# Patient Record
Sex: FEMALE | Race: WHITE | NOT HISPANIC OR LATINO | Employment: OTHER | URBAN - METROPOLITAN AREA
[De-identification: names, ages, dates, MRNs, and addresses within clinical notes are randomized per-mention and may not be internally consistent; named-entity substitution may affect disease eponyms.]

---

## 2022-05-27 ENCOUNTER — CARE AT HOME (OUTPATIENT)
Dept: HOME HEALTH SERVICES | Facility: CLINIC | Age: 87
End: 2022-05-27
Payer: MEDICARE

## 2022-05-27 DIAGNOSIS — I10 HYPERTENSION, UNSPECIFIED TYPE: ICD-10-CM

## 2022-05-27 DIAGNOSIS — G47.00 INSOMNIA, UNSPECIFIED TYPE: ICD-10-CM

## 2022-05-27 PROCEDURE — 99497 PR ADVNCD CARE PLAN 30 MIN: ICD-10-PCS | Mod: S$GLB,,, | Performed by: NURSE PRACTITIONER

## 2022-05-27 PROCEDURE — 99350 PR HOME VISIT,ESTAB PATIENT,LEVEL IV: ICD-10-PCS | Mod: S$GLB,,, | Performed by: NURSE PRACTITIONER

## 2022-05-27 PROCEDURE — 99350 HOME/RES VST EST HIGH MDM 60: CPT | Mod: S$GLB,,, | Performed by: NURSE PRACTITIONER

## 2022-05-27 PROCEDURE — 99497 ADVNCD CARE PLAN 30 MIN: CPT | Mod: S$GLB,,, | Performed by: NURSE PRACTITIONER

## 2022-05-30 NOTE — PROGRESS NOTES
[facility]  Medical Home Visit    Visit Date: 2022  Encounter Provider: Rula Mays  PCP:  Rula Mays, NP    Subjective:      Patient ID: Radha Sandoval is a 87 y.o. female.    Consult Requested By:  No ref. provider found  Reason for Consult:  Establish care    Radha is being seen at home due to being seen at home due to physical debility that presents a taxing effort to leave the home, to mitigate high risk of hospital readmission and/or due to the limited availability of reliable or safe options for transportation to the point of access to the provider. Prior to treatment on this visit the chart was reviewed and patient verbal consent was obtained.    Chief Complaint: Establish Care HTN, Insomnia      HPI  Radha Sandoval  is a  y/o M/F with a past medical history of  Hypertension and Insomnia . Currently is ambulatory with a rollator and lives home alone in [place], she has a son who lives nearby and checks on her frequently. She is interested in moving to [facility], an assistant living facility as it is getting taxing to prepare meals etc.     With this visit today patient is found at home alone, ambulatory with rollator. Patient is AAOx3, able to verify her name and . Most of patients other history was received from her medical record. She does not have Home health and there is no skill for home health that meets medical necessity. She does not have a PCP, I will refer to [facility] clinic to establish PCP. I will continue seeing the patient in the home as it is difficult for him to physically make multiple visits. SHe endorses eating x 3 meals per day. SHe endorses regular BMs.       Fall/Safety precautions. Education completed ~ 15 minutes. Plan to follow up.     VSS. Denies fever, chest pain, shortness of breath, nausea, vomiting, diarrhea. Risks of environmental exposure to coronavirus discussed including: social distancing, hand hygiene, and limiting departures from the home for  necessities only.  Reports understanding and willingness to comply.  All hospital discharge orders reviewed and being followed, all medications reconciled and reviewed, patient and family verbalized understanding. No other needs identified at this time.     I initiated the process of advance care planning today and explained the importance of this process to the patient and family.  I introduced the concept of advance directives to the patient, as well. Then the patient received detailed information about the importance of designating a Health Care Power of  (HCPOA). She was also instructed to communicate with this person about his wishes for future healthcare, should he become sick and lose decision-making capacity. FULL CODE STATUS    I Introduced [place] form with patient/family, explaining this is the patient's wishes, and this form will be uploaded into the patient's [facility] and the [place] Registry.     We spoke about ACP for 20 minutes.    Attestation: Screening criteria to assess the level of the patient's risk for infection with COVID-19 as recommended by the CDC at the time of the above documented home visit concluded appropriateness to proceed. Universal precautions were maintained at all times, including provider use of 60% alcohol gel hand  immediately prior to entry and upon departing the patient's home.    Review of Systems   Constitutional: Negative for fatigue and unexpected weight change.   HENT: Positive for trouble swallowing. Negative for congestion, dental problem and drooling.    Eyes: Negative for redness and visual disturbance.   Respiratory: Negative for cough and shortness of breath.    Cardiovascular: Negative for chest pain and palpitations.   Gastrointestinal: Negative for abdominal distention, abdominal pain, nausea and vomiting.   Endocrine: Negative for polydipsia and polyuria.   Genitourinary: Negative for difficulty urinating and flank pain.    Musculoskeletal: Positive for gait problem. Negative for arthralgias.   Skin: Negative for color change and rash.   Allergic/Immunologic: Negative for food allergies.   Neurological: Negative for dizziness and weakness.   Psychiatric/Behavioral: Negative for agitation.       Assessments:  · Environmental: Lives in single story home with 4 steps to enter  · Functional Status: Independent with ADLs, rollator for mobility   · Safety: Fall precautions  · Nutritional: Heart healthy  · Home Health/DME/Supplies: rollator    Objective:   Physical Exam  HENT:      Head: Atraumatic.      Nose: Nose normal.      Mouth/Throat:      Mouth: Mucous membranes are moist.   Eyes:      Pupils: Pupils are equal, round, and reactive to light.   Cardiovascular:      Rate and Rhythm: Normal rate.      Pulses: Normal pulses.   Pulmonary:      Effort: Pulmonary effort is normal.   Abdominal:      General: Abdomen is flat. Bowel sounds are normal.      Palpations: Abdomen is soft.   Musculoskeletal:         General: Normal range of motion.      Cervical back: Normal range of motion.   Skin:     General: Skin is warm and dry.      Capillary Refill: Capillary refill takes less than 2 seconds.   Neurological:      Mental Status: She is alert and oriented to person, place, and time.         Vitals:    05/27/22 1015   BP: 128/73   Pulse: 72   Resp: 18   Temp: 97.6 °F (36.4 °C)   SpO2: 98%   PainSc: 0-No pain     There is no height or weight on file to calculate BMI.    Assessment:     1. Hypertension, unspecified type    2. Insomnia, unspecified type      Hypertension    Norvasc 10 mg po daily  Lisinopril 10 mg po daily  Low Na diet    Insomnia  Ramelteon 8 mg po Qhs     Constipation  Colace 100 mg po prn constipation    Plan:     Ethical / Legal: Advance Care Planning   · Surrogate decision maker:  Cali Sandoval, Relationship: son  · Code Status:  Full code  · [place]:  To discuss with family  · Other advance directive:  To discuss with family,  Capacity to make medical decisions:  yes, Conflict n/a       Problem List Items Addressed This Visit        Cardiac/Vascular    Hypertension       Other    Insomnia           Were controlled substances prescribed?  No    Follow Up Appointments:   No future appointments.    Signature: Rula Mays, NP

## 2022-06-10 VITALS
HEART RATE: 72 BPM | SYSTOLIC BLOOD PRESSURE: 128 MMHG | OXYGEN SATURATION: 98 % | TEMPERATURE: 98 F | DIASTOLIC BLOOD PRESSURE: 73 MMHG | RESPIRATION RATE: 18 BRPM

## 2022-06-10 PROBLEM — I10 HYPERTENSION: Status: ACTIVE | Noted: 2022-06-10

## 2022-06-10 PROBLEM — G47.00 INSOMNIA: Status: ACTIVE | Noted: 2022-06-10

## 2022-06-10 RX ORDER — LISINOPRIL 10 MG/1
10 TABLET ORAL DAILY
Status: ON HOLD | COMMUNITY
End: 2023-01-01 | Stop reason: HOSPADM

## 2022-06-10 RX ORDER — RAMELTEON 8 MG/1
8 TABLET ORAL NIGHTLY
Status: ON HOLD | COMMUNITY
End: 2023-01-01 | Stop reason: HOSPADM

## 2022-06-10 RX ORDER — AMLODIPINE BESYLATE 10 MG/1
10 TABLET ORAL DAILY
COMMUNITY
End: 2023-01-01

## 2022-06-10 NOTE — PATIENT INSTRUCTIONS
Instructions:  - [facility] Nurse Practitioner to schedule home follow-up visit with patient in 4-6 weeks or as needed.  - Continue all medications, treatments and therapies as ordered.   - Follow all instructions, recommendations as discussed.  - Maintain Safety Precautions at all times.  - Attend all medical appointments as scheduled.  - For worsening symptoms: call Primary Care Physician or Nurse Practitioner.  - For emergencies, call 911 or immediately report to the nearest emergency room.  - Limit Risks of environmental exposure to coronavirus/COVID-19 as discussed including: social distancing, hand hygiene, and limiting departures from the home for necessities only.

## 2022-07-08 ENCOUNTER — CARE AT HOME (OUTPATIENT)
Dept: HOME HEALTH SERVICES | Facility: CLINIC | Age: 87
End: 2022-07-08
Payer: MEDICARE

## 2022-07-08 DIAGNOSIS — G30.9 ALZHEIMER'S DEMENTIA WITHOUT BEHAVIORAL DISTURBANCE, UNSPECIFIED TIMING OF DEMENTIA ONSET: Primary | ICD-10-CM

## 2022-07-08 DIAGNOSIS — Z51.5 PALLIATIVE CARE ENCOUNTER: ICD-10-CM

## 2022-07-08 DIAGNOSIS — F02.80 ALZHEIMER'S DEMENTIA WITHOUT BEHAVIORAL DISTURBANCE, UNSPECIFIED TIMING OF DEMENTIA ONSET: Primary | ICD-10-CM

## 2022-07-08 PROCEDURE — 99349 HOME/RES VST EST MOD MDM 40: CPT | Mod: S$GLB,,, | Performed by: NURSE PRACTITIONER

## 2022-07-08 PROCEDURE — 99349 PR HOME VISIT,ESTAB PATIENT,LEVEL III: ICD-10-PCS | Mod: S$GLB,,, | Performed by: NURSE PRACTITIONER

## 2022-07-11 NOTE — PROGRESS NOTES
[facility]  Medical Home Visit    Visit Date: 2022  Encounter Provider: Rula Mays  PCP:  Rula Mays, NP    Subjective:      Patient ID: Radha Sandoval is a 87 y.o. female.    Consult Requested By:  No ref. provider found  Reason for Consult:  Establish care    Radha is being seen at home due to being seen at home due to physical debility that presents a taxing effort to leave the home, to mitigate high risk of hospital readmission and/or due to the limited availability of reliable or safe options for transportation to the point of access to the provider. Prior to treatment on this visit the chart was reviewed and patient verbal consent was obtained.    Chief Complaint: Establish Care HTN, Insomnia      HPI  Radha Sandoval  is a  y/o M/F with a past medical history of  Hypertension and Insomnia . Currently is ambulatory with a rollator and was living at home alone in [place], she has a son who lives nearby and checks on her frequently. She has recently moved to [facility], an assistant living facility. She enjoys playing the piano.      With this visit today patient is found in her room at the [facility], ambulatory with rollator. Patient is AAOx3, able to verify her name and . Most of patients other history was received from her medical record. She does not have Home health and there is no skill for home health that meets medical necessity. She does not have a PCP, I will refer to [facility] clinic to establish PCP. I will continue seeing the patient in the home as it is difficult for him to physically make multiple visits. SHe endorses eating x 3 meals per day. SHe endorses regular BMs.  She feels she has made a good transition to the [facility].     Fall/Safety precautions. Education completed ~ 15 minutes. Plan to follow up.     VSS. Denies fever, chest pain, shortness of breath, nausea, vomiting, diarrhea. Risks of environmental exposure to coronavirus discussed including: social distancing, hand  hygiene, and limiting departures from the home for necessities only.  Reports understanding and willingness to comply.  All hospital discharge orders reviewed and being followed, all medications reconciled and reviewed, patient and family verbalized understanding. No other needs identified at this time.     Attestation: Screening criteria to assess the level of the patient's risk for infection with COVID-19 as recommended by the CDC at the time of the above documented home visit concluded appropriateness to proceed. Universal precautions were maintained at all times, including provider use of 60% alcohol gel hand  immediately prior to entry and upon departing the patient's home.    Review of Systems   Constitutional: Negative for fatigue and unexpected weight change.   HENT: Positive for trouble swallowing. Negative for congestion, dental problem and drooling.    Eyes: Negative for redness and visual disturbance.   Respiratory: Negative for cough and shortness of breath.    Cardiovascular: Negative for chest pain and palpitations.   Gastrointestinal: Negative for abdominal distention, abdominal pain, nausea and vomiting.   Endocrine: Negative for polydipsia and polyuria.   Genitourinary: Negative for difficulty urinating and flank pain.   Musculoskeletal: Positive for gait problem. Negative for arthralgias.   Skin: Negative for color change and rash.   Allergic/Immunologic: Negative for food allergies.   Neurological: Negative for dizziness and weakness.   Psychiatric/Behavioral: Negative for agitation.       Assessments:  · Environmental: Lives in single story home with 4 steps to enter  · Functional Status: Independent with ADLs, rollator for mobility   · Safety: Fall precautions  · Nutritional: Heart healthy  · Home Health/DME/Supplies: rollator    Objective:   Physical Exam  HENT:      Head: Atraumatic.      Nose: Nose normal.      Mouth/Throat:      Mouth: Mucous membranes are moist.   Eyes:       Pupils: Pupils are equal, round, and reactive to light.   Cardiovascular:      Rate and Rhythm: Normal rate.      Pulses: Normal pulses.   Pulmonary:      Effort: Pulmonary effort is normal.   Abdominal:      General: Abdomen is flat. Bowel sounds are normal.      Palpations: Abdomen is soft.   Musculoskeletal:         General: Normal range of motion.      Cervical back: Normal range of motion.   Skin:     General: Skin is warm and dry.      Capillary Refill: Capillary refill takes less than 2 seconds.   Neurological:      Mental Status: She is alert and oriented to person, place, and time.         Vitals:    07/08/22 1100   BP: 122/67   Pulse: 67   Resp: 18   Temp: 97.8 °F (36.6 °C)   SpO2: 98%   PainSc: 0-No pain     There is no height or weight on file to calculate BMI.    Assessment:     1. Alzheimer's dementia without behavioral disturbance, unspecified timing of dementia onset    2. Palliative care encounter      Hypertension    Norvasc 10 mg po daily  Lisinopril 10 mg po daily  Low Na diet    Insomnia  Ramelteon 8 mg po Qhs     Constipation  Colace 100 mg po prn constipation    Plan:     Ethical / Legal: Advance Care Planning   · Surrogate decision maker:  Cali Sandoval, Relationship: son  · Code Status:  Full code  · [place]:  To discuss with family  · Other advance directive:  To discuss with family, Capacity to make medical decisions:  yes, Conflict n/a       Problem List Items Addressed This Visit    None     Visit Diagnoses     Alzheimer's dementia without behavioral disturbance, unspecified timing of dementia onset    -  Primary    Palliative care encounter   C discussion              Were controlled substances prescribed?  No    Follow Up Appointments:   Future Appointments   Date Time Provider Department Center   8/26/2022  8:00 AM Rula Mays NP [facility] [place]       Signature: Rula Mays NP

## 2022-07-13 VITALS
OXYGEN SATURATION: 98 % | RESPIRATION RATE: 18 BRPM | SYSTOLIC BLOOD PRESSURE: 122 MMHG | TEMPERATURE: 98 F | HEART RATE: 67 BPM | DIASTOLIC BLOOD PRESSURE: 67 MMHG

## 2022-07-13 NOTE — PATIENT INSTRUCTIONS
Instructions:  - [facility] Nurse Practitioner to schedule home follow-up visit with patient in 4-6 weeks or as needed.  - Continue all medications, treatments and therapies as ordered.   - Follow all instructions, recommendations as discussed.  - Maintain Safety Precautions at all times.  - Attend all medical appointments as scheduled.  - For worsening symptoms: call Primary Care Physician or Nurse Practitioner.  - For emergencies, call 911 or immediately report to the nearest emergency room.  - Limit Risks of environmental exposure to coronavirus/COVID-19 as discussed including: social distancing, hand hygiene, and limiting departures from the home for necessities only.

## 2022-08-26 ENCOUNTER — CARE AT HOME (OUTPATIENT)
Dept: HOME HEALTH SERVICES | Facility: CLINIC | Age: 87
End: 2022-08-26
Payer: MEDICARE

## 2022-08-26 DIAGNOSIS — I10 HYPERTENSION, UNSPECIFIED TYPE: Primary | ICD-10-CM

## 2022-08-26 PROCEDURE — 99349 HOME/RES VST EST MOD MDM 40: CPT | Mod: S$GLB,,, | Performed by: NURSE PRACTITIONER

## 2022-08-26 PROCEDURE — 99349 PR HOME VISIT,ESTAB PATIENT,LEVEL III: ICD-10-PCS | Mod: S$GLB,,, | Performed by: NURSE PRACTITIONER

## 2022-08-29 NOTE — PROGRESS NOTES
[facility]  Medical Home Visit    Visit Date: 2022  Encounter Provider: Rula Mays  PCP:  Rula Mays, NP    Subjective:      Patient ID: Radha Sandoval is a 87 y.o. female.    Consult Requested By:    Reason for Consult:  Establish care    Radha is being seen at home due to being seen at home due to physical debility that presents a taxing effort to leave the home, to mitigate high risk of hospital readmission and/or due to the limited availability of reliable or safe options for transportation to the point of access to the provider. Prior to treatment on this visit the chart was reviewed and patient verbal consent was obtained.    Chief Complaint: Establish Care HTN, Insomnia      HPI  Radha Sandoval  is a  y/o M/F with a past medical history of  Hypertension and Insomnia . Currently is ambulatory with a rollator and was living at home alone in [place], she has a son who lives nearby and checks on her frequently. She has recently moved to [facility], an assistant living facility. She enjoys playing the piano and has the opportunity to do so a community events at the [facility].     With this visit today patient is found in her room at the [facility], ambulatory with rollator. Patient is AAOx3, able to verify her name and , she does exhibit some confusion today but is easily reoriented. Most of patients other history was received from her medical record. She does not have Home health and there is no skill for home health that meets medical necessity. She does not have a PCP, I will refer to [facility] clinic to establish PCP. I will continue seeing the patient in the home as it is difficult for him to physically make multiple visits. SHe endorses eating x 3 meals per day. SHe endorses regular BMs.  She feels she has made a good transition to the [facility].     Fall/Safety precautions. Education completed ~ 15 minutes. Plan to follow up.     VSS. Denies fever, chest pain, shortness of breath, nausea, vomiting,  diarrhea. Risks of environmental exposure to coronavirus discussed including: social distancing, hand hygiene, and limiting departures from the home for necessities only.  Reports understanding and willingness to comply.  All hospital discharge orders reviewed and being followed, all medications reconciled and reviewed, patient and family verbalized understanding. No other needs identified at this time.     Attestation: Screening criteria to assess the level of the patient's risk for infection with COVID-19 as recommended by the CDC at the time of the above documented home visit concluded appropriateness to proceed. Universal precautions were maintained at all times, including provider use of 60% alcohol gel hand  immediately prior to entry and upon departing the patient's home.    Review of Systems   Constitutional: Negative for fatigue and unexpected weight change.   HENT: Negative for congestion, dental problem and drooling.    Eyes: Negative for redness and visual disturbance.   Respiratory: Negative for cough and shortness of breath.    Cardiovascular: Negative for chest pain and palpitations.   Gastrointestinal: Negative for abdominal distention, abdominal pain, nausea and vomiting.   Endocrine: Negative for polydipsia and polyuria.   Genitourinary: Negative for difficulty urinating and flank pain.   Musculoskeletal: Positive for gait problem. Negative for arthralgias.   Skin: Negative for color change and rash.   Allergic/Immunologic: Negative for food allergies.   Neurological: Negative for dizziness and weakness.   Psychiatric/Behavioral: Negative for agitation.       Assessments:  Environmental: Lives in single story home with 4 steps to enter  Functional Status: Independent with ADLs, rollator for mobility   Safety: Fall precautions  Nutritional: Heart healthy  Home Health/DME/Supplies: rollator    Objective:   Physical Exam  HENT:      Head: Atraumatic.      Nose: Nose normal.      Mouth/Throat:       Mouth: Mucous membranes are moist.   Eyes:      Pupils: Pupils are equal, round, and reactive to light.   Cardiovascular:      Rate and Rhythm: Normal rate.      Pulses: Normal pulses.   Pulmonary:      Effort: Pulmonary effort is normal.   Abdominal:      General: Abdomen is flat. Bowel sounds are normal.      Palpations: Abdomen is soft.   Musculoskeletal:         General: Normal range of motion.      Cervical back: Normal range of motion.   Skin:     General: Skin is warm and dry.      Capillary Refill: Capillary refill takes less than 2 seconds.   Neurological:      Mental Status: She is alert and oriented to person, place, and time.         Vitals:    08/26/22 1217   Resp: 18   Temp: 97.5 °F (36.4 °C)   SpO2: 98%   PainSc: 0-No pain     There is no height or weight on file to calculate BMI.    Assessment:     1. Hypertension, unspecified type      Hypertension    Norvasc 10 mg po daily  Lisinopril 10 mg po daily  Low Na diet    Insomnia  Ramelteon 8 mg po Qhs     Constipation  Colace 100 mg po prn constipation    Plan:     Ethical / Legal: Advance Care Planning   Surrogate decision maker:  Cali Sandoval, Relationship: son  Code Status:  Full code  [place]:  To discuss with family  Other advance directive:  To discuss with family, Capacity to make medical decisions:  yes, Conflict n/a       Problem List Items Addressed This Visit    None       Visit Diagnoses       Alzheimer's dementia without behavioral disturbance, unspecified timing of dementia onset    -  Primary    Palliative care encounter   GOC discussion                Were controlled substances prescribed?  No    Follow Up Appointments:   No future appointments.      Signature: Rula Mays NP

## 2022-09-05 VITALS — RESPIRATION RATE: 18 BRPM | OXYGEN SATURATION: 98 % | TEMPERATURE: 98 F

## 2022-09-05 NOTE — PATIENT INSTRUCTIONS
Instructions:  - [facility] Pract[facility] to schedule home follow-up visit with patient in 4-6 weeks or as needed.  - Continue all medications, treatments and therapies as ordered.   - Follow all instructions, recommendations as discussed.  - Maintain Safety Precautions at all times.  - Attend all medical appointments as scheduled.  - For worsening symptoms: call Primary Care Physician or Nurse Practitioner.  - For emergencies, call 911 or immediately report to the nearest emergency room.  - Limit Risks of environmental exposure to coronavirus/COVID-19 as discussed including: social distancing, hand hygiene, and limiting departures from the home for necessities only.

## 2022-10-07 ENCOUNTER — CARE AT HOME (OUTPATIENT)
Dept: HOME HEALTH SERVICES | Facility: CLINIC | Age: 87
End: 2022-10-07
Payer: MEDICARE

## 2022-10-07 DIAGNOSIS — G47.00 INSOMNIA, UNSPECIFIED TYPE: ICD-10-CM

## 2022-10-07 DIAGNOSIS — I10 HYPERTENSION, UNSPECIFIED TYPE: Primary | ICD-10-CM

## 2022-10-07 PROCEDURE — 99349 PR HOME VISIT,ESTAB PATIENT,LEVEL III: ICD-10-PCS | Mod: ,,, | Performed by: NURSE PRACTITIONER

## 2022-10-07 PROCEDURE — 99349 HOME/RES VST EST MOD MDM 40: CPT | Mod: ,,, | Performed by: NURSE PRACTITIONER

## 2022-10-10 NOTE — PROGRESS NOTES
[facility]  Medical Home Visit    Visit Date: 10/07/2022  Encounter Provider: Rula Mays  PCP:  Rula Mays, NP    Subjective:      Patient ID: Radha Sandoval is a 87 y.o. female.    Consult Requested By:    Reason for Consult:  Medical follow up    Radha is being seen at home due to being seen at home due to physical debility that presents a taxing effort to leave the home, to mitigate high risk of hospital readmission and/or due to the limited availability of reliable or safe options for transportation to the point of access to the provider. Prior to treatment on this visit the chart was reviewed and patient verbal consent was obtained.    Chief Complaint: HTN, Insomnia      HPI  Radha Sandoval  is an 86 y/o F with a past medical history of  Hypertension and Insomnia . Currently is ambulatory with a rollator and was living at home alone in [place], she has a son who lives nearby and checks on her frequently. She has recently moved to [facility], an assistant [facility] facility where she is being evaluated today. She enjoys playing the piano and has the opportunity to do so at community events at the [facility].     With this visit today patient is found in her room at the [facility], ambulatory with rollator. Patient is AAOx3, able to verify her name and , she does exhibit some confusion today but is easily reoriented. Most of patients other history was received from her medical record. She does not have Home health and there is no skill for home health that meets medical necessity. She does not have a PCP, I will refer to [facility] clinic to establish PCP. I will continue seeing the patient in the home as it is difficult for him to physically make multiple visits. SHe endorses eating x 3 meals per day. SHe endorses regular BMs.  She feels she has made a good transition to the [facility]. She has no acute concerns, I have refilled all prescriptions.     Fall/Safety precautions. Education completed ~ 15 minutes. Plan to  follow up.     VSS. Denies fever, chest pain, shortness of breath, nausea, vomiting, diarrhea. Risks of environmental exposure to coronavirus discussed including: social distancing, hand hygiene, and limiting departures from the home for necessities only.  Reports understanding and willingness to comply.  All hospital discharge orders reviewed and being followed, all medications reconciled and reviewed, patient and family verbalized understanding. No other needs identified at this time.     Attestation: Screening criteria to assess the level of the patient's risk for infection with COVID-19 as recommended by the CDC at the time of the above documented home visit concluded appropriateness to proceed. Universal precautions were maintained at all times, including provider use of 60% alcohol gel hand  immediately prior to entry and upon departing the patient's home.    Review of Systems   Constitutional: Negative for fatigue and unexpected weight change.   HENT: Negative for congestion, dental problem and drooling.    Eyes: Negative for redness and visual disturbance.   Respiratory: Negative for cough and shortness of breath.    Cardiovascular: Negative for chest pain and palpitations.   Gastrointestinal: Negative for abdominal distention, abdominal pain, nausea and vomiting.   Endocrine: Negative for polydipsia and polyuria.   Genitourinary: Negative for difficulty urinating and flank pain.   Musculoskeletal: Positive for gait problem. Negative for arthralgias.   Skin: Negative for color change and rash.   Allergic/Immunologic: Negative for food allergies.   Neurological: Negative for dizziness and weakness.   Psychiatric/Behavioral: Negative for agitation.       Assessments:  Environmental: Lives in single story home with 4 steps to enter  Functional Status: Independent with ADLs, rollator for mobility   Safety: Fall precautions  Nutritional: Heart healthy  Home Health/DME/Supplies: rollator    Objective:    Physical Exam  HENT:      Head: Atraumatic.      Nose: Nose normal.      Mouth/Throat:      Mouth: Mucous membranes are moist.   Eyes:      Pupils: Pupils are equal, round, and reactive to light.   Cardiovascular:      Rate and Rhythm: Normal rate.      Pulses: Normal pulses.   Pulmonary:      Effort: Pulmonary effort is normal.   Abdominal:      General: Abdomen is flat. Bowel sounds are normal.      Palpations: Abdomen is soft.   Musculoskeletal:         General: Normal range of motion.      Cervical back: Normal range of motion.   Skin:     General: Skin is warm and dry.      Capillary Refill: Capillary refill takes less than 2 seconds.   Neurological:      Mental Status: She is alert and oriented to person, place, and time.         Vitals:    10/07/22 1100   Pulse: 70   Resp: 18   Temp: 97.8 °F (36.6 °C)   SpO2: 98%   PainSc: 0-No pain     There is no height or weight on file to calculate BMI.    Assessment:     1. Hypertension, unspecified type    2. Insomnia, unspecified type      Hypertension    Norvasc 10 mg po daily  Lisinopril 10 mg po daily  Low Na diet    Insomnia  Ramelteon 8 mg po Qhs     Constipation  Colace 100 mg po prn constipation    Plan:     Ethical / Legal: Advance Care Planning   Surrogate decision maker:  Cali Sandoval, Relationship: son  Code Status:  Full code  LaPOST:  To discuss with family  Other advance directive:  To discuss with family, Capacity to make medical decisions:  yes, Conflict n/a       Problem List Items Addressed This Visit    None       Visit Diagnoses       Alzheimer's dementia without behavioral disturbance, unspecified timing of dementia onset    -  Primary    Palliative care encounter   GOC discussion                Were controlled substances prescribed?  No    Follow Up Appointments:   No future appointments.      Signature: Rula Mays, MARYANA

## 2022-10-12 VITALS — OXYGEN SATURATION: 98 % | RESPIRATION RATE: 18 BRPM | TEMPERATURE: 98 F | HEART RATE: 70 BPM

## 2022-10-12 NOTE — PATIENT INSTRUCTIONS
Instructions:  - [facility] Pract[facility] to schedule home follow-up visit with patient in 6-8 weeks or as needed.  - Continue all medications, treatments and therapies as ordered.   - Follow all instructions, recommendations as discussed.  - Maintain Safety Precautions at all times.  - Attend all medical appointments as scheduled.  - For worsening symptoms: call Primary Care Physician or Nurse Practitioner.  - For emergencies, call 911 or immediately report to the nearest emergency room.  - Limit Risks of environmental exposure to coronavirus/COVID-19 as discussed including: social distancing, hand hygiene, and limiting departures from the home for necessities only.

## 2022-12-09 ENCOUNTER — CARE AT HOME (OUTPATIENT)
Dept: HOME HEALTH SERVICES | Facility: CLINIC | Age: 87
End: 2022-12-09
Payer: MEDICARE

## 2022-12-09 DIAGNOSIS — I10 HYPERTENSION, UNSPECIFIED TYPE: Primary | ICD-10-CM

## 2022-12-09 PROCEDURE — 99349 PR HOME VISIT,ESTAB PATIENT,LEVEL III: ICD-10-PCS | Mod: S$GLB,,, | Performed by: NURSE PRACTITIONER

## 2022-12-09 PROCEDURE — 99349 HOME/RES VST EST MOD MDM 40: CPT | Mod: S$GLB,,, | Performed by: NURSE PRACTITIONER

## 2022-12-28 VITALS — TEMPERATURE: 98 F | OXYGEN SATURATION: 96 % | RESPIRATION RATE: 18 BRPM | HEART RATE: 76 BPM

## 2022-12-28 NOTE — PROGRESS NOTES
[facility]  Medical Home Visit    Visit Date: 2022  Encounter Provider: Rula Mays  PCP:  Rula Mays, NP    Subjective:      Patient ID: Radha Sandoval is a 87 y.o. female.    Consult Requested By:    Reason for Consult:  Medical follow up    Radha is being seen at home due to being seen at home due to physical debility that presents a taxing effort to leave the home, to mitigate high risk of hospital readmission and/or due to the limited availability of reliable or safe options for transportation to the point of access to the provider. Prior to treatment on this visit the chart was reviewed and patient verbal consent was obtained.    Chief Complaint: HTN, Insomnia      HPI  Radha Sandoval  is an 88 y/o F with a past medical history of  Hypertension and Insomnia . At our first visit she was ambulatory with a rollator and was living at home alone in [place], she has a son who lives nearby and checks on her frequently. She has recently moved to [facility], an assistant [facility] facility where she is being evaluated today. She enjoys playing the piano and has the opportunity to do so at community events at the [facility].     With this visit today patient is found in her room at the [facility], ambulatory with rollator. Patient is AAOx3, able to verify her name and , she does exhibit some confusion today but is easily reoriented. Most of patients other history was received from her medical record. She does not have Home health and there is no skill for home health that meets medical necessity. She does not have a PCP, I will refer to [facility] clinic to establish PCP. I will continue seeing the patient in the home as it is difficult for him to physically make multiple visits. SHe endorses eating x 3 meals per day. SHe endorses regular BMs.  She feels she has made a good transition to the [facility]. She has no acute concerns, I have refilled all prescriptions.     Fall/Safety precautions. Education completed ~ 15  minutes. Plan to follow up.     VSS. Denies fever, chest pain, shortness of breath, nausea, vomiting, diarrhea. Risks of environmental exposure to coronavirus discussed including: social distancing, hand hygiene, and limiting departures from the home for necessities only.  Reports understanding and willingness to comply.  All hospital discharge orders reviewed and being followed, all medications reconciled and reviewed, patient and family verbalized understanding. No other needs identified at this time.     Attestation: Screening criteria to assess the level of the patient's risk for infection with COVID-19 as recommended by the CDC at the time of the above documented home visit concluded appropriateness to proceed. Universal precautions were maintained at all times, including provider use of 60% alcohol gel hand  immediately prior to entry and upon departing the patient's home.    Review of Systems   Constitutional: Negative for fatigue and unexpected weight change.   HENT: Negative for congestion, dental problem and drooling.    Eyes: Negative for redness and visual disturbance.   Respiratory: Negative for cough and shortness of breath.    Cardiovascular: Negative for chest pain and palpitations.   Gastrointestinal: Negative for abdominal distention, abdominal pain, nausea and vomiting.   Endocrine: Negative for polydipsia and polyuria.   Genitourinary: Negative for difficulty urinating and flank pain.   Musculoskeletal: Positive for gait problem. Negative for arthralgias.   Skin: Negative for color change and rash.   Allergic/Immunologic: Negative for food allergies.   Neurological: Negative for dizziness and weakness.   Psychiatric/Behavioral: Negative for agitation.       Assessments:  Environmental: Lives in single story home with 4 steps to enter  Functional Status: Independent with ADLs, rollator for mobility   Safety: Fall precautions  Nutritional: Heart healthy  Home Health/DME/Supplies:  rollator    Objective:   Physical Exam  HENT:      Head: Atraumatic.      Nose: Nose normal.      Mouth/Throat:      Mouth: Mucous membranes are moist.   Eyes:      Pupils: Pupils are equal, round, and reactive to light.   Cardiovascular:      Rate and Rhythm: Normal rate.      Pulses: Normal pulses.   Pulmonary:      Effort: Pulmonary effort is normal.   Abdominal:      General: Abdomen is flat. Bowel sounds are normal.      Palpations: Abdomen is soft.   Musculoskeletal:         General: Normal range of motion.      Cervical back: Normal range of motion.   Skin:     General: Skin is warm and dry.      Capillary Refill: Capillary refill takes less than 2 seconds.   Neurological:      Mental Status: She is alert and oriented to person, place, and time.         Vitals:    12/09/22 0912   Pulse: 76   Resp: 18   Temp: 97.8 °F (36.6 °C)   SpO2: 96%     There is no height or weight on file to calculate BMI.    Assessment:     No diagnosis found.    Hypertension    Norvasc 10 mg po daily  Lisinopril 10 mg po daily  Low Na diet    Insomnia  Ramelteon 8 mg po Qhs     Constipation  Colace 100 mg po prn constipation    Plan:     Ethical / Legal: Advance Care Planning   Surrogate decision maker:  Cali Sandoval, Relationship: son  Code Status:  Full code  LaPOST:  To discuss with family  Other advance directive:  To discuss with family, Capacity to make medical decisions:  yes, Conflict n/a       Problem List Items Addressed This Visit    None       Visit Diagnoses       Alzheimer's dementia without behavioral disturbance, unspecified timing of dementia onset    -  Primary    Palliative care encounter   GOC discussion                Were controlled substances prescribed?  No    Follow Up Appointments:   No future appointments.      Signature: Rula Mays NP

## 2022-12-28 NOTE — PATIENT INSTRUCTIONS
Instructions:  - [facility] Nurse Practition[facility] to schedule home follow-up visit with patient in 6-8 weeks or as needed.  - Continue all medications, treatments and therapies as ordered.   - Follow all instructions, recommendations as discussed.  - Maintain Safety Precautions at all times.  - Attend all medical appointments as scheduled.  - For worsening symptoms: call Primary Care Physician or Nurse Practitioner.  - For emergencies, call 911 or immediately report to the nearest emergency room.  - Limit Risks of environmental exposure to coronavirus/COVID-19 as discussed including: social distancing, hand hygiene, and limiting departures from the home for necessities only.

## 2023-01-01 ENCOUNTER — TELEPHONE (OUTPATIENT)
Dept: NEUROLOGY | Facility: CLINIC | Age: 88
End: 2023-01-01
Payer: MEDICARE

## 2023-01-01 ENCOUNTER — HOSPITAL ENCOUNTER (INPATIENT)
Facility: HOSPITAL | Age: 88
LOS: 9 days | Discharge: NURSING, REHAB OR LONG-TERM CARE | DRG: 690 | End: 2023-04-28
Attending: STUDENT IN AN ORGANIZED HEALTH CARE EDUCATION/TRAINING PROGRAM | Admitting: STUDENT IN AN ORGANIZED HEALTH CARE EDUCATION/TRAINING PROGRAM
Payer: MEDICARE

## 2023-01-01 ENCOUNTER — PATIENT OUTREACH (OUTPATIENT)
Dept: ADMINISTRATIVE | Facility: CLINIC | Age: 88
End: 2023-01-01
Payer: MEDICARE

## 2023-01-01 ENCOUNTER — CARE AT HOME (OUTPATIENT)
Dept: HOME HEALTH SERVICES | Facility: CLINIC | Age: 88
End: 2023-01-01
Payer: MEDICARE

## 2023-01-01 ENCOUNTER — HOSPITAL ENCOUNTER (INPATIENT)
Facility: HOSPITAL | Age: 88
LOS: 7 days | Discharge: NURSING, REHAB OR LONG-TERM CARE | DRG: 175 | End: 2023-05-29
Attending: EMERGENCY MEDICINE | Admitting: STUDENT IN AN ORGANIZED HEALTH CARE EDUCATION/TRAINING PROGRAM
Payer: MEDICARE

## 2023-01-01 ENCOUNTER — TELEPHONE (OUTPATIENT)
Dept: ADMINISTRATIVE | Facility: HOSPITAL | Age: 88
End: 2023-01-01
Payer: MEDICARE

## 2023-01-01 ENCOUNTER — LAB VISIT (OUTPATIENT)
Dept: LAB | Facility: HOSPITAL | Age: 88
End: 2023-01-01
Payer: MEDICARE

## 2023-01-01 ENCOUNTER — TELEPHONE (OUTPATIENT)
Dept: NEUROSURGERY | Facility: CLINIC | Age: 88
End: 2023-01-01
Payer: MEDICARE

## 2023-01-01 ENCOUNTER — HOSPITAL ENCOUNTER (INPATIENT)
Facility: HOSPITAL | Age: 88
LOS: 4 days | Discharge: NURSING, REHAB OR LONG-TERM CARE | DRG: 092 | End: 2023-08-14
Attending: EMERGENCY MEDICINE | Admitting: HOSPITALIST
Payer: MEDICARE

## 2023-01-01 ENCOUNTER — HOSPITAL ENCOUNTER (INPATIENT)
Facility: HOSPITAL | Age: 88
LOS: 6 days | Discharge: HOME HEALTH | DRG: 291 | End: 2023-07-14
Attending: STUDENT IN AN ORGANIZED HEALTH CARE EDUCATION/TRAINING PROGRAM | Admitting: HOSPITALIST
Payer: MEDICARE

## 2023-01-01 ENCOUNTER — HOSPITAL ENCOUNTER (INPATIENT)
Facility: OTHER | Age: 88
LOS: 2 days | Discharge: NURSING, REHAB OR LONG-TERM CARE | DRG: 682 | End: 2023-09-21
Attending: EMERGENCY MEDICINE | Admitting: HOSPITALIST
Payer: MEDICARE

## 2023-01-01 ENCOUNTER — OFFICE VISIT (OUTPATIENT)
Dept: INTERNAL MEDICINE | Facility: CLINIC | Age: 88
DRG: 690 | End: 2023-01-01
Payer: MEDICARE

## 2023-01-01 ENCOUNTER — TELEPHONE (OUTPATIENT)
Dept: INTERNAL MEDICINE | Facility: CLINIC | Age: 88
End: 2023-01-01
Payer: MEDICARE

## 2023-01-01 ENCOUNTER — PATIENT MESSAGE (OUTPATIENT)
Dept: ORTHOPEDICS | Facility: CLINIC | Age: 88
End: 2023-01-01
Payer: MEDICARE

## 2023-01-01 ENCOUNTER — HOSPITAL ENCOUNTER (INPATIENT)
Facility: HOSPITAL | Age: 88
LOS: 3 days | Discharge: NURSING, REHAB OR LONG-TERM CARE | DRG: 689 | End: 2023-12-19
Attending: EMERGENCY MEDICINE | Admitting: EMERGENCY MEDICINE
Payer: MEDICARE

## 2023-01-01 ENCOUNTER — OFFICE VISIT (OUTPATIENT)
Dept: INTERNAL MEDICINE | Facility: CLINIC | Age: 88
DRG: 963 | End: 2023-01-01
Payer: MEDICARE

## 2023-01-01 ENCOUNTER — ANESTHESIA EVENT (OUTPATIENT)
Dept: SURGERY | Facility: HOSPITAL | Age: 88
DRG: 352 | End: 2023-01-01
Payer: MEDICARE

## 2023-01-01 ENCOUNTER — HOSPITAL ENCOUNTER (EMERGENCY)
Facility: OTHER | Age: 88
Discharge: HOME | End: 2023-11-06
Attending: EMERGENCY MEDICINE
Payer: MEDICARE

## 2023-01-01 ENCOUNTER — TELEPHONE (OUTPATIENT)
Dept: INTERNAL MEDICINE | Facility: CLINIC | Age: 88
End: 2023-01-01

## 2023-01-01 ENCOUNTER — ANESTHESIA (OUTPATIENT)
Dept: SURGERY | Facility: HOSPITAL | Age: 88
DRG: 352 | End: 2023-01-01
Payer: MEDICARE

## 2023-01-01 ENCOUNTER — OFFICE VISIT (OUTPATIENT)
Dept: ORTHOPEDICS | Facility: CLINIC | Age: 88
End: 2023-01-01
Payer: MEDICARE

## 2023-01-01 ENCOUNTER — EXTERNAL HOME HEALTH (OUTPATIENT)
Dept: HOME HEALTH SERVICES | Facility: HOSPITAL | Age: 88
End: 2023-01-01
Payer: MEDICARE

## 2023-01-01 ENCOUNTER — HOSPITAL ENCOUNTER (INPATIENT)
Facility: HOSPITAL | Age: 88
LOS: 15 days | Discharge: NURSING, REHAB OR LONG-TERM CARE | DRG: 963 | End: 2023-08-07
Attending: EMERGENCY MEDICINE | Admitting: INTERNAL MEDICINE
Payer: MEDICARE

## 2023-01-01 ENCOUNTER — HOSPITAL ENCOUNTER (OUTPATIENT)
Dept: RADIOLOGY | Facility: HOSPITAL | Age: 88
Discharge: HOME | End: 2023-11-15
Attending: PHYSICIAN ASSISTANT
Payer: MEDICARE

## 2023-01-01 ENCOUNTER — OFFICE VISIT (OUTPATIENT)
Dept: NEUROLOGY | Facility: CLINIC | Age: 88
End: 2023-01-01
Payer: MEDICARE

## 2023-01-01 ENCOUNTER — HOSPITAL ENCOUNTER (EMERGENCY)
Facility: HOSPITAL | Age: 88
Discharge: HOME | End: 2023-03-27
Attending: EMERGENCY MEDICINE
Payer: MEDICARE

## 2023-01-01 ENCOUNTER — TELEPHONE (OUTPATIENT)
Dept: ORTHOPEDICS | Facility: CLINIC | Age: 88
End: 2023-01-01
Payer: MEDICARE

## 2023-01-01 ENCOUNTER — HOSPITAL ENCOUNTER (INPATIENT)
Facility: HOSPITAL | Age: 88
LOS: 3 days | Discharge: HOME | DRG: 352 | End: 2023-05-09
Attending: EMERGENCY MEDICINE | Admitting: SURGERY
Payer: MEDICARE

## 2023-01-01 ENCOUNTER — PATIENT MESSAGE (OUTPATIENT)
Dept: NEUROLOGY | Facility: CLINIC | Age: 88
End: 2023-01-01
Payer: MEDICARE

## 2023-01-01 ENCOUNTER — HOSPITAL ENCOUNTER (OUTPATIENT)
Dept: RADIOLOGY | Facility: HOSPITAL | Age: 88
Discharge: HOME | End: 2023-08-30
Attending: PHYSICIAN ASSISTANT
Payer: MEDICARE

## 2023-01-01 ENCOUNTER — HOSPITAL ENCOUNTER (INPATIENT)
Facility: OTHER | Age: 88
LOS: 2 days | Discharge: HOME | DRG: 380 | End: 2024-01-04
Attending: EMERGENCY MEDICINE | Admitting: INTERNAL MEDICINE
Payer: MEDICARE

## 2023-01-01 VITALS
WEIGHT: 167.56 LBS | HEART RATE: 80 BPM | RESPIRATION RATE: 16 BRPM | TEMPERATURE: 98 F | BODY MASS INDEX: 29.69 KG/M2 | OXYGEN SATURATION: 95 % | DIASTOLIC BLOOD PRESSURE: 51 MMHG | HEIGHT: 63 IN | SYSTOLIC BLOOD PRESSURE: 109 MMHG

## 2023-01-01 VITALS
DIASTOLIC BLOOD PRESSURE: 72 MMHG | SYSTOLIC BLOOD PRESSURE: 179 MMHG | RESPIRATION RATE: 18 BRPM | TEMPERATURE: 98 F | WEIGHT: 133.63 LBS | HEART RATE: 80 BPM | OXYGEN SATURATION: 98 % | BODY MASS INDEX: 22.93 KG/M2

## 2023-01-01 VITALS
DIASTOLIC BLOOD PRESSURE: 79 MMHG | HEIGHT: 65 IN | WEIGHT: 200 LBS | RESPIRATION RATE: 18 BRPM | TEMPERATURE: 99 F | OXYGEN SATURATION: 95 % | HEART RATE: 91 BPM | BODY MASS INDEX: 33.32 KG/M2 | SYSTOLIC BLOOD PRESSURE: 194 MMHG

## 2023-01-01 VITALS
HEART RATE: 80 BPM | WEIGHT: 156.75 LBS | TEMPERATURE: 97 F | OXYGEN SATURATION: 98 % | SYSTOLIC BLOOD PRESSURE: 152 MMHG | BODY MASS INDEX: 26.12 KG/M2 | DIASTOLIC BLOOD PRESSURE: 60 MMHG | HEIGHT: 65 IN

## 2023-01-01 VITALS
OXYGEN SATURATION: 92 % | SYSTOLIC BLOOD PRESSURE: 126 MMHG | DIASTOLIC BLOOD PRESSURE: 56 MMHG | HEART RATE: 81 BPM | RESPIRATION RATE: 18 BRPM | TEMPERATURE: 98 F

## 2023-01-01 VITALS
WEIGHT: 156 LBS | HEIGHT: 65 IN | SYSTOLIC BLOOD PRESSURE: 132 MMHG | TEMPERATURE: 98 F | OXYGEN SATURATION: 93 % | BODY MASS INDEX: 25.99 KG/M2 | HEART RATE: 58 BPM | DIASTOLIC BLOOD PRESSURE: 63 MMHG | RESPIRATION RATE: 18 BRPM

## 2023-01-01 VITALS
WEIGHT: 167 LBS | SYSTOLIC BLOOD PRESSURE: 139 MMHG | DIASTOLIC BLOOD PRESSURE: 64 MMHG | HEART RATE: 88 BPM | RESPIRATION RATE: 20 BRPM | TEMPERATURE: 97 F | HEIGHT: 64 IN | OXYGEN SATURATION: 97 % | BODY MASS INDEX: 28.51 KG/M2

## 2023-01-01 VITALS
SYSTOLIC BLOOD PRESSURE: 133 MMHG | HEART RATE: 115 BPM | WEIGHT: 167 LBS | OXYGEN SATURATION: 97 % | TEMPERATURE: 98 F | RESPIRATION RATE: 19 BRPM | DIASTOLIC BLOOD PRESSURE: 60 MMHG | BODY MASS INDEX: 28.51 KG/M2 | HEIGHT: 64 IN

## 2023-01-01 VITALS
WEIGHT: 167 LBS | HEIGHT: 63 IN | SYSTOLIC BLOOD PRESSURE: 128 MMHG | BODY MASS INDEX: 29.59 KG/M2 | DIASTOLIC BLOOD PRESSURE: 70 MMHG

## 2023-01-01 VITALS
DIASTOLIC BLOOD PRESSURE: 65 MMHG | TEMPERATURE: 98 F | HEART RATE: 72 BPM | OXYGEN SATURATION: 97 % | RESPIRATION RATE: 18 BRPM | WEIGHT: 134 LBS | SYSTOLIC BLOOD PRESSURE: 154 MMHG | HEIGHT: 64 IN | BODY MASS INDEX: 22.88 KG/M2

## 2023-01-01 VITALS
DIASTOLIC BLOOD PRESSURE: 57 MMHG | HEIGHT: 65 IN | TEMPERATURE: 98 F | SYSTOLIC BLOOD PRESSURE: 127 MMHG | OXYGEN SATURATION: 93 % | WEIGHT: 159.63 LBS | RESPIRATION RATE: 18 BRPM | HEART RATE: 83 BPM | BODY MASS INDEX: 26.6 KG/M2

## 2023-01-01 VITALS
WEIGHT: 135 LBS | DIASTOLIC BLOOD PRESSURE: 63 MMHG | OXYGEN SATURATION: 100 % | TEMPERATURE: 96 F | RESPIRATION RATE: 20 BRPM | BODY MASS INDEX: 23.17 KG/M2 | SYSTOLIC BLOOD PRESSURE: 140 MMHG | HEART RATE: 92 BPM

## 2023-01-01 VITALS — BODY MASS INDEX: 23.04 KG/M2 | WEIGHT: 134.94 LBS | HEIGHT: 64 IN

## 2023-01-01 VITALS — HEIGHT: 65 IN | BODY MASS INDEX: 26.49 KG/M2 | WEIGHT: 159 LBS

## 2023-01-01 DIAGNOSIS — Z03.89 RULED OUT FOR MYOCARDIAL INFARCTION: ICD-10-CM

## 2023-01-01 DIAGNOSIS — N17.9 AKI (ACUTE KIDNEY INJURY): Primary | ICD-10-CM

## 2023-01-01 DIAGNOSIS — I10 HYPERTENSION, UNSPECIFIED TYPE: ICD-10-CM
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
## 2023-01-01 DIAGNOSIS — R41.82 AMS (ALTERED MENTAL STATUS): ICD-10-CM

## 2023-01-01 DIAGNOSIS — R29.6 FREQUENT FALLS: ICD-10-CM

## 2023-01-01 DIAGNOSIS — S32.82XD MULTIPLE CLOSED FRACTURES OF PELVIS WITHOUT DISRUPTION OF PELVIC RING WITH ROUTINE HEALING, SUBSEQUENT ENCOUNTER: Primary | ICD-10-CM

## 2023-01-01 DIAGNOSIS — R33.9 URINARY RETENTION: ICD-10-CM

## 2023-01-01 DIAGNOSIS — R07.9 CHEST PAIN: ICD-10-CM

## 2023-01-01 DIAGNOSIS — D64.9 CHRONIC ANEMIA: Primary | ICD-10-CM

## 2023-01-01 DIAGNOSIS — Z91.89 AT RISK FOR LONG QT SYNDROME: ICD-10-CM

## 2023-01-01 DIAGNOSIS — R41.82 ALTERED MENTAL STATUS, UNSPECIFIED ALTERED MENTAL STATUS TYPE: ICD-10-CM

## 2023-01-01 DIAGNOSIS — I82.411 ACUTE DEEP VEIN THROMBOSIS (DVT) OF FEMORAL VEIN OF RIGHT LOWER EXTREMITY: ICD-10-CM

## 2023-01-01 DIAGNOSIS — F02.C0 SEVERE LATE ONSET ALZHEIMER'S DEMENTIA WITHOUT BEHAVIORAL DISTURBANCE, PSYCHOTIC DISTURBANCE, MOOD DISTURBANCE, OR ANXIETY: Primary | Chronic | ICD-10-CM

## 2023-01-01 DIAGNOSIS — M79.604 RIGHT LEG PAIN: ICD-10-CM

## 2023-01-01 DIAGNOSIS — S32.82XA MULTIPLE CLOSED FRACTURES OF PELVIS WITHOUT DISRUPTION OF PELVIC RING, INITIAL ENCOUNTER: ICD-10-CM

## 2023-01-01 DIAGNOSIS — I62.00 NONTRAUMATIC SUBDURAL HEMORRHAGE, UNSPECIFIED: Primary | ICD-10-CM

## 2023-01-01 DIAGNOSIS — G96.08 SUBDURAL HYGROMA: ICD-10-CM

## 2023-01-01 DIAGNOSIS — R00.0 TACHYCARDIA: ICD-10-CM

## 2023-01-01 DIAGNOSIS — D62 ACUTE BLOOD LOSS ANEMIA: ICD-10-CM

## 2023-01-01 DIAGNOSIS — N32.3 BLADDER DIVERTICULUM: ICD-10-CM

## 2023-01-01 DIAGNOSIS — N30.00 ACUTE CYSTITIS WITHOUT HEMATURIA: ICD-10-CM

## 2023-01-01 DIAGNOSIS — G30.1 SEVERE LATE ONSET ALZHEIMER'S DEMENTIA WITHOUT BEHAVIORAL DISTURBANCE, PSYCHOTIC DISTURBANCE, MOOD DISTURBANCE, OR ANXIETY: Chronic | ICD-10-CM

## 2023-01-01 DIAGNOSIS — F03.B11 MODERATE DEMENTIA WITH AGITATION, UNSPECIFIED DEMENTIA TYPE: Primary | ICD-10-CM

## 2023-01-01 DIAGNOSIS — G30.9 MAJOR NEUROCOGNITIVE DISORDER DUE TO ALZHEIMER'S DISEASE, WITHOUT BEHAVIORAL DISTURBANCE: ICD-10-CM

## 2023-01-01 DIAGNOSIS — J96.01 ACUTE HYPOXEMIC RESPIRATORY FAILURE: ICD-10-CM

## 2023-01-01 DIAGNOSIS — S32.82XD MULTIPLE CLOSED FRACTURES OF PELVIS WITHOUT DISRUPTION OF PELVIC RING WITH ROUTINE HEALING, SUBSEQUENT ENCOUNTER: ICD-10-CM

## 2023-01-01 DIAGNOSIS — I10 PRIMARY HYPERTENSION: Chronic | ICD-10-CM

## 2023-01-01 DIAGNOSIS — I48.91 A-FIB: ICD-10-CM

## 2023-01-01 DIAGNOSIS — S32.9XXA CLOSED NONDISPLACED FRACTURE OF PELVIS, UNSPECIFIED PART OF PELVIS, INITIAL ENCOUNTER: ICD-10-CM

## 2023-01-01 DIAGNOSIS — I48.91 ATRIAL FIBRILLATION, UNSPECIFIED TYPE: Chronic | ICD-10-CM

## 2023-01-01 DIAGNOSIS — N17.9 AKI (ACUTE KIDNEY INJURY): ICD-10-CM

## 2023-01-01 DIAGNOSIS — K40.90 INGUINAL HERNIA: ICD-10-CM

## 2023-01-01 DIAGNOSIS — R79.89 TROPONIN LEVEL ELEVATED: ICD-10-CM

## 2023-01-01 DIAGNOSIS — Z76.89 ENCOUNTER FOR NAIL CARE: ICD-10-CM

## 2023-01-01 DIAGNOSIS — K92.0 HEMATEMESIS WITH NAUSEA: ICD-10-CM

## 2023-01-01 DIAGNOSIS — E87.6 HYPOKALEMIA: ICD-10-CM

## 2023-01-01 DIAGNOSIS — I50.9 CONGESTIVE HEART FAILURE, UNSPECIFIED HF CHRONICITY, UNSPECIFIED HEART FAILURE TYPE: ICD-10-CM

## 2023-01-01 DIAGNOSIS — E88.09 HYPOALBUMINEMIA: ICD-10-CM

## 2023-01-01 DIAGNOSIS — I26.99 PULMONARY EMBOLISM: ICD-10-CM

## 2023-01-01 DIAGNOSIS — I10 HYPERTENSION, UNSPECIFIED TYPE: Primary | ICD-10-CM

## 2023-01-01 DIAGNOSIS — Z09 HOSPITAL DISCHARGE FOLLOW-UP: Primary | ICD-10-CM

## 2023-01-01 DIAGNOSIS — J90 CHRONIC BILATERAL PLEURAL EFFUSIONS: ICD-10-CM

## 2023-01-01 DIAGNOSIS — R41.82 ALTERED MENTAL STATUS, UNSPECIFIED ALTERED MENTAL STATUS TYPE: Primary | ICD-10-CM

## 2023-01-01 DIAGNOSIS — R41.0 CONFUSION: ICD-10-CM

## 2023-01-01 DIAGNOSIS — M25.551 RIGHT HIP PAIN: ICD-10-CM

## 2023-01-01 DIAGNOSIS — G30.1 SEVERE LATE ONSET ALZHEIMER'S DEMENTIA WITHOUT BEHAVIORAL DISTURBANCE, PSYCHOTIC DISTURBANCE, MOOD DISTURBANCE, OR ANXIETY: Primary | Chronic | ICD-10-CM

## 2023-01-01 DIAGNOSIS — R11.2 NAUSEA & VOMITING: ICD-10-CM

## 2023-01-01 DIAGNOSIS — R53.1 WEAKNESS: ICD-10-CM

## 2023-01-01 DIAGNOSIS — G30.9 ALZHEIMER DISEASE: ICD-10-CM

## 2023-01-01 DIAGNOSIS — R09.02 HYPOXIA: ICD-10-CM

## 2023-01-01 DIAGNOSIS — R94.31 PROLONGED Q-T INTERVAL ON ECG: ICD-10-CM

## 2023-01-01 DIAGNOSIS — F02.80 MAJOR NEUROCOGNITIVE DISORDER DUE TO ALZHEIMER'S DISEASE, WITHOUT BEHAVIORAL DISTURBANCE: ICD-10-CM

## 2023-01-01 DIAGNOSIS — G93.40 ACUTE ENCEPHALOPATHY: ICD-10-CM

## 2023-01-01 DIAGNOSIS — N39.0 UTI (URINARY TRACT INFECTION): ICD-10-CM

## 2023-01-01 DIAGNOSIS — M79.10 MYALGIA: Primary | ICD-10-CM

## 2023-01-01 DIAGNOSIS — S32.810A CLOSED PELVIC RING FRACTURE, INITIAL ENCOUNTER: ICD-10-CM

## 2023-01-01 DIAGNOSIS — U07.1 COVID-19 VIRUS DETECTED: ICD-10-CM

## 2023-01-01 DIAGNOSIS — K40.30 NON-RECURRENT UNILATERAL INGUINAL HERNIA WITH OBSTRUCTION WITHOUT GANGRENE: Primary | ICD-10-CM

## 2023-01-01 DIAGNOSIS — N39.0 URINARY TRACT INFECTION WITHOUT HEMATURIA, SITE UNSPECIFIED: ICD-10-CM

## 2023-01-01 DIAGNOSIS — F03.90 DEMENTIA, UNSPECIFIED DEMENTIA SEVERITY, UNSPECIFIED DEMENTIA TYPE, UNSPECIFIED WHETHER BEHAVIORAL, PSYCHOTIC, OR MOOD DISTURBANCE OR ANXIETY: ICD-10-CM

## 2023-01-01 DIAGNOSIS — F02.C0 SEVERE LATE ONSET ALZHEIMER'S DEMENTIA WITHOUT BEHAVIORAL DISTURBANCE, PSYCHOTIC DISTURBANCE, MOOD DISTURBANCE, OR ANXIETY: Chronic | ICD-10-CM

## 2023-01-01 DIAGNOSIS — S32.9XXA PELVIC FRACTURE: ICD-10-CM

## 2023-01-01 DIAGNOSIS — K20.90 ESOPHAGITIS: ICD-10-CM

## 2023-01-01 DIAGNOSIS — R55 SYNCOPE: ICD-10-CM

## 2023-01-01 DIAGNOSIS — I26.99 ACUTE PULMONARY EMBOLISM WITHOUT ACUTE COR PULMONALE, UNSPECIFIED PULMONARY EMBOLISM TYPE: Primary | ICD-10-CM

## 2023-01-01 DIAGNOSIS — R94.31 QT PROLONGATION: ICD-10-CM

## 2023-01-01 DIAGNOSIS — Z71.89 ADVANCE CARE PLANNING: ICD-10-CM

## 2023-01-01 DIAGNOSIS — M79.10 MYALGIA: ICD-10-CM

## 2023-01-01 DIAGNOSIS — R29.6 FREQUENT FALLS: Primary | ICD-10-CM

## 2023-01-01 DIAGNOSIS — A41.9 SEPSIS: ICD-10-CM

## 2023-01-01 DIAGNOSIS — F03.B11 MODERATE DEMENTIA WITH AGITATION, UNSPECIFIED DEMENTIA TYPE: ICD-10-CM

## 2023-01-01 DIAGNOSIS — R29.6 MULTIPLE FALLS: ICD-10-CM

## 2023-01-01 DIAGNOSIS — R32 URINARY INCONTINENCE, UNSPECIFIED TYPE: ICD-10-CM

## 2023-01-01 DIAGNOSIS — I50.9 CHF (CONGESTIVE HEART FAILURE): ICD-10-CM

## 2023-01-01 DIAGNOSIS — Z79.01 ANTICOAGULATED: ICD-10-CM

## 2023-01-01 DIAGNOSIS — K92.2 GASTROINTESTINAL HEMORRHAGE, UNSPECIFIED GASTROINTESTINAL HEMORRHAGE TYPE: Primary | ICD-10-CM

## 2023-01-01 DIAGNOSIS — M47.16 LUMBAR SPONDYLOSIS WITH MYELOPATHY: Primary | ICD-10-CM

## 2023-01-01 DIAGNOSIS — J18.9 PNEUMONIA DUE TO INFECTIOUS ORGANISM, UNSPECIFIED LATERALITY, UNSPECIFIED PART OF LUNG: ICD-10-CM

## 2023-01-01 DIAGNOSIS — F02.80 ALZHEIMER DISEASE: ICD-10-CM

## 2023-01-01 DIAGNOSIS — K92.2 ACUTE UPPER GI BLEED: ICD-10-CM

## 2023-01-01 DIAGNOSIS — R41.3 OTHER AMNESIA: ICD-10-CM

## 2023-01-01 DIAGNOSIS — I50.31 ACUTE DIASTOLIC CONGESTIVE HEART FAILURE: ICD-10-CM

## 2023-01-01 DIAGNOSIS — R06.02 SHORTNESS OF BREATH: ICD-10-CM

## 2023-01-01 DIAGNOSIS — E43 SEVERE MALNUTRITION: Chronic | ICD-10-CM

## 2023-01-01 DIAGNOSIS — D64.9 ANEMIA, UNSPECIFIED TYPE: ICD-10-CM

## 2023-01-01 DIAGNOSIS — N17.9 ACUTE KIDNEY INJURY: ICD-10-CM

## 2023-01-01 LAB
25(OH)D3+25(OH)D2 SERPL-MCNC: 45 NG/ML (ref 30–96)
ABO + RH BLD: NORMAL
ALBUMIN SERPL BCP-MCNC: 2 G/DL (ref 3.5–5.2)
ALBUMIN SERPL BCP-MCNC: 2.2 G/DL (ref 3.5–5.2)
ALBUMIN SERPL BCP-MCNC: 2.3 G/DL (ref 3.5–5.2)
ALBUMIN SERPL BCP-MCNC: 2.4 G/DL (ref 3.5–5.2)
ALBUMIN SERPL BCP-MCNC: 2.5 G/DL (ref 3.5–5.2)
ALBUMIN SERPL BCP-MCNC: 2.6 G/DL (ref 3.5–5.2)
ALBUMIN SERPL BCP-MCNC: 2.6 G/DL (ref 3.5–5.2)
ALBUMIN SERPL BCP-MCNC: 2.7 G/DL (ref 3.5–5.2)
ALBUMIN SERPL BCP-MCNC: 2.8 G/DL (ref 3.5–5.2)
ALBUMIN SERPL BCP-MCNC: 2.8 G/DL (ref 3.5–5.2)
ALBUMIN SERPL BCP-MCNC: 3 G/DL (ref 3.5–5.2)
ALBUMIN SERPL BCP-MCNC: 3.6 G/DL (ref 3.5–5.2)
ALBUMIN SERPL BCP-MCNC: 3.7 G/DL (ref 3.5–5.2)
ALLENS TEST: ABNORMAL
ALLENS TEST: NORMAL
ALP SERPL-CCNC: 119 U/L (ref 55–135)
ALP SERPL-CCNC: 121 U/L (ref 55–135)
ALP SERPL-CCNC: 121 U/L (ref 55–135)
ALP SERPL-CCNC: 131 U/L (ref 55–135)
ALP SERPL-CCNC: 132 U/L (ref 55–135)
ALP SERPL-CCNC: 138 U/L (ref 55–135)
ALP SERPL-CCNC: 139 U/L (ref 55–135)
ALP SERPL-CCNC: 56 U/L (ref 55–135)
ALP SERPL-CCNC: 60 U/L (ref 55–135)
ALP SERPL-CCNC: 66 U/L (ref 55–135)
ALP SERPL-CCNC: 67 U/L (ref 55–135)
ALP SERPL-CCNC: 72 U/L (ref 55–135)
ALP SERPL-CCNC: 74 U/L (ref 55–135)
ALP SERPL-CCNC: 74 U/L (ref 55–135)
ALP SERPL-CCNC: 77 U/L (ref 55–135)
ALP SERPL-CCNC: 83 U/L (ref 55–135)
ALT SERPL W/O P-5'-P-CCNC: 10 U/L (ref 10–44)
ALT SERPL W/O P-5'-P-CCNC: 11 U/L (ref 10–44)
ALT SERPL W/O P-5'-P-CCNC: 12 U/L (ref 10–44)
ALT SERPL W/O P-5'-P-CCNC: 12 U/L (ref 10–44)
ALT SERPL W/O P-5'-P-CCNC: 14 U/L (ref 10–44)
ALT SERPL W/O P-5'-P-CCNC: 14 U/L (ref 10–44)
ALT SERPL W/O P-5'-P-CCNC: 15 U/L (ref 10–44)
ALT SERPL W/O P-5'-P-CCNC: 16 U/L (ref 10–44)
ALT SERPL W/O P-5'-P-CCNC: 19 U/L (ref 10–44)
ALT SERPL W/O P-5'-P-CCNC: 7 U/L (ref 10–44)
AMMONIA PLAS-SCNC: <10 UMOL/L (ref 10–50)
ANION GAP SERPL CALC-SCNC: 10 MMOL/L (ref 8–16)
ANION GAP SERPL CALC-SCNC: 11 MMOL/L (ref 8–16)
ANION GAP SERPL CALC-SCNC: 12 MMOL/L (ref 8–16)
ANION GAP SERPL CALC-SCNC: 13 MMOL/L (ref 8–16)
ANION GAP SERPL CALC-SCNC: 14 MMOL/L (ref 8–16)
ANION GAP SERPL CALC-SCNC: 15 MMOL/L (ref 8–16)
ANION GAP SERPL CALC-SCNC: 17 MMOL/L (ref 8–16)
ANION GAP SERPL CALC-SCNC: 17 MMOL/L (ref 8–16)
ANION GAP SERPL CALC-SCNC: 7 MMOL/L (ref 8–16)
ANION GAP SERPL CALC-SCNC: 8 MMOL/L (ref 8–16)
ANION GAP SERPL CALC-SCNC: 9 MMOL/L (ref 8–16)
ANISOCYTOSIS BLD QL SMEAR: SLIGHT
APTT PPP: 138.8 SEC (ref 21–32)
APTT PPP: 22.9 SEC (ref 21–32)
APTT PPP: 42.8 SEC (ref 21–32)
APTT PPP: 45.3 SEC (ref 21–32)
APTT PPP: 49.9 SEC (ref 21–32)
APTT PPP: 58.9 SEC (ref 21–32)
APTT PPP: 65.6 SEC (ref 21–32)
ASCENDING AORTA: 2.66 CM
ASCENDING AORTA: 2.93 CM
AST SERPL-CCNC: 18 U/L (ref 10–40)
AST SERPL-CCNC: 18 U/L (ref 10–40)
AST SERPL-CCNC: 19 U/L (ref 10–40)
AST SERPL-CCNC: 20 U/L (ref 10–40)
AST SERPL-CCNC: 20 U/L (ref 10–40)
AST SERPL-CCNC: 22 U/L (ref 10–40)
AST SERPL-CCNC: 23 U/L (ref 10–40)
AST SERPL-CCNC: 26 U/L (ref 10–40)
AST SERPL-CCNC: 27 U/L (ref 10–40)
AST SERPL-CCNC: 28 U/L (ref 10–40)
AST SERPL-CCNC: 30 U/L (ref 10–40)
AST SERPL-CCNC: 32 U/L (ref 10–40)
AST SERPL-CCNC: 33 U/L (ref 10–40)
AST SERPL-CCNC: 36 U/L (ref 10–40)
AV INDEX (PROSTH): 0.47
AV INDEX (PROSTH): 0.51
AV INDEX (PROSTH): 0.6
AV MEAN GRADIENT: 15 MMHG
AV MEAN GRADIENT: 15 MMHG
AV MEAN GRADIENT: 22 MMHG
AV PEAK GRADIENT: 27 MMHG
AV PEAK GRADIENT: 34 MMHG
AV PEAK GRADIENT: 37 MMHG
AV VALVE AREA: 1.42 CM2
AV VALVE AREA: 1.47 CM2
AV VALVE AREA: 1.88 CM2
AV VELOCITY RATIO: 0.5
AV VELOCITY RATIO: 0.52
AV VELOCITY RATIO: 0.52
BACTERIA #/AREA URNS AUTO: ABNORMAL /HPF
BACTERIA #/AREA URNS HPF: ABNORMAL /HPF
BACTERIA BLD CULT: NORMAL
BACTERIA UR CULT: ABNORMAL
BACTERIA UR CULT: NO GROWTH
BACTERIA UR CULT: NORMAL
BASO STIPL BLD QL SMEAR: ABNORMAL
BASOPHILS # BLD AUTO: 0.02 K/UL (ref 0–0.2)
BASOPHILS # BLD AUTO: 0.03 K/UL (ref 0–0.2)
BASOPHILS # BLD AUTO: 0.04 K/UL (ref 0–0.2)
BASOPHILS # BLD AUTO: 0.05 K/UL (ref 0–0.2)
BASOPHILS # BLD AUTO: 0.06 K/UL (ref 0–0.2)
BASOPHILS # BLD AUTO: 0.07 K/UL (ref 0–0.2)
BASOPHILS # BLD AUTO: 0.11 K/UL (ref 0–0.2)
BASOPHILS NFR BLD: 0.2 % (ref 0–1.9)
BASOPHILS NFR BLD: 0.3 % (ref 0–1.9)
BASOPHILS NFR BLD: 0.4 % (ref 0–1.9)
BASOPHILS NFR BLD: 0.5 % (ref 0–1.9)
BASOPHILS NFR BLD: 0.6 % (ref 0–1.9)
BASOPHILS NFR BLD: 0.7 % (ref 0–1.9)
BASOPHILS NFR BLD: 0.8 % (ref 0–1.9)
BASOPHILS NFR BLD: 0.9 % (ref 0–1.9)
BASOPHILS NFR BLD: 0.9 % (ref 0–1.9)
BASOPHILS NFR BLD: 1.3 % (ref 0–1.9)
BILIRUB SERPL-MCNC: 0.4 MG/DL (ref 0.1–1)
BILIRUB SERPL-MCNC: 0.5 MG/DL (ref 0.1–1)
BILIRUB SERPL-MCNC: 0.6 MG/DL (ref 0.1–1)
BILIRUB SERPL-MCNC: 0.7 MG/DL (ref 0.1–1)
BILIRUB SERPL-MCNC: 0.7 MG/DL (ref 0.1–1)
BILIRUB UR QL STRIP: NEGATIVE
BLD GP AB SCN CELLS X3 SERPL QL: NORMAL
BLD PROD TYP BPU: NORMAL
BLOOD UNIT EXPIRATION DATE: NORMAL
BLOOD UNIT TYPE CODE: 5100
BLOOD UNIT TYPE: NORMAL
BNP SERPL-MCNC: 1626 PG/ML (ref 0–99)
BNP SERPL-MCNC: 301 PG/ML (ref 0–99)
BNP SERPL-MCNC: 371 PG/ML (ref 0–99)
BNP SERPL-MCNC: 373 PG/ML (ref 0–99)
BNP SERPL-MCNC: 397 PG/ML (ref 0–99)
BNP SERPL-MCNC: 514 PG/ML (ref 0–99)
BNP SERPL-MCNC: 815 PG/ML (ref 0–99)
BNP SERPL-MCNC: 931 PG/ML (ref 0–99)
BSA FOR ECHO PROCEDURE: 1.8 M2
BSA FOR ECHO PROCEDURE: 1.8 M2
BSA FOR ECHO PROCEDURE: 1.85 M2
BUN SERPL-MCNC: 10 MG/DL (ref 8–23)
BUN SERPL-MCNC: 11 MG/DL (ref 8–23)
BUN SERPL-MCNC: 12 MG/DL (ref 8–23)
BUN SERPL-MCNC: 12 MG/DL (ref 8–23)
BUN SERPL-MCNC: 13 MG/DL (ref 8–23)
BUN SERPL-MCNC: 14 MG/DL (ref 8–23)
BUN SERPL-MCNC: 15 MG/DL (ref 8–23)
BUN SERPL-MCNC: 16 MG/DL (ref 8–23)
BUN SERPL-MCNC: 17 MG/DL (ref 8–23)
BUN SERPL-MCNC: 17 MG/DL (ref 8–23)
BUN SERPL-MCNC: 18 MG/DL (ref 8–23)
BUN SERPL-MCNC: 19 MG/DL (ref 8–23)
BUN SERPL-MCNC: 19 MG/DL (ref 8–23)
BUN SERPL-MCNC: 20 MG/DL (ref 8–23)
BUN SERPL-MCNC: 21 MG/DL (ref 8–23)
BUN SERPL-MCNC: 25 MG/DL (ref 8–23)
BUN SERPL-MCNC: 29 MG/DL (ref 8–23)
BUN SERPL-MCNC: 29 MG/DL (ref 8–23)
BUN SERPL-MCNC: 32 MG/DL (ref 8–23)
BUN SERPL-MCNC: 33 MG/DL (ref 8–23)
BUN SERPL-MCNC: 36 MG/DL (ref 8–23)
BUN SERPL-MCNC: 39 MG/DL (ref 8–23)
BUN SERPL-MCNC: 41 MG/DL (ref 8–23)
BUN SERPL-MCNC: 50 MG/DL (ref 8–23)
BUN SERPL-MCNC: 55 MG/DL (ref 8–23)
BUN SERPL-MCNC: 55 MG/DL (ref 8–23)
BUN SERPL-MCNC: 6 MG/DL (ref 8–23)
BUN SERPL-MCNC: 7 MG/DL (ref 8–23)
BUN SERPL-MCNC: 7 MG/DL (ref 8–23)
BUN SERPL-MCNC: 8 MG/DL (ref 8–23)
BUN SERPL-MCNC: 9 MG/DL (ref 8–23)
BUN SERPL-MCNC: 9 MG/DL (ref 8–23)
BURR CELLS BLD QL SMEAR: ABNORMAL
CALCIUM SERPL-MCNC: 7.6 MG/DL (ref 8.7–10.5)
CALCIUM SERPL-MCNC: 7.9 MG/DL (ref 8.7–10.5)
CALCIUM SERPL-MCNC: 8.1 MG/DL (ref 8.7–10.5)
CALCIUM SERPL-MCNC: 8.1 MG/DL (ref 8.7–10.5)
CALCIUM SERPL-MCNC: 8.2 MG/DL (ref 8.7–10.5)
CALCIUM SERPL-MCNC: 8.3 MG/DL (ref 8.7–10.5)
CALCIUM SERPL-MCNC: 8.4 MG/DL (ref 8.7–10.5)
CALCIUM SERPL-MCNC: 8.5 MG/DL (ref 8.7–10.5)
CALCIUM SERPL-MCNC: 8.6 MG/DL (ref 8.7–10.5)
CALCIUM SERPL-MCNC: 8.7 MG/DL (ref 8.7–10.5)
CALCIUM SERPL-MCNC: 8.8 MG/DL (ref 8.7–10.5)
CALCIUM SERPL-MCNC: 8.8 MG/DL (ref 8.7–10.5)
CALCIUM SERPL-MCNC: 8.9 MG/DL (ref 8.7–10.5)
CALCIUM SERPL-MCNC: 9 MG/DL (ref 8.7–10.5)
CALCIUM SERPL-MCNC: 9.1 MG/DL (ref 8.7–10.5)
CALCIUM SERPL-MCNC: 9.2 MG/DL (ref 8.7–10.5)
CALCIUM SERPL-MCNC: 9.2 MG/DL (ref 8.7–10.5)
CALCIUM SERPL-MCNC: 9.3 MG/DL (ref 8.7–10.5)
CALCIUM SERPL-MCNC: 9.3 MG/DL (ref 8.7–10.5)
CALCIUM SERPL-MCNC: 9.6 MG/DL (ref 8.7–10.5)
CALCIUM SERPL-MCNC: 9.9 MG/DL (ref 8.7–10.5)
CAOX CRY UR QL COMP ASSIST: ABNORMAL
CHLORIDE SERPL-SCNC: 101 MMOL/L (ref 95–110)
CHLORIDE SERPL-SCNC: 102 MMOL/L (ref 95–110)
CHLORIDE SERPL-SCNC: 103 MMOL/L (ref 95–110)
CHLORIDE SERPL-SCNC: 104 MMOL/L (ref 95–110)
CHLORIDE SERPL-SCNC: 104 MMOL/L (ref 95–110)
CHLORIDE SERPL-SCNC: 105 MMOL/L (ref 95–110)
CHLORIDE SERPL-SCNC: 106 MMOL/L (ref 95–110)
CHLORIDE SERPL-SCNC: 107 MMOL/L (ref 95–110)
CHLORIDE SERPL-SCNC: 108 MMOL/L (ref 95–110)
CHLORIDE SERPL-SCNC: 109 MMOL/L (ref 95–110)
CHLORIDE SERPL-SCNC: 110 MMOL/L (ref 95–110)
CHLORIDE SERPL-SCNC: 111 MMOL/L (ref 95–110)
CHLORIDE SERPL-SCNC: 111 MMOL/L (ref 95–110)
CHLORIDE SERPL-SCNC: 112 MMOL/L (ref 95–110)
CHLORIDE SERPL-SCNC: 113 MMOL/L (ref 95–110)
CHLORIDE SERPL-SCNC: 116 MMOL/L (ref 95–110)
CHLORIDE SERPL-SCNC: 117 MMOL/L (ref 95–110)
CHLORIDE SERPL-SCNC: 97 MMOL/L (ref 95–110)
CHLORIDE SERPL-SCNC: 99 MMOL/L (ref 95–110)
CK SERPL-CCNC: 201 U/L (ref 20–180)
CLARITY UR REFRACT.AUTO: ABNORMAL
CLARITY UR REFRACT.AUTO: CLEAR
CLARITY UR REFRACT.AUTO: CLEAR
CLARITY UR: CLEAR
CLARITY UR: CLEAR
CO2 SERPL-SCNC: 14 MMOL/L (ref 23–29)
CO2 SERPL-SCNC: 15 MMOL/L (ref 23–29)
CO2 SERPL-SCNC: 17 MMOL/L (ref 23–29)
CO2 SERPL-SCNC: 18 MMOL/L (ref 23–29)
CO2 SERPL-SCNC: 19 MMOL/L (ref 23–29)
CO2 SERPL-SCNC: 20 MMOL/L (ref 23–29)
CO2 SERPL-SCNC: 20 MMOL/L (ref 23–29)
CO2 SERPL-SCNC: 21 MMOL/L (ref 23–29)
CO2 SERPL-SCNC: 22 MMOL/L (ref 23–29)
CO2 SERPL-SCNC: 23 MMOL/L (ref 23–29)
CO2 SERPL-SCNC: 24 MMOL/L (ref 23–29)
CO2 SERPL-SCNC: 25 MMOL/L (ref 23–29)
CO2 SERPL-SCNC: 26 MMOL/L (ref 23–29)
CO2 SERPL-SCNC: 27 MMOL/L (ref 23–29)
CO2 SERPL-SCNC: 28 MMOL/L (ref 23–29)
CO2 SERPL-SCNC: 28 MMOL/L (ref 23–29)
CO2 SERPL-SCNC: 29 MMOL/L (ref 23–29)
CO2 SERPL-SCNC: 30 MMOL/L (ref 23–29)
CO2 SERPL-SCNC: 30 MMOL/L (ref 23–29)
CODING SYSTEM: NORMAL
COLOR UR AUTO: ABNORMAL
COLOR UR AUTO: YELLOW
COLOR UR: YELLOW
COLOR UR: YELLOW
CREAT SERPL-MCNC: 0.6 MG/DL (ref 0.5–1.4)
CREAT SERPL-MCNC: 0.7 MG/DL (ref 0.5–1.4)
CREAT SERPL-MCNC: 0.8 MG/DL (ref 0.5–1.4)
CREAT SERPL-MCNC: 0.9 MG/DL (ref 0.5–1.4)
CREAT SERPL-MCNC: 1 MG/DL (ref 0.5–1.4)
CREAT SERPL-MCNC: 1.2 MG/DL (ref 0.5–1.4)
CREAT SERPL-MCNC: 1.3 MG/DL (ref 0.5–1.4)
CREAT SERPL-MCNC: 1.4 MG/DL (ref 0.5–1.4)
CREAT SERPL-MCNC: 1.4 MG/DL (ref 0.5–1.4)
CREAT SERPL-MCNC: 1.6 MG/DL (ref 0.5–1.4)
CREAT SERPL-MCNC: 2.2 MG/DL (ref 0.5–1.4)
CREAT SERPL-MCNC: 2.2 MG/DL (ref 0.5–1.4)
CREAT UR-MCNC: 219.3 MG/DL (ref 15–325)
CREAT UR-MCNC: 219.3 MG/DL (ref 15–325)
CROSSMATCH INTERPRETATION: NORMAL
CTP QC/QA: YES
CV ECHO LV RWT: 0.42 CM
CV ECHO LV RWT: 0.44 CM
D DIMER PPP IA.FEU-MCNC: 0.52 MG/L FEU
DELSYS: ABNORMAL
DIFFERENTIAL METHOD BLD: ABNORMAL
DIFFERENTIAL METHOD: ABNORMAL
DISPENSE STATUS: NORMAL
DOP CALC AO PEAK VEL: 2.58 M/S
DOP CALC AO PEAK VEL: 2.9 M/S
DOP CALC AO PEAK VEL: 3.03 M/S
DOP CALC AO VTI: 60 CM
DOP CALC AO VTI: 65.36 CM
DOP CALC AO VTI: 66.32 CM
DOP CALC LVOT AREA: 2.8 CM2
DOP CALC LVOT AREA: 3.1 CM2
DOP CALC LVOT AREA: 3.1 CM2
DOP CALC LVOT DIAMETER: 1.88 CM
DOP CALC LVOT DIAMETER: 2 CM
DOP CALC LVOT DIAMETER: 2 CM
DOP CALC LVOT PEAK VEL: 1.33 M/S
DOP CALC LVOT PEAK VEL: 1.5 M/S
DOP CALC LVOT PEAK VEL: 1.51 M/S
DOP CALC LVOT STROKE VOLUME: 123.15 CM3
DOP CALC LVOT STROKE VOLUME: 87.92 CM3
DOP CALC LVOT STROKE VOLUME: 94.33 CM3
DOP CALC MV VTI: 46.22 CM
DOP CALC MV VTI: 53.64 CM
DOP CALCLVOT PEAK VEL VTI: 28 CM
DOP CALCLVOT PEAK VEL VTI: 34 CM
DOP CALCLVOT PEAK VEL VTI: 39.22 CM
E WAVE DECELERATION TIME: 245.49 MSEC
E WAVE DECELERATION TIME: 268.36 MSEC
E WAVE DECELERATION TIME: 435.65 MSEC
E/A RATIO: 0.58
E/A RATIO: 0.7
E/A RATIO: 0.76
E/E' RATIO: 17.86 M/S
E/E' RATIO: 20.17 M/S
E/E' RATIO: 26.25 M/S
ECHO LV POSTERIOR WALL: 0.82 CM (ref 0.6–1.1)
ECHO LV POSTERIOR WALL: 1 CM (ref 0.6–1.1)
EJECTION FRACTION: 65 %
EOSINOPHIL # BLD AUTO: 0 K/UL (ref 0–0.5)
EOSINOPHIL # BLD AUTO: 0.1 K/UL (ref 0–0.5)
EOSINOPHIL # BLD AUTO: 0.2 K/UL (ref 0–0.5)
EOSINOPHIL # BLD AUTO: 0.3 K/UL (ref 0–0.5)
EOSINOPHIL # BLD AUTO: 0.4 K/UL (ref 0–0.5)
EOSINOPHIL # BLD AUTO: 0.5 K/UL (ref 0–0.5)
EOSINOPHIL NFR BLD: 0 % (ref 0–8)
EOSINOPHIL NFR BLD: 0.1 % (ref 0–8)
EOSINOPHIL NFR BLD: 0.1 % (ref 0–8)
EOSINOPHIL NFR BLD: 0.2 % (ref 0–8)
EOSINOPHIL NFR BLD: 0.3 % (ref 0–8)
EOSINOPHIL NFR BLD: 0.4 % (ref 0–8)
EOSINOPHIL NFR BLD: 0.4 % (ref 0–8)
EOSINOPHIL NFR BLD: 0.5 % (ref 0–8)
EOSINOPHIL NFR BLD: 0.6 % (ref 0–8)
EOSINOPHIL NFR BLD: 0.7 % (ref 0–8)
EOSINOPHIL NFR BLD: 0.8 % (ref 0–8)
EOSINOPHIL NFR BLD: 0.8 % (ref 0–8)
EOSINOPHIL NFR BLD: 0.9 % (ref 0–8)
EOSINOPHIL NFR BLD: 0.9 % (ref 0–8)
EOSINOPHIL NFR BLD: 1 % (ref 0–8)
EOSINOPHIL NFR BLD: 1 % (ref 0–8)
EOSINOPHIL NFR BLD: 1.1 % (ref 0–8)
EOSINOPHIL NFR BLD: 1.1 % (ref 0–8)
EOSINOPHIL NFR BLD: 1.2 % (ref 0–8)
EOSINOPHIL NFR BLD: 1.3 % (ref 0–8)
EOSINOPHIL NFR BLD: 1.4 % (ref 0–8)
EOSINOPHIL NFR BLD: 1.6 % (ref 0–8)
EOSINOPHIL NFR BLD: 1.6 % (ref 0–8)
EOSINOPHIL NFR BLD: 1.7 % (ref 0–8)
EOSINOPHIL NFR BLD: 1.9 % (ref 0–8)
EOSINOPHIL NFR BLD: 2 % (ref 0–8)
EOSINOPHIL NFR BLD: 2.2 % (ref 0–8)
EOSINOPHIL NFR BLD: 2.2 % (ref 0–8)
EOSINOPHIL NFR BLD: 2.3 % (ref 0–8)
EOSINOPHIL NFR BLD: 2.3 % (ref 0–8)
EOSINOPHIL NFR BLD: 2.6 % (ref 0–8)
EOSINOPHIL NFR BLD: 2.7 % (ref 0–8)
EOSINOPHIL NFR BLD: 2.8 % (ref 0–8)
EOSINOPHIL NFR BLD: 3.1 % (ref 0–8)
EOSINOPHIL NFR BLD: 3.4 % (ref 0–8)
EOSINOPHIL NFR BLD: 4.2 % (ref 0–8)
EOSINOPHIL NFR BLD: 4.7 % (ref 0–8)
EOSINOPHIL NFR BLD: 5.1 % (ref 0–8)
EOSINOPHIL NFR BLD: 5.1 % (ref 0–8)
EOSINOPHIL NFR BLD: 5.3 % (ref 0–8)
EOSINOPHIL NFR BLD: 5.4 % (ref 0–8)
EOSINOPHIL NFR BLD: 5.6 % (ref 0–8)
EOSINOPHIL NFR BLD: 5.7 % (ref 0–8)
EOSINOPHIL NFR BLD: 6.7 % (ref 0–8)
EOSINOPHIL NFR BLD: 7.1 % (ref 0–8)
ERYTHROCYTE [DISTWIDTH] IN BLOOD BY AUTOMATED COUNT: 13.4 % (ref 11.5–14.5)
ERYTHROCYTE [DISTWIDTH] IN BLOOD BY AUTOMATED COUNT: 13.4 % (ref 11.5–14.5)
ERYTHROCYTE [DISTWIDTH] IN BLOOD BY AUTOMATED COUNT: 13.8 % (ref 11.5–14.5)
ERYTHROCYTE [DISTWIDTH] IN BLOOD BY AUTOMATED COUNT: 13.9 % (ref 11.5–14.5)
ERYTHROCYTE [DISTWIDTH] IN BLOOD BY AUTOMATED COUNT: 13.9 % (ref 11.5–14.5)
ERYTHROCYTE [DISTWIDTH] IN BLOOD BY AUTOMATED COUNT: 14 % (ref 11.5–14.5)
ERYTHROCYTE [DISTWIDTH] IN BLOOD BY AUTOMATED COUNT: 14 % (ref 11.5–14.5)
ERYTHROCYTE [DISTWIDTH] IN BLOOD BY AUTOMATED COUNT: 14.1 % (ref 11.5–14.5)
ERYTHROCYTE [DISTWIDTH] IN BLOOD BY AUTOMATED COUNT: 14.1 % (ref 11.5–14.5)
ERYTHROCYTE [DISTWIDTH] IN BLOOD BY AUTOMATED COUNT: 14.2 % (ref 11.5–14.5)
ERYTHROCYTE [DISTWIDTH] IN BLOOD BY AUTOMATED COUNT: 14.3 % (ref 11.5–14.5)
ERYTHROCYTE [DISTWIDTH] IN BLOOD BY AUTOMATED COUNT: 14.4 % (ref 11.5–14.5)
ERYTHROCYTE [DISTWIDTH] IN BLOOD BY AUTOMATED COUNT: 14.5 % (ref 11.5–14.5)
ERYTHROCYTE [DISTWIDTH] IN BLOOD BY AUTOMATED COUNT: 14.6 % (ref 11.5–14.5)
ERYTHROCYTE [DISTWIDTH] IN BLOOD BY AUTOMATED COUNT: 14.6 % (ref 11.5–14.5)
ERYTHROCYTE [DISTWIDTH] IN BLOOD BY AUTOMATED COUNT: 14.8 % (ref 11.5–14.5)
ERYTHROCYTE [DISTWIDTH] IN BLOOD BY AUTOMATED COUNT: 14.9 % (ref 11.5–14.5)
ERYTHROCYTE [DISTWIDTH] IN BLOOD BY AUTOMATED COUNT: 14.9 % (ref 11.5–14.5)
ERYTHROCYTE [DISTWIDTH] IN BLOOD BY AUTOMATED COUNT: 15 % (ref 11.5–14.5)
ERYTHROCYTE [DISTWIDTH] IN BLOOD BY AUTOMATED COUNT: 15.1 % (ref 11.5–14.5)
ERYTHROCYTE [DISTWIDTH] IN BLOOD BY AUTOMATED COUNT: 15.1 % (ref 11.5–14.5)
ERYTHROCYTE [DISTWIDTH] IN BLOOD BY AUTOMATED COUNT: 15.2 % (ref 11.5–14.5)
ERYTHROCYTE [DISTWIDTH] IN BLOOD BY AUTOMATED COUNT: 15.3 % (ref 11.5–14.5)
ERYTHROCYTE [DISTWIDTH] IN BLOOD BY AUTOMATED COUNT: 15.3 % (ref 11.5–14.5)
ERYTHROCYTE [DISTWIDTH] IN BLOOD BY AUTOMATED COUNT: 15.4 % (ref 11.5–14.5)
ERYTHROCYTE [DISTWIDTH] IN BLOOD BY AUTOMATED COUNT: 15.4 % (ref 11.5–14.5)
ERYTHROCYTE [DISTWIDTH] IN BLOOD BY AUTOMATED COUNT: 15.5 % (ref 11.5–14.5)
ERYTHROCYTE [DISTWIDTH] IN BLOOD BY AUTOMATED COUNT: 15.5 % (ref 11.5–14.5)
ERYTHROCYTE [DISTWIDTH] IN BLOOD BY AUTOMATED COUNT: 15.7 % (ref 11.5–14.5)
ERYTHROCYTE [DISTWIDTH] IN BLOOD BY AUTOMATED COUNT: 15.7 % (ref 11.5–14.5)
ERYTHROCYTE [DISTWIDTH] IN BLOOD BY AUTOMATED COUNT: 16.1 % (ref 11.5–14.5)
ERYTHROCYTE [DISTWIDTH] IN BLOOD BY AUTOMATED COUNT: 16.9 % (ref 11.5–14.5)
ERYTHROCYTE [DISTWIDTH] IN BLOOD BY AUTOMATED COUNT: 17.1 % (ref 11.5–14.5)
ERYTHROCYTE [DISTWIDTH] IN BLOOD BY AUTOMATED COUNT: 17.2 % (ref 11.5–14.5)
ERYTHROCYTE [DISTWIDTH] IN BLOOD BY AUTOMATED COUNT: 17.2 % (ref 11.5–14.5)
ERYTHROCYTE [DISTWIDTH] IN BLOOD BY AUTOMATED COUNT: 17.3 % (ref 11.5–14.5)
ERYTHROCYTE [DISTWIDTH] IN BLOOD BY AUTOMATED COUNT: 17.7 % (ref 11.5–14.5)
ERYTHROCYTE [DISTWIDTH] IN BLOOD BY AUTOMATED COUNT: 19.1 % (ref 11.5–14.5)
ERYTHROCYTE [SEDIMENTATION RATE] IN BLOOD BY WESTERGREN METHOD: 2 MM/H
EST. GFR  (NO RACE VARIABLE): 21 ML/MIN/1.73 M^2
EST. GFR  (NO RACE VARIABLE): 21 ML/MIN/1.73 M^2
EST. GFR  (NO RACE VARIABLE): 31 ML/MIN/1.73 M^2
EST. GFR  (NO RACE VARIABLE): 36 ML/MIN/1.73 M^2
EST. GFR  (NO RACE VARIABLE): 36.4 ML/MIN/1.73 M^2
EST. GFR  (NO RACE VARIABLE): 39.8 ML/MIN/1.73 M^2
EST. GFR  (NO RACE VARIABLE): 39.8 ML/MIN/1.73 M^2
EST. GFR  (NO RACE VARIABLE): 40 ML/MIN/1.73 M^2
EST. GFR  (NO RACE VARIABLE): 43.8 ML/MIN/1.73 M^2
EST. GFR  (NO RACE VARIABLE): 54.5 ML/MIN/1.73 M^2
EST. GFR  (NO RACE VARIABLE): 55 ML/MIN/1.73 M^2
EST. GFR  (NO RACE VARIABLE): >60 ML/MIN/1.73 M^2
ESTIMATED AVG GLUCOSE: 120 MG/DL (ref 68–131)
FERRITIN SERPL-MCNC: 16 NG/ML (ref 20–300)
FINAL PATHOLOGIC DIAGNOSIS: NORMAL
FIO2: 21
FIO2: 28
FLOW: 2
FOLATE SERPL-MCNC: 7.7 NG/ML (ref 4–24)
FRACTIONAL SHORTENING: 32 % (ref 28–44)
FRACTIONAL SHORTENING: 43 % (ref 28–44)
FRACTIONAL SHORTENING: 48 % (ref 28–44)
GLUCOSE SERPL-MCNC: 101 MG/DL (ref 70–110)
GLUCOSE SERPL-MCNC: 102 MG/DL (ref 70–110)
GLUCOSE SERPL-MCNC: 103 MG/DL (ref 70–110)
GLUCOSE SERPL-MCNC: 104 MG/DL (ref 70–110)
GLUCOSE SERPL-MCNC: 105 MG/DL (ref 70–110)
GLUCOSE SERPL-MCNC: 106 MG/DL (ref 70–110)
GLUCOSE SERPL-MCNC: 107 MG/DL (ref 70–110)
GLUCOSE SERPL-MCNC: 107 MG/DL (ref 70–110)
GLUCOSE SERPL-MCNC: 108 MG/DL (ref 70–110)
GLUCOSE SERPL-MCNC: 109 MG/DL (ref 70–110)
GLUCOSE SERPL-MCNC: 111 MG/DL (ref 70–110)
GLUCOSE SERPL-MCNC: 111 MG/DL (ref 70–110)
GLUCOSE SERPL-MCNC: 119 MG/DL (ref 70–110)
GLUCOSE SERPL-MCNC: 120 MG/DL (ref 70–110)
GLUCOSE SERPL-MCNC: 121 MG/DL (ref 70–110)
GLUCOSE SERPL-MCNC: 121 MG/DL (ref 70–110)
GLUCOSE SERPL-MCNC: 122 MG/DL (ref 70–110)
GLUCOSE SERPL-MCNC: 145 MG/DL (ref 70–110)
GLUCOSE SERPL-MCNC: 62 MG/DL (ref 70–110)
GLUCOSE SERPL-MCNC: 78 MG/DL (ref 70–110)
GLUCOSE SERPL-MCNC: 78 MG/DL (ref 70–110)
GLUCOSE SERPL-MCNC: 80 MG/DL (ref 70–110)
GLUCOSE SERPL-MCNC: 81 MG/DL (ref 70–110)
GLUCOSE SERPL-MCNC: 82 MG/DL (ref 70–110)
GLUCOSE SERPL-MCNC: 83 MG/DL (ref 70–110)
GLUCOSE SERPL-MCNC: 84 MG/DL (ref 70–110)
GLUCOSE SERPL-MCNC: 85 MG/DL (ref 70–110)
GLUCOSE SERPL-MCNC: 85 MG/DL (ref 70–110)
GLUCOSE SERPL-MCNC: 87 MG/DL (ref 70–110)
GLUCOSE SERPL-MCNC: 89 MG/DL (ref 70–110)
GLUCOSE SERPL-MCNC: 90 MG/DL (ref 70–110)
GLUCOSE SERPL-MCNC: 91 MG/DL (ref 70–110)
GLUCOSE SERPL-MCNC: 92 MG/DL (ref 70–110)
GLUCOSE SERPL-MCNC: 93 MG/DL (ref 70–110)
GLUCOSE SERPL-MCNC: 95 MG/DL (ref 70–110)
GLUCOSE SERPL-MCNC: 98 MG/DL (ref 70–110)
GLUCOSE SERPL-MCNC: 98 MG/DL (ref 70–110)
GLUCOSE SERPL-MCNC: 99 MG/DL (ref 70–110)
GLUCOSE UR QL STRIP: NEGATIVE
GROSS: NORMAL
HBA1C MFR BLD: 5.8 % (ref 4–5.6)
HCO3 UR-SCNC: 18.1 MMOL/L (ref 24–28)
HCO3 UR-SCNC: 20.7 MMOL/L (ref 24–28)
HCO3 UR-SCNC: 22.5 MMOL/L (ref 24–28)
HCO3 UR-SCNC: 26.9 MMOL/L (ref 24–28)
HCO3 UR-SCNC: 27.7 MMOL/L (ref 24–28)
HCO3 UR-SCNC: 27.9 MMOL/L (ref 24–28)
HCO3 UR-SCNC: 28.1 MMOL/L (ref 24–28)
HCO3 UR-SCNC: 28.5 MMOL/L (ref 24–28)
HCO3 UR-SCNC: 29.6 MMOL/L (ref 24–28)
HCO3 UR-SCNC: 29.9 MMOL/L (ref 24–28)
HCO3 UR-SCNC: 30.5 MMOL/L (ref 24–28)
HCO3 UR-SCNC: 31.2 MMOL/L (ref 24–28)
HCT VFR BLD AUTO: 21 % (ref 37–48.5)
HCT VFR BLD AUTO: 23.6 % (ref 37–48.5)
HCT VFR BLD AUTO: 23.8 % (ref 37–48.5)
HCT VFR BLD AUTO: 24.8 % (ref 37–48.5)
HCT VFR BLD AUTO: 24.9 % (ref 37–48.5)
HCT VFR BLD AUTO: 25.1 % (ref 37–48.5)
HCT VFR BLD AUTO: 25.5 % (ref 37–48.5)
HCT VFR BLD AUTO: 25.8 % (ref 37–48.5)
HCT VFR BLD AUTO: 25.8 % (ref 37–48.5)
HCT VFR BLD AUTO: 25.9 % (ref 37–48.5)
HCT VFR BLD AUTO: 26 % (ref 37–48.5)
HCT VFR BLD AUTO: 26 % (ref 37–48.5)
HCT VFR BLD AUTO: 26.2 % (ref 37–48.5)
HCT VFR BLD AUTO: 26.4 % (ref 37–48.5)
HCT VFR BLD AUTO: 26.8 % (ref 37–48.5)
HCT VFR BLD AUTO: 26.9 % (ref 37–48.5)
HCT VFR BLD AUTO: 26.9 % (ref 37–48.5)
HCT VFR BLD AUTO: 27.2 % (ref 37–48.5)
HCT VFR BLD AUTO: 27.6 % (ref 37–48.5)
HCT VFR BLD AUTO: 27.6 % (ref 37–48.5)
HCT VFR BLD AUTO: 27.8 % (ref 37–48.5)
HCT VFR BLD AUTO: 27.8 % (ref 37–48.5)
HCT VFR BLD AUTO: 27.9 % (ref 37–48.5)
HCT VFR BLD AUTO: 28 % (ref 37–48.5)
HCT VFR BLD AUTO: 28.3 % (ref 37–48.5)
HCT VFR BLD AUTO: 28.4 % (ref 37–48.5)
HCT VFR BLD AUTO: 28.5 % (ref 37–48.5)
HCT VFR BLD AUTO: 29.3 % (ref 37–48.5)
HCT VFR BLD AUTO: 29.5 % (ref 37–48.5)
HCT VFR BLD AUTO: 29.6 % (ref 37–48.5)
HCT VFR BLD AUTO: 29.6 % (ref 37–48.5)
HCT VFR BLD AUTO: 29.7 % (ref 37–48.5)
HCT VFR BLD AUTO: 29.8 % (ref 37–48.5)
HCT VFR BLD AUTO: 30.2 % (ref 37–48.5)
HCT VFR BLD AUTO: 30.3 % (ref 37–48.5)
HCT VFR BLD AUTO: 30.4 % (ref 37–48.5)
HCT VFR BLD AUTO: 30.8 % (ref 37–48.5)
HCT VFR BLD AUTO: 31.1 % (ref 37–48.5)
HCT VFR BLD AUTO: 31.4 % (ref 37–48.5)
HCT VFR BLD AUTO: 31.9 % (ref 37–48.5)
HCT VFR BLD AUTO: 32.6 % (ref 37–48.5)
HCT VFR BLD AUTO: 33 % (ref 37–48.5)
HCT VFR BLD AUTO: 33 % (ref 37–48.5)
HCT VFR BLD AUTO: 33.3 % (ref 37–48.5)
HCT VFR BLD AUTO: 33.5 % (ref 37–48.5)
HCT VFR BLD AUTO: 33.6 % (ref 37–48.5)
HCT VFR BLD AUTO: 34.6 % (ref 37–48.5)
HCT VFR BLD AUTO: 34.9 % (ref 37–48.5)
HCT VFR BLD AUTO: 35 % (ref 37–48.5)
HCT VFR BLD AUTO: 35.4 % (ref 37–48.5)
HCT VFR BLD AUTO: 35.9 % (ref 37–48.5)
HCT VFR BLD AUTO: 36 % (ref 37–48.5)
HCT VFR BLD AUTO: 36.4 % (ref 37–48.5)
HCT VFR BLD AUTO: 37.8 % (ref 37–48.5)
HCT VFR BLD AUTO: 42.7 % (ref 37–48.5)
HCT VFR BLD CALC: 24 %PCV (ref 36–54)
HCV AB SERPL QL IA: NORMAL
HGB BLD-MCNC: 10 G/DL (ref 12–16)
HGB BLD-MCNC: 10 G/DL (ref 12–16)
HGB BLD-MCNC: 10.3 G/DL (ref 12–16)
HGB BLD-MCNC: 10.4 G/DL (ref 12–16)
HGB BLD-MCNC: 10.5 G/DL (ref 12–16)
HGB BLD-MCNC: 10.7 G/DL (ref 12–16)
HGB BLD-MCNC: 11 G/DL (ref 12–16)
HGB BLD-MCNC: 11.1 G/DL (ref 12–16)
HGB BLD-MCNC: 11.1 G/DL (ref 12–16)
HGB BLD-MCNC: 11.5 G/DL (ref 12–16)
HGB BLD-MCNC: 11.7 G/DL (ref 12–16)
HGB BLD-MCNC: 11.7 G/DL (ref 12–16)
HGB BLD-MCNC: 11.9 G/DL (ref 12–16)
HGB BLD-MCNC: 13.1 G/DL (ref 12–16)
HGB BLD-MCNC: 13.9 G/DL (ref 12–16)
HGB BLD-MCNC: 6.7 G/DL (ref 12–16)
HGB BLD-MCNC: 7.4 G/DL (ref 12–16)
HGB BLD-MCNC: 7.5 G/DL (ref 12–16)
HGB BLD-MCNC: 7.7 G/DL (ref 12–16)
HGB BLD-MCNC: 7.8 G/DL (ref 12–16)
HGB BLD-MCNC: 7.9 G/DL (ref 12–16)
HGB BLD-MCNC: 8 G/DL (ref 12–16)
HGB BLD-MCNC: 8.1 G/DL (ref 12–16)
HGB BLD-MCNC: 8.1 G/DL (ref 12–16)
HGB BLD-MCNC: 8.2 G/DL (ref 12–16)
HGB BLD-MCNC: 8.2 G/DL (ref 12–16)
HGB BLD-MCNC: 8.3 G/DL (ref 12–16)
HGB BLD-MCNC: 8.4 G/DL (ref 12–16)
HGB BLD-MCNC: 8.4 G/DL (ref 12–16)
HGB BLD-MCNC: 8.5 G/DL (ref 12–16)
HGB BLD-MCNC: 8.6 G/DL (ref 12–16)
HGB BLD-MCNC: 8.7 G/DL (ref 12–16)
HGB BLD-MCNC: 8.7 G/DL (ref 12–16)
HGB BLD-MCNC: 8.8 G/DL (ref 12–16)
HGB BLD-MCNC: 8.9 G/DL (ref 12–16)
HGB BLD-MCNC: 8.9 G/DL (ref 12–16)
HGB BLD-MCNC: 9 G/DL (ref 12–16)
HGB BLD-MCNC: 9.1 G/DL (ref 12–16)
HGB BLD-MCNC: 9.2 G/DL (ref 12–16)
HGB BLD-MCNC: 9.2 G/DL (ref 12–16)
HGB BLD-MCNC: 9.4 G/DL (ref 12–16)
HGB BLD-MCNC: 9.5 G/DL (ref 12–16)
HGB BLD-MCNC: 9.6 G/DL (ref 12–16)
HGB BLD-MCNC: 9.6 G/DL (ref 12–16)
HGB BLD-MCNC: 9.8 G/DL (ref 12–16)
HGB BLD-MCNC: 9.9 G/DL (ref 12–16)
HGB UR QL STRIP: ABNORMAL
HGB UR QL STRIP: NEGATIVE
HIV 1+2 AB+HIV1 P24 AG SERPL QL IA: NORMAL
HR MV ECHO: 76 BPM
HYALINE CASTS #/AREA URNS LPF: 16 /LPF
HYALINE CASTS UR QL AUTO: 0 /LPF
HYALINE CASTS UR QL AUTO: 0 /LPF
HYALINE CASTS UR QL AUTO: 110 /LPF
HYALINE CASTS UR QL AUTO: 13 /LPF
HYALINE CASTS UR QL AUTO: 20 /LPF
HYALINE CASTS UR QL AUTO: 3 /LPF
HYALINE CASTS UR QL AUTO: 9 /LPF
HYPOCHROMIA BLD QL SMEAR: ABNORMAL
IMM GRANULOCYTES # BLD AUTO: 0.01 K/UL (ref 0–0.04)
IMM GRANULOCYTES # BLD AUTO: 0.02 K/UL (ref 0–0.04)
IMM GRANULOCYTES # BLD AUTO: 0.03 K/UL (ref 0–0.04)
IMM GRANULOCYTES # BLD AUTO: 0.04 K/UL (ref 0–0.04)
IMM GRANULOCYTES # BLD AUTO: 0.05 K/UL (ref 0–0.04)
IMM GRANULOCYTES # BLD AUTO: 0.06 K/UL (ref 0–0.04)
IMM GRANULOCYTES # BLD AUTO: 0.08 K/UL (ref 0–0.04)
IMM GRANULOCYTES # BLD AUTO: 0.09 K/UL (ref 0–0.04)
IMM GRANULOCYTES # BLD AUTO: 0.09 K/UL (ref 0–0.04)
IMM GRANULOCYTES # BLD AUTO: 0.1 K/UL (ref 0–0.04)
IMM GRANULOCYTES # BLD AUTO: 0.1 K/UL (ref 0–0.04)
IMM GRANULOCYTES # BLD AUTO: 0.11 K/UL (ref 0–0.04)
IMM GRANULOCYTES # BLD AUTO: 0.15 K/UL (ref 0–0.04)
IMM GRANULOCYTES # BLD AUTO: 0.2 K/UL (ref 0–0.04)
IMM GRANULOCYTES # BLD AUTO: 0.33 K/UL (ref 0–0.04)
IMM GRANULOCYTES NFR BLD AUTO: 0.1 % (ref 0–0.5)
IMM GRANULOCYTES NFR BLD AUTO: 0.2 % (ref 0–0.5)
IMM GRANULOCYTES NFR BLD AUTO: 0.3 % (ref 0–0.5)
IMM GRANULOCYTES NFR BLD AUTO: 0.4 % (ref 0–0.5)
IMM GRANULOCYTES NFR BLD AUTO: 0.5 % (ref 0–0.5)
IMM GRANULOCYTES NFR BLD AUTO: 0.6 % (ref 0–0.5)
IMM GRANULOCYTES NFR BLD AUTO: 0.6 % (ref 0–0.5)
IMM GRANULOCYTES NFR BLD AUTO: 0.7 % (ref 0–0.5)
IMM GRANULOCYTES NFR BLD AUTO: 0.7 % (ref 0–0.5)
IMM GRANULOCYTES NFR BLD AUTO: 0.8 % (ref 0–0.5)
IMM GRANULOCYTES NFR BLD AUTO: 1 % (ref 0–0.5)
IMM GRANULOCYTES NFR BLD AUTO: 1.3 % (ref 0–0.5)
IMM GRANULOCYTES NFR BLD AUTO: 1.4 % (ref 0–0.5)
IMM GRANULOCYTES NFR BLD AUTO: 1.4 % (ref 0–0.5)
IMM GRANULOCYTES NFR BLD AUTO: 1.5 % (ref 0–0.5)
IMM GRANULOCYTES NFR BLD AUTO: 1.7 % (ref 0–0.5)
IMM GRANULOCYTES NFR BLD AUTO: 2.4 % (ref 0–0.5)
INFLUENZA A, MOLECULAR: NOT DETECTED
INFLUENZA B, MOLECULAR: NOT DETECTED
INR PPP: 1 (ref 0.8–1.2)
INR PPP: 1.1 (ref 0.8–1.2)
INR PPP: 2.3 (ref 0.8–1.2)
INR PPP: 3.5 (ref 0.8–1.2)
INR PPP: 3.8 (ref 0.8–1.2)
INR PPP: 4 (ref 0.8–1.2)
INTERVENTRICULAR SEPTUM: 0.86 CM (ref 0.6–1.1)
INTERVENTRICULAR SEPTUM: 1.3 CM (ref 0.6–1.1)
IRON SERPL-MCNC: 18 UG/DL (ref 30–160)
IVRT: 79.92 MSEC
KETONES UR QL STRIP: ABNORMAL
KETONES UR QL STRIP: NEGATIVE
LA MAJOR: 5.38 CM
LA MAJOR: 6.59 CM
LA MAJOR: 6.65 CM
LA MINOR: 5.74 CM
LA MINOR: 6.37 CM
LA MINOR: 6.65 CM
LA WIDTH: 3.7 CM
LA WIDTH: 3.73 CM
LA WIDTH: 3.94 CM
LACTATE SERPL-SCNC: 0.8 MMOL/L (ref 0.5–2.2)
LACTATE SERPL-SCNC: 1.2 MMOL/L (ref 0.5–2.2)
LDH SERPL L TO P-CCNC: 0.79 MMOL/L (ref 0.5–2.2)
LEFT ATRIUM SIZE: 3.31 CM
LEFT ATRIUM SIZE: 3.62 CM
LEFT ATRIUM SIZE: 5.03 CM
LEFT ATRIUM VOLUME INDEX MOD: 33.4 ML/M2
LEFT ATRIUM VOLUME INDEX MOD: 38.7 ML/M2
LEFT ATRIUM VOLUME INDEX MOD: 54.5 ML/M2
LEFT ATRIUM VOLUME INDEX: 40.3 ML/M2
LEFT ATRIUM VOLUME INDEX: 42.9 ML/M2
LEFT ATRIUM VOLUME INDEX: 48.5 ML/M2
LEFT ATRIUM VOLUME MOD: 59.45 CM3
LEFT ATRIUM VOLUME MOD: 70 CM3
LEFT ATRIUM VOLUME MOD: 96.99 CM3
LEFT ATRIUM VOLUME: 71.81 CM3
LEFT ATRIUM VOLUME: 76.32 CM3
LEFT ATRIUM VOLUME: 87.86 CM3
LEFT INTERNAL DIMENSION IN SYSTOLE: 1.9 CM (ref 2.1–4)
LEFT INTERNAL DIMENSION IN SYSTOLE: 2.32 CM (ref 2.1–4)
LEFT INTERNAL DIMENSION IN SYSTOLE: 2.66 CM (ref 2.1–4)
LEFT VENTRICLE DIASTOLIC VOLUME INDEX: 25.38 ML/M2
LEFT VENTRICLE DIASTOLIC VOLUME INDEX: 35.87 ML/M2
LEFT VENTRICLE DIASTOLIC VOLUME INDEX: 37.07 ML/M2
LEFT VENTRICLE DIASTOLIC VOLUME: 45.18 ML
LEFT VENTRICLE DIASTOLIC VOLUME: 63.85 ML
LEFT VENTRICLE DIASTOLIC VOLUME: 67.1 ML
LEFT VENTRICLE MASS INDEX: 105 G/M2
LEFT VENTRICLE MASS INDEX: 54 G/M2
LEFT VENTRICLE SYSTOLIC VOLUME INDEX: 10.4 ML/M2
LEFT VENTRICLE SYSTOLIC VOLUME INDEX: 14.4 ML/M2
LEFT VENTRICLE SYSTOLIC VOLUME INDEX: 6.2 ML/M2
LEFT VENTRICLE SYSTOLIC VOLUME: 11.09 ML
LEFT VENTRICLE SYSTOLIC VOLUME: 18.44 ML
LEFT VENTRICLE SYSTOLIC VOLUME: 26.04 ML
LEFT VENTRICULAR INTERNAL DIMENSION IN DIASTOLE: 3.33 CM (ref 3.5–6)
LEFT VENTRICULAR INTERNAL DIMENSION IN DIASTOLE: 3.93 CM (ref 3.5–6)
LEFT VENTRICULAR INTERNAL DIMENSION IN DIASTOLE: 4.5 CM (ref 3.5–6)
LEFT VENTRICULAR MASS: 186.39 G
LEFT VENTRICULAR MASS: 97 G
LEUKOCYTE ESTERASE UR QL STRIP: ABNORMAL
LEUKOCYTE ESTERASE UR QL STRIP: NEGATIVE
LIPASE SERPL-CCNC: 19 U/L (ref 4–60)
LV LATERAL E/E' RATIO: 13.89 M/S
LV LATERAL E/E' RATIO: 15.13 M/S
LV LATERAL E/E' RATIO: 21 M/S
LV SEPTAL E/E' RATIO: 25 M/S
LV SEPTAL E/E' RATIO: 30.25 M/S
LV SEPTAL E/E' RATIO: 35 M/S
LYMPHOCYTES # BLD AUTO: 0.6 K/UL (ref 1–4.8)
LYMPHOCYTES # BLD AUTO: 0.7 K/UL (ref 1–4.8)
LYMPHOCYTES # BLD AUTO: 0.7 K/UL (ref 1–4.8)
LYMPHOCYTES # BLD AUTO: 0.8 K/UL (ref 1–4.8)
LYMPHOCYTES # BLD AUTO: 0.9 K/UL (ref 1–4.8)
LYMPHOCYTES # BLD AUTO: 0.9 K/UL (ref 1–4.8)
LYMPHOCYTES # BLD AUTO: 1 K/UL (ref 1–4.8)
LYMPHOCYTES # BLD AUTO: 1.1 K/UL (ref 1–4.8)
LYMPHOCYTES # BLD AUTO: 1.2 K/UL (ref 1–4.8)
LYMPHOCYTES # BLD AUTO: 1.3 K/UL (ref 1–4.8)
LYMPHOCYTES # BLD AUTO: 1.4 K/UL (ref 1–4.8)
LYMPHOCYTES # BLD AUTO: 1.5 K/UL (ref 1–4.8)
LYMPHOCYTES # BLD AUTO: 1.5 K/UL (ref 1–4.8)
LYMPHOCYTES # BLD AUTO: 1.6 K/UL (ref 1–4.8)
LYMPHOCYTES # BLD AUTO: 1.7 K/UL (ref 1–4.8)
LYMPHOCYTES # BLD AUTO: 1.8 K/UL (ref 1–4.8)
LYMPHOCYTES # BLD AUTO: 2.3 K/UL (ref 1–4.8)
LYMPHOCYTES # BLD AUTO: 2.3 K/UL (ref 1–4.8)
LYMPHOCYTES NFR BLD: 10.5 % (ref 18–48)
LYMPHOCYTES NFR BLD: 10.9 % (ref 18–48)
LYMPHOCYTES NFR BLD: 11.5 % (ref 18–48)
LYMPHOCYTES NFR BLD: 11.5 % (ref 18–48)
LYMPHOCYTES NFR BLD: 11.9 % (ref 18–48)
LYMPHOCYTES NFR BLD: 11.9 % (ref 18–48)
LYMPHOCYTES NFR BLD: 12.3 % (ref 18–48)
LYMPHOCYTES NFR BLD: 12.5 % (ref 18–48)
LYMPHOCYTES NFR BLD: 12.6 % (ref 18–48)
LYMPHOCYTES NFR BLD: 12.9 % (ref 18–48)
LYMPHOCYTES NFR BLD: 13 % (ref 18–48)
LYMPHOCYTES NFR BLD: 13.1 % (ref 18–48)
LYMPHOCYTES NFR BLD: 13.3 % (ref 18–48)
LYMPHOCYTES NFR BLD: 13.4 % (ref 18–48)
LYMPHOCYTES NFR BLD: 13.4 % (ref 18–48)
LYMPHOCYTES NFR BLD: 14 % (ref 18–48)
LYMPHOCYTES NFR BLD: 14 % (ref 18–48)
LYMPHOCYTES NFR BLD: 14.2 % (ref 18–48)
LYMPHOCYTES NFR BLD: 14.3 % (ref 18–48)
LYMPHOCYTES NFR BLD: 14.5 % (ref 18–48)
LYMPHOCYTES NFR BLD: 14.6 % (ref 18–48)
LYMPHOCYTES NFR BLD: 15.3 % (ref 18–48)
LYMPHOCYTES NFR BLD: 15.9 % (ref 18–48)
LYMPHOCYTES NFR BLD: 15.9 % (ref 18–48)
LYMPHOCYTES NFR BLD: 16.3 % (ref 18–48)
LYMPHOCYTES NFR BLD: 16.4 % (ref 18–48)
LYMPHOCYTES NFR BLD: 17.4 % (ref 18–48)
LYMPHOCYTES NFR BLD: 17.6 % (ref 18–48)
LYMPHOCYTES NFR BLD: 17.8 % (ref 18–48)
LYMPHOCYTES NFR BLD: 17.9 % (ref 18–48)
LYMPHOCYTES NFR BLD: 18.4 % (ref 18–48)
LYMPHOCYTES NFR BLD: 19.5 % (ref 18–48)
LYMPHOCYTES NFR BLD: 19.7 % (ref 18–48)
LYMPHOCYTES NFR BLD: 20.2 % (ref 18–48)
LYMPHOCYTES NFR BLD: 20.6 % (ref 18–48)
LYMPHOCYTES NFR BLD: 21.5 % (ref 18–48)
LYMPHOCYTES NFR BLD: 21.8 % (ref 18–48)
LYMPHOCYTES NFR BLD: 22.6 % (ref 18–48)
LYMPHOCYTES NFR BLD: 23.2 % (ref 18–48)
LYMPHOCYTES NFR BLD: 23.8 % (ref 18–48)
LYMPHOCYTES NFR BLD: 24.3 % (ref 18–48)
LYMPHOCYTES NFR BLD: 27.6 % (ref 18–48)
LYMPHOCYTES NFR BLD: 3.2 % (ref 18–48)
LYMPHOCYTES NFR BLD: 6.4 % (ref 18–48)
LYMPHOCYTES NFR BLD: 7.2 % (ref 18–48)
LYMPHOCYTES NFR BLD: 7.5 % (ref 18–48)
LYMPHOCYTES NFR BLD: 7.6 % (ref 18–48)
LYMPHOCYTES NFR BLD: 8 % (ref 18–48)
LYMPHOCYTES NFR BLD: 9 % (ref 18–48)
Lab: NORMAL
MAGNESIUM SERPL-MCNC: 1.4 MG/DL (ref 1.6–2.6)
MAGNESIUM SERPL-MCNC: 1.5 MG/DL (ref 1.6–2.6)
MAGNESIUM SERPL-MCNC: 1.6 MG/DL (ref 1.6–2.6)
MAGNESIUM SERPL-MCNC: 1.7 MG/DL (ref 1.6–2.6)
MAGNESIUM SERPL-MCNC: 1.8 MG/DL (ref 1.6–2.6)
MAGNESIUM SERPL-MCNC: 1.9 MG/DL (ref 1.6–2.6)
MAGNESIUM SERPL-MCNC: 2 MG/DL (ref 1.6–2.6)
MAGNESIUM SERPL-MCNC: 2 MG/DL (ref 1.6–2.6)
MAGNESIUM SERPL-MCNC: 2.1 MG/DL (ref 1.6–2.6)
MAGNESIUM SERPL-MCNC: 2.3 MG/DL (ref 1.6–2.6)
MAGNESIUM SERPL-MCNC: 2.4 MG/DL (ref 1.6–2.6)
MCH RBC QN AUTO: 23.8 PG (ref 27–31)
MCH RBC QN AUTO: 25.1 PG (ref 27–31)
MCH RBC QN AUTO: 25.1 PG (ref 27–31)
MCH RBC QN AUTO: 25.6 PG (ref 27–31)
MCH RBC QN AUTO: 25.8 PG (ref 27–31)
MCH RBC QN AUTO: 25.9 PG (ref 27–31)
MCH RBC QN AUTO: 26.3 PG (ref 27–31)
MCH RBC QN AUTO: 26.6 PG (ref 27–31)
MCH RBC QN AUTO: 26.6 PG (ref 27–31)
MCH RBC QN AUTO: 26.9 PG (ref 27–31)
MCH RBC QN AUTO: 26.9 PG (ref 27–31)
MCH RBC QN AUTO: 27 PG (ref 27–31)
MCH RBC QN AUTO: 27.2 PG (ref 27–31)
MCH RBC QN AUTO: 27.2 PG (ref 27–31)
MCH RBC QN AUTO: 27.4 PG (ref 27–31)
MCH RBC QN AUTO: 27.5 PG (ref 27–31)
MCH RBC QN AUTO: 27.6 PG (ref 27–31)
MCH RBC QN AUTO: 27.6 PG (ref 27–31)
MCH RBC QN AUTO: 27.7 PG (ref 27–31)
MCH RBC QN AUTO: 27.8 PG (ref 27–31)
MCH RBC QN AUTO: 27.9 PG (ref 27–31)
MCH RBC QN AUTO: 27.9 PG (ref 27–31)
MCH RBC QN AUTO: 28 PG (ref 27–31)
MCH RBC QN AUTO: 28.1 PG (ref 27–31)
MCH RBC QN AUTO: 28.2 PG (ref 27–31)
MCH RBC QN AUTO: 28.2 PG (ref 27–31)
MCH RBC QN AUTO: 28.3 PG (ref 27–31)
MCH RBC QN AUTO: 28.5 PG (ref 27–31)
MCH RBC QN AUTO: 28.5 PG (ref 27–31)
MCH RBC QN AUTO: 28.6 PG (ref 27–31)
MCH RBC QN AUTO: 28.7 PG (ref 27–31)
MCH RBC QN AUTO: 28.7 PG (ref 27–31)
MCH RBC QN AUTO: 28.9 PG (ref 27–31)
MCH RBC QN AUTO: 29 PG (ref 27–31)
MCH RBC QN AUTO: 29.1 PG (ref 27–31)
MCH RBC QN AUTO: 29.1 PG (ref 27–31)
MCH RBC QN AUTO: 29.3 PG (ref 27–31)
MCHC RBC AUTO-ENTMCNC: 28.8 G/DL (ref 32–36)
MCHC RBC AUTO-ENTMCNC: 29.8 G/DL (ref 32–36)
MCHC RBC AUTO-ENTMCNC: 30.1 G/DL (ref 32–36)
MCHC RBC AUTO-ENTMCNC: 30.2 G/DL (ref 32–36)
MCHC RBC AUTO-ENTMCNC: 30.3 G/DL (ref 32–36)
MCHC RBC AUTO-ENTMCNC: 30.5 G/DL (ref 32–36)
MCHC RBC AUTO-ENTMCNC: 30.5 G/DL (ref 32–36)
MCHC RBC AUTO-ENTMCNC: 30.7 G/DL (ref 32–36)
MCHC RBC AUTO-ENTMCNC: 30.9 G/DL (ref 32–36)
MCHC RBC AUTO-ENTMCNC: 31 G/DL (ref 32–36)
MCHC RBC AUTO-ENTMCNC: 31 G/DL (ref 32–36)
MCHC RBC AUTO-ENTMCNC: 31.1 G/DL (ref 32–36)
MCHC RBC AUTO-ENTMCNC: 31.1 G/DL (ref 32–36)
MCHC RBC AUTO-ENTMCNC: 31.2 G/DL (ref 32–36)
MCHC RBC AUTO-ENTMCNC: 31.2 G/DL (ref 32–36)
MCHC RBC AUTO-ENTMCNC: 31.3 G/DL (ref 32–36)
MCHC RBC AUTO-ENTMCNC: 31.3 G/DL (ref 32–36)
MCHC RBC AUTO-ENTMCNC: 31.4 G/DL (ref 32–36)
MCHC RBC AUTO-ENTMCNC: 31.5 G/DL (ref 32–36)
MCHC RBC AUTO-ENTMCNC: 31.5 G/DL (ref 32–36)
MCHC RBC AUTO-ENTMCNC: 31.6 G/DL (ref 32–36)
MCHC RBC AUTO-ENTMCNC: 31.7 G/DL (ref 32–36)
MCHC RBC AUTO-ENTMCNC: 31.7 G/DL (ref 32–36)
MCHC RBC AUTO-ENTMCNC: 31.8 G/DL (ref 32–36)
MCHC RBC AUTO-ENTMCNC: 31.9 G/DL (ref 32–36)
MCHC RBC AUTO-ENTMCNC: 32 G/DL (ref 32–36)
MCHC RBC AUTO-ENTMCNC: 32 G/DL (ref 32–36)
MCHC RBC AUTO-ENTMCNC: 32.1 G/DL (ref 32–36)
MCHC RBC AUTO-ENTMCNC: 32.2 G/DL (ref 32–36)
MCHC RBC AUTO-ENTMCNC: 32.3 G/DL (ref 32–36)
MCHC RBC AUTO-ENTMCNC: 32.3 G/DL (ref 32–36)
MCHC RBC AUTO-ENTMCNC: 32.5 G/DL (ref 32–36)
MCHC RBC AUTO-ENTMCNC: 32.5 G/DL (ref 32–36)
MCHC RBC AUTO-ENTMCNC: 32.6 G/DL (ref 32–36)
MCHC RBC AUTO-ENTMCNC: 32.8 G/DL (ref 32–36)
MCHC RBC AUTO-ENTMCNC: 32.9 G/DL (ref 32–36)
MCHC RBC AUTO-ENTMCNC: 32.9 G/DL (ref 32–36)
MCHC RBC AUTO-ENTMCNC: 33.1 G/DL (ref 32–36)
MCHC RBC AUTO-ENTMCNC: 33.3 G/DL (ref 32–36)
MCHC RBC AUTO-ENTMCNC: 34.7 G/DL (ref 32–36)
MCV RBC AUTO: 75 FL (ref 82–98)
MCV RBC AUTO: 77 FL (ref 82–98)
MCV RBC AUTO: 78 FL (ref 82–98)
MCV RBC AUTO: 80 FL (ref 82–98)
MCV RBC AUTO: 81 FL (ref 82–98)
MCV RBC AUTO: 81 FL (ref 82–98)
MCV RBC AUTO: 84 FL (ref 82–98)
MCV RBC AUTO: 85 FL (ref 82–98)
MCV RBC AUTO: 86 FL (ref 82–98)
MCV RBC AUTO: 87 FL (ref 82–98)
MCV RBC AUTO: 88 FL (ref 82–98)
MCV RBC AUTO: 89 FL (ref 82–98)
MCV RBC AUTO: 90 FL (ref 82–98)
MCV RBC AUTO: 91 FL (ref 82–98)
MCV RBC AUTO: 92 FL (ref 82–98)
MCV RBC AUTO: 93 FL (ref 82–98)
MCV RBC AUTO: 94 FL (ref 82–98)
MCV RBC AUTO: 95 FL (ref 82–98)
MICROSCOPIC COMMENT: ABNORMAL
MICROSCOPIC EXAM: NORMAL
MODE: ABNORMAL
MONOCYTES # BLD AUTO: 0.4 K/UL (ref 0.3–1)
MONOCYTES # BLD AUTO: 0.5 K/UL (ref 0.3–1)
MONOCYTES # BLD AUTO: 0.6 K/UL (ref 0.3–1)
MONOCYTES # BLD AUTO: 0.7 K/UL (ref 0.3–1)
MONOCYTES # BLD AUTO: 0.8 K/UL (ref 0.3–1)
MONOCYTES # BLD AUTO: 0.9 K/UL (ref 0.3–1)
MONOCYTES # BLD AUTO: 1.2 K/UL (ref 0.3–1)
MONOCYTES # BLD AUTO: 1.2 K/UL (ref 0.3–1)
MONOCYTES # BLD AUTO: 1.3 K/UL (ref 0.3–1)
MONOCYTES NFR BLD: 10 % (ref 4–15)
MONOCYTES NFR BLD: 10.3 % (ref 4–15)
MONOCYTES NFR BLD: 10.3 % (ref 4–15)
MONOCYTES NFR BLD: 10.4 % (ref 4–15)
MONOCYTES NFR BLD: 10.5 % (ref 4–15)
MONOCYTES NFR BLD: 11.2 % (ref 4–15)
MONOCYTES NFR BLD: 12 % (ref 4–15)
MONOCYTES NFR BLD: 12.3 % (ref 4–15)
MONOCYTES NFR BLD: 4.9 % (ref 4–15)
MONOCYTES NFR BLD: 5.3 % (ref 4–15)
MONOCYTES NFR BLD: 5.8 % (ref 4–15)
MONOCYTES NFR BLD: 6.4 % (ref 4–15)
MONOCYTES NFR BLD: 6.6 % (ref 4–15)
MONOCYTES NFR BLD: 6.7 % (ref 4–15)
MONOCYTES NFR BLD: 6.8 % (ref 4–15)
MONOCYTES NFR BLD: 7 % (ref 4–15)
MONOCYTES NFR BLD: 7.2 % (ref 4–15)
MONOCYTES NFR BLD: 7.3 % (ref 4–15)
MONOCYTES NFR BLD: 7.3 % (ref 4–15)
MONOCYTES NFR BLD: 7.4 % (ref 4–15)
MONOCYTES NFR BLD: 7.4 % (ref 4–15)
MONOCYTES NFR BLD: 7.6 % (ref 4–15)
MONOCYTES NFR BLD: 7.7 % (ref 4–15)
MONOCYTES NFR BLD: 8.1 % (ref 4–15)
MONOCYTES NFR BLD: 8.2 % (ref 4–15)
MONOCYTES NFR BLD: 8.3 % (ref 4–15)
MONOCYTES NFR BLD: 8.5 % (ref 4–15)
MONOCYTES NFR BLD: 8.6 % (ref 4–15)
MONOCYTES NFR BLD: 8.7 % (ref 4–15)
MONOCYTES NFR BLD: 8.8 % (ref 4–15)
MONOCYTES NFR BLD: 8.9 % (ref 4–15)
MONOCYTES NFR BLD: 9.1 % (ref 4–15)
MONOCYTES NFR BLD: 9.2 % (ref 4–15)
MONOCYTES NFR BLD: 9.2 % (ref 4–15)
MONOCYTES NFR BLD: 9.3 % (ref 4–15)
MONOCYTES NFR BLD: 9.4 % (ref 4–15)
MONOCYTES NFR BLD: 9.5 % (ref 4–15)
MONOCYTES NFR BLD: 9.7 % (ref 4–15)
MONOCYTES NFR BLD: 9.8 % (ref 4–15)
MV MEAN GRADIENT: 5 MMHG
MV MEAN GRADIENT: 7 MMHG
MV PEAK A VEL: 1.65 M/S
MV PEAK A VEL: 1.73 M/S
MV PEAK A VEL: 1.82 M/S
MV PEAK E VEL: 1.05 M/S
MV PEAK E VEL: 1.21 M/S
MV PEAK E VEL: 1.25 M/S
MV PEAK GRADIENT: 22 MMHG
MV STENOSIS PRESSURE HALF TIME: 126.34 MS
MV STENOSIS PRESSURE HALF TIME: 71.19 MS
MV STENOSIS PRESSURE HALF TIME: 77.83 MS
MV VALVE AREA BY CONTINUITY EQUATION: 1.76 CM2
MV VALVE AREA BY CONTINUITY EQUATION: 2.66 CM2
MV VALVE AREA P 1/2 METHOD: 1.74 CM2
MV VALVE AREA P 1/2 METHOD: 2.83 CM2
MV VALVE AREA P 1/2 METHOD: 3.09 CM2
NEUTROPHILS # BLD AUTO: 10 K/UL (ref 1.8–7.7)
NEUTROPHILS # BLD AUTO: 10.6 K/UL (ref 1.8–7.7)
NEUTROPHILS # BLD AUTO: 10.8 K/UL (ref 1.8–7.7)
NEUTROPHILS # BLD AUTO: 11 K/UL (ref 1.8–7.7)
NEUTROPHILS # BLD AUTO: 16 K/UL (ref 1.8–7.7)
NEUTROPHILS # BLD AUTO: 3.1 K/UL (ref 1.8–7.7)
NEUTROPHILS # BLD AUTO: 3.1 K/UL (ref 1.8–7.7)
NEUTROPHILS # BLD AUTO: 3.3 K/UL (ref 1.8–7.7)
NEUTROPHILS # BLD AUTO: 3.7 K/UL (ref 1.8–7.7)
NEUTROPHILS # BLD AUTO: 3.9 K/UL (ref 1.8–7.7)
NEUTROPHILS # BLD AUTO: 4 K/UL (ref 1.8–7.7)
NEUTROPHILS # BLD AUTO: 4 K/UL (ref 1.8–7.7)
NEUTROPHILS # BLD AUTO: 4.1 K/UL (ref 1.8–7.7)
NEUTROPHILS # BLD AUTO: 4.2 K/UL (ref 1.8–7.7)
NEUTROPHILS # BLD AUTO: 4.3 K/UL (ref 1.8–7.7)
NEUTROPHILS # BLD AUTO: 4.4 K/UL (ref 1.8–7.7)
NEUTROPHILS # BLD AUTO: 4.7 K/UL (ref 1.8–7.7)
NEUTROPHILS # BLD AUTO: 4.8 K/UL (ref 1.8–7.7)
NEUTROPHILS # BLD AUTO: 4.9 K/UL (ref 1.8–7.7)
NEUTROPHILS # BLD AUTO: 4.9 K/UL (ref 1.8–7.7)
NEUTROPHILS # BLD AUTO: 5 K/UL (ref 1.8–7.7)
NEUTROPHILS # BLD AUTO: 5.2 K/UL (ref 1.8–7.7)
NEUTROPHILS # BLD AUTO: 5.2 K/UL (ref 1.8–7.7)
NEUTROPHILS # BLD AUTO: 5.3 K/UL (ref 1.8–7.7)
NEUTROPHILS # BLD AUTO: 5.4 K/UL (ref 1.8–7.7)
NEUTROPHILS # BLD AUTO: 5.5 K/UL (ref 1.8–7.7)
NEUTROPHILS # BLD AUTO: 5.9 K/UL (ref 1.8–7.7)
NEUTROPHILS # BLD AUTO: 5.9 K/UL (ref 1.8–7.7)
NEUTROPHILS # BLD AUTO: 6 K/UL (ref 1.8–7.7)
NEUTROPHILS # BLD AUTO: 6.2 K/UL (ref 1.8–7.7)
NEUTROPHILS # BLD AUTO: 6.3 K/UL (ref 1.8–7.7)
NEUTROPHILS # BLD AUTO: 6.3 K/UL (ref 1.8–7.7)
NEUTROPHILS # BLD AUTO: 6.4 K/UL (ref 1.8–7.7)
NEUTROPHILS # BLD AUTO: 6.4 K/UL (ref 1.8–7.7)
NEUTROPHILS # BLD AUTO: 6.5 K/UL (ref 1.8–7.7)
NEUTROPHILS # BLD AUTO: 6.5 K/UL (ref 1.8–7.7)
NEUTROPHILS # BLD AUTO: 6.7 K/UL (ref 1.8–7.7)
NEUTROPHILS # BLD AUTO: 6.7 K/UL (ref 1.8–7.7)
NEUTROPHILS # BLD AUTO: 7.2 K/UL (ref 1.8–7.7)
NEUTROPHILS # BLD AUTO: 7.8 K/UL (ref 1.8–7.7)
NEUTROPHILS # BLD AUTO: 8 K/UL (ref 1.8–7.7)
NEUTROPHILS # BLD AUTO: 8.1 K/UL (ref 1.8–7.7)
NEUTROPHILS # BLD AUTO: 8.3 K/UL (ref 1.8–7.7)
NEUTROPHILS # BLD AUTO: 8.5 K/UL (ref 1.8–7.7)
NEUTROPHILS # BLD AUTO: 8.9 K/UL (ref 1.8–7.7)
NEUTROPHILS # BLD AUTO: 9.2 K/UL (ref 1.8–7.7)
NEUTROPHILS NFR BLD: 57.4 % (ref 38–73)
NEUTROPHILS NFR BLD: 59.6 % (ref 38–73)
NEUTROPHILS NFR BLD: 63 % (ref 38–73)
NEUTROPHILS NFR BLD: 63.1 % (ref 38–73)
NEUTROPHILS NFR BLD: 63.2 % (ref 38–73)
NEUTROPHILS NFR BLD: 64.2 % (ref 38–73)
NEUTROPHILS NFR BLD: 64.6 % (ref 38–73)
NEUTROPHILS NFR BLD: 65.1 % (ref 38–73)
NEUTROPHILS NFR BLD: 67.1 % (ref 38–73)
NEUTROPHILS NFR BLD: 67.1 % (ref 38–73)
NEUTROPHILS NFR BLD: 68.8 % (ref 38–73)
NEUTROPHILS NFR BLD: 69.4 % (ref 38–73)
NEUTROPHILS NFR BLD: 69.5 % (ref 38–73)
NEUTROPHILS NFR BLD: 70.2 % (ref 38–73)
NEUTROPHILS NFR BLD: 70.4 % (ref 38–73)
NEUTROPHILS NFR BLD: 71.1 % (ref 38–73)
NEUTROPHILS NFR BLD: 71.2 % (ref 38–73)
NEUTROPHILS NFR BLD: 71.6 % (ref 38–73)
NEUTROPHILS NFR BLD: 72.2 % (ref 38–73)
NEUTROPHILS NFR BLD: 72.4 % (ref 38–73)
NEUTROPHILS NFR BLD: 72.8 % (ref 38–73)
NEUTROPHILS NFR BLD: 73.2 % (ref 38–73)
NEUTROPHILS NFR BLD: 73.4 % (ref 38–73)
NEUTROPHILS NFR BLD: 73.8 % (ref 38–73)
NEUTROPHILS NFR BLD: 74 % (ref 38–73)
NEUTROPHILS NFR BLD: 74.2 % (ref 38–73)
NEUTROPHILS NFR BLD: 74.4 % (ref 38–73)
NEUTROPHILS NFR BLD: 74.5 % (ref 38–73)
NEUTROPHILS NFR BLD: 74.7 % (ref 38–73)
NEUTROPHILS NFR BLD: 74.9 % (ref 38–73)
NEUTROPHILS NFR BLD: 75.4 % (ref 38–73)
NEUTROPHILS NFR BLD: 75.4 % (ref 38–73)
NEUTROPHILS NFR BLD: 75.6 % (ref 38–73)
NEUTROPHILS NFR BLD: 75.9 % (ref 38–73)
NEUTROPHILS NFR BLD: 76 % (ref 38–73)
NEUTROPHILS NFR BLD: 76.2 % (ref 38–73)
NEUTROPHILS NFR BLD: 76.6 % (ref 38–73)
NEUTROPHILS NFR BLD: 76.8 % (ref 38–73)
NEUTROPHILS NFR BLD: 77.3 % (ref 38–73)
NEUTROPHILS NFR BLD: 77.6 % (ref 38–73)
NEUTROPHILS NFR BLD: 78 % (ref 38–73)
NEUTROPHILS NFR BLD: 78 % (ref 38–73)
NEUTROPHILS NFR BLD: 78.1 % (ref 38–73)
NEUTROPHILS NFR BLD: 78.9 % (ref 38–73)
NEUTROPHILS NFR BLD: 79.5 % (ref 38–73)
NEUTROPHILS NFR BLD: 79.8 % (ref 38–73)
NEUTROPHILS NFR BLD: 80.8 % (ref 38–73)
NEUTROPHILS NFR BLD: 82.4 % (ref 38–73)
NEUTROPHILS NFR BLD: 84.1 % (ref 38–73)
NEUTROPHILS NFR BLD: 86 % (ref 38–73)
NEUTROPHILS NFR BLD: 90.7 % (ref 38–73)
NITRITE UR QL STRIP: NEGATIVE
NON-SQ EPI CELLS #/AREA URNS AUTO: 0 /HPF
NRBC BLD-RTO: 0 /100 WBC
NRBC BLD-RTO: 1 /100 WBC
NUM UNITS TRANS PACKED RBC: NORMAL
OB PNL STL: POSITIVE
OSMOLALITY UR: 349 MOSM/KG (ref 50–1200)
OVALOCYTES BLD QL SMEAR: ABNORMAL
PCO2 BLDA: 28 MMHG (ref 35–45)
PCO2 BLDA: 28.8 MMHG (ref 35–45)
PCO2 BLDA: 31.8 MMHG (ref 35–45)
PCO2 BLDA: 31.9 MMHG (ref 35–45)
PCO2 BLDA: 34.2 MMHG (ref 35–45)
PCO2 BLDA: 36.6 MMHG (ref 35–45)
PCO2 BLDA: 37.1 MMHG (ref 35–45)
PCO2 BLDA: 37.5 MMHG (ref 35–45)
PCO2 BLDA: 39.3 MMHG (ref 35–45)
PCO2 BLDA: 41.6 MMHG (ref 35–45)
PCO2 BLDA: 43.3 MMHG (ref 35–45)
PCO2 BLDA: 52.3 MMHG (ref 35–45)
PH SMN: 7.34 [PH] (ref 7.35–7.45)
PH SMN: 7.36 [PH] (ref 7.35–7.45)
PH SMN: 7.46 [PH] (ref 7.35–7.45)
PH SMN: 7.47 [PH] (ref 7.35–7.45)
PH SMN: 7.48 [PH] (ref 7.35–7.45)
PH SMN: 7.5 [PH] (ref 7.35–7.45)
PH SMN: 7.5 [PH] (ref 7.35–7.45)
PH SMN: 7.51 [PH] (ref 7.35–7.45)
PH SMN: 7.52 [PH] (ref 7.35–7.45)
PH SMN: 7.55 [PH] (ref 7.35–7.45)
PH UR STRIP: 5 [PH] (ref 5–8)
PH UR STRIP: 5 [PH] (ref 5–8)
PH UR STRIP: 6 [PH] (ref 5–8)
PH UR STRIP: 7 [PH] (ref 5–8)
PH UR STRIP: 7 [PH] (ref 5–8)
PH UR STRIP: 8 [PH] (ref 5–8)
PHOSPHATE SERPL-MCNC: 2 MG/DL (ref 2.7–4.5)
PHOSPHATE SERPL-MCNC: 2.1 MG/DL (ref 2.7–4.5)
PHOSPHATE SERPL-MCNC: 2.5 MG/DL (ref 2.7–4.5)
PHOSPHATE SERPL-MCNC: 2.6 MG/DL (ref 2.7–4.5)
PHOSPHATE SERPL-MCNC: 2.8 MG/DL (ref 2.7–4.5)
PHOSPHATE SERPL-MCNC: 2.9 MG/DL (ref 2.7–4.5)
PHOSPHATE SERPL-MCNC: 3 MG/DL (ref 2.7–4.5)
PHOSPHATE SERPL-MCNC: 3.2 MG/DL (ref 2.7–4.5)
PHOSPHATE SERPL-MCNC: 3.3 MG/DL (ref 2.7–4.5)
PHOSPHATE SERPL-MCNC: 3.5 MG/DL (ref 2.7–4.5)
PHOSPHATE SERPL-MCNC: 3.6 MG/DL (ref 2.7–4.5)
PHOSPHATE SERPL-MCNC: 3.7 MG/DL (ref 2.7–4.5)
PHOSPHATE SERPL-MCNC: 3.9 MG/DL (ref 2.7–4.5)
PHOSPHATE SERPL-MCNC: 4 MG/DL (ref 2.7–4.5)
PHOSPHATE SERPL-MCNC: 4.6 MG/DL (ref 2.7–4.5)
PISA TR MAX VEL: 2.77 M/S
PISA TR MAX VEL: 3 M/S
PISA TR MAX VEL: 3.38 M/S
PLATELET # BLD AUTO: 187 K/UL (ref 150–450)
PLATELET # BLD AUTO: 196 K/UL (ref 150–450)
PLATELET # BLD AUTO: 202 K/UL (ref 150–450)
PLATELET # BLD AUTO: 203 K/UL (ref 150–450)
PLATELET # BLD AUTO: 205 K/UL (ref 150–450)
PLATELET # BLD AUTO: 211 K/UL (ref 150–450)
PLATELET # BLD AUTO: 217 K/UL (ref 150–450)
PLATELET # BLD AUTO: 217 K/UL (ref 150–450)
PLATELET # BLD AUTO: 219 K/UL (ref 150–450)
PLATELET # BLD AUTO: 221 K/UL (ref 150–450)
PLATELET # BLD AUTO: 222 K/UL (ref 150–450)
PLATELET # BLD AUTO: 224 K/UL (ref 150–450)
PLATELET # BLD AUTO: 228 K/UL (ref 150–450)
PLATELET # BLD AUTO: 228 K/UL (ref 150–450)
PLATELET # BLD AUTO: 230 K/UL (ref 150–450)
PLATELET # BLD AUTO: 241 K/UL (ref 150–450)
PLATELET # BLD AUTO: 247 K/UL (ref 150–450)
PLATELET # BLD AUTO: 249 K/UL (ref 150–450)
PLATELET # BLD AUTO: 253 K/UL (ref 150–450)
PLATELET # BLD AUTO: 256 K/UL (ref 150–450)
PLATELET # BLD AUTO: 268 K/UL (ref 150–450)
PLATELET # BLD AUTO: 270 K/UL (ref 150–450)
PLATELET # BLD AUTO: 273 K/UL (ref 150–450)
PLATELET # BLD AUTO: 273 K/UL (ref 150–450)
PLATELET # BLD AUTO: 274 K/UL (ref 150–450)
PLATELET # BLD AUTO: 275 K/UL (ref 150–450)
PLATELET # BLD AUTO: 278 K/UL (ref 150–450)
PLATELET # BLD AUTO: 278 K/UL (ref 150–450)
PLATELET # BLD AUTO: 279 K/UL (ref 150–450)
PLATELET # BLD AUTO: 282 K/UL (ref 150–450)
PLATELET # BLD AUTO: 283 K/UL (ref 150–450)
PLATELET # BLD AUTO: 284 K/UL (ref 150–450)
PLATELET # BLD AUTO: 286 K/UL (ref 150–450)
PLATELET # BLD AUTO: 291 K/UL (ref 150–450)
PLATELET # BLD AUTO: 291 K/UL (ref 150–450)
PLATELET # BLD AUTO: 292 K/UL (ref 150–450)
PLATELET # BLD AUTO: 294 K/UL (ref 150–450)
PLATELET # BLD AUTO: 295 K/UL (ref 150–450)
PLATELET # BLD AUTO: 299 K/UL (ref 150–450)
PLATELET # BLD AUTO: 300 K/UL (ref 150–450)
PLATELET # BLD AUTO: 301 K/UL (ref 150–450)
PLATELET # BLD AUTO: 303 K/UL (ref 150–450)
PLATELET # BLD AUTO: 307 K/UL (ref 150–450)
PLATELET # BLD AUTO: 307 K/UL (ref 150–450)
PLATELET # BLD AUTO: 311 K/UL (ref 150–450)
PLATELET # BLD AUTO: 312 K/UL (ref 150–450)
PLATELET # BLD AUTO: 313 K/UL (ref 150–450)
PLATELET # BLD AUTO: 314 K/UL (ref 150–450)
PLATELET # BLD AUTO: 315 K/UL (ref 150–450)
PLATELET # BLD AUTO: 315 K/UL (ref 150–450)
PLATELET # BLD AUTO: 319 K/UL (ref 150–450)
PLATELET # BLD AUTO: 321 K/UL (ref 150–450)
PLATELET # BLD AUTO: 330 K/UL (ref 150–450)
PLATELET # BLD AUTO: 341 K/UL (ref 150–450)
PLATELET # BLD AUTO: 356 K/UL (ref 150–450)
PLATELET BLD QL SMEAR: ABNORMAL
PMV BLD AUTO: 10.1 FL (ref 9.2–12.9)
PMV BLD AUTO: 10.3 FL (ref 9.2–12.9)
PMV BLD AUTO: 10.4 FL (ref 9.2–12.9)
PMV BLD AUTO: 10.5 FL (ref 9.2–12.9)
PMV BLD AUTO: 10.6 FL (ref 9.2–12.9)
PMV BLD AUTO: 10.7 FL (ref 9.2–12.9)
PMV BLD AUTO: 10.7 FL (ref 9.2–12.9)
PMV BLD AUTO: 10.8 FL (ref 9.2–12.9)
PMV BLD AUTO: 10.9 FL (ref 9.2–12.9)
PMV BLD AUTO: 11 FL (ref 9.2–12.9)
PMV BLD AUTO: 11.1 FL (ref 9.2–12.9)
PMV BLD AUTO: 11.2 FL (ref 9.2–12.9)
PMV BLD AUTO: 11.3 FL (ref 9.2–12.9)
PMV BLD AUTO: 11.4 FL (ref 9.2–12.9)
PMV BLD AUTO: 11.5 FL (ref 9.2–12.9)
PMV BLD AUTO: 11.6 FL (ref 9.2–12.9)
PMV BLD AUTO: 11.7 FL (ref 9.2–12.9)
PMV BLD AUTO: 11.8 FL (ref 9.2–12.9)
PMV BLD AUTO: 11.8 FL (ref 9.2–12.9)
PMV BLD AUTO: 11.9 FL (ref 9.2–12.9)
PMV BLD AUTO: 11.9 FL (ref 9.2–12.9)
PMV BLD AUTO: 12 FL (ref 9.2–12.9)
PMV BLD AUTO: 12 FL (ref 9.2–12.9)
PMV BLD AUTO: 12.1 FL (ref 9.2–12.9)
PMV BLD AUTO: 12.1 FL (ref 9.2–12.9)
PMV BLD AUTO: 12.8 FL (ref 9.2–12.9)
PO2 BLDA: 26 MMHG (ref 40–60)
PO2 BLDA: 33 MMHG (ref 40–60)
PO2 BLDA: 34 MMHG (ref 40–60)
PO2 BLDA: 50 MMHG (ref 40–60)
PO2 BLDA: 54 MMHG (ref 40–60)
PO2 BLDA: 64 MMHG (ref 80–100)
PO2 BLDA: 66 MMHG (ref 40–60)
PO2 BLDA: 70 MMHG (ref 80–100)
PO2 BLDA: 71 MMHG (ref 80–100)
PO2 BLDA: 76 MMHG (ref 80–100)
PO2 BLDA: 77 MMHG (ref 80–100)
PO2 BLDA: 81 MMHG (ref 80–100)
POC BE: -1 MMOL/L
POC BE: -3 MMOL/L
POC BE: -7 MMOL/L
POC BE: 2 MMOL/L
POC BE: 4 MMOL/L
POC BE: 4 MMOL/L
POC BE: 5 MMOL/L
POC BE: 5 MMOL/L
POC BE: 7 MMOL/L
POC BE: 8 MMOL/L
POC IONIZED CALCIUM: 1.15 MMOL/L (ref 1.06–1.42)
POC MOLECULAR INFLUENZA A AGN: NEGATIVE
POC MOLECULAR INFLUENZA B AGN: NEGATIVE
POC SATURATED O2: 44 % (ref 95–100)
POC SATURATED O2: 64 % (ref 95–100)
POC SATURATED O2: 67 % (ref 95–100)
POC SATURATED O2: 89 % (ref 95–100)
POC SATURATED O2: 90 % (ref 95–100)
POC SATURATED O2: 94 % (ref 95–100)
POC SATURATED O2: 95 % (ref 95–100)
POC SATURATED O2: 96 % (ref 95–100)
POC SATURATED O2: 97 % (ref 95–100)
POC SATURATED O2: 97 % (ref 95–100)
POC TCO2: 19 MMOL/L (ref 24–29)
POC TCO2: 22 MMOL/L (ref 23–27)
POC TCO2: 23 MMOL/L (ref 24–29)
POC TCO2: 28 MMOL/L (ref 24–29)
POC TCO2: 29 MMOL/L (ref 23–27)
POC TCO2: 29 MMOL/L (ref 24–29)
POC TCO2: 30 MMOL/L (ref 23–27)
POC TCO2: 30 MMOL/L (ref 24–29)
POC TCO2: 31 MMOL/L (ref 23–27)
POC TCO2: 31 MMOL/L (ref 23–27)
POC TCO2: 32 MMOL/L (ref 23–27)
POC TCO2: 32 MMOL/L (ref 24–29)
POCT GLUCOSE: 104 MG/DL (ref 70–110)
POCT GLUCOSE: 106 MG/DL (ref 70–110)
POCT GLUCOSE: 110 MG/DL (ref 70–110)
POCT GLUCOSE: 110 MG/DL (ref 70–110)
POCT GLUCOSE: 112 MG/DL (ref 70–110)
POCT GLUCOSE: 113 MG/DL (ref 70–110)
POCT GLUCOSE: 116 MG/DL (ref 70–110)
POCT GLUCOSE: 120 MG/DL (ref 70–110)
POCT GLUCOSE: 125 MG/DL (ref 70–110)
POCT GLUCOSE: 127 MG/DL (ref 70–110)
POCT GLUCOSE: 133 MG/DL (ref 70–110)
POCT GLUCOSE: 136 MG/DL (ref 70–110)
POCT GLUCOSE: 163 MG/DL (ref 70–110)
POCT GLUCOSE: 211 MG/DL (ref 70–110)
POCT GLUCOSE: 66 MG/DL (ref 70–110)
POCT GLUCOSE: 74 MG/DL (ref 70–110)
POCT GLUCOSE: 78 MG/DL (ref 70–110)
POCT GLUCOSE: 81 MG/DL (ref 70–110)
POCT GLUCOSE: 87 MG/DL (ref 70–110)
POCT GLUCOSE: 88 MG/DL (ref 70–110)
POCT GLUCOSE: 89 MG/DL (ref 70–110)
POCT GLUCOSE: 91 MG/DL (ref 70–110)
POCT GLUCOSE: 91 MG/DL (ref 70–110)
POCT GLUCOSE: 92 MG/DL (ref 70–110)
POCT GLUCOSE: 93 MG/DL (ref 70–110)
POCT GLUCOSE: 94 MG/DL (ref 70–110)
POCT GLUCOSE: 95 MG/DL (ref 70–110)
POCT GLUCOSE: 95 MG/DL (ref 70–110)
POCT GLUCOSE: 97 MG/DL (ref 70–110)
POCT GLUCOSE: 98 MG/DL (ref 70–110)
POIKILOCYTOSIS BLD QL SMEAR: SLIGHT
POLYCHROMASIA BLD QL SMEAR: ABNORMAL
POTASSIUM BLD-SCNC: 3.7 MMOL/L (ref 3.5–5.1)
POTASSIUM SERPL-SCNC: 2.6 MMOL/L (ref 3.5–5.1)
POTASSIUM SERPL-SCNC: 2.6 MMOL/L (ref 3.5–5.1)
POTASSIUM SERPL-SCNC: 2.9 MMOL/L (ref 3.5–5.1)
POTASSIUM SERPL-SCNC: 2.9 MMOL/L (ref 3.5–5.1)
POTASSIUM SERPL-SCNC: 3 MMOL/L (ref 3.5–5.1)
POTASSIUM SERPL-SCNC: 3.1 MMOL/L (ref 3.5–5.1)
POTASSIUM SERPL-SCNC: 3.2 MMOL/L (ref 3.5–5.1)
POTASSIUM SERPL-SCNC: 3.3 MMOL/L (ref 3.5–5.1)
POTASSIUM SERPL-SCNC: 3.4 MMOL/L (ref 3.5–5.1)
POTASSIUM SERPL-SCNC: 3.5 MMOL/L (ref 3.5–5.1)
POTASSIUM SERPL-SCNC: 3.6 MMOL/L (ref 3.5–5.1)
POTASSIUM SERPL-SCNC: 3.7 MMOL/L (ref 3.5–5.1)
POTASSIUM SERPL-SCNC: 3.8 MMOL/L (ref 3.5–5.1)
POTASSIUM SERPL-SCNC: 3.9 MMOL/L (ref 3.5–5.1)
POTASSIUM SERPL-SCNC: 3.9 MMOL/L (ref 3.5–5.1)
POTASSIUM SERPL-SCNC: 4 MMOL/L (ref 3.5–5.1)
POTASSIUM SERPL-SCNC: 4.1 MMOL/L (ref 3.5–5.1)
POTASSIUM SERPL-SCNC: 4.1 MMOL/L (ref 3.5–5.1)
POTASSIUM SERPL-SCNC: 4.2 MMOL/L (ref 3.5–5.1)
POTASSIUM SERPL-SCNC: 4.2 MMOL/L (ref 3.5–5.1)
POTASSIUM SERPL-SCNC: 4.3 MMOL/L (ref 3.5–5.1)
POTASSIUM SERPL-SCNC: 4.4 MMOL/L (ref 3.5–5.1)
POTASSIUM SERPL-SCNC: 4.5 MMOL/L (ref 3.5–5.1)
POTASSIUM SERPL-SCNC: 4.9 MMOL/L (ref 3.5–5.1)
PROT SERPL-MCNC: 5.4 G/DL (ref 6–8.4)
PROT SERPL-MCNC: 5.4 G/DL (ref 6–8.4)
PROT SERPL-MCNC: 5.5 G/DL (ref 6–8.4)
PROT SERPL-MCNC: 5.7 G/DL (ref 6–8.4)
PROT SERPL-MCNC: 5.7 G/DL (ref 6–8.4)
PROT SERPL-MCNC: 5.8 G/DL (ref 6–8.4)
PROT SERPL-MCNC: 6 G/DL (ref 6–8.4)
PROT SERPL-MCNC: 6.1 G/DL (ref 6–8.4)
PROT SERPL-MCNC: 6.2 G/DL (ref 6–8.4)
PROT SERPL-MCNC: 6.5 G/DL (ref 6–8.4)
PROT SERPL-MCNC: 7.2 G/DL (ref 6–8.4)
PROT SERPL-MCNC: 7.4 G/DL (ref 6–8.4)
PROT UR QL STRIP: ABNORMAL
PROT UR QL STRIP: NEGATIVE
PROT UR-MCNC: 20 MG/DL (ref 0–15)
PROT/CREAT UR: 0.09 MG/G{CREAT} (ref 0–0.2)
PROTHROMBIN TIME: 11.3 SEC (ref 9–12.5)
PROTHROMBIN TIME: 11.4 SEC (ref 9–12.5)
PROTHROMBIN TIME: 11.9 SEC (ref 9–12.5)
PROTHROMBIN TIME: 23 SEC (ref 9–12.5)
PROTHROMBIN TIME: 35 SEC (ref 9–12.5)
PROTHROMBIN TIME: 37.3 SEC (ref 9–12.5)
PROTHROMBIN TIME: 39.8 SEC (ref 9–12.5)
RA MAJOR: 4.71 CM
RA MAJOR: 4.71 CM
RA MAJOR: 4.77 CM
RA PRESSURE: 3 MMHG
RA PRESSURE: 3 MMHG
RA PRESSURE: 8 MMHG
RA WIDTH: 2.71 CM
RA WIDTH: 3.06 CM
RA WIDTH: 3.39 CM
RBC # BLD AUTO: 2.7 M/UL (ref 4–5.4)
RBC # BLD AUTO: 2.77 M/UL (ref 4–5.4)
RBC # BLD AUTO: 2.78 M/UL (ref 4–5.4)
RBC # BLD AUTO: 2.8 M/UL (ref 4–5.4)
RBC # BLD AUTO: 2.81 M/UL (ref 4–5.4)
RBC # BLD AUTO: 2.88 M/UL (ref 4–5.4)
RBC # BLD AUTO: 2.9 M/UL (ref 4–5.4)
RBC # BLD AUTO: 2.91 M/UL (ref 4–5.4)
RBC # BLD AUTO: 2.98 M/UL (ref 4–5.4)
RBC # BLD AUTO: 2.98 M/UL (ref 4–5.4)
RBC # BLD AUTO: 2.99 M/UL (ref 4–5.4)
RBC # BLD AUTO: 3.01 M/UL (ref 4–5.4)
RBC # BLD AUTO: 3.04 M/UL (ref 4–5.4)
RBC # BLD AUTO: 3.06 M/UL (ref 4–5.4)
RBC # BLD AUTO: 3.06 M/UL (ref 4–5.4)
RBC # BLD AUTO: 3.1 M/UL (ref 4–5.4)
RBC # BLD AUTO: 3.12 M/UL (ref 4–5.4)
RBC # BLD AUTO: 3.15 M/UL (ref 4–5.4)
RBC # BLD AUTO: 3.15 M/UL (ref 4–5.4)
RBC # BLD AUTO: 3.17 M/UL (ref 4–5.4)
RBC # BLD AUTO: 3.18 M/UL (ref 4–5.4)
RBC # BLD AUTO: 3.21 M/UL (ref 4–5.4)
RBC # BLD AUTO: 3.23 M/UL (ref 4–5.4)
RBC # BLD AUTO: 3.26 M/UL (ref 4–5.4)
RBC # BLD AUTO: 3.27 M/UL (ref 4–5.4)
RBC # BLD AUTO: 3.32 M/UL (ref 4–5.4)
RBC # BLD AUTO: 3.32 M/UL (ref 4–5.4)
RBC # BLD AUTO: 3.34 M/UL (ref 4–5.4)
RBC # BLD AUTO: 3.36 M/UL (ref 4–5.4)
RBC # BLD AUTO: 3.37 M/UL (ref 4–5.4)
RBC # BLD AUTO: 3.4 M/UL (ref 4–5.4)
RBC # BLD AUTO: 3.41 M/UL (ref 4–5.4)
RBC # BLD AUTO: 3.42 M/UL (ref 4–5.4)
RBC # BLD AUTO: 3.45 M/UL (ref 4–5.4)
RBC # BLD AUTO: 3.46 M/UL (ref 4–5.4)
RBC # BLD AUTO: 3.48 M/UL (ref 4–5.4)
RBC # BLD AUTO: 3.51 M/UL (ref 4–5.4)
RBC # BLD AUTO: 3.52 M/UL (ref 4–5.4)
RBC # BLD AUTO: 3.53 M/UL (ref 4–5.4)
RBC # BLD AUTO: 3.57 M/UL (ref 4–5.4)
RBC # BLD AUTO: 3.57 M/UL (ref 4–5.4)
RBC # BLD AUTO: 3.58 M/UL (ref 4–5.4)
RBC # BLD AUTO: 3.67 M/UL (ref 4–5.4)
RBC # BLD AUTO: 3.71 M/UL (ref 4–5.4)
RBC # BLD AUTO: 3.79 M/UL (ref 4–5.4)
RBC # BLD AUTO: 3.82 M/UL (ref 4–5.4)
RBC # BLD AUTO: 3.83 M/UL (ref 4–5.4)
RBC # BLD AUTO: 3.89 M/UL (ref 4–5.4)
RBC # BLD AUTO: 3.98 M/UL (ref 4–5.4)
RBC # BLD AUTO: 4.02 M/UL (ref 4–5.4)
RBC # BLD AUTO: 4.07 M/UL (ref 4–5.4)
RBC # BLD AUTO: 4.11 M/UL (ref 4–5.4)
RBC # BLD AUTO: 4.11 M/UL (ref 4–5.4)
RBC # BLD AUTO: 4.47 M/UL (ref 4–5.4)
RBC # BLD AUTO: 4.79 M/UL (ref 4–5.4)
RBC #/AREA URNS AUTO: 12 /HPF (ref 0–4)
RBC #/AREA URNS AUTO: 28 /HPF (ref 0–4)
RBC #/AREA URNS AUTO: 3 /HPF (ref 0–4)
RBC #/AREA URNS AUTO: 4 /HPF (ref 0–4)
RBC #/AREA URNS AUTO: 4 /HPF (ref 0–4)
RBC #/AREA URNS AUTO: 46 /HPF (ref 0–4)
RBC #/AREA URNS AUTO: 9 /HPF (ref 0–4)
RBC #/AREA URNS AUTO: >100 /HPF (ref 0–4)
RBC #/AREA URNS AUTO: >100 /HPF (ref 0–4)
RBC #/AREA URNS HPF: 0 /HPF (ref 0–4)
RIGHT VENTRICULAR END-DIASTOLIC DIMENSION: 2.89 CM
RIGHT VENTRICULAR END-DIASTOLIC DIMENSION: 2.99 CM
RIGHT VENTRICULAR END-DIASTOLIC DIMENSION: 3.2 CM
RSV AG BY MOLECULAR METHOD: NOT DETECTED
SAMPLE: ABNORMAL
SAMPLE: NORMAL
SARS-COV-2 RDRP RESP QL NAA+PROBE: NEGATIVE
SARS-COV-2 RDRP RESP QL NAA+PROBE: NEGATIVE
SARS-COV-2 RDRP RESP QL NAA+PROBE: POSITIVE
SARS-COV-2 RNA RESP QL NAA+PROBE: DETECTED
SARS-COV-2 RNA RESP QL NAA+PROBE: NOT DETECTED
SATURATED IRON: 4 % (ref 20–50)
SINUS: 2.45 CM
SINUS: 2.66 CM
SINUS: 2.74 CM
SITE: ABNORMAL
SITE: NORMAL
SODIUM BLD-SCNC: 145 MMOL/L (ref 136–145)
SODIUM SERPL-SCNC: 135 MMOL/L (ref 136–145)
SODIUM SERPL-SCNC: 137 MMOL/L (ref 136–145)
SODIUM SERPL-SCNC: 137 MMOL/L (ref 136–145)
SODIUM SERPL-SCNC: 138 MMOL/L (ref 136–145)
SODIUM SERPL-SCNC: 139 MMOL/L (ref 136–145)
SODIUM SERPL-SCNC: 140 MMOL/L (ref 136–145)
SODIUM SERPL-SCNC: 141 MMOL/L (ref 136–145)
SODIUM SERPL-SCNC: 142 MMOL/L (ref 136–145)
SODIUM SERPL-SCNC: 143 MMOL/L (ref 136–145)
SODIUM SERPL-SCNC: 144 MMOL/L (ref 136–145)
SODIUM SERPL-SCNC: 145 MMOL/L (ref 136–145)
SODIUM SERPL-SCNC: 145 MMOL/L (ref 136–145)
SODIUM SERPL-SCNC: 146 MMOL/L (ref 136–145)
SODIUM SERPL-SCNC: 146 MMOL/L (ref 136–145)
SODIUM SERPL-SCNC: 147 MMOL/L (ref 136–145)
SODIUM SERPL-SCNC: 148 MMOL/L (ref 136–145)
SODIUM SERPL-SCNC: 148 MMOL/L (ref 136–145)
SODIUM SERPL-SCNC: 149 MMOL/L (ref 136–145)
SODIUM SERPL-SCNC: 149 MMOL/L (ref 136–145)
SODIUM UR-SCNC: 20 MMOL/L (ref 20–250)
SP GR UR STRIP: 1.01 (ref 1–1.03)
SP GR UR STRIP: 1.02 (ref 1–1.03)
SP02: 91
SPECIMEN OUTDATE: NORMAL
SPHEROCYTES BLD QL SMEAR: ABNORMAL
SQUAMOUS #/AREA URNS AUTO: 0 /HPF
SQUAMOUS #/AREA URNS AUTO: 0 /HPF
SQUAMOUS #/AREA URNS AUTO: 1 /HPF
SQUAMOUS #/AREA URNS AUTO: 15 /HPF
SQUAMOUS #/AREA URNS AUTO: 17 /HPF
SQUAMOUS #/AREA URNS AUTO: 2 /HPF
SQUAMOUS #/AREA URNS AUTO: 4 /HPF
SQUAMOUS #/AREA URNS AUTO: 6 /HPF
SQUAMOUS #/AREA URNS AUTO: 7 /HPF
SQUAMOUS #/AREA URNS HPF: 1 /HPF
STJ: 2.17 CM
STJ: 2.43 CM
STJ: 2.66 CM
T4 FREE SERPL-MCNC: 1.32 NG/DL (ref 0.71–1.51)
TB INDURATION 48 - 72 HR READ: 0 MM
TDI LATERAL: 0.05 M/S
TDI LATERAL: 0.08 M/S
TDI LATERAL: 0.09 M/S
TDI SEPTAL: 0.03 M/S
TDI SEPTAL: 0.04 M/S
TDI SEPTAL: 0.05 M/S
TDI: 0.04 M/S
TDI: 0.06 M/S
TDI: 0.07 M/S
TOTAL IRON BINDING CAPACITY: 404 UG/DL (ref 250–450)
TR MAX PG: 31 MMHG
TR MAX PG: 36 MMHG
TR MAX PG: 46 MMHG
TRANSFERRIN SERPL-MCNC: 273 MG/DL (ref 200–375)
TRICUSPID ANNULAR PLANE SYSTOLIC EXCURSION: 1.68 CM
TRICUSPID ANNULAR PLANE SYSTOLIC EXCURSION: 1.8 CM
TRICUSPID ANNULAR PLANE SYSTOLIC EXCURSION: 2.26 CM
TROPONIN I SERPL DL<=0.01 NG/ML-MCNC: 0.01 NG/ML (ref 0–0.03)
TROPONIN I SERPL DL<=0.01 NG/ML-MCNC: 0.03 NG/ML (ref 0–0.03)
TROPONIN I SERPL DL<=0.01 NG/ML-MCNC: 0.04 NG/ML (ref 0–0.03)
TROPONIN I SERPL DL<=0.01 NG/ML-MCNC: 0.06 NG/ML (ref 0–0.03)
TROPONIN I SERPL DL<=0.01 NG/ML-MCNC: 0.07 NG/ML (ref 0–0.03)
TROPONIN I SERPL DL<=0.01 NG/ML-MCNC: 0.17 NG/ML (ref 0–0.03)
TROPONIN I SERPL DL<=0.01 NG/ML-MCNC: 0.18 NG/ML (ref 0–0.03)
TROPONIN I SERPL DL<=0.01 NG/ML-MCNC: 0.24 NG/ML (ref 0–0.03)
TROPONIN I SERPL DL<=0.01 NG/ML-MCNC: 0.24 NG/ML (ref 0–0.03)
TSH SERPL DL<=0.005 MIU/L-ACNC: 3.37 UIU/ML (ref 0.4–4)
TSH SERPL DL<=0.005 MIU/L-ACNC: 4 UIU/ML (ref 0.4–4)
TV REST PULMONARY ARTERY PRESSURE: 34 MMHG
TV REST PULMONARY ARTERY PRESSURE: 39 MMHG
TV REST PULMONARY ARTERY PRESSURE: 54 MMHG
URN SPEC COLLECT METH UR: ABNORMAL
UROBILINOGEN UR STRIP-ACNC: NEGATIVE EU/DL
UROBILINOGEN UR STRIP-ACNC: NEGATIVE EU/DL
VIT B12 SERPL-MCNC: 492 PG/ML (ref 210–950)
VIT B12 SERPL-MCNC: 971 PG/ML (ref 210–950)
WBC # BLD AUTO: 10.17 K/UL (ref 3.9–12.7)
WBC # BLD AUTO: 10.34 K/UL (ref 3.9–12.7)
WBC # BLD AUTO: 10.91 K/UL (ref 3.9–12.7)
WBC # BLD AUTO: 11.09 K/UL (ref 3.9–12.7)
WBC # BLD AUTO: 11.13 K/UL (ref 3.9–12.7)
WBC # BLD AUTO: 11.17 K/UL (ref 3.9–12.7)
WBC # BLD AUTO: 11.24 K/UL (ref 3.9–12.7)
WBC # BLD AUTO: 12.56 K/UL (ref 3.9–12.7)
WBC # BLD AUTO: 12.81 K/UL (ref 3.9–12.7)
WBC # BLD AUTO: 13.51 K/UL (ref 3.9–12.7)
WBC # BLD AUTO: 14.03 K/UL (ref 3.9–12.7)
WBC # BLD AUTO: 17.63 K/UL (ref 3.9–12.7)
WBC # BLD AUTO: 4.9 K/UL (ref 3.9–12.7)
WBC # BLD AUTO: 5.21 K/UL (ref 3.9–12.7)
WBC # BLD AUTO: 5.69 K/UL (ref 3.9–12.7)
WBC # BLD AUTO: 5.72 K/UL (ref 3.9–12.7)
WBC # BLD AUTO: 5.83 K/UL (ref 3.9–12.7)
WBC # BLD AUTO: 5.87 K/UL (ref 3.9–12.7)
WBC # BLD AUTO: 5.9 K/UL (ref 3.9–12.7)
WBC # BLD AUTO: 5.9 K/UL (ref 3.9–12.7)
WBC # BLD AUTO: 6.18 K/UL (ref 3.9–12.7)
WBC # BLD AUTO: 6.19 K/UL (ref 3.9–12.7)
WBC # BLD AUTO: 6.25 K/UL (ref 3.9–12.7)
WBC # BLD AUTO: 6.38 K/UL (ref 3.9–12.7)
WBC # BLD AUTO: 6.48 K/UL (ref 3.9–12.7)
WBC # BLD AUTO: 6.49 K/UL (ref 3.9–12.7)
WBC # BLD AUTO: 6.51 K/UL (ref 3.9–12.7)
WBC # BLD AUTO: 6.52 K/UL (ref 3.9–12.7)
WBC # BLD AUTO: 6.53 K/UL (ref 3.9–12.7)
WBC # BLD AUTO: 6.68 K/UL (ref 3.9–12.7)
WBC # BLD AUTO: 6.68 K/UL (ref 3.9–12.7)
WBC # BLD AUTO: 6.92 K/UL (ref 3.9–12.7)
WBC # BLD AUTO: 7.03 K/UL (ref 3.9–12.7)
WBC # BLD AUTO: 7.06 K/UL (ref 3.9–12.7)
WBC # BLD AUTO: 7.11 K/UL (ref 3.9–12.7)
WBC # BLD AUTO: 7.11 K/UL (ref 3.9–12.7)
WBC # BLD AUTO: 7.14 K/UL (ref 3.9–12.7)
WBC # BLD AUTO: 7.15 K/UL (ref 3.9–12.7)
WBC # BLD AUTO: 7.18 K/UL (ref 3.9–12.7)
WBC # BLD AUTO: 7.21 K/UL (ref 3.9–12.7)
WBC # BLD AUTO: 7.4 K/UL (ref 3.9–12.7)
WBC # BLD AUTO: 7.64 K/UL (ref 3.9–12.7)
WBC # BLD AUTO: 7.65 K/UL (ref 3.9–12.7)
WBC # BLD AUTO: 7.87 K/UL (ref 3.9–12.7)
WBC # BLD AUTO: 7.92 K/UL (ref 3.9–12.7)
WBC # BLD AUTO: 8.09 K/UL (ref 3.9–12.7)
WBC # BLD AUTO: 8.3 K/UL (ref 3.9–12.7)
WBC # BLD AUTO: 8.33 K/UL (ref 3.9–12.7)
WBC # BLD AUTO: 8.37 K/UL (ref 3.9–12.7)
WBC # BLD AUTO: 8.47 K/UL (ref 3.9–12.7)
WBC # BLD AUTO: 8.51 K/UL (ref 3.9–12.7)
WBC # BLD AUTO: 8.76 K/UL (ref 3.9–12.7)
WBC # BLD AUTO: 8.83 K/UL (ref 3.9–12.7)
WBC # BLD AUTO: 9.48 K/UL (ref 3.9–12.7)
WBC # BLD AUTO: 9.49 K/UL (ref 3.9–12.7)
WBC #/AREA URNS AUTO: 23 /HPF (ref 0–5)
WBC #/AREA URNS AUTO: 37 /HPF (ref 0–5)
WBC #/AREA URNS AUTO: 46 /HPF (ref 0–5)
WBC #/AREA URNS AUTO: 55 /HPF (ref 0–5)
WBC #/AREA URNS AUTO: 58 /HPF (ref 0–5)
WBC #/AREA URNS AUTO: >100 /HPF (ref 0–5)
WBC #/AREA URNS HPF: 11 /HPF (ref 0–5)
WBC CLUMPS UR QL AUTO: ABNORMAL
WBC CLUMPS UR QL AUTO: ABNORMAL
YEAST UR QL AUTO: ABNORMAL
YEAST UR QL AUTO: ABNORMAL

## 2023-01-01 PROCEDURE — 82803 BLOOD GASES ANY COMBINATION: CPT

## 2023-01-01 PROCEDURE — 36415 COLL VENOUS BLD VENIPUNCTURE: CPT | Performed by: FAMILY MEDICINE

## 2023-01-01 PROCEDURE — 99900035 HC TECH TIME PER 15 MIN (STAT)

## 2023-01-01 PROCEDURE — 25000003 PHARM REV CODE 250: Performed by: NURSE PRACTITIONER

## 2023-01-01 PROCEDURE — 25000003 PHARM REV CODE 250

## 2023-01-01 PROCEDURE — 94640 AIRWAY INHALATION TREATMENT: CPT

## 2023-01-01 PROCEDURE — 36415 COLL VENOUS BLD VENIPUNCTURE: CPT | Performed by: INTERNAL MEDICINE

## 2023-01-01 PROCEDURE — 85025 COMPLETE CBC W/AUTO DIFF WBC: CPT

## 2023-01-01 PROCEDURE — 21400001 HC TELEMETRY ROOM

## 2023-01-01 PROCEDURE — 83735 ASSAY OF MAGNESIUM: CPT

## 2023-01-01 PROCEDURE — 86920 COMPATIBILITY TEST SPIN: CPT

## 2023-01-01 PROCEDURE — 83735 ASSAY OF MAGNESIUM: CPT | Performed by: PHYSICIAN ASSISTANT

## 2023-01-01 PROCEDURE — 99233 PR SUBSEQUENT HOSPITAL CARE,LEVL III: ICD-10-PCS | Mod: ,,,

## 2023-01-01 PROCEDURE — 99233 PR SUBSEQUENT HOSPITAL CARE,LEVL III: ICD-10-PCS | Mod: ,,, | Performed by: INTERNAL MEDICINE

## 2023-01-01 PROCEDURE — 80048 BASIC METABOLIC PNL TOTAL CA: CPT | Performed by: FAMILY MEDICINE

## 2023-01-01 PROCEDURE — 93010 ELECTROCARDIOGRAM REPORT: CPT | Mod: ,,, | Performed by: INTERNAL MEDICINE

## 2023-01-01 PROCEDURE — 93010 EKG 12-LEAD: ICD-10-PCS | Mod: ,,, | Performed by: INTERNAL MEDICINE

## 2023-01-01 PROCEDURE — 87635 SARS-COV-2 COVID-19 AMP PRB: CPT | Performed by: INTERNAL MEDICINE

## 2023-01-01 PROCEDURE — 87635 SARS-COV-2 COVID-19 AMP PRB: CPT | Performed by: FAMILY MEDICINE

## 2023-01-01 PROCEDURE — 25000242 PHARM REV CODE 250 ALT 637 W/ HCPCS: Performed by: FAMILY MEDICINE

## 2023-01-01 PROCEDURE — 63600175 PHARM REV CODE 636 W HCPCS

## 2023-01-01 PROCEDURE — 80048 BASIC METABOLIC PNL TOTAL CA: CPT

## 2023-01-01 PROCEDURE — 99223 1ST HOSP IP/OBS HIGH 75: CPT | Mod: ,,, | Performed by: ORTHOPAEDIC SURGERY

## 2023-01-01 PROCEDURE — 11000001 HC ACUTE MED/SURG PRIVATE ROOM

## 2023-01-01 PROCEDURE — 97530 THERAPEUTIC ACTIVITIES: CPT

## 2023-01-01 PROCEDURE — 85025 COMPLETE CBC W/AUTO DIFF WBC: CPT | Performed by: HOSPITALIST

## 2023-01-01 PROCEDURE — 99232 SBSQ HOSP IP/OBS MODERATE 35: CPT | Mod: GC,,, | Performed by: PSYCHIATRY & NEUROLOGY

## 2023-01-01 PROCEDURE — 25000003 PHARM REV CODE 250: Performed by: HOSPITALIST

## 2023-01-01 PROCEDURE — 80048 BASIC METABOLIC PNL TOTAL CA: CPT | Performed by: PHYSICIAN ASSISTANT

## 2023-01-01 PROCEDURE — 25000003 PHARM REV CODE 250: Performed by: STUDENT IN AN ORGANIZED HEALTH CARE EDUCATION/TRAINING PROGRAM

## 2023-01-01 PROCEDURE — 63600175 PHARM REV CODE 636 W HCPCS: Performed by: INTERNAL MEDICINE

## 2023-01-01 PROCEDURE — 96365 THER/PROPH/DIAG IV INF INIT: CPT

## 2023-01-01 PROCEDURE — 99232 SBSQ HOSP IP/OBS MODERATE 35: CPT | Mod: ,,, | Performed by: NURSE PRACTITIONER

## 2023-01-01 PROCEDURE — 27000221 HC OXYGEN, UP TO 24 HOURS

## 2023-01-01 PROCEDURE — 93005 ELECTROCARDIOGRAM TRACING: CPT

## 2023-01-01 PROCEDURE — 82140 ASSAY OF AMMONIA: CPT | Performed by: FAMILY MEDICINE

## 2023-01-01 PROCEDURE — 87088 URINE BACTERIA CULTURE: CPT | Performed by: FAMILY MEDICINE

## 2023-01-01 PROCEDURE — 99239 PR HOSPITAL DISCHARGE DAY,>30 MIN: ICD-10-PCS | Mod: ,,,

## 2023-01-01 PROCEDURE — 25000003 PHARM REV CODE 250: Performed by: INTERNAL MEDICINE

## 2023-01-01 PROCEDURE — 99233 SBSQ HOSP IP/OBS HIGH 50: CPT | Mod: ,,, | Performed by: INTERNAL MEDICINE

## 2023-01-01 PROCEDURE — 84443 ASSAY THYROID STIM HORMONE: CPT | Performed by: EMERGENCY MEDICINE

## 2023-01-01 PROCEDURE — 63600175 PHARM REV CODE 636 W HCPCS: Performed by: HOSPITALIST

## 2023-01-01 PROCEDURE — 99223 1ST HOSP IP/OBS HIGH 75: CPT | Mod: ,,,

## 2023-01-01 PROCEDURE — 97530 THERAPEUTIC ACTIVITIES: CPT | Mod: CQ

## 2023-01-01 PROCEDURE — 81000 URINALYSIS NONAUTO W/SCOPE: CPT | Performed by: EMERGENCY MEDICINE

## 2023-01-01 PROCEDURE — 80048 BASIC METABOLIC PNL TOTAL CA: CPT | Mod: 91 | Performed by: NURSE PRACTITIONER

## 2023-01-01 PROCEDURE — 95816 EEG AWAKE AND DROWSY: CPT

## 2023-01-01 PROCEDURE — 84100 ASSAY OF PHOSPHORUS: CPT | Performed by: INTERNAL MEDICINE

## 2023-01-01 PROCEDURE — 99232 PR SUBSEQUENT HOSPITAL CARE,LEVL II: ICD-10-PCS | Mod: ,,, | Performed by: INTERNAL MEDICINE

## 2023-01-01 PROCEDURE — 63600175 PHARM REV CODE 636 W HCPCS: Performed by: NURSE PRACTITIONER

## 2023-01-01 PROCEDURE — 49507 PR REPAIR ING HERNIA,5+Y/O,STRANG: ICD-10-PCS | Mod: RT,GC,, | Performed by: SURGERY

## 2023-01-01 PROCEDURE — 81001 URINALYSIS AUTO W/SCOPE: CPT

## 2023-01-01 PROCEDURE — P9016 RBC LEUKOCYTES REDUCED: HCPCS

## 2023-01-01 PROCEDURE — 51701 INSERT BLADDER CATHETER: CPT

## 2023-01-01 PROCEDURE — 85730 THROMBOPLASTIN TIME PARTIAL: CPT | Performed by: STUDENT IN AN ORGANIZED HEALTH CARE EDUCATION/TRAINING PROGRAM

## 2023-01-01 PROCEDURE — 99497 PR ADVNCD CARE PLAN 30 MIN: ICD-10-PCS | Mod: ,,, | Performed by: HOSPITALIST

## 2023-01-01 PROCEDURE — 83735 ASSAY OF MAGNESIUM: CPT | Performed by: INTERNAL MEDICINE

## 2023-01-01 PROCEDURE — 25000242 PHARM REV CODE 250 ALT 637 W/ HCPCS

## 2023-01-01 PROCEDURE — 99999 PR PBB SHADOW E&M-EST. PATIENT-LVL V: CPT | Mod: PBBFAC,,,

## 2023-01-01 PROCEDURE — 72190 XR PELVIS AP INLET AND OUTLET: ICD-10-PCS | Mod: 26,,, | Performed by: RADIOLOGY

## 2023-01-01 PROCEDURE — 25500020 PHARM REV CODE 255: Performed by: EMERGENCY MEDICINE

## 2023-01-01 PROCEDURE — 97535 SELF CARE MNGMENT TRAINING: CPT

## 2023-01-01 PROCEDURE — 83735 ASSAY OF MAGNESIUM: CPT | Performed by: FAMILY MEDICINE

## 2023-01-01 PROCEDURE — 85610 PROTHROMBIN TIME: CPT | Performed by: STUDENT IN AN ORGANIZED HEALTH CARE EDUCATION/TRAINING PROGRAM

## 2023-01-01 PROCEDURE — 99223 PR INITIAL HOSPITAL CARE,LEVL III: ICD-10-PCS | Mod: AI,,, | Performed by: STUDENT IN AN ORGANIZED HEALTH CARE EDUCATION/TRAINING PROGRAM

## 2023-01-01 PROCEDURE — 99999 PR PBB SHADOW E&M-EST. PATIENT-LVL III: CPT | Mod: PBBFAC,,, | Performed by: INTERNAL MEDICINE

## 2023-01-01 PROCEDURE — 99233 SBSQ HOSP IP/OBS HIGH 50: CPT | Mod: ,,, | Performed by: STUDENT IN AN ORGANIZED HEALTH CARE EDUCATION/TRAINING PROGRAM

## 2023-01-01 PROCEDURE — 86803 HEPATITIS C AB TEST: CPT | Performed by: PHYSICIAN ASSISTANT

## 2023-01-01 PROCEDURE — 99233 SBSQ HOSP IP/OBS HIGH 50: CPT | Mod: ,,, | Performed by: HOSPITALIST

## 2023-01-01 PROCEDURE — 97162 PT EVAL MOD COMPLEX 30 MIN: CPT

## 2023-01-01 PROCEDURE — C9113 INJ PANTOPRAZOLE SODIUM, VIA: HCPCS | Performed by: EMERGENCY MEDICINE

## 2023-01-01 PROCEDURE — 96375 TX/PRO/DX INJ NEW DRUG ADDON: CPT

## 2023-01-01 PROCEDURE — 20000000 HC ICU ROOM

## 2023-01-01 PROCEDURE — 82550 ASSAY OF CK (CPK): CPT | Performed by: EMERGENCY MEDICINE

## 2023-01-01 PROCEDURE — 80053 COMPREHEN METABOLIC PANEL: CPT

## 2023-01-01 PROCEDURE — 97112 NEUROMUSCULAR REEDUCATION: CPT

## 2023-01-01 PROCEDURE — 99213 PR OFFICE/OUTPT VISIT, EST, LEVL III, 20-29 MIN: ICD-10-PCS | Mod: S$PBB,,, | Performed by: PHYSICIAN ASSISTANT

## 2023-01-01 PROCEDURE — 99223 PR INITIAL HOSPITAL CARE,LEVL III: ICD-10-PCS | Mod: ,,, | Performed by: INTERNAL MEDICINE

## 2023-01-01 PROCEDURE — 84132 ASSAY OF SERUM POTASSIUM: CPT | Performed by: STUDENT IN AN ORGANIZED HEALTH CARE EDUCATION/TRAINING PROGRAM

## 2023-01-01 PROCEDURE — 86900 BLOOD TYPING SEROLOGIC ABO: CPT | Performed by: EMERGENCY MEDICINE

## 2023-01-01 PROCEDURE — 25000003 PHARM REV CODE 250: Performed by: EMERGENCY MEDICINE

## 2023-01-01 PROCEDURE — 99239 HOSP IP/OBS DSCHRG MGMT >30: CPT | Mod: ,,, | Performed by: INTERNAL MEDICINE

## 2023-01-01 PROCEDURE — 87086 URINE CULTURE/COLONY COUNT: CPT | Performed by: STUDENT IN AN ORGANIZED HEALTH CARE EDUCATION/TRAINING PROGRAM

## 2023-01-01 PROCEDURE — 80069 RENAL FUNCTION PANEL: CPT | Performed by: INTERNAL MEDICINE

## 2023-01-01 PROCEDURE — C9113 INJ PANTOPRAZOLE SODIUM, VIA: HCPCS | Performed by: PHYSICIAN ASSISTANT

## 2023-01-01 PROCEDURE — 81003 URINALYSIS AUTO W/O SCOPE: CPT | Performed by: EMERGENCY MEDICINE

## 2023-01-01 PROCEDURE — 99223 1ST HOSP IP/OBS HIGH 75: CPT | Mod: ,,, | Performed by: FAMILY MEDICINE

## 2023-01-01 PROCEDURE — 84100 ASSAY OF PHOSPHORUS: CPT

## 2023-01-01 PROCEDURE — 83880 ASSAY OF NATRIURETIC PEPTIDE: CPT

## 2023-01-01 PROCEDURE — 99223 1ST HOSP IP/OBS HIGH 75: CPT | Mod: ,,, | Performed by: PHYSICIAN ASSISTANT

## 2023-01-01 PROCEDURE — 63600175 PHARM REV CODE 636 W HCPCS: Performed by: PHYSICIAN ASSISTANT

## 2023-01-01 PROCEDURE — 84100 ASSAY OF PHOSPHORUS: CPT | Performed by: FAMILY MEDICINE

## 2023-01-01 PROCEDURE — 88302 TISSUE EXAM BY PATHOLOGIST: CPT | Mod: 26,,, | Performed by: STUDENT IN AN ORGANIZED HEALTH CARE EDUCATION/TRAINING PROGRAM

## 2023-01-01 PROCEDURE — 84484 ASSAY OF TROPONIN QUANT: CPT | Performed by: PHYSICIAN ASSISTANT

## 2023-01-01 PROCEDURE — 94761 N-INVAS EAR/PLS OXIMETRY MLT: CPT

## 2023-01-01 PROCEDURE — 63600175 PHARM REV CODE 636 W HCPCS: Performed by: STUDENT IN AN ORGANIZED HEALTH CARE EDUCATION/TRAINING PROGRAM

## 2023-01-01 PROCEDURE — 99999 PR PBB SHADOW E&M-EST. PATIENT-LVL III: ICD-10-PCS | Mod: PBBFAC,,, | Performed by: PHYSICIAN ASSISTANT

## 2023-01-01 PROCEDURE — 99285 EMERGENCY DEPT VISIT HI MDM: CPT | Mod: 25,27

## 2023-01-01 PROCEDURE — 99233 SBSQ HOSP IP/OBS HIGH 50: CPT | Mod: ,,, | Performed by: FAMILY MEDICINE

## 2023-01-01 PROCEDURE — 85025 COMPLETE CBC W/AUTO DIFF WBC: CPT | Performed by: STUDENT IN AN ORGANIZED HEALTH CARE EDUCATION/TRAINING PROGRAM

## 2023-01-01 PROCEDURE — 63600175 PHARM REV CODE 636 W HCPCS: Performed by: EMERGENCY MEDICINE

## 2023-01-01 PROCEDURE — 85025 COMPLETE CBC W/AUTO DIFF WBC: CPT | Performed by: EMERGENCY MEDICINE

## 2023-01-01 PROCEDURE — 99233 SBSQ HOSP IP/OBS HIGH 50: CPT | Mod: ,,,

## 2023-01-01 PROCEDURE — 93010 ELECTROCARDIOGRAM REPORT: CPT | Mod: 76,,, | Performed by: INTERNAL MEDICINE

## 2023-01-01 PROCEDURE — 80048 BASIC METABOLIC PNL TOTAL CA: CPT | Performed by: STUDENT IN AN ORGANIZED HEALTH CARE EDUCATION/TRAINING PROGRAM

## 2023-01-01 PROCEDURE — 80048 BASIC METABOLIC PNL TOTAL CA: CPT | Mod: XB | Performed by: PHYSICIAN ASSISTANT

## 2023-01-01 PROCEDURE — 99223 PR INITIAL HOSPITAL CARE,LEVL III: ICD-10-PCS | Mod: 25,,, | Performed by: STUDENT IN AN ORGANIZED HEALTH CARE EDUCATION/TRAINING PROGRAM

## 2023-01-01 PROCEDURE — 99213 OFFICE O/P EST LOW 20 MIN: CPT | Mod: PBBFAC | Performed by: INTERNAL MEDICINE

## 2023-01-01 PROCEDURE — 84100 ASSAY OF PHOSPHORUS: CPT | Performed by: PHYSICIAN ASSISTANT

## 2023-01-01 PROCEDURE — 51798 US URINE CAPACITY MEASURE: CPT

## 2023-01-01 PROCEDURE — 99232 SBSQ HOSP IP/OBS MODERATE 35: CPT | Mod: ,,, | Performed by: HOSPITALIST

## 2023-01-01 PROCEDURE — 99214 OFFICE O/P EST MOD 30 MIN: CPT | Mod: S$PBB,,, | Performed by: PHYSICIAN ASSISTANT

## 2023-01-01 PROCEDURE — 80069 RENAL FUNCTION PANEL: CPT | Performed by: HOSPITALIST

## 2023-01-01 PROCEDURE — G0378 HOSPITAL OBSERVATION PER HR: HCPCS

## 2023-01-01 PROCEDURE — 25500020 PHARM REV CODE 255: Performed by: HOSPITALIST

## 2023-01-01 PROCEDURE — 97166 OT EVAL MOD COMPLEX 45 MIN: CPT

## 2023-01-01 PROCEDURE — 84484 ASSAY OF TROPONIN QUANT: CPT

## 2023-01-01 PROCEDURE — 84484 ASSAY OF TROPONIN QUANT: CPT | Performed by: EMERGENCY MEDICINE

## 2023-01-01 PROCEDURE — 83735 ASSAY OF MAGNESIUM: CPT | Performed by: NURSE PRACTITIONER

## 2023-01-01 PROCEDURE — 99497 PR ADVNCD CARE PLAN 30 MIN: ICD-10-PCS | Mod: 25,,, | Performed by: STUDENT IN AN ORGANIZED HEALTH CARE EDUCATION/TRAINING PROGRAM

## 2023-01-01 PROCEDURE — 96374 THER/PROPH/DIAG INJ IV PUSH: CPT

## 2023-01-01 PROCEDURE — S0166 INJ OLANZAPINE 2.5MG: HCPCS

## 2023-01-01 PROCEDURE — 36415 COLL VENOUS BLD VENIPUNCTURE: CPT | Performed by: PHYSICIAN ASSISTANT

## 2023-01-01 PROCEDURE — 87077 CULTURE AEROBIC IDENTIFY: CPT | Performed by: FAMILY MEDICINE

## 2023-01-01 PROCEDURE — 96372 THER/PROPH/DIAG INJ SC/IM: CPT | Performed by: HOSPITALIST

## 2023-01-01 PROCEDURE — 87086 URINE CULTURE/COLONY COUNT: CPT

## 2023-01-01 PROCEDURE — 99223 PR INITIAL HOSPITAL CARE,LEVL III: ICD-10-PCS | Mod: ,,, | Performed by: HOSPITALIST

## 2023-01-01 PROCEDURE — 20600001 HC STEP DOWN PRIVATE ROOM

## 2023-01-01 PROCEDURE — 97110 THERAPEUTIC EXERCISES: CPT

## 2023-01-01 PROCEDURE — 30200315 PPD INTRADERMAL TEST REV CODE 302: Performed by: HOSPITALIST

## 2023-01-01 PROCEDURE — 25000003 PHARM REV CODE 250: Performed by: FAMILY MEDICINE

## 2023-01-01 PROCEDURE — 99222 1ST HOSP IP/OBS MODERATE 55: CPT | Mod: ,,, | Performed by: FAMILY MEDICINE

## 2023-01-01 PROCEDURE — 51702 INSERT TEMP BLADDER CATH: CPT

## 2023-01-01 PROCEDURE — 99999 PR PBB SHADOW E&M-EST. PATIENT-LVL III: CPT | Mod: PBBFAC,,, | Performed by: PHYSICIAN ASSISTANT

## 2023-01-01 PROCEDURE — 84300 ASSAY OF URINE SODIUM: CPT | Performed by: PHYSICIAN ASSISTANT

## 2023-01-01 PROCEDURE — 36415 COLL VENOUS BLD VENIPUNCTURE: CPT | Performed by: STUDENT IN AN ORGANIZED HEALTH CARE EDUCATION/TRAINING PROGRAM

## 2023-01-01 PROCEDURE — 80053 COMPREHEN METABOLIC PANEL: CPT | Performed by: EMERGENCY MEDICINE

## 2023-01-01 PROCEDURE — 85025 COMPLETE CBC W/AUTO DIFF WBC: CPT | Performed by: INTERNAL MEDICINE

## 2023-01-01 PROCEDURE — 99233 PR SUBSEQUENT HOSPITAL CARE,LEVL III: ICD-10-PCS | Mod: ,,, | Performed by: PHYSICIAN ASSISTANT

## 2023-01-01 PROCEDURE — 80053 COMPREHEN METABOLIC PANEL: CPT | Performed by: STUDENT IN AN ORGANIZED HEALTH CARE EDUCATION/TRAINING PROGRAM

## 2023-01-01 PROCEDURE — 99284 EMERGENCY DEPT VISIT MOD MDM: CPT | Mod: 25

## 2023-01-01 PROCEDURE — 90792 PSYCH DIAG EVAL W/MED SRVCS: CPT | Mod: GC,,, | Performed by: PSYCHIATRY & NEUROLOGY

## 2023-01-01 PROCEDURE — 37000009 HC ANESTHESIA EA ADD 15 MINS: Performed by: SURGERY

## 2023-01-01 PROCEDURE — 99999 PR PBB SHADOW E&M-EST. PATIENT-LVL V: ICD-10-PCS | Mod: PBBFAC,,,

## 2023-01-01 PROCEDURE — 99499 NO LOS: ICD-10-PCS | Mod: ,,, | Performed by: ORTHOPAEDIC SURGERY

## 2023-01-01 PROCEDURE — 63600175 PHARM REV CODE 636 W HCPCS: Performed by: FAMILY MEDICINE

## 2023-01-01 PROCEDURE — 25000003 PHARM REV CODE 250: Performed by: PHYSICIAN ASSISTANT

## 2023-01-01 PROCEDURE — 99232 PR SUBSEQUENT HOSPITAL CARE,LEVL II: ICD-10-PCS | Mod: ,,, | Performed by: NURSE PRACTITIONER

## 2023-01-01 PROCEDURE — 99233 SBSQ HOSP IP/OBS HIGH 50: CPT | Mod: GC,,, | Performed by: PSYCHIATRY & NEUROLOGY

## 2023-01-01 PROCEDURE — 99213 OFFICE O/P EST LOW 20 MIN: CPT | Mod: PBBFAC | Performed by: PHYSICIAN ASSISTANT

## 2023-01-01 PROCEDURE — 87086 URINE CULTURE/COLONY COUNT: CPT | Performed by: EMERGENCY MEDICINE

## 2023-01-01 PROCEDURE — 99239 PR HOSPITAL DISCHARGE DAY,>30 MIN: ICD-10-PCS | Mod: ,,, | Performed by: INTERNAL MEDICINE

## 2023-01-01 PROCEDURE — 99223 PR INITIAL HOSPITAL CARE,LEVL III: ICD-10-PCS | Mod: ,,, | Performed by: ORTHOPAEDIC SURGERY

## 2023-01-01 PROCEDURE — 85025 COMPLETE CBC W/AUTO DIFF WBC: CPT | Performed by: FAMILY MEDICINE

## 2023-01-01 PROCEDURE — 97165 OT EVAL LOW COMPLEX 30 MIN: CPT

## 2023-01-01 PROCEDURE — 99291 PR CRITICAL CARE, E/M 30-74 MINUTES: ICD-10-PCS | Mod: GC,,, | Performed by: EMERGENCY MEDICINE

## 2023-01-01 PROCEDURE — 92610 EVALUATE SWALLOWING FUNCTION: CPT

## 2023-01-01 PROCEDURE — 81001 URINALYSIS AUTO W/SCOPE: CPT | Performed by: STUDENT IN AN ORGANIZED HEALTH CARE EDUCATION/TRAINING PROGRAM

## 2023-01-01 PROCEDURE — 99233 PR SUBSEQUENT HOSPITAL CARE,LEVL III: ICD-10-PCS | Mod: ,,, | Performed by: HOSPITALIST

## 2023-01-01 PROCEDURE — 36415 COLL VENOUS BLD VENIPUNCTURE: CPT

## 2023-01-01 PROCEDURE — 87088 URINE BACTERIA CULTURE: CPT | Performed by: EMERGENCY MEDICINE

## 2023-01-01 PROCEDURE — 99285 EMERGENCY DEPT VISIT HI MDM: CPT | Mod: 25

## 2023-01-01 PROCEDURE — 82330 ASSAY OF CALCIUM: CPT

## 2023-01-01 PROCEDURE — 83735 ASSAY OF MAGNESIUM: CPT | Performed by: EMERGENCY MEDICINE

## 2023-01-01 PROCEDURE — 96361 HYDRATE IV INFUSION ADD-ON: CPT

## 2023-01-01 PROCEDURE — 80048 BASIC METABOLIC PNL TOTAL CA: CPT | Performed by: HOSPITALIST

## 2023-01-01 PROCEDURE — 84100 ASSAY OF PHOSPHORUS: CPT | Performed by: HOSPITALIST

## 2023-01-01 PROCEDURE — 99999 PR PBB SHADOW E&M-EST. PATIENT-LVL II: CPT | Mod: PBBFAC,,, | Performed by: PHYSICIAN ASSISTANT

## 2023-01-01 PROCEDURE — 84484 ASSAY OF TROPONIN QUANT: CPT | Mod: 91 | Performed by: STUDENT IN AN ORGANIZED HEALTH CARE EDUCATION/TRAINING PROGRAM

## 2023-01-01 PROCEDURE — 99223 PR INITIAL HOSPITAL CARE,LEVL III: ICD-10-PCS | Mod: 25,,, | Performed by: INTERNAL MEDICINE

## 2023-01-01 PROCEDURE — 71000033 HC RECOVERY, INTIAL HOUR: Performed by: SURGERY

## 2023-01-01 PROCEDURE — 99231 SBSQ HOSP IP/OBS SF/LOW 25: CPT | Mod: ,,, | Performed by: INTERNAL MEDICINE

## 2023-01-01 PROCEDURE — 99233 PR SUBSEQUENT HOSPITAL CARE,LEVL III: ICD-10-PCS | Mod: ,,, | Performed by: FAMILY MEDICINE

## 2023-01-01 PROCEDURE — 83735 ASSAY OF MAGNESIUM: CPT | Performed by: HOSPITALIST

## 2023-01-01 PROCEDURE — 93010 EKG 12-LEAD: ICD-10-PCS | Mod: 76,,, | Performed by: INTERNAL MEDICINE

## 2023-01-01 PROCEDURE — 99497 ADVNCD CARE PLAN 30 MIN: CPT | Mod: 25,,, | Performed by: STUDENT IN AN ORGANIZED HEALTH CARE EDUCATION/TRAINING PROGRAM

## 2023-01-01 PROCEDURE — 99232 PR SUBSEQUENT HOSPITAL CARE,LEVL II: ICD-10-PCS | Mod: ,,, | Performed by: PSYCHIATRY & NEUROLOGY

## 2023-01-01 PROCEDURE — 87635 SARS-COV-2 COVID-19 AMP PRB: CPT | Performed by: EMERGENCY MEDICINE

## 2023-01-01 PROCEDURE — 99213 OFFICE O/P EST LOW 20 MIN: CPT | Mod: S$PBB,,, | Performed by: INTERNAL MEDICINE

## 2023-01-01 PROCEDURE — 99215 PR OFFICE/OUTPT VISIT, EST, LEVL V, 40-54 MIN: ICD-10-PCS | Mod: S$PBB,,,

## 2023-01-01 PROCEDURE — 81001 URINALYSIS AUTO W/SCOPE: CPT | Performed by: FAMILY MEDICINE

## 2023-01-01 PROCEDURE — 99232 SBSQ HOSP IP/OBS MODERATE 35: CPT | Mod: ,,, | Performed by: PSYCHIATRY & NEUROLOGY

## 2023-01-01 PROCEDURE — 82272 OCCULT BLD FECES 1-3 TESTS: CPT | Performed by: HOSPITALIST

## 2023-01-01 PROCEDURE — A4216 STERILE WATER/SALINE, 10 ML: HCPCS | Performed by: PHYSICIAN ASSISTANT

## 2023-01-01 PROCEDURE — 72190 X-RAY EXAM OF PELVIS: CPT | Mod: 26,,, | Performed by: RADIOLOGY

## 2023-01-01 PROCEDURE — 72190 X-RAY EXAM OF PELVIS: CPT | Mod: TC

## 2023-01-01 PROCEDURE — 99223 PR INITIAL HOSPITAL CARE,LEVL III: ICD-10-PCS | Mod: ,,, | Performed by: PHYSICIAN ASSISTANT

## 2023-01-01 PROCEDURE — 85025 COMPLETE CBC W/AUTO DIFF WBC: CPT | Performed by: PHYSICIAN ASSISTANT

## 2023-01-01 PROCEDURE — 88302 PR  SURG PATH,LEVEL II: ICD-10-PCS | Mod: 26,,, | Performed by: STUDENT IN AN ORGANIZED HEALTH CARE EDUCATION/TRAINING PROGRAM

## 2023-01-01 PROCEDURE — 85610 PROTHROMBIN TIME: CPT | Performed by: EMERGENCY MEDICINE

## 2023-01-01 PROCEDURE — 83880 ASSAY OF NATRIURETIC PEPTIDE: CPT | Performed by: EMERGENCY MEDICINE

## 2023-01-01 PROCEDURE — 99232 PR SUBSEQUENT HOSPITAL CARE,LEVL II: ICD-10-PCS | Mod: ,,, | Performed by: HOSPITALIST

## 2023-01-01 PROCEDURE — 36415 COLL VENOUS BLD VENIPUNCTURE: CPT | Performed by: HOSPITALIST

## 2023-01-01 PROCEDURE — 99999 PR PBB SHADOW E&M-EST. PATIENT-LVL III: ICD-10-PCS | Mod: PBBFAC,,, | Performed by: INTERNAL MEDICINE

## 2023-01-01 PROCEDURE — 99291 CRITICAL CARE FIRST HOUR: CPT | Mod: ,,, | Performed by: PHYSICIAN ASSISTANT

## 2023-01-01 PROCEDURE — 99223 PR INITIAL HOSPITAL CARE,LEVL III: ICD-10-PCS | Mod: ,,,

## 2023-01-01 PROCEDURE — 36000707: Performed by: SURGERY

## 2023-01-01 PROCEDURE — 99213 PR OFFICE/OUTPT VISIT, EST, LEVL III, 20-29 MIN: ICD-10-PCS | Mod: S$PBB,,, | Performed by: INTERNAL MEDICINE

## 2023-01-01 PROCEDURE — 95816 PR EEG,W/AWAKE & DROWSY RECORD: ICD-10-PCS | Mod: 26,,, | Performed by: PSYCHIATRY & NEUROLOGY

## 2023-01-01 PROCEDURE — 82746 ASSAY OF FOLIC ACID SERUM: CPT

## 2023-01-01 PROCEDURE — 99233 PR SUBSEQUENT HOSPITAL CARE,LEVL III: ICD-10-PCS | Mod: ,,, | Performed by: STUDENT IN AN ORGANIZED HEALTH CARE EDUCATION/TRAINING PROGRAM

## 2023-01-01 PROCEDURE — 80048 BASIC METABOLIC PNL TOTAL CA: CPT | Performed by: NURSE PRACTITIONER

## 2023-01-01 PROCEDURE — 37000008 HC ANESTHESIA 1ST 15 MINUTES: Performed by: SURGERY

## 2023-01-01 PROCEDURE — 99223 1ST HOSP IP/OBS HIGH 75: CPT | Mod: ,,, | Performed by: HOSPITALIST

## 2023-01-01 PROCEDURE — C1781 MESH (IMPLANTABLE): HCPCS | Performed by: SURGERY

## 2023-01-01 PROCEDURE — 87186 SC STD MICRODIL/AGAR DIL: CPT | Performed by: FAMILY MEDICINE

## 2023-01-01 PROCEDURE — 84484 ASSAY OF TROPONIN QUANT: CPT | Mod: 91 | Performed by: PHYSICIAN ASSISTANT

## 2023-01-01 PROCEDURE — 80053 COMPREHEN METABOLIC PANEL: CPT | Performed by: FAMILY MEDICINE

## 2023-01-01 PROCEDURE — D9220A PRA ANESTHESIA: ICD-10-PCS | Mod: ANES,,, | Performed by: ANESTHESIOLOGY

## 2023-01-01 PROCEDURE — 84439 ASSAY OF FREE THYROXINE: CPT | Performed by: EMERGENCY MEDICINE

## 2023-01-01 PROCEDURE — 99223 1ST HOSP IP/OBS HIGH 75: CPT | Mod: ,,, | Performed by: STUDENT IN AN ORGANIZED HEALTH CARE EDUCATION/TRAINING PROGRAM

## 2023-01-01 PROCEDURE — 86850 RBC ANTIBODY SCREEN: CPT

## 2023-01-01 PROCEDURE — 25000242 PHARM REV CODE 250 ALT 637 W/ HCPCS: Performed by: INTERNAL MEDICINE

## 2023-01-01 PROCEDURE — 87088 URINE BACTERIA CULTURE: CPT

## 2023-01-01 PROCEDURE — 36415 COLL VENOUS BLD VENIPUNCTURE: CPT | Performed by: NURSE PRACTITIONER

## 2023-01-01 PROCEDURE — 99223 PR INITIAL HOSPITAL CARE,LEVL III: ICD-10-PCS | Mod: ,,, | Performed by: STUDENT IN AN ORGANIZED HEALTH CARE EDUCATION/TRAINING PROGRAM

## 2023-01-01 PROCEDURE — 87186 SC STD MICRODIL/AGAR DIL: CPT | Performed by: EMERGENCY MEDICINE

## 2023-01-01 PROCEDURE — 49507 PRP I/HERN INIT BLOCK >5 YR: CPT | Mod: RT,GC,, | Performed by: SURGERY

## 2023-01-01 PROCEDURE — 87040 BLOOD CULTURE FOR BACTERIA: CPT

## 2023-01-01 PROCEDURE — 71000015 HC POSTOP RECOV 1ST HR: Performed by: SURGERY

## 2023-01-01 PROCEDURE — 87106 FUNGI IDENTIFICATION YEAST: CPT | Performed by: EMERGENCY MEDICINE

## 2023-01-01 PROCEDURE — 99497 PR ADVNCD CARE PLAN 30 MIN: ICD-10-PCS | Mod: ,,, | Performed by: INTERNAL MEDICINE

## 2023-01-01 PROCEDURE — 36600 WITHDRAWAL OF ARTERIAL BLOOD: CPT

## 2023-01-01 PROCEDURE — 99231 PR SUBSEQUENT HOSPITAL CARE,LEVL I: ICD-10-PCS | Mod: ,,, | Performed by: INTERNAL MEDICINE

## 2023-01-01 PROCEDURE — 99213 OFFICE O/P EST LOW 20 MIN: CPT | Mod: S$PBB,,, | Performed by: PHYSICIAN ASSISTANT

## 2023-01-01 PROCEDURE — 85610 PROTHROMBIN TIME: CPT

## 2023-01-01 PROCEDURE — 99233 SBSQ HOSP IP/OBS HIGH 50: CPT | Mod: ,,, | Performed by: PHYSICIAN ASSISTANT

## 2023-01-01 PROCEDURE — 86580 TB INTRADERMAL TEST: CPT | Performed by: HOSPITALIST

## 2023-01-01 PROCEDURE — 99443 PR PHYSICIAN TELEPHONE EVALUATION 21-30 MIN: CPT | Mod: 95,S$GLB,, | Performed by: NURSE PRACTITIONER

## 2023-01-01 PROCEDURE — 82607 VITAMIN B-12: CPT | Performed by: FAMILY MEDICINE

## 2023-01-01 PROCEDURE — 99239 HOSP IP/OBS DSCHRG MGMT >30: CPT | Mod: ,,,

## 2023-01-01 PROCEDURE — 25000003 PHARM REV CODE 250: Performed by: SURGERY

## 2023-01-01 PROCEDURE — 99443 PR PHYSICIAN TELEPHONE EVALUATION 21-30 MIN: ICD-10-PCS | Mod: 95,S$GLB,, | Performed by: NURSE PRACTITIONER

## 2023-01-01 PROCEDURE — 0241U SARS-COV2 (COVID) WITH FLU/RSV BY PCR: CPT | Performed by: NURSE PRACTITIONER

## 2023-01-01 PROCEDURE — 99291 PR CRITICAL CARE, E/M 30-74 MINUTES: ICD-10-PCS | Mod: ,,, | Performed by: PHYSICIAN ASSISTANT

## 2023-01-01 PROCEDURE — 99291 CRITICAL CARE FIRST HOUR: CPT | Mod: GC,,, | Performed by: EMERGENCY MEDICINE

## 2023-01-01 PROCEDURE — 99499 UNLISTED E&M SERVICE: CPT | Mod: ,,, | Performed by: ORTHOPAEDIC SURGERY

## 2023-01-01 PROCEDURE — 80053 COMPREHEN METABOLIC PANEL: CPT | Performed by: INTERNAL MEDICINE

## 2023-01-01 PROCEDURE — 87389 HIV-1 AG W/HIV-1&-2 AB AG IA: CPT | Performed by: PHYSICIAN ASSISTANT

## 2023-01-01 PROCEDURE — 87086 URINE CULTURE/COLONY COUNT: CPT | Performed by: FAMILY MEDICINE

## 2023-01-01 PROCEDURE — 85730 THROMBOPLASTIN TIME PARTIAL: CPT | Mod: 91 | Performed by: STUDENT IN AN ORGANIZED HEALTH CARE EDUCATION/TRAINING PROGRAM

## 2023-01-01 PROCEDURE — 96360 HYDRATION IV INFUSION INIT: CPT

## 2023-01-01 PROCEDURE — 99232 PR SUBSEQUENT HOSPITAL CARE,LEVL II: ICD-10-PCS | Mod: GC,,, | Performed by: PSYCHIATRY & NEUROLOGY

## 2023-01-01 PROCEDURE — 99497 ADVNCD CARE PLAN 30 MIN: CPT | Mod: ,,, | Performed by: INTERNAL MEDICINE

## 2023-01-01 PROCEDURE — 99232 SBSQ HOSP IP/OBS MODERATE 35: CPT | Mod: ,,, | Performed by: INTERNAL MEDICINE

## 2023-01-01 PROCEDURE — 87077 CULTURE AEROBIC IDENTIFY: CPT | Performed by: EMERGENCY MEDICINE

## 2023-01-01 PROCEDURE — 99223 PR INITIAL HOSPITAL CARE,LEVL III: ICD-10-PCS | Mod: ,,, | Performed by: FAMILY MEDICINE

## 2023-01-01 PROCEDURE — 36430 TRANSFUSION BLD/BLD COMPNT: CPT

## 2023-01-01 PROCEDURE — 99212 OFFICE O/P EST SF 10 MIN: CPT | Mod: PBBFAC | Performed by: PHYSICIAN ASSISTANT

## 2023-01-01 PROCEDURE — 95816 EEG AWAKE AND DROWSY: CPT | Mod: 26,,, | Performed by: PSYCHIATRY & NEUROLOGY

## 2023-01-01 PROCEDURE — 97164 PT RE-EVAL EST PLAN CARE: CPT

## 2023-01-01 PROCEDURE — 82962 GLUCOSE BLOOD TEST: CPT | Performed by: ORTHOPAEDIC SURGERY

## 2023-01-01 PROCEDURE — 99239 HOSP IP/OBS DSCHRG MGMT >30: CPT | Mod: ,,, | Performed by: HOSPITALIST

## 2023-01-01 PROCEDURE — 99215 OFFICE O/P EST HI 40 MIN: CPT | Mod: PBBFAC,PO

## 2023-01-01 PROCEDURE — 93005 ELECTROCARDIOGRAM TRACING: CPT | Performed by: INTERNAL MEDICINE

## 2023-01-01 PROCEDURE — 92526 ORAL FUNCTION THERAPY: CPT

## 2023-01-01 PROCEDURE — 84156 ASSAY OF PROTEIN URINE: CPT | Performed by: PHYSICIAN ASSISTANT

## 2023-01-01 PROCEDURE — 99223 1ST HOSP IP/OBS HIGH 75: CPT | Mod: 25,,, | Performed by: STUDENT IN AN ORGANIZED HEALTH CARE EDUCATION/TRAINING PROGRAM

## 2023-01-01 PROCEDURE — 99239 HOSP IP/OBS DSCHRG MGMT >30: CPT | Mod: ,,, | Performed by: NURSE PRACTITIONER

## 2023-01-01 PROCEDURE — 84295 ASSAY OF SERUM SODIUM: CPT

## 2023-01-01 PROCEDURE — 86580 TB INTRADERMAL TEST: CPT | Performed by: INTERNAL MEDICINE

## 2023-01-01 PROCEDURE — 85027 COMPLETE CBC AUTOMATED: CPT | Performed by: NURSE PRACTITIONER

## 2023-01-01 PROCEDURE — 99239 PR HOSPITAL DISCHARGE DAY,>30 MIN: ICD-10-PCS | Mod: ,,, | Performed by: NURSE PRACTITIONER

## 2023-01-01 PROCEDURE — 84466 ASSAY OF TRANSFERRIN: CPT | Performed by: HOSPITALIST

## 2023-01-01 PROCEDURE — 82728 ASSAY OF FERRITIN: CPT | Performed by: HOSPITALIST

## 2023-01-01 PROCEDURE — 88302 TISSUE EXAM BY PATHOLOGIST: CPT | Performed by: STUDENT IN AN ORGANIZED HEALTH CARE EDUCATION/TRAINING PROGRAM

## 2023-01-01 PROCEDURE — 83605 ASSAY OF LACTIC ACID: CPT

## 2023-01-01 PROCEDURE — 87040 BLOOD CULTURE FOR BACTERIA: CPT | Performed by: EMERGENCY MEDICINE

## 2023-01-01 PROCEDURE — U0005 INFEC AGEN DETEC AMPLI PROBE: HCPCS

## 2023-01-01 PROCEDURE — D9220A PRA ANESTHESIA: Mod: CRNA,,, | Performed by: STUDENT IN AN ORGANIZED HEALTH CARE EDUCATION/TRAINING PROGRAM

## 2023-01-01 PROCEDURE — 99239 PR HOSPITAL DISCHARGE DAY,>30 MIN: ICD-10-PCS | Mod: ,,, | Performed by: HOSPITALIST

## 2023-01-01 PROCEDURE — 97535 SELF CARE MNGMENT TRAINING: CPT | Mod: CO

## 2023-01-01 PROCEDURE — 99214 PR OFFICE/OUTPT VISIT, EST, LEVL IV, 30-39 MIN: ICD-10-PCS | Mod: S$PBB,,, | Performed by: PHYSICIAN ASSISTANT

## 2023-01-01 PROCEDURE — 83880 ASSAY OF NATRIURETIC PEPTIDE: CPT | Performed by: STUDENT IN AN ORGANIZED HEALTH CARE EDUCATION/TRAINING PROGRAM

## 2023-01-01 PROCEDURE — 84132 ASSAY OF SERUM POTASSIUM: CPT

## 2023-01-01 PROCEDURE — 83880 ASSAY OF NATRIURETIC PEPTIDE: CPT | Performed by: NURSE PRACTITIONER

## 2023-01-01 PROCEDURE — 87106 FUNGI IDENTIFICATION YEAST: CPT

## 2023-01-01 PROCEDURE — 99223 1ST HOSP IP/OBS HIGH 75: CPT | Mod: ,,, | Performed by: INTERNAL MEDICINE

## 2023-01-01 PROCEDURE — 85027 COMPLETE CBC AUTOMATED: CPT | Performed by: FAMILY MEDICINE

## 2023-01-01 PROCEDURE — 82800 BLOOD PH: CPT

## 2023-01-01 PROCEDURE — 99223 1ST HOSP IP/OBS HIGH 75: CPT | Mod: GC,,, | Performed by: ORTHOPAEDIC SURGERY

## 2023-01-01 PROCEDURE — 12000002 HC ACUTE/MED SURGE SEMI-PRIVATE ROOM

## 2023-01-01 PROCEDURE — 30200315 PPD INTRADERMAL TEST REV CODE 302: Performed by: INTERNAL MEDICINE

## 2023-01-01 PROCEDURE — 99223 PR INITIAL HOSPITAL CARE,LEVL III: ICD-10-PCS | Mod: GC,,, | Performed by: ORTHOPAEDIC SURGERY

## 2023-01-01 PROCEDURE — 85014 HEMATOCRIT: CPT

## 2023-01-01 PROCEDURE — 83935 ASSAY OF URINE OSMOLALITY: CPT | Performed by: PHYSICIAN ASSISTANT

## 2023-01-01 PROCEDURE — 83540 ASSAY OF IRON: CPT | Performed by: HOSPITALIST

## 2023-01-01 PROCEDURE — 83690 ASSAY OF LIPASE: CPT | Performed by: EMERGENCY MEDICINE

## 2023-01-01 PROCEDURE — 99291 PR CRITICAL CARE, E/M 30-74 MINUTES: ICD-10-PCS | Mod: ,,, | Performed by: EMERGENCY MEDICINE

## 2023-01-01 PROCEDURE — 85025 COMPLETE CBC W/AUTO DIFF WBC: CPT | Mod: 91 | Performed by: PHYSICIAN ASSISTANT

## 2023-01-01 PROCEDURE — 82306 VITAMIN D 25 HYDROXY: CPT

## 2023-01-01 PROCEDURE — D9220A PRA ANESTHESIA: Mod: ANES,,, | Performed by: ANESTHESIOLOGY

## 2023-01-01 PROCEDURE — 81001 URINALYSIS AUTO W/SCOPE: CPT | Performed by: EMERGENCY MEDICINE

## 2023-01-01 PROCEDURE — 97161 PT EVAL LOW COMPLEX 20 MIN: CPT

## 2023-01-01 PROCEDURE — U0005 INFEC AGEN DETEC AMPLI PROBE: HCPCS | Performed by: STUDENT IN AN ORGANIZED HEALTH CARE EDUCATION/TRAINING PROGRAM

## 2023-01-01 PROCEDURE — 97530 THERAPEUTIC ACTIVITIES: CPT | Mod: CO

## 2023-01-01 PROCEDURE — 30200315 PPD INTRADERMAL TEST REV CODE 302

## 2023-01-01 PROCEDURE — 85025 COMPLETE CBC W/AUTO DIFF WBC: CPT | Mod: 91 | Performed by: HOSPITALIST

## 2023-01-01 PROCEDURE — 99233 PR SUBSEQUENT HOSPITAL CARE,LEVL III: ICD-10-PCS | Mod: GC,,, | Performed by: INTERNAL MEDICINE

## 2023-01-01 PROCEDURE — 27000207 HC ISOLATION

## 2023-01-01 PROCEDURE — 25500020 PHARM REV CODE 255: Performed by: FAMILY MEDICINE

## 2023-01-01 PROCEDURE — 97116 GAIT TRAINING THERAPY: CPT | Mod: CQ

## 2023-01-01 PROCEDURE — 99284 EMERGENCY DEPT VISIT MOD MDM: CPT

## 2023-01-01 PROCEDURE — 87635 SARS-COV-2 COVID-19 AMP PRB: CPT | Performed by: HOSPITALIST

## 2023-01-01 PROCEDURE — 36000706: Performed by: SURGERY

## 2023-01-01 PROCEDURE — 82962 GLUCOSE BLOOD TEST: CPT

## 2023-01-01 PROCEDURE — 99285 PR EMERGENCY DEPT VISIT,LEVEL V: ICD-10-PCS | Mod: ,,, | Performed by: STUDENT IN AN ORGANIZED HEALTH CARE EDUCATION/TRAINING PROGRAM

## 2023-01-01 PROCEDURE — 99291 CRITICAL CARE FIRST HOUR: CPT | Mod: ,,, | Performed by: EMERGENCY MEDICINE

## 2023-01-01 PROCEDURE — 86580 TB INTRADERMAL TEST: CPT

## 2023-01-01 PROCEDURE — 83036 HEMOGLOBIN GLYCOSYLATED A1C: CPT | Performed by: PHYSICIAN ASSISTANT

## 2023-01-01 PROCEDURE — 84484 ASSAY OF TROPONIN QUANT: CPT | Performed by: NURSE PRACTITIONER

## 2023-01-01 PROCEDURE — 99233 PR SUBSEQUENT HOSPITAL CARE,LEVL III: ICD-10-PCS | Mod: GC,,, | Performed by: PSYCHIATRY & NEUROLOGY

## 2023-01-01 PROCEDURE — 99497 ADVNCD CARE PLAN 30 MIN: CPT | Mod: ,,, | Performed by: HOSPITALIST

## 2023-01-01 PROCEDURE — D9220A PRA ANESTHESIA: ICD-10-PCS | Mod: CRNA,,, | Performed by: STUDENT IN AN ORGANIZED HEALTH CARE EDUCATION/TRAINING PROGRAM

## 2023-01-01 PROCEDURE — 82607 VITAMIN B-12: CPT

## 2023-01-01 PROCEDURE — 99233 SBSQ HOSP IP/OBS HIGH 50: CPT | Mod: GC,,, | Performed by: INTERNAL MEDICINE

## 2023-01-01 PROCEDURE — 99999 PR PBB SHADOW E&M-EST. PATIENT-LVL II: ICD-10-PCS | Mod: PBBFAC,,, | Performed by: PHYSICIAN ASSISTANT

## 2023-01-01 PROCEDURE — 99285 EMERGENCY DEPT VISIT HI MDM: CPT | Mod: ,,, | Performed by: STUDENT IN AN ORGANIZED HEALTH CARE EDUCATION/TRAINING PROGRAM

## 2023-01-01 PROCEDURE — 25500020 PHARM REV CODE 255: Performed by: STUDENT IN AN ORGANIZED HEALTH CARE EDUCATION/TRAINING PROGRAM

## 2023-01-01 PROCEDURE — 99222 PR INITIAL HOSPITAL CARE,LEVL II: ICD-10-PCS | Mod: ,,, | Performed by: FAMILY MEDICINE

## 2023-01-01 PROCEDURE — 99215 OFFICE O/P EST HI 40 MIN: CPT | Mod: S$PBB,,,

## 2023-01-01 PROCEDURE — 90792 PR PSYCHIATRIC DIAGNOSTIC EVALUATION W/MEDICAL SERVICES: ICD-10-PCS | Mod: GC,,, | Performed by: PSYCHIATRY & NEUROLOGY

## 2023-01-01 PROCEDURE — 97116 GAIT TRAINING THERAPY: CPT

## 2023-01-01 PROCEDURE — 85379 FIBRIN DEGRADATION QUANT: CPT | Performed by: EMERGENCY MEDICINE

## 2023-01-01 PROCEDURE — 99900031 HC PATIENT EDUCATION (STAT)

## 2023-01-01 PROCEDURE — 84443 ASSAY THYROID STIM HORMONE: CPT

## 2023-01-01 PROCEDURE — 99223 1ST HOSP IP/OBS HIGH 75: CPT | Mod: 25,,, | Performed by: INTERNAL MEDICINE

## 2023-01-01 PROCEDURE — 84484 ASSAY OF TROPONIN QUANT: CPT | Performed by: STUDENT IN AN ORGANIZED HEALTH CARE EDUCATION/TRAINING PROGRAM

## 2023-01-01 PROCEDURE — 99223 1ST HOSP IP/OBS HIGH 75: CPT | Mod: AI,,, | Performed by: STUDENT IN AN ORGANIZED HEALTH CARE EDUCATION/TRAINING PROGRAM

## 2023-01-01 PROCEDURE — 87502 INFLUENZA DNA AMP PROBE: CPT

## 2023-01-01 DEVICE — MESH HERNIA GROIN KIT 2.4X5.4: Type: IMPLANTABLE DEVICE | Site: ABDOMEN | Status: FUNCTIONAL

## 2023-01-01 RX ORDER — LOPERAMIDE HYDROCHLORIDE 2 MG/1
2 CAPSULE ORAL 4 TIMES DAILY PRN
Qty: 60 CAPSULE | Refills: 0 | Status: SHIPPED | OUTPATIENT
Start: 2023-01-01 | End: 2023-01-01 | Stop reason: SDUPTHER

## 2023-01-01 RX ORDER — ACETAMINOPHEN 325 MG/1
650 TABLET ORAL EVERY 6 HOURS PRN
Qty: 60 TABLET | Refills: 1 | COMMUNITY
Start: 2023-01-01

## 2023-01-01 RX ORDER — GLUCAGON 1 MG
1 KIT INJECTION
Status: DISCONTINUED | OUTPATIENT
Start: 2023-01-01 | End: 2023-01-01

## 2023-01-01 RX ORDER — PANTOPRAZOLE SODIUM 40 MG/1
40 TABLET, DELAYED RELEASE ORAL DAILY
Status: DISCONTINUED | OUTPATIENT
Start: 2023-01-01 | End: 2023-01-01 | Stop reason: HOSPADM

## 2023-01-01 RX ORDER — HALOPERIDOL 2 MG/1
2 TABLET ORAL NIGHTLY
Qty: 30 TABLET | Refills: 11 | Status: ON HOLD
Start: 2023-01-01 | End: 2023-01-01 | Stop reason: HOSPADM

## 2023-01-01 RX ORDER — POTASSIUM CHLORIDE 7.45 MG/ML
10 INJECTION INTRAVENOUS ONCE
Status: DISCONTINUED | OUTPATIENT
Start: 2023-01-01 | End: 2023-01-01 | Stop reason: HOSPADM

## 2023-01-01 RX ORDER — POTASSIUM CHLORIDE 750 MG/1
30 CAPSULE, EXTENDED RELEASE ORAL
Status: DISCONTINUED | OUTPATIENT
Start: 2023-01-01 | End: 2023-01-01

## 2023-01-01 RX ORDER — MECLIZINE HCL 12.5 MG 12.5 MG/1
25 TABLET ORAL 2 TIMES DAILY
Status: DISCONTINUED | OUTPATIENT
Start: 2023-01-01 | End: 2023-01-01

## 2023-01-01 RX ORDER — PANTOPRAZOLE SODIUM 40 MG/1
40 TABLET, DELAYED RELEASE ORAL DAILY
Qty: 30 TABLET | Refills: 11
Start: 2023-01-01 | End: 2023-01-01

## 2023-01-01 RX ORDER — FUROSEMIDE 20 MG/1
20 TABLET ORAL DAILY
Status: DISCONTINUED | OUTPATIENT
Start: 2023-01-01 | End: 2023-01-01 | Stop reason: HOSPADM

## 2023-01-01 RX ORDER — ESCITALOPRAM OXALATE 10 MG/1
10 TABLET ORAL DAILY
Status: DISCONTINUED | OUTPATIENT
Start: 2023-01-01 | End: 2023-01-01 | Stop reason: HOSPADM

## 2023-01-01 RX ORDER — TALC
6 POWDER (GRAM) TOPICAL NIGHTLY PRN
Qty: 60 TABLET | Refills: 0 | Status: SHIPPED | OUTPATIENT
Start: 2023-01-01 | End: 2023-01-01 | Stop reason: SDUPTHER

## 2023-01-01 RX ORDER — METOPROLOL TARTRATE 25 MG/1
25 TABLET, FILM COATED ORAL 2 TIMES DAILY
Qty: 90 TABLET | Refills: 1 | Status: SHIPPED | OUTPATIENT
Start: 2023-01-01 | End: 2023-01-01 | Stop reason: SDUPTHER

## 2023-01-01 RX ORDER — ALBUTEROL SULFATE 90 UG/1
2 AEROSOL, METERED RESPIRATORY (INHALATION) 4 TIMES DAILY
Status: DISCONTINUED | OUTPATIENT
Start: 2023-01-01 | End: 2023-01-01 | Stop reason: HOSPADM

## 2023-01-01 RX ORDER — METOPROLOL TARTRATE 1 MG/ML
2.5 INJECTION, SOLUTION INTRAVENOUS
Status: DISCONTINUED | OUTPATIENT
Start: 2023-01-01 | End: 2023-01-01

## 2023-01-01 RX ORDER — TALC
6 POWDER (GRAM) TOPICAL NIGHTLY PRN
Status: DISCONTINUED | OUTPATIENT
Start: 2023-01-01 | End: 2023-01-01 | Stop reason: HOSPADM

## 2023-01-01 RX ORDER — OLANZAPINE 10 MG/2ML
2.5 INJECTION, POWDER, FOR SOLUTION INTRAMUSCULAR EVERY 8 HOURS PRN
Start: 2023-01-01 | End: 2023-01-01 | Stop reason: HOSPADM

## 2023-01-01 RX ORDER — PROPOFOL 10 MG/ML
VIAL (ML) INTRAVENOUS
Status: DISCONTINUED | OUTPATIENT
Start: 2023-01-01 | End: 2023-01-01

## 2023-01-01 RX ORDER — MEMANTINE HYDROCHLORIDE 5 MG/1
5 TABLET ORAL 2 TIMES DAILY
Status: DISCONTINUED | OUTPATIENT
Start: 2023-01-01 | End: 2023-01-01 | Stop reason: HOSPADM

## 2023-01-01 RX ORDER — TAMSULOSIN HYDROCHLORIDE 0.4 MG/1
0.4 CAPSULE ORAL NIGHTLY
Status: DISCONTINUED | OUTPATIENT
Start: 2023-01-01 | End: 2023-01-01 | Stop reason: HOSPADM

## 2023-01-01 RX ORDER — PROCHLORPERAZINE EDISYLATE 5 MG/ML
5 INJECTION INTRAMUSCULAR; INTRAVENOUS EVERY 6 HOURS PRN
Status: DISCONTINUED | OUTPATIENT
Start: 2023-01-01 | End: 2023-01-01 | Stop reason: HOSPADM

## 2023-01-01 RX ORDER — ROCURONIUM BROMIDE 10 MG/ML
INJECTION, SOLUTION INTRAVENOUS
Status: DISCONTINUED | OUTPATIENT
Start: 2023-01-01 | End: 2023-01-01

## 2023-01-01 RX ORDER — ZINC SULFATE 50(220)MG
220 CAPSULE ORAL EVERY OTHER DAY
Status: DISCONTINUED | OUTPATIENT
Start: 2023-01-01 | End: 2023-01-01 | Stop reason: HOSPADM

## 2023-01-01 RX ORDER — FAMOTIDINE 10 MG/ML
20 INJECTION INTRAVENOUS
Status: COMPLETED | OUTPATIENT
Start: 2023-01-01 | End: 2023-01-01

## 2023-01-01 RX ORDER — OXYCODONE HYDROCHLORIDE 5 MG/1
5 TABLET ORAL EVERY 4 HOURS PRN
Status: DISCONTINUED | OUTPATIENT
Start: 2023-01-01 | End: 2023-01-01 | Stop reason: HOSPADM

## 2023-01-01 RX ORDER — SODIUM CHLORIDE 0.9 % (FLUSH) 0.9 %
10 SYRINGE (ML) INJECTION EVERY 6 HOURS PRN
Status: DISCONTINUED | OUTPATIENT
Start: 2023-01-01 | End: 2023-01-01 | Stop reason: HOSPADM

## 2023-01-01 RX ORDER — LEVALBUTEROL 1.25 MG/.5ML
1.25 SOLUTION, CONCENTRATE RESPIRATORY (INHALATION) EVERY 4 HOURS PRN
Status: DISCONTINUED | OUTPATIENT
Start: 2023-01-01 | End: 2023-01-01 | Stop reason: HOSPADM

## 2023-01-01 RX ORDER — LORAZEPAM 0.5 MG/1
0.5 TABLET ORAL EVERY 6 HOURS PRN
Status: DISCONTINUED | OUTPATIENT
Start: 2023-01-01 | End: 2023-01-01

## 2023-01-01 RX ORDER — FENTANYL CITRATE 50 UG/ML
INJECTION, SOLUTION INTRAMUSCULAR; INTRAVENOUS
Status: DISCONTINUED | OUTPATIENT
Start: 2023-01-01 | End: 2023-01-01

## 2023-01-01 RX ORDER — DEXTROSE, SODIUM CHLORIDE, SODIUM LACTATE, POTASSIUM CHLORIDE, AND CALCIUM CHLORIDE 5; .6; .31; .03; .02 G/100ML; G/100ML; G/100ML; G/100ML; G/100ML
INJECTION, SOLUTION INTRAVENOUS CONTINUOUS
Status: DISCONTINUED | OUTPATIENT
Start: 2023-01-01 | End: 2023-01-01

## 2023-01-01 RX ORDER — SODIUM,POTASSIUM PHOSPHATES 280-250MG
2 POWDER IN PACKET (EA) ORAL DAILY
Refills: 0
Start: 2023-01-01 | End: 2023-01-01

## 2023-01-01 RX ORDER — FUROSEMIDE 10 MG/ML
40 INJECTION INTRAMUSCULAR; INTRAVENOUS
Status: COMPLETED | OUTPATIENT
Start: 2023-01-01 | End: 2023-01-01

## 2023-01-01 RX ORDER — SODIUM CHLORIDE 0.9 % (FLUSH) 0.9 %
10 SYRINGE (ML) INJECTION EVERY 8 HOURS
Status: DISCONTINUED | OUTPATIENT
Start: 2023-01-01 | End: 2023-01-01

## 2023-01-01 RX ORDER — WARFARIN SODIUM 5 MG/1
5 TABLET ORAL DAILY
Status: DISCONTINUED | OUTPATIENT
Start: 2023-01-01 | End: 2023-01-01

## 2023-01-01 RX ORDER — LANOLIN ALCOHOL/MO/W.PET/CERES
400 CREAM (GRAM) TOPICAL DAILY
Qty: 90 TABLET | Refills: 1 | Status: SHIPPED | OUTPATIENT
Start: 2023-01-01 | End: 2023-01-01 | Stop reason: HOSPADM

## 2023-01-01 RX ORDER — IBUPROFEN 200 MG
16 TABLET ORAL
Status: DISCONTINUED | OUTPATIENT
Start: 2023-01-01 | End: 2023-01-01 | Stop reason: HOSPADM

## 2023-01-01 RX ORDER — NIFEDIPINE 30 MG/1
30 TABLET, EXTENDED RELEASE ORAL DAILY
Status: DISCONTINUED | OUTPATIENT
Start: 2023-01-01 | End: 2023-01-01

## 2023-01-01 RX ORDER — POLYETHYLENE GLYCOL 3350 17 G/17G
17 POWDER, FOR SOLUTION ORAL 2 TIMES DAILY
Status: DISCONTINUED | OUTPATIENT
Start: 2023-01-01 | End: 2023-01-01 | Stop reason: HOSPADM

## 2023-01-01 RX ORDER — ASCORBIC ACID 250 MG
250 TABLET ORAL DAILY
Status: DISCONTINUED | OUTPATIENT
Start: 2023-01-01 | End: 2023-01-01 | Stop reason: HOSPADM

## 2023-01-01 RX ORDER — ZINC GLUCONATE 50 MG
50 TABLET ORAL DAILY
Status: ON HOLD | COMMUNITY
End: 2023-01-01 | Stop reason: HOSPADM

## 2023-01-01 RX ORDER — IPRATROPIUM BROMIDE AND ALBUTEROL SULFATE 2.5; .5 MG/3ML; MG/3ML
3 SOLUTION RESPIRATORY (INHALATION) EVERY 6 HOURS PRN
Status: DISCONTINUED | OUTPATIENT
Start: 2023-01-01 | End: 2023-01-01 | Stop reason: HOSPADM

## 2023-01-01 RX ORDER — FUROSEMIDE 10 MG/ML
40 INJECTION INTRAMUSCULAR; INTRAVENOUS DAILY
Status: DISCONTINUED | OUTPATIENT
Start: 2023-01-01 | End: 2023-01-01

## 2023-01-01 RX ORDER — ACETAMINOPHEN 325 MG/1
650 TABLET ORAL EVERY 4 HOURS PRN
Status: DISCONTINUED | OUTPATIENT
Start: 2023-01-01 | End: 2023-01-01 | Stop reason: HOSPADM

## 2023-01-01 RX ORDER — OLANZAPINE 10 MG/2ML
2.5 INJECTION, POWDER, FOR SOLUTION INTRAMUSCULAR EVERY 8 HOURS PRN
Status: DISCONTINUED | OUTPATIENT
Start: 2023-01-01 | End: 2023-01-01

## 2023-01-01 RX ORDER — LACTULOSE 10 G/15ML
20 SOLUTION ORAL EVERY 6 HOURS PRN
Status: DISCONTINUED | OUTPATIENT
Start: 2023-01-01 | End: 2023-01-01 | Stop reason: HOSPADM

## 2023-01-01 RX ORDER — METOPROLOL TARTRATE 1 MG/ML
5 INJECTION, SOLUTION INTRAVENOUS EVERY 6 HOURS
Status: CANCELLED | OUTPATIENT
Start: 2023-01-01

## 2023-01-01 RX ORDER — ENOXAPARIN SODIUM 100 MG/ML
40 INJECTION SUBCUTANEOUS EVERY 24 HOURS
Status: DISCONTINUED | OUTPATIENT
Start: 2023-01-01 | End: 2023-01-01 | Stop reason: HOSPADM

## 2023-01-01 RX ORDER — MEMANTINE HYDROCHLORIDE 5 MG/1
5 TABLET ORAL 2 TIMES DAILY
Status: ON HOLD | COMMUNITY
End: 2023-01-01 | Stop reason: SDUPTHER

## 2023-01-01 RX ORDER — ACETAMINOPHEN 325 MG/1
650 TABLET ORAL EVERY 6 HOURS PRN
Status: DISCONTINUED | OUTPATIENT
Start: 2023-01-01 | End: 2023-01-01 | Stop reason: HOSPADM

## 2023-01-01 RX ORDER — ACETAMINOPHEN 500 MG
1000 TABLET ORAL EVERY 8 HOURS PRN
Status: DISCONTINUED | OUTPATIENT
Start: 2023-01-01 | End: 2023-01-01 | Stop reason: HOSPADM

## 2023-01-01 RX ORDER — HYDROCODONE BITARTRATE AND ACETAMINOPHEN 500; 5 MG/1; MG/1
TABLET ORAL
Status: DISCONTINUED | OUTPATIENT
Start: 2023-01-01 | End: 2023-01-01 | Stop reason: HOSPADM

## 2023-01-01 RX ORDER — MAGNESIUM SULFATE HEPTAHYDRATE 40 MG/ML
2 INJECTION, SOLUTION INTRAVENOUS
Status: COMPLETED | OUTPATIENT
Start: 2023-01-01 | End: 2023-01-01

## 2023-01-01 RX ORDER — METOPROLOL SUCCINATE 25 MG/1
25 TABLET, EXTENDED RELEASE ORAL DAILY
Status: DISCONTINUED | OUTPATIENT
Start: 2023-01-01 | End: 2023-01-01 | Stop reason: HOSPADM

## 2023-01-01 RX ORDER — KETOROLAC TROMETHAMINE 30 MG/ML
15 INJECTION, SOLUTION INTRAMUSCULAR; INTRAVENOUS EVERY 6 HOURS PRN
Status: DISCONTINUED | OUTPATIENT
Start: 2023-01-01 | End: 2023-01-01

## 2023-01-01 RX ORDER — MAGNESIUM SULFATE HEPTAHYDRATE 40 MG/ML
2 INJECTION, SOLUTION INTRAVENOUS ONCE
Status: COMPLETED | OUTPATIENT
Start: 2023-01-01 | End: 2023-01-01

## 2023-01-01 RX ORDER — METOPROLOL TARTRATE 25 MG/1
12.5 TABLET ORAL 2 TIMES DAILY
Status: DISCONTINUED | OUTPATIENT
Start: 2023-01-01 | End: 2023-01-01 | Stop reason: HOSPADM

## 2023-01-01 RX ORDER — LANOLIN ALCOHOL/MO/W.PET/CERES
400 CREAM (GRAM) TOPICAL DAILY
Refills: 0
Start: 2023-01-01 | End: 2023-01-01 | Stop reason: SDUPTHER

## 2023-01-01 RX ORDER — MICONAZOLE NITRATE 2 %
POWDER (GRAM) TOPICAL 2 TIMES DAILY
Refills: 0
Start: 2023-01-01 | End: 2023-01-01 | Stop reason: SDUPTHER

## 2023-01-01 RX ORDER — MEMANTINE HYDROCHLORIDE 5 MG/1
5 TABLET ORAL 2 TIMES DAILY
Qty: 60 TABLET | Refills: 11 | Status: SHIPPED | OUTPATIENT
Start: 2023-01-01 | End: 2024-07-13

## 2023-01-01 RX ORDER — LISINOPRIL 10 MG/1
10 TABLET ORAL DAILY
Status: DISCONTINUED | OUTPATIENT
Start: 2023-01-01 | End: 2023-01-01

## 2023-01-01 RX ORDER — POTASSIUM CHLORIDE 750 MG/1
10 TABLET, EXTENDED RELEASE ORAL DAILY
Refills: 0
Start: 2023-01-01 | End: 2023-01-01 | Stop reason: SDUPTHER

## 2023-01-01 RX ORDER — ACETAMINOPHEN 650 MG/20.3ML
650 LIQUID ORAL
Status: COMPLETED | OUTPATIENT
Start: 2023-01-01 | End: 2023-01-01

## 2023-01-01 RX ORDER — LIDOCAINE 50 MG/G
1 PATCH TOPICAL
Status: COMPLETED | OUTPATIENT
Start: 2023-01-01 | End: 2023-01-01

## 2023-01-01 RX ORDER — SODIUM CHLORIDE, SODIUM LACTATE, POTASSIUM CHLORIDE, CALCIUM CHLORIDE 600; 310; 30; 20 MG/100ML; MG/100ML; MG/100ML; MG/100ML
INJECTION, SOLUTION INTRAVENOUS CONTINUOUS
Status: DISCONTINUED | OUTPATIENT
Start: 2023-01-01 | End: 2023-01-01

## 2023-01-01 RX ORDER — METHOCARBAMOL 500 MG/1
500 TABLET, FILM COATED ORAL 3 TIMES DAILY PRN
Start: 2023-01-01 | End: 2023-01-01 | Stop reason: SDUPTHER

## 2023-01-01 RX ORDER — ACETAMINOPHEN 650 MG/20.3ML
500 LIQUID ORAL ONCE
Status: DISCONTINUED | OUTPATIENT
Start: 2023-01-01 | End: 2023-01-01 | Stop reason: HOSPADM

## 2023-01-01 RX ORDER — ONDANSETRON 2 MG/ML
INJECTION INTRAMUSCULAR; INTRAVENOUS
Status: DISCONTINUED | OUTPATIENT
Start: 2023-01-01 | End: 2023-01-01

## 2023-01-01 RX ORDER — BUPIVACAINE HYDROCHLORIDE 2.5 MG/ML
INJECTION, SOLUTION EPIDURAL; INFILTRATION; INTRACAUDAL
Status: DISCONTINUED | OUTPATIENT
Start: 2023-01-01 | End: 2023-01-01 | Stop reason: HOSPADM

## 2023-01-01 RX ORDER — BISACODYL 10 MG
10 SUPPOSITORY, RECTAL RECTAL DAILY PRN
Status: DISCONTINUED | OUTPATIENT
Start: 2023-01-01 | End: 2023-01-01 | Stop reason: HOSPADM

## 2023-01-01 RX ORDER — TALC
6 POWDER (GRAM) TOPICAL NIGHTLY PRN
Refills: 0
Start: 2023-01-01 | End: 2023-01-01 | Stop reason: SDUPTHER

## 2023-01-01 RX ORDER — METOPROLOL TARTRATE 1 MG/ML
5 INJECTION, SOLUTION INTRAVENOUS EVERY 5 MIN PRN
Status: DISCONTINUED | OUTPATIENT
Start: 2023-01-01 | End: 2023-01-01 | Stop reason: HOSPADM

## 2023-01-01 RX ORDER — WARFARIN 2 MG/1
2 TABLET ORAL DAILY
Status: DISCONTINUED | OUTPATIENT
Start: 2023-01-01 | End: 2023-01-01

## 2023-01-01 RX ORDER — LISINOPRIL 20 MG/1
40 TABLET ORAL DAILY
Status: DISCONTINUED | OUTPATIENT
Start: 2023-01-01 | End: 2023-01-01 | Stop reason: HOSPADM

## 2023-01-01 RX ORDER — PROCHLORPERAZINE EDISYLATE 5 MG/ML
5 INJECTION INTRAMUSCULAR; INTRAVENOUS EVERY 6 HOURS PRN
Status: DISCONTINUED | OUTPATIENT
Start: 2023-01-01 | End: 2023-01-01

## 2023-01-01 RX ORDER — MUPIROCIN 20 MG/G
OINTMENT TOPICAL 2 TIMES DAILY
Status: DISCONTINUED | OUTPATIENT
Start: 2023-01-01 | End: 2023-01-01 | Stop reason: HOSPADM

## 2023-01-01 RX ORDER — LACTULOSE 10 G/15ML
30 SOLUTION ORAL DAILY PRN
Status: DISCONTINUED | OUTPATIENT
Start: 2023-01-01 | End: 2023-01-01 | Stop reason: HOSPADM

## 2023-01-01 RX ORDER — METOCLOPRAMIDE HYDROCHLORIDE 5 MG/ML
10 INJECTION INTRAMUSCULAR; INTRAVENOUS
Status: COMPLETED | OUTPATIENT
Start: 2023-01-01 | End: 2023-01-01

## 2023-01-01 RX ORDER — METHOCARBAMOL 500 MG/1
500 TABLET, FILM COATED ORAL 4 TIMES DAILY
Status: DISCONTINUED | OUTPATIENT
Start: 2023-01-01 | End: 2023-01-01

## 2023-01-01 RX ORDER — LEVALBUTEROL 1.25 MG/.5ML
1.25 SOLUTION, CONCENTRATE RESPIRATORY (INHALATION) EVERY 4 HOURS PRN
Status: DISCONTINUED | OUTPATIENT
Start: 2023-01-01 | End: 2023-01-01

## 2023-01-01 RX ORDER — HYDROXYZINE HYDROCHLORIDE 25 MG/1
25 TABLET, FILM COATED ORAL 3 TIMES DAILY PRN
Status: DISCONTINUED | OUTPATIENT
Start: 2023-01-01 | End: 2023-01-01 | Stop reason: HOSPADM

## 2023-01-01 RX ORDER — ESCITALOPRAM OXALATE 5 MG/1
5 TABLET ORAL DAILY
Status: ON HOLD | COMMUNITY
End: 2023-01-01 | Stop reason: SDUPTHER

## 2023-01-01 RX ORDER — METHOCARBAMOL 500 MG/1
500 TABLET, FILM COATED ORAL 4 TIMES DAILY PRN
Status: DISCONTINUED | OUTPATIENT
Start: 2023-01-01 | End: 2023-01-01

## 2023-01-01 RX ORDER — FLUCONAZOLE 150 MG/1
150 TABLET ORAL ONCE
Status: COMPLETED | OUTPATIENT
Start: 2023-01-01 | End: 2023-01-01

## 2023-01-01 RX ORDER — LANOLIN ALCOHOL/MO/W.PET/CERES
400 CREAM (GRAM) TOPICAL DAILY
Status: DISCONTINUED | OUTPATIENT
Start: 2023-01-01 | End: 2023-01-01 | Stop reason: HOSPADM

## 2023-01-01 RX ORDER — ESCITALOPRAM OXALATE 5 MG/1
5 TABLET ORAL DAILY
Qty: 90 TABLET | Refills: 1 | Status: SHIPPED | OUTPATIENT
Start: 2023-01-01 | End: 2023-01-01 | Stop reason: SDUPTHER

## 2023-01-01 RX ORDER — NALOXONE HCL 0.4 MG/ML
0.02 VIAL (ML) INJECTION
Status: DISCONTINUED | OUTPATIENT
Start: 2023-01-01 | End: 2023-01-01 | Stop reason: HOSPADM

## 2023-01-01 RX ORDER — MAG HYDROX/ALUMINUM HYD/SIMETH 200-200-20
30 SUSPENSION, ORAL (FINAL DOSE FORM) ORAL 4 TIMES DAILY PRN
Status: DISCONTINUED | OUTPATIENT
Start: 2023-01-01 | End: 2023-01-01 | Stop reason: HOSPADM

## 2023-01-01 RX ORDER — ONDANSETRON 2 MG/ML
4 INJECTION INTRAMUSCULAR; INTRAVENOUS EVERY 8 HOURS PRN
Status: DISCONTINUED | OUTPATIENT
Start: 2023-01-01 | End: 2023-01-01 | Stop reason: HOSPADM

## 2023-01-01 RX ORDER — DEXTROSE MONOHYDRATE 50 MG/ML
INJECTION, SOLUTION INTRAVENOUS CONTINUOUS
Status: ACTIVE | OUTPATIENT
Start: 2023-01-01 | End: 2023-01-01

## 2023-01-01 RX ORDER — AMOXICILLIN 250 MG
1 CAPSULE ORAL DAILY PRN
Status: DISCONTINUED | OUTPATIENT
Start: 2023-01-01 | End: 2023-01-01 | Stop reason: HOSPADM

## 2023-01-01 RX ORDER — SODIUM CHLORIDE 0.9 % (FLUSH) 0.9 %
10 SYRINGE (ML) INJECTION
Status: DISCONTINUED | OUTPATIENT
Start: 2023-01-01 | End: 2023-01-01 | Stop reason: HOSPADM

## 2023-01-01 RX ORDER — FUROSEMIDE 20 MG/1
20 TABLET ORAL 2 TIMES DAILY
Status: DISCONTINUED | OUTPATIENT
Start: 2023-01-01 | End: 2023-01-01

## 2023-01-01 RX ORDER — SODIUM CHLORIDE 9 MG/ML
500 INJECTION, SOLUTION INTRAVENOUS
Status: COMPLETED | OUTPATIENT
Start: 2023-01-01 | End: 2023-01-01

## 2023-01-01 RX ORDER — POTASSIUM CHLORIDE 7.45 MG/ML
10 INJECTION INTRAVENOUS
Status: COMPLETED | OUTPATIENT
Start: 2023-01-01 | End: 2023-01-01

## 2023-01-01 RX ORDER — METHOCARBAMOL 500 MG/1
500 TABLET, FILM COATED ORAL 3 TIMES DAILY PRN
Qty: 30 TABLET | Refills: 1 | Status: ON HOLD | OUTPATIENT
Start: 2023-01-01 | End: 2023-01-01 | Stop reason: HOSPADM

## 2023-01-01 RX ORDER — MIRTAZAPINE 15 MG/1
15 TABLET, ORALLY DISINTEGRATING ORAL NIGHTLY
Status: DISCONTINUED | OUTPATIENT
Start: 2023-01-01 | End: 2023-01-01

## 2023-01-01 RX ORDER — POLYETHYLENE GLYCOL 3350 17 G/17G
17 POWDER, FOR SOLUTION ORAL DAILY PRN
Status: DISCONTINUED | OUTPATIENT
Start: 2023-01-01 | End: 2023-01-01 | Stop reason: HOSPADM

## 2023-01-01 RX ORDER — LOPERAMIDE HYDROCHLORIDE 2 MG/1
2 CAPSULE ORAL 4 TIMES DAILY PRN
Refills: 0
Start: 2023-01-01 | End: 2023-01-01 | Stop reason: SDUPTHER

## 2023-01-01 RX ORDER — HALOPERIDOL 1 MG/1
2 TABLET ORAL NIGHTLY
Status: DISCONTINUED | OUTPATIENT
Start: 2023-01-01 | End: 2023-01-01 | Stop reason: HOSPADM

## 2023-01-01 RX ORDER — POTASSIUM CHLORIDE 7.45 MG/ML
10 INJECTION INTRAVENOUS
Status: DISCONTINUED | OUTPATIENT
Start: 2023-01-01 | End: 2023-01-01

## 2023-01-01 RX ORDER — ESCITALOPRAM OXALATE 5 MG/1
5 TABLET ORAL DAILY
Qty: 30 TABLET | Refills: 11 | Status: ON HOLD | OUTPATIENT
Start: 2023-01-01 | End: 2023-01-01 | Stop reason: SDUPTHER

## 2023-01-01 RX ORDER — ENOXAPARIN SODIUM 100 MG/ML
40 INJECTION SUBCUTANEOUS EVERY 24 HOURS
Status: DISCONTINUED | OUTPATIENT
Start: 2023-01-01 | End: 2023-01-01

## 2023-01-01 RX ORDER — AMOXICILLIN 250 MG
1 CAPSULE ORAL DAILY PRN
Qty: 65 TABLET | Refills: 1 | Status: SHIPPED | OUTPATIENT
Start: 2023-01-01 | End: 2023-01-01 | Stop reason: HOSPADM

## 2023-01-01 RX ORDER — HALOPERIDOL 0.5 MG/1
0.5 TABLET ORAL NIGHTLY
Qty: 30 TABLET | Refills: 11 | Status: ON HOLD | OUTPATIENT
Start: 2023-01-01 | End: 2023-01-01 | Stop reason: HOSPADM

## 2023-01-01 RX ORDER — METOPROLOL TARTRATE 25 MG/1
25 TABLET, FILM COATED ORAL 2 TIMES DAILY
Status: DISCONTINUED | OUTPATIENT
Start: 2023-01-01 | End: 2023-01-01 | Stop reason: HOSPADM

## 2023-01-01 RX ORDER — ALBUTEROL SULFATE 90 UG/1
2 AEROSOL, METERED RESPIRATORY (INHALATION) 4 TIMES DAILY
Qty: 18 G | Refills: 0 | Status: SHIPPED | OUTPATIENT
Start: 2023-01-01 | End: 2023-01-01

## 2023-01-01 RX ORDER — DEXAMETHASONE SODIUM PHOSPHATE 4 MG/ML
INJECTION, SOLUTION INTRA-ARTICULAR; INTRALESIONAL; INTRAMUSCULAR; INTRAVENOUS; SOFT TISSUE
Status: DISCONTINUED | OUTPATIENT
Start: 2023-01-01 | End: 2023-01-01

## 2023-01-01 RX ORDER — AMLODIPINE BESYLATE 10 MG/1
10 TABLET ORAL DAILY
Status: DISCONTINUED | OUTPATIENT
Start: 2023-01-01 | End: 2023-01-01 | Stop reason: HOSPADM

## 2023-01-01 RX ORDER — FUROSEMIDE 20 MG/1
20 TABLET ORAL 2 TIMES DAILY
Qty: 60 TABLET | Refills: 11 | Status: ON HOLD | OUTPATIENT
Start: 2023-01-01 | End: 2023-01-01 | Stop reason: SDUPTHER

## 2023-01-01 RX ORDER — ZINC SULFATE 50(220)MG
220 CAPSULE ORAL DAILY
Start: 2023-01-01 | End: 2023-01-01 | Stop reason: SDUPTHER

## 2023-01-01 RX ORDER — HYDROXYZINE HYDROCHLORIDE 25 MG/1
25 TABLET, FILM COATED ORAL 3 TIMES DAILY
Status: ON HOLD
Start: 2023-01-01 | End: 2023-01-01 | Stop reason: HOSPADM

## 2023-01-01 RX ORDER — POTASSIUM CHLORIDE 20 MEQ/1
20 TABLET, EXTENDED RELEASE ORAL 3 TIMES DAILY
Status: DISCONTINUED | OUTPATIENT
Start: 2023-01-01 | End: 2023-01-01 | Stop reason: HOSPADM

## 2023-01-01 RX ORDER — POTASSIUM CHLORIDE 7.45 MG/ML
10 INJECTION INTRAVENOUS
Status: DISPENSED | OUTPATIENT
Start: 2023-01-01 | End: 2023-01-01

## 2023-01-01 RX ORDER — LISINOPRIL 40 MG/1
40 TABLET ORAL DAILY
Qty: 90 TABLET | Refills: 3
Start: 2023-01-01 | End: 2023-01-01 | Stop reason: SDUPTHER

## 2023-01-01 RX ORDER — HYDROXYZINE HYDROCHLORIDE 25 MG/1
25 TABLET, FILM COATED ORAL 3 TIMES DAILY
Status: DISCONTINUED | OUTPATIENT
Start: 2023-01-01 | End: 2023-01-01 | Stop reason: HOSPADM

## 2023-01-01 RX ORDER — LISINOPRIL 20 MG/1
40 TABLET ORAL DAILY
Status: DISCONTINUED | OUTPATIENT
Start: 2023-01-01 | End: 2023-01-01

## 2023-01-01 RX ORDER — MICONAZOLE NITRATE 2 %
POWDER (GRAM) TOPICAL 2 TIMES DAILY
Status: DISCONTINUED | OUTPATIENT
Start: 2023-01-01 | End: 2023-01-01 | Stop reason: HOSPADM

## 2023-01-01 RX ORDER — SODIUM,POTASSIUM PHOSPHATES 280-250MG
2 POWDER IN PACKET (EA) ORAL EVERY 4 HOURS
Status: COMPLETED | OUTPATIENT
Start: 2023-01-01 | End: 2023-01-01

## 2023-01-01 RX ORDER — MECLIZINE HCL 12.5 MG 12.5 MG/1
12.5 TABLET ORAL DAILY
Refills: 0
Start: 2023-01-01 | End: 2023-01-01

## 2023-01-01 RX ORDER — ACETAMINOPHEN 325 MG/1
650 TABLET ORAL EVERY 4 HOURS PRN
Qty: 60 TABLET | Refills: 1 | Status: SHIPPED | OUTPATIENT
Start: 2023-01-01 | End: 2023-01-01 | Stop reason: SDUPTHER

## 2023-01-01 RX ORDER — METHOCARBAMOL 500 MG/1
500 TABLET, FILM COATED ORAL 3 TIMES DAILY PRN
Qty: 65 TABLET | Refills: 1 | Status: SHIPPED | OUTPATIENT
Start: 2023-01-01 | End: 2023-01-01 | Stop reason: SDUPTHER

## 2023-01-01 RX ORDER — MICONAZOLE NITRATE 2 %
POWDER (GRAM) TOPICAL
Refills: 0 | Status: ON HOLD
Start: 2023-01-01 | End: 2023-01-01 | Stop reason: HOSPADM

## 2023-01-01 RX ORDER — FLUMAZENIL 0.1 MG/ML
0.5 INJECTION INTRAVENOUS ONCE AS NEEDED
Status: DISCONTINUED | OUTPATIENT
Start: 2023-01-01 | End: 2023-01-01 | Stop reason: HOSPADM

## 2023-01-01 RX ORDER — AMPICILLIN 500 MG/1
500 CAPSULE ORAL 4 TIMES DAILY
Qty: 16 CAPSULE | Refills: 0 | Status: SHIPPED | OUTPATIENT
Start: 2023-01-01 | End: 2023-01-01

## 2023-01-01 RX ORDER — ASCORBIC ACID 250 MG
250 TABLET ORAL DAILY
Qty: 30 TABLET | Refills: 11 | Status: ON HOLD | OUTPATIENT
Start: 2023-01-01 | End: 2023-01-01 | Stop reason: HOSPADM

## 2023-01-01 RX ORDER — OXYCODONE HYDROCHLORIDE 5 MG/1
5 TABLET ORAL EVERY 4 HOURS PRN
Status: DISCONTINUED | OUTPATIENT
Start: 2023-01-01 | End: 2023-01-01

## 2023-01-01 RX ORDER — POLYETHYLENE GLYCOL 3350 17 G/17G
17 POWDER, FOR SOLUTION ORAL 2 TIMES DAILY PRN
Status: DISCONTINUED | OUTPATIENT
Start: 2023-01-01 | End: 2023-01-01

## 2023-01-01 RX ORDER — POTASSIUM CHLORIDE 750 MG/1
10 CAPSULE, EXTENDED RELEASE ORAL DAILY
Status: DISCONTINUED | OUTPATIENT
Start: 2023-01-01 | End: 2023-01-01

## 2023-01-01 RX ORDER — TAMSULOSIN HYDROCHLORIDE 0.4 MG/1
0.4 CAPSULE ORAL NIGHTLY
Qty: 30 CAPSULE | Refills: 11 | Status: ON HOLD | OUTPATIENT
Start: 2023-01-01 | End: 2023-01-01 | Stop reason: HOSPADM

## 2023-01-01 RX ORDER — HYDRALAZINE HYDROCHLORIDE 20 MG/ML
10 INJECTION INTRAMUSCULAR; INTRAVENOUS EVERY 6 HOURS PRN
Status: DISCONTINUED | OUTPATIENT
Start: 2023-01-01 | End: 2023-01-01

## 2023-01-01 RX ORDER — METOPROLOL SUCCINATE 25 MG/1
25 TABLET, EXTENDED RELEASE ORAL 2 TIMES DAILY
Status: DISCONTINUED | OUTPATIENT
Start: 2023-01-01 | End: 2023-01-01 | Stop reason: HOSPADM

## 2023-01-01 RX ORDER — DIVALPROEX SODIUM 250 MG/1
250 TABLET, DELAYED RELEASE ORAL NIGHTLY
Qty: 30 TABLET | Refills: 11 | Status: ON HOLD
Start: 2023-01-01 | End: 2023-01-01 | Stop reason: HOSPADM

## 2023-01-01 RX ORDER — LORAZEPAM 2 MG/ML
0.5 INJECTION INTRAMUSCULAR ONCE AS NEEDED
Status: COMPLETED | OUTPATIENT
Start: 2023-01-01 | End: 2023-01-01

## 2023-01-01 RX ORDER — ACETAMINOPHEN 500 MG
1000 TABLET ORAL EVERY 8 HOURS
Status: DISCONTINUED | OUTPATIENT
Start: 2023-01-01 | End: 2023-01-01

## 2023-01-01 RX ORDER — KETOROLAC TROMETHAMINE 30 MG/ML
10 INJECTION, SOLUTION INTRAMUSCULAR; INTRAVENOUS
Status: COMPLETED | OUTPATIENT
Start: 2023-01-01 | End: 2023-01-01

## 2023-01-01 RX ORDER — METOPROLOL TARTRATE 25 MG/1
12.5 TABLET, FILM COATED ORAL 2 TIMES DAILY
Qty: 30 TABLET | Refills: 11 | Status: ON HOLD
Start: 2023-01-01 | End: 2023-01-01 | Stop reason: HOSPADM

## 2023-01-01 RX ORDER — POTASSIUM CHLORIDE 750 MG/1
30 CAPSULE, EXTENDED RELEASE ORAL
Status: COMPLETED | OUTPATIENT
Start: 2023-01-01 | End: 2023-01-01

## 2023-01-01 RX ORDER — ASCORBIC ACID 250 MG
250 TABLET ORAL DAILY
Status: ON HOLD | COMMUNITY
End: 2023-01-01 | Stop reason: SDUPTHER

## 2023-01-01 RX ORDER — CEFAZOLIN SODIUM 1 G/3ML
INJECTION, POWDER, FOR SOLUTION INTRAMUSCULAR; INTRAVENOUS
Status: DISCONTINUED | OUTPATIENT
Start: 2023-01-01 | End: 2023-01-01

## 2023-01-01 RX ORDER — HYDRALAZINE HYDROCHLORIDE 20 MG/ML
10 INJECTION INTRAMUSCULAR; INTRAVENOUS EVERY 8 HOURS PRN
Status: DISCONTINUED | OUTPATIENT
Start: 2023-01-01 | End: 2023-01-01 | Stop reason: HOSPADM

## 2023-01-01 RX ORDER — POTASSIUM CHLORIDE 20 MEQ/1
40 TABLET, EXTENDED RELEASE ORAL ONCE
Status: COMPLETED | OUTPATIENT
Start: 2023-01-01 | End: 2023-01-01

## 2023-01-01 RX ORDER — HYDRALAZINE HYDROCHLORIDE 25 MG/1
25 TABLET, FILM COATED ORAL EVERY 8 HOURS PRN
Status: DISCONTINUED | OUTPATIENT
Start: 2023-01-01 | End: 2023-01-01

## 2023-01-01 RX ORDER — CLONIDINE HYDROCHLORIDE 0.1 MG/1
0.1 TABLET ORAL 3 TIMES DAILY PRN
Status: ON HOLD | COMMUNITY
Start: 2022-11-06 | End: 2023-01-01

## 2023-01-01 RX ORDER — MICONAZOLE NITRATE 2 %
POWDER (GRAM) TOPICAL 2 TIMES DAILY
Qty: 85 G | Refills: 3 | Status: SHIPPED | OUTPATIENT
Start: 2023-01-01

## 2023-01-01 RX ORDER — DEXMEDETOMIDINE HYDROCHLORIDE 100 UG/ML
INJECTION, SOLUTION INTRAVENOUS
Status: DISCONTINUED | OUTPATIENT
Start: 2023-01-01 | End: 2023-01-01

## 2023-01-01 RX ORDER — ENOXAPARIN SODIUM 100 MG/ML
40 INJECTION SUBCUTANEOUS EVERY 24 HOURS
Status: COMPLETED | OUTPATIENT
Start: 2023-01-01 | End: 2023-01-01

## 2023-01-01 RX ORDER — SODIUM CHLORIDE 9 MG/ML
INJECTION, SOLUTION INTRAVENOUS CONTINUOUS
Status: ACTIVE | OUTPATIENT
Start: 2023-01-01 | End: 2023-01-01

## 2023-01-01 RX ORDER — AMLODIPINE BESYLATE 10 MG/1
10 TABLET ORAL DAILY
Status: DISCONTINUED | OUTPATIENT
Start: 2023-01-01 | End: 2023-01-01

## 2023-01-01 RX ORDER — GLUCAGON 1 MG
1 KIT INJECTION
Status: DISCONTINUED | OUTPATIENT
Start: 2023-01-01 | End: 2023-01-01 | Stop reason: HOSPADM

## 2023-01-01 RX ORDER — LOPERAMIDE HYDROCHLORIDE 2 MG/1
2 CAPSULE ORAL ONCE
Status: COMPLETED | OUTPATIENT
Start: 2023-01-01 | End: 2023-01-01

## 2023-01-01 RX ORDER — PHENYLEPHRINE HYDROCHLORIDE 10 MG/ML
INJECTION INTRAVENOUS
Status: DISCONTINUED | OUTPATIENT
Start: 2023-01-01 | End: 2023-01-01

## 2023-01-01 RX ORDER — SUCCINYLCHOLINE CHLORIDE 20 MG/ML
INJECTION INTRAMUSCULAR; INTRAVENOUS
Status: DISCONTINUED | OUTPATIENT
Start: 2023-01-01 | End: 2023-01-01

## 2023-01-01 RX ORDER — LANOLIN ALCOHOL/MO/W.PET/CERES
400 CREAM (GRAM) TOPICAL ONCE
Status: COMPLETED | OUTPATIENT
Start: 2023-01-01 | End: 2023-01-01

## 2023-01-01 RX ORDER — WARFARIN 7.5 MG/1
7.5 TABLET ORAL DAILY
Status: DISCONTINUED | OUTPATIENT
Start: 2023-01-01 | End: 2023-01-01

## 2023-01-01 RX ORDER — TAMSULOSIN HYDROCHLORIDE 0.4 MG/1
0.4 CAPSULE ORAL NIGHTLY
Qty: 30 CAPSULE | Refills: 11
Start: 2023-01-01 | End: 2023-01-01 | Stop reason: SDUPTHER

## 2023-01-01 RX ORDER — NUT.TX.GLUCOSE INTOLERANCE,SOY
30 BAR ORAL 2 TIMES DAILY
Status: ON HOLD | COMMUNITY
Start: 2023-01-01 | End: 2023-01-01 | Stop reason: HOSPADM

## 2023-01-01 RX ORDER — LEVALBUTEROL 1.25 MG/.5ML
1.25 SOLUTION, CONCENTRATE RESPIRATORY (INHALATION) ONCE
Status: COMPLETED | OUTPATIENT
Start: 2023-01-01 | End: 2023-01-01

## 2023-01-01 RX ORDER — METOPROLOL SUCCINATE 25 MG/1
25 TABLET, EXTENDED RELEASE ORAL DAILY
Status: DISCONTINUED | OUTPATIENT
Start: 2023-01-01 | End: 2023-01-01

## 2023-01-01 RX ORDER — METOPROLOL TARTRATE 25 MG/1
25 TABLET, FILM COATED ORAL 2 TIMES DAILY
Qty: 60 TABLET | Refills: 11 | Status: SHIPPED | OUTPATIENT
Start: 2023-01-01 | End: 2024-07-13

## 2023-01-01 RX ORDER — MAGNESIUM SULFATE HEPTAHYDRATE 40 MG/ML
2 INJECTION, SOLUTION INTRAVENOUS
Status: DISCONTINUED | OUTPATIENT
Start: 2023-01-01 | End: 2023-01-01 | Stop reason: HOSPADM

## 2023-01-01 RX ORDER — ACETAMINOPHEN 325 MG/1
650 TABLET ORAL EVERY 4 HOURS PRN
Refills: 0
Start: 2023-01-01 | End: 2023-01-01 | Stop reason: SDUPTHER

## 2023-01-01 RX ORDER — ONDANSETRON 2 MG/ML
4 INJECTION INTRAMUSCULAR; INTRAVENOUS EVERY 8 HOURS PRN
Status: DISCONTINUED | OUTPATIENT
Start: 2023-01-01 | End: 2023-01-01

## 2023-01-01 RX ORDER — FUROSEMIDE 10 MG/ML
40 INJECTION INTRAMUSCULAR; INTRAVENOUS
Status: DISCONTINUED | OUTPATIENT
Start: 2023-01-01 | End: 2023-01-01

## 2023-01-01 RX ORDER — LOPERAMIDE HYDROCHLORIDE 2 MG/1
2 CAPSULE ORAL 4 TIMES DAILY PRN
Status: DISCONTINUED | OUTPATIENT
Start: 2023-01-01 | End: 2023-01-01 | Stop reason: HOSPADM

## 2023-01-01 RX ORDER — ASCORBIC ACID 250 MG
250 TABLET ORAL DAILY
Qty: 90 TABLET | Refills: 1 | Status: SHIPPED | OUTPATIENT
Start: 2023-01-01 | End: 2023-01-01 | Stop reason: SDUPTHER

## 2023-01-01 RX ORDER — HYDROXYZINE PAMOATE 25 MG/1
25 CAPSULE ORAL EVERY 8 HOURS PRN
Status: DISCONTINUED | OUTPATIENT
Start: 2023-01-01 | End: 2023-01-01

## 2023-01-01 RX ORDER — TAMSULOSIN HYDROCHLORIDE 0.4 MG/1
CAPSULE ORAL DAILY
Status: ON HOLD | COMMUNITY
End: 2023-01-01 | Stop reason: HOSPADM

## 2023-01-01 RX ORDER — NAPROXEN 500 MG/1
500 TABLET ORAL
Status: COMPLETED | OUTPATIENT
Start: 2023-01-01 | End: 2023-01-01

## 2023-01-01 RX ORDER — CHOLECALCIFEROL (VITAMIN D3) 25 MCG
1000 TABLET ORAL DAILY
Status: DISCONTINUED | OUTPATIENT
Start: 2023-01-01 | End: 2023-01-01 | Stop reason: HOSPADM

## 2023-01-01 RX ORDER — IPRATROPIUM BROMIDE 0.5 MG/2.5ML
0.5 SOLUTION RESPIRATORY (INHALATION) EVERY 4 HOURS PRN
Status: DISCONTINUED | OUTPATIENT
Start: 2023-01-01 | End: 2023-01-01

## 2023-01-01 RX ORDER — LISINOPRIL 40 MG/1
40 TABLET ORAL DAILY
Qty: 30 TABLET | Refills: 11 | Status: SHIPPED | OUTPATIENT
Start: 2023-01-01 | End: 2024-07-13

## 2023-01-01 RX ORDER — ONDANSETRON 8 MG/1
8 TABLET, ORALLY DISINTEGRATING ORAL EVERY 8 HOURS PRN
Status: DISCONTINUED | OUTPATIENT
Start: 2023-01-01 | End: 2023-01-01 | Stop reason: HOSPADM

## 2023-01-01 RX ORDER — POLYETHYLENE GLYCOL 3350 17 G/17G
17 POWDER, FOR SOLUTION ORAL 2 TIMES DAILY PRN
Refills: 0
Start: 2023-01-01

## 2023-01-01 RX ORDER — IBUPROFEN 200 MG
24 TABLET ORAL
Status: DISCONTINUED | OUTPATIENT
Start: 2023-01-01 | End: 2023-01-01 | Stop reason: HOSPADM

## 2023-01-01 RX ORDER — DIVALPROEX SODIUM 250 MG/1
250 TABLET, DELAYED RELEASE ORAL NIGHTLY
Status: DISCONTINUED | OUTPATIENT
Start: 2023-01-01 | End: 2023-01-01 | Stop reason: HOSPADM

## 2023-01-01 RX ORDER — FUROSEMIDE 20 MG/1
20 TABLET ORAL 2 TIMES DAILY
Status: DISCONTINUED | OUTPATIENT
Start: 2023-01-01 | End: 2023-01-01 | Stop reason: HOSPADM

## 2023-01-01 RX ORDER — HYDRALAZINE HYDROCHLORIDE 20 MG/ML
5 INJECTION INTRAMUSCULAR; INTRAVENOUS ONCE
Status: COMPLETED | OUTPATIENT
Start: 2023-01-01 | End: 2023-01-01

## 2023-01-01 RX ORDER — ACETAMINOPHEN 325 MG/1
650 TABLET ORAL EVERY 4 HOURS PRN
Status: DISCONTINUED | OUTPATIENT
Start: 2023-01-01 | End: 2023-01-01

## 2023-01-01 RX ORDER — AMOXICILLIN 250 MG
1 CAPSULE ORAL DAILY PRN
Start: 2023-01-01 | End: 2023-01-01 | Stop reason: SDUPTHER

## 2023-01-01 RX ORDER — HYDRALAZINE HYDROCHLORIDE 20 MG/ML
10 INJECTION INTRAMUSCULAR; INTRAVENOUS EVERY 6 HOURS PRN
Status: DISCONTINUED | OUTPATIENT
Start: 2023-01-01 | End: 2023-01-01 | Stop reason: HOSPADM

## 2023-01-01 RX ORDER — ONDANSETRON 2 MG/ML
4 INJECTION INTRAMUSCULAR; INTRAVENOUS EVERY 6 HOURS PRN
Status: DISCONTINUED | OUTPATIENT
Start: 2023-01-01 | End: 2023-01-01 | Stop reason: HOSPADM

## 2023-01-01 RX ORDER — HYDROMORPHONE HYDROCHLORIDE 1 MG/ML
0.5 INJECTION, SOLUTION INTRAMUSCULAR; INTRAVENOUS; SUBCUTANEOUS ONCE
Status: COMPLETED | OUTPATIENT
Start: 2023-01-01 | End: 2023-01-01

## 2023-01-01 RX ORDER — METOPROLOL TARTRATE 25 MG/1
25 TABLET, FILM COATED ORAL 2 TIMES DAILY
Qty: 60 TABLET | Refills: 11
Start: 2023-01-01 | End: 2023-01-01 | Stop reason: SDUPTHER

## 2023-01-01 RX ORDER — ZINC SULFATE 50(220)MG
220 CAPSULE ORAL DAILY
Qty: 30 CAPSULE | Refills: 11 | Status: ON HOLD | OUTPATIENT
Start: 2023-01-01 | End: 2023-01-01 | Stop reason: HOSPADM

## 2023-01-01 RX ORDER — MAGNESIUM SULFATE HEPTAHYDRATE 40 MG/ML
2 INJECTION, SOLUTION INTRAVENOUS ONCE
Status: DISCONTINUED | OUTPATIENT
Start: 2023-01-01 | End: 2023-01-01

## 2023-01-01 RX ORDER — CHOLECALCIFEROL (VITAMIN D3) 50 MCG
2000 TABLET ORAL DAILY
Start: 2023-01-01

## 2023-01-01 RX ORDER — IBUPROFEN 400 MG/1
400 TABLET ORAL EVERY 6 HOURS PRN
Qty: 30 TABLET | Refills: 0 | Status: ON HOLD | OUTPATIENT
Start: 2023-01-01 | End: 2023-01-01 | Stop reason: HOSPADM

## 2023-01-01 RX ORDER — MECLIZINE HCL 12.5 MG 12.5 MG/1
12.5 TABLET ORAL DAILY
Status: DISCONTINUED | OUTPATIENT
Start: 2023-01-01 | End: 2023-01-01 | Stop reason: HOSPADM

## 2023-01-01 RX ORDER — IPRATROPIUM BROMIDE 0.5 MG/2.5ML
0.5 SOLUTION RESPIRATORY (INHALATION) EVERY 4 HOURS PRN
Status: DISCONTINUED | OUTPATIENT
Start: 2023-01-01 | End: 2023-01-01 | Stop reason: HOSPADM

## 2023-01-01 RX ORDER — AMLODIPINE BESYLATE 5 MG/1
5 TABLET ORAL DAILY
Status: DISCONTINUED | OUTPATIENT
Start: 2023-01-01 | End: 2023-01-01

## 2023-01-01 RX ORDER — LISINOPRIL 40 MG/1
40 TABLET ORAL DAILY
Qty: 90 TABLET | Refills: 3 | Status: SHIPPED | OUTPATIENT
Start: 2023-01-01 | End: 2023-01-01 | Stop reason: SDUPTHER

## 2023-01-01 RX ORDER — ACETAMINOPHEN 325 MG/1
650 TABLET ORAL EVERY 6 HOURS
Status: DISCONTINUED | OUTPATIENT
Start: 2023-01-01 | End: 2023-01-01 | Stop reason: HOSPADM

## 2023-01-01 RX ORDER — ENOXAPARIN SODIUM 100 MG/ML
1 INJECTION SUBCUTANEOUS EVERY 12 HOURS
Status: DISCONTINUED | OUTPATIENT
Start: 2023-01-01 | End: 2023-01-01

## 2023-01-01 RX ORDER — AMOXICILLIN 250 MG
1 CAPSULE ORAL 2 TIMES DAILY PRN
Status: DISCONTINUED | OUTPATIENT
Start: 2023-01-01 | End: 2023-01-01 | Stop reason: HOSPADM

## 2023-01-01 RX ORDER — HALOPERIDOL 0.5 MG/1
0.5 TABLET ORAL NIGHTLY
Status: DISCONTINUED | OUTPATIENT
Start: 2023-01-01 | End: 2023-01-01

## 2023-01-01 RX ORDER — OLANZAPINE 10 MG/2ML
2.5 INJECTION, POWDER, FOR SOLUTION INTRAMUSCULAR NIGHTLY
Status: DISCONTINUED | OUTPATIENT
Start: 2023-01-01 | End: 2023-01-01

## 2023-01-01 RX ORDER — HYDROCODONE BITARTRATE AND ACETAMINOPHEN 5; 325 MG/1; MG/1
1 TABLET ORAL EVERY 6 HOURS PRN
Qty: 10 TABLET | Refills: 0 | Status: SHIPPED | OUTPATIENT
Start: 2023-01-01 | End: 2023-01-01 | Stop reason: SDUPTHER

## 2023-01-01 RX ORDER — CALCIUM CARBONATE 300MG(750)
1 TABLET,CHEWABLE ORAL 2 TIMES DAILY WITH MEALS
Start: 2023-01-01 | End: 2023-01-01

## 2023-01-01 RX ORDER — HYDROCODONE BITARTRATE AND ACETAMINOPHEN 5; 325 MG/1; MG/1
1 TABLET ORAL
Status: COMPLETED | OUTPATIENT
Start: 2023-01-01 | End: 2023-01-01

## 2023-01-01 RX ORDER — TAMSULOSIN HYDROCHLORIDE 0.4 MG/1
0.4 CAPSULE ORAL NIGHTLY
Qty: 90 CAPSULE | Refills: 1 | Status: SHIPPED | OUTPATIENT
Start: 2023-01-01 | End: 2023-01-01 | Stop reason: SDUPTHER

## 2023-01-01 RX ORDER — POTASSIUM CHLORIDE 750 MG/1
10 TABLET, EXTENDED RELEASE ORAL DAILY
Qty: 30 TABLET | Refills: 11 | Status: SHIPPED | OUTPATIENT
Start: 2023-01-01 | End: 2024-07-13

## 2023-01-01 RX ORDER — ESCITALOPRAM OXALATE 5 MG/1
5 TABLET ORAL DAILY
Status: DISCONTINUED | OUTPATIENT
Start: 2023-01-01 | End: 2023-01-01 | Stop reason: HOSPADM

## 2023-01-01 RX ORDER — SODIUM,POTASSIUM PHOSPHATES 280-250MG
2 POWDER IN PACKET (EA) ORAL ONCE
Status: COMPLETED | OUTPATIENT
Start: 2023-01-01 | End: 2023-01-01

## 2023-01-01 RX ORDER — TALC
6 POWDER (GRAM) TOPICAL NIGHTLY PRN
Qty: 30 TABLET | Refills: 3 | Status: SHIPPED | OUTPATIENT
Start: 2023-01-01

## 2023-01-01 RX ORDER — PANTOPRAZOLE SODIUM 40 MG/10ML
80 INJECTION, POWDER, LYOPHILIZED, FOR SOLUTION INTRAVENOUS ONCE
Status: COMPLETED | OUTPATIENT
Start: 2023-01-01 | End: 2023-01-01

## 2023-01-01 RX ORDER — HALOPERIDOL 0.5 MG/1
0.5 TABLET ORAL NIGHTLY
Qty: 30 TABLET | Refills: 11
Start: 2023-01-01 | End: 2023-01-01 | Stop reason: SDUPTHER

## 2023-01-01 RX ORDER — NOREPINEPHRINE BITARTRATE/D5W 4MG/250ML
0-.2 PLASTIC BAG, INJECTION (ML) INTRAVENOUS CONTINUOUS
Status: DISPENSED | OUTPATIENT
Start: 2023-01-01 | End: 2023-01-01

## 2023-01-01 RX ORDER — DEXTROSE 40 %
30 GEL (GRAM) ORAL
Status: DISCONTINUED | OUTPATIENT
Start: 2023-01-01 | End: 2023-01-01 | Stop reason: HOSPADM

## 2023-01-01 RX ORDER — SODIUM CHLORIDE, SODIUM LACTATE, POTASSIUM CHLORIDE, CALCIUM CHLORIDE 600; 310; 30; 20 MG/100ML; MG/100ML; MG/100ML; MG/100ML
INJECTION, SOLUTION INTRAVENOUS CONTINUOUS
Status: DISCONTINUED | OUTPATIENT
Start: 2023-01-01 | End: 2023-01-01 | Stop reason: HOSPADM

## 2023-01-01 RX ORDER — HALOPERIDOL 5 MG/ML
0.5 INJECTION INTRAMUSCULAR ONCE
Status: DISCONTINUED | OUTPATIENT
Start: 2023-01-01 | End: 2023-01-01

## 2023-01-01 RX ORDER — LISINOPRIL 20 MG/1
20 TABLET ORAL DAILY
Status: ON HOLD | COMMUNITY
End: 2023-01-01 | Stop reason: HOSPADM

## 2023-01-01 RX ORDER — ACETAMINOPHEN 10 MG/ML
INJECTION, SOLUTION INTRAVENOUS
Status: DISCONTINUED | OUTPATIENT
Start: 2023-01-01 | End: 2023-01-01

## 2023-01-01 RX ORDER — SIMETHICONE 80 MG
1 TABLET,CHEWABLE ORAL 4 TIMES DAILY PRN
Status: DISCONTINUED | OUTPATIENT
Start: 2023-01-01 | End: 2023-01-01 | Stop reason: HOSPADM

## 2023-01-01 RX ORDER — MUPIROCIN 20 MG/G
OINTMENT TOPICAL
Status: CANCELLED | OUTPATIENT
Start: 2023-01-01

## 2023-01-01 RX ORDER — ONDANSETRON 2 MG/ML
4 INJECTION INTRAMUSCULAR; INTRAVENOUS
Status: COMPLETED | OUTPATIENT
Start: 2023-01-01 | End: 2023-01-01

## 2023-01-01 RX ORDER — ONDANSETRON 8 MG/1
8 TABLET, ORALLY DISINTEGRATING ORAL EVERY 8 HOURS PRN
Status: DISCONTINUED | OUTPATIENT
Start: 2023-01-01 | End: 2023-01-01

## 2023-01-01 RX ORDER — MAGNESIUM SULFATE 1 G/100ML
1 INJECTION INTRAVENOUS ONCE
Status: COMPLETED | OUTPATIENT
Start: 2023-01-01 | End: 2023-01-01

## 2023-01-01 RX ORDER — ZINC SULFATE 50(220)MG
220 CAPSULE ORAL DAILY
Status: DISCONTINUED | OUTPATIENT
Start: 2023-01-01 | End: 2023-01-01 | Stop reason: HOSPADM

## 2023-01-01 RX ORDER — ALBUTEROL SULFATE 90 UG/1
2 AEROSOL, METERED RESPIRATORY (INHALATION) EVERY 4 HOURS PRN
Qty: 18 G | Refills: 1
Start: 2023-01-01

## 2023-01-01 RX ORDER — POLYETHYLENE GLYCOL 3350 17 G/17G
17 POWDER, FOR SOLUTION ORAL 2 TIMES DAILY PRN
Status: DISCONTINUED | OUTPATIENT
Start: 2023-01-01 | End: 2023-01-01 | Stop reason: HOSPADM

## 2023-01-01 RX ORDER — OLANZAPINE 10 MG/2ML
5 INJECTION, POWDER, FOR SOLUTION INTRAMUSCULAR NIGHTLY
Status: DISCONTINUED | OUTPATIENT
Start: 2023-01-01 | End: 2023-01-01

## 2023-01-01 RX ORDER — METHOCARBAMOL 500 MG/1
500 TABLET, FILM COATED ORAL 3 TIMES DAILY PRN
Status: DISCONTINUED | OUTPATIENT
Start: 2023-01-01 | End: 2023-01-01 | Stop reason: HOSPADM

## 2023-01-01 RX ORDER — NITROFURANTOIN 25; 75 MG/1; MG/1
100 CAPSULE ORAL 2 TIMES DAILY
Status: ON HOLD | COMMUNITY
Start: 2023-01-01 | End: 2023-01-01 | Stop reason: ALTCHOICE

## 2023-01-01 RX ORDER — MICONAZOLE NITRATE 2 %
POWDER (GRAM) TOPICAL 2 TIMES DAILY
Qty: 85 G | Refills: 3 | Status: SHIPPED | OUTPATIENT
Start: 2023-01-01 | End: 2023-01-01 | Stop reason: SDUPTHER

## 2023-01-01 RX ORDER — FUROSEMIDE 10 MG/ML
40 INJECTION INTRAMUSCULAR; INTRAVENOUS ONCE
Status: COMPLETED | OUTPATIENT
Start: 2023-01-01 | End: 2023-01-01

## 2023-01-01 RX ORDER — KETAMINE HCL IN 0.9 % NACL 50 MG/5 ML
SYRINGE (ML) INTRAVENOUS
Status: DISCONTINUED | OUTPATIENT
Start: 2023-01-01 | End: 2023-01-01

## 2023-01-01 RX ORDER — IPRATROPIUM BROMIDE AND ALBUTEROL SULFATE 2.5; .5 MG/3ML; MG/3ML
3 SOLUTION RESPIRATORY (INHALATION)
Status: DISCONTINUED | OUTPATIENT
Start: 2023-01-01 | End: 2023-01-01

## 2023-01-01 RX ORDER — HEPARIN SODIUM,PORCINE/D5W 25000/250
0-40 INTRAVENOUS SOLUTION INTRAVENOUS CONTINUOUS
Status: DISCONTINUED | OUTPATIENT
Start: 2023-01-01 | End: 2023-01-01

## 2023-01-01 RX ORDER — ACETAMINOPHEN 500 MG
1000 TABLET ORAL EVERY 8 HOURS
Status: DISCONTINUED | OUTPATIENT
Start: 2023-01-01 | End: 2023-01-01 | Stop reason: HOSPADM

## 2023-01-01 RX ORDER — POTASSIUM CHLORIDE 750 MG/1
10 TABLET, EXTENDED RELEASE ORAL DAILY
Status: ON HOLD | COMMUNITY
Start: 2023-01-01 | End: 2023-01-01

## 2023-01-01 RX ORDER — HYDROCODONE BITARTRATE AND ACETAMINOPHEN 5; 325 MG/1; MG/1
1 TABLET ORAL EVERY 4 HOURS PRN
Status: DISCONTINUED | OUTPATIENT
Start: 2023-01-01 | End: 2023-01-01

## 2023-01-01 RX ORDER — POTASSIUM CHLORIDE 750 MG/1
10 TABLET, EXTENDED RELEASE ORAL DAILY
Qty: 90 TABLET | Refills: 1 | Status: SHIPPED | OUTPATIENT
Start: 2023-01-01 | End: 2023-01-01 | Stop reason: SDUPTHER

## 2023-01-01 RX ORDER — HALOPERIDOL 0.5 MG/1
0.5 TABLET ORAL NIGHTLY
Status: DISCONTINUED | OUTPATIENT
Start: 2023-01-01 | End: 2023-01-01 | Stop reason: HOSPADM

## 2023-01-01 RX ORDER — FUROSEMIDE 20 MG/1
20 TABLET ORAL DAILY
Qty: 30 TABLET | Refills: 0
Start: 2023-01-01 | End: 2024-09-20

## 2023-01-01 RX ORDER — DEXTROSE 40 %
15 GEL (GRAM) ORAL
Status: DISCONTINUED | OUTPATIENT
Start: 2023-01-01 | End: 2023-01-01 | Stop reason: HOSPADM

## 2023-01-01 RX ORDER — IBUPROFEN 200 MG
16 TABLET ORAL
Status: DISCONTINUED | OUTPATIENT
Start: 2023-01-01 | End: 2023-01-01

## 2023-01-01 RX ORDER — SODIUM CHLORIDE 0.9 % (FLUSH) 0.9 %
10 SYRINGE (ML) INJECTION EVERY 8 HOURS PRN
Status: DISCONTINUED | OUTPATIENT
Start: 2023-01-01 | End: 2023-01-01

## 2023-01-01 RX ORDER — SODIUM CHLORIDE 450 MG/100ML
INJECTION, SOLUTION INTRAVENOUS CONTINUOUS
Status: ACTIVE | OUTPATIENT
Start: 2023-01-01 | End: 2023-01-01

## 2023-01-01 RX ORDER — HYDRALAZINE HYDROCHLORIDE 20 MG/ML
20 INJECTION INTRAMUSCULAR; INTRAVENOUS ONCE
Status: COMPLETED | OUTPATIENT
Start: 2023-01-01 | End: 2023-01-01

## 2023-01-01 RX ORDER — QUETIAPINE FUMARATE 25 MG/1
25 TABLET, FILM COATED ORAL NIGHTLY
Status: DISCONTINUED | OUTPATIENT
Start: 2023-01-01 | End: 2023-01-01

## 2023-01-01 RX ORDER — IPRATROPIUM BROMIDE AND ALBUTEROL SULFATE 2.5; .5 MG/3ML; MG/3ML
3 SOLUTION RESPIRATORY (INHALATION) EVERY 4 HOURS PRN
Status: DISCONTINUED | OUTPATIENT
Start: 2023-01-01 | End: 2023-01-01 | Stop reason: HOSPADM

## 2023-01-01 RX ORDER — IBUPROFEN 400 MG/1
400 TABLET ORAL EVERY 6 HOURS PRN
Status: DISCONTINUED | OUTPATIENT
Start: 2023-01-01 | End: 2023-01-01 | Stop reason: HOSPADM

## 2023-01-01 RX ORDER — SODIUM CHLORIDE 9 MG/ML
INJECTION, SOLUTION INTRAVENOUS CONTINUOUS
Status: DISCONTINUED | OUTPATIENT
Start: 2023-01-01 | End: 2023-01-01

## 2023-01-01 RX ORDER — MEMANTINE HYDROCHLORIDE 5 MG/1
5 TABLET ORAL 2 TIMES DAILY
Qty: 90 TABLET | Refills: 1 | Status: SHIPPED | OUTPATIENT
Start: 2023-01-01 | End: 2023-01-01 | Stop reason: SDUPTHER

## 2023-01-01 RX ORDER — FUROSEMIDE 10 MG/ML
20 INJECTION INTRAMUSCULAR; INTRAVENOUS ONCE
Status: COMPLETED | OUTPATIENT
Start: 2023-01-01 | End: 2023-01-01

## 2023-01-01 RX ORDER — METOPROLOL SUCCINATE 25 MG/1
25 TABLET, EXTENDED RELEASE ORAL 2 TIMES DAILY
Qty: 60 TABLET | Refills: 11
Start: 2023-01-01 | End: 2023-01-01

## 2023-01-01 RX ORDER — ZINC SULFATE 50(220)MG
220 CAPSULE ORAL DAILY
Qty: 90 CAPSULE | Refills: 1 | Status: SHIPPED | OUTPATIENT
Start: 2023-01-01 | End: 2023-01-01 | Stop reason: SDUPTHER

## 2023-01-01 RX ORDER — OXYCODONE HYDROCHLORIDE 10 MG/1
10 TABLET ORAL EVERY 4 HOURS PRN
Status: DISCONTINUED | OUTPATIENT
Start: 2023-01-01 | End: 2023-01-01 | Stop reason: HOSPADM

## 2023-01-01 RX ORDER — LISINOPRIL 10 MG/1
10 TABLET ORAL
Status: COMPLETED | OUTPATIENT
Start: 2023-01-01 | End: 2023-01-01

## 2023-01-01 RX ORDER — FUROSEMIDE 20 MG/1
20 TABLET ORAL DAILY
Status: ON HOLD | COMMUNITY
Start: 2023-01-01 | End: 2023-01-01 | Stop reason: HOSPADM

## 2023-01-01 RX ORDER — LOPERAMIDE HYDROCHLORIDE 2 MG/1
2 CAPSULE ORAL 4 TIMES DAILY PRN
Qty: 60 CAPSULE | Refills: 0 | COMMUNITY
Start: 2023-01-01

## 2023-01-01 RX ORDER — AMOXICILLIN 250 MG
1 CAPSULE ORAL 2 TIMES DAILY
Status: DISCONTINUED | OUTPATIENT
Start: 2023-01-01 | End: 2023-01-01 | Stop reason: HOSPADM

## 2023-01-01 RX ORDER — SODIUM CHLORIDE 0.9 % (FLUSH) 0.9 %
5 SYRINGE (ML) INJECTION
Status: DISCONTINUED | OUTPATIENT
Start: 2023-01-01 | End: 2023-01-01 | Stop reason: HOSPADM

## 2023-01-01 RX ORDER — LIDOCAINE HYDROCHLORIDE 20 MG/ML
INJECTION INTRAVENOUS
Status: DISCONTINUED | OUTPATIENT
Start: 2023-01-01 | End: 2023-01-01

## 2023-01-01 RX ORDER — ENALAPRILAT 1.25 MG/ML
1.25 INJECTION INTRAVENOUS
Status: DISCONTINUED | OUTPATIENT
Start: 2023-01-01 | End: 2023-01-01

## 2023-01-01 RX ORDER — SODIUM CHLORIDE 0.9 % (FLUSH) 0.9 %
10 SYRINGE (ML) INJECTION EVERY 12 HOURS PRN
Status: DISCONTINUED | OUTPATIENT
Start: 2023-01-01 | End: 2023-01-01 | Stop reason: HOSPADM

## 2023-01-01 RX ORDER — METOPROLOL TARTRATE 1 MG/ML
5 INJECTION, SOLUTION INTRAVENOUS ONCE
Status: COMPLETED | OUTPATIENT
Start: 2023-01-01 | End: 2023-01-01

## 2023-01-01 RX ORDER — ESCITALOPRAM OXALATE 5 MG/1
10 TABLET ORAL DAILY
Qty: 60 TABLET | Refills: 11
Start: 2023-01-01 | End: 2024-01-01

## 2023-01-01 RX ORDER — HALOPERIDOL 0.5 MG/1
0.5 TABLET ORAL NIGHTLY
Qty: 90 TABLET | Refills: 1 | Status: SHIPPED | OUTPATIENT
Start: 2023-01-01 | End: 2023-01-01 | Stop reason: SDUPTHER

## 2023-01-01 RX ORDER — PANTOPRAZOLE SODIUM 40 MG/10ML
40 INJECTION, POWDER, LYOPHILIZED, FOR SOLUTION INTRAVENOUS 2 TIMES DAILY
Status: DISCONTINUED | OUTPATIENT
Start: 2023-01-01 | End: 2023-01-01

## 2023-01-01 RX ORDER — HYDRALAZINE HYDROCHLORIDE 20 MG/ML
5 INJECTION INTRAMUSCULAR; INTRAVENOUS ONCE
Status: DISCONTINUED | OUTPATIENT
Start: 2023-01-01 | End: 2023-01-01

## 2023-01-01 RX ORDER — HYDROCODONE BITARTRATE AND ACETAMINOPHEN 5; 325 MG/1; MG/1
1 TABLET ORAL EVERY 6 HOURS PRN
Qty: 10 TABLET | Refills: 0 | Status: ON HOLD | OUTPATIENT
Start: 2023-01-01 | End: 2023-01-01 | Stop reason: HOSPADM

## 2023-01-01 RX ORDER — OLANZAPINE 10 MG/2ML
5 INJECTION, POWDER, FOR SOLUTION INTRAMUSCULAR NIGHTLY
Qty: 15 EACH | Refills: 11
Start: 2023-01-01 | End: 2023-01-01 | Stop reason: HOSPADM

## 2023-01-01 RX ORDER — LISINOPRIL 20 MG/1
20 TABLET ORAL DAILY
Status: DISCONTINUED | OUTPATIENT
Start: 2023-01-01 | End: 2023-01-01

## 2023-01-01 RX ORDER — FUROSEMIDE 10 MG/ML
20 INJECTION INTRAMUSCULAR; INTRAVENOUS ONCE
Status: DISCONTINUED | OUTPATIENT
Start: 2023-01-01 | End: 2023-01-01

## 2023-01-01 RX ORDER — LORAZEPAM 2 MG/ML
0.5 INJECTION INTRAMUSCULAR ONCE
Status: COMPLETED | OUTPATIENT
Start: 2023-01-01 | End: 2023-01-01

## 2023-01-01 RX ADMIN — ESCITALOPRAM OXALATE 5 MG: 5 TABLET, FILM COATED ORAL at 08:07

## 2023-01-01 RX ADMIN — MEMANTINE HYDROCHLORIDE 5 MG: 5 TABLET ORAL at 09:09

## 2023-01-01 RX ADMIN — METOPROLOL SUCCINATE 25 MG: 25 TABLET, EXTENDED RELEASE ORAL at 09:05

## 2023-01-01 RX ADMIN — SENNOSIDES AND DOCUSATE SODIUM 1 TABLET: 50; 8.6 TABLET ORAL at 10:08

## 2023-01-01 RX ADMIN — FUROSEMIDE 40 MG: 10 INJECTION, SOLUTION INTRAMUSCULAR; INTRAVENOUS at 09:07

## 2023-01-01 RX ADMIN — CHOLECALCIFEROL TAB 25 MCG (1000 UNIT) 1000 UNITS: 25 TAB at 08:08

## 2023-01-01 RX ADMIN — FUROSEMIDE 20 MG: 20 TABLET ORAL at 10:07

## 2023-01-01 RX ADMIN — MICONAZOLE NITRATE 2 % TOPICAL POWDER: at 08:07

## 2023-01-01 RX ADMIN — LACTULOSE 20 G: 20 SOLUTION ORAL at 10:04

## 2023-01-01 RX ADMIN — MAGNESIUM SULFATE HEPTAHYDRATE 2 G: 40 INJECTION, SOLUTION INTRAVENOUS at 04:08

## 2023-01-01 RX ADMIN — ALBUTEROL SULFATE 2 PUFF: 108 AEROSOL, METERED RESPIRATORY (INHALATION) at 11:08

## 2023-01-01 RX ADMIN — FENTANYL CITRATE 25 MCG: 50 INJECTION, SOLUTION INTRAMUSCULAR; INTRAVENOUS at 07:05

## 2023-01-01 RX ADMIN — SODIUM CHLORIDE, POTASSIUM CHLORIDE, SODIUM LACTATE AND CALCIUM CHLORIDE: 600; 310; 30; 20 INJECTION, SOLUTION INTRAVENOUS at 04:05

## 2023-01-01 RX ADMIN — LEVALBUTEROL 1.25 MG: 1.25 SOLUTION, CONCENTRATE RESPIRATORY (INHALATION) at 06:07

## 2023-01-01 RX ADMIN — METHOCARBAMOL 500 MG: 500 TABLET ORAL at 05:07

## 2023-01-01 RX ADMIN — ACETAMINOPHEN 1000 MG: 500 TABLET ORAL at 10:07

## 2023-01-01 RX ADMIN — METOPROLOL TARTRATE 25 MG: 25 TABLET, FILM COATED ORAL at 12:09

## 2023-01-01 RX ADMIN — MEMANTINE HYDROCHLORIDE 5 MG: 5 TABLET ORAL at 09:08

## 2023-01-01 RX ADMIN — IOHEXOL 75 ML: 350 INJECTION, SOLUTION INTRAVENOUS at 02:07

## 2023-01-01 RX ADMIN — Medication 6 MG: at 10:08

## 2023-01-01 RX ADMIN — HYDROXYZINE HYDROCHLORIDE 25 MG: 25 TABLET, FILM COATED ORAL at 04:04

## 2023-01-01 RX ADMIN — MUPIROCIN: 20 OINTMENT TOPICAL at 09:08

## 2023-01-01 RX ADMIN — ZINC SULFATE 220 MG (50 MG) CAPSULE 220 MG: CAPSULE at 10:07

## 2023-01-01 RX ADMIN — METHOCARBAMOL 500 MG: 500 TABLET ORAL at 01:07

## 2023-01-01 RX ADMIN — OXYCODONE HYDROCHLORIDE 5 MG: 5 TABLET ORAL at 09:05

## 2023-01-01 RX ADMIN — MICONAZOLE NITRATE 2 % TOPICAL POWDER: at 02:08

## 2023-01-01 RX ADMIN — POTASSIUM CHLORIDE 10 MEQ: 7.46 INJECTION, SOLUTION INTRAVENOUS at 11:07

## 2023-01-01 RX ADMIN — SODIUM CHLORIDE, POTASSIUM CHLORIDE, SODIUM LACTATE AND CALCIUM CHLORIDE 500 ML: 600; 310; 30; 20 INJECTION, SOLUTION INTRAVENOUS at 06:08

## 2023-01-01 RX ADMIN — APIXABAN 5 MG: 5 TABLET, FILM COATED ORAL at 08:08

## 2023-01-01 RX ADMIN — SODIUM CHLORIDE, POTASSIUM CHLORIDE, SODIUM LACTATE AND CALCIUM CHLORIDE: 600; 310; 30; 20 INJECTION, SOLUTION INTRAVENOUS at 02:04

## 2023-01-01 RX ADMIN — ENOXAPARIN SODIUM 80 MG: 80 INJECTION SUBCUTANEOUS at 01:08

## 2023-01-01 RX ADMIN — LISINOPRIL 40 MG: 20 TABLET ORAL at 09:08

## 2023-01-01 RX ADMIN — POTASSIUM CHLORIDE 10 MEQ: 7.46 INJECTION, SOLUTION INTRAVENOUS at 04:12

## 2023-01-01 RX ADMIN — MICONAZOLE NITRATE 2 % TOPICAL POWDER: at 10:08

## 2023-01-01 RX ADMIN — ENOXAPARIN SODIUM 40 MG: 40 INJECTION SUBCUTANEOUS at 05:07

## 2023-01-01 RX ADMIN — MEMANTINE HYDROCHLORIDE 5 MG: 5 TABLET ORAL at 10:07

## 2023-01-01 RX ADMIN — POLYETHYLENE GLYCOL 3350 17 G: 17 POWDER, FOR SOLUTION ORAL at 08:07

## 2023-01-01 RX ADMIN — SODIUM CHLORIDE, POTASSIUM CHLORIDE, SODIUM LACTATE AND CALCIUM CHLORIDE 500 ML: 600; 310; 30; 20 INJECTION, SOLUTION INTRAVENOUS at 11:05

## 2023-01-01 RX ADMIN — SODIUM CHLORIDE, POTASSIUM CHLORIDE, SODIUM LACTATE AND CALCIUM CHLORIDE 1000 ML: 600; 310; 30; 20 INJECTION, SOLUTION INTRAVENOUS at 09:04

## 2023-01-01 RX ADMIN — DIVALPROEX SODIUM 250 MG: 250 TABLET, DELAYED RELEASE ORAL at 09:08

## 2023-01-01 RX ADMIN — IPRATROPIUM BROMIDE AND ALBUTEROL SULFATE 3 ML: 2.5; .5 SOLUTION RESPIRATORY (INHALATION) at 08:07

## 2023-01-01 RX ADMIN — ACETAMINOPHEN 650 MG: 325 TABLET ORAL at 11:05

## 2023-01-01 RX ADMIN — LORAZEPAM 0.5 MG: 2 INJECTION INTRAMUSCULAR; INTRAVENOUS at 06:08

## 2023-01-01 RX ADMIN — VANCOMYCIN HYDROCHLORIDE 1500 MG: 1.5 INJECTION, POWDER, LYOPHILIZED, FOR SOLUTION INTRAVENOUS at 08:07

## 2023-01-01 RX ADMIN — METOPROLOL TARTRATE 25 MG: 25 TABLET, FILM COATED ORAL at 09:07

## 2023-01-01 RX ADMIN — METOPROLOL TARTRATE 25 MG: 25 TABLET, FILM COATED ORAL at 09:08

## 2023-01-01 RX ADMIN — MEMANTINE HYDROCHLORIDE 5 MG: 5 TABLET ORAL at 08:08

## 2023-01-01 RX ADMIN — MICONAZOLE NITRATE 2 % TOPICAL POWDER: at 08:08

## 2023-01-01 RX ADMIN — AMLODIPINE BESYLATE 10 MG: 10 TABLET ORAL at 09:04

## 2023-01-01 RX ADMIN — ACETAMINOPHEN 650 MG: 325 TABLET ORAL at 12:05

## 2023-01-01 RX ADMIN — APIXABAN 5 MG: 2.5 TABLET, FILM COATED ORAL at 09:09

## 2023-01-01 RX ADMIN — METOPROLOL TARTRATE 25 MG: 25 TABLET, FILM COATED ORAL at 09:09

## 2023-01-01 RX ADMIN — MEMANTINE HYDROCHLORIDE 5 MG: 5 TABLET ORAL at 08:07

## 2023-01-01 RX ADMIN — APIXABAN 5 MG: 5 TABLET, FILM COATED ORAL at 09:08

## 2023-01-01 RX ADMIN — ESCITALOPRAM OXALATE 5 MG: 5 TABLET, FILM COATED ORAL at 10:07

## 2023-01-01 RX ADMIN — METOPROLOL TARTRATE 25 MG: 25 TABLET, FILM COATED ORAL at 02:07

## 2023-01-01 RX ADMIN — FUROSEMIDE 40 MG: 10 INJECTION, SOLUTION INTRAMUSCULAR; INTRAVENOUS at 10:07

## 2023-01-01 RX ADMIN — POTASSIUM CHLORIDE 30 MEQ: 10 CAPSULE, COATED, EXTENDED RELEASE ORAL at 04:08

## 2023-01-01 RX ADMIN — ACETAMINOPHEN 650 MG: 325 TABLET, FILM COATED ORAL at 09:09

## 2023-01-01 RX ADMIN — CHOLECALCIFEROL TAB 25 MCG (1000 UNIT) 1000 UNITS: 25 TAB at 09:08

## 2023-01-01 RX ADMIN — LISINOPRIL 40 MG: 20 TABLET ORAL at 02:07

## 2023-01-01 RX ADMIN — SENNOSIDES AND DOCUSATE SODIUM 1 TABLET: 50; 8.6 TABLET ORAL at 09:08

## 2023-01-01 RX ADMIN — IOHEXOL 100 ML: 350 INJECTION, SOLUTION INTRAVENOUS at 10:07

## 2023-01-01 RX ADMIN — METOPROLOL TARTRATE 25 MG: 25 TABLET, FILM COATED ORAL at 08:07

## 2023-01-01 RX ADMIN — METOPROLOL TARTRATE 25 MG: 25 TABLET, FILM COATED ORAL at 10:07

## 2023-01-01 RX ADMIN — POTASSIUM CHLORIDE 10 MEQ: 7.46 INJECTION, SOLUTION INTRAVENOUS at 11:12

## 2023-01-01 RX ADMIN — PANTOPRAZOLE SODIUM 40 MG: 40 TABLET, DELAYED RELEASE ORAL at 09:05

## 2023-01-01 RX ADMIN — LISINOPRIL 40 MG: 20 TABLET ORAL at 08:12

## 2023-01-01 RX ADMIN — POTASSIUM CHLORIDE 10 MEQ: 10 INJECTION INTRAVENOUS at 07:07

## 2023-01-01 RX ADMIN — ESCITALOPRAM OXALATE 5 MG: 5 TABLET, FILM COATED ORAL at 09:08

## 2023-01-01 RX ADMIN — SODIUM CHLORIDE, POTASSIUM CHLORIDE, SODIUM LACTATE AND CALCIUM CHLORIDE: 600; 310; 30; 20 INJECTION, SOLUTION INTRAVENOUS at 05:05

## 2023-01-01 RX ADMIN — Medication 6 MG: at 08:07

## 2023-01-01 RX ADMIN — POTASSIUM CHLORIDE 30 MEQ: 10 CAPSULE, COATED, EXTENDED RELEASE ORAL at 10:08

## 2023-01-01 RX ADMIN — ESCITALOPRAM OXALATE 5 MG: 5 TABLET, FILM COATED ORAL at 09:07

## 2023-01-01 RX ADMIN — POTASSIUM CHLORIDE 10 MEQ: 10 INJECTION, SOLUTION INTRAVENOUS at 02:08

## 2023-01-01 RX ADMIN — Medication 10 ML: at 09:09

## 2023-01-01 RX ADMIN — METOPROLOL SUCCINATE 12.5 MG: 25 TABLET, EXTENDED RELEASE ORAL at 09:04

## 2023-01-01 RX ADMIN — SODIUM CHLORIDE, POTASSIUM CHLORIDE, SODIUM LACTATE AND CALCIUM CHLORIDE 1000 ML: 600; 310; 30; 20 INJECTION, SOLUTION INTRAVENOUS at 12:04

## 2023-01-01 RX ADMIN — POTASSIUM BICARBONATE 20 MEQ: 391 TABLET, EFFERVESCENT ORAL at 10:12

## 2023-01-01 RX ADMIN — Medication 6 MG: at 09:08

## 2023-01-01 RX ADMIN — ENOXAPARIN SODIUM 40 MG: 40 INJECTION SUBCUTANEOUS at 04:04

## 2023-01-01 RX ADMIN — ACETAMINOPHEN 1000 MG: 500 TABLET ORAL at 02:07

## 2023-01-01 RX ADMIN — MEMANTINE HYDROCHLORIDE 5 MG: 5 TABLET ORAL at 09:07

## 2023-01-01 RX ADMIN — MECLIZINE HYDROCHLORIDE 12.5 MG: 12.5 TABLET ORAL at 09:05

## 2023-01-01 RX ADMIN — Medication 10 MG: at 08:05

## 2023-01-01 RX ADMIN — OLANZAPINE 5 MG: 10 INJECTION, POWDER, FOR SOLUTION INTRAMUSCULAR at 09:07

## 2023-01-01 RX ADMIN — ENOXAPARIN SODIUM 40 MG: 40 INJECTION SUBCUTANEOUS at 05:08

## 2023-01-01 RX ADMIN — LISINOPRIL 40 MG: 20 TABLET ORAL at 08:08

## 2023-01-01 RX ADMIN — POTASSIUM CHLORIDE 10 MEQ: 7.46 INJECTION, SOLUTION INTRAVENOUS at 07:08

## 2023-01-01 RX ADMIN — MICONAZOLE NITRATE 2 % TOPICAL POWDER: at 09:07

## 2023-01-01 RX ADMIN — METHOCARBAMOL 500 MG: 500 TABLET ORAL at 07:07

## 2023-01-01 RX ADMIN — MICONAZOLE NITRATE 2 % TOPICAL POWDER: at 03:07

## 2023-01-01 RX ADMIN — HYDRALAZINE HYDROCHLORIDE 10 MG: 20 INJECTION, SOLUTION INTRAMUSCULAR; INTRAVENOUS at 07:12

## 2023-01-01 RX ADMIN — HYDRALAZINE HYDROCHLORIDE 5 MG: 20 INJECTION INTRAMUSCULAR; INTRAVENOUS at 05:08

## 2023-01-01 RX ADMIN — ACETAMINOPHEN 650 MG: 325 TABLET ORAL at 06:05

## 2023-01-01 RX ADMIN — Medication 250 MG: at 10:07

## 2023-01-01 RX ADMIN — MICONAZOLE NITRATE 2 % TOPICAL POWDER: at 09:04

## 2023-01-01 RX ADMIN — CHOLECALCIFEROL TAB 25 MCG (1000 UNIT) 1000 UNITS: 25 TAB at 08:07

## 2023-01-01 RX ADMIN — SODIUM CHLORIDE 1000 ML: 9 INJECTION, SOLUTION INTRAVENOUS at 08:08

## 2023-01-01 RX ADMIN — ACETAMINOPHEN 1000 MG: 500 TABLET ORAL at 03:07

## 2023-01-01 RX ADMIN — TAMSULOSIN HYDROCHLORIDE 0.4 MG: 0.4 CAPSULE ORAL at 10:07

## 2023-01-01 RX ADMIN — MAGNESIUM SULFATE HEPTAHYDRATE 2 G: 40 INJECTION, SOLUTION INTRAVENOUS at 01:08

## 2023-01-01 RX ADMIN — FUROSEMIDE 20 MG: 20 TABLET ORAL at 08:07

## 2023-01-01 RX ADMIN — METHOCARBAMOL 500 MG: 500 TABLET ORAL at 02:07

## 2023-01-01 RX ADMIN — APIXABAN 5 MG: 5 TABLET, FILM COATED ORAL at 10:07

## 2023-01-01 RX ADMIN — POTASSIUM BICARBONATE 50 MEQ: 978 TABLET, EFFERVESCENT ORAL at 03:05

## 2023-01-01 RX ADMIN — POTASSIUM CHLORIDE 30 MEQ: 10 CAPSULE, COATED, EXTENDED RELEASE ORAL at 11:05

## 2023-01-01 RX ADMIN — LACTULOSE 20 G: 20 SOLUTION ORAL at 01:04

## 2023-01-01 RX ADMIN — MICONAZOLE NITRATE 2 % TOPICAL POWDER: at 08:04

## 2023-01-01 RX ADMIN — POLYETHYLENE GLYCOL 3350 17 G: 17 POWDER, FOR SOLUTION ORAL at 09:07

## 2023-01-01 RX ADMIN — MUPIROCIN: 20 OINTMENT TOPICAL at 10:07

## 2023-01-01 RX ADMIN — AMPICILLIN 2 G: 2 INJECTION, POWDER, FOR SOLUTION INTRAMUSCULAR; INTRAVENOUS at 10:08

## 2023-01-01 RX ADMIN — OXYCODONE HYDROCHLORIDE 5 MG: 5 TABLET ORAL at 09:07

## 2023-01-01 RX ADMIN — Medication 250 MG: at 08:08

## 2023-01-01 RX ADMIN — PANTOPRAZOLE SODIUM 40 MG: 40 TABLET, DELAYED RELEASE ORAL at 09:09

## 2023-01-01 RX ADMIN — TAMSULOSIN HYDROCHLORIDE 0.4 MG: 0.4 CAPSULE ORAL at 08:07

## 2023-01-01 RX ADMIN — HALOPERIDOL 2 MG: 1 TABLET ORAL at 09:05

## 2023-01-01 RX ADMIN — ACETAMINOPHEN 650 MG: 325 TABLET ORAL at 11:08

## 2023-01-01 RX ADMIN — ACETAMINOPHEN 1000 MG: 500 TABLET ORAL at 01:07

## 2023-01-01 RX ADMIN — POTASSIUM BICARBONATE 20 MEQ: 391 TABLET, EFFERVESCENT ORAL at 07:07

## 2023-01-01 RX ADMIN — PIPERACILLIN SODIUM AND TAZOBACTAM SODIUM 4.5 G: 4; .5 INJECTION, POWDER, FOR SOLUTION INTRAVENOUS at 06:07

## 2023-01-01 RX ADMIN — HEPARIN SODIUM AND DEXTROSE 14 UNITS/KG/HR: 10000; 5 INJECTION INTRAVENOUS at 01:05

## 2023-01-01 RX ADMIN — APIXABAN 5 MG: 5 TABLET, FILM COATED ORAL at 10:08

## 2023-01-01 RX ADMIN — METOPROLOL SUCCINATE 25 MG: 25 TABLET, EXTENDED RELEASE ORAL at 08:04

## 2023-01-01 RX ADMIN — SENNOSIDES AND DOCUSATE SODIUM 1 TABLET: 50; 8.6 TABLET ORAL at 09:07

## 2023-01-01 RX ADMIN — MICONAZOLE NITRATE 2 % TOPICAL POWDER: at 02:07

## 2023-01-01 RX ADMIN — ACETAMINOPHEN 1000 MG: 500 TABLET ORAL at 09:07

## 2023-01-01 RX ADMIN — MUPIROCIN: 20 OINTMENT TOPICAL at 10:08

## 2023-01-01 RX ADMIN — MEMANTINE HYDROCHLORIDE 5 MG: 5 TABLET ORAL at 08:12

## 2023-01-01 RX ADMIN — Medication 250 MG: at 09:08

## 2023-01-01 RX ADMIN — METOPROLOL TARTRATE 25 MG: 25 TABLET, FILM COATED ORAL at 08:09

## 2023-01-01 RX ADMIN — SENNOSIDES AND DOCUSATE SODIUM 1 TABLET: 50; 8.6 TABLET ORAL at 08:07

## 2023-01-01 RX ADMIN — POTASSIUM BICARBONATE 40 MEQ: 391 TABLET, EFFERVESCENT ORAL at 09:07

## 2023-01-01 RX ADMIN — Medication 10 ML: at 06:09

## 2023-01-01 RX ADMIN — LISINOPRIL 40 MG: 20 TABLET ORAL at 09:07

## 2023-01-01 RX ADMIN — POTASSIUM CHLORIDE 10 MEQ: 7.46 INJECTION, SOLUTION INTRAVENOUS at 01:12

## 2023-01-01 RX ADMIN — ALBUTEROL SULFATE 2 PUFF: 108 AEROSOL, METERED RESPIRATORY (INHALATION) at 12:08

## 2023-01-01 RX ADMIN — ACETAMINOPHEN 650 MG: 325 TABLET ORAL at 05:05

## 2023-01-01 RX ADMIN — APIXABAN 5 MG: 5 TABLET, FILM COATED ORAL at 08:07

## 2023-01-01 RX ADMIN — METHOCARBAMOL 500 MG: 500 TABLET ORAL at 10:07

## 2023-01-01 RX ADMIN — MECLIZINE HYDROCHLORIDE 25 MG: 12.5 TABLET ORAL at 11:04

## 2023-01-01 RX ADMIN — MEMANTINE HYDROCHLORIDE 5 MG: 5 TABLET ORAL at 08:09

## 2023-01-01 RX ADMIN — POLYETHYLENE GLYCOL 3350 17 G: 17 POWDER, FOR SOLUTION ORAL at 10:04

## 2023-01-01 RX ADMIN — CEFTRIAXONE 2 G: 2 INJECTION, POWDER, FOR SOLUTION INTRAMUSCULAR; INTRAVENOUS at 03:07

## 2023-01-01 RX ADMIN — MICONAZOLE NITRATE 2 % TOPICAL POWDER: at 10:04

## 2023-01-01 RX ADMIN — HYDROXYZINE HYDROCHLORIDE 25 MG: 25 TABLET, FILM COATED ORAL at 05:05

## 2023-01-01 RX ADMIN — IPRATROPIUM BROMIDE AND ALBUTEROL SULFATE 3 ML: 2.5; .5 SOLUTION RESPIRATORY (INHALATION) at 02:07

## 2023-01-01 RX ADMIN — DIVALPROEX SODIUM 250 MG: 250 TABLET, DELAYED RELEASE ORAL at 08:08

## 2023-01-01 RX ADMIN — AMPICILLIN 2 G: 2 INJECTION, POWDER, FOR SOLUTION INTRAMUSCULAR; INTRAVENOUS at 04:08

## 2023-01-01 RX ADMIN — ZINC SULFATE 220 MG (50 MG) CAPSULE 220 MG: CAPSULE at 08:08

## 2023-01-01 RX ADMIN — AMPICILLIN 2 G: 2 INJECTION, POWDER, FOR SOLUTION INTRAMUSCULAR; INTRAVENOUS at 12:08

## 2023-01-01 RX ADMIN — MECLIZINE HYDROCHLORIDE 12.5 MG: 12.5 TABLET ORAL at 10:04

## 2023-01-01 RX ADMIN — MAGNESIUM SULFATE HEPTAHYDRATE 1 G: 500 INJECTION, SOLUTION INTRAMUSCULAR; INTRAVENOUS at 11:05

## 2023-01-01 RX ADMIN — CEFTRIAXONE 1 G: 1 INJECTION, POWDER, FOR SOLUTION INTRAMUSCULAR; INTRAVENOUS at 04:04

## 2023-01-01 RX ADMIN — HALOPERIDOL 0.5 MG: 0.5 TABLET ORAL at 08:07

## 2023-01-01 RX ADMIN — FUROSEMIDE 40 MG: 10 INJECTION, SOLUTION INTRAMUSCULAR; INTRAVENOUS at 03:07

## 2023-01-01 RX ADMIN — OLANZAPINE 2.5 MG: 10 INJECTION, POWDER, FOR SOLUTION INTRAMUSCULAR at 02:07

## 2023-01-01 RX ADMIN — HEPARIN SODIUM AND DEXTROSE 18 UNITS/KG/HR: 10000; 5 INJECTION INTRAVENOUS at 01:05

## 2023-01-01 RX ADMIN — SODIUM CHLORIDE: 9 INJECTION, SOLUTION INTRAVENOUS at 10:08

## 2023-01-01 RX ADMIN — MAGNESIUM SULFATE 2 G: 2 INJECTION INTRAVENOUS at 02:07

## 2023-01-01 RX ADMIN — POTASSIUM CHLORIDE 10 MEQ: 7.46 INJECTION, SOLUTION INTRAVENOUS at 06:08

## 2023-01-01 RX ADMIN — SODIUM CHLORIDE, POTASSIUM CHLORIDE, SODIUM LACTATE AND CALCIUM CHLORIDE: 600; 310; 30; 20 INJECTION, SOLUTION INTRAVENOUS at 03:04

## 2023-01-01 RX ADMIN — SODIUM CHLORIDE, POTASSIUM CHLORIDE, SODIUM LACTATE AND CALCIUM CHLORIDE: 600; 310; 30; 20 INJECTION, SOLUTION INTRAVENOUS at 12:04

## 2023-01-01 RX ADMIN — POTASSIUM BICARBONATE 25 MEQ: 978 TABLET, EFFERVESCENT ORAL at 10:07

## 2023-01-01 RX ADMIN — ZINC SULFATE 220 MG (50 MG) CAPSULE 220 MG: CAPSULE at 08:07

## 2023-01-01 RX ADMIN — CEFTRIAXONE 2 G: 2 INJECTION, POWDER, FOR SOLUTION INTRAMUSCULAR; INTRAVENOUS at 04:07

## 2023-01-01 RX ADMIN — CEFTRIAXONE 1 G: 1 INJECTION, POWDER, FOR SOLUTION INTRAMUSCULAR; INTRAVENOUS at 01:05

## 2023-01-01 RX ADMIN — MUPIROCIN: 20 OINTMENT TOPICAL at 08:07

## 2023-01-01 RX ADMIN — FUROSEMIDE 20 MG: 20 TABLET ORAL at 09:07

## 2023-01-01 RX ADMIN — AMLODIPINE BESYLATE 10 MG: 10 TABLET ORAL at 10:04

## 2023-01-01 RX ADMIN — SODIUM CHLORIDE, POTASSIUM CHLORIDE, SODIUM LACTATE AND CALCIUM CHLORIDE 500 ML: 600; 310; 30; 20 INJECTION, SOLUTION INTRAVENOUS at 02:04

## 2023-01-01 RX ADMIN — ACETAMINOPHEN 650 MG: 325 TABLET ORAL at 06:07

## 2023-01-01 RX ADMIN — CEFTRIAXONE 1 G: 1 INJECTION, POWDER, FOR SOLUTION INTRAMUSCULAR; INTRAVENOUS at 06:04

## 2023-01-01 RX ADMIN — Medication 250 MG: at 09:07

## 2023-01-01 RX ADMIN — LOPERAMIDE HYDROCHLORIDE 2 MG: 2 CAPSULE ORAL at 08:07

## 2023-01-01 RX ADMIN — APIXABAN 5 MG: 2.5 TABLET, FILM COATED ORAL at 12:09

## 2023-01-01 RX ADMIN — POTASSIUM & SODIUM PHOSPHATES POWDER PACK 280-160-250 MG 2 PACKET: 280-160-250 PACK at 08:04

## 2023-01-01 RX ADMIN — PANTOPRAZOLE SODIUM 40 MG: 40 INJECTION, POWDER, LYOPHILIZED, FOR SOLUTION INTRAVENOUS at 09:09

## 2023-01-01 RX ADMIN — DEXTROSE MONOHYDRATE 250 MG: 50 INJECTION, SOLUTION INTRAVENOUS at 11:08

## 2023-01-01 RX ADMIN — ACETAMINOPHEN 1000 MG: 500 TABLET ORAL at 12:07

## 2023-01-01 RX ADMIN — LACTULOSE 20 G: 20 SOLUTION ORAL at 09:04

## 2023-01-01 RX ADMIN — POTASSIUM CHLORIDE 10 MEQ: 7.46 INJECTION, SOLUTION INTRAVENOUS at 08:07

## 2023-01-01 RX ADMIN — AMLODIPINE BESYLATE 10 MG: 10 TABLET ORAL at 09:05

## 2023-01-01 RX ADMIN — AMLODIPINE BESYLATE 5 MG: 5 TABLET ORAL at 10:08

## 2023-01-01 RX ADMIN — POTASSIUM PHOSPHATE, MONOBASIC AND POTASSIUM PHOSPHATE, DIBASIC 30 MMOL: 224; 236 INJECTION, SOLUTION, CONCENTRATE INTRAVENOUS at 02:08

## 2023-01-01 RX ADMIN — FENTANYL CITRATE 25 MCG: 50 INJECTION, SOLUTION INTRAMUSCULAR; INTRAVENOUS at 08:05

## 2023-01-01 RX ADMIN — DEXTROSE MONOHYDRATE 250 MG: 50 INJECTION, SOLUTION INTRAVENOUS at 09:08

## 2023-01-01 RX ADMIN — ESCITALOPRAM OXALATE 5 MG: 5 TABLET, FILM COATED ORAL at 08:08

## 2023-01-01 RX ADMIN — IPRATROPIUM BROMIDE 2 PUFF: 17 AEROSOL, METERED RESPIRATORY (INHALATION) at 04:08

## 2023-01-01 RX ADMIN — QUETIAPINE FUMARATE 25 MG: 25 TABLET ORAL at 08:05

## 2023-01-01 RX ADMIN — QUETIAPINE FUMARATE 12.5 MG: 25 TABLET ORAL at 04:04

## 2023-01-01 RX ADMIN — Medication 6 MG: at 12:04

## 2023-01-01 RX ADMIN — Medication 30 MG: at 07:05

## 2023-01-01 RX ADMIN — MAGNESIUM SULFATE HEPTAHYDRATE 2 G: 40 INJECTION, SOLUTION INTRAVENOUS at 07:12

## 2023-01-01 RX ADMIN — MECLIZINE HYDROCHLORIDE 12.5 MG: 12.5 TABLET ORAL at 08:04

## 2023-01-01 RX ADMIN — MAGNESIUM SULFATE 2 G: 2 INJECTION INTRAVENOUS at 01:07

## 2023-01-01 RX ADMIN — SODIUM CHLORIDE: 9 INJECTION, SOLUTION INTRAVENOUS at 12:09

## 2023-01-01 RX ADMIN — SUCCINYLCHOLINE CHLORIDE 140 MG: 20 INJECTION, SOLUTION INTRAMUSCULAR; INTRAVENOUS at 07:05

## 2023-01-01 RX ADMIN — HYDRALAZINE HYDROCHLORIDE 10 MG: 20 INJECTION, SOLUTION INTRAMUSCULAR; INTRAVENOUS at 12:04

## 2023-01-01 RX ADMIN — MEMANTINE HYDROCHLORIDE 5 MG: 5 TABLET ORAL at 02:07

## 2023-01-01 RX ADMIN — ESCITALOPRAM OXALATE 10 MG: 10 TABLET ORAL at 08:09

## 2023-01-01 RX ADMIN — OLANZAPINE 2.5 MG: 10 INJECTION, POWDER, FOR SOLUTION INTRAMUSCULAR at 11:07

## 2023-01-01 RX ADMIN — ACETAMINOPHEN 1000 MG: 500 TABLET ORAL at 05:07

## 2023-01-01 RX ADMIN — METOPROLOL SUCCINATE 12.5 MG: 25 TABLET, EXTENDED RELEASE ORAL at 06:04

## 2023-01-01 RX ADMIN — QUETIAPINE FUMARATE 25 MG: 25 TABLET ORAL at 09:05

## 2023-01-01 RX ADMIN — MUPIROCIN: 20 OINTMENT TOPICAL at 08:08

## 2023-01-01 RX ADMIN — MICONAZOLE NITRATE 2 % TOPICAL POWDER: at 09:08

## 2023-01-01 RX ADMIN — LISINOPRIL 40 MG: 20 TABLET ORAL at 10:07

## 2023-01-01 RX ADMIN — POTASSIUM CHLORIDE 10 MEQ: 7.46 INJECTION, SOLUTION INTRAVENOUS at 10:12

## 2023-01-01 RX ADMIN — HEPARIN SODIUM AND DEXTROSE 14 UNITS/KG/HR: 10000; 5 INJECTION INTRAVENOUS at 05:05

## 2023-01-01 RX ADMIN — Medication 400 MG: at 10:12

## 2023-01-01 RX ADMIN — METOPROLOL TARTRATE 25 MG: 25 TABLET, FILM COATED ORAL at 08:08

## 2023-01-01 RX ADMIN — HYDROXYZINE HYDROCHLORIDE 25 MG: 25 TABLET, FILM COATED ORAL at 08:04

## 2023-01-01 RX ADMIN — FUROSEMIDE 20 MG: 20 TABLET ORAL at 05:07

## 2023-01-01 RX ADMIN — MICONAZOLE NITRATE 2 % TOPICAL POWDER: at 12:07

## 2023-01-01 RX ADMIN — ENOXAPARIN SODIUM 40 MG: 40 INJECTION SUBCUTANEOUS at 05:04

## 2023-01-01 RX ADMIN — METOPROLOL TARTRATE 25 MG: 25 TABLET, FILM COATED ORAL at 10:08

## 2023-01-01 RX ADMIN — ALBUTEROL SULFATE 2 PUFF: 108 AEROSOL, METERED RESPIRATORY (INHALATION) at 04:08

## 2023-01-01 RX ADMIN — OLANZAPINE 2.5 MG: 10 INJECTION, POWDER, FOR SOLUTION INTRAMUSCULAR at 12:07

## 2023-01-01 RX ADMIN — POTASSIUM CHLORIDE 30 MEQ: 10 CAPSULE, COATED, EXTENDED RELEASE ORAL at 02:05

## 2023-01-01 RX ADMIN — SODIUM CHLORIDE 500 ML: 9 INJECTION, SOLUTION INTRAVENOUS at 02:09

## 2023-01-01 RX ADMIN — IPRATROPIUM BROMIDE 2 PUFF: 17 AEROSOL, METERED RESPIRATORY (INHALATION) at 05:08

## 2023-01-01 RX ADMIN — ENOXAPARIN SODIUM 40 MG: 40 INJECTION SUBCUTANEOUS at 07:07

## 2023-01-01 RX ADMIN — METHOCARBAMOL 500 MG: 500 TABLET ORAL at 08:07

## 2023-01-01 RX ADMIN — CHOLECALCIFEROL TAB 25 MCG (1000 UNIT) 1000 UNITS: 25 TAB at 02:07

## 2023-01-01 RX ADMIN — MAGNESIUM SULFATE 2 G: 2 INJECTION INTRAVENOUS at 08:04

## 2023-01-01 RX ADMIN — MEMANTINE HYDROCHLORIDE 5 MG: 5 TABLET ORAL at 10:12

## 2023-01-01 RX ADMIN — IPRATROPIUM BROMIDE AND ALBUTEROL SULFATE 3 ML: 2.5; .5 SOLUTION RESPIRATORY (INHALATION) at 01:07

## 2023-01-01 RX ADMIN — POLYETHYLENE GLYCOL 3350 17 G: 17 POWDER, FOR SOLUTION ORAL at 09:08

## 2023-01-01 RX ADMIN — AMLODIPINE BESYLATE 10 MG: 10 TABLET ORAL at 12:04

## 2023-01-01 RX ADMIN — CHOLECALCIFEROL TAB 25 MCG (1000 UNIT) 1000 UNITS: 25 TAB at 09:07

## 2023-01-01 RX ADMIN — MEMANTINE HYDROCHLORIDE 5 MG: 5 TABLET ORAL at 12:07

## 2023-01-01 RX ADMIN — METHOCARBAMOL 500 MG: 500 TABLET ORAL at 04:07

## 2023-01-01 RX ADMIN — HYDROXYZINE HYDROCHLORIDE 25 MG: 25 TABLET, FILM COATED ORAL at 09:04

## 2023-01-01 RX ADMIN — AMLODIPINE BESYLATE 10 MG: 10 TABLET ORAL at 08:04

## 2023-01-01 RX ADMIN — LIDOCAINE HYDROCHLORIDE 80 MG: 20 INJECTION INTRAVENOUS at 07:05

## 2023-01-01 RX ADMIN — ENOXAPARIN SODIUM 40 MG: 40 INJECTION SUBCUTANEOUS at 05:05

## 2023-01-01 RX ADMIN — IOHEXOL 100 ML: 350 INJECTION, SOLUTION INTRAVENOUS at 11:05

## 2023-01-01 RX ADMIN — POTASSIUM CHLORIDE 40 MEQ: 1500 TABLET, EXTENDED RELEASE ORAL at 07:04

## 2023-01-01 RX ADMIN — TAMSULOSIN HYDROCHLORIDE 0.4 MG: 0.4 CAPSULE ORAL at 09:08

## 2023-01-01 RX ADMIN — TAMSULOSIN HYDROCHLORIDE 0.4 MG: 0.4 CAPSULE ORAL at 12:07

## 2023-01-01 RX ADMIN — GLYCOPYRROLATE 0.2 MG: 0.2 INJECTION, SOLUTION INTRAMUSCULAR; INTRAVENOUS at 07:05

## 2023-01-01 RX ADMIN — KETOROLAC TROMETHAMINE 10 MG: 30 INJECTION, SOLUTION INTRAMUSCULAR at 09:12

## 2023-01-01 RX ADMIN — SODIUM CHLORIDE, POTASSIUM CHLORIDE, SODIUM LACTATE AND CALCIUM CHLORIDE: 600; 310; 30; 20 INJECTION, SOLUTION INTRAVENOUS at 05:09

## 2023-01-01 RX ADMIN — HYDRALAZINE HYDROCHLORIDE 20 MG: 20 INJECTION INTRAMUSCULAR; INTRAVENOUS at 09:08

## 2023-01-01 RX ADMIN — METOPROLOL SUCCINATE 25 MG: 25 TABLET, EXTENDED RELEASE ORAL at 10:04

## 2023-01-01 RX ADMIN — Medication 400 MG: at 09:07

## 2023-01-01 RX ADMIN — ENOXAPARIN SODIUM 80 MG: 80 INJECTION SUBCUTANEOUS at 12:08

## 2023-01-01 RX ADMIN — POTASSIUM CHLORIDE 10 MEQ: 7.46 INJECTION, SOLUTION INTRAVENOUS at 10:07

## 2023-01-01 RX ADMIN — SODIUM CHLORIDE 1000 ML: 9 INJECTION, SOLUTION INTRAVENOUS at 04:05

## 2023-01-01 RX ADMIN — METOROPROLOL TARTRATE 5 MG: 5 INJECTION, SOLUTION INTRAVENOUS at 05:07

## 2023-01-01 RX ADMIN — METHOCARBAMOL 500 MG: 500 TABLET ORAL at 12:07

## 2023-01-01 RX ADMIN — HYDROXYZINE HYDROCHLORIDE 25 MG: 25 TABLET, FILM COATED ORAL at 03:04

## 2023-01-01 RX ADMIN — LOPERAMIDE HYDROCHLORIDE 2 MG: 2 CAPSULE ORAL at 10:07

## 2023-01-01 RX ADMIN — HYDROCODONE BITARTRATE AND ACETAMINOPHEN 1 TABLET: 5; 325 TABLET ORAL at 05:07

## 2023-01-01 RX ADMIN — TAMSULOSIN HYDROCHLORIDE 0.4 MG: 0.4 CAPSULE ORAL at 08:08

## 2023-01-01 RX ADMIN — HYDROXYZINE HYDROCHLORIDE 25 MG: 25 TABLET, FILM COATED ORAL at 08:05

## 2023-01-01 RX ADMIN — SODIUM CHLORIDE, POTASSIUM CHLORIDE, SODIUM LACTATE AND CALCIUM CHLORIDE 1000 ML: 600; 310; 30; 20 INJECTION, SOLUTION INTRAVENOUS at 10:12

## 2023-01-01 RX ADMIN — METOPROLOL SUCCINATE 25 MG: 25 TABLET, EXTENDED RELEASE ORAL at 09:04

## 2023-01-01 RX ADMIN — TUBERCULIN PURIFIED PROTEIN DERIVATIVE 5 UNITS: 5 INJECTION, SOLUTION INTRADERMAL at 12:04

## 2023-01-01 RX ADMIN — SODIUM CHLORIDE, POTASSIUM CHLORIDE, SODIUM LACTATE AND CALCIUM CHLORIDE 1000 ML: 600; 310; 30; 20 INJECTION, SOLUTION INTRAVENOUS at 06:09

## 2023-01-01 RX ADMIN — MEMANTINE HYDROCHLORIDE 5 MG: 5 TABLET ORAL at 10:08

## 2023-01-01 RX ADMIN — POTASSIUM CHLORIDE 10 MEQ: 7.46 INJECTION, SOLUTION INTRAVENOUS at 11:08

## 2023-01-01 RX ADMIN — Medication 250 MG: at 08:07

## 2023-01-01 RX ADMIN — CEFTRIAXONE 1 G: 1 INJECTION, POWDER, FOR SOLUTION INTRAMUSCULAR; INTRAVENOUS at 05:07

## 2023-01-01 RX ADMIN — ZINC SULFATE 220 MG (50 MG) CAPSULE 220 MG: CAPSULE at 09:08

## 2023-01-01 RX ADMIN — MUPIROCIN: 20 OINTMENT TOPICAL at 09:07

## 2023-01-01 RX ADMIN — CEFTRIAXONE 1 G: 1 INJECTION, POWDER, FOR SOLUTION INTRAMUSCULAR; INTRAVENOUS at 08:08

## 2023-01-01 RX ADMIN — MICONAZOLE NITRATE 2 % TOPICAL POWDER: at 09:05

## 2023-01-01 RX ADMIN — POTASSIUM BICARBONATE 50 MEQ: 978 TABLET, EFFERVESCENT ORAL at 11:05

## 2023-01-01 RX ADMIN — METOROPROLOL TARTRATE 5 MG: 5 INJECTION, SOLUTION INTRAVENOUS at 08:08

## 2023-01-01 RX ADMIN — MECLIZINE HYDROCHLORIDE 12.5 MG: 12.5 TABLET ORAL at 09:04

## 2023-01-01 RX ADMIN — LISINOPRIL 10 MG: 10 TABLET ORAL at 09:03

## 2023-01-01 RX ADMIN — POLYETHYLENE GLYCOL 3350 17 G: 17 POWDER, FOR SOLUTION ORAL at 10:08

## 2023-01-01 RX ADMIN — MICONAZOLE NITRATE 2 % TOPICAL POWDER: at 11:07

## 2023-01-01 RX ADMIN — ACETAMINOPHEN 650 MG: 650 SOLUTION ORAL at 05:07

## 2023-01-01 RX ADMIN — POTASSIUM CHLORIDE 10 MEQ: 10 CAPSULE, COATED, EXTENDED RELEASE ORAL at 10:07

## 2023-01-01 RX ADMIN — SODIUM CHLORIDE, POTASSIUM CHLORIDE, SODIUM LACTATE AND CALCIUM CHLORIDE: 600; 310; 30; 20 INJECTION, SOLUTION INTRAVENOUS at 11:04

## 2023-01-01 RX ADMIN — METOPROLOL SUCCINATE 25 MG: 25 TABLET, EXTENDED RELEASE ORAL at 08:05

## 2023-01-01 RX ADMIN — ONDANSETRON 4 MG: 2 INJECTION INTRAMUSCULAR; INTRAVENOUS at 08:05

## 2023-01-01 RX ADMIN — POLYETHYLENE GLYCOL 3350 17 G: 17 POWDER, FOR SOLUTION ORAL at 05:04

## 2023-01-01 RX ADMIN — POTASSIUM BICARBONATE 40 MEQ: 391 TABLET, EFFERVESCENT ORAL at 10:07

## 2023-01-01 RX ADMIN — SODIUM CHLORIDE 8 MG/HR: 900 INJECTION INTRAVENOUS at 03:09

## 2023-01-01 RX ADMIN — AMPICILLIN SODIUM 500 MG: 500 INJECTION, POWDER, FOR SOLUTION INTRAMUSCULAR; INTRAVENOUS at 10:12

## 2023-01-01 RX ADMIN — POTASSIUM CHLORIDE 10 MEQ: 7.46 INJECTION, SOLUTION INTRAVENOUS at 05:08

## 2023-01-01 RX ADMIN — ALBUTEROL SULFATE 2 PUFF: 108 AEROSOL, METERED RESPIRATORY (INHALATION) at 08:08

## 2023-01-01 RX ADMIN — POTASSIUM BICARBONATE 50 MEQ: 978 TABLET, EFFERVESCENT ORAL at 10:07

## 2023-01-01 RX ADMIN — FUROSEMIDE 20 MG: 20 TABLET ORAL at 12:07

## 2023-01-01 RX ADMIN — HYDROXYZINE HYDROCHLORIDE 25 MG: 25 TABLET, FILM COATED ORAL at 10:04

## 2023-01-01 RX ADMIN — LISINOPRIL 40 MG: 20 TABLET ORAL at 08:07

## 2023-01-01 RX ADMIN — APIXABAN 5 MG: 5 TABLET, FILM COATED ORAL at 10:12

## 2023-01-01 RX ADMIN — AMPICILLIN SODIUM 500 MG: 500 INJECTION, POWDER, FOR SOLUTION INTRAMUSCULAR; INTRAVENOUS at 09:12

## 2023-01-01 RX ADMIN — METOPROLOL TARTRATE 25 MG: 25 TABLET, FILM COATED ORAL at 12:07

## 2023-01-01 RX ADMIN — POTASSIUM CHLORIDE 10 MEQ: 7.46 INJECTION, SOLUTION INTRAVENOUS at 07:07

## 2023-01-01 RX ADMIN — NAPROXEN 500 MG: 500 TABLET ORAL at 06:12

## 2023-01-01 RX ADMIN — IOHEXOL 75 ML: 350 INJECTION, SOLUTION INTRAVENOUS at 03:07

## 2023-01-01 RX ADMIN — MAGNESIUM SULFATE HEPTAHYDRATE 1 G: 500 INJECTION, SOLUTION INTRAMUSCULAR; INTRAVENOUS at 10:07

## 2023-01-01 RX ADMIN — METOPROLOL TARTRATE 25 MG: 25 TABLET, FILM COATED ORAL at 10:12

## 2023-01-01 RX ADMIN — PHENYLEPHRINE HYDROCHLORIDE 100 MCG: 10 INJECTION INTRAVENOUS at 07:05

## 2023-01-01 RX ADMIN — ZINC SULFATE 220 MG (50 MG) CAPSULE 220 MG: CAPSULE at 09:07

## 2023-01-01 RX ADMIN — ACETAMINOPHEN 1000 MG: 500 TABLET ORAL at 06:07

## 2023-01-01 RX ADMIN — ZINC SULFATE 220 MG (50 MG) CAPSULE 220 MG: CAPSULE at 02:07

## 2023-01-01 RX ADMIN — POTASSIUM BICARBONATE 40 MEQ: 391 TABLET, EFFERVESCENT ORAL at 05:05

## 2023-01-01 RX ADMIN — OLANZAPINE 5 MG: 10 INJECTION, POWDER, FOR SOLUTION INTRAMUSCULAR at 08:07

## 2023-01-01 RX ADMIN — SODIUM CHLORIDE: 4.5 INJECTION, SOLUTION INTRAVENOUS at 10:07

## 2023-01-01 RX ADMIN — IPRATROPIUM BROMIDE 2 PUFF: 17 AEROSOL, METERED RESPIRATORY (INHALATION) at 12:08

## 2023-01-01 RX ADMIN — METHOCARBAMOL 500 MG: 500 TABLET ORAL at 09:07

## 2023-01-01 RX ADMIN — WARFARIN SODIUM 7.5 MG: 7.5 TABLET ORAL at 04:05

## 2023-01-01 RX ADMIN — DEXAMETHASONE SODIUM PHOSPHATE 4 MG: 4 INJECTION, SOLUTION INTRAMUSCULAR; INTRAVENOUS at 07:05

## 2023-01-01 RX ADMIN — POTASSIUM BICARBONATE 25 MEQ: 978 TABLET, EFFERVESCENT ORAL at 02:05

## 2023-01-01 RX ADMIN — IPRATROPIUM BROMIDE AND ALBUTEROL SULFATE 3 ML: .5; 3 SOLUTION RESPIRATORY (INHALATION) at 10:04

## 2023-01-01 RX ADMIN — IPRATROPIUM BROMIDE 2 PUFF: 17 AEROSOL, METERED RESPIRATORY (INHALATION) at 10:08

## 2023-01-01 RX ADMIN — SODIUM CHLORIDE, SODIUM LACTATE, POTASSIUM CHLORIDE, AND CALCIUM CHLORIDE 250 ML: .6; .31; .03; .02 INJECTION, SOLUTION INTRAVENOUS at 06:07

## 2023-01-01 RX ADMIN — POTASSIUM CHLORIDE 10 MEQ: 10 INJECTION INTRAVENOUS at 02:07

## 2023-01-01 RX ADMIN — MAGNESIUM SULFATE HEPTAHYDRATE 2 G: 40 INJECTION, SOLUTION INTRAVENOUS at 03:08

## 2023-01-01 RX ADMIN — AMPICILLIN SODIUM 500 MG: 500 INJECTION, POWDER, FOR SOLUTION INTRAMUSCULAR; INTRAVENOUS at 03:12

## 2023-01-01 RX ADMIN — MUPIROCIN: 20 OINTMENT TOPICAL at 08:09

## 2023-01-01 RX ADMIN — TAMSULOSIN HYDROCHLORIDE 0.4 MG: 0.4 CAPSULE ORAL at 09:07

## 2023-01-01 RX ADMIN — ALBUTEROL SULFATE 2 PUFF: 108 AEROSOL, METERED RESPIRATORY (INHALATION) at 05:08

## 2023-01-01 RX ADMIN — POTASSIUM & SODIUM PHOSPHATES POWDER PACK 280-160-250 MG 2 PACKET: 280-160-250 PACK at 09:04

## 2023-01-01 RX ADMIN — CEFTRIAXONE 1 G: 1 INJECTION, POWDER, FOR SOLUTION INTRAMUSCULAR; INTRAVENOUS at 07:04

## 2023-01-01 RX ADMIN — TAMSULOSIN HYDROCHLORIDE 0.4 MG: 0.4 CAPSULE ORAL at 10:08

## 2023-01-01 RX ADMIN — AMLODIPINE BESYLATE 10 MG: 10 TABLET ORAL at 06:04

## 2023-01-01 RX ADMIN — METOPROLOL TARTRATE 12.5 MG: 25 TABLET, FILM COATED ORAL at 11:05

## 2023-01-01 RX ADMIN — IPRATROPIUM BROMIDE AND ALBUTEROL SULFATE 3 ML: 2.5; .5 SOLUTION RESPIRATORY (INHALATION) at 10:08

## 2023-01-01 RX ADMIN — POTASSIUM BICARBONATE 25 MEQ: 978 TABLET, EFFERVESCENT ORAL at 10:05

## 2023-01-01 RX ADMIN — CEFTRIAXONE 1 G: 1 INJECTION, POWDER, FOR SOLUTION INTRAMUSCULAR; INTRAVENOUS at 09:04

## 2023-01-01 RX ADMIN — POTASSIUM CHLORIDE 30 MEQ: 10 CAPSULE, COATED, EXTENDED RELEASE ORAL at 12:08

## 2023-01-01 RX ADMIN — POTASSIUM CHLORIDE 10 MEQ: 7.46 INJECTION, SOLUTION INTRAVENOUS at 02:12

## 2023-01-01 RX ADMIN — ENOXAPARIN SODIUM 40 MG: 40 INJECTION SUBCUTANEOUS at 04:08

## 2023-01-01 RX ADMIN — ROCURONIUM BROMIDE 50 MG: 10 INJECTION, SOLUTION INTRAVENOUS at 07:05

## 2023-01-01 RX ADMIN — CEFAZOLIN 2 G: 330 INJECTION, POWDER, FOR SOLUTION INTRAMUSCULAR; INTRAVENOUS at 07:05

## 2023-01-01 RX ADMIN — POTASSIUM CHLORIDE 10 MEQ: 7.46 INJECTION, SOLUTION INTRAVENOUS at 09:04

## 2023-01-01 RX ADMIN — HALOPERIDOL 0.5 MG: 0.5 TABLET ORAL at 10:07

## 2023-01-01 RX ADMIN — METOPROLOL TARTRATE 25 MG: 25 TABLET, FILM COATED ORAL at 06:09

## 2023-01-01 RX ADMIN — IPRATROPIUM BROMIDE 2 PUFF: 17 AEROSOL, METERED RESPIRATORY (INHALATION) at 08:08

## 2023-01-01 RX ADMIN — POTASSIUM CHLORIDE 10 MEQ: 7.46 INJECTION, SOLUTION INTRAVENOUS at 01:07

## 2023-01-01 RX ADMIN — IPRATROPIUM BROMIDE AND ALBUTEROL SULFATE 3 ML: 2.5; .5 SOLUTION RESPIRATORY (INHALATION) at 07:07

## 2023-01-01 RX ADMIN — AMPICILLIN SODIUM 500 MG: 500 INJECTION, POWDER, FOR SOLUTION INTRAMUSCULAR; INTRAVENOUS at 04:12

## 2023-01-01 RX ADMIN — ACETAMINOPHEN 1000 MG: 10 INJECTION, SOLUTION INTRAVENOUS at 07:05

## 2023-01-01 RX ADMIN — DEXTROSE MONOHYDRATE 130 MG: 50 INJECTION, SOLUTION INTRAVENOUS at 12:08

## 2023-01-01 RX ADMIN — HYDRALAZINE HYDROCHLORIDE 10 MG: 20 INJECTION, SOLUTION INTRAMUSCULAR; INTRAVENOUS at 09:07

## 2023-01-01 RX ADMIN — POTASSIUM BICARBONATE 40 MEQ: 391 TABLET, EFFERVESCENT ORAL at 12:07

## 2023-01-01 RX ADMIN — CEFTRIAXONE 1 G: 1 INJECTION, POWDER, FOR SOLUTION INTRAMUSCULAR; INTRAVENOUS at 07:08

## 2023-01-01 RX ADMIN — HYDRALAZINE HYDROCHLORIDE 10 MG: 20 INJECTION, SOLUTION INTRAMUSCULAR; INTRAVENOUS at 08:07

## 2023-01-01 RX ADMIN — SODIUM CHLORIDE, POTASSIUM CHLORIDE, SODIUM LACTATE AND CALCIUM CHLORIDE: 600; 310; 30; 20 INJECTION, SOLUTION INTRAVENOUS at 07:09

## 2023-01-01 RX ADMIN — OLANZAPINE 2.5 MG: 10 INJECTION, POWDER, FOR SOLUTION INTRAMUSCULAR at 10:07

## 2023-01-01 RX ADMIN — QUETIAPINE FUMARATE 12.5 MG: 25 TABLET ORAL at 05:04

## 2023-01-01 RX ADMIN — APIXABAN 5 MG: 2.5 TABLET, FILM COATED ORAL at 08:09

## 2023-01-01 RX ADMIN — METOPROLOL SUCCINATE 12.5 MG: 25 TABLET, EXTENDED RELEASE ORAL at 01:04

## 2023-01-01 RX ADMIN — ENOXAPARIN SODIUM 40 MG: 40 INJECTION SUBCUTANEOUS at 06:08

## 2023-01-01 RX ADMIN — POTASSIUM CHLORIDE 10 MEQ: 10 INJECTION INTRAVENOUS at 12:07

## 2023-01-01 RX ADMIN — POTASSIUM CHLORIDE 10 MEQ: 7.46 INJECTION, SOLUTION INTRAVENOUS at 09:07

## 2023-01-01 RX ADMIN — DEXTROSE MONOHYDRATE: 5 INJECTION, SOLUTION INTRAVENOUS at 10:05

## 2023-01-01 RX ADMIN — POTASSIUM CHLORIDE 10 MEQ: 10 INJECTION, SOLUTION INTRAVENOUS at 01:08

## 2023-01-01 RX ADMIN — ENOXAPARIN SODIUM 70 MG: 80 INJECTION SUBCUTANEOUS at 01:07

## 2023-01-01 RX ADMIN — TUBERCULIN PURIFIED PROTEIN DERIVATIVE 5 UNITS: 5 INJECTION, SOLUTION INTRADERMAL at 06:07

## 2023-01-01 RX ADMIN — SODIUM CHLORIDE, POTASSIUM CHLORIDE, SODIUM LACTATE AND CALCIUM CHLORIDE: 600; 310; 30; 20 INJECTION, SOLUTION INTRAVENOUS at 09:05

## 2023-01-01 RX ADMIN — POTASSIUM & SODIUM PHOSPHATES POWDER PACK 280-160-250 MG 2 PACKET: 280-160-250 PACK at 10:04

## 2023-01-01 RX ADMIN — ACETAMINOPHEN 1000 MG: 500 TABLET ORAL at 12:04

## 2023-01-01 RX ADMIN — MICONAZOLE NITRATE 2 % TOPICAL POWDER: at 05:08

## 2023-01-01 RX ADMIN — Medication 6 MG: at 08:08

## 2023-01-01 RX ADMIN — POTASSIUM BICARBONATE 40 MEQ: 391 TABLET, EFFERVESCENT ORAL at 09:04

## 2023-01-01 RX ADMIN — OLANZAPINE 2.5 MG: 10 INJECTION, POWDER, FOR SOLUTION INTRAMUSCULAR at 05:07

## 2023-01-01 RX ADMIN — TRAZODONE HYDROCHLORIDE 25 MG: 50 TABLET ORAL at 10:07

## 2023-01-01 RX ADMIN — ENOXAPARIN SODIUM 40 MG: 40 INJECTION SUBCUTANEOUS at 06:07

## 2023-01-01 RX ADMIN — FUROSEMIDE 20 MG: 20 TABLET ORAL at 02:07

## 2023-01-01 RX ADMIN — METOPROLOL TARTRATE 25 MG: 25 TABLET, FILM COATED ORAL at 08:12

## 2023-01-01 RX ADMIN — METOPROLOL SUCCINATE 25 MG: 25 TABLET, EXTENDED RELEASE ORAL at 01:08

## 2023-01-01 RX ADMIN — MAGNESIUM SULFATE 2 G: 2 INJECTION INTRAVENOUS at 09:04

## 2023-01-01 RX ADMIN — POTASSIUM CHLORIDE 10 MEQ: 10 INJECTION, SOLUTION INTRAVENOUS at 11:08

## 2023-01-01 RX ADMIN — POTASSIUM CHLORIDE 20 MEQ: 1500 TABLET, EXTENDED RELEASE ORAL at 09:09

## 2023-01-01 RX ADMIN — LIDOCAINE 1 PATCH: 50 PATCH TOPICAL at 04:04

## 2023-01-01 RX ADMIN — MUPIROCIN: 20 OINTMENT TOPICAL at 09:09

## 2023-01-01 RX ADMIN — WARFARIN SODIUM 1 MG: 1 TABLET ORAL at 05:05

## 2023-01-01 RX ADMIN — DEXTROSE MONOHYDRATE 250 MG: 50 INJECTION, SOLUTION INTRAVENOUS at 05:07

## 2023-01-01 RX ADMIN — PANTOPRAZOLE SODIUM 80 MG: 40 INJECTION, POWDER, FOR SOLUTION INTRAVENOUS at 10:12

## 2023-01-01 RX ADMIN — LISINOPRIL 20 MG: 20 TABLET ORAL at 10:07

## 2023-01-01 RX ADMIN — ALBUTEROL SULFATE 2 PUFF: 108 AEROSOL, METERED RESPIRATORY (INHALATION) at 10:08

## 2023-01-01 RX ADMIN — SUGAMMADEX 200 MG: 100 INJECTION, SOLUTION INTRAVENOUS at 08:05

## 2023-01-01 RX ADMIN — SODIUM CHLORIDE, SODIUM GLUCONATE, SODIUM ACETATE, POTASSIUM CHLORIDE, MAGNESIUM CHLORIDE, SODIUM PHOSPHATE, DIBASIC, AND POTASSIUM PHOSPHATE: .53; .5; .37; .037; .03; .012; .00082 INJECTION, SOLUTION INTRAVENOUS at 07:05

## 2023-01-01 RX ADMIN — IPRATROPIUM BROMIDE 2 PUFF: 17 AEROSOL, METERED RESPIRATORY (INHALATION) at 11:08

## 2023-01-01 RX ADMIN — ENOXAPARIN SODIUM 40 MG: 40 INJECTION SUBCUTANEOUS at 04:12

## 2023-01-01 RX ADMIN — ESCITALOPRAM OXALATE 10 MG: 10 TABLET ORAL at 09:09

## 2023-01-01 RX ADMIN — METOCLOPRAMIDE 10 MG: 5 INJECTION, SOLUTION INTRAMUSCULAR; INTRAVENOUS at 09:12

## 2023-01-01 RX ADMIN — MICONAZOLE NITRATE 2 % TOPICAL POWDER: at 10:07

## 2023-01-01 RX ADMIN — IPRATROPIUM BROMIDE AND ALBUTEROL SULFATE 3 ML: 2.5; .5 SOLUTION RESPIRATORY (INHALATION) at 01:08

## 2023-01-01 RX ADMIN — AMPICILLIN 2 G: 2 INJECTION, POWDER, FOR SOLUTION INTRAMUSCULAR; INTRAVENOUS at 11:08

## 2023-01-01 RX ADMIN — LOPERAMIDE HYDROCHLORIDE 2 MG: 2 CAPSULE ORAL at 12:07

## 2023-01-01 RX ADMIN — Medication 6 MG: at 09:04

## 2023-01-01 RX ADMIN — PROPOFOL 130 MG: 10 INJECTION, EMULSION INTRAVENOUS at 07:05

## 2023-01-01 RX ADMIN — APIXABAN 5 MG: 5 TABLET, FILM COATED ORAL at 09:07

## 2023-01-01 RX ADMIN — MAGNESIUM SULFATE 2 G: 2 INJECTION INTRAVENOUS at 08:07

## 2023-01-01 RX ADMIN — ESCITALOPRAM OXALATE 10 MG: 10 TABLET ORAL at 08:12

## 2023-01-01 RX ADMIN — SODIUM CHLORIDE, SODIUM LACTATE, POTASSIUM CHLORIDE, CALCIUM CHLORIDE AND DEXTROSE MONOHYDRATE: 5; 600; 310; 30; 20 INJECTION, SOLUTION INTRAVENOUS at 06:12

## 2023-01-01 RX ADMIN — TUBERCULIN PURIFIED PROTEIN DERIVATIVE 5 UNITS: 5 INJECTION, SOLUTION INTRADERMAL at 01:08

## 2023-01-01 RX ADMIN — POTASSIUM CHLORIDE 10 MEQ: 10 INJECTION INTRAVENOUS at 04:07

## 2023-01-01 RX ADMIN — APIXABAN 5 MG: 5 TABLET, FILM COATED ORAL at 08:12

## 2023-01-01 RX ADMIN — AMLODIPINE BESYLATE 5 MG: 5 TABLET ORAL at 08:04

## 2023-01-01 RX ADMIN — SENNOSIDES AND DOCUSATE SODIUM 1 TABLET: 50; 8.6 TABLET ORAL at 10:07

## 2023-01-01 RX ADMIN — DEXMEDETOMIDINE HYDROCHLORIDE 8 MCG: 100 INJECTION, SOLUTION INTRAVENOUS at 08:05

## 2023-01-01 RX ADMIN — Medication 10 ML: at 05:09

## 2023-01-01 RX ADMIN — POTASSIUM & SODIUM PHOSPHATES POWDER PACK 280-160-250 MG 2 PACKET: 280-160-250 PACK at 02:04

## 2023-01-01 RX ADMIN — HYDROXYZINE HYDROCHLORIDE 25 MG: 25 TABLET, FILM COATED ORAL at 02:04

## 2023-01-01 RX ADMIN — LORAZEPAM 0.5 MG: 2 INJECTION INTRAMUSCULAR; INTRAVENOUS at 07:08

## 2023-01-01 RX ADMIN — ACETAMINOPHEN 650 MG: 325 TABLET ORAL at 10:07

## 2023-01-01 RX ADMIN — POTASSIUM CHLORIDE 10 MEQ: 7.46 INJECTION, SOLUTION INTRAVENOUS at 01:08

## 2023-01-01 RX ADMIN — IOHEXOL 100 ML: 350 INJECTION, SOLUTION INTRAVENOUS at 06:07

## 2023-01-01 RX ADMIN — POTASSIUM CHLORIDE 10 MEQ: 7.46 INJECTION, SOLUTION INTRAVENOUS at 03:08

## 2023-01-01 RX ADMIN — Medication 6 MG: at 09:09

## 2023-01-01 RX ADMIN — CEFTRIAXONE SODIUM 1 G: 1 INJECTION, POWDER, FOR SOLUTION INTRAMUSCULAR; INTRAVENOUS at 12:09

## 2023-01-01 RX ADMIN — POTASSIUM PHOSPHATE, MONOBASIC AND POTASSIUM PHOSPHATE, DIBASIC 30 MMOL: 224; 236 INJECTION, SOLUTION, CONCENTRATE INTRAVENOUS at 11:08

## 2023-01-01 RX ADMIN — ACETAMINOPHEN 1000 MG: 500 TABLET ORAL at 09:04

## 2023-01-01 RX ADMIN — SENNOSIDES AND DOCUSATE SODIUM 1 TABLET: 50; 8.6 TABLET ORAL at 08:08

## 2023-01-01 RX ADMIN — IPRATROPIUM BROMIDE AND ALBUTEROL SULFATE 3 ML: 2.5; .5 SOLUTION RESPIRATORY (INHALATION) at 03:08

## 2023-01-01 RX ADMIN — MECLIZINE HYDROCHLORIDE 25 MG: 12.5 TABLET ORAL at 10:04

## 2023-01-01 RX ADMIN — TUBERCULIN PURIFIED PROTEIN DERIVATIVE 5 UNITS: 5 INJECTION, SOLUTION INTRADERMAL at 05:05

## 2023-01-01 RX ADMIN — ONDANSETRON 4 MG: 2 INJECTION INTRAMUSCULAR; INTRAVENOUS at 02:09

## 2023-01-01 RX ADMIN — HYDRALAZINE HYDROCHLORIDE 10 MG: 20 INJECTION, SOLUTION INTRAMUSCULAR; INTRAVENOUS at 01:08

## 2023-01-01 RX ADMIN — HYDRALAZINE HYDROCHLORIDE 10 MG: 20 INJECTION, SOLUTION INTRAMUSCULAR; INTRAVENOUS at 12:12

## 2023-01-01 RX ADMIN — POTASSIUM CHLORIDE 10 MEQ: 7.46 INJECTION, SOLUTION INTRAVENOUS at 07:04

## 2023-01-01 RX ADMIN — POTASSIUM CHLORIDE 10 MEQ: 7.46 INJECTION, SOLUTION INTRAVENOUS at 07:12

## 2023-01-01 RX ADMIN — SODIUM CHLORIDE, SODIUM LACTATE, POTASSIUM CHLORIDE, CALCIUM CHLORIDE AND DEXTROSE MONOHYDRATE: 5; 600; 310; 30; 20 INJECTION, SOLUTION INTRAVENOUS at 01:12

## 2023-01-01 RX ADMIN — MAGNESIUM SULFATE HEPTAHYDRATE 2 G: 40 INJECTION, SOLUTION INTRAVENOUS at 05:08

## 2023-01-01 RX ADMIN — HALOPERIDOL 2 MG: 1 TABLET ORAL at 11:05

## 2023-01-01 RX ADMIN — SODIUM CHLORIDE, POTASSIUM CHLORIDE, SODIUM LACTATE AND CALCIUM CHLORIDE: 600; 310; 30; 20 INJECTION, SOLUTION INTRAVENOUS at 08:04

## 2023-01-01 RX ADMIN — LORAZEPAM 0.5 MG: 0.5 TABLET ORAL at 08:04

## 2023-01-01 RX ADMIN — DEXTROSE MONOHYDRATE 130 MG: 50 INJECTION, SOLUTION INTRAVENOUS at 05:08

## 2023-01-01 RX ADMIN — HYDRALAZINE HYDROCHLORIDE 10 MG: 20 INJECTION, SOLUTION INTRAMUSCULAR; INTRAVENOUS at 02:07

## 2023-01-01 RX ADMIN — MAGNESIUM SULFATE 2 G: 2 INJECTION INTRAVENOUS at 12:05

## 2023-01-01 RX ADMIN — ENALAPRILAT 1.25 MG: 2.5 INJECTION INTRAVENOUS at 10:07

## 2023-01-01 RX ADMIN — ACETAMINOPHEN 650 MG: 325 TABLET ORAL at 05:07

## 2023-01-01 RX ADMIN — FLUCONAZOLE 150 MG: 150 TABLET ORAL at 04:04

## 2023-01-01 RX ADMIN — POTASSIUM BICARBONATE 20 MEQ: 391 TABLET, EFFERVESCENT ORAL at 09:07

## 2023-01-01 RX ADMIN — POLYETHYLENE GLYCOL 3350 17 G: 17 POWDER, FOR SOLUTION ORAL at 08:08

## 2023-01-01 RX ADMIN — FUROSEMIDE 20 MG: 20 TABLET ORAL at 01:08

## 2023-01-01 RX ADMIN — METOPROLOL SUCCINATE 12.5 MG: 25 TABLET, EXTENDED RELEASE ORAL at 08:04

## 2023-01-01 RX ADMIN — POTASSIUM CHLORIDE 10 MEQ: 10 INJECTION, SOLUTION INTRAVENOUS at 12:08

## 2023-01-01 RX ADMIN — DIVALPROEX SODIUM 250 MG: 250 TABLET, DELAYED RELEASE ORAL at 10:08

## 2023-01-01 RX ADMIN — LEVALBUTEROL 1.25 MG: 1.25 SOLUTION, CONCENTRATE RESPIRATORY (INHALATION) at 02:08

## 2023-01-01 RX ADMIN — FAMOTIDINE 20 MG: 10 INJECTION, SOLUTION INTRAVENOUS at 02:09

## 2023-01-01 RX ADMIN — WARFARIN SODIUM 5 MG: 5 TABLET ORAL at 05:05

## 2023-01-01 RX ADMIN — FUROSEMIDE 20 MG: 10 INJECTION, SOLUTION INTRAMUSCULAR; INTRAVENOUS at 09:09

## 2023-01-01 RX ADMIN — KETOROLAC TROMETHAMINE 10 MG: 30 INJECTION, SOLUTION INTRAMUSCULAR; INTRAVENOUS at 09:03

## 2023-01-01 RX ADMIN — HYDROMORPHONE HYDROCHLORIDE 0.5 MG: 1 INJECTION, SOLUTION INTRAMUSCULAR; INTRAVENOUS; SUBCUTANEOUS at 09:07

## 2023-01-01 RX ADMIN — IOHEXOL 100 ML: 350 INJECTION, SOLUTION INTRAVENOUS at 05:05

## 2023-01-01 RX ADMIN — Medication 6 MG: at 10:12

## 2023-01-01 RX ADMIN — IPRATROPIUM BROMIDE 0.5 MG: 0.5 SOLUTION RESPIRATORY (INHALATION) at 02:08

## 2023-01-01 RX ADMIN — CEFTRIAXONE 1 G: 1 INJECTION, POWDER, FOR SOLUTION INTRAMUSCULAR; INTRAVENOUS at 12:05

## 2023-01-01 RX ADMIN — MAGNESIUM SULFATE 2 G: 2 INJECTION INTRAVENOUS at 12:04

## 2023-01-01 RX ADMIN — PANTOPRAZOLE SODIUM 40 MG: 40 INJECTION, POWDER, LYOPHILIZED, FOR SOLUTION INTRAVENOUS at 04:09

## 2023-01-01 RX ADMIN — FUROSEMIDE 20 MG: 20 TABLET ORAL at 09:09

## 2023-02-03 ENCOUNTER — CARE AT HOME (OUTPATIENT)
Dept: HOME HEALTH SERVICES | Facility: CLINIC | Age: 88
End: 2023-02-03
Payer: MEDICARE

## 2023-02-03 DIAGNOSIS — I10 HYPERTENSION, UNSPECIFIED TYPE: Primary | ICD-10-CM

## 2023-02-03 DIAGNOSIS — G47.00 INSOMNIA, UNSPECIFIED TYPE: ICD-10-CM

## 2023-02-03 PROCEDURE — 99349 HOME/RES VST EST MOD MDM 40: CPT | Mod: S$GLB,,, | Performed by: NURSE PRACTITIONER

## 2023-02-03 PROCEDURE — 99349 PR HOME VISIT,ESTAB PATIENT,LEVEL III: ICD-10-PCS | Mod: S$GLB,,, | Performed by: NURSE PRACTITIONER

## 2023-02-23 VITALS
HEART RATE: 70 BPM | DIASTOLIC BLOOD PRESSURE: 71 MMHG | OXYGEN SATURATION: 98 % | RESPIRATION RATE: 18 BRPM | SYSTOLIC BLOOD PRESSURE: 132 MMHG | TEMPERATURE: 98 F

## 2023-02-23 NOTE — PROGRESS NOTES
[facility]  Medical Home Visit    Visit Date: 2023  Encounter Provider: Rula Mays  PCP:  Rula Mays, NP    Subjective:      Patient ID: Radha Sandoval is a 87 y.o. female.    Consult Requested By:    Reason for Consult:  Medical follow up    Radha is being seen at home due to being seen at home due to physical debility that presents a taxing effort to leave the home, to mitigate high risk of hospital readmission and/or due to the limited availability of reliable or safe options for transportation to the point of access to the provider. Prior to treatment on this visit the chart was reviewed and patient verbal consent was obtained.    Chief Complaint: HTN, Insomnia      HPI  Radha Sandoval  is an 86 y/o F with a past medical history of  Hypertension and Insomnia . At our first visit she was ambulatory with a rollator and was living at home alone in [place], she has a son who lives nearby and checks on her frequently. She has recently moved to [facility], an assistant [facility] facility where she is being evaluated today. She enjoys playing the piano and has the opportunity to do so at community events at the [facility].     With this visit today patient is found in the day room at the [facility], ambulatory with rollator. Patient is AAOx3, able to verify her name and , she does exhibit some confusion at times but is easily reoriented. Most of patients other history was received from her medical record. She does not have Home health and there is no skill for home health that meets medical necessity. She does not have a PCP, I will refer to [facility] clinic to establish PCP. I will continue seeing the patient in the home as it is difficult for him to physically make multiple visits. SHe endorses eating x 3 meals per day. SHe endorses regular BMs.  She feels she has made a good transition to the [facility]. She has no acute concerns, I have refilled all prescriptions.Facility staff deneis acute concerns. Patient is  happy and plays the piano at community events.     Fall/Safety precautions. Education completed ~ 15 minutes. Plan to follow up.     VSS. Denies fever, chest pain, shortness of breath, nausea, vomiting, diarrhea. Risks of environmental exposure to coronavirus discussed including: social distancing, hand hygiene, and limiting departures from the home for necessities only.  Reports understanding and willingness to comply.  All hospital discharge orders reviewed and being followed, all medications reconciled and reviewed, patient and family verbalized understanding. No other needs identified at this time.     Attestation: Screening criteria to assess the level of the patient's risk for infection with COVID-19 as recommended by the CDC at the time of the above documented home visit concluded appropriateness to proceed. Universal precautions were maintained at all times, including provider use of 60% alcohol gel hand  immediately prior to entry and upon departing the patient's home.    Review of Systems   Constitutional: Negative for fatigue and unexpected weight change.   HENT: Negative for congestion, dental problem and drooling.    Eyes: Negative for redness and visual disturbance.   Respiratory: Negative for cough and shortness of breath.    Cardiovascular: Negative for chest pain and palpitations.   Gastrointestinal: Negative for abdominal distention, abdominal pain, nausea and vomiting.   Endocrine: Negative for polydipsia and polyuria.   Genitourinary: Negative for difficulty urinating and flank pain.   Musculoskeletal: Positive for gait problem. Negative for arthralgias.   Skin: Negative for color change and rash.   Allergic/Immunologic: Negative for food allergies.   Neurological: Negative for dizziness and weakness.   Psychiatric/Behavioral: Negative for agitation.       Assessments:  Environmental: Lives in single story home with 4 steps to enter  Functional Status: Independent with ADLs, rollator for  mobility   Safety: Fall precautions  Nutritional: Heart healthy  Home Health/DME/Supplies: rollator    Objective:   Physical Exam  HENT:      Head: Atraumatic.      Nose: Nose normal.      Mouth/Throat:      Mouth: Mucous membranes are moist.   Eyes:      Pupils: Pupils are equal, round, and reactive to light.   Cardiovascular:      Rate and Rhythm: Normal rate.      Pulses: Normal pulses.   Pulmonary:      Effort: Pulmonary effort is normal.   Abdominal:      General: Abdomen is flat. Bowel sounds are normal.      Palpations: Abdomen is soft.   Musculoskeletal:         General: Normal range of motion.      Cervical back: Normal range of motion.   Skin:     General: Skin is warm and dry.      Capillary Refill: Capillary refill takes less than 2 seconds.   Neurological:      Mental Status: She is alert and oriented to person, place, and time.         Vitals:    02/03/23 0935   BP: 132/71   Pulse: 70   Resp: 18   Temp: 97.8 °F (36.6 °C)   SpO2: 98%     There is no height or weight on file to calculate BMI.    Assessment:     1. Hypertension, unspecified type    2. Insomnia, unspecified type        Hypertension    Norvasc 10 mg po daily  Lisinopril 10 mg po daily  Low Na diet    Insomnia  Ramelteon 8 mg po Qhs     Constipation  Colace 100 mg po prn constipation    Plan:     Ethical / Legal: Advance Care Planning   Surrogate decision maker:  Name Lori, Relationship: son  Code Status:  Full code  LaPOST:  To discuss with family  Other advance directive:  To discuss with family, Capacity to make medical decisions:  yes, Conflict n/a       Problem List Items Addressed This Visit    None       Visit Diagnoses       Alzheimer's dementia without behavioral disturbance, unspecified timing of dementia onset    -  Primary    Palliative care encounter   GOC discussion                Were controlled substances prescribed?  No    Follow Up Appointments:   No future appointments.      Signature: Rula Mays NP

## 2023-02-23 NOTE — PATIENT INSTRUCTIONS
Instructions:  - [facility] Pract[facility] to schedule home follow-up visit with patient in 6-8 weeks or as needed.  - Continue all medications, treatments and therapies as ordered.   - Follow all instructions, recommendations as discussed.  - Maintain Safety Precautions at all times.  - Attend all medical appointments as scheduled.  - For worsening symptoms: call Primary Care Physician or Nurse Practitioner.  - For emergencies, call 911 or immediately report to the nearest emergency room.  - Limit Risks of environmental exposure to coronavirus/COVID-19 as discussed including: social distancing, hand hygiene, and limiting departures from the home for necessities only.

## 2023-03-27 NOTE — ED PROVIDER NOTES
Encounter Date: 3/27/2023       History     Chief Complaint   Patient presents with    Hypertension     Brought to ED form [facility]. 911 was alerted for htn.on ems arrival, pt c/o generalized body pain since Saturday.       Patient is an 88-year-old female with a history of hypertension brought in by EMS from her [facility] living center where she was found to have elevated blood pressure this morning.  Patient complains of generalized body aches.  No reported fever.  She denies nausea, vomiting or diarrhea.  No chest pain or shortness of breath.  Patient says she takes medicines for her blood pressure but believes she was not given this this morning.    Review of patient's allergies indicates:  No Known Allergies  No past medical history on file.  No past surgical history on file.  No family history on file.     Review of Systems   Constitutional:  Negative for fever.   Respiratory:  Negative for shortness of breath.    Cardiovascular:  Negative for chest pain.   Gastrointestinal:  Negative for abdominal pain, diarrhea, nausea and vomiting.   Musculoskeletal:  Positive for myalgias.     Physical Exam     Initial Vitals [03/27/23 0848]   BP Pulse Resp Temp SpO2   (!) 190/100 110 18 98.6 °F (37 °C) 95 %      MAP       --         Physical Exam    Nursing note and vitals reviewed.  Constitutional: No distress.   HENT:   Head: Atraumatic.   Eyes: EOM are normal.   Neck: Neck supple.   Cardiovascular:  Normal rate, regular rhythm and normal heart sounds.           Pulmonary/Chest: Breath sounds normal.   Abdominal: Abdomen is soft. Bowel sounds are normal.   Very mild diffuse tenderness without guarding or rebound.   Musculoskeletal:         General: No edema. Normal range of motion.      Cervical back: Neck supple.     Neurological: She is alert.   Psychiatric: Her behavior is normal. Thought content normal.       ED Course   Procedures  Labs Reviewed   URINALYSIS, REFLEX TO URINE CULTURE - Abnormal; Notable for  the following components:       Result Value    Occult Blood UA Trace (*)     All other components within normal limits    Narrative:     Specimen Source->Urine   CBC W/ AUTO DIFFERENTIAL - Abnormal; Notable for the following components:    Gran % 79.8 (*)     Lymph % 14.2 (*)     All other components within normal limits   COMPREHENSIVE METABOLIC PANEL - Abnormal; Notable for the following components:    CO2 18 (*)     All other components within normal limits   CK - Abnormal; Notable for the following components:     (*)     All other components within normal limits   B-TYPE NATRIURETIC PEPTIDE - Abnormal; Notable for the following components:     (*)     All other components within normal limits   TROPONIN I   MAGNESIUM   SARS-COV-2 RDRP GENE          Imaging Results              X-Ray Chest 1 View (Final result)  Result time 03/27/23 10:08:53      Final result by Ren Hickey MD (03/27/23 10:08:53)                   Impression:      No acute process.      Electronically signed by: Ren Hickey MD  Date:    03/27/2023  Time:    10:08               Narrative:    EXAMINATION:  XR CHEST 1 VIEW    CLINICAL HISTORY:  Hypertension;    TECHNIQUE:  Single frontal view of the chest was performed.    COMPARISON:  None    FINDINGS:  The trachea is unremarkable.  There are calcifications of the aortic knob.  The cardiomediastinal silhouette is within normal limits.  The hemidiaphragms are unremarkable.  There are no pleural effusions.  There is no evidence of a pneumothorax.  There is an azygous fissure.  There is no evidence of pneumomediastinum.  No airspace opacity is present.  The osseous structures demonstrate degenerative changes.                                       Medications   lisinopriL tablet 10 mg (10 mg Oral Given 3/27/23 0942)   ketorolac injection 9.999 mg (9.999 mg Intravenous Given 3/27/23 2936)     Medical Decision Making:   Initial Assessment:   88-year-old female from her assisted living  [place] where she was found to have elevated blood pressure.  Patient complains of body aches.  No headache, chest pain or shortness of breath.  Clinical Tests:   Lab Tests: Ordered and Reviewed  The following lab test(s) were unremarkable: CBC, CMP and Urinalysis  ED Management:  Patient was given Toradol here in the ED with resolution of her body aches.  Initial blood pressure was elevated as well as final one but patient becomes very anxious and tenses up when the blood pressure cuff inflates.  BNP is elevated at 815, however patient's lungs are clear to auscultation and her oxygen saturations are 95% on room air in the patient denies any breathing difficulties.  She reports feeling better with no complaints asking to be sent back to her assisted living center.  I feel she may be safely discharged with close follow-up.  She may return to the ED for any recurrence of symptoms or any other urgent concerns.                        Clinical Impression:   Final diagnoses:  [I10] Hypertension, unspecified type (Primary)  [M79.10] Myalgia        ED Disposition Condition    Discharge Stable          ED Prescriptions       Medication Sig Dispense Start Date End Date Auth. Provider    ibuprofen (ADVIL,MOTRIN) 400 MG tablet Take 1 tablet (400 mg total) by mouth every 6 (six) hours as needed (pain). 30 tablet 3/27/2023 -- Tyrese Segura MD          Follow-up Information       Follow up With Specialties Details Why Contact Info    Rula Mays, NP Hospitalist Schedule an appointment as soon as possible for a visit   [address] [facility] [address]  [phone]      [place] [facility] - Emergency Dept Emergency Medicine  If symptoms worsen [address] [facility] [id number] [phone]             Tyrese Segura MD  03/27/23 2334

## 2023-04-18 PROBLEM — N17.9 AKI (ACUTE KIDNEY INJURY): Status: ACTIVE | Noted: 2023-01-01

## 2023-04-18 PROBLEM — N39.0 UTI (URINARY TRACT INFECTION): Status: ACTIVE | Noted: 2023-01-01

## 2023-04-18 PROBLEM — R79.89 ELEVATED TROPONIN: Status: ACTIVE | Noted: 2023-01-01

## 2023-04-18 PROBLEM — N30.01 ACUTE CYSTITIS WITH HEMATURIA: Status: ACTIVE | Noted: 2023-01-01

## 2023-04-18 NOTE — ED PROVIDER NOTES
"Encounter Date: 4/18/2023       History     Chief Complaint   Patient presents with    Generalized Body Aches     Lives at [facility],      88-year-old female presents with caretaker for multiple complaints.  Per the patient, she says that she believes she has a urinary tract infection.  Caretaker says that the patient has multiple areas of pain from her "neck down to her ankle. "They have multiple concerns from chronic pain to difficulty with ambulation to her urinary incontinence.  Urinary incontinence is a chronic issue however may be worsening per the patient.  Patient said that she has been having the chronic pain in her back however it is not as worse as usual as she has been taking tramadol.  Reportedly she is on Macrobid as well as meclizine however intermittently takes both of these.  Both the caretaker and the patient are poor historians and if contradicting history is.  HPI limited secondary to poor historian on both the patient as well as the caretaker at bedside.  Caretaker becomes argumentative with patient when she does not have a corroborating history.  Caretaker says that the patient complains of significant pain with ambulation-when discussed with the patient directly, she denies this and says that her pain is improved and has been there for some time however is unable to quantify.    Review of patient's allergies indicates:  No Known Allergies  No past medical history on file.  No past surgical history on file.  No family history on file.     Review of Systems   Reason unable to perform ROS: Poor historian.     Physical Exam     Initial Vitals [04/18/23 1444]   BP Pulse Resp Temp SpO2   (!) 187/81 85 18 97.6 °F (36.4 °C) 100 %      MAP       --         Physical Exam    Nursing note and vitals reviewed.  Constitutional:   Older female, no acute distress   HENT:   Head: Normocephalic and atraumatic.   Mucous membranes are dry   Eyes: EOM are normal. Pupils are equal, round, and reactive to " light.   Neck: No JVD present.   No tenderness to the C-spine with palpation   Cardiovascular:  Normal rate and regular rhythm.           Pulmonary/Chest: Breath sounds normal. No stridor. No respiratory distress.   Abdominal: Abdomen is soft. There is no abdominal tenderness.   Musculoskeletal:         General: No edema. Normal range of motion.      Comments: No tenderness to the cervical, thoracic, lumbar midline with palpation.  No bony step-off or crepitus.     Neurological: She is alert and oriented to person, place, and time. GCS score is 15. GCS eye subscore is 4. GCS verbal subscore is 5. GCS motor subscore is 6.   Skin: Skin is warm and dry. Capillary refill takes less than 2 seconds.   Psychiatric: She has a normal mood and affect. Thought content normal.       ED Course   Procedures  Labs Reviewed   CBC W/ AUTO DIFFERENTIAL - Abnormal; Notable for the following components:       Result Value    Gran # (ANC) 7.8 (*)     All other components within normal limits   COMPREHENSIVE METABOLIC PANEL - Abnormal; Notable for the following components:    Chloride 112 (*)     CO2 15 (*)     BUN 50 (*)     eGFR 43.8 (*)     All other components within normal limits   URINALYSIS, REFLEX TO URINE CULTURE - Abnormal; Notable for the following components:    Appearance, UA Cloudy (*)     Protein, UA 1+ (*)     Occult Blood UA 3+ (*)     Leukocytes, UA 3+ (*)     All other components within normal limits    Narrative:     Specimen Source->Urine   TROPONIN I - Abnormal; Notable for the following components:    Troponin I 0.063 (*)     All other components within normal limits   URINALYSIS MICROSCOPIC - Abnormal; Notable for the following components:    RBC, UA >100 (*)     WBC, UA >100 (*)     Bacteria Many (*)     All other components within normal limits    Narrative:     Specimen Source->Urine   ISTAT PROCEDURE - Abnormal; Notable for the following components:    POC PCO2 31.9 (*)     POC PO2 34 (*)     POC HCO3 18.1 (*)      POC SATURATED O2 64 (*)     POC TCO2 19 (*)     All other components within normal limits   CULTURE, URINE   HIV 1 / 2 ANTIBODY    Narrative:     Release to patient->Immediate   HEPATITIS C ANTIBODY    Narrative:     Release to patient->Immediate          Imaging Results              CT Head Without Contrast (Final result)  Result time 04/18/23 20:29:51      Final result by Maurice Hernández MD (04/18/23 20:29:51)                   Impression:      No acute large vascular territory infarct or intracranial hemorrhage identified.  If persistent neurologic deficit, MRI brain can be obtained.    Sequela of senescent and chronic microvascular ischemic change.    Suspected multifocal age-indeterminate lacunar type infarcts, as above.  Clinical correlation advised.      Electronically signed by: Maurice Hernández MD  Date:    04/18/2023  Time:    20:29               Narrative:    EXAMINATION:  CT HEAD WITHOUT CONTRAST    CLINICAL HISTORY:  ams;    TECHNIQUE:  Low dose axial CT images obtained throughout the head without intravenous contrast. Sagittal and coronal reconstructions were performed.    COMPARISON:  None.    FINDINGS:  Beam hardening with streak artifact somewhat limits evaluation.    Intracranial compartment: Brain appears normally formed.    Age-related generalized cerebral volume loss.  Ventricles are midline without distortion by mass effect or acute hydrocephalus.  No extra-axial blood or fluid collections.    Patchy hypoattenuation of the supratentorial white matter consistent with chronic microvascular ischemic change.  More focal areas of hypoattenuation at the right corona radiata, bilateral basal ganglia, bilateral external capsule, left caudate and anterior upper left michelle amy in a region of streak artifact, likely age-indeterminate lacunar type infarcts, noting no priors for comparison.  No parenchymal mass, hemorrhage, edema or major vascular distribution infarct.  Skull base scattered advanced  atherosclerotic vascular calcifications noted.    Skull/extracranial contents (limited evaluation): No fracture. Mastoid air cells and paranasal sinuses are essentially clear.  Suspected remote operative change to the right ocular lens.                                       CT Entire Spine Without Contrast (Final result)  Result time 04/18/23 21:12:59      Final result by Maurice Hernández MD (04/18/23 21:12:59)                   Impression:      No acute osseous abnormality.    Lumbar spondylosis as described above, most prominent L4-L5 with moderate-severe spinal canal stenosis.    Additional findings as above.    Electronically signed by resident: Butch Fay  Date:    04/18/2023  Time:    20:18    Electronically signed by: Maurice Hernández MD  Date:    04/18/2023  Time:    21:12               Narrative:    EXAMINATION:  CT ENTIRE SPINE WITHOUT CONTRAST (XPD)    CLINICAL HISTORY:  pain all over;    TECHNIQUE:  Low dose axial images, sagittal and coronal reformations were performed though the cervical, thoracic, lumbar spine.  Contrast was not administered.    COMPARISON:  Same-date CT head.    FINDINGS:  Alignment: Minimal grade 1 anterolisthesis of L4 on L5.  Levocurvature of the cervical, thoracic and lumbar spine, likely positional.  Otherwise, within normal limits.    Vertebra: There is no evidence of acute displaced fracture.  Chronic appearing multilevel minimal to mild anterior wedge deformity of multiple thoracic and also L1 and L2 vertebral bodies.  No acute vertebral compression fracture.  Diffuse osteopenia.  Lumbar lordosis is maintained.  Five non-rib-bearing lumbar type vertebral bodies.    Discs: Multilevel degenerative disc disease with height loss, with multilevel vacuum disc phenomena.    Focal degenerative findings as below.    Multilevel facet arthropathy throughout the cervical spine.  Posterior disc osteophyte complex resulting in mild acquired canal stenosis with mild bilateral neural foraminal  narrowing at C5-6 level.  No high-grade spinal canal stenosis or severe neural foraminal narrowing at any cervical level.    No high-grade spinal canal stenosis or neural foraminal narrowing at any thoracic level.    Multilevel disc bulges, facet arthropathy, ligamentum flavum buckling.  Findings contribute to multilevel neural foraminal narrowing, most prominently with mild-moderate narrowing at L2-L3 bilaterally.  Multilevel spinal canal stenosis most prominently with moderate-severe stenosis at L4-L5.    Miscellaneous: The craniocervical junction and visualized intracranial contents are better evaluated on concomitant head CT.  The adjacent soft tissue structures demonstrate calcific atherosclerosis of the bilateral carotid bifurcations.  Thyroid appears unremarkable.  Incidental azygous pulmonary lobe.  Advanced aortoiliac calcific atherosclerosis including branch vessels.  Coronary artery calcific atherosclerosis and valvular calcifications.  Small hiatal hernia.  Partially imaged large left renal cyst.  Sclerotic focus of the left ilium, likely bone island.                                       X-Ray Chest AP Portable (Final result)  Result time 04/18/23 20:08:59      Final result by Maurice Hernández MD (04/18/23 20:08:59)                   Impression:      No radiographic acute intrathoracic process seen on this single view.      Electronically signed by: Maurice Hernández MD  Date:    04/18/2023  Time:    20:08               Narrative:    EXAMINATION:  XR CHEST AP PORTABLE    CLINICAL HISTORY:  Altered mental status, unspecified    TECHNIQUE:  Single frontal view of the chest was performed.    COMPARISON:  Chest radiograph 03/27/2023    FINDINGS:  Monitoring leads overlie the chest.  Large body habitus.  Patient is slightly rotated.    Cardiomediastinal silhouette is midline with similar calcification and tortuosity of the aorta.  Heart size is normal.  Pulmonary vasculature and hilar contours are within normal  limits.    Azygous lobe configuration on the right.  Nonspecific elevation of the right hemidiaphragm.  Bibasilar minimal platelike scarring versus atelectasis.  The lungs are otherwise well expanded without consolidation, pleural effusion or pneumothorax.  No acute osseous process definitively seen.  PA and lateral views can be obtained.                                       Medications   LIDOcaine 5 % patch 1 patch (1 patch Transdermal Patch Applied 4/18/23 1655)   cefTRIAXone (ROCEPHIN) 1 g in dextrose 5 % in water (D5W) 5 % 50 mL IVPB (MB+) (0 g Intravenous Stopped 4/18/23 1925)     Medical Decision Making:   Initial Assessment:   Hemodynamically stable. Afebrile. Phonating and protecting the airway spontaneously. No clinical evidence for cardiovascular instability or impending airway compromise. Examination as above.  Difficult historian from both the patient and the caretaker.  Palpation of the entire cervical, thoracic, lumbar spine is benign the patient has no pain with palpation throughout.  The caretaker says that any palpation of the patient results in her being in significant pain however I do not appreciate this on my examination.  They have contradicting history is and become frustrated with another mostly from the caretaker to the patient.  They saw an outpatient Internal Medicine PA who they say or very concern regarding this chronic pain as well as urinary tract infection concerns.  Will obtain basic set of labs as well as urine.  Due to the contradictory dynamic between the patient and caretaker, will obtain a CT scan and reassess.                 Labs reviewed.  The patient has evidence for urinary tract infection will receive ceftriaxone.  Troponin is elevated. The patient was reassessed frequently and managed aggressively while in the emergency department. Due to the clinical workup and presentation, the patients condition is concerning and will require additional evaluation. I discussed the  objective findings with her including laboratory studies, diagnostic imaging, and any consultant involvement. She was educated on their clinical presentation and all questions were answered. She acknowledged and verbally agreed to the treatment plan. The patient will be admitted to the hospital for further management.     DISCLAIMER: This note was prepared with [employer] voice recognition transcription software. Garbled syntax, mangled pronouns, and other bizarre constructions may be attributed to that software system.         Clinical Impression:   Final diagnoses:  [R41.82] AMS (altered mental status)  [N39.0] UTI (urinary tract infection)  [R77.8] Troponin level elevated (Primary)        ED Disposition Condition    Observation Stable                yEad Cruz MD  04/18/23 2953

## 2023-04-18 NOTE — Clinical Note
Diagnosis: UTI (urinary tract infection) [326915]   Future Attending Provider: YRN LE [[id number]]   Admitting Provider:: MARCELINO BARAKAT [[id number]]

## 2023-04-18 NOTE — TELEPHONE ENCOUNTER
----- Message from [facility] sent at 4/11/2023  3:48 PM CDT -----  .Type:  Same Day Appointment Request     Caller is requesting a same day appointment       Caller declined first available appointment 5/22       Best Call Back Number: [phone]    Additional Information: NONE

## 2023-04-18 NOTE — PROGRESS NOTES
Subjective:       Patient ID: Radha Sandoval is a 88 y.o. female.        Chief Complaint: Urinary Tract Infection    Radha Sandoval is an established patient of Rula Mays NP here today for urgent care visit.    Son Maykel is here with her today.  Also new caregiver Chel who has only known her for a few days.      Lives at [facility], assisted living.  Does NOT have 24 hour/day care.  Administers her own medications.  Chel shares that she found her pillbox with all meds in a drawer and none taken for at least a week.    Drinks diet coke, not much water.  Wonders if dehydrated.    Incontinent, wears depends, depends have been soaking wet.    She is breathing quickly, son attributes to anxiety, but unclear baseline.    He is giving her meclizine that he buys online and also glucose tablets for unclear reasons.    She complains of excruciating body pain.  Unable to walk due to pain.  Started 2 weeks ago.    She feels weak.     Mental status unclear, seems more confused to caregiver.         Review of Systems   Constitutional:  Negative for chills, diaphoresis, fatigue and fever.   HENT:  Negative for congestion and sore throat.    Eyes:  Negative for visual disturbance.   Respiratory:  Negative for cough, chest tightness and shortness of breath.    Cardiovascular:  Negative for chest pain, palpitations and leg swelling.   Gastrointestinal:  Negative for abdominal pain, blood in stool, constipation, diarrhea, nausea and vomiting.   Genitourinary:  Negative for dysuria, frequency, hematuria and urgency.   Musculoskeletal:  Positive for arthralgias, back pain, gait problem and myalgias.   Skin:  Negative for rash.   Neurological:  Negative for dizziness, syncope, weakness and headaches.   Psychiatric/Behavioral:  Negative for dysphoric mood and sleep disturbance. The patient is not nervous/anxious.      Objective:      Physical Exam  Vitals and nursing note reviewed.   Constitutional:       Appearance: Normal  appearance. She is well-developed.   HENT:      Head: Normocephalic.      Right Ear: External ear normal.      Left Ear: External ear normal.   Eyes:      Pupils: Pupils are equal, round, and reactive to light.   Cardiovascular:      Rate and Rhythm: Normal rate and regular rhythm.      Heart sounds: Normal heart sounds. No murmur heard.    No friction rub. No gallop.   Pulmonary:      Effort: Pulmonary effort is normal. Tachypnea present. No respiratory distress.      Breath sounds: Normal breath sounds.   Abdominal:      Palpations: Abdomen is soft.      Tenderness: There is no abdominal tenderness.   Musculoskeletal:      Comments: Sensitive to even light touch   Skin:     General: Skin is warm and dry.   Neurological:      Mental Status: She is alert.       Assessment:       1. Myalgia    2. Hypertension, unspecified type    3. Urinary incontinence, unspecified type    4. Weakness    5. Altered mental status, unspecified altered mental status type        Plan:       Radha was seen today for urinary tract infection.    Diagnoses and all orders for this visit:    Myalgia  -     Refer to Emergency Dept.    Hypertension, unspecified type  -     Refer to Emergency Dept.    Urinary incontinence, unspecified type  -     Refer to Emergency Dept.    Weakness  -     Refer to Emergency Dept.    Altered mental status, unspecified altered mental status type        -     Refer to Emergency Dept.    Importance of establishing with a PCP to coordinate care reviewed at length  Discussed not giving medications if not on medication list    Chel - new caregiver - [phone]  Maykel - son - [phone]    Pt has been given instructions populated from patient instructions database and has verbalized understanding of the after visit summary and information contained wherein.    Follow up with a primary care provider. May go to ER for acute shortness of breath, lightheadedness, fever, or any other emergent complaints or changes in  "condition.    "This note will be shared with the patient"    Future Appointments   Date Time Provider Department Center   5/22/2023  3:15 PM DO MACIE Graham                 "

## 2023-04-19 PROBLEM — R53.81 DEBILITY: Status: ACTIVE | Noted: 2023-01-01

## 2023-04-19 NOTE — ASSESSMENT & PLAN NOTE
Resolved  Patient with acute kidney injury likely due to IVVD/dehydration GINA is currently stable. Labs reviewed- Renal function/electrolytes with Estimated Creatinine Clearance: 48.9 mL/min (based on SCr of 0.8 mg/dL). according to latest data. Monitor urine output and serial BMP and adjust therapy as needed. Avoid nephrotoxins and renally dose meds for GFR listed above.   - Cr 1.2 on admit   - Cr 0.8 ~3 weeks ago  - 1L LR bolus given  - UTI noted and patient endorses poor oral hydration  - Monitor renal function on daily BMP

## 2023-04-19 NOTE — PLAN OF CARE
[facility]H  Initial Discharge Assessment       Primary Care Provider: Rula Mays NP    Admission Diagnosis: UTI (urinary tract infection) [N39.0]  Troponin level elevated [R77.8]  Chest pain [R07.9]  AMS (altered mental status) [R41.82]    Admission Date: 4/18/2023  Expected Discharge Date: 4/20/2023         Payor: MEDICARE / Plan: MEDICARE PART A & B / Product Type: Government /     Extended Emergency Contact Information  Primary Emergency Contact: tony griffith  Mobile Phone: [phone]  Relation: Son   needed? No  Secondary Emergency Contact: chel ramirez  Mobile Phone: [phone]  Relation: Other   needed? No    Discharge Plan A: (P) Assisted Living  Discharge Plan B: (P) Skilled Nursing Facility    No Pharmacies Listed    Initial Assessment (most recent)       Adult Discharge Assessment - 04/19/23 1254          Discharge Assessment    Assessment Type Discharge Planning Assessment     Confirmed/corrected address, phone number and insurance Yes     Confirmed Demographics Correct on Facesheet     Source of Information patient     People in Home facility resident     Facility Arrived From: [facility]     Do you expect to return to your current living situation? Yes     Prior to hospitilization cognitive status: Alert/Oriented     Current cognitive status: Alert/Oriented     Equipment Currently Used at Home walker, rolling     Readmission within 30 days? No     Patient currently being followed by outpatient case management? No     Do you take prescription medications? Yes     Do you have prescription coverage? Yes     Do you have any problems affording any of your prescribed medications? TBD     Are you on dialysis? No (P)      Do you take coumadin? No (P)      Discharge Plan A Assisted Living (P)      Discharge Plan B Skilled Nursing Facility (P)      Discharge Plan discussed with: Patient (P)    Chel - Hired                            SW completed  Discharge Planning Assessment with patient and Chel, hired  via bedside. Discharge planning booklet given to patient/family and whiteboard updated with TOSHA and phone #. All questions answered.    Patient may need assistance with transportation upon discharge.     Patient reported that she is a resident at [facility], and prior to hospitalization she needed assistance with her ADL's. Patient reported that she uses a walker. Patient is not on dialysis and does not go to a Coumadin clinic.     Chel reported that she has been hired by patient's son, Maykel to be a  for patient to assist her with running errands and making sure patient takes her medication. Chel reported that she does not think patient has been taking her medicine as she should and patient needs to see a primary care provider to make sure she is taking the right medication. Chel reported that she does not think patient is eating meals regularly and she does not go to the dining palma at the facility while they are serving food. Patient reported that she has not felt like going to the dining palma; however, she does it the food that they bring to her room, and some times friends will bring her something to eat. Chel reported that patient is unable to bathe herself and has been being incontinent. Chel reported that patient my benefit from having a caregiver or going to a skilled nursing facility for a short time in order to get back to her baseline.       Lyndsay West, BIPIN  [facility]  Ext. [phone]

## 2023-04-19 NOTE — H&P
"[facility] [address] [place] [address] Medicine  History & Physical    Patient Name: Radha Sandoval  MRN: [id number]  Patient Class: OP- Observation  Admission Date: 4/18/2023  Attending Physician: Ling Cabello MD   Primary Care Provider: Rula Mays NP      Patient information was obtained from patient, caregiver / friend, past medical records and ER records.     Subjective:     Principal Problem:Acute cystitis with hematuria    Chief Complaint:   Chief Complaint   Patient presents with    Generalized Body Aches     Lives at [facility],         HPI: Radha Sandoval is a 88 y.o. female with unclear PMHx, who presents for multiple complaints. Patient is accompanied by her new caretaker, who has only known the patient for less than a week. Patient currently resides at [facility]. She was sent from the [facility] to see a PCP for a possible urinary tract infection and was seen in primary care clinic this afternoon. At the appointment, patient was unable to ambulate, had excruciating body pain, tachypnea and altered mental status. Patient was sent to the ED for further evaluation. Of note, patient has not been established with a PCP for 3-4 years now.    Upon evaluation in the ED, patient noted to have an UTI and elevated troponin. Patient denies any urinary symptoms. However, the caretaker states the patient was told about her UTI already and was placed on macrobid last week, though there is no mention of this on chart review. Patient supposedly took only one dose of macrobid on Saturday. Caretaker states that her mental status has changed since she first met the patient last week. The caretaker also noted that patient administers her own medications and she found patient's pillbox in a drawer full, with no medications taken for at least a week. Lengthy discussion had at bedside regarding patient's chief complaint. Patient stated "she just wants someone to tell her what " "medications to take and when". Patient endorses worsening back pain, from cervical to lumbar spine. At baseline, patient uses a rolling walker to ambulate, but has had difficulty walking for the last two weeks. Patient supposedly takes meclizine, glucose tablets (unknown reason) and ibuprofen on a daily basis, which the son buys online. Overall history is limited and unclear due to patient being a poor historian. Currently endorses dyspnea and back pain. Denies chest pain, abdominal pain, headache, F/N/V/D.    ED: /81, afebrile, on RA. CBC wnl. CMP with Cr 1.2, BUN 50. Troponin 0.063. UA with 3+ leukocytes, >100 RBC, >100 WBC. Urine culture pending. CXR negative. CT Head negative. CT Entire Spine with No acute osseous abnormality. Received IV Ceftriaxone 1g and Lidocaine patch in ED.       No past medical history on file.    No past surgical history on file.    Review of patient's allergies indicates:  No Known Allergies    No current facility-administered medications on file prior to encounter.     Current Outpatient Medications on File Prior to Encounter   Medication Sig    amLODIPine (NORVASC) 10 MG tablet Take 10 mg by mouth once daily.    ibuprofen (ADVIL,MOTRIN) 400 MG tablet Take 1 tablet (400 mg total) by mouth every 6 (six) hours as needed (pain).    lisinopriL 10 MG tablet Take 10 mg by mouth once daily.    ramelteon (ROZEREM) 8 mg tablet Take 8 mg by mouth every evening.     Family History    None       Tobacco Use    Smoking status: Not on file    Smokeless tobacco: Not on file   Substance and Sexual Activity    Alcohol use: Not on file    Drug use: Not on file    Sexual activity: Not on file     Review of Systems   Constitutional:  Positive for activity change. Negative for chills and fever.   HENT:  Negative for trouble swallowing.    Eyes:  Negative for photophobia and visual disturbance.   Respiratory:  Positive for shortness of breath. Negative for cough and chest tightness.  "   Cardiovascular:  Negative for chest pain, palpitations and leg swelling.   Gastrointestinal:  Negative for abdominal pain, constipation, diarrhea, nausea and vomiting.   Genitourinary:  Negative for decreased urine volume, difficulty urinating, dysuria, frequency, hematuria and urgency.   Musculoskeletal:  Positive for back pain and gait problem (unable to use rolling walker). Negative for neck pain.   Skin:  Negative for rash and wound.   Neurological:  Negative for dizziness, syncope, speech difficulty and light-headedness.   Psychiatric/Behavioral:  Negative for agitation and confusion. The patient is not nervous/anxious.    Objective:     Vital Signs (Most Recent):  Temp: 97.6 °F (36.4 °C) (04/18/23 1444)  Pulse: 72 (04/18/23 2125)  Resp: 19 (04/18/23 2125)  BP: (!) 172/60 (04/18/23 2125)  SpO2: 99 % (04/18/23 2125)   Vital Signs (24h Range):  Temp:  [97.4 °F (36.3 °C)-97.6 °F (36.4 °C)] 97.6 °F (36.4 °C)  Pulse:  [72-85] 72  Resp:  [18-19] 19  SpO2:  [98 %-100 %] 99 %  BP: (152-187)/(60-81) 172/60     Weight: 70.8 kg (156 lb)  Body mass index is 25.96 kg/m².    Physical Exam  Vitals and nursing note reviewed.   Constitutional:       General: She is not in acute distress.     Appearance: She is well-developed. She is not ill-appearing.      Comments: Older, cooperative, easily redirected   HENT:      Head: Normocephalic and atraumatic.      Mouth/Throat:      Pharynx: No oropharyngeal exudate.   Eyes:      Conjunctiva/sclera: Conjunctivae normal.      Pupils: Pupils are equal, round, and reactive to light.   Cardiovascular:      Rate and Rhythm: Normal rate and regular rhythm.      Heart sounds: Normal heart sounds.   Pulmonary:      Effort: Pulmonary effort is normal. No respiratory distress.      Breath sounds: Normal breath sounds. No wheezing.   Abdominal:      General: Bowel sounds are normal. There is no distension.      Palpations: Abdomen is soft.      Tenderness: There is no abdominal tenderness.    Musculoskeletal:         General: No tenderness. Normal range of motion.      Cervical back: Normal range of motion and neck supple.      Comments: No TTP to spine   Lymphadenopathy:      Cervical: No cervical adenopathy.   Skin:     General: Skin is warm and dry.      Capillary Refill: Capillary refill takes less than 2 seconds.      Findings: No rash.   Neurological:      Mental Status: She is alert and oriented to person, place, and time.      Cranial Nerves: No cranial nerve deficit.      Sensory: No sensory deficit.      Coordination: Coordination normal.      Comments: Baseline unknown   Psychiatric:         Behavior: Behavior normal.         Thought Content: Thought content normal.         Judgment: Judgment normal.         CRANIAL NERVES     CN III, IV, VI   Pupils are equal, round, and reactive to light.     Significant Labs: All pertinent labs within the past 24 hours have been reviewed.  CBC:   Recent Labs   Lab 04/18/23  1633   WBC 11.13   HGB 13.1   HCT 37.8        CMP:   Recent Labs   Lab 04/18/23  1633      K 3.7   *   CO2 15*      BUN 50*   CREATININE 1.2   CALCIUM 9.9   PROT 7.4   ALBUMIN 3.6   BILITOT 0.6   ALKPHOS 66   AST 28   ALT 15   ANIONGAP 14     Troponin:   Recent Labs   Lab 04/18/23  1633   TROPONINI 0.063*     Urine Studies:   Recent Labs   Lab 04/18/23  1826   COLORU Mikayla   APPEARANCEUA Cloudy*   PHUR 5.0   SPECGRAV 1.025   PROTEINUA 1+*   GLUCUA Negative   KETONESU Negative   BILIRUBINUA Negative   OCCULTUA 3+*   NITRITE Negative   LEUKOCYTESUR 3+*   RBCUA >100*   WBCUA >100*   BACTERIA Many*   SQUAMEPITHEL 17   HYALINECASTS 0       Significant Imaging: I have reviewed all pertinent imaging results/findings within the past 24 hours.  CT Entire Spine Without Contrast  Narrative: EXAMINATION:  CT ENTIRE SPINE WITHOUT CONTRAST (XPD)    CLINICAL HISTORY:  pain all over;    TECHNIQUE:  Low dose axial images, sagittal and coronal reformations were performed though  the cervical, thoracic, lumbar spine.  Contrast was not administered.    COMPARISON:  Same-date CT head.    FINDINGS:  Alignment: Minimal grade 1 anterolisthesis of L4 on L5.  Levocurvature of the cervical, thoracic and lumbar spine, likely positional.  Otherwise, within normal limits.    Vertebra: There is no evidence of acute displaced fracture.  Chronic appearing multilevel minimal to mild anterior wedge deformity of multiple thoracic and also L1 and L2 vertebral bodies.  No acute vertebral compression fracture.  Diffuse osteopenia.  Lumbar lordosis is maintained.  Five non-rib-bearing lumbar type vertebral bodies.    Discs: Multilevel degenerative disc disease with height loss, with multilevel vacuum disc phenomena.    Focal degenerative findings as below.    Multilevel facet arthropathy throughout the cervical spine.  Posterior disc osteophyte complex resulting in mild acquired canal stenosis with mild bilateral neural foraminal narrowing at C5-6 level.  No high-grade spinal canal stenosis or severe neural foraminal narrowing at any cervical level.    No high-grade spinal canal stenosis or neural foraminal narrowing at any thoracic level.    Multilevel disc bulges, facet arthropathy, ligamentum flavum buckling.  Findings contribute to multilevel neural foraminal narrowing, most prominently with mild-moderate narrowing at L2-L3 bilaterally.  Multilevel spinal canal stenosis most prominently with moderate-severe stenosis at L4-L5.    Miscellaneous: The craniocervical junction and visualized intracranial contents are better evaluated on concomitant head CT.  The adjacent soft tissue structures demonstrate calcific atherosclerosis of the bilateral carotid bifurcations.  Thyroid appears unremarkable.  Incidental azygous pulmonary lobe.  Advanced aortoiliac calcific atherosclerosis including branch vessels.  Coronary artery calcific atherosclerosis and valvular calcifications.  Small hiatal hernia.  Partially imaged  large left renal cyst.  Sclerotic focus of the left ilium, likely bone island.  Impression: No acute osseous abnormality.    Lumbar spondylosis as described above, most prominent L4-L5 with moderate-severe spinal canal stenosis.    Additional findings as above.    Electronically signed by resident: Butch Fay  Date:    04/18/2023  Time:    20:18    Electronically signed by: Maurice Hernández MD  Date:    04/18/2023  Time:    21:12  CT Head Without Contrast  Narrative: EXAMINATION:  CT HEAD WITHOUT CONTRAST    CLINICAL HISTORY:  ams;    TECHNIQUE:  Low dose axial CT images obtained throughout the head without intravenous contrast. Sagittal and coronal reconstructions were performed.    COMPARISON:  None.    FINDINGS:  Beam hardening with streak artifact somewhat limits evaluation.    Intracranial compartment: Brain appears normally formed.    Age-related generalized cerebral volume loss.  Ventricles are midline without distortion by mass effect or acute hydrocephalus.  No extra-axial blood or fluid collections.    Patchy hypoattenuation of the supratentorial white matter consistent with chronic microvascular ischemic change.  More focal areas of hypoattenuation at the right corona radiata, bilateral basal ganglia, bilateral external capsule, left caudate and anterior upper left michelle amy in a region of streak artifact, likely age-indeterminate lacunar type infarcts, noting no priors for comparison.  No parenchymal mass, hemorrhage, edema or major vascular distribution infarct.  Skull base scattered advanced atherosclerotic vascular calcifications noted.    Skull/extracranial contents (limited evaluation): No fracture. Mastoid air cells and paranasal sinuses are essentially clear.  Suspected remote operative change to the right ocular lens.  Impression: No acute large vascular territory infarct or intracranial hemorrhage identified.  If persistent neurologic deficit, MRI brain can be obtained.    Sequela of senescent and  chronic microvascular ischemic change.    Suspected multifocal age-indeterminate lacunar type infarcts, as above.  Clinical correlation advised.    Electronically signed by: Maurice Hernández MD  Date:    04/18/2023  Time:    20:29  X-Ray Chest AP Portable  Narrative: EXAMINATION:  XR CHEST AP PORTABLE    CLINICAL HISTORY:  Altered mental status, unspecified    TECHNIQUE:  Single frontal view of the chest was performed.    COMPARISON:  Chest radiograph 03/27/2023    FINDINGS:  Monitoring leads overlie the chest.  Large body habitus.  Patient is slightly rotated.    Cardiomediastinal silhouette is midline with similar calcification and tortuosity of the aorta.  Heart size is normal.  Pulmonary vasculature and hilar contours are within normal limits.    Azygous lobe configuration on the right.  Nonspecific elevation of the right hemidiaphragm.  Bibasilar minimal platelike scarring versus atelectasis.  The lungs are otherwise well expanded without consolidation, pleural effusion or pneumothorax.  No acute osseous process definitively seen.  PA and lateral views can be obtained.  Impression: No radiographic acute intrathoracic process seen on this single view.    Electronically signed by: Maurice Hernández MD  Date:    04/18/2023  Time:    20:08      Assessment/Plan:     * Acute cystitis with hematuria  88 y.o. female admitted to  observation with acute cystitis with hematuria. Patient diagnosed with UTI last week, was prescribed macrobid, however only took one dose. Denies any urinary symptoms. However, was sent from [facility] to see PCP for concerns of worsening UTI and associated AMS. Patient sent to ED from primary care clinic for further evaluation.    - UA with 3+ occult blood, 3+ leukocytes, >100 RBC, >100 WBC  - Afebrile, no leukocytosis  - A&O x3, however caretaker states patient mental status is altered since last week  - Started IV Ceftriaxone 1g q24h  - Urine culture pending, follow up  - Monitor for  improvement in mentation and other symptoms  - Transition to oral antibiotics when clinically appropriate    Elevated troponin  - Troponin 0.063 - repeat pending  - EKG completed in ED, however results unavailable in EMR - will follow up   - No acute abnormalities noted by ED team  - Monitor telemetry  - TTE ordered, no baseline available in EMR    GINA (acute kidney injury)  Patient with acute kidney injury likely due to IVVD/dehydration GINA is currently stable. Labs reviewed- Renal function/electrolytes with Estimated Creatinine Clearance: 32.6 mL/min (based on SCr of 1.2 mg/dL). according to latest data. Monitor urine output and serial BMP and adjust therapy as needed. Avoid nephrotoxins and renally dose meds for GFR listed above.   - Cr 1.2 on admit   - Cr 0.8 ~3 weeks ago  - 1L LR bolus given  - UTI noted and patient endorses poor oral hydration  - Monitor renal function on daily BMP    Hypertension  - Patient non-compliant with anti-hypertensive medications  - History of amlodpine and lisinopril noted on chart review, however patient states she is not taking either and unable to say when she last took them  - Start amlodipine 10 mg daily  - Hold lisinopril 10 mg daily, in setting of GINA - resume when appropriate  - PRN hydralazine for SBP >180  - Monitor BP with q4h vitals   - Close PCP f/u on discharge      VTE Risk Mitigation (From admission, onward)         Ordered     enoxaparin injection 40 mg  Daily         04/18/23 2247     IP VTE HIGH RISK PATIENT  Once         04/18/23 2247                On 04/18/2023, patient should be placed in hospital observation services under my care in collaboration with Dr. Harvinder Huston.      Angelica Wadsworth PA-C  Department of Hospital Medicine  [facility] - Observation 11H

## 2023-04-19 NOTE — ASSESSMENT & PLAN NOTE
- Patient non-compliant with anti-hypertensive medications  - History of amlodpine and lisinopril noted on chart review, however patient states she is not taking either and unable to say when she last took them  - Start amlodipine 10 mg daily  - Hold lisinopril 10 mg daily, in setting of GINA - resume when appropriate  - PRN hydralazine for SBP >180  - Monitor BP with q4h vitals   - Close PCP f/u on discharge

## 2023-04-19 NOTE — ASSESSMENT & PLAN NOTE
- Patient's history provided by son and hemant, who is new to patient  - Sitter feels patient is a good candidate for SNF/rehab  - Patient's son, Maykel, states that patient is able to ambulate with walker at baseline, but reports that patient's had recent decline in functional status over the last week or two. Discussed that [facility] wants patient to be able to ambulate independently because they don't provide any assistance. Son states he'll consider SNF pending clinical improvement with UTI treatment  - Nursing to assist patient with ambulation  - Ordered walker to bedside  - Current plan to discharge back to [facility] with HH once medically ready  - HH orders placed  - Will monitor for improvement with treating underlying infection

## 2023-04-19 NOTE — PLAN OF CARE
Problem: Adult Inpatient Plan of Care  Goal: Plan of Care Review  4/19/2023 0931 by Edgardo Huston RN  Outcome: Ongoing, Progressing  4/19/2023 0931 by Edgardo Huston RN  Outcome: Ongoing, Progressing  Goal: Patient-Specific Goal (Individualized)  4/19/2023 0931 by Edgardo Huston RN  Outcome: Ongoing, Progressing  4/19/2023 0931 by Edgardo Huston RN  Outcome: Ongoing, Progressing  Goal: Absence of Hospital-Acquired Illness or Injury  4/19/2023 0931 by Edgardo Huston RN  Outcome: Ongoing, Progressing  4/19/2023 0931 by Edgardo Huston RN  Outcome: Ongoing, Progressing  Goal: Optimal Comfort and Wellbeing  4/19/2023 0931 by Edgardo Huston RN  Outcome: Ongoing, Progressing  4/19/2023 0931 by Edgardo Huston RN  Outcome: Ongoing, Progressing  Goal: Readiness for Transition of Care  4/19/2023 0931 by Edgardo Huston RN  Outcome: Ongoing, Progressing  4/19/2023 0931 by Edgardo Huston RN  Outcome: Ongoing, Progressing     Problem: Fluid and Electrolyte Imbalance (Acute Kidney Injury/Impairment)  Goal: Fluid and Electrolyte Balance  4/19/2023 0931 by Edgardo Huston RN  Outcome: Ongoing, Progressing  4/19/2023 0931 by Edgardo Huston RN  Outcome: Ongoing, Progressing     Problem: Oral Intake Inadequate (Acute Kidney Injury/Impairment)  Goal: Optimal Nutrition Intake  4/19/2023 0931 by Edgardo Huston RN  Outcome: Ongoing, Progressing  4/19/2023 0931 by Edgardo Huston RN  Outcome: Ongoing, Progressing     Problem: Renal Function Impairment (Acute Kidney Injury/Impairment)  Goal: Effective Renal Function  4/19/2023 0931 by Edgardo Huston RN  Outcome: Ongoing, Progressing  4/19/2023 0931 by Edgardo Huston RN  Outcome: Ongoing, Progressing     Problem: Infection  Goal: Absence of Infection Signs and Symptoms  4/19/2023 0931 by Edgardo Huston RN  Outcome: Ongoing, Progressing  4/19/2023 0931 by Edgardo Huston RN  Outcome: Ongoing, Progressing     Problem: Fall Injury Risk  Goal: Absence of Fall and Fall-Related Injury  4/19/2023  0931 by Edgardo Huston RN  Outcome: Ongoing, Progressing  4/19/2023 0931 by Edgardo Huston RN  Outcome: Ongoing, Progressing     Problem: Skin Injury Risk Increased  Goal: Skin Health and Integrity  4/19/2023 0931 by Edgardo Huston RN  Outcome: Ongoing, Progressing  4/19/2023 0931 by Edgardo Huston RN  Outcome: Ongoing, Progressing

## 2023-04-19 NOTE — SUBJECTIVE & OBJECTIVE
Interval History: HAMLET ROWE. K 2.6, replaced PO and IV. Evaluated patient at bedside. Patient oriented to self, place, year. States random thoughts, but is able to provide some history. She denies any pain or urinary symptoms at this time. Sitter at bedside who states patient unable to care for herself. Sitter found soiled diaper in drawer with medications at [facility]. Unsure if patient is taking medication as prescribed. Sitter feels patient is a good candidate for rehab facility. Will discuss with son.     Spoke with patient's son, Maykel. He states that patient is able to ambulate with walker at baseline, but reports that patient's had recent decline in functional status over the last week or two. Son not interested in SNF at this time. Nursing to assist patient with ambulation. Ordered walker to bedside. Discussed that [facility] wants patient to be able to ambulate independently because they don't provide any assistance. Son states he'll consider SNF pending clinical improvement with UTI treatment. Current plan to discharge back to [facility] with  once medically ready.    Review of Systems   Constitutional:  Positive for activity change. Negative for chills and fever.   HENT:  Negative for trouble swallowing.    Eyes:  Negative for photophobia and visual disturbance.   Respiratory:  Positive for shortness of breath. Negative for cough and chest tightness.    Cardiovascular:  Negative for chest pain, palpitations and leg swelling.   Gastrointestinal:  Negative for abdominal pain, constipation, diarrhea, nausea and vomiting.   Genitourinary:  Negative for decreased urine volume, difficulty urinating, dysuria, frequency, hematuria and urgency.   Musculoskeletal:  Positive for back pain and gait problem (unable to use rolling walker). Negative for neck pain.   Skin:  Negative for rash and wound.   Neurological:  Negative for dizziness, syncope, speech difficulty and light-headedness.   Psychiatric/Behavioral:  Negative for  agitation and confusion. The patient is not nervous/anxious.    Objective:     Vital Signs (Most Recent):  Temp: 98.3 °F (36.8 °C) (04/19/23 1719)  Pulse: (!) 125 (04/19/23 1719)  Resp: 18 (04/19/23 1719)  BP: 136/71 (04/19/23 1719)  SpO2: 99 % (04/19/23 1719)   Vital Signs (24h Range):  Temp:  [97.8 °F (36.6 °C)-98.4 °F (36.9 °C)] 98.3 °F (36.8 °C)  Pulse:  [] 125  Resp:  [16-20] 18  SpO2:  [98 %-99 %] 99 %  BP: (107-172)/(60-77) 136/71     Weight: 70.8 kg (156 lb)  Body mass index is 25.96 kg/m².    Intake/Output Summary (Last 24 hours) at 4/19/2023 1819  Last data filed at 4/19/2023 1230  Gross per 24 hour   Intake 530 ml   Output 350 ml   Net 180 ml      Physical Exam  Vitals and nursing note reviewed.   Constitutional:       General: She is not in acute distress.     Appearance: She is well-developed. She is not ill-appearing.      Comments: Older, cooperative, easily redirected   HENT:      Head: Normocephalic and atraumatic.      Mouth/Throat:      Pharynx: No oropharyngeal exudate.   Eyes:      Conjunctiva/sclera: Conjunctivae normal.      Pupils: Pupils are equal, round, and reactive to light.   Cardiovascular:      Rate and Rhythm: Normal rate and regular rhythm.      Heart sounds: Normal heart sounds.   Pulmonary:      Effort: Pulmonary effort is normal. No respiratory distress.      Breath sounds: Normal breath sounds. No wheezing.   Abdominal:      General: Bowel sounds are normal. There is no distension.      Palpations: Abdomen is soft.      Tenderness: There is no abdominal tenderness.   Musculoskeletal:         General: No tenderness. Normal range of motion.      Cervical back: Normal range of motion and neck supple.      Comments: No TTP to spine   Lymphadenopathy:      Cervical: No cervical adenopathy.   Skin:     General: Skin is warm and dry.      Capillary Refill: Capillary refill takes less than 2 seconds.      Findings: No rash.   Neurological:      Mental Status: She is alert and  oriented to person, place, and time.      Cranial Nerves: No cranial nerve deficit.      Sensory: No sensory deficit.      Coordination: Coordination normal.      Comments: Baseline unknown   Psychiatric:         Behavior: Behavior normal.         Thought Content: Thought content normal.         Judgment: Judgment normal.       Significant Labs: All pertinent labs within the past 24 hours have been reviewed.    Significant Imaging: I have reviewed all pertinent imaging results/findings within the past 24 hours.

## 2023-04-19 NOTE — PLAN OF CARE
04/19/23 1529   Post-Acute Status   Post-Acute Authorization Home Health   Home Health Status Set-up Complete/Auth obtained     SW received notification that pt has been accepted with Felisa West LMSW  [facility]  Ext. [phone]

## 2023-04-19 NOTE — PLAN OF CARE
04/19/23 1434   Post-Acute Status   Post-Acute Authorization Home Health   Home Health Status Referrals Sent     Pt is expected to discharge back to [facility] with home health once she is medically stable. SW sent referrals via [facility] and is waiting for an accepting agency.    CLAUDIA will continue to follow up.    Lyndsay West LMSW  [facility]  Ext. [phone]

## 2023-04-19 NOTE — ASSESSMENT & PLAN NOTE
88 y.o. female admitted to  observation with acute cystitis with hematuria. Patient diagnosed with UTI last week, was prescribed macrobid, however only took one dose. Denies any urinary symptoms. However, was sent from [facility] to see PCP for concerns of worsening UTI and associated AMS. Patient sent to ED from primary care clinic for further evaluation.    - UA with 3+ occult blood, 3+ leukocytes, >100 RBC, >100 WBC  - Afebrile, no leukocytosis  - A&O x3, however caretaker states patient mental status is altered since last week  - Started IV Ceftriaxone 1g q24h  - Urine culture pending, follow up  - Monitor for improvement in mentation and other symptoms  - Transition to oral antibiotics when clinically appropriate

## 2023-04-19 NOTE — HPI
"Radha Sandoval is a 88 y.o. female with unclear PMHx, who presents for multiple complaints. Patient is accompanied by her new caretaker, who has only known the patient for less than a week. Patient currently resides at [facility]. She was sent from the [facility] to see a PCP for a possible urinary tract infection and was seen in primary care clinic this afternoon. At the appointment, patient was unable to ambulate, had excruciating body pain, tachypnea and altered mental status. Patient was sent to the ED for further evaluation. Of note, patient has not been established with a PCP for 3-4 years now.    Upon evaluation in the ED, patient noted to have an UTI and elevated troponin. Patient denies any urinary symptoms. However, the caretaker states the patient was told about her UTI already and was placed on macrobid last week, though there is no mention of this on chart review. Patient supposedly took only one dose of macrobid on Saturday. Caretaker states that her mental status has changed since she first met the patient last week. The caretaker also noted that patient administers her own medications and she found patient's pillbox in a drawer full, with no medications taken for at least a week. Lengthy discussion had at bedside regarding patient's chief complaint. Patient stated "she just wants someone to tell her what medications to take and when". Patient endorses worsening back pain, from cervical to lumbar spine. At baseline, patient uses a rolling walker to ambulate, but has had difficulty walking for the last two weeks. Patient supposedly takes meclizine, glucose tablets (unknown reason) and ibuprofen on a daily basis, which the son buys online. Overall history is limited and unclear due to patient being a poor historian. Currently endorses dyspnea and back pain. Denies chest pain, abdominal pain, headache, F/N/V/D.    ED: /81, afebrile, on RA. CBC wnl. CMP with Cr " 1.2, BUN 50. Troponin 0.063. UA with 3+ leukocytes, >100 RBC, >100 WBC. Urine culture pending. CXR negative. CT Head negative. CT Entire Spine with No acute osseous abnormality. Received IV Ceftriaxone 1g and Lidocaine patch in ED.

## 2023-04-19 NOTE — ASSESSMENT & PLAN NOTE
88 y.o. female admitted to  observation with acute cystitis with hematuria. Patient diagnosed with UTI last week, was prescribed macrobid, however only took one dose. Denies any urinary symptoms. However, was sent from [facility] to see PCP for concerns of worsening UTI and associated AMS. Patient sent to ED from primary care clinic for further evaluation.    - UA with 3+ occult blood, 3+ leukocytes, >100 RBC, >100 WBC  - Afebrile, no leukocytosis  - A&O x3, however caretaker states patient mental status is altered since last week  - Started IV Ceftriaxone 1g q24h  - Urine culture pending, follow up  - Monitor for improvement in mentation and other symptoms  - Transition to oral antibiotics when clinically appropriate

## 2023-04-19 NOTE — ASSESSMENT & PLAN NOTE
- Troponin 0.063 - repeat pending  - EKG completed in ED, however results unavailable in EMR - will follow up   - No acute abnormalities noted by ED team  - Monitor telemetry  - TTE ordered, no baseline available in EMR

## 2023-04-19 NOTE — ASSESSMENT & PLAN NOTE
- Troponin 0.063, repeat flat  - EKG completed in ED, however results unavailable in EMR - will follow up   - No acute abnormalities noted by ED team  - Repeat EKG NSR  - Monitor telemetry  - TTE ordered, no baseline available in EMR  Results for orders placed during the hospital encounter of 04/18/23  Echo  Interpretation Summary  · The left ventricle is normal in size with normal systolic function. The estimated ejection fraction is 65%.  · Normal right ventricular size with normal right ventricular systolic function.  · Moderate left atrial enlargement.  · There is moderate mitral stenosis. The mean diastolic gradient across the mitral valve is 7 mmHg at a heart rate of bpm.  · There is mild-to-moderate aortic valve stenosis. Aortic valve area is 1.4 cm2; peak velocity is 3.0 m/s; mean gradient is 22 mmHg.  · The estimated PA systolic pressure is 34 mmHg.  · Normal central venous pressure (3 mmHg).

## 2023-04-19 NOTE — PLAN OF CARE
[facility]      HOME HEALTH ORDERS  FACE TO FACE ENCOUNTER    Patient Name: Radha Sandoval  YOB: 1935    PCP: Rula Mays NP   PCP Address: [address] / [place] [address]  PCP Phone Number: [phone]  PCP Fax: [phone]    Encounter Date: 4/18/23    Admit to Home Health    Diagnoses:  Active Hospital Problems    Diagnosis  POA    *Acute cystitis with hematuria [N30.01]  Yes    Elevated troponin [R77.8]  Yes    GINA (acute kidney injury) [N17.9]  Yes    Hypertension [I10]  Yes      Resolved Hospital Problems   No resolved problems to display.       Follow Up Appointments:  Future Appointments   Date Time Provider Department Center   5/22/2023  3:15 PM Mariposa Loya DO [facility]       Allergies:Review of patient's allergies indicates:  No Known Allergies    Medications: Review discharge medications with patient and family and provide education.    Current Facility-Administered Medications   Medication Dose Route Frequency Provider Last Rate Last Admin    acetaminophen tablet 1,000 mg  1,000 mg Oral Q8H PRN ADEEL Carrillo-C   1,000 mg at 04/19/23 0004    acetaminophen tablet 650 mg  650 mg Oral Q4H PRN Angelica Wadsworth PA-C        aluminum-magnesium hydroxide-simethicone 200-200-20 mg/5 mL suspension 30 mL  30 mL Oral QID PRN Angelica Wadsworth PA-C        amLODIPine tablet 10 mg  10 mg Oral Daily Angelica Wadsworth PA-C   10 mg at 04/19/23 0827    bisacodyL suppository 10 mg  10 mg Rectal Daily PRN Angelica Wadsworth PA-C        cefTRIAXone (ROCEPHIN) 1 g in dextrose 5 % in water (D5W) 5 % 50 mL IVPB (MB+)  1 g Intravenous Q24H Angelica Wadsworth PA-C        dextrose 10% bolus 125 mL 125 mL  12.5 g Intravenous PRN Angelica Wadsworth PA-C        dextrose 10% bolus 250 mL 250 mL  25 g Intravenous PRN Angelica Wadsworth PA-C        dextrose 40 % gel 15,000 mg  15 g Oral PRN Angelica Wadsworth PA-C        dextrose 40 % gel 30,000 mg  30 g Oral PRN Angelica DEIDRA  JANY Wadsworth        enoxaparin injection 40 mg  40 mg Subcutaneous Daily Angelica Wadsworth PA-C        glucagon (human recombinant) injection 1 mg  1 mg Intramuscular PRN Angelica Wadsworth PA-C        hydrALAZINE tablet 25 mg  25 mg Oral Q8H PRN Angelicacher Wadsworth PA-C        ibuprofen tablet 400 mg  400 mg Oral Q6H PRN Angelica Wadsworth PA-C        melatonin tablet 6 mg  6 mg Oral Nightly PRN AngelicaADEEL Juan-C   6 mg at 04/19/23 0004    naloxone 0.4 mg/mL injection 0.02 mg  0.02 mg Intravenous PRN Angelicacher Wadsworth PA-C        ondansetron disintegrating tablet 8 mg  8 mg Oral Q8H PRN Angelicacher Wadsworth PA-C        polyethylene glycol packet 17 g  17 g Oral Daily PRN Angelicacher Wadsworth PA-C        prochlorperazine injection Soln 5 mg  5 mg Intravenous Q6H PRN Angelica Wadsworth PA-C        simethicone chewable tablet 80 mg  1 tablet Oral QID PRN Angelicacher Wadsworth PA-C        sodium chloride 0.9% flush 5 mL  5 mL Intravenous PRN Angelicacher Wadsworth PA-C         Current Discharge Medication List        CONTINUE these medications which have NOT CHANGED    Details   amLODIPine (NORVASC) 10 MG tablet Take 10 mg by mouth once daily.      ibuprofen (ADVIL,MOTRIN) 400 MG tablet Take 1 tablet (400 mg total) by mouth every 6 (six) hours as needed (pain).  Qty: 30 tablet, Refills: 0      lisinopriL 10 MG tablet Take 10 mg by mouth once daily.      ramelteon (ROZEREM) 8 mg tablet Take 8 mg by mouth every evening.               I have seen and examined this patient within the last 30 days. My clinical findings that support the need for the home health skilled services and home bound status are the following:no   Weakness/numbness causing balance and gait disturbance due to Weakness/Debility making it taxing to leave home.     Diet:   cardiac diet    Referrals/ Consults  Physical Therapy to evaluate and treat. Evaluate for home safety and equipment needs; Establish/upgrade home exercise program. Perform / instruct on therapeutic  exercises, gait training, transfer training, and Range of Motion.  Occupational Therapy to evaluate and treat. Evaluate home environment for safety and equipment needs. Perform/Instruct on transfers, ADL training, ROM, and therapeutic exercises.  Aide to provide assistance with personal care, ADLs, and vital signs.    Activities:   activity as tolerated    Nursing:   Agency to admit patient within 24 hours of hospital discharge unless specified on physician order or at patient request    SN to complete comprehensive assessment including routine vital signs. Instruct on disease process and s/s of complications to report to MD. Review/verify medication list sent home with the patient at time of discharge  and instruct patient/caregiver as needed. Frequency may be adjusted depending on start of care date.     Skilled nurse to perform up to 3 visits PRN for symptoms related to diagnosis    Notify MD if SBP > 160 or < 90; DBP > 90 or < 50; HR > 120 or < 50; Temp > 101; O2 < 88%;    Ok to schedule additional visits based on staff availability and patient request on consecutive days within the home health episode.    When multiple disciplines ordered:    Start of Care occurs on Sunday - Wednesday schedule remaining discipline evaluations as ordered on separate consecutive days following the start of care.    Thursday SOC -schedule subsequent evaluations Friday and Monday the following week.     Friday - Saturday SOC - schedule subsequent discipline evaluations on consecutive days starting Monday of the following week.    For all post-discharge communication and subsequent orders please contact patient's primary care physician. If unable to reach primary care physician or do not receive response within 30 minutes, please contact [phone] for clinical staff order clarification    Home Health Aide:  Nursing Three times weekly, Physical Therapy Three times weekly, Occupational Therapy Three times weekly, and Home Health  Aide Three times weekly    Wound Care Orders  no    I certify that this patient is confined to her home and needs intermittent skilled nursing care, physical therapy, and occupational therapy.

## 2023-04-19 NOTE — SUBJECTIVE & OBJECTIVE
No past medical history on file.    No past surgical history on file.    Review of patient's allergies indicates:  No Known Allergies    No current facility-administered medications on file prior to encounter.     Current Outpatient Medications on File Prior to Encounter   Medication Sig    amLODIPine (NORVASC) 10 MG tablet Take 10 mg by mouth once daily.    ibuprofen (ADVIL,MOTRIN) 400 MG tablet Take 1 tablet (400 mg total) by mouth every 6 (six) hours as needed (pain).    lisinopriL 10 MG tablet Take 10 mg by mouth once daily.    ramelteon (ROZEREM) 8 mg tablet Take 8 mg by mouth every evening.     Family History    None       Tobacco Use    Smoking status: Not on file    Smokeless tobacco: Not on file   Substance and Sexual Activity    Alcohol use: Not on file    Drug use: Not on file    Sexual activity: Not on file     Review of Systems   Constitutional:  Positive for activity change. Negative for chills and fever.   HENT:  Negative for trouble swallowing.    Eyes:  Negative for photophobia and visual disturbance.   Respiratory:  Positive for shortness of breath. Negative for cough and chest tightness.    Cardiovascular:  Negative for chest pain, palpitations and leg swelling.   Gastrointestinal:  Negative for abdominal pain, constipation, diarrhea, nausea and vomiting.   Genitourinary:  Negative for decreased urine volume, difficulty urinating, dysuria, frequency, hematuria and urgency.   Musculoskeletal:  Positive for back pain and gait problem (unable to use rolling walker). Negative for neck pain.   Skin:  Negative for rash and wound.   Neurological:  Negative for dizziness, syncope, speech difficulty and light-headedness.   Psychiatric/Behavioral:  Negative for agitation and confusion. The patient is not nervous/anxious.    Objective:     Vital Signs (Most Recent):  Temp: 97.6 °F (36.4 °C) (04/18/23 1444)  Pulse: 72 (04/18/23 2125)  Resp: 19 (04/18/23 2125)  BP: (!) 172/60 (04/18/23 2125)  SpO2: 99 %  (04/18/23 2125)   Vital Signs (24h Range):  Temp:  [97.4 °F (36.3 °C)-97.6 °F (36.4 °C)] 97.6 °F (36.4 °C)  Pulse:  [72-85] 72  Resp:  [18-19] 19  SpO2:  [98 %-100 %] 99 %  BP: (152-187)/(60-81) 172/60     Weight: 70.8 kg (156 lb)  Body mass index is 25.96 kg/m².    Physical Exam  Vitals and nursing note reviewed.   Constitutional:       General: She is not in acute distress.     Appearance: She is well-developed. She is not ill-appearing.      Comments: Older, cooperative, easily redirected   HENT:      Head: Normocephalic and atraumatic.      Mouth/Throat:      Pharynx: No oropharyngeal exudate.   Eyes:      Conjunctiva/sclera: Conjunctivae normal.      Pupils: Pupils are equal, round, and reactive to light.   Cardiovascular:      Rate and Rhythm: Normal rate and regular rhythm.      Heart sounds: Normal heart sounds.   Pulmonary:      Effort: Pulmonary effort is normal. No respiratory distress.      Breath sounds: Normal breath sounds. No wheezing.   Abdominal:      General: Bowel sounds are normal. There is no distension.      Palpations: Abdomen is soft.      Tenderness: There is no abdominal tenderness.   Musculoskeletal:         General: No tenderness. Normal range of motion.      Cervical back: Normal range of motion and neck supple.      Comments: No TTP to spine   Lymphadenopathy:      Cervical: No cervical adenopathy.   Skin:     General: Skin is warm and dry.      Capillary Refill: Capillary refill takes less than 2 seconds.      Findings: No rash.   Neurological:      Mental Status: She is alert and oriented to person, place, and time.      Cranial Nerves: No cranial nerve deficit.      Sensory: No sensory deficit.      Coordination: Coordination normal.      Comments: Baseline unknown   Psychiatric:         Behavior: Behavior normal.         Thought Content: Thought content normal.         Judgment: Judgment normal.         CRANIAL NERVES     CN III, IV, VI   Pupils are equal, round, and reactive to  light.     Significant Labs: All pertinent labs within the past 24 hours have been reviewed.  CBC:   Recent Labs   Lab 04/18/23  1633   WBC 11.13   HGB 13.1   HCT 37.8        CMP:   Recent Labs   Lab 04/18/23  1633      K 3.7   *   CO2 15*      BUN 50*   CREATININE 1.2   CALCIUM 9.9   PROT 7.4   ALBUMIN 3.6   BILITOT 0.6   ALKPHOS 66   AST 28   ALT 15   ANIONGAP 14     Troponin:   Recent Labs   Lab 04/18/23  1633   TROPONINI 0.063*     Urine Studies:   Recent Labs   Lab 04/18/23  1826   COLORU Mikayla   APPEARANCEUA Cloudy*   PHUR 5.0   SPECGRAV 1.025   PROTEINUA 1+*   GLUCUA Negative   KETONESU Negative   BILIRUBINUA Negative   OCCULTUA 3+*   NITRITE Negative   LEUKOCYTESUR 3+*   RBCUA >100*   WBCUA >100*   BACTERIA Many*   SQUAMEPITHEL 17   HYALINECASTS 0       Significant Imaging: I have reviewed all pertinent imaging results/findings within the past 24 hours.  CT Entire Spine Without Contrast  Narrative: EXAMINATION:  CT ENTIRE SPINE WITHOUT CONTRAST (XPD)    CLINICAL HISTORY:  pain all over;    TECHNIQUE:  Low dose axial images, sagittal and coronal reformations were performed though the cervical, thoracic, lumbar spine.  Contrast was not administered.    COMPARISON:  Same-date CT head.    FINDINGS:  Alignment: Minimal grade 1 anterolisthesis of L4 on L5.  Levocurvature of the cervical, thoracic and lumbar spine, likely positional.  Otherwise, within normal limits.    Vertebra: There is no evidence of acute displaced fracture.  Chronic appearing multilevel minimal to mild anterior wedge deformity of multiple thoracic and also L1 and L2 vertebral bodies.  No acute vertebral compression fracture.  Diffuse osteopenia.  Lumbar lordosis is maintained.  Five non-rib-bearing lumbar type vertebral bodies.    Discs: Multilevel degenerative disc disease with height loss, with multilevel vacuum disc phenomena.    Focal degenerative findings as below.    Multilevel facet arthropathy throughout the  cervical spine.  Posterior disc osteophyte complex resulting in mild acquired canal stenosis with mild bilateral neural foraminal narrowing at C5-6 level.  No high-grade spinal canal stenosis or severe neural foraminal narrowing at any cervical level.    No high-grade spinal canal stenosis or neural foraminal narrowing at any thoracic level.    Multilevel disc bulges, facet arthropathy, ligamentum flavum buckling.  Findings contribute to multilevel neural foraminal narrowing, most prominently with mild-moderate narrowing at L2-L3 bilaterally.  Multilevel spinal canal stenosis most prominently with moderate-severe stenosis at L4-L5.    Miscellaneous: The craniocervical junction and visualized intracranial contents are better evaluated on concomitant head CT.  The adjacent soft tissue structures demonstrate calcific atherosclerosis of the bilateral carotid bifurcations.  Thyroid appears unremarkable.  Incidental azygous pulmonary lobe.  Advanced aortoiliac calcific atherosclerosis including branch vessels.  Coronary artery calcific atherosclerosis and valvular calcifications.  Small hiatal hernia.  Partially imaged large left renal cyst.  Sclerotic focus of the left ilium, likely bone island.  Impression: No acute osseous abnormality.    Lumbar spondylosis as described above, most prominent L4-L5 with moderate-severe spinal canal stenosis.    Additional findings as above.    Electronically signed by resident: Butch Fay  Date:    04/18/2023  Time:    20:18    Electronically signed by: Maurice Hernández MD  Date:    04/18/2023  Time:    21:12  CT Head Without Contrast  Narrative: EXAMINATION:  CT HEAD WITHOUT CONTRAST    CLINICAL HISTORY:  ams;    TECHNIQUE:  Low dose axial CT images obtained throughout the head without intravenous contrast. Sagittal and coronal reconstructions were performed.    COMPARISON:  None.    FINDINGS:  Beam hardening with streak artifact somewhat limits evaluation.    Intracranial compartment:  Brain appears normally formed.    Age-related generalized cerebral volume loss.  Ventricles are midline without distortion by mass effect or acute hydrocephalus.  No extra-axial blood or fluid collections.    Patchy hypoattenuation of the supratentorial white matter consistent with chronic microvascular ischemic change.  More focal areas of hypoattenuation at the right corona radiata, bilateral basal ganglia, bilateral external capsule, left caudate and anterior upper left michelle amy in a region of streak artifact, likely age-indeterminate lacunar type infarcts, noting no priors for comparison.  No parenchymal mass, hemorrhage, edema or major vascular distribution infarct.  Skull base scattered advanced atherosclerotic vascular calcifications noted.    Skull/extracranial contents (limited evaluation): No fracture. Mastoid air cells and paranasal sinuses are essentially clear.  Suspected remote operative change to the right ocular lens.  Impression: No acute large vascular territory infarct or intracranial hemorrhage identified.  If persistent neurologic deficit, MRI brain can be obtained.    Sequela of senescent and chronic microvascular ischemic change.    Suspected multifocal age-indeterminate lacunar type infarcts, as above.  Clinical correlation advised.    Electronically signed by: Maurice Hernández MD  Date:    04/18/2023  Time:    20:29  X-Ray Chest AP Portable  Narrative: EXAMINATION:  XR CHEST AP PORTABLE    CLINICAL HISTORY:  Altered mental status, unspecified    TECHNIQUE:  Single frontal view of the chest was performed.    COMPARISON:  Chest radiograph 03/27/2023    FINDINGS:  Monitoring leads overlie the chest.  Large body habitus.  Patient is slightly rotated.    Cardiomediastinal silhouette is midline with similar calcification and tortuosity of the aorta.  Heart size is normal.  Pulmonary vasculature and hilar contours are within normal limits.    Azygous lobe configuration on the right.  Nonspecific  elevation of the right hemidiaphragm.  Bibasilar minimal platelike scarring versus atelectasis.  The lungs are otherwise well expanded without consolidation, pleural effusion or pneumothorax.  No acute osseous process definitively seen.  PA and lateral views can be obtained.  Impression: No radiographic acute intrathoracic process seen on this single view.    Electronically signed by: Maurice Hernández MD  Date:    04/18/2023  Time:    20:08

## 2023-04-19 NOTE — PROGRESS NOTES
"[facility] [address] [place] [address] Medicine  Progress Note    Patient Name: Radha Sandoval  MRN: [id number]  Patient Class: IP- Inpatient   Admission Date: 4/18/2023  Length of Stay: 0 days  Attending Physician: Ling Cabello MD  Primary Care Provider: No primary care provider on file.        Subjective:     Principal Problem:Acute cystitis with hematuria        HPI:  Radha Sandoval is a 88 y.o. female with unclear PMHx, who presents for multiple complaints. Patient is accompanied by her new caretaker, who has only known the patient for less than a week. Patient currently resides at [facility]. She was sent from the [facility] to see a PCP for a possible urinary tract infection and was seen in primary care clinic this afternoon. At the appointment, patient was unable to ambulate, had excruciating body pain, tachypnea and altered mental status. Patient was sent to the ED for further evaluation. Of note, patient has not been established with a PCP for 3-4 years now.    Upon evaluation in the ED, patient noted to have an UTI and elevated troponin. Patient denies any urinary symptoms. However, the caretaker states the patient was told about her UTI already and was placed on macrobid last week, though there is no mention of this on chart review. Patient supposedly took only one dose of macrobid on Saturday. Caretaker states that her mental status has changed since she first met the patient last week. The caretaker also noted that patient administers her own medications and she found patient's pillbox in a drawer full, with no medications taken for at least a week. Lengthy discussion had at bedside regarding patient's chief complaint. Patient stated "she just wants someone to tell her what medications to take and when". Patient endorses worsening back pain, from cervical to lumbar spine. At baseline, patient uses a rolling walker to ambulate, but has had difficulty walking for the " last two weeks. Patient supposedly takes meclizine, glucose tablets (unknown reason) and ibuprofen on a daily basis, which the son buys online. Overall history is limited and unclear due to patient being a poor historian. Currently endorses dyspnea and back pain. Denies chest pain, abdominal pain, headache, F/N/V/D.    ED: /81, afebrile, on RA. CBC wnl. CMP with Cr 1.2, BUN 50. Troponin 0.063. UA with 3+ leukocytes, >100 RBC, >100 WBC. Urine culture pending. CXR negative. CT Head negative. CT Entire Spine with No acute osseous abnormality. Received IV Ceftriaxone 1g and Lidocaine patch in ED.       Overview/Hospital Course:  Radha Sandoval was admitted to Hospital Medicine for acute cystitis. Continuing Ceftriaxone at this time. Following urine cultures. Had in depth discussion with patient's son and sitter. Son not interested in SNF placement, current plan for HH at [facility]. Ordered walker to bedside for hospital use. Echo completed: normal LV systolic function, EF of 65%, normal RV systolic, moderate LA enlargement, moderate mitral stenosis, mild-to-moderate aortic valve stenosis, normal CVP. Patient needs to establish care with PCP. Son aware of plan of care.       Interval History: HAMLET ROWE. K 2.6, replaced PO and IV. Evaluated patient at bedside. Patient oriented to self, place, year. States random thoughts, but is able to provide some history. She denies any pain or urinary symptoms at this time. Sitter at bedside who states patient unable to care for herself. Sitter found soiled diaper in drawer with medications at [facility]. Unsure if patient is taking medication as prescribed. Sitter feels patient is a good candidate for rehab facility. Will discuss with son.     Spoke with patient's son, Maykel. He states that patient is able to ambulate with walker at baseline, but reports that patient's had recent decline in functional status over the last week or two. Son not interested in SNF at this time. Nursing to  assist patient with ambulation. Ordered walker to bedside. Discussed that [facility] wants patient to be able to ambulate independently because they don't provide any assistance. Son states he'll consider SNF pending clinical improvement with UTI treatment. Current plan to discharge back to [facility] with HH once medically ready.    Review of Systems   Constitutional:  Positive for activity change. Negative for chills and fever.   HENT:  Negative for trouble swallowing.    Eyes:  Negative for photophobia and visual disturbance.   Respiratory:  Positive for shortness of breath. Negative for cough and chest tightness.    Cardiovascular:  Negative for chest pain, palpitations and leg swelling.   Gastrointestinal:  Negative for abdominal pain, constipation, diarrhea, nausea and vomiting.   Genitourinary:  Negative for decreased urine volume, difficulty urinating, dysuria, frequency, hematuria and urgency.   Musculoskeletal:  Positive for back pain and gait problem (unable to use rolling walker). Negative for neck pain.   Skin:  Negative for rash and wound.   Neurological:  Negative for dizziness, syncope, speech difficulty and light-headedness.   Psychiatric/Behavioral:  Negative for agitation and confusion. The patient is not nervous/anxious.    Objective:     Vital Signs (Most Recent):  Temp: 98.3 °F (36.8 °C) (04/19/23 1719)  Pulse: (!) 125 (04/19/23 1719)  Resp: 18 (04/19/23 1719)  BP: 136/71 (04/19/23 1719)  SpO2: 99 % (04/19/23 1719)   Vital Signs (24h Range):  Temp:  [97.8 °F (36.6 °C)-98.4 °F (36.9 °C)] 98.3 °F (36.8 °C)  Pulse:  [] 125  Resp:  [16-20] 18  SpO2:  [98 %-99 %] 99 %  BP: (107-172)/(60-77) 136/71     Weight: 70.8 kg (156 lb)  Body mass index is 25.96 kg/m².    Intake/Output Summary (Last 24 hours) at 4/19/2023 1819  Last data filed at 4/19/2023 1230  Gross per 24 hour   Intake 530 ml   Output 350 ml   Net 180 ml      Physical Exam  Vitals and nursing note reviewed.   Constitutional:       General: She is  not in acute distress.     Appearance: She is well-developed. She is not ill-appearing.      Comments: Older, cooperative, easily redirected   HENT:      Head: Normocephalic and atraumatic.      Mouth/Throat:      Pharynx: No oropharyngeal exudate.   Eyes:      Conjunctiva/sclera: Conjunctivae normal.      Pupils: Pupils are equal, round, and reactive to light.   Cardiovascular:      Rate and Rhythm: Normal rate and regular rhythm.      Heart sounds: Normal heart sounds.   Pulmonary:      Effort: Pulmonary effort is normal. No respiratory distress.      Breath sounds: Normal breath sounds. No wheezing.   Abdominal:      General: Bowel sounds are normal. There is no distension.      Palpations: Abdomen is soft.      Tenderness: There is no abdominal tenderness.   Musculoskeletal:         General: No tenderness. Normal range of motion.      Cervical back: Normal range of motion and neck supple.      Comments: No TTP to spine   Lymphadenopathy:      Cervical: No cervical adenopathy.   Skin:     General: Skin is warm and dry.      Capillary Refill: Capillary refill takes less than 2 seconds.      Findings: No rash.   Neurological:      Mental Status: She is alert and oriented to person, place, and time.      Cranial Nerves: No cranial nerve deficit.      Sensory: No sensory deficit.      Coordination: Coordination normal.      Comments: Baseline unknown   Psychiatric:         Behavior: Behavior normal.         Thought Content: Thought content normal.         Judgment: Judgment normal.       Significant Labs: All pertinent labs within the past 24 hours have been reviewed.    Significant Imaging: I have reviewed all pertinent imaging results/findings within the past 24 hours.      Assessment/Plan:      * Acute cystitis with hematuria  88 y.o. female admitted to  observation with acute cystitis with hematuria. Patient diagnosed with UTI last week, was prescribed macrobid, however only took one dose. Denies any urinary  symptoms. However, was sent from assisted living center to see PCP for concerns of worsening UTI and associated AMS. Patient sent to ED from primary care clinic for further evaluation.    - UA with 3+ occult blood, 3+ leukocytes, >100 RBC, >100 WBC  - Afebrile, no leukocytosis  - A&O x3, however caretaker states patient mental status is altered since last week  - Started IV Ceftriaxone 1g q24h  - Urine culture pending, follow up  - Monitor for improvement in mentation and other symptoms  - Transition to oral antibiotics when clinically appropriate    Debility  - Patient's history provided by son and hemant, who is new to patient  - Sitter feels patient is a good candidate for SNF/rehab  - Patient's son, Maykel, states that patient is able to ambulate with walker at baseline, but reports that patient's had recent decline in functional status over the last week or two. Discussed that [facility] wants patient to be able to ambulate independently because they don't provide any assistance. Son states he'll consider SNF pending clinical improvement with UTI treatment  - Nursing to assist patient with ambulation  - Ordered walker to bedside  - Current plan to discharge back to [facility] with HH once medically ready  - HH orders placed  - Will monitor for improvement with treating underlying infection    GINA (acute kidney injury)  Resolved  Patient with acute kidney injury likely due to IVVD/dehydration GINA is currently stable. Labs reviewed- Renal function/electrolytes with Estimated Creatinine Clearance: 48.9 mL/min (based on SCr of 0.8 mg/dL). according to latest data. Monitor urine output and serial BMP and adjust therapy as needed. Avoid nephrotoxins and renally dose meds for GFR listed above.   - Cr 1.2 on admit   - Cr 0.8 ~3 weeks ago  - 1L LR bolus given  - UTI noted and patient endorses poor oral hydration  - Monitor renal function on daily BMP    Elevated troponin  - Troponin 0.063, repeat flat  - EKG completed in ED, however  results unavailable in EMR - will follow up   - No acute abnormalities noted by ED team  - Repeat EKG NSR  - Monitor telemetry  - TTE ordered, no baseline available in EMR  Results for orders placed during the hospital encounter of 04/18/23  Echo  Interpretation Summary  · The left ventricle is normal in size with normal systolic function. The estimated ejection fraction is 65%.  · Normal right ventricular size with normal right ventricular systolic function.  · Moderate left atrial enlargement.  · There is moderate mitral stenosis. The mean diastolic gradient across the mitral valve is 7 mmHg at a heart rate of bpm.  · There is mild-to-moderate aortic valve stenosis. Aortic valve area is 1.4 cm2; peak velocity is 3.0 m/s; mean gradient is 22 mmHg.  · The estimated PA systolic pressure is 34 mmHg.  · Normal central venous pressure (3 mmHg).    Hypertension  - Patient non-compliant with anti-hypertensive medications  - History of amlodpine and lisinopril noted on chart review, however patient states she is not taking either and unable to say when she last took them  - Start amlodipine 10 mg daily  - Hold lisinopril 10 mg daily, in setting of GINA - resume when appropriate  - PRN hydralazine for SBP >180  - Monitor BP with q4h vitals   - Close PCP f/u on discharge    VTE Risk Mitigation (From admission, onward)         Ordered     enoxaparin injection 40 mg  Daily         04/18/23 2247     IP VTE HIGH RISK PATIENT  Once         04/18/23 2247                Discharge Planning   TOSHA: 4/20/2023     Code Status: Full Code   Is the patient medically ready for discharge?: No    Reason for patient still in hospital (select all that apply): Patient trending condition, Treatment and Pending disposition  Discharge Plan A: Assisted Living                  Ricardo Liz PA-C  Department of Hospital Medicine   [facility] 11H

## 2023-04-19 NOTE — PLAN OF CARE
Problem: Adult Inpatient Plan of Care  Goal: Plan of Care Review  Outcome: Ongoing, Progressing  Goal: Optimal Comfort and Wellbeing  Outcome: Ongoing, Progressing     Problem: Fluid and Electrolyte Imbalance (Acute Kidney Injury/Impairment)  Goal: Fluid and Electrolyte Balance  Outcome: Ongoing, Progressing     Problem: Oral Intake Inadequate (Acute Kidney Injury/Impairment)  Goal: Optimal Nutrition Intake  Outcome: Ongoing, Progressing

## 2023-04-19 NOTE — HOSPITAL COURSE
Radha Sandoval was admitted to Hospital Medicine for acute cystitis for which she completed a course of IV rocephin. Urine culture no growth. Echo with normal LV systolic function, EF of 65%, normal RV systolic, moderate LA enlargement, moderate mitral stenosis, mild-to-moderate aortic valve stenosis, normal CVP. Patient is noncompliant with home meds and has not followed up with PCP in many years. Amlodipine restarted for HTN. Started on seroquel nightly for delirium. GINA on admit that resolved with IVF. Pt reporting persistent generalized weakness and back pain, consistent with her lumbar spondylosis. PT/OT recommending SNF. Patient and family agreeable to placement. Discharged to SNF with PCP and neuro f/u. Also placed gen surg referral for hernia.

## 2023-04-19 NOTE — ASSESSMENT & PLAN NOTE
Patient with acute kidney injury likely due to IVVD/dehydration GINA is currently stable. Labs reviewed- Renal function/electrolytes with Estimated Creatinine Clearance: 32.6 mL/min (based on SCr of 1.2 mg/dL). according to latest data. Monitor urine output and serial BMP and adjust therapy as needed. Avoid nephrotoxins and renally dose meds for GFR listed above.   - Cr 1.2 on admit   - Cr 0.8 ~3 weeks ago  - 1L LR bolus given  - UTI noted and patient endorses poor oral hydration  - Monitor renal function on daily BMP

## 2023-04-20 NOTE — PROGRESS NOTES
"[facility] - Observation [address] [place] [address] Medicine  Progress Note    Patient Name: Radha Sandoval  MRN: [id number]  Patient Class: IP- Inpatient   Admission Date: 4/18/2023  Length of Stay: 1 days  Attending Physician: Uvaldo Magaña MD  Primary Care Provider: Primary Doctor No        Subjective:     Principal Problem:Acute cystitis with hematuria        HPI:  Radha Sandoval is a 88 y.o. female with unclear PMHx, who presents for multiple complaints. Patient is accompanied by her new caretaker, who has only known the patient for less than a week. Patient currently resides at [facility]. She was sent from the [facility] to see a PCP for a possible urinary tract infection and was seen in primary care clinic this afternoon. At the appointment, patient was unable to ambulate, had excruciating body pain, tachypnea and altered mental status. Patient was sent to the ED for further evaluation. Of note, patient has not been established with a PCP for 3-4 years now.    Upon evaluation in the ED, patient noted to have an UTI and elevated troponin. Patient denies any urinary symptoms. However, the caretaker states the patient was told about her UTI already and was placed on macrobid last week, though there is no mention of this on chart review. Patient supposedly took only one dose of macrobid on Saturday. Caretaker states that her mental status has changed since she first met the patient last week. The caretaker also noted that patient administers her own medications and she found patient's pillbox in a drawer full, with no medications taken for at least a week. Lengthy discussion had at bedside regarding patient's chief complaint. Patient stated "she just wants someone to tell her what medications to take and when". Patient endorses worsening back pain, from cervical to lumbar spine. At baseline, patient uses a rolling walker to ambulate, but has had difficulty walking for the last two weeks. " Patient supposedly takes meclizine, glucose tablets (unknown reason) and ibuprofen on a daily basis, which the son buys online. Overall history is limited and unclear due to patient being a poor historian. Currently endorses dyspnea and back pain. Denies chest pain, abdominal pain, headache, F/N/V/D.    ED: /81, afebrile, on RA. CBC wnl. CMP with Cr 1.2, BUN 50. Troponin 0.063. UA with 3+ leukocytes, >100 RBC, >100 WBC. Urine culture pending. CXR negative. CT Head negative. CT Entire Spine with No acute osseous abnormality. Received IV Ceftriaxone 1g and Lidocaine patch in ED.       Overview/Hospital Course:  Radha Sandoval was admitted to Hospital Medicine for acute cystitis. Pt completed 3 days of IV rocephin. Urine culture no growth. Echo with normal LV systolic function, EF of 65%, normal RV systolic, moderate LA enlargement, moderate mitral stenosis, mild-to-moderate aortic valve stenosis, normal CVP. Pt reporting persistent generalized weakness. Son not initially interested in SNF placement and seeking HH at [facility]; however, given persistent weakness, he is open to possible SNF placement and eager for PT/OT evaluation. Plan to establish care with PCP at time of discharge.       Interval History: Pt seen and examined by me this morning. BISMARK ROWE. Patient oriented to self, place, and year. She reports some generalized weakness but it otherwise without physical complaints. Chel Merlos, at bedside who states that she still seems weak compared to her recent baseline.     Nursing attempted to walk the patient using her rollator from home. Per nursing, she only got a few steps in before needing to stop and rest in a chair. She was very slow and intermittently jerking and stopping.    Spoke with patient's son, Maykel, via phone. He states that patient is able to ambulate with walker at baseline, but reports that patient's had recent decline in functional status over the last week or two. Son not interested in  SNF initially but given this second failed attempt to walk, amenable to PT/OT consult and possible placement. Discussed that [facility] wants patient to be able to ambulate independently because they don't provide any assistance. Son states he'll consider SNF pending PT/OT recs.     Review of Systems   Constitutional:  Positive for activity change. Negative for chills and fever.   HENT:  Negative for trouble swallowing.    Eyes:  Negative for photophobia and visual disturbance.   Respiratory:  Negative for cough, chest tightness and shortness of breath.    Cardiovascular:  Negative for chest pain, palpitations and leg swelling.   Gastrointestinal:  Negative for abdominal pain, constipation, diarrhea, nausea and vomiting.   Genitourinary:  Negative for decreased urine volume, difficulty urinating, dysuria, frequency, hematuria and urgency.   Musculoskeletal:  Positive for back pain (chronic, well-controlled) and gait problem (unable to use rolling walker). Negative for neck pain.   Skin:  Negative for rash and wound.   Neurological:  Positive for weakness. Negative for dizziness, syncope, speech difficulty and light-headedness.   Psychiatric/Behavioral:  Negative for agitation and confusion. The patient is not nervous/anxious.    Objective:     Vital Signs (Most Recent):  Temp: 97.6 °F (36.4 °C) (04/20/23 1133)  Pulse: 75 (04/20/23 1133)  Resp: 12 (04/20/23 1133)  BP: 137/61 (04/20/23 1133)  SpO2: 98 % (04/20/23 1133)   Vital Signs (24h Range):  Temp:  [97.6 °F (36.4 °C)-98.5 °F (36.9 °C)] 97.6 °F (36.4 °C)  Pulse:  [] 75  Resp:  [12-18] 12  SpO2:  [96 %-99 %] 98 %  BP: (127-137)/(57-71) 137/61     Weight: 70.8 kg (156 lb)  Body mass index is 25.96 kg/m².    Intake/Output Summary (Last 24 hours) at 4/20/2023 1336  Last data filed at 4/20/2023 1104  Gross per 24 hour   Intake 1410 ml   Output 700 ml   Net 710 ml        Physical Exam  Vitals and nursing note reviewed.   Constitutional:       General: She is not in acute  distress.     Appearance: She is well-developed.      Comments: Pleasant elderly woman who is conversant with interview and cooperative with exam   HENT:      Head: Normocephalic and atraumatic.   Eyes:      Conjunctiva/sclera: Conjunctivae normal.      Pupils: Pupils are equal, round, and reactive to light.   Cardiovascular:      Rate and Rhythm: Normal rate and regular rhythm.      Heart sounds: Normal heart sounds.   Pulmonary:      Effort: Pulmonary effort is normal. No respiratory distress.      Breath sounds: Normal breath sounds. No wheezing.   Abdominal:      General: Bowel sounds are normal. There is no distension.      Palpations: Abdomen is soft.      Tenderness: There is no abdominal tenderness.   Musculoskeletal:         General: Normal range of motion.      Cervical back: Normal range of motion and neck supple.   Skin:     General: Skin is warm and dry.      Capillary Refill: Capillary refill takes less than 2 seconds.   Neurological:      Mental Status: She is alert and oriented to person, place, and time. Mental status is at baseline.   Psychiatric:         Behavior: Behavior normal.         Thought Content: Thought content normal.         Judgment: Judgment normal.       Significant Labs: All pertinent labs within the past 24 hours have been reviewed.    Significant Imaging: I have reviewed all pertinent imaging results/findings within the past 24 hours.      Assessment/Plan:      * Acute cystitis with hematuria  Resolved  88 y.o. female admitted to  observation with acute cystitis with hematuria. Patient diagnosed with UTI last week, was prescribed macrobid, however only took one dose. Denies any urinary symptoms. However, was sent from [facility] [place] to see PCP for concerns of worsening UTI and associated AMS. Patient sent to ED from primary care clinic for further evaluation.    - UA with 3+ occult blood, 3+ leukocytes, >100 RBC, >100 WBC  - Afebrile, no leukocytosis  - A&O x3   - s/p 3  days of IV Ceftriaxone 1g q24h  - Urine culture no growth    Debility  - Patient's history provided by son and sitter, who is new to patient  - [facility] feels patient is a good candidate for SNF/rehab  - Patient's son, Maykel, states that patient is able to ambulate with walker at baseline, but reports that patient's had recent decline in functional status over the last week or two. [facility]iscussed that [facility] to be able to ambulate independently because they don't provide any assistance. Son states he'll consider SNF pending PT/OT recs  - Nursing to assist patient with ambulation  - Ordered walker to bedside  -  orders placed  - Given continued difficulty ambulating with nursing, PT/OT consulted     GINA (acute kidney injury)  Resolved  Patient with acute kidney injury likely due to IVVD/dehydration GINA is currently stable. Labs reviewed- Renal function/electrolytes with Estimated Creatinine Clearance: 48.9 mL/min (based on SCr of 0.8 mg/dL). according to latest data. Monitor urine output and serial BMP and adjust therapy as needed. Avoid nephrotoxins and renally dose meds for GFR listed above.   - Cr 1.2 on admit   - Cr 0.8 ~3 weeks ago  - 1L LR bolus given  - UTI noted and patient endorses poor oral hydration  - Monitor renal function on daily BMP    Elevated troponin  - Troponin 0.063, repeat flat  - EKG completed in ED, however results unavailable in EMR - will follow up   - No acute abnormalities noted by ED team  - Repeat EKG NSR  - Monitor telemetry  - TTE ordered, no baseline available in EMR  Results for orders placed during the hospital encounter of 04/18/23  Echo  Interpretation Summary  · The left ventricle is normal in size with normal systolic function. The estimated ejection fraction is 65%.  · Normal right ventricular size with normal right ventricular systolic function.  · Moderate left atrial enlargement.  · There is moderate mitral stenosis. The mean diastolic gradient across the mitral valve  is 7 mmHg at a heart rate of bpm.  · There is mild-to-moderate aortic valve stenosis. Aortic valve area is 1.4 cm2; peak velocity is 3.0 m/s; mean gradient is 22 mmHg.  · The estimated PA systolic pressure is 34 mmHg.  · Normal central venous pressure (3 mmHg).    Hypertension  - Patient non-compliant with anti-hypertensive medications  - History of amlodpine and lisinopril noted on chart review, however patient states she is not taking either and unable to say when she last took them  - Start amlodipine 10 mg daily  - Hold lisinopril 10 mg daily, in setting of GINA - do not resume given normotensive with amlodipine only   - Monitor BP with q4h vitals   - Close PCP f/u on discharge      VTE Risk Mitigation (From admission, onward)         Ordered     enoxaparin injection 40 mg  Daily         04/18/23 2247     IP VTE HIGH RISK PATIENT  Once         04/18/23 2247                Discharge Planning   TOSHA: 4/20/2023     Code Status: Full Code   Is the patient medically ready for discharge?: Yes    Reason for patient still in hospital (select all that apply): Patient trending condition, PT / OT recommendations and Pending disposition  Discharge Plan A: Assisted Living                  Raine Huerta PA-C  Department of Hospital Medicine   [facility] - Observation 11H

## 2023-04-20 NOTE — SUBJECTIVE & OBJECTIVE
Interval History: Pt seen and examined by me this morning. BISMARK ROWE. Patient oriented to self, place, and year. She reports some generalized weakness but it otherwise without physical complaints. Chel Merlos, at bedside who states that she still seems weak compared to her recent baseline.     Nursing attempted to walk the patient using her rollator from home. Per nursing, she only got a few steps in before needing to stop and rest in a chair. She was very slow and intermittently jerking and stopping.    Spoke with patient's son, Maykel, via phone. He states that patient is able to ambulate with walker at baseline, but reports that patient's had recent decline in functional status over the last week or two. Son not interested in SNF initially but given this second failed attempt to walk, amenable to PT/OT consult and possible placement. Discussed that [facility] wants patient to be able to ambulate independently because they don't provide any assistance. Son states he'll consider SNF pending PT/OT recs.     Review of Systems   Constitutional:  Positive for activity change. Negative for chills and fever.   HENT:  Negative for trouble swallowing.    Eyes:  Negative for photophobia and visual disturbance.   Respiratory:  Negative for cough, chest tightness and shortness of breath.    Cardiovascular:  Negative for chest pain, palpitations and leg swelling.   Gastrointestinal:  Negative for abdominal pain, constipation, diarrhea, nausea and vomiting.   Genitourinary:  Negative for decreased urine volume, difficulty urinating, dysuria, frequency, hematuria and urgency.   Musculoskeletal:  Positive for back pain (chronic, well-controlled) and gait problem (unable to use rolling walker). Negative for neck pain.   Skin:  Negative for rash and wound.   Neurological:  Positive for weakness. Negative for dizziness, syncope, speech difficulty and light-headedness.   Psychiatric/Behavioral:  Negative for agitation and confusion.  The patient is not nervous/anxious.    Objective:     Vital Signs (Most Recent):  Temp: 97.6 °F (36.4 °C) (04/20/23 1133)  Pulse: 75 (04/20/23 1133)  Resp: 12 (04/20/23 1133)  BP: 137/61 (04/20/23 1133)  SpO2: 98 % (04/20/23 1133)   Vital Signs (24h Range):  Temp:  [97.6 °F (36.4 °C)-98.5 °F (36.9 °C)] 97.6 °F (36.4 °C)  Pulse:  [] 75  Resp:  [12-18] 12  SpO2:  [96 %-99 %] 98 %  BP: (127-137)/(57-71) 137/61     Weight: 70.8 kg (156 lb)  Body mass index is 25.96 kg/m².    Intake/Output Summary (Last 24 hours) at 4/20/2023 1336  Last data filed at 4/20/2023 1104  Gross per 24 hour   Intake 1410 ml   Output 700 ml   Net 710 ml        Physical Exam  Vitals and nursing note reviewed.   Constitutional:       General: She is not in acute distress.     Appearance: She is well-developed.      Comments: Pleasant elderly woman who is conversant with interview and cooperative with exam   HENT:      Head: Normocephalic and atraumatic.   Eyes:      Conjunctiva/sclera: Conjunctivae normal.      Pupils: Pupils are equal, round, and reactive to light.   Cardiovascular:      Rate and Rhythm: Normal rate and regular rhythm.      Heart sounds: Normal heart sounds.   Pulmonary:      Effort: Pulmonary effort is normal. No respiratory distress.      Breath sounds: Normal breath sounds. No wheezing.   Abdominal:      General: Bowel sounds are normal. There is no distension.      Palpations: Abdomen is soft.      Tenderness: There is no abdominal tenderness.   Musculoskeletal:         General: Normal range of motion.      Cervical back: Normal range of motion and neck supple.   Skin:     General: Skin is warm and dry.      Capillary Refill: Capillary refill takes less than 2 seconds.   Neurological:      Mental Status: She is alert and oriented to person, place, and time. Mental status is at baseline.   Psychiatric:         Behavior: Behavior normal.         Thought Content: Thought content normal.         Judgment: Judgment normal.        Significant Labs: All pertinent labs within the past 24 hours have been reviewed.    Significant Imaging: I have reviewed all pertinent imaging results/findings within the past 24 hours.

## 2023-04-20 NOTE — ASSESSMENT & PLAN NOTE
- Patient's history provided by son and hemant, who is new to patient  - [facility] feels patient is a good candidate for SNF/rehab  - Patient's son, Maykel, states that patient is able to ambulate with walker at baseline, but reports that patient's had recent decline in functional status over the last week or two. Discussed that [facility] wants patient to be able to ambulate independently because they don't provide any assistance. Son states he'll consider SNF pending PT/OT recs  - Nursing to assist patient with ambulation  - Ordered walker to bedside  -  orders placed  - Given continued difficulty ambulating with nursing, PT/OT consulted

## 2023-04-20 NOTE — ASSESSMENT & PLAN NOTE
- Patient non-compliant with anti-hypertensive medications  - History of amlodpine and lisinopril noted on chart review, however patient states she is not taking either and unable to say when she last took them  - Start amlodipine 10 mg daily  - Hold lisinopril 10 mg daily, in setting of GINA - do not resume given normotensive with amlodipine only   - Monitor BP with q4h vitals   - Close PCP f/u on discharge

## 2023-04-20 NOTE — PLAN OF CARE
Problem: Adult Inpatient Plan of Care  Goal: Plan of Care Review  Outcome: Ongoing, Progressing  Goal: Patient-Specific Goal (Individualized)  Outcome: Ongoing, Progressing  Goal: Absence of Hospital-Acquired Illness or Injury  Outcome: Ongoing, Progressing  Goal: Optimal Comfort and Wellbeing  Outcome: Ongoing, Progressing  Goal: Readiness for Transition of Care  Outcome: Ongoing, Progressing     Problem: Fluid and Electrolyte Imbalance (Acute Kidney Injury/Impairment)  Goal: Fluid and Electrolyte Balance  Outcome: Ongoing, Progressing     Problem: Oral Intake Inadequate (Acute Kidney Injury/Impairment)  Goal: Optimal Nutrition Intake  Outcome: Ongoing, Progressing     Problem: Renal Function Impairment (Acute Kidney Injury/Impairment)  Goal: Effective Renal Function  Outcome: Ongoing, Progressing     Problem: Infection  Goal: Absence of Infection Signs and Symptoms  Outcome: Ongoing, Progressing     Problem: Skin Injury Risk Increased  Goal: Skin Health and Integrity  Outcome: Ongoing, Progressing     Problem: Fall Injury Risk  Goal: Absence of Fall and Fall-Related Injury  Outcome: Ongoing, Progressing

## 2023-04-20 NOTE — ASSESSMENT & PLAN NOTE
Resolved  88 y.o. female admitted to  observation with acute cystitis with hematuria. Patient diagnosed with UTI last week, was prescribed macrobid, however only took one dose. Denies any urinary symptoms. However, was sent from [facility] to see PCP for concerns of worsening UTI and associated AMS. Patient sent to ED from primary care clinic for further evaluation.    - UA with 3+ occult blood, 3+ leukocytes, >100 RBC, >100 WBC  - Afebrile, no leukocytosis  - A&O x3   - s/p 3 days of IV Ceftriaxone 1g q24h  - Urine culture no growth

## 2023-04-20 NOTE — PLAN OF CARE
NURSING HOME ORDERS    04/20/2023  [facility]  [facility] - OBSERVATION 11H  [address]  [facility] [address]  Dept: [phone]  Loc: [phone]     Admit to Nursing Home:  Skilled Nursing Facility     Diagnoses:  Active Hospital Problems    Diagnosis  POA    *Acute cystitis with hematuria [N30.01]  Yes    Debility [R53.81]  Yes    Elevated troponin [R77.8]  Yes    GINA (acute kidney injury) [N17.9]  Yes    Hypertension [I10]  Yes      Resolved Hospital Problems   No resolved problems to display.       Patient is homebound due to:  Acute cystitis with hematuria    Allergies:Review of patient's allergies indicates:  No Known Allergies    Vitals:  Every shift    Diet: cardiac diet    Activities:   Up in a chair each morning as tolerated and Activity as tolerated    Goals of Care Treatment Preferences:  Code Status: Full Code      Labs: Per protocol    Nursing Precautions:  Fall and Pressure ulcer prevention    Consults:   PT to evaluate and treat- 5 times a week, OT to evaluate and treat- 5 times a week, and Nutrition to evaluate and recommend diet       Medications: Discontinue all previous medication orders, if any. See new list below.     Medication List        START taking these medications      pantoprazole 40 MG tablet  Commonly known as: PROTONIX  Take 1 tablet (40 mg total) by mouth once daily.            CONTINUE taking these medications      amLODIPine 10 MG tablet  Commonly known as: NORVASC  Take 10 mg by mouth once daily.     ibuprofen 400 MG tablet  Commonly known as: ADVIL,MOTRIN  Take 1 tablet (400 mg total) by mouth every 6 (six) hours as needed (pain).     ramelteon 8 mg tablet  Commonly known as: ROZEREM  Take 8 mg by mouth every evening.            STOP taking these medications      lisinopriL 10 MG tablet                Immunizations Administered as of 4/20/2023       Name Date Dose VIS Date Route Exp Date    COVID-19, MRNA, LN-S, PF (Pfizer) (Purple Cap) 3/10/2021  0.3 mL -- -- --    Lot: [id number]     External: Auto Reconciled From Outside Source     COVID-19, MRNA, LN-S, PF (Pfizer) (Purple Cap) 2/17/2021 0.3 mL -- -- --    Lot: [id number]     External: Auto Reconciled From Outside Source             This patient has had both covid vaccinations      Raine Huerta PA-C  04/20/2023\

## 2023-04-21 PROBLEM — R41.0 DELIRIUM: Status: ACTIVE | Noted: 2023-01-01

## 2023-04-21 NOTE — PLAN OF CARE
04/21/23 1604   Post-Acute Status   Post-Acute Authorization Placement   Post-Acute Placement Status Referrals Sent     CLAUDIA contacted pt's son, Maykel to review discharge recommendation of SNF and he is agreeable to plan    SW provided list of facilities in-network with patient's payor plan. CLAUDIA notified Maykel that referrals were sent to the below listed facilities from in-network list based on proximity to home/family support:   [facility]  [facility]    SW instructed Maykel to identify preference.    Preferred Facility: (if more than 1, listed in order of descending preference)  [facility]    If an additional preferred facility not listed above is identified, additional referral to be sent. If above facilities unable to accept, will send additional referrals to in-network providers.     Lyndsay West, BIPIN  [facility]  Ext. [phone]

## 2023-04-21 NOTE — SUBJECTIVE & OBJECTIVE
Interval History: Pt seen and examined by me this morning. BISMARK ROWE. Pt reports difficulty sleeping overnight and sitter at bedside this morning reports the patient is much more confused than previous evaluations and appears to be talking to someone who isn't in the room. Pt AAOx3 with some conversational confusion. PT/OT scheduled to evaluate the patient this morning. Will update son on POC via phone pending PT/OT recs.       Review of Systems   Constitutional:  Positive for activity change. Negative for chills and fever.   HENT:  Negative for trouble swallowing.    Eyes:  Negative for photophobia and visual disturbance.   Respiratory:  Negative for cough, chest tightness and shortness of breath.    Cardiovascular:  Negative for chest pain, palpitations and leg swelling.   Gastrointestinal:  Negative for abdominal pain, constipation, diarrhea, nausea and vomiting.   Genitourinary:  Negative for decreased urine volume, difficulty urinating, dysuria, frequency, hematuria and urgency.   Musculoskeletal:  Positive for back pain (chronic, well-controlled) and gait problem (unable to use rolling walker). Negative for neck pain.   Skin:  Negative for rash and wound.   Neurological:  Positive for weakness. Negative for dizziness, syncope, speech difficulty and light-headedness.   Psychiatric/Behavioral:  Positive for confusion. Negative for agitation. The patient is not nervous/anxious.    Objective:     Vital Signs (Most Recent):  Temp: 97.9 °F (36.6 °C) (04/21/23 0755)  Pulse: 97 (04/21/23 1129)  Resp: 18 (04/21/23 0755)  BP: 135/75 (04/21/23 0755)  SpO2: 95 % (04/21/23 0755) Vital Signs (24h Range):  Temp:  [97.6 °F (36.4 °C)-98.6 °F (37 °C)] 97.9 °F (36.6 °C)  Pulse:  [] 97  Resp:  [12-20] 18  SpO2:  [95 %-98 %] 95 %  BP: (119-137)/(54-75) 135/75     Weight: 70.8 kg (156 lb)  Body mass index is 25.96 kg/m².    Intake/Output Summary (Last 24 hours) at 4/21/2023 1130  Last data filed at 4/21/2023 0917  Gross  per 24 hour   Intake 345 ml   Output 200 ml   Net 145 ml      Physical Exam  Vitals and nursing note reviewed.   Constitutional:       General: She is not in acute distress.     Appearance: She is well-developed.      Comments: Pleasant elderly woman who is conversant with interview and cooperative with exam   HENT:      Head: Normocephalic and atraumatic.   Eyes:      Conjunctiva/sclera: Conjunctivae normal.      Pupils: Pupils are equal, round, and reactive to light.   Cardiovascular:      Rate and Rhythm: Normal rate and regular rhythm.      Heart sounds: Normal heart sounds.   Pulmonary:      Effort: Pulmonary effort is normal. No respiratory distress.      Breath sounds: Normal breath sounds. No wheezing.   Abdominal:      General: Bowel sounds are normal. There is no distension.      Palpations: Abdomen is soft.      Tenderness: There is no abdominal tenderness.   Musculoskeletal:         General: Normal range of motion.      Cervical back: Normal range of motion and neck supple.   Skin:     General: Skin is warm and dry.      Capillary Refill: Capillary refill takes less than 2 seconds.   Neurological:      Mental Status: She is alert and oriented to person, place, and time.      Comments: Mild confusion this morning. No objective signs of AVHs.    Psychiatric:         Behavior: Behavior normal.         Thought Content: Thought content normal.         Judgment: Judgment normal.       Significant Labs: All pertinent labs within the past 24 hours have been reviewed.    Significant Imaging: I have reviewed all pertinent imaging results/findings within the past 24 hours.

## 2023-04-21 NOTE — PT/OT/SLP EVAL
Occupational Therapy   Co-Evaluation and Co-Treatment  Co-treatment with PT for maximal pt participation, safety, and activity tolerance       Name: Radha Sandoval  MRN: [id number]  Admitting Diagnosis: Debility  Recent Surgery: * No surgery found *      Recommendations:     Discharge Recommendations: nursing facility, skilled, other (see comments) (with transition to snf)  Discharge Equipment Recommendations:  wheelchair  Barriers to discharge:       Assessment:     Radha Sandoval is a 88 y.o. female with a medical diagnosis of Debility.  She presents with impaired ADL and mobility performance deficits. Pt found upright in bed and agreeable for therapy. Pt with noted anxiety and fear of falling throughout all of session requiring max positive reinforcement and encouragement to pt's capacity.  Pt required max A for bed mobility and stood with max A against bed 2/2 resistive nature and noted fear of falling using rollator. PTA, pt was living at [facility] with limited A available for ADLs/mobility. Pt would benefit from continued OT skilled services 3x/wk to improve daily living skills to optimize QOL.  Pt is recommended to discharge to SNF at this time.  Performance deficits affecting function: weakness, impaired balance, impaired endurance, impaired self care skills, impaired functional mobility, gait instability, decreased lower extremity function, decreased upper extremity function, decreased ROM, pain, decreased safety awareness, impaired cognition.      Rehab Prognosis: Good; patient would benefit from acute skilled OT services to address these deficits and reach maximum level of function.       Plan:     Patient to be seen 3 x/week to address the above listed problems via self-care/home management, therapeutic activities, therapeutic exercises, cognitive retraining, neuromuscular re-education  Plan of Care Expires: 05/21/23  Plan of Care Reviewed with: patient, caregiver    Subjective     Chief  Complaint: fear of falling; pt feeling like she would slip   Patient/Family Comments/goals: go back to playing piano at new facility    Occupational Profile:  Living Environment: Pt lives at [facility] in 2nd floor apartment (pt reports apt at end of hallway requiring longer distance mobility).  Previous level of function: Pt has new   to care (present during session-Chel). Pt states she is able to complete ADLs however pt's  states this is difficult for pt. Pt has meals provided in dining room at [facility] however pt not attending during structured times. Pt uses a rollator for mobility and has A for bathing 2-3x/wk from staff.   Roles and Routines: Pt enjoys playing piano   Equipment Used at Home: rollator, grab bar, shower chair  Assistance upon Discharge: staff    Pain/Comfort:  Pain Rating 1: other (see comments) (pain in R flank near hernia)  Location - Side 1: Right  Location - Orientation 1: generalized  Location 1: flank  Pain Addressed 1: Reposition, Cessation of Activity, Distraction    Patients cultural, spiritual, Yazidism conflicts given the current situation: no    Objective:     Communicated with: RN prior to session.  Patient found HOB elevated with peripheral IV, PureWick, telemetry upon OT entry to room.    General Precautions: Standard, fall  Orthopedic Precautions: N/A  Braces: N/A  Respiratory Status: Room air    Occupational Performance:    Bed Mobility:    Patient completed Rolling/Turning to Right with maximal assistance  Patient completed Scooting/Bridging with maximal assistance  Patient completed Supine to Sit with maximal assistance and 2 persons  Patient completed Sit to Supine with maximal assistance and 2 persons    Functional Mobility/Transfers:  Patient completed Sit <> Stand Transfer with maximal assistance  with  rolling walker   Functional Mobility: Pt stood with max A with noted posterior lean (second A required 2/2 pt's fear of falling) using RW. Pt  tolerated ~20 seconds. Pt began having flailing episode (x2 times, once during initial sit, once during standing) requiring max therapeutic empathy and cues    Activities of Daily Living:  Lower Body Dressing: total assistance donning slippers and  socks over slippers ( to improve pt's fear of falling)  Toileting: total assistance replacement of purewick    Cognitive/Visual Perceptual:  Cognitive/Psychosocial Skills:     -       Oriented to: Person, Place, and Time   -       Follows Commands/attention:Inattentive, Easily distracted, and Follows two-step commands  -       Communication: clear/fluent  -       Memory: No Deficits noted  -       Safety awareness/insight to disability: impaired   -       Mood/Affect/Coping skills/emotional control: Labile, Tearful, and Anxious    Physical Exam:  Balance:    -       demo initially max A at EOB 2/2 fear however progressed to CGA. Pt tolerated EOB ~15 minutes. Poor standing balance 2/2 resistive posterior lean   Upper Extremity Range of Motion:     -       Right Upper Extremity: WFL  -       Left Upper Extremity: WFL  Upper Extremity Strength:    -       Right Upper Extremity: Deficits: 4/5  -       Left Upper Extremity: Deficits: 4/5   Strength:    -       Right Upper Extremity: WFL  -       Left Upper Extremity: WFL    AMPAC 6 Click ADL:  AMPAC Total Score: 16    Treatment & Education:  Pt educated on role of occupational therapy, POC, and safety during ADLs and functional mobility. Pt and OT discussed importance of safe, continued mobility to optimize daily living skills. Pt verbalized understanding.     White board updated during session. Pt given instruction to call for medical staff/nurse for assistance.       Patient left HOB elevated with all lines intact, call button in reach, bed alarm on, RN notified, and  present    GOALS:   Multidisciplinary Problems       Occupational Therapy Goals          Problem: Occupational Therapy    Goal Priority  Disciplines Outcome Interventions   Occupational Therapy Goal     OT, PT/OT Ongoing, Progressing    Description: Goals to be met by: 4/28/23 (1 week)     Patient will increase functional independence with ADLs by performing:    UE Dressing with Stand-by Assistance.  LE Dressing with Moderate Assistance.  Grooming while standing with Minimal Assistance.  Toileting from bedside commode with Moderate Assistance for hygiene and clothing management.   Rolling to Bilateral with Stand-by Assistance.   Supine to sit with Contact Guard Assistance.  Step transfer with Stand-by Assistance using LRAD  Toilet transfer to bedside commode with Stand-by Assistance using LRAD                          History:     No past medical history on file.    No past surgical history on file.    Time Tracking:     OT Date of Treatment: 04/21/23  OT Start Time: 1026  OT Stop Time: 1104  OT Total Time (min): 38 min    Billable Minutes:Evaluation 10 min  Self Care/Home Management 15 min  Therapeutic Activity 13 min    4/21/2023

## 2023-04-21 NOTE — NURSING
Patient sinus tach on telemetry 120s-130s... Rosemary Medley notified. 500ml bolus LR ordered. Will continue to monitor.

## 2023-04-21 NOTE — PHYSICIAN QUERY
PT Name: Radha Sandoval  MR #: [id number]    DOCUMENTATION CLARIFICATION     CDS/: Chiquita Aparicio  RN, BSN             Contact information: [email or web address]   This form is a permanent document in the medical record.     Query Date: April 21, 2023    By submitting this query, we are merely seeking further clarification of documentation. Please utilize your independent clinical judgment when addressing the question(s) below.    The Medical Record contains the following:   Indicators   Supporting Clinical Findings Location in Medical Record   x AMS, Confusion,  LOC, etc.  AMS    alert and oriented to person, place, and time    the patient are poor historians and if contradicting history    altered mental status.   A&O x3, however caretaker states patient mental status is altered since last week  Baseline unknown    oriented to self, place, year. States random thoughts, but is able to provide some history  cooperative, easily redirected   ED, 4/18            H&P, 4/18        HM, PN, 4/19       x Acute/Chronic Illness Acute cystitis with hematuria    GINA  likely due to IVVD/dehydration  endorses poor oral hydration    Elevated troponin    Hypertension  - Patient non-compliant with anti-hypertensive medications    Debility  - Patient's son, Maykel, states that patient is able to ambulate with walker at baseline, but reports that patient's had recent decline in functional status over the last week or two.    chronic pain in her back however it is not as worse as usual as she has been taking tramadol.  Reportedly she is on Macrobid as well as meclizine however intermittently takes both of these.     H&P, 4/18                      HM, PN, 4/19        ED, 4/18   x Radiology Findings Impression:   No acute large vascular territory infarct or intracranial hemorrhage identified.  If persistent neurologic deficit, MRI brain can be obtained.   Sequela of senescent and chronic microvascular ischemic change.   Suspected  multifocal age-indeterminate lacunar type infarcts, as above.  Clinical correlation advised.   CTH, 4/18   x Electrolyte Imbalance  04/18/23 16:33 04/19/23 05:10 04/19/23 15:42 04/20/23 04:31 04/21/23 05:09   Potassium 3.7 2.6 (LL) 3.5 3.4 (L) 3.7      04/18/23 16:33 04/19/23 05:10 04/20/23 04:31   Sodium 141 144 146 (H)      Latest Reference Range & Units 04/18/23 16:33 04/19/23 05:10 04/20/23 04:31 04/21/23 05:09   Chloride 95 - 110 mmol/L 112 (H) 116 (H) 117 (H) 113 (H)      Latest Reference Range & Units 04/19/23 05:10 04/20/23 04:31 04/21/23 05:09   Phosphorus 2.7 - 4.5 mg/dL 2.9 2.5 (L) 2.0 (L)      04/19/23 05:10 04/20/23 04:31 04/21/23 05:09   Magnesium 1.7 1.7 1.5 (L)      LAB   x Medication - Started IV Ceftriaxone 1g q24h     K 2.6, replaced PO and IV. H&P, 4/18    HM, PN, 4/19     x Treatment         - 1L LR bolus given  - UTI noted and patient endorses poor oral hydration  - Monitor renal function on daily BMP    - Monitor for improvement in mentation and other symptoms  - Transition to oral antibiotics when clinically appropriate   H&P, 4/18    Other       The noted clinical guidelines are only system guidelines and do not replace the providers clinical judgment.    The National [place] of Neurologic Disorders and Stroke (NINDS) of the NIH describes encephalopathy as any diffuse disease of the brain that alters brain function or structure.    Provider, please specify the diagnosis or diagnoses associated with above clinical findings.  [   ] Metabolic Encephalopathy - Due to electrolyte imbalance, metabolic derangements, or infectious processes, includes Septic Encephalopathy, Uremic Encephalopathy     [   ] Toxic Encephalopathy - Due to drugs, chemicals, or other toxic substances     [   ] Other Encephalopathy (please specify): ____________________     [   ] Other neurological condition- Includes Post-ictal altered mental status (please specify condition): __________     [ x  ]  Clinically  Undetermined         Please document in your progress notes daily for the duration of treatment until resolved, and include in your discharge summary.    References:  TEZ Butler RN, CCDS. (2018, June 9). Notes from the Instructor: Encephalopathy tips. Retrieved October 22, 2020, from [email or web address]-[email or web address]    ICD-9-CM Coding Clinic First Quarter 2013, Effective with discharges: October 21, 2013 [facility] § Seizure with encephalopathy due to postictal state (2013).    ICD-10-CM/PCS triptap Integrated Codebook (Version V.20.8.10.0) [Computer software]. (2020). Retrieved October 21, 2020.    National [place] of Neurological Disorders and Stroke. (2019, March 27). Retrieved October 22, 2020, from https://www.ninds.nih.gov/Disorders/All-Disorders/Qgczeylzydcijt-Zcuoqaqzjyb-Gvbf    Form No. 26934

## 2023-04-21 NOTE — CONSULTS
Thank you for your consult to [facility]. We have reviewed the patient chart. This patient does not meet criteria for [facility] service at this time due to [facility] service capacity limitations. Will hand back to In-house service.    Rachel Conway MD

## 2023-04-21 NOTE — ASSESSMENT & PLAN NOTE
- Pt with worsening confusion and signs of auditory & visual hallucinations per the sitter at bedside likely 2/2 hospital delirium though family reports she has had similar episodes in the past  - Start seroquel 12.5 mg with dinner  - Delirium precautions   - Consider neuropsych follow-up at discharge

## 2023-04-21 NOTE — PLAN OF CARE
Problem: Physical Therapy  Goal: Physical Therapy Goal  Description: Goals to be met by: 2023     Patient will increase functional independence with mobility by performin. Supine to sit with Stand-by Assistance  2. Sit to supine with Contact Guard Assistance  3. Rolling to Left and Right with Supervision.  4. Sit to stand transfer with Contact Guard Assistance and RW  5. Bed to chair transfer with Moderate Assistance using Rolling Walker  6. Gait  x 15 feet with Moderate Assistance using Rolling Walker.   7. Lower extremity exercise program x15 reps per handout, with supervision    Outcome: Ongoing, Progressing

## 2023-04-21 NOTE — ASSESSMENT & PLAN NOTE
Resolved  88 y.o. female admitted to  observation with acute cystitis with hematuria. Patient diagnosed with UTI last week, was prescribed macrobid, however only took one dose. Denies any urinary symptoms. However, was sent from [facility] to see PCP for concerns of worsening UTI and associated AMS. Patient sent to ED from primary care clinic for further evaluation.    - UA with 3+ occult blood, 3+ leukocytes, >100 RBC, >100 WBC  - Afebrile, no leukocytosis  - A&O x3   - s/p 4 days of IV Ceftriaxone 1g q24h  - Urine culture no growth

## 2023-04-21 NOTE — PLAN OF CARE
SW completed LOCET and faxed PASRR to state. Waiting on 142.    PASRR uploaded to [facility].    Lyndsay West LMSW  [facility]  Ext. [phone]

## 2023-04-21 NOTE — PT/OT/SLP EVAL
Physical Therapy Co-Evaluation  Co-treatment with PT for maximal pt participation, safety, and activity tolerance.     Patient Name:  Radha Sandoval   MRN:  [id number]    Recommendations:     Discharge Recommendations: nursing facility, skilled   Discharge Equipment Recommendations: to be determined by next level of care   Barriers to discharge: Inaccessible home and Decreased caregiver support    Assessment:     Radha Sandoval is a 88 y.o. female admitted with a medical diagnosis of Debility.  She presents with the following impairments/functional limitations: weakness, impaired endurance, impaired self care skills, impaired functional mobility, gait instability, impaired balance, decreased upper extremity function, decreased lower extremity function, decreased safety awareness, impaired cognition. Pt with significant fear of falling and begins shaking and crying with all mobility. Pt would benefit from a skilled nursing facility for: Dynamic/static standing/sitting balance through skilled balance training, strengthening with the use of skilled therapeutic exercises interventions, and mobility through adaptive equipment training. Pt continues to benefit from a collaborative PT/OT program to improve quality of life and focus on recovery of impairments.    Rehab Prognosis: Fair; patient would benefit from acute skilled PT services to address these deficits and reach maximum level of function.    Recent Surgery: * No surgery found *      Plan:     During this hospitalization, patient to be seen 3 x/week to address the identified rehab impairments via gait training, therapeutic activities, therapeutic exercises, neuromuscular re-education and progress toward the following goals:    Plan of Care Expires:  05/21/23    Subjective     Chief Complaint: fear of falling  Patient/Family Comments/goals: pt reports pain in R groin below hernia  Pain/Comfort:  Pain Rating 1: 0/10 (pain in R flank near hernia)  Location - Side 1:  Right  Location - Orientation 1: generalized  Location 1: lower quadrant  Pain Addressed 1: Reposition, Distraction, Cessation of Activity  Pain Rating Post-Intervention 1:  (not rated)    Patients cultural, spiritual, Latter-day conflicts given the current situation: no    Living Environment:  Living Environment: Pt lives at [facility] in 2nd floor apartment (pt reports apt at end of hallway requiring longer distance mobility).  Previous level of function: Pt has new   to care (present during session-Chel). Pt states she is able to complete ADLs however pt's  states this is difficult for pt. Pt has meals provided in dining room at [facility] however pt not attending during structured times. Pt uses a rollator for mobility and has A for bathing 2-3x/wk from staff.   Roles and Routines: Pt enjoys playing piano   Equipment Used at Home: rollator, grab bar, shower chair  Assistance upon Discharge: staff    Objective:     Communicated with RN prior to session.  Patient found HOB elevated with PureWick, peripheral IV, telemetry  upon PT entry to room.    General Precautions: Standard, fall  Orthopedic Precautions:N/A   Braces: N/A  Respiratory Status: Room air    Exams:  Cognitive Exam:  Patient is oriented to Person, Place, and Time  Attention: inattentive, easily distracted  Emotions: emotionally labile, normal mood and behavior but rapidly switches to tearful and anxious with movement or before movement 2/2 fear of falling  Gross Motor Coordination:  WFL  Postural Exam:  Patient presented with the following abnormalities:    -       Rounded shoulders  -       Forward head  RLE ROM: WFL  RLE Strength: Deficits: hip flexion 2/5, all others at least 3/5 observed; pt to anxious for formal assessment sitting EOB  LLE ROM: WFL  LLE Strength: Deficits: hip flexion 2/5, all others at least 3/5 observed; pt to anxious for formal assessment sitting EOB    Functional Mobility: *major fear of falling with  all mobility including rolling and scooting to HOB in supine with draw sheet  Bed Mobility:     Rolling Left:  maximal assistance  Rolling Right: maximal assistance  Scooting to EOB: maximal assistance  Scooting to HOB in supine via draw sheet: total assistance and of 2 persons  Supine to Sit: maximal assistance and of 2 persons, pt with flailing, shaking, and crying upon sitting EOB 2/2 fear of falling  Sit to Supine: maximal assistance and of 2 persons  Transfers:     Sit to Stand:  maximal assistance and of 2 persons with 4 wheeled walker  Gait: unable to complete, pt briefly calm but then began having shaking and flailing episode with fear of falling and sat back down  Balance:   Static Sitting: maxA progressed to CGA  Dynamic Sitting:   Static Standing: Yusef-maxA with resistive posterior lean and pt began flailing and shaking while crying 2/2 fear of falling and returned to sitting  Dynamic Standing: MICHAEL      AM-PAC 6 CLICK MOBILITY  Total Score:9       Treatment & Education:  Therapeutic listening and education provided.  Patient educated on role of therapy, goals of session, and benefits of mobilizing.   Discussed PT plan of care during hospitalization.   Patient educated on calling for assistance.   Patient educated on how their diagnosis impacts their mobility within PT scope of practice.   All questions answered within PT scope of practice.    Patient left HOB elevated with all lines intact, call button in reach, bed alarm on, RN notified, and pt's  present.    GOALS:   Multidisciplinary Problems       Physical Therapy Goals          Problem: Physical Therapy    Goal Priority Disciplines Outcome Goal Variances Interventions   Physical Therapy Goal     PT, PT/OT Ongoing, Progressing     Description: Goals to be met by: 2023     Patient will increase functional independence with mobility by performin. Supine to sit with Stand-by Assistance  2. Sit to supine with Contact Guard  Assistance  3. Rolling to Left and Right with Supervision.  4. Sit to stand transfer with Contact Guard Assistance and RW  5. Bed to chair transfer with Moderate Assistance using Rolling Walker  6. Gait  x 15 feet with Moderate Assistance using Rolling Walker.   7. Lower extremity exercise program x15 reps per handout, with supervision                         History:     No past medical history on file.    No past surgical history on file.    Time Tracking:     PT Received On: 04/21/23  PT Start Time: 1025     PT Stop Time: 1103  PT Total Time (min): 38 min     Billable Minutes: Evaluation 13, Therapeutic Activity 15, and Neuromuscular Re-education 10      04/21/2023

## 2023-04-21 NOTE — PLAN OF CARE
Problem: Occupational Therapy  Goal: Occupational Therapy Goal  Description: Goals to be met by: 4/28/23 (1 week)     Patient will increase functional independence with ADLs by performing:    UE Dressing with Stand-by Assistance.  LE Dressing with Moderate Assistance.  Grooming while standing with Minimal Assistance.  Toileting from bedside commode with Moderate Assistance for hygiene and clothing management.   Rolling to Bilateral with Stand-by Assistance.   Supine to sit with Contact Guard Assistance.  Step transfer with Stand-by Assistance using LRAD  Toilet transfer to bedside commode with Stand-by Assistance using LRAD     Evaluated pt and established OT POC. Continue OT as tolerated.  Nellie Patel OT  4/21/2023    Outcome: Ongoing, Progressing

## 2023-04-21 NOTE — PLAN OF CARE
Problem: Adult Inpatient Plan of Care  Goal: Plan of Care Review  4/21/2023 0627 by Leyda Lopez LPN  Outcome: Ongoing, Progressing  4/21/2023 0627 by Leyda Lopez LPN  Outcome: Ongoing, Progressing     Problem: Fluid and Electrolyte Imbalance (Acute Kidney Injury/Impairment)  Goal: Fluid and Electrolyte Balance  4/21/2023 0627 by Leyda Lopze LPN  Outcome: Ongoing, Progressing  4/21/2023 0627 by Leyda Lopez LPN  Outcome: Ongoing, Progressing     Problem: Oral Intake Inadequate (Acute Kidney Injury/Impairment)  Goal: Optimal Nutrition Intake  4/21/2023 0627 by Leyda Lopez LPN  Outcome: Ongoing, Progressing  4/21/2023 0627 by Leyda Lopez LPN  Outcome: Ongoing, Progressing     Problem: Renal Function Impairment (Acute Kidney Injury/Impairment)  Goal: Effective Renal Function  4/21/2023 0627 by Leyda Lopez LPN  Outcome: Ongoing, Progressing  4/21/2023 0627 by Leyda Lopez LPN  Outcome: Ongoing, Progressing     Problem: Infection  Goal: Absence of Infection Signs and Symptoms  4/21/2023 0627 by Leyda Lopez LPN  Outcome: Ongoing, Progressing  4/21/2023 0627 by Leyda Lopez LPN  Outcome: Ongoing, Progressing     Problem: Fall Injury Risk  Goal: Absence of Fall and Fall-Related Injury  4/21/2023 0627 by Leyda Lopez LPN  Outcome: Ongoing, Progressing  4/21/2023 0627 by Leyda Lopez LPN  Outcome: Ongoing, Progressing

## 2023-04-21 NOTE — PROGRESS NOTES
"[facility] [address] [place] [address] Medicine  Progress Note    Patient Name: Radha Sandoval  MRN: [id number]  Patient Class: IP- Inpatient   Admission Date: 4/18/2023  Length of Stay: 2 days  Attending Physician: Uvaldo Magaña MD  Primary Care Provider: Primary Doctor No        Subjective:     Principal Problem:Debility        HPI:  Radha Sandoval is a 88 y.o. female with unclear PMHx, who presents for multiple complaints. Patient is accompanied by her new caretaker, who has only known the patient for less than a week. Patient currently resides at [facility]. She was sent from the [facility] to see a PCP for a possible urinary tract infection and was seen in primary care clinic this afternoon. At the appointment, patient was unable to ambulate, had excruciating body pain, tachypnea and altered mental status. Patient was sent to the ED for further evaluation. Of note, patient has not been established with a PCP for 3-4 years now.    Upon evaluation in the ED, patient noted to have an UTI and elevated troponin. Patient denies any urinary symptoms. However, the caretaker states the patient was told about her UTI already and was placed on macrobid last week, though there is no mention of this on chart review. Patient supposedly took only one dose of macrobid on Saturday. Caretaker states that her mental status has changed since she first met the patient last week. The caretaker also noted that patient administers her own medications and she found patient's pillbox in a drawer full, with no medications taken for at least a week. Lengthy discussion had at bedside regarding patient's chief complaint. Patient stated "she just wants someone to tell her what medications to take and when". Patient endorses worsening back pain, from cervical to lumbar spine. At baseline, patient uses a rolling walker to ambulate, but has had difficulty walking for the last two weeks. Patient supposedly " takes meclizine, glucose tablets (unknown reason) and ibuprofen on a daily basis, which the son buys online. Overall history is limited and unclear due to patient being a poor historian. Currently endorses dyspnea and back pain. Denies chest pain, abdominal pain, headache, F/N/V/D.    ED: /81, afebrile, on RA. CBC wnl. CMP with Cr 1.2, BUN 50. Troponin 0.063. UA with 3+ leukocytes, >100 RBC, >100 WBC. Urine culture pending. CXR negative. CT Head negative. CT Entire Spine with No acute osseous abnormality. Received IV Ceftriaxone 1g and Lidocaine patch in ED.       Overview/Hospital Course:  Radha Sandoval was admitted to Hospital Medicine for acute cystitis. Pt completed 4 days of IV rocephin. Urine culture no growth. Echo with normal LV systolic function, EF of 65%, normal RV systolic, moderate LA enlargement, moderate mitral stenosis, mild-to-moderate aortic valve stenosis, normal CVP. Pt reporting persistent generalized weakness. Son not initially interested in SNF placement and seeking HH at [facility]; however, given persistent weakness, he is open to possible SNF placement and eager for PT/OT evaluation. Plan to establish care with PCP & neurology at time of discharge.       Interval History: Pt seen and examined by me this morning. BISMARK ROWE. Pt reports difficulty sleeping overnight and sitter at bedside this morning reports the patient is much more confused than previous evaluations and appears to be talking to someone who isn't in the room. Pt AAOx3 with some conversational confusion. PT/OT scheduled to evaluate the patient this morning. Will update son on POC via phone pending PT/OT recs.       Review of Systems   Constitutional:  Positive for activity change. Negative for chills and fever.   HENT:  Negative for trouble swallowing.    Eyes:  Negative for photophobia and visual disturbance.   Respiratory:  Negative for cough, chest tightness and shortness of breath.    Cardiovascular:  Negative for chest  pain, palpitations and leg swelling.   Gastrointestinal:  Negative for abdominal pain, constipation, diarrhea, nausea and vomiting.   Genitourinary:  Negative for decreased urine volume, difficulty urinating, dysuria, frequency, hematuria and urgency.   Musculoskeletal:  Positive for back pain (chronic, well-controlled) and gait problem (unable to use rolling walker). Negative for neck pain.   Skin:  Negative for rash and wound.   Neurological:  Positive for weakness. Negative for dizziness, syncope, speech difficulty and light-headedness.   Psychiatric/Behavioral:  Positive for confusion. Negative for agitation. The patient is not nervous/anxious.    Objective:     Vital Signs (Most Recent):  Temp: 97.9 °F (36.6 °C) (04/21/23 0755)  Pulse: 97 (04/21/23 1129)  Resp: 18 (04/21/23 0755)  BP: 135/75 (04/21/23 0755)  SpO2: 95 % (04/21/23 0755) Vital Signs (24h Range):  Temp:  [97.6 °F (36.4 °C)-98.6 °F (37 °C)] 97.9 °F (36.6 °C)  Pulse:  [] 97  Resp:  [12-20] 18  SpO2:  [95 %-98 %] 95 %  BP: (119-137)/(54-75) 135/75     Weight: 70.8 kg (156 lb)  Body mass index is 25.96 kg/m².    Intake/Output Summary (Last 24 hours) at 4/21/2023 1130  Last data filed at 4/21/2023 0956  Gross per 24 hour   Intake 345 ml   Output 200 ml   Net 145 ml      Physical Exam  Vitals and nursing note reviewed.   Constitutional:       General: She is not in acute distress.     Appearance: She is well-developed.      Comments: Pleasant elderly woman who is conversant with interview and cooperative with exam   HENT:      Head: Normocephalic and atraumatic.   Eyes:      Conjunctiva/sclera: Conjunctivae normal.      Pupils: Pupils are equal, round, and reactive to light.   Cardiovascular:      Rate and Rhythm: Normal rate and regular rhythm.      Heart sounds: Normal heart sounds.   Pulmonary:      Effort: Pulmonary effort is normal. No respiratory distress.      Breath sounds: Normal breath sounds. No wheezing.   Abdominal:      General: Bowel  sounds are normal. There is no distension.      Palpations: Abdomen is soft.      Tenderness: There is no abdominal tenderness.   Musculoskeletal:         General: Normal range of motion.      Cervical back: Normal range of motion and neck supple.   Skin:     General: Skin is warm and dry.      Capillary Refill: Capillary refill takes less than 2 seconds.   Neurological:      Mental Status: She is alert and oriented to person, place, and time.      Comments: Mild confusion this morning. No objective signs of AVHs.    Psychiatric:         Behavior: Behavior normal.         Thought Content: Thought content normal.         Judgment: Judgment normal.       Significant Labs: All pertinent labs within the past 24 hours have been reviewed.    Significant Imaging: I have reviewed all pertinent imaging results/findings within the past 24 hours.      Assessment/Plan:      * Debility  - Patient's history provided by son and hemant, who is new to patient  - Sitter feels patient is a good candidate for SNF/rehab  - Patient's son, Maykel, states that patient is able to ambulate with walker at baseline, but reports that patient's had recent decline in functional status over the last week or two. Discussed that [facility] wants patient to be able to ambulate independently because they don't provide any assistance. Son states he'll consider SNF pending PT/OT recs  - Nursing to assist patient with ambulation  - Ordered walker to bedside  -  orders placed  - Given continued difficulty ambulating with nursing, PT/OT consulted     Acute cystitis with hematuria  Resolved  88 y.o. female admitted to  observation with acute cystitis with hematuria. Patient diagnosed with UTI last week, was prescribed macrobid, however only took one dose. Denies any urinary symptoms. However, was sent from assisted living center to see PCP for concerns of worsening UTI and associated AMS. Patient sent to ED from primary care clinic for further evaluation.    -  UA with 3+ occult blood, 3+ leukocytes, >100 RBC, >100 WBC  - Afebrile, no leukocytosis  - A&O x3   - s/p 4 days of IV Ceftriaxone 1g q24h  - Urine culture no growth    GINA (acute kidney injury)  Resolved  Patient with acute kidney injury likely due to IVVD/dehydration GINA is currently stable. Labs reviewed- Renal function/electrolytes with Estimated Creatinine Clearance: 48.9 mL/min (based on SCr of 0.8 mg/dL). according to latest data. Monitor urine output and serial BMP and adjust therapy as needed. Avoid nephrotoxins and renally dose meds for GFR listed above.   - Cr 1.2 on admit   - Cr 0.8 ~3 weeks ago  - 1L LR bolus given  - UTI noted and patient endorses poor oral hydration  - Monitor renal function on daily BMP    Delirium  - Pt with worsening confusion and signs of auditory & visual hallucinations per the sitter at bedside likely 2/2 hospital delirium though family reports she has had similar episodes in the past  - Start seroquel 12.5 mg with dinner  - Delirium precautions   - Consider neuropsych follow-up at discharge     Elevated troponin  - Troponin 0.063, repeat flat  - EKG completed in ED, however results unavailable in EMR - will follow up   - No acute abnormalities noted by ED team  - Repeat EKG NSR  - Monitor telemetry  - TTE ordered, no baseline available in EMR  Results for orders placed during the hospital encounter of 04/18/23  Echo  Interpretation Summary  · The left ventricle is normal in size with normal systolic function. The estimated ejection fraction is 65%.  · Normal right ventricular size with normal right ventricular systolic function.  · Moderate left atrial enlargement.  · There is moderate mitral stenosis. The mean diastolic gradient across the mitral valve is 7 mmHg at a heart rate of bpm.  · There is mild-to-moderate aortic valve stenosis. Aortic valve area is 1.4 cm2; peak velocity is 3.0 m/s; mean gradient is 22 mmHg.  · The estimated PA systolic pressure is 34 mmHg.  · Normal  central venous pressure (3 mmHg).    Hypertension  - Patient non-compliant with anti-hypertensive medications  - History of amlodpine and lisinopril noted on chart review, however patient states she is not taking either and unable to say when she last took them  - Start amlodipine 10 mg daily  - Hold lisinopril 10 mg daily, in setting of GINA - do not resume given normotensive with amlodipine only   - Monitor BP with q4h vitals   - Close PCP f/u on discharge      VTE Risk Mitigation (From admission, onward)         Ordered     enoxaparin injection 40 mg  Daily         04/18/23 2247     IP VTE HIGH RISK PATIENT  Once         04/18/23 2247                Discharge Planning   TOSHA: 4/24/2023     Code Status: Full Code   Is the patient medically ready for discharge?: Yes    Reason for patient still in hospital (select all that apply): Patient trending condition, PT / OT recommendations and Pending disposition  Discharge Plan A: Assisted Living                  Raine Huerta PA-C  Department of Hospital Medicine   [facility]

## 2023-04-22 PROBLEM — R00.0 SINUS TACHYCARDIA: Status: ACTIVE | Noted: 2023-01-01

## 2023-04-22 NOTE — PROGRESS NOTES
"[facility] [address] [place] [address] Medicine  Progress Note    Patient Name: Radha Sandoval  MRN: [id number]  Patient Class: IP- Inpatient   Admission Date: 4/18/2023  Length of Stay: 3 days  Attending Physician: Uvaldo Magaña MD  Primary Care Provider: Primary Doctor No        Subjective:     Principal Problem:Debility        HPI:  Radha Sandoval is a 88 y.o. female with unclear PMHx, who presents for multiple complaints. Patient is accompanied by her new caretaker, who has only known the patient for less than a week. Patient currently resides at [facility]. She was sent from the [facility] to see a PCP for a possible urinary tract infection and was seen in primary care clinic this afternoon. At the appointment, patient was unable to ambulate, had excruciating body pain, tachypnea and altered mental status. Patient was sent to the ED for further evaluation. Of note, patient has not been established with a PCP for 3-4 years now.    Upon evaluation in the ED, patient noted to have an UTI and elevated troponin. Patient denies any urinary symptoms. However, the caretaker states the patient was told about her UTI already and was placed on macrobid last week, though there is no mention of this on chart review. Patient supposedly took only one dose of macrobid on Saturday. Caretaker states that her mental status has changed since she first met the patient last week. The caretaker also noted that patient administers her own medications and she found patient's pillbox in a drawer full, with no medications taken for at least a week. Lengthy discussion had at bedside regarding patient's chief complaint. Patient stated "she just wants someone to tell her what medications to take and when". Patient endorses worsening back pain, from cervical to lumbar spine. At baseline, patient uses a rolling walker to ambulate, but has had difficulty walking for the last two weeks. Patient supposedly " takes meclizine, glucose tablets (unknown reason) and ibuprofen on a daily basis, which the son buys online. Overall history is limited and unclear due to patient being a poor historian. Currently endorses dyspnea and back pain. Denies chest pain, abdominal pain, headache, F/N/V/D.    ED: /81, afebrile, on RA. CBC wnl. CMP with Cr 1.2, BUN 50. Troponin 0.063. UA with 3+ leukocytes, >100 RBC, >100 WBC. Urine culture pending. CXR negative. CT Head negative. CT Entire Spine with No acute osseous abnormality. Received IV Ceftriaxone 1g and Lidocaine patch in ED.       Overview/Hospital Course:  Radha Sandoval was admitted to Hospital Medicine for acute cystitis. Pt completed 4 days of IV rocephin. Urine culture no growth. Echo with normal LV systolic function, EF of 65%, normal RV systolic, moderate LA enlargement, moderate mitral stenosis, mild-to-moderate aortic valve stenosis, normal CVP. Pt reporting persistent generalized weakness. Son not initially interested in SNF placement and seeking HH at [facility]; however, given persistent weakness, he is open to possible SNF placement and eager for PT/OT evaluation. Plan to establish care with PCP & neurology at time of discharge.       Interval History: Pt seen and examined by me this morning. Per overnight team, patient was able to get some rest overnight & overall the patient appears less confused than prior evaluation. Pt with intermittent asymptomatic tachycardia to 120s-130s. EKG consistent with sinus tachycardia. Pt reports feeling down this morning and is eager to discharge home. She is otherwise without physical complaints and reports she is upset because she wants to see her son and her reynaldoterChel. Updated son on POC via phone who plans to visit the patient today. Awaiting SNF placement.    Review of Systems   Constitutional:  Positive for activity change. Negative for chills and fever.   HENT:  Negative for trouble swallowing.    Eyes:  Negative for  photophobia and visual disturbance.   Respiratory:  Negative for cough, chest tightness and shortness of breath.    Cardiovascular:  Negative for chest pain, palpitations and leg swelling.   Gastrointestinal:  Negative for abdominal pain, constipation, diarrhea, nausea and vomiting.   Genitourinary:  Negative for decreased urine volume, difficulty urinating, dysuria, frequency, hematuria and urgency.   Musculoskeletal:  Positive for back pain (chronic, well-controlled) and gait problem (unable to use rolling walker). Negative for neck pain.   Skin:  Negative for rash and wound.   Neurological:  Positive for weakness. Negative for dizziness, syncope, speech difficulty and light-headedness.   Psychiatric/Behavioral:  Positive for confusion (improving). Negative for agitation. The patient is not nervous/anxious.    Objective:     Vital Signs (Most Recent):  Temp: 97.9 °F (36.6 °C) (04/22/23 1152)  Pulse: (!) 127 (04/22/23 1152)  Resp: 18 (04/22/23 1152)  BP: (!) 145/81 (04/22/23 1152)  SpO2: (!) 93 % (04/22/23 1152)   Vital Signs (24h Range):  Temp:  [97.7 °F (36.5 °C)-98.7 °F (37.1 °C)] 97.9 °F (36.6 °C)  Pulse:  [] 127  Resp:  [17-18] 18  SpO2:  [93 %-95 %] 93 %  BP: (141-184)/(70-86) 145/81     Weight: 70.8 kg (156 lb)  Body mass index is 25.96 kg/m².    Intake/Output Summary (Last 24 hours) at 4/22/2023 1244  Last data filed at 4/22/2023 1055  Gross per 24 hour   Intake 1365.03 ml   Output 950 ml   Net 415.03 ml      Physical Exam  Vitals and nursing note reviewed.   Constitutional:       General: She is not in acute distress.     Appearance: She is well-developed.      Comments: Elderly woman who is conversant with interview and cooperative with exam, intermittently tearful   HENT:      Head: Normocephalic and atraumatic.   Eyes:      Conjunctiva/sclera: Conjunctivae normal.      Pupils: Pupils are equal, round, and reactive to light.   Cardiovascular:      Rate and Rhythm: Normal rate and regular rhythm.       Heart sounds: Normal heart sounds.   Pulmonary:      Effort: Pulmonary effort is normal. No respiratory distress.      Breath sounds: Normal breath sounds. No wheezing.   Abdominal:      General: Bowel sounds are normal. There is no distension.      Palpations: Abdomen is soft.      Tenderness: There is no abdominal tenderness.   Musculoskeletal:         General: Normal range of motion.      Cervical back: Normal range of motion and neck supple.   Skin:     General: Skin is warm and dry.      Capillary Refill: Capillary refill takes less than 2 seconds.   Neurological:      Mental Status: She is alert and oriented to person, place, and time.      Comments: Mild confusion, which seems improved from previous evaluation. No objective signs of AVHs.    Psychiatric:         Behavior: Behavior normal.         Thought Content: Thought content normal.         Judgment: Judgment normal.       Significant Labs: All pertinent labs within the past 24 hours have been reviewed.    Significant Imaging: I have reviewed all pertinent imaging results/findings within the past 24 hours.      Assessment/Plan:      * Debility  - Patient's history provided by son and hemant, who is new to patient  - Hemant feels patient is a good candidate for SNF/rehab  - Patient's son, Maykel, states that patient is able to ambulate with walker at baseline, but reports that patient's had recent decline in functional status over the last week or two. Discussed that [facility] wants patient to be able to ambulate independently because they don't provide any assistance. Son states he'll consider SNF pending PT/OT recs  - Nursing to assist patient with ambulation  - Ordered walker to bedside  - PT/OT and recommending SNF  - Awaiting placement    Sinus tachycardia  - Pt with intermittent bouts of asymptomatic sinus tachycardia, not responding to IVFs  - Afebrile with no leukocytosis  - Repeat CXR and UA pending  - Consider addition of metoprolol daily if  infectious workup is negative     Acute cystitis with hematuria  Resolved  88 y.o. female admitted to  observation with acute cystitis with hematuria. Patient diagnosed with UTI last week, was prescribed macrobid, however only took one dose. Denies any urinary symptoms. However, was sent from [facility] living center to see PCP for concerns of worsening UTI and associated AMS. Patient sent to ED from primary care clinic for further evaluation.    - UA with 3+ occult blood, 3+ leukocytes, >100 RBC, >100 WBC  - Afebrile, no leukocytosis  - A&O x3   - s/p 4 days of IV Ceftriaxone 1g q24h  - Urine culture no growth    GINA (acute kidney injury)  Resolved  Patient with acute kidney injury likely due to IVVD/dehydration GINA is currently stable. Labs reviewed- Renal function/electrolytes with Estimated Creatinine Clearance: 48.9 mL/min (based on SCr of 0.8 mg/dL). according to latest data. Monitor urine output and serial BMP and adjust therapy as needed. Avoid nephrotoxins and renally dose meds for GFR listed above.   - Cr 1.2 on admit   - Cr 0.8 ~3 weeks ago  - 1L LR bolus given  - UTI noted and patient endorses poor oral hydration  - Monitor renal function on daily BMP    Delirium  - Pt with worsening confusion and signs of auditory & visual hallucinations per the sitter at bedside likely 2/2 hospital delirium though family reports she has had similar episodes in the past  - Cotninune seroquel 12.5 mg nightly  - Delirium precautions   - Consider neuropsych follow-up at discharge     Elevated troponin  - Troponin 0.063, repeat flat  - EKG completed in ED, however results unavailable in EMR - will follow up   - No acute abnormalities noted by ED team  - Repeat EKG NSR  - Monitor telemetry  - TTE ordered, no baseline available in EMR  Results for orders placed during the hospital encounter of 04/18/23  Echo  Interpretation Summary  · The left ventricle is normal in size with normal systolic function. The estimated ejection  fraction is 65%.  · Normal right ventricular size with normal right ventricular systolic function.  · Moderate left atrial enlargement.  · There is moderate mitral stenosis. The mean diastolic gradient across the mitral valve is 7 mmHg at a heart rate of bpm.  · There is mild-to-moderate aortic valve stenosis. Aortic valve area is 1.4 cm2; peak velocity is 3.0 m/s; mean gradient is 22 mmHg.  · The estimated PA systolic pressure is 34 mmHg.  · Normal central venous pressure (3 mmHg).    Hypertension  - Patient non-compliant with anti-hypertensive medications  - History of amlodpine and lisinopril noted on chart review, however patient states she is not taking either and unable to say when she last took them  - Continue amlodipine 10 mg daily  - Hold lisinopril 10 mg daily in setting of GINA - do not resume given normotensive with amlodipine only   - Monitor BP with q4h vitals   - Close PCP f/u on discharge      VTE Risk Mitigation (From admission, onward)         Ordered     enoxaparin injection 40 mg  Daily         04/18/23 2247     IP VTE HIGH RISK PATIENT  Once         04/18/23 2247                Discharge Planning   TOSHA: 4/24/2023     Code Status: Full Code   Is the patient medically ready for discharge?: Yes    Reason for patient still in hospital (select all that apply): Patient trending condition and Pending disposition  Discharge Plan A: Assisted Living                  Raine Huerta PA-C  Department of Hospital Medicine   [facility] 11H

## 2023-04-22 NOTE — ASSESSMENT & PLAN NOTE
- Pt with intermittent bouts of asymptomatic sinus tachycardia, not responding to IVFs  - Afebrile with no leukocytosis  - Repeat CXR and UA pending  - Consider addition of metoprolol daily if infectious workup is negative

## 2023-04-22 NOTE — ASSESSMENT & PLAN NOTE
- Patient non-compliant with anti-hypertensive medications  - History of amlodpine and lisinopril noted on chart review, however patient states she is not taking either and unable to say when she last took them  - Continue amlodipine 10 mg daily  - Hold lisinopril 10 mg daily in setting of GINA - do not resume given normotensive with amlodipine only   - Monitor BP with q4h vitals   - Close PCP f/u on discharge

## 2023-04-22 NOTE — CONSULTS
Thank you for your consult to [facility]. We have reviewed the patient chart. This patient does not meet criteria for [facility] service at this time due to [facility] service capacity limitations. Will hand back to In-house service.    Rachel Conway MD

## 2023-04-22 NOTE — ASSESSMENT & PLAN NOTE
- Patient's history provided by son and hemant, who is new to patient  - Sitter feels patient is a good candidate for SNF/rehab  - Patient's son, Maykel, states that patient is able to ambulate with walker at baseline, but reports that patient's had recent decline in functional status over the last week or two. Discussed that [facility] wants patient to be able to ambulate independently because they don't provide any assistance. Son states he'll consider SNF pending PT/OT recs  - Nursing to assist patient with ambulation  - Ordered walker to bedside  - PT/OT and recommending SNF  - Awaiting placement

## 2023-04-22 NOTE — PLAN OF CARE
Patient confused and groggy at shift change. Patient resting quietly throughout shift. Patient awake with a period of confusion around midnight but easily redirectable. Patient appears to be sleeping rest of shift. VSS. Fall and safety precautions maintained. Bed in lowest locked position. Call light within reach. Bed alarm maintained. POC discussed with patient. Will continue to monitor.       Problem: Adult Inpatient Plan of Care  Goal: Plan of Care Review  4/22/2023 0625 by Leyda Lopez LPN  Outcome: Ongoing, Progressing  4/22/2023 0625 by Leyda Lopez LPN  Outcome: Ongoing, Progressing     Problem: Adult Inpatient Plan of Care  Goal: Optimal Comfort and Wellbeing  4/22/2023 0625 by Leyda Lopez LPN  Outcome: Ongoing, Progressing  4/22/2023 0625 by Leyda Lopez LPN  Outcome: Ongoing, Progressing     Problem: Adult Inpatient Plan of Care  Goal: Readiness for Transition of Care  4/22/2023 0625 by Leyda Lopez LPN  Outcome: Ongoing, Progressing  4/22/2023 0625 by Leyda Lopez LPN  Outcome: Ongoing, Progressing     Problem: Fluid and Electrolyte Imbalance (Acute Kidney Injury/Impairment)  Goal: Fluid and Electrolyte Balance  4/22/2023 0625 by Leyda Lopez LPN  Outcome: Ongoing, Progressing  4/22/2023 0625 by Leyda Lopez LPN  Outcome: Ongoing, Progressing     Problem: Oral Intake Inadequate (Acute Kidney Injury/Impairment)  Goal: Optimal Nutrition Intake  4/22/2023 0625 by Leyda Lopez LPN  Outcome: Ongoing, Progressing  4/22/2023 0625 by Leyda Lopez LPN  Outcome: Ongoing, Progressing     Problem: Renal Function Impairment (Acute Kidney Injury/Impairment)  Goal: Effective Renal Function  4/22/2023 0625 by Leyda oLpez LPN  Outcome: Ongoing, Progressing  4/22/2023 0625 by Leyda Lopez LPN  Outcome: Ongoing, Progressing     Problem: Infection  Goal: Absence of Infection Signs and Symptoms  4/22/2023 0625 by Leyda Lopez LPN  Outcome: Ongoing, Progressing  4/22/2023 0625 by Leyda Lopez  LPN  Outcome: Ongoing, Progressing     Problem: Skin Injury Risk Increased  Goal: Skin Health and Integrity  4/22/2023 0625 by Leyda Lopez LPN  Outcome: Ongoing, Progressing  4/22/2023 0625 by Leyda Lopez LPN  Outcome: Ongoing, Progressing     Problem: Fall Injury Risk  Goal: Absence of Fall and Fall-Related Injury  4/22/2023 0625 by Leyda Lopez LPN  Outcome: Ongoing, Progressing  4/22/2023 0625 by Leyda Lopez LPN  Outcome: Ongoing, Progressing

## 2023-04-22 NOTE — SUBJECTIVE & OBJECTIVE
Interval History: Pt seen and examined by me this morning. Per overnight team, patient was able to get some rest overnight & overall the patient appears less confused than prior evaluation. Pt with intermittent asymptomatic tachycardia to 120s-130s. EKG consistent with sinus tachycardia. Pt reports feeling down this morning and is eager to discharge home. She is otherwise without physical complaints and reports she is upset because she wants to see her son and her sitter, Chel. Updated son on POC via phone who plans to visit the patient today. Awaiting SNF placement.    Review of Systems   Constitutional:  Positive for activity change. Negative for chills and fever.   HENT:  Negative for trouble swallowing.    Eyes:  Negative for photophobia and visual disturbance.   Respiratory:  Negative for cough, chest tightness and shortness of breath.    Cardiovascular:  Negative for chest pain, palpitations and leg swelling.   Gastrointestinal:  Negative for abdominal pain, constipation, diarrhea, nausea and vomiting.   Genitourinary:  Negative for decreased urine volume, difficulty urinating, dysuria, frequency, hematuria and urgency.   Musculoskeletal:  Positive for back pain (chronic, well-controlled) and gait problem (unable to use rolling walker). Negative for neck pain.   Skin:  Negative for rash and wound.   Neurological:  Positive for weakness. Negative for dizziness, syncope, speech difficulty and light-headedness.   Psychiatric/Behavioral:  Positive for confusion (improving). Negative for agitation. The patient is not nervous/anxious.    Objective:     Vital Signs (Most Recent):  Temp: 97.9 °F (36.6 °C) (04/22/23 1152)  Pulse: (!) 127 (04/22/23 1152)  Resp: 18 (04/22/23 1152)  BP: (!) 145/81 (04/22/23 1152)  SpO2: (!) 93 % (04/22/23 1152)   Vital Signs (24h Range):  Temp:  [97.7 °F (36.5 °C)-98.7 °F (37.1 °C)] 97.9 °F (36.6 °C)  Pulse:  [] 127  Resp:  [17-18] 18  SpO2:  [93 %-95 %] 93 %  BP:  (141-184)/(70-86) 145/81     Weight: 70.8 kg (156 lb)  Body mass index is 25.96 kg/m².    Intake/Output Summary (Last 24 hours) at 4/22/2023 1244  Last data filed at 4/22/2023 1055  Gross per 24 hour   Intake 1365.03 ml   Output 950 ml   Net 415.03 ml      Physical Exam  Vitals and nursing note reviewed.   Constitutional:       General: She is not in acute distress.     Appearance: She is well-developed.      Comments: Elderly woman who is conversant with interview and cooperative with exam, intermittently tearful   HENT:      Head: Normocephalic and atraumatic.   Eyes:      Conjunctiva/sclera: Conjunctivae normal.      Pupils: Pupils are equal, round, and reactive to light.   Cardiovascular:      Rate and Rhythm: Normal rate and regular rhythm.      Heart sounds: Normal heart sounds.   Pulmonary:      Effort: Pulmonary effort is normal. No respiratory distress.      Breath sounds: Normal breath sounds. No wheezing.   Abdominal:      General: Bowel sounds are normal. There is no distension.      Palpations: Abdomen is soft.      Tenderness: There is no abdominal tenderness.   Musculoskeletal:         General: Normal range of motion.      Cervical back: Normal range of motion and neck supple.   Skin:     General: Skin is warm and dry.      Capillary Refill: Capillary refill takes less than 2 seconds.   Neurological:      Mental Status: She is alert and oriented to person, place, and time.      Comments: Mild confusion, which seems improved from previous evaluation. No objective signs of AVHs.    Psychiatric:         Behavior: Behavior normal.         Thought Content: Thought content normal.         Judgment: Judgment normal.       Significant Labs: All pertinent labs within the past 24 hours have been reviewed.    Significant Imaging: I have reviewed all pertinent imaging results/findings within the past 24 hours.

## 2023-04-22 NOTE — ASSESSMENT & PLAN NOTE
- Pt with worsening confusion and signs of auditory & visual hallucinations per the sitter at bedside likely 2/2 hospital delirium though family reports she has had similar episodes in the past  - Cotninune seroquel 12.5 mg nightly  - Delirium precautions   - Consider neuropsych follow-up at discharge

## 2023-04-22 NOTE — NURSING
"Son at bedside and patient stating she cannot find her upper dentures. Upon entering room this morning at 0805, patient did have upper dentures in her hand. I asked patient if she wanted to put them in and she said "I want to finish my cookie first". I looked under bed and couch, in the bed with patient, in trash room and housekeeping trash bags, and breakfast tray was checked. Dolores, nurse enamorado and I are unable to find the upper dentures at this time.Tri, charge nurse contacted security to come fill out a report with patient. Will continue to look for dentures.     1213- Dentures were found. Patient said they were in her purse  "

## 2023-04-23 NOTE — PROGRESS NOTES
"[facility] [address] [place] [address] Medicine  Progress Note    Patient Name: Radha Sandoval  MRN: [id number]  Patient Class: IP- Inpatient   Admission Date: 4/18/2023  Length of Stay: 4 days  Attending Physician: Uvaldo Magaña MD  Primary Care Provider: Primary Doctor No        Subjective:     Principal Problem:Debility        HPI:  Radha Sandoval is a 88 y.o. female with unclear PMHx, who presents for multiple complaints. Patient is accompanied by her new caretaker, who has only known the patient for less than a week. Patient currently resides at [facility]. She was sent from the [facility] to see a PCP for a possible urinary tract infection and was seen in primary care clinic this afternoon. At the appointment, patient was unable to ambulate, had excruciating body pain, tachypnea and altered mental status. Patient was sent to the ED for further evaluation. Of note, patient has not been established with a PCP for 3-4 years now.    Upon evaluation in the ED, patient noted to have an UTI and elevated troponin. Patient denies any urinary symptoms. However, the caretaker states the patient was told about her UTI already and was placed on macrobid last week, though there is no mention of this on chart review. Patient supposedly took only one dose of macrobid on Saturday. Caretaker states that her mental status has changed since she first met the patient last week. The caretaker also noted that patient administers her own medications and she found patient's pillbox in a drawer full, with no medications taken for at least a week. Lengthy discussion had at bedside regarding patient's chief complaint. Patient stated "she just wants someone to tell her what medications to take and when". Patient endorses worsening back pain, from cervical to lumbar spine. At baseline, patient uses a rolling walker to ambulate, but has had difficulty walking for the last two weeks. Patient supposedly " takes meclizine, glucose tablets (unknown reason) and ibuprofen on a daily basis, which the son buys online. Overall history is limited and unclear due to patient being a poor historian. Currently endorses dyspnea and back pain. Denies chest pain, abdominal pain, headache, F/N/V/D.    ED: /81, afebrile, on RA. CBC wnl. CMP with Cr 1.2, BUN 50. Troponin 0.063. UA with 3+ leukocytes, >100 RBC, >100 WBC. Urine culture pending. CXR negative. CT Head negative. CT Entire Spine with No acute osseous abnormality. Received IV Ceftriaxone 1g and Lidocaine patch in ED.       Overview/Hospital Course:  Radha Sandoval was admitted to Hospital Medicine for acute cystitis. Pt completed 4 days of IV rocephin. Urine culture no growth. Echo with normal LV systolic function, EF of 65%, normal RV systolic, moderate LA enlargement, moderate mitral stenosis, mild-to-moderate aortic valve stenosis, normal CVP. Pt reporting persistent generalized weakness. Son not initially interested in SNF placement and seeking HH at [facility]; however, given persistent weakness, he is open to possible SNF placement and eager for PT/OT evaluation. Plan to establish care with PCP & neurology at time of discharge.       Interval History: Pt seen and examined by me this morning with Chel marcos, at bedside. BISMARK ROWE. Pt reports sleeping well overnight. No physical complaints at this time.     Review of Systems   Constitutional:  Positive for activity change. Negative for chills and fever.   HENT:  Negative for trouble swallowing.    Eyes:  Negative for photophobia and visual disturbance.   Respiratory:  Negative for cough, chest tightness and shortness of breath.    Cardiovascular:  Negative for chest pain, palpitations and leg swelling.   Gastrointestinal:  Negative for abdominal pain, constipation, diarrhea, nausea and vomiting.   Genitourinary:  Negative for decreased urine volume, difficulty urinating, dysuria, frequency, hematuria and urgency.    Musculoskeletal:  Positive for back pain (chronic, well-controlled) and gait problem (unable to use rolling walker). Negative for neck pain.   Skin:  Negative for rash and wound.   Neurological:  Positive for weakness. Negative for dizziness, syncope, speech difficulty and light-headedness.   Psychiatric/Behavioral:  Positive for confusion (improving). Negative for agitation. The patient is not nervous/anxious.    Objective:     Vital Signs (Most Recent):  Temp: 97.9 °F (36.6 °C) (04/23/23 1045)  Pulse: 90 (04/23/23 1456)  Resp: 18 (04/23/23 1045)  BP: (!) 151/70 (04/23/23 1045)  SpO2: 95 % (04/23/23 1045)   Vital Signs (24h Range):  Temp:  [37.4 °F (3 °C)-98.7 °F (37.1 °C)] 97.9 °F (36.6 °C)  Pulse:  [] 90  Resp:  [17-20] 18  SpO2:  [93 %-95 %] 95 %  BP: (134-205)/() 151/70     Weight: 70.8 kg (156 lb)  Body mass index is 25.96 kg/m².    Intake/Output Summary (Last 24 hours) at 4/23/2023 1520  Last data filed at 4/22/2023 1908  Gross per 24 hour   Intake 862 ml   Output 150 ml   Net 712 ml      Physical Exam  Vitals and nursing note reviewed.   Constitutional:       General: She is not in acute distress.     Appearance: She is well-developed.      Comments: Pleasant elderly woman who is conversant with interview and cooperative with exam   HENT:      Head: Normocephalic and atraumatic.   Eyes:      Conjunctiva/sclera: Conjunctivae normal.      Pupils: Pupils are equal, round, and reactive to light.   Cardiovascular:      Rate and Rhythm: Normal rate and regular rhythm.      Heart sounds: Normal heart sounds.   Pulmonary:      Effort: Pulmonary effort is normal. No respiratory distress.      Breath sounds: Normal breath sounds. No wheezing.   Abdominal:      General: Bowel sounds are normal. There is no distension.      Palpations: Abdomen is soft.      Tenderness: There is no abdominal tenderness.   Musculoskeletal:         General: Normal range of motion.      Cervical back: Normal range of motion  and neck supple.   Skin:     General: Skin is warm and dry.      Capillary Refill: Capillary refill takes less than 2 seconds.   Neurological:      Mental Status: She is alert and oriented to person, place, and time.      Comments: Mild confusion, much improved from previous evaluation. No objective signs of AVHs.    Psychiatric:         Behavior: Behavior normal.         Thought Content: Thought content normal.         Judgment: Judgment normal.       Significant Labs: All pertinent labs within the past 24 hours have been reviewed.    Significant Imaging: I have reviewed all pertinent imaging results/findings within the past 24 hours.      Assessment/Plan:      * Debility  - Patient's history provided by son and hemant, who is new to patient  - Hemant feels patient is a good candidate for SNF/rehab  - Patient's son, Maykel, states that patient is able to ambulate with walker at baseline, but reports that patient's had recent decline in functional status over the last week or two. Discussed that [facility] wants patient to be able to ambulate independently because they don't provide any assistance. Son states he'll consider SNF pending PT/OT recs  - Nursing to assist patient with ambulation  - Ordered walker to bedside  - PT/OT and recommending SNF  - Awaiting placement    Sinus tachycardia  - Pt with intermittent bouts of asymptomatic sinus tachycardia, not responding to IVFs  - Afebrile with no leukocytosis  - Repeat CXR negative  - UA slightly positive - will extend rocephin course to total 7 days  - Start metoprolol 12.5 mg BID    Acute cystitis with hematuria  Resolved  88 y.o. female admitted to  observation with acute cystitis with hematuria. Patient diagnosed with UTI last week, was prescribed macrobid, however only took one dose. Denies any urinary symptoms. However, was sent from assisted living center to see PCP for concerns of worsening UTI and associated AMS. Patient sent to ED from primary care clinic for  further evaluation.    - UA with 3+ occult blood, 3+ leukocytes, >100 RBC, >100 WBC  - Afebrile, no leukocytosis  - A&O x3   - s/p 4 days of IV Ceftriaxone 1g q24h  - Urine culture no growth    GINA (acute kidney injury)  Resolved  Patient with acute kidney injury likely due to IVVD/dehydration GINA is currently stable. Labs reviewed- Renal function/electrolytes with Estimated Creatinine Clearance: 48.9 mL/min (based on SCr of 0.8 mg/dL). according to latest data. Monitor urine output and serial BMP and adjust therapy as needed. Avoid nephrotoxins and renally dose meds for GFR listed above.   - Cr 1.2 on admit   - Cr 0.8 ~3 weeks ago  - 1L LR bolus given  - UTI noted and patient endorses poor oral hydration  - Monitor renal function on daily BMP    Delirium  - Pt with worsening confusion and signs of auditory & visual hallucinations per the sitter at bedside likely 2/2 hospital delirium though family reports she has had similar episodes in the past  - Continue seroquel 12.5 mg nightly  - Delirium precautions   - Consider neuropsych follow-up at discharge     Elevated troponin  - Troponin 0.063, repeat flat  - EKG completed in ED, however results unavailable in EMR - will follow up   - No acute abnormalities noted by ED team  - Repeat EKG NSR  - Monitor telemetry  - TTE ordered, no baseline available in EMR  Results for orders placed during the hospital encounter of 04/18/23  Echo  Interpretation Summary  · The left ventricle is normal in size with normal systolic function. The estimated ejection fraction is 65%.  · Normal right ventricular size with normal right ventricular systolic function.  · Moderate left atrial enlargement.  · There is moderate mitral stenosis. The mean diastolic gradient across the mitral valve is 7 mmHg at a heart rate of bpm.  · There is mild-to-moderate aortic valve stenosis. Aortic valve area is 1.4 cm2; peak velocity is 3.0 m/s; mean gradient is 22 mmHg.  · The estimated PA systolic  pressure is 34 mmHg.  · Normal central venous pressure (3 mmHg).    Hypertension  - Patient non-compliant with anti-hypertensive medications  - History of amlodpine and lisinopril noted on chart review, however patient states she is not taking either and unable to say when she last took them  - Continue amlodipine 10 mg daily  - Hold lisinopril 10 mg daily in setting of GINA - do not resume given normotensive with amlodipine only   - Monitor BP with q4h vitals   - Close PCP f/u on discharge      VTE Risk Mitigation (From admission, onward)         Ordered     enoxaparin injection 40 mg  Daily         04/18/23 2247     IP VTE HIGH RISK PATIENT  Once         04/18/23 2247                Discharge Planning   TOSHA: 4/24/2023     Code Status: Full Code   Is the patient medically ready for discharge?: Yes    Reason for patient still in hospital (select all that apply): Patient trending condition and Pending disposition  Discharge Plan A: Assisted Living                  JON AngelC  Department of Hospital Medicine   [facility] 11H

## 2023-04-23 NOTE — ASSESSMENT & PLAN NOTE
- Pt with worsening confusion and signs of auditory & visual hallucinations per the sitter at bedside likely 2/2 hospital delirium though family reports she has had similar episodes in the past  - Continue seroquel 12.5 mg nightly  - Delirium precautions   - Consider neuropsych follow-up at discharge

## 2023-04-23 NOTE — SUBJECTIVE & OBJECTIVE
Interval History: Pt seen and examined by me this morning with Chel marcos, at bedside. JASONCATHIECliff ROWE. Pt reports sleeping well overnight. No physical complaints at this time.     Review of Systems   Constitutional:  Positive for activity change. Negative for chills and fever.   HENT:  Negative for trouble swallowing.    Eyes:  Negative for photophobia and visual disturbance.   Respiratory:  Negative for cough, chest tightness and shortness of breath.    Cardiovascular:  Negative for chest pain, palpitations and leg swelling.   Gastrointestinal:  Negative for abdominal pain, constipation, diarrhea, nausea and vomiting.   Genitourinary:  Negative for decreased urine volume, difficulty urinating, dysuria, frequency, hematuria and urgency.   Musculoskeletal:  Positive for back pain (chronic, well-controlled) and gait problem (unable to use rolling walker). Negative for neck pain.   Skin:  Negative for rash and wound.   Neurological:  Positive for weakness. Negative for dizziness, syncope, speech difficulty and light-headedness.   Psychiatric/Behavioral:  Positive for confusion (improving). Negative for agitation. The patient is not nervous/anxious.    Objective:     Vital Signs (Most Recent):  Temp: 97.9 °F (36.6 °C) (04/23/23 1045)  Pulse: 90 (04/23/23 1456)  Resp: 18 (04/23/23 1045)  BP: (!) 151/70 (04/23/23 1045)  SpO2: 95 % (04/23/23 1045)   Vital Signs (24h Range):  Temp:  [37.4 °F (3 °C)-98.7 °F (37.1 °C)] 97.9 °F (36.6 °C)  Pulse:  [] 90  Resp:  [17-20] 18  SpO2:  [93 %-95 %] 95 %  BP: (134-205)/() 151/70     Weight: 70.8 kg (156 lb)  Body mass index is 25.96 kg/m².    Intake/Output Summary (Last 24 hours) at 4/23/2023 1520  Last data filed at 4/22/2023 1908  Gross per 24 hour   Intake 862 ml   Output 150 ml   Net 712 ml      Physical Exam  Vitals and nursing note reviewed.   Constitutional:       General: She is not in acute distress.     Appearance: She is well-developed.      Comments: Pleasant  elderly woman who is conversant with interview and cooperative with exam   HENT:      Head: Normocephalic and atraumatic.   Eyes:      Conjunctiva/sclera: Conjunctivae normal.      Pupils: Pupils are equal, round, and reactive to light.   Cardiovascular:      Rate and Rhythm: Normal rate and regular rhythm.      Heart sounds: Normal heart sounds.   Pulmonary:      Effort: Pulmonary effort is normal. No respiratory distress.      Breath sounds: Normal breath sounds. No wheezing.   Abdominal:      General: Bowel sounds are normal. There is no distension.      Palpations: Abdomen is soft.      Tenderness: There is no abdominal tenderness.   Musculoskeletal:         General: Normal range of motion.      Cervical back: Normal range of motion and neck supple.   Skin:     General: Skin is warm and dry.      Capillary Refill: Capillary refill takes less than 2 seconds.   Neurological:      Mental Status: She is alert and oriented to person, place, and time.      Comments: Mild confusion, much improved from previous evaluation. No objective signs of AVHs.    Psychiatric:         Behavior: Behavior normal.         Thought Content: Thought content normal.         Judgment: Judgment normal.       Significant Labs: All pertinent labs within the past 24 hours have been reviewed.    Significant Imaging: I have reviewed all pertinent imaging results/findings within the past 24 hours.

## 2023-04-23 NOTE — ASSESSMENT & PLAN NOTE
- Pt with intermittent bouts of asymptomatic sinus tachycardia, not responding to IVFs  - Afebrile with no leukocytosis  - Repeat CXR negative  - UA slightly positive - will extend rocephin course to total 7 days  - Start metoprolol 12.5 mg BID

## 2023-04-23 NOTE — PLAN OF CARE
No acute events during shift. Patient able to answer all orientation questions correctly with the exception of situation. BP elevated 205/93 . ADEEL Tam notified . Nurse Instructed to stop fluids, give morning bp meds early and to recheck bp in an hour. Fall and safety precautions maintained. Bed in lowest locked position. Call light within reach. POC discussed with patient. Will continue to monitor.

## 2023-04-24 NOTE — PT/OT/SLP PROGRESS
"Physical Therapy Treatment    Patient Name:  Radha Sandoval   MRN:  [id number]    Recommendations:     Discharge Recommendations: nursing facility, skilled  Discharge Equipment Recommendations: to be determined by next level of care  Barriers to discharge: Inaccessible home and Decreased caregiver support    Assessment:     Radha Sandoval is a 88 y.o. female admitted with a medical diagnosis of Debility.  She presents with the following impairments/functional limitations: weakness, impaired endurance, impaired functional mobility, gait instability, impaired balance, pain, decreased safety awareness, decreased lower extremity function, decreased coordination, impaired cognition, abnormal tone, decreased ROM, impaired cardiopulmonary response to activity.  Pt was agreeable to therapy, but session limited d/t significant anxiousness and shakiness. Pt completed 2 trials of bed mobility and scooting to EOB. Once pt demonstrated increased shakiness, pt required increase time to calm down. Pt continues to benefit from a collaborative PT/OT program to improve quality of life and focus on recovery of impairments.     Rehab Prognosis: Fair; patient would benefit from acute skilled PT services to address these deficits and reach maximum level of function.    Recent Surgery: * No surgery found *      Plan:     During this hospitalization, patient to be seen 3 x/week to address the identified rehab impairments via gait training, therapeutic activities, therapeutic exercises, neuromuscular re-education and progress toward the following goals:    Plan of Care Expires:  05/21/23    Subjective     Chief Complaint: fear of falling   Patient/Family Comments/goals: "I don't want to fall" and "hold my hand"  Pain/Comfort:  Pain Rating 1:  (none verbalized)  Pain Rating Post-Intervention 1:  (none verbalized)      Objective:     Communicated with RN prior to session.  Patient found HOB elevated with Tamera, telemetry upon PT entry to room. "     General Precautions: Standard, fall  Orthopedic Precautions: N/A  Braces: N/A  Respiratory Status: Room air     Functional Mobility:  Additional staff present: Rehab Tech  Bed Mobility:   Scooting   to HOB: via drawsheet: total assistance and 2 persons  to EOB: maximal assistance, pt able to briefly complete with Yusef.   Supine to Sit: maximal assistance and 2 persons; HOB elevated  2 trials.   When pt laying in bed, pt required significant time to relax and decrease anxiousness about falling. Max cues to inform pt, she was laying in bed and not sitting or standing up. Per caregiver/sitter, pt possible experience some dizziness.  Sit to Supine: total assistance and 2 persons  2 trials.   Increase assistance d/t safety   Transfers:   Sit <> Stand Transfer: try to attempt x2, but unable to complete d/t anxiousness and increased shakiness  Balance:   Static/Dynamic sitting EOB balance: maxA for safety  2 trials. Pt tolerated ~1-2 minutes of sitting EOB before requesting to returned back to bed.  Increase shakiness and anxiousness with minimal relief of calming.      AM-PAC 6 CLICK MOBILITY  Turning over in bed (including adjusting bedclothes, sheets and blankets)?: 2  Sitting down on and standing up from a chair with arms (e.g., wheelchair, bedside commode, etc.): 2  Moving from lying on back to sitting on the side of the bed?: 2  Moving to and from a bed to a chair (including a wheelchair)?: 1  Need to walk in hospital room?: 1  Climbing 3-5 steps with a railing?: 1  Basic Mobility Total Score: 9     Treatment & Education:  Increase time required for controled breathing and decrease shakiness/tremors  Cool towel and music to assist with calming activities with short term relief.   Patient educated on role of therapy, goals of session, and benefits of out of bed mobility.   Instructed on use of call button and importance of calling nursing staff for assistance with mobility   Questions/concerns addressed within PTA  scope of practice  Unsure of pt education and understanding for above.    Patient left HOB elevated with all lines intact, call button in reach, nurse notified, and sitter/caregiver present.    GOALS:   Multidisciplinary Problems       Physical Therapy Goals          Problem: Physical Therapy    Goal Priority Disciplines Outcome Goal Variances Interventions   Physical Therapy Goal     PT, PT/OT Ongoing, Progressing     Description: Goals to be met by: 2023     Patient will increase functional independence with mobility by performin. Supine to sit with Stand-by Assistance  2. Sit to supine with Contact Guard Assistance  3. Rolling to Left and Right with Supervision.  4. Sit to stand transfer with Contact Guard Assistance and RW  5. Bed to chair transfer with Moderate Assistance using Rolling Walker  6. Gait  x 15 feet with Moderate Assistance using Rolling Walker.   7. Lower extremity exercise program x15 reps per handout, with supervision                         Time Tracking:     PT Received On: 23  PT Start Time: 1440     PT Stop Time: 1518  PT Total Time (min): 38 min     Billable Minutes: Therapeutic Activity 38    Treatment Type: Treatment  PT/PTA: PTA     Number of PTA visits since last PT visit: 2023

## 2023-04-24 NOTE — NURSING
0800 Rc'd pt awake and alert x3. Caregiver at bedside. No acute distress noted. Pt denies pain.Bed in low and lock position. Side rails up x2. Call light in reach. Will continue to monitor    1200 Pt is laying in bed with eyes open. No acute distress noted. Caregiver at bedside. Bed in low and lock position. Side rails up x2. Call light in reach. Will continue to monitor     1730 Pt is talking to caregiver. No acute distress noted. Will continue to monitor

## 2023-04-24 NOTE — PLAN OF CARE
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       Patient is homebound due to:  Lumbar spondylosis with myelopathy    Allergies:Review of patient's allergies indicates:  No Known Allergies    Vitals:  Every shift    Diet: No salt added     Activities:   Up in a chair each morning as tolerated and Activity as tolerated    Goals of Care Treatment Preferences:  Code Status: Full Code      Labs: Per facility protocol    Nursing Precautions:  Fall and Pressure ulcer prevention    Consults:   PT to evaluate and treat- 5 times a week, OT to evaluate and treat- 5 times a week, ST to evaluate and treat- 5 times a week, and Nutrition to evaluate and recommend diet     Miscellaneous Care: Routine Skin for Bedridden Patients:  Apply moisture barrier cream to all             Medications: Discontinue all previous medication orders, if any. See new list below.     Medication List        START taking these medications      hydrOXYzine HCL 25 MG tablet  Commonly known as: ATARAX  Take 1 tablet (25 mg total) by mouth 3 (three) times daily.     meclizine 12.5 mg tablet  Commonly known as: ANTIVERT  Take 1 tablet (12.5 mg total) by mouth once daily.     metoprolol succinate 25 MG 24 hr tablet  Commonly known as: TOPROL-XL  Take 1 tablet (25 mg total) by mouth 2 (two) times daily.     miconazole NITRATE 2 %  2 % top powder  Commonly known as: MICOTIN  Apply topically twice daily under bilateral breasts     pantoprazole 40 MG tablet  Commonly known as: PROTONIX  Take 1 tablet (40 mg total) by mouth once daily.            CONTINUE taking these medications      amLODIPine 10 MG tablet  Commonly known as: NORVASC  Take 10 mg by mouth once daily.     ibuprofen 400 MG tablet  Commonly known as: ADVIL,MOTRIN  Take 1 tablet (400 mg total) by mouth every 6 (six) hours as needed (pain).     ramelteon 8 mg tablet  Commonly known as: ROZEREM  Take 8 mg by mouth every evening.            STOP taking these medications      lisinopriL 10 MG tablet                Immunizations Administered as of 4/28/2023       Name Date Dose VIS Date Route Exp Date    COVID-19, MRNA, LN-S, PF (Pfizer) (Purple Cap) 3/10/2021 0.3 mL -- -- --    Lot: [id number]     External: Auto Reconciled From Outside Source     COVID-19, MRNA, LN-S, PF (Pfizer) (Purple Cap) 2/17/2021 0.3 mL -- -- --    Lot: [id number]     External: Auto Reconciled From Outside Source             This patient has had both covid vaccinations          Joslyn Fatima PA-C  04/28/2023

## 2023-04-24 NOTE — PLAN OF CARE
CHW met with patient/family at bedside. Patient experience rounding completed and reviewed the following.     Do you know your discharge plan? Yes         If yes, what is the plan? SNF    Have you discussed your needs and preferences with your SW/CM? Yes     Assigned SW/CM notified of any patient/family needs or concerns.

## 2023-04-24 NOTE — PLAN OF CARE
142 received and uploaded to [employer].    Lyndsay West LMSW  [facility]  Ext. [phone]

## 2023-04-24 NOTE — PLAN OF CARE
SANTOSW scheduled the Podiatry hospital follow up for May 17 @ 1:00pm    SANTOSW scheduled the Neurology hospital follow up for  June 5 @ 11:00am

## 2023-04-24 NOTE — PT/OT/SLP PROGRESS
Occupational Therapy      Patient Name:  Radha Sandoval   MRN:  [id number]    Patient not seen today secondary to Other (Comment) (Pt with discharge orders in place to leave facility today). Will follow-up for OT per POC.    4/24/2023

## 2023-04-24 NOTE — PLAN OF CARE
04/24/23 1049   Post-Acute Status   Post-Acute Authorization Placement   Post-Acute Placement Status Pending post-acute provider review/more information requested     SW received notification that [facility] is unable to accept pt. CLAUDIA sent additional documentation requested to [facility] and informed them that they can reach out to pt's son, Maykel to discuss paperwork.    CLAUDIA contacted Maykel to provide him with an update on the referrals that were sent. Maykel is agreeable with pt going to [facility]. CLAUDIA provided Maykel with [facility]'s contact information for him to reach out to them in case they have not called him regarding paperwork.    CLAUDIA will continue to follow up.    Lyndsay West LMSW  [facility]  Ext. [phone]

## 2023-04-24 NOTE — PT/OT/SLP PROGRESS
Occupational Therapy   Treatment    Name: Radha Sandoval  MRN: [id number]  Admitting Diagnosis:  Debility       Recommendations:     Discharge Recommendations: nursing facility, skilled  Discharge Equipment Recommendations:  to be determined by next level of care  Barriers to discharge:  Other (Comment)    Assessment:     Radha Sandoval is a 88 y.o. female with a medical diagnosis of Debility.  She presents with a fear of falling. When presented with the goal to stand she began to cry and shake significantly in UE and LE at EOB limited her performance today. Pt. Required redirecting to attend to task 2/2 increase anxiety with falling. Performance deficits affecting function are weakness, impaired endurance, impaired balance, impaired functional mobility, impaired self care skills, impaired cardiopulmonary response to activity, pain, decreased safety awareness, impaired cognition, decreased lower extremity function.     Rehab Prognosis:  Good; patient would benefit from acute skilled OT services to address these deficits and reach maximum level of function.       Plan:     Patient to be seen 3 x/week to address the above listed problems via self-care/home management, therapeutic activities, therapeutic exercises, cognitive retraining  Plan of Care Expires: 05/21/23  Plan of Care Reviewed with: patient, caregiver    Subjective     Chief Complaint: Oil is on the floor, it too slippery to stand  Patient/Family Comments/goals: Pt. Report she wants to stand to pull up undergarments  Pain/Comfort:  Pain Rating 1:  (Unrated)    Objective:     Communicated with: RN prior to session.  Patient found HOB elevated with PureWick upon OT entry to room.    General Precautions: Standard, fall    Orthopedic Precautions:N/A  Braces: N/A  Respiratory Status: Room air     Occupational Performance:   Additional staff present: Rehab Tech  Bed Mobility:    Patient completed Rolling/Turning to Left with  maximal assistance  Patient completed  Rolling/Turning to Right with maximal assistance  Patient completed Scooting/Bridging to plant feet on the floor with moderate assistance at   Patient completed Supine to Sit with moderate assistance  Patient completed Sit to Supine with total of assistance and 2 persons   Scooting to Patient  HOB: via drawsheet: total assistance and 2 persons    Functional Mobility/Transfers:  Functional Mobility: Pt. Sat EOB ~ 15mins prior to announce to the pt.the goal is stand at bed side table to complete oral care pt static sitting  balance at EOB was CGA.  Post the introducing the goal, pt static sitting balance at EOB was Max A x 2 with bilateral blocking of both Knees and a posterior lean (Pt. Arms would fail, and extend bilateral legs at EOB )    Activities of Daily Living:  Grooming: set up (a) to complete oral care at bed level (with caregiver)  Lower Body Dressing: maximal assistance at bed level to doff slippers and  socks (which  socks were over slippers)    Toileting: total assistance purewick in place  Grooming: moderate assistance to comb hair seated at EOB     [facility] 6 Click ADL: 16    Treatment & Education:  Pt.educated on breathing techniques to mange anxiety   Pt. Educated on mindfulness techniques to assist with anxiety relating to falling   Pt educated on role of occupational therapy, POC, and safety during ADLs and functional mobility. Pt and OT discussed importance of safe, continued mobility to optimize daily living skills. Pt verbalized understanding. Pt given instruction to call for medical staff/nurse for assistance.     Patient left HOB elevated with all lines intact, call button in reach, and caregiver and PCT present    GOALS:   Multidisciplinary Problems       Occupational Therapy Goals          Problem: Occupational Therapy    Goal Priority Disciplines Outcome Interventions   Occupational Therapy Goal     OT, PT/OT Ongoing, Progressing    Description: Goals to be met by: 4/28/23 (1 week)      Patient will increase functional independence with ADLs by performing:    UE Dressing with Stand-by Assistance.  LE Dressing with Moderate Assistance.  Grooming while standing with Minimal Assistance.  Toileting from bedside commode with Moderate Assistance for hygiene and clothing management.   Rolling to Bilateral with Stand-by Assistance.   Supine to sit with Contact Guard Assistance.  Step transfer with Stand-by Assistance using LRAD  Toilet transfer to bedside commode with Stand-by Assistance using LRAD                          Time Tracking:     OT Date of Treatment: 04/24/23  OT Start Time: 1602  OT Stop Time: 1633  OT Total Time (min): 31 min    Billable Minutes:Self Care/Home Management 15  Therapeutic Activity 16    OT/ALEXIS: OT          4/24/2023

## 2023-04-25 PROBLEM — M47.16 LUMBAR SPONDYLOSIS WITH MYELOPATHY: Status: ACTIVE | Noted: 2023-01-01

## 2023-04-25 PROBLEM — R53.81 DEBILITY: Status: ACTIVE | Noted: 2023-01-01

## 2023-04-25 NOTE — PLAN OF CARE
Problem: Adult Inpatient Plan of Care  Goal: Plan of Care Review  4/25/2023 0634 by Leyda Lopez LPN  Outcome: Ongoing, Progressing  4/25/2023 0633 by Leyda Lopez LPN  Outcome: Ongoing, Progressing     Problem: Adult Inpatient Plan of Care  Goal: Absence of Hospital-Acquired Illness or Injury  4/25/2023 0634 by Leyda Lopez LPN  Outcome: Ongoing, Progressing  4/25/2023 0633 by Leyda Lopez LPN  Outcome: Ongoing, Progressing     Problem: Adult Inpatient Plan of Care  Goal: Optimal Comfort and Wellbeing  4/25/2023 0634 by Leyda Lopez LPN  Outcome: Ongoing, Progressing  4/25/2023 0633 by Leyda Lopez LPN  Outcome: Ongoing, Progressing     Problem: Fluid and Electrolyte Imbalance (Acute Kidney Injury/Impairment)  Goal: Fluid and Electrolyte Balance  4/25/2023 0634 by Leyda Lopez LPN  Outcome: Ongoing, Progressing  4/25/2023 0633 by Leyda Lopez LPN  Outcome: Ongoing, Progressing     Problem: Oral Intake Inadequate (Acute Kidney Injury/Impairment)  Goal: Optimal Nutrition Intake  4/25/2023 0634 by Leyda Lopez LPN  Outcome: Ongoing, Progressing  4/25/2023 0633 by Leyda Lopez LPN  Outcome: Ongoing, Progressing     Problem: Renal Function Impairment (Acute Kidney Injury/Impairment)  Goal: Effective Renal Function  4/25/2023 0634 by Leyda Lopez LPN  Outcome: Ongoing, Progressing  4/25/2023 0633 by Leyda Lopez LPN  Outcome: Ongoing, Progressing     Problem: Infection  Goal: Absence of Infection Signs and Symptoms  4/25/2023 0634 by Leyda Lopez LPN  Outcome: Ongoing, Progressing  4/25/2023 0633 by Leyda Lopez LPN  Outcome: Ongoing, Progressing     Problem: Skin Injury Risk Increased  Goal: Skin Health and Integrity  4/25/2023 0634 by Leyda Lopez LPN  Outcome: Ongoing, Progressing  4/25/2023 0633 by Leyda Lopez LPN  Outcome: Ongoing, Progressing     Problem: Fall Injury Risk  Goal: Absence of Fall and Fall-Related Injury  4/25/2023 0634 by Leyda Lopez LPN  Outcome: Ongoing,  Progressing  4/25/2023 0633 by Leyda Lopez LPN  Outcome: Ongoing, Progressing

## 2023-04-25 NOTE — PROGRESS NOTES
"[facility] [address] [place] [address] Medicine  Progress Note    Patient Name: Radha Sandoval  MRN: [id number]  Patient Class: IP- Inpatient   Admission Date: 4/18/2023  Length of Stay: 5 days  Attending Physician: Uvaldo Magaña MD  Primary Care Provider: Primary Doctor No        Subjective:     Principal Problem:Debility        HPI:  Radha Sandoval is a 88 y.o. female with unclear PMHx, who presents for multiple complaints. Patient is accompanied by her new caretaker, who has only known the patient for less than a week. Patient currently resides at [facility]. She was sent from the [facility] to see a PCP for a possible urinary tract infection and was seen in primary care clinic this afternoon. At the appointment, patient was unable to ambulate, had excruciating body pain, tachypnea and altered mental status. Patient was sent to the ED for further evaluation. Of note, patient has not been established with a PCP for 3-4 years now.    Upon evaluation in the ED, patient noted to have an UTI and elevated troponin. Patient denies any urinary symptoms. However, the caretaker states the patient was told about her UTI already and was placed on macrobid last week, though there is no mention of this on chart review. Patient supposedly took only one dose of macrobid on Saturday. Caretaker states that her mental status has changed since she first met the patient last week. The caretaker also noted that patient administers her own medications and she found patient's pillbox in a drawer full, with no medications taken for at least a week. Lengthy discussion had at bedside regarding patient's chief complaint. Patient stated "she just wants someone to tell her what medications to take and when". Patient endorses worsening back pain, from cervical to lumbar spine. At baseline, patient uses a rolling walker to ambulate, but has had difficulty walking for the last two weeks. Patient supposedly " takes meclizine, glucose tablets (unknown reason) and ibuprofen on a daily basis, which the son buys online. Overall history is limited and unclear due to patient being a poor historian. Currently endorses dyspnea and back pain. Denies chest pain, abdominal pain, headache, F/N/V/D.    ED: /81, afebrile, on RA. CBC wnl. CMP with Cr 1.2, BUN 50. Troponin 0.063. UA with 3+ leukocytes, >100 RBC, >100 WBC. Urine culture pending. CXR negative. CT Head negative. CT Entire Spine with No acute osseous abnormality. Received IV Ceftriaxone 1g and Lidocaine patch in ED.       Overview/Hospital Course:  Radha Sandoval was admitted to Hospital Medicine for acute cystitis for which she completed a course of IV rocephin. Urine culture no growth. Echo with normal LV systolic function, EF of 65%, normal RV systolic, moderate LA enlargement, moderate mitral stenosis, mild-to-moderate aortic valve stenosis, normal CVP. Pt reporting persistent generalized weakness. Son not initially interested in SNF placement and seeking HH at [facility]; however, given persistent weakness, he is open to possible SNF placement and eager for PT/OT evaluation. PT/OT recommending SNF and the patient is awaiting placement. Plan to establish care with PCP & neurology at time of discharge.       Interval History: Pt seen and examined by me this morning with Chel marcos, at bedside. BISMARK ROWE. Pt reports continued weakness and difficulty with standing and walking. She is unable to ambulate safely without assistance. PT/OT re-evaluated and continue to recommend SNF placement.     Review of Systems   Constitutional:  Positive for activity change. Negative for chills and fever.   HENT:  Negative for trouble swallowing.    Eyes:  Negative for photophobia and visual disturbance.   Respiratory:  Negative for cough, chest tightness and shortness of breath.    Cardiovascular:  Negative for chest pain, palpitations and leg swelling.   Gastrointestinal:  Negative  for abdominal pain, constipation, diarrhea, nausea and vomiting.   Genitourinary:  Negative for decreased urine volume, difficulty urinating, dysuria, frequency, hematuria and urgency.   Musculoskeletal:  Positive for back pain (chronic, well-controlled) and gait problem (unable to use rolling walker). Negative for neck pain.   Skin:  Negative for rash and wound.   Neurological:  Positive for weakness. Negative for dizziness, syncope, speech difficulty and light-headedness.   Psychiatric/Behavioral:  Positive for confusion (improving). Negative for agitation. The patient is not nervous/anxious.    Objective:     Vital Signs (Most Recent):  Temp: 97.9 °F (36.6 °C) (04/24/23 1548)  Pulse: 68 (04/24/23 1548)  Resp: 20 (04/24/23 1548)  BP: 122/75 (04/24/23 1548)  SpO2: (!) 93 % (04/24/23 1548)   Vital Signs (24h Range):  Temp:  [97.6 °F (36.4 °C)-98.5 °F (36.9 °C)] 97.9 °F (36.6 °C)  Pulse:  [] 68  Resp:  [18-20] 20  SpO2:  [93 %-95 %] 93 %  BP: (122-181)/(48-84) 122/75     Weight: 70.8 kg (156 lb)  Body mass index is 25.96 kg/m².    Intake/Output Summary (Last 24 hours) at 4/24/2023 1915  Last data filed at 4/24/2023 0630  Gross per 24 hour   Intake --   Output 500 ml   Net -500 ml        Physical Exam  Vitals and nursing note reviewed.   Constitutional:       General: She is not in acute distress.     Appearance: She is well-developed.      Comments: Pleasant elderly woman who is conversant with interview and cooperative with exam   HENT:      Head: Normocephalic and atraumatic.   Eyes:      Conjunctiva/sclera: Conjunctivae normal.      Pupils: Pupils are equal, round, and reactive to light.   Cardiovascular:      Rate and Rhythm: Normal rate and regular rhythm.      Heart sounds: Normal heart sounds.   Pulmonary:      Effort: Pulmonary effort is normal. No respiratory distress.      Breath sounds: Normal breath sounds. No wheezing.   Abdominal:      General: Bowel sounds are normal. There is no distension.       Palpations: Abdomen is soft.      Tenderness: There is no abdominal tenderness.   Musculoskeletal:         General: Normal range of motion.      Cervical back: Normal range of motion and neck supple.   Skin:     General: Skin is warm and dry.      Capillary Refill: Capillary refill takes less than 2 seconds.   Neurological:      Mental Status: She is alert and oriented to person, place, and time.      Motor: Weakness present.      Gait: Gait abnormal.   Psychiatric:         Behavior: Behavior normal.         Thought Content: Thought content normal.         Judgment: Judgment normal.       Significant Labs: All pertinent labs within the past 24 hours have been reviewed.    Significant Imaging: I have reviewed all pertinent imaging results/findings within the past 24 hours.      Assessment/Plan:      * Debility  - Patient's history provided by son and hemant, who is new to patient  - Hemant feels patient is a good candidate for SNF/rehab  - Patient's son, Maykel, states that patient is able to ambulate with walker at baseline, but reports that patient's had recent decline in functional status over the last week or two. Discussed that [facility] wants patient to be able to ambulate independently because they don't provide any assistance. Son states he'll consider SNF pending PT/OT recs  - Nursing to assist patient with ambulation  - Ordered walker to bedside  - Pt persistently weak and requiring assistance with gait training  - PT/OT recommending SNF  - Awaiting placement    Sinus tachycardia  - Pt with intermittent bouts of asymptomatic sinus tachycardia, not responding to IVFs  - Afebrile with no leukocytosis  - Repeat CXR negative  - UA slightly positive - will extend rocephin course to total 7 days  - Continue metoprolol 12.5 mg BID    Acute cystitis with hematuria  Resolved  88 y.o. female admitted to  observation with acute cystitis with hematuria. Patient diagnosed with UTI last week, was prescribed macrobid, however  only took one dose. Denies any urinary symptoms. However, was sent from assisted living center to see PCP for concerns of worsening UTI and associated AMS. Patient sent to ED from primary care clinic for further evaluation.    - UA with 3+ occult blood, 3+ leukocytes, >100 RBC, >100 WBC  - Afebrile, no leukocytosis  - A&O x3   - s/p IV Ceftriaxone 1g q24h  - Urine culture no growth    GINA (acute kidney injury)  Resolved  Patient with acute kidney injury likely due to IVVD/dehydration GINA is currently stable. Labs reviewed- Renal function/electrolytes with Estimated Creatinine Clearance: 48.9 mL/min (based on SCr of 0.8 mg/dL). according to latest data. Monitor urine output and serial BMP and adjust therapy as needed. Avoid nephrotoxins and renally dose meds for GFR listed above.   - Cr 1.2 on admit   - Cr 0.8 ~3 weeks ago  - 1L LR bolus given  - UTI noted and patient endorses poor oral hydration  - Monitor renal function on daily BMP    Delirium  - Pt with worsening confusion and signs of auditory & visual hallucinations per the sitter at bedside likely 2/2 hospital delirium though family reports she has had similar episodes in the past  - Continue seroquel 12.5 mg nightly  - Delirium precautions   - Consider neuropsych follow-up at discharge     Elevated troponin  - Troponin 0.063, repeat flat  - EKG completed in ED, however results unavailable in EMR - will follow up   - No acute abnormalities noted by ED team  - Repeat EKG NSR  - Monitor telemetry  - TTE ordered, no baseline available in EMR  Results for orders placed during the hospital encounter of 04/18/23  Echo  Interpretation Summary  · The left ventricle is normal in size with normal systolic function. The estimated ejection fraction is 65%.  · Normal right ventricular size with normal right ventricular systolic function.  · Moderate left atrial enlargement.  · There is moderate mitral stenosis. The mean diastolic gradient across the mitral valve is 7 mmHg  at a heart rate of bpm.  · There is mild-to-moderate aortic valve stenosis. Aortic valve area is 1.4 cm2; peak velocity is 3.0 m/s; mean gradient is 22 mmHg.  · The estimated PA systolic pressure is 34 mmHg.  · Normal central venous pressure (3 mmHg).    Hypertension  - Patient non-compliant with anti-hypertensive medications  - History of amlodpine and lisinopril noted on chart review, however patient states she is not taking either and unable to say when she last took them  - Continue amlodipine 10 mg daily  - Hold lisinopril 10 mg daily in setting of GINA - do not resume given normotensive with amlodipine only   - Monitor BP with q4h vitals   - Close PCP f/u on discharge      VTE Risk Mitigation (From admission, onward)         Ordered     enoxaparin injection 40 mg  Daily         04/18/23 2247     IP VTE HIGH RISK PATIENT  Once         04/18/23 2247                Discharge Planning   TOSHA: 4/25/2023     Code Status: Full Code   Is the patient medically ready for discharge?: Yes    Reason for patient still in hospital (select all that apply): Pending disposition  Discharge Plan A: Assisted Living                  Raine Huerta PA-C  Department of Hospital Medicine   [facility] 11H

## 2023-04-25 NOTE — NURSING
Patient very drowsy and lethargic. Unable to answer questions. Night medications held due to lethargy. Per MAR, patient received Seroquel at 1753. VSS. Julianna Champagne notified. VBGs ordered. Charge nurse notified. Will continue to monitor.    2350: Patient back to baseline, able to answer all orientation questions but confused to situation. Will continue to monitor.

## 2023-04-25 NOTE — PLAN OF CARE
04/25/23 1205   Post-Acute Status   Post-Acute Authorization Placement   Post-Acute Placement Status Pending post-acute provider review/more information requested     CLAUDIA followed up with Carmen at [facility] regarding the updated documentation that was sent. Carmen reported that they are unable to accept pt due to her presenting issues being resolved. CLAUDIA informed Carmen that she sent updated progress notes showing that pt is still having issues with ambulation and is in need of SNF placement. Carmen reported that her staff has reviewed the documentation and they are not seeing a skillable need for SNF placement and they will not be able to accept pt.    CLAUDIA updated the medical team of the conversation with [facility] and will continue to follow up.    UPDATE    SW was informed that [facility] is maintaining their denial of acceptance. CLAUDIA followed up with the additional referrals that were previously sent and was informed that [facility] is able to accept pt, and will require financial documentation for pt. CLAUDIA provided admissions with pt's son, Maykel contact information for them to reach out to.    CLAUDIA met with pt, Chel (caregiver) at bedside, and Maykel via telephone to provide them with an update on pt's discharge plan. Maykel expressed frustration about the process and [facility] lack of communication, as well as, having a new facility that he will have to deal with. CLAUDIA explained the process and apologized about him not being able to get in touch with anyone at [facility].    CLAUDIA will continue to follow up.      Lyndsay West LMSW  [facility]  Ext. [phone]

## 2023-04-25 NOTE — ASSESSMENT & PLAN NOTE
Resolved  88 y.o. female admitted to  observation with acute cystitis with hematuria. Patient diagnosed with UTI last week, was prescribed macrobid, however only took one dose. Denies any urinary symptoms. However, was sent from [facility] to see PCP for concerns of worsening UTI and associated AMS. Patient sent to ED from primary care clinic for further evaluation.    - UA with 3+ occult blood, 3+ leukocytes, >100 RBC, >100 WBC  - Afebrile, no leukocytosis  - A&O x3   - s/p IV Ceftriaxone 1g q24h  - Urine culture no growth

## 2023-04-25 NOTE — ASSESSMENT & PLAN NOTE
- Patient's history provided by son and hemant, who is new to patient  - Sitter feels patient is a good candidate for SNF/rehab  - Patient's son, Maykel, states that patient is able to ambulate with walker at baseline, but reports that patient's had recent decline in functional status over the last week or two. Discussed that [facility] wants patient to be able to ambulate independently because they don't provide any assistance. Son states he'll consider SNF pending PT/OT recs  - Nursing to assist patient with ambulation  - Ordered walker to bedside  - Pt persistently weak and requiring assistance with gait training  - PT/OT recommending SNF  - Awaiting placement

## 2023-04-25 NOTE — PLAN OF CARE
Problem: Adult Inpatient Plan of Care  Goal: Plan of Care Review  4/25/2023 1749 by Eva Butler RN  Outcome: Ongoing, Progressing  4/25/2023 1710 by Eva Butler RN  Outcome: Ongoing, Progressing  Goal: Patient-Specific Goal (Individualized)  4/25/2023 1749 by Eva Butler RN  Outcome: Ongoing, Progressing  4/25/2023 1710 by Eva Butler RN  Outcome: Ongoing, Progressing  Goal: Absence of Hospital-Acquired Illness or Injury  4/25/2023 1749 by Eva Butler RN  Outcome: Ongoing, Progressing  4/25/2023 1710 by Eva Butler RN  Outcome: Ongoing, Progressing  Goal: Optimal Comfort and Wellbeing  4/25/2023 1749 by Eva Butler RN  Outcome: Ongoing, Progressing  4/25/2023 1710 by Eva Butler RN  Outcome: Ongoing, Progressing  Goal: Readiness for Transition of Care  4/25/2023 1749 by Eva Butler RN  Outcome: Ongoing, Progressing  4/25/2023 1710 by Eva Butler RN  Outcome: Ongoing, Progressing     Problem: Fluid and Electrolyte Imbalance (Acute Kidney Injury/Impairment)  Goal: Fluid and Electrolyte Balance  4/25/2023 1749 by Eva Butler RN  Outcome: Ongoing, Progressing  4/25/2023 1710 by Eva Butler RN  Outcome: Ongoing, Progressing     Problem: Oral Intake Inadequate (Acute Kidney Injury/Impairment)  Goal: Optimal Nutrition Intake  4/25/2023 1749 by Eva Butler RN  Outcome: Ongoing, Progressing  4/25/2023 1710 by Eva Butler RN  Outcome: Ongoing, Progressing     Problem: Renal Function Impairment (Acute Kidney Injury/Impairment)  Goal: Effective Renal Function  4/25/2023 1749 by Eva Butler RN  Outcome: Ongoing, Progressing  4/25/2023 1710 by Eva Butler RN  Outcome: Ongoing, Progressing     Problem: Infection  Goal: Absence of Infection Signs and Symptoms  4/25/2023 1749 by Eva Butler RN  Outcome: Ongoing, Progressing  4/25/2023 1710 by Eva Butler RN  Outcome: Ongoing, Progressing     Problem: Skin Injury Risk Increased  Goal: Skin Health and  Integrity  4/25/2023 1749 by Eva Butler RN  Outcome: Ongoing, Progressing  4/25/2023 1710 by Eva Butler RN  Outcome: Ongoing, Progressing     Problem: Fall Injury Risk  Goal: Absence of Fall and Fall-Related Injury  4/25/2023 1749 by Eva Butler RN  Outcome: Ongoing, Progressing  4/25/2023 1710 by Eva Butler RN  Outcome: Ongoing, Progressing

## 2023-04-25 NOTE — ASSESSMENT & PLAN NOTE
- Pt with intermittent bouts of asymptomatic sinus tachycardia, not responding to IVFs  - Afebrile with no leukocytosis  - Repeat CXR negative  - UA slightly positive - will extend rocephin course to total 7 days  - Continue metoprolol 12.5 mg BID

## 2023-04-25 NOTE — SUBJECTIVE & OBJECTIVE
Interval History: Pt seen and examined by me this morning with Chel marcos, at bedside. BISMARK ROWE. Pt reports continued weakness and difficulty with standing and walking. She is unable to ambulate safely without assistance. PT/OT re-evaluated and continue to recommend SNF placement.     Review of Systems   Constitutional:  Positive for activity change. Negative for chills and fever.   HENT:  Negative for trouble swallowing.    Eyes:  Negative for photophobia and visual disturbance.   Respiratory:  Negative for cough, chest tightness and shortness of breath.    Cardiovascular:  Negative for chest pain, palpitations and leg swelling.   Gastrointestinal:  Negative for abdominal pain, constipation, diarrhea, nausea and vomiting.   Genitourinary:  Negative for decreased urine volume, difficulty urinating, dysuria, frequency, hematuria and urgency.   Musculoskeletal:  Positive for back pain (chronic, well-controlled) and gait problem (unable to use rolling walker). Negative for neck pain.   Skin:  Negative for rash and wound.   Neurological:  Positive for weakness. Negative for dizziness, syncope, speech difficulty and light-headedness.   Psychiatric/Behavioral:  Positive for confusion (improving). Negative for agitation. The patient is not nervous/anxious.    Objective:     Vital Signs (Most Recent):  Temp: 97.9 °F (36.6 °C) (04/24/23 1548)  Pulse: 68 (04/24/23 1548)  Resp: 20 (04/24/23 1548)  BP: 122/75 (04/24/23 1548)  SpO2: (!) 93 % (04/24/23 1548)   Vital Signs (24h Range):  Temp:  [97.6 °F (36.4 °C)-98.5 °F (36.9 °C)] 97.9 °F (36.6 °C)  Pulse:  [] 68  Resp:  [18-20] 20  SpO2:  [93 %-95 %] 93 %  BP: (122-181)/(48-84) 122/75     Weight: 70.8 kg (156 lb)  Body mass index is 25.96 kg/m².    Intake/Output Summary (Last 24 hours) at 4/24/2023 191  Last data filed at 4/24/2023 0630  Gross per 24 hour   Intake --   Output 500 ml   Net -500 ml        Physical Exam  Vitals and nursing note reviewed.    Constitutional:       General: She is not in acute distress.     Appearance: She is well-developed.      Comments: Pleasant elderly woman who is conversant with interview and cooperative with exam   HENT:      Head: Normocephalic and atraumatic.   Eyes:      Conjunctiva/sclera: Conjunctivae normal.      Pupils: Pupils are equal, round, and reactive to light.   Cardiovascular:      Rate and Rhythm: Normal rate and regular rhythm.      Heart sounds: Normal heart sounds.   Pulmonary:      Effort: Pulmonary effort is normal. No respiratory distress.      Breath sounds: Normal breath sounds. No wheezing.   Abdominal:      General: Bowel sounds are normal. There is no distension.      Palpations: Abdomen is soft.      Tenderness: There is no abdominal tenderness.   Musculoskeletal:         General: Normal range of motion.      Cervical back: Normal range of motion and neck supple.   Skin:     General: Skin is warm and dry.      Capillary Refill: Capillary refill takes less than 2 seconds.   Neurological:      Mental Status: She is alert and oriented to person, place, and time.      Motor: Weakness present.      Gait: Gait abnormal.   Psychiatric:         Behavior: Behavior normal.         Thought Content: Thought content normal.         Judgment: Judgment normal.       Significant Labs: All pertinent labs within the past 24 hours have been reviewed.    Significant Imaging: I have reviewed all pertinent imaging results/findings within the past 24 hours.

## 2023-04-26 NOTE — CONSULTS
Thank you for your consult to [facility]. We have reviewed the patient chart. This patient does not meet criteria for [facility] service at this time due to [facility] service capacity limitations. Will hand back to In-house service.    Rachel Conway MD

## 2023-04-26 NOTE — PROGRESS NOTES
"[facility] [address] [place] [address] Medicine  Progress Note    Patient Name: Radha Sandoval  MRN: [id number]  Patient Class: IP- Inpatient   Admission Date: 4/18/2023  Length of Stay: 7 days  Attending Physician: Uvaldo Magaña MD  Primary Care Provider: Primary Doctor No        Subjective:     Principal Problem:Lumbar spondylosis with myelopathy        HPI:  Radha Sandoval is a 88 y.o. female with unclear PMHx, who presents for multiple complaints. Patient is accompanied by her new caretaker, who has only known the patient for less than a week. Patient currently resides at [facility]. She was sent from the [facility] to see a PCP for a possible urinary tract infection and was seen in primary care clinic this afternoon. At the appointment, patient was unable to ambulate, had excruciating body pain, tachypnea and altered mental status. Patient was sent to the ED for further evaluation. Of note, patient has not been established with a PCP for 3-4 years now.    Upon evaluation in the ED, patient noted to have an UTI and elevated troponin. Patient denies any urinary symptoms. However, the caretaker states the patient was told about her UTI already and was placed on macrobid last week, though there is no mention of this on chart review. Patient supposedly took only one dose of macrobid on Saturday. Caretaker states that her mental status has changed since she first met the patient last week. The caretaker also noted that patient administers her own medications and she found patient's pillbox in a drawer full, with no medications taken for at least a week. Lengthy discussion had at bedside regarding patient's chief complaint. Patient stated "she just wants someone to tell her what medications to take and when". Patient endorses worsening back pain, from cervical to lumbar spine. At baseline, patient uses a rolling walker to ambulate, but has had difficulty walking for the last two " weeks. Patient supposedly takes meclizine, glucose tablets (unknown reason) and ibuprofen on a daily basis, which the son buys online. Overall history is limited and unclear due to patient being a poor historian. Currently endorses dyspnea and back pain. Denies chest pain, abdominal pain, headache, F/N/V/D.    ED: /81, afebrile, on RA. CBC wnl. CMP with Cr 1.2, BUN 50. Troponin 0.063. UA with 3+ leukocytes, >100 RBC, >100 WBC. Urine culture pending. CXR negative. CT Head negative. CT Entire Spine with No acute osseous abnormality. Received IV Ceftriaxone 1g and Lidocaine patch in ED.       Overview/Hospital Course:  Radha Sandoval was admitted to Hospital Medicine for acute cystitis for which she completed a course of IV rocephin. Urine culture no growth. Echo with normal LV systolic function, EF of 65%, normal RV systolic, moderate LA enlargement, moderate mitral stenosis, mild-to-moderate aortic valve stenosis, normal CVP. Pt reporting persistent generalized weakness and back pain, consistent with her lumbar spondylosis. PT/OT recommending SNF and the patient is awaiting placement. Plan to establish care with PCP & neurology at time of discharge.       Interval History: Pt seen and examined by me this morning with Chel marcos, at bedside. Hypertensive prior to receiving morning medications otherwise VSSAF. NAEON. Pt reports continued weakness and difficulty with standing and walking. She also endorses slight worsening or chronic back pain associated with her lumbar spondylosis with myelopathy. She is unable to ambulate safely without assistance. PT/OT continue to recommend SNF placement.     Review of Systems   Constitutional:  Positive for activity change. Negative for chills and fever.   HENT:  Negative for trouble swallowing.    Eyes:  Negative for photophobia and visual disturbance.   Respiratory:  Negative for cough, chest tightness and shortness of breath.    Cardiovascular:  Negative for chest pain,  palpitations and leg swelling.   Gastrointestinal:  Negative for abdominal pain, constipation, diarrhea, nausea and vomiting.   Genitourinary:  Negative for decreased urine volume, difficulty urinating, dysuria, frequency, hematuria and urgency.   Musculoskeletal:  Positive for back pain (chronic, well-controlled) and gait problem (unable to use rolling walker). Negative for neck pain.   Skin:  Negative for rash and wound.   Neurological:  Positive for weakness. Negative for dizziness, syncope, speech difficulty and light-headedness.   Psychiatric/Behavioral:  Positive for confusion (improving). Negative for agitation. The patient is not nervous/anxious.    Objective:     Vital Signs (Most Recent):  Temp: 97.6 °F (36.4 °C) (04/26/23 0712)  Pulse: 107 (04/26/23 0712)  Resp: 18 (04/26/23 0712)  BP: (!) 170/74 (04/26/23 0712)  SpO2: 95 % (04/26/23 0712)   Vital Signs (24h Range):  Temp:  [97.6 °F (36.4 °C)-99.3 °F (37.4 °C)] 97.6 °F (36.4 °C)  Pulse:  [] 107  Resp:  [17-18] 18  SpO2:  [92 %-95 %] 95 %  BP: (124-174)/(57-74) 170/74     Weight: 70.8 kg (156 lb)  Body mass index is 25.96 kg/m².    Intake/Output Summary (Last 24 hours) at 4/26/2023 0911  Last data filed at 4/25/2023 1749  Gross per 24 hour   Intake --   Output 300 ml   Net -300 ml        Physical Exam  Vitals and nursing note reviewed.   Constitutional:       General: She is not in acute distress.     Appearance: She is well-developed.      Comments: Pleasant elderly woman who is conversant with interview and cooperative with exam   HENT:      Head: Normocephalic and atraumatic.   Eyes:      Conjunctiva/sclera: Conjunctivae normal.      Pupils: Pupils are equal, round, and reactive to light.   Cardiovascular:      Rate and Rhythm: Normal rate and regular rhythm.      Heart sounds: Normal heart sounds.   Pulmonary:      Effort: Pulmonary effort is normal. No respiratory distress.      Breath sounds: Normal breath sounds. No wheezing.   Abdominal:       General: Bowel sounds are normal. There is no distension.      Palpations: Abdomen is soft.      Tenderness: There is no abdominal tenderness.   Musculoskeletal:         General: Normal range of motion.      Cervical back: Normal range of motion and neck supple.   Skin:     General: Skin is warm and dry.      Capillary Refill: Capillary refill takes less than 2 seconds.   Neurological:      Mental Status: She is alert and oriented to person, place, and time.      Motor: Weakness present.      Gait: Gait abnormal.   Psychiatric:         Behavior: Behavior normal.         Thought Content: Thought content normal.         Judgment: Judgment normal.       Significant Labs: All pertinent labs within the past 24 hours have been reviewed.    Significant Imaging: I have reviewed all pertinent imaging results/findings within the past 24 hours.      Assessment/Plan:      * Lumbar spondylosis with myelopathy  Debility  - Patient with progressively worsening generalized weakness and gait instability, as well as back pain  - MRI with lumbar spondylosis most prominent L4-L5 with moderate-severe spinal canal stenosis  - Pt persistently weak and requiring active assistance with standing and gait training  - PT/OT recommending SNF  - Awaiting placement    Sinus tachycardia  - Pt with intermittent bouts of asymptomatic sinus tachycardia, not responding to IVFs  - Afebrile with no leukocytosis  - Repeat CXR negative  - UA slightly positive - will extend rocephin course to total 7 days  - Continue metoprolol 12.5 mg BID    Acute cystitis with hematuria  Resolved  88 y.o. female admitted to  observation with acute cystitis with hematuria. Patient diagnosed with UTI last week, was prescribed macrobid, however only took one dose. Denies any urinary symptoms. However, was sent from [facility] [place] to see PCP for concerns of worsening UTI and associated AMS. Patient sent to ED from primary care clinic for further evaluation.    - UA  with 3+ occult blood, 3+ leukocytes, >100 RBC, >100 WBC  - Afebrile, no leukocytosis  - A&O x3   - s/p IV Ceftriaxone 1g q24h  - Urine culture no growth    GINA (acute kidney injury)  Resolved  Patient with acute kidney injury likely due to IVVD/dehydration GINA is currently stable. Labs reviewed- Renal function/electrolytes with Estimated Creatinine Clearance: 48.9 mL/min (based on SCr of 0.8 mg/dL). according to latest data. Monitor urine output and serial BMP and adjust therapy as needed. Avoid nephrotoxins and renally dose meds for GFR listed above.   - Cr 1.2 on admit   - Cr 0.8 ~3 weeks ago  - 1L LR bolus given  - UTI noted and patient endorses poor oral hydration  - Monitor renal function on daily BMP    Delirium  Resolved  - Pt with worsening confusion and signs of auditory & visual hallucinations per the sitter at bedside likely 2/2 hospital delirium though family reports she has had similar episodes in the past  - Continue seroquel 12.5 mg nightly  - Delirium precautions   - Consider neuropsych follow-up at discharge     Elevated troponin  - Troponin 0.063, repeat flat  - EKG completed in ED, however results unavailable in EMR - will follow up   - No acute abnormalities noted by ED team  - Repeat EKG NSR  - Monitor telemetry  - TTE ordered, no baseline available in EMR  Results for orders placed during the hospital encounter of 04/18/23  Echo  Interpretation Summary  · The left ventricle is normal in size with normal systolic function. The estimated ejection fraction is 65%.  · Normal right ventricular size with normal right ventricular systolic function.  · Moderate left atrial enlargement.  · There is moderate mitral stenosis. The mean diastolic gradient across the mitral valve is 7 mmHg at a heart rate of bpm.  · There is mild-to-moderate aortic valve stenosis. Aortic valve area is 1.4 cm2; peak velocity is 3.0 m/s; mean gradient is 22 mmHg.  · The estimated PA systolic pressure is 34 mmHg.  · Normal  central venous pressure (3 mmHg).    Hypertension  - Patient non-compliant with anti-hypertensive medications  - History of amlodpine and lisinopril noted on chart review, however patient states she is not taking either and unable to say when she last took them  - Continue amlodipine 10 mg daily  - Hold lisinopril 10 mg daily in setting of GINA - do not resume given normotensive with amlodipine only   - Monitor BP with q4h vitals   - Close PCP f/u on discharge      VTE Risk Mitigation (From admission, onward)         Ordered     enoxaparin injection 40 mg  Daily         04/18/23 2247     IP VTE HIGH RISK PATIENT  Once         04/18/23 2247                Discharge Planning   TOSHA: 4/28/2023     Code Status: Full Code   Is the patient medically ready for discharge?: Yes    Reason for patient still in hospital (select all that apply): Pending disposition  Discharge Plan A: Assisted Living                  Ranie Huerta PA-C  Department of Hospital Medicine   [facility] 11H

## 2023-04-26 NOTE — NURSING
Care provided as ordered during shift. No falls or injuries noted during shift. Safety and fall precautions maintained. Plan of care discussed care continued

## 2023-04-26 NOTE — ASSESSMENT & PLAN NOTE
Resolved  - Pt with worsening confusion and signs of auditory & visual hallucinations per the sitter at bedside likely 2/2 hospital delirium though family reports she has had similar episodes in the past  - Continue seroquel 12.5 mg nightly  - Delirium precautions   - Consider neuropsych follow-up at discharge

## 2023-04-26 NOTE — PLAN OF CARE
04/26/23 1208   Post-Acute Status   Post-Acute Authorization Placement   Post-Acute Placement Status Pending post-acute provider review/more information requested     CLAUDIA followed up with [facility] regarding the referral that was sent. CLAUDIA was informed by Bel with admissions that pt's son, Maykel will be meeting her at the facility today between 1:00PM and 1:30PM to do a tour. Bel reported that she will follow up with CLAUDIA regarding if the paperwork needed is provided.    CLAUDIA will continue to follow up.    Lyndsay West LMSW  [facility]  Ext. [phone]

## 2023-04-26 NOTE — ASSESSMENT & PLAN NOTE
Debility  - Patient with progressively worsening generalized weakness and gait instability  - MRI with lumbar spondylosis most prominent L4-L5 with moderate-severe spinal canal stenosis.  - Pt persistently weak and requiring active assistance with gait training  - PT/OT recommending SNF  - Awaiting placement

## 2023-04-26 NOTE — PT/OT/SLP PROGRESS
Occupational Therapy   Co-Treatment  Co-treatment with PT for maximal pt participation, safety, and activity tolerance     Name: Radha Sandoval  MRN: [id number]  Admitting Diagnosis:  Lumbar spondylosis with myelopathy       Recommendations:     Discharge Recommendations: nursing facility, skilled  Discharge Equipment Recommendations:  to be determined by next level of care  Barriers to discharge:       Assessment:     Radha Sandoval is a 88 y.o. female with a medical diagnosis of Lumbar spondylosis with myelopathy.  She presents with impaired ADL and mobility performance deficits. Pt found upright in bed and agreeable for therapy. Pt continues to display significant anxiety related to fear of falling impeding on pt's fx'l mobility potential. Pt required  total A x2 for bed mobility and sat EOB with CGA with therapist and sitter (Chel) on side of pt for comfort.  Pt required min A x2 to stand with rollator however required max A x2 to maintain 2/2 pt flailing, tremulous shaking, and fearful posterior lean against bed despite max attempts at gentle calming. Pt would benefit from continued OT skilled services 3x/wk to improve daily living skills to optimize QOL. Pt is recommended to discharge to SNF at this time. Performance deficits affecting function are weakness, impaired functional mobility, impaired endurance, gait instability, impaired balance, impaired self care skills, decreased lower extremity function, decreased upper extremity function, decreased coordination, decreased safety awareness, impaired cognition, impaired coordination, pain.     Rehab Prognosis:  Good; patient would benefit from acute skilled OT services to address these deficits and reach maximum level of function.       Plan:     Patient to be seen 3 x/week to address the above listed problems via self-care/home management, therapeutic activities, therapeutic exercises, cognitive retraining  Plan of Care Expires: 05/21/23  Plan of Care Reviewed  with: patient, caregiver    Subjective     Chief Complaint: significant fear of falling   Patient/Family Comments/goals: to go back to bed   Pain/Comfort:  Pain Rating 1: 0/10  Pain Rating Post-Intervention 1: 0/10    Objective:     Communicated with: RN prior to session.  Patient found HOB elevated with PureWick upon OT entry to room.    General Precautions: Standard, fall    Orthopedic Precautions:N/A  Braces: N/A  Respiratory Status: Room air     Occupational Performance:     Bed Mobility:    Patient completed Rolling/Turning to Left with  total assistance and 2 persons  Patient completed Rolling/Turning to Right with total assistance and 2 persons  Patient completed Scooting/Bridging with total assistance and 2 persons  Patient completed Supine to Sit with total assistance and 2 persons  Patient completed Sit to Supine with total assistance and 2 persons     Functional Mobility/Transfers:  Patient completed Sit <> Stand Transfer with minimum assistance and of 2 persons  with  rollator    Functional Mobility: Pt sat EOB with CGA with 2 people next to pt 2/2 pt's fear of falling, tolerating EOB ~12 minutes .    Activities of Daily Living:  Lower Body Dressing: total assistance donning socks and slippers       [facility] 6 Click ADL: 16    Treatment & Education:  Pt educated on role of occupational therapy, POC, and safety during ADLs and functional mobility. Pt and OT discussed importance of safe, continued mobility to optimize daily living skills. Pt verbalized understanding.     White board updated during session. Pt given instruction to call for medical staff/nurse for assistance.       Patient left HOB elevated with all lines intact, call button in reach, RN notified, and sitter present    GOALS:   Multidisciplinary Problems       Occupational Therapy Goals          Problem: Occupational Therapy    Goal Priority Disciplines Outcome Interventions   Occupational Therapy Goal     OT, PT/OT Ongoing, Progressing     Description: Goals to be met by: 4/28/23 (1 week)     Patient will increase functional independence with ADLs by performing:    UE Dressing with Stand-by Assistance.  LE Dressing with Moderate Assistance.  Grooming while standing with Minimal Assistance.  Toileting from bedside commode with Moderate Assistance for hygiene and clothing management.   Rolling to Bilateral with Stand-by Assistance.   Supine to sit with Contact Guard Assistance.  Step transfer with Stand-by Assistance using LRAD  Toilet transfer to bedside commode with Stand-by Assistance using LRAD                          Time Tracking:     OT Date of Treatment: 04/26/23  OT Start Time: 1037  OT Stop Time: 1100  OT Total Time (min): 23 min    Billable Minutes:Self Care/Home Management 13 min  Therapeutic Activity 10 min    OT/ALEXIS: OT          4/26/2023

## 2023-04-26 NOTE — PLAN OF CARE
04/26/23 1212   Discharge Reassessment   Assessment Type Discharge Planning Reassessment   Did the patient's condition or plan change since previous assessment? Yes   Discharge Plan discussed with: Caregiver;Patient;Adult children   Discharge Plan A Skilled Nursing Facility   Discharge Plan B Assisted Living;Home Health   Post-Acute Status   Post-Acute Authorization Placement   Post-Acute Placement Status Pending post-acute provider review/more information requested     Interval History: Pt seen and examined by me this morning with Chel marcos, at bedside. Hypertensive prior to receiving morning medications otherwise VSSAF. NAEON. Pt reports continued weakness and difficulty with standing and walking. She also endorses slight worsening or chronic back pain associated with her lumbar spondylosis with myelopathy. She is unable to ambulate safely without assistance. PT/OT continue to recommend SNF placement.    [facility] has been accepted with [facility] and SW has been informed that pt's son, Maykel is going to the facility today to do a tour.    SW will continue to follow up for discharge planning needs.    Lyndsay West LMSW  [facility]  Ext. [phone]

## 2023-04-26 NOTE — PT/OT/SLP PROGRESS
Physical Therapy Co-Treatment    Patient Name:  Radha Sandoval   MRN:  [id number]  Admitting Diagnosis:  Lumbar spondylosis with myelopathy   Recent Surgery: * No surgery found *    Admit Date: 4/18/2023  Length of Stay: 7 days    Recommendations:     Discharge Recommendations:  nursing facility, skilled   Discharge Equipment Recommendations: to be determined by next level of care   Barriers to discharge:  current level of assist    Plan:     During this hospitalization, patient to be seen 3 x/week to address the identified rehab impairments via therapeutic activities, gait training, therapeutic exercises, neuromuscular re-education and progress towards the established goals.  Plan of Care Expires:  05/21/23  Plan of Care Reviewed with: patient, caregiver    Assessment:     Radha Sandoval is a 88 y.o. female admitted with a medical diagnosis of Lumbar spondylosis with myelopathy. Pt tolerated session fairly. Pt agreeable for PT session wit hcaregiver at bedside. When pt began initating supine > sit transfer she began to state she was too fearful to participate in PT. Caregiver and PT/OT encouraged pt to participate and was able to reach EOB with total A x 2 with pt flailing arms, grasping caregiver, and demonstrating full body tremors 2/2 fear of falling. Once EOB caregiver and PT sat on either side of pt and fear of falling improved. Pt agreeable to perform stand but again became fearful demonstrated increased posterior lean, full body tremors and refusing to stand with perseverative fear of falling. Eventually able to reach stand with min A x 2 with PT/OT and caregiver in front stabilizing rollator. Pt will continue to benefit from skilled PT services during this hospital admit to continue to improve transfer ability and efficiency as well as continue to progress pt's ambulation distance and cardiopulmonary endurance to maximize pt's functional independence and return to PLOF. Patient continues to demonstrate a need for  "therapy on a daily basis as patient continues to demonstrate decreased independence with bed mobility, transfers, and ambulation.     Problem List: weakness, impaired endurance, impaired self care skills, impaired functional mobility, gait instability, impaired balance, decreased upper extremity function, decreased lower extremity function, decreased safety awareness, pain, impaired cardiopulmonary response to activity, impaired fine motor, impaired coordination, impaired skin.  Rehab Prognosis: Good; patient would benefit from acute skilled PT services to address these deficits and reach maximum level of function.      Goals:   Multidisciplinary Problems       Physical Therapy Goals          Problem: Physical Therapy    Goal Priority Disciplines Outcome Goal Variances Interventions   Physical Therapy Goal     PT, PT/OT Ongoing, Progressing     Description: Goals to be met by: 2023     Patient will increase functional independence with mobility by performin. Supine to sit with Stand-by Assistance  2. Sit to supine with Contact Guard Assistance  3. Rolling to Left and Right with Supervision.  4. Sit to stand transfer with Contact Guard Assistance and RW  5. Bed to chair transfer with Moderate Assistance using Rolling Walker  6. Gait  x 15 feet with Moderate Assistance using Rolling Walker.   7. Lower extremity exercise program x15 reps per handout, with supervision                         Subjective     RN notified prior to session. Caregiver Chel present upon PT entrance into room. Patient agreeable to PT treatment session.    Chief Complaint: "I can't do it, no" pt stated about falling  Patient/Family Comments/goals: return to bed  Pain/Comfort:  Pain Rating 1: 0/10  Pain Rating Post-Intervention 1: 0/10      Objective:     Additional staff present: OT; Co-treatment performed due to pt's increased anxiety and fear of falling needing multiple skilled therapists with good therapeutic presence to promote " "mobility to highest potential.    Patient found HOB elevated with: PureWick   Cognition:   Alert and Tangential  AxOx3  Command following: Easily distracted and Follows multistep verbal commands  Fluency: clear/fluent  General Precautions: Standard, Cardiac fall   Orthopedic Precautions:N/A   Braces: N/A   Body mass index is 25.96 kg/m².  Oxygen Device: Room Air  Vitals: BP (!) 158/65 (BP Location: Right arm, Patient Position: Lying)   Pulse 68   Temp 98.1 °F (36.7 °C) (Oral)   Resp 18   Ht 5' 5" (1.651 m)   Wt 70.8 kg (156 lb)   SpO2 95%   BMI 25.96 kg/m²     Outcome Measures:  AM-PAC 6 CLICK MOBILITY  Turning over in bed (including adjusting bedclothes, sheets and blankets)?: 2  Sitting down on and standing up from a chair with arms (e.g., wheelchair, bedside commode, etc.): 2  Moving from lying on back to sitting on the side of the bed?: 2  Moving to and from a bed to a chair (including a wheelchair)?: 1  Need to walk in hospital room?: 1  Climbing 3-5 steps with a railing?: 1  Basic Mobility Total Score: 9     Functional Mobility:    Bed Mobility:   Rolling/Turning to Left: total assistance and 2 persons  Rolling/Turning to Right: total assistance and 2 persons  Scooting to HOB via supine bridge: total assistance and 2 persons  Supine to Sit: total assistance and 2 persons; from Lt side of bed  Scooting anteriorly to EOB to have both feet planted on floor: total assistance  Sit to Supine: total assistance and 2 persons; to Lt side of bed    Sitting Balance at Edge of Bed:  Static Sitting Balance: Fair : able to sit unsupported without balance loss and without UE support  Dynamic Sitting Balance: Fair : minimal weight shifting ipsilaterally or anteriorly. Difficulty crossing midline  Assistance Level Required: Contact Guard Assistance  Time: ~12 minutes  Postural deviations noted: slouched posture and rounded shoulders  Comments: Time EOB focused primarily on tolerance to upright positioning, " cardiopulmonary response and endurance for activities, and strength of postural musculature to perform dynamic sitting to prepare for tasks in the home. Pt required assist on either side of her for safety and fear of falling.     Transfers:   Sit <> Stand Transfer: minimum assistance and of 2 persons with  rollator    Stand <> Sit Transfer: minimum assistance and of 2 persons with  rollator    [id number] from EOB    Standing Balance:  Static Standing Balance: Poor+: requires moderate assistance and UE support to maintain standing without balance loss  Dynamic Standing Balance: n/a  Assistance Level Required: Minimal Assistancex2  Patient used:  rollator     Time: ~30 seconds      Gait: Deferred due to pt's performance with above listed functional mobility    Education:  Time provided for education, counseling and discussion of health disposition in regards to patient's current status  All questions answered within PT scope of practice and to patient's satisfaction  PT role in POC to address current functional deficits  Pt educated on proper body mechanics, safety techniques, and energy conservation with PT facilitation and cueing throughout session  Call nursing/pct to transfer to chair/use bathroom. Pt stated understanding.  Whiteboard updated with therapist name and pt's current mobility status documented above    Patient left HOB elevated with all lines intact, call button in reach, and caregiver present.    Time Tracking:     PT Received On: 04/26/23  PT Start Time: 1037     PT Stop Time: 1101  PT Total Time (min): 24 min     Billable Minutes:   Therapeutic Exercise 24 minutes    Treatment Type: Treatment  PT/PTA: PT       4/26/2023

## 2023-04-26 NOTE — SUBJECTIVE & OBJECTIVE
Interval History: Pt seen and examined by me this morning with Chel marcos, at bedside. BISMARK ROWE. Pt reports continued weakness and difficulty with standing and walking. She is unable to ambulate safely without assistance. PT/OT re-evaluated and continue to recommend SNF placement.     Review of Systems   Constitutional:  Positive for activity change. Negative for chills and fever.   HENT:  Negative for trouble swallowing.    Eyes:  Negative for photophobia and visual disturbance.   Respiratory:  Negative for cough, chest tightness and shortness of breath.    Cardiovascular:  Negative for chest pain, palpitations and leg swelling.   Gastrointestinal:  Negative for abdominal pain, constipation, diarrhea, nausea and vomiting.   Genitourinary:  Negative for decreased urine volume, difficulty urinating, dysuria, frequency, hematuria and urgency.   Musculoskeletal:  Positive for back pain (chronic, well-controlled) and gait problem (unable to use rolling walker). Negative for neck pain.   Skin:  Negative for rash and wound.   Neurological:  Positive for weakness. Negative for dizziness, syncope, speech difficulty and light-headedness.   Psychiatric/Behavioral:  Positive for confusion (improving). Negative for agitation. The patient is not nervous/anxious.    Objective:     Vital Signs (Most Recent):  Temp: 98.5 °F (36.9 °C) (04/25/23 1657)  Pulse: 77 (04/25/23 1657)  Resp: 17 (04/25/23 1657)  BP: (!) 124/59 (04/25/23 1657)  SpO2: (!) 93 % (04/25/23 1657)   Vital Signs (24h Range):  Temp:  [98.2 °F (36.8 °C)-98.8 °F (37.1 °C)] 98.5 °F (36.9 °C)  Pulse:  [70-80] 77  Resp:  [17-20] 17  SpO2:  [91 %-94 %] 93 %  BP: (124-190)/(58-75) 124/59     Weight: 70.8 kg (156 lb)  Body mass index is 25.96 kg/m².    Intake/Output Summary (Last 24 hours) at 4/25/2023 1922  Last data filed at 4/25/2023 1957  Gross per 24 hour   Intake --   Output 300 ml   Net -300 ml        Physical Exam  Vitals and nursing note reviewed.    Constitutional:       General: She is not in acute distress.     Appearance: She is well-developed.      Comments: Pleasant elderly woman who is conversant with interview and cooperative with exam   HENT:      Head: Normocephalic and atraumatic.   Eyes:      Conjunctiva/sclera: Conjunctivae normal.      Pupils: Pupils are equal, round, and reactive to light.   Cardiovascular:      Rate and Rhythm: Normal rate and regular rhythm.      Heart sounds: Normal heart sounds.   Pulmonary:      Effort: Pulmonary effort is normal. No respiratory distress.      Breath sounds: Normal breath sounds. No wheezing.   Abdominal:      General: Bowel sounds are normal. There is no distension.      Palpations: Abdomen is soft.      Tenderness: There is no abdominal tenderness.   Musculoskeletal:         General: Normal range of motion.      Cervical back: Normal range of motion and neck supple.   Skin:     General: Skin is warm and dry.      Capillary Refill: Capillary refill takes less than 2 seconds.   Neurological:      Mental Status: She is alert and oriented to person, place, and time.      Motor: Weakness present.      Gait: Gait abnormal.   Psychiatric:         Behavior: Behavior normal.         Thought Content: Thought content normal.         Judgment: Judgment normal.       Significant Labs: All pertinent labs within the past 24 hours have been reviewed.    Significant Imaging: I have reviewed all pertinent imaging results/findings within the past 24 hours.

## 2023-04-26 NOTE — PROGRESS NOTES
"[facility] [address] [place] [address] Medicine  Progress Note    Patient Name: Radha Sandoval  MRN: [id number]  Patient Class: IP- Inpatient   Admission Date: 4/18/2023  Length of Stay: 6 days  Attending Physician: Uvaldo Magaña MD  Primary Care Provider: Primary Doctor No        Subjective:     Principal Problem:Lumbar spondylosis with myelopathy        HPI:  Radha Sandoval is a 88 y.o. female with unclear PMHx, who presents for multiple complaints. Patient is accompanied by her new caretaker, who has only known the patient for less than a week. Patient currently resides at [facility]. She was sent from the [facility] to see a PCP for a possible urinary tract infection and was seen in primary care clinic this afternoon. At the appointment, patient was unable to ambulate, had excruciating body pain, tachypnea and altered mental status. Patient was sent to the ED for further evaluation. Of note, patient has not been established with a PCP for 3-4 years now.    Upon evaluation in the ED, patient noted to have an UTI and elevated troponin. Patient denies any urinary symptoms. However, the caretaker states the patient was told about her UTI already and was placed on macrobid last week, though there is no mention of this on chart review. Patient supposedly took only one dose of macrobid on Saturday. Caretaker states that her mental status has changed since she first met the patient last week. The caretaker also noted that patient administers her own medications and she found patient's pillbox in a drawer full, with no medications taken for at least a week. Lengthy discussion had at bedside regarding patient's chief complaint. Patient stated "she just wants someone to tell her what medications to take and when". Patient endorses worsening back pain, from cervical to lumbar spine. At baseline, patient uses a rolling walker to ambulate, but has had difficulty walking for the last two " weeks. Patient supposedly takes meclizine, glucose tablets (unknown reason) and ibuprofen on a daily basis, which the son buys online. Overall history is limited and unclear due to patient being a poor historian. Currently endorses dyspnea and back pain. Denies chest pain, abdominal pain, headache, F/N/V/D.    ED: /81, afebrile, on RA. CBC wnl. CMP with Cr 1.2, BUN 50. Troponin 0.063. UA with 3+ leukocytes, >100 RBC, >100 WBC. Urine culture pending. CXR negative. CT Head negative. CT Entire Spine with No acute osseous abnormality. Received IV Ceftriaxone 1g and Lidocaine patch in ED.       Overview/Hospital Course:  Radha Sandoval was admitted to Hospital Medicine for acute cystitis for which she completed a course of IV rocephin. Urine culture no growth. Echo with normal LV systolic function, EF of 65%, normal RV systolic, moderate LA enlargement, moderate mitral stenosis, mild-to-moderate aortic valve stenosis, normal CVP. Pt reporting persistent generalized weakness. Son not initially interested in SNF placement and seeking HH at [facility]; however, given persistent weakness, he is open to possible SNF placement and eager for PT/OT evaluation. PT/OT recommending SNF and the patient is awaiting placement. Plan to establish care with PCP & neurology at time of discharge.       Interval History: Pt seen and examined by me this morning with Chel marcos, at bedside. BISMARK ROWE. Pt reports continued weakness and difficulty with standing and walking. She is unable to ambulate safely without assistance. PT/OT re-evaluated and continue to recommend SNF placement.     Review of Systems   Constitutional:  Positive for activity change. Negative for chills and fever.   HENT:  Negative for trouble swallowing.    Eyes:  Negative for photophobia and visual disturbance.   Respiratory:  Negative for cough, chest tightness and shortness of breath.    Cardiovascular:  Negative for chest pain, palpitations and leg swelling.    Gastrointestinal:  Negative for abdominal pain, constipation, diarrhea, nausea and vomiting.   Genitourinary:  Negative for decreased urine volume, difficulty urinating, dysuria, frequency, hematuria and urgency.   Musculoskeletal:  Positive for back pain (chronic, well-controlled) and gait problem (unable to use rolling walker). Negative for neck pain.   Skin:  Negative for rash and wound.   Neurological:  Positive for weakness. Negative for dizziness, syncope, speech difficulty and light-headedness.   Psychiatric/Behavioral:  Positive for confusion (improving). Negative for agitation. The patient is not nervous/anxious.    Objective:     Vital Signs (Most Recent):  Temp: 98.5 °F (36.9 °C) (04/25/23 1657)  Pulse: 77 (04/25/23 1657)  Resp: 17 (04/25/23 1657)  BP: (!) 124/59 (04/25/23 1657)  SpO2: (!) 93 % (04/25/23 1657)   Vital Signs (24h Range):  Temp:  [98.2 °F (36.8 °C)-98.8 °F (37.1 °C)] 98.5 °F (36.9 °C)  Pulse:  [70-80] 77  Resp:  [17-20] 17  SpO2:  [91 %-94 %] 93 %  BP: (124-190)/(58-75) 124/59     Weight: 70.8 kg (156 lb)  Body mass index is 25.96 kg/m².    Intake/Output Summary (Last 24 hours) at 4/25/2023 1922  Last data filed at 4/25/2023 1749  Gross per 24 hour   Intake --   Output 300 ml   Net -300 ml        Physical Exam  Vitals and nursing note reviewed.   Constitutional:       General: She is not in acute distress.     Appearance: She is well-developed.      Comments: Pleasant elderly woman who is conversant with interview and cooperative with exam   HENT:      Head: Normocephalic and atraumatic.   Eyes:      Conjunctiva/sclera: Conjunctivae normal.      Pupils: Pupils are equal, round, and reactive to light.   Cardiovascular:      Rate and Rhythm: Normal rate and regular rhythm.      Heart sounds: Normal heart sounds.   Pulmonary:      Effort: Pulmonary effort is normal. No respiratory distress.      Breath sounds: Normal breath sounds. No wheezing.   Abdominal:      General: Bowel sounds are  normal. There is no distension.      Palpations: Abdomen is soft.      Tenderness: There is no abdominal tenderness.   Musculoskeletal:         General: Normal range of motion.      Cervical back: Normal range of motion and neck supple.   Skin:     General: Skin is warm and dry.      Capillary Refill: Capillary refill takes less than 2 seconds.   Neurological:      Mental Status: She is alert and oriented to person, place, and time.      Motor: Weakness present.      Gait: Gait abnormal.   Psychiatric:         Behavior: Behavior normal.         Thought Content: Thought content normal.         Judgment: Judgment normal.       Significant Labs: All pertinent labs within the past 24 hours have been reviewed.    Significant Imaging: I have reviewed all pertinent imaging results/findings within the past 24 hours.      Assessment/Plan:      * Lumbar spondylosis with myelopathy  Debility  - Patient with progressively worsening generalized weakness and gait instability  - MRI with lumbar spondylosis most prominent L4-L5 with moderate-severe spinal canal stenosis.  - Pt persistently weak and requiring active assistance with gait training  - PT/OT recommending SNF  - Awaiting placement    Sinus tachycardia  - Pt with intermittent bouts of asymptomatic sinus tachycardia, not responding to IVFs  - Afebrile with no leukocytosis  - Repeat CXR negative  - UA slightly positive - will extend rocephin course to total 7 days  - Continue metoprolol 12.5 mg BID    Acute cystitis with hematuria  Resolved  88 y.o. female admitted to  observation with acute cystitis with hematuria. Patient diagnosed with UTI last week, was prescribed macrobid, however only took one dose. Denies any urinary symptoms. However, was sent from [facility] living [place] to see PCP for concerns of worsening UTI and associated AMS. Patient sent to ED from primary care clinic for further evaluation.    - UA with 3+ occult blood, 3+ leukocytes, >100 RBC, >100 WBC  -  Afebrile, no leukocytosis  - A&O x3   - s/p IV Ceftriaxone 1g q24h  - Urine culture no growth    GINA (acute kidney injury)  Resolved  Patient with acute kidney injury likely due to IVVD/dehydration GINA is currently stable. Labs reviewed- Renal function/electrolytes with Estimated Creatinine Clearance: 48.9 mL/min (based on SCr of 0.8 mg/dL). according to latest data. Monitor urine output and serial BMP and adjust therapy as needed. Avoid nephrotoxins and renally dose meds for GFR listed above.   - Cr 1.2 on admit   - Cr 0.8 ~3 weeks ago  - 1L LR bolus given  - UTI noted and patient endorses poor oral hydration  - Monitor renal function on daily BMP    Delirium  - Pt with worsening confusion and signs of auditory & visual hallucinations per the sitter at bedside likely 2/2 hospital delirium though family reports she has had similar episodes in the past  - Continue seroquel 12.5 mg nightly  - Delirium precautions   - Consider neuropsych follow-up at discharge     Elevated troponin  - Troponin 0.063, repeat flat  - EKG completed in ED, however results unavailable in EMR - will follow up   - No acute abnormalities noted by ED team  - Repeat EKG NSR  - Monitor telemetry  - TTE ordered, no baseline available in EMR  Results for orders placed during the hospital encounter of 04/18/23  Echo  Interpretation Summary  · The left ventricle is normal in size with normal systolic function. The estimated ejection fraction is 65%.  · Normal right ventricular size with normal right ventricular systolic function.  · Moderate left atrial enlargement.  · There is moderate mitral stenosis. The mean diastolic gradient across the mitral valve is 7 mmHg at a heart rate of bpm.  · There is mild-to-moderate aortic valve stenosis. Aortic valve area is 1.4 cm2; peak velocity is 3.0 m/s; mean gradient is 22 mmHg.  · The estimated PA systolic pressure is 34 mmHg.  · Normal central venous pressure (3 mmHg).    Hypertension  - Patient  non-compliant with anti-hypertensive medications  - History of amlodpine and lisinopril noted on chart review, however patient states she is not taking either and unable to say when she last took them  - Continue amlodipine 10 mg daily  - Hold lisinopril 10 mg daily in setting of GINA - do not resume given normotensive with amlodipine only   - Monitor BP with q4h vitals   - Close PCP f/u on discharge      VTE Risk Mitigation (From admission, onward)         Ordered     enoxaparin injection 40 mg  Daily         04/18/23 2247     IP VTE HIGH RISK PATIENT  Once         04/18/23 2247                Discharge Planning   TOSHA: 4/28/2023     Code Status: Full Code   Is the patient medically ready for discharge?: Yes    Reason for patient still in hospital (select all that apply): Pending disposition  Discharge Plan A: Assisted Living                  Raine Huerta PA-C  Department of Hospital Medicine   [facility] 11H

## 2023-04-26 NOTE — SUBJECTIVE & OBJECTIVE
Interval History: Pt seen and examined by me this morning with Chel marcos, at bedside. Hypertensive prior to receiving morning medications otherwise VSSAF. NAEON. Pt reports continued weakness and difficulty with standing and walking. She also endorses slight worsening or chronic back pain associated with her lumbar spondylosis with myelopathy. She is unable to ambulate safely without assistance. PT/OT continue to recommend SNF placement.     Review of Systems   Constitutional:  Positive for activity change. Negative for chills and fever.   HENT:  Negative for trouble swallowing.    Eyes:  Negative for photophobia and visual disturbance.   Respiratory:  Negative for cough, chest tightness and shortness of breath.    Cardiovascular:  Negative for chest pain, palpitations and leg swelling.   Gastrointestinal:  Negative for abdominal pain, constipation, diarrhea, nausea and vomiting.   Genitourinary:  Negative for decreased urine volume, difficulty urinating, dysuria, frequency, hematuria and urgency.   Musculoskeletal:  Positive for back pain (chronic, well-controlled) and gait problem (unable to use rolling walker). Negative for neck pain.   Skin:  Negative for rash and wound.   Neurological:  Positive for weakness. Negative for dizziness, syncope, speech difficulty and light-headedness.   Psychiatric/Behavioral:  Positive for confusion (improving). Negative for agitation. The patient is not nervous/anxious.    Objective:     Vital Signs (Most Recent):  Temp: 97.6 °F (36.4 °C) (04/26/23 0712)  Pulse: 107 (04/26/23 0712)  Resp: 18 (04/26/23 0712)  BP: (!) 170/74 (04/26/23 0712)  SpO2: 95 % (04/26/23 0712)   Vital Signs (24h Range):  Temp:  [97.6 °F (36.4 °C)-99.3 °F (37.4 °C)] 97.6 °F (36.4 °C)  Pulse:  [] 107  Resp:  [17-18] 18  SpO2:  [92 %-95 %] 95 %  BP: (124-174)/(57-74) 170/74     Weight: 70.8 kg (156 lb)  Body mass index is 25.96 kg/m².    Intake/Output Summary (Last 24 hours) at 4/26/2023 0911  Last  data filed at 4/25/2023 3386  Gross per 24 hour   Intake --   Output 300 ml   Net -300 ml        Physical Exam  Vitals and nursing note reviewed.   Constitutional:       General: She is not in acute distress.     Appearance: She is well-developed.      Comments: Pleasant elderly woman who is conversant with interview and cooperative with exam   HENT:      Head: Normocephalic and atraumatic.   Eyes:      Conjunctiva/sclera: Conjunctivae normal.      Pupils: Pupils are equal, round, and reactive to light.   Cardiovascular:      Rate and Rhythm: Normal rate and regular rhythm.      Heart sounds: Normal heart sounds.   Pulmonary:      Effort: Pulmonary effort is normal. No respiratory distress.      Breath sounds: Normal breath sounds. No wheezing.   Abdominal:      General: Bowel sounds are normal. There is no distension.      Palpations: Abdomen is soft.      Tenderness: There is no abdominal tenderness.   Musculoskeletal:         General: Normal range of motion.      Cervical back: Normal range of motion and neck supple.   Skin:     General: Skin is warm and dry.      Capillary Refill: Capillary refill takes less than 2 seconds.   Neurological:      Mental Status: She is alert and oriented to person, place, and time.      Motor: Weakness present.      Gait: Gait abnormal.   Psychiatric:         Behavior: Behavior normal.         Thought Content: Thought content normal.         Judgment: Judgment normal.       Significant Labs: All pertinent labs within the past 24 hours have been reviewed.    Significant Imaging: I have reviewed all pertinent imaging results/findings within the past 24 hours.

## 2023-04-26 NOTE — ASSESSMENT & PLAN NOTE
Debility  - Patient with progressively worsening generalized weakness and gait instability, as well as back pain  - MRI with lumbar spondylosis most prominent L4-L5 with moderate-severe spinal canal stenosis  - Pt persistently weak and requiring active assistance with standing and gait training  - PT/OT recommending SNF  - Awaiting placement

## 2023-04-27 NOTE — PROGRESS NOTES
"[facility] - Observation [address] [place] [address] Medicine  Progress Note    Patient Name: Radha Sandoval  MRN: [id number]  Patient Class: IP- Inpatient   Admission Date: 4/18/2023  Length of Stay: 8 days  Attending Physician: Lucia Lancaster MD  Primary Care Provider: Primary Doctor No        Subjective:     Principal Problem:Lumbar spondylosis with myelopathy        HPI:  Radha Sandoval is a 88 y.o. female with unclear PMHx, who presents for multiple complaints. Patient is accompanied by her new caretaker, who has only known the patient for less than a week. Patient currently resides at [facility]. She was sent from the [facility] to see a PCP for a possible urinary tract infection and was seen in primary care clinic this afternoon. At the appointment, patient was unable to ambulate, had excruciating body pain, tachypnea and altered mental status. Patient was sent to the ED for further evaluation. Of note, patient has not been established with a PCP for 3-4 years now.    Upon evaluation in the ED, patient noted to have an UTI and elevated troponin. Patient denies any urinary symptoms. However, the caretaker states the patient was told about her UTI already and was placed on macrobid last week, though there is no mention of this on chart review. Patient supposedly took only one dose of macrobid on Saturday. Caretaker states that her mental status has changed since she first met the patient last week. The caretaker also noted that patient administers her own medications and she found patient's pillbox in a drawer full, with no medications taken for at least a week. Lengthy discussion had at bedside regarding patient's chief complaint. Patient stated "she just wants someone to tell her what medications to take and when". Patient endorses worsening back pain, from cervical to lumbar spine. At baseline, patient uses a rolling walker to ambulate, but has had difficulty walking for the last two " weeks. Patient supposedly takes meclizine, glucose tablets (unknown reason) and ibuprofen on a daily basis, which the son buys online. Overall history is limited and unclear due to patient being a poor historian. Currently endorses dyspnea and back pain. Denies chest pain, abdominal pain, headache, F/N/V/D.    ED: /81, afebrile, on RA. CBC wnl. CMP with Cr 1.2, BUN 50. Troponin 0.063. UA with 3+ leukocytes, >100 RBC, >100 WBC. Urine culture pending. CXR negative. CT Head negative. CT Entire Spine with No acute osseous abnormality. Received IV Ceftriaxone 1g and Lidocaine patch in ED.       Overview/Hospital Course:  Radha Sandoval was admitted to Hospital Medicine for acute cystitis for which she completed a course of IV rocephin. Urine culture no growth. Echo with normal LV systolic function, EF of 65%, normal RV systolic, moderate LA enlargement, moderate mitral stenosis, mild-to-moderate aortic valve stenosis, normal CVP. Pt reporting persistent generalized weakness and back pain, consistent with her lumbar spondylosis. PT/OT recommending SNF and the patient is awaiting placement. Plan to establish care with PCP & neurology at time of discharge.       Interval History: Patient seen at bedside with care taker Chel. Oxygen removed and patient 94% on RA. Patient alert and oriented X 3 on exam, no evidence of confusion noted. Chel reports intermittent confusion at times with hallucinations. Will continue to monitor while inpatient and refer to outpatient neurology/ psych. Medically stable for discharge.Awaiting SNF placement.     Review of Systems   Constitutional:  Positive for activity change. Negative for chills and fever.   HENT:  Negative for trouble swallowing.    Eyes:  Negative for photophobia and visual disturbance.   Respiratory:  Negative for cough, chest tightness and shortness of breath.    Cardiovascular:  Negative for chest pain, palpitations and leg swelling.   Gastrointestinal:  Negative for  abdominal pain, constipation, diarrhea, nausea and vomiting.   Genitourinary:  Negative for decreased urine volume, difficulty urinating, dysuria, frequency, hematuria and urgency.   Musculoskeletal:  Positive for back pain (chronic, well-controlled) and gait problem (unable to use rolling walker). Negative for neck pain.   Skin:  Negative for rash and wound.   Neurological:  Positive for weakness. Negative for dizziness, syncope, speech difficulty and light-headedness.   Psychiatric/Behavioral:  Positive for confusion (improving). Negative for agitation. The patient is not nervous/anxious.    Objective:     Vital Signs (Most Recent):  Temp: 97.5 °F (36.4 °C) (04/27/23 1205)  Pulse: 66 (04/27/23 1205)  Resp: 18 (04/27/23 1205)  BP: (!) 144/64 (04/27/23 1205)  SpO2: (!) 94 % (04/27/23 1205)   Vital Signs (24h Range):  Temp:  [97.5 °F (36.4 °C)-98.1 °F (36.7 °C)] 97.5 °F (36.4 °C)  Pulse:  [] 66  Resp:  [18-19] 18  SpO2:  [91 %-97 %] 94 %  BP: (144-171)/() 144/64     Weight: 70.8 kg (156 lb)  Body mass index is 25.96 kg/m².  No intake or output data in the 24 hours ending 04/27/23 1422   Physical Exam  Vitals and nursing note reviewed.   Constitutional:       General: She is not in acute distress.     Appearance: She is well-developed.      Comments: Pleasant elderly woman who is conversant with interview and cooperative with exam   HENT:      Head: Normocephalic and atraumatic.      Comments: Dentures at bed side  Eyes:      Conjunctiva/sclera: Conjunctivae normal.      Pupils: Pupils are equal, round, and reactive to light.   Cardiovascular:      Rate and Rhythm: Normal rate and regular rhythm.      Heart sounds: Normal heart sounds.   Pulmonary:      Effort: Pulmonary effort is normal. No respiratory distress.      Breath sounds: Normal breath sounds. No wheezing.   Abdominal:      General: Bowel sounds are normal. There is no distension.      Palpations: Abdomen is soft.      Tenderness: There is no  abdominal tenderness.   Musculoskeletal:         General: Normal range of motion.      Cervical back: Normal range of motion and neck supple.   Skin:     General: Skin is warm and dry.      Capillary Refill: Capillary refill takes less than 2 seconds.   Neurological:      Mental Status: She is alert and oriented to person, place, and time.      Motor: Weakness (generalized) present.      Gait: Gait abnormal.   Psychiatric:         Behavior: Behavior normal.         Thought Content: Thought content normal.         Judgment: Judgment normal.       Significant Labs: All pertinent labs within the past 24 hours have been reviewed.      Significant Imaging: I have reviewed all pertinent imaging results/findings within the past 24 hours.      Assessment/Plan:      * Lumbar spondylosis with myelopathy  Debility  - Patient with progressively worsening generalized weakness and gait instability, as well as back pain  - MRI with lumbar spondylosis most prominent L4-L5 with moderate-severe spinal canal stenosis  - Pt persistently weak and requiring active assistance with standing and gait training  - PT/OT recommending SNF  - Awaiting placement    Sinus tachycardia  - Pt with intermittent bouts of asymptomatic sinus tachycardia, not responding to IVFs  - Afebrile with no leukocytosis  - Repeat CXR negative  - UA slightly positive - will extend rocephin course to total 7 days  - Continue metoprolol 12.5 mg BID    Delirium  Resolved  - Pt with worsening confusion and signs of auditory & visual hallucinations per the sitter at bedside likely 2/2 hospital delirium though family reports she has had similar episodes in the past  - Continue seroquel 12.5 mg nightly  - Delirium precautions   - Refer to neuro, geripsych at discharge    GINA (acute kidney injury)  Resolved  Patient with acute kidney injury likely due to IVVD/dehydration GINA is currently stable. Labs reviewed- Renal function/electrolytes with Estimated Creatinine Clearance:  55.9 mL/min (based on SCr of 0.7 mg/dL). according to latest data. Monitor urine output and serial BMP and adjust therapy as needed. Avoid nephrotoxins and renally dose meds for GFR listed above.   - Cr 1.2 on admit   - Cr 0.8 ~3 weeks ago  - 1L LR bolus given  - UTI noted and patient endorses poor oral hydration  - Monitor renal function on daily BMP    Elevated troponin  - Troponin 0.063, repeat flat  - EKG completed in ED, however results unavailable in EMR - will follow up   - No acute abnormalities noted by ED team  - Repeat EKG NSR  - Monitor telemetry  - TTE ordered, no baseline available in EMR  Results for orders placed during the hospital encounter of 04/18/23  Echo  Interpretation Summary  · The left ventricle is normal in size with normal systolic function. The estimated ejection fraction is 65%.  · Normal right ventricular size with normal right ventricular systolic function.  · Moderate left atrial enlargement.  · There is moderate mitral stenosis. The mean diastolic gradient across the mitral valve is 7 mmHg at a heart rate of bpm.  · There is mild-to-moderate aortic valve stenosis. Aortic valve area is 1.4 cm2; peak velocity is 3.0 m/s; mean gradient is 22 mmHg.  · The estimated PA systolic pressure is 34 mmHg.  · Normal central venous pressure (3 mmHg).    Acute cystitis with hematuria  Resolved  88 y.o. female admitted to  observation with acute cystitis with hematuria. Patient diagnosed with UTI last week, was prescribed macrobid, however only took one dose. Denies any urinary symptoms. However, was sent from [facility] to see PCP for concerns of worsening UTI and associated AMS. Patient sent to ED from primary care clinic for further evaluation.    - UA with 3+ occult blood, 3+ leukocytes, >100 RBC, >100 WBC  - Afebrile, no leukocytosis  - A&O x3   - s/p IV Ceftriaxone 1g q24h  - Urine culture no growth    Hypertension  - Patient non-compliant with anti-hypertensive medications  -  History of amlodpine and lisinopril noted on chart review, however patient states she is not taking either and unable to say when she last took them  - Continue amlodipine 10 mg daily  - Hold lisinopril 10 mg daily in setting of GINA - do not resume given normotensive with amlodipine only   - Monitor BP with q4h vitals   - Close PCP f/u on discharge      VTE Risk Mitigation (From admission, onward)         Ordered     enoxaparin injection 40 mg  Daily         04/18/23 2247     IP VTE HIGH RISK PATIENT  Once         04/18/23 2247                Discharge Planning   TOSHA: 4/27/2023     Code Status: Full Code   Is the patient medically ready for discharge?: Yes    Reason for patient still in hospital (select all that apply): Pending disposition  Discharge Plan A: Skilled Nursing Facility                  Cristina Perez PA-C  Department of Hospital Medicine   [facility] - Observation 11H

## 2023-04-27 NOTE — ASSESSMENT & PLAN NOTE
Resolved  - Pt with worsening confusion and signs of auditory & visual hallucinations per the sitter at bedside likely 2/2 hospital delirium though family reports she has had similar episodes in the past  - Continue seroquel 12.5 mg nightly  - Delirium precautions   - Refer to neuro geripsych at discharge

## 2023-04-27 NOTE — PLAN OF CARE
04/27/23 1310   Post-Acute Status   Post-Acute Authorization Placement   Post-Acute Placement Status Pending post-acute provider review/more information requested     CLAUDIA attempted to follow up with [facility] regarding their reconsideration of accepting pt; however, CLAUDIA was informed that their admissions coordinator, Yoana is out for today. SW was forwarded to someone named Leland to speak with; however, there was no answer. CLAUDIA left a message for Leland to return the call. CLAUDIA attempted to call [facility] back at a later time to follow up with Leland; however, was informed that Leland was no in the office. SW was forwarded to someone in administration to follow up with; however, there was no answer. CLAUDIA left a message for someone to call back.    CLAUDIA contacted pt's son, Maykel and provided him with an update on the status of pt being accepted to [facility]. CLAUDIA also spoke with Maykel about providing [facility] with the financial documentation they need to complete the paperwork for pt to be accepted at their facility. Maykel reported that he is waiting to hear back from Leland at [facility], but he has submitted some of the documentation needed to [facility]. CLAUDIA explained to Maykel that Leland is not in the office and all of the paperwork will need to be submitted to so they can proceed with accepting pt. CLAUDIA also explained that the other option would be for pt to discharge back to [facility] with home health and 24 hour caregivers. Maykel reported that he does not think pt is at a point where she will be able to discharge back to [facility] with home health at this time. Maykel reported that he will attempt to reach out to [facility] again and follow up with CLAUDIA.    CLAUDIA contacted Bel at [facility] to see which documentation is still needed from Maykel. Bel reported Maykel has not submitted anything as of yet; however, she will reach out to him to see if he is going to provide the  documentation needed. CLAUDIA asked Bel if Maykel provides the documents today will pt be able to admit into their facility today. Bel reported that even if Maykel submitted the documentation needed today, pt will probably have to admit in tomorrow morning.    CLAUDIA spoke with pt's caregiver, Chel and provided her with an update on the status of pt's discharge and the next steps that need to happen. CLAUDIA informed Chel that Maykel will need to go provide the documentation needed to [facility] today in order for pt to go to their facility in the morning. Chel reported that she will contact Maykel and provide him with this information.    UPDATE    CLAUDIA was notified by Bel that Maykel has agreed for pt to go to [facility] and they have emailed him the paperwork for him to complete.    CLAUDIA updated the medical team of the discharge plan.    CLAUDIA will continue to follow up.      Lyndsay West LMSW  [facility]  Ext. [phone]

## 2023-04-27 NOTE — ASSESSMENT & PLAN NOTE
Resolved  Patient with acute kidney injury likely due to IVVD/dehydration GINA is currently stable. Labs reviewed- Renal function/electrolytes with Estimated Creatinine Clearance: 55.9 mL/min (based on SCr of 0.7 mg/dL). according to latest data. Monitor urine output and serial BMP and adjust therapy as needed. Avoid nephrotoxins and renally dose meds for GFR listed above.   - Cr 1.2 on admit   - Cr 0.8 ~3 weeks ago  - 1L LR bolus given  - UTI noted and patient endorses poor oral hydration  - Monitor renal function on daily BMP

## 2023-04-27 NOTE — SUBJECTIVE & OBJECTIVE
Interval History: Patient seen at bedside with care taker Chel. Oxygen removed and patient 94% on RA. Patient alert and oriented X 3 on exam, no evidence of confusion noted. Chel reports intermittent confusion at times with hallucinations. Will continue to monitor while inpatient and refer to outpatient neurology/ psych. Medically stable for discharge.Awaiting SNF placement.     Review of Systems   Constitutional:  Positive for activity change. Negative for chills and fever.   HENT:  Negative for trouble swallowing.    Eyes:  Negative for photophobia and visual disturbance.   Respiratory:  Negative for cough, chest tightness and shortness of breath.    Cardiovascular:  Negative for chest pain, palpitations and leg swelling.   Gastrointestinal:  Negative for abdominal pain, constipation, diarrhea, nausea and vomiting.   Genitourinary:  Negative for decreased urine volume, difficulty urinating, dysuria, frequency, hematuria and urgency.   Musculoskeletal:  Positive for back pain (chronic, well-controlled) and gait problem (unable to use rolling walker). Negative for neck pain.   Skin:  Negative for rash and wound.   Neurological:  Positive for weakness. Negative for dizziness, syncope, speech difficulty and light-headedness.   Psychiatric/Behavioral:  Positive for confusion (improving). Negative for agitation. The patient is not nervous/anxious.    Objective:     Vital Signs (Most Recent):  Temp: 97.5 °F (36.4 °C) (04/27/23 1205)  Pulse: 66 (04/27/23 1205)  Resp: 18 (04/27/23 1205)  BP: (!) 144/64 (04/27/23 1205)  SpO2: (!) 94 % (04/27/23 1205)   Vital Signs (24h Range):  Temp:  [97.5 °F (36.4 °C)-98.1 °F (36.7 °C)] 97.5 °F (36.4 °C)  Pulse:  [] 66  Resp:  [18-19] 18  SpO2:  [91 %-97 %] 94 %  BP: (144-171)/() 144/64     Weight: 70.8 kg (156 lb)  Body mass index is 25.96 kg/m².  No intake or output data in the 24 hours ending 04/27/23 1422   Physical Exam  Vitals and nursing note reviewed.    Constitutional:       General: She is not in acute distress.     Appearance: She is well-developed.      Comments: Pleasant elderly woman who is conversant with interview and cooperative with exam   HENT:      Head: Normocephalic and atraumatic.      Comments: Dentures at bed side  Eyes:      Conjunctiva/sclera: Conjunctivae normal.      Pupils: Pupils are equal, round, and reactive to light.   Cardiovascular:      Rate and Rhythm: Normal rate and regular rhythm.      Heart sounds: Normal heart sounds.   Pulmonary:      Effort: Pulmonary effort is normal. No respiratory distress.      Breath sounds: Normal breath sounds. No wheezing.   Abdominal:      General: Bowel sounds are normal. There is no distension.      Palpations: Abdomen is soft.      Tenderness: There is no abdominal tenderness.   Musculoskeletal:         General: Normal range of motion.      Cervical back: Normal range of motion and neck supple.   Skin:     General: Skin is warm and dry.      Capillary Refill: Capillary refill takes less than 2 seconds.   Neurological:      Mental Status: She is alert and oriented to person, place, and time.      Motor: Weakness (generalized) present.      Gait: Gait abnormal.   Psychiatric:         Behavior: Behavior normal.         Thought Content: Thought content normal.         Judgment: Judgment normal.       Significant Labs: All pertinent labs within the past 24 hours have been reviewed.      Significant Imaging: I have reviewed all pertinent imaging results/findings within the past 24 hours.

## 2023-04-28 NOTE — NURSING
1454: Report given to nurse Hess at [facility] via telephone. Questions answered/encouraged at this time.

## 2023-04-28 NOTE — PT/OT/SLP PROGRESS
Occupational Therapy   Co-Treatment  Co-treatment with PT for maximal pt participation, safety, and activity tolerance     Name: Radha Sandoval  MRN: [id number]  Admitting Diagnosis:  Lumbar spondylosis with myelopathy       Recommendations:     Discharge Recommendations: nursing facility, skilled  Discharge Equipment Recommendations:  to be determined by next level of care  Barriers to discharge:       Assessment:     Radha Sandoval is a 88 y.o. female with a medical diagnosis of Lumbar spondylosis with myelopathy.  He presents with impaired ADL and mobility performance deficits. Pt found upright in bed and agreeable for therapy. Session focused on bed mobility, gentle relaxation strategies at bedside, and standing trials using rollator. Pt required mod A x2 for bed mobility and stood from bed with min A x2 with rollator. Pt continues to display noted anxiety and voices fear of falling limiting further mobility.  Pt was able to take ~1 lateral step at bedside however quickly returned self to bedside.  Pt would benefit from continued OT skilled services 3x/wk to improve daily living skills to optimize QOL. Pt is recommended to discharge to SNF at this time.    Pt deficits: weakness, impaired functional mobility, impaired endurance, gait instability, impaired balance, impaired self care skills, decreased lower extremity function, decreased upper extremity function, impaired cardiopulmonary response to activity, pain, impaired cognition, decreased safety awareness.     Rehab Prognosis:  Good; patient would benefit from acute skilled OT services to address these deficits and reach maximum level of function.       Plan:     Patient to be seen 3 x/week to address the above listed problems via self-care/home management, therapeutic activities, therapeutic exercises, cognitive retraining  Plan of Care Expires: 05/21/23  Plan of Care Reviewed with: patient, caregiver    Subjective     Chief Complaint: fear of falling, pt stating  ""put me down" and "I'm not doing it, tell Maykel"  Patient/Family Comments/goals: return to bed   Pain/Comfort:  Pain Rating 1: 0/10  Pain Rating Post-Intervention 1: 0/10    Objective:     Communicated with: RN prior to session.  Patient found HOB elevated with PureWick upon OT entry to room.    General Precautions: Standard, fall    Orthopedic Precautions:N/A  Braces: N/A  Respiratory Status: Room air     Occupational Performance:     Bed Mobility:    Patient completed Rolling/Turning to Left with  moderate assistance and 2 persons  Patient completed Scooting/Bridging with moderate assistance and 2 persons  Patient completed Supine to Sit with moderate assistance and 2 persons     Functional Mobility/Transfers:  Patient completed Sit <> Stand Transfer with minimum assistance and of 2 persons  with  rollator    Functional Mobility: Pt stood from bed briefly x2 trials. On first trial pt stood ~30 seconds with rollator requiring min A x2. Pt completed second stand taking lateral step toward HOB however with voiced fear and pt purposely shuffling feet and slipping in place despite max cues for relaxation for safe participation.   Pt responded well to EOB today with 2 clinicians near pt, tolerating ~10 minutes while listening to therapeutic piano music.    Activities of Daily Living:  Lower Body Dressing: total assistance donning slippers and  socks in bed       [facility] 6 Click ADL: 16    Treatment & Education:  Pt educated on role of occupational therapy, POC, and safety during ADLs and functional mobility. Pt and OT discussed importance of safe, continued mobility to optimize daily living skills. Pt verbalized understanding.   White board updated during session. Pt given instruction to call for medical staff/nurse for assistance.       Patient left HOB elevated with all lines intact, call button in reach, RN notified, and pt's sitter present    GOALS:   Multidisciplinary Problems       Occupational Therapy Goals  "         Problem: Occupational Therapy    Goal Priority Disciplines Outcome Interventions   Occupational Therapy Goal     OT, PT/OT Ongoing, Progressing    Description: Goals to be met by: 4/28/23 (1 week)     Patient will increase functional independence with ADLs by performing:    UE Dressing with Stand-by Assistance.  LE Dressing with Moderate Assistance.  Grooming while standing with Minimal Assistance.  Toileting from bedside commode with Moderate Assistance for hygiene and clothing management.   Rolling to Bilateral with Stand-by Assistance.   Supine to sit with Contact Guard Assistance.  Step transfer with Stand-by Assistance using LRAD  Toilet transfer to bedside commode with Stand-by Assistance using LRAD                          Time Tracking:     OT Date of Treatment: 04/28/23  OT Start Time: 0846  OT Stop Time: 0909  OT Total Time (min): 23 min    Billable Minutes:Self Care/Home Management 13 min  Therapeutic Activity 10 min    OT/ALEXIS: OT          4/28/2023

## 2023-04-28 NOTE — PLAN OF CARE
CM was notified of placement. Patient was accepted to [facility] on 4/28/23 . Report can be called to [phone] ask for Jimena in 4 N. Pt going to Rm 420.  SW arranged  Ambulance transport via [facility]. Requested  time is 1300 .  Requested  time does not guarantee arrival time.  If transport does not arrive by  1400  please contact assigned SW or on-call for assistance.    Patient's bedside nurse and Team notified of the above.     04/28/23 1205   Post-Acute Status   Post-Acute Authorization Placement   Post-Acute Placement Status Set-up Complete/Auth obtained   Discharge Plan   Discharge Plan A Skilled Nursing Facility   Discharge Plan B Skilled Nursing Facility       Future Appointments   Date Time Provider Department Center   5/17/2023 10:00 AM Micha Jackson DPM [facility] POD [facility]   5/22/2023  3:15 PM Mariposa Loya DO [facility] IM [facility]   6/5/2023 11:00 AM Carolyne Hirsch PA-C [facility] NEUR[facility]     Luis Daniel Torrez, RN,BSN

## 2023-04-28 NOTE — NURSING
Patient discharged per MD order. Peripheral IVs and telemetry removed. Fitbit, visi, and ipad removed from patient. Patient states understanding of discharge instructions. Patient contacting family for transportation home. Will continue to monitor patient.   Yamileth attempts made to call report to nurse Jimena at [facility]. Placed on hold several times but no one came to the phone to receive report.

## 2023-04-28 NOTE — PLAN OF CARE
Problem: Adult Inpatient Plan of Care  Goal: Plan of Care Review  Outcome: Met  Goal: Patient-Specific Goal (Individualized)  Outcome: Met  Goal: Absence of Hospital-Acquired Illness or Injury  Outcome: Met  Goal: Optimal Comfort and Wellbeing  Outcome: Met  Goal: Readiness for Transition of Care  Outcome: Met     Problem: Fluid and Electrolyte Imbalance (Acute Kidney Injury/Impairment)  Goal: Fluid and Electrolyte Balance  Outcome: Met     Problem: Oral Intake Inadequate (Acute Kidney Injury/Impairment)  Goal: Optimal Nutrition Intake  Outcome: Met     Problem: Renal Function Impairment (Acute Kidney Injury/Impairment)  Goal: Effective Renal Function  Outcome: Met     Problem: Infection  Goal: Absence of Infection Signs and Symptoms  Outcome: Met     Problem: Skin Injury Risk Increased  Goal: Skin Health and Integrity  Outcome: Met     Problem: Fall Injury Risk  Goal: Absence of Fall and Fall-Related Injury  Outcome: Met

## 2023-04-28 NOTE — PT/OT/SLP PROGRESS
"Physical Therapy Co-Treatment    Patient Name:  Radha Sandoval   MRN:  [id number]    Recommendations:     Discharge Recommendations: nursing facility, skilled  Discharge Equipment Recommendations: to be determined by next level of care  Barriers to discharge: Pt currently requiring increased level of assistance with mobility    Assessment:     Radha Sandoval is a 88 y.o. female admitted with a medical diagnosis of Lumbar spondylosis with myelopathy.  She presents with the following impairments/functional limitations: weakness, impaired endurance, impaired functional mobility, gait instability, impaired balance, decreased coordination, decreased lower extremity function, decreased upper extremity function, edema, decreased safety awareness.    Pt appeared very anxious and fear of falling, however cooperated with max reinforcement. Pt initially required increased assistance with sitting balance, however with one person standing each side (therapist and caregiver), pt's sitting balance improved to fair static sitting balance with CGA. Pt tolerated 3 reps STS with rollator and 2 person assist and presented with fair static standing balance however most limited due to fear of falling. Pt continues to benefit from skilled PT services while in house in order to address the aforementioned deficits.     Rehab Prognosis: Fair; patient would benefit from acute skilled PT services to address these deficits and reach maximum level of function.    Recent Surgery: * No surgery found *      Plan:     During this hospitalization, patient to be seen 3 x/week to address the identified rehab impairments via gait training, therapeutic exercises, therapeutic activities, neuromuscular re-education, wheelchair management/training and progress toward the following goals:    Plan of Care Expires:  05/21/23    Subjective     "I learn how to play the piano by ear"    Pain/Comfort:  Pain Rating 1: 0/10  Pain Rating Post-Intervention 1: " 0/10      Objective:     Communicated with RN prior to session.  Patient found HOB elevated with PureWick upon PT entry to room.     General Precautions: Standard, fall  Orthopedic Precautions: N/A  Braces: N/A  Respiratory Status: Room air     Functional Mobility:  Bed Mobility:     Scooting: maximal assistance  Supine to Sit: moderate assistance and of 2 persons  Sit to Supine: moderate assistance and of 2 persons  Transfers:     Sit to Stand:  minimum assistance and of 2 persons with rollator  Gait: Pt amb 2 side steps to left rollator Yusef x2. Pt presented with fair weightshift for limb advancement, head down, excessive knee flexion  Balance:   Fair sitting balance   Fair static standing balance  Fair-poor dynamic standing balance      AM-PAC 6 CLICK MOBILITY  Turning over in bed (including adjusting bedclothes, sheets and blankets)?: 2  Sitting down on and standing up from a chair with arms (e.g., wheelchair, bedside commode, etc.): 2  Moving from lying on back to sitting on the side of the bed?: 2  Moving to and from a bed to a chair (including a wheelchair)?: 1  Need to walk in hospital room?: 1  Climbing 3-5 steps with a railing?: 1  Basic Mobility Total Score: 9       Treatment & Education:  Educated pt on PT role/POC  Educated pt on importance of OOB activity and daily ambulation   Pt educated on proper body mechanics, safety techniques, and energy conservation with PT facilitation and cueing throughout session   Pt verbalized understanding      Patient left HOB elevated with all lines intact, call button in reach, RN notified, and OT and caregiver Chel present..    GOALS:   Multidisciplinary Problems       Physical Therapy Goals          Problem: Physical Therapy    Goal Priority Disciplines Outcome Goal Variances Interventions   Physical Therapy Goal     PT, PT/OT Ongoing, Progressing     Description: Goals to be met by: 5/5/2023     Patient will increase functional independence with mobility by  performin. Supine to sit with Stand-by Assistance  2. Sit to supine with Contact Guard Assistance  3. Rolling to Left and Right with Supervision.  4. Sit to stand transfer with Contact Guard Assistance and RW  5. Bed to chair transfer with Moderate Assistance using Rolling Walker  6. Gait  x 15 feet with Moderate Assistance using Rolling Walker.   7. Lower extremity exercise program x15 reps per handout, with supervision                         Time Tracking:     PT Received On: 23  PT Start Time: 847     PT Stop Time: 910  PT Total Time (min): 23 min     Billable Minutes: Neuromuscular Re-education 23    Co-treatment performed due to patient's multiple deficits requiring two skilled therapists to appropriately and safely assess patient's strength and endurance while facilitating functional tasks in addition to accommodating for patient's activity tolerance.       Treatment Type: Treatment  PT/PTA: PT     Number of PTA visits since last PT visit: 0     2023

## 2023-04-30 NOTE — DISCHARGE SUMMARY
"[facility]ation [address] [place] [address] Medicine  Discharge Summary      Patient Name: Radha Sandoval  MRN: [id number]  CINDY: [id number]  Patient Class: IP- Inpatient  Admission Date: 4/18/2023  Hospital Length of Stay: 9 days  Discharge Date and Time: 4/28/2023  2:00 PM  Attending Physician: Bev att. providers found   Discharging Provider: Joslyn Fatima PA-C  Primary Care Provider: Primary Doctor Bev  [facility] Medicine Team: [facility] HOSP MED E Joslyn Fatima PA-C  Primary Care Team: [facility] HOSP MED E    HPI:   Radha Sandoval is a 88 y.o. female with unclear PMHx, who presents for multiple complaints. Patient is accompanied by her new caretaker, who has only known the patient for less than a week. Patient currently resides at [facility]. She was sent from the [facility] to see a PCP for a possible urinary tract infection and was seen in primary care clinic this afternoon. At the appointment, patient was unable to ambulate, had excruciating body pain, tachypnea and altered mental status. Patient was sent to the ED for further evaluation. Of note, patient has not been established with a PCP for 3-4 years now.    Upon evaluation in the ED, patient noted to have an UTI and elevated troponin. Patient denies any urinary symptoms. However, the caretaker states the patient was told about her UTI already and was placed on macrobid last week, though there is no mention of this on chart review. Patient supposedly took only one dose of macrobid on Saturday. Caretaker states that her mental status has changed since she first met the patient last week. The caretaker also noted that patient administers her own medications and she found patient's pillbox in a drawer full, with no medications taken for at least a week. Lengthy discussion had at bedside regarding patient's chief complaint. Patient stated "she just wants someone to tell her what medications to take and when". Patient endorses worsening back " pain, from cervical to lumbar spine. At baseline, patient uses a rolling walker to ambulate, but has had difficulty walking for the last two weeks. Patient supposedly takes meclizine, glucose tablets (unknown reason) and ibuprofen on a daily basis, which the son buys online. Overall history is limited and unclear due to patient being a poor historian. Currently endorses dyspnea and back pain. Denies chest pain, abdominal pain, headache, F/N/V/D.    ED: /81, afebrile, on RA. CBC wnl. CMP with Cr 1.2, BUN 50. Troponin 0.063. UA with 3+ leukocytes, >100 RBC, >100 WBC. Urine culture pending. CXR negative. CT Head negative. CT Entire Spine with No acute osseous abnormality. Received IV Ceftriaxone 1g and Lidocaine patch in ED.       * No surgery found *      Hospital Course:   Radha Sandoval was admitted to Hospital Medicine for acute cystitis for which she completed a course of IV rocephin. Urine culture no growth. Echo with normal LV systolic function, EF of 65%, normal RV systolic, moderate LA enlargement, moderate mitral stenosis, mild-to-moderate aortic valve stenosis, normal CVP. Patient is noncompliant with home meds and has not followed up with PCP in many years. Amlodipine restarted for HTN. Started on seroquel nightly for delirium. GINA on admit that resolved with IVF. Pt reporting persistent generalized weakness and back pain, consistent with her lumbar spondylosis. PT/OT recommending SNF. Patient and family agreeable to placement. Discharged to SNF with PCP and neuro f/u. Also placed gen surg referral for hernia.       Goals of Care Treatment Preferences:  Code Status: Full Code      Consults:   Consults (From admission, onward)        Status Ordering Provider     Inpatient virtual consult to Hospital Medicine  Once        Provider:  (Not yet assigned)    Completed RON GUAJARDO     Inpatient virtual consult to Hospital Medicine  Once        Provider:  (Not yet assigned)    Completed HARVEY REYNA      Inpatient virtual consult to Hospital Medicine  Once        Provider:  (Not yet assigned)    Completed RON GUAJARDO          No new Assessment & Plan notes have been filed under this hospital service since the last note was generated.  Service: Hospital Medicine    Final Active Diagnoses:    Diagnosis Date Noted POA    PRINCIPAL PROBLEM:  Lumbar spondylosis with myelopathy [M47.16] 04/25/2023 Yes    Debility [R53.81] 04/25/2023 Yes    Sinus tachycardia [R00.0] 04/22/2023 Yes    Delirium [R41.0] 04/21/2023 Yes    Acute cystitis with hematuria [N30.01] 04/18/2023 Yes    Elevated troponin [R77.8] 04/18/2023 Yes    GINA (acute kidney injury) [N17.9] 04/18/2023 Yes    Hypertension [I10] 06/10/2022 Yes      Problems Resolved During this Admission:       Discharged Condition: stable    Disposition: Skilled Nursing Facility    Follow Up:   Follow-up Information     Rula Mays NP .    Specialty: Hospitalist  Contact information:  [address] [place] [address]  [phone]             [facility] .    Contact information:  [address]  [facility] [address]  [phone]                     Patient Instructions:      Ambulatory referral/consult to Outpatient Case Management   Referral Priority: Routine Referral Type: Consultation   Referral Reason: Specialty Services Required   Number of Visits Requested: 1     Ambulatory referral/consult to [facility]   Standing Status: Future   Referral Priority: Routine Referral Type: Consultation   Referral Reason: Specialty Services Required   Number of Visits Requested: 1     Ambulatory referral/consult to Community Care   Standing Status: Future   Referral Priority: Routine Referral Type: Consultation   Referral Location: [facility]   Requested Specialty: Community Care   Number of Visits Requested: 1     Ambulatory referral/consult to Podiatry   Standing Status: Future    Referral Priority: Routine Referral Type: Consultation   Referral Reason: Specialty Services Required   Requested Specialty: Podiatry   Number of Visits Requested: 1     Ambulatory referral/consult to Neurology   Standing Status: Future   Referral Priority: Urgent Referral Type: Consultation   Referral Reason: Specialty Services Required   Requested Specialty: Neurology   Number of Visits Requested: 1     Ambulatory referral/consult to Internal Medicine   Standing Status: Future   Referral Priority: Urgent Referral Type: Consultation   Referral Reason: Specialty Services Required   Requested Specialty: Internal Medicine   Number of Visits Requested: 1     Ambulatory referral/consult to General Surgery   Standing Status: Future   Referral Priority: Routine Referral Type: Consultation   Referral Reason: Specialty Services Required   Requested Specialty: General Surgery   Number of Visits Requested: 1     Diet Cardiac     Notify your health care provider if you experience any of the following:  severe uncontrolled pain     Notify your health care provider if you experience any of the following:  persistent dizziness, light-headedness, or visual disturbances     Notify your health care provider if you experience any of the following:  increased confusion or weakness     Activity as tolerated     Activity as tolerated         Pending Diagnostic Studies:     Procedure Component Value Units Date/Time    EKG 12-lead [[id number]]     Order Status: Sent Lab Status: No result          Medications:  Reconciled Home Medications:      Medication List      START taking these medications    hydrOXYzine HCL 25 MG tablet  Commonly known as: ATARAX  Take 1 tablet (25 mg total) by mouth 3 (three) times daily.     meclizine 12.5 mg tablet  Commonly known as: ANTIVERT  Take 1 tablet (12.5 mg total) by mouth once daily.     metoprolol succinate 25 MG 24 hr tablet  Commonly known as: TOPROL-XL  Take 1 tablet (25 mg total) by mouth 2 (two)  times daily.     miconazole NITRATE 2 % 2 % top powder  Commonly known as: MICOTIN  Apply topically twice daily under bilateral breasts     pantoprazole 40 MG tablet  Commonly known as: PROTONIX  Take 1 tablet (40 mg total) by mouth once daily.        CONTINUE taking these medications    amLODIPine 10 MG tablet  Commonly known as: NORVASC  Take 10 mg by mouth once daily.     ibuprofen 400 MG tablet  Commonly known as: ADVIL,MOTRIN  Take 1 tablet (400 mg total) by mouth every 6 (six) hours as needed (pain).     ramelteon 8 mg tablet  Commonly known as: ROZEREM  Take 8 mg by mouth every evening.        STOP taking these medications    lisinopriL 10 MG tablet            Indwelling Lines/Drains at time of discharge:   Lines/Drains/Airways     None                 Time spent on the discharge of patient: 35 minutes         Joslyn Fatima PA-C  Department of Hospital Medicine  [facility] 11H

## 2023-05-06 PROBLEM — K40.30 INCARCERATED INGUINAL HERNIA: Status: ACTIVE | Noted: 2023-01-01

## 2023-05-06 NOTE — SUBJECTIVE & OBJECTIVE
No current facility-administered medications on file prior to encounter.     Current Outpatient Medications on File Prior to Encounter   Medication Sig    amLODIPine (NORVASC) 10 MG tablet Take 10 mg by mouth once daily.    hydrOXYzine HCL (ATARAX) 25 MG tablet Take 1 tablet (25 mg total) by mouth 3 (three) times daily.    ibuprofen (ADVIL,MOTRIN) 400 MG tablet Take 1 tablet (400 mg total) by mouth every 6 (six) hours as needed (pain).    meclizine (ANTIVERT) 12.5 mg tablet Take 1 tablet (12.5 mg total) by mouth once daily.    metoprolol succinate (TOPROL-XL) 25 MG 24 hr tablet Take 1 tablet (25 mg total) by mouth 2 (two) times daily.    miconazole NITRATE 2 % (MICOTIN) 2 % top powder Apply topically twice daily under bilateral breasts    pantoprazole (PROTONIX) 40 MG tablet Take 1 tablet (40 mg total) by mouth once daily.    ramelteon (ROZEREM) 8 mg tablet Take 8 mg by mouth every evening.       Review of patient's allergies indicates:  No Known Allergies    No past medical history on file.  No past surgical history on file.  Family History    None       Tobacco Use    Smoking status: Not on file    Smokeless tobacco: Not on file   Substance and Sexual Activity    Alcohol use: Not on file    Drug use: Not on file    Sexual activity: Not on file     Review of Systems   Constitutional:  Negative for chills and fever.   HENT:  Negative for sore throat.    Eyes:  Negative for redness.   Respiratory:  Negative for shortness of breath.    Cardiovascular:  Negative for chest pain.   Gastrointestinal:  Positive for abdominal pain, nausea and vomiting.   Endocrine: Negative for polyuria.   Genitourinary:  Negative for dysuria.   Musculoskeletal:  Negative for back pain.   Skin:  Negative for wound.   Neurological:  Negative for headaches.   Psychiatric/Behavioral:  Negative for agitation.    Objective:     Vital Signs (Most Recent):  Temp: 97.4 °F (36.3 °C) (05/06/23 1544)  Pulse: 66 (05/06/23 1544)  Resp: 20 (05/06/23  1544)  BP: (!) 132/46 (05/06/23 1544)  SpO2: 95 % (05/06/23 1544)   Vital Signs (24h Range):  Temp:  [97.4 °F (36.3 °C)] 97.4 °F (36.3 °C)  Pulse:  [66] 66  Resp:  [20] 20  SpO2:  [95 %] 95 %  BP: (132)/(46) 132/46        There is no height or weight on file to calculate BMI.     Physical Exam  Vitals and nursing note reviewed.   Constitutional:       General: She is not in acute distress.     Appearance: She is well-developed.   HENT:      Head: Normocephalic and atraumatic.      Right Ear: External ear normal.      Left Ear: External ear normal.      Mouth/Throat:      Mouth: Mucous membranes are moist.   Eyes:      Conjunctiva/sclera: Conjunctivae normal.   Cardiovascular:      Rate and Rhythm: Normal rate.   Pulmonary:      Effort: Pulmonary effort is normal.   Abdominal:      Palpations: Abdomen is soft.      Tenderness: There is abdominal tenderness.      Hernia: A hernia is present.      Comments: Incarcerated, possibly strangulated large right inguinal hernia with some erythema and warmth over the skin. Tender to touch.    Musculoskeletal:      Cervical back: Normal range of motion.      Right lower leg: No edema.      Left lower leg: No edema.   Skin:     General: Skin is warm and dry.   Neurological:      Mental Status: She is alert. Mental status is at baseline.   Psychiatric:         Behavior: Behavior normal.          I have reviewed all pertinent lab results within the past 24 hours.  CBC:   Recent Labs   Lab 05/06/23  1630   WBC 11.24   RBC 3.79*   HGB 11.0*   HCT 33.0*      MCV 87   MCH 29.0   MCHC 33.3     CMP:   Recent Labs   Lab 05/06/23  1630   *   CALCIUM 9.3   ALBUMIN 2.8*   PROT 6.0      K 3.6   CO2 29      BUN 36*   CREATININE 0.9   ALKPHOS 60   ALT 16   AST 18   BILITOT 0.5       Significant Diagnostics:  CT abdomen/pelvis shows very large right inguinal hernia with bowel and some fluid.

## 2023-05-06 NOTE — ANESTHESIA PREPROCEDURE EVALUATION
[facility]  Anesthesia Pre-Operative Evaluation         Patient Name: Radha Sandoval  YOB: 1935  MRN: [id number]    SUBJECTIVE:     Pre-operative evaluation for Procedure(s) (LRB):  REPAIR, HERNIA, INGUINAL, WITHOUT HISTORY OF PRIOR REPAIR, AGE 5 YEARS OR OLDER (Right)     05/06/2023    Radha Sandoval is a 88 y.o. female w/ a significant PMHx of HTN, GERD, and acutely incarcerated right inguinal hernia concerning for strangulation with some bowel edema and fluid on CT.    CLASS A TO OR. Patient is NPO. Last had sip of insure @ 1200, then vomited that back up. 20ga PIV for access. Upper dentures removed.    Patient now presents for the above procedure(s).    TTE (5/6/23):   The left ventricle is normal in size with normal systolic function. The estimated ejection fraction is 65%.   Normal right ventricular size with normal right ventricular systolic function.   Moderate left atrial enlargement.   There is moderate mitral stenosis. The mean diastolic gradient across the mitral valve is 7 mmHg at a heart rate of bpm.   There is mild-to-moderate aortic valve stenosis. Aortic valve area is 1.4 cm2; peak velocity is 3.0 m/s; mean gradient is 22 mmHg.   The estimated PA systolic pressure is 34 mmHg.   Normal central venous pressure (3 mmHg).      LDA:        Peripheral IV - Single Lumen 05/06/23 1629 20 G Anterior;Left Hand (Active)   Site Assessment Dry;Clean;Intact 05/06/23 1629   Line Status Saline locked;Blood return noted;Flushed 05/06/23 1629   Dressing Status Intact;Dry;Clean 05/06/23 1629   Number of days: 0       Prev airway: None documented.    Drips:    lactated ringers         Patient Active Problem List   Diagnosis    Hypertension    Insomnia    Acute cystitis with hematuria    Elevated troponin    GINA (acute kidney injury)    Delirium    Sinus tachycardia    Lumbar spondylosis with myelopathy    Debility    Incarcerated inguinal hernia       Review of patient's  allergies indicates:  No Known Allergies    Current Inpatient Medications:   [START ON 5/7/2023] amLODIPine  10 mg Oral Daily    enoxaparin  40 mg Subcutaneous Daily    [START ON 5/7/2023] meclizine  12.5 mg Oral Daily    metoprolol succinate  25 mg Oral BID       No current facility-administered medications on file prior to encounter.     Current Outpatient Medications on File Prior to Encounter   Medication Sig Dispense Refill    amLODIPine (NORVASC) 10 MG tablet Take 10 mg by mouth once daily.      hydrOXYzine HCL (ATARAX) 25 MG tablet Take 1 tablet (25 mg total) by mouth 3 (three) times daily.      ibuprofen (ADVIL,MOTRIN) 400 MG tablet Take 1 tablet (400 mg total) by mouth every 6 (six) hours as needed (pain). 30 tablet 0    meclizine (ANTIVERT) 12.5 mg tablet Take 1 tablet (12.5 mg total) by mouth once daily.  0    metoprolol succinate (TOPROL-XL) 25 MG 24 hr tablet Take 1 tablet (25 mg total) by mouth 2 (two) times daily. 60 tablet 11    miconazole NITRATE 2 % (MICOTIN) 2 % top powder Apply topically twice daily under bilateral breasts  0    pantoprazole (PROTONIX) 40 MG tablet Take 1 tablet (40 mg total) by mouth once daily. 30 tablet 11    ramelteon (ROZEREM) 8 mg tablet Take 8 mg by mouth every evening.         History reviewed. No pertinent surgical history.    Social History     Socioeconomic History    Marital status: Unknown       OBJECTIVE:     Vital Signs Range (Last 24H):  Temp:  [36.3 °C (97.4 °F)]   Pulse:  [66]   Resp:  [20]   BP: (132)/(46)   SpO2:  [95 %-96 %]       Significant Labs:  Lab Results   Component Value Date    WBC 11.24 05/06/2023    HGB 11.0 (L) 05/06/2023    HCT 33.0 (L) 05/06/2023     05/06/2023    ALT 16 05/06/2023    AST 18 05/06/2023     05/06/2023    K 3.6 05/06/2023     05/06/2023    CREATININE 0.9 05/06/2023    BUN 36 (H) 05/06/2023    CO2 29 05/06/2023       Diagnostic Studies: No relevant studies.    EKG:   Results for orders placed or  performed during the hospital encounter of 04/18/23   EKG 12-lead    Collection Time: 04/23/23  7:54 AM    Narrative    Test Reason : R00.0,    Vent. Rate : 097 BPM     Atrial Rate : 097 BPM     P-R Int : 140 ms          QRS Dur : 084 ms      QT Int : 370 ms       P-R-T Axes : 024 -08 034 degrees     QTc Int : 469 ms    Sinus rhythm with Premature atrial complexes  Otherwise normal ECG  When compared with ECG of 22-APR-2023 10:21,  Nonspecific T wave abnormality, improved in Inferior leads  Nonspecific T wave abnormality no longer evident in Lateral leads  Confirmed by Todd DERAS MD ([id number]) on 4/23/2023 10:06:12 AM    Referred By: [email or web address]   SELF           Confirmed By:Todd DERAS MD       2D ECHO:  TTE:  Results for orders placed or performed during the hospital encounter of 04/18/23   Echo   Result Value Ref Range    BSA 1.8 m2    TDI SEPTAL 0.03 m/s    LV LATERAL E/E' RATIO 21.00 m/s    LV SEPTAL E/E' RATIO 35.00 m/s    LA WIDTH 3.73 cm    TDI LATERAL 0.05 m/s    LVIDd 3.33 (A) 3.5 - 6.0 cm    LVIDs 1.90 (A) 2.1 - 4.0 cm    FS 43 28 - 44 %    LA volume 76.32 cm3    Sinus 2.74 cm    STJ 2.66 cm    LA size 3.62 cm    RVDD 3.20 cm    TAPSE 1.68 cm    AV mean gradient 22 mmHg    AV valve area 1.42 cm2    AV Velocity Ratio 0.50     AV index (prosthetic) 0.51     MV mean gradient 7 mmHg    MV valve area p 1/2 method 1.74 cm2    MV valve area by continuity eq 1.76 cm2    E/A ratio 0.58     Mean e' 0.04 m/s    E wave deceleration time 435.65 msec    LVOT diameter 1.88 cm    LVOT area 2.8 cm2    LVOT peak jose 1.51 m/s    LVOT peak VTI 34.00 cm    Ao peak jose 3.03 m/s    Ao VTI 66.32 cm    LVOT stroke volume 94.33 cm3    AV peak gradient 37 mmHg    MV peak gradient 22 mmHg    E/E' ratio 26.25 m/s    MV Peak E Jose 1.05 m/s    TR Max Jose 2.77 m/s    MV VTI 53.64 cm    MV stenosis pressure 1/2 time 126.34 ms    MV Peak A Jose 1.82 m/s    LV Systolic Volume 11.09 mL    LV Systolic Volume Index 6.2 mL/m2    LV Diastolic Volume  45.18 mL    LV Diastolic Volume Index 25.38 mL/m2    LA Volume Index 42.9 mL/m2    RA Major Axis 4.71 cm    Left Atrium Minor Axis 6.65 cm    Left Atrium Major Axis 6.65 cm    Triscuspid Valve Regurgitation Peak Gradient 31 mmHg    LA Volume Index (Mod) 33.4 mL/m2    LA volume (mod) 59.45 cm3    RA Width 3.06 cm    Right Atrial Pressure (from IVC) 3 mmHg    EF 65 %    TV rest pulmonary artery pressure 34 mmHg    Narrative    · The left ventricle is normal in size with normal systolic function. The   estimated ejection fraction is 65%.  · Normal right ventricular size with normal right ventricular systolic   function.  · Grade I left ventricular diastolic dysfunction.  · Moderate left atrial enlargement.  · There is moderate mitral stenosis. The mean diastolic gradient across   the mitral valve is 7 mmHg at a heart rate of bpm.  · There is mild-to-moderate aortic valve stenosis. Aortic valve area is   1.4 cm2; peak velocity is 3.0 m/s; mean gradient is 22 mmHg.  · The estimated PA systolic pressure is 34 mmHg.  · Normal central venous pressure (3 mmHg).          DERREK:  No results found for this or any previous visit.    ASSESSMENT/PLAN:       Pre-op Assessment    I have reviewed the Patient Summary Reports.     I have reviewed the Nursing Notes. I have reviewed the NPO Status.   I have reviewed the Medications.     Review of Systems  Anesthesia Hx:  No problems with previous Anesthesia  Denies Family Hx of Anesthesia complications.   Denies Personal Hx of Anesthesia complications.   Hematology/Oncology:  Hematology Normal   Oncology Normal     EENT/Dental:EENT/Dental Normal   Cardiovascular:   Hypertension    Pulmonary:  Pulmonary Normal    Renal/:   Chronic Renal Disease    Hepatic/GI:   GERD    Neurological:  Neurology Normal    Endocrine:  Endocrine Normal        Physical Exam  General: Well nourished    Airway:  Mallampati: III / II  Mouth Opening: Normal  TM Distance: Normal  Tongue: Normal  Neck ROM: Normal  ROM    Dental:  Dentures, Periodontal disease        Anesthesia Plan  Type of Anesthesia, risks & benefits discussed:    Anesthesia Type: Gen ETT  Intra-op Monitoring Plan: Standard ASA Monitors  Post Op Pain Control Plan: multimodal analgesia  Induction:  IV  Airway Plan: Video and Direct, Post-Induction  Informed Consent: Informed consent signed with the Patient and all parties understand the risks and agree with anesthesia plan.  All questions answered.   ASA Score: 3 Emergent  Day of Surgery Review of History & Physical: H&P Update referred to the surgeon/provider.    Ready For Surgery From Anesthesia Perspective.     .

## 2023-05-06 NOTE — ASSESSMENT & PLAN NOTE
88-year-old female with multiple medical co-morbidities presents with acutely incarcerated right inguinal hernia concerning for strangulation with some bowel edema and fluid on CT. Some skin changed on exam with tenderness over hernia. Lactate normal. Will proceed with emergent open right inguinal hernia repair, possible laparotomy, possible bowel resection, possible mesh versus tissue repair.     -OR for class A right inguinal hernia repair  -admit surgery

## 2023-05-06 NOTE — Clinical Note
Diagnosis: Inguinal hernia [080962]   Future Attending Provider: BRENNEN CASSIDY [[id number]]   Admitting Provider:: BRENNEN CASSIDY [[id number]]

## 2023-05-06 NOTE — CONSULTS
[facility] - Emergency Dept  General Surgery  Consult Note    Patient Name: Radha Sandoval  MRN: [id number]  Code Status: Full Code  Admission Date: 5/6/2023  Hospital Length of Stay: 0 days  Attending Physician: Maurice Piper MD  Primary Care Provider: Primary Doctor No    Patient information was obtained from patient, relative(s), past medical records and ER records.     Inpatient consult to General surgery  Consult performed by: Tres Hamm MD  Consult ordered by: Tres Hamm MD        Subjective:     Principal Problem: inguinal hernia    History of Present Illness: 88-year-old female with multiple medical co-morbidities including known right inguinal hernia who presents with right groin pain over for 6 days that has been waxing and waning in intensity associated with multiple episodes of nausea and foul-smelling emesis. Patient notes her right inguinal hernia bulge is much bigger than usual, firm, painful to touch, with some overlying erythema. Denies fevers, chills, or any other issues at this time.       No current facility-administered medications on file prior to encounter.     Current Outpatient Medications on File Prior to Encounter   Medication Sig    amLODIPine (NORVASC) 10 MG tablet Take 10 mg by mouth once daily.    hydrOXYzine HCL (ATARAX) 25 MG tablet Take 1 tablet (25 mg total) by mouth 3 (three) times daily.    ibuprofen (ADVIL,MOTRIN) 400 MG tablet Take 1 tablet (400 mg total) by mouth every 6 (six) hours as needed (pain).    meclizine (ANTIVERT) 12.5 mg tablet Take 1 tablet (12.5 mg total) by mouth once daily.    metoprolol succinate (TOPROL-XL) 25 MG 24 hr tablet Take 1 tablet (25 mg total) by mouth 2 (two) times daily.    miconazole NITRATE 2 % (MICOTIN) 2 % top powder Apply topically twice daily under bilateral breasts    pantoprazole (PROTONIX) 40 MG tablet Take 1 tablet (40 mg total) by mouth once daily.    ramelteon (ROZEREM) 8 mg tablet Take 8 mg by mouth every evening.       Review of  patient's allergies indicates:  No Known Allergies    No past medical history on file.  No past surgical history on file.  Family History    None       Tobacco Use    Smoking status: Not on file    Smokeless tobacco: Not on file   Substance and Sexual Activity    Alcohol use: Not on file    Drug use: Not on file    Sexual activity: Not on file     Review of Systems   Constitutional:  Negative for chills and fever.   HENT:  Negative for sore throat.    Eyes:  Negative for redness.   Respiratory:  Negative for shortness of breath.    Cardiovascular:  Negative for chest pain.   Gastrointestinal:  Positive for abdominal pain, nausea and vomiting.   Endocrine: Negative for polyuria.   Genitourinary:  Negative for dysuria.   Musculoskeletal:  Negative for back pain.   Skin:  Negative for wound.   Neurological:  Negative for headaches.   Psychiatric/Behavioral:  Negative for agitation.    Objective:     Vital Signs (Most Recent):  Temp: 97.4 °F (36.3 °C) (05/06/23 1544)  Pulse: 66 (05/06/23 1544)  Resp: 20 (05/06/23 1544)  BP: (!) 132/46 (05/06/23 1544)  SpO2: 95 % (05/06/23 1544)   Vital Signs (24h Range):  Temp:  [97.4 °F (36.3 °C)] 97.4 °F (36.3 °C)  Pulse:  [66] 66  Resp:  [20] 20  SpO2:  [95 %] 95 %  BP: (132)/(46) 132/46        There is no height or weight on file to calculate BMI.     Physical Exam  Vitals and nursing note reviewed.   Constitutional:       General: She is not in acute distress.     Appearance: She is well-developed.   HENT:      Head: Normocephalic and atraumatic.      Right Ear: External ear normal.      Left Ear: External ear normal.      Mouth/Throat:      Mouth: Mucous membranes are moist.   Eyes:      Conjunctiva/sclera: Conjunctivae normal.   Cardiovascular:      Rate and Rhythm: Normal rate.   Pulmonary:      Effort: Pulmonary effort is normal.   Abdominal:      Palpations: Abdomen is soft.      Tenderness: There is abdominal tenderness.      Hernia: A hernia is present.      Comments:  Incarcerated, possibly strangulated large right inguinal hernia with some erythema and warmth over the skin. Tender to touch.    Musculoskeletal:      Cervical back: Normal range of motion.      Right lower leg: No edema.      Left lower leg: No edema.   Skin:     General: Skin is warm and dry.   Neurological:      Mental Status: She is alert. Mental status is at baseline.   Psychiatric:         Behavior: Behavior normal.          I have reviewed all pertinent lab results within the past 24 hours.  CBC:   Recent Labs   Lab 05/06/23  1630   WBC 11.24   RBC 3.79*   HGB 11.0*   HCT 33.0*      MCV 87   MCH 29.0   MCHC 33.3     CMP:   Recent Labs   Lab 05/06/23  1630   *   CALCIUM 9.3   ALBUMIN 2.8*   PROT 6.0      K 3.6   CO2 29      BUN 36*   CREATININE 0.9   ALKPHOS 60   ALT 16   AST 18   BILITOT 0.5       Significant Diagnostics:  CT abdomen/pelvis shows very large right inguinal hernia with bowel and some fluid.       Assessment/Plan:     Incarcerated inguinal hernia  88-year-old female with multiple medical co-morbidities presents with acutely incarcerated right inguinal hernia concerning for strangulation with some bowel edema and fluid on CT. Some skin changed on exam with tenderness over hernia. Lactate normal. Will proceed with emergent open right inguinal hernia repair, possible laparotomy, possible bowel resection, possible mesh versus tissue repair.     -OR for class A right inguinal hernia repair  -admit surgery      VTE Risk Mitigation (From admission, onward)         Ordered     enoxaparin injection 40 mg  Daily         05/06/23 1830     IP VTE HIGH RISK PATIENT  Once         05/06/23 1830     Place sequential compression device  Until discontinued         05/06/23 1830                Thank you for your consult. I will follow-up with patient. Please contact us if you have any additional questions.    Tres Hamm MD  General Surgery  [facility] - Emergency Dept

## 2023-05-06 NOTE — HPI
88-year-old female with multiple medical co-morbidities including known right inguinal hernia who presents with right groin pain over for 6 days that has been waxing and waning in intensity associated with multiple episodes of nausea and foul-smelling emesis. Patient notes her right inguinal hernia bulge is much bigger than usual, firm, painful to touch, with some overlying erythema. Denies fevers, chills, or any other issues at this time.

## 2023-05-06 NOTE — ED PROVIDER NOTES
Encounter Date: 5/6/2023       History     Chief Complaint   Patient presents with    Emesis     Pt to ED via EMS for c/o emesis x 1 week. Large amt of brown colored emesis. Recent admit for UTI. AxOx4. Hx hernia, hernia appears ''much larger than normal'' per pt family on scene.     88-year-old female who resides in nursing home with past medical history of hypertension, abdominal wall hernia, and recent admission for UTI (DC 7 days ago) now presenting with chief complaint of 6-day history of vomiting.  Patient's caretaker is bedside and reports that patient has had dark brown foul-smelling vomitus for the last 6 days and she is concerned for fecal matter. Additionally patient's caregiver reports a notably enlarging hernia with overlying red skin changes.  Patient has had normal stooling habits and reports last bowel movement this morning.  Patient denies any abdominal pain, fevers, or dysuria.    Review of patient's allergies indicates:  No Known Allergies  History reviewed. No pertinent past medical history.  History reviewed. No pertinent surgical history.  History reviewed. No pertinent family history.     Review of Systems    Physical Exam     Initial Vitals [05/06/23 1544]   BP Pulse Resp Temp SpO2   (!) 132/46 66 20 97.4 °F (36.3 °C) 95 %      MAP       --         Physical Exam    Nursing note and vitals reviewed.    Gen: AxOx3, well nourished, appears stated age, no pallor, no jaundice, appears dehydrated with dry mucous membranes  Eye: EOMI, no scleral icterus, no periorbital edema or ecchymosis  Head: Normocephalic, atraumatic, no lesions, scalp appears normal  ENT:  Foul-smelling breath.  Neck supple, no stridor, no masses, no drooling or voice changes  CVS: All distal pulses intact with normal rate and rhythm, no JVD, normal S1/S2, no murmur.  Cap refill 3 seconds  Pulm: Normal breath sounds, no wheezes, rales or rhonchi, no increased work of breathing  Abd:  Large 20 cm mass of abdominal wall in right  lower quadrant that is not pulsatile, soft Nondistended, firm, with overlying erythematous skin changes, not reducible and nontender  -Tenderness to palpation over epigastrium  -no suprapubic/CVAT tenderness  Ext: No edema, no lesions, rashes, or deformity  Neuro: GCS15, moving all extremities, gait intact, face grossly symmetric  Psych: normal affect, cooperative, well groomed, makes good eye contact      ED Course   Procedures  Labs Reviewed   CBC W/ AUTO DIFFERENTIAL - Abnormal; Notable for the following components:       Result Value    RBC 3.79 (*)     Hemoglobin 11.0 (*)     Hematocrit 33.0 (*)     Immature Granulocytes 1.0 (*)     Gran # (ANC) 8.9 (*)     Immature Grans (Abs) 0.11 (*)     Mono # 1.2 (*)     Gran % 78.9 (*)     Lymph % 9.0 (*)     All other components within normal limits   COMPREHENSIVE METABOLIC PANEL - Abnormal; Notable for the following components:    Glucose 119 (*)     BUN 36 (*)     Albumin 2.8 (*)     All other components within normal limits   URINALYSIS, REFLEX TO URINE CULTURE - Abnormal; Notable for the following components:    Protein, UA Trace (*)     Occult Blood UA 1+ (*)     Leukocytes, UA 2+ (*)     All other components within normal limits    Narrative:     Specimen Source->Urine   URINALYSIS MICROSCOPIC - Abnormal; Notable for the following components:    RBC, UA 12 (*)     WBC, UA 37 (*)     All other components within normal limits    Narrative:     Specimen Source->Urine   CULTURE, URINE   LACTIC ACID, PLASMA          Imaging Results               CT Abdomen Pelvis With Contrast (Final result)  Result time 05/06/23 17:45:54      Final result by Matthew Chávez MD (05/06/23 17:45:54)                   Impression:      1. Large right inguinal hernia with protrusion of small bowel and mesentery with small bowel wall thickening, mild fluid and edema.  There are findings suggesting small bowel ischemic change.  There is an associated small bowel obstruction.  See above  comments also.  Recommend stat surgical consultation.  2. 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  See above comments.  Follow-up recommended.  3. See above comments also.  4.  This report was flagged in Epic as abnormal.  Dr. El and Dr. Magaña were notified by secure messaging at approximately 17:36      Electronically signed by: Mathtew Smith  Date:    05/06/2023  Time:    17:45               Narrative:    EXAMINATION:  CT ABDOMEN PELVIS WITH CONTRAST    CLINICAL HISTORY:  Bowel obstruction suspected;    TECHNIQUE:  Low dose axial images, sagittal and coronal reformations were obtained from the lung bases to the pubic symphysis following the IV administration of 100 mL of Omnipaque 350 .  Oral contrast was not administered.    COMPARISON:  None.    FINDINGS:  Abdomen:    - Lower thorax:Small hiatal hernia.    - Lung bases: No infiltrates and no nodules.    - Liver: Few scattered hepatic cysts.    - Gallbladder: No calcified gallstones.    - Bile Ducts: No evidence of intra or extra hepatic biliary ductal dilation.    - Spleen: Negative.    - Kidneys: Large simple left renal cyst at the upper pole.  No stone, soft tissue mass or hydronephrosis bilaterally.  Probable small vascular calcification near the right hilum.    - Adrenals: Unremarkable.    - Pancreas: No mass or peripancreatic fat stranding.    - Retroperitoneum:  No significant adenopathy.    - Vascular: Vascular atherosclerosis.  Probable high-grade narrowing at the origin of the celiac artery and slightly more distal in the celiac artery on axial 69.    Suspected high-grade narrowing at the origin of the superior mesenteric artery and proximal SMA on axial 84.    No evidence of aortic aneurysm.    - Abdominal wall:  There is a large right inguinal hernia with protrusion of small bowel.  There is small bowel wall thickening present.  There is edema and  slight hypoenhancement noted to a loop of the incarcerated small bowel.  Findings could represent ischemic change and a strangulation.  This is in the SMA distribution.  Recommend surgical consultation and follow-up.  The hernia is associated with more proximal dilation of small bowel with air-fluid levels consistent with small bowel obstruction.    The small bowel from the stomach to the hernia is in the right abdomen without crossing the midline to the left.  Acute angulation to the right is noted on axial 101..  This could represent the ligament of Treitz and deviation of the small bowel related to the large hernia versus congenital malrotation.    Pelvis:    Urinary bladder is distended.  Urinary bladder diverticulum posteriorly on the right.    Status post hysterectomy.    Bowel/Mesentery:    Small-bowel obstruction associated with large right inguinal hernia.  See above comments also.  Surgical consultation recommended.    Bones:  No acute osseous abnormality and no suspicious lytic or blastic lesion.                                       Medications   amLODIPine tablet 10 mg (has no administration in time range)   meclizine tablet 12.5 mg (has no administration in time range)   metoprolol succinate (TOPROL-XL) 24 hr tablet 25 mg (has no administration in time range)   sodium chloride 0.9% flush 10 mL (has no administration in time range)   lactated ringers infusion (has no administration in time range)   enoxaparin injection 40 mg (has no administration in time range)   oxyCODONE immediate release tablet 5 mg (has no administration in time range)   oxyCODONE immediate release tablet 10 mg (has no administration in time range)   ondansetron injection 4 mg (has no administration in time range)   sodium chloride 0.9% bolus 1,000 mL 1,000 mL (0 mLs Intravenous Stopped 5/6/23 1754)   iohexoL (OMNIPAQUE 350) injection 100 mL (100 mLs Intravenous Given 5/6/23 1713)     Medical Decision Making:   Initial Assessment:    88-year-old female presenting with vomiting and enlarging hernia. Patient is able to speak, breathing spontaneously, hemodynamically stable, oriented, moving all 4 limbs spontaneously.  Examination consistent with large red non reducible hernia  Differential Diagnosis:   Initial impression is concerning for  Small bowel obstruction secondary to strangulated hernia  Considered incarcerated hernia  Considered UTI    Clinical Tests:   Lab Tests: Ordered and Reviewed  Radiological Study: Ordered and Reviewed  ED Management:  Given high concern for strangulated bowel, CT abdomen was ordered showed large ventral hernia with concern for strangulation.  Spann was placed given large distended bladder on CT.  CBC and CMP consistent with GINA with BUN of 36.  Lactate was ordered which was 0.8 and reassuring for bowel ischemia.  Patient was given 1 L of normal saline given dehydration on examination.  General surgery was consulted and patient was admitted to general surgery.  Patient remained stable in the emergency department and did not require pain medication given only minimal pain.          Attending Attestation:   Physician Attestation Statement for Resident:  As the supervising MD   Physician Attestation Statement: I have personally seen and examined this patient.   I agree with the above history.  -:   As the supervising MD I agree with the above PE.     As the supervising MD I agree with the above treatment, course, plan, and disposition.                  ED Course as of 05/06/23 1914   Sat May 06, 2023   1725 Sodium: 145 [PM]   1725 Potassium: 3.6 [PM]   1725 BUN(!): 36  Concerning for GINA [PM]   1726 Lactate, Yakov: 0.8 [PM]   1726 WBC: 11.24 [PM]   1726 Hemoglobin(!): 11.0 [PM]   1726 Platelets: 286 [PM]   1807 CT Abdomen Pelvis With Contrast(!)  As per my independent interpretation  large right inguinal hernia with protrusion of small bowel and mesentery with small bowel wall thickening, mild fluid and edema.  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 [PM]      ED Course User Index  [PM] Kiera El MD          Critical Care   Date: 05/06/2023  Performed by: Maykel Magaña MD   Authorized by: Maykel Magaña MD    Total critical care time (exclusive of procedural time) : 45 minutes  Critical care was necessary to treat or prevent imminent or life-threatening deterioration of the following conditions:  bowel ischemia, SBO from strangulated hernia         Clinical Impression:   Final diagnoses:  [K40.90] Inguinal hernia        ED Disposition Condition    Admit                 Kiera El MD  Resident  05/06/23 1914       Maykel Magaña MD  05/06/23 [id number]

## 2023-05-07 NOTE — NURSING
"Patient verbalizes feeling frustrated with being in the hospital and her current state. She misses the way she used to do things. She became very agitated when asked to be repositioned in bed. She did agree to shoulder raises, foot pumps, arm rises and IS. She currently would like to be left alone. MD paged for concerns of agitation and anxiety, a podiatry consult, and feeling like she is "falling constantly" while remaining safely in bed. All four side rails raised per patient request, bed alarm on and patient's friend at bedside.   " Discharge teaching and instructions for condition explained to patient. AVS reviewed. Patient voiced understanding regarding follow up appointments and care of self at home. Pt discharged to home in stable condition per friend's care.      Herson Meyer RN  11/02/21 0051

## 2023-05-07 NOTE — ASSESSMENT & PLAN NOTE
88-year-old female with multiple medical co-morbidities presents with acutely incarcerated right inguinal hernia concerning for strangulation with some bowel edema and fluid on CT. Some skin changed on exam with tenderness over hernia. Now s/p right inguinal hernia repair 5/6.    - D/c NGT as it was clogged  - Okay for CLD, will back down if patient has any N/V  - Multimodal pain regimen  - Resume home meds as appropriate  - IS  - OOBTC, ambulate

## 2023-05-07 NOTE — NURSING TRANSFER
Nursing Transfer Note      5/6/2023     Reason patient is being transferred: post procedure    Transfer To: 1041    Transfer via bed    Transfer with 3L NC to O2    Transported by transport x 2    Telemetry: Box Number na    Medicines sent:  none    Any special needs or follow-up needed: routine    Chart send with patient: Yes    Notified: friend    Patient reassessed at: 7568 5/6/2023

## 2023-05-07 NOTE — OP NOTE
DATE OF PROCEDURE: 05/06/2023     PREOPERATIVE DIAGNOSIS: Incarcerated Right inguinal hernia.     POSTOPERATIVE DIAGNOSIS: Same    PROCEDURE PERFORMED: open R inguinal hernia repair with mesh.     ATTENDING SURGEON: Maurice Piper MD    HOUSESTAFF SURGEON: Tres Hamm M.D. (RES)     ANESTHESIA: GETA    ESTIMATED BLOOD LOSS: 12 mL.     FINDINGS: Large R inguinal hernia defect containing small bowel    SPECIMEN: Hernia Sac     DRAINS: None.     COMPLICATIONS: None.     INDICATIONS: Radha Sandoval is a 88 y.o.female who presented with abdominal pain and a R groin bulge that was not reducible.  The bulge had been present for days, patient began to have obstructive symptoms and was brought to the ED by her assisted living facility.  Decision made to take the patient for emergent surgery as the hernia could not be reduced.  Risks, benefits, alternatives to procedure discussed with the patient as well has her family over the phone.  Informed consent signed and witnessed.    DESCRIPTION OF OPERATIVE PROCEDURE: The patient was identified in preoperative holding and brought back to the Operating Room. The patient was placed supine on the operating table and padded appropriately. Monitors were applied and monitored anesthesia care was initiated. A timeout was   performed and all team members present agreed this was the correct procedure on   the correct side of the correct patient. We also confirmed administration of   appropriate preoperative antibiotics.     We marked out an appropriate R inguinal incision and administered a Marcaine. After assuring adequate local anesthesia, a 5 cm   oblique skin incision was made. Subcutaneous tissue was divided with Bovie   electrocautery. This dissection was carried down through Luc fascia down to   the aponeurosis of the external oblique. The aponeurosis of the external   oblique was incised in line with its fibers, first with a scalpel and then   Metzenbaum scissors, and this  incision was extended to the external spermatic   ring. The inguinal canal contents were bluntly encircled, first digitally and   then with a Penrose drain. Weitlaner retractors were placed to facilitate the   dissection. We then had appropriate borders of the inguinal canal identified and decided to repair the defect with a piece of polypropylene keyhole mesh. The mesh was cut to fit the defect appropriately and sewn in place with interrupted 2-0 PDS suture. Medially, the mesh was anchored to the fascia overlying the pubic tubercle. Inferiorly, the mesh was anchored to the shelving edge of the inguinal ligament. Superiorly, the mesh was anchored to the conjoined tendon.  The mesh was inspected and noted to lie in appropriate position without   any redundancy or tension. The aponeurosis of   the external oblique was closed to the conjoint tendon with a running 2-0 Vicryl suture. Luc   fascia was closed with several buried interrupted 3-0 Vicryl sutures and the   skin was closed with staples. A sterile dressing   was applied. The patient was then transported to the Recovery Room in stable   condition. All sponge, instrument and needle counts were correct at the end of   the case. I was present and scrubbed for the entire procedure.

## 2023-05-07 NOTE — ANESTHESIA POSTPROCEDURE EVALUATION
Anesthesia Post Evaluation    Patient: Radha Sandoval    Procedure(s) Performed: Procedure(s) (LRB):  REPAIR, HERNIA, INGUINAL, WITHOUT HISTORY OF PRIOR REPAIR, AGE 5 YEARS OR OLDER (Right)    Final Anesthesia Type: general      Patient location during evaluation: PACU  Patient participation: Yes- Able to Participate  Level of consciousness: awake and alert  Post-procedure vital signs: reviewed and stable  Pain management: adequate  Airway patency: patent    PONV status at discharge: No PONV  Anesthetic complications: no      Cardiovascular status: blood pressure returned to baseline  Respiratory status: unassisted  Hydration status: euvolemic  Follow-up not needed.          Vitals Value Taken Time   /71 05/07/23 0415   Temp 36 °C (96.8 °F) 05/07/23 0415   Pulse 81 05/07/23 0415   Resp 19 05/07/23 0415   SpO2 95 % 05/07/23 0415         Event Time   Out of Recovery 21:45:00         Pain/Juan Score: Juan Score: 9 (5/6/2023  9:45 PM)

## 2023-05-07 NOTE — PROGRESS NOTES
[facility] - [facility]  General Surgery  Progress Note    Subjective:     History of Present Illness:  88-year-old female with multiple medical co-morbidities including known right inguinal hernia who presents with right groin pain over for 6 days that has been waxing and waning in intensity associated with multiple episodes of nausea and foul-smelling emesis. Patient notes her right inguinal hernia bulge is much bigger than usual, firm, painful to touch, with some overlying erythema. Denies fevers, chills, or any other issues at this time.       Post-Op Info:  Procedure(s) (LRB):  REPAIR, HERNIA, INGUINAL, WITHOUT HISTORY OF PRIOR REPAIR, AGE 5 YEARS OR OLDER (Right)   1 Day Post-Op     Interval History: Underwent emergent repair of strangulated right inguinal hernia yesterday. NGT clogged overnight. Patient denies N/V this morning. Pain well controlled.    Medications:  Continuous Infusions:   lactated ringers 125 mL/hr at 05/07/23 0435     Scheduled Meds:   amLODIPine  10 mg Oral Daily    enoxaparin  40 mg Subcutaneous Daily    meclizine  12.5 mg Oral Daily    metoprolol succinate  25 mg Oral BID     PRN Meds:ondansetron, oxyCODONE, oxyCODONE, sodium chloride 0.9%     Review of patient's allergies indicates:  No Known Allergies  Objective:     Vital Signs (Most Recent):  Temp: 98.5 °F (36.9 °C) (05/07/23 0800)  Pulse: 66 (05/07/23 0800)  Resp: 18 (05/07/23 0800)  BP: (!) 157/65 (05/07/23 0800)  SpO2: 98 % (05/07/23 0800) Vital Signs (24h Range):  Temp:  [96.8 °F (36 °C)-98.5 °F (36.9 °C)] 98.5 °F (36.9 °C)  Pulse:  [60-81] 66  Resp:  [12-20] 18  SpO2:  [89 %-98 %] 98 %  BP: (121-162)/(46-79) 157/65        There is no height or weight on file to calculate BMI.    Intake/Output - Last 3 Shifts         05/05 0700  05/06 0659 05/06 0700  05/07 0659 05/07 0700  05/08 0659    I.V.  900     IV Piggyback  1000     Total Intake  1900     Urine  650     Emesis/NG output  400     Stool  0     Total Output  1050     Net  +850             Stool Occurrence  0 x              Physical Exam  Constitutional:       General: She is not in acute distress.     Appearance: Normal appearance.   HENT:      Head: Normocephalic and atraumatic.      Mouth/Throat:      Mouth: Mucous membranes are moist.   Eyes:      Extraocular Movements: Extraocular movements intact.      Conjunctiva/sclera: Conjunctivae normal.   Cardiovascular:      Rate and Rhythm: Normal rate and regular rhythm.   Pulmonary:      Effort: Pulmonary effort is normal. No respiratory distress.   Abdominal:      General: There is no distension.      Palpations: Abdomen is soft.      Comments: Right inguinal incision with island dressing in place with minimal strikethrough, appropriately TTP   Skin:     General: Skin is warm and dry.   Neurological:      General: No focal deficit present.      Mental Status: She is alert and oriented to person, place, and time. Mental status is at baseline.        Significant Labs:  I have reviewed all pertinent lab results within the past 24 hours.    Significant Diagnostics:  I have reviewed all pertinent imaging results/findings within the past 24 hours.    Assessment/Plan:     Incarcerated inguinal hernia  88-year-old female with multiple medical co-morbidities presents with acutely incarcerated right inguinal hernia concerning for strangulation with some bowel edema and fluid on CT. Some skin changed on exam with tenderness over hernia. Now s/p right inguinal hernia repair 5/6.    - D/c NGT as it was clogged  - Okay for CLD, will back down if patient has any N/V  - Multimodal pain regimen  - Resume home meds as appropriate  - IS  - OOBTC, ambulate        Bessie Aquino MD  General Surgery  [facility]

## 2023-05-07 NOTE — ANESTHESIA PROCEDURE NOTES
Intubation    Date/Time: 5/6/2023 7:30 PM  Performed by: Ana Triana CRNA  Authorized by: Janak Harman MD     Intubation:     Induction:  Rapid sequence induction    Intubated:  Postinduction    Mask Ventilation:  Not attempted    Attempts:  1    Attempted By:  CRNA    Method of Intubation:  Video laryngoscopy    Blade:  Thompson 3    Laryngeal View Grade: Grade I - full view of cords      Difficult Airway Encountered?: No      Complications:  None    Airway Device:  Oral endotracheal tube    Airway Device Size:  7.0    Style/Cuff Inflation:  Cuffed (inflated to minimal occlusive pressure)    Tube secured:  22    Secured at:  The lips    Complicating Factors:  None    Findings Post-Intubation:  BS equal bilateral and atraumatic/condition of teeth unchanged

## 2023-05-07 NOTE — PLAN OF CARE
Patient anxious with moving after surgery. Continuing to encourage ROM and mobility. Patient has best friend at bedside. Call light within reach, bed alarm on and pain adequately managed at this time.      Problem: Infection  Goal: Absence of Infection Signs and Symptoms  Outcome: Ongoing, Progressing     Problem: Adult Inpatient Plan of Care  Goal: Plan of Care Review  Outcome: Ongoing, Progressing  Goal: Patient-Specific Goal (Individualized)  Outcome: Ongoing, Progressing  Goal: Absence of Hospital-Acquired Illness or Injury  Outcome: Ongoing, Progressing  Goal: Optimal Comfort and Wellbeing  Outcome: Ongoing, Progressing  Goal: Readiness for Transition of Care  Outcome: Ongoing, Progressing     Problem: Fluid and Electrolyte Imbalance (Acute Kidney Injury/Impairment)  Goal: Fluid and Electrolyte Balance  Outcome: Ongoing, Progressing     Problem: Oral Intake Inadequate (Acute Kidney Injury/Impairment)  Goal: Optimal Nutrition Intake  Outcome: Ongoing, Progressing     Problem: Renal Function Impairment (Acute Kidney Injury/Impairment)  Goal: Effective Renal Function  Outcome: Ongoing, Progressing     Problem: Fall Injury Risk  Goal: Absence of Fall and Fall-Related Injury  Outcome: Ongoing, Progressing     Problem: Skin Injury Risk Increased  Goal: Skin Health and Integrity  Outcome: Ongoing, Progressing     Problem: Impaired Wound Healing  Goal: Optimal Wound Healing  Outcome: Ongoing, Progressing

## 2023-05-07 NOTE — TRANSFER OF CARE
Anesthesia Transfer of Care Note    Patient: Radha Sandoval    Procedure(s) Performed: Procedure(s) (LRB):  REPAIR, HERNIA, INGUINAL, WITHOUT HISTORY OF PRIOR REPAIR, AGE 5 YEARS OR OLDER (Right)    Patient location: PACU    Anesthesia Type: general    Transport from OR: Transported from OR on room air with adequate spontaneous ventilation and Transported from OR on 6-10 L/min O2 by NC with adequate spontaneous ventilation    Post pain: Adequate analgesia    Post assessment: no apparent anesthetic complications and tolerated procedure well    Last vitals:   Visit Vitals  BP (!) 132/46   Pulse 66   Temp 36.3 °C (97.4 °F) (Oral)   Resp 20   SpO2 96%       Post vital signs: stable    Level of consciousness: sedated    Nausea/Vomiting: no nausea/no vomiting    Complications: none

## 2023-05-07 NOTE — ANESTHESIA POSTPROCEDURE EVALUATION
Anesthesia Post Evaluation    Patient: aRdha Sandoval    Procedure(s) Performed: Procedure(s) (LRB):  REPAIR, HERNIA, INGUINAL, WITHOUT HISTORY OF PRIOR REPAIR, AGE 5 YEARS OR OLDER (Right)    [facility] Anesthesia Post Op Evaluation      Vitals Value Taken Time   /71 05/07/23 0415   Temp 36 °C (96.8 °F) 05/07/23 0415   Pulse 81 05/07/23 0415   Resp 19 05/07/23 0415   SpO2 95 % 05/07/23 0415         Event Time   Out of Recovery 21:45:00         Pain/Juan Score: Juan Score: 9 (5/6/2023  9:45 PM)

## 2023-05-07 NOTE — NURSING
Nurses Note -- 4 Eyes      5/7/2023   6:46 AM      Skin assessed during: Transfer      [] No Altered Skin Integrity Present    []Prevention Measures Documented      [x] Yes- Altered Skin Integrity Present or Discovered   [x] LDA Added if Not in Epic (Describe Wound)   [x] New Altered Skin Integrity was Present on Admit and Documented in LDA   [] Wound Image Taken    Wound Care Consulted? Yes    Attending Nurse:  Lee Ann Azevedo RN     Second RN/Staff Member:  ELEAZAR Bonilla

## 2023-05-07 NOTE — SUBJECTIVE & OBJECTIVE
Interval History: Underwent emergent repair of strangulated right inguinal hernia yesterday. NGT clogged overnight. Patient denies N/V this morning. Pain well controlled.    Medications:  Continuous Infusions:   lactated ringers 125 mL/hr at 05/07/23 0435     Scheduled Meds:   amLODIPine  10 mg Oral Daily    enoxaparin  40 mg Subcutaneous Daily    meclizine  12.5 mg Oral Daily    metoprolol succinate  25 mg Oral BID     PRN Meds:ondansetron, oxyCODONE, oxyCODONE, sodium chloride 0.9%     Review of patient's allergies indicates:  No Known Allergies  Objective:     Vital Signs (Most Recent):  Temp: 98.5 °F (36.9 °C) (05/07/23 0800)  Pulse: 66 (05/07/23 0800)  Resp: 18 (05/07/23 0800)  BP: (!) 157/65 (05/07/23 0800)  SpO2: 98 % (05/07/23 0800) Vital Signs (24h Range):  Temp:  [96.8 °F (36 °C)-98.5 °F (36.9 °C)] 98.5 °F (36.9 °C)  Pulse:  [60-81] 66  Resp:  [12-20] 18  SpO2:  [89 %-98 %] 98 %  BP: (121-162)/(46-79) 157/65        There is no height or weight on file to calculate BMI.    Intake/Output - Last 3 Shifts         05/05 0700  05/06 0659 05/06 0700  05/07 0659 05/07 0700  05/08 0659    I.V.  900     IV Piggyback  1000     Total Intake  1900     Urine  650     Emesis/NG output  400     Stool  0     Total Output  1050     Net  +850            Stool Occurrence  0 x              Physical Exam  Constitutional:       General: She is not in acute distress.     Appearance: Normal appearance.   HENT:      Head: Normocephalic and atraumatic.      Mouth/Throat:      Mouth: Mucous membranes are moist.   Eyes:      Extraocular Movements: Extraocular movements intact.      Conjunctiva/sclera: Conjunctivae normal.   Cardiovascular:      Rate and Rhythm: Normal rate and regular rhythm.   Pulmonary:      Effort: Pulmonary effort is normal. No respiratory distress.   Abdominal:      General: There is no distension.      Palpations: Abdomen is soft.      Comments: Right inguinal incision with island dressing in place with minimal  strikethrough, appropriately TTP   Skin:     General: Skin is warm and dry.   Neurological:      General: No focal deficit present.      Mental Status: She is alert and oriented to person, place, and time. Mental status is at baseline.        Significant Labs:  I have reviewed all pertinent lab results within the past 24 hours.    Significant Diagnostics:  I have reviewed all pertinent imaging results/findings within the past 24 hours.

## 2023-05-08 NOTE — CONSULTS
Wound care consult received for the buttocks.  The buttocks are intact, pink, moist and blanchable, likely due to moisture and incontinence. Triad applied. The posterior thighs have partial thickness skin losses with pink, dry and blanchable wound beds due to shearing. The breast folds are intact and moist with fungal appearance.     Recommendations:  - Buttocks and posterior thighs: nursing to cleanse with soap and water, pat dry and apply triad BID and Prn  - Breast folds: nursing to cleanse with soap and water, pat dry and apply miconazole powder BID  - Turning every 2 hours  - Heel protecting boots  - Waffle mattress overlay      05/08/23 1458        Altered Skin Integrity 05/06/23 2225 lower Breast Intertrigo   Date First Assessed/Time First Assessed: 05/06/23 2225   Altered Skin Integrity Present on Admission - Did Patient arrive to the hospital with altered skin?: yes  Orientation: lower  Location: Breast  Is this injury device related?: No  Primary Wound ...   Wound Image    Dressing Appearance Open to air   Drainage Amount None   Appearance Intact;Pink;Moist;Other (see comments)  (blanchable)   Periwound Area Intact;Dry        Altered Skin Integrity 05/06/23 2225 Buttocks Moisture associated dermatitis   Date First Assessed/Time First Assessed: 05/06/23 2225   Altered Skin Integrity Present on Admission - Did Patient arrive to the hospital with altered skin?: yes  Location: (c) Buttocks  Is this injury device related?: No  Primary Wound Type: Moisture...   Dressing Appearance Open to air   Drainage Amount None   Appearance Intact;Pink;Moist;Other (see comments)  (blanchable)   Periwound Area Intact;Dry   Care Cleansed with:;Soap and water   Dressing Applied;Other (comment)  (triad)        Altered Skin Integrity 05/06/23 2225 Left distal;posterior Thigh Skin Tear   Date First Assessed/Time First Assessed: 05/06/23 2225   Altered Skin Integrity Present on Admission - Did Patient arrive to the hospital with  altered skin?: yes  Side: Left  Orientation: distal;posterior  Location: Thigh  Is this injury device relate...   Dressing Appearance Open to air   Drainage Amount None   Appearance Intact;Pink;Dry        Altered Skin Integrity 05/06/23 2225 Right posterior Thigh Skin Tear   Date First Assessed/Time First Assessed: 05/06/23 2225   Altered Skin Integrity Present on Admission - Did Patient arrive to the hospital with altered skin?: yes  Side: Right  Orientation: posterior  Location: Thigh  Is this injury device related?: No...   Dressing Appearance Open to air   Drainage Amount None   Appearance Intact;Pink;Dry     Recommendations made to primary team for above plan via secure chat. Orders placed. Wound care to follow-up as needed.

## 2023-05-08 NOTE — PROGRESS NOTES
[facility] - [facility]  General Surgery  Progress Note    Subjective:     History of Present Illness:  88-year-old female with multiple medical co-morbidities including known right inguinal hernia who presents with right groin pain over for 6 days that has been waxing and waning in intensity associated with multiple episodes of nausea and foul-smelling emesis. Patient notes her right inguinal hernia bulge is much bigger than usual, firm, painful to touch, with some overlying erythema. Denies fevers, chills, or any other issues at this time.       Post-Op Info:  Procedure(s) (LRB):  REPAIR, HERNIA, INGUINAL, WITHOUT HISTORY OF PRIOR REPAIR, AGE 6 YEARS OR OLDER (Right)   2 Days Post-Op     Interval History: NAEO. Some pain. Wound looks good. Tolerating clears. AF. HDS.     Medications:  Continuous Infusions:   lactated ringers 50 mL/hr at 05/07/23 1724     Scheduled Meds:   acetaminophen  650 mg Oral Q6H    amLODIPine  10 mg Oral Daily    enoxaparin  40 mg Subcutaneous Daily    meclizine  12.5 mg Oral Daily    metoprolol succinate  25 mg Oral BID    pantoprazole  40 mg Oral Daily     PRN Meds:ondansetron, oxyCODONE, oxyCODONE, sodium chloride 0.9%     Review of patient's allergies indicates:  No Known Allergies  Objective:     Vital Signs (Most Recent):  Temp: 98.3 °F (36.8 °C) (05/07/23 2345)  Pulse: 72 (05/07/23 2345)  Resp: 18 (05/07/23 2345)  BP: (!) 145/64 (05/07/23 2345)  SpO2: (!) 90 % (05/07/23 2345) Vital Signs (24h Range):  Temp:  [98.2 °F (36.8 °C)-98.6 °F (37 °C)] 98.3 °F (36.8 °C)  Pulse:  [51-75] 72  Resp:  [16-22] 18  SpO2:  [90 %-98 %] 90 %  BP: (135-157)/(57-88) 145/64        There is no height or weight on file to calculate BMI.    Intake/Output - Last 3 Shifts         05/06 0700 05/07 0659 05/07 0700  05/08 0659 05/08 0700  05/09 0659    P.O.  240     I.V. 900 2400.4     IV Piggyback 1000      Total Intake 1900 2640.4     Urine 650 375     Emesis/NG output 400      Stool 0      Total Output 1050  375     Net +850 +2265.4            Stool Occurrence 0 x 1 x              Physical Exam  Constitutional:       General: She is not in acute distress.     Appearance: Normal appearance.   HENT:      Head: Normocephalic and atraumatic.      Mouth/Throat:      Mouth: Mucous membranes are moist.   Eyes:      Extraocular Movements: Extraocular movements intact.      Conjunctiva/sclera: Conjunctivae normal.   Cardiovascular:      Rate and Rhythm: Normal rate and regular rhythm.   Pulmonary:      Effort: Pulmonary effort is normal. No respiratory distress.   Abdominal:      General: There is no distension.      Palpations: Abdomen is soft.      Comments: Right inguinal incision with island dressing in place with minimal strikethrough, appropriately TTP   Skin:     General: Skin is warm and dry.   Neurological:      General: No focal deficit present.      Mental Status: She is alert and oriented to person, place, and time. Mental status is at baseline.        Significant Labs:  I have reviewed all pertinent lab results within the past 24 hours.    Significant Diagnostics:  I have reviewed all pertinent imaging results/findings within the past 24 hours.    Assessment/Plan:     Incarcerated inguinal hernia  88-year-old female with multiple medical co-morbidities presents with acutely incarcerated right inguinal hernia concerning for strangulation with some bowel edema and fluid on CT. Some skin changed on exam with tenderness over hernia. Now s/p right inguinal hernia repair 5/6.    - Okay for regular diet   - Multimodal pain regimen  - Home meds  - IS  - OOBTC, ambulate, PT/OT  -Dressing changes RLQ prn     Dispo: After tolerates diet and works with PT. PM vs tomorrow.         Moshe Coates MD  General Surgery  [facility]

## 2023-05-08 NOTE — PT/OT/SLP EVAL
Occupational Therapy   Evaluation    Name: Radha Sandoval  MRN: [id number]  Admitting Diagnosis: <principal problem not specified>  Recent Surgery: Procedure(s) (LRB):  REPAIR, HERNIA, INGUINAL, WITHOUT HISTORY OF PRIOR REPAIR, AGE 6 YEARS OR OLDER (Right) 2 Days Post-Op    Recommendations:     Discharge Recommendations: nursing facility, skilled  Discharge Equipment Recommendations:     Barriers to discharge:  None    Assessment:     Radha Sandoval is a 88 y.o. female with a medical diagnosis of <principal problem not specified>.  She presents s/p hernia repair on 5/06. Pt with increased anxiety when standing due to fear of falling. Performance deficits affecting function: weakness, impaired endurance, impaired balance, impaired self care skills, decreased coordination, impaired functional mobility, gait instability, decreased lower extremity function.      Rehab Prognosis: Good; patient would benefit from acute skilled OT services to address these deficits and reach maximum level of function.       Plan:     Patient to be seen 3 x/week to address the above listed problems via self-care/home management, therapeutic activities, therapeutic exercises  Plan of Care Expires: 06/07/23  Plan of Care Reviewed with: patient    Subjective     Occupational Profile:  Living Environment: Pt lives at [facility] in 2nd floor apartment (pt reports apt at end of hallway requiring longer distance mobility).  Previous level of function: Pt has new   to care. Pt states she is able to complete ADLs however pt's  helps as needed. Pt has meals provided in dining room at [facility] however pt not attending during structured times. Pt uses a rollator for mobility and has A for bathing 2-3x/wk from staff.   Roles and Routines: Pt enjoys playing piano   Equipment Used at Home: rollator, grab bar, shower chair  Assistance upon Discharge: staff    Pain/Comfort:  Pain Rating 1: 0/10    Patients cultural, spiritual, Buddhist  conflicts given the current situation:      Objective:     Communicated with: rn prior to session.  Patient found supine with peripheral IV upon OT entry to room.    General Precautions: Standard, fall  Orthopedic Precautions:    Braces:    Respiratory Status: Room air    Occupational Performance:    Bed Mobility:    Patient completed Scooting/Bridging with moderate assistance  Patient completed Supine to Sit with minimum assistance    Functional Mobility/Transfers:  Patient completed Sit <> Stand Transfer with minimum assistance  with  no assistive device   Patient completed Bed <> Chair Transfer using Stand Pivot technique with moderate assistance with no assistive device    Activities of Daily Living:  Grooming: supervision seated  Upper Body Dressing: minimum assistance   Lower Body Dressing: total assistance     Cognitive/Visual Perceptual:  Cognitive/Psychosocial Skills:     -       Oriented to: Person, Place, Time, and Situation   -       Safety awareness/insight to disability: impaired     Physical Exam:  Upper Extremity Range of Motion:     -       Right Upper Extremity: WNL  -       Left Upper Extremity: WNL  Upper Extremity Strength:    -       Right Upper Extremity: WFL  -       Left Upper Extremity: WFL    AMPAC 6 Click ADL:  AMPAC Total Score: 16    Treatment & Education:  UE ROM/MMT  Bed mobility training / assessment  Functional mobility assessment  Sit/standing balance assessment  Educated on importance of sitting OOB in bedside chair to promote increased strength, endurance & breathing.  Discussed OT POC / Post-acute plan    Patient left up in chair with all lines intact and call button in reach    GOALS:   Multidisciplinary Problems       Occupational Therapy Goals          Problem: Occupational Therapy    Goal Priority Disciplines Outcome Interventions   Occupational Therapy Goal     OT, PT/OT Ongoing, Progressing    Description: Goals to be met by: 5/15/23     Patient will increase functional  independence with ADLs by performing:    LE Dressing with Moderate Assistance.  Grooming while standing with Contact Guard Assistance.  Toileting from bedside commode with Moderate Assistance for hygiene and clothing management.   Supine to sit with Stand-by Assistance.  Step transfer with Contact Guard Assistance  Toilet transfer to bedside commode with Contact Guard Assistance.                         History:     History reviewed. No pertinent past medical history.      Past Surgical History:   Procedure Laterality Date    REPAIR, HERNIA, INGUINAL, WITHOUT HISTORY OF PRIOR REPAIR, AGE 6 YEARS OR OLDER Right 5/6/2023    Procedure: REPAIR, HERNIA, INGUINAL, WITHOUT HISTORY OF PRIOR REPAIR, AGE 6 YEARS OR OLDER;  Surgeon: Maurice Piper MD;  Location: [facility] OR [address] [place] [address];  Service: General;  Laterality: Right;  possible laparotomy, possible bowel resection       Time Tracking:     OT Date of Treatment: 05/08/23  OT Start Time: 1026  OT Stop Time: 1050  OT Total Time (min): 24 min    Billable Minutes:Evaluation 10  Therapeutic Activity 14    5/8/2023

## 2023-05-08 NOTE — PLAN OF CARE
Problem: Infection  Goal: Absence of Infection Signs and Symptoms  Outcome: Ongoing, Progressing     Problem: Adult Inpatient Plan of Care  Goal: Plan of Care Review  Outcome: Ongoing, Progressing  Goal: Patient-Specific Goal (Individualized)  Outcome: Ongoing, Progressing  Goal: Absence of Hospital-Acquired Illness or Injury  Outcome: Ongoing, Progressing  Goal: Optimal Comfort and Wellbeing  Outcome: Ongoing, Progressing  Goal: Readiness for Transition of Care  Outcome: Ongoing, Progressing     Problem: Fluid and Electrolyte Imbalance (Acute Kidney Injury/Impairment)  Goal: Fluid and Electrolyte Balance  Outcome: Ongoing, Progressing     Problem: Oral Intake Inadequate (Acute Kidney Injury/Impairment)  Goal: Optimal Nutrition Intake  Outcome: Ongoing, Progressing     Problem: Renal Function Impairment (Acute Kidney Injury/Impairment)  Goal: Effective Renal Function  Outcome: Ongoing, Progressing     Problem: Fall Injury Risk  Goal: Absence of Fall and Fall-Related Injury  Outcome: Ongoing, Progressing     Problem: Skin Injury Risk Increased  Goal: Skin Health and Integrity  Outcome: Ongoing, Progressing     Problem: Impaired Wound Healing  Goal: Optimal Wound Healing  Outcome: Ongoing, Progressing

## 2023-05-08 NOTE — ASSESSMENT & PLAN NOTE
88-year-old female with multiple medical co-morbidities presents with acutely incarcerated right inguinal hernia concerning for strangulation with some bowel edema and fluid on CT. Some skin changed on exam with tenderness over hernia. Now s/p right inguinal hernia repair 5/6.    - Okay for regular diet   - Multimodal pain regimen  - Home meds  - IS  - OOBTC, ambulate, PT/OT  -Dressing changes RLQ prn     Dispo: After tolerates diet and works with PT. PM vs tomorrow.

## 2023-05-08 NOTE — SUBJECTIVE & OBJECTIVE
Interval History: NAEO. Some pain. Wound looks good. Tolerating clears. AF. HDS.     Medications:  Continuous Infusions:   lactated ringers 50 mL/hr at 05/07/23 1724     Scheduled Meds:   acetaminophen  650 mg Oral Q6H    amLODIPine  10 mg Oral Daily    enoxaparin  40 mg Subcutaneous Daily    meclizine  12.5 mg Oral Daily    metoprolol succinate  25 mg Oral BID    pantoprazole  40 mg Oral Daily     PRN Meds:ondansetron, oxyCODONE, oxyCODONE, sodium chloride 0.9%     Review of patient's allergies indicates:  No Known Allergies  Objective:     Vital Signs (Most Recent):  Temp: 98.3 °F (36.8 °C) (05/07/23 2345)  Pulse: 72 (05/07/23 2345)  Resp: 18 (05/07/23 2345)  BP: (!) 145/64 (05/07/23 2345)  SpO2: (!) 90 % (05/07/23 2345) Vital Signs (24h Range):  Temp:  [98.2 °F (36.8 °C)-98.6 °F (37 °C)] 98.3 °F (36.8 °C)  Pulse:  [51-75] 72  Resp:  [16-22] 18  SpO2:  [90 %-98 %] 90 %  BP: (135-157)/(57-88) 145/64        There is no height or weight on file to calculate BMI.    Intake/Output - Last 3 Shifts         05/06 0700  05/07 0659 05/07 0700 05/08 0659 05/08 0700 05/09 0659    P.O.  240     I.V. 900 2400.4     IV Piggyback 1000      Total Intake 1900 2640.4     Urine 650 375     Emesis/NG output 400      Stool 0      Total Output 1050 375     Net +850 +2265.4            Stool Occurrence 0 x 1 x              Physical Exam  Constitutional:       General: She is not in acute distress.     Appearance: Normal appearance.   HENT:      Head: Normocephalic and atraumatic.      Mouth/Throat:      Mouth: Mucous membranes are moist.   Eyes:      Extraocular Movements: Extraocular movements intact.      Conjunctiva/sclera: Conjunctivae normal.   Cardiovascular:      Rate and Rhythm: Normal rate and regular rhythm.   Pulmonary:      Effort: Pulmonary effort is normal. No respiratory distress.   Abdominal:      General: There is no distension.      Palpations: Abdomen is soft.      Comments: Right inguinal incision with island  dressing in place with minimal strikethrough, appropriately TTP   Skin:     General: Skin is warm and dry.   Neurological:      General: No focal deficit present.      Mental Status: She is alert and oriented to person, place, and time. Mental status is at baseline.        Significant Labs:  I have reviewed all pertinent lab results within the past 24 hours.    Significant Diagnostics:  I have reviewed all pertinent imaging results/findings within the past 24 hours.

## 2023-05-08 NOTE — PLAN OF CARE
[facility]  Initial Discharge Assessment       Primary Care Provider: Primary Doctor No    Admission Diagnosis: Inguinal hernia [K40.90]  Non-recurrent unilateral inguinal hernia with obstruction without gangrene [K40.30]    Admission Date: 5/6/2023  Expected Discharge Date: 5/9/2023    Discharge Barriers Identified: None    Payor: MEDICARE / Plan: MEDICARE PART A & B / Product Type: Government /     Extended Emergency Contact Information  Primary Emergency Contact: tony griffith  Mobile Phone: [phone]  Relation: Son   needed? No  Secondary Emergency Contact: scott ramirez  Mobile Phone: [phone]  Relation: Other   needed? No    Discharge Plan A: Skilled Nursing Facility           Initial Assessment (most recent)       Adult Discharge Assessment - 05/08/23 1225          Discharge Assessment    Assessment Type Discharge Planning Assessment     Confirmed/corrected address, phone number and insurance Yes     Confirmed Demographics Correct on Facesheet     Source of Information patient;other (see comments)   caregiver scott    Communicated TOSHA with patient/caregiver Yes     People in Home alone     Do you expect to return to your current living situation? No     Do you have help at home or someone to help you manage your care at home? Yes     Who are your caregiver(s) and their phone number(s)? scott     Prior to hospitilization cognitive status: Unable to Assess     Current cognitive status: Unable to Assess     Walking or Climbing Stairs ambulation difficulty, dependent;transferring difficulty, assistance 1 person;stair climbing difficulty, dependent     Dressing/Bathing bathing difficulty, assistance 1 person     Equipment Currently Used at Home wheelchair     Readmission within 30 days? Yes     Patient currently being followed by outpatient case management? No     Do you currently have service(s) that help you manage your care at home? No     Do you take prescription medications?  Yes     Do you have prescription coverage? Yes     Do you have any problems affording any of your prescribed medications? No     Is the patient taking medications as prescribed? yes     Who is going to help you get home at discharge? will need transport to snf     How do you get to doctors appointments? family or friend will provide     Are you on dialysis? No     Do you take coumadin? No     Discharge Plan A Skilled Nursing Facility     DME Needed Upon Discharge  none     Discharge Plan discussed with: Caregiver;Patient     Discharge Barriers Identified None                   CLAUDIA met with pt and her caregiver Chel to complete dc assessment. Prior to April 2023, pt was living alone at [facility]. Her son hired a caregiver to assist pt (Chel). Pt was then hospitalized in April and dc to SNF at [facility]. Since this time pt has not been ambulatory and she requires assistance with ADLs.    Plan is for pt to return to [facility] upon d/c.     Ref sent through [facility] to [facility].    CLAUDIA requested covid test.    Mary Howard LCSW  PRN

## 2023-05-08 NOTE — NURSING
On call paged for concerns of patient's anxiety and inability to urinate after catheter removal and issues capturing bladder scan volume.

## 2023-05-08 NOTE — PLAN OF CARE
Spann removed this morning. Abdominal incision clean and intact with no drainage. Patient premedicated for PT, patient able transfer to chair with stand and pivot. Patient is happy with being into chair. Call light in reach and tele sitter at bedside.     Problem: Infection  Goal: Absence of Infection Signs and Symptoms  Outcome: Ongoing, Progressing     Problem: Adult Inpatient Plan of Care  Goal: Plan of Care Review  Outcome: Ongoing, Progressing  Goal: Patient-Specific Goal (Individualized)  Outcome: Ongoing, Progressing  Goal: Absence of Hospital-Acquired Illness or Injury  Outcome: Ongoing, Progressing  Goal: Optimal Comfort and Wellbeing  Outcome: Ongoing, Progressing  Goal: Readiness for Transition of Care  Outcome: Ongoing, Progressing     Problem: Fluid and Electrolyte Imbalance (Acute Kidney Injury/Impairment)  Goal: Fluid and Electrolyte Balance  Outcome: Ongoing, Progressing     Problem: Oral Intake Inadequate (Acute Kidney Injury/Impairment)  Goal: Optimal Nutrition Intake  Outcome: Ongoing, Progressing     Problem: Renal Function Impairment (Acute Kidney Injury/Impairment)  Goal: Effective Renal Function  Outcome: Ongoing, Progressing     Problem: Fall Injury Risk  Goal: Absence of Fall and Fall-Related Injury  Outcome: Ongoing, Progressing     Problem: Skin Injury Risk Increased  Goal: Skin Health and Integrity  Outcome: Ongoing, Progressing     Problem: Impaired Wound Healing  Goal: Optimal Wound Healing  Outcome: Ongoing, Progressing

## 2023-05-08 NOTE — PLAN OF CARE
Problem: Occupational Therapy  Goal: Occupational Therapy Goal  Description: Goals to be met by: 5/15/23     Patient will increase functional independence with ADLs by performing:    LE Dressing with Moderate Assistance.  Grooming while standing with Contact Guard Assistance.  Toileting from bedside commode with Moderate Assistance for hygiene and clothing management.   Supine to sit with Stand-by Assistance.  Step transfer with Contact Guard Assistance  Toilet transfer to bedside commode with Contact Guard Assistance.    Outcome: Ongoing, Progressing

## 2023-05-09 PROBLEM — K40.30 INCARCERATED INGUINAL HERNIA: Status: RESOLVED | Noted: 2023-01-01 | Resolved: 2023-01-01

## 2023-05-09 NOTE — PLAN OF CARE
05/09/23 0956   Post-Acute Status   Post-Acute Authorization Placement   Post-Acute Placement Status   (pending covid result, order review, room assignment and number for report)   Discharge Delays None known at this time   Discharge Plan   Discharge Plan A Skilled Nursing Facility     Pt will dc today to [facility]. Orders completed and sent to admissions. COVID swab completed per nurse and sent to lab. SW requested CM leadership escalate covid test result to lab to avoid a delay in dc.    Facility will need covid result prior to admit.    Mary Howard LCSW  PRN

## 2023-05-09 NOTE — PLAN OF CARE
NURSING HOME ORDERS    05/09/2023  [facility]  [address]  [address]  [place] [facility] [address]  Dept: [phone]  Loc: [phone]     Discharge to [facility]     Diagnoses:  Active Hospital Problems    Diagnosis  POA    Incarcerated inguinal hernia [K40.30]  Unknown      Resolved Hospital Problems   No resolved problems to display.       Patient is homebound due to: Weakness and faility     Allergies:Review of patient's allergies indicates:  No Known Allergies    Vitals:  Routine    Diet: regular diet    Activities:   Activity as tolerated    Goals of Care Treatment Preferences:  Code Status: Full Code       LaPOST: Yes           Labs:  Per facility    Nursing Precautions:  Fall    Consults:    PT/OT/ST eval and treat 5 times weekly  Podiatry consult for poor foot hygiene     Miscellaneous Care:   Bladder scans prn for urinary retention    Wound care: Keep right groin incision clean and dry   Gauze as needed  Leave staples in. Will remove in clinic in about 2 weeks                           Medications: Discontinue all previous medication orders, if any. See new list below.     Medication List        ASK your doctor about these medications      amLODIPine 10 MG tablet  Commonly known as: NORVASC  Take 10 mg by mouth once daily.     hydrOXYzine HCL 25 MG tablet  Commonly known as: ATARAX  Take 1 tablet (25 mg total) by mouth 3 (three) times daily.     ibuprofen 400 MG tablet  Commonly known as: ADVIL,MOTRIN  Take 1 tablet (400 mg total) by mouth every 6 (six) hours as needed (pain).     meclizine 12.5 mg tablet  Commonly known as: ANTIVERT  Take 1 tablet (12.5 mg total) by mouth once daily.     metoprolol succinate 25 MG 24 hr tablet  Commonly known as: TOPROL-XL  Take 1 tablet (25 mg total) by mouth 2 (two) times daily.     miconazole NITRATE 2 % 2 % top powder  Commonly known as: MICOTIN  Apply topically twice daily under bilateral breasts     pantoprazole 40 MG  tablet  Commonly known as: PROTONIX  Take 1 tablet (40 mg total) by mouth once daily.     ramelteon 8 mg tablet  Commonly known as: ROZEREM  Take 8 mg by mouth every evening.                Immunizations Administered as of 5/9/2023       Name Date Dose VIS Date Route Exp Date    COVID-19, MRNA, LN-S, PF (Pfizer) (Purple Cap) 3/10/2021 0.3 mL -- -- --    Lot: [id number]     External: Auto Reconciled From Outside Source     COVID-19, MRNA, LN-S, PF (Pfizer) (Purple Cap) 2/17/2021 0.3 mL -- -- --    Lot: [id number]     External: Auto Reconciled From Outside Source             As above , pending COVID test     Discharge to SNF    Some patients may experience side effects after vaccination.  These may include fever, headache, muscle or joint aches.  Most symptoms resolve with 24-48 hours and do not require urgent medical evaluation unless they persist for more than 72 hours or symptoms are concerning for an unrelated medical condition.          _________________________________  Moshe Coates MD  05/09/2023

## 2023-05-09 NOTE — NURSING
Report called to Candelaria at [facility] [phone], caregiver aware , will leave saline lck in until ambulance arrives
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
## 2023-05-09 NOTE — PLAN OF CARE
CHW met with patient/family at bedside. Patient experience rounding completed and reviewed the following.     Do you know your discharge plan? Yes or No,    If yes, what is the plan? (Home, Home Health, Rehab, SNF, LTAC, or Other)    SNF    If you are discharging home, do you have help at home? Yes or No     SNF    Do you think you will need help at home at discharge? Yes or No    SNF    Have you discussed your needs and preferences with your SW/CM? Yes or No    Yes    Assigned SW/CM notified of any patient/family needs or concerns.      Mary Trevizo CHW  Case Management  [phone]

## 2023-05-09 NOTE — SUBJECTIVE & OBJECTIVE
Interval History: No acute events overnight. Had straight cath x1 after duncan was removed. Small void after. Feels well. Eating and having bowel function. Minimal pain. Not getting out of bed.     Medications:  Continuous Infusions:   lactated ringers 40 mL/hr at 05/09/23 0245     Scheduled Meds:   acetaminophen  650 mg Oral Q6H    amLODIPine  10 mg Oral Daily    enoxaparin  40 mg Subcutaneous Daily    meclizine  12.5 mg Oral Daily    metoprolol succinate  25 mg Oral BID    miconazole NITRATE 2 %   Topical (Top) BID    pantoprazole  40 mg Oral Daily     PRN Meds:hydrOXYzine HCL, ondansetron, oxyCODONE, oxyCODONE, sodium chloride 0.9%     Review of patient's allergies indicates:  No Known Allergies  Objective:     Vital Signs (Most Recent):  Temp: 97.4 °F (36.3 °C) (05/09/23 0706)  Pulse: 78 (05/09/23 0706)  Resp: 18 (05/09/23 0706)  BP: (!) 163/72 (05/09/23 0706)  SpO2: (!) 93 % (05/09/23 0706) Vital Signs (24h Range):  Temp:  [97.1 °F (36.2 °C)-98.5 °F (36.9 °C)] 97.4 °F (36.3 °C)  Pulse:  [60-78] 78  Resp:  [16-20] 18  SpO2:  [93 %-94 %] 93 %  BP: (127-163)/(57-72) 163/72        There is no height or weight on file to calculate BMI.    Intake/Output - Last 3 Shifts         05/07 0700  05/08 0659 05/08 0700  05/09 0659 05/09 0700  05/10 0659    P.O. 240      I.V. 2400.4 599.6     IV Piggyback       Total Intake 2640.4 599.6     Urine 375 700     Emesis/NG output       Stool       Total Output 375 700     Net +2265.4 -100.4            Stool Occurrence 1 x               Physical Exam  Vitals and nursing note reviewed.   Cardiovascular:      Rate and Rhythm: Normal rate.   Pulmonary:      Effort: Pulmonary effort is normal.   Abdominal:      Tenderness: There is abdominal tenderness.      Comments: Abdomen is largely benign. Right inguinal incision is well approximated with staples. No evidence of infection. No recurrence. Minimally tender.         Significant Labs:  I have reviewed all pertinent lab results within  the past 24 hours.  CBC:   Recent Labs   Lab 05/09/23  0552   WBC 13.51*   RBC 3.57*   HGB 10.3*   HCT 31.4*      MCV 88   MCH 28.9   MCHC 32.8     BMP:   Recent Labs   Lab 05/09/23  0552   GLU 83      K 3.8      CO2 27   BUN 19   CREATININE 0.7   CALCIUM 8.3*       Significant Diagnostics:  none

## 2023-05-09 NOTE — NURSING
Pt [place]. Pt denies pain or discomfort. States she is ready to go home. Pt given med for anxiety at this time. Will follow up with bladder scan. Vitals obtained. Caregiver in room supplied by family. Call light in reach.

## 2023-05-09 NOTE — PLAN OF CARE
[facility] - [facility]  Discharge Final Note    Primary Care Provider: Primary Doctor No    Expected Discharge Date: 5/9/2023    Pt will dc to [facility] SNF. Nurse to call report to Jimena on [address], [phone], room 411.    Wheelchair van transport ordered for 3pm .     updated pts nurse and pts caregiver Chel.    Final Discharge Note (most recent)       Final Note - 05/09/23 1348          Final Note    Assessment Type Final Discharge Note     Anticipated Discharge Disposition Skilled Nursing Facility     Hospital Resources/Appts/Education Provided Appointments scheduled and added to AVS        Post-Acute Status    Post-Acute Authorization Placement     Post-Acute Placement Status Set-up Complete/Auth obtained     Discharge Delays None known at this time                     Important Message from Medicare  Important Message from Medicare regarding Discharge Appeal Rights: Given to patient/caregiver, Explained to patient/caregiver, Signed/date by patient/caregiver     Date IMM was signed: 05/09/23  Time IMM was signed: 0940    Contact Info       Chuy Caldera MD   Specialty: General Surgery, Surgery    [address]  [facility] [address]   Phone: [phone]       Next Steps: Follow up in 2 week(s)    Instructions: For wound check and post op follow up          Future Appointments   Date Time Provider Department Center   5/17/2023 10:00 AM Micha Jackson DPM [facility] POD [facility]   5/22/2023  3:15 PM Mariposa Loya DO [facility] IM [facility] PCW   5/25/2023  9:45 AM Lobito Barragan MD [facility] GENSUR [facility]   6/5/2023 11:00 AM Carolyne Hirsch PA-C [facility] NEURO [facility]     Mary Howard, ÁNGEL  PRN
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
## 2023-05-09 NOTE — AI DETERIORATION ALERT
Evaluation of Early Detection of Sepsis Risk     Patient Name: Radha Sandoval  MRN:  [id number]  Primary Care Team: Networked reference to record PCT   Date of Admission:  5/6/2023     Principal Problem:  Incarcerated inguinal hernia   Date of Review: 05/09/2023    Patient reviewed for elevated sepsis risk score. Sepsis Screen Negative  Will continue to monitor for signs and symptoms of new or worsening infection and screen as needed. Please notify Rapid Response Team for any hypotension or decompensation.    Sepsis Screen Results     IP Sepsis Screen (most recent)       Sepsis Screen (IP) - 05/09/23 1230       Is the patient's history or complaint suggestive of a possible infection? Yes  -SS    Are there at least two of the following signs and symptoms present? Yes  -SS    Sepsis signs/symptoms - Hyper or Hypothermia Hyperthermia >100.4 or Hypothermia < 96.8  -SS    Sepsis signs/symptoms - WBC WBC < 4,000 or WBC > 12,000  -SS    Are any of the following organ dysfunction criteria present and not considered to be due to a chronic condition? No  -SS    Initiate Sepsis Protocol No  -SS              User Key  (r) = Recorded By, (t) = Taken By, (c) = Cosigned By      Initials Name    SS Stephen Saenz, PA-C Stephen Saenz, PA-C [facility]

## 2023-05-10 NOTE — PT/OT/SLP EVAL
Physical Therapy Evaluation    Patient Name:  Radha Sandoval   MRN:  [id number]    Recommendations:     Discharge Recommendations: nursing facility, skilled   Discharge Equipment Recommendations: to be determined by next level of care   Barriers to discharge: Inaccessible home and Decreased caregiver support    Assessment:     Radha Sandoval is a 88 y.o. female admitted with a medical diagnosis of Incarcerated inguinal hernia.  She presents with the following impairments/functional limitations: weakness, impaired endurance, impaired self care skills, impaired functional mobility, gait instability, impaired balance, decreased coordination, decreased upper extremity function, decreased lower extremity function, decreased safety awareness, impaired skin, impaired cardiopulmonary response to activity. Pt with severe shaking and tremors with fear of falling on all attempts with mobility requiring significant amount of comforting. Pt would benefit from a skilled nursing facility for: Dynamic/static standing/sitting balance through skilled balance training, strengthening with the use of skilled therapeutic exercises interventions, and mobility through adaptive equipment training. Pt continues to benefit from a collaborative PT/OT program to improve quality of life and focus on recovery of impairments.      Rehab Prognosis: Good; patient would benefit from acute skilled PT services to address these deficits and reach maximum level of function.    Recent Surgery: Procedure(s) (LRB):  REPAIR, HERNIA, INGUINAL, WITHOUT HISTORY OF PRIOR REPAIR, AGE 6 YEARS OR OLDER (Right) 3 Days Post-Op    Plan:     During this hospitalization, patient to be seen   to address the identified rehab impairments via gait training, therapeutic activities, therapeutic exercises, neuromuscular re-education and progress toward the following goals:    Plan of Care Expires:  06/08/23    Subjective     Chief Complaint: fear of falling  Patient/Family  Comments/goals: return to PLOF  Pain/Comfort:  Pain Rating 1: 0/10  Pain Rating Post-Intervention 1: 0/10    Patients cultural, spiritual, Yazidism conflicts given the current situation: no    Living Environment:  Living Environment: Pt lives at [facility] in 2nd floor apartment (pt reports apt at end of hallway requiring longer distance mobility).  Previous level of function: Pt has new   to care. Pt states she is able to complete ADLs however pt's  helps as needed. Pt has meals provided in dining room at [facility] however pt not attending during structured times. Pt uses a rollator for mobility and has A for bathing 2-3x/wk from staff.   Roles and Routines: Pt enjoys playing piano   Equipment Used at Home: rollator, grab bar, shower chair  Assistance upon Discharge: staff    Objective:     Communicated with RN prior to session.  Patient found HOB elevated with peripheral IV  upon PT entry to room.    General Precautions: Standard, fall  Orthopedic Precautions:N/A   Braces: N/A  Respiratory Status: Room air    Exams:  Cognitive Exam:  awake and alert, no signs of disorientation   Gross Motor Coordination:  WFL  Postural Exam:  Patient presented with the following abnormalities:    -       Rounded shoulders  -       Forward head  -       Kyphosis  RLE ROM: WFL  RLE Strength: Deficits: at least 3+ to 4/5  LLE ROM: WFL  LLE Strength: Deficits: at least 3+ to 4/5    Functional Mobility: evere shaking and tremors with fear of falling on all attempts with mobility  Bed Mobility:     Scooting: contact guard assistance to moderate assistance  Supine to Sit: moderate assistance  Transfers:     Sit to Stand:  maximal assistance with no AD  Bed to Chair: maximal assistance with  no AD  using  Squat Pivot  Gait: MICHAEL  Balance:   Static Sitting: CGA-modA, multiple R posterolateral LOBs, particularly with anxiety and shaking   Dynamic Sitting: CGA-modA, multiple R posterolateral LOBs particularly with  anxiety and shaking  Static standing: maxA-totalA  Dynamic Standing: MICHAEL      AM-PAC 6 CLICK MOBILITY  Total Score:11       Treatment & Education:  Patient educated on role of therapy, goals of session, and benefits of mobilizing.   Discussed PT plan of care during hospitalization.   Patient educated on calling for assistance.   Patient educated on how their diagnosis impacts their mobility within PT scope of practice.   All questions answered within PT scope of practice.    Patient left  sitting up in wheelchair  with all lines intact, call button in reach, and caregiver and transporter present.    GOALS:   Multidisciplinary Problems       Physical Therapy Goals          Problem: Physical Therapy    Goal Priority Disciplines Outcome Goal Variances Interventions   Physical Therapy Goal     PT, PT/OT Ongoing, Progressing     Description: Goals to be met by: 2023     Patient will increase functional independence with mobility by performin. Supine to sit with Stand-by Assistance  2. Sit to supine with Stand-by Assistance  3. Sit to stand transfer with Stand-by Assistance  4. Bed to chair transfer with Minimal Assistance using Rolling Walker  5. Gait  x 15 feet with Minimal Assistance using Rolling Walker.   6. Lower extremity exercise program x15 reps, with independence                         History:     History reviewed. No pertinent past medical history.    Past Surgical History:   Procedure Laterality Date    REPAIR, HERNIA, INGUINAL, WITHOUT HISTORY OF PRIOR REPAIR, AGE 6 YEARS OR OLDER Right 2023    Procedure: REPAIR, HERNIA, INGUINAL, WITHOUT HISTORY OF PRIOR REPAIR, AGE 6 YEARS OR OLDER;  Surgeon: Maurice Piper MD;  Location: [facility] OR [address] [place] [address];  Service: General;  Laterality: Right;  possible laparotomy, possible bowel resection       Time Tracking:     PT Received On: 23  PT Start Time: 1537     PT Stop Time: 1556  PT Total Time (min): 19 min     Billable Minutes: Evaluation 10 and  Therapeutic Activity 9      05/09/2023

## 2023-05-10 NOTE — DISCHARGE SUMMARY
[facility]  General Surgery  Discharge Summary      Patient Name: Radha Sandoval  MRN: [id number]  Admission Date: 5/6/2023  Hospital Length of Stay: 3 days  Discharge Date and Time:  05/10/2023 11:53 AM  Attending Physician: Bev att. providers found   Discharging Provider: Devonte Felipe PA-C  Primary Care Provider: Primary Doctor Bev     HPI:   88-year-old female with multiple medical co-morbidities including known right inguinal hernia who presents with right groin pain over for 6 days that has been waxing and waning in intensity associated with multiple episodes of nausea and foul-smelling emesis. Patient notes her right inguinal hernia bulge is much bigger than usual, firm, painful to touch, with some overlying erythema. Denies fevers, chills, or any other issues at this time.    Procedure(s) (LRB):  REPAIR, HERNIA, INGUINAL, WITHOUT HISTORY OF PRIOR REPAIR, AGE 5 YEARS OR OLDER (Right)     Hospital Course:   Patient was admitted to the general surgery service. she was made NPO, given IVF, as well as pain and nausea control. She underwent open R inguinal hernia repair with mesh on 5/6/23. The patient tolerated the procedure well, was transferred to recovery post-op, and then transferred to the [facility] for continuation of medical care. The patient's clinical condition progressively improved. NGT removed. Had ROBF. Diet progressed. Evaluated by PT/OT and determined to require SNF at [place]. Patient was HDS throughout admission. By the time of discharge, she was meeting all post op milestones, tolerating a diet without nausea or vomiting, pain was well controlled with oral medications, and she was ambulating without difficulty. Voiding appropriately. On 5/9/23, the patient was discharged to [facility]. On discharge, the patient's incisions were c/d/i and the surgical site was soft and appropriately tender to palpation. The patient will follow up in Dr. Caldera's clinic in 2 weeks. Discussed POC and  ED precautions with patient. Patient verbalized understanding and is agreeable to plan. All questions answered.    Please see hospital and op notes for further detail regarding patient's admission.    Patient's discharge was discussed with Dr. Caldera.       Consults (From admission, onward)          Status Ordering Provider     Inpatient consult to General surgery  Once        Provider:  (Not yet assigned)    Completed BRIDGER HUNT              Indwelling Lines/Drains at time of discharge:   Lines/Drains/Airways       None                   Significant Diagnostic Studies: Labs: CMP   Recent Labs   Lab 05/09/23 0552      K 3.8      CO2 27   GLU 83   BUN 19   CREATININE 0.7   CALCIUM 8.3*   ANIONGAP 9   , CBC   Recent Labs   Lab 05/09/23 0552   WBC 13.51*   HGB 10.3*   HCT 31.4*      , and All labs within the past 24 hours have been reviewed  Radiology:   XR NG/OG tube placement check, non-radiologist performed [[id number]] Resulted: 05/06/23 2124   Order Status: Completed Updated: 05/06/23 2126   Narrative:     EXAMINATION:   XR NG/OG TUBE PLACEMENT CHECK, NON-RADIOLOGIST PERFORMED     CLINICAL HISTORY:   NG tube placement.;     TECHNIQUE:   Single-view chest and abdomen.     COMPARISON:   None     FINDINGS:   NG tube present with tip overlying the stomach.     No evidence of bowel obstruction.  Mild elevation the right hemidiaphragm.  No radiographic mass.    Impression:       Status post NG tube placement.       Electronically signed by: Matthew Smith   Date: 05/06/2023   Time: 21:24   CT Abdomen Pelvis With Contrast [[id number]] (Abnormal) Resulted: 05/06/23 1745   Order Status: Completed Updated: 05/06/23 1748   Narrative:     EXAMINATION:   CT ABDOMEN PELVIS WITH CONTRAST     CLINICAL HISTORY:   Bowel obstruction suspected;     TECHNIQUE:   Low dose axial images, sagittal and coronal reformations were obtained from the lung bases to the pubic symphysis following the IV administration of 100 mL  of Omnipaque 350 .  Oral contrast was not administered.     COMPARISON:   None.     FINDINGS:   Abdomen:     - Lower thorax:Small hiatal hernia.     - Lung bases: No infiltrates and no nodules.     - Liver: Few scattered hepatic cysts.     - Gallbladder: No calcified gallstones.     - Bile Ducts: No evidence of intra or extra hepatic biliary ductal dilation.     - Spleen: Negative.     - Kidneys: Large simple left renal cyst at the upper pole.  No stone, soft tissue mass or hydronephrosis bilaterally.  Probable small vascular calcification near the right hilum.     - Adrenals: Unremarkable.     - Pancreas: No mass or peripancreatic fat stranding.     - Retroperitoneum:  No significant adenopathy.     - Vascular: Vascular atherosclerosis.  Probable high-grade narrowing at the origin of the celiac artery and slightly more distal in the celiac artery on axial 69.     Suspected high-grade narrowing at the origin of the superior mesenteric artery and proximal SMA on axial 84.     No evidence of aortic aneurysm.     - Abdominal wall:  There is a large right inguinal hernia with protrusion of small bowel.  There is small bowel wall thickening present.  There is edema and slight hypoenhancement noted to a loop of the incarcerated small bowel.  Findings could represent ischemic change and a strangulation.  This is in the SMA distribution.  Recommend surgical consultation and follow-up.  The hernia is associated with more proximal dilation of small bowel with air-fluid levels consistent with small bowel obstruction.     The small bowel from the stomach to the hernia is in the right abdomen without crossing the midline to the left.  Acute angulation to the right is noted on axial 101..  This could represent the ligament of Treitz and deviation of the small bowel related to the large hernia versus congenital malrotation.     Pelvis:     Urinary bladder is distended.  Urinary bladder diverticulum posteriorly on the right.      Status post hysterectomy.     Bowel/Mesentery:     Small-bowel obstruction associated with large right inguinal hernia.  See above comments also.  Surgical consultation recommended.     Bones:  No acute osseous abnormality and no suspicious lytic or blastic lesion.    Impression:       1. Large right inguinal hernia with protrusion of small bowel and mesentery with small bowel wall thickening, mild fluid and edema.  There are findings suggesting small bowel ischemic change.  There is an associated small bowel obstruction.  See above comments also.  Recommend stat surgical consultation.   2. 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  See above comments.  Follow-up recommended.   3. See above comments also.   4.  This report was flagged in Epic as abnormal.   Dr. El and Dr. Magaña were notified by secure messaging at approximately 17:36        Pending Diagnostic Studies:       Procedure Component Value Units Date/Time    Specimen to Pathology, Surgery General Surgery [[id number]] Collected: 05/06/23 2105    Order Status: Sent Lab Status: In process Updated: 05/08/23 2257    Specimen: Tissue             Final Active Diagnoses:      Problems Resolved During this Admission:    Diagnosis Date Noted Date Resolved POA    PRINCIPAL PROBLEM:  Incarcerated inguinal hernia [K40.30] 05/06/2023 05/09/2023 Yes        Discharged Condition: good    Disposition: Skilled Nursing Facility    Follow Up:   Follow-up Information       Chuy Caldera MD Follow up in 2 week(s).    Specialties: General Surgery, Surgery  Why: For wound check and post op follow up  Contact information:  [address] [facility] [address]  [phone]                             Patient Instructions:      Diet Adult Regular     Lifting restrictions     No driving until:   Order Comments: Discharge Instructions     WOUND CARE   -- Ok to shower;  however, no baths or submerging in water (I.e. swimming, submerging in water) for at least two weeks.  -- Please keep the incision clean with soap and water, pat your incision dry, do not scrub hard over your incisions.     MEDICATIONS AND PAIN CONTROL  -- Please resume all home medications as instructed and take any newly prescribed medications.  -- Most people find that over-the-counter pain medications (Tylenol combined with Ibuprofen) will be sufficient for most pain control.  -- You have been prescribed a narcotic pain medication. Please wean narcotic over the next few daysIf you're taking prescription narcotics, do not drive or operate heavy machinery. Do not drive if your pain is not controlled enough for you to react quickly safely.  -- No straining, avoid constipation. May take OTC stool softeners as described and laxatives as needed.     OTHER INSTRUCTIONS  -- Monitor for temp > 101 F, bleeding, redness, purulent drainage, or any sudden, new extreme pain. If any occur, please call our clinic or go to the emergency department if after normal business hours.  -- You may resume your regular diet as tolerated.   -- No heavy lifting (anything >10 lbs or = to a gallon of milk) or strenuous exercise until cleared by a physician. No pushing or pulling activities such as vacuuming or raking. Otherwise, activity as tolerated.   -- Follow up with Dr. Caldera in 2 weeks in clinic for a post-op check. Message sent to clinic for scheduling. Clinic # is [phone]     Notify your health care provider if you experience any of the following:  increased confusion or weakness     Notify your health care provider if you experience any of the following:  worsening rash     Notify your health care provider if you experience any of the following:  severe persistent headache     Notify your health care provider if you experience any of the following:  difficulty breathing or increased cough     Notify your health care provider  if you experience any of the following:  redness, tenderness, or signs of infection (pain, swelling, redness, odor or green/yellow discharge around incision site)     Notify your health care provider if you experience any of the following:  severe uncontrolled pain     Notify your health care provider if you experience any of the following:  persistent nausea and vomiting or diarrhea     Notify your health care provider if you experience any of the following:  temperature >100.4     Notify your health care provider if you experience any of the following:  persistent dizziness, light-headedness, or visual disturbances     Change dressing (specify)   Order Comments: Dry gauze to R groin incision PRN. Change at least daily and PRN if soiled if drainage, okay to leave open to air if dry     Activity as tolerated       Medications:  Reconciled Home Medications:      Medication List        START taking these medications      HYDROcodone-acetaminophen 5-325 mg per tablet  Commonly known as: NORCO  Take 1 tablet by mouth every 6 (six) hours as needed for Pain.            CONTINUE taking these medications      amLODIPine 10 MG tablet  Commonly known as: NORVASC  Take 10 mg by mouth once daily.     hydrOXYzine HCL 25 MG tablet  Commonly known as: ATARAX  Take 1 tablet (25 mg total) by mouth 3 (three) times daily.     ibuprofen 400 MG tablet  Commonly known as: ADVIL,MOTRIN  Take 1 tablet (400 mg total) by mouth every 6 (six) hours as needed (pain).     meclizine 12.5 mg tablet  Commonly known as: ANTIVERT  Take 1 tablet (12.5 mg total) by mouth once daily.     metoprolol succinate 25 MG 24 hr tablet  Commonly known as: TOPROL-XL  Take 1 tablet (25 mg total) by mouth 2 (two) times daily.     miconazole NITRATE 2 % 2 % top powder  Commonly known as: MICOTIN  Apply topically twice daily under bilateral breasts     pantoprazole 40 MG tablet  Commonly known as: PROTONIX  Take 1 tablet (40 mg total) by mouth once daily.      ramelteon 8 mg tablet  Commonly known as: ROZEREM  Take 8 mg by mouth every evening.                Patient was seen and examined on the date of discharge and determined to be suitable for discharge.  Total time spent preparing discharge services: 20 minutes.  Time was spent speaking with consultants and case management, reviewing records, and/or discussing the plan of care with patient/family.        Devonte Felipe PA-C  General Surgery   [facility]

## 2023-05-10 NOTE — PLAN OF CARE
Problem: Physical Therapy  Goal: Physical Therapy Goal  Description: Goals to be met by: 2023     Patient will increase functional independence with mobility by performin. Supine to sit with Stand-by Assistance  2. Sit to supine with Stand-by Assistance  3. Sit to stand transfer with Stand-by Assistance  4. Bed to chair transfer with Minimal Assistance using Rolling Walker  5. Gait  x 15 feet with Minimal Assistance using Rolling Walker.   6. Lower extremity exercise program x15 reps, with independence    Outcome: Ongoing, Progressing

## 2023-05-19 NOTE — ADDENDUM NOTE
Addendum  created 05/19/23 1004 by Kar Robertson MD    Review and Sign - Ready for Procedure, Review and Sign - Undo

## 2023-05-22 PROBLEM — J96.01 ACUTE HYPOXEMIC RESPIRATORY FAILURE: Status: ACTIVE | Noted: 2023-01-01

## 2023-05-22 PROBLEM — I26.99 ACUTE PULMONARY EMBOLISM: Status: ACTIVE | Noted: 2023-01-01

## 2023-05-22 PROBLEM — J90 CHRONIC BILATERAL PLEURAL EFFUSIONS: Status: ACTIVE | Noted: 2023-01-01

## 2023-05-22 PROBLEM — F03.90 DEMENTIA: Status: ACTIVE | Noted: 2023-01-01

## 2023-05-22 PROBLEM — E88.09 HYPOALBUMINEMIA: Status: ACTIVE | Noted: 2023-01-01

## 2023-05-22 NOTE — ED NOTES
Telemetry Verification   Patient placed on Telemetry Box  Verified with War Room  Box # [id number]   Monitor Tech    Rate ns   Rhythm 66

## 2023-05-22 NOTE — ASSESSMENT & PLAN NOTE
Carries diagnosis of dementia  More recently reported some hallucinations  Possible acutely worsened in setting of UTI and hypoxia  Delirium precautions  Seroquel qhs

## 2023-05-22 NOTE — ASSESSMENT & PLAN NOTE
8 yr old with recent cystitis, inguinal hernia repair on right, no cardiac history as per caretaker at bedside, dementia came for SOB requiring 3 lt oxygen for last 3 days, weak, tired and some hallucinations.   She underwent open R inguinal hernia repair with mesh on 5/6/23. Discarhed to SNF. She had SOB with oxygen requirement of 3 litres for last 3 days. Today in Ed, CT mario alberto to r/o PE. Cardiology consulted for PE management.  Patient denied any SOB, leg swelling, chest pain , palpitations at bedside.   Caretaker/ Neighbour informed- she has been participating in rehab but last 3 days more hallucinations and upset.    HR 77-77, , Sat 98% on 3 lt NC.  Cr 1.3, hb 8.6 (was 10 2 weeks ago), Troponin 0.058, BNP pending.  EKG NSR 65, non specific ST/T wave changes.    Plan:  Given hemodynamically stable, age and AMS, baseline demientia recommend medical management with heparin gtt.  Bedside TTE with normal LVEF and RV with TAPSE 2, RV 3.5 cm at base, minimal TR for PASP.  Obtain official echo.  Assess for etiology of AMS including infection, medications etc  Admit to [facility] to cardiology 3rd floor.  Monitor closely for ay new HD instability, symptoms, or increased oxygen requirement/ hypotension/ tachycardia and call cardiology ASAP.  D/W IC staff

## 2023-05-22 NOTE — CONSULTS
[facility] - Emergency Dept  Cardiology  Consult Note    Patient Name: Radha Sandoval  MRN: [id number]  Admission Date: 5/22/2023  Hospital Length of Stay: 0 days  Code Status: Prior   Attending Provider: Bambi Zuñiga MD   Consulting Provider: Orquidea Bradley MD  Primary Care Physician: Primary Doctor No  Principal Problem:<principal problem not specified>    Patient information was obtained from patient and ER records.     Inpatient consult to Interventional Cardiology  Consult performed by: Orquidea Bradley MD  Consult ordered by: Nirali Gore MD        Subjective:     Chief Complaint:  PE     HPI:   88 yr old with recent cystitis, inguinal hernia repair on right, no cardiac history as per caretaker at bedside, dementia came for SOB requiring 3 lt oxygen for last 3 days, weak, tired and some hallucinations.   She underwent open R inguinal hernia repair with mesh on 5/6/23. Discarhed to SNF. She had SOB with oxygen requirement of 3 litres for last 3 days. Today in Ed, CT mario alberto to r/o PE. Cardiology consulted for PE management.  Patient denied any SOB, leg swelling, chest pain , palpitations at bedside.   Caretaker/ Neighbour informed- she has been participating in rehab but last 3 days more hallucinations and upset.    HR 77-77, , Sat 98% on 3 lt NC.  Cr 1.3, hb 8.6 (was 10 2 weeks ago), Troponin 0.058, BNP pending.  EKG NSR 65, non specific ST/T wave changes.    CTA:    1. Examination positive for extensive pulmonary emboli, asymmetric to the right.  Embolus noted within the lateral aspect of the right main pulmonary artery, encroaching within 4.7 cm of the main pulmonary artery bifurcation. Additional involvement of lobar, inter lobar, segmental and subsegmental pulmonary arteries.  2. Flattening of the interventricular septum concerning for right heart strain the given context.  3. Bilateral pleural effusions and basilar atelectasis. Infectious or noninfectious inflammatory airspace consolidation or  possibly small infarction difficult to exclude in the basilar left lower lobe.  4. Additional details, as provided in the body of the report.  This report was flagged in Epic as abnormal.       4/2023: TTE The left ventricle is normal in size with normal systolic function. The estimated ejection fraction is 65%.  Normal right ventricular size with normal right ventricular systolic function.  Moderate left atrial enlargement.  There is moderate mitral stenosis. The mean diastolic gradient across the mitral valve is 7 mmHg at a heart rate of bpm.  There is mild-to-moderate aortic valve stenosis. Aortic valve area is 1.4 cm2; peak velocity is 3.0 m/s; mean gradient is 22 mmHg.  The estimated PA systolic pressure is 34 mmHg.  Normal central venous pressure (3 mmHg).          No past medical history on file.    Past Surgical History:   Procedure Laterality Date    REPAIR, HERNIA, INGUINAL, WITHOUT HISTORY OF PRIOR REPAIR, AGE 5 YEARS OR OLDER Right 5/6/2023    Procedure: REPAIR, HERNIA, INGUINAL, WITHOUT HISTORY OF PRIOR REPAIR, AGE 6 YEARS OR OLDER;  Surgeon: Maurice Piper MD;  Location: [facility] OR [address] [place] [address];  Service: General;  Laterality: Right;  possible laparotomy, possible bowel resection       Review of patient's allergies indicates:  No Known Allergies    No current facility-administered medications on file prior to encounter.     Current Outpatient Medications on File Prior to Encounter   Medication Sig    haloperidol decanoate (HALDOL DECANOATE IM) Inject 2 mg into the muscle every 6 (six) hours as needed (Agitation for 3 days).    hydrOXYzine HCL (ATARAX) 25 MG tablet Take 1 tablet (25 mg total) by mouth 3 (three) times daily.    ibuprofen (ADVIL,MOTRIN) 400 MG tablet Take 1 tablet (400 mg total) by mouth every 6 (six) hours as needed (pain).    miconazole NITRATE 2 % (MICOTIN) 2 % top powder Apply topically twice daily under bilateral breasts    ramelteon (ROZEREM) 8 mg tablet Take 8 mg by mouth every evening.     HYDROcodone-acetaminophen (NORCO) 5-325 mg per tablet Take 1 tablet by mouth every 6 (six) hours as needed for Pain. (Patient not taking: Reported on 5/22/2023)    [DISCONTINUED] amLODIPine (NORVASC) 10 MG tablet Take 10 mg by mouth once daily.    [DISCONTINUED] meclizine (ANTIVERT) 12.5 mg tablet Take 1 tablet (12.5 mg total) by mouth once daily.    [DISCONTINUED] metoprolol succinate (TOPROL-XL) 25 MG 24 hr tablet Take 1 tablet (25 mg total) by mouth 2 (two) times daily.    [DISCONTINUED] pantoprazole (PROTONIX) 40 MG tablet Take 1 tablet (40 mg total) by mouth once daily.     Family History    None       Tobacco Use    Smoking status: Not on file    Smokeless tobacco: Not on file   Substance and Sexual Activity    Alcohol use: Not on file    Drug use: Not on file    Sexual activity: Not on file     Review of Systems   Reason unable to perform ROS: Limited history due to mentation.   Constitutional: Negative for decreased appetite, fever, malaise/fatigue and weight loss.   HENT:  Negative for congestion.    Eyes:  Negative for blurred vision.   Cardiovascular:  Negative for chest pain, dyspnea on exertion, orthopnea and palpitations.   Respiratory:  Negative for cough, shortness of breath and wheezing.    Endocrine: Negative for polyuria.   Gastrointestinal:  Negative for bloating, abdominal pain and constipation.   Genitourinary:  Negative for bladder incontinence.   Objective:     Vital Signs (Most Recent):  Temp: 97.3 °F (36.3 °C) (05/22/23 1106)  Pulse: 74 (05/22/23 1106)  Resp: (!) 22 (05/22/23 1106)  BP: 132/63 (05/22/23 1106)  SpO2: 96 % (05/22/23 1106) Vital Signs (24h Range):  Temp:  [97.3 °F (36.3 °C)-98 °F (36.7 °C)] 97.3 °F (36.3 °C)  Pulse:  [69-77] 74  Resp:  [20-22] 22  SpO2:  [96 %-97 %] 96 %  BP: ()/(56-63) 132/63     Weight: 70.3 kg (155 lb)  Body mass index is 25.79 kg/m².    SpO2: 96 %         Intake/Output Summary (Last 24 hours) at 5/22/2023 1301  Last data filed at 5/22/2023  1227  Gross per 24 hour   Intake 500 ml   Output --   Net 500 ml       Lines/Drains/Airways       Peripheral Intravenous Line  Duration                  Peripheral IV - Single Lumen 05/22/23 1030 20 G Anterior;Left Forearm <1 day                     Physical Exam  Vitals and nursing note reviewed.   Constitutional:       Appearance: She is well-developed.   HENT:      Head: Normocephalic and atraumatic.      Mouth/Throat:      Mouth: Mucous membranes are moist.   Eyes:      Pupils: Pupils are equal, round, and reactive to light.   Cardiovascular:      Rate and Rhythm: Normal rate and regular rhythm.      Pulses: Normal pulses and intact distal pulses.      Heart sounds: Murmur (ESM) heard.   Pulmonary:      Effort: Pulmonary effort is normal. No respiratory distress.      Breath sounds: Normal breath sounds. No rales.   Abdominal:      General: Abdomen is flat. Bowel sounds are normal. There is no distension.      Palpations: Abdomen is soft.      Tenderness: There is no abdominal tenderness. There is no guarding.   Musculoskeletal:      Cervical back: Normal range of motion.      Right lower leg: No edema.      Left lower leg: No edema.   Skin:     General: Skin is warm.   Neurological:      General: No focal deficit present.      Mental Status: She is alert and oriented to person, place, and time.   Psychiatric:         Mood and Affect: Mood normal.        Significant Labs: CMP   Recent Labs   Lab 05/22/23  0926   *   K 3.6   *   CO2 23      BUN 20   CREATININE 1.3   CALCIUM 8.1*   PROT 5.4*   ALBUMIN 2.5*   BILITOT 0.5   ALKPHOS 56   AST 33   ALT 19   ANIONGAP 13   , CBC   Recent Labs   Lab 05/22/23  0926   WBC 8.51   HGB 8.6*   HCT 27.6*      , and INR   Recent Labs   Lab 05/22/23  1228   INR 1.1       Significant Imaging: Echocardiogram: 2D echo with color flow doppler: No results found for this or any previous visit. and Transthoracic echo (TTE) complete (Cupid Only):   Results for  orders placed or performed during the hospital encounter of 04/18/23   Echo   Result Value Ref Range    BSA 1.8 m2    TDI SEPTAL 0.03 m/s    LV LATERAL E/E' RATIO 21.00 m/s    LV SEPTAL E/E' RATIO 35.00 m/s    LA WIDTH 3.73 cm    TDI LATERAL 0.05 m/s    LVIDd 3.33 (A) 3.5 - 6.0 cm    LVIDs 1.90 (A) 2.1 - 4.0 cm    FS 43 28 - 44 %    LA volume 76.32 cm3    Sinus 2.74 cm    STJ 2.66 cm    LA size 3.62 cm    RVDD 3.20 cm    TAPSE 1.68 cm    AV mean gradient 22 mmHg    AV valve area 1.42 cm2    AV Velocity Ratio 0.50     AV index (prosthetic) 0.51     MV mean gradient 7 mmHg    MV valve area p 1/2 method 1.74 cm2    MV valve area by continuity eq 1.76 cm2    E/A ratio 0.58     Mean e' 0.04 m/s    E wave deceleration time 435.65 msec    LVOT diameter 1.88 cm    LVOT area 2.8 cm2    LVOT peak jose 1.51 m/s    LVOT peak VTI 34.00 cm    Ao peak jose 3.03 m/s    Ao VTI 66.32 cm    LVOT stroke volume 94.33 cm3    AV peak gradient 37 mmHg    MV peak gradient 22 mmHg    E/E' ratio 26.25 m/s    MV Peak E Jose 1.05 m/s    TR Max Jose 2.77 m/s    MV VTI 53.64 cm    MV stenosis pressure 1/2 time 126.34 ms    MV Peak A Jose 1.82 m/s    LV Systolic Volume 11.09 mL    LV Systolic Volume Index 6.2 mL/m2    LV Diastolic Volume 45.18 mL    LV Diastolic Volume Index 25.38 mL/m2    LA Volume Index 42.9 mL/m2    RA Major Axis 4.71 cm    Left Atrium Minor Axis 6.65 cm    Left Atrium Major Axis 6.65 cm    Triscuspid Valve Regurgitation Peak Gradient 31 mmHg    LA Volume Index (Mod) 33.4 mL/m2    LA volume (mod) 59.45 cm3    RA Width 3.06 cm    Right Atrial Pressure (from IVC) 3 mmHg    EF 65 %    TV rest pulmonary artery pressure 34 mmHg    Narrative    · The left ventricle is normal in size with normal systolic function. The   estimated ejection fraction is 65%.  · Normal right ventricular size with normal right ventricular systolic   function.  · Grade I left ventricular diastolic dysfunction.  · Moderate left atrial enlargement.  · There is  moderate mitral stenosis. The mean diastolic gradient across   the mitral valve is 7 mmHg at a heart rate of bpm.  · There is mild-to-moderate aortic valve stenosis. Aortic valve area is   1.4 cm2; peak velocity is 3.0 m/s; mean gradient is 22 mmHg.  · The estimated PA systolic pressure is 34 mmHg.  · Normal central venous pressure (3 mmHg).        Assessment and Plan:     Acute pulmonary embolism  8 yr old with recent cystitis, inguinal hernia repair on right, no cardiac history as per caretaker at bedside, dementia came for SOB requiring 3 lt oxygen for last 3 days, weak, tired and some hallucinations.   She underwent open R inguinal hernia repair with mesh on 5/6/23. Discarhed to SNF. She had SOB with oxygen requirement of 3 litres for last 3 days. Today in Ed, CT mario alberto to r/o PE. Cardiology consulted for PE management.  Patient denied any SOB, leg swelling, chest pain , palpitations at bedside.   Caretaker/ Neighbour informed- she has been participating in rehab but last 3 days more hallucinations and upset.    HR 77-77, , Sat 98% on 3 lt NC.  Cr 1.3, hb 8.6 (was 10 2 weeks ago), Troponin 0.058,   EKG NSR 65, non specific ST/T wave changes.    Plan:  Given hemodynamically stable, age and AMS, baseline demientia recommend medical management with heparin gtt.  PESI 87 -147 as AMS seems more chronic as stated by caretaker/neighbor.   Bedside TTE with normal LVEF and RV with TAPSE 2, RV 3.5 cm at base, minimal TR for PASP.  Obtain official echo.  Assess for etiology of AMS including infection, medications etc  Admit to  to cardiology 3rd floor.  Monitor closely for ay new HD instability, symptoms, or increased oxygen requirement/ hypotension/ tachycardia and call cardiology ASAP.  D/W IC staff            VTE Risk Mitigation (From admission, onward)           Ordered     heparin 25,000 units in dextrose 5% (100 units/ml) IV bolus from bag - ADDITIONAL PRN BOLUS - 60 units/kg  As needed (PRN)         Question:  Heparin Infusion Adjustment (DO NOT MODIFY ANSWER)  Answer:  [email or web address]\epic\Images\Pharmacy\HeparinInfusions\heparin HIGH INTENSITY nomogram for [facility] [id number]    05/22/23 1127     heparin 25,000 units in dextrose 5% (100 units/ml) IV bolus from bag - ADDITIONAL PRN BOLUS - 30 units/kg  As needed (PRN)        Question:  Heparin Infusion Adjustment (DO NOT MODIFY ANSWER)  Answer:  [email or web address]\epic\Images\Pharmacy\HeparinInfusions\heparin HIGH INTENSITY nomogram for [facility] [id number]    05/22/23 1127     heparin 25,000 units in dextrose 5% 250 mL (100 units/mL) infusion HIGH INTENSITY nomogram - [facility]  Continuous        Question Answer Comment   Heparin Infusion Adjustment (DO NOT MODIFY ANSWER) [email or web address]\epic\Images\Pharmacy\HeparinInfusions\heparin HIGH INTENSITY nomogram for [facility] [id number]    Begin at (in units/kg/hr) 18        05/22/23 1127                    Thank you for your consult.    Orquidea Bradley MD  Cardiology   [facility] - Emergency Dept      Staff:  I have personally taken the history and examined this patient and agree with the fellow's note as stated above and amended it accordingly :-)  Based on the size and location of the PE's and the patient's age, comorbidities, and excellent clinical appearance, conservative management is optimal with IV then oral anticoagulation.

## 2023-05-22 NOTE — SUBJECTIVE & OBJECTIVE
No past medical history on file.    Past Surgical History:   Procedure Laterality Date    REPAIR, HERNIA, INGUINAL, WITHOUT HISTORY OF PRIOR REPAIR, AGE 6 YEARS OR OLDER Right 5/6/2023    Procedure: REPAIR, HERNIA, INGUINAL, WITHOUT HISTORY OF PRIOR REPAIR, AGE 6 YEARS OR OLDER;  Surgeon: Maurice Piper MD;  Location: [facility] OR [address] [place] [address];  Service: General;  Laterality: Right;  possible laparotomy, possible bowel resection       Review of patient's allergies indicates:  No Known Allergies    No current facility-administered medications on file prior to encounter.     Current Outpatient Medications on File Prior to Encounter   Medication Sig    haloperidol decanoate (HALDOL DECANOATE IM) Inject 2 mg into the muscle every 6 (six) hours as needed (Agitation for 3 days).    hydrOXYzine HCL (ATARAX) 25 MG tablet Take 1 tablet (25 mg total) by mouth 3 (three) times daily.    ibuprofen (ADVIL,MOTRIN) 400 MG tablet Take 1 tablet (400 mg total) by mouth every 6 (six) hours as needed (pain).    miconazole NITRATE 2 % (MICOTIN) 2 % top powder Apply topically twice daily under bilateral breasts    ramelteon (ROZEREM) 8 mg tablet Take 8 mg by mouth every evening.    HYDROcodone-acetaminophen (NORCO) 5-325 mg per tablet Take 1 tablet by mouth every 6 (six) hours as needed for Pain. (Patient not taking: Reported on 5/22/2023)    [DISCONTINUED] amLODIPine (NORVASC) 10 MG tablet Take 10 mg by mouth once daily.    [DISCONTINUED] meclizine (ANTIVERT) 12.5 mg tablet Take 1 tablet (12.5 mg total) by mouth once daily.    [DISCONTINUED] metoprolol succinate (TOPROL-XL) 25 MG 24 hr tablet Take 1 tablet (25 mg total) by mouth 2 (two) times daily.    [DISCONTINUED] pantoprazole (PROTONIX) 40 MG tablet Take 1 tablet (40 mg total) by mouth once daily.     Family History    None       Tobacco Use    Smoking status: Not on file    Smokeless tobacco: Not on file   Substance and Sexual Activity    Alcohol use: Not on file    Drug use: Not on file     Sexual activity: Not on file     Review of Systems   Reason unable to perform ROS: Limited history due to mentation.   Constitutional: Negative for decreased appetite, fever, malaise/fatigue and weight loss.   HENT:  Negative for congestion.    Eyes:  Negative for blurred vision.   Cardiovascular:  Negative for chest pain, dyspnea on exertion, orthopnea and palpitations.   Respiratory:  Negative for cough, shortness of breath and wheezing.    Endocrine: Negative for polyuria.   Gastrointestinal:  Negative for bloating, abdominal pain and constipation.   Genitourinary:  Negative for bladder incontinence.   Objective:     Vital Signs (Most Recent):  Temp: 97.3 °F (36.3 °C) (05/22/23 1106)  Pulse: 74 (05/22/23 1106)  Resp: (!) 22 (05/22/23 1106)  BP: 132/63 (05/22/23 1106)  SpO2: 96 % (05/22/23 1106) Vital Signs (24h Range):  Temp:  [97.3 °F (36.3 °C)-98 °F (36.7 °C)] 97.3 °F (36.3 °C)  Pulse:  [69-77] 74  Resp:  [20-22] 22  SpO2:  [96 %-97 %] 96 %  BP: ()/(56-63) 132/63     Weight: 70.3 kg (155 lb)  Body mass index is 25.79 kg/m².    SpO2: 96 %         Intake/Output Summary (Last 24 hours) at 5/22/2023 1301  Last data filed at 5/22/2023 1227  Gross per 24 hour   Intake 500 ml   Output --   Net 500 ml       Lines/Drains/Airways       Peripheral Intravenous Line  Duration                  Peripheral IV - Single Lumen 05/22/23 1030 20 G Anterior;Left Forearm <1 day                     Physical Exam  Vitals and nursing note reviewed.   Constitutional:       Appearance: She is well-developed.   HENT:      Head: Normocephalic and atraumatic.      Mouth/Throat:      Mouth: Mucous membranes are moist.   Eyes:      Pupils: Pupils are equal, round, and reactive to light.   Cardiovascular:      Rate and Rhythm: Normal rate and regular rhythm.      Pulses: Normal pulses and intact distal pulses.      Heart sounds: Murmur (ESM) heard.   Pulmonary:      Effort: Pulmonary effort is normal. No respiratory distress.       Breath sounds: Normal breath sounds. No rales.   Abdominal:      General: Abdomen is flat. Bowel sounds are normal. There is no distension.      Palpations: Abdomen is soft.      Tenderness: There is no abdominal tenderness. There is no guarding.   Musculoskeletal:      Cervical back: Normal range of motion.      Right lower leg: No edema.      Left lower leg: No edema.   Skin:     General: Skin is warm.   Neurological:      General: No focal deficit present.      Mental Status: She is alert and oriented to person, place, and time.   Psychiatric:         Mood and Affect: Mood normal.        Significant Labs: CMP   Recent Labs   Lab 05/22/23  0926   *   K 3.6   *   CO2 23      BUN 20   CREATININE 1.3   CALCIUM 8.1*   PROT 5.4*   ALBUMIN 2.5*   BILITOT 0.5   ALKPHOS 56   AST 33   ALT 19   ANIONGAP 13   , CBC   Recent Labs   Lab 05/22/23  0926   WBC 8.51   HGB 8.6*   HCT 27.6*      , and INR   Recent Labs   Lab 05/22/23  1228   INR 1.1       Significant Imaging: Echocardiogram: 2D echo with color flow doppler: No results found for this or any previous visit. and Transthoracic echo (TTE) complete (Cupid Only):   Results for orders placed or performed during the hospital encounter of 04/18/23   Echo   Result Value Ref Range    BSA 1.8 m2    TDI SEPTAL 0.03 m/s    LV LATERAL E/E' RATIO 21.00 m/s    LV SEPTAL E/E' RATIO 35.00 m/s    LA WIDTH 3.73 cm    TDI LATERAL 0.05 m/s    LVIDd 3.33 (A) 3.5 - 6.0 cm    LVIDs 1.90 (A) 2.1 - 4.0 cm    FS 43 28 - 44 %    LA volume 76.32 cm3    Sinus 2.74 cm    STJ 2.66 cm    LA size 3.62 cm    RVDD 3.20 cm    TAPSE 1.68 cm    AV mean gradient 22 mmHg    AV valve area 1.42 cm2    AV Velocity Ratio 0.50     AV index (prosthetic) 0.51     MV mean gradient 7 mmHg    MV valve area p 1/2 method 1.74 cm2    MV valve area by continuity eq 1.76 cm2    E/A ratio 0.58     Mean e' 0.04 m/s    E wave deceleration time 435.65 msec    LVOT diameter 1.88 cm    LVOT area 2.8 cm2     LVOT peak jose 1.51 m/s    LVOT peak VTI 34.00 cm    Ao peak jose 3.03 m/s    Ao VTI 66.32 cm    LVOT stroke volume 94.33 cm3    AV peak gradient 37 mmHg    MV peak gradient 22 mmHg    E/E' ratio 26.25 m/s    MV Peak E Jose 1.05 m/s    TR Max Jose 2.77 m/s    MV VTI 53.64 cm    MV stenosis pressure 1/2 time 126.34 ms    MV Peak A Jose 1.82 m/s    LV Systolic Volume 11.09 mL    LV Systolic Volume Index 6.2 mL/m2    LV Diastolic Volume 45.18 mL    LV Diastolic Volume Index 25.38 mL/m2    LA Volume Index 42.9 mL/m2    RA Major Axis 4.71 cm    Left Atrium Minor Axis 6.65 cm    Left Atrium Major Axis 6.65 cm    Triscuspid Valve Regurgitation Peak Gradient 31 mmHg    LA Volume Index (Mod) 33.4 mL/m2    LA volume (mod) 59.45 cm3    RA Width 3.06 cm    Right Atrial Pressure (from IVC) 3 mmHg    EF 65 %    TV rest pulmonary artery pressure 34 mmHg    Narrative    · The left ventricle is normal in size with normal systolic function. The   estimated ejection fraction is 65%.  · Normal right ventricular size with normal right ventricular systolic   function.  · Grade I left ventricular diastolic dysfunction.  · Moderate left atrial enlargement.  · There is moderate mitral stenosis. The mean diastolic gradient across   the mitral valve is 7 mmHg at a heart rate of bpm.  · There is mild-to-moderate aortic valve stenosis. Aortic valve area is   1.4 cm2; peak velocity is 3.0 m/s; mean gradient is 22 mmHg.  · The estimated PA systolic pressure is 34 mmHg.  · Normal central venous pressure (3 mmHg).

## 2023-05-22 NOTE — HPI
Radha Sandoval is a 88 y.o. female with past medical history of HTN, dementia, who presents from [facility] with shortness of breath. History obtained with assistance of patient close family friend who is at bedside. Patient was recently admitted in April for UTI, discharged to SNF, and then presented again and admitted 5/6 - 5/9 for inguinal hernia repair after presenting with groin pain. She was discharged to snf and now presents with shortness of breath for the last few days, along with confusion. She was started on oxygen at snf and has been requiring 3 L to maintain her saturation. She has had confusion along with potential hallucinations, reportedly thinking her son was at bedside; confusion has reportedly been apparent for at least last few weeks but potentially longer.

## 2023-05-22 NOTE — PHARMACY MED REC
"  Admission Medication History     The home medication history was taken by Angelica Thomas.    You may go to "Admission" then "Reconcile Home Medications" tabs to review and/or act upon these items.     The home medication list has been updated by the Pharmacy department.   Please read ALL comments highlighted in yellow.   Please address this information as you see fit.    Feel free to contact us if you have any questions or require assistance.      The medications listed below were removed from the home medication list. Please reorder if appropriate:  Patient reports no longer taking the following medication(s):  AMLODIPINE 10 MG  MECLIZINE 12.5 MG  PANTOPRAZOLE 40 MG    Medications listed below were obtained from: Nursing home  Current Outpatient Medications on File Prior to Encounter   Medication Sig    haloperidol decanoate (HALDOL DECANOATE IM) Inject 2 mg into the muscle every 6 (six) hours as needed (Agitation for 3 days).      hydrOXYzine HCL (ATARAX) 25 MG tablet   Take 1 tablet (25 mg total) by mouth 3 (three) times daily.    ibuprofen (ADVIL,MOTRIN) 400 MG tablet   Take 1 tablet (400 mg total) by mouth every 6 (six) hours as needed (pain).    miconazole NITRATE 2 % (MICOTIN) 2 % top powder   Apply topically twice daily under bilateral breasts    ramelteon (ROZEREM) 8 mg tablet   Take 8 mg by mouth every evening.     Potential issues to be addressed PRIOR TO DISCHARGE  Patient reported not taking the following medications: (NORCO). These medications remain on the home medication list. Please address accordingly.     The listed medications were obtained from another facility ([facility]). The patient may not have been able to fill these prescriptions prior to this admission and may require new scripts upon discharge.           Angelica Thomas  [id number]                .        "

## 2023-05-22 NOTE — HPI
88 yr old with recent cystitis, inguinal hernia repair on right, no cardiac history as per caretaker at bedside, dementia came for SOB requiring 3 lt oxygen for last 3 days, weak, tired and some hallucinations.   She underwent open R inguinal hernia repair with mesh on 5/6/23. Discarhed to SNF. She had SOB with oxygen requirement of 3 litres for last 3 days. Today in Ed, CT mario alberto to r/o PE. Cardiology consulted for PE management.  Patient denied any SOB, leg swelling, chest pain , palpitations at bedside.   Caretaker/ Neighbour informed- she has been participating in rehab but last 3 days more hallucinations and upset.    HR 77-77, , Sat 98% on 3 lt NC.  Cr 1.3, hb 8.6 (was 10 2 weeks ago), Troponin 0.058, BNP pending.  EKG NSR 65, non specific ST/T wave changes.    CTA:    1. Examination positive for extensive pulmonary emboli, asymmetric to the right.  Embolus noted within the lateral aspect of the right main pulmonary artery, encroaching within 4.7 cm of the main pulmonary artery bifurcation. Additional involvement of lobar, inter lobar, segmental and subsegmental pulmonary arteries.  2. Flattening of the interventricular septum concerning for right heart strain the given context.  3. Bilateral pleural effusions and basilar atelectasis. Infectious or noninfectious inflammatory airspace consolidation or possibly small infarction difficult to exclude in the basilar left lower lobe.  4. Additional details, as provided in the body of the report.  This report was flagged in Epic as abnormal.       4/2023: TTE The left ventricle is normal in size with normal systolic function. The estimated ejection fraction is 65%.  Normal right ventricular size with normal right ventricular systolic function.  Moderate left atrial enlargement.  There is moderate mitral stenosis. The mean diastolic gradient across the mitral valve is 7 mmHg at a heart rate of bpm.  There is mild-to-moderate aortic valve stenosis. Aortic valve  area is 1.4 cm2; peak velocity is 3.0 m/s; mean gradient is 22 mmHg.  The estimated PA systolic pressure is 34 mmHg.  Normal central venous pressure (3 mmHg).

## 2023-05-22 NOTE — CONSULTS
I have personally taken the history and examined the patient and agree with the resident's note ( Dr Orquidea Bradley) as stated above.  Also reviewed with Dr Dave. Problems are : 1) Large PE; 2) Anemia; 3) Hypoalbuminemia;4)Recent UTI; 5) Pleural effusions .   Heparin followed by probably Eliquis

## 2023-05-22 NOTE — ED PROVIDER NOTES
Encounter Date: 5/22/2023       History     Chief Complaint   Patient presents with    Shortness of Breath     EMS reports patient is at [facility]/ EMS state they received report that 02 sat has been declining over past 4 days, readings were @ 85-90% on room air, currently on 3L 02 per NC and 97%- EMS also suspect UTI     88-year-old female past medical history of HTN and dementia presenting to ED with complaint of hypoxia.  Patient recently underwent open repair right-sided inguinal hernia on 5/6/23 and was discharged to [facility].  EMS states [facility] staff reported approximately 3 days of hypoxia satting 85-90% on room air.  Patient was subsequently placed on 3 L of oxygen via nasal cannula with improvement in oxygen saturations to 93%.  There was also report of a suspected UTI.  Patient's only complaint is of dry mouth, reporting she has not ate or drank in 3-4 hours and is very thirsty.  She denies fevers, abdominal pain, chest pain or shortness of breath.    Review of patient's allergies indicates:  No Known Allergies  No past medical history on file.  Past Surgical History:   Procedure Laterality Date    REPAIR, HERNIA, INGUINAL, WITHOUT HISTORY OF PRIOR REPAIR, AGE 5 YEARS OR OLDER Right 5/6/2023    Procedure: REPAIR, HERNIA, INGUINAL, WITHOUT HISTORY OF PRIOR REPAIR, AGE 5 YEARS OR OLDER;  Surgeon: Maurice Piper MD;  Location: [facility] OR [address] [place] [address];  Service: General;  Laterality: Right;  possible laparotomy, possible bowel resection     History reviewed. No pertinent family history.     Review of Systems   Constitutional:  Negative for chills and fever.   HENT:  Negative for congestion and rhinorrhea.    Eyes:  Negative for visual disturbance.   Respiratory:  Negative for shortness of breath.    Cardiovascular:  Negative for chest pain.   Gastrointestinal:  Negative for abdominal pain and vomiting.   Genitourinary:  Negative for dysuria.   Musculoskeletal:  Negative for gait problem and  joint swelling.   Skin:  Positive for wound (s/p open repair of R inguinal hernia on 5/6). Negative for rash.   Neurological:  Negative for numbness and headaches.     Physical Exam     Initial Vitals [05/22/23 0849]   BP Pulse Resp Temp SpO2   (!) 95/56 77 20 98 °F (36.7 °C) 96 %      MAP       --         Physical Exam    Nursing note and vitals reviewed.  Constitutional: She is not diaphoretic. No distress.   HENT:   Head: Normocephalic and atraumatic.   Mouth/Throat: Oropharynx is clear and moist.   Eyes: Conjunctivae and EOM are normal.   Neck: Neck supple.   Normal range of motion.  Cardiovascular:  Normal rate, regular rhythm and normal heart sounds.           Pulmonary/Chest: She has no rhonchi. She has no rales.   Left basilar crackles   Abdominal: Abdomen is soft. She exhibits no distension. There is no abdominal tenderness.   Musculoskeletal:         General: No edema. Normal range of motion.      Cervical back: Normal range of motion and neck supple.     Neurological: She is alert and oriented to person, place, and time.   Skin: Skin is warm and dry. Capillary refill takes less than 2 seconds.   R inguinal incision site c/d/I with no surrounding erythema. Staples in place.        ED Course   Procedures  Labs Reviewed   CBC W/ AUTO DIFFERENTIAL - Abnormal; Notable for the following components:       Result Value    RBC 3.06 (*)     Hemoglobin 8.6 (*)     Hematocrit 27.6 (*)     MCHC 31.2 (*)     RDW 15.2 (*)     Immature Granulocytes 0.7 (*)     Immature Grans (Abs) 0.06 (*)     Gran % 75.4 (*)     Lymph % 12.3 (*)     All other components within normal limits   COMPREHENSIVE METABOLIC PANEL - Abnormal; Notable for the following components:    Sodium 149 (*)     Chloride 113 (*)     Calcium 8.1 (*)     Total Protein 5.4 (*)     Albumin 2.5 (*)     eGFR 39.8 (*)     All other components within normal limits    Narrative:     add on troponin per Bambi Zuñiga MD order# [id number] 05/22/2023   @ 10:13     URINALYSIS, REFLEX TO URINE CULTURE - Abnormal; Notable for the following components:    Appearance, UA Hazy (*)     Protein, UA Trace (*)     Occult Blood UA Trace (*)     Leukocytes, UA 3+ (*)     All other components within normal limits    Narrative:     Specimen Source->Urine   TROPONIN I - Abnormal; Notable for the following components:    Troponin I 0.058 (*)     All other components within normal limits    Narrative:     add on troponin per Bambi Zuñiga MD order# [id number] 05/22/2023   @ 10:13    TROPONIN I - Abnormal; Notable for the following components:    Troponin I 0.062 (*)     All other components within normal limits   URINALYSIS MICROSCOPIC - Abnormal; Notable for the following components:    WBC, UA 55 (*)     All other components within normal limits    Narrative:     Specimen Source->Urine   B-TYPE NATRIURETIC PEPTIDE - Abnormal; Notable for the following components:     (*)     All other components within normal limits    Narrative:     Add on per Bambi Zuñiga MD 05/22/2023  11:46   add on troponin per Bambi Zuñiga MD order# [id number] 05/22/2023   @ 10:13    ISTAT PROCEDURE - Abnormal; Notable for the following components:    POC PH 7.479 (*)     POC SATURATED O2 90 (*)     All other components within normal limits   CULTURE, URINE   TROPONIN I   B-TYPE NATRIURETIC PEPTIDE   APTT    Narrative:     Draw baseline aPTT prior to starting the heparin bolus or  infusion  (if patient is on warfarin prior to heparin therapy)   PROTIME-INR    Narrative:     Draw baseline aPTT prior to starting the heparin bolus or  infusion  (if patient is on warfarin prior to heparin therapy)   TYPE & SCREEN        ECG Results              EKG 12-lead (Final result)  Result time 05/22/23 11:02:27      Final result by Interface, Lab In [facility] (05/22/23 11:02:27)                   Narrative:    Test Reason : R09.02,    Vent. Rate : 065 BPM     Atrial Rate : 065 BPM     P-R Int : 146 ms           QRS Dur : 084 ms      QT Int : 460 ms       P-R-T Axes : 025 009 062 degrees     QTc Int : 478 ms    Normal sinus rhythm  Nonspecific T wave abnormality  Prolonged QT  Abnormal ECG  When compared with ECG of 23-APR-2023 07:54,  Premature atrial complexes are no longer Present  Vent. rate has decreased BY  32 BPM  Nonspecific T wave abnormality now evident in Anterior leads  Confirmed by Todd DERAS MD ([id number]) on 5/22/2023 11:02:20 AM    Referred By: [email or web address]   SELF           Confirmed By:Todd DERAS MD                                  Imaging Results               CTA Chest Non-Coronary (PE Studies) (Final result)  Result time 05/22/23 11:31:55      Final result by Edwin Parker MD (05/22/23 11:31:55)                   Impression:      1. Examination positive for extensive pulmonary emboli, asymmetric to the right.  Embolus noted within the lateral aspect of the right main pulmonary artery, encroaching within 4.7 cm of the main pulmonary artery bifurcation. Additional involvement of lobar, inter lobar, segmental and subsegmental pulmonary arteries.  2. Flattening of the interventricular septum concerning for right heart strain the given context.  3. Bilateral pleural effusions and basilar atelectasis. Infectious or noninfectious inflammatory airspace consolidation or possibly small infarction difficult to exclude in the basilar left lower lobe.  4. Additional details, as provided in the body of the report.  This report was flagged in Epic as abnormal.      Electronically signed by: Edwin Parker  Date:    05/22/2023  Time:    11:31               Narrative:    EXAMINATION:  CTA CHEST NON CORONARY (PE STUDIES)    CLINICAL HISTORY:  Pulmonary embolism (PE) suspected, unknown D-dimer;    TECHNIQUE:  Low dose axial images, sagittal and coronal reformations were obtained from the thoracic inlet to the lung bases following the IV administration of 100 mL of Omnipaque 350.  Contrast timing was optimized to evaluate the  pulmonary arteries.  MIP images were performed.    COMPARISON:  None    FINDINGS:  Motion compromised examination.    Exam quality: Diagnostic quality.    Pulmonary embolism: Examination positive for extensive pulmonary emboli, asymmetric to the right.  Embolus noted within the lateral aspect of the right main pulmonary artery, encroaching within 4.7 cm of the main pulmonary artery bifurcation. Additional involvement of lobar, inter lobar, segmental and subsegmental pulmonary arteries.    Central pulmonary arteries: Normal caliber.    Aorta: Nonaneurysmal.  Left-sided arch.  Moderate to heavy atherosclerotic calcifications.  Suspect conventional 3 vessel arch anatomy.    Heart: Flattening of the interventricular septum concerning for right heart strain the given context.    Coronary arteries: Heavy atherosclerotic calcifications notably involving the LAD.  Overall, suspect 3-vessel distribution atherosclerosis.    Pericardium: Normal. No effusion, thickening, or calcification.    Support tubes and lines: None.    Base of neck/thyroid: Normal.    Lymph nodes: No supraclavicular, axillary, internal mammary, mediastinal, or hilar adenopathy.    Esophagus: Patulous and fluid-filled, finding which may be seen in setting of dysmotility.    Pleura: Trace right and small to moderate left pleural effusions.  Fluid noted within the azygous fissure.    Upper abdomen: Simple appearing cysts in the liver are again appreciated.    Body wall: Within the left breast soft tissues, there is suggestion of 2 lobulated soft tissue lesions measuring approximately 20 mm in 27 mm, respectively (axial series 2, image 313).    Airways: Central airways grossly patent.    Lungs: Assessment lungs limited due to respiratory motion.  Dependent atelectasis.  Infectious or noninfectious inflammatory airspace consolidation or possibly small infarction difficult to exclude in the basilar left lower lobe.    Bones: Query osseous demineralization.   Mild irregularity of the superior endplates of the T3-T7 vertebra may relate to Schmorl's node versus remote fractures.  No definite retropulsion.  No discrete fracture lines identified.  Appearance similar to recent CT imaging of 04/18/2023.                                       X-Ray Chest AP Portable (Final result)  Result time 05/22/23 11:04:53      Final result by Edwin Parker MD (05/22/23 11:04:53)                   Impression:      Prominent interstitial opacities could relate to pulmonary edema versus infectious or noninfectious inflammatory etiology.    Left basilar opacity could relate to atelectasis, aspiration or pneumonia.    Question small pleural effusion.      Electronically signed by: Edwin Parker  Date:    05/22/2023  Time:    11:04               Narrative:    EXAMINATION:  XR CHEST AP PORTABLE    CLINICAL HISTORY:  Hypoxemia    TECHNIQUE:  Single frontal view of the chest was performed.    COMPARISON:  Chest radiograph performed 04/26/2023.    FINDINGS:  Monitoring leads overlie the chest.  Grossly unchanged cardiomediastinal contours.  Atherosclerosis of the aorta.  Prominence and indistinctness of central pulmonary vasculature prominent interstitial markings.    More ill-defined opacity at the left lung base.  Question small left pleural effusion.    No definite acute findings in the visualized abdomen.  Osseous and soft tissue structures appear without definite acute abnormality.                                       Medications   heparin 25,000 units in dextrose 5% 250 mL (100 units/mL) infusion HIGH INTENSITY nomogram - [facility] (18 Units/kg/hr × 62.3 kg (Adjusted) Intravenous New Bag 5/22/23 1300)   heparin 25,000 units in dextrose 5% (100 units/ml) IV bolus from bag - ADDITIONAL PRN BOLUS - 60 units/kg (has no administration in time range)   heparin 25,000 units in dextrose 5% (100 units/ml) IV bolus from bag - ADDITIONAL PRN BOLUS - 30 units/kg (has no administration in time range)    sodium chloride 0.9% flush 10 mL (has no administration in time range)   naloxone 0.4 mg/mL injection 0.02 mg (has no administration in time range)   glucose chewable tablet 16 g (has no administration in time range)   glucose chewable tablet 24 g (has no administration in time range)   glucagon (human recombinant) injection 1 mg (has no administration in time range)   cefTRIAXone (ROCEPHIN) 1 g in dextrose 5 % in water (D5W) 5 % 50 mL IVPB (MB+) (has no administration in time range)   QUEtiapine tablet 25 mg (has no administration in time range)   dextrose 10% bolus 125 mL 125 mL (has no administration in time range)   dextrose 10% bolus 250 mL 250 mL (has no administration in time range)   lactated ringers bolus 500 mL (0 mLs Intravenous Stopped 5/22/23 1227)   iohexoL (OMNIPAQUE 350) injection 100 mL (100 mLs Intravenous Given 5/22/23 1106)   heparin 25,000 units in dextrose 5% (100 units/ml) IV bolus from bag INITIAL BOLUS (4,984 Units Intravenous Bolus from Bag 5/22/23 1304)   cefTRIAXone (ROCEPHIN) 1 g in dextrose 5 % in water (D5W) 5 % 50 mL IVPB (MB+) (0 g Intravenous Stopped 5/22/23 1335)     Medical Decision Making:   History:   Old Medical Records: I decided to obtain old medical records.  Differential Diagnosis:   PE  ACS  PNA  CHF  COPD  Pericarditis  Independently Interpreted Test(s):   I have ordered and independently interpreted EKG Reading(s) - see summary below       <> Summary of EKG Reading(s): Normal sinus rhythm.  No STEMI.  Clinical Tests:   Lab Tests: Ordered and Reviewed  Radiological Study: Ordered and Reviewed  Medical Tests: Ordered and Reviewed  ED Management:  Patient is ill-appearing the hemodynamically stable.  Formal echo ordered.  Labs notable for Na 149, Cr 1.3 (0.7 two weeks ago), trop 0.058 (0.063 one month ago), Hgb 8.6 (10.3 two wks ago). FOBT positive.  Patient given 500 cc IV fluid bolus.  Chest x-ray demonstrates prominent interstitial opacities that could relate to  pulmonary edema versus infectious or noninfectious inflammatory etiology.  There is also a finding of left basilar opacity could relate to atelectasis, aspiration or pneumonia. CT study shows extensive right-sided pulmonary emboli with embolus noted within the lateral aspect of the right main pulmonary artery, encroaching within 4.7 cm of the main pulmonary artery bifurcation. Additional involvement of lobar, inter lobar, segmental and subsegmental pulmonary arteries appreciated along with flattening of the interventricular septum concerning for right heart strain the given context.  Heparin gtt started.  Cardiology consulted and performed bedside echo which did not demonstrate right heart strain.  Patient continues satting well on 3 L of oxygen via nasal cannula.  Per cardiology recommendations, patient admitted to step-down bed with Hospital Medicine with interventional cardiology following.           ED Course as of 05/22/23 1544   Mon May 22, 2023   0957 Hemoglobin(!): 8.6 [HS]   0957 Hematocrit(!): 27.6  Drop from baseline [HS]   1144 CTA results discussed with radiology, concerning for proximal PE with signs of heart strain. Cardiology consulted [HS]      ED Course User Index  [HS] Bambi Zuñiga MD                   Clinical Impression:   Final diagnoses:  [R09.02] Hypoxia  [I26.99] Pulmonary embolism        ED Disposition Condition    Admit Stable                Nirali Gore MD  Resident  05/22/23 2270

## 2023-05-22 NOTE — PROGRESS NOTES
05/22/23 1553   Vital Signs   Temp 97.6 °F (36.4 °C)   Temp Source Oral   Pulse 78   Heart Rate Source Monitor   Resp 18   SpO2 96 %   /80   MAP (mmHg) 103   BP Location Right arm   BP Method Automatic   Patient Position Lying        Cardiac/Telemetry Details / Alarms   Cardiac/Telemetry Monitor On Yes   Cardiac/Telemetry Start time of monitoring 1557   Cardiac/Telemetry Audible Yes   Cardiac/Telemetry Alarms Set Yes   Assessments (Pre/Post)   Level of Consciousness (AVPU) alert     Patient admitted to CSU. Patient arrived to floor from ED no evidence of distress; patient AAO x1 at this time. Patient placed on tele. Vital signs obtained. Patient voices no complaints at this time. Plan of care initiated with patient. Bed in lowest position, locked, SR up x2, call bell in reach. Will continue to monitor patient throughout my shift

## 2023-05-22 NOTE — H&P
Bijan Gusman - Emergency Dept  [facility] Medicine  History & Physical    Patient Name: Radha Sandoval  MRN: [id number]  Patient Class: IP- Inpatient  Admission Date: 5/22/2023  Attending Physician: Jose Mcgregor*   Primary Care Provider: Primary Doctor No         Patient information was obtained from patient, caregiver / friend, past medical records and ER records.     Subjective:     Principal Problem:Acute pulmonary embolism    Chief Complaint:   Chief Complaint   Patient presents with    Shortness of Breath     EMS reports patient is at [facility]/ EMS state they received report that 02 sat has been declining over past 4 days, readings were @ 85-90% on room air, currently on 3L 02 per NC and 97%- EMS also suspect UTI        HPI: Radha Sandoval is a 88 y.o. female with past medical history of HTN, dementia, who presents from [facility] with shortness of breath. History obtained with assistance of patient close family friend who is at bedside. Patient was recently admitted in April for UTI, discharged to SNF, and then presented again and admitted 5/6 - 5/9 for inguinal hernia repair after presenting with groin pain. She was discharged to snf and now presents with shortness of breath for the last few days, along with confusion. She was started on oxygen at snf and has been requiring 3 L to maintain her saturation. She has had confusion along with potential hallucinations, reportedly thinking her son was at bedside; confusion has reportedly been apparent for at least last few weeks but potentially longer. In the ED, HR 77-77, , Sat 98% on 3L NC. CTA showed Extensive pulmonary emboli, asymmetric to the right. Embolus noted within the lateral aspect of the right main pulmonary artery, encroaching within 4.7 cm of the main pulmonary artery bifurcation. Additional involvement of lobar, inter lobar, segmental and subsegmental pulmonary arteries. Patient denies chest pain, palpitations, lightheadedness, fever  / chills, abdominal pain, nausea / vomiting. Cardiology consulted and patient was started on heparin gtt. Bedside echo without evidence of right heart strain. She was admitted to hospital medicine.       No past medical history on file.    Past Surgical History:   Procedure Laterality Date    REPAIR, HERNIA, INGUINAL, WITHOUT HISTORY OF PRIOR REPAIR, AGE 5 YEARS OR OLDER Right 5/6/2023    Procedure: REPAIR, HERNIA, INGUINAL, WITHOUT HISTORY OF PRIOR REPAIR, AGE 5 YEARS OR OLDER;  Surgeon: Maurice Piper MD;  Location: [facility] OR [address] [place] [address];  Service: General;  Laterality: Right;  possible laparotomy, possible bowel resection       Review of patient's allergies indicates:  No Known Allergies    No current facility-administered medications on file prior to encounter.     Current Outpatient Medications on File Prior to Encounter   Medication Sig    haloperidol decanoate (HALDOL DECANOATE IM) Inject 2 mg into the muscle every 6 (six) hours as needed (Agitation for 3 days).    hydrOXYzine HCL (ATARAX) 25 MG tablet Take 1 tablet (25 mg total) by mouth 3 (three) times daily.    ibuprofen (ADVIL,MOTRIN) 400 MG tablet Take 1 tablet (400 mg total) by mouth every 6 (six) hours as needed (pain).    miconazole NITRATE 2 % (MICOTIN) 2 % top powder Apply topically twice daily under bilateral breasts    ramelteon (ROZEREM) 8 mg tablet Take 8 mg by mouth every evening.    HYDROcodone-acetaminophen (NORCO) 5-325 mg per tablet Take 1 tablet by mouth every 6 (six) hours as needed for Pain. (Patient not taking: Reported on 5/22/2023)    [DISCONTINUED] amLODIPine (NORVASC) 10 MG tablet Take 10 mg by mouth once daily.    [DISCONTINUED] meclizine (ANTIVERT) 12.5 mg tablet Take 1 tablet (12.5 mg total) by mouth once daily.    [DISCONTINUED] metoprolol succinate (TOPROL-XL) 25 MG 24 hr tablet Take 1 tablet (25 mg total) by mouth 2 (two) times daily.    [DISCONTINUED] pantoprazole (PROTONIX) 40 MG tablet Take 1 tablet (40 mg total) by  mouth once daily.     Family History    None       Tobacco Use    Smoking status: Not on file    Smokeless tobacco: Not on file   Substance and Sexual Activity    Alcohol use: Not on file    Drug use: Not on file    Sexual activity: Not on file     Review of Systems   Unable to perform ROS: Dementia   Objective:     Vital Signs (Most Recent):  Temp: 98.3 °F (36.8 °C) (05/22/23 1503)  Pulse: 76 (05/22/23 1503)  Resp: (!) 22 (05/22/23 1503)  BP: (!) 142/63 (05/22/23 1503)  SpO2: 98 % (05/22/23 1503) Vital Signs (24h Range):  Temp:  [97.3 °F (36.3 °C)-98.3 °F (36.8 °C)] 98.3 °F (36.8 °C)  Pulse:  [69-82] 76  Resp:  [20-26] 22  SpO2:  [96 %-100 %] 98 %  BP: ()/(56-63) 142/63     Weight: 70.3 kg (155 lb)  Body mass index is 25.79 kg/m².     Physical Exam  Constitutional:       General: She is not in acute distress.     Appearance: Normal appearance. She is not toxic-appearing or diaphoretic.   HENT:      Head: Normocephalic and atraumatic.   Cardiovascular:      Rate and Rhythm: Normal rate and regular rhythm.      Heart sounds: Murmur heard.     No friction rub. No gallop.   Pulmonary:      Effort: Pulmonary effort is normal. No respiratory distress.      Breath sounds: Normal breath sounds. No wheezing.   Abdominal:      General: Abdomen is flat. There is no distension.      Palpations: Abdomen is soft.      Tenderness: There is no abdominal tenderness. There is no guarding or rebound.      Comments: Inguinal incision  Staples  No signs infection   Musculoskeletal:      Right lower leg: No edema.      Left lower leg: No edema.   Skin:     General: Skin is warm and dry.      Findings: No erythema.   Neurological:      Mental Status: She is alert. She is disoriented.              Significant Labs: All pertinent labs within the past 24 hours have been reviewed.    Significant Imaging: I have reviewed all pertinent imaging results/findings within the past 24 hours.    Assessment/Plan:     * Acute pulmonary  embolism  Acute hypoxic respiratory failure  88 y.o. female with history of HTN, dementia, who presents from [facility] with shortness of breath.   - HR 77-77, , Sat 98% on 3L NC.   - CTA showed Extensive pulmonary emboli, asymmetric to the right. Embolus noted within the lateral aspect of the right main pulmonary artery, encroaching within 4.7 cm of the main pulmonary artery bifurcation. Additional involvement of lobar, inter lobar, segmental and subsegmental pulmonary arteries.   - Cardiology consulted appreciate assistance  - maintain heparin gtt, ultimate plan to transition to doac  - obtain formal echo  - telemetry    Delirium  Carries diagnosis of dementia  More recently reported some hallucinations  Possible acutely worsened in setting of UTI and hypoxia  Delirium precautions  Seroquel qhs      Acute cystitis with hematuria  Started on rocephin  F/u urine culture      Hypertension  No longer on medication        VTE Risk Mitigation (From admission, onward)         Ordered     IP VTE HIGH RISK PATIENT  Once         05/22/23 1454     Place sequential compression device  Until discontinued         05/22/23 1454     heparin 25,000 units in dextrose 5% (100 units/ml) IV bolus from bag - ADDITIONAL PRN BOLUS - 60 units/kg  As needed (PRN)        Question:  Heparin Infusion Adjustment (DO NOT MODIFY ANSWER)  Answer:  \\[email or web address]\[employer]\Images\Pharmacy\HeparinInfusions\heparin HIGH INTENSITY nomogram for [facility] [id number].pdf    05/22/23 1127     heparin 25,000 units in dextrose 5% (100 units/ml) IV bolus from bag - ADDITIONAL PRN BOLUS - 30 units/kg  As needed (PRN)        Question:  Heparin Infusion Adjustment (DO NOT MODIFY ANSWER)  Answer:  \[email or web address]\[email or web address]\Images\Pharmacy\HeparinInfusions\heparin HIGH INTENSITY nomogram for [facility] [id number].pdf    05/22/23 1127     heparin 25,000 units in dextrose 5% 250 mL (100 units/mL) infusion HIGH INTENSITY nomogram - [facility]  Continuous        Question Answer Comment   Heparin  Infusion Adjustment (DO NOT MODIFY ANSWER) \\[email or web address]\[email or web address]\Images\Pharmacy\HeparinInfusions\heparin HIGH INTENSITY nomogram for [facility] [id number]    Begin at (in units/kg/hr) 18 05/22/23 1127                           Jose Davis MD  Department of Hospital Medicine  [facility] - Emergency Dept

## 2023-05-22 NOTE — ASSESSMENT & PLAN NOTE
Acute hypoxic respiratory failure  88 y.o. female with history of HTN, dementia, who presents from [facility] with shortness of breath.   - HR 77-77, , Sat 98% on 3L NC.   - CTA showed Extensive pulmonary emboli, asymmetric to the right. Embolus noted within the lateral aspect of the right main pulmonary artery, encroaching within 4.7 cm of the main pulmonary artery bifurcation. Additional involvement of lobar, inter lobar, segmental and subsegmental pulmonary arteries.   - Cardiology consulted appreciate assistance  - maintain heparin gtt, ultimate plan to transition to doac  - obtain formal echo  - telemetry

## 2023-05-22 NOTE — PLAN OF CARE
Problem: Adult Inpatient Plan of Care  Goal: Patient-Specific Goal (Individualized)  Outcome: Ongoing, Progressing  Flowsheets (Taken 5/22/2023 1807)  Anxieties, Fears or Concerns: Falling  Individualized Care Needs: Maintain Delirium precautions. Perform Spann Cath care. Tele sitter @ bedside.

## 2023-05-23 NOTE — PLAN OF CARE
[facility]ijan Olivaresy [facility]  Initial Discharge Assessment       Primary Care Provider: Primary Doctor No    Admission Diagnosis: Inguinal hernia [K40.90]  Non-recurrent unilateral inguinal hernia with obstruction without gangrene [K40.30]    Admission Date: 5/6/2023  Expected Discharge Date: 5/9/2023    Transition of Care Barriers: None    Payor: MEDICARE / Plan: MEDICARE PART A & B / Product Type: Government /     Extended Emergency Contact Information  Primary Emergency Contact: tony griffith  Mobile Phone: [phone]  Relation: Son   needed? No  Secondary Emergency Contact: chel ramirez  Mobile Phone: [phone]  Relation: Other   needed? No    Discharge Plan A: Skilled Nursing Facility  Discharge Plan B: Independent living facility, Home Health    No Pharmacies Listed    Initial Assessment (most recent)       Adult Discharge Assessment - 05/23/23 1356          Discharge Assessment    Assessment Type Discharge Planning Assessment     Confirmed/corrected address, phone number and insurance Yes     Confirmed Demographics Correct on Facesheet     Source of Information patient;family   called and spoke with patient's son Tony Griffith ([phone])    If unable to respond/provide information was family/caregiver contacted? Yes     Contact Name/Number Tony Griffith (son) [phone]     Communicated TOSHA with patient/caregiver Date not available/Unable to determine     Reason For Admission confusion     People in Home alone     Facility Arrived From: [facility] [phone] [address] [place] [address]. [address]     Do you expect to return to your current living situation? Yes     Do you have help at home or someone to help you manage your care at home? Yes     Who are your caregiver(s) and their phone number(s)? Tony Griffith (son) [phone] ( son hired a care taker Chel to assist her at [facility]     Prior to hospitilization cognitive status: Unable to Assess     Current cognitive status:  Not Oriented to Place;Not Oriented to Time   patient confused time and place during conversation.    Walking or Climbing Stairs ambulation difficulty, requires equipment;transferring difficulty, assistance 1 person     Mobility Management wheelchair and walker with one person assist.     Dressing/Bathing bathing difficulty, requires equipment;bathing difficulty, assistance 1 person   shower chair    Equipment Currently Used at Home wheelchair     Readmission within 30 days? Yes     Patient currently being followed by outpatient case management? No     Do you currently have service(s) that help you manage your care at home? No     Do you take prescription medications? Yes     Do you have prescription coverage? Yes     Coverage Medicare part A  & B     Do you have any problems affording any of your prescribed medications? No     Is the patient taking medications as prescribed? yes     Who is going to help you get home at discharge? will need transportation to SNF ( her plan is to return to SNF and then after to go to her assisted Living at [facility].     How do you get to doctors appointments? family or friend will provide     Are you on dialysis? No     Do you take coumadin? No     Discharge Plan A Skilled Nursing Facility     Discharge Plan B Independent living facility;Home Health     DME Needed Upon Discharge  other (see comments)   TBD    Discharge Plan discussed with: Patient;Adult children     Name(s) and Number(s) Maykel Sandoval (son) [phone]     Transition of Care Barriers None        Financial Resource Strain    How hard is it for you to pay for the very basics like food, housing, medical care, and heating? Not hard at all        Housing Stability    In the last 12 months, was there a time when you were not able to pay the mortgage or rent on time? Yes     In the last 12 months, how many places have you lived? 1     In the last 12 months, was there a time when you did not have a steady place to  sleep or slept in a shelter (including now)? Yes        Transportation Needs    In the past 12 months, has lack of transportation kept you from medical appointments or from getting medications? No     In the past 12 months, has lack of transportation kept you from meetings, work, or from getting things needed for daily living? No        Food Insecurity    Within the past 12 months, you worried that your food would run out before you got the money to buy more. Never true     Within the past 12 months, the food you bought just didn't last and you didn't have money to get more. Never true        Stress    Do you feel stress - tense, restless, nervous, or anxious, or unable to sleep at night because your mind is troubled all the time - these days? Not at all        Social Connections    In a typical week, how many times do you talk on the phone with family, friends, or neighbors? More than three times a week     How often do you get together with friends or relatives? Patient refused     Are you , , , , never , or living with a partner?         Alcohol Use    Q1: How often do you have a drink containing alcohol? Never     Q2: How many drinks containing alcohol do you have on a typical day when you are drinking? Patient does not drink     Q3: How often do you have six or more drinks on one occasion? Never                      CM met with the patient at her bedside. Gave her the discharge booklet and explained to her the discharge process. She is pleasantly confused to place and time. Redirected easily and she was able to verify her name and  . Did call son Maykel Sandoval ([phone]) to verify information from prior assessment and current stay. Son stated the goal is for his mother to return to SNF  at [facility] for therapy and get her strong enough to return to [facility] assisted living facility where she can then get home health for her therapies after. He  "also stated he is interested in a pharmacy like [facility] whom delivers to get her medications delivered to her. Prior to April 2023 she lived alone at [facility] and son hired her a care taker "Chel" . Patient returned to hospital and was discharged to SNF and came back to hospital at this time. Will continue to monitor for discharge needs.     Elizabeth Russo RN CM   [phone]            "

## 2023-05-23 NOTE — PLAN OF CARE
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 No  Secondary Emergency Contact: scott ramirez  Mobile Phone: [phone]  Relation: Other   needed? No    Discharge Plan A: Skilled Nursing Facility  Discharge Plan B: Independent living facility    No Pharmacies Listed    Initial Assessment (most recent)       Adult Discharge Assessment - 05/23/23 1415          Discharge Assessment    Assessment Type Discharge Planning Assessment     Confirmed/corrected address, phone number and insurance Yes     Confirmed Demographics Correct on Facesheet     Source of Information patient;family     If unable to respond/provide information was family/caregiver contacted? Yes     Contact Name/Number son Tony Griffith [phone]     Communicated TOSHA with patient/caregiver Date not available/Unable to determine     Reason For Admission acute pulmonary embolism     People in Home alone     Facility Arrived From: [facility]     Do you expect to return to your current living situation? Yes     Do you have help at home or someone to help you manage your care at home? Yes     Who are your caregiver(s) and their phone  number(s)? facility staff     Prior to hospitilization cognitive status: Unable to Assess     Current cognitive status: Not Oriented to Time;Not Oriented to Place   pleasantly confused to place    Walking or Climbing Stairs ambulation difficulty, requires equipment     Mobility Management wheelchair and walker with one person assist     Dressing/Bathing bathing difficulty, assistance 1 person     Dressing/Bathing Management assist of one person     Equipment Currently Used at Home wheelchair     Readmission within 30 days? Yes     Patient currently being followed by outpatient case management? No     Do you currently have service(s) that help you manage your care at home? No     Do you take prescription medications? Yes     Do you have prescription coverage? Yes     Coverage Medicare A&B     Do you have any problems affording any of your prescribed medications? No     Is the patient taking medications as prescribed? yes     Who is going to help you get home at discharge? lang Sandoval [phone] ( if returning to SNF we will provide transportation)     How do you get to doctors appointments? family or friend will provide     Are you on dialysis? No     Do you take coumadin? No     Discharge Plan A Skilled Nursing Facility     Discharge Plan B Independent living facility     DME Needed Upon Discharge  wheelchair;walker, standard     Discharge Plan discussed with: Patient;Adult children     Transition of Care Barriers None                       CM met with the patient at her bedside. Gave her the discharge booklet and explained to her the discharge process. She is pleasantly confused to place and time. Redirected easily and she was able to verify her name and  . Did call lang Sandoval ([phone]) to verify information from prior assessment and current stay. Son stated the goal is for his mother to return to SNF  at [facility] for therapy and get her strong enough to return to [facility] her  "assisted living facility where she can then get home health for her therapies after. He also stated he is interested in a pharmacy like [facility] whom delivers to get her medications delivered to her. Prior to April 2023 she lived alone at [facility] and son hired her a care taker "Chel" . Patient returned to hospital and was discharged to SNF and came back to hospital at this time. Will continue to monitor for discharge needs.      Elizabeth Russo RN CM   [phone]    "

## 2023-05-23 NOTE — SUBJECTIVE & OBJECTIVE
Interval History: No acute events. Pt remains confused    Review of Systems  Objective:     Vital Signs (Most Recent):  Temp: 97.6 °F (36.4 °C) (05/23/23 1537)  Pulse: 82 (05/23/23 1537)  Resp: 18 (05/23/23 1537)  BP: (!) 153/59 (05/23/23 1537)  SpO2: 95 % (05/23/23 1537) Vital Signs (24h Range):  Temp:  [96.5 °F (35.8 °C)-98.5 °F (36.9 °C)] 97.6 °F (36.4 °C)  Pulse:  [] 82  Resp:  [18-20] 18  SpO2:  [95 %-97 %] 95 %  BP: (120-156)/(59-90) 153/59     Weight: 72.4 kg (159 lb 9.8 oz)  Body mass index is 26.56 kg/m².    Intake/Output Summary (Last 24 hours) at 5/23/2023 1720  Last data filed at 5/23/2023 1150  Gross per 24 hour   Intake --   Output 1350 ml   Net -1350 ml         Physical Exam  Constitutional:       General: She is not in acute distress.     Appearance: Normal appearance. She is not toxic-appearing or diaphoretic.   HENT:      Head: Normocephalic and atraumatic.   Cardiovascular:      Rate and Rhythm: Normal rate and regular rhythm.      Heart sounds: Murmur heard.     No friction rub. No gallop.   Pulmonary:      Effort: Pulmonary effort is normal. No respiratory distress.      Breath sounds: Normal breath sounds. No wheezing.   Abdominal:      General: Abdomen is flat. There is no distension.      Palpations: Abdomen is soft.      Tenderness: There is no abdominal tenderness. There is no guarding or rebound.      Comments: Inguinal incision  Staples  No signs infection   Musculoskeletal:      Right lower leg: No edema.      Left lower leg: No edema.   Skin:     General: Skin is warm and dry.      Findings: No erythema.   Neurological:      Mental Status: She is alert. She is disoriented.           Significant Labs: All pertinent labs within the past 24 hours have been reviewed.    Significant Imaging: I have reviewed all pertinent imaging results/findings within the past 24 hours.

## 2023-05-23 NOTE — SUBJECTIVE & OBJECTIVE
Interval History: No acute events overnight, afebrile, hemodynamically stable.       Review of Systems   Unable to perform ROS: Dementia   Objective:     Vital Signs (Most Recent):  Temp: 98.3 °F (36.8 °C) (05/23/23 0845)  Pulse: 93 (05/23/23 0845)  Resp: 18 (05/23/23 0845)  BP: 122/74 (05/23/23 0845)  SpO2: 95 % (05/23/23 0845) Vital Signs (24h Range):  Temp:  [96.5 °F (35.8 °C)-98.3 °F (36.8 °C)] 98.3 °F (36.8 °C)  Pulse:  [68-93] 93  Resp:  [18-26] 18  SpO2:  [95 %-100 %] 95 %  BP: (122-156)/(59-80) 122/74     Weight: 72.4 kg (159 lb 9.8 oz)  Body mass index is 26.56 kg/m².     SpO2: 95 %         Intake/Output Summary (Last 24 hours) at 5/23/2023 1108  Last data filed at 5/23/2023 0522  Gross per 24 hour   Intake 500.79 ml   Output 800 ml   Net -299.21 ml       Lines/Drains/Airways       Drain  Duration                  Urethral Catheter 05/22/23 1801 Silicone 16 Fr. <1 day              Peripheral Intravenous Line  Duration                  Peripheral IV - Single Lumen 05/22/23 1030 20 G Anterior;Left Forearm 1 day                       Physical Exam  Vitals and nursing note reviewed.   Constitutional:       General: She is not in acute distress.     Appearance: Normal appearance. She is not ill-appearing, toxic-appearing or diaphoretic.   HENT:      Head: Normocephalic and atraumatic.   Eyes:      General: No scleral icterus.        Right eye: No discharge.         Left eye: No discharge.   Cardiovascular:      Rate and Rhythm: Normal rate and regular rhythm.      Heart sounds: Murmur heard.   Pulmonary:      Effort: Pulmonary effort is normal. No respiratory distress.      Breath sounds: Normal breath sounds.   Abdominal:      General: Bowel sounds are normal.      Palpations: Abdomen is soft.      Tenderness: There is no abdominal tenderness.   Musculoskeletal:         General: No swelling or tenderness.      Right lower leg: Edema present.      Left lower leg: Edema present.   Feet:      Right foot:       Toenail Condition: Right toenails are abnormally thick and long.      Left foot:      Toenail Condition: Left toenails are abnormally thick and long.   Skin:     General: Skin is warm and dry.   Neurological:      Mental Status: She is alert.   Psychiatric:         Mood and Affect: Mood normal.         Behavior: Behavior normal.              LABS:  Recent Labs   Lab 05/22/23  0926 05/23/23  0640   * 149*   K 3.6 3.0*   * 113*   CO2 23 24   BUN 20 12   CREATININE 1.3 0.8    104   ANIONGAP 13 12     Recent Labs   Lab 05/22/23  0926   AST 33   ALT 19   ALKPHOS 56   BILITOT 0.5   ALBUMIN 2.5*      Recent Labs   Lab 05/22/23  0926 05/23/23  0640   WBC 8.51 7.15   HGB 8.6* 8.7*   HCT 27.6* 27.8*    303   GRAN 75.4*  6.4 70.4  5.0     Recent Labs   Lab 05/22/23  1228   INR 1.1     Recent Labs   Lab 05/22/23  0926 05/22/23  1125   TROPONINI 0.058* 0.062*     Lab Results   Component Value Date     (H) 05/22/2023     (H) 03/27/2023                IMAGING:  EKGs:  Results for orders placed or performed during the hospital encounter of 05/22/23   EKG 12-lead    Collection Time: 05/22/23  9:15 AM    Narrative    Test Reason : R09.02,    Vent. Rate : 065 BPM     Atrial Rate : 065 BPM     P-R Int : 146 ms          QRS Dur : 084 ms      QT Int : 460 ms       P-R-T Axes : 025 009 062 degrees     QTc Int : 478 ms    Normal sinus rhythm  Nonspecific T wave abnormality  Prolonged QT  Abnormal ECG  When compared with ECG of 23-APR-2023 07:54,  Premature atrial complexes are no longer Present  Vent. rate has decreased BY  32 BPM  Nonspecific T wave abnormality now evident in Anterior leads  Confirmed by Todd DERAS MD ([id number]) on 5/22/2023 11:02:20 AM    Referred By: [email or web address]   SELF           Confirmed By:Todd DERAS MD       TTE[05/22/23]:  Summary    The estimated ejection fraction is 65%.  The left ventricle is normal in size with concentric hypertrophy and normal systolic function.  Grade I left  ventricular diastolic dysfunction.  Normal right ventricular size with normal right ventricular systolic function.  Mild left atrial enlargement.  There is mild aortic valve stenosis.  Aortic valve area is 1.47 cm2; peak velocity is 2.9 m/s; mean gradient is 15 mmHg.  There is pulmonary hypertension.  The estimated PA systolic pressure is 54 mmHg.  Intermediate central venous pressure (8 mmHg).  EF   Date Value Ref Range Status   05/22/2023 65 % Final   04/19/2023 65 % Final       Significant Imaging: I have reviewed all pertinent imaging results/findings within the past 24 hours.      INPATIENT MEDICATIONS:  Continuous Infusions:   dextrose 5 % (D5W)      heparin (porcine) in D5W 14 Units/kg/hr (05/22/23 2340)     Scheduled Meds:   cefTRIAXone (ROCEPHIN) IVPB  1 g Intravenous Q24H    QUEtiapine  25 mg Oral QHS     PRN Meds:dextrose 10%, dextrose 10%, glucagon (human recombinant), glucose, glucose, heparin (PORCINE), heparin (PORCINE), naloxone, sodium chloride 0.9%

## 2023-05-23 NOTE — PLAN OF CARE
05/23/23 1430   Post-Acute Status   Post-Acute Authorization Placement   Post-Acute Placement Status Referrals Sent   Discharge Plan   Discharge Plan A Skilled Nursing Facility   Discharge Plan B Independent living facility;Home Health     Referral sent via care port to [facility] - per patient and family choice to return to facility she came from for SNF .     Elizabeth Russo RN    [phone]

## 2023-05-23 NOTE — HOSPITAL COURSE
Admitted with acute hypoxia and found to have acute PE and DVT's.    Acute pulmonary embolism  Acute hypoxic respiratory failure  Bilateral DVT  Supratherapeutic INR  - CTA showed Extensive pulmonary emboli, asymmetric to the right. Embolus noted within the lateral aspect of the right main pulmonary artery, encroaching within 4.7 cm of the main pulmonary artery bifurcation. Additional involvement of lobar, inter lobar, segmental and subsegmental pulmonary arteries.   - Also with extensive RLE DVTs as well as L femoral vein DVT.  - Cardiology consulted, recommended AC  - no R heart strain on TTE  - Started on heparin-coumadin bridge as pt unable to utilize DOAC 2/2 cost. Holding coumadin for INR 3.8. Resume at 2 mg daily once in range of 2-3.        Hypokalemia: repleted     Delirium  H/o dementia  More recently reported some hallucinations  Delirium precautions  Haldol qhs - per pt caretaker was being given haldol qhs while at facility, seroquel worsened agitation  Controlled  No longer requiring telesitter.        Abnormal urinalysis  Urinary retention  Started on rocephin, however UCx is negative. Stop CTX  Spann placed. Void trial in 2 weeks on 6/5        Hypertension  No longer on medication

## 2023-05-23 NOTE — ASSESSMENT & PLAN NOTE
"88 yr old with recent cystitis, inguinal hernia repair on right, no cardiac history as per caretaker at bedside, dementia came for SOB requiring 3 lt oxygen for last 3 days, weak, tired and some hallucinations.   She underwent open R inguinal hernia repair with mesh on 5/6/23. Discharged to SNF. She had SOB with oxygen requirement of 3 litres for last 3 days.  Cardiology consulted for PE management.  Upon initial evaluation, patient denied any SOB, leg swelling, chest pain , palpitations at bedside.     Caretaker/ Neighbour informed- she has been participating in rehab but last 3 days more hallucinations and upset.    Initial  Troponin 0.058 and remained flat, ,   EKG NSR 65, non specific ST/T wave changes.    LE U/S[05/23/23] positive for "extensive right lower extremity DVT including within the common femoral, femoral, deep femoral, and popliteal veins. DVT in the distal left femoral vein."    -TTE[05/22] with "normal right ventricular size with normal right ventricular systolic function."    -Interventional Cardiology was consulted and given hemodynamically stable, age and AMS, baseline dementia they recommended medical management     RECOMMENDATIONS  -Transition Heparin drip to Apixaban  -Anemia workup per primary team  -Monitor closely for any new HD instability, symptoms, or increased oxygen requirement/ hypotension/ tachycardia and call cardiology ASAP.  "

## 2023-05-23 NOTE — PROGRESS NOTES
[facility] - Cardiology [facility] Medicine  Progress Note    Patient Name: Radha Sandoval  MRN: [id number]  Patient Class: IP- Inpatient   Admission Date: 5/22/2023  Length of Stay: 1 days  Attending Physician: Jose Mcgregor*  Primary Care Provider: Primary Doctor No        Subjective:     Principal Problem:Acute pulmonary embolism        HPI:  Radha Sandoval is a 88 y.o. female with past medical history of HTN, dementia, who presents from [facility] with shortness of breath. History obtained with assistance of patient close family friend who is at bedside. Patient was recently admitted in April for UTI, discharged to SNF, and then presented again and admitted 5/6 - 5/9 for inguinal hernia repair after presenting with groin pain. She was discharged to snf and now presents with shortness of breath for the last few days, along with confusion. She was started on oxygen at snf and has been requiring 3 L to maintain her saturation. She has had confusion along with potential hallucinations, reportedly thinking her son was at bedside; confusion has reportedly been apparent for at least last few weeks but potentially longer.       Overview/Hospital Course:  In the ED, HR 77-77, , Sat 98% on 3L NC. CTA showed Extensive pulmonary emboli, asymmetric to the right. Embolus noted within the lateral aspect of the right main pulmonary artery, encroaching within 4.7 cm of the main pulmonary artery bifurcation. Additional involvement of lobar, inter lobar, segmental and subsegmental pulmonary arteries. Patient denies chest pain, palpitations, lightheadedness, fever / chills, abdominal pain, nausea / vomiting. Cardiology consulted and patient was started on heparin gtt. Bedside echo without evidence of right heart strain. She was admitted to hospital medicine.       Interval History: No acute events. Pt remains confused    Review of Systems  Objective:     Vital Signs (Most Recent):  Temp: 97.6 °F (36.4 °C)  (05/23/23 1537)  Pulse: 82 (05/23/23 1537)  Resp: 18 (05/23/23 1537)  BP: (!) 153/59 (05/23/23 1537)  SpO2: 95 % (05/23/23 1537) Vital Signs (24h Range):  Temp:  [96.5 °F (35.8 °C)-98.5 °F (36.9 °C)] 97.6 °F (36.4 °C)  Pulse:  [] 82  Resp:  [18-20] 18  SpO2:  [95 %-97 %] 95 %  BP: (120-156)/(59-90) 153/59     Weight: 72.4 kg (159 lb 9.8 oz)  Body mass index is 26.56 kg/m².    Intake/Output Summary (Last 24 hours) at 5/23/2023 1720  Last data filed at 5/23/2023 1150  Gross per 24 hour   Intake --   Output 1350 ml   Net -1350 ml         Physical Exam  Constitutional:       General: She is not in acute distress.     Appearance: Normal appearance. She is not toxic-appearing or diaphoretic.   HENT:      Head: Normocephalic and atraumatic.   Cardiovascular:      Rate and Rhythm: Normal rate and regular rhythm.      Heart sounds: Murmur heard.     No friction rub. No gallop.   Pulmonary:      Effort: Pulmonary effort is normal. No respiratory distress.      Breath sounds: Normal breath sounds. No wheezing.   Abdominal:      General: Abdomen is flat. There is no distension.      Palpations: Abdomen is soft.      Tenderness: There is no abdominal tenderness. There is no guarding or rebound.      Comments: Inguinal incision  Staples  No signs infection   Musculoskeletal:      Right lower leg: No edema.      Left lower leg: No edema.   Skin:     General: Skin is warm and dry.      Findings: No erythema.   Neurological:      Mental Status: She is alert. She is disoriented.           Significant Labs: All pertinent labs within the past 24 hours have been reviewed.    Significant Imaging: I have reviewed all pertinent imaging results/findings within the past 24 hours.      Assessment/Plan:      * Acute pulmonary embolism  Acute hypoxic respiratory failure  88 y.o. female with history of HTN, dementia, who presents from [facility] with shortness of breath.   - HR 77-77, , Sat 98% on 3L NC.   - CTA showed Extensive  pulmonary emboli, asymmetric to the right. Embolus noted within the lateral aspect of the right main pulmonary artery, encroaching within 4.7 cm of the main pulmonary artery bifurcation. Additional involvement of lobar, inter lobar, segmental and subsegmental pulmonary arteries.   - Cardiology consulted appreciate assistance  - maintain heparin gtt, ultimate plan to transition to doac  - obtain formal echo  - telemetry    Confusion  Carries diagnosis of dementia  More recently reported some hallucinations  Possible acutely worsened in setting of UTI and hypoxia  Delirium precautions  Seroquel qhs      Acute cystitis with hematuria  Started on rocephin  F/u urine culture      Hypertension  No longer on medication        VTE Risk Mitigation (From admission, onward)         Ordered     IP VTE HIGH RISK PATIENT  Once         05/22/23 1454     Place sequential compression device  Until discontinued         05/22/23 1454     heparin 25,000 units in dextrose 5% (100 units/ml) IV bolus from bag - ADDITIONAL PRN BOLUS - 60 units/kg  As needed (PRN)        Question:  Heparin Infusion Adjustment (DO NOT MODIFY ANSWER)  Answer:  \\[email or web address]\Images\Pharmacy\HeparinInfusions\heparin HIGH INTENSITY nomogram for [facility] [id number].pdf    05/22/23 1127     heparin 25,000 units in dextrose 5% (100 units/ml) IV bolus from bag - ADDITIONAL PRN BOLUS - 30 units/kg  As needed (PRN)        Question:  Heparin Infusion Adjustment (DO NOT MODIFY ANSWER)  Answer:  \[email or web address]\epic\Images\Pharmacy\HeparinInfusions\heparin HIGH INTENSITY nomogram for [facility] [id number].pdf    05/22/23 1127     heparin 25,000 units in dextrose 5% 250 mL (100 units/mL) infusion HIGH INTENSITY nomogram - [facility]  Continuous        Question Answer Comment   Heparin Infusion Adjustment (DO NOT MODIFY ANSWER) \\[email or web address]\epic\Images\Pharmacy\HeparinInfusions\heparin HIGH INTENSITY nomogram for [facility] [id number].pdf    Begin at (in units/kg/hr) 18        05/22/23 1127                 Discharge Planning   TOSHA: 5/25/2023     Code Status: Full Code   Is the patient medically ready for discharge?:     Reason for patient still in hospital (select all that apply): Patient trending condition and Treatment  Discharge Plan A: Skilled Nursing Facility          Jose Davis MD  Department of Hospital Medicine   [facility] - Cardiology Stepdown

## 2023-05-23 NOTE — PROGRESS NOTES
[facility] - Cardiology Stepdown  Cardiology  Progress Note    Patient Name: Radha Sandovla  MRN: [id number]  Admission Date: 5/22/2023  Hospital Length of Stay: 1 days  Code Status: Full Code   Attending Physician: Jose Mcgregor*   Primary Care Physician: Primary Doctor No  Expected Discharge Date:   Principal Problem:Acute pulmonary embolism    Subjective:       Interval History: No acute events overnight, afebrile, hemodynamically stable.       Review of Systems   Unable to perform ROS: Dementia   Objective:     Vital Signs (Most Recent):  Temp: 98.3 °F (36.8 °C) (05/23/23 0845)  Pulse: 93 (05/23/23 0845)  Resp: 18 (05/23/23 0845)  BP: 122/74 (05/23/23 0845)  SpO2: 95 % (05/23/23 0845) Vital Signs (24h Range):  Temp:  [96.5 °F (35.8 °C)-98.3 °F (36.8 °C)] 98.3 °F (36.8 °C)  Pulse:  [68-93] 93  Resp:  [18-26] 18  SpO2:  [95 %-100 %] 95 %  BP: (122-156)/(59-80) 122/74     Weight: 72.4 kg (159 lb 9.8 oz)  Body mass index is 26.56 kg/m².     SpO2: 95 %         Intake/Output Summary (Last 24 hours) at 5/23/2023 1108  Last data filed at 5/23/2023 0522  Gross per 24 hour   Intake 500.79 ml   Output 800 ml   Net -299.21 ml       Lines/Drains/Airways       Drain  Duration                  Urethral Catheter 05/22/23 1801 Silicone 16 Fr. <1 day              Peripheral Intravenous Line  Duration                  Peripheral IV - Single Lumen 05/22/23 1030 20 G Anterior;Left Forearm 1 day                       Physical Exam  Vitals and nursing note reviewed.   Constitutional:       General: She is not in acute distress.     Appearance: Normal appearance. She is not ill-appearing, toxic-appearing or diaphoretic.   HENT:      Head: Normocephalic and atraumatic.   Eyes:      General: No scleral icterus.        Right eye: No discharge.         Left eye: No discharge.   Cardiovascular:      Rate and Rhythm: Normal rate and regular rhythm.      Heart sounds: Murmur heard.   Pulmonary:      Effort: Pulmonary effort is normal. No  respiratory distress.      Breath sounds: Normal breath sounds.   Abdominal:      General: Bowel sounds are normal.      Palpations: Abdomen is soft.      Tenderness: There is no abdominal tenderness.   Musculoskeletal:         General: No swelling or tenderness.      Right lower leg: Edema present.      Left lower leg: Edema present.   Feet:      Right foot:      Toenail Condition: Right toenails are abnormally thick and long.      Left foot:      Toenail Condition: Left toenails are abnormally thick and long.   Skin:     General: Skin is warm and dry.   Neurological:      Mental Status: She is alert.   Psychiatric:         Mood and Affect: Mood normal.         Behavior: Behavior normal.              LABS:  Recent Labs   Lab 05/22/23  0926 05/23/23  0640   * 149*   K 3.6 3.0*   * 113*   CO2 23 24   BUN 20 12   CREATININE 1.3 0.8    104   ANIONGAP 13 12     Recent Labs   Lab 05/22/23  0926   AST 33   ALT 19   ALKPHOS 56   BILITOT 0.5   ALBUMIN 2.5*      Recent Labs   Lab 05/22/23  0926 05/23/23  0640   WBC 8.51 7.15   HGB 8.6* 8.7*   HCT 27.6* 27.8*    303   GRAN 75.4*  6.4 70.4  5.0     Recent Labs   Lab 05/22/23  1228   INR 1.1     Recent Labs   Lab 05/22/23  0926 05/22/23  1125   TROPONINI 0.058* 0.062*     Lab Results   Component Value Date     (H) 05/22/2023     (H) 03/27/2023                IMAGING:  EKGs:  Results for orders placed or performed during the hospital encounter of 05/22/23   EKG 12-lead    Collection Time: 05/22/23  9:15 AM    Narrative    Test Reason : R09.02,    Vent. Rate : 065 BPM     Atrial Rate : 065 BPM     P-R Int : 146 ms          QRS Dur : 084 ms      QT Int : 460 ms       P-R-T Axes : 025 009 062 degrees     QTc Int : 478 ms    Normal sinus rhythm  Nonspecific T wave abnormality  Prolonged QT  Abnormal ECG  When compared with ECG of 23-APR-2023 07:54,  Premature atrial complexes are no longer Present  Vent. rate has decreased BY  32  BPM  Nonspecific T wave abnormality now evident in Anterior leads  Confirmed by Todd DERAS MD ([id number]) on 5/22/2023 11:02:20 AM    Referred By: [email or web address]   SELF           Confirmed By:Todd DERAS MD       TTE[05/22/23]:  Summary    The estimated ejection fraction is 65%.  The left ventricle is normal in size with concentric hypertrophy and normal systolic function.  Grade I left ventricular diastolic dysfunction.  Normal right ventricular size with normal right ventricular systolic function.  Mild left atrial enlargement.  There is mild aortic valve stenosis.  Aortic valve area is 1.47 cm2; peak velocity is 2.9 m/s; mean gradient is 15 mmHg.  There is pulmonary hypertension.  The estimated PA systolic pressure is 54 mmHg.  Intermediate central venous pressure (8 mmHg).  EF   Date Value Ref Range Status   05/22/2023 65 % Final   04/19/2023 65 % Final       Significant Imaging: I have reviewed all pertinent imaging results/findings within the past 24 hours.      INPATIENT MEDICATIONS:  Continuous Infusions:   dextrose 5 % (D5W)      heparin (porcine) in D5W 14 Units/kg/hr (05/22/23 2340)     Scheduled Meds:   cefTRIAXone (ROCEPHIN) IVPB  1 g Intravenous Q24H    QUEtiapine  25 mg Oral QHS     PRN Meds:dextrose 10%, dextrose 10%, glucagon (human recombinant), glucose, glucose, heparin (PORCINE), heparin (PORCINE), naloxone, sodium chloride 0.9%        Assessment and Plan:       * Acute pulmonary embolism  88 yr old with recent cystitis, inguinal hernia repair on right, no cardiac history as per caretaker at bedside, dementia came for SOB requiring 3 lt oxygen for last 3 days, weak, tired and some hallucinations.   She underwent open R inguinal hernia repair with mesh on 5/6/23. Discharged to SNF. She had SOB with oxygen requirement of 3 litres for last 3 days.  Cardiology consulted for PE management.  Upon initial evaluation, patient denied any SOB, leg swelling, chest pain , palpitations at bedside.     Caretaker/  "Neighbour informed- she has been participating in rehab but last 3 days more hallucinations and upset.    Initial  Troponin 0.058 and remained flat, ,   EKG NSR 65, non specific ST/T wave changes.    LE U/S[05/23/23] positive for "extensive right lower extremity DVT including within the common femoral, femoral, deep femoral, and popliteal veins. DVT in the distal left femoral vein."    -TTE[05/22] with "normal right ventricular size with normal right ventricular systolic function."    -Interventional Cardiology was consulted and given hemodynamically stable, age and AMS, baseline dementia they recommended medical management     RECOMMENDATIONS  -Transition Heparin drip to Apixaban  -Anemia workup per primary team  -Monitor closely for any new HD instability, symptoms, or increased oxygen requirement/ hypotension/ tachycardia and call cardiology ASAP.      Discussed with Cardiology Fellow and attending. Please see staff attestation for final recommendations    Wil Elizabeth,   Cardiology  [facility] - Cardiology Stepdown  I have personally taken the history and examined the patient and agree with the resident's note as stated above.  Eliquis 10 mg BID for a week than 5 BID  "

## 2023-05-24 NOTE — SUBJECTIVE & OBJECTIVE
Interval History: Per patient's caretaker she has been agitated overnight while on seroquel. She was being given doses haldol at night to help her sleep while at facility with success.   DOAC prohibitively expensive and starting on warfarin. Heparin bridging.     Review of Systems  Objective:     Vital Signs (Most Recent):  Temp: 97.8 °F (36.6 °C) (05/24/23 0941)  Pulse: 87 (05/24/23 1215)  Resp: 20 (05/24/23 1215)  BP: (!) 158/68 (05/24/23 1215)  SpO2: 98 % (05/24/23 1215) Vital Signs (24h Range):  Temp:  [97.6 °F (36.4 °C)-98.6 °F (37 °C)] 97.8 °F (36.6 °C)  Pulse:  [72-87] 87  Resp:  [18-20] 20  SpO2:  [95 %-98 %] 98 %  BP: (138-158)/(59-68) 158/68     Weight: 72.4 kg (159 lb 9.8 oz)  Body mass index is 26.56 kg/m².    Intake/Output Summary (Last 24 hours) at 5/24/2023 1511  Last data filed at 5/24/2023 1109  Gross per 24 hour   Intake 342 ml   Output 650 ml   Net -308 ml         Physical Exam  Constitutional:       General: She is not in acute distress.     Appearance: Normal appearance. She is not toxic-appearing or diaphoretic.   HENT:      Head: Normocephalic and atraumatic.   Cardiovascular:      Rate and Rhythm: Normal rate and regular rhythm.      Heart sounds: Murmur heard.     No friction rub. No gallop.   Pulmonary:      Effort: Pulmonary effort is normal. No respiratory distress.      Breath sounds: Normal breath sounds. No wheezing.   Abdominal:      General: Abdomen is flat. There is no distension.      Palpations: Abdomen is soft.      Tenderness: There is no abdominal tenderness. There is no guarding or rebound.      Comments: Inguinal incision  Staples  No signs infection   Musculoskeletal:      Right lower leg: No edema.      Left lower leg: No edema.   Skin:     General: Skin is warm and dry.      Findings: No erythema.   Neurological:      Mental Status: She is alert. She is disoriented.           Significant Labs: All pertinent labs within the past 24 hours have been reviewed.    Significant  Imaging: I have reviewed all pertinent imaging results/findings within the past 24 hours.

## 2023-05-24 NOTE — ASSESSMENT & PLAN NOTE
H/o dementia  More recently reported some hallucinations  Possible acutely worsened in setting of UTI and hypoxia  Delirium precautions  Haldol qhs - per pt caretaker was being given haldol qhs while at facility, seroquel worsened agitation

## 2023-05-24 NOTE — PROGRESS NOTES
Bijan Gusman - Cardiology [facility] Medicine  Progress Note    Patient Name: Radha Sandoval  MRN: [id number]  Patient Class: IP- Inpatient   Admission Date: 5/22/2023  Length of Stay: 2 days  Attending Physician: Jose Mcgregor*  Primary Care Provider: Primary Doctor No        Subjective:     Principal Problem:Acute pulmonary embolism        HPI:  Radha Sandoval is a 88 y.o. female with past medical history of HTN, dementia, who presents from [facility] with shortness of breath. History obtained with assistance of patient close family friend who is at bedside. Patient was recently admitted in April for UTI, discharged to SNF, and then presented again and admitted 5/6 - 5/9 for inguinal hernia repair after presenting with groin pain. She was discharged to snf and now presents with shortness of breath for the last few days, along with confusion. She was started on oxygen at snf and has been requiring 3 L to maintain her saturation. She has had confusion along with potential hallucinations, reportedly thinking her son was at bedside; confusion has reportedly been apparent for at least last few weeks but potentially longer.       Overview/Hospital Course:  In the ED, HR 77-77, , Sat 98% on 3L NC. CTA showed Extensive pulmonary emboli, asymmetric to the right. Embolus noted within the lateral aspect of the right main pulmonary artery, encroaching within 4.7 cm of the main pulmonary artery bifurcation. Additional involvement of lobar, inter lobar, segmental and subsegmental pulmonary arteries. Patient denies chest pain, palpitations, lightheadedness, fever / chills, abdominal pain, nausea / vomiting. Cardiology consulted and patient was started on heparin gtt. Bedside echo without evidence of right heart strain. She was admitted to hospital medicine. UA concerning for UTI and started on rocephin.       Interval History: Per patient's caretaker she has been agitated overnight while on seroquel. She was  being given doses haldol at night to help her sleep while at facility with success.   DOAC prohibitively expensive and starting on warfarin. Heparin bridging.     Review of Systems  Objective:     Vital Signs (Most Recent):  Temp: 97.8 °F (36.6 °C) (05/24/23 0941)  Pulse: 87 (05/24/23 1215)  Resp: 20 (05/24/23 1215)  BP: (!) 158/68 (05/24/23 1215)  SpO2: 98 % (05/24/23 1215) Vital Signs (24h Range):  Temp:  [97.6 °F (36.4 °C)-98.6 °F (37 °C)] 97.8 °F (36.6 °C)  Pulse:  [72-87] 87  Resp:  [18-20] 20  SpO2:  [95 %-98 %] 98 %  BP: (138-158)/(59-68) 158/68     Weight: 72.4 kg (159 lb 9.8 oz)  Body mass index is 26.56 kg/m².    Intake/Output Summary (Last 24 hours) at 5/24/2023 1511  Last data filed at 5/24/2023 1109  Gross per 24 hour   Intake 342 ml   Output 650 ml   Net -308 ml         Physical Exam  Constitutional:       General: She is not in acute distress.     Appearance: Normal appearance. She is not toxic-appearing or diaphoretic.   HENT:      Head: Normocephalic and atraumatic.   Cardiovascular:      Rate and Rhythm: Normal rate and regular rhythm.      Heart sounds: Murmur heard.     No friction rub. No gallop.   Pulmonary:      Effort: Pulmonary effort is normal. No respiratory distress.      Breath sounds: Normal breath sounds. No wheezing.   Abdominal:      General: Abdomen is flat. There is no distension.      Palpations: Abdomen is soft.      Tenderness: There is no abdominal tenderness. There is no guarding or rebound.      Comments: Inguinal incision  Staples  No signs infection   Musculoskeletal:      Right lower leg: No edema.      Left lower leg: No edema.   Skin:     General: Skin is warm and dry.      Findings: No erythema.   Neurological:      Mental Status: She is alert. She is disoriented.           Significant Labs: All pertinent labs within the past 24 hours have been reviewed.    Significant Imaging: I have reviewed all pertinent imaging results/findings within the past 24  hours.      Assessment/Plan:      * Acute pulmonary embolism  Acute hypoxic respiratory failure  88 y.o. female with history of HTN, dementia, who presents from [facility] with shortness of breath.   - HR 77-77, , Sat 98% on 3L NC.   - CTA showed Extensive pulmonary emboli, asymmetric to the right. Embolus noted within the lateral aspect of the right main pulmonary artery, encroaching within 4.7 cm of the main pulmonary artery bifurcation. Additional involvement of lobar, inter lobar, segmental and subsegmental pulmonary arteries.   - Cardiology consulted appreciate assistance  - maintain heparin gtt, bridging to warfarin  - formal echo  - telemetry    Confusion  H/o dementia  More recently reported some hallucinations  Possible acutely worsened in setting of UTI and hypoxia  Delirium precautions  Haldol qhs - per pt caretaker was being given haldol qhs while at facility, seroquel worsened agitation      Acute cystitis with hematuria  Started on rocephin  F/u urine culture      Hypertension  No longer on medication      VTE Risk Mitigation (From admission, onward)         Ordered     warfarin (COUMADIN) tablet 5 mg  Daily         05/24/23 1255     IP VTE HIGH RISK PATIENT  Once         05/22/23 1454     Place sequential compression device  Until discontinued         05/22/23 1454     heparin 25,000 units in dextrose 5% (100 units/ml) IV bolus from bag - ADDITIONAL PRN BOLUS - 60 units/kg  As needed (PRN)        Question:  Heparin Infusion Adjustment (DO NOT MODIFY ANSWER)  Answer:  \\[email or web address]\Reaction\Images\Pharmacy\HeparinInfusions\heparin HIGH INTENSITY nomogram for [facility] [id number]    05/22/23 1127     heparin 25,000 units in dextrose 5% (100 units/ml) IV bolus from bag - ADDITIONAL PRN BOLUS - 30 units/kg  As needed (PRN)        Question:  Heparin Infusion Adjustment (DO NOT MODIFY ANSWER)  Answer:  \\[email or web address]\epic\Images\Pharmacy\HeparinInfusions\heparin HIGH INTENSITY nomogram for [facility] [id number].pdf     05/22/23 1127     heparin 25,000 units in dextrose 5% 250 mL (100 units/mL) infusion HIGH INTENSITY nomogram - [facility]  Continuous        Question Answer Comment   Heparin Infusion Adjustment (DO NOT MODIFY ANSWER) \\[email or web address]\epic\Images\Pharmacy\HeparinInfusions\heparin HIGH INTENSITY nomogram for [facility] [id number].pdf    Begin at (in units/kg/hr) 18        05/22/23 1127                Discharge Planning   TOSHA: 5/25/2023     Code Status: Full Code   Is the patient medically ready for discharge?:     Reason for patient still in hospital (select all that apply): Patient trending condition and Treatment  Discharge Plan A: Skilled Nursing Facility          Jose Davis MD  Department of Hospital Medicine   [facility] - Cardiology Stepdown

## 2023-05-24 NOTE — ASSESSMENT & PLAN NOTE
Acute hypoxic respiratory failure  88 y.o. female with history of HTN, dementia, who presents from [facility] with shortness of breath.   - HR 77-77, , Sat 98% on 3L NC.   - CTA showed Extensive pulmonary emboli, asymmetric to the right. Embolus noted within the lateral aspect of the right main pulmonary artery, encroaching within 4.7 cm of the main pulmonary artery bifurcation. Additional involvement of lobar, inter lobar, segmental and subsegmental pulmonary arteries.   - Cardiology consulted appreciate assistance  - maintain heparin gtt, bridging to warfarin  - formal echo  - telemetry

## 2023-05-24 NOTE — PLAN OF CARE
Problem: Adult Inpatient Plan of Care  Goal: Plan of Care Review  Outcome: Ongoing, Progressing  Goal: Patient-Specific Goal (Individualized)  Outcome: Ongoing, Progressing  Goal: Absence of Hospital-Acquired Illness or Injury  Outcome: Ongoing, Progressing  Goal: Optimal Comfort and Wellbeing  Outcome: Ongoing, Progressing  Goal: Readiness for Transition of Care  Outcome: Ongoing, Progressing     Problem: Fluid and Electrolyte Imbalance (Acute Kidney Injury/Impairment)  Goal: Fluid and Electrolyte Balance  Outcome: Ongoing, Progressing     Problem: Oral Intake Inadequate (Acute Kidney Injury/Impairment)  Goal: Optimal Nutrition Intake  Outcome: Ongoing, Progressing     Problem: Renal Function Impairment (Acute Kidney Injury/Impairment)  Goal: Effective Renal Function  Outcome: Ongoing, Progressing     Problem: Impaired Wound Healing  Goal: Optimal Wound Healing  Outcome: Ongoing, Progressing     Problem: Fall Injury Risk  Goal: Absence of Fall and Fall-Related Injury  Outcome: Ongoing, Progressing     Problem: Skin Injury Risk Increased  Goal: Skin Health and Integrity  Outcome: Ongoing, Progressing     Problem: Infection  Goal: Absence of Infection Signs and Symptoms  Outcome: Ongoing, Progressing

## 2023-05-24 NOTE — PHYSICIAN QUERY
PT Name: Radha Sandoval  MR #: [id number]     DOCUMENTATION CLARIFICATION     CDS: Sangita Ford RN, CCDS   Contact Information: [email or web address]    This form is a permanent document in the medical record.     Query Date: May 24, 2023    By submitting this query, we are merely seeking further clarification of documentation.  Please utilize your independent clinical judgment when addressing the question(s) below.  The Medical Record contains the following   Indicators   Supporting Clinical Findings Location in Medical Record    x Documentation of Respiratory Failure, ARDS  Acute hypoxic respiratory failure  5/22-5/23 [facility] Medicine Progress Notes     x Subjective Respiratory Signs/Symptoms:   SOB, SETHI, Cough, etc.  Negative for shortness of breath.    5/22 ED, Dr. Gore    x Objective Respiratory Signs/Symptoms: Respiratory distress, Accessory muscle use, tachypnea, wheezing, etc.  Pulmonary effort is normal. No respiratory distress.      Pulmonary effort is normal. No respiratory distress.     Pulmonary effort is normal. No respiratory distress.   5/22 H&P, Dr. Davis     5/23 Cardiology, Dr. Youssef     5/23 [facility] Medicine, Dr. Davis    x RR     ABGs     O2 sat  5/22 0849: R 20, SpO2 96% no oxygen documented   5/22 1106: R 22, SpO2 96% on 3L via Nasal Cannula   5/22 1503: R 22, SpO2 98% on 3L via Nasal Cannula   5/23 1130: R 20, SpO2 96% no oxygen documented  Flowsheets    x Hypoxia/Hypercapnia  Hypoxia  5/22 ED, Dr. Gore    BiPAP/Intubation/ Mechanical Ventilation      x Supplemental O2  Patient continues satting well on 3 L of oxygen via nasal cannula.  5/22 ED, Dr. Gore    Home O2, Oxygen Dependence      x Radiology findings  Examination positive for extensive pulmonary emboli, asymmetric to the right.  Embolus noted within the lateral aspect of the right main pulmonary artery, encroaching within 4.7 cm of the main pulmonary artery bifurcation. Additional involvement of lobar,  inter lobar, segmental and subsegmental pulmonary arteries.  5/22 CTA Chest Non-Coronary    x Acute/Chronic Illness  88-year-old female past medical history of HTN and dementia presenting to ED with complaint of hypoxia   Pulmonary embolism  5/22 ED, Dr. Sofía dalton Treatment  Continuous Oxygen   Continuous Pulse Oximetry  Orders    Other         The noted clinical guidelines following a diagnosis are only system guidelines and do not replace the providers clinical judgment.    Due to the conflicting clinical picture, please clinically validate the diagnosis of Acute Hypoxic Respiratory Failure.    If validated, please provide additional clinical support for the diagnosis.     [    ]  Acute hypoxic respiratory failure is not confirmed and/or it has been ruled out   [    ]  Acute hypoxic respiratory failure has been ruled out, other diagnosis ruled in:   [   ] Hypoxia only   [   ] Other (please specify):_______________   [  x  ]  Acute Respiratory Failure with Hypoxia (ABG pO2 < 60 mmHg or O2 sat of <91% on room air and respiratory symptoms documented) diagnosis is confirmed.       Additional clinical support/decision-making indicators for the diagnosis include (please specify):    ________________________________________________________   [    ]  Other clarification (please specify): ___________________     Please document in your progress notes daily for the duration of treatment until resolved and include in your discharge summary.     Reference:    AIDEE Kramer MD. (2020, March 13). Acute respiratory distress syndrome: Clinical features, diagnosis, and complications in adults ([id number] VARSHA Bliss MD & [id number] SLADE Westbrook MD, Eds.). Retrieved November 13, 2020, from [email or web address]-[email or web address]-distress-syndrome-[email or web address]-features-diagnosis-[email or web address]-[email or web address]-[email or web address]?search=ards&source=search_result&selectedTitle=1~150&usage_type=default&display_rank=1  Form No.  [id number]

## 2023-05-25 NOTE — PHYSICIAN QUERY
PT Name: Radha Sandoval  MR #: [id number]     DOCUMENTATION CLARIFICATION     CDS: Sangita Ford RN, CCDS   Contact Information: [email or web address]    This form is a permanent document in the medical record.     Query Date: May 25, 2023    By submitting this query, we are merely seeking further clarification of documentation.  Please utilize your independent clinical judgment when addressing the question(s) below.    The Medical Record contains the following             Clinical Findings   Location in Medical Record    Acute Respiratory Failure with Hypoxia  5/24 1606 Physician Query Response, Dr. Davis    Negative for shortness of breath.      Pulmonary effort is normal. No respiratory distress.       Pulmonary effort is normal. No respiratory distress.      Pulmonary effort is normal. No respiratory distress.   5/22 ED, Dr. Gore     5/22 H&P, Dr. Davis      5/23 Cardiology, Dr. Youssef      5/23 [facility] Medicine, Dr. Davis    5/22 0849: R 20, SpO2 96% no oxygen documented   5/22 1106: R 22, SpO2 96% on 3L via Nasal Cannula   5/22 1503: R 22, SpO2 98% on 3L via Nasal Cannula   5/23 1130: R 20, SpO2 96% no oxygen documented  Flowsheets    Continuous Oxygen   Continuous Pulse Oximetry  Orders      88-year-old female past medical history of HTN and dementia presenting to ED with complaint of hypoxia   Pulmonary embolism  5/22 ED, Dr. Gore     Acute Respiratory Failure with Hypoxia has been confirmed as a diagnosis via query signed 5/24/23 1606.    Based on your medical judgment and in order to clinically support the documented diagnosis, please document the clinical indicators (signs, symptoms, & treatment) that were utilized to support this diagnosis:    [ x  ]  Clinical Indicators: Signs, Symptoms, & Treatment: _hypoxic and in respiratory distress requiring o2_______________________________________   [   ]   Acute Respiratory Failure with Hypoxia is not confirmed and/or it has been ruled  out   [   ]   Acute Respiratory Failure with Hypoxia has been ruled out, other diagnosis ruled in:   [   ] Hypoxia only   [   ] Other Clarification (please specify):______________   [   ]  Other clarification (please specify): ___________________________________       Form No. [id number]

## 2023-05-25 NOTE — PROGRESS NOTES
[facility] Hospital Medicine  Progress Note    Primary Team: [facility]  Admit Date: 5/22/2023    Subjective:      HPI:  Radha Sandoval is a 88 y.o. female with past medical history of HTN, dementia, who presents from [facility] with shortness of breath. History obtained with assistance of patient close family friend who is at bedside. Patient was recently admitted in April for UTI, discharged to SNF, and then presented again and admitted 5/6 - 5/9 for inguinal hernia repair after presenting with groin pain. She was discharged to snf and now presents with shortness of breath for the last few days, along with confusion. She was started on oxygen at snf and has been requiring 3 L to maintain her saturation. She has had confusion along with potential hallucinations, reportedly thinking her son was at bedside; confusion has reportedly been apparent for at least last few weeks but potentially longer.         Overview/Hospital Course:  In the ED, HR 77-77, , Sat 98% on 3L NC. CTA showed Extensive pulmonary emboli, asymmetric to the right. Embolus noted within the lateral aspect of the right main pulmonary artery, encroaching within 4.7 cm of the main pulmonary artery bifurcation. Additional involvement of lobar, inter lobar, segmental and subsegmental pulmonary arteries. Patient denies chest pain, palpitations, lightheadedness, fever / chills, abdominal pain, nausea / vomiting. Cardiology consulted and patient was started on heparin gtt. Bedside echo without evidence of right heart strain. She was admitted to hospital medicine. UA concerning for UTI and started on rocephin.     Interval History:  No acute events overnight. RN reported agitation this AM. INR 1.1. Pharmd to increase coumadin. Urine cx negative, stop CTX.     ROS:  Unable to perform due to mental status    No past medical history on file.  Past Surgical History:   Procedure Laterality Date    REPAIR, HERNIA, INGUINAL, WITHOUT  HISTORY OF PRIOR REPAIR, AGE 5 YEARS OR OLDER Right 2023    Procedure: REPAIR, HERNIA, INGUINAL, WITHOUT HISTORY OF PRIOR REPAIR, AGE 5 YEARS OR OLDER;  Surgeon: Maurice Piper MD;  Location: [facility] OR [address] [place] [address];  Service: General;  Laterality: Right;  possible laparotomy, possible bowel resection         Objective:   Last 24 Hour Vital Signs:  BP  Min: 146/68  Max: 179/70  Temp  Av.1 °F (36.7 °C)  Min: 97.5 °F (36.4 °C)  Max: 98.9 °F (37.2 °C)  Pulse  Av.1  Min: 78  Max: 90  Resp  Av.3  Min: 17  Max: 20  SpO2  Av.1 %  Min: 93 %  Max: 98 %  I/O last 3 completed shifts:  In: 1120 [P.O.:1120]  Out: 1450 [Urine:1450]    Physical Examination:  GEN: AAOx1, NAD  HEENT: NCAT, MMM, PERRL, EOMI, oropharynx clear  CV: RRR, no m/r, no S3/S4  RESP: CTAB, no wheezes/crackles, no increased WOB  ABD: soft, NTND, normoactive BS, no organomegaly  EXTR: no c/c/e, intact distal pulses x 4  NEURO: PERRL, EOMI, moving all four extremities, intact sensation to light touch, no focal deficits  SKIN: no rashes, lesions, or color changes  PSYCH: confused    Laboratory:  I have reviewed all pertinent lab results/findings within the past 24 hours.    Radiology:  I have reviewed all pertinent imaging results/findings within the past 24 hours.    Current Medications:     Infusions:   heparin (porcine) in D5W 14 Units/kg/hr (23 4623)        Scheduled:   haloperidoL  2 mg Oral QHS    warfarin  7.5 mg Oral Daily        PRN:  dextrose 10%, dextrose 10%, glucagon (human recombinant), glucose, glucose, heparin (PORCINE), heparin (PORCINE), naloxone, sodium chloride 0.9%    Prior records reviewed.       Assessment/Plan:     Radha Sandoval is a 88 y.o.female with    Acute pulmonary embolism  Acute hypoxic respiratory failure  Bilateral DVT  - CTA showed Extensive pulmonary emboli, asymmetric to the right. Embolus noted within the lateral aspect of the right main pulmonary artery, encroaching within 4.7 cm of the main pulmonary  artery bifurcation. Additional involvement of lobar, inter lobar, segmental and subsegmental pulmonary arteries.   - Also with extensive RLE DVTs as well as L femoral vein DVT.  - Cardiology consulted  - maintain heparin gtt, bridging to warfarin. Unable to utilize DOAC 2/2 cost. Increase coumadin today as INR remains subtherapeutic.  - no R heart strain on TTE       Delirium  H/o dementia  More recently reported some hallucinations  Delirium precautions  Haldol qhs - per pt caretaker was being given haldol qhs while at facility, seroquel worsened agitation        Abnormal urinalysis  Urinary retention  Started on rocephin, however UCx is negative. Stop CTX  Spann placed. Void trial in 2 weeks        Hypertension  No longer on medication        Lucia Knapp MD  Hospital Medicine Staff  [facility]

## 2023-05-26 NOTE — PROGRESS NOTES
[facility] Hospital Medicine  Progress Note    Primary Team: [facility] HOSP MED C  Admit Date: 5/22/2023    Subjective:      HPI:  Radha Sandoval is a 88 y.o. female with past medical history of HTN, dementia, who presents from [facility] with shortness of breath. History obtained with assistance of patient close family friend who is at bedside. Patient was recently admitted in April for UTI, discharged to SNF, and then presented again and admitted 5/6 - 5/9 for inguinal hernia repair after presenting with groin pain. She was discharged to snf and now presents with shortness of breath for the last few days, along with confusion. She was started on oxygen at snf and has been requiring 3 L to maintain her saturation. She has had confusion along with potential hallucinations, reportedly thinking her son was at bedside; confusion has reportedly been apparent for at least last few weeks but potentially longer.         Overview/Hospital Course:  Admitted with acute PE. CTA showed Extensive pulmonary emboli, asymmetric to the right. Embolus noted within the lateral aspect of the right main pulmonary artery, encroaching within 4.7 cm of the main pulmonary artery bifurcation. Additional involvement of lobar, inter lobar, segmental and subsegmental pulmonary arteries. Cardiology consulted and patient was started on heparin gtt. Bedside echo without evidence of right heart strain. UA concerning for UTI and started on rocephin but this was stopped after urine cx returned with no growth. Patient has no medication coverage via insurance so unable to start DOAC. Heparin-coumadin bridge initiated. Heparin stoppped 5/26 as INR is > 2 now.    Interval History:  No acute events overnight.  INR increased 1.1 > 2.3. Pharmd to reduce coumadin dose. Caretaker at bedside. SNF reports needs to be off tele-sitter x 24 hours prior to transfer. Plan for Monday discharge; will need to stop telesitter Saturday evening.      ROS:  Unable to perform due to mental status    No past medical history on file.  Past Surgical History:   Procedure Laterality Date    REPAIR, HERNIA, INGUINAL, WITHOUT HISTORY OF PRIOR REPAIR, AGE 6 YEARS OR OLDER Right 2023    Procedure: REPAIR, HERNIA, INGUINAL, WITHOUT HISTORY OF PRIOR REPAIR, AGE 5 YEARS OR OLDER;  Surgeon: Maurice Piper MD;  Location: [facility] OR [address] [place] [address];  Service: General;  Laterality: Right;  possible laparotomy, possible bowel resection         Objective:   Last 24 Hour Vital Signs:  BP  Min: 137/68  Max: 193/74  Temp  Av °F (36.7 °C)  Min: 97.7 °F (36.5 °C)  Max: 98.1 °F (36.7 °C)  Pulse  Av.2  Min: 84  Max: 141  Resp  Av  Min: 18  Max: 18  SpO2  Av.5 %  Min: 93 %  Max: 98 %  I/O last 3 completed shifts:  In: 1240 [P.O.:1240]  Out: 2400 [Urine:2400]    Physical Examination:  GEN: AAOx1, NAD  HEENT: NCAT, MMM, PERRL, EOMI, oropharynx clear  CV: RRR, no m/r, no S3/S4  RESP: CTAB, no wheezes/crackles, no increased WOB  ABD: soft, NTND, normoactive BS, no organomegaly  EXTR: no c/c/e, intact distal pulses x 4  NEURO: PERRL, EOMI, moving all four extremities, intact sensation to light touch, no focal deficits  SKIN: no rashes, lesions, or color changes  PSYCH: confused but pleasant    Laboratory:  I have reviewed all pertinent lab results/findings within the past 24 hours.    Radiology:  I have reviewed all pertinent imaging results/findings within the past 24 hours.    Current Medications:     Infusions:         Scheduled:   haloperidoL  2 mg Oral QHS    tuberculin  5 Units Intradermal Once    warfarin  1 mg Oral Daily    [START ON 2023] warfarin  2 mg Oral Daily        PRN:  dextrose 10%, dextrose 10%, glucagon (human recombinant), glucose, glucose, naloxone, sodium chloride 0.9%    Prior records reviewed.       Assessment/Plan:     Radha Sandoval is a 88 y.o.female with    Acute pulmonary embolism  Acute hypoxic respiratory failure  Bilateral DVT  - CTA showed  Extensive pulmonary emboli, asymmetric to the right. Embolus noted within the lateral aspect of the right main pulmonary artery, encroaching within 4.7 cm of the main pulmonary artery bifurcation. Additional involvement of lobar, inter lobar, segmental and subsegmental pulmonary arteries.   - Also with extensive RLE DVTs as well as L femoral vein DVT.  - Cardiology consulted  - no R heart strain on TTE  - stop heparin gtt,  continue warfarin as INR is 2.3 now. Unable to utilize DOAC 2/2 cost. PharmD managing coumadin dosing; decreasing dose today      Delirium  H/o dementia  More recently reported some hallucinations  Delirium precautions  Haldol qhs - per pt caretaker was being given haldol qhs while at facility, seroquel worsened agitation  Controlled  Stop telesitter Saturday evening. Needs to be without telesitter 24 hours prior to SNF transfer on Monday.        Abnormal urinalysis  Urinary retention  Started on rocephin, however UCx is negative. Stop CTX  Spann placed. Void trial in 2 weeks        Hypertension  No longer on medication        Lucia Knapp MD  Hospital Medicine Staff  [facility]

## 2023-05-26 NOTE — PLAN OF CARE
Problem: Occupational Therapy  Goal: Occupational Therapy Goal  Description: Goals to be met by: 6/9/23     Patient will increase functional independence with ADLs by performing:    UE Dressing with Set-up Assistance.  Grooming while EOB with Minimal Assistance.  Sitting at edge of bed x10 minutes with Minimal Assistance.  Rolling to Bilateral with Minimal Assistance.   Supine to sit with Moderate Assistance.  Sit>stand transfers with Moderate Assistance.  Supervision assistance to complete BUE HEP to improve activity tolerance to complete ADLs and IADLs      Outcome: Ongoing, Progressing

## 2023-05-26 NOTE — PLAN OF CARE
Patient laying in bed at this time and appears in no acute distress. Patient is confused and is only oriented to person. Vital signs are stable. Patient is on room air at this time. Safety measures in place and call bell within reach. Patient remains with telesitter at this time.     Problem: Adult Inpatient Plan of Care  Goal: Plan of Care Review  Outcome: Ongoing, Progressing  Goal: Patient-Specific Goal (Individualized)  Outcome: Ongoing, Progressing  Goal: Absence of Hospital-Acquired Illness or Injury  Outcome: Ongoing, Progressing  Goal: Optimal Comfort and Wellbeing  Outcome: Ongoing, Progressing  Goal: Readiness for Transition of Care  Outcome: Ongoing, Progressing     Problem: Fluid and Electrolyte Imbalance (Acute Kidney Injury/Impairment)  Goal: Fluid and Electrolyte Balance  Outcome: Ongoing, Progressing     Problem: Oral Intake Inadequate (Acute Kidney Injury/Impairment)  Goal: Optimal Nutrition Intake  Outcome: Ongoing, Progressing     Problem: Impaired Wound Healing  Goal: Optimal Wound Healing  Outcome: Ongoing, Progressing     Problem: Fall Injury Risk  Goal: Absence of Fall and Fall-Related Injury  Outcome: Ongoing, Progressing     Problem: Skin Injury Risk Increased  Goal: Skin Health and Integrity  Outcome: Ongoing, Progressing     Problem: Infection  Goal: Absence of Infection Signs and Symptoms  Outcome: Ongoing, Progressing

## 2023-05-26 NOTE — PT/OT/SLP EVAL
"Physical Therapy Co-Evaluation with OT   And Treatment     Patient Name:  Radha Sandoval   MRN:  [id number]    Recommendations:     Discharge Recommendations: skilled nursing facility   Discharge Equipment Recommendations:  TBD at next level of care  Barriers to discharge:  fall risk and increased level of caregiver support required     Assessment:     Radha Sandoval is a 88 y.o. female admitted with a medical diagnosis of Acute pulmonary embolism. Pt transferred to [facility] from SNF. She presents with the following impairments/functional limitations: weakness, impaired endurance, impaired self care skills, impaired functional mobility, gait instability, impaired balance, impaired cognition, decreased lower extremity function, decreased safety awareness, impaired cardiopulmonary response to activity. Pt with fair tolerance for activity this visit, but displays increased fear of falling. Ms. Sandoval required max A of 2 persons to complete sup<>sit transitions and perform EOB activity this visit.     Rehab Prognosis: Fair; patient would benefit from acute skilled PT services to address these deficits and reach maximum level of function.      Recent Surgery: * No surgery found *      Plan:     During this hospitalization, patient to be seen 3 x/week to address the identified rehab impairments via gait training, therapeutic activities, therapeutic exercises, neuromuscular re-education and progress toward the following goals:    Plan of Care Expires:  06/25/23    Subjective     Chief Complaint: fear of falling, weakness   Patient/Family Comments/goals: pt's speech is difficult to understand, but pt reported not being happy at prior facility, and stating she thought about "killing herself." Pt easily distracted and tangential in speech, but RN notified of pt remarks.   Pain/Comfort:  Pain Rating 1: 0/10  Pain Rating Post-Intervention 1: 0/10    Patients cultural, spiritual, Episcopal conflicts given the current situation: " no    Living Environment/PLOF:   Pt is a poor historian, and unable to provide complete history/PLOF. Pt recently admitted and discharged to [facility]. Prior to last admission, pt living at [facility] in 2nd floor apartment (pt reports apt at end of hallway requiring longer distance mobility). Pt uses a rollator for mobility and has A for bathing 2-3x/wk from staff.   Equipment Used at Home: rollator, grab bar, shower chair  Assistance upon Discharge: staff    Objective:     Communicated with RN (Tyesha)  prior to session.  Patient found HOB elevated with oxygen, bed alarm, telemetry, peripheral IV  upon PT entry to room.    General Precautions: Standard, fall  Orthopedic Precautions:N/A   Braces: N/A  Respiratory Status: Nasal cannula     Exams:  Cognitive Exam:  Patient is oriented to Person  Postural Exam:  Patient presented with the following abnormalities:    -       Rounded shoulders  -       Forward head  -       Kyphosis  Sensation:    -       Intact  Skin Integrity/Edema:      -       Skin integrity: Visible skin intact  RLE ROM: WFL  RLE Strength: 3+/5  LLE ROM: WFL  LLE Strength: 3+/5    Functional Mobility:    Bed Mobility:   Rolling: to right and left with total assistance  Supine > Sit: maximal assistance of 2 persons   Sit > Supine: maximal assistance of 2 persons     Transfers:   Deferred; pt with poor sitting balance, excessive posterior lean, and fear of falling     Balance:   Sitting balance: Poor; pt requiring max to total assistance to maintain static and dynamic sitting balance while seated EOB x ~8 minute trial. Pt with significant posterior lean and fear of falling. Max verbal and tactile cueing provided to improve postural awareness and sitting balance. Pt with decreased trunk control and difficulty following cueing for balance due to cognitive deficits.               Gait:  Unable to perform      AM-PAC 6 CLICK MOBILITY  Total Score:8       Treatment & Education:  Pt  educated on:  - Role of PT and POC/goals for therapy   - Safety with mobility and fall risk   - Instructed to call nursing staff for assistance with mobility as needed     Pt verbalized understanding and expressed no further concerns/questions.     Patient left HOB elevated with all lines intact, call button in reach, bed alarm on, and RN notified.    GOALS:   Multidisciplinary Problems       Physical Therapy Goals          Problem: Physical Therapy    Goal Priority Disciplines Outcome Goal Variances Interventions   Physical Therapy Goal     PT, PT/OT Ongoing, Progressing     Description: Goals to be met by: 6/15/23     Patient will increase functional independence with mobility by performin. Supine to sit with Moderate Assistance  2. Sit to supine with Moderate Assistance  3. Sit to stand transfer with Moderate Assistance  4. Bed to chair transfer with Moderate Assistance using Rolling Walker  5. Sitting at edge of bed x15 minutes with Stand-by Assistance  6. Lower extremity exercise program x20 reps per handout, with assistance as needed                         History:     No past medical history on file.    Past Surgical History:   Procedure Laterality Date    REPAIR, HERNIA, INGUINAL, WITHOUT HISTORY OF PRIOR REPAIR, AGE 5 YEARS OR OLDER Right 2023    Procedure: REPAIR, HERNIA, INGUINAL, WITHOUT HISTORY OF PRIOR REPAIR, AGE 5 YEARS OR OLDER;  Surgeon: Maurice Piper MD;  Location: [facility] OR [address] [place] [address];  Service: General;  Laterality: Right;  possible laparotomy, possible bowel resection       Time Tracking:     PT Received On: 23  PT Start Time: 936     PT Stop Time: 959  PT Total Time (min): 23 min     Billable Minutes: Evaluation 14 min and Neuromuscular Re-education 9 min      2023

## 2023-05-26 NOTE — PLAN OF CARE
Plan of Care:  PT evaluation completed- see note for details. Goals and POC established.       2023    Physical Therapy Treatment Goals:  Multidisciplinary Problems       Physical Therapy Goals          Problem: Physical Therapy    Goal Priority Disciplines Outcome Goal Variances Interventions   Physical Therapy Goal     PT, PT/OT Ongoing, Progressing     Description: Goals to be met by: 6/15/23     Patient will increase functional independence with mobility by performin. Supine to sit with Moderate Assistance  2. Sit to supine with Moderate Assistance  3. Sit to stand transfer with Moderate Assistance  4. Bed to chair transfer with Moderate Assistance using Rolling Walker  5. Sitting at edge of bed x15 minutes with Stand-by Assistance  6. Lower extremity exercise program x20 reps per handout, with assistance as needed

## 2023-05-26 NOTE — PLAN OF CARE
Spoke with Erin at [facility] ( [phone] and aware patient remains on telesitter at this time. Patient bridging to coumadin as well . Not ready for discharge per MD . Will continue to monitor for discharge needs. Order to place PPD noted.     Elizabeth Russo RN    [phone]

## 2023-05-26 NOTE — PT/OT/SLP EVAL
"Occupational Therapy   Co-Evaluation & Co-Treatment     Name: Radha Sandoval  MRN: [id number]  Admitting Diagnosis: Acute pulmonary embolism  Recent Surgery: * No surgery found *      Recommendations:     Discharge Recommendations: nursing facility, skilled  Discharge Equipment Recommendations:  to be determined by next level of care  Barriers to discharge:  Other (Comment) (level of skilled assistanace required)    Assessment:     Radha Sandoval is a 88 y.o. female with a medical diagnosis of Acute pulmonary embolism.  Patient's speech is difficult to understand (clenched jaw) thus limited information obtained from patient about occupational profile. Patient with intense fear of falling. Performance deficits affecting function: weakness, impaired endurance, impaired self care skills, impaired functional mobility, impaired balance, decreased safety awareness, impaired cardiopulmonary response to activity, impaired cognition, impaired coordination.      Rehab Prognosis: Fair; patient would benefit from acute skilled OT services to address these deficits and reach maximum level of function.       Plan:     Patient to be seen   to address the above listed problems via self-care/home management, therapeutic activities, therapeutic exercises, neuromuscular re-education, wheelchair management/training  Plan of Care Expires: 06/09/23  Plan of Care Reviewed with: patient    Subjective     Chief Complaint: Difficulty to understand patient, but she mentioned thought of something "makes her think about killing herself". Patient tangential and unable to understand context of phrase. RN notified  Patient/Family Comments/goals: get better     Occupational Profile: patient presents as poor historian; all information obtained from chart review  Living Environment: Patient admitted from SNF at [facility]. Prior to this, patient resided at [facility].   Previous level of function: Patient used a rollator for mobility and " received assistance from staff to complete ADLs   Roles and Routines: will continue to assess   Equipment Used at Home: rollator, shower chair, grab bar (information from chart review; continue to assess)  Assistance upon Discharge: patient will require staff/skilled assistance after discharge    Pain/Comfort:  Pain Rating 1: 0/10    Patients cultural, spiritual, Yarsanism conflicts given the current situation: no    Objective:     Communicated with: nursing (Summer) prior to session.  Patient found HOB elevated with telemetry, peripheral IV upon OT entry to room.    General Precautions: Standard, fall  Orthopedic Precautions: N/A  Braces: N/A  Respiratory Status: Room air upon OT entrance to room; application of 3L nasal cannula during session    Occupational Performance:    Bed Mobility:    Patient completed Rolling/Turning to Left with  total assistance  Patient completed Rolling/Turning to Right with total assistance  Patient completed Supine to Sit with maximal assistance and 2 persons  Patient completed Sit to Supine with maximal assistance and 2 persons  EOB balance:  Time: ~8 minutes  Noted: excessive postior lean, rounded shoulders and forward head  Maximal cueing to improve static sitting posture. Patient with difficulty following cueing requiring physical assistance to facilitate movements.    Functional Mobility/Transfers:  Not completed on this date due to functional status and patient's fear of falling     Activities of Daily Living:  Upper Body Dressing: total assistance to don gown    Cognitive/Visual Perceptual:  Cognitive/Psychosocial Skills:     -       Oriented to: Person only   -       Follows Commands/attention:Inattentive and unable to follow 80% of cues  -       Communication: unable to articulate words; patient presenting with clenched and forward jaw   -       Memory: Impaired STM and Impaired LTM  -       Safety awareness/insight to disability: unable to assess   -        Mood/Affect/Coping skills/emotional control: Agitated    Physical Exam:  Postural examination/scapula alignment:    -       Rounded shoulders  -       Forward head  -       Kyphosis  Skin integrity: Visible skin intact  Unable to assess UE ROM and MMT on this date due to patient's inability to follow commands     [facility] 6 Click ADL:  AMPA Total Score: 13    Treatment & Education:    Patient educated on:   -purpose of OT and OT POC  -importance of early mobility and EOB activities with staff assist  -overall benefits of therapy       Patient left HOB elevated with all lines intact, call button in reach, and nursing notified    Co-treatment with PT performed due to need for education, patient safety, and facilitation of treatment from two skilled therapy disciplines.         GOALS:   Multidisciplinary Problems       Occupational Therapy Goals          Problem: Occupational Therapy    Goal Priority Disciplines Outcome Interventions   Occupational Therapy Goal     OT, PT/OT Ongoing, Progressing    Description: Goals to be met by: 6/9/23     Patient will increase functional independence with ADLs by performing:    UE Dressing with Set-up Assistance.  Grooming while EOB with Minimal Assistance.  Sitting at edge of bed x10 minutes with Minimal Assistance.  Rolling to Bilateral with Minimal Assistance.   Supine to sit with Moderate Assistance.  Sit>stand transfers with Moderate Assistance.  Supervision assistance to complete BUE HEP to improve activity tolerance to complete ADLs and IADLs                           History:     No past medical history on file.      Past Surgical History:   Procedure Laterality Date    REPAIR, HERNIA, INGUINAL, WITHOUT HISTORY OF PRIOR REPAIR, AGE 5 YEARS OR OLDER Right 5/6/2023    Procedure: REPAIR, HERNIA, INGUINAL, WITHOUT HISTORY OF PRIOR REPAIR, AGE 5 YEARS OR OLDER;  Surgeon: Maurice Piper MD;  Location: [facility] OR [address] [place] [address];  Service: General;  Laterality: Right;  possible laparotomy,  possible bowel resection       Time Tracking:     OT Date of Treatment: 05/26/23  OT Start Time: 0935  OT Stop Time: 1000  OT Total Time (min): 25 min    Billable Minutes:Evaluation 10  Therapeutic Activity 15    5/26/2023

## 2023-05-27 NOTE — PROGRESS NOTES
[facility] Hospital Medicine  Progress Note    Primary Team: [facility]  Admit Date: 5/22/2023    Subjective:      HPI:  Radha Sandoval is a 88 y.o. female with past medical history of HTN, dementia, who presents from [facility] with shortness of breath. History obtained with assistance of patient close family friend who is at bedside. Patient was recently admitted in April for UTI, discharged to SNF, and then presented again and admitted 5/6 - 5/9 for inguinal hernia repair after presenting with groin pain. She was discharged to snf and now presents with shortness of breath for the last few days, along with confusion. She was started on oxygen at snf and has been requiring 3 L to maintain her saturation. She has had confusion along with potential hallucinations, reportedly thinking her son was at bedside; confusion has reportedly been apparent for at least last few weeks but potentially longer.         Overview/Hospital Course:  Admitted with acute PE. CTA showed Extensive pulmonary emboli, asymmetric to the right. Embolus noted within the lateral aspect of the right main pulmonary artery, encroaching within 4.7 cm of the main pulmonary artery bifurcation. Additional involvement of lobar, inter lobar, segmental and subsegmental pulmonary arteries. Cardiology consulted and patient was started on heparin gtt. Bedside echo without evidence of right heart strain. UA concerning for UTI and started on rocephin but this was stopped after urine cx returned with no growth. Patient has no medication coverage via insurance so unable to start DOAC. Heparin-coumadin bridge initiated. Heparin stoppped 5/26 as INR is > 2 now.    Interval History:  No acute events overnight.  INR increased to 3.5, will hold dose today. Calm during my assessment. Discontinue telesitter tonight.     ROS:  Unable to perform due to mental status    No past medical history on file.  Past Surgical History:   Procedure  Laterality Date    REPAIR, HERNIA, INGUINAL, WITHOUT HISTORY OF PRIOR REPAIR, AGE 5 YEARS OR OLDER Right 2023    Procedure: REPAIR, HERNIA, INGUINAL, WITHOUT HISTORY OF PRIOR REPAIR, AGE 5 YEARS OR OLDER;  Surgeon: Maurice Piper MD;  Location: [facility] OR [address] [place] [address];  Service: General;  Laterality: Right;  possible laparotomy, possible bowel resection         Objective:   Last 24 Hour Vital Signs:  BP  Min: 147/67  Max: 180/72  Temp  Av.8 °F (36.6 °C)  Min: 97 °F (36.1 °C)  Max: 98.5 °F (36.9 °C)  Pulse  Av.8  Min: 84  Max: 111  Resp  Av.7  Min: 16  Max: 19  SpO2  Av %  Min: 95 %  Max: 97 %  I/O last 3 completed shifts:  In: 490 [P.O.:490]  Out: 3470 [Urine:3470]    Physical Examination:  GEN: AAOx1, NAD  HEENT: NCAT, MMM, PERRL, EOMI, oropharynx clear  CV: RRR, no m/r, no S3/S4  RESP: CTAB, no wheezes/crackles, no increased WOB  ABD: soft, NTND, normoactive BS, no organomegaly  EXTR: no c/c/e, intact distal pulses x 4  NEURO: PERRL, EOMI, moving all four extremities, intact sensation to light touch, no focal deficits  SKIN: no rashes, lesions, or color changes  PSYCH: confused but pleasant, calm    Laboratory:  I have reviewed all pertinent lab results/findings within the past 24 hours.    Radiology:  I have reviewed all pertinent imaging results/findings within the past 24 hours.    Current Medications:     Infusions:         Scheduled:   haloperidoL  2 mg Oral QHS    potassium bicarbonate  50 mEq Oral Q6H        PRN:  dextrose 10%, dextrose 10%, glucagon (human recombinant), glucose, glucose, naloxone, sodium chloride 0.9%    Prior records reviewed.       Assessment/Plan:     Radha Sandoval is a 88 y.o.female with    Acute pulmonary embolism  Acute hypoxic respiratory failure  Bilateral DVT  Supratherapeutic INR  - CTA showed Extensive pulmonary emboli, asymmetric to the right. Embolus noted within the lateral aspect of the right main pulmonary artery, encroaching within 4.7 cm of the main  pulmonary artery bifurcation. Additional involvement of lobar, inter lobar, segmental and subsegmental pulmonary arteries.   - Also with extensive RLE DVTs as well as L femoral vein DVT.  - Cardiology consulted  - no R heart strain on TTE  - stopped heparin gtt once INR became therapeutic. Holding coumadin tonight for INR 3.5. Unable to utilize DOAC 2/2 cost. PharmD managing coumadin dosing      Hypokalemia: K 2.9. Repleting.    Delirium  H/o dementia  More recently reported some hallucinations  Delirium precautions  Haldol qhs - per pt caretaker was being given haldol qhs while at facility, seroquel worsened agitation  Controlled  Stop telesitter Saturday evening. Needs to be without telesitter 24 hours prior to SNF transfer on Monday.        Abnormal urinalysis  Urinary retention  Started on rocephin, however UCx is negative. Stop CTX  Spann placed. Void trial in 2 weeks on 6/5        Hypertension  No longer on medication        Lucia Knapp MD  Hospital Medicine Staff  [facility]

## 2023-05-28 NOTE — PROGRESS NOTES
[facility] Hospital Medicine  Progress Note    Primary Team: [facility]  Admit Date: 5/22/2023    Subjective:      HPI:  Radha Sandoval is a 88 y.o. female with past medical history of HTN, dementia, who presents from [facility] with shortness of breath. History obtained with assistance of patient close family friend who is at bedside. Patient was recently admitted in April for UTI, discharged to SNF, and then presented again and admitted 5/6 - 5/9 for inguinal hernia repair after presenting with groin pain. She was discharged to snf and now presents with shortness of breath for the last few days, along with confusion. She was started on oxygen at snf and has been requiring 3 L to maintain her saturation. She has had confusion along with potential hallucinations, reportedly thinking her son was at bedside; confusion has reportedly been apparent for at least last few weeks but potentially longer.         Overview/Hospital Course:  Admitted with acute PE. CTA showed Extensive pulmonary emboli, asymmetric to the right. Embolus noted within the lateral aspect of the right main pulmonary artery, encroaching within 4.7 cm of the main pulmonary artery bifurcation. Additional involvement of lobar, inter lobar, segmental and subsegmental pulmonary arteries. Cardiology consulted and patient was started on heparin gtt. Bedside echo without evidence of right heart strain. UA concerning for UTI and started on rocephin but this was stopped after urine cx returned with no growth. Patient has no medication coverage via insurance so unable to start DOAC. Heparin-coumadin bridge initiated. Heparin stoppped 5/26 as INR is > 2 now.    Interval History:  No acute events overnight.  INR increased to 4, will continue to hold dose today. Calm during my assessment. Caretaker at bedside. Surgery to see patient for staples from recent hernia surgery.    ROS:  Unable to perform due to mental status    No past medical  history on file.  Past Surgical History:   Procedure Laterality Date    REPAIR, HERNIA, INGUINAL, WITHOUT HISTORY OF PRIOR REPAIR, AGE 5 YEARS OR OLDER Right 2023    Procedure: REPAIR, HERNIA, INGUINAL, WITHOUT HISTORY OF PRIOR REPAIR, AGE 5 YEARS OR OLDER;  Surgeon: Maurice Piper MD;  Location: [facility] OR [address] [place] [address];  Service: General;  Laterality: Right;  possible laparotomy, possible bowel resection         Objective:   Last 24 Hour Vital Signs:  BP  Min: 149/66  Max: 174/74  Temp  Av.1 °F (36.7 °C)  Min: 97.5 °F (36.4 °C)  Max: 98.5 °F (36.9 °C)  Pulse  Av.9  Min: 64  Max: 114  Resp  Av.3  Min: 16  Max: 20  SpO2  Av.8 %  Min: 94 %  Max: 98 %  I/O last 3 completed shifts:  In: 0   Out: 1270 [Urine:1270]    Physical Examination:  GEN: AAOx1, NAD  HEENT: NCAT, MMM, PERRL, EOMI, oropharynx clear  CV: RRR, no m/r, no S3/S4  RESP: CTAB, no wheezes/crackles, no increased WOB  ABD: soft, NTND, normoactive BS, no organomegaly  EXTR: no c/c/e, intact distal pulses x 4  NEURO: PERRL, EOMI, moving all four extremities, intact sensation to light touch, no focal deficits  SKIN: no rashes, lesions, or color changes  PSYCH: confused but pleasant, calm    Laboratory:  I have reviewed all pertinent lab results/findings within the past 24 hours.    Radiology:  I have reviewed all pertinent imaging results/findings within the past 24 hours.    Current Medications:     Infusions:         Scheduled:   haloperidoL  2 mg Oral QHS        PRN:  dextrose 10%, dextrose 10%, glucagon (human recombinant), glucose, glucose, naloxone, sodium chloride 0.9%    Prior records reviewed.       Assessment/Plan:     Radha Sandoval is a 88 y.o.female with    Acute pulmonary embolism  Acute hypoxic respiratory failure  Bilateral DVT  Supratherapeutic INR  - CTA showed Extensive pulmonary emboli, asymmetric to the right. Embolus noted within the lateral aspect of the right main pulmonary artery, encroaching within 4.7 cm of the main  pulmonary artery bifurcation. Additional involvement of lobar, inter lobar, segmental and subsegmental pulmonary arteries.   - Also with extensive RLE DVTs as well as L femoral vein DVT.  - Cardiology consulted  - no R heart strain on TTE  - stopped heparin gtt once INR became therapeutic. Holding coumadin for INR 4. Unable to utilize DOAC 2/2 cost. PharmD managing coumadin dosing      Hypokalemia: repleted    Delirium  H/o dementia  More recently reported some hallucinations  Delirium precautions  Haldol qhs - per pt caretaker was being given haldol qhs while at facility, seroquel worsened agitation  Controlled  No longer requiring telesitter. Needs to be without telesitter 24 hours prior to SNF transfer on Monday.        Abnormal urinalysis  Urinary retention  Started on rocephin, however UCx is negative. Stop CTX  Spann placed. Void trial in 2 weeks on 6/5        Hypertension  No longer on medication        Lucia Knapp MD  Hospital Medicine Staff  [facility]

## 2023-05-29 NOTE — PLAN OF CARE
GENERAL SURGERY    87 yo nursing home resident w/ dementia who underwent incarcerated inguinal hernia repair on 5/6.  Unfortunately is back in the hospital w/ a PE.  We were asked to see pt for staple removal.      Staples removed.  No evidence on exam of hernia recurrence and incision is well healed.      No further follow up with general surgery needed.  Incision can remain open to air.  Call w/ questions.    Vinicius Lopez MD  General Surgery, PGY-5  [phone]

## 2023-05-29 NOTE — PLAN OF CARE
NURSING HOME ORDERS    05[facility]29/2023  [facility]  [address]  [facility] [address]  Dept: [phone]  Loc: [phone]     Admit to Nursing Home:  Skilled Nursing Facility    Diagnoses:  Active Hospital Problems    Diagnosis  POA    *Acute pulmonary embolism [I26.99]  Unknown    Acute hypoxemic respiratory failure [J96.01]  Unknown    Dementia [F03.90]  Unknown    Hypoalbuminemia [E88.09]  Unknown    Chronic bilateral pleural effusions [J90]  Unknown    Confusion [R41.0]  Yes    Acute cystitis with hematuria [N30.01]  Yes    Hypertension [I10]  Yes      Resolved Hospital Problems   No resolved problems to display.       Patient is homebound due to:  Acute pulmonary embolism    Allergies:Review of patient's allergies indicates:  No Known Allergies    Vitals:  Every shift    Diet: regular diet (no added salt)    Activities:   Activity as tolerated    Goals of Care Treatment Preferences:  Code Status: DNR       LaPOST: Yes         Oxygen prn for goal sats > 92%    LABS: INR daily - start coumadin 2 mg daily once INR between 2-3    Nursing Precautions:  Aspiration , Fall, and Pressure ulcer prevention    Consults:   PT to evaluate and treat- 5 times a week, OT to evaluate and treat- 5 times a week, and ST to evaluate and treat- 5 times a week     Miscellaneous Care: Routine Skin for Bedridden Patients:  Apply moisture barrier cream to all             Wound  Abdominal hernia surgical incision: can remain open to air    Spann care. Voiding trial on 6/5/23    Medications: Discontinue all previous medication orders, if any. See new list below.     Medication List        START taking these medications      haloperidoL 2 MG tablet  Commonly known as: HALDOL  Take 1 tablet (2 mg total) by mouth every evening.     metoprolol tartrate 25 MG tablet  Commonly known as: LOPRESSOR  Take 0.5 tablets (12.5 mg total) by mouth 2 (two) times daily.          Start coumadin 2  mg daily once INR between 2-3, and titrate accordingly         STOP taking these medications      HALDOL DECANOATE IM     HYDROcodone-acetaminophen 5-325 mg per tablet  Commonly known as: NORCO     hydrOXYzine HCL 25 MG tablet  Commonly known as: ATARAX     ibuprofen 400 MG tablet  Commonly known as: ADVIL,MOTRIN     ramelteon 8 mg tablet  Commonly known as: ROZEREM                Immunizations Administered as of 5/29/2023       Name Date Dose VIS Date Route Exp Date    COVID-19, MRNA, LN-S, PF (Pfizer) (Purple Cap) 3/10/2021 0.3 mL -- -- --    Lot: [id number]     External: Auto Reconciled From Outside Source     COVID-19, MRNA, LN-S, PF (Pfizer) (Purple Cap) 2/17/2021 0.3 mL -- -- --    Lot: [id number]     External: Auto Reconciled From Outside Source             _________________________________  Lucia Knapp MD  05/29/2023

## 2023-05-29 NOTE — PLAN OF CARE
"Spoke with Leland at [facility] and sent Covid and TB results and chest Xray with D/C packet and 7 day past MAR along with orders for SNF all via care port.     12:42 Leland called and stated they do not have a cardiac diet but diet can read "No Added Na or No Concentrated Sweets or both . Chat to physician .    1:27 New diet orders sent and spoke with Leland and was told to have nurse call report to [phone] and ask for 4 north.     1:38 Attempted to reach son (Maykel ) to inform him of Mothers discharge today. No answer, attempted to leave message - Mail box full . Unable to leave message.     Elizabeth Russo RN    [phone]    "

## 2023-05-29 NOTE — DISCHARGE SUMMARY
[facility] - Cardiology [facility] Hospital Medicine  Discharge Summary      Patient Name: Radha Sandvoal  MRN: [id number]  ICNDY: [id number]  Patient Class: IP- Inpatient  Admission Date: 5/22/2023  Hospital Length of Stay: 7 days  Discharge Date and Time:  05/29/2023 2:42 PM  Attending Physician: Lucia Knapp MD   Discharging Provider: Lucia Knapp MD  Primary Care Provider: Primary Doctor [facility] Medicine Team: [facility] Lucia Knapp MD  Primary Care Team: [facility]    HPI:   Radha Sandoval is a 88 y.o. female with past medical history of HTN, dementia, who presents from [facility] with shortness of breath. History obtained with assistance of patient close family friend who is at bedside. Patient was recently admitted in April for UTI, discharged to SNF, and then presented again and admitted 5/6 - 5/9 for inguinal hernia repair after presenting with groin pain. She was discharged to snf and now presents with shortness of breath for the last few days, along with confusion. She was started on oxygen at snf and has been requiring 3 L to maintain her saturation. She has had confusion along with potential hallucinations, reportedly thinking her son was at bedside; confusion has reportedly been apparent for at least last few weeks but potentially longer.       * No surgery found *      Hospital Course:   Admitted with acute hypoxia and found to have acute PE and DVT's.    Acute pulmonary embolism  Acute hypoxic respiratory failure  Bilateral DVT  Supratherapeutic INR  - CTA showed Extensive pulmonary emboli, asymmetric to the right. Embolus noted within the lateral aspect of the right main pulmonary artery, encroaching within 4.7 cm of the main pulmonary artery bifurcation. Additional involvement of lobar, inter lobar, segmental and subsegmental pulmonary arteries.   - Also with extensive RLE DVTs as well as L femoral vein DVT.  - Cardiology consulted, recommended AC  - no R heart strain on TTE  - Started on  heparin-coumadin bridge as pt unable to utilize DOAC 2/2 cost. Holding coumadin for INR 3.8. Resume at 2 mg daily once in range of 2-3.        Hypokalemia: repleted     Delirium  H/o dementia  More recently reported some hallucinations  Delirium precautions  Haldol qhs - per pt caretaker was being given haldol qhs while at facility, seroquel worsened agitation  Controlled  No longer requiring telesitter.        Abnormal urinalysis  Urinary retention  Started on rocephin, however UCx is negative. Stop CTX  Spann placed. Void trial in 2 weeks on 6/5        Hypertension  No longer on medication       Goals of Care Treatment Preferences:  Code Status: DNR       LaPOST: Yes           Consults:   Consults (From admission, onward)        Status Ordering Provider     Inpatient consult to Interventional Cardiology  Once        Provider:  (Not yet assigned)    FOSTER Singh          No new Assessment & Plan notes have been filed under this hospital service since the last note was generated.  Service: Hospital Medicine    Final Active Diagnoses:    Diagnosis Date Noted POA    PRINCIPAL PROBLEM:  Acute pulmonary embolism [I26.99] 05/22/2023 Unknown    Acute hypoxemic respiratory failure [J96.01] 05/22/2023 Unknown    Dementia [F03.90] 05/22/2023 Unknown    Hypoalbuminemia [E88.09] 05/22/2023 Unknown    Chronic bilateral pleural effusions [J90] 05/22/2023 Unknown    Confusion [R41.0] 04/21/2023 Yes    Acute cystitis with hematuria [N30.01] 04/18/2023 Yes    Hypertension [I10] 06/10/2022 Yes      Problems Resolved During this Admission:       Discharged Condition: good    Disposition: Skilled Nursing Facility    Follow Up:    Patient Instructions:      Ambulatory referral/consult to Outpatient Case Management   Referral Priority: Routine Referral Type: Consultation   Referral Reason: Specialty Services Required   Number of Visits Requested: 1       Significant Diagnostic Studies: Labs:   CMP   Recent Labs   Lab  05/27/23  2246 05/28/23  0556 05/29/23  0456    142 141   K 4.2 3.8 3.5    104 105   CO2 26 23 25   * 103 120*   BUN 6* 6* 11   CREATININE 0.7 0.6 0.6   CALCIUM 8.8 8.5* 8.6*   ANIONGAP 14 15 11    and CBC   Recent Labs   Lab 05/28/23  0556 05/29/23  0456   WBC 8.83 8.37   HGB 9.8* 9.0*   HCT 30.3* 27.6*    275       Pending Diagnostic Studies:     None         Medications:  Reconciled Home Medications:      Medication List      START taking these medications    haloperidoL 2 MG tablet  Commonly known as: HALDOL  Take 1 tablet (2 mg total) by mouth every evening.     metoprolol tartrate 25 MG tablet  Commonly known as: LOPRESSOR  Take 0.5 tablets (12.5 mg total) by mouth 2 (two) times daily.        CONTINUE taking these medications    miconazole NITRATE 2 % 2 % top powder  Commonly known as: MICOTIN  Apply topically twice daily under bilateral breasts        STOP taking these medications    HALDOL DECANOATE IM     HYDROcodone-acetaminophen 5-325 mg per tablet  Commonly known as: NORCO     hydrOXYzine HCL 25 MG tablet  Commonly known as: ATARAX     ibuprofen 400 MG tablet  Commonly known as: ADVIL,MOTRIN     ramelteon 8 mg tablet  Commonly known as: ROZEREM            Indwelling Lines/Drains at time of discharge:   Lines/Drains/Airways     Drain  Duration                Urethral Catheter 05/22/23 1801 Silicone 16 Fr. 6 days                Time spent on the discharge of patient: 35 minutes         Lucia Knapp MD  Department of Hospital Medicine  [facility] - Cardiology Stepdown

## 2023-05-29 NOTE — NURSING
Patient is ready for discharge. Patient stable alert and oriented. IVs removed. Telemonitor removed. Spann CDI. No complaints of pain. Discussed discharge plan with Caregiver Chel. Printed AVS provided to Chel. Patient left the floor via stretcher with Sj.

## 2023-05-29 NOTE — PLAN OF CARE
CHW met with patient/family at bedside. Patient experience rounding completed and reviewed the following.     Do you know your discharge plan? Yes  If yes, what is the plan? ([facility])    If you are discharging home, do you have help at home? Yes caretaker  Do you think you will need help at home at discharge? No     Have you discussed your needs and preferences with your SW/CM? Yes     Assigned SW/CM notified of any patient/family needs or concerns. No needs/concerns

## 2023-05-29 NOTE — PLAN OF CARE
"   05/29/23 1342   Final Note   Assessment Type Final Discharge Note   Anticipated Discharge Disposition SNF   What phone number can be called within the next 1-3 days to see how you are doing after discharge? [phone]   Hospital Resources/Appts/Education Provided Provided patient/caregiver with written discharge plan information;Appointments scheduled and added to AVS;Appointments scheduled by Navigator/Coordinator   Post-Acute Status   Post-Acute Placement Status Set-up Complete/Auth obtained   Discharge Delays None known at this time     Patient returning to [facility] via ambulance . Nurse aware to call report to [phone] and ask for [address]. Attempted to call son and unsuccessful to reach him.     1:54 Did reach son (Maykel Sandoval) and informed him of mother returning back to [facility] and he requested that orders and all be sent to them. Reassured him all of that has been done.      2:58 Patient had left earlier and physician came and stated she had made change to orders for the coumadin and INR . Called and spoke with nurse Hess at [facility] facility at ([phone]) Sent copy in care port and copy faxed to [phone]. Nurse aware of orders. Your fax has been successfully sent to [phone] at [phone].  ------------------------------------------------------------  From: [id number]  ------------------------------------------------------------  5/29/2023 2:57:29 PM Transmission Record          Sent to [phone] with remote ID "[phone]"          Result: (0/339;0/0) Success          Page record: 1 - 5          Elapsed time: 02:34 on channel 42    Elizabeth Russo RN    [phone]    Future Appointments   Date Time Provider Department Center   6/5/2023 11:00 AM Carolyne Hirsch PA-C [facility] NEURO [facility]       "

## 2023-05-30 NOTE — PLAN OF CARE
Did call and speak with Erin at [facility] to let her know the new orders for the INR and the coumadin are in care port and that they were faxed to the nurse yesterday evening. She stated she will make sure it is there.     Elizabeth Russo RN CM   [phone]

## 2023-06-23 NOTE — PROGRESS NOTES
[facility] NEUROLOGY  [facility], [place]    Date: 6/23/23  Patient Name: Radha Sandoval   MRN: [id number]   PCP: Primary Doctor No  Referring Provider: Self, Aaareferral    Chief Complaint: Memory  Subjective:   Patient seen in consultation at the request of Self, Aaareferral for the evaluation of memory. A copy of this note will be sent to the referring physician.        HPI:   Ms. Radha Sandoval is a 88 y.o. female presenting for memory evaluation and is accompanied by Chel who has been assisting with care since April 2023. Chel was hired by patients son to bring to doctor's appointments. Chel notes that they went to establish care with a PCP for patient in April.     Patient had a UTI and was admitted for management followed by rehab 4/18-4/28. Following that patient had an emergency bowel obstruction requiring admission 5/6-5/9 followed by rehab. While in rehab, had difficulty breathing and then was admitted for PE and bladder infection (5/22-5/29). Since then confusion has been ongoing with continued changes over the last two months.    Patient tends to forget things very quickly. Sometimes thinks she ate but didn't. Endorse visual hallucinations and delusions. Does have vivid dreams and acts out dreams.     Chel additionally notes that when she first started with patient, she found a bunch of medications that had not been taken. Had found used diapers in the drawer. She states that current facility provides better care and supervision for patient given need for assistance with ADL's compared to prior facility which did not have 24 hour care.     Patient needs assistance with toileting, bathing, and dressing. Wears depends for incontinence     Currently on haldol 2 mg nightly. Had been given Seroquel in the hospital, which worsened agitation.    Sons manage finances. One is located here and one is in [place].     Patient no longer driving. Patient states it's because her   gave her car to his girlfriend (patient and  have been  for years).       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 5/22/2023    Lumbar spondylosis with myelopathy 4/25/2023    Multiple closed right sided fractures of pelvis without disruption of pelvic ring 7/9/2023    Primary hypertension 6/10/2022    Subdural hygroma 7/8/2023       PAST SURGICAL HISTORY:  Past Surgical History:   Procedure Laterality Date    REPAIR, HERNIA, INGUINAL, WITHOUT HISTORY OF PRIOR REPAIR, AGE 5 YEARS OR OLDER Right 5/6/2023    Procedure: REPAIR, HERNIA, INGUINAL, WITHOUT HISTORY OF PRIOR REPAIR, AGE 5 YEARS OR OLDER;  Surgeon: Maurice Piper MD;  Location: [facility] OR [address] [place] [address];  Service: General;  Laterality: Right;  possible laparotomy, possible bowel resection       CURRENT MEDS:  Current Outpatient Medications   Medication Sig Dispense Refill    acetaminophen (TYLENOL) 325 MG tablet Take 2 tablets (650 mg total) by mouth every 6 (six) hours as needed (Pain). 60 tablet 1    apixaban (ELIQUIS) 5 mg Tab Take 1 tablet (5 mg total) by mouth 2 (two) times daily. 60 tablet 11    ascorbic acid, vitamin C, (VITAMIN C) 250 MG tablet Take 1 tablet (250 mg total) by mouth once daily. 30 tablet 11    EScitalopram oxalate (LEXAPRO) 5 MG Tab Take 1 tablet (5 mg total) by mouth once daily. 30 tablet 11    furosemide (LASIX) 20 MG tablet Take 1 tablet (20 mg total) by mouth 2 (two) times daily. 60 tablet 11    haloperidoL (HALDOL) 0.5 MG tablet Take 1 tablet (0.5 mg total) by mouth every evening. 30 tablet 11    lisinopriL (PRINIVIL,ZESTRIL) 40 MG tablet Take 1 tablet (40 mg total) by  "mouth once daily. 30 tablet 11    loperamide (IMODIUM) 2 mg capsule Take 1 capsule (2 mg total) by mouth 4 (four) times daily as needed for Diarrhea. 60 capsule 0    melatonin (MELATIN) 3 mg tablet Take 2 tablets (6 mg total) by mouth nightly as needed for Insomnia. 30 tablet 3    memantine (NAMENDA) 5 MG Tab Take 1 tablet (5 mg total) by mouth 2 (two) times daily. 60 tablet 11    methocarbamoL (ROBAXIN) 500 MG Tab Take 1 tablet (500 mg total) by mouth 3 (three) times daily as needed (muscle spasms, pain scale 5-7). 30 tablet 1    metoprolol tartrate (LOPRESSOR) 25 MG tablet Take 1 tablet (25 mg total) by mouth 2 (two) times daily. 60 tablet 11    miconazole NITRATE 2 % (MICOTIN) 2 % top powder Apply topically 2 (two) times daily. Underside of bilateral breasts 85 g 3    potassium chloride (KLOR-CON) 10 MEQ TbSR Take 1 tablet (10 mEq total) by mouth once daily. 30 tablet 11    tamsulosin (FLOMAX) 0.4 mg Cap Take 1 capsule (0.4 mg total) by mouth nightly. 30 capsule 11    zinc sulfate (ZINCATE) 50 mg zinc (220 mg) capsule Take 1 capsule (220 mg total) by mouth once daily. 30 capsule 11     No current facility-administered medications for this visit.       ALLERGIES:  Review of patient's allergies indicates:  No Known Allergies    FAMILY HISTORY:  History reviewed. No pertinent family history.    SOCIAL HISTORY:       Review of Systems:  Gen: no fever, no chills, no generalized feeling of weakness   HEENT: no double vision, no blurred vision, no eye pain, no eye exudates. no nasal congestion,   no traumatic injury of head, no neck pain, no neck stiffness. no photophobia or phonophobia at this time. ?    Heart: no chest pain, no SOB    Lungs: no SOB, no cough    MSK: no weakness of legs, intact ROM    ABD: no abd pain, no N/V/D/C, no difficulty with defecation.    Extremities: No leg pain, no edema.       Objective:     Vitals:    06/23/23 0913   Weight: 72.1 kg (159 lb)   Height: 5' 5" (1.651 m)       General: Female " in NAD, alert and awake, well groomed. ?    ? ?    HEENT: Head is NC/AT EOMI, pupil size: 4 mm B/L, no nystagmus noted; hearing grossly intact b/l. Mucous membrane moist, uvula midline, no pharyngeal erythema, exudates or discharges.      Neck: Supple. no nuchal rigidity.      Cardiovascular: well perfused, no cyanosis        Respiratory: Symmetric chest rise noted       Musculoskeletal: Muscle tone noted to be adequate for patient age, muscle mass is WNL. No spontaneous movements or fasciculations noted during this examination.       Extremities: No pedal edema or calf tenderness. mild rigidity LUE     Neurological Examination.    Mental status: AA&O x3; Affect/mood is euthymic/congruent; no aphasia noted during examination. Patient answers simple questions appropriately & follows simple commands; no dysarthria or expressive aphasia; no michelle-neglect or extinction.       Cranial Nerves: II-XII grossly intact. visual fields intact to confrontation, EOMI, no nystagmus, PERRLA,?facial muscles symmetric and no facial droop noted, patient hearing is grossly intact b/l, ?facial sensation to sharp and light touch grossly intact B/L. Patient smiles, frowns, closes eyes ?forcefully uneventfully. Uvula midline and no difficulty with pronunciation. No SCM/trapezius/muscle of mastication weakness noted B/L. Patient has adequate control of tongue and may protrude it and move it adequately.       Muscle Function: Tone WNL and Muscle bulk WNL. Symmetric use of bilateral upper and lower extremities against gravity.     Sensory: ?light touch are grossly intact in bilateral upper and lower extremities, face, and trunk.       Coordination: no dysmetria      Gait: in wheelchair today    Slowed alternating movements on left    MOCA: 14/30  Visuospatial/Executive 1/5, Naming 0/3, Attention 5/6, Language 1/3, Abstraction 2/2, Delayed Recall 1/5, Orientation 4/6      Assessment:   Radha Sandoval is a 88 y.o. female presenting for memory  evaluation. MOCA and history align with dementia. Will initiate neuropsychology testing for further case definition. Screen for Lewy Body vs alzheimers with hallucinations. Memory labs ordered for evaluation. Endorse placement in facility that provides more supervision and care with daily activities. Will not initiate memory medications at this time due to side effect profile.    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 Use of such devices sometimes results in grammatical errors or replacement of words that sound similarly.    I spent a total of 60 minutes on the day of the visit. This includes face to face time and non-face to face time preparing to see the patient (eg, review of tests), obtaining and/or reviewing separately obtained history, documenting clinical information in the electronic or other health record, independently interpreting results and communicating results to the patient/family/caregiver, or care coordinator. Mathew Quigley DO was available  for consultation throughout this encounter.     Carolyne Hirsch PA-C

## 2023-07-08 PROBLEM — F02.83 LATE ONSET ALZHEIMER'S DEMENTIA WITH MOOD DISTURBANCE: Status: ACTIVE | Noted: 2023-01-01

## 2023-07-08 PROBLEM — I50.31 ACUTE DIASTOLIC CONGESTIVE HEART FAILURE: Status: ACTIVE | Noted: 2023-01-01

## 2023-07-08 PROBLEM — G30.1 LATE ONSET ALZHEIMER'S DEMENTIA WITH MOOD DISTURBANCE: Status: ACTIVE | Noted: 2023-01-01

## 2023-07-08 PROBLEM — S32.509A: Status: ACTIVE | Noted: 2023-01-01

## 2023-07-08 PROBLEM — I50.9 ACUTE CONGESTIVE HEART FAILURE: Status: ACTIVE | Noted: 2023-01-01

## 2023-07-08 PROBLEM — D18.1 HYGROMA: Status: ACTIVE | Noted: 2023-01-01

## 2023-07-08 PROBLEM — N17.9 AKI (ACUTE KIDNEY INJURY): Status: RESOLVED | Noted: 2023-01-01 | Resolved: 2023-01-01

## 2023-07-08 PROBLEM — J18.9 PNEUMONIA: Status: ACTIVE | Noted: 2023-01-01

## 2023-07-08 NOTE — ED PROVIDER NOTES
Encounter Date: 7/8/2023       History     Chief Complaint   Patient presents with    Fall     Pt arrived via EMS from NH w/ c/o a fall, pt landed on her right side, states she hit her head, pt is on eliquis. 5mg of morphine IM given by EMS     Radha Sandoval is a 88 y.o. female with past medical history of HTN, dementia, with for PE and DVT on Eliquis presenting to the emergency department after a fall.  Caregiver reports that patient has been working at [employer] with PT OT and attempted to get up on her own today and then landed on her right side.  She states that patient had hit her head but denies any loss of consciousness.  She states that patient began complaining of right arm and hip pain.  Caregiver also at bedside states that patient has had worsening swelling and audible wheezing and reporting shortness of breath however patient is a poor historian.  Patient unable to provide any history.  That patient has had decreased urination and is concerned about urinary retention.  She notes that patient has cognitively been declining and following up with neurology with a scheduled MRI that had to be canceled given current status.    The history is provided by a caregiver.   Review of patient's allergies indicates:  No Known Allergies  No past medical history on file.  Past Surgical History:   Procedure Laterality Date    REPAIR, HERNIA, INGUINAL, WITHOUT HISTORY OF PRIOR REPAIR, AGE 5 YEARS OR OLDER Right 5/6/2023    Procedure: REPAIR, HERNIA, INGUINAL, WITHOUT HISTORY OF PRIOR REPAIR, AGE 5 YEARS OR OLDER;  Surgeon: Maurice Piper MD;  Location: [facility] OR [address] [place] [address];  Service: General;  Laterality: Right;  possible laparotomy, possible bowel resection     No family history on file.      Physical Exam     Initial Vitals [07/08/23 1343]   BP Pulse Resp Temp SpO2   (!) 168/96 63 16 97.8 °F (36.6 °C) (!) 92 %      MAP       --         Physical Exam    Nursing note and vitals reviewed.  Constitutional: She appears  well-developed and well-nourished.   HENT:   Head: Normocephalic and atraumatic.   Eyes: EOM are normal. Pupils are equal, round, and reactive to light.   Neck: Neck supple. No tracheal deviation present. JVD present.   Normal range of motion.  Cardiovascular:  Normal rate and regular rhythm.           Pulmonary/Chest: She has wheezes. She has rales.   Abdominal: She exhibits no distension. There is no abdominal tenderness.   Musculoskeletal:         General: Tenderness and edema present.      Right shoulder: Bony tenderness present. Decreased range of motion.      Left shoulder: No tenderness or bony tenderness.      Cervical back: Normal range of motion and neck supple.      Left hip: Bony tenderness present. No deformity or lacerations. Decreased range of motion.      Comments: Bilateral pitting lower extremity edema         ED Course   Procedures  Labs Reviewed   CBC W/ AUTO DIFFERENTIAL   COMPREHENSIVE METABOLIC PANEL   TROPONIN I   B-TYPE NATRIURETIC PEPTIDE   MAGNESIUM   PROTIME-INR   URINALYSIS, REFLEX TO URINE CULTURE          Imaging Results    None          Medications   furosemide injection 40 mg (has no administration in time range)     Medical Decision Making:   Initial Assessment:   88-year-old female presenting to the emergency department for evaluation for fall with complaints of right shoulder and hip pain.  Caregiver also notes that patient has had worsening shortness of breath and wheezing.  On examination patient is hypoxic at 88-90% on room air improved on 2 L nasal cannula, tachypneic  Differential Diagnosis:   CHF  Pneumonia  Saddle pulmonary embolism  UTI  Intracranial hemorrhage  Proximal femur fracture  Pelvic fracture    Clinical Tests:   Lab Tests: Ordered and Reviewed  Radiological Study: Ordered and Reviewed  Medical Tests: Ordered and Reviewed  ED Management:  88-year-old female presenting to emergency department for evaluation for a fall.  Upon further evaluation by caregiver at  bedside patient has also been noted to be short of breath with audible inspiratory and expiratory wheezing with increased work of breathing.  Upon evaluation for trauma patient reported pain in the right shoulder, right hip.  She denies any neck pain however patient did hit her head and is on Eliquis with concern for possible distracting injury.  At this time x-ray of the right arm and right hip and pelvis obtained.  CT head and cervical spine obtained.  Cervical spine with no signs of acute fractures.  Right shoulder x-ray with no signs of humeral head fracture or dislocation.  Pelvic x-ray/right hip notable for a superior and inferior pubic rami fracture orthopedic surgery consulted and obtained a CT pelvic and recommended currently non operative but will continue to follow and evaluate for intervention.  CT head was notable for concern for possible chronic bilateral subdural hematomas.  Neurosurgery consulted and felt that this was chronic and unlikely to be acute but will continue to follow and monitor but did not recommend interventions at this time.    During initial assessment patient was also hypoxic with increased work of breathing with notable inspiratory and expiratory wheezing.  On exam patient had JVD with bilateral lower extremity edema and notable rales.  Concern for fluid overload/acute CHF exacerbation.  Chest x-ray obtained was notable for pleural effusions.  Given recent pulmonary embolism diagnosis with worsening cardiac function concern for worsening PE burden.  CT PE study obtained which does notable for bilateral pleural effusions, with concern for pneumonia.  with no signs of saddle PE or right heart strain given limited study.  Patient given IV 40 mg Lasix.  Patient has had worsening urinary retention.  Bladder scan performed and was notable for retention of 350 Spann was placed and patient given Lasix with urinary output.  Patient also had a leukocytosis of 12 with the findings of pneumonia  patient started on broad-spectrum antibiotics to cover Hcap given recent hospitalization and current care at SNF, which will also cover urinary tract infection suspected given leukocytosis, WBCs, bacteria in the urine.  This time discussed case with Hospital Medicine who will admit patient for continued care.  Plan discussed with caregiver at bedside and discussed with son over the phone who are agreeable with plan.  Patient admitted to Hospital Medicine                 ED Course as of 07/08/23 1659   Sat Jul 08, 2023   1631 BNP(!): 931 [AC]   1631 Troponin I(!): 0.031 [AC]   1631 WBC(!): 12.81 [AC]      ED Course User Index  [AC] Devonte Pennington DO                 Clinical Impression:   Final diagnoses:  [W19.XXXA] Fall  [R06.02] Shortness of breath  [R09.02] Hypoxia               Blake Chamberlain MD  Resident  07/08/23 2002

## 2023-07-09 PROBLEM — E87.6 HYPOKALEMIA: Status: ACTIVE | Noted: 2023-01-01

## 2023-07-09 PROBLEM — R13.10 DYSPHAGIA: Status: ACTIVE | Noted: 2023-01-01

## 2023-07-09 PROBLEM — G93.41 ACUTE METABOLIC ENCEPHALOPATHY: Status: ACTIVE | Noted: 2023-01-01

## 2023-07-09 PROBLEM — Z71.89 GOALS OF CARE, COUNSELING/DISCUSSION: Status: ACTIVE | Noted: 2023-01-01

## 2023-07-09 PROBLEM — R00.0 SINUS TACHYCARDIA: Status: RESOLVED | Noted: 2023-01-01 | Resolved: 2023-01-01

## 2023-07-09 PROBLEM — E83.42 HYPOMAGNESEMIA: Status: ACTIVE | Noted: 2023-01-01

## 2023-07-09 PROBLEM — I82.411 ACUTE DEEP VEIN THROMBOSIS (DVT) OF FEMORAL VEIN OF RIGHT LOWER EXTREMITY: Status: ACTIVE | Noted: 2023-01-01

## 2023-07-09 PROBLEM — S32.82XA MULTIPLE CLOSED FRACTURES OF PELVIS WITHOUT DISRUPTION OF PELVIC RING: Status: ACTIVE | Noted: 2023-01-01

## 2023-07-09 NOTE — ASSESSMENT & PLAN NOTE
With recent hospital stays - patient's caretaker notes worsening deblity and patient's cognitive deficits compound issue.  Patient's unwitnessed fall today occurred - patient reported to be trying to walk to the bathroom when this occurred  Son reports that prior to these recent multiple admits she was doing well but has had multiple serious illnesses including incarcerated hernia, PE, UTIS which have led to worsening baseline status in last few months

## 2023-07-09 NOTE — ASSESSMENT & PLAN NOTE
New acute respiratory failure with hypoxia, patient not previously on oxygen  -she has recent diagnosis of PE and noted to have bilateral L>R pleural effusions persistent today, persistent wheezing prior to admit per family in last days to weeks prior to admit sounds consistent with volume overload developing with LE edema worsening before admit as well  -continue lasix now, rocephin should cover any superimposed PNA but sounds more likely to be volume.   -CT chest today does not have significant parenchymal disease, patient is afebrile   -Etiology may be due to worsening of CHF symptoms coupled with presence of right pulmonary embolism, no new or worsening degree of PE seen on CTA today but motion artifact degrades quality of study.     -Continue lasix,  supplemental O2 to wean as tolerated.

## 2023-07-09 NOTE — ASSESSMENT & PLAN NOTE
-likely secondary to UTI as well as dilaudid given on admission as has had recent hallucinations prior to admit suspect was likely UTI then and then worsened mental status on 7/9 in AM lkely from combination of poor sleep on admit + pain meds worsening this, CT head showing possible change in hygroma and concern for this with mental status and monitoring closely with q 4 neuro checks but now improving mental status in PM so seems less likely from this and more likely from former  -close monitoring

## 2023-07-09 NOTE — NURSING
Pt very drowsy at this time. Pt is arousable and vitals are stable, pt very sleepy at this time and is unable to stay awake long enough to swallow medications. Pt last received medication of Dilaudid 0.5mg IV @ 2105 in the ED. Pt able to state her name but disoriented to place and time. No apnea at this time. Pupils 3 & PERRLA. 99% 2L NC. SCD's were applied to pt's legs for DVT prevention. HOB 30 degrees, call light within reach, bed in lowest position and bed alarm activated.Chel (caregiver) is at the bedside with the pt.

## 2023-07-09 NOTE — CARE UPDATE
Notified by nursing after arrival to the floor  Patient is somnolent - not safe to take oral pills    Instructed nursing to hold oral meds    Will switch eliquis to therapeutic lovenox for now to continue anticoagulation.

## 2023-07-09 NOTE — SUBJECTIVE & OBJECTIVE
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 5/22/2023    Lumbar spondylosis with myelopathy 4/25/2023    Primary hypertension 6/10/2022       Past Surgical History:   Procedure Laterality Date    REPAIR, HERNIA, INGUINAL, WITHOUT HISTORY OF PRIOR REPAIR, AGE 5 YEARS OR OLDER Right 5/6/2023    Procedure: REPAIR, HERNIA, INGUINAL, WITHOUT HISTORY OF PRIOR REPAIR, AGE 5 YEARS OR OLDER;  Surgeon: Maurice Piper MD;  Location: [facility] OR [address] [place] [address];  Service: General;  Laterality: Right;  possible laparotomy, possible bowel resection       Review of patient's allergies indicates:  No Known Allergies    Current Facility-Administered Medications   Medication    acetaminophen tablet 650 mg    ascorbic acid (vitamin C) tablet 250 mg    cefTRIAXone (ROCEPHIN) 2 g in dextrose 5 % in water (D5W) 5 % 100 mL IVPB (MB+)    EScitalopram oxalate tablet 5 mg    furosemide injection 40 mg    haloperidoL tablet 0.5 mg    lisinopriL tablet 20 mg    melatonin tablet 6 mg    memantine tablet 5 mg    metoprolol tartrate (LOPRESSOR) tablet 25 mg    miconazole NITRATE 2 % top powder    naloxone 0.4 mg/mL injection 0.02 mg    ondansetron injection 4 mg    polyethylene glycol packet 17 g    prochlorperazine injection Soln 5 mg    senna-docusate 8.6-50 mg per tablet 1 tablet    sodium chloride 0.9% flush 10 mL    tamsulosin 24 hr capsule 0.4 mg    zinc sulfate capsule 220 mg     Family History    None       Tobacco Use    Smoking status: Not on file    Smokeless tobacco: Not on file   Substance and Sexual Activity    Alcohol use: Not on file    Drug use: Not on file    Sexual activity: Not on file     ROS  Unable to obtain due to patient's neurocognitive status  Objective:     Vital  "Signs (Most Recent):  Temp: 98.6 °F (37 °C) (07/08/23 2306)  Pulse: 84 (07/09/23 0341)  Resp: 18 (07/08/23 2306)  BP: 131/63 (07/08/23 2306)  SpO2: 95 % (07/08/23 2306) Vital Signs (24h Range):  Temp:  [97.8 °F (36.6 °C)-98.6 °F (37 °C)] 98.6 °F (37 °C)  Pulse:  [63-85] 84  Resp:  [13-24] 18  SpO2:  [92 %-99 %] 95 %  BP: (110-168)/(53-96) 131/63     Weight: 72.1 kg (159 lb)  Height: 5' 3" (160 cm)  Body mass index is 28.17 kg/m².      Intake/Output Summary (Last 24 hours) at 7/9/2023 0427  Last data filed at 7/9/2023 0152  Gross per 24 hour   Intake --   Output 900 ml   Net -900 ml        Ortho/SPM Exam  Physical Exam:    Limited due to limited responsiveness from patient and inability to answer questioning    General: mild distress  CV:  Normal pulses, color, and cap refill  Lungs:  no increased WOB  Neuro/Psych: Drowsy on exam and difficult to rouse    MSK:    Patient demonstrates obvious pain with any movement of lower extremities.  Skin intact throughout with no open wounds.  Severe hallux valgus bilaterally.  Extremities are WWP.       Significant Labs: BMP:   Recent Labs   Lab 07/08/23  1549   *      K 4.5   *   CO2 23   BUN 17   CREATININE 0.8   CALCIUM 8.4*   MG 1.7     CBC:   Recent Labs   Lab 07/08/23  1549   WBC 12.81*   HGB 9.0*   HCT 29.5*          Lactic Acid:   Recent Labs   Lab 07/08/23  1821   LACTATE 1.2     Urine Studies:   Recent Labs   Lab 07/08/23  1844   COLORU Yellow   APPEARANCEUA Hazy*   PHUR 6.0   SPECGRAV 1.015   PROTEINUA Negative   GLUCUA Negative   KETONESU Negative   BILIRUBINUA Negative   OCCULTUA Negative   NITRITE Negative   LEUKOCYTESUR 3+*   RBCUA 4   WBCUA >100*   BACTERIA Many*   SQUAMEPITHEL 2   HYALINECASTS 13*     All pertinent labs within the past 24 hours have been reviewed.    Significant Imaging: I have reviewed and interpreted all pertinent imaging results/findings.  X-ray of right hip showing superior and inferior pubic rami fractures at the " level of the symphysis   CT of pelvis showing no acute sacral fractures

## 2023-07-09 NOTE — ASSESSMENT & PLAN NOTE
88F PMH HTN, dementia, PE/DVT on eliquis, presenting after fall w/o LOC and CTH demonstrating chronic subdural hygroma    Plan:  --admit HM, q4h neurochecks  --all labs and significant diagnostics reviewed   --CTH 7/8: New chronic subdural hygromas when compared to CT scan 4/23   --CTH 7/8 4hr repeat:   --CT C spine (fall, AMS) 7/8:  --Coags WNL  --will consider outpatient MMA embo for chronic subdural hygroma pending stable scan

## 2023-07-09 NOTE — ASSESSMENT & PLAN NOTE
[facility]   -Orthopedic surgery consulted and pending by ED team  -duncan catheter in place.   -given dilaudid 0.5mg IV once after evaluation - monitor resposne to pain medication.

## 2023-07-09 NOTE — ASSESSMENT & PLAN NOTE
Noted on CT head  -neurosurgery consults has evaluated, no acute hemorrhage concern, A 4 hour interval repeat CT head showing more dense area with possible more acuity than previous  Discussed with NS dr barrett on 7/9 AM who reports hold anticoag for now given this but odd presentation on scans as not wider or larger just more denser looking, CTA for tomorrow to reassess as plan still may be embolization OP and would need CTA either way prior to this  -q4 neuro checks, neuro exam improving this PM so reassuring from AM

## 2023-07-09 NOTE — HPI
Radha Sandoval is a 88 y.o. 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  Due to patient's declining neurocognitive status, history was collected from patient's daughter.  States patient has been experiencing multiple falls over the past few months, and she was found down on right side yesterday at her skilled nursing facility, [facility].  Daughter states patient was unable to move due to pain and has not tried to bear weight since the incident.  Prior to her most recent fall, she had been reportedly progressing well with physical therapy and was able to ambulate with walker assistance.  She is currently admitted for treatment of UTI and acute hypoxemic respiratory failure possibly secondary to pneumonia vs CHF exacerbation.

## 2023-07-09 NOTE — ASSESSMENT & PLAN NOTE
Noted to have inability to urinate in ED and urinary retention with elevated bladder scan  -duncan catheter placed  -urine gram stain sent, blood cultures sent   -continue empiric Ceftriaxone for now        Bilobed Transposition Flap Text: The defect edges were debeveled with a #15 scalpel blade.  Given the location of the defect and the proximity to free margins a bilobed transposition flap was deemed most appropriate.  Using a sterile surgical marker, an appropriate bilobe flap drawn around the defect.    The area thus outlined was incised deep to adipose tissue with a #15 scalpel blade.  The skin margins were undermined to an appropriate distance in all directions utilizing iris scissors.

## 2023-07-09 NOTE — ASSESSMENT & PLAN NOTE
Patient is identified as having Diastolic (HFpEF) heart failure that is Acute. CHF is currently uncontrolled due to Continued edema of extremities and JVD and Pulmonary edema/pleural effusion on CXR. Latest ECHO performed and demonstrates- Results for orders placed during the hospital encounter of 05/22/23    Echo    Interpretation Summary  · The estimated ejection fraction is 65%.  · The left ventricle is normal in size with concentric hypertrophy and normal systolic function.  · Grade I left ventricular diastolic dysfunction.  · Normal right ventricular size with normal right ventricular systolic function.  · Mild left atrial enlargement.  · There is mild aortic valve stenosis.  · Aortic valve area is 1.47 cm2; peak velocity is 2.9 m/s; mean gradient is 15 mmHg.  · There is pulmonary hypertension.  · The estimated PA systolic pressure is 54 mmHg.  · Intermediate central venous pressure (8 mmHg).  . Continue Beta Blocker, ACE/ARB and Furosemide and monitor clinical status closely. Monitor on telemetry. Patient is off CHF pathway.  Monitor strict Is&Os and daily weights.  Place on fluid restriction of 1.5 L. Cardiology has not been any consulted. Continue to stress to patient importance of self efficacy and  on diet for CHF. Last BNP reviewed- and noted below   Recent Labs   Lab 07/08/23  1549   *   .    Continue Metoprolol and LIsinopril - on higher dose metoprolol than from discharge at last hospital stay

## 2023-07-09 NOTE — ASSESSMENT & PLAN NOTE
Diagnosed with PE and DVT last hospital admission ((5/22& 5/23/23)- was on eliquis but transitioned to warfarin per DC summary.  Per nursing facility MAR she is back on eliquis. Son reports that at some point had it held he was told given easy bruising and was told clots may have gone away. Unclear, will f/u with sitter he reports knows more information as is with her every day. Holding now given CT head per NS recs.  Repeat US LE given extensive DVTS in may 2023 to assess clot burden.    Prior progress notes indicate she was switched to warfarin due to potential high cost of eliquis as an ouptatient.     -Continue eliquis for now - obtain PharmD consult to review pricing of anticoagulation options.

## 2023-07-09 NOTE — PLAN OF CARE
Seen this afternoon with caregiver at bedside. She was more awake as nurse jonna reported from earlier and able to answer more quetsions. Reports feeling better and pain improved from previous. Reports breathing is a little better. Dentures in but needs poligrip to fully keep in as sliding around a little bit and slurring from this and not from other causes. Updated caregiver on planning for US to assess clot burden and CTA head neck tomorrow per NS and holding anticoag right now. She is working on getting kia of [facility] to confirm that she was on eliquis prior to admit as son was not sure 100%  Had issues swallowing mashed potatoe so pureed and speech consulted for tomorrow, chocolate boost added in interim as she will drink this for nutrition.  Looks improving from this AM a good bit on repeat exam.

## 2023-07-09 NOTE — ASSESSMENT & PLAN NOTE
With recent hospital stays - patient's caretaker notes worsening deblity and patient's cognitive deficits compound issue.  Patient's unwitnessed fall today occurred - patient reported to be trying to walk to the bathroom when this occurred

## 2023-07-09 NOTE — ASSESSMENT & PLAN NOTE
Continue lisinopril and metoprolol that were being given @ SNF  -patient no longer on clonidine.

## 2023-07-09 NOTE — CONSULTS
[facility] - [facility]  Orthopedics  Consult Note    Patient Name: Radha Sandoval  MRN: [id number]  Admission Date: 7/8/2023  Hospital Length of Stay: 1 days  Attending Provider: Camryn Fink MD  Primary Care Provider: Primary Doctor No    Patient information was obtained from patient and ER records.     Inpatient consult to Orthopedic Surgery  Consult performed by: ANAY Vargas MD  Consult ordered by: Jay Gomez MD        Subjective:     Principal Problem:Acute hypoxemic respiratory failure    Chief Complaint:   Chief Complaint   Patient presents with    Fall     Pt arrived via EMS from NH w/ c/o a fall, pt landed on her right side, states she hit her head, pt is on eliquis. 5mg of morphine IM given by EMS        HPI: Radha Sandoval is a 88 y.o. 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  Due to patient's declining neurocognitive status, history was collected from patient's daughter.  States patient has been experiencing multiple falls over the past few months, and she was found down on right side yesterday at her skilled nursing facility, [facility].  Daughter states patient was unable to move due to pain and has not tried to bear weight since the incident.  Prior to her most recent fall, she had been reportedly progressing well with physical therapy and was able to ambulate with walker assistance.  She is currently admitted for treatment of UTI and acute hypoxemic respiratory failure possibly secondary to pneumonia vs CHF exacerbation.        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 5/22/2023    Lumbar spondylosis with myelopathy 4/25/2023    Primary hypertension 6/10/2022       Past Surgical History:   Procedure Laterality Date    REPAIR, HERNIA, INGUINAL, WITHOUT HISTORY OF PRIOR REPAIR, AGE 5 YEARS OR OLDER Right 5/6/2023    Procedure: REPAIR, HERNIA, INGUINAL, WITHOUT HISTORY OF PRIOR REPAIR, AGE 5 YEARS OR OLDER;  Surgeon: Maurice Piper MD;  Location: [facility] OR [address] [place] [address];  Service: General;  Laterality: Right;  possible laparotomy, possible bowel resection       Review of patient's allergies indicates:  No Known Allergies    Current Facility-Administered Medications   Medication    acetaminophen tablet 650 mg    ascorbic acid (vitamin C) tablet 250 mg    cefTRIAXone (ROCEPHIN) 2 g in dextrose 5 % in water (D5W) 5 % 100 mL IVPB (MB+)    EScitalopram oxalate tablet 5 mg    furosemide injection 40 mg    haloperidoL tablet 0.5 mg    lisinopriL tablet 20 mg    melatonin tablet 6 mg    memantine tablet 5 mg    metoprolol tartrate (LOPRESSOR) tablet 25 mg    miconazole NITRATE 2 % top powder    naloxone 0.4 mg/mL injection 0.02 mg    ondansetron injection 4 mg    polyethylene glycol packet 17 g    prochlorperazine injection Soln 5 mg    senna-docusate 8.6-50 mg per tablet 1 tablet    sodium chloride 0.9% flush 10 mL    tamsulosin 24 hr capsule 0.4 mg    zinc sulfate capsule 220 mg     Family History    None       Tobacco Use    Smoking status: Not on file    Smokeless tobacco: Not on file   Substance and Sexual Activity    Alcohol use: Not on file    Drug use: Not on file    Sexual activity: Not on file     ROS  Unable to obtain due to patient's neurocognitive status  Objective:     Vital Signs (Most Recent):  Temp: 98.6 °F (37 °C) (07/08/23 2306)  Pulse: 84 (07/09/23 0341)  Resp: 18 (07/08/23 2306)  BP: 131/63 (07/08/23 2306)  SpO2: 95 %  "(07/08/23 2306) Vital Signs (24h Range):  Temp:  [97.8 °F (36.6 °C)-98.6 °F (37 °C)] 98.6 °F (37 °C)  Pulse:  [63-85] 84  Resp:  [13-24] 18  SpO2:  [92 %-99 %] 95 %  BP: (110-168)/(53-96) 131/63     Weight: 72.1 kg (159 lb)  Height: 5' 3" (160 cm)  Body mass index is 28.17 kg/m².      Intake/Output Summary (Last 24 hours) at 7/9/2023 0427  Last data filed at 7/9/2023 0152  Gross per 24 hour   Intake --   Output 900 ml   Net -900 ml        Ortho/SPM Exam  Physical Exam:    Limited due to limited responsiveness from patient and inability to answer questioning    General: mild distress  CV:  Normal pulses, color, and cap refill  Lungs:  no increased WOB  Neuro/Psych: Drowsy on exam and difficult to rouse    MSK:    Patient demonstrates obvious pain with any movement of lower extremities.  Skin intact throughout with no open wounds.  Severe hallux valgus bilaterally.  Extremities are WWP.       Significant Labs: BMP:   Recent Labs   Lab 07/08/23  1549   *      K 4.5   *   CO2 23   BUN 17   CREATININE 0.8   CALCIUM 8.4*   MG 1.7     CBC:   Recent Labs   Lab 07/08/23  1549   WBC 12.81*   HGB 9.0*   HCT 29.5*          Lactic Acid:   Recent Labs   Lab 07/08/23  1821   LACTATE 1.2     Urine Studies:   Recent Labs   Lab 07/08/23  1844   COLORU Yellow   APPEARANCEUA Hazy*   PHUR 6.0   SPECGRAV 1.015   PROTEINUA Negative   GLUCUA Negative   KETONESU Negative   BILIRUBINUA Negative   OCCULTUA Negative   NITRITE Negative   LEUKOCYTESUR 3+*   RBCUA 4   WBCUA >100*   BACTERIA Many*   SQUAMEPITHEL 2   HYALINECASTS 13*     All pertinent labs within the past 24 hours have been reviewed.    Significant Imaging: I have reviewed and interpreted all pertinent imaging results/findings.  X-ray of right hip and CT pelvis showing:  - completely displaced inferior pubic ramus fracture, which appears to extend superiorly into the pubic body  - nondisplaced parasymphyseal superior ramus fracture   - possible cortical " irregularity pubic root/ileopubic eminence    Assessment/Plan:     Multiple closed fractures of pelvis without disruption of pelvic ring  Radha Sandoval is a 88 y.o. 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  No evidence of sacral fracture on CT.  She is currently admitted for treatment of UTI and possible pneumonia vs CHF exacerbation.  Will plan for PT to evaluate patient's ability to bear weight pending improvement in neurocognitive function.  Will discuss further treatment plan with staff, but plan to proceed with non-operative management at this time.       -Pain: multimodal regimen  -PT/OT: weightbearing as tolerated with walker   -DVT ppx: changed from eliquis to Lovenox due to patient being high risk for aspiration with oral meds    Dispo: f/u PT recs         ANAY Vargas MD  Orthopedics  [facility] - Surgery

## 2023-07-09 NOTE — PROGRESS NOTES
[facility] Medicine  Progress Note    Patient Name: Radha Sandoval  MRN: [id number]  Patient Class: IP- Inpatient   Admission Date: 7/8/2023  Length of Stay: 1 days  Attending Physician: Camryn Fink MD  Primary Care Provider: Primary Doctor No        Subjective:     Principal Problem:Acute hypoxemic respiratory failure        HPI:  87 y/o WF hx of PE/DVT, bilateral on anticoagulation, Dementia, Hypertension, Debility presents from skilled nursing facility with unwitnessed fall.     She had recent admission to [facility] [place] [facility] from 05/22-05/29, admitted for acute hypoxemic respiratory failure and was found with large Right sided PE, extensive RLE DVT and LLE DVT.  She had ECHO and cardiology evaluation without findings of right heart strain.  She was on heparin and then eliquis and discharge orders for coumadin.  Currently at SNF patient receiving Apixaban.   Noted to have Delirium and patient was treated with scheduled Haldol, report that seroquel worsened pts mental status and discontinued. She had urinary retention and duncan catheter placed during admission and patient discharged with indwelling catheter, with plans for voiding trial 6/5.     Today history per ED note and hired caretaker - Chel Bui who is with patient at bedside.  Chel states she presented to SNF to visit patient at approx 10am and found patient on the floor multiple feet from her bed and b/w bathroom.   Patient told her she was trying to ambulate. Caretaker noted patient has been wheezing this week    Since arrival to ED found with recurrent hypoxia and concern for pulmonary edema, persistent pleural effusions noted on CT, recurrent urinary retention s/p duncan catheter placement.  Plain film and CT imaging demonstrated Pubic fracture in 2 places Superior/inferior ramus. UA consistent with recurrent cystitis.     CT head with questionable new subdural fluid collections, acute bleeding not suspected - Neurosurgery consulted gely  pending.     She's received 40mg IV lasix, Vancomycin and Zosyn and referred for admission.     Chel is a hired caretaker, since patient is at SNF she visits few hours per day per patient preference.  She has noted in recent months/weeks patient with cognitive decline, frequent disorientation, hallucinations, repeats persecutory delusion about 3 men locking her in a cabin, pt repeats this toe me at bedside.   Patient oriented to self/birthdate, intermittently to hospital.  C/o of severe left foot pain when blankets removed from her, she is frustrated upset at being asked to repeat details of the fall.  Unclear if she understands that she suffered broken pelvis despite describing to her at bedside.     Extended Emergency Contact Information  Primary Emergency Contact: tony griffith  Mobile Phone: [phone]  Relation: Son   needed? No  Secondary Emergency Contact: chel ramirez  Mobile Phone: [phone]  Relation: Other   needed? No            Overview/Hospital Course:  No notes on file    Interval history: this morning on exam her sitter was bedside and able to update me on her history and was with her through the night, she reported that she was more alert when she came in than she was initially one xam this morning and received pain meds once late last night. On exam she was able to wake up with sternal rub and pushed my hand away and said that it hurt and opened her eyes to sitter and was able to answer basic quetsions and was able to follow commands- could squeeze my hands on both sides with good strength and push down with her feet on both sides. She would not smile or stick her tongue out for me but suspect is more from drowsiness than any physical barrier. Per nursing later in day she is awake and alert and swallowing fine and much improved from this morning. Her CT overnight was showing more dense area of subdural hygroma with concern for more acute findings and discussed with NS  resident dr keane on phone who rec CTA tomorrow to further assess and if any changes in mental status today then to repeat CT but mental status has been improving. She is wbat to bilateral LE and PT/OT was held due to mental status so will place order for these therapies to start tomorrow for her given improvements.  and CTA chest with exam now with wheezing heard most consistent with volume overload with recent admit for some volume/PE in may and started on lasix with 900 output so will adjust to BID for goal output of 2-3L daily. Was not sent home on lasix on last admit and likely will need to be on chronically as her son reports that she has had increased wheezing and dyspnea and LE edema for days-over a week now at [facility] and was not being addressed it sounds like prior to admit. Questionable pneumonia per CT read but symptoms sounds more like volume especialy slowly increasing onset per family with edema in legs and elevated BNP and on rocephin for UTI which should also cover upper resp infections as well. 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 Mag replaced as 1.5 this AM and K low at 3.0 and replacig as well, now that awake if needed can change from IV to oral and nursing to let me know if need IV access. Urine retentin on admit and her son reports that this was never an issue before was more incontinence so may be the UTI causing this now so would keep duncan in for at least 3-4 days before attempting removal and will need a uro f/u for possible UTI nidus.  He repots that she was possibly off anticoag at one point at the SNF as they said she was bruising easily and then a some point someone said the clots may have been gone so he is unsure what anticoagulation she was on and said her sitter would know better cause shes with her all the  "time and will discuss further with her as reports say eliquis and holding now given imaging per NS recs. Will recheck LE US now to assess burden given burden in may showed extenive clots and the CTA on admit was poor study but not showing extensive clots in lungs at this time. Nursing reports having some spasms in legs from pelvic fx so robaxin added to tylenol for pain control, would avoid stronger meds for now until ensure mental status staying cleared given sleeping ness this AM  I talked to he son on the phone and updated him. He reports that patient has been a full code which is what her documents from 4/2023 scanned in stated. He said that a meeting last week at [facility] she also indicated she was full code and wished to pursue all interventinos at that time. I am unsure why the night team had her placed as DNR and had amended this thi morning. He said sometimes she gest frustrated and may say "I dont want to be here" but that that is not her wishes and as of this morning was not in a state to be makign that decision but I dont have documentation by any physician from last night of any conversation regarding anything related to dnr so again not sure how that was conveyed or if but per son is full code.          Review of patient's allergies indicates:  No Known Allergies    No current facility-administered medications on file prior to encounter.     Current Outpatient Medications on File Prior to Encounter   Medication Sig    apixaban (ELIQUIS) 5 mg Tab Take 5 mg by mouth 2 (two) times daily.    ascorbic acid, vitamin C, (VITAMIN C) 250 MG tablet Take 250 mg by mouth once daily.    EScitalopram oxalate (LEXAPRO) 5 MG Tab Take 5 mg by mouth once daily.    furosemide (LASIX) 20 MG tablet Take 20 mg by mouth once daily.    haloperidoL (HALDOL) 2 MG tablet Take 1 tablet (2 mg total) by mouth every evening. (Patient taking differently: Take 0.5 mg by mouth every evening.)    lisinopriL (PRINIVIL,ZESTRIL) 20 MG " tablet Take 20 mg by mouth once daily.    memantine (NAMENDA) 5 MG Tab Take 5 mg by mouth 2 (two) times daily.    metoprolol tartrate (LOPRESSOR) 25 MG tablet Take 0.5 tablets (12.5 mg total) by mouth 2 (two) times daily. (Patient taking differently: Take 25 mg by mouth 2 (two) times daily. HOLD IF SBP <100 OR HR <50)    potassium chloride (KLOR-CON) 10 MEQ TbSR Take 10 mEq by mouth once daily.    protein supplement (PROMOD PROTEIN) Liqd Take 30 mLs by mouth 2 (two) times a day.    tamsulosin (FLOMAX) 0.4 mg Cap Take by mouth once daily.    zinc gluconate 50 mg tablet Take 50 mg by mouth once daily.    miconazole NITRATE 2 % (MICOTIN) 2 % top powder Apply topically twice daily under bilateral breasts (Patient not taking: Reported on 6/23/2023)     Family History    None       Tobacco Use    Smoking status: Not on file    Smokeless tobacco: Not on file   Substance and Sexual Activity    Alcohol use: Not on file    Drug use: Not on file    Sexual activity: Not on file     Review of Systems   Unable to perform ROS: Mental status change   Neurological:  Positive for weakness.   Psychiatric/Behavioral:  Positive for confusion and hallucinations.    Objective:     Vital Signs (Most Recent):  Temp: 97.8 °F (36.6 °C) (07/09/23 1152)  Pulse: 77 (07/09/23 1152)  Resp: 16 (07/09/23 1152)  BP: (!) 153/69 (07/09/23 1152)  SpO2: (!) 93 % (07/09/23 1152) Vital Signs (24h Range):  Temp:  [96.4 °F (35.8 °C)-100.7 °F (38.2 °C)] 97.8 °F (36.6 °C)  Pulse:  [] 77  Resp:  [13-24] 16  SpO2:  [92 %-99 %] 93 %  BP: (110-168)/(53-96) 153/69     Weight: 76 kg (167 lb 8.8 oz)  Body mass index is 29.68 kg/m².     Physical Exam  Constitutional:       General: She is in acute distress.      Appearance: She is obese. She is ill-appearing.      Comments: Lethargic but able to wake up with sternal rub and then with sitter assiting to answer basic questions.   HENT:      Head: Normocephalic and atraumatic.      Mouth/Throat:       Pharynx: Oropharynx is clear.   Eyes:      General: No scleral icterus.     Pupils: Pupils are equal, round, and reactive to light.   Cardiovascular:      Rate and Rhythm: Normal rate and regular rhythm.      Heart sounds: No murmur heard.  Pulmonary:      Effort: Pulmonary effort is normal. No respiratory distress.      Comments: Audible wheezing heard.  On 2L oxygen  Abdominal:      General: Bowel sounds are normal. There is no distension.      Palpations: Abdomen is soft.      Tenderness: There is no abdominal tenderness.   Musculoskeletal:      Right lower leg: Edema present.      Left lower leg: Edema present.   Skin:     General: Skin is warm and dry.      Coloration: Skin is pale.   Neurological:      Mental Status: She is disoriented and confused.      GCS: GCS eye subscore is 4. GCS verbal subscore is 4. GCS motor subscore is 5.      Motor: Weakness present.      Comments: Lethargic but wakes up to sternal rub and can open eyes and answer very basic yes no questions. Good strength in UE, LE, RLE slightly weaker. Will not stick out tongue or smile. Oriented to person/birthdate   Psychiatric:         Cognition and Memory: Cognition is impaired. Memory is impaired.            CRANIAL NERVES     CN III, IV, VI   Pupils are equal, round, and reactive to light.     Significant Labs: All pertinent labs within the past 24 hours have been reviewed.  ABGs:   Recent Labs   Lab 07/08/23  1555   PH 7.338*   PCO2 52.3*   HCO3 28.1*   POCSATURATED 44*   BE 2   PO2 26*       Blood Culture:   Recent Labs   Lab 07/08/23  1821   LABBLOO No Growth to date  No Growth to date     CBC:   Recent Labs   Lab 07/08/23  1549 07/09/23  0554   WBC 12.81* 11.09   HGB 9.0* 8.4*   HCT 29.5* 27.2*    291       CMP:   Recent Labs   Lab 07/08/23  1549 07/09/23  0554    146*   K 4.5 3.0*   * 110   CO2 23 26   * 106   BUN 17 17   CREATININE 0.8 0.8   CALCIUM 8.4* 8.3*   PROT 6.2  --    ALBUMIN 2.7* 2.5*   BILITOT 0.4   --    ALKPHOS 77  --    AST 28  --    ALT 10  --    ANIONGAP 10 10       Cardiac Markers:   Recent Labs   Lab 07/08/23  1549   *       Coagulation:   Recent Labs   Lab 07/08/23  1549   INR 1.1       Lactic Acid:   Recent Labs   Lab 07/08/23  1821   LACTATE 1.2       Magnesium:   Recent Labs   Lab 07/08/23  1549 07/09/23  0554   MG 1.7 1.5*       Troponin:   Recent Labs   Lab 07/08/23  1549   TROPONINI 0.031*       TSH: No results for input(s): TSH in the last 4320 hours.  Urine Culture: No results for input(s): LABURIN in the last 48 hours.  Urine Studies:   Recent Labs   Lab 07/08/23  1844   COLORU Yellow   APPEARANCEUA Hazy*   PHUR 6.0   SPECGRAV 1.015   PROTEINUA Negative   GLUCUA Negative   KETONESU Negative   BILIRUBINUA Negative   OCCULTUA Negative   NITRITE Negative   LEUKOCYTESUR 3+*   RBCUA 4   WBCUA >100*   BACTERIA Many*   SQUAMEPITHEL 2   HYALINECASTS 13*         Significant Imaging: I have reviewed all pertinent imaging results/findings within the past 24 hours.  EXAMINATION:  CT HEAD WITHOUT CONTRAST     CLINICAL HISTORY:  Head trauma, minor (Age >= 65y);     TECHNIQUE:  Low dose axial images were obtained through the head.  Coronal and sagittal reformations were also performed. Contrast was not administered.     COMPARISON:  CT 04/18/2023     FINDINGS:  There is motion artifact.     There is generalized cerebral volume loss.  There is hypoattenuation in a periventricular fashion, likely sequela of chronic microvascular ischemic change. There are a few punctate foci of low attenuation involving the left basal ganglia, suggesting sequela of remote infarct, stable. There is no evidence of acute major vascular territory infarct, hemorrhage, or mass.  There is no hydrocephalus.  There are prominent low attenuating collections overlying the convexities bilaterally, left greater than right, new since the previous exam.  The paranasal sinuses and mastoid air cells are clear, and there is no evidence of  calvarial fracture.  The visualized soft tissues are unremarkable.     Impression:     1. Allowing for extensive motion artifact, there are low attenuating subdural collections overlying the convexities bilaterally, left greater than right.  This is new since the previous exam.  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  No hydrocephalus.  2. Patchy low attenuation within the region of the left basal ganglia, suggesting sequela of remote infarct.        Electronically signed by: William Coelho MD  Date:                                            07/08/2023  Time:                                           18:34CTA CHEST NON CORONARY (PE STUDIES)     CLINICAL HISTORY:  Pulmonary embolism (PE) suspected, high prob;     TECHNIQUE:  Low dose axial images, sagittal and coronal reformations were obtained from the thoracic inlet to the lung bases following the IV administration of 100 mL of Omnipaque 350.  Contrast timing was optimized to evaluate the pulmonary arteries.  MIP images were performed.     COMPARISON:  CT 05/22/2023     FINDINGS:  There is extensive motion artifact.     Allowing for the above, the structures at the base of the neck are grossly unremarkable.  The heart is not enlarged, no significant pericardial effusion.  The thoracic aorta tapers normally noting atherosclerotic plaque and calcification along its course and in the distribution of the coronary arteries.  The visualized portions of the spleen and pancreas are grossly unremarkable.  The visualized portions of the adrenal glands are unremarkable.  The visualized right kidney and gallbladder are unremarkable.  Low attenuating lesions are noted within the hepatic parenchyma, several of which are too small for characterization, largest within the anterior aspect of the left hepatic lobe measures 2.5 cm with attenuation suggesting cyst.  There is a  partially visualized cyst within the left upper quadrant, better evaluated 05/06/2023, arising from the left kidney.  There is a hiatal hernia.     Allowing for extensive motion artifact, the central airways are patent, the distal airways are suboptimally evaluated.  Likewise, there is suboptimal evaluation of the pulmonary parenchyma.  There is suspicion for scattered interlobular septal thickening, may reflect edema.  There is fluid along the fissure on the right.  There is bilateral basilar dependent atelectasis.  There is a trace right pleural effusion with associated compressive atelectasis of the right lower lobe.  There is a mild left pleural effusion with associated compressive atelectasis of the left lower lobe.  There is a calcified granuloma within the right lower lobe.     Bolus timing is adequate for the evaluation of pulmonary thromboembolism however extensive motion artifact renders the majority of the exam nondiagnostic for evaluation of the same.  No large central pulmonary thromboembolism, distal embolism cannot be excluded on the basis of this exam.     There is osteopenia.  There are degenerative changes of the spine.  No significant axillary lymphadenopathy.  There is body wall anasarca.     Impression:     1. Extensive motion artifact significantly limits exam, no convincing large central pulmonary thromboembolism, distal embolism cannot be excluded on the basis of this exam.  Correlation is advised.  2. Bilateral pleural effusions, left greater than right with associated compressive atelectasis of the bilateral lower lobes.  There is suspected underlying interstitial edema.  Pulmonary nodule cannot be definitively excluded.  3. Please see above for several additional findings.        Electronically signed by: William Coelho MD  Date:                                            07/08/2023  Time:                                           18:25    CT PELVIS WITHOUT CONTRAST; CT 3D RECON WITH  INDEPENDENT WS     CLINICAL HISTORY:  Pelvic fracture;2mm cuts with 3d recons;; fall;     TECHNIQUE:  Axial images of the pelvis were obtained at 1.25 mm intervals without administration of IV contrast.  Coronal and sagittal reformatted images were reviewed.  3D reconstructed images were created on a separate workstation and reviewed.     COMPARISON:  CT 05/06/2023     FINDINGS:  There is motion artifact.     There is osteopenia.  There is subtle nondisplaced fracture of the superior pubic ramus on the right at the level of the symphysis.  There is displaced fracture involving the inferior pubic ramus on the right.  The bilateral femoroacetabular joints are intact.  There is a suspected bone island within the posterior aspect of the left iliac bone.  There are degenerative changes of the lumbar spine.  The sacrum is intact.  The sacroiliac joints are intact noting degenerative changes.  There is grade 1 anterolisthesis of L4 on L5.  The sacral segments appear aligned on sagittal reformatted images.     The visualized portions of the bowel are grossly unremarkable.  The appendix is unremarkable.  There is atherosclerotic calcification of the aorta and its branches.  The uterus and adnexa are unremarkable.  The urinary bladder is distended noting posteriorly projecting diverticulum versus urethral diverticulum.  Correlation with any history of urinary retention or outlet obstruction.  There is a partially visualized cyst arising from the left kidney.  There is body wall anasarca.  There is edema about the fracture sites.     Impression:     This report was flagged in Epic as abnormal.     1. Acute appearing fractures involving the right superior and inferior pubic rami as described.  2. Please see above for several additional findings.        Electronically signed by: William Coelho MD  Date:                                            07/08/2023  Time:                                            18:42        Assessment/Plan:      * Acute hypoxemic respiratory failure  New acute respiratory failure with hypoxia, patient not previously on oxygen  -she has recent diagnosis of PE and noted to have bilateral L>R pleural effusions persistent today, persistent wheezing prior to admit per family in last days to weeks prior to admit sounds consistent with volume overload developing with LE edema worsening before admit as well  -continue lasix now, rocephin should cover any superimposed PNA but sounds more likely to be volume.   -CT chest today does not have significant parenchymal disease, patient is afebrile   -Etiology may be due to worsening of CHF symptoms coupled with presence of right pulmonary embolism, no new or worsening degree of PE seen on CTA today but motion artifact degrades quality of study.     -Continue lasix,  supplemental O2 to wean as tolerated.     Goals of care, counseling/discussion  Advance Care Planning     Today a voluntary meeting took place: other (conference room) phone call with son    Patient Participation: Patient is unable to participate     Attendees (Name and  Relationship to patient): Legal surrogate decision-maker: son         ACP Conversation (General): Understanding of current condition good     Code Status: Full Code    ACP Documents: Confirmed existing forms and scanned into chart and Other Documents (specify): document in media section indication full code from 4/2023 and he reports had a meeting last week at [facility] and patient reported she was full code and wished to do eveerything if her condition changed    Goals of care: The family endorses that what is most important right now is to focus on continuing care per patient wishes from meeting a week ago on her goals.                   Acute metabolic encephalopathy  -likely secondary to UTI as well as dilaudid given on admission as has had recent hallucinations prior to admit suspect was likely UTI then and then worsened  mental status on 7/9 in AM lkely from combination of poor sleep on admit + pain meds worsening this, CT head showing possible change in hygroma and concern for this with mental status and monitoring closely with q 4 neuro checks but now improving mental status in PM so seems less likely from this and more likely from former  -close monitoring      Hypokalemia  Replace PRN      Hypomagnesemia  Replace PRN      Acute deep vein thrombosis (DVT) of femoral vein of right lower extremity    Seen on US may 2023, on anticoag prior to admit, held now for CT head trending  -repeat now to assess clot burden    Hygroma  Noted on CT head  -neurosurgery consults has evaluated, no acute hemorrhage concern, A 4 hour interval repeat CT head showing more dense area with possible more acuity than previous  Discussed with NS dr barrett on 7/9 AM who reports hold anticoag for now given this but odd presentation on scans as not wider or larger just more denser looking, CTA for tomorrow to reassess as plan still may be embolization OP and would need CTA either way prior to this  -q4 neuro checks, neuro exam improving this PM so reassuring from AM       Fracture of pubis  Secondary to fall today at Skilled nursing facility   -Orthopedic surgery consulted and non op fx with WBAT, PT/OT held on 7/9 due to mental status which has improved so will consult for 7/10  -duncan catheter in place.   -pain meds with tylenol and robaxin, avoid stronger meds for now as had worsening mental status after dilaudid x 1. If worsens can consdier possible tramadol    Acute diastolic congestive heart failure  Patient is identified as having Diastolic (HFpEF) heart failure that is Acute. CHF is currently uncontrolled due to Continued edema of extremities and JVD and Pulmonary edema/pleural effusion on CXR. Latest ECHO performed and demonstrates- Results for orders placed during the hospital encounter of 05/22/23    Echo    Interpretation Summary  · The  estimated ejection fraction is 65%.  · The left ventricle is normal in size with concentric hypertrophy and normal systolic function.  · Grade I left ventricular diastolic dysfunction.  · Normal right ventricular size with normal right ventricular systolic function.  · Mild left atrial enlargement.  · There is mild aortic valve stenosis.  · Aortic valve area is 1.47 cm2; peak velocity is 2.9 m/s; mean gradient is 15 mmHg.  · There is pulmonary hypertension.  · The estimated PA systolic pressure is 54 mmHg.  · Intermediate central venous pressure (8 mmHg).  . Continue Beta Blocker, ACE/ARB and Furosemide and monitor clinical status closely. Monitor on telemetry. Patient is off CHF pathway.  Monitor strict Is&Os and daily weights.  Place on fluid restriction of 1.5 L. Cardiology has not been any consulted. Continue to stress to patient importance of self efficacy and  on diet for CHF. Last BNP reviewed- and noted below   Recent Labs   Lab 07/08/23  1549   *   .    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  Per nursing facility MAR she is back on eliquis. Son reports that at some point had it held he was told given easy bruising and was told clots may have gone away. Unclear, will f/u with sitter he reports knows more information as is with her every day. Holding now given CT head  per NS recs.  Repeat US LE given extensive DVTS in may 2023 to assess clot burden.    Prior progress notes indicate she was switched to warfarin due to potential high cost of eliquis as an ouptatient.     -Continue eliquis for now - obtain PharmD consult to review pricing of anticoagulation options.       Debility  With recent hospital stays - patient's caretaker notes worsening deblity and patient's cognitive deficits compound issue.  Patient's unwitnessed fall today occurred - patient reported to be trying to walk to the bathroom when this occurred  Son reports that prior to these recent multiple admits she was doing well but has had multiple serious illnesses including incarcerated hernia, PE, UTIS which have led to worsening baseline status in last few months      Acute cystitis with hematuria  Noted to have inability to urinate in ED and urinary retention with elevated bladder scan. 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 Will refer to uro on dc for long term work up   -duncan catheter placed- would keep at least 3-4 days bfeore voiding trial.  -urine gram stain sent, blood cultures sent   Urine CX pending  -continue empiric Ceftriaxone for now         Primary hypertension  Continue lisinopril and metoprolol that were being given @ SNF  -patient no longer on clonidine.         VTE Risk Mitigation (From admission, onward)         Ordered     Reason for No Pharmacological VTE Prophylaxis  Once        Question:  Reasons:  Answer:  Already adequately anticoagulated on oral Anticoagulants    07/08/23 2326     IP VTE HIGH RISK PATIENT  Once         07/08/23 2326     Place sequential compression device  Until discontinued         07/08/23 2326                Discharge Planning   TOSHA:       Code Status: Full Code   Is the patient medically ready for discharge?: No    Reason for patient still in hospital (select all that apply): Patient trending condition                     Camryn Fink MD  Department of Hospital Medicine   [facility]

## 2023-07-09 NOTE — ASSESSMENT & PLAN NOTE
Seen on US may 2023, on anticoag prior to admit, held now for CT head trending  -repeat now to assess clot burden

## 2023-07-09 NOTE — H&P
[facility] Medicine  History & Physical    Patient Name: Radha Sandoval  MRN: [id number]  Patient Class: IP- Inpatient  Admission Date: 7/8/2023  Attending Physician: Camryn Fink MD [facility] HOSP MED K  Admitting Physician: Jay Gomez MD  Primary Care Provider: Primary Doctor No      Patient information was obtained from caregiver / friend, past medical records and ER records.     Subjective:     Principal Problem:Acute hypoxemic respiratory failure    Chief Complaint:   Chief Complaint   Patient presents with    Fall     Pt arrived via EMS from NH w/ c/o a fall, pt landed on her right side, states she hit her head, pt is on eliquis. 5mg of morphine IM given by EMS        HPI: 89 y/o WF hx of PE/DVT, bilateral on anticoagulation, Dementia, Hypertension, Debility presents from skilled nursing facility with unwitnessed fall.     She had recent admission to [facility] from 05/22-05/29, admitted for acute hypoxemic respiratory failure and was found with large Right sided PE, extensive RLE DVT and LLE DVT.  She had ECHO and cardiology evaluation without findings of right heart strain.  She was on heparin and then eliquis and discharge orders for coumadin.  Currently at SNF patient receiving Apixaban.   Noted to have Delirium and patient was treated with scheduled Haldol, report that seroquel worsened pts mental status and discontinued. She had urinary retention and duncan catheter placed during admission and patient discharged with indwelling catheter, with plans for voiding trial 6/5.     Today history per ED note and hired caretaker - Chel Bui who is with patient at bedside.  Chel states she presented to SNF to visit patient at approx 10am and found patient on the floor multiple feet from her bed and b/w bathroom.   Patient told her she was trying to ambulate. Caretaker noted patient has been wheezing this week    Since arrival to ED found with recurrent hypoxia and concern for pulmonary edema,  persistent pleural effusions noted on CT, recurrent urinary retention s/p duncan catheter placement.  Plain film and CT imaging demonstrated Pubic fracture in 2 places Superior/inferior ramus. UA consistent with recurrent cystitis.     CT head with questionable new subdural fluid collections, acute bleeding not suspected - Neurosurgery consulted eval pending.     She's received 40mg IV lasix, Vancomycin and Zosyn and referred for admission.     Chel is a hired caretaker, since patient is at SNF she visits few hours per day per patient preference.  She has noted in recent months/weeks patient with cognitive decline, frequent disorientation, hallucinations, repeats persecutory delusion about 3 men locking her in a cabin, pt repeats this toe me at bedside.   Patient oriented to self/birthdate, intermittently to hospital.  C/o of severe left foot pain when blankets removed from her, she is frustrated upset at being asked to repeat details of the fall.  Unclear if she understands that she suffered broken pelvis despite describing to her at bedside.     Extended Emergency Contact Information  Primary Emergency Contact: tony griffith  Mobile Phone: [phone]  Relation: Son   needed? No  Secondary Emergency Contact: chel ramirez  Mobile Phone: [phone]  Relation: Other   needed? 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 5/22/2023    Lumbar spondylosis with myelopathy 4/25/2023    Primary hypertension 6/10/2022       Past Surgical History:   Procedure Laterality Date    REPAIR, HERNIA, INGUINAL, WITHOUT HISTORY OF PRIOR REPAIR, AGE 5 YEARS OR OLDER Right 5/6/2023    Procedure: REPAIR, HERNIA, INGUINAL, WITHOUT HISTORY  OF PRIOR REPAIR, AGE 5 YEARS OR OLDER;  Surgeon: Maurice Piper MD;  Location: [facility] OR [address] [place] [address];  Service: General;  Laterality: Right;  possible laparotomy, possible bowel resection       Review of patient's allergies indicates:  No Known Allergies    No current facility-administered medications on file prior to encounter.     Current Outpatient Medications on File Prior to Encounter   Medication Sig    apixaban (ELIQUIS) 5 mg Tab Take 5 mg by mouth 2 (two) times daily.    ascorbic acid, vitamin C, (VITAMIN C) 250 MG tablet Take 250 mg by mouth once daily.    EScitalopram oxalate (LEXAPRO) 5 MG Tab Take 5 mg by mouth once daily.    furosemide (LASIX) 20 MG tablet Take 20 mg by mouth once daily.    haloperidoL (HALDOL) 2 MG tablet Take 1 tablet (2 mg total) by mouth every evening. (Patient taking differently: Take 0.5 mg by mouth every evening.)    lisinopriL (PRINIVIL,ZESTRIL) 20 MG tablet Take 20 mg by mouth once daily.    memantine (NAMENDA) 5 MG Tab Take 5 mg by mouth 2 (two) times daily.    metoprolol tartrate (LOPRESSOR) 25 MG tablet Take 0.5 tablets (12.5 mg total) by mouth 2 (two) times daily. (Patient taking differently: Take 25 mg by mouth 2 (two) times daily. HOLD IF SBP <100 OR HR <50)    potassium chloride (KLOR-CON) 10 MEQ TbSR Take 10 mEq by mouth once daily.    protein supplement (PROMOD PROTEIN) Liqd Take 30 mLs by mouth 2 (two) times a day.    tamsulosin (FLOMAX) 0.4 mg Cap Take by mouth once daily.    zinc gluconate 50 mg tablet Take 50 mg by mouth once daily.    miconazole NITRATE 2 % (MICOTIN) 2 % top powder Apply topically twice daily under bilateral breasts (Patient not taking: Reported on 6/23/2023)     Family History    None       Tobacco Use    Smoking status: Not on file    Smokeless tobacco: Not on file   Substance and Sexual Activity    Alcohol use: Not on file    Drug use: Not on file    Sexual activity: Not on file     Review of Systems   Unable to perform ROS: Mental status change    Neurological:  Positive for weakness.   Psychiatric/Behavioral:  Positive for confusion and hallucinations.    Objective:     Vital Signs (Most Recent):  Temp: 98.6 °F (37 °C) (07/08/23 2306)  Pulse: 77 (07/08/23 2306)  Resp: 18 (07/08/23 2306)  BP: 131/63 (07/08/23 2306)  SpO2: 95 % (07/08/23 2306) Vital Signs (24h Range):  Temp:  [97.8 °F (36.6 °C)-98.6 °F (37 °C)] 98.6 °F (37 °C)  Pulse:  [63-85] 77  Resp:  [13-24] 18  SpO2:  [92 %-99 %] 95 %  BP: (110-168)/(53-96) 131/63     Weight: 72.1 kg (159 lb)  Body mass index is 28.17 kg/m².     Physical Exam  Constitutional:       General: She is in acute distress.      Appearance: She is obese. She is ill-appearing.   HENT:      Head: Normocephalic and atraumatic.      Mouth/Throat:      Pharynx: Oropharynx is clear.   Eyes:      General: No scleral icterus.     Pupils: Pupils are equal, round, and reactive to light.   Cardiovascular:      Rate and Rhythm: Normal rate and regular rhythm.      Heart sounds: No murmur heard.  Pulmonary:      Effort: Pulmonary effort is normal. No respiratory distress.      Comments: Limited anterior exam - mobility limited due to severe pain complaints/disorientation  On 2L oxygen  Abdominal:      General: Bowel sounds are normal. There is no distension.      Palpations: Abdomen is soft.      Tenderness: There is no abdominal tenderness.   Musculoskeletal:      Right lower leg: Edema present.      Left lower leg: Edema present.   Skin:     General: Skin is warm and dry.      Coloration: Skin is pale.   Neurological:      Mental Status: She is disoriented and confused.      GCS: GCS eye subscore is 4. GCS verbal subscore is 4. GCS motor subscore is 5.      Motor: Weakness present.      Comments: Oriented to person/birthdate   Psychiatric:         Cognition and Memory: Cognition is impaired. Memory is impaired.      Comments: Inattentive  Reporting persecutory delusion - that she was held against her will by 3 men - taken to a cabin.   Caretaker notes patient repeats this delusion frequently            CRANIAL NERVES     CN III, IV, VI   Pupils are equal, round, and reactive to light.     Significant Labs: All pertinent labs within the past 24 hours have been reviewed.  ABGs:   Recent Labs   Lab 07/08/23  1555   PH 7.338*   PCO2 52.3*   HCO3 28.1*   POCSATURATED 44*   BE 2   PO2 26*     Blood Culture: No results for input(s): LABBLOO in the last 48 hours.  CBC:   Recent Labs   Lab 07/08/23  1549   WBC 12.81*   HGB 9.0*   HCT 29.5*        CMP:   Recent Labs   Lab 07/08/23  1549      K 4.5   *   CO2 23   *   BUN 17   CREATININE 0.8   CALCIUM 8.4*   PROT 6.2   ALBUMIN 2.7*   BILITOT 0.4   ALKPHOS 77   AST 28   ALT 10   ANIONGAP 10     Cardiac Markers:   Recent Labs   Lab 07/08/23  1549   *     Coagulation:   Recent Labs   Lab 07/08/23  1549   INR 1.1     Lactic Acid:   Recent Labs   Lab 07/08/23  1821   LACTATE 1.2     Magnesium:   Recent Labs   Lab 07/08/23  1549   MG 1.7     Troponin:   Recent Labs   Lab 07/08/23  1549   TROPONINI 0.031*     TSH: No results for input(s): TSH in the last 4320 hours.  Urine Culture: No results for input(s): LABURIN in the last 48 hours.  Urine Studies:   Recent Labs   Lab 07/08/23  1844   COLORU Yellow   APPEARANCEUA Hazy*   PHUR 6.0   SPECGRAV 1.015   PROTEINUA Negative   GLUCUA Negative   KETONESU Negative   BILIRUBINUA Negative   OCCULTUA Negative   NITRITE Negative   LEUKOCYTESUR 3+*   RBCUA 4   WBCUA >100*   BACTERIA Many*   SQUAMEPITHEL 2   HYALINECASTS 13*       Significant Imaging: I have reviewed all pertinent imaging results/findings within the past 24 hours.  EXAMINATION:  CT HEAD WITHOUT CONTRAST     CLINICAL HISTORY:  Head trauma, minor (Age >= 65y);     TECHNIQUE:  Low dose axial images were obtained through the head.  Coronal and sagittal reformations were also performed. Contrast was not administered.     COMPARISON:  CT 04/18/2023     FINDINGS:  There is motion  artifact.     There is generalized cerebral volume loss.  There is hypoattenuation in a periventricular fashion, likely sequela of chronic microvascular ischemic change. There are a few punctate foci of low attenuation involving the left basal ganglia, suggesting sequela of remote infarct, stable. There is no evidence of acute major vascular territory infarct, hemorrhage, or mass.  There is no hydrocephalus.  There are prominent low attenuating collections overlying the convexities bilaterally, left greater than right, new since the previous exam.  The paranasal sinuses and mastoid air cells are clear, and there is no evidence of calvarial fracture.  The visualized soft tissues are unremarkable.     Impression:     1. Allowing for extensive motion artifact, there are low attenuating subdural collections overlying the convexities bilaterally, left greater than right.  This is new since the previous exam.  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  No hydrocephalus.  2. Patchy low attenuation within the region of the left basal ganglia, suggesting sequela of remote infarct.        Electronically signed by: William Coelho MD  Date:                                            07/08/2023  Time:                                           18:34CTA CHEST NON CORONARY (PE STUDIES)     CLINICAL HISTORY:  Pulmonary embolism (PE) suspected, high prob;     TECHNIQUE:  Low dose axial images, sagittal and coronal reformations were obtained from the thoracic inlet to the lung bases following the IV administration of 100 mL of Omnipaque 350.  Contrast timing was optimized to evaluate the pulmonary arteries.  MIP images were performed.     COMPARISON:  CT 05/22/2023     FINDINGS:  There is extensive motion artifact.     Allowing for the above, the structures at the base of the neck are grossly unremarkable.  The heart is not enlarged,  no significant pericardial effusion.  The thoracic aorta tapers normally noting atherosclerotic plaque and calcification along its course and in the distribution of the coronary arteries.  The visualized portions of the spleen and pancreas are grossly unremarkable.  The visualized portions of the adrenal glands are unremarkable.  The visualized right kidney and gallbladder are unremarkable.  Low attenuating lesions are noted within the hepatic parenchyma, several of which are too small for characterization, largest within the anterior aspect of the left hepatic lobe measures 2.5 cm with attenuation suggesting cyst.  There is a partially visualized cyst within the left upper quadrant, better evaluated 05/06/2023, arising from the left kidney.  There is a hiatal hernia.     Allowing for extensive motion artifact, the central airways are patent, the distal airways are suboptimally evaluated.  Likewise, there is suboptimal evaluation of the pulmonary parenchyma.  There is suspicion for scattered interlobular septal thickening, may reflect edema.  There is fluid along the fissure on the right.  There is bilateral basilar dependent atelectasis.  There is a trace right pleural effusion with associated compressive atelectasis of the right lower lobe.  There is a mild left pleural effusion with associated compressive atelectasis of the left lower lobe.  There is a calcified granuloma within the right lower lobe.     Bolus timing is adequate for the evaluation of pulmonary thromboembolism however extensive motion artifact renders the majority of the exam nondiagnostic for evaluation of the same.  No large central pulmonary thromboembolism, distal embolism cannot be excluded on the basis of this exam.     There is osteopenia.  There are degenerative changes of the spine.  No significant axillary lymphadenopathy.  There is body wall anasarca.     Impression:     1. Extensive motion artifact significantly limits exam, no  convincing large central pulmonary thromboembolism, distal embolism cannot be excluded on the basis of this exam.  Correlation is advised.  2. Bilateral pleural effusions, left greater than right with associated compressive atelectasis of the bilateral lower lobes.  There is suspected underlying interstitial edema.  Pulmonary nodule cannot be definitively excluded.  3. Please see above for several additional findings.        Electronically signed by: William Coelho MD  Date:                                            07/08/2023  Time:                                           18:25    CT PELVIS WITHOUT CONTRAST; CT 3D RECON WITH INDEPENDENT WS     CLINICAL HISTORY:  Pelvic fracture;2mm cuts with 3d recons;; fall;     TECHNIQUE:  Axial images of the pelvis were obtained at 1.25 mm intervals without administration of IV contrast.  Coronal and sagittal reformatted images were reviewed.  3D reconstructed images were created on a separate workstation and reviewed.     COMPARISON:  CT 05/06/2023     FINDINGS:  There is motion artifact.     There is osteopenia.  There is subtle nondisplaced fracture of the superior pubic ramus on the right at the level of the symphysis.  There is displaced fracture involving the inferior pubic ramus on the right.  The bilateral femoroacetabular joints are intact.  There is a suspected bone island within the posterior aspect of the left iliac bone.  There are degenerative changes of the lumbar spine.  The sacrum is intact.  The sacroiliac joints are intact noting degenerative changes.  There is grade 1 anterolisthesis of L4 on L5.  The sacral segments appear aligned on sagittal reformatted images.     The visualized portions of the bowel are grossly unremarkable.  The appendix is unremarkable.  There is atherosclerotic calcification of the aorta and its branches.  The uterus and adnexa are unremarkable.  The urinary bladder is distended noting posteriorly projecting diverticulum versus  urethral diverticulum.  Correlation with any history of urinary retention or outlet obstruction.  There is a partially visualized cyst arising from the left kidney.  There is body wall anasarca.  There is edema about the fracture sites.     Impression:     This report was flagged in Epic as abnormal.     1. Acute appearing fractures involving the right superior and inferior pubic rami as described.  2. Please see above for several additional findings.        Electronically signed by: William Coelho MD  Date:                                            07/08/2023  Time:                                           18:42      Assessment/Plan:     * Acute hypoxemic respiratory failure  New acute respiratory failure with hypoxia, patient not previously on oxygen  -she has recent diagnosis of PE and noted to have bilateral L>R pleural effusions persistent today, was wheezing during ED evaluation, given 40mg IV lasix with >500cc UOP - duncan contents not yet measured since insertion.   -CT chest today does not have significant parenchymal disease, patient is afebrile   -Etiology may be due to worsening of CHF symptoms coupled with presence of right pulmonary embolism, no new or worsening degree of PE seen on CTA today but motion artifact degrades quality of study.     -Continue lasix 40mg daily at this time, supplemental O2 to wean as tolerated.     Fracture of pubis  Secondary to fall today at [facility] nursing facility   -Orthopedic surgery consulted and pending by ED team  -duncan catheter in place.   -given dilaudid 0.5mg IV once after evaluation - monitor resposne to pain medication.       Acute diastolic congestive heart failure  Patient is identified as having Diastolic (HFpEF) heart failure that is Acute. CHF is currently uncontrolled due to Continued edema of extremities and JVD and Pulmonary edema/pleural effusion on CXR. Latest ECHO performed and demonstrates- Results for orders placed during the hospital encounter of  05/22/23    Echo    Interpretation Summary  · The estimated ejection fraction is 65%.  · The left ventricle is normal in size with concentric hypertrophy and normal systolic function.  · Grade I left ventricular diastolic dysfunction.  · Normal right ventricular size with normal right ventricular systolic function.  · Mild left atrial enlargement.  · There is mild aortic valve stenosis.  · Aortic valve area is 1.47 cm2; peak velocity is 2.9 m/s; mean gradient is 15 mmHg.  · There is pulmonary hypertension.  · The estimated PA systolic pressure is 54 mmHg.  · Intermediate central venous pressure (8 mmHg).  . Continue Beta Blocker, ACE/ARB and Furosemide and monitor clinical status closely. Monitor on telemetry. Patient is off CHF pathway.  Monitor strict Is&Os and daily weights.  Place on fluid restriction of 1.5 L. Cardiology has not been any consulted. Continue to stress to patient importance of self efficacy and  on diet for CHF. Last BNP reviewed- and noted below   Recent Labs   Lab 07/08/23  1549   *   .    Continue Metoprolol and LIsinopril - on higher dose metoprolol than from discharge at last hospital stay     Acute pulmonary embolism  Diagnosed with PE and DVT last hospital admission ((5/22& 5/23/23)- was on eliquis but transitioned to warfarin per DC summary.  Per nursing facility MAR she is back on eliquis.     Prior progress notes indicate she was switched to warfarin due to potential high cost of eliquis as an ouptatient.     -Continue eliquis for now - obtain PharmD consult to review pricing of anticoagulation options.       Acute cystitis with hematuria  Noted to have inability to urinate in ED and urinary retention with elevated bladder scan  -duncan catheter placed  -urine gram stain sent, blood cultures sent   -continue empiric Ceftriaxone for now         Debility  With recent hospital stays - patient's caretaker notes worsening deblity and patient's cognitive deficits compound issue.   Patient's unwitnessed fall today occurred - patient reported to be trying to walk to the bathroom when this occurred      Primary hypertension  Continue lisinopril and metoprolol that were being given @ SNF  -patient no longer on clonidine.       Hygroma  Noted on CT head  -neurosurgery consults has evaluated, no acute hemorrhage concern, A 4 hour interval repeat CT head and CT cervical spine are ordered and pending.   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  Patient started on memantine  Last hospital stay was on haldol 2mg nightly - dosage has been reduced to 0.5mg nightly continue at this time  -Fall/Aspiration and Delirium precautions           VTE Risk Mitigation (From admission, onward)           Ordered     apixaban tablet 5 mg  2 times daily         07/08/23 2326     Reason for No Pharmacological VTE Prophylaxis  Once        Question:  Reasons:  Answer:  Already adequately anticoagulated on oral Anticoagulants    07/08/23 2326     IP VTE HIGH RISK PATIENT  Once         07/08/23 2326     Place sequential compression device  Until discontinued         07/08/23 2326                               Jay Gomez MD  Department of Hospital Medicine  [facility] - Surgery

## 2023-07-09 NOTE — HPI
89 y/o WF hx of PE/DVT, bilateral on anticoagulation, Dementia, Hypertension, Debility presents from skilled nursing facility with unwitnessed fall.     She had recent admission to [facility] from 05/22-05/29, admitted for acute hypoxemic respiratory failure and was found with large Right sided PE, extensive RLE DVT and LLE DVT.  She had ECHO and cardiology evaluation without findings of right heart strain.  She was on heparin and then eliquis and discharge orders for coumadin.  Currently at SNF patient receiving Apixaban.   Noted to have Delirium and patient was treated with scheduled Haldol, report that seroquel worsened pts mental status and discontinued. She had urinary retention and duncan catheter placed during admission and patient discharged with indwelling catheter, with plans for voiding trial 6/5.     Today history per ED note and hired caretaker - Chel Bui who is with patient at bedside.  Chel states she presented to SNF to visit patient at approx 10am and found patient on the floor multiple feet from her bed and b/w bathroom.   Patient told her she was trying to ambulate. Caretaker noted patient has been wheezing this week    Since arrival to ED found with recurrent hypoxia and concern for pulmonary edema, persistent pleural effusions noted on CT, recurrent urinary retention s/p duncan catheter placement.  Plain film and CT imaging demonstrated Pubic fracture in 2 places Superior/inferior ramus. UA consistent with recurrent cystitis.     CT head with questionable new subdural fluid collections, acute bleeding not suspected - Neurosurgery consulted eval pending.     She's received 40mg IV lasix, Vancomycin and Zosyn and referred for admission.     Chel is a hired caretaker, since patient is at SNF she visits few hours per day per patient preference.  She has noted in recent months/weeks patient with cognitive decline, frequent disorientation, hallucinations, repeats persecutory delusion about 3 men  locking her in a cabin, pt repeats this toe me at bedside.   Patient oriented to self/birthdate, intermittently to hospital.  C/o of severe left foot pain when blankets removed from her, she is frustrated upset at being asked to repeat details of the fall.  Unclear if she understands that she suffered broken pelvis despite describing to her at bedside.     Extended Emergency Contact Information  Primary Emergency Contact: tony griffith  Mobile Phone: [phone]  Relation: Son   needed? No  Secondary Emergency Contact: scott ramirez  Mobile Phone: [phone]  Relation: Other   needed? No

## 2023-07-09 NOTE — SUBJECTIVE & OBJECTIVE
Interval history: this morning on exam her sitter was bedside and able to update me on her history and was with her through the night, she reported that she was more alert when she came in than she was initially one xam this morning and received pain meds once late last night. On exam she was able to wake up with sternal rub and pushed my hand away and said that it hurt and opened her eyes to sitter and was able to answer basic quetsions and was able to follow commands- could squeeze my hands on both sides with good strength and push down with her feet on both sides. She would not smile or stick her tongue out for me but suspect is more from drowsiness than any physical barrier. Per nursing later in day she is awake and alert and swallowing fine and much improved from this morning. Her CT overnight was showing more dense area of subdural hygroma with concern for more acute findings and discussed with NS resident dr keane on phone who rec CTA tomorrow to further assess and if any changes in mental status today then to repeat CT but mental status has been improving. She is wbat to bilateral LE and PT/OT was held due to mental status so will place order for these therapies to start tomorrow for her given improvements.  and CTA chest with exam now with wheezing heard most consistent with volume overload with recent admit for some volume/PE in may and started on lasix with 900 output so will adjust to BID for goal output of 2-3L daily. Was not sent home on lasix on last admit and likely will need to be on chronically as her son reports that she has had increased wheezing and dyspnea and LE edema for days-over a week now at SN[facility] and was not being addressed it sounds like prior to admit. Questionable pneumonia per CT read but symptoms sounds more like volume especialy slowly increasing onset per family with edema in legs and elevated BNP and on rocephin for UTI which should also cover upper resp infections as  "well. 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 Mag replaced as 1.5 this AM and K low at 3.0 and replacig as well, now that awake if needed can change from IV to oral and nursing to let me know if need IV access. Urine retentin on admit and her son reports that this was never an issue before was more incontinence so may be the UTI causing this now so would keep duncan in for at least 3-4 days before attempting removal and will need a uro f/u for possible UTI nidus.  He repots that she was possibly off anticoag at one point at the [facility] as they said she was bruising easily and then a some point someone said the clots may have been gone so he is unsure what anticoagulation she was on and said her sitter would know better cause shes with her all the time and will discuss further with her as reports say eliquis and holding now given imaging per NS recs. Will recheck LE US now to assess burden given burden in may showed extenive clots and the CTA on admit was poor study but not showing extensive clots in lungs at this time. Nursing reports having some spasms in legs from pelvic fx so robaxin added to tylenol for pain control, would avoid stronger meds for now until ensure mental status staying cleared given sleeping ness this AM  I talked to he son on the phone and updated him. He reports that patient has been a full code which is what her documents from 4/2023 scanned in stated. He said that a meeting last week at [facility] she also indicated she was full code and wished to pursue all interventinos at that time. I am unsure why the night team had her placed as DNR and had amended this thi morning. He said sometimes she gest frustrated and may say "I dont want to be here" but that that is not her wishes and as of this morning was not in a state to be makign that " decision but I dont have documentation by any physician from last night of any conversation regarding anything related to dnr so again not sure how that was conveyed or if but per son is full code.          Review of patient's allergies indicates:  No Known Allergies    No current facility-administered medications on file prior to encounter.     Current Outpatient Medications on File Prior to Encounter   Medication Sig    apixaban (ELIQUIS) 5 mg Tab Take 5 mg by mouth 2 (two) times daily.    ascorbic acid, vitamin C, (VITAMIN C) 250 MG tablet Take 250 mg by mouth once daily.    EScitalopram oxalate (LEXAPRO) 5 MG Tab Take 5 mg by mouth once daily.    furosemide (LASIX) 20 MG tablet Take 20 mg by mouth once daily.    haloperidoL (HALDOL) 2 MG tablet Take 1 tablet (2 mg total) by mouth every evening. (Patient taking differently: Take 0.5 mg by mouth every evening.)    lisinopriL (PRINIVIL,ZESTRIL) 20 MG tablet Take 20 mg by mouth once daily.    memantine (NAMENDA) 5 MG Tab Take 5 mg by mouth 2 (two) times daily.    metoprolol tartrate (LOPRESSOR) 25 MG tablet Take 0.5 tablets (12.5 mg total) by mouth 2 (two) times daily. (Patient taking differently: Take 25 mg by mouth 2 (two) times daily. HOLD IF SBP <100 OR HR <50)    potassium chloride (KLOR-CON) 10 MEQ TbSR Take 10 mEq by mouth once daily.    protein supplement (PROMOD PROTEIN) Liqd Take 30 mLs by mouth 2 (two) times a day.    tamsulosin (FLOMAX) 0.4 mg Cap Take by mouth once daily.    zinc gluconate 50 mg tablet Take 50 mg by mouth once daily.    miconazole NITRATE 2 % (MICOTIN) 2 % top powder Apply topically twice daily under bilateral breasts (Patient not taking: Reported on 6/23/2023)     Family History    None       Tobacco Use    Smoking status: Not on file    Smokeless tobacco: Not on file   Substance and Sexual Activity    Alcohol use: Not on file    Drug use: Not on file    Sexual activity: Not on file     Review of Systems   Unable to perform ROS: Mental  status change   Neurological:  Positive for weakness.   Psychiatric/Behavioral:  Positive for confusion and hallucinations.    Objective:     Vital Signs (Most Recent):  Temp: 97.8 °F (36.6 °C) (07/09/23 1152)  Pulse: 77 (07/09/23 1152)  Resp: 16 (07/09/23 1152)  BP: (!) 153/69 (07/09/23 1152)  SpO2: (!) 93 % (07/09/23 1152) Vital Signs (24h Range):  Temp:  [96.4 °F (35.8 °C)-100.7 °F (38.2 °C)] 97.8 °F (36.6 °C)  Pulse:  [] 77  Resp:  [13-24] 16  SpO2:  [92 %-99 %] 93 %  BP: (110-168)/(53-96) 153/69     Weight: 76 kg (167 lb 8.8 oz)  Body mass index is 29.68 kg/m².     Physical Exam  Constitutional:       General: She is in acute distress.      Appearance: She is obese. She is ill-appearing.      Comments: Lethargic but able to wake up with sternal rub and then with sitter assiting to answer basic questions.   HENT:      Head: Normocephalic and atraumatic.      Mouth/Throat:      Pharynx: Oropharynx is clear.   Eyes:      General: No scleral icterus.     Pupils: Pupils are equal, round, and reactive to light.   Cardiovascular:      Rate and Rhythm: Normal rate and regular rhythm.      Heart sounds: No murmur heard.  Pulmonary:      Effort: Pulmonary effort is normal. No respiratory distress.      Comments: Audible wheezing heard.  On 2L oxygen  Abdominal:      General: Bowel sounds are normal. There is no distension.      Palpations: Abdomen is soft.      Tenderness: There is no abdominal tenderness.   Musculoskeletal:      Right lower leg: Edema present.      Left lower leg: Edema present.   Skin:     General: Skin is warm and dry.      Coloration: Skin is pale.   Neurological:      Mental Status: She is disoriented and confused.      GCS: GCS eye subscore is 4. GCS verbal subscore is 4. GCS motor subscore is 5.      Motor: Weakness present.      Comments: Lethargic but wakes up to sternal rub and can open eyes and answer very basic yes no questions. Good strength in UE, LE, RLE slightly weaker. Will not  stick out tongue or smile. Oriented to person/birthdate   Psychiatric:         Cognition and Memory: Cognition is impaired. Memory is impaired.            CRANIAL NERVES     CN III, IV, VI   Pupils are equal, round, and reactive to light.     Significant Labs: All pertinent labs within the past 24 hours have been reviewed.  ABGs:   Recent Labs   Lab 07/08/23  1555   PH 7.338*   PCO2 52.3*   HCO3 28.1*   POCSATURATED 44*   BE 2   PO2 26*       Blood Culture:   Recent Labs   Lab 07/08/23  1821   LABBLOO No Growth to date  No Growth to date     CBC:   Recent Labs   Lab 07/08/23  1549 07/09/23  0554   WBC 12.81* 11.09   HGB 9.0* 8.4*   HCT 29.5* 27.2*    291       CMP:   Recent Labs   Lab 07/08/23  1549 07/09/23  0554    146*   K 4.5 3.0*   * 110   CO2 23 26   * 106   BUN 17 17   CREATININE 0.8 0.8   CALCIUM 8.4* 8.3*   PROT 6.2  --    ALBUMIN 2.7* 2.5*   BILITOT 0.4  --    ALKPHOS 77  --    AST 28  --    ALT 10  --    ANIONGAP 10 10       Cardiac Markers:   Recent Labs   Lab 07/08/23  1549   *       Coagulation:   Recent Labs   Lab 07/08/23  1549   INR 1.1       Lactic Acid:   Recent Labs   Lab 07/08/23  1821   LACTATE 1.2       Magnesium:   Recent Labs   Lab 07/08/23  1549 07/09/23  0554   MG 1.7 1.5*       Troponin:   Recent Labs   Lab 07/08/23  1549   TROPONINI 0.031*       TSH: No results for input(s): TSH in the last 4320 hours.  Urine Culture: No results for input(s): LABURIN in the last 48 hours.  Urine Studies:   Recent Labs   Lab 07/08/23  1844   COLORU Yellow   APPEARANCEUA Hazy*   PHUR 6.0   SPECGRAV 1.015   PROTEINUA Negative   GLUCUA Negative   KETONESU Negative   BILIRUBINUA Negative   OCCULTUA Negative   NITRITE Negative   LEUKOCYTESUR 3+*   RBCUA 4   WBCUA >100*   BACTERIA Many*   SQUAMEPITHEL 2   HYALINECASTS 13*         Significant Imaging: I have reviewed all pertinent imaging results/findings within the past 24 hours.  EXAMINATION:  CT HEAD WITHOUT CONTRAST      CLINICAL HISTORY:  Head trauma, minor (Age >= 65y);     TECHNIQUE:  Low dose axial images were obtained through the head.  Coronal and sagittal reformations were also performed. Contrast was not administered.     COMPARISON:  CT 04/18/2023     FINDINGS:  There is motion artifact.     There is generalized cerebral volume loss.  There is hypoattenuation in a periventricular fashion, likely sequela of chronic microvascular ischemic change. There are a few punctate foci of low attenuation involving the left basal ganglia, suggesting sequela of remote infarct, stable. There is no evidence of acute major vascular territory infarct, hemorrhage, or mass.  There is no hydrocephalus.  There are prominent low attenuating collections overlying the convexities bilaterally, left greater than right, new since the previous exam.  The paranasal sinuses and mastoid air cells are clear, and there is no evidence of calvarial fracture.  The visualized soft tissues are unremarkable.     Impression:     1. Allowing for extensive motion artifact, there are low attenuating subdural collections overlying the convexities bilaterally, left greater than right.  This is new since the previous exam.  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  No hydrocephalus.  2. Patchy low attenuation within the region of the left basal ganglia, suggesting sequela of remote infarct.        Electronically signed by: William Coelho MD  Date:                                            07/08/2023  Time:                                           18:34CTA CHEST NON CORONARY (PE STUDIES)     CLINICAL HISTORY:  Pulmonary embolism (PE) suspected, high prob;     TECHNIQUE:  Low dose axial images, sagittal and coronal reformations were obtained from the thoracic inlet to the lung bases following the IV administration of 100 mL of Omnipaque 350.  Contrast timing was  optimized to evaluate the pulmonary arteries.  MIP images were performed.     COMPARISON:  CT 05/22/2023     FINDINGS:  There is extensive motion artifact.     Allowing for the above, the structures at the base of the neck are grossly unremarkable.  The heart is not enlarged, no significant pericardial effusion.  The thoracic aorta tapers normally noting atherosclerotic plaque and calcification along its course and in the distribution of the coronary arteries.  The visualized portions of the spleen and pancreas are grossly unremarkable.  The visualized portions of the adrenal glands are unremarkable.  The visualized right kidney and gallbladder are unremarkable.  Low attenuating lesions are noted within the hepatic parenchyma, several of which are too small for characterization, largest within the anterior aspect of the left hepatic lobe measures 2.5 cm with attenuation suggesting cyst.  There is a partially visualized cyst within the left upper quadrant, better evaluated 05/06/2023, arising from the left kidney.  There is a hiatal hernia.     Allowing for extensive motion artifact, the central airways are patent, the distal airways are suboptimally evaluated.  Likewise, there is suboptimal evaluation of the pulmonary parenchyma.  There is suspicion for scattered interlobular septal thickening, may reflect edema.  There is fluid along the fissure on the right.  There is bilateral basilar dependent atelectasis.  There is a trace right pleural effusion with associated compressive atelectasis of the right lower lobe.  There is a mild left pleural effusion with associated compressive atelectasis of the left lower lobe.  There is a calcified granuloma within the right lower lobe.     Bolus timing is adequate for the evaluation of pulmonary thromboembolism however extensive motion artifact renders the majority of the exam nondiagnostic for evaluation of the same.  No large central pulmonary thromboembolism, distal  embolism cannot be excluded on the basis of this exam.     There is osteopenia.  There are degenerative changes of the spine.  No significant axillary lymphadenopathy.  There is body wall anasarca.     Impression:     1. Extensive motion artifact significantly limits exam, no convincing large central pulmonary thromboembolism, distal embolism cannot be excluded on the basis of this exam.  Correlation is advised.  2. Bilateral pleural effusions, left greater than right with associated compressive atelectasis of the bilateral lower lobes.  There is suspected underlying interstitial edema.  Pulmonary nodule cannot be definitively excluded.  3. Please see above for several additional findings.        Electronically signed by: William Coelho MD  Date:                                            07/08/2023  Time:                                           18:25    CT PELVIS WITHOUT CONTRAST; CT 3D RECON WITH INDEPENDENT WS     CLINICAL HISTORY:  Pelvic fracture;2mm cuts with 3d recons;; fall;     TECHNIQUE:  Axial images of the pelvis were obtained at 1.25 mm intervals without administration of IV contrast.  Coronal and sagittal reformatted images were reviewed.  3D reconstructed images were created on a separate workstation and reviewed.     COMPARISON:  CT 05/06/2023     FINDINGS:  There is motion artifact.     There is osteopenia.  There is subtle nondisplaced fracture of the superior pubic ramus on the right at the level of the symphysis.  There is displaced fracture involving the inferior pubic ramus on the right.  The bilateral femoroacetabular joints are intact.  There is a suspected bone island within the posterior aspect of the left iliac bone.  There are degenerative changes of the lumbar spine.  The sacrum is intact.  The sacroiliac joints are intact noting degenerative changes.  There is grade 1 anterolisthesis of L4 on L5.  The sacral segments appear aligned on sagittal reformatted images.     The visualized  portions of the bowel are grossly unremarkable.  The appendix is unremarkable.  There is atherosclerotic calcification of the aorta and its branches.  The uterus and adnexa are unremarkable.  The urinary bladder is distended noting posteriorly projecting diverticulum versus urethral diverticulum.  Correlation with any history of urinary retention or outlet obstruction.  There is a partially visualized cyst arising from the left kidney.  There is body wall anasarca.  There is edema about the fracture sites.     Impression:     This report was flagged in Epic as abnormal.     1. Acute appearing fractures involving the right superior and inferior pubic rami as described.  2. Please see above for several additional findings.        Electronically signed by: William Coelho MD  Date:                                            07/08/2023  Time:                                           18:42

## 2023-07-09 NOTE — PLAN OF CARE
Patient was DNR in chart by night team but no documentation in night team notes regarding who this was discussed with or documents in paper chart supporting this now and not seen in past charting ACP docs.  In media section scanned on page 3 there is a form signed in April 2023 from [facility] signed by patient saying she is full code and wants everything done. She is unable to state her wishes now due to condition.   I called her first contact her son and went straight to voicemail and could not leave message as VM was full.  Given no documentation from overnight and only have documentation stating was full code from April 2023 will chage to full code at this time until can clarify further

## 2023-07-09 NOTE — ASSESSMENT & PLAN NOTE
Patient started on memantine  Last hospital stay was on haldol 2mg nightly - dosage has been reduced to 0.5mg nightly continue at this time  -Fall/Aspiration and Delirium precautions

## 2023-07-09 NOTE — ASSESSMENT & PLAN NOTE
New acute respiratory failure with hypoxia, patient not previously on oxygen  -she has recent diagnosis of PE and noted to have bilateral L>R pleural effusions persistent today, was wheezing during ED evaluation, given 40mg IV lasix with >500cc UOP - duncan contents not yet measured since insertion.   -CT chest today does not have significant parenchymal disease, patient is afebrile   -Etiology may be due to worsening of CHF symptoms coupled with presence of right pulmonary embolism, no new or worsening degree of PE seen on CTA today but motion artifact degrades quality of study.     -Continue lasix 40mg daily at this time, supplemental O2 to wean as tolerated.

## 2023-07-09 NOTE — ASSESSMENT & PLAN NOTE
Noted to have inability to urinate in ED and urinary retention with elevated bladder scan. 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 Will refer to uro on dc for long term work up   -duncan catheter placed- would keep at least 3-4 days bfeore voiding trial.  -urine gram stain sent, blood cultures sent   Urine CX pending  -continue empiric Ceftriaxone for now
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
## 2023-07-09 NOTE — ASSESSMENT & PLAN NOTE
Radha Sandoval is a 88 y.o. 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  No evidence of sacral fracture on CT.  She is currently admitted for treatment of UTI and possible pneumonia vs CHF exacerbation.  Will plan for PT to evaluate patient's ability to bear weight pending improvement in neurocognitive function.  Will discuss further treatment plan with staff, but plan to proceed with non-operative treatment at this time.       -Pain: multimodal regimen  -PT/OT: weightbearing as tolerated  -DVT ppx: Lovenox     Dispo: f/u PT recs

## 2023-07-09 NOTE — ASSESSMENT & PLAN NOTE
Diagnosed with PE and DVT last hospital admission ((5/22& 5/23/23)- was on eliquis but transitioned to warfarin per DC summary.  Per nursing facility MAR she is back on eliquis.     Prior progress notes indicate she was switched to warfarin due to potential high cost of eliquis as an ouptatient.     -Continue eliquis for now - obtain PharmD consult to review pricing of anticoagulation options.

## 2023-07-09 NOTE — NURSING
Nurse notified MARYANA Huerta and  of pt being unable to swallow medications (eliquis, flomax, and haldol) at this time. Pt is drowsy but arousable. See orders for Enoxaparin.

## 2023-07-09 NOTE — ASSESSMENT & PLAN NOTE
[facility]   -Orthopedic surgery consulted and non op fx with [facility], PT/OT held on 7/9 due to mental status which has improved so will consult for 7/10  -duncan catheter in place.   -pain meds with tylenol and robaxin, avoid stronger meds for now as had worsening mental status after dilaudid x 1. If worsens can consdier possible tramadol

## 2023-07-09 NOTE — SUBJECTIVE & OBJECTIVE
(Not in a hospital admission)      Review of patient's allergies indicates:  No Known Allergies    No past medical history on file.  Past Surgical History:   Procedure Laterality Date    REPAIR, HERNIA, INGUINAL, WITHOUT HISTORY OF PRIOR REPAIR, AGE 6 YEARS OR OLDER Right 5/6/2023    Procedure: REPAIR, HERNIA, INGUINAL, WITHOUT HISTORY OF PRIOR REPAIR, AGE 6 YEARS OR OLDER;  Surgeon: Maurice Piper MD;  Location: [facility] OR [address] [place] [address];  Service: General;  Laterality: Right;  possible laparotomy, possible bowel resection     Family History    None       Tobacco Use    Smoking status: Not on file    Smokeless tobacco: Not on file   Substance and Sexual Activity    Alcohol use: Not on file    Drug use: Not on file    Sexual activity: Not on file     Review of Systems   Reason unable to perform ROS: AMS.   Objective:     Weight: 72.1 kg (159 lb)  Body mass index is 28.17 kg/m².  Vital Signs (Most Recent):  Temp: 98.5 °F (36.9 °C) (07/08/23 2235)  Pulse: 73 (07/08/23 2235)  Resp: 19 (07/08/23 2105)  BP: (!) 133/56 (07/08/23 2235)  SpO2: 96 % (07/08/23 2235) Vital Signs (24h Range):  Temp:  [97.8 °F (36.6 °C)-98.5 °F (36.9 °C)] 98.5 °F (36.9 °C)  Pulse:  [63-85] 73  Resp:  [13-24] 19  SpO2:  [92 %-99 %] 96 %  BP: (110-168)/(53-96) 133/56                              Urethral Catheter 07/08/23 1911 Non-latex 16 Fr. (Active)          Physical Exam  Vitals and nursing note reviewed.   HENT:      Head: Normocephalic.   Eyes:      Extraocular Movements: Extraocular movements intact.      Pupils: Pupils are equal, round, and reactive to light.   Cardiovascular:      Rate and Rhythm: Normal rate.   Pulmonary:      Effort: Pulmonary effort is normal.   Abdominal:      Palpations: Abdomen is soft.   Neurological:      Mental Status: She is alert.          E4V4M6  Aox2 (person, year)  Cni, PERRL  dysarthric  Not fully cooperative due to AMS on motor/sensation exam but FC x4 AG  Unable to assess drift     Significant Labs:  Recent Labs    Lab 07/08/23  1549   *      K 4.5   *   CO2 23   BUN 17   CREATININE 0.8   CALCIUM 8.4*   MG 1.7     Recent Labs   Lab 07/08/23  1549   WBC 12.81*   HGB 9.0*   HCT 29.5*        Recent Labs   Lab 07/08/23  1549   INR 1.1     Microbiology Results (last 7 days)       Procedure Component Value Units Date/Time    Urine culture [[id number]] Collected: 07/08/23 1844    Order Status: No result Specimen: Urine Updated: 07/08/23 1915    Blood Culture #1 **CANNOT BE ORDERED STAT** [[id number]] Collected: 07/08/23 1821    Order Status: Sent Specimen: Blood from Peripheral, Antecubital, Right Updated: 07/08/23 1844    Blood Culture #2 **CANNOT BE ORDERED STAT** [[id number]] Collected: 07/08/23 1821    Order Status: Sent Specimen: Blood from Peripheral, Forearm, Right Updated: 07/08/23 1844          All pertinent labs from the last 24 hours have been reviewed.    Significant Diagnostics:  I have reviewed all pertinent imaging results/findings within the past 24 hours.  I have reviewed and interpreted all pertinent imaging results/findings within the past 24 hours.  X-Ray Hip 2 or 3 views Right (with Pelvis when performed)    Result Date: 7/8/2023  1. Fractures involving the superior and inferior pubic rami on the right as described. Electronically signed by: William Coelho MD Date:    07/08/2023 Time:    17:06    CT Head Without Contrast    Result Date: 7/8/2023  1. Allowing for extensive motion artifact, there are low attenuating subdural collections overlying the convexities bilaterally, left greater than right.  This is new since the previous exam.  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  No hydrocephalus. 2. Patchy low attenuation within the region of the left basal ganglia, suggesting sequela of remote infarct. Electronically signed by: William Coelho MD Date:    07/08/2023 Time:    18:34    CTA  Chest Non-Coronary (PE Studies)    Result Date: 7/8/2023  1. Extensive motion artifact significantly limits exam, no convincing large central pulmonary thromboembolism, distal embolism cannot be excluded on the basis of this exam.  Correlation is advised. 2. Bilateral pleural effusions, left greater than right with associated compressive atelectasis of the bilateral lower lobes.  There is suspected underlying interstitial edema.  Pulmonary nodule cannot be definitively excluded. 3. Please see above for several additional findings. Electronically signed by: William Coelho MD Date:    07/08/2023 Time:    18:25    CT Cervical Spine Without Contrast    Result Date: 7/8/2023  No acutely displaced fracture or acute traumatic malalignment. Mild apparent widening of the anterior C3-C4 intervertebral disc spaces appear similar to prior. Electronically signed by resident: Kilo Cruz Date:    07/08/2023 Time:    18:48 Electronically signed by: Ren Hickey MD Date:    07/08/2023 Time:    19:18    CT Pelvis Without Contrast    Result Date: 7/8/2023  This report was flagged in Epic as abnormal. 1. Acute appearing fractures involving the right superior and inferior pubic rami as described. 2. Please see above for several additional findings. Electronically signed by: William Coelho MD Date:    07/08/2023 Time:    18:42    X-Ray Chest AP Portable    Result Date: 7/8/2023  Increased opacification in the right paratracheal and right hilar regions with associated right lung volume loss.  Findings may reflect atelectasis.  Other possibilities include pneumonia, underlying mass, or adenopathy.  Follow-up, as clinically warranted. Electronically signed by resident: Kilo Cruz Date:    07/08/2023 Time:    17:03 Electronically signed by: Ren Hickey MD Date:    07/08/2023 Time:    17:34    CT 3D RECON WITH INDEPENDENT WS    Result Date: 7/8/2023  This report was flagged in Epic as abnormal. 1. Acute appearing fractures  involving the right superior and inferior pubic rami as described. 2. Please see above for several additional findings. Electronically signed by: William Coelho MD Date:    07/08/2023 Time:    18:42    X-Ray Shoulder Trauma Right    Result Date: 7/8/2023  1. Allowing for exam limitations described above, no convincing acute displaced fracture or dislocation of the right shoulder. Electronically signed by: William Coelho MD Date:    07/08/2023 Time:    17:04

## 2023-07-09 NOTE — HPI
88F PMH HTN, dementia, PE/DVT on eliquis, presenting after fall w/o LOC and CTH demonstrating chronic subdural hygroma. Patient with UTI, pneumonia, hip fracture, being admitted to . Per family member in room patient has been experiencing cognitive decline recently and is being followed by neurology, was scheduled to have MRI this week but deferred due to ongoing problems.

## 2023-07-09 NOTE — ASSESSMENT & PLAN NOTE
Noted on CT head  -neurosurgery consults has evaluated, no acute hemorrhage concern, A 4 hour interval repeat CT head and CT cervical spine are ordered and pending.   -Discussed with neurosurgery physician - patient ok to continue therapeutic anticoagulation

## 2023-07-09 NOTE — CONSULTS
[facility] - Emergency Dept  Neurosurgery  Consult Note    Inpatient consult to Neurosurgery  Consult performed by: Omer Stephens MD  Consult ordered by: Blake Chamberlain MD        Subjective:     Chief Complaint/Reason for Admission: fall w/o LOC    History of Present Illness: 88F PMH HTN, dementia, PE/DVT on eliquis, presenting after fall w/o LOC and CTH demonstrating chronic subdural hygroma. Patient with UTI, pneumonia, hip fracture, being admitted to . Per family member in room patient has been experiencing cognitive decline recently and is being followed by neurology, was scheduled to have MRI this week but deferred due to ongoing problems.       (Not in a hospital admission)      Review of patient's allergies indicates:  No Known Allergies    No past medical history on file.  Past Surgical History:   Procedure Laterality Date    REPAIR, HERNIA, INGUINAL, WITHOUT HISTORY OF PRIOR REPAIR, AGE 6 YEARS OR OLDER Right 5/6/2023    Procedure: REPAIR, HERNIA, INGUINAL, WITHOUT HISTORY OF PRIOR REPAIR, AGE 6 YEARS OR OLDER;  Surgeon: Maurice Piper MD;  Location: [facility] OR [address] [place] [address];  Service: General;  Laterality: Right;  possible laparotomy, possible bowel resection     Family History    None       Tobacco Use    Smoking status: Not on file    Smokeless tobacco: Not on file   Substance and Sexual Activity    Alcohol use: Not on file    Drug use: Not on file    Sexual activity: Not on file     Review of Systems   Reason unable to perform ROS: AMS.   Objective:     Weight: 72.1 kg (159 lb)  Body mass index is 28.17 kg/m².  Vital Signs (Most Recent):  Temp: 98.5 °F (36.9 °C) (07/08/23 2235)  Pulse: 73 (07/08/23 2235)  Resp: 19 (07/08/23 2105)  BP: (!) 133/56 (07/08/23 2235)  SpO2: 96 % (07/08/23 2235) Vital Signs (24h Range):  Temp:  [97.8 °F (36.6 °C)-98.5 °F (36.9 °C)] 98.5 °F (36.9 °C)  Pulse:  [63-85] 73  Resp:  [13-24] 19  SpO2:  [92 %-99 %] 96 %  BP: (110-168)/(53-96) 133/56                              Urethral  Catheter 07/08/23 1911 Non-latex 16 Fr. (Active)          Physical Exam  Vitals and nursing note reviewed.   HENT:      Head: Normocephalic.   Eyes:      Extraocular Movements: Extraocular movements intact.      Pupils: Pupils are equal, round, and reactive to light.   Cardiovascular:      Rate and Rhythm: Normal rate.   Pulmonary:      Effort: Pulmonary effort is normal.   Abdominal:      Palpations: Abdomen is soft.   Neurological:      Mental Status: She is alert.          E4V4M6  Aox2 (person, year)  Cni, PERRL  dysarthric  Not fully cooperative due to AMS on motor/sensation exam but FC x4 AG  Unable to assess drift     Significant Labs:  Recent Labs   Lab 07/08/23  1549   *      K 4.5   *   CO2 23   BUN 17   CREATININE 0.8   CALCIUM 8.4*   MG 1.7     Recent Labs   Lab 07/08/23  1549   WBC 12.81*   HGB 9.0*   HCT 29.5*        Recent Labs   Lab 07/08/23  1549   INR 1.1     Microbiology Results (last 7 days)       Procedure Component Value Units Date/Time    Urine culture [[id number]] Collected: 07/08/23 1844    Order Status: No result Specimen: Urine Updated: 07/08/23 1915    Blood Culture #1 **CANNOT BE ORDERED STAT** [[id number]] Collected: 07/08/23 1821    Order Status: Sent Specimen: Blood from Peripheral, Antecubital, Right Updated: 07/08/23 1844    Blood Culture #2 **CANNOT BE ORDERED STAT** [[id number]] Collected: 07/08/23 1821    Order Status: Sent Specimen: Blood from Peripheral, Forearm, Right Updated: 07/08/23 1844          All pertinent labs from the last 24 hours have been reviewed.    Significant Diagnostics:  I have reviewed all pertinent imaging results/findings within the past 24 hours.  I have reviewed and interpreted all pertinent imaging results/findings within the past 24 hours.  X-Ray Hip 2 or 3 views Right (with Pelvis when performed)    Result Date: 7/8/2023  1. Fractures involving the superior and inferior pubic rami on the right as described. Electronically signed  by: William Coelho MD Date:    07/08/2023 Time:    17:06    CT Head Without Contrast    Result Date: 7/8/2023  1. Allowing for extensive motion artifact, there are low attenuating subdural collections overlying the convexities bilaterally, left greater than right.  This is new since the previous exam.  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  No hydrocephalus. 2. Patchy low attenuation within the region of the left basal ganglia, suggesting sequela of remote infarct. Electronically signed by: William Coelho MD Date:    07/08/2023 Time:    18:34    CTA Chest Non-Coronary (PE Studies)    Result Date: 7/8/2023  1. Extensive motion artifact significantly limits exam, no convincing large central pulmonary thromboembolism, distal embolism cannot be excluded on the basis of this exam.  Correlation is advised. 2. Bilateral pleural effusions, left greater than right with associated compressive atelectasis of the bilateral lower lobes.  There is suspected underlying interstitial edema.  Pulmonary nodule cannot be definitively excluded. 3. Please see above for several additional findings. Electronically signed by: William Coelho MD Date:    07/08/2023 Time:    18:25    CT Cervical Spine Without Contrast    Result Date: 7/8/2023  No acutely displaced fracture or acute traumatic malalignment. Mild apparent widening of the anterior C3-C4 intervertebral disc spaces appear similar to prior. Electronically signed by resident: Kilo Cruz Date:    07/08/2023 Time:    18:48 Electronically signed by: Ren Hickey MD Date:    07/08/2023 Time:    19:18    CT Pelvis Without Contrast    Result Date: 7/8/2023  This report was flagged in Epic as abnormal. 1. Acute appearing fractures involving the right superior and inferior pubic rami as described. 2. Please see above for several additional findings. Electronically signed by: William  MD [place] Date:    07/08/2023 Time:    18:42    X-Ray Chest AP Portable    Result Date: 7/8/2023  Increased opacification in the right paratracheal and right hilar regions with associated right lung volume loss.  Findings may reflect atelectasis.  Other possibilities include pneumonia, underlying mass, or adenopathy.  Follow-up, as clinically warranted. Electronically signed by resident: Kilo Cruz Date:    07/08/2023 Time:    17:03 Electronically signed by: Ren Hickey MD Date:    07/08/2023 Time:    17:34    CT 3D RECON WITH INDEPENDENT WS    Result Date: 7/8/2023  This report was flagged in Epic as abnormal. 1. Acute appearing fractures involving the right superior and inferior pubic rami as described. 2. Please see above for several additional findings. Electronically signed by: William Coelho MD Date:    07/08/2023 Time:    18:42    X-Ray Shoulder Trauma Right    Result Date: 7/8/2023  1. Allowing for exam limitations described above, no convincing acute displaced fracture or dislocation of the right shoulder. Electronically signed by: William Coelho MD Date:    07/08/2023 Time:    17:04       Assessment/Plan:     Hygroma  88F PMH HTN, dementia, PE/DVT on eliquis, presenting after fall w/o LOC and CTH demonstrating chronic subdural hygroma    Plan:  --admit HM, q4h neurochecks  --all labs and significant diagnostics reviewed   --CTH 7/8: New chronic subdural hygromas when compared to CT scan 4/23   --CTH 7/8 4hr repeat:   --CT C spine (fall, AMS) 7/8:  --Coags WNL  --will consider outpatient [facility] embo for chronic subdural hygroma pending stable scan          Thank you for your consult. I will follow-up with patient. Please contact us if you have any additional questions.    Omer Stephens MD  Neurosurgery  [facility] - Emergency Dept

## 2023-07-09 NOTE — ASSESSMENT & PLAN NOTE
Advance Care Planning     Today a voluntary meeting took place: other (conference room) phone call with son    Patient Participation: Patient is unable to participate     Attendees (Name and  Relationship to patient): Legal surrogate decision-maker: son         ACP Conversation (General): Understanding of current condition good     Code Status: Full Code    ACP Documents: Confirmed existing forms and scanned into chart and Other Documents (specify): document in media section indication full code from 4/2023 and he reports had a meeting last week at [facility] and patient reported she was full code and wished to do eveerything if her condition changed    Goals of care: The family endorses that what is most important right now is to focus on continuing care per patient wishes from meeting a week ago on her goals.

## 2023-07-10 NOTE — PLAN OF CARE
Problem: Physical Therapy  Goal: Physical Therapy Goal  Description: Goals to be met by: 2023     Patient will increase functional independence with mobility by performin. Supine to sit with Stand-by Assistance  2. Sit to stand transfer with Stand-by Assistance  3. Bed to chair transfer with Stand-by Assistance using Rolling Walker  4. Gait  x 50 feet with Minimal Assistance using Rolling Walker.     Outcome: Ongoing, Progressing     Pt evaluated and appropriate goals established.

## 2023-07-10 NOTE — PLAN OF CARE
07/10/23 1206   Post-Acute Status   Post-Acute Authorization Placement   Post-Acute Placement Status Referrals Sent   Discharge Plan   Discharge Plan A Return to nursing home;Skilled Nursing Facility     SW faxed referral to [facility] via [facility] for review, expected to return with SNF. SW will follow up as needed.    Saida Pablo LMSW  Case Management   [facility]   Ext. [phone]

## 2023-07-10 NOTE — PT/OT/SLP EVAL
Physical Therapy Co-Evaluation and Treatment    Patient Name:  Radha Sandoval   MRN:  [id number]    *co-treatment with OT  2/2 pt with potential impaired ability to tolerate 2 evaluations 2/2 medical status   Recommendations:     Discharge Recommendations: nursing facility, skilled   Discharge Equipment Recommendations: none   Barriers to discharge: Decreased caregiver support at current level of function     Assessment:     Radha Sandoval is a 88 y.o. female admitted with a medical diagnosis of Multiple closed fractures of pelvis without disruption of pelvic ring.  She presents with the following impairments/functional limitations: weakness, impaired endurance, impaired balance, impaired self care skills, impaired functional mobility, gait instability, impaired cognition, decreased lower extremity function, pain. Pt tolerated activity with significant assistance required for mobility, primarily pain limiting. Pt has made great progress with mobility at SNF since previous admission, demo's good prognosis for continued improvement. Upon discharge, pt would benefit from continued skilled therapy intervention at [facility] nursing facility to progress toward more independent mobility. At this time, pt would continue to benefit from acute skilled therapy intervention to address deficits and progress toward prior level of function.       Rehab Prognosis: Good; patient would benefit from acute skilled PT services to address these deficits and reach maximum level of function.    Recent Surgery: * No surgery found *      Plan:     During this hospitalization, patient to be seen 4 x/week to address the identified rehab impairments via gait training, therapeutic activities, therapeutic exercises, neuromuscular re-education and progress toward the following goals:    Plan of Care Expires:  08/10/23    Subjective     Chief Complaint: c/o pain with movement   Patient/Family Comments/goals: to get better   Pain/Comfort:  Pain Rating 1:   (pt reported R hip pain with movement, did not quantify)  Location - Side 1: Right  Location - Orientation 1: generalized  Location 1: hip  Pain Addressed 1: Pre-medicate for activity, Reposition, Distraction, Cessation of Activity, Nurse notified  Pain Rating Post-Intervention 1:  (did not report, no c/o pain)    Patients cultural, spiritual, Sabianism conflicts given the current situation: no    Living Environment:  Pt lives at an assisted living facility on the second floor.   Prior to admission, patients level of function was independent with mobility and ADLs with use of rollator, however, has had multiple hospital admissions with subsequent SNF stays. Caregiver present, states she was ambulating 60-90 ft with RW with therapy prior to fall.  Equipment used at home: grab bar, rollator, shower chair.  DME owned (not currently used): none.  Upon discharge, patient will have assistance from caregiver.    Objective:     Communicated with RN prior to session.  Patient found HOB elevated with duncan catheter, peripheral IV, SCD  upon PT entry to room.    General Precautions: Standard, fall  Orthopedic Precautions:LLE weight bearing as tolerated, RLE weight bearing as tolerated   Braces: N/A  Respiratory Status: Room air    Exams:  Cognitive Exam:  Patient is oriented to Person, Place, and Situation, fluctuating presentation of confusion, intermittent impaired attention to task, moderate memory impairment   Gross Motor Coordination:  WFL  RLE ROM: WFL except limited hip exam 2/2 pain   RLE Strength: pain limiting   LLE ROM: WFL except limited hip exam 2/2 pain  LLE Strength: pain limiting     Functional Mobility:  Bed Mobility:     Rolling Left:  total assistance  Rolling Right: total assistance  Supine to Sit: total assistance  Transfers:     Sit to Stand:  moderate assistance with rolling walker  Bed to Chair: moderate assistance initially, regressed to maximum assistance with  rolling walker  using  Step  Transfer  Gait: Pt ambulated 5 ft from bed to chair with RW, initially performed with moderate assistance, regressed to maximum assistance. Pt demo'd small step size, decreased foot clearance, narrow ROLANDO, excessive sway. Facilitation provided for lateral weight shift to promote step initiation. Cuing provided for forward gaze and upright posture. Pt with uncontrolled descent to chair with maximum assistance for safety.       AM-PAC 6 CLICK MOBILITY  Total Score:11       Treatment & Education:  Pt performed rolling L and R while PT and OT assisted with self care/pericare   Pt educated on role of PT/POC. Pt verbalized understanding.   Pt encouraged to only perform OOB mobility with assistance from nursing/therapy. Pt agreeable.   Pt encouraged to ambulate daily with assistance/supervision from nursing/therapy. Pt agreeable.      Patient left up in chair with all lines intact, call button in reach, and RN notified.    GOALS:   Multidisciplinary Problems       Physical Therapy Goals          Problem: Physical Therapy    Goal Priority Disciplines Outcome Goal Variances Interventions   Physical Therapy Goal     PT, PT/OT Ongoing, Progressing     Description: Goals to be met by: 2023     Patient will increase functional independence with mobility by performin. Supine to sit with Stand-by Assistance  2. Sit to stand transfer with Stand-by Assistance  3. Bed to chair transfer with Stand-by Assistance using Rolling Walker  4. Gait  x 50 feet with Minimal Assistance using Rolling Walker.                          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 2023    Lumbar spondylosis with myelopathy 2023    Primary hypertension  6/10/2022       Past Surgical History:   Procedure Laterality Date    REPAIR, HERNIA, INGUINAL, WITHOUT HISTORY OF PRIOR REPAIR, AGE 6 YEARS OR OLDER Right 5/6/2023    Procedure: REPAIR, HERNIA, INGUINAL, WITHOUT HISTORY OF PRIOR REPAIR, AGE 6 YEARS OR OLDER;  Surgeon: Maurice Piper MD;  Location: [facility] OR [address] [place] [address];  Service: General;  Laterality: Right;  possible laparotomy, possible bowel resection       Time Tracking:     PT Received On: 07/10/23  PT Start Time: 0845     PT Stop Time: 0916  PT Total Time (min): 31 min     Billable Minutes: Evaluation 7 mins , Gait Training 8 mins , and Therapeutic Activity 16 mins      07/10/2023

## 2023-07-10 NOTE — ASSESSMENT & PLAN NOTE
Secondary to fall today at Skilled nursing facility   -Orthopedic surgery consulted and non op fx with [facility]AT, PT/OT held on 7/9 due to mental status which has improved so will consult for 7/10  -duncan catheter in place.   -pain meds with tylenol and robaxin, avoid stronger meds for now as had worsening mental status after dilaudid x 1. If worsens can consdier possible tramadol  Did well with PT/OT on 7/10 and pain doing well so far per therapy at bedside during exam

## 2023-07-10 NOTE — ASSESSMENT & PLAN NOTE
-likely secondary to UTI as well as dilaudid given on admission as has had recent hallucinations prior to admit suspect was likely UTI then and then worsened mental status on 7/9 in AM lkely from combination of poor sleep on admit + pain meds worsening this, CT head showing possible change in hygroma and concern for this with mental status and monitoring closely with q 4 neuro checks but now improving mental status in PM so seems less likely from this and more likely from former  -close monitoring  Much improved 7/10

## 2023-07-10 NOTE — ASSESSMENT & PLAN NOTE
With recent hospital stays - patient's caretaker notes worsening deblity and patient's cognitive deficits compound issue.  Patient's unwitnessed fall today occurred - patient reported to be trying to walk to the bathroom when this occurred  Son reports that prior to these recent multiple admits she was doing well but has had multiple serious illnesses including incarcerated hernia, PE, UTIS which have led to worsening baseline status in last few months  Did well with PT/OT on 7/10

## 2023-07-10 NOTE — NURSING
No significant events overnight. Pt became more alert throughout the night. Pt more verbal and able to express no pain at this time. Pt was given boost to drink, pt tolerated boost well. Chel (caregiver) at bedside with patient. No complaints or concerns from patient and/or caregiver at this time. Call light within reach and bed alarm activated. Pt was reoriented to place, time, situation and call light system. Pt expressed understanding but no evidence of learning. Chel expressed understanding to press call light when any assistance is needed.

## 2023-07-10 NOTE — NURSING
Nurse received critical result from Lab, potassium level of 2.6. Nurse notified ADEEL Ny via phone. See ORDERS

## 2023-07-10 NOTE — ASSESSMENT & PLAN NOTE
Continue lisinopril and metoprolol that were being given @ SNF  -patient no longer on clonidine.   Inc losartan 7/10 for elevations

## 2023-07-10 NOTE — ASSESSMENT & PLAN NOTE
Patient started on memantine  Last hospital stay was on haldol 2mg nightly - dosage has been reduced to 0.5mg nightly continue at this time  -Fall/Aspiration and Delirium precautions   Improved mental status 7/10 close to baseline

## 2023-07-10 NOTE — SUBJECTIVE & OBJECTIVE
Interval History: NAEON. Pt doing well today, mentation has significantly improved and seems close to her baseline now, calm and cooperative. Gaurang Milligan at bedside this afternoon and spoke with son Maykel via phone, discussed plan of care.     Medications:  Continuous Infusions:  Scheduled Meds:   apixaban  5 mg Oral BID    ascorbic acid (vitamin C)  250 mg Oral Daily    cefTRIAXone (ROCEPHIN) IVPB  2 g Intravenous Q24H    EScitalopram oxalate  5 mg Oral Daily    [START ON 7/11/2023] furosemide (LASIX) injection  40 mg Intravenous Daily    haloperidoL  0.5 mg Oral QHS    lisinopriL  40 mg Oral Daily    memantine  5 mg Oral BID    metoprolol tartrate  25 mg Oral BID    miconazole NITRATE 2 %   Topical (Top) BID    mupirocin   Nasal BID    tamsulosin  0.4 mg Oral Nightly    zinc sulfate  220 mg Oral Every other day     PRN Meds:acetaminophen, melatonin, methocarbamoL, naloxone, ondansetron, polyethylene glycol, prochlorperazine, senna-docusate 8.6-50 mg, sodium chloride 0.9%     Review of Systems   Constitutional:  Negative for chills and fever.   Eyes:  Negative for photophobia and visual disturbance.   Gastrointestinal:  Negative for nausea and vomiting.   Musculoskeletal:  Positive for gait problem. Negative for back pain and neck pain.   Skin:  Negative for rash and wound.   Neurological:  Negative for seizures, facial asymmetry, speech difficulty and headaches.   Psychiatric/Behavioral:  Positive for confusion. Negative for agitation.    Objective:     Weight: 76 kg (167 lb 8.8 oz)  Body mass index is 29.68 kg/m².  Vital Signs (Most Recent):  Temp: 97.8 °F (36.6 °C) (07/10/23 1529)  Pulse: 77 (07/10/23 1529)  Resp: 16 (07/10/23 1529)  BP: (!) 139/59 (07/10/23 1529)  SpO2: (!) 91 % (07/10/23 1529) Vital Signs (24h Range):  Temp:  [97.5 °F (36.4 °C)-99 °F (37.2 °C)] 97.8 °F (36.6 °C)  Pulse:  [] 77  Resp:  [16-20] 16  SpO2:  [91 %-96 %] 91 %  BP: (139-189)/(59-79) 139/59     Date 07/10/23 0700 - 07/11/23  "0659   Shift 0034-6405 0610-6027 8932-8960 24 Hour Total   INTAKE   P.O. 610   610   Shift Total(mL/kg) 610(8)   610(8)   OUTPUT   Urine(mL/kg/hr) 550(0.9)   550   Shift Total(mL/kg) 550(7.2)   550(7.2)   Weight (kg) 76 76 76 76                            Urethral Catheter 07/08/23 1911 Non-latex 16 Fr. (Active)   Site Assessment Clean;Intact 07/10/23 0800   Collection Container Standard drainage bag 07/10/23 0800   Securement Method secured to top of thigh w/ adhesive device 07/10/23 0800   Catheter Care Performed yes 07/10/23 0800   Reason for Continuing Urinary Catheterization Urinary retention 07/10/23 0800   CAUTI Prevention Bundle Securement Device in place with 1" slack;Intact seal between catheter & drainage tubing;Drainage bag/urimeter off the floor;Sheeting clip in use;No dependent loops or kinks;Drainage bag/urimeter not overfilled (<2/3 full);Drainage bag/urimeter below bladder 07/10/23 0800   Output (mL) 300 mL 07/10/23 1230          Physical Exam         Neurosurgery Physical Exam    Vitals and nursing note reviewed.   HENT:      Head: Normocephalic. Atraumatic.  Eyes:      Extraocular Movements: Extraocular movements intact.      Pupils: Pupils are equal, round, and reactive to light.   Cardiovascular:      Rate and Rhythm: Normal rate.   Pulmonary:      Effort: Pulmonary effort is normal.   Abdominal:      Palpations: Abdomen is soft and non-tender.   Neurological:      Mental Status: She is awake and alert.         E4V4M6  Aox2 (person, year; somewhat to place/situation with re-orienting)  Cni, PERRL  Dysarthric, no aphasia  Follows simple commands  Not fully cooperative due to AMS on motor/sensation exam but FC x4 AG  Unable to assess drift/dysmetria     Significant Labs:  Recent Labs   Lab 07/09/23  0554 07/10/23  0523    120*   * 142   K 3.0* 2.6*    107   CO2 26 27   BUN 17 15   CREATININE 0.8 0.7   CALCIUM 8.3* 8.3*   MG 1.5* 1.6     Recent Labs   Lab 07/09/23  0554 " 07/10/23  0523   WBC 11.09 8.09   HGB 8.4* 7.8*   HCT 27.2* 25.1*    253     No results for input(s): LABPT, INR, APTT in the last 48 hours.  Microbiology Results (last 7 days)       Procedure Component Value Units Date/Time    Urine culture [[id number]] Collected: 07/08/23 1844    Order Status: Completed Specimen: Urine Updated: 07/10/23 0347     Urine Culture, Routine Multiple organisms isolated. None in predominance.  Repeat if      clinically necessary.    Narrative:      Specimen Source->Urine    Blood Culture #1 **CANNOT BE ORDERED STAT** [[id number]] Collected: 07/08/23 1821    Order Status: Completed Specimen: Blood from Peripheral, Antecubital, Right Updated: 07/09/23 2012     Blood Culture, Routine No Growth to date      No Growth to date    Blood Culture #2 **CANNOT BE ORDERED STAT** [[id number]] Collected: 07/08/23 1821    Order Status: Completed Specimen: Blood from Peripheral, Forearm, Right Updated: 07/09/23 2012     Blood Culture, Routine No Growth to date      No Growth to date          All pertinent labs from the last 24 hours have been reviewed.    Significant Diagnostics:  I have reviewed and interpreted all pertinent imaging results/findings within the past 24 hours.

## 2023-07-10 NOTE — ASSESSMENT & PLAN NOTE
Advance Care Planning       Patient able to voice her wishes on 7/10 as back at her mental status baseline and her sitter and I wre able to ask her specifically what those were with her sitter asking her if she were to become incapacitated and if she were to be near death from not breathing or heart stopping and need cpr or berathing or feeding tube would she want this and she stated to both of us that she would want us to try to do what we could to save her and would want to be full code and will keep this in her chart.

## 2023-07-10 NOTE — ASSESSMENT & PLAN NOTE
New acute respiratory failure with hypoxia, patient not previously on oxygen  -she has recent diagnosis of PE and noted to have bilateral L>R pleural effusions persistent today, persistent wheezing prior to admit per family in last days to weeks prior to admit sounds consistent with volume overload developing with LE edema worsening before admit as well  -continue lasix now, rocephin should cover any superimposed PNA but sounds more likely to be volume.   -CT chest today does not have significant parenchymal disease, patient is afebrile   -Etiology may be due to worsening of CHF symptoms coupled with presence of right pulmonary embolism, no new or worsening degree of PE seen on CTA today but motion artifact degrades quality of study.     -Continue lasix,  supplemental O2 to wean as tolerated.   Improving 7/10 as off oxygen now, go to daily IV lasix

## 2023-07-10 NOTE — ASSESSMENT & PLAN NOTE
Seen on US may 2023, on anticoag prior to admit, held now for CT head trending  -repeat now to assess clot burden pending still, as has LE pain on R more than left and could be from clot burden still and ensure not becoing chronic

## 2023-07-10 NOTE — PLAN OF CARE
Problem: Occupational Therapy  Goal: Occupational Therapy Goal  Description: Goals to be met by: 7/10/23     Patient will increase functional independence with ADLs by performing:    UE Dressing with Minimal Assistance.  LE Dressing with Minimal Assistance.  Grooming while standing with Minimal Assistance.  Toileting from bedside commode with Stand-by Assistance for hygiene and clothing management.   Toilet transfer to bedside commode with Stand-by Assistance.    Outcome: Ongoing, Progressing   Pt. Evaluated and goals established

## 2023-07-10 NOTE — ASSESSMENT & PLAN NOTE
Patient is identified as having Diastolic (HFpEF) heart failure that is Acute. CHF is currently uncontrolled due to Continued edema of extremities and JVD and Pulmonary edema/pleural effusion on CXR. Latest ECHO performed and demonstrates- Results for orders placed during the hospital encounter of 05/22/23    Echo    Interpretation Summary  · The estimated ejection fraction is 65%.  · The left ventricle is normal in size with concentric hypertrophy and normal systolic function.  · Grade I left ventricular diastolic dysfunction.  · Normal right ventricular size with normal right ventricular systolic function.  · Mild left atrial enlargement.  · There is mild aortic valve stenosis.  · Aortic valve area is 1.47 cm2; peak velocity is 2.9 m/s; mean gradient is 15 mmHg.  · There is pulmonary hypertension.  · The estimated PA systolic pressure is 54 mmHg.  · Intermediate central venous pressure (8 mmHg).  . Continue Beta Blocker, ACE/ARB and Furosemide and monitor clinical status closely. Monitor on telemetry. Patient is off CHF pathway.  Monitor strict Is&Os and daily weights.  Place on fluid restriction of 1.5 L. Cardiology has not been any consulted. Continue to stress to patient importance of self efficacy and  on diet for CHF. Last BNP reviewed- and noted below   Recent Labs   Lab 07/08/23  1549   *   .    Continue Metoprolol and LIsinopril - on higher dose metoprolol than from discharge at last hospital stay   Will need to be on diuretics long term given had volume last stay and was not discharged on this and then had signs of volume return at SNF and now with acute overload  mproved 7/10 off oxygen and no wheezing heard with improving edema as well, go to lasix daily now IV and plan to go to orals once closer to euvolemic

## 2023-07-10 NOTE — PROGRESS NOTES
[facility] - [facility]  Neurosurgery  Progress Note    Subjective:     History of Present Illness: 88F PMH HTN, dementia, PE/DVT on eliquis, presenting after fall w/o LOC and CTH demonstrating chronic subdural hygroma. Patient with UTI, pneumonia, hip fracture, being admitted to . Per family member in room patient has been experiencing cognitive decline recently and is being followed by neurology, was scheduled to have MRI this week but deferred due to ongoing problems.       Post-Op Info:  * No surgery found *         Interval History: NAEON. Pt doing well today, mentation has significantly improved and seems close to her baseline now, calm and cooperative. Sitjulisa Milligan at bedside this afternoon and spoke with son Maykel via phone, discussed plan of care.     Medications:  Continuous Infusions:  Scheduled Meds:   apixaban  5 mg Oral BID    ascorbic acid (vitamin C)  250 mg Oral Daily    cefTRIAXone (ROCEPHIN) IVPB  2 g Intravenous Q24H    EScitalopram oxalate  5 mg Oral Daily    [START ON 7/11/2023] furosemide (LASIX) injection  40 mg Intravenous Daily    haloperidoL  0.5 mg Oral QHS    lisinopriL  40 mg Oral Daily    memantine  5 mg Oral BID    metoprolol tartrate  25 mg Oral BID    miconazole NITRATE 2 %   Topical (Top) BID    mupirocin   Nasal BID    tamsulosin  0.4 mg Oral Nightly    zinc sulfate  220 mg Oral Every other day     PRN Meds:acetaminophen, melatonin, methocarbamoL, naloxone, ondansetron, polyethylene glycol, prochlorperazine, senna-docusate 8.6-50 mg, sodium chloride 0.9%     Review of Systems   Constitutional:  Negative for chills and fever.   Eyes:  Negative for photophobia and visual disturbance.   Gastrointestinal:  Negative for nausea and vomiting.   Musculoskeletal:  Positive for gait problem. Negative for back pain and neck pain.   Skin:  Negative for rash and wound.   Neurological:  Negative for seizures, facial asymmetry, speech difficulty and headaches.  "  Psychiatric/Behavioral:  Positive for confusion. Negative for agitation.    Objective:     Weight: 76 kg (167 lb 8.8 oz)  Body mass index is 29.68 kg/m².  Vital Signs (Most Recent):  Temp: 97.8 °F (36.6 °C) (07/10/23 1529)  Pulse: 77 (07/10/23 1529)  Resp: 16 (07/10/23 1529)  BP: (!) 139/59 (07/10/23 1529)  SpO2: (!) 91 % (07/10/23 1529) Vital Signs (24h Range):  Temp:  [97.5 °F (36.4 °C)-99 °F (37.2 °C)] 97.8 °F (36.6 °C)  Pulse:  [] 77  Resp:  [16-20] 16  SpO2:  [91 %-96 %] 91 %  BP: (139-189)/(59-79) 139/59     Date 07/10/23 0700 - 07/11/23 0659   Shift 6140-3508 7387-4392 9874-8189 24 Hour Total   INTAKE   P.O. 610   610   Shift Total(mL/kg) 610(8)   610(8)   OUTPUT   Urine(mL/kg/hr) 550(0.9)   550   Shift Total(mL/kg) 550(7.2)   550(7.2)   Weight (kg) 76 76 76 76                            Urethral Catheter 07/08/23 1911 Non-latex 16 Fr. (Active)   Site Assessment Clean;Intact 07/10/23 0800   Collection Container Standard drainage bag 07/10/23 0800   Securement Method secured to top of thigh w/ adhesive device 07/10/23 0800   Catheter Care Performed yes 07/10/23 0800   Reason for Continuing Urinary Catheterization Urinary retention 07/10/23 0800   CAUTI Prevention Bundle Securement Device in place with 1" slack;Intact seal between catheter & drainage tubing;Drainage bag/urimeter off the floor;Sheeting clip in use;No dependent loops or kinks;Drainage bag/urimeter not overfilled (<2/3 full);Drainage bag/urimeter below bladder 07/10/23 0800   Output (mL) 300 mL 07/10/23 1230          Physical Exam         Neurosurgery Physical Exam    Vitals and nursing note reviewed.   HENT:      Head: Normocephalic. Atraumatic.  Eyes:      Extraocular Movements: Extraocular movements intact.      Pupils: Pupils are equal, round, and reactive to light.   Cardiovascular:      Rate and Rhythm: Normal rate.   Pulmonary:      Effort: Pulmonary effort is normal.   Abdominal:      Palpations: Abdomen is soft and non-tender. "   Neurological:      Mental Status: She is awake and alert.         E4V4M6  Aox2 (person, year; somewhat to place/situation with re-orienting)  Cni, PERRL  Dysarthric, no aphasia  Follows simple commands  Not fully cooperative due to AMS on motor/sensation exam but FC x4 AG  Unable to assess drift/dysmetria     Significant Labs:  Recent Labs   Lab 07/09/23  0554 07/10/23  0523    120*   * 142   K 3.0* 2.6*    107   CO2 26 27   BUN 17 15   CREATININE 0.8 0.7   CALCIUM 8.3* 8.3*   MG 1.5* 1.6     Recent Labs   Lab 07/09/23  0554 07/10/23  0523   WBC 11.09 8.09   HGB 8.4* 7.8*   HCT 27.2* 25.1*    253     No results for input(s): LABPT, INR, APTT in the last 48 hours.  Microbiology Results (last 7 days)       Procedure Component Value Units Date/Time    Urine culture [[id number]] Collected: 07/08/23 1844    Order Status: Completed Specimen: Urine Updated: 07/10/23 0347     Urine Culture, Routine Multiple organisms isolated. None in predominance.  Repeat if      clinically necessary.    Narrative:      Specimen Source->Urine    Blood Culture #1 **CANNOT BE ORDERED STAT** [[id number]] Collected: 07/08/23 1821    Order Status: Completed Specimen: Blood from Peripheral, Antecubital, Right Updated: 07/09/23 2012     Blood Culture, Routine No Growth to date      No Growth to date    Blood Culture #2 **CANNOT BE ORDERED STAT** [[id number]] Collected: 07/08/23 1821    Order Status: Completed Specimen: Blood from Peripheral, Forearm, Right Updated: 07/09/23 2012     Blood Culture, Routine No Growth to date      No Growth to date          All pertinent labs from the last 24 hours have been reviewed.    Significant Diagnostics:  I have reviewed and interpreted all pertinent imaging results/findings within the past 24 hours.    Assessment/Plan:     Hygroma  88F PMH HTN, dementia, PE/DVT on eliquis, presenting after fall w/o LOC and CTH demonstrating chronic subdural hygroma    Plan:  --admit , q4h  neurochecks  --all labs and significant diagnostics reviewed   --CTH 7/8: New chronic subdural hygromas when compared to CT scan 4/23   --CTH 7/9 4hr repeat: apparent overall increase in density of b/l subdural collections but stable in size/configuration   --CT C spine (fall, AMS) 7/9: negative for fractures    --CTA head/neck 7/10: stable bilateral subdural hygromas with decreased density compared to prior scan  --Lianna WNL  --Plan for MMA embolization outpatient for stable subdural hygromas, as well as f/u in clinic with CTH in 2 weeks, we will schedule. Discussed in detail with pt's son via phone and in agreement with plan.  --Okay to resume anti-coagulation for acute PE/DVTs given likely higher benefit rather than risk  --NSGY will sign off. Please contact us with any questions/concerns of acute decline in neurologic exam.    Discussed with attending staff Dr. Marcie Pimentel, PA-C  Neurosurgery  [facility] - Surgery

## 2023-07-10 NOTE — ASSESSMENT & PLAN NOTE
Noted on CT head  -neurosurgery consults has evaluated, no acute hemorrhage concern, A 4 hour interval repeat CT head showing more dense area with possible more acuity than previous  Discussed with NS dr barrett on 7/9 AM who reports hold anticoag for now given this but odd presentation on scans as not wider or larger just more denser looking, CTA obtained and awaiting NS recs further as they told sitter and patient on 7/10 they plan for embolization IP vs OP and deciding. Will need NS clearance to resume anticoag also. Mental status doing welll 7/10  -q4 neuro checks

## 2023-07-10 NOTE — PT/OT/SLP EVAL
"Speech Language Pathology Evaluation  Bedside Swallow    Patient Name:  Radha Sandoval   MRN:  [id number]  Admitting Diagnosis: Multiple closed fractures of pelvis without disruption of pelvic ring    Recommendations:                 General Recommendations:  Follow-up not indicated  Diet recommendations:  Minced & Moist Diet - IDDSI Level 5, Thin liquids - IDDSI Level 0 (upgrade to regular solids once caregiver acquires adhesive for denture placement)  Aspiration Precautions: 1 bite/sip at a time, Assistance with meals, Avoid talking while eating, Eliminate distractions, HOB to 90 degrees, Meds crushed in puree, Monitor for s/s of aspiration, Small bites/sips, and Strict aspiration precautions   General Precautions: Standard, fall  Communication strategies:  none    Assessment:     Radha Sandoval is a 88 y.o. female with an SLP diagnosis of oral Dysphagia.  She presents with likely baseline functional swallowing deficits, worsened by inability to place dentures 2/2 lack of denture adhesive present.     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 Extensive motion artifact significantly limits exam, no convincing large central " "pulmonary thromboembolism, distal embolism cannot be excluded on the basis of this exam.  Correlation is advised.  2. Bilateral pleural effusions, left greater than right with associated compressive atelectasis of the bilateral lower lobes.  There is suspected underlying interstitial edema.  Pulmonary nodule cannot be definitively excluded.  3. Please see above for several additional findings."    Prior diet: reg/thin per caregiver    Subjective     Pt awake/alert. Caregiver present. Pt agreeable to participate.    Pain/Comfort:  Pain Rating 1: 0/10    Respiratory Status: Room air    Objective:     Oral Musculature Evaluation  Oral Musculature: WFL  Dentition: upper dentures, uses dentures to eat  Secretion Management: adequate  Mucosal Quality: adequate  Oral Labial Strength and Mobility: WFL  Lingual Strength and Mobility: WFL  Volitional Cough: fair strength  Volitional Swallow: mildly delayed  Voice Prior to PO Intake: clear    Bedside Swallow Eval:   Consistencies Assessed:  Thin liquids - x2 cup sips diet coke, x5 straw sips diet coke  Puree - x1 tsp pudding  Soft solids - x3 bite portia cracker softened in puree       Oral Phase:   Decreased closure around utensil  Prolonged mastication    Pharyngeal Phase:   no overt clinical signs/symptoms of aspiration  no overt clinical signs/symptoms of pharyngeal dysphagia    Treatment: Pt utilizes upper dentures at baseline during mastication of regular solids, though upper dentures were not in place as the caregiver did not have adhesive. Dentures were placed without the use of polygrip, with the pt attempting to masticate softened solids and demonstrating poor oral coordination. SLP explained baseline swallow function and ability to upgrade to regular solids should denture adhesive be obtained and utilized for denture placement. Pt and caregiver expressed agreement.       Goals:   Multidisciplinary Problems       SLP Goals       Not on file                    Plan: "     Plan of Care reviewed with:  patient, caregiver   SLP Follow-Up:  No       Discharge recommendations: No further skilled SLP service warranted     Time Tracking:     SLP Treatment Date:   07/10/23  Speech Start Time:  0800  Speech Stop Time:  0821     Speech Total Time (min):  21 min    Billable Minutes: Eval Swallow and Oral Function 11 and Self Care/Home Management Training 10    07/10/2023       back, legs/yes

## 2023-07-10 NOTE — ASSESSMENT & PLAN NOTE
88F PMH HTN, dementia, PE/DVT on eliquis, presenting after fall w/o LOC and CTH demonstrating chronic subdural hygroma    Plan:  --admit HM, q4h neurochecks  --all labs and significant diagnostics reviewed   --CTH 7/8: New chronic subdural hygromas when compared to CT scan 4/23   --CTH 7/9 4hr repeat: apparent overall increase in density of b/l subdural collections but stable in size/configuration   --CT C spine (fall, AMS) 7/9: negative for fractures    --CTA head/neck 7/10: stable bilateral subdural hygromas with decreased density compared to prior scan  --Coags WNL  --Plan for MMA embolization outpatient for stable subdural hygromas, as well as f/u in clinic with CT in 2 weeks, we will schedule. Discussed in detail with pt's son via phone and in agreement with plan.  --Okay to resume anti-coagulation for acute PE/DVTs given likely higher benefit rather than risk  --NSGY will sign off. Please contact us with any questions/concerns of acute decline in neurologic exam.    Discussed with attending staff Dr. Jack

## 2023-07-10 NOTE — PLAN OF CARE
[facility] Gus[facility] - Surgery  Initial Discharge Assessment       Primary Care Provider: Primary Doctor No    Admission Diagnosis: Shortness of breath [R06.02]  Fall [W19.XXXA]  Hypoxia [R09.02]  Urinary tract infection without hematuria, site unspecified [N39.0]  Closed nondisplaced fracture of pelvis, unspecified part of pelvis, initial encounter [S32.9XXA]  Pneumonia due to infectious organism, unspecified laterality, unspecified part of lung [J18.9]  Congestive heart failure, unspecified HF chronicity, unspecified heart failure type [I50.9]    Admission Date: 7/8/2023  Expected Discharge Date: 7/12/2023    Transition of Care Barriers: None    Payor: MEDICARE / Plan: MEDICARE PART A & B / Product Type: Government /     Extended Emergency Contact Information  Primary Emergency Contact: tony griffith  Mobile Phone: [phone]  Relation: Son   needed? No  Secondary Emergency Contact: chel morrison  Mobile Phone: [phone]  Relation: Other   needed? No    Discharge Plan A: Skilled Nursing Facility  Discharge Plan B: Assisted Living    No Pharmacies Listed    Initial Assessment (most recent)       Adult Discharge Assessment - 07/10/23 1403          Discharge Assessment    Assessment Type Discharge Planning Assessment     Confirmed/corrected address, phone number and insurance Yes     Confirmed Demographics Correct on Facesheet     Source of Information other (see comments)   Caregiver- Chel Morrison    Communicated TOSHA with patient/caregiver Yes     Equipment Currently Used at Home rollator;wheelchair     Do you currently have service(s) that help you manage your care at home? No     Do you take prescription medications? Yes     Do you have prescription coverage? Yes     Do you have any problems affording any of your prescribed medications? No     Is the patient taking medications as prescribed? yes     How do you get to doctors appointments? family or friend will provide     Are you on dialysis? No      Do you take coumadin? No     Discharge Plan A Skilled Nursing Facility     Discharge Plan B Assisted Living     Discharge Plan discussed with: Caregiver     Transition of Care Barriers None                   Patient transferred from [facility] with plan to dc to [facility] per Caregiver- Chel Morrison

## 2023-07-10 NOTE — SUBJECTIVE & OBJECTIVE
"Interval history: she looks a lot better today in terms of her mental status, she is awake and oriented with sitter at bedside and able to say she is in the hospital and knows she fell and what she is here for. She reports that she doesn't remember yesterday too much and I updated her on yesterdays findings and that her sitter and her son helped me with her history upon admission since she was unable to fully participate given mental status on admission. K very low at 2.6 and replacing oral and IV today with repeat this afternoon to reassess. Replacing Mg again today also. She is a picky eater per sitter and so working on improving intake. Urine CX with multiple organisms but clinical UTI on admit UA so continue to traet for at least 3 days and then keep duncan in for at least 3-5 days for retention present on admission before voiding trial. Repeat US of LE pending to assess clot burden, holding anticoag until nS clears to resume, CTA done and f/u NS recs given this as plan for embolization at some point IP vs OP they said NS was decidding when they stopped by this morning to see her. Good urine output with lasix with improving edema in legs (has pain in LE andreina right on exam so may be from clots as edema improving so checking this burden), so will go to lasix daily as also woresning hypokalemia with being off oxygen now and wheezing improved with only slight expiratory crackle heard on exam now. Was on eliquis at [facility] and was on lasix recently as sitter was able to get [facility] of nursing who confirmed this for us.  She has brandi right shoulder pain and imaging showed OA on admit and she said "of course I have arthritis im 88 years old what do you expect" and was joking with the team about this. PT/OT worked with her and reported was hesitant at first to mobilize but did well considering all things.  Night MD dr flori velasquez had paperwork and discussed with her that suggested was a DNR. Myself and her sitter " asked her about this with her sitter asking her directly- if you were getting sick and couldn't breath or your heart stopped and you started to die would you want them to bring you back or put a feeding tube in or a breathing tube and she told us both yes I would want them to try and do what they could to save me. So she is riented and in her right state of mind today and per this, she would ilke to be a full code and will keep this in place now              Review of patient's allergies indicates:  No Known Allergies    No current facility-administered medications on file prior to encounter.     Current Outpatient Medications on File Prior to Encounter   Medication Sig    apixaban (ELIQUIS) 5 mg Tab Take 5 mg by mouth 2 (two) times daily.    ascorbic acid, vitamin C, (VITAMIN C) 250 MG tablet Take 250 mg by mouth once daily.    EScitalopram oxalate (LEXAPRO) 5 MG Tab Take 5 mg by mouth once daily.    furosemide (LASIX) 20 MG tablet Take 20 mg by mouth once daily.    haloperidoL (HALDOL) 2 MG tablet Take 1 tablet (2 mg total) by mouth every evening. (Patient taking differently: Take 0.5 mg by mouth every evening.)    lisinopriL (PRINIVIL,ZESTRIL) 20 MG tablet Take 20 mg by mouth once daily.    memantine (NAMENDA) 5 MG Tab Take 5 mg by mouth 2 (two) times daily.    metoprolol tartrate (LOPRESSOR) 25 MG tablet Take 0.5 tablets (12.5 mg total) by mouth 2 (two) times daily. (Patient taking differently: Take 25 mg by mouth 2 (two) times daily. HOLD IF SBP <100 OR HR <50)    potassium chloride (KLOR-CON) 10 MEQ TbSR Take 10 mEq by mouth once daily.    protein supplement (PROMOD PROTEIN) Liqd Take 30 mLs by mouth 2 (two) times a day.    tamsulosin (FLOMAX) 0.4 mg Cap Take by mouth once daily.    zinc gluconate 50 mg tablet Take 50 mg by mouth once daily.    miconazole NITRATE 2 % (MICOTIN) 2 % top powder Apply topically twice daily under bilateral breasts (Patient not taking: Reported on 6/23/2023)     Family History     None       Tobacco Use    Smoking status: Not on file    Smokeless tobacco: Not on file   Substance and Sexual Activity    Alcohol use: Not on file    Drug use: Not on file    Sexual activity: Not on file     Review of Systems   Unable to perform ROS: Mental status change   Neurological:  Positive for weakness.   Psychiatric/Behavioral:  Positive for confusion and hallucinations.    Objective:     Vital Signs (Most Recent):  Temp: 98.3 °F (36.8 °C) (07/10/23 1148)  Pulse: 69 (07/10/23 1148)  Resp: 16 (07/10/23 1148)  BP: (!) 142/76 (07/10/23 1148)  SpO2: (!) 92 % (07/10/23 1148) Vital Signs (24h Range):  Temp:  [97.5 °F (36.4 °C)-99.4 °F (37.4 °C)] 98.3 °F (36.8 °C)  Pulse:  [] 69  Resp:  [16-20] 16  SpO2:  [92 %-96 %] 92 %  BP: (142-189)/(65-79) 142/76     Weight: 76 kg (167 lb 8.8 oz)  Body mass index is 29.68 kg/m².     Physical Exam  Constitutional:       General: She is in acute distress.      Appearance: She is obese. She is ill-appearing.      Comments: In chair, much improved mental status. Sitter and nursing t bedside.   HENT:      Head: Normocephalic and atraumatic.      Mouth/Throat:      Pharynx: Oropharynx is clear.   Eyes:      General: No scleral icterus.     Pupils: Pupils are equal, round, and reactive to light.   Cardiovascular:      Rate and Rhythm: Normal rate and regular rhythm.      Heart sounds: No murmur heard.  Pulmonary:      Effort: Pulmonary effort is normal. No respiratory distress.      Comments: Wheezes resolved. Slight exp crackle. Off oxygen  Abdominal:      General: Bowel sounds are normal. There is no distension.      Palpations: Abdomen is soft.      Tenderness: There is no abdominal tenderness.   Musculoskeletal:      Right lower leg: Edema present.      Left lower leg: Edema present.      Comments: Pain when you touch her legs, R>L. Edema improving. Scds on   Skin:     General: Skin is warm and dry.      Coloration: Skin is pale.   Neurological:      GCS: GCS eye  subscore is 4. GCS verbal subscore is 4. GCS motor subscore is 5.      Motor: Weakness present.      Comments: Awake and oriented and  near baseline. Able to converse and answer questinos well. No neuro exam deficits seen, gait not assessed as sitting in chair.            CRANIAL NERVES     CN III, IV, VI   Pupils are equal, round, and reactive to light.     Significant Labs: All pertinent labs within the past 24 hours have been reviewed.  ABGs:   Recent Labs   Lab 07/08/23  1555   PH 7.338*   PCO2 52.3*   HCO3 28.1*   POCSATURATED 44*   BE 2   PO2 26*       Blood Culture:   Recent Labs   Lab 07/08/23  1821   LABBLOO No Growth to date  No Growth to date  No Growth to date  No Growth to date       CBC:   Recent Labs   Lab 07/08/23  1549 07/09/23  0554 07/10/23  0523   WBC 12.81* 11.09 8.09   HGB 9.0* 8.4* 7.8*   HCT 29.5* 27.2* 25.1*    291 253       CMP:   Recent Labs   Lab 07/08/23  1549 07/09/23  0554 07/10/23  0523    146* 142   K 4.5 3.0* 2.6*   * 110 107   CO2 23 26 27   * 106 120*   BUN 17 17 15   CREATININE 0.8 0.8 0.7   CALCIUM 8.4* 8.3* 8.3*   PROT 6.2  --   --    ALBUMIN 2.7* 2.5* 2.2*   BILITOT 0.4  --   --    ALKPHOS 77  --   --    AST 28  --   --    ALT 10  --   --    ANIONGAP 10 10 8       Cardiac Markers:   Recent Labs   Lab 07/08/23  1549   *       Coagulation:   Recent Labs   Lab 07/08/23  1549   INR 1.1       Lactic Acid:   Recent Labs   Lab 07/08/23  1821   LACTATE 1.2       Magnesium:   Recent Labs   Lab 07/08/23  1549 07/09/23  0554 07/10/23  0523   MG 1.7 1.5* 1.6       Troponin:   Recent Labs   Lab 07/08/23  1549   TROPONINI 0.031*       TSH: No results for input(s): TSH in the last 4320 hours.  Urine Culture:   Recent Labs   Lab 07/08/23  1844   LABURIN Multiple organisms isolated. None in predominance.  Repeat if  clinically necessary.     Urine Studies:   Recent Labs   Lab 07/08/23 1844   COLORU Yellow   APPEARANCEUA Hazy*   PHUR 6.0   SPECGRAV 1.015    PROTEINUA Negative   GLUCUA Negative   KETONESU Negative   BILIRUBINUA Negative   OCCULTUA Negative   NITRITE Negative   LEUKOCYTESUR 3+*   RBCUA 4   WBCUA >100*   BACTERIA Many*   SQUAMEPITHEL 2   HYALINECASTS 13*         Significant Imaging: I have reviewed all pertinent imaging results/findings within the past 24 hours.  EXAMINATION:  CT HEAD WITHOUT CONTRAST     CLINICAL HISTORY:  Head trauma, minor (Age >= 65y);     TECHNIQUE:  Low dose axial images were obtained through the head.  Coronal and sagittal reformations were also performed. Contrast was not administered.     COMPARISON:  CT 04/18/2023     FINDINGS:  There is motion artifact.     There is generalized cerebral volume loss.  There is hypoattenuation in a periventricular fashion, likely sequela of chronic microvascular ischemic change. There are a few punctate foci of low attenuation involving the left basal ganglia, suggesting sequela of remote infarct, stable. There is no evidence of acute major vascular territory infarct, hemorrhage, or mass.  There is no hydrocephalus.  There are prominent low attenuating collections overlying the convexities bilaterally, left greater than right, new since the previous exam.  The paranasal sinuses and mastoid air cells are clear, and there is no evidence of calvarial fracture.  The visualized soft tissues are unremarkable.     Impression:     1. Allowing for extensive motion artifact, there are low attenuating subdural collections overlying the convexities bilaterally, left greater than right.  This is new since the previous exam.  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  No hydrocephalus.  2. Patchy low attenuation within the region of the left basal ganglia, suggesting sequela of remote infarct.        Electronically signed by: William Coelho MD  Date:                                             07/08/2023  Time:                                           18:34CTA CHEST NON CORONARY (PE STUDIES)     CLINICAL HISTORY:  Pulmonary embolism (PE) suspected, high prob;     TECHNIQUE:  Low dose axial images, sagittal and coronal reformations were obtained from the thoracic inlet to the lung bases following the IV administration of 100 mL of Omnipaque 350.  Contrast timing was optimized to evaluate the pulmonary arteries.  MIP images were performed.     COMPARISON:  CT 05/22/2023     FINDINGS:  There is extensive motion artifact.     Allowing for the above, the structures at the base of the neck are grossly unremarkable.  The heart is not enlarged, no significant pericardial effusion.  The thoracic aorta tapers normally noting atherosclerotic plaque and calcification along its course and in the distribution of the coronary arteries.  The visualized portions of the spleen and pancreas are grossly unremarkable.  The visualized portions of the adrenal glands are unremarkable.  The visualized right kidney and gallbladder are unremarkable.  Low attenuating lesions are noted within the hepatic parenchyma, several of which are too small for characterization, largest within the anterior aspect of the left hepatic lobe measures 2.5 cm with attenuation suggesting cyst.  There is a partially visualized cyst within the left upper quadrant, better evaluated 05/06/2023, arising from the left kidney.  There is a hiatal hernia.     Allowing for extensive motion artifact, the central airways are patent, the distal airways are suboptimally evaluated.  Likewise, there is suboptimal evaluation of the pulmonary parenchyma.  There is suspicion for scattered interlobular septal thickening, may reflect edema.  There is fluid along the fissure on the right.  There is bilateral basilar dependent atelectasis.  There is a trace right pleural effusion with associated compressive atelectasis of the right lower lobe.  There is a mild left pleural  effusion with associated compressive atelectasis of the left lower lobe.  There is a calcified granuloma within the right lower lobe.     Bolus timing is adequate for the evaluation of pulmonary thromboembolism however extensive motion artifact renders the majority of the exam nondiagnostic for evaluation of the same.  No large central pulmonary thromboembolism, distal embolism cannot be excluded on the basis of this exam.     There is osteopenia.  There are degenerative changes of the spine.  No significant axillary lymphadenopathy.  There is body wall anasarca.     Impression:     1. Extensive motion artifact significantly limits exam, no convincing large central pulmonary thromboembolism, distal embolism cannot be excluded on the basis of this exam.  Correlation is advised.  2. Bilateral pleural effusions, left greater than right with associated compressive atelectasis of the bilateral lower lobes.  There is suspected underlying interstitial edema.  Pulmonary nodule cannot be definitively excluded.  3. Please see above for several additional findings.        Electronically signed by: William Coelho MD  Date:                                            07/08/2023  Time:                                           18:25    CT PELVIS WITHOUT CONTRAST; CT 3D RECON WITH INDEPENDENT WS     CLINICAL HISTORY:  Pelvic fracture;2mm cuts with 3d recons;; fall;     TECHNIQUE:  Axial images of the pelvis were obtained at 1.25 mm intervals without administration of IV contrast.  Coronal and sagittal reformatted images were reviewed.  3D reconstructed images were created on a separate workstation and reviewed.     COMPARISON:  CT 05/06/2023     FINDINGS:  There is motion artifact.     There is osteopenia.  There is subtle nondisplaced fracture of the superior pubic ramus on the right at the level of the symphysis.  There is displaced fracture involving the inferior pubic ramus on the right.  The bilateral femoroacetabular joints  are intact.  There is a suspected bone island within the posterior aspect of the left iliac bone.  There are degenerative changes of the lumbar spine.  The sacrum is intact.  The sacroiliac joints are intact noting degenerative changes.  There is grade 1 anterolisthesis of L4 on L5.  The sacral segments appear aligned on sagittal reformatted images.     The visualized portions of the bowel are grossly unremarkable.  The appendix is unremarkable.  There is atherosclerotic calcification of the aorta and its branches.  The uterus and adnexa are unremarkable.  The urinary bladder is distended noting posteriorly projecting diverticulum versus urethral diverticulum.  Correlation with any history of urinary retention or outlet obstruction.  There is a partially visualized cyst arising from the left kidney.  There is body wall anasarca.  There is edema about the fracture sites.     Impression:     This report was flagged in Epic as abnormal.     1. Acute appearing fractures involving the right superior and inferior pubic rami as described.  2. Please see above for several additional findings.        Electronically signed by: William Coelho MD  Date:                                            07/08/2023  Time:                                           18:42

## 2023-07-10 NOTE — ASSESSMENT & PLAN NOTE
Speech recs soft diet/minced diet and reports can updgrade once receive her denture bond per recs 7/10

## 2023-07-10 NOTE — PT/OT/SLP EVAL
Occupational Therapy   Co-Evaluation/Tx    Name: Radha Sandoval  MRN: [id number]  Admitting Diagnosis: Multiple closed fractures of pelvis without disruption of pelvic ring  Recent Surgery: * No surgery found *      Recommendations:     Discharge Recommendations: nursing facility, skilled  Discharge Equipment Recommendations:  to be determined by next level of care  Barriers to discharge:  None    Assessment:     Radha Sandoval is a 88 y.o. female with a medical diagnosis of Multiple closed fractures of pelvis without disruption of pelvic ring. Performance deficits affecting function: weakness, impaired endurance, impaired self care skills, impaired functional mobility, gait instability, pain, decreased safety awareness, decreased coordination, orthopedic precautions.    Since previous admission, Pt has made great progress with functional mobility at SNF. Recommending SNF once medically appropriate for discharge to increase maximal independence, reduce burden of care, and ensure safety.       Rehab Prognosis: Good; patient would benefit from acute skilled OT services to address these deficits and reach maximum level of function.       Plan:     Patient to be seen 3 x/week to address the above listed problems via self-care/home management, neuromuscular re-education, therapeutic activities  Plan of Care Expires: 08/09/23  Plan of Care Reviewed with: patient, caregiver    Subjective     Chief Complaint: pain with movement  Patient/Family Comments/goals: to get better    Occupational Profile:  Living Environment: Lives on a 2nd floor of an [facility]  Previous level of function: PTA at SNF pt. Was completing functional mobility of ~ 60-90ft with a RW and required assistance with ADLs  Equipment Used at Home: grab bar, rollator, shower chair  Assistance upon Discharge: Caregiver    Pain/Comfort:  Pain Rating 1:  (did not rate)  Location - Side 1: Right  Location - Orientation 1: generalized  Location 1: leg  Pain Addressed 1:  Reposition, Distraction  Pain Rating Post-Intervention 1:  (did not rate)    Patients cultural, spiritual, Taoist conflicts given the current situation: no    Objective:     Communicated with: RN prior to session.  Patient found HOB elevated with duncan catheter, peripheral IV, SCD upon OT entry to room.    General Precautions: Standard, fall  Orthopedic Precautions:  (BLE WBAT)LLE weight bearing as tolerated, RLE weight bearing as tolerated  Braces: N/A  Respiratory Status: Room air    Occupational Performance:    Bed Mobility:    Patient completed Rolling/Turning to Left with  total assistance  Patient completed Rolling/Turning to Right with total assistance  Patient completed Supine to Sit with total assistance    Functional Mobility/Transfers:  Patient completed Sit <> Stand Transfer with moderate assistance  with  rolling walker   Patient completed Bed <> Chair Transfer using Step Transfer technique with moderate assistance and maximal assistance with rolling walker  Functional Mobility: Pt demonstrated functional mobility training to simulate household ~5ft from bed to chair with RW with moderate assistance regressed to maximum assistance, with NBOS but no noted LOB     Activities of Daily Living:  Toileting: total assistance pericare at bed level    Cognitive/Visual Perceptual:  Cognitive/Psychosocial Skills:     -       Oriented to: Person, Place, Time, Situation, and noted confusion    -       Follows Commands/attention:Follows one-step commands  -       Communication: clear/fluent  -       Memory: Impaired STM  -       Safety awareness/insight to disability: impaired   -       Mood/Affect/Coping skills/emotional control: Appropriate to situation    Physical Exam:  Balance:    Static Sitting:  SBA  Dynamic Sitting: SBA  Static Standing: Mod A- Max A  Upper Extremity Range of Motion:     -       Right Upper Extremity: WFL  -       Left Upper Extremity: WFL  Upper Extremity Strength:    -       Right Upper  Extremity: WFL  -       Left Upper Extremity: WFL   Strength:    -       Right Upper Extremity: WFL  -       Left Upper Extremity: WFL    AMPAC 6 Click ADL:  AMPAC Total Score: 14    Treatment & Education:  BUE MMT/ROM  Pt educated on role of occupational therapy, POC, and safety during ADLs and functional mobility. Pt and OT discussed importance of safe, continued mobility to optimize daily living skills. Pt verbalized understanding. Pt given instruction to call for medical staff/nurse for assistance.   Co-treatment with PT for maximal pt participation, safety, and activity tolerance   PT present for coeval due to pt's multiple medical comorbidities and functional/cognition deficits requiring two skilled therapists to appropriately progress pt's musculoskeletal strength, neuromuscular control, and endurance while taking into consideration medical acuity and pt safety.         Patient left HOB elevated with all lines intact, call button in reach, RN notified, and Caregiver present    GOALS:   Multidisciplinary Problems       Occupational Therapy Goals          Problem: Occupational Therapy    Goal Priority Disciplines Outcome Interventions   Occupational Therapy Goal     OT, PT/OT Ongoing, Progressing    Description: Goals to be met by: 7/10/23     Patient will increase functional independence with ADLs by performing:    UE Dressing with Minimal Assistance.  LE Dressing with Minimal Assistance.  Grooming while standing with Minimal Assistance.  Toileting from bedside commode with Stand-by Assistance for hygiene and clothing management.   Toilet transfer to bedside commode with Stand-by Assistance.                         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 OR [address] [place] [address];  Service: General;  Laterality: Right;  possible laparotomy, possible bowel resection       Time Tracking:     OT Date of Treatment: 07/10/23  OT Start Time: 0846  OT Stop Time: 0916  OT Total Time (min): 30 min    Billable Minutes:Evaluation 10  Therapeutic Activity 20    7/10/2023

## 2023-07-10 NOTE — ASSESSMENT & PLAN NOTE
Noted to have inability to urinate in ED and urinary retention with elevated bladder scan. 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 Will refer to uro on dc for long term work up   -duncan catheter placed- would keep at least 3-4 days bfeore voiding trial.  -urine gram stain sent, blood cultures sent   Urine CX multiple organisms so will keep on rocephin for 3 days  -continue empiric Ceftriaxone for now

## 2023-07-10 NOTE — PROGRESS NOTES
[facility]  Progress Note    Patient Name: Radha Sandoval  MRN: [id number]  Patient Class: IP- Inpatient   Admission Date: 7/8/2023  Length of Stay: 2 days  Attending Physician: Camryn Fink MD  Primary Care Provider: Primary Doctor No        Subjective:     Principal Problem:Multiple closed fractures of pelvis without disruption of pelvic ring        HPI:  89 y/o WF hx of PE/DVT, bilateral on anticoagulation, Dementia, Hypertension, Debility presents from skilled nursing facility with unwitnessed fall.     She had recent admission to [facility] from 05/22-05/29, admitted for acute hypoxemic respiratory failure and was found with large Right sided PE, extensive RLE DVT and LLE DVT.  She had ECHO and cardiology evaluation without findings of right heart strain.  She was on heparin and then eliquis and discharge orders for coumadin.  Currently at SNF patient receiving Apixaban.   Noted to have Delirium and patient was treated with scheduled Haldol, report that seroquel worsened pts mental status and discontinued. She had urinary retention and duncan catheter placed during admission and patient discharged with indwelling catheter, with plans for voiding trial 6/5.     Today history per ED note and hired caretaker - Chel Bui who is with patient at bedside.  Chel states she presented to SNF to visit patient at approx 10am and found patient on the floor multiple feet from her bed and b/w bathroom.   Patient told her she was trying to ambulate. Caretaker noted patient has been wheezing this week    Since arrival to ED found with recurrent hypoxia and concern for pulmonary edema, persistent pleural effusions noted on CT, recurrent urinary retention s/p duncan catheter placement.  Plain film and CT imaging demonstrated Pubic fracture in 2 places Superior/inferior ramus. UA consistent with recurrent cystitis.     CT head with questionable new subdural fluid collections, acute bleeding not suspected  - Neurosurgery consulted eval pending.     She's received 40mg IV lasix, Vancomycin and Zosyn and referred for admission.     Chel is a hired caretaker, since patient is at SNF she visits few hours per day per patient preference.  She has noted in recent months/weeks patient with cognitive decline, frequent disorientation, hallucinations, repeats persecutory delusion about 3 men locking her in a cabin, pt repeats this toe me at bedside.   Patient oriented to self/birthdate, intermittently to hospital.  C/o of severe left foot pain when blankets removed from her, she is frustrated upset at being asked to repeat details of the fall.  Unclear if she understands that she suffered broken pelvis despite describing to her at bedside.     Extended Emergency Contact Information  Primary Emergency Contact: tony griffith  Mobile Phone: [phone]  Relation: Son   needed? No  Secondary Emergency Contact: chel ramirez  Mobile Phone: [phone]  Relation: Other   needed? No            Overview/Hospital Course:  No notes on file    Interval history: she looks a lot better today in terms of her mental status, she is awake and oriented with sitter at bedside and able to say she is in the hospital and knows she fell and what she is here for. She reports that she doesn't remember yesterday too much and I updated her on yesterdays findings and that her sitter and her son helped me with her history upon admission since she was unable to fully participate given mental status on admission. K very low at 2.6 and replacing oral and IV today with repeat this afternoon to reassess. Replacing Mg again today also. She is a picky eater per sitter and so working on improving intake. Urine CX with multiple organisms but clinical UTI on admit UA so continue to traet for at least 3 days and then keep duncan in for at least 3-5 days for retention present on admission before voiding trial. Repeat US of LE pending to assess  "clot burden, holding anticoag until nS clears to resume, CTA done and f/u NS recs given this as plan for embolization at some point IP vs OP they said NS was decidding when they stopped by this morning to see her. Good urine output with lasix with improving edema in legs (has pain in LE andreina right on exam so may be from clots as edema improving so checking this burden), so will go to lasix daily as also woresning hypokalemia with being off oxygen now and wheezing improved with only slight expiratory crackle heard on exam now. Was on eliquis at [facility] and was on lasix recently as sitter was able to get [facility] of nursing who confirmed this for us.  She has brandi right shoulder pain and imaging showed OA on admit and she said "of course I have arthritis im 88 years old what do you expect" and was joking with the team about this. PT/OT worked with her and reported was hesitant at first to mobilize but did well considering all things.  Night MD dr ruby reorted had paperwork and discussed with her that suggested was a DNR. Myself and her sitter asked her about this with her sitter asking her directly- if you were getting sick and couldn't breath or your heart stopped and you started to die would you want them to bring you back or put a feeding tube in or a breathing tube and she told us both yes I would want them to try and do what they could to save me. So she is riented and in her right state of mind today and per this, she would ilke to be a full code and will keep this in place now              Review of patient's allergies indicates:  No Known Allergies    No current facility-administered medications on file prior to encounter.     Current Outpatient Medications on File Prior to Encounter   Medication Sig    apixaban (ELIQUIS) 5 mg Tab Take 5 mg by mouth 2 (two) times daily.    ascorbic acid, vitamin C, (VITAMIN C) 250 MG tablet Take 250 mg by mouth once daily.    EScitalopram oxalate (LEXAPRO) 5 MG Tab " Take 5 mg by mouth once daily.    furosemide (LASIX) 20 MG tablet Take 20 mg by mouth once daily.    haloperidoL (HALDOL) 2 MG tablet Take 1 tablet (2 mg total) by mouth every evening. (Patient taking differently: Take 0.5 mg by mouth every evening.)    lisinopriL (PRINIVIL,ZESTRIL) 20 MG tablet Take 20 mg by mouth once daily.    memantine (NAMENDA) 5 MG Tab Take 5 mg by mouth 2 (two) times daily.    metoprolol tartrate (LOPRESSOR) 25 MG tablet Take 0.5 tablets (12.5 mg total) by mouth 2 (two) times daily. (Patient taking differently: Take 25 mg by mouth 2 (two) times daily. HOLD IF SBP <100 OR HR <50)    potassium chloride (KLOR-CON) 10 MEQ TbSR Take 10 mEq by mouth once daily.    protein supplement (PROMOD PROTEIN) Liqd Take 30 mLs by mouth 2 (two) times a day.    tamsulosin (FLOMAX) 0.4 mg Cap Take by mouth once daily.    zinc gluconate 50 mg tablet Take 50 mg by mouth once daily.    miconazole NITRATE 2 % (MICOTIN) 2 % top powder Apply topically twice daily under bilateral breasts (Patient not taking: Reported on 6/23/2023)     Family History    None       Tobacco Use    Smoking status: Not on file    Smokeless tobacco: Not on file   Substance and Sexual Activity    Alcohol use: Not on file    Drug use: Not on file    Sexual activity: Not on file     Review of Systems   Unable to perform ROS: Mental status change   Neurological:  Positive for weakness.   Psychiatric/Behavioral:  Positive for confusion and hallucinations.    Objective:     Vital Signs (Most Recent):  Temp: 98.3 °F (36.8 °C) (07/10/23 1148)  Pulse: 69 (07/10/23 1148)  Resp: 16 (07/10/23 1148)  BP: (!) 142/76 (07/10/23 1148)  SpO2: (!) 92 % (07/10/23 1148) Vital Signs (24h Range):  Temp:  [97.5 °F (36.4 °C)-99.4 °F (37.4 °C)] 98.3 °F (36.8 °C)  Pulse:  [] 69  Resp:  [16-20] 16  SpO2:  [92 %-96 %] 92 %  BP: (142-189)/(65-79) 142/76     Weight: 76 kg (167 lb 8.8 oz)  Body mass index is 29.68 kg/m².     Physical  Exam  Constitutional:       General: She is in acute distress.      Appearance: She is obese. She is ill-appearing.      Comments: In chair, much improved mental status. Sitter and nursing t bedside.   HENT:      Head: Normocephalic and atraumatic.      Mouth/Throat:      Pharynx: Oropharynx is clear.   Eyes:      General: No scleral icterus.     Pupils: Pupils are equal, round, and reactive to light.   Cardiovascular:      Rate and Rhythm: Normal rate and regular rhythm.      Heart sounds: No murmur heard.  Pulmonary:      Effort: Pulmonary effort is normal. No respiratory distress.      Comments: Wheezes resolved. Slight exp crackle. Off oxygen  Abdominal:      General: Bowel sounds are normal. There is no distension.      Palpations: Abdomen is soft.      Tenderness: There is no abdominal tenderness.   Musculoskeletal:      Right lower leg: Edema present.      Left lower leg: Edema present.      Comments: Pain when you touch her legs, R>L. Edema improving. Scds on   Skin:     General: Skin is warm and dry.      Coloration: Skin is pale.   Neurological:      GCS: GCS eye subscore is 4. GCS verbal subscore is 4. GCS motor subscore is 5.      Motor: Weakness present.      Comments: Awake and oriented and  near baseline. Able to converse and answer questinos well. No neuro exam deficits seen, gait not assessed as sitting in chair.            CRANIAL NERVES     CN III, IV, VI   Pupils are equal, round, and reactive to light.     Significant Labs: All pertinent labs within the past 24 hours have been reviewed.  ABGs:   Recent Labs   Lab 07/08/23  1555   PH 7.338*   PCO2 52.3*   HCO3 28.1*   POCSATURATED 44*   BE 2   PO2 26*       Blood Culture:   Recent Labs   Lab 07/08/23  1821   LABBLOO No Growth to date  No Growth to date  No Growth to date  No Growth to date       CBC:   Recent Labs   Lab 07/08/23  1549 07/09/23  0554 07/10/23  0523   WBC 12.81* 11.09 8.09   HGB 9.0* 8.4* 7.8*   HCT 29.5* 27.2* 25.1*     291 253       CMP:   Recent Labs   Lab 07/08/23  1549 07/09/23  0554 07/10/23  0523    146* 142   K 4.5 3.0* 2.6*   * 110 107   CO2 23 26 27   * 106 120*   BUN 17 17 15   CREATININE 0.8 0.8 0.7   CALCIUM 8.4* 8.3* 8.3*   PROT 6.2  --   --    ALBUMIN 2.7* 2.5* 2.2*   BILITOT 0.4  --   --    ALKPHOS 77  --   --    AST 28  --   --    ALT 10  --   --    ANIONGAP 10 10 8       Cardiac Markers:   Recent Labs   Lab 07/08/23  1549   *       Coagulation:   Recent Labs   Lab 07/08/23  1549   INR 1.1       Lactic Acid:   Recent Labs   Lab 07/08/23  1821   LACTATE 1.2       Magnesium:   Recent Labs   Lab 07/08/23  1549 07/09/23  0554 07/10/23  0523   MG 1.7 1.5* 1.6       Troponin:   Recent Labs   Lab 07/08/23  1549   TROPONINI 0.031*       TSH: No results for input(s): TSH in the last 4320 hours.  Urine Culture:   Recent Labs   Lab 07/08/23  1844   LABURIN Multiple organisms isolated. None in predominance.  Repeat if  clinically necessary.     Urine Studies:   Recent Labs   Lab 07/08/23  1844   COLORU Yellow   APPEARANCEUA Hazy*   PHUR 6.0   SPECGRAV 1.015   PROTEINUA Negative   GLUCUA Negative   KETONESU Negative   BILIRUBINUA Negative   OCCULTUA Negative   NITRITE Negative   LEUKOCYTESUR 3+*   RBCUA 4   WBCUA >100*   BACTERIA Many*   SQUAMEPITHEL 2   HYALINECASTS 13*         Significant Imaging: I have reviewed all pertinent imaging results/findings within the past 24 hours.  EXAMINATION:  CT HEAD WITHOUT CONTRAST     CLINICAL HISTORY:  Head trauma, minor (Age >= 65y);     TECHNIQUE:  Low dose axial images were obtained through the head.  Coronal and sagittal reformations were also performed. Contrast was not administered.     COMPARISON:  CT 04/18/2023     FINDINGS:  There is motion artifact.     There is generalized cerebral volume loss.  There is hypoattenuation in a periventricular fashion, likely sequela of chronic microvascular ischemic change. There are a few punctate foci of low attenuation  involving the left basal ganglia, suggesting sequela of remote infarct, stable. There is no evidence of acute major vascular territory infarct, hemorrhage, or mass.  There is no hydrocephalus.  There are prominent low attenuating collections overlying the convexities bilaterally, left greater than right, new since the previous exam.  The paranasal sinuses and mastoid air cells are clear, and there is no evidence of calvarial fracture.  The visualized soft tissues are unremarkable.     Impression:     1. Allowing for extensive motion artifact, there are low attenuating subdural collections overlying the convexities bilaterally, left greater than right.  This is new since the previous exam.  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  No hydrocephalus.  2. Patchy low attenuation within the region of the left basal ganglia, suggesting sequela of remote infarct.        Electronically signed by: William Coelho MD  Date:                                            07/08/2023  Time:                                           18:34CTA CHEST NON CORONARY (PE STUDIES)     CLINICAL HISTORY:  Pulmonary embolism (PE) suspected, high prob;     TECHNIQUE:  Low dose axial images, sagittal and coronal reformations were obtained from the thoracic inlet to the lung bases following the IV administration of 100 mL of Omnipaque 350.  Contrast timing was optimized to evaluate the pulmonary arteries.  MIP images were performed.     COMPARISON:  CT 05/22/2023     FINDINGS:  There is extensive motion artifact.     Allowing for the above, the structures at the base of the neck are grossly unremarkable.  The heart is not enlarged, no significant pericardial effusion.  The thoracic aorta tapers normally noting atherosclerotic plaque and calcification along its course and in the distribution of the coronary arteries.  The visualized portions of  the spleen and pancreas are grossly unremarkable.  The visualized portions of the adrenal glands are unremarkable.  The visualized right kidney and gallbladder are unremarkable.  Low attenuating lesions are noted within the hepatic parenchyma, several of which are too small for characterization, largest within the anterior aspect of the left hepatic lobe measures 2.5 cm with attenuation suggesting cyst.  There is a partially visualized cyst within the left upper quadrant, better evaluated 05/06/2023, arising from the left kidney.  There is a hiatal hernia.     Allowing for extensive motion artifact, the central airways are patent, the distal airways are suboptimally evaluated.  Likewise, there is suboptimal evaluation of the pulmonary parenchyma.  There is suspicion for scattered interlobular septal thickening, may reflect edema.  There is fluid along the fissure on the right.  There is bilateral basilar dependent atelectasis.  There is a trace right pleural effusion with associated compressive atelectasis of the right lower lobe.  There is a mild left pleural effusion with associated compressive atelectasis of the left lower lobe.  There is a calcified granuloma within the right lower lobe.     Bolus timing is adequate for the evaluation of pulmonary thromboembolism however extensive motion artifact renders the majority of the exam nondiagnostic for evaluation of the same.  No large central pulmonary thromboembolism, distal embolism cannot be excluded on the basis of this exam.     There is osteopenia.  There are degenerative changes of the spine.  No significant axillary lymphadenopathy.  There is body wall anasarca.     Impression:     1. Extensive motion artifact significantly limits exam, no convincing large central pulmonary thromboembolism, distal embolism cannot be excluded on the basis of this exam.  Correlation is advised.  2. Bilateral pleural effusions, left greater than right with associated compressive  atelectasis of the bilateral lower lobes.  There is suspected underlying interstitial edema.  Pulmonary nodule cannot be definitively excluded.  3. Please see above for several additional findings.        Electronically signed by: William Coelho MD  Date:                                            07/08/2023  Time:                                           18:25    CT PELVIS WITHOUT CONTRAST; CT 3D RECON WITH INDEPENDENT WS     CLINICAL HISTORY:  Pelvic fracture;2mm cuts with 3d recons;; fall;     TECHNIQUE:  Axial images of the pelvis were obtained at 1.25 mm intervals without administration of IV contrast.  Coronal and sagittal reformatted images were reviewed.  3D reconstructed images were created on a separate workstation and reviewed.     COMPARISON:  CT 05/06/2023     FINDINGS:  There is motion artifact.     There is osteopenia.  There is subtle nondisplaced fracture of the superior pubic ramus on the right at the level of the symphysis.  There is displaced fracture involving the inferior pubic ramus on the right.  The bilateral femoroacetabular joints are intact.  There is a suspected bone island within the posterior aspect of the left iliac bone.  There are degenerative changes of the lumbar spine.  The sacrum is intact.  The sacroiliac joints are intact noting degenerative changes.  There is grade 1 anterolisthesis of L4 on L5.  The sacral segments appear aligned on sagittal reformatted images.     The visualized portions of the bowel are grossly unremarkable.  The appendix is unremarkable.  There is atherosclerotic calcification of the aorta and its branches.  The uterus and adnexa are unremarkable.  The urinary bladder is distended noting posteriorly projecting diverticulum versus urethral diverticulum.  Correlation with any history of urinary retention or outlet obstruction.  There is a partially visualized cyst arising from the left kidney.  There is body wall anasarca.  There is edema about the  fracture sites.     Impression:     This report was flagged in Epic as abnormal.     1. Acute appearing fractures involving the right superior and inferior pubic rami as described.  2. Please see above for several additional findings.        Electronically signed by: William Coelho MD  Date:                                            07/08/2023  Time:                                           18:42        Assessment/Plan:      Dysphagia  Speech recs soft diet/minced diet and reports can updgrade once receive her denture bond per recs 7/10      Goals of care, counseling/discussion  Advance Care Planning       Patient able to voice her wishes on 7/10 as back at her mental status baseline and her sitter and I wre able to ask her specifically what those were with her sitter asking her if she were to become incapacitated and if she were to be near death from not breathing or heart stopping and need cpr or berathing or feeding tube would she want this and she stated to both of us that she would want us to try to do what we could to save her and would want to be full code and will keep this in her chart.    Acute metabolic encephalopathy  -likely secondary to UTI as well as dilaudid given on admission as has had recent hallucinations prior to admit suspect was likely UTI then and then worsened mental status on 7/9 in AM lkely from combination of poor sleep on admit + pain meds worsening this, CT head showing possible change in hygroma and concern for this with mental status and monitoring closely with q 4 neuro checks but now improving mental status in PM so seems less likely from this and more likely from former  -close monitoring  Much improved 7/10      Hypokalemia  Replace PRN  Very low again 7/10 and replacing iv and PO and recheck this pm      Hypomagnesemia  Replace PRN      Acute deep vein thrombosis (DVT) of femoral vein of right lower extremity    Seen on US may 2023, on anticoag prior to admit, held now for CT  head trending  -repeat now to assess clot burden pending still, as has LE pain on R more than left and could be from clot burden still and ensure not becoing chronic     Hygroma  Noted on CT head  -neurosurgery consults has evaluated, no acute hemorrhage concern, A 4 hour interval repeat CT head showing more dense area with possible more acuity than previous  Discussed with NS dr barrett on 7/9 AM who reports hold anticoag for now given this but odd presentation on scans as not wider or larger just more denser looking, CTA obtained and awaiting NS recs further as they told sitter and patient on 7/10 they plan for embolization IP vs OP and deciding. Will need NS clearance to resume anticoag also. Mental status doing welll 7/10  -q4 neuro checks      Fracture of pubis  Secondary to fall today at Skilled nursing facility   -Orthopedic surgery consulted and non op fx with WBAT, PT/OT held on 7/9 due to mental status which has improved so will consult for 7/10  -duncan catheter in place.   -pain meds with tylenol and robaxin, avoid stronger meds for now as had worsening mental status after dilaudid x 1. If worsens can consdier possible tramadol  Did well with PT/OT on 7/10 and pain doing well so far per therapy at bedside during exam    Acute diastolic congestive heart failure  Patient is identified as having Diastolic (HFpEF) heart failure that is Acute. CHF is currently uncontrolled due to Continued edema of extremities and JVD and Pulmonary edema/pleural effusion on CXR. Latest ECHO performed and demonstrates- Results for orders placed during the hospital encounter of 05/22/23    Echo    Interpretation Summary  · The estimated ejection fraction is 65%.  · The left ventricle is normal in size with concentric hypertrophy and normal systolic function.  · Grade I left ventricular diastolic dysfunction.  · Normal right ventricular size with normal right ventricular systolic function.  · Mild left atrial enlargement.  ·  There is mild aortic valve stenosis.  · Aortic valve area is 1.47 cm2; peak velocity is 2.9 m/s; mean gradient is 15 mmHg.  · There is pulmonary hypertension.  · The estimated PA systolic pressure is 54 mmHg.  · Intermediate central venous pressure (8 mmHg).  . Continue Beta Blocker, ACE/ARB and Furosemide and monitor clinical status closely. Monitor on telemetry. Patient is off CHF pathway.  Monitor strict Is&Os and daily weights.  Place on fluid restriction of 1.5 L. Cardiology has not been any consulted. Continue to stress to patient importance of self efficacy and  on diet for CHF. Last BNP reviewed- and noted below   Recent Labs   Lab 07/08/23  1549   *   .    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  Patient started on memantine  Last hospital stay was on haldol 2mg nightly - dosage has been reduced to 0.5mg nightly continue at this time  -Fall/Aspiration and Delirium precautions   Improved mental status 7/10 close to baseline        Acute hypoxemic respiratory failure  New acute respiratory failure with hypoxia, patient not previously on oxygen  -she has recent diagnosis of PE and noted to have bilateral L>R pleural effusions persistent today, persistent wheezing prior to admit per family in last days to weeks prior to admit sounds consistent with volume overload developing with LE edema worsening before admit as well  -continue lasix now, rocephin should cover any superimposed PNA but sounds more likely to be volume.   -CT chest today does not have significant  parenchymal disease, patient is afebrile   -Etiology may be due to worsening of CHF symptoms coupled with presence of right pulmonary embolism, no new or worsening degree of PE seen on CTA today but motion artifact degrades quality of study.     -Continue lasix,  supplemental O2 to wean as tolerated.   Improving 7/10 as off oxygen now, go to daily IV lasix     Acute pulmonary embolism  Diagnosed with PE and DVT last hospital admission ((5/22& 5/23/23)- was on eliquis but transitioned to warfarin per DC summary.  Per nursing facility MAR she is back on eliquis. Son reports that at some point had it held he was told given easy bruising and was told clots may have gone away. Unclear, will f/u with sitter he reports knows more information as is with her every day. Holding now given CT head per NS recs.  Repeat US LE given extensive DVTS in may 2023 to assess clot burden.    Prior progress notes indicate she was switched to warfarin due to potential high cost of eliquis as an ouptatient.     -Continue eliquis for now - obtain PharmD consult to review pricing of anticoagulation options.       Debility  With recent hospital stays - patient's caretaker notes worsening deblity and patient's cognitive deficits compound issue.  Patient's unwitnessed fall today occurred - patient reported to be trying to walk to the bathroom when this occurred  Son reports that prior to these recent multiple admits she was doing well but has had multiple serious illnesses including incarcerated hernia, PE, UTIS which have led to worsening baseline status in last few months  Did well with PT/OT on 7/10     Acute cystitis with hematuria  Noted to have inability to urinate in ED and urinary retention with elevated bladder scan. 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 Will refer to uro on dc for long term work up   -duncan catheter placed- would keep at least 3-4 days bfeore voiding trial.  -urine gram stain sent, blood cultures sent   Urine CX multiple organisms so will keep on rocephin for 3 days  -continue empiric Ceftriaxone for now         Primary hypertension  Continue lisinopril and metoprolol that were being given @ SNF  -patient no longer on clonidine.   Inc losartan 7/10 for elevations        VTE Risk Mitigation (From admission, onward)         Ordered     Reason for No Pharmacological VTE Prophylaxis  Once        Question:  Reasons:  Answer:  Already adequately anticoagulated on oral Anticoagulants    07/08/23 2326     IP VTE HIGH RISK PATIENT  Once         07/08/23 2326     Place sequential compression device  Until discontinued         07/08/23 2326                Discharge Planning   TOSHA: 7/12/2023     Code Status: Full Code   Is the patient medically ready for discharge?: No    Reason for patient still in hospital (select all that apply): Patient trending condition  Discharge Plan A: Return to nursing home, Skilled Nursing Facility                  Camryn Fink MD  Department of Hospital Medicine   [facility] - Surgery

## 2023-07-11 PROBLEM — R33.9 URINARY RETENTION: Status: ACTIVE | Noted: 2023-01-01

## 2023-07-11 NOTE — ASSESSMENT & PLAN NOTE
New acute respiratory failure with hypoxia, patient not previously on oxygen  -she has recent diagnosis of PE and noted to have bilateral L>R pleural effusions persistent today, persistent wheezing prior to admit per family in last days to weeks prior to admit sounds consistent with volume overload developing with LE edema worsening before admit as well  -continue lasix now, rocephin should cover any superimposed PNA but sounds more likely to be volume.   -CT chest today does not have significant parenchymal disease, patient is afebrile   -Etiology may be due to worsening of CHF symptoms coupled with presence of right pulmonary embolism, no new or worsening degree of PE seen on CTA today but motion artifact degrades quality of study.     -Continue lasix,  supplemental O2 to wean as tolerated.   Improving 7/10 as off oxygen now, go to daily IV lasix   Reports some dyspnea still on 7/11 so keep on lasix IV and plan for possible oral transition tomorrow

## 2023-07-11 NOTE — TELEPHONE ENCOUNTER
----- Message from Nancy Pimentel PA-C sent at 7/10/2023  5:46 PM CDT -----  Hey, please schedule f/u in about 2 weeks with [facility] (ordered) for [facility]. Can be with me or other PA clinic. Thanks.

## 2023-07-11 NOTE — PT/OT/SLP PROGRESS
Occupational Therapy   Co-Treatment  Co-treatment with PT for maximal pt participation, safety, and activity tolerance     Name: Radha Sandoval  MRN: [id number]  Admitting Diagnosis:  Multiple closed fractures of pelvis without disruption of pelvic ring       Recommendations:     Discharge Recommendations: nursing facility, skilled  Discharge Equipment Recommendations:  none  Barriers to discharge:  None    Assessment:     Radha Sandoval is a 88 y.o. female with a medical diagnosis of Multiple closed fractures of pelvis without disruption of pelvic ring.  She presents with diarrhea and was soiled upon arrival. Pt was also confused compared to yesterday session, per caregiver reports pt.did not sleep much last night. Performance deficits affecting function are weakness, impaired endurance, impaired balance, impaired self care skills, impaired cognition, decreased ROM, decreased coordination, decreased safety awareness, decreased lower extremity function, impaired functional mobility.     Rehab Prognosis:  Good; patient would benefit from acute skilled OT services to address these deficits and reach maximum level of function.       Plan:     Patient to be seen 3 x/week to address the above listed problems via therapeutic exercises, self-care/home management, therapeutic activities, neuromuscular re-education  Plan of Care Expires: 08/09/23  Plan of Care Reviewed with: patient, caregiver    Subjective     Chief Complaint: none verbalized   Patient/Family Comments/goals: C/o hip pain with rolling  Pain/Comfort:  Pain Rating 1:  (Pt did not rate)  Location 1: hip (hip)  Pain Addressed 1:  (Pt did not rate)    Objective:     Communicated with: RN prior to session.  Patient found HOB elevated with duncan catheter, peripheral IV, SCD, pressure relief boots upon OT entry to room.    General Precautions: Standard, fall    Orthopedic Precautions:: LLE weight bearing as tolerated, RLE weight bearing as tolerated  Braces:  N/A  Respiratory Status: Room air     Occupational Performance:     Bed Mobility:    Patient completed Rolling/Turning to Left with  total assistance and 2 persons  Patient completed Rolling/Turning to Right with total assistance and 2 persons     Functional Mobility/Transfers:  Deferred at this time until medically appropriate     Activities of Daily Living:  Toileting: total assistance and of 2 persons pericare at bed level    [facility] 6 Click ADL: 14    Treatment & Education:  Reorientation provided throughout session, little carryover noted.   Mindfulness techniques utilized throughout the session to decrease anxiety  Pt educated on role of occupational therapy, POC, and safety during ADLs and functional mobility. Pt and OT discussed importance of safe, continued mobility to optimize daily living skills. Pt verbalized understanding. Pt given instruction to call for medical staff/nurse for assistance.     Patient left HOB elevated with all lines intact, call button in reach, and PCT,caregiver Chel present    GOALS:   Multidisciplinary Problems       Occupational Therapy Goals          Problem: Occupational Therapy    Goal Priority Disciplines Outcome Interventions   Occupational Therapy Goal     OT, PT/OT Ongoing, Progressing    Description: Goals to be met by: 7/10/23     Patient will increase functional independence with ADLs by performing:    UE Dressing with Minimal Assistance.  LE Dressing with Minimal Assistance.  Grooming while standing with Minimal Assistance.  Toileting from bedside commode with Stand-by Assistance for hygiene and clothing management.   Toilet transfer to bedside commode with Stand-by Assistance.                         Time Tracking:     OT Date of Treatment: 07/11/23  OT Start Time: 1130  OT Stop Time: 1200  OT Total Time (min): 30 min    Billable Minutes:Self Care/Home Management 30    OT/ALEXIS: OT          7/11/2023

## 2023-07-11 NOTE — ASSESSMENT & PLAN NOTE
Noted on CT head  -neurosurgery consults has evaluated, no acute hemorrhage concern, A 4 hour interval repeat CT head showing more dense area with possible more acuity than previous  Discussed with NS dr barrett on 7/9 AM who reports hold anticoag for now given this but odd presentation on scans as not wider or larger just more denser looking, CTA obtained and awaiting NS recs further as they told sitter and patient on 7/10 they plan for embolization IP vs OP and deciding. Will need NS clearance to resume anticoag also. Mental status doing welll 7/10 and they recommend OP embolization, okay to resume eliquis 7/10 PM  -q4 neuro checks

## 2023-07-11 NOTE — ACP (ADVANCE CARE PLANNING)
Advance Care Planning     Date: 07/11/2023    [facility]  I engaged the patient in a voluntary conversation about advance care planning and we specifically addressed what the goals of care would be moving forward..  We did specifically address the patient's  course and if her course were to change and she were to become obtunded or worsen or to need intubation in the future or to have a cardiac arrest in the future or need a feeding tube or ICU care in the future. We explored the patient's values and preferences for future care.  The patient endorses that what is most important right now is to focus on extending life as long as possible, even it it means sacrificing quality and curative/life-prolongation (regardless of treatment burdens)  Patient caregiver scott at bedside to witness this conversation and patient was of sound mind to convey her wishes. Previous conversation with her son on phone reported similar wishes to him at a meeting at her Nursing home last week so these wishes she conveyed now are same as wishes previously known to him prior to admit.    Accordingly, we have decided that the best plan to meet the patient's goals includes continuing with treatment        I spent a total of 15 minutes engaging the patient in this advance care planning discussion.

## 2023-07-11 NOTE — SUBJECTIVE & OBJECTIVE
Interval history: she was in U/S this morning and discussed updates with her caretker Chel. Returned to room after she was back from US with caretaker and nursing at bedside. She repoted that she still has some heavy breathing and Chel reports that it was especialy at night and with more activity it seems like and so will keep on IV lasix today and if improving dyspnea will plan to go to orals tomorrow and watch on orals to see how dosing does to ensure output is at goal on an oral regimen as likely regimen was started too late at SNF before when was already in overload state. Not on oxygen, and exam in LE improving with less pain and less swelling. US showing improving clot burden from may with less clot in the right common femoral and femoral veins and resolution of clot in the right popliteal and left femoral veins that were there previously and CTA on admit was showing improving clot burden as was not seeing clots from previous as well too (bolus timing wasn't great so cannot truly assess) but is responding well to eliquis and NS yeterday reported was fine to resume this yesterday PM for her and plan to do embolization as OP for her hygromas. Pain controlled on eam this morning and potassium improving, likely will plan for long term low dose K and Mg daily replacemet as sitter chel reports that always seem to run low even when at SNF and will need close checks of lytes once out of hospital. If staying  on track will plan to transition to oral lasix tomorrow and do a voiding trial and then dc possibly Thursday if doing well.                 Review of patient's allergies indicates:  No Known Allergies    No current facility-administered medications on file prior to encounter.     Current Outpatient Medications on File Prior to Encounter   Medication Sig    apixaban (ELIQUIS) 5 mg Tab Take 5 mg by mouth 2 (two) times daily.    ascorbic acid, vitamin C, (VITAMIN C) 250 MG tablet Take 250 mg by mouth once  daily.    EScitalopram oxalate (LEXAPRO) 5 MG Tab Take 5 mg by mouth once daily.    [] furosemide (LASIX) 20 MG tablet Take 20 mg by mouth once daily.    haloperidoL (HALDOL) 2 MG tablet Take 1 tablet (2 mg total) by mouth every evening. (Patient taking differently: Take 0.5 mg by mouth every evening.)    lisinopriL (PRINIVIL,ZESTRIL) 20 MG tablet Take 20 mg by mouth once daily.    memantine (NAMENDA) 5 MG Tab Take 5 mg by mouth 2 (two) times daily.    metoprolol tartrate (LOPRESSOR) 25 MG tablet Take 0.5 tablets (12.5 mg total) by mouth 2 (two) times daily. (Patient taking differently: Take 25 mg by mouth 2 (two) times daily. HOLD IF SBP <100 OR HR <50)    [] potassium chloride (KLOR-CON) 10 MEQ TbSR Take 10 mEq by mouth once daily.    protein supplement (PROMOD PROTEIN) Liqd Take 30 mLs by mouth 2 (two) times a day.    tamsulosin (FLOMAX) 0.4 mg Cap Take by mouth once daily.    zinc gluconate 50 mg tablet Take 50 mg by mouth once daily.    miconazole NITRATE 2 % (MICOTIN) 2 % top powder Apply topically twice daily under bilateral breasts (Patient not taking: Reported on 2023)     Family History    None       Tobacco Use    Smoking status: Not on file    Smokeless tobacco: Not on file   Substance and Sexual Activity    Alcohol use: Not on file    Drug use: Not on file    Sexual activity: Not on file     Review of Systems   Unable to perform ROS: Mental status change   Neurological:  Positive for weakness.   Psychiatric/Behavioral:  Positive for confusion and hallucinations.    Objective:     Vital Signs (Most Recent):  Temp: 97.9 °F (36.6 °C) (23)  Pulse: 82 (23 1100)  Resp: 18 (23)  BP: (!) 151/99 (23)  SpO2: (!) 93 % (23) Vital Signs (24h Range):  Temp:  [96.7 °F (35.9 °C)-98.3 °F (36.8 °C)] 97.9 °F (36.6 °C)  Pulse:  [63-82] 82  Resp:  [14-18] 18  SpO2:  [91 %-95 %] 93 %  BP: (130-174)/(59-99) 151/99     Weight: 76 kg (167 lb 8.8 oz)  Body mass  index is 29.68 kg/m².     Physical Exam  Constitutional:       General: She is in acute distress.      Appearance: She is obese. She is ill-appearing.      Comments: Sitting in bed, much improved mental status. Sitter and nursing t bedside.   HENT:      Head: Normocephalic and atraumatic.      Mouth/Throat:      Pharynx: Oropharynx is clear.   Eyes:      General: No scleral icterus.     Pupils: Pupils are equal, round, and reactive to light.   Cardiovascular:      Rate and Rhythm: Normal rate and regular rhythm.      Heart sounds: No murmur heard.  Pulmonary:      Effort: Pulmonary effort is normal. No respiratory distress.      Comments: Wheezes resolved. Slight exp crackle. Off oxygen  Abdominal:      General: Bowel sounds are normal. There is no distension.      Palpations: Abdomen is soft.      Tenderness: There is no abdominal tenderness.   Musculoskeletal:      Right lower leg: Edema present.      Left lower leg: Edema present.      Comments: Pain when you touch her legs, R>L. Edema improving. Scds on   Skin:     General: Skin is warm and dry.      Coloration: Skin is pale.   Neurological:      GCS: GCS eye subscore is 4. GCS verbal subscore is 4. GCS motor subscore is 5.      Motor: Weakness present.      Comments: Awake and oriented and  near baseline. Able to converse and answer questinos well. No neuro exam deficits seen.            CRANIAL NERVES     CN III, IV, VI   Pupils are equal, round, and reactive to light.     Significant Labs: All pertinent labs within the past 24 hours have been reviewed.  ABGs:   No results for input(s): PH, PCO2, HCO3, POCSATURATED, BE, TOTALHB, COHB, METHB, O2HB, POCFIO2, PO2 in the last 48 hours.    Blood Culture: No results for input(s): LABBLOO in the last 48 hours.    CBC:   Recent Labs   Lab 07/10/23  0523 07/11/23  0508   WBC 8.09 7.92   HGB 7.8* 8.3*   HCT 25.1* 26.9*    273       CMP:   Recent Labs   Lab 07/10/23  0523 07/10/23  1715 07/11/23  0508    142  140   K 2.6* 3.6 3.6    102 103   CO2 27 29 29   * 109 122*   BUN 15 13 13   CREATININE 0.7 0.8 0.7   CALCIUM 8.3* 8.6* 8.2*   ALBUMIN 2.2*  --  2.3*   ANIONGAP 8 11 8       Cardiac Markers:   No results for input(s): CKMB, MYOGLOBIN, BNP, TROPISTAT in the last 48 hours.    Coagulation:   No results for input(s): PT, INR, APTT in the last 48 hours.    Lactic Acid:   No results for input(s): LACTATE in the last 48 hours.    Magnesium:   Recent Labs   Lab 07/10/23  0523 07/11/23  0508   MG 1.6 1.8       Troponin:   No results for input(s): TROPONINI, TROPONINIHS in the last 48 hours.    TSH: No results for input(s): TSH in the last 4320 hours.  Urine Culture: No results for input(s): LABURIN in the last 48 hours.    Urine Studies:   No results for input(s): COLORU, APPEARANCEUA, PHUR, SPECGRAV, PROTEINUA, GLUCUA, KETONESU, BILIRUBINUA, OCCULTUA, NITRITE, UROBILINOGEN, LEUKOCYTESUR, RBCUA, WBCUA, BACTERIA, SQUAMEPITHEL, HYALINECASTS in the last 48 hours.    Invalid input(s): WRIGHTSUR      Significant Imaging: I have reviewed all pertinent imaging results/findings within the past 24 hours.  EXAMINATION:  CT HEAD WITHOUT CONTRAST     CLINICAL HISTORY:  Head trauma, minor (Age >= 65y);     TECHNIQUE:  Low dose axial images were obtained through the head.  Coronal and sagittal reformations were also performed. Contrast was not administered.     COMPARISON:  CT 04/18/2023     FINDINGS:  There is motion artifact.     There is generalized cerebral volume loss.  There is hypoattenuation in a periventricular fashion, likely sequela of chronic microvascular ischemic change. There are a few punctate foci of low attenuation involving the left basal ganglia, suggesting sequela of remote infarct, stable. There is no evidence of acute major vascular territory infarct, hemorrhage, or mass.  There is no hydrocephalus.  There are prominent low attenuating collections overlying the convexities bilaterally, left greater than  right, new since the previous exam.  The paranasal sinuses and mastoid air cells are clear, and there is no evidence of calvarial fracture.  The visualized soft tissues are unremarkable.     Impression:     1. Allowing for extensive motion artifact, there are low attenuating subdural collections overlying the convexities bilaterally, left greater than right.  This is new since the previous exam.  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  No hydrocephalus.  2. Patchy low attenuation within the region of the left basal ganglia, suggesting sequela of remote infarct.        Electronically signed by: William Coelho MD  Date:                                            07/08/2023  Time:                                           18:34CTA CHEST NON CORONARY (PE STUDIES)     CLINICAL HISTORY:  Pulmonary embolism (PE) suspected, high prob;     TECHNIQUE:  Low dose axial images, sagittal and coronal reformations were obtained from the thoracic inlet to the lung bases following the IV administration of 100 mL of Omnipaque 350.  Contrast timing was optimized to evaluate the pulmonary arteries.  MIP images were performed.     COMPARISON:  CT 05/22/2023     FINDINGS:  There is extensive motion artifact.     Allowing for the above, the structures at the base of the neck are grossly unremarkable.  The heart is not enlarged, no significant pericardial effusion.  The thoracic aorta tapers normally noting atherosclerotic plaque and calcification along its course and in the distribution of the coronary arteries.  The visualized portions of the spleen and pancreas are grossly unremarkable.  The visualized portions of the adrenal glands are unremarkable.  The visualized right kidney and gallbladder are unremarkable.  Low attenuating lesions are noted within the hepatic parenchyma, several of which are too small for characterization,  largest within the anterior aspect of the left hepatic lobe measures 2.5 cm with attenuation suggesting cyst.  There is a partially visualized cyst within the left upper quadrant, better evaluated 05/06/2023, arising from the left kidney.  There is a hiatal hernia.     Allowing for extensive motion artifact, the central airways are patent, the distal airways are suboptimally evaluated.  Likewise, there is suboptimal evaluation of the pulmonary parenchyma.  There is suspicion for scattered interlobular septal thickening, may reflect edema.  There is fluid along the fissure on the right.  There is bilateral basilar dependent atelectasis.  There is a trace right pleural effusion with associated compressive atelectasis of the right lower lobe.  There is a mild left pleural effusion with associated compressive atelectasis of the left lower lobe.  There is a calcified granuloma within the right lower lobe.     Bolus timing is adequate for the evaluation of pulmonary thromboembolism however extensive motion artifact renders the majority of the exam nondiagnostic for evaluation of the same.  No large central pulmonary thromboembolism, distal embolism cannot be excluded on the basis of this exam.     There is osteopenia.  There are degenerative changes of the spine.  No significant axillary lymphadenopathy.  There is body wall anasarca.     Impression:     1. Extensive motion artifact significantly limits exam, no convincing large central pulmonary thromboembolism, distal embolism cannot be excluded on the basis of this exam.  Correlation is advised.  2. Bilateral pleural effusions, left greater than right with associated compressive atelectasis of the bilateral lower lobes.  There is suspected underlying interstitial edema.  Pulmonary nodule cannot be definitively excluded.  3. Please see above for several additional findings.        Electronically signed by: William Coelho MD  Date:                                             07/08/2023  Time:                                           18:25    CT PELVIS WITHOUT CONTRAST; CT 3D RECON WITH INDEPENDENT WS     CLINICAL HISTORY:  Pelvic fracture;2mm cuts with 3d recons;; fall;     TECHNIQUE:  Axial images of the pelvis were obtained at 1.25 mm intervals without administration of IV contrast.  Coronal and sagittal reformatted images were reviewed.  3D reconstructed images were created on a separate workstation and reviewed.     COMPARISON:  CT 05/06/2023     FINDINGS:  There is motion artifact.     There is osteopenia.  There is subtle nondisplaced fracture of the superior pubic ramus on the right at the level of the symphysis.  There is displaced fracture involving the inferior pubic ramus on the right.  The bilateral femoroacetabular joints are intact.  There is a suspected bone island within the posterior aspect of the left iliac bone.  There are degenerative changes of the lumbar spine.  The sacrum is intact.  The sacroiliac joints are intact noting degenerative changes.  There is grade 1 anterolisthesis of L4 on L5.  The sacral segments appear aligned on sagittal reformatted images.     The visualized portions of the bowel are grossly unremarkable.  The appendix is unremarkable.  There is atherosclerotic calcification of the aorta and its branches.  The uterus and adnexa are unremarkable.  The urinary bladder is distended noting posteriorly projecting diverticulum versus urethral diverticulum.  Correlation with any history of urinary retention or outlet obstruction.  There is a partially visualized cyst arising from the left kidney.  There is body wall anasarca.  There is edema about the fracture sites.     Impression:     This report was flagged in Epic as abnormal.     1. Acute appearing fractures involving the right superior and inferior pubic rami as described.  2. Please see above for several additional findings.        Electronically signed by: William Coelho MD  Date:                                             07/08/2023  Time:                                           18:42

## 2023-07-11 NOTE — PT/OT/SLP PROGRESS
Physical Therapy Treatment    Patient Name:  Radha Sandoval   MRN:  [id number]    Recommendations:     Discharge Recommendations: nursing facility, skilled  Discharge Equipment Recommendations: none  Barriers to discharge:  increased level of assistance required    Assessment:     Radha Sandoval is a 88 y.o. female admitted with a medical diagnosis of Multiple closed fractures of pelvis without disruption of pelvic ring.  She presents with the following impairments/functional limitations: weakness, impaired endurance, impaired balance, impaired self care skills, impaired functional mobility, gait instability, impaired cognition, decreased lower extremity function, pain.  Pt with some increased confusion per OT as compared to last session. Pt found soiled; OT and PTA performed bed mobility and hygiene with minimal effort from patient. Pt with increased anxiety with all mobility this session as well as continued confusion despite reorientation.     Rehab Prognosis: Fair; patient would benefit from acute skilled PT services to address these deficits and reach maximum level of function.    Recent Surgery: * No surgery found *      Plan:     During this hospitalization, patient to be seen 4 x/week to address the identified rehab impairments via gait training, therapeutic activities, therapeutic exercises, neuromuscular re-education and progress toward the following goals:    Plan of Care Expires:  08/10/23    Subjective     Chief Complaint: none verbalized   Patient/Family Comments/goals: none verbalized  Pain/Comfort:  Pain Rating 1:  (Pt did not rate)      Objective:     Communicated with RN prior to session.  Patient found HOB elevated with duncan catheter, peripheral IV, SCD, pressure relief boots upon PT entry to room.     General Precautions: Standard, fall  Orthopedic Precautions: LLE weight bearing as tolerated, RLE weight bearing as tolerated  Braces: N/A  Respiratory Status: Room air     Functional Mobility:  Bed  Mobility:     Rolling Left:  total assistance and of 2 persons  Rolling Right: total assistance and of 2 persons  Scooting: total assistance and of 2 persons      AM-PAC 6 CLICK MOBILITY  Turning over in bed (including adjusting bedclothes, sheets and blankets)?: 2  Sitting down on and standing up from a chair with arms (e.g., wheelchair, bedside commode, etc.): 2  Moving from lying on back to sitting on the side of the bed?: 2  Moving to and from a bed to a chair (including a wheelchair)?: 2  Need to walk in hospital room?: 2  Climbing 3-5 steps with a railing?: 1  Basic Mobility Total Score: 11       Treatment & Education:  Education and reorientation provided throughout session, however little carryover noted.     Bedside table in front of patient and area set up for function, convenience, and safety. RN aware of patient's mobility needs and status. Questions/concerns addressed within PTA scope of practice; patient  with no further questions. Time was provided for active listening, discussion of health disposition, and discussion of safe discharge.      Patient left HOB elevated with all lines intact, call button in reach, and caregiver present..    GOALS:   Multidisciplinary Problems       Physical Therapy Goals          Problem: Physical Therapy    Goal Priority Disciplines Outcome Goal Variances Interventions   Physical Therapy Goal     PT, PT/OT Ongoing, Progressing     Description: Goals to be met by: 2023     Patient will increase functional independence with mobility by performin. Supine to sit with Stand-by Assistance  2. Sit to stand transfer with Stand-by Assistance  3. Bed to chair transfer with Stand-by Assistance using Rolling Walker  4. Gait  x 50 feet with Minimal Assistance using Rolling Walker.                          Time Tracking:     PT Received On: 23  PT Start Time: 1130     PT Stop Time: 1200  PT Total Time (min): 30 min     Billable Minutes: Therapeutic Activity  30    Treatment Type: Treatment  PT/PTA: PTA     Number of PTA visits since last PT visit: 1 07/11/2023

## 2023-07-11 NOTE — ASSESSMENT & PLAN NOTE
Seen on US may 2023, on anticoag prior to admit, held now for CT head trending  -repeat now to assess clot burden pending still, as has LE pain on R more than left and could be from clot burden still and ensure not becoing chronic   7/11: US read back and showing improved clot burden from previous, and eliquis resumed yesterday PM

## 2023-07-11 NOTE — PROGRESS NOTES
[facility] Medicine  Progress Note    Patient Name: Radha Sandoval  MRN: [id number]  Patient Class: IP- Inpatient   Admission Date: 7/8/2023  Length of Stay: 3 days  Attending Physician: Camryn Fink MD  Primary Care Provider: Primary Doctor No        Subjective:     Principal Problem:Multiple closed fractures of pelvis without disruption of pelvic ring        HPI:  87 y/o WF hx of PE/DVT, bilateral on anticoagulation, Dementia, Hypertension, Debility presents from skilled nursing facility with unwitnessed fall.     She had recent admission to [facility] from 05/22-05/29, admitted for acute hypoxemic respiratory failure and was found with large Right sided PE, extensive RLE DVT and LLE DVT.  She had ECHO and cardiology evaluation without findings of right heart strain.  She was on heparin and then eliquis and discharge orders for coumadin.  Currently at SNF patient receiving Apixaban.   Noted to have Delirium and patient was treated with scheduled Haldol, report that seroquel worsened pts mental status and discontinued. She had urinary retention and duncan catheter placed during admission and patient discharged with indwelling catheter, with plans for voiding trial 6/5.     Today history per ED note and hired caretaker - Chel Bui who is with patient at bedside.  Chel states she presented to SNF to visit patient at approx 10am and found patient on the floor multiple feet from her bed and b/w bathroom.   Patient told her she was trying to ambulate. Caretaker noted patient has been wheezing this week    Since arrival to ED found with recurrent hypoxia and concern for pulmonary edema, persistent pleural effusions noted on CT, recurrent urinary retention s/p duncan catheter placement.  Plain film and CT imaging demonstrated Pubic fracture in 2 places Superior/inferior ramus. UA consistent with recurrent cystitis.     CT head with questionable new subdural fluid collections, acute bleeding not suspected  - Neurosurgery consulted eval pending.     She's received 40mg IV lasix, Vancomycin and Zosyn and referred for admission.     Chel is a hired caretaker, since patient is at SNF she visits few hours per day per patient preference.  She has noted in recent months/weeks patient with cognitive decline, frequent disorientation, hallucinations, repeats persecutory delusion about 3 men locking her in a cabin, pt repeats this toe me at bedside.   Patient oriented to self/birthdate, intermittently to hospital.  C/o of severe left foot pain when blankets removed from her, she is frustrated upset at being asked to repeat details of the fall.  Unclear if she understands that she suffered broken pelvis despite describing to her at bedside.     Extended Emergency Contact Information  Primary Emergency Contact: tony griffith  Mobile Phone: [phone]  Relation: Son   needed? No  Secondary Emergency Contact: chel ramirez  Mobile Phone: [phone]  Relation: Other   needed? No            Overview/Hospital Course:  No notes on file    Interval history: she was in U/S this morning and discussed updates with her caretker Chel. Returned to room after she was back from US with caretaker and nursing at bedside. She repoted that she still has some heavy breathing and Chel reports that it was especialy at night and with more activity it seems like and so will keep on IV lasix today and if improving dyspnea will plan to go to orals tomorrow and watch on orals to see how dosing does to ensure output is at goal on an oral regimen as likely regimen was started too late at SNF before when was already in overload state. Not on oxygen, and exam in LE improving with less pain and less swelling. US showing improving clot burden from may with less clot in the right common femoral and femoral veins and resolution of clot in the right popliteal and left femoral veins that were there previously and CTA on admit was showing  improving clot burden as was not seeing clots from previous as well too (bolus timing wasn't great so cannot truly assess) but is responding well to eliquis and NS yeterday reported was fine to resume this yesterday PM for her and plan to do embolization as OP for her hygromas. Pain controlled on eam this morning and potassium improving, likely will plan for long term low dose K and Mg daily replacemet as hemant chavez reports that always seem to run low even when at SNF and will need close checks of lytes once out of hospital. If staying  on track will plan to transition to oral lasix tomorrow and do a voiding trial and then dc possibly Thursday if doing well.                 Review of patient's allergies indicates:  No Known Allergies    No current facility-administered medications on file prior to encounter.     Current Outpatient Medications on File Prior to Encounter   Medication Sig    apixaban (ELIQUIS) 5 mg Tab Take 5 mg by mouth 2 (two) times daily.    ascorbic acid, vitamin C, (VITAMIN C) 250 MG tablet Take 250 mg by mouth once daily.    EScitalopram oxalate (LEXAPRO) 5 MG Tab Take 5 mg by mouth once daily.    [] furosemide (LASIX) 20 MG tablet Take 20 mg by mouth once daily.    haloperidoL (HALDOL) 2 MG tablet Take 1 tablet (2 mg total) by mouth every evening. (Patient taking differently: Take 0.5 mg by mouth every evening.)    lisinopriL (PRINIVIL,ZESTRIL) 20 MG tablet Take 20 mg by mouth once daily.    memantine (NAMENDA) 5 MG Tab Take 5 mg by mouth 2 (two) times daily.    metoprolol tartrate (LOPRESSOR) 25 MG tablet Take 0.5 tablets (12.5 mg total) by mouth 2 (two) times daily. (Patient taking differently: Take 25 mg by mouth 2 (two) times daily. HOLD IF SBP <100 OR HR <50)    [] potassium chloride (KLOR-CON) 10 MEQ TbSR Take 10 mEq by mouth once daily.    protein supplement (PROMOD PROTEIN) Liqd Take 30 mLs by mouth 2 (two) times a day.    tamsulosin (FLOMAX) 0.4 mg Cap Take  by mouth once daily.    zinc gluconate 50 mg tablet Take 50 mg by mouth once daily.    miconazole NITRATE 2 % (MICOTIN) 2 % top powder Apply topically twice daily under bilateral breasts (Patient not taking: Reported on 6/23/2023)     Family History    None       Tobacco Use    Smoking status: Not on file    Smokeless tobacco: Not on file   Substance and Sexual Activity    Alcohol use: Not on file    Drug use: Not on file    Sexual activity: Not on file     Review of Systems   Unable to perform ROS: Mental status change   Neurological:  Positive for weakness.   Psychiatric/Behavioral:  Positive for confusion and hallucinations.    Objective:     Vital Signs (Most Recent):  Temp: 97.9 °F (36.6 °C) (07/11/23 0721)  Pulse: 82 (07/11/23 1100)  Resp: 18 (07/11/23 0721)  BP: (!) 151/99 (07/11/23 0721)  SpO2: (!) 93 % (07/11/23 0721) Vital Signs (24h Range):  Temp:  [96.7 °F (35.9 °C)-98.3 °F (36.8 °C)] 97.9 °F (36.6 °C)  Pulse:  [63-82] 82  Resp:  [14-18] 18  SpO2:  [91 %-95 %] 93 %  BP: (130-174)/(59-99) 151/99     Weight: 76 kg (167 lb 8.8 oz)  Body mass index is 29.68 kg/m².     Physical Exam  Constitutional:       General: She is in acute distress.      Appearance: She is obese. She is ill-appearing.      Comments: Sitting in bed, much improved mental status. Sitter and nursing t bedside.   HENT:      Head: Normocephalic and atraumatic.      Mouth/Throat:      Pharynx: Oropharynx is clear.   Eyes:      General: No scleral icterus.     Pupils: Pupils are equal, round, and reactive to light.   Cardiovascular:      Rate and Rhythm: Normal rate and regular rhythm.      Heart sounds: No murmur heard.  Pulmonary:      Effort: Pulmonary effort is normal. No respiratory distress.      Comments: Wheezes resolved. Slight exp crackle. Off oxygen  Abdominal:      General: Bowel sounds are normal. There is no distension.      Palpations: Abdomen is soft.      Tenderness: There is no abdominal tenderness.   Musculoskeletal:       Right lower leg: Edema present.      Left lower leg: Edema present.      Comments: Pain when you touch her legs, R>L. Edema improving. Scds on   Skin:     General: Skin is warm and dry.      Coloration: Skin is pale.   Neurological:      GCS: GCS eye subscore is 4. GCS verbal subscore is 4. GCS motor subscore is 5.      Motor: Weakness present.      Comments: Awake and oriented and  near baseline. Able to converse and answer questinos well. No neuro exam deficits seen.            CRANIAL NERVES     CN III, IV, VI   Pupils are equal, round, and reactive to light.     Significant Labs: All pertinent labs within the past 24 hours have been reviewed.  ABGs:   No results for input(s): PH, PCO2, HCO3, POCSATURATED, BE, TOTALHB, COHB, METHB, O2HB, POCFIO2, PO2 in the last 48 hours.    Blood Culture: No results for input(s): LABBLOO in the last 48 hours.    CBC:   Recent Labs   Lab 07/10/23  0523 07/11/23  0508   WBC 8.09 7.92   HGB 7.8* 8.3*   HCT 25.1* 26.9*    273       CMP:   Recent Labs   Lab 07/10/23  0523 07/10/23  1715 07/11/23  0508    142 140   K 2.6* 3.6 3.6    102 103   CO2 27 29 29   * 109 122*   BUN 15 13 13   CREATININE 0.7 0.8 0.7   CALCIUM 8.3* 8.6* 8.2*   ALBUMIN 2.2*  --  2.3*   ANIONGAP 8 11 8       Cardiac Markers:   No results for input(s): CKMB, MYOGLOBIN, BNP, TROPISTAT in the last 48 hours.    Coagulation:   No results for input(s): PT, INR, APTT in the last 48 hours.    Lactic Acid:   No results for input(s): LACTATE in the last 48 hours.    Magnesium:   Recent Labs   Lab 07/10/23  0523 07/11/23  0508   MG 1.6 1.8       Troponin:   No results for input(s): TROPONINI, TROPONINIHS in the last 48 hours.    TSH: No results for input(s): TSH in the last 4320 hours.  Urine Culture: No results for input(s): LABURIN in the last 48 hours.    Urine Studies:   No results for input(s): COLORU, APPEARANCEUA, PHUR, SPECGRAV, PROTEINUA, GLUCUA, KETONESU, BILIRUBINUA, OCCULTUA,  NITRITE, UROBILINOGEN, LEUKOCYTESUR, RBCUA, WBCUA, BACTERIA, SQUAMEPITHEL, HYALINECASTS in the last 48 hours.    Invalid input(s): SREEKANTH      Significant Imaging: I have reviewed all pertinent imaging results/findings within the past 24 hours.  EXAMINATION:  CT HEAD WITHOUT CONTRAST     CLINICAL HISTORY:  Head trauma, minor (Age >= 65y);     TECHNIQUE:  Low dose axial images were obtained through the head.  Coronal and sagittal reformations were also performed. Contrast was not administered.     COMPARISON:  CT 04/18/2023     FINDINGS:  There is motion artifact.     There is generalized cerebral volume loss.  There is hypoattenuation in a periventricular fashion, likely sequela of chronic microvascular ischemic change. There are a few punctate foci of low attenuation involving the left basal ganglia, suggesting sequela of remote infarct, stable. There is no evidence of acute major vascular territory infarct, hemorrhage, or mass.  There is no hydrocephalus.  There are prominent low attenuating collections overlying the convexities bilaterally, left greater than right, new since the previous exam.  The paranasal sinuses and mastoid air cells are clear, and there is no evidence of calvarial fracture.  The visualized soft tissues are unremarkable.     Impression:     1. Allowing for extensive motion artifact, there are low attenuating subdural collections overlying the convexities bilaterally, left greater than right.  This is new since the previous exam.  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  No hydrocephalus.  2. Patchy low attenuation within the region of the left basal ganglia, suggesting sequela of remote infarct.        Electronically signed by: William Coelho MD  Date:                                            07/08/2023  Time:                                           18:34CTA CHEST NON CORONARY (PE  STUDIES)     CLINICAL HISTORY:  Pulmonary embolism (PE) suspected, high prob;     TECHNIQUE:  Low dose axial images, sagittal and coronal reformations were obtained from the thoracic inlet to the lung bases following the IV administration of 100 mL of Omnipaque 350.  Contrast timing was optimized to evaluate the pulmonary arteries.  MIP images were performed.     COMPARISON:  CT 05/22/2023     FINDINGS:  There is extensive motion artifact.     Allowing for the above, the structures at the base of the neck are grossly unremarkable.  The heart is not enlarged, no significant pericardial effusion.  The thoracic aorta tapers normally noting atherosclerotic plaque and calcification along its course and in the distribution of the coronary arteries.  The visualized portions of the spleen and pancreas are grossly unremarkable.  The visualized portions of the adrenal glands are unremarkable.  The visualized right kidney and gallbladder are unremarkable.  Low attenuating lesions are noted within the hepatic parenchyma, several of which are too small for characterization, largest within the anterior aspect of the left hepatic lobe measures 2.5 cm with attenuation suggesting cyst.  There is a partially visualized cyst within the left upper quadrant, better evaluated 05/06/2023, arising from the left kidney.  There is a hiatal hernia.     Allowing for extensive motion artifact, the central airways are patent, the distal airways are suboptimally evaluated.  Likewise, there is suboptimal evaluation of the pulmonary parenchyma.  There is suspicion for scattered interlobular septal thickening, may reflect edema.  There is fluid along the fissure on the right.  There is bilateral basilar dependent atelectasis.  There is a trace right pleural effusion with associated compressive atelectasis of the right lower lobe.  There is a mild left pleural effusion with associated compressive atelectasis of the left lower lobe.  There is a  calcified granuloma within the right lower lobe.     Bolus timing is adequate for the evaluation of pulmonary thromboembolism however extensive motion artifact renders the majority of the exam nondiagnostic for evaluation of the same.  No large central pulmonary thromboembolism, distal embolism cannot be excluded on the basis of this exam.     There is osteopenia.  There are degenerative changes of the spine.  No significant axillary lymphadenopathy.  There is body wall anasarca.     Impression:     1. Extensive motion artifact significantly limits exam, no convincing large central pulmonary thromboembolism, distal embolism cannot be excluded on the basis of this exam.  Correlation is advised.  2. Bilateral pleural effusions, left greater than right with associated compressive atelectasis of the bilateral lower lobes.  There is suspected underlying interstitial edema.  Pulmonary nodule cannot be definitively excluded.  3. Please see above for several additional findings.        Electronically signed by: William Coelho MD  Date:                                            07/08/2023  Time:                                           18:25    CT PELVIS WITHOUT CONTRAST; CT 3D RECON WITH INDEPENDENT WS     CLINICAL HISTORY:  Pelvic fracture;2mm cuts with 3d recons;; fall;     TECHNIQUE:  Axial images of the pelvis were obtained at 1.25 mm intervals without administration of IV contrast.  Coronal and sagittal reformatted images were reviewed.  3D reconstructed images were created on a separate workstation and reviewed.     COMPARISON:  CT 05/06/2023     FINDINGS:  There is motion artifact.     There is osteopenia.  There is subtle nondisplaced fracture of the superior pubic ramus on the right at the level of the symphysis.  There is displaced fracture involving the inferior pubic ramus on the right.  The bilateral femoroacetabular joints are intact.  There is a suspected bone island within the posterior aspect of the left  iliac bone.  There are degenerative changes of the lumbar spine.  The sacrum is intact.  The sacroiliac joints are intact noting degenerative changes.  There is grade 1 anterolisthesis of L4 on L5.  The sacral segments appear aligned on sagittal reformatted images.     The visualized portions of the bowel are grossly unremarkable.  The appendix is unremarkable.  There is atherosclerotic calcification of the aorta and its branches.  The uterus and adnexa are unremarkable.  The urinary bladder is distended noting posteriorly projecting diverticulum versus urethral diverticulum.  Correlation with any history of urinary retention or outlet obstruction.  There is a partially visualized cyst arising from the left kidney.  There is body wall anasarca.  There is edema about the fracture sites.     Impression:     This report was flagged in Epic as abnormal.     1. Acute appearing fractures involving the right superior and inferior pubic rami as described.  2. Please see above for several additional findings.        Electronically signed by: William Coelho MD  Date:                                            07/08/2023  Time:                                           18:42        Assessment/Plan:      Dysphagia  Speech recs soft diet/minced diet and reports can updgrade once receive her denture bond per recs 7/10      Goals of care, counseling/discussion  Advance Care Planning       Patient able to voice her wishes on 7/10 as back at her mental status baseline and her sitter and I wre able to ask her specifically what those were with her sitter asking her if she were to become incapacitated and if she were to be near death from not breathing or heart stopping and need cpr or berathing or feeding tube would she want this and she stated to both of us that she would want us to try to do what we could to save her and would want to be full code and will keep this in her chart.    Acute metabolic encephalopathy  -likely secondary  to UTI as well as dilaudid given on admission as has had recent hallucinations prior to admit suspect was likely UTI then and then worsened mental status on 7/9 in AM lkely from combination of poor sleep on admit + pain meds worsening this, CT head showing possible change in hygroma and concern for this with mental status and monitoring closely with q 4 neuro checks but now improving mental status in PM so seems less likely from this and more likely from former  -close monitoring  Much improved 7/10      Hypokalemia  Replace PRN  Very low again 7/10 and replacing iv and PO and recheck this pm  Improved to 3.6 and replace for goal of 4, will ikely keep on low dose like 10 meQ or so daily  On dc as sitter reports always runs low when checked at baseline and likely on lasix will continue to need long term    Hypomagnesemia  Replace PRN  Will plan to likely keep on daily Mag on dc      Acute deep vein thrombosis (DVT) of femoral vein of right lower extremity    Seen on US may 2023, on anticoag prior to admit, held now for CT head trending  -repeat now to assess clot burden pending still, as has LE pain on R more than left and could be from clot burden still and ensure not becoing chronic   7/11: US read back and showing improved clot burden from previous, and eliquis resumed yesterday PM    Hygroma  Noted on CT head  -neurosurgery consults has evaluated, no acute hemorrhage concern, A 4 hour interval repeat CT head showing more dense area with possible more acuity than previous  Discussed with NS dr barrett on 7/9 AM who reports hold anticoag for now given this but odd presentation on scans as not wider or larger just more denser looking, CTA obtained and awaiting NS recs further as they told sitter and patient on 7/10 they plan for embolization IP vs OP and deciding. Will need NS clearance to resume anticoag also. Mental status doing welll 7/10 and they recommend OP embolization, okay to resume eliquis 7/10 PM  -q4  neuro checks      Fracture of pubis  Secondary to fall today at [facility]   -Orthopedic surgery consulted and non op fx with WBAT, PT/OT held on 7/9 due to mental status which has improved so will consult for 7/10  -duncan catheter in place.   -pain meds with tylenol and robaxin, avoid stronger meds for now as had worsening mental status after dilaudid x 1. If worsens can consdier possible tramadol  Did well with PT/OT on 7/10 and pain doing well so far per therapy at bedside during exam    Acute diastolic congestive heart failure  Patient is identified as having Diastolic (HFpEF) heart failure that is Acute. CHF is currently uncontrolled due to Continued edema of extremities and JVD and Pulmonary edema/pleural effusion on CXR. Latest ECHO performed and demonstrates- Results for orders placed during the hospital encounter of 05/22/23    Echo    Interpretation Summary  · The estimated ejection fraction is 65%.  · The left ventricle is normal in size with concentric hypertrophy and normal systolic function.  · Grade I left ventricular diastolic dysfunction.  · Normal right ventricular size with normal right ventricular systolic function.  · Mild left atrial enlargement.  · There is mild aortic valve stenosis.  · Aortic valve area is 1.47 cm2; peak velocity is 2.9 m/s; mean gradient is 15 mmHg.  · There is pulmonary hypertension.  · The estimated PA systolic pressure is 54 mmHg.  · Intermediate central venous pressure (8 mmHg).  . Continue Beta Blocker, ACE/ARB and Furosemide and monitor clinical status closely. Monitor on telemetry. Patient is off CHF pathway.  Monitor strict Is&Os and daily weights.  Place on fluid restriction of 1.5 L. Cardiology has not been any consulted. Continue to stress to patient importance of self efficacy and  on diet for CHF. Last BNP reviewed- and noted below   Recent Labs   Lab 07/08/23  1549   *   .    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  Patient started on memantine  Last hospital stay was on haldol 2mg nightly - dosage has been reduced to 0.5mg nightly continue at this time  -Fall/Aspiration and Delirium precautions   Improved mental status 7/10 close to baseline        Acute hypoxemic respiratory failure  New acute respiratory failure with hypoxia, patient not previously on oxygen  -she has recent diagnosis of PE and noted to have bilateral L>R pleural effusions persistent today, persistent wheezing prior to admit per family in last days to weeks prior to admit sounds consistent with volume overload developing with LE edema worsening before admit as well  -continue lasix now, rocephin should cover any superimposed PNA but sounds more likely to be volume.   -CT chest today does not have significant parenchymal disease, patient is afebrile   -Etiology may be due to worsening of CHF symptoms coupled with presence of right pulmonary embolism, no new or worsening degree of PE seen on CTA today but motion artifact degrades quality of study.     -Continue lasix,  supplemental O2 to wean as tolerated.   Improving 7/10 as off oxygen now, go to daily IV lasix   Reports some dyspnea still on 7/11 so keep on lasix IV and plan for possible oral transition tomorrow    Acute pulmonary embolism  Diagnosed with PE and DVT last hospital admission ((5/22& 5/23/23)- was on eliquis but transitioned to warfarin per DC summary.  Per nursing facility MAR she is back  on eliquis. Son reports that at some point had it held he was told given easy bruising and was told clots may have gone away. Unclear, will f/u with sitter he reports knows more information as is with her every day. Holding now given CT head per NS recs.  Repeat US LE given extensive DVTS in may 2023 to assess clot burden.    Prior progress notes indicate she was switched to warfarin due to potential high cost of eliquis as an ouptatient.     -Continue eliquis for now - obtain PharmD consult to review pricing of anticoagulation options.       Debility  With recent hospital stays - patient's caretaker notes worsening deblity and patient's cognitive deficits compound issue.  Patient's unwitnessed fall today occurred - patient reported to be trying to walk to the bathroom when this occurred  Son reports that prior to these recent multiple admits she was doing well but has had multiple serious illnesses including incarcerated hernia, PE, UTIS which have led to worsening baseline status in last few months  Did well with PT/OT on 7/10     Acute cystitis with hematuria  Noted to have inability to urinate in ED and urinary retention with elevated bladder scan. 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 Will refer to uro on dc for long term work up   -duncan catheter placed- would keep at least 3-4 days bfeore voiding trial.  -urine gram stain sent, blood cultures sent   Urine CX multiple organisms so will keep on rocephin for 3 days  -continue empiric Ceftriaxone for now         Primary hypertension  Continue lisinopril and metoprolol that were being given @ SNF  -patient no longer on clonidine.   Inc losartan 7/10 for elevations        VTE Risk Mitigation (From admission, onward)         Ordered      apixaban tablet 5 mg  2 times daily         07/10/23 1511     Reason for No Pharmacological VTE Prophylaxis  Once        Question:  Reasons:  Answer:  Already adequately anticoagulated on oral Anticoagulants    07/08/23 2326     IP VTE HIGH RISK PATIENT  Once         07/08/23 2326     Place sequential compression device  Until discontinued         07/08/23 2326                Discharge Planning   TOSHA: 7/12/2023     Code Status: Full Code   Is the patient medically ready for discharge?: No    Reason for patient still in hospital (select all that apply): Patient trending condition  Discharge Plan A: Skilled Nursing Facility                  Camryn Fink MD  Department of Hospital Medicine   [facility] - [facility]

## 2023-07-11 NOTE — ASSESSMENT & PLAN NOTE
Patient is identified as having Diastolic (HFpEF) heart failure that is Acute. CHF is currently uncontrolled due to Continued edema of extremities and JVD and Pulmonary edema/pleural effusion on CXR. Latest ECHO performed and demonstrates- Results for orders placed during the hospital encounter of 05/22/23    Echo    Interpretation Summary  · The estimated ejection fraction is 65%.  · The left ventricle is normal in size with concentric hypertrophy and normal systolic function.  · Grade I left ventricular diastolic dysfunction.  · Normal right ventricular size with normal right ventricular systolic function.  · Mild left atrial enlargement.  · There is mild aortic valve stenosis.  · Aortic valve area is 1.47 cm2; peak velocity is 2.9 m/s; mean gradient is 15 mmHg.  · There is pulmonary hypertension.  · The estimated PA systolic pressure is 54 mmHg.  · Intermediate central venous pressure (8 mmHg).  . Continue Beta Blocker, ACE/ARB and Furosemide and monitor clinical status closely. Monitor on telemetry. Patient is off CHF pathway.  Monitor strict Is&Os and daily weights.  Place on fluid restriction of 1.5 L. Cardiology has not been any consulted. Continue to stress to patient importance of self efficacy and  on diet for CHF. Last BNP reviewed- and noted below   Recent Labs   Lab 07/08/23  1549   *   .    Continue Metoprolol and LIsinopril - on higher dose metoprolol than from discharge at last hospital stay   Will need to be on diuretics long term given had volume last stay and was not discharged on this and then had signs of volume return at SNF and now with acute overload  mproved 7/10 off oxygen and no wheezing heard with improving edema as well, go to lasix daily now IV and plan to go to orals once closer to euvolemic  7/11 improving, would keep on IV lasix now, and possible change to orals tomorrow

## 2023-07-11 NOTE — PLAN OF CARE
Pt resting in bed comfortably. Oriented to self only, calm and cooperative with nursing care. Caregiver at bedside. PIV line intact and free of infection and irritation. Fall precautions maintained, no falls noted. Call light within reach, bed locked and in lowest position. Patient instructed to call for assistance. Weight shift assistance and incontinence care provided. No complaints at this time. Will continue to monitor and follow plan of care.

## 2023-07-11 NOTE — ASSESSMENT & PLAN NOTE
Replace PRN  Very low again 7/10 and replacing iv and PO and recheck this pm  Improved to 3.6 and replace for goal of 4, will ikely keep on low dose like 10 meQ or so daily  On dc as sitter reports always runs low when checked at baseline and likely on lasix will continue to need long term

## 2023-07-12 PROBLEM — S32.509A: Status: RESOLVED | Noted: 2023-01-01 | Resolved: 2023-01-01

## 2023-07-12 NOTE — PLAN OF CARE
Nursing reports no urine output since duncan removed this AM and 400 cc lashonda on bladder scan. Will straight cath now, is already on flomax on admit. Will give more time overnight to see if has spontaneous voiding and has straight cath prn orders now. If overnight requires further straight caths and no spontaneous urinations by the morning then will need to have duncan placed again. Have already placed a uro follow up either way to be seen in their clinic

## 2023-07-12 NOTE — ASSESSMENT & PLAN NOTE
New acute respiratory failure with hypoxia, patient not previously on oxygen  -she has recent diagnosis of PE and noted to have bilateral L>R pleural effusions persistent today, persistent wheezing prior to admit per family in last days to weeks prior to admit sounds consistent with volume overload developing with LE edema worsening before admit as well  -continue lasix now, rocephin should cover any superimposed PNA but sounds more likely to be volume on admit and suspect was due to volume as had no sputum during admit and completed antibiotics.  -CT chest  does not have significant parenchymal disease, patient is afebrile   -Etiology due to worsening of CHF symptoms coupled with presence of right pulmonary embolism, no new or worsening degree of PE seen on CTA  but motion artifact degrades quality of study.     -Continue lasix,  supplemental O2 to wean as tolerated, was off oxygen but placed back on overnight 7/12 due to sats 88% and now stable on 2L and would wean back down as doing well I prev days with much improved exam.   Improving 7/10 as off oxygen now, go to daily IV lasix - given back on oxygen wolfreeman keep on IV and if back off oxygen would go to orals tomorrow to monitor on orals to enusre good output and determine dosage for discharge  -not fluid restricted now in hospital as very picky eater as would likely restrict her getting her boost but when she is discharged will need to have it placed on her home orders to be 2L and 2 grams restricted to ensure the food that is cooked for her is compliant long term

## 2023-07-12 NOTE — PLAN OF CARE
CLAUDIA called Chel garcia/Norah #[phone] to follow up on pt's discharge needs prior to d/c, received no answer left voice message.    Saida Pablo LMSW  Case Management   [facility]   Ext. [phone]

## 2023-07-12 NOTE — PLAN OF CARE
This is not the official med rec as does not have lasix and used for unofficial purposes only     Medication List        START taking these medications      acetaminophen 325 MG tablet  Commonly known as: TYLENOL  Take 2 tablets (650 mg total) by mouth every 4 (four) hours as needed (pain scale 1-4).     loperamide 2 mg capsule  Commonly known as: IMODIUM  Take 1 capsule (2 mg total) by mouth 4 (four) times daily as needed for Diarrhea.     magnesium oxide 400 mg (241.3 mg magnesium) tablet  Commonly known as: MAG-OX  Take 1 tablet (400 mg total) by mouth once daily.     melatonin 3 mg tablet  Commonly known as: MELATIN  Take 2 tablets (6 mg total) by mouth nightly as needed for Insomnia.     methocarbamoL 500 MG Tab  Commonly known as: ROBAXIN  Take 1 tablet (500 mg total) by mouth 3 (three) times daily as needed (muscle spasms, pain scale 5-7).     senna-docusate 8.6-50 mg 8.6-50 mg per tablet  Commonly known as: PERICOLACE  Take 1 tablet by mouth daily as needed for Constipation.     zinc sulfate 50 mg zinc (220 mg) capsule  Commonly known as: ZINCATE  Take 1 capsule (220 mg total) by mouth once daily.  Replaces: zinc gluconate 50 mg tablet            CHANGE how you take these medications      haloperidoL 0.5 MG tablet  Commonly known as: HALDOL  Take 1 tablet (0.5 mg total) by mouth every evening.  What changed:   medication strength  how much to take     lisinopriL 40 MG tablet  Commonly known as: PRINIVIL,ZESTRIL  Take 1 tablet (40 mg total) by mouth once daily.  What changed:   medication strength  how much to take     metoprolol tartrate 25 MG tablet  Commonly known as: LOPRESSOR  Take 1 tablet (25 mg total) by mouth 2 (two) times daily.  What changed: how much to take     miconazole NITRATE 2 % 2 % top powder  Commonly known as: MICOTIN  Apply topically 2 (two) times daily. Underside of bilateral breasts  What changed:   how to take this  when to take this  additional instructions     tamsulosin 0.4 mg  Cap  Commonly known as: FLOMAX  Take 1 capsule (0.4 mg total) by mouth nightly.  What changed:   how much to take  when to take this            CONTINUE taking these medications      apixaban 5 mg Tab  Commonly known as: ELIQUIS  Take 1 tablet (5 mg total) by mouth 2 (two) times daily.     ascorbic acid (vitamin C) 250 MG tablet  Commonly known as: VITAMIN C  Take 1 tablet (250 mg total) by mouth once daily.     EScitalopram oxalate 5 MG Tab  Commonly known as: LEXAPRO  Take 1 tablet (5 mg total) by mouth once daily.     memantine 5 MG Tab  Commonly known as: NAMENDA  Take 1 tablet (5 mg total) by mouth 2 (two) times daily.     potassium chloride 10 MEQ Tbsr  Commonly known as: KLOR-CON  Take 1 tablet (10 mEq total) by mouth once daily.            STOP taking these medications      PROMOD PROTEIN Liqd  Generic drug: protein supplement     zinc gluconate 50 mg tablet  Replaced by: zinc sulfate 50 mg zinc (220 mg) capsule            ASK your doctor about these medications      furosemide 20 MG tablet  Commonly known as: LASIX  Ask about: Should I take this medication?               Where to Get Your Medications        These medications were sent to [facility]  [address] [place] [address]      Phone: [phone]   acetaminophen 325 MG tablet  apixaban 5 mg Tab  ascorbic acid (vitamin C) 250 MG tablet  EScitalopram oxalate 5 MG Tab  haloperidoL 0.5 MG tablet  lisinopriL 40 MG tablet  loperamide 2 mg capsule  magnesium oxide 400 mg (241.3 mg magnesium) tablet  melatonin 3 mg tablet  memantine 5 MG Tab  methocarbamoL 500 MG Tab  metoprolol tartrate 25 MG tablet  miconazole NITRATE 2 % 2 % top powder  potassium chloride 10 MEQ Tbsr  senna-docusate 8.6-50 mg 8.6-50 mg per tablet  tamsulosin 0.4 mg Cap  zinc sulfate 50 mg zinc (220 mg) capsule

## 2023-07-12 NOTE — ASSESSMENT & PLAN NOTE
Noted to have inability to urinate in ED and urinary retention with elevated bladder scan. 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 Will refer to uro on dc for long term work up   -duncan catheter placed- will attempt voiding trial 7/12.   -urine gram stain sent, blood cultures NG  Urine CX multiple organisms so will keep on rocephin for 3 days  -continue empiric Ceftriaxone since admit and completed treatment and dc on 7/12

## 2023-07-12 NOTE — PT/OT/SLP PROGRESS
Occupational Therapy   Co-Treatment    Name: Radha Sandoval  MRN: [id number]  Admitting Diagnosis:  Multiple closed fractures of pelvis without disruption of pelvic ring       Recommendations:     Discharge Recommendations: nursing facility, skilled  Discharge Equipment Recommendations:  none  Barriers to discharge:  None    Assessment:     Radha Sandoval is a 88 y.o. female with a medical diagnosis of Multiple closed fractures of pelvis without disruption of pelvic ring.  She presents with a negative attitude today toward functional mobility. Pt. Completed 3 sit to stand at EOB however actively resistive after max encouragement to take any steps. Performance deficits affecting function are weakness, impaired balance, impaired endurance, impaired self care skills, impaired cognition, decreased coordination, impaired functional mobility, decreased lower extremity function.     Rehab Prognosis:  Good; patient would benefit from acute skilled OT services to address these deficits and reach maximum level of function.       Plan:     Patient to be seen 4 x/week to address the above listed problems via self-care/home management, neuromuscular re-education, therapeutic activities, therapeutic exercises  Plan of Care Expires: 08/09/23  Plan of Care Reviewed with: patient, caregiver    Subjective     Chief Complaint: Multiple closed fractures of pelvis without disruption of pelvic ring    Patient/Family Comments/goals: Pt reports she is not suppose to walk   Pain/Comfort:  Pain Rating 1:  (did not rate)  Location - Side 1: Right  Location - Orientation 1: generalized  Location 1: hip    Objective:     Communicated with: RN prior to session.  Patient found HOB elevated with SCD upon OT entry to room.    General Precautions: Standard, fall    Orthopedic Precautions:LLE weight bearing as tolerated, RLE weight bearing as tolerated  Braces: N/A  Respiratory Status: Room air     Occupational Performance:     Bed Mobility:    Patient  completed Scooting/Bridging with moderate assistance  Patient completed Supine to Sit with total assistance  Patient completed Sit to Supine with total assistance     Functional Mobility/Transfers:  Patient completed Sit <> Stand Transfer with minimum assistance  with  rolling walker 3 trials from EOB  Functional Mobility: deferred due to safety, patient actively resisting with posterior lean     Activities of Daily Living:  Grooming: set up (A) for facial hygiene seated at EOB  Grooming: total assistance for combing hair seated at EOB  Decline all further ADLs    [facility] 6 Click ADL: 14    Treatment & Education:  3 sit stands from EOB in prep for ADLs, build endurance and strength   Pt. Educated on importance of OOB mobility, grooming and  hygiene   Pt educated on role of occupational therapy, POC, and safety during ADLs and functional mobility. Pt and OT discussed importance of safe, continued mobility to optimize daily living skills. Pt verbalized understanding. Pt given instruction to call for medical staff/nurse for assistance.     Co-treatment with PT for maximal pt participation, safety, and activity tolerance       Patient left HOB elevated with all lines intact, call button in reach, bed alarm on, RN  notified, and Caregiver Chel present    GOALS:   Multidisciplinary Problems       Occupational Therapy Goals          Problem: Occupational Therapy    Goal Priority Disciplines Outcome Interventions   Occupational Therapy Goal     OT, PT/OT Ongoing, Progressing    Description: Goals to be met by: 7/10/23     Patient will increase functional independence with ADLs by performing:    UE Dressing with Minimal Assistance.  LE Dressing with Minimal Assistance.  Grooming while standing with Minimal Assistance.  Toileting from bedside commode with Stand-by Assistance for hygiene and clothing management.   Toilet transfer to bedside commode with Stand-by Assistance.                         Time Tracking:     OT Date of  Treatment: 07/12/23  OT Start Time: 1111  OT Stop Time: 1130  OT Total Time (min): 19 min    Billable Minutes:Self Care/Home Management 19    OT/ALEXIS: OT          7/12/2023

## 2023-07-12 NOTE — SUBJECTIVE & OBJECTIVE
Interval history: she was sleeping this morning with caretaker scott at bedside and she reports she was having vivid dreams last night and that she was finally getting good sleep now as she had not slept well in the prevoius nights since admission and that she has vivid dreams and some confusion at night even priro to this admission but the melatonin she got last night really seemed to help her get some good rest. Still awaiting her AM labs as not back yet to check her electrrolytes as had been improving since previous days when K/Mg were very low and lpan to keep on low dose K/Mag long term. She reports her sats got to 88% before she fell asleep when she was getting antsy and moving around in bed a good bit and a little riled up, nursing documented 92% in chart and so put on 2L, so will see how she does today once calm and awake as diuresing well and exam had improved in previous days and would titrate off if sats stay above 92% again, given was put back on oxygen would keep on IV Lasix today and then if tirate back off then would plan to go back to plan to take her to [facility] to watch on tht a day before discharge, so if on track then plan would be more likely discharge Friday and plan to discharge to [facility] facility per caretaker. She has been taking to them on the phone ad what they need and tdap ordered yesterday per their request. They have aked for orders as well and they need a hard copy for her to take to them and she let them know CM will fax them a copy also, and there are no unusual meds expeccted on those orders but do not have them ready yet as unsure what dose of lasix we are planning for on discharge yet and we are unsure if she will need a folley yet or not as [facility] order placed today and plan to see if she can urinate on her own today given retention on admit and UTi treated now since admission. Urology referral for OP placed given intermittent retention and frequent UTIS with bladder  divertiulum on US to see if may be a nidus for this.  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     Current Outpatient Medications on File Prior to Encounter   Medication Sig    apixaban (ELIQUIS) 5 mg Tab Take 5 mg by mouth 2 (two) times daily.    ascorbic acid, vitamin C, (VITAMIN C) 250 MG tablet Take 250 mg by mouth once daily.    EScitalopram oxalate (LEXAPRO) 5 MG Tab Take 5 mg by mouth once daily.    memantine (NAMENDA) 5 MG Tab Take 5 mg by mouth 2 (two) times daily.     Family History    None       Tobacco Use    Smoking status: Not on file    Smokeless tobacco: Not on file   Substance and Sexual Activity    Alcohol use: Not on file    Drug use: Not on file    Sexual activity: Not on file     Review of Systems   Unable to perform ROS: Mental status change   Neurological:  Positive for weakness.   Psychiatric/Behavioral:  Positive for confusion and hallucinations.    Objective:     Vital Signs (Most Recent):  Temp: 97.1 °F (36.2 °C) (07/12/23 0800)  Pulse: 75 (07/12/23 0800)  Resp: 18 (07/12/23 0442)  BP: (!) 150/64 (07/12/23 0800)  SpO2: 97 % (07/12/23 0800) Vital Signs (24h Range):  Temp:  [97.1 °F (36.2 °C)-99.1 °F (37.3 °C)] 97.1 °F (36.2 °C)  Pulse:  [] 75  Resp:  [16-20] 18  SpO2:  [91 %-98 %] 97 %  BP: (139-156)/(60-85) 150/64     Weight: 76 kg (167 lb 8.8 oz)  Body mass index is 29.68 kg/m².     Physical Exam  Constitutional:       Appearance: She is obese. She is  ill-appearing.      Comments: Sleeeping comfortably with caretaker scott at bedside. On 2L   HENT:      Head: Normocephalic and atraumatic.      Mouth/Throat:      Pharynx: Oropharynx is clear.   Eyes:      General: No scleral icterus.     Pupils: Pupils are equal, round, and reactive to light.   Cardiovascular:      Rate and Rhythm: Normal rate and regular rhythm.      Heart sounds: No murmur heard.  Pulmonary:      Effort: Pulmonary effort is normal. No respiratory distress.      Comments: Wheezes resolved. Crackes resolved and deep breath sounds heard. On 2L  Abdominal:      General: Bowel sounds are normal. There is no distension.      Palpations: Abdomen is soft.      Tenderness: There is no abdominal tenderness.   Musculoskeletal:      Right lower leg: Edema present.      Left lower leg: Edema present.      Comments: Pain mild and improved to LE R>L. Edema improving. Scds on   Skin:     General: Skin is warm and dry.      Coloration: Skin is pale.   Neurological:      GCS: GCS eye subscore is 4. GCS verbal subscore is 4. GCS motor subscore is 5.      Motor: Weakness present.      Comments: Sleeping this morning, but on prev days was now able to converse and answer questinos well. No neuro exam deficits seen.            CRANIAL NERVES     CN III, IV, VI   Pupils are equal, round, and reactive to light.     Significant Labs: All pertinent labs within the past 24 hours have been reviewed.  ABGs:   No results for input(s): PH, PCO2, HCO3, POCSATURATED, BE, TOTALHB, COHB, METHB, O2HB, POCFIO2, PO2 in the last 48 hours.    Blood Culture: No results for input(s): LABBLOO in the last 48 hours.    CBC:   Recent Labs   Lab 07/11/23  0508   WBC 7.92   HGB 8.3*   HCT 26.9*          CMP:   Recent Labs   Lab 07/10/23  1715 07/11/23  0508    140   K 3.6 3.6    103   CO2 29 29    122*   BUN 13 13   CREATININE 0.8 0.7   CALCIUM 8.6* 8.2*   ALBUMIN  --  2.3*   ANIONGAP 11 8       Cardiac Markers:   No  results for input(s): CKMB, MYOGLOBIN, BNP, TROPISTAT in the last 48 hours.    Coagulation:   No results for input(s): PT, INR, APTT in the last 48 hours.    Lactic Acid:   No results for input(s): LACTATE in the last 48 hours.    Magnesium:   Recent Labs   Lab 07/11/23  0508   MG 1.8       Troponin:   No results for input(s): TROPONINI, TROPONINIHS in the last 48 hours.    TSH: No results for input(s): TSH in the last 4320 hours.  Urine Culture: No results for input(s): LABURIN in the last 48 hours.    Urine Studies:   No results for input(s): COLORU, APPEARANCEUA, PHUR, SPECGRAV, PROTEINUA, GLUCUA, KETONESU, BILIRUBINUA, OCCULTUA, NITRITE, UROBILINOGEN, LEUKOCYTESUR, RBCUA, WBCUA, BACTERIA, SQUAMEPITHEL, HYALINECASTS in the last 48 hours.    Invalid input(s): WRIGHTSUR      Significant Imaging: I have reviewed all pertinent imaging results/findings within the past 24 hours.  EXAMINATION:  CT HEAD WITHOUT CONTRAST     CLINICAL HISTORY:  Head trauma, minor (Age >= 65y);     TECHNIQUE:  Low dose axial images were obtained through the head.  Coronal and sagittal reformations were also performed. Contrast was not administered.     COMPARISON:  CT 04/18/2023     FINDINGS:  There is motion artifact.     There is generalized cerebral volume loss.  There is hypoattenuation in a periventricular fashion, likely sequela of chronic microvascular ischemic change. There are a few punctate foci of low attenuation involving the left basal ganglia, suggesting sequela of remote infarct, stable. There is no evidence of acute major vascular territory infarct, hemorrhage, or mass.  There is no hydrocephalus.  There are prominent low attenuating collections overlying the convexities bilaterally, left greater than right, new since the previous exam.  The paranasal sinuses and mastoid air cells are clear, and there is no evidence of calvarial fracture.  The visualized soft tissues are unremarkable.     Impression:     1. Allowing for  extensive motion artifact, there are low attenuating subdural collections overlying the convexities bilaterally, left greater than right.  This is new since the previous exam.  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  No hydrocephalus.  2. Patchy low attenuation within the region of the left basal ganglia, suggesting sequela of remote infarct.        Electronically signed by: Willaim Coelho MD  Date:                                            07/08/2023  Time:                                           18:34CTA CHEST NON CORONARY (PE STUDIES)     CLINICAL HISTORY:  Pulmonary embolism (PE) suspected, high prob;     TECHNIQUE:  Low dose axial images, sagittal and coronal reformations were obtained from the thoracic inlet to the lung bases following the IV administration of 100 mL of Omnipaque 350.  Contrast timing was optimized to evaluate the pulmonary arteries.  MIP images were performed.     COMPARISON:  CT 05/22/2023     FINDINGS:  There is extensive motion artifact.     Allowing for the above, the structures at the base of the neck are grossly unremarkable.  The heart is not enlarged, no significant pericardial effusion.  The thoracic aorta tapers normally noting atherosclerotic plaque and calcification along its course and in the distribution of the coronary arteries.  The visualized portions of the spleen and pancreas are grossly unremarkable.  The visualized portions of the adrenal glands are unremarkable.  The visualized right kidney and gallbladder are unremarkable.  Low attenuating lesions are noted within the hepatic parenchyma, several of which are too small for characterization, largest within the anterior aspect of the left hepatic lobe measures 2.5 cm with attenuation suggesting cyst.  There is a partially visualized cyst within the left upper quadrant, better evaluated 05/06/2023, arising from the  left kidney.  There is a hiatal hernia.     Allowing for extensive motion artifact, the central airways are patent, the distal airways are suboptimally evaluated.  Likewise, there is suboptimal evaluation of the pulmonary parenchyma.  There is suspicion for scattered interlobular septal thickening, may reflect edema.  There is fluid along the fissure on the right.  There is bilateral basilar dependent atelectasis.  There is a trace right pleural effusion with associated compressive atelectasis of the right lower lobe.  There is a mild left pleural effusion with associated compressive atelectasis of the left lower lobe.  There is a calcified granuloma within the right lower lobe.     Bolus timing is adequate for the evaluation of pulmonary thromboembolism however extensive motion artifact renders the majority of the exam nondiagnostic for evaluation of the same.  No large central pulmonary thromboembolism, distal embolism cannot be excluded on the basis of this exam.     There is osteopenia.  There are degenerative changes of the spine.  No significant axillary lymphadenopathy.  There is body wall anasarca.     Impression:     1. Extensive motion artifact significantly limits exam, no convincing large central pulmonary thromboembolism, distal embolism cannot be excluded on the basis of this exam.  Correlation is advised.  2. Bilateral pleural effusions, left greater than right with associated compressive atelectasis of the bilateral lower lobes.  There is suspected underlying interstitial edema.  Pulmonary nodule cannot be definitively excluded.  3. Please see above for several additional findings.        Electronically signed by: William Coelho MD  Date:                                            07/08/2023  Time:                                           18:25    CT PELVIS WITHOUT CONTRAST; CT 3D RECON WITH INDEPENDENT WS     CLINICAL HISTORY:  Pelvic fracture;2mm cuts with 3d recons;; fall;     TECHNIQUE:  Axial  images of the pelvis were obtained at 1.25 mm intervals without administration of IV contrast.  Coronal and sagittal reformatted images were reviewed.  3D reconstructed images were created on a separate workstation and reviewed.     COMPARISON:  CT 05/06/2023     FINDINGS:  There is motion artifact.     There is osteopenia.  There is subtle nondisplaced fracture of the superior pubic ramus on the right at the level of the symphysis.  There is displaced fracture involving the inferior pubic ramus on the right.  The bilateral femoroacetabular joints are intact.  There is a suspected bone island within the posterior aspect of the left iliac bone.  There are degenerative changes of the lumbar spine.  The sacrum is intact.  The sacroiliac joints are intact noting degenerative changes.  There is grade 1 anterolisthesis of L4 on L5.  The sacral segments appear aligned on sagittal reformatted images.     The visualized portions of the bowel are grossly unremarkable.  The appendix is unremarkable.  There is atherosclerotic calcification of the aorta and its branches.  The uterus and adnexa are unremarkable.  The urinary bladder is distended noting posteriorly projecting diverticulum versus urethral diverticulum.  Correlation with any history of urinary retention or outlet obstruction.  There is a partially visualized cyst arising from the left kidney.  There is body wall anasarca.  There is edema about the fracture sites.     Impression:     This report was flagged in Epic as abnormal.     1. Acute appearing fractures involving the right superior and inferior pubic rami as described.  2. Please see above for several additional findings.        Electronically signed by: William Coelho MD  Date:                                            07/08/2023  Time:                                           18:42

## 2023-07-12 NOTE — ASSESSMENT & PLAN NOTE
Patient is identified as having Diastolic (HFpEF) heart failure that is Acute. CHF is currently uncontrolled due to Continued edema of extremities and JVD and Pulmonary edema/pleural effusion on CXR. Latest ECHO performed and demonstrates- Results for orders placed during the hospital encounter of 05/22/23    Echo    Interpretation Summary  · The estimated ejection fraction is 65%.  · The left ventricle is normal in size with concentric hypertrophy and normal systolic function.  · Grade I left ventricular diastolic dysfunction.  · Normal right ventricular size with normal right ventricular systolic function.  · Mild left atrial enlargement.  · There is mild aortic valve stenosis.  · Aortic valve area is 1.47 cm2; peak velocity is 2.9 m/s; mean gradient is 15 mmHg.  · There is pulmonary hypertension.  · The estimated PA systolic pressure is 54 mmHg.  · Intermediate central venous pressure (8 mmHg).  . Continue Beta Blocker, ACE/ARB and Furosemide and monitor clinical status closely. Monitor on telemetry. Patient is off CHF pathway.  Monitor strict Is&Os and daily weights.  Place on fluid restriction of 1.5 L. Cardiology has not been any consulted. Continue to stress to patient importance of self efficacy and  on diet for CHF. Last BNP reviewed- and noted below   Recent Labs   Lab 07/08/23  1549   *   .    Continue Metoprolol and LIsinopril - on higher dose metoprolol than from discharge at last hospital stay   Will need to be on diuretics long term given had volume last stay and was not discharged on this and then had signs of volume return at SNF and now with acute overload  mproved 7/10 off oxygen and no wheezing heard with improving edema as well, go to lasix daily now IV and plan to go to [facility] once closer to euvolemic  7/11 improving, would keep on IV lasix now, and possible change to [facility] tomorrow  7/12: keep on IV now as back on 2L with sats 88 reportedly with agitation, in 90s now and exam  improved and so would likely go to [facility] tomorrow if sats stay good and back off oxygen.

## 2023-07-12 NOTE — PT/OT/SLP PROGRESS
"Physical Therapy Treatment    Patient Name:  Radha Sandoval   MRN:  [id number]    Recommendations:     Discharge Recommendations: nursing facility, skilled  Discharge Equipment Recommendations: none  Barriers to discharge:  increased level of assistance required    Assessment:     Radha Sandoval is a 88 y.o. female admitted with a medical diagnosis of Multiple closed fractures of pelvis without disruption of pelvic ring.  She presents with the following impairments/functional limitations: weakness, impaired endurance, impaired self care skills, impaired functional mobility, gait instability, impaired balance, decreased safety awareness, pain, decreased ROM. Throughout session, pt was physically resistant against mobility as well as verbally refusing to attempt further mobility.     Rehab Prognosis: Good; patient would benefit from acute skilled PT services to address these deficits and reach maximum level of function.    Recent Surgery: * No surgery found *      Plan:     During this hospitalization, patient to be seen 4 x/week to address the identified rehab impairments via gait training, therapeutic activities, therapeutic exercises, neuromuscular re-education and progress toward the following goals:    Plan of Care Expires:  08/10/23    Subjective     Chief Complaint: "I am not supposed to walk, my legs are broken."  Patient/Family Comments/goals: return to PLOF  Pain/Comfort:  Pain Rating 1:  (unrated)  Location 1: hip  Pain Addressed 1: Distraction, Cessation of Activity, Pre-medicate for activity, Reposition      Objective:     Communicated with RN prior to session.  Patient found HOB elevated with duncan catheter, peripheral IV, SCD, pressure relief boots upon PT entry to room.     General Precautions: Standard, fall  Orthopedic Precautions: LLE weight bearing as tolerated, RLE weight bearing as tolerated  Braces: N/A  Respiratory Status: Room air     Functional Mobility:  Bed Mobility:     Scooting: moderate " assistance  Supine to Sit: total assistance and resistive  Sit to Supine: total assistance  Transfers:     Sit to Stand:  minimum assistance and 3 trials with rolling walker  Gait: attempted (took 1/2 a step), pt resistive and with posterior lean.      AM-PAC 6 CLICK MOBILITY  Turning over in bed (including adjusting bedclothes, sheets and blankets)?: 2  Sitting down on and standing up from a chair with arms (e.g., wheelchair, bedside commode, etc.): 3  Moving from lying on back to sitting on the side of the bed?: 2  Moving to and from a bed to a chair (including a wheelchair)?: 2  Need to walk in hospital room?: 2  Climbing 3-5 steps with a railing?: 1  Basic Mobility Total Score: 12       Treatment & Education:  Education provided on PT role, weight bearing status, mobility expectations.   Bedside table in front of patient and area set up for function, convenience, and safety. RN aware of patient's mobility needs and status. Questions/concerns addressed within PTA scope of practice; patient  with no further questions. Time was provided for active listening, discussion of health disposition, and discussion of safe discharge.      Patient left HOB elevated with all lines intact, call button in reach, and caregiver present..    GOALS:   Multidisciplinary Problems       Physical Therapy Goals          Problem: Physical Therapy    Goal Priority Disciplines Outcome Goal Variances Interventions   Physical Therapy Goal     PT, PT/OT Ongoing, Progressing     Description: Goals to be met by: 2023     Patient will increase functional independence with mobility by performin. Supine to sit with Stand-by Assistance  2. Sit to stand transfer with Stand-by Assistance  3. Bed to chair transfer with Stand-by Assistance using Rolling Walker  4. Gait  x 50 feet with Minimal Assistance using Rolling Walker.                          Time Tracking:     PT Received On: 23  PT Start Time: 1111     PT Stop Time: 1131  PT  Total Time (min): 20 min     Billable Minutes: Gait Training 20    Treatment Type: Treatment  PT/PTA: PTA     Number of PTA visits since last PT visit: 2     07/12/2023

## 2023-07-12 NOTE — PLAN OF CARE
Problem: Infection  Goal: Absence of Infection Signs and Symptoms  Outcome: Ongoing, Progressing     Problem: Adult Inpatient Plan of Care  Goal: Plan of Care Review  Outcome: Ongoing, Progressing  Goal: Optimal Comfort and Wellbeing  Outcome: Ongoing, Progressing     Problem: Impaired Wound Healing  Goal: Optimal Wound Healing  Outcome: Ongoing, Progressing     Problem: Fall Injury Risk  Goal: Absence of Fall and Fall-Related Injury  Outcome: Ongoing, Progressing

## 2023-07-12 NOTE — PROGRESS NOTES
[facility]  Progress Note    Patient Name: Radha Sandoval  MRN: [id number]  Patient Class: IP- Inpatient   Admission Date: 7/8/2023  Length of Stay: 4 days  Attending Physician: Camryn Fink MD  Primary Care Provider: Primary Doctor No        Subjective:     Principal Problem:Multiple closed fractures of pelvis without disruption of pelvic ring        HPI:  87 y/o WF hx of PE/DVT, bilateral on anticoagulation, Dementia, Hypertension, Debility presents from skilled nursing facility with unwitnessed fall.     She had recent admission to [facility] from 05/22-05/29, admitted for acute hypoxemic respiratory failure and was found with large Right sided PE, extensive RLE DVT and LLE DVT.  She had ECHO and cardiology evaluation without findings of right heart strain.  She was on heparin and then eliquis and discharge orders for coumadin.  Currently at SNF patient receiving Apixaban.   Noted to have Delirium and patient was treated with scheduled Haldol, report that seroquel worsened pts mental status and discontinued. She had urinary retention and duncan catheter placed during admission and patient discharged with indwelling catheter, with plans for voiding trial 6/5.     Today history per ED note and hired caretaker - Chel Bui who is with patient at bedside.  Chel states she presented to SNF to visit patient at approx 10am and found patient on the floor multiple feet from her bed and b/w bathroom.   Patient told her she was trying to ambulate. Caretaker noted patient has been wheezing this week    Since arrival to ED found with recurrent hypoxia and concern for pulmonary edema, persistent pleural effusions noted on CT, recurrent urinary retention s/p duncan catheter placement.  Plain film and CT imaging demonstrated Pubic fracture in 2 places Superior/inferior ramus. UA consistent with recurrent cystitis.     CT head with questionable new subdural fluid collections, acute bleeding not suspected  - Neurosurgery consulted eval pending.     She's received 40mg IV lasix, Vancomycin and Zosyn and referred for admission.     Chel is a hired caretaker, since patient is at SNF she visits few hours per day per patient preference.  She has noted in recent months/weeks patient with cognitive decline, frequent disorientation, hallucinations, repeats persecutory delusion about 3 men locking her in a cabin, pt repeats this toe me at bedside.   Patient oriented to self/birthdate, intermittently to hospital.  C/o of severe left foot pain when blankets removed from her, she is frustrated upset at being asked to repeat details of the fall.  Unclear if she understands that she suffered broken pelvis despite describing to her at bedside.     Extended Emergency Contact Information  Primary Emergency Contact: tony griffith  Mobile Phone: [phone]  Relation: Son   needed? No  Secondary Emergency Contact: melissachel montes  Mobile Phone: [phone]  Relation: Other   needed? No            Overview/Hospital Course:  No notes on file    Interval history: she was sleeping this morning with caretaker chel at bedside and she reports she was having vivid dreams last night and that she was finally getting good sleep now as she had not slept well in the prevoius nights since admission and that she has vivid dreams and some confusion at night even priro to this admission but the melatonin she got last night really seemed to help her get some good rest. Still awaiting her AM labs as not back yet to check her electrrolytes as had been improving since previous days when K/Mg were very low and lpan to keep on low dose K/Mag long term. She reports her sats got to 88% before she fell asleep when she was getting antsy and moving around in bed a good bit and a little riled up, nursing documented 92% in chart and so put on 2L, so will see how she does today once calm and awake as diuresing well and exam had improved in  previous days and would titrate off if sats stay above 92% again, given was put back on oxygen would keep on IV Lasix today and then if tirate back off then would plan to go back to plan to take her to orals to watch on tht a day before discharge, so if on track then plan would be more likely discharge Friday and plan to discharge to [facility] facility per caretaker. She has been taking to them on the phone ad what they need and tdap ordered yesterday per their request. They have aked for orders as well and they need a hard copy for her to take to them and she let them know CM will fax them a copy also, and there are no unusual meds expeccted on those orders but do not have them ready yet as unsure what dose of lasix we are planning for on discharge yet and we are unsure if she will need a folley yet or not as duncan dc order placed today and plan to see if she can urinate on her own today given retention on admit and UTi treated now since admission. Urology referral for OP placed given intermittent retention and frequent UTIS with bladder divertiulum on US to see if may be a nidus for this.  Updated that US of LE are showing improvemet of previous extensive DVTS from may and still some clot on the right side but left resolved and much less clot in RLE than before so continuing on eliquis she was on now as last admit was on coumadin but ultimately eliquis was affordable when at SNF in may and placed on this and scott confirmed she was on this with [facility] before she came in this weekend back to hospital                  Review of patient's allergies indicates:  No Known Allergies    No current facility-administered medications on file prior to encounter.     Current Outpatient Medications on File Prior to Encounter   Medication Sig    apixaban (ELIQUIS) 5 mg Tab Take 5 mg by mouth 2 (two) times daily.    ascorbic acid, vitamin C, (VITAMIN C) 250 MG tablet Take 250 mg by mouth once daily.    EScitalopram  oxalate (LEXAPRO) 5 MG Tab Take 5 mg by mouth once daily.    memantine (NAMENDA) 5 MG Tab Take 5 mg by mouth 2 (two) times daily.     Family History    None       Tobacco Use    Smoking status: Not on file    Smokeless tobacco: Not on file   Substance and Sexual Activity    Alcohol use: Not on file    Drug use: Not on file    Sexual activity: Not on file     Review of Systems   Unable to perform ROS: Mental status change   Neurological:  Positive for weakness.   Psychiatric/Behavioral:  Positive for confusion and hallucinations.    Objective:     Vital Signs (Most Recent):  Temp: 97.1 °F (36.2 °C) (07/12/23 0800)  Pulse: 75 (07/12/23 0800)  Resp: 18 (07/12/23 0442)  BP: (!) 150/64 (07/12/23 0800)  SpO2: 97 % (07/12/23 0800) Vital Signs (24h Range):  Temp:  [97.1 °F (36.2 °C)-99.1 °F (37.3 °C)] 97.1 °F (36.2 °C)  Pulse:  [] 75  Resp:  [16-20] 18  SpO2:  [91 %-98 %] 97 %  BP: (139-156)/(60-85) 150/64     Weight: 76 kg (167 lb 8.8 oz)  Body mass index is 29.68 kg/m².     Physical Exam  Constitutional:       Appearance: She is obese. She is ill-appearing.      Comments: Sleeeping comfortably with caretaker scott at bedside. On 2L   HENT:      Head: Normocephalic and atraumatic.      Mouth/Throat:      Pharynx: Oropharynx is clear.   Eyes:      General: No scleral icterus.     Pupils: Pupils are equal, round, and reactive to light.   Cardiovascular:      Rate and Rhythm: Normal rate and regular rhythm.      Heart sounds: No murmur heard.  Pulmonary:      Effort: Pulmonary effort is normal. No respiratory distress.      Comments: Wheezes resolved. Crackes resolved and deep breath sounds heard. On 2L  Abdominal:      General: Bowel sounds are normal. There is no distension.      Palpations: Abdomen is soft.      Tenderness: There is no abdominal tenderness.   Musculoskeletal:      Right lower leg: Edema present.      Left lower leg: Edema present.      Comments: Pain mild and improved to LE R>L. Edema  improving. Scds on   Skin:     General: Skin is warm and dry.      Coloration: Skin is pale.   Neurological:      GCS: GCS eye subscore is 4. GCS verbal subscore is 4. GCS motor subscore is 5.      Motor: Weakness present.      Comments: Sleeping this morning, but on prev days was now able to converse and answer questinos well. No neuro exam deficits seen.            CRANIAL NERVES     CN III, IV, VI   Pupils are equal, round, and reactive to light.     Significant Labs: All pertinent labs within the past 24 hours have been reviewed.  ABGs:   No results for input(s): PH, PCO2, HCO3, POCSATURATED, BE, TOTALHB, COHB, METHB, O2HB, POCFIO2, PO2 in the last 48 hours.    Blood Culture: No results for input(s): LABBLOO in the last 48 hours.    CBC:   Recent Labs   Lab 07/11/23  0508   WBC 7.92   HGB 8.3*   HCT 26.9*          CMP:   Recent Labs   Lab 07/10/23  1715 07/11/23  0508    140   K 3.6 3.6    103   CO2 29 29    122*   BUN 13 13   CREATININE 0.8 0.7   CALCIUM 8.6* 8.2*   ALBUMIN  --  2.3*   ANIONGAP 11 8       Cardiac Markers:   No results for input(s): CKMB, MYOGLOBIN, BNP, TROPISTAT in the last 48 hours.    Coagulation:   No results for input(s): PT, INR, APTT in the last 48 hours.    Lactic Acid:   No results for input(s): LACTATE in the last 48 hours.    Magnesium:   Recent Labs   Lab 07/11/23  0508   MG 1.8       Troponin:   No results for input(s): TROPONINI, TROPONINIHS in the last 48 hours.    TSH: No results for input(s): TSH in the last 4320 hours.  Urine Culture: No results for input(s): LABURIN in the last 48 hours.    Urine Studies:   No results for input(s): COLORU, APPEARANCEUA, PHUR, SPECGRAV, PROTEINUA, GLUCUA, KETONESU, BILIRUBINUA, OCCULTUA, NITRITE, UROBILINOGEN, LEUKOCYTESUR, RBCUA, WBCUA, BACTERIA, SQUAMEPITHEL, HYALINECASTS in the last 48 hours.    Invalid input(s): WRIGHTSUR      Significant Imaging: I have reviewed all pertinent imaging results/findings within the  past 24 hours.  EXAMINATION:  CT HEAD WITHOUT CONTRAST     CLINICAL HISTORY:  Head trauma, minor (Age >= 65y);     TECHNIQUE:  Low dose axial images were obtained through the head.  Coronal and sagittal reformations were also performed. Contrast was not administered.     COMPARISON:  CT 04/18/2023     FINDINGS:  There is motion artifact.     There is generalized cerebral volume loss.  There is hypoattenuation in a periventricular fashion, likely sequela of chronic microvascular ischemic change. There are a few punctate foci of low attenuation involving the left basal ganglia, suggesting sequela of remote infarct, stable. There is no evidence of acute major vascular territory infarct, hemorrhage, or mass.  There is no hydrocephalus.  There are prominent low attenuating collections overlying the convexities bilaterally, left greater than right, new since the previous exam.  The paranasal sinuses and mastoid air cells are clear, and there is no evidence of calvarial fracture.  The visualized soft tissues are unremarkable.     Impression:     1. Allowing for extensive motion artifact, there are low attenuating subdural collections overlying the convexities bilaterally, left greater than right.  This is new since the previous exam.  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  No hydrocephalus.  2. Patchy low attenuation within the region of the left basal ganglia, suggesting sequela of remote infarct.        Electronically signed by: William Coelho MD  Date:                                            07/08/2023  Time:                                           18:34CTA CHEST NON CORONARY (PE STUDIES)     CLINICAL HISTORY:  Pulmonary embolism (PE) suspected, high prob;     TECHNIQUE:  Low dose axial images, sagittal and coronal reformations were obtained from the thoracic inlet to the lung bases following the IV  administration of 100 mL of Omnipaque 350.  Contrast timing was optimized to evaluate the pulmonary arteries.  MIP images were performed.     COMPARISON:  CT 05/22/2023     FINDINGS:  There is extensive motion artifact.     Allowing for the above, the structures at the base of the neck are grossly unremarkable.  The heart is not enlarged, no significant pericardial effusion.  The thoracic aorta tapers normally noting atherosclerotic plaque and calcification along its course and in the distribution of the coronary arteries.  The visualized portions of the spleen and pancreas are grossly unremarkable.  The visualized portions of the adrenal glands are unremarkable.  The visualized right kidney and gallbladder are unremarkable.  Low attenuating lesions are noted within the hepatic parenchyma, several of which are too small for characterization, largest within the anterior aspect of the left hepatic lobe measures 2.5 cm with attenuation suggesting cyst.  There is a partially visualized cyst within the left upper quadrant, better evaluated 05/06/2023, arising from the left kidney.  There is a hiatal hernia.     Allowing for extensive motion artifact, the central airways are patent, the distal airways are suboptimally evaluated.  Likewise, there is suboptimal evaluation of the pulmonary parenchyma.  There is suspicion for scattered interlobular septal thickening, may reflect edema.  There is fluid along the fissure on the right.  There is bilateral basilar dependent atelectasis.  There is a trace right pleural effusion with associated compressive atelectasis of the right lower lobe.  There is a mild left pleural effusion with associated compressive atelectasis of the left lower lobe.  There is a calcified granuloma within the right lower lobe.     Bolus timing is adequate for the evaluation of pulmonary thromboembolism however extensive motion artifact renders the majority of the exam nondiagnostic for evaluation of the  same.  No large central pulmonary thromboembolism, distal embolism cannot be excluded on the basis of this exam.     There is osteopenia.  There are degenerative changes of the spine.  No significant axillary lymphadenopathy.  There is body wall anasarca.     Impression:     1. Extensive motion artifact significantly limits exam, no convincing large central pulmonary thromboembolism, distal embolism cannot be excluded on the basis of this exam.  Correlation is advised.  2. Bilateral pleural effusions, left greater than right with associated compressive atelectasis of the bilateral lower lobes.  There is suspected underlying interstitial edema.  Pulmonary nodule cannot be definitively excluded.  3. Please see above for several additional findings.        Electronically signed by: William Coelho MD  Date:                                            07/08/2023  Time:                                           18:25    CT PELVIS WITHOUT CONTRAST; CT 3D RECON WITH INDEPENDENT WS     CLINICAL HISTORY:  Pelvic fracture;2mm cuts with 3d recons;; fall;     TECHNIQUE:  Axial images of the pelvis were obtained at 1.25 mm intervals without administration of IV contrast.  Coronal and sagittal reformatted images were reviewed.  3D reconstructed images were created on a separate workstation and reviewed.     COMPARISON:  CT 05/06/2023     FINDINGS:  There is motion artifact.     There is osteopenia.  There is subtle nondisplaced fracture of the superior pubic ramus on the right at the level of the symphysis.  There is displaced fracture involving the inferior pubic ramus on the right.  The bilateral femoroacetabular joints are intact.  There is a suspected bone island within the posterior aspect of the left iliac bone.  There are degenerative changes of the lumbar spine.  The sacrum is intact.  The sacroiliac joints are intact noting degenerative changes.  There is grade 1 anterolisthesis of L4 on L5.  The sacral segments appear  aligned on sagittal reformatted images.     The visualized portions of the bowel are grossly unremarkable.  The appendix is unremarkable.  There is atherosclerotic calcification of the aorta and its branches.  The uterus and adnexa are unremarkable.  The urinary bladder is distended noting posteriorly projecting diverticulum versus urethral diverticulum.  Correlation with any history of urinary retention or outlet obstruction.  There is a partially visualized cyst arising from the left kidney.  There is body wall anasarca.  There is edema about the fracture sites.     Impression:     This report was flagged in Epic as abnormal.     1. Acute appearing fractures involving the right superior and inferior pubic rami as described.  2. Please see above for several additional findings.        Electronically signed by: William Coelho MD  Date:                                            07/08/2023  Time:                                           18:42        Assessment/Plan:      Urinary retention  Will attempt duncan removal on 7/12, OP uro consult placed for chronic on/off issue      Dysphagia  Speech recs soft diet/minced diet and reports can updgrade once receive her denture bond per recs 7/10      Goals of care, counseling/discussion  Advance Care Planning       Patient able to voice her wishes on 7/10 as back at her mental status baseline and her sitter and I wre able to ask her specifically what those were with her sitter asking her if she were to become incapacitated and if she were to be near death from not breathing or heart stopping and need cpr or berathing or feeding tube would she want this and she stated to both of us that she would want us to try to do what we could to save her and would want to be full code and will keep this in her chart.    Acute metabolic encephalopathy  -likely secondary to UTI as well as dilaudid given on admission as has had recent hallucinations prior to admit suspect was likely UTI  then and then worsened mental status on 7/9 in AM lkely from combination of poor sleep on admit + pain meds worsening this, CT head showing possible change in hygroma and concern for this with mental status and monitoring closely with q 4 neuro checks but now improving mental status in PM so seems less likely from this and more likely from former  -close monitoring  Much improved 7/10      Hypokalemia  Replace PRN  Very low again 7/10 and replacing iv and PO and recheck this pm  Improved to 3.6 and replace for goal of 4, will ikely keep on low dose like 10 meQ or so daily  On dc as sitter reports always runs low when checked at baseline and likely on lasix will continue to need long term    Hypomagnesemia  Replace PRN  Will plan to likely keep on daily Mag on dc      Acute deep vein thrombosis (DVT) of femoral vein of right lower extremity    Seen on US may 2023, on anticoag prior to admit, held now for CT head trending  -repeat now to assess clot burden pending still, as has LE pain on R more than left and could be from clot burden still and ensure not becoing chronic   7/11: US read back and showing improved clot burden from previous, and eliquis resumed yesterday PM    Hygroma  Noted on CT head  -neurosurgery consults has evaluated, no acute hemorrhage concern, A 4 hour interval repeat CT head showing more dense area with possible more acuity than previous  Discussed with NS dr barrett on 7/9 AM who reports hold anticoag for now given this but odd presentation on scans as not wider or larger just more denser looking, CTA obtained and awaiting NS recs further as they told sitter and patient on 7/10 they plan for embolization IP vs OP and deciding. Will need NS clearance to resume anticoag also. Mental status doing welll 7/10 and they recommend OP embolization, okay to resume eliquis 7/10 PM  -q4 neuro checks      Fracture of pubis  Secondary to fall today at Skilled nursing facility   -Orthopedic surgery  consulted and non op fx with WBAT, PT/OT held on 7/9 due to mental status which has improved so will consult for 7/10  -duncan catheter in place.   -pain meds with tylenol and robaxin, avoid stronger meds for now as had worsening mental status after dilaudid x 1. If worsens can consdier possible tramadol  Did well with PT/OT on 7/10 and pain doing well so far per therapy at bedside during exam    Acute diastolic congestive heart failure  Patient is identified as having Diastolic (HFpEF) heart failure that is Acute. CHF is currently uncontrolled due to Continued edema of extremities and JVD and Pulmonary edema/pleural effusion on CXR. Latest ECHO performed and demonstrates- Results for orders placed during the hospital encounter of 05/22/23    Echo    Interpretation Summary  · The estimated ejection fraction is 65%.  · The left ventricle is normal in size with concentric hypertrophy and normal systolic function.  · Grade I left ventricular diastolic dysfunction.  · Normal right ventricular size with normal right ventricular systolic function.  · Mild left atrial enlargement.  · There is mild aortic valve stenosis.  · Aortic valve area is 1.47 cm2; peak velocity is 2.9 m/s; mean gradient is 15 mmHg.  · There is pulmonary hypertension.  · The estimated PA systolic pressure is 54 mmHg.  · Intermediate central venous pressure (8 mmHg).  . Continue Beta Blocker, ACE/ARB and Furosemide and monitor clinical status closely. Monitor on telemetry. Patient is off CHF pathway.  Monitor strict Is&Os and daily weights.  Place on fluid restriction of 1.5 L. Cardiology has not been any consulted. Continue to stress to patient importance of self efficacy and  on diet for CHF. Last BNP reviewed- and noted below   Recent Labs   Lab 07/08/23  1549   *   .    Continue Metoprolol and LIsinopril - on higher dose metoprolol than from discharge at last hospital stay   Will need to be on diuretics long term given had volume last  stay and was not discharged on this and then had signs of volume return at SNF and now with acute overload  mproved 7/10 off oxygen and no wheezing heard with improving edema as well, go to lasix daily now IV and plan to go to orals once closer to euvolemic  7/11 improving, would keep on IV lasix now, and possible change to orals tomorrow  7/12: keep on IV now as back on 2L with sats 88 reportedly with agitation, in 90s now and exam improved and so would likely go to orals tomorrow if sats stay good and back off oxygen.    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  Patient started on memantine  Last hospital stay was on haldol 2mg nightly - dosage has been reduced to 0.5mg nightly continue at this time  -Fall/Aspiration and Delirium precautions   Improved mental status 7/10 close to baseline        Acute hypoxemic respiratory failure  New acute respiratory failure with hypoxia, patient not previously on oxygen  -she has recent diagnosis of PE and noted to have bilateral L>R pleural effusions persistent today, persistent wheezing prior to admit per family in last days to weeks prior to admit sounds consistent with volume overload developing with LE edema worsening before admit as well  -continue lasix now, rocephin should cover any superimposed PNA but sounds more likely to be volume on admit and suspect was due to volume as had no sputum during admit and completed antibiotics.  -CT chest  does not have significant parenchymal disease, patient is afebrile   -Etiology due to worsening of CHF symptoms coupled with presence of right pulmonary embolism, no new or worsening degree of PE seen on CTA  but motion artifact degrades quality of study.     -Continue lasix,  supplemental O2 to wean as tolerated, was off oxygen but placed back on overnight 7/12 due to sats 88% and now stable on 2L and would wean back down as doing well I prev days with much improved exam.   Improving 7/10 as off oxygen now, go to daily  IV lasix - given back on oxygen jocelyn keep on IV and if back off oxygen would go to orals tomorrow to monitor on orals to enusre good output and determine dosage for discharge  -not fluid restricted now in hospital as very picky eater as would likely restrict her getting her boost but when she is discharged will need to have it placed on her home orders to be 2L and 2 grams restricted to ensure the food that is cooked for her is compliant long term    Acute pulmonary embolism  Diagnosed with PE and DVT last hospital admission ((5/22& 5/23/23)- was on eliquis but transitioned to warfarin per DC summary.  Per nursing facility MAR she is back on eliquis. Son reports that at some point had it held he was told given easy bruising and was told clots may have gone away. Unclear, will f/u with sitter he reports knows more information as is with her every day. Holding now given CT head per NS recs.  Repeat US LE given extensive DVTS in may 2023 to assess clot burden.    Prior progress notes indicate she was switched to warfarin due to potential high cost of eliquis as an ouptatient.     -Continue eliquis for now - obtain PharmD consult to review pricing of anticoagulation options.       Debility  With recent hospital stays - patient's caretaker notes worsening deblity and patient's cognitive deficits compound issue.  Patient's unwitnessed fall today occurred - patient reported to be trying to walk to the bathroom when this occurred  Son reports that prior to these recent multiple admits she was doing well but has had multiple serious illnesses including incarcerated hernia, PE, UTIS which have led to worsening baseline status in last few months  Did well with PT/OT on 7/10     Acute cystitis with hematuria  Noted to have inability to urinate in ED and urinary retention with elevated bladder scan. 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 Will refer to uro on dc for long term work up   -duncan catheter placed- will attempt voiding trial 7/12.   -urine gram stain sent, blood cultures NG  Urine CX multiple organisms so will keep on rocephin for 3 days  -continue empiric Ceftriaxone since admit and completed treatment and dc on 7/12        Primary hypertension  Continue lisinopril and metoprolol that were being given @ SNF  -patient no longer on clonidine.   Inc losartan 7/10 for elevations        VTE Risk Mitigation (From admission, onward)         Ordered     apixaban tablet 5 mg  2 times daily         07/10/23 1511     Reason for No Pharmacological VTE Prophylaxis  Once        Question:  Reasons:  Answer:  Already adequately anticoagulated on oral Anticoagulants    07/08/23 2326     IP VTE HIGH RISK PATIENT  Once         07/08/23 2326     Place sequential compression device  Until discontinued         07/08/23 2326                Discharge Planning   TOSHA: 7/13/2023     Code Status: Full Code   Is the patient medically ready for discharge?: No    Reason for patient still in hospital (select all that apply): Patient trending condition  Discharge Plan A: Skilled Nursing Facility                  Camryn Fink MD  Department of Hospital Medicine   [facility] - Surgery

## 2023-07-12 NOTE — PLAN OF CARE
POC Frequency Adjusted   Problem: Occupational Therapy  Goal: Occupational Therapy Goal  Description: Goals to be met by: 7/10/23     Patient will increase functional independence with ADLs by performing:    UE Dressing with Minimal Assistance.  LE Dressing with Minimal Assistance.  Grooming while standing with Minimal Assistance.  Toileting from bedside commode with Stand-by Assistance for hygiene and clothing management.   Toilet transfer to bedside commode with Stand-by Assistance.    Outcome: Ongoing, Progressing

## 2023-07-13 PROBLEM — G93.41 ACUTE METABOLIC ENCEPHALOPATHY: Status: RESOLVED | Noted: 2023-01-01 | Resolved: 2023-01-01

## 2023-07-13 PROBLEM — G96.08 SUBDURAL HYGROMA: Status: ACTIVE | Noted: 2023-01-01

## 2023-07-13 PROBLEM — R41.0 CONFUSION: Status: RESOLVED | Noted: 2023-01-01 | Resolved: 2023-01-01

## 2023-07-13 PROBLEM — R79.89 ELEVATED TROPONIN: Status: RESOLVED | Noted: 2023-01-01 | Resolved: 2023-01-01

## 2023-07-13 PROBLEM — R13.12 OROPHARYNGEAL DYSPHAGIA: Status: ACTIVE | Noted: 2023-01-01

## 2023-07-13 NOTE — ASSESSMENT & PLAN NOTE
- Much improved on 7/13. Plan to switch from IV to oral Lasix on 7/13 and monitor for 1 day to make sure still diuresising on oral lasix. Patient switched to Lasix 20 mg po BID on 7/13. Patient weaned off oxygen and not hypoxia on room air. Patient denies any SOB.   - Patient is identified as having Diastolic (HFpEF) heart failure that is Acute. Latest ECHO performed and demonstrates- Results for orders placed during the hospital encounter of 05/22/23    Echo    Interpretation Summary  · The estimated ejection fraction is 65%.  · The left ventricle is normal in size with concentric hypertrophy and normal systolic function.  · Grade I left ventricular diastolic dysfunction.  · Normal right ventricular size with normal right ventricular systolic function.  · Mild left atrial enlargement.  · There is mild aortic valve stenosis.  · Aortic valve area is 1.47 cm2; peak velocity is 2.9 m/s; mean gradient is 15 mmHg.  · There is pulmonary hypertension.  · The estimated PA systolic pressure is 54 mmHg.  · Intermediate central venous pressure (8 mmHg).    - Continue Metoprolol, Lisinopril and Lasix to treat heart failure.  - Patient started on IV Lasix 40 mg BID on admit to treat acute heart failure and diuresised very well with IV Lasix.   - Monitor clinical status closely.   - Monitor on telemetry.   - Patient is off CHF pathway.    - Monitor strict Is&Os and daily weights.    - Fluid restriction of 1.5 L. Cardiology has not been any consulted.   - Continue to stress to patient importance of self efficacy and  on diet for CHF. Last BNP reviewed- and noted below   Recent Labs   Lab 07/08/23  1549   *

## 2023-07-13 NOTE — HOSPITAL COURSE
Patient admitted with closed right sided pubic rami fractures of right superior and inferior pubic rami seen on X-rays and CT of pelvis. Orthopedic surgery consulted and stated non-operative management needed for fractures as stable fractures and weight bear as tolerated to right lower extremity. PT/OT consulted. Patient placed on multimodal pain medications for pain management. 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 Neurosurgery consulted and evaluated. No acute surgical intervention needed. Repeat CTA of head and neck done on 7/10 and showed stable hygromas. Neurosurgery discussed with family her son Maykel and plan for outpatient follow-up with repeat CT scan of head in 2 weeks and plan outpatient middle meningeal artery embolization to treat hygromas and Neurosurgery to arrange their own follow-up and signed off case on 7/10. Neurosurgery okay for continued anticoagulation with Apixiban to treat recent PEs and bilateral DVTs found on recent admit in 5/2023. Patient resumed on Apixiban 5 mg po BID to treat after cleared by Neurosurgery to resume on 7/10. Patient noted to be hypoxic on admit and CTA of chest showed bilateral pleural effusions and recent echo with elevated CVP and BNP elevated at 931 on admit consistent with acute on chronic diastolic heart failure. Patient placed on IV Lasix 40 mg BID to treat and diuresised well with improvement in hypoxia and able to be weaned off oxygen on 7/13. Lasix switched from IV to oral Lasix 20 mg po BID on 7/13 and patient discharged on this dose. Patient continued on home Metoprolol and Lisinopril to treat her chronic heart failure in hospital. Patient also noted on admit to have UTI and placed on IV Ceftriaxone to treat. Patient with frequent UTIs in past 1 year. Urine culture grew multiple organisms but none in predominance. Patient treated with IV Ceftriaxone for 3 days to treat UTI and no further antibiotics needed on discharge. Patient also  with noted urinary retention in ED on admit and Spann placed. Patient has had recent issues with urinary retention causing need for Spann placement. Spann removed on 7/12 and patient able to void on her own. Ambulatory referral to Urology as outpatient to be placed on discharge due to recurrent issues with retention and UTIs. PT/OT recommended SNF on discharge but son declined and wanted patient placed in assisted living instead so family made arrangements on discharge for patient to discharge to [facility] and to be discharged with  PT/OT and DME and patient discharged to [facility] on 7/14/2023 and SW arranged HH PT/OT on discharge. Patient's medications sent to [facility] on discharge to get filled as that is pharmacy that [facility] uses for their patients. Patient to follow-up with Orthopedics on discharge to monitor fracture healing. Neurosurgery arranging their own follow-up of patient for her subdural hygromas on discharge. No home oxygen required on discharge.

## 2023-07-13 NOTE — ASSESSMENT & PLAN NOTE
Advance Care Planning   Patient able to voice her wishes on 7/10 as back at her mental status baseline and her sitter and I were able to ask her specifically what those were with her sitter asking her if she were to become incapacitated and if she were to be near death from not breathing or heart stopping and need cpr or berathing or feeding tube would she want this and she stated to both of us that she would want us to try to do what we could to save her and would want to be full code and will keep this in her chart.

## 2023-07-13 NOTE — ASSESSMENT & PLAN NOTE
- Noted on CT head of head on admit to have new and chronic bilateral hygromas left > right.   - Neurosurgery consulted and evaluated and no acute hemorrhage concern.  - CTA of head and neck done as per Neurosurgery recs and showed stable bilateral hygromas.  - Per Neurosurgery no acute intervention needed and Neurosurgery to plan for middle meningeal artery embolization outpatient for treatment of stable subdural hygromas as well as follow-up in Neurosurgery clinic with CT head in 2 weeks that Neurosurgery will schedule.   - Per Neurosurgery, okay to resume oral anti-coagulation for acute PE/DVTs given likely higher benefit rather than risk.

## 2023-07-13 NOTE — ASSESSMENT & PLAN NOTE
· Patient with CT scan of pelvis on admit that showed closed fractures of right inferior and superior pubic rami but stable fractures.  · Orthopedic surgery consulted and recommended non-operative management of right sided pelvic fractures and weight bear as tolerated to right lower extremity.   · PT/OT consulted and recommended SNF on discharge but family not interested and wants patient placed at [facility] facility with home health PT/OT on discharge and arrangements being made with family with CM/SW and plan likely discharge on 7/14.   · Patient on long term anticoagulation for recent PEs and bilateral proximal DVTs with Apixiban 5 mg po BID to treat and continued in hospital so no other chemical DVT prophylaxis needed.   · Pain management with multimodal pain medications and pain controlled. Continue scheduled Tylenol 1000 mg po every 8 hours and Robaxin prn for breakthrough pain.

## 2023-07-13 NOTE — ASSESSMENT & PLAN NOTE
Speech therapy consulted and recommended soft diet/minced diet and reports can updgrade once she receives her denture bond per recs on 7/10.

## 2023-07-13 NOTE — PT/OT/SLP PROGRESS
"Physical Therapy Treatment    Patient Name:  Radha Sandoval   MRN:  [id number]    Recommendations:     Discharge Recommendations: nursing facility, skilled  Discharge Equipment Recommendations: hospital bed; wheelchair  Barriers to discharge:  increased level of assistance required    Assessment:     Radha Sandoval is a 88 y.o. female admitted with a medical diagnosis of Multiple closed fractures of pelvis without disruption of pelvic ring.  She presents with the following impairments/functional limitations: weakness, impaired endurance, impaired self care skills, impaired functional mobility, gait instability, impaired balance, impaired cognition, decreased safety awareness, pain, decreased ROM, impaired cardiopulmonary response to activity. Pt continues to be limited by pain, confusion. Increased assistance required during standing tolerance due to pt posterior lean and active resistance to therapist.     Rehab Prognosis: Good; patient would benefit from acute skilled PT services to address these deficits and reach maximum level of function.    Recent Surgery: * No surgery found *      Plan:     During this hospitalization, patient to be seen 4 x/week to address the identified rehab impairments via gait training, therapeutic activities, therapeutic exercises, neuromuscular re-education and progress toward the following goals:    Plan of Care Expires:  08/10/23    Subjective     Chief Complaint: "I'm not walking today."  Patient/Family Comments/goals: none verablized  Pain/Comfort:         Objective:     Communicated with RN prior to session.  Patient found HOB elevated with peripheral IV, telemetry upon PT entry to room.     General Precautions: Standard, fall  Orthopedic Precautions: LLE weight bearing as tolerated, RLE weight bearing as tolerated  Braces: N/A  Respiratory Status: Room air     Functional Mobility:  Bed Mobility:     Supine to Sit: maximal assistance and of 2 persons  Sit to Supine: total " assistance  Transfers:     Sit to Stand:  minimum assistance, of 2 persons, and x2 trials with rolling walker  Gait: x4 steps to HOB max of 2 RW, decreased step length, increased weight bearing through UE, guarding      AM-PAC 6 CLICK MOBILITY  Turning over in bed (including adjusting bedclothes, sheets and blankets)?: 2  Sitting down on and standing up from a chair with arms (e.g., wheelchair, bedside commode, etc.): 3  Moving from lying on back to sitting on the side of the bed?: 2  Moving to and from a bed to a chair (including a wheelchair)?: 2  Need to walk in hospital room?: 2  Climbing 3-5 steps with a railing?: 1  Basic Mobility Total Score: 12       Treatment & Education:  Max education provided on role of PT and importance of mobility. Pt reoriented to weight bearing status and level of recent mobility.   Bedside table in front of patient and area set up for function, convenience, and safety. RN aware of patient's mobility needs and status. Questions/concerns addressed within PTA scope of practice; patient  with no further questions. Time was provided for active listening, discussion of health disposition, and discussion of safe discharge.      Patient left HOB elevated with all lines intact, call button in reach, and caregiver present..    GOALS:   Multidisciplinary Problems       Physical Therapy Goals          Problem: Physical Therapy    Goal Priority Disciplines Outcome Goal Variances Interventions   Physical Therapy Goal     PT, PT/OT Ongoing, Progressing     Description: Goals to be met by: 2023     Patient will increase functional independence with mobility by performin. Supine to sit with Stand-by Assistance  2. Sit to stand transfer with Stand-by Assistance  3. Bed to chair transfer with Stand-by Assistance using Rolling Walker  4. Gait  x 50 feet with Minimal Assistance using Rolling Walker.                          Time Tracking:     PT Received On: 23  PT Start Time: 4384      PT Stop Time: 1115  PT Total Time (min): 21 min     Billable Minutes: Gait Training 21    Treatment Type: Treatment  PT/PTA: PTA     Number of PTA visits since last PT visit: 3     07/13/2023

## 2023-07-13 NOTE — ASSESSMENT & PLAN NOTE
· Improved. Noted on previous lower extremity ultrasound in May 2023.    · Ultrasound of lower extremities on this admit showed improved clot burden with non occlusive thrombus noted in right common femoral veins with resolution of thrombus in right popliteal and and left femoral veins.   · Continue Apixiban 5 mg po BID to treat.

## 2023-07-13 NOTE — ASSESSMENT & PLAN NOTE
· Resolved on 7/13. No Spann catheter will be needed on hospital discharge. Continue to monitor with bladder scans every shift in hospital to monitor bladder residuals as patient has known urinary incontinence making accurate I+O's impossible to measure.   · Patient with urinary retention noted in ED on admit and Spann catheter placed. Voiding trial done on 7/12 with removal of Spann. This has been an on and off issue with patient in past several months.  · Outpatient referral to Urology clinic on discharge.

## 2023-07-13 NOTE — ASSESSMENT & PLAN NOTE
· With recent hospital stays - patient's caretaker notes worsening debility and patient's cognitive deficits compound issue. Patient's unwitnessed fall occurred on this admit as patient reported to be trying to walk to the bathroom when this occurred on her own.   · Son reports that prior to these recent multiple admits she was doing well but has had multiple serious illnesses including incarcerated hernia, PE, UTIs which have led to worsening baseline status in last few months.  · Continue PT/OT in hospital and on discharge to help with debility.

## 2023-07-13 NOTE — PLAN OF CARE
Problem: Infection  Goal: Absence of Infection Signs and Symptoms  Outcome: Ongoing, Progressing     Problem: Adult Inpatient Plan of Care  Goal: Optimal Comfort and Wellbeing  Outcome: Ongoing, Progressing     Problem: Impaired Wound Healing  Goal: Optimal Wound Healing  Outcome: Ongoing, Progressing     Problem: Fall Injury Risk  Goal: Absence of Fall and Fall-Related Injury  Outcome: Ongoing, Progressing

## 2023-07-13 NOTE — ASSESSMENT & PLAN NOTE
· Diagnosed with recent PEs and bilateral DVTs last hospital admission (5/22-5/23/23). Patient was on Apixiban but transitioned to Warfarin per discharge summary. Prior progress notes indicate she was switched to Warfarin due to potential high cost of Apixiban as outpatient. Per nursing facility MAR she is back on Apixiban. Son reports that at some point had it held he was told given easy bruising and was told clots may have gone away.   · Patient placed on Apixiban 5 mg po BID on this admit to treat PE and DVTs.   · Repeat CTA of chest on this admit was sub optimal but no large PE noted.

## 2023-07-13 NOTE — PLAN OF CARE
CLAUDIA returned call received voice message from Oliva garcia/ [facility] #[phone],      3:19 PM  SW called Oliva additional time, informed she will not be in tomorrow. [facility] #[phone] will be following.     Saida Pablo, BIPIN  Case Management   [facility]   Ext. [phone]

## 2023-07-13 NOTE — ASSESSMENT & PLAN NOTE
- Resolved.   - Noted to have inability to urinate in ED and urinary retention. Son reports baseline incontinence prior to this so posisble UTI caused retention prior to admit.  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 Will refer to Urology as outpatient on discharge.   - Urine culture sent and returned with multiple organisms but none in predominance and treated with IV Ceftriaxone x 3 days and now has completed treatment for UTI.

## 2023-07-13 NOTE — PROGRESS NOTES
[facility] Medicine  Progress Note    Patient Name: Radha Sandoval  MRN: [id number]  Patient Class: IP- Inpatient   Admission Date: 7/8/2023  Length of Stay: 5 days  Attending Physician: Yelena Aparicio MD  Primary Care Provider: Primary Doctor No        Subjective:     Principal Problem:Multiple closed fractures of pelvis without disruption of pelvic ring        HPI:  87 y/o WF hx of PE/DVT, bilateral on anticoagulation, Dementia, Hypertension, Debility presents from skilled nursing facility with unwitnessed fall.     She had recent admission to [facility] from 05/22-05/29, admitted for acute hypoxemic respiratory failure and was found with large Right sided PE, extensive RLE DVT and LLE DVT.  She had ECHO and cardiology evaluation without findings of right heart strain.  She was on heparin and then eliquis and discharge orders for coumadin.  Currently at SNF patient receiving Apixaban.   Noted to have Delirium and patient was treated with scheduled Haldol, report that seroquel worsened pts mental status and discontinued. She had urinary retention and duncan catheter placed during admission and patient discharged with indwelling catheter, with plans for voiding trial 6/5.     Today history per ED note and hired caretaker - Chel Bui who is with patient at bedside.  Chel states she presented to SNF to visit patient at approx 10am and found patient on the floor multiple feet from her bed and b/w bathroom.   Patient told her she was trying to ambulate. Caretaker noted patient has been wheezing this week    Since arrival to ED found with recurrent hypoxia and concern for pulmonary edema, persistent pleural effusions noted on CT, recurrent urinary retention s/p duncan catheter placement.  Plain film and CT imaging demonstrated Pubic fracture in 2 places Superior/inferior ramus. UA consistent with recurrent cystitis.     CT head with questionable new subdural fluid collections, acute bleeding not suspected  - Neurosurgery consulted eval pending.     She's received 40mg IV lasix, Vancomycin and Zosyn and referred for admission.     Chel is a hired caretaker, since patient is at SNF she visits few hours per day per patient preference.  She has noted in recent months/weeks patient with cognitive decline, frequent disorientation, hallucinations, repeats persecutory delusion about 3 men locking her in a cabin, pt repeats this toe me at bedside.   Patient oriented to self/birthdate, intermittently to hospital.  C/o of severe left foot pain when blankets removed from her, she is frustrated upset at being asked to repeat details of the fall.  Unclear if she understands that she suffered broken pelvis despite describing to her at bedside.     Extended Emergency Contact Information  Primary Emergency Contact: maykel griffith  Mobile Phone: [phone]  Relation: Son   needed? No  Secondary Emergency Contact: chel ramirez  Mobile Phone: [phone]  Relation: Other   needed? No            Overview/Hospital Course:  Patient admitted with closed right sided pubic rami fractures of right superior and inferior pubic rami seen on X-rays and CT of pelvis. Orthopedic surgery consulted and stated non-operative management needed for fractures as stable fractures and weight bear as tolerated to right lower extremity. PT/OT consulted. Patient placed on multimodal pain medications for pain management. 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 Neurosurgery consulted and evaluated. No acute surgical intervention needed. Repeat CTA of head and neck done on 7/10 and showed stable hygromas. Neurosurgery discussed with family her son Maykel and plan for outpatient follow-up with repeat CT scan of head in 2 weeks and plan outpatient middle meningeal artery embolization to treat hygromas and Neurosurgery to arrange their own follow-up and signed off case on 7/10. Neurosurgery okay for continued anticoagulation  with Apixiban to treat recent PEs and bilateral DVTs found on recent admit in 5/2023. Patient resumed on Apixiban 5 mg po BID to treat after cleared by Neurosurgery to resume on 7/10. Patient noted to be hypoxic on admit and CTA of chest showed bilateral pleural effusions and recent echo with elevated CVP and BNP elevated at 931 on admit consistent with acute on chronic diastolic heart failure. Patient placed on IV Lasix 40 mg BID to treat and diuresised well with improvement in hypoxia and able to be weaned off oxygen on 7/13. Lasix switched from IV to oral Lasix 20 mg po BID on 7/13. Patient continued on home Metoprolol and Lisinopril to treat her chronic heart failure in hospital. Patient also noted on admit to have UTI and placed on IV Ceftriaxone to treat. Patient with frequent UTIs in past 1 year. Urine culture grew multiple organisms but none in predominance. Patient treated with IV Ceftriaxone for 3 days to treat UTI. Patient also with noted urinary retention in ED on admit and Spann placed. Patient has had recent issues with urinary retention causing need for Spann placement. Spann removed on 7/12 and patient able to void on her own. Ambulatory referral to Urology as outpatient to be placed on discharge. PT/OT recommended SNF on discharge but son declined and wanted patient placed n assisted living so family made arrangements on discharge for patient to discharge to [facility] and be discharged on  PT/OT and DME. Plan likely discharge for 7/14.       Interval History: Patient this am on exam was awake and alert. Patient's friend at bedside as normal caretaker Chel had a clinic appointment today. Patient reports 3/10 pain to right hip area. Patient confused and thought she was still at [facility] where she came from today. Patient on room air this am with no hypoxia. Spann catheter removed yesterday and nursing noted patient's diaper this am was soaked with urine and unable to  quantitate how much urine she voided but bladder scans done today and showed low residuals in bladder so no evidence to suggest urinary retention. Patient to be switched from IV to oral Lasix today. Plan discharge to [facility] for tomorrow with HH PT/OT and DME. Patient back to her cognitive baseline.     Review of Systems   Constitutional:  Negative for fever.   Respiratory:  Negative for shortness of breath.    Cardiovascular:  Negative for chest pain.   Gastrointestinal:  Negative for abdominal pain, constipation and nausea.   Musculoskeletal:  Positive for arthralgias (Right hip).   Neurological:  Positive for weakness (Generalized weakness).   Psychiatric/Behavioral:  Positive for confusion. Negative for agitation and hallucinations.    Objective:     Vital Signs (Most Recent):  Temp: 96.5 °F (35.8 °C) (07/13/23 1530)  Pulse: 78 (07/13/23 1530)  Resp: 16 (07/13/23 1530)  BP: 127/53 (07/13/23 1530)  SpO2: 92 % (07/13/23 1530) on room air Vital Signs (24h Range):  Temp:  [96.5 °F (35.8 °C)-98.2 °F (36.8 °C)] 96.5 °F (35.8 °C)  Pulse:  [61-78] 78  Resp:  [16-18] 16  SpO2:  [92 %-96 %] 92 %  BP: (127-163)/(53-70) 127/53     Weight: 76 kg (167 lb 8.8 oz)  Body mass index is 29.68 kg/m².    Intake/Output Summary (Last 24 hours) at 7/13/2023 1649  Last data filed at 7/13/2023 0915  Gross per 24 hour   Intake 480 ml   Output 0 ml   Net 480 ml         Physical Exam  Vitals and nursing note reviewed.   Constitutional:       General: She is awake. She is not in acute distress.     Appearance: Normal appearance. She is normal weight. She is not ill-appearing.      Comments: Patient awake and alert and responsive on exam. Patient in no distress. Confused. Patient not on oxygen.    Neck:      Vascular: No JVD.   Cardiovascular:      Rate and Rhythm: Normal rate and regular rhythm.      Heart sounds: Normal heart sounds. No murmur heard.    No friction rub. No gallop.   Pulmonary:      Effort: Pulmonary effort  is normal. No respiratory distress.      Breath sounds: Normal breath sounds. No wheezing.   Abdominal:      General: Abdomen is flat. Bowel sounds are normal. There is no distension.      Palpations: Abdomen is soft.      Tenderness: There is no abdominal tenderness. There is no guarding.   Musculoskeletal:      Right lower leg: No edema.      Left lower leg: No edema.   Skin:     General: Skin is warm.      Findings: No erythema.   Neurological:      Mental Status: She is alert. She is disoriented.      Comments: Oriented to self not place or time.    Psychiatric:         Mood and Affect: Mood normal.         Behavior: Behavior normal. Behavior is cooperative.           Significant Labs: CBC:   Recent Labs   Lab 07/13/23  0525   WBC 6.68   HGB 7.7*   HCT 25.5*        CMP:   Recent Labs   Lab 07/13/23  0525      K 3.3*   CL 99   CO2 30*      BUN 15   CREATININE 0.7   CALCIUM 8.4*   ALBUMIN 2.0*   ANIONGAP 9     Urine Culture, Routine   Date Value Ref Range Status   07/08/2023   Final    Multiple organisms isolated. None in predominance.  Repeat if   07/08/2023 clinically necessary.  Final       Significant Imaging: I have reviewed all pertinent imaging results/findings within the past 24 hours.      Assessment/Plan:      * Multiple closed right sided fractures of pelvis without disruption of pelvic ring  · Patient with CT scan of pelvis on admit that showed closed fractures of right inferior and superior pubic rami but stable fractures.  · Orthopedic surgery consulted and recommended non-operative management of right sided pelvic fractures and weight bear as tolerated to right lower extremity.   · PT/OT consulted and recommended SNF on discharge but family not interested and wants patient placed at [facility] facility with home health PT/OT on discharge and arrangements being made with family with DILIA/CLAUDIA and plan likely discharge on 7/14.   · Patient on long term anticoagulation for  recent PEs and bilateral proximal DVTs with Apixiban 5 mg po BID to treat and continued in hospital so no other chemical DVT prophylaxis needed.   · Pain management with multimodal pain medications and pain controlled. Continue scheduled Tylenol 1000 mg po every 8 hours and Robaxin prn for breakthrough pain.       Acute cystitis with hematuria  - Resolved.   - Noted to have inability to urinate in ED and urinary retention. Son reports baseline incontinence prior to this so posisble UTI caused retention prior to admit.  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 Will refer to Urology as outpatient on discharge.   - Urine culture sent and returned with multiple organisms but none in predominance and treated with IV Ceftriaxone x 3 days and now has completed treatment for UTI.       Acute deep vein thrombosis (DVT) of femoral vein of right lower extremity  · Improved. Noted on previous lower extremity ultrasound in May 2023.    · Ultrasound of lower extremities on this admit showed improved clot burden with non occlusive thrombus noted in right common femoral veins with resolution of thrombus in right popliteal and and left femoral veins.   · Continue Apixiban 5 mg po BID to treat.     Acute pulmonary embolism without acute cor pulmonale  · Diagnosed with recent PEs and bilateral DVTs last hospital admission (5/22-5/23/23). Patient was on Apixiban but transitioned to Warfarin per discharge summary. Prior progress notes indicate she was switched to Warfarin due to potential high cost of Apixiban as outpatient. Per nursing facility MAR she is back on Apixiban. Son reports that at some point had it held he was told given easy bruising and was told clots may have gone away.   · Patient placed on Apixiban 5 mg po BID on this admit to treat PE and DVTs.   · Repeat CTA of  chest on this admit was sub optimal but no large PE noted.       Urinary retention  · Resolved on 7/13. No Spann catheter will be needed on hospital discharge. Continue to monitor with bladder scans every shift in hospital to monitor bladder residuals as patient has known urinary incontinence making accurate I+O's impossible to measure.   · Patient with urinary retention noted in ED on admit and Spann catheter placed. Voiding trial done on 7/12 with removal of Spann. This has been an on and off issue with patient in past several months.  · Outpatient referral to Urology clinic on discharge.     Acute hypoxemic respiratory failure  - Resolved on 7/13. Patient back to room air with oxygen sats > 90% on room air.   - Patient with new acute respiratory failure with hypoxia on this admit.   - Patient with recent diagnosis of PE and noted to have bilateral L>R pleural effusions persistent on this admit and persistent wheezing prior to admit per family in last days to weeks prior to admit sounds consistent with volume overload developing with LE edema worsening before admit as well. Patient started on IV Lasix to treat acute diastolic heart failure and hypoxia improved with IV Lasix and treatment of acute heart failure.   - CTA of chest on this admit showed bilateral pleural effusions but no significant parenchymal disease. Sub optimal study but no obvious large PEs noted on CTA.   - Titrate oxygen and wean as tolerated to keep oxygen sats > 90%.     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 Patient at cognitive baseline.  · Continue delirium and fall precautions in hospital.  · Continue Haldol 0.5 mg po nightly to help with sundowning. Continue Namenda 5 mg po BID to help with dementia.   · Continue Lexapro 5 mg po daily to help with mood disturbance related to dementia.       Acute diastolic congestive heart failure  - Much improved on 7/13. Plan to switch from IV to oral Lasix on 7/13  and monitor for 1 day to make sure still diuresising on oral lasix. Patient switched to Lasix 20 mg po BID on 7/13. Patient weaned off oxygen and not hypoxia on room air. Patient denies any SOB.   - Patient is identified as having Diastolic (HFpEF) heart failure that is Acute. Latest ECHO performed and demonstrates- Results for orders placed during the hospital encounter of 05/22/23    Echo    Interpretation Summary  · The estimated ejection fraction is 65%.  · The left ventricle is normal in size with concentric hypertrophy and normal systolic function.  · Grade I left ventricular diastolic dysfunction.  · Normal right ventricular size with normal right ventricular systolic function.  · Mild left atrial enlargement.  · There is mild aortic valve stenosis.  · Aortic valve area is 1.47 cm2; peak velocity is 2.9 m/s; mean gradient is 15 mmHg.  · There is pulmonary hypertension.  · The estimated PA systolic pressure is 54 mmHg.  · Intermediate central venous pressure (8 mmHg).    - Continue Metoprolol, Lisinopril and Lasix to treat heart failure.  - Patient started on IV Lasix 40 mg BID on admit to treat acute heart failure and diuresised very well with IV Lasix.   - Monitor clinical status closely.   - Monitor on telemetry.   - Patient is off CHF pathway.    - Monitor strict Is&Os and daily weights.    - Fluid restriction of 1.5 L. Cardiology has not been any consulted.   - Continue to stress to patient importance of self efficacy and  on diet for CHF. Last BNP reviewed- and noted below   Recent Labs   Lab 07/08/23  1549   *       Subdural hygroma  - Noted on CT head of head on admit to have new and chronic bilateral hygromas left > right.   - Neurosurgery consulted and evaluated and no acute hemorrhage concern.  - CTA of head and neck done as per Neurosurgery recs and showed stable bilateral hygromas.  - Per Neurosurgery no acute intervention needed and Neurosurgery to plan for middle meningeal artery  embolization outpatient for treatment of stable subdural hygromas as well as follow-up in Neurosurgery clinic with CT head in 2 weeks that Neurosurgery will schedule.   - Per Neurosurgery, okay to resume oral anti-coagulation for acute PE/DVTs given likely higher benefit rather than risk.    Hypomagnesemia  Related to renal losses from Lasix. Replace as needed with oral Magnesium supplements. Monitor Magnesium level daily.       Hypokalemia  · Related to renal losses from Lasix. Continue to replace potassium as needed to try and keep potassium > 4 with oral potassium supplements.  · Monitor daily BMP to watch potassium level.     Debility  · With recent hospital stays - patient's caretaker notes worsening debility and patient's cognitive deficits compound issue. Patient's unwitnessed fall occurred on this admit as patient reported to be trying to walk to the bathroom when this occurred on her own.   · Son reports that prior to these recent multiple admits she was doing well but has had multiple serious illnesses including incarcerated hernia, PE, UTIs which have led to worsening baseline status in last few months.  · Continue PT/OT in hospital and on discharge to help with debility.     Primary hypertension  Chronic and controlled. Continue Lisinopril and Metoprolol to treat. Monitor vital signs every 4 hours. Target BP < 150/90.       Oropharyngeal dysphagia  Speech therapy consulted and recommended soft diet/minced diet and reports can updgrade once she receives her denture bond per recs on 7/10.      Goals of care, counseling/discussion  Advance Care Planning   Patient able to voice her wishes on 7/10 as back at her mental status baseline and her sitter and I were able to ask her specifically what those were with her sitter asking her if she were to become incapacitated and if she were to be near death from not breathing or heart stopping and need cpr or berathing or feeding tube would she want this and she  stated to both of us that she would want us to try to do what we could to save her and would want to be full code and will keep this in her chart.      VTE Risk Mitigation (From admission, onward)         Ordered     apixaban tablet 5 mg  2 times daily         07/10/23 1511     Reason for No Pharmacological VTE Prophylaxis  Once        Question:  Reasons:  Answer:  Already adequately anticoagulated on oral Anticoagulants    07/08/23 2326     IP VTE HIGH RISK PATIENT  Once         07/08/23 2326     Place sequential compression device  Until discontinued         07/08/23 2326                Discharge Planning   TOSHA: 7/14/2023     Code Status: Full Code   Is the patient medically ready for discharge?: No    Reason for patient still in hospital (select all that apply): Patient trending condition  Discharge Plan A: [facility] with  PT/OT and DME on discharge and plan discharge for 7/14.          Yelena Aparicio MD  Department of Hospital Medicine   [facility] - Surgery

## 2023-07-13 NOTE — SUBJECTIVE & OBJECTIVE
Interval History: Patient this am on exam was awake and alert. Patient's friend at bedside as normal caretaker Chel had a clinic appointment today. Patient reports 3/10 pain to right hip area. Patient confused and thought she was still at [facility] where she came from today. Patient on room air this am with no hypoxia. Spann catheter removed yesterday and nursing noted patient's diaper this am was soaked with urine and unable to quantitate how much urine she voided but bladder scans done today and showed low residuals in bladder so no evidence to suggest urinary retention. Patient to be switched from IV to oral Lasix today. Plan discharge to [facility] for tomorrow with  PT/OT and DME. Patient back to her cognitive baseline.     Review of Systems   Constitutional:  Negative for fever.   Respiratory:  Negative for shortness of breath.    Cardiovascular:  Negative for chest pain.   Gastrointestinal:  Negative for abdominal pain, constipation and nausea.   Musculoskeletal:  Positive for arthralgias (Right hip).   Neurological:  Positive for weakness (Generalized weakness).   Psychiatric/Behavioral:  Positive for confusion. Negative for agitation and hallucinations.    Objective:     Vital Signs (Most Recent):  Temp: 96.5 °F (35.8 °C) (07/13/23 1530)  Pulse: 78 (07/13/23 1530)  Resp: 16 (07/13/23 1530)  BP: 127/53 (07/13/23 1530)  SpO2: 92 % (07/13/23 1530) on room air Vital Signs (24h Range):  Temp:  [96.5 °F (35.8 °C)-98.2 °F (36.8 °C)] 96.5 °F (35.8 °C)  Pulse:  [61-78] 78  Resp:  [16-18] 16  SpO2:  [92 %-96 %] 92 %  BP: (127-163)/(53-70) 127/53     Weight: 76 kg (167 lb 8.8 oz)  Body mass index is 29.68 kg/m².    Intake/Output Summary (Last 24 hours) at 7/13/2023 1649  Last data filed at 7/13/2023 0915  Gross per 24 hour   Intake 480 ml   Output 0 ml   Net 480 ml         Physical Exam  Vitals and nursing note reviewed.   Constitutional:       General: She is awake. She is not in acute  distress.     Appearance: Normal appearance. She is normal weight. She is not ill-appearing.      Comments: Patient awake and alert and responsive on exam. Patient in no distress. Confused. Patient not on oxygen.    Neck:      Vascular: No JVD.   Cardiovascular:      Rate and Rhythm: Normal rate and regular rhythm.      Heart sounds: Normal heart sounds. No murmur heard.    No friction rub. No gallop.   Pulmonary:      Effort: Pulmonary effort is normal. No respiratory distress.      Breath sounds: Normal breath sounds. No wheezing.   Abdominal:      General: Abdomen is flat. Bowel sounds are normal. There is no distension.      Palpations: Abdomen is soft.      Tenderness: There is no abdominal tenderness. There is no guarding.   Musculoskeletal:      Right lower leg: No edema.      Left lower leg: No edema.   Skin:     General: Skin is warm.      Findings: No erythema.   Neurological:      Mental Status: She is alert. She is disoriented.      Comments: Oriented to self not place or time.    Psychiatric:         Mood and Affect: Mood normal.         Behavior: Behavior normal. Behavior is cooperative.           Significant Labs: CBC:   Recent Labs   Lab 07/13/23  0525   WBC 6.68   HGB 7.7*   HCT 25.5*        CMP:   Recent Labs   Lab 07/13/23  0525      K 3.3*   CL 99   CO2 30*      BUN 15   CREATININE 0.7   CALCIUM 8.4*   ALBUMIN 2.0*   ANIONGAP 9     Urine Culture, Routine   Date Value Ref Range Status   07/08/2023   Final    Multiple organisms isolated. None in predominance.  Repeat if   07/08/2023 clinically necessary.  Final       Significant Imaging: I have reviewed all pertinent imaging results/findings within the past 24 hours.

## 2023-07-13 NOTE — ASSESSMENT & PLAN NOTE
Chronic and controlled. Continue Lisinopril and Metoprolol to treat. Monitor vital signs every 4 hours. Target BP < 150/90.

## 2023-07-13 NOTE — ASSESSMENT & PLAN NOTE
- Resolved on 7/13. Patient back to room air with oxygen sats > 90% on room air.   - Patient with new acute respiratory failure with hypoxia on this admit.   - Patient with recent diagnosis of PE and noted to have bilateral L>R pleural effusions persistent on this admit and persistent wheezing prior to admit per family in last days to weeks prior to admit sounds consistent with volume overload developing with LE edema worsening before admit as well. Patient started on IV Lasix to treat acute diastolic heart failure and hypoxia improved with IV Lasix and treatment of acute heart failure.   - CTA of chest on this admit showed bilateral pleural effusions but no significant parenchymal disease. Sub optimal study but no obvious large PEs noted on CTA.   - Titrate oxygen and wean as tolerated to keep oxygen sats > 90%.

## 2023-07-13 NOTE — ASSESSMENT & PLAN NOTE
· Related to renal losses from Lasix. Continue to replace potassium as needed to try and keep potassium > 4 with oral potassium supplements.  · Monitor daily BMP to watch potassium level.

## 2023-07-13 NOTE — ASSESSMENT & PLAN NOTE
Related to renal losses from Lasix. Replace as needed with oral Magnesium supplements. Monitor Magnesium level daily.

## 2023-07-13 NOTE — PT/OT/SLP PROGRESS
"Occupational Therapy   Co-Treatment with Physical Therapy    Name: Radha Sandoval  MRN: [id number]  Admitting Diagnosis:  Multiple closed fractures of pelvis without disruption of pelvic ring     Pre-op Diagnosis: * No surgery found *  Recommendations:     Discharge Recommendations: nursing facility, skilled  Discharge Equipment Recommendations:  wheelchair, hospital bed  Barriers to discharge:  None    Assessment:     Radha Sandoval is a 88 y.o. female with a medical diagnosis of Multiple closed fractures of pelvis without disruption of pelvic ring.  She presents with the following performance deficits affecting function are weakness, impaired endurance, impaired self care skills, impaired functional mobility, gait instability, impaired balance, pain, decreased safety awareness, decreased lower extremity function, decreased ROM, orthopedic precautions, decreased coordination, impaired coordination, impaired cardiopulmonary response to activity. Patient with poor tolerance 2/2 c/o pain. Patient goals remain appropriate. Patient would benefit from continued OT services to address deficits and progress towards goals. Continue OT POC.    Rehab Prognosis:  Fair; patient would benefit from acute skilled OT services to address these deficits and reach maximum level of function.       Plan:     Patient to be seen 4 x/week to address the above listed problems via self-care/home management, neuromuscular re-education, therapeutic activities, therapeutic exercises  Plan of Care Expires: 08/09/23  Plan of Care Reviewed with: patient, caregiver    Subjective     Chief Complaint: "Come back tomorrow!"  Patient/Family Comments/goals: rest  Pain/Comfort:  Pain Rating 1:  (unrated)  Location - Side 1: Right  Location - Orientation 1: generalized  Location 1: leg  Pain Addressed 1: Reposition, Distraction, Cessation of Activity, Pre-medicate for activity  Pain Rating Post-Intervention 1: 0/10 (rest)    Objective:     Communicated with: " nurse morrison and OTR prior to session.  Patient found HOB elevated with telemetry upon OT entry to room.  A client care conference was completed by the OTR and the STAFFORD prior to treatment by the STAFFORD to discuss the patient's POC and current status.    General Precautions: Standard, fall    Orthopedic Precautions:LLE weight bearing as tolerated, RLE weight bearing as tolerated  Braces: N/A  Respiratory Status: Room air     Occupational Performance:     Bed Mobility:    Patient completed Scooting/Bridging with maximal assistance  Patient completed Supine to Sit with maximal assistance and 2 persons  Patient completed Sit to Supine with total assistance and 2 persons     Functional Mobility/Transfers:  Patient completed Sit <> Stand Transfer with minimum assistance and of 2 persons  with  rolling walker   Functional Mobility: ~4 small lateral steps to L along EOB using RW with Max(A)x2 ; significant R posterolateral lean     Activities of Daily Living:  Grooming: stand by assistance HOB elevated  Hair combing seated EOB with Max(A); decreased endurance  Lower Body Dressing: total assistance adjust B socks in long sitting      AMPAC 6 Click ADL: 14    Treatment & Education:  Patient educated on OT POC, goals, and current progress. Patient requires max encouragement for all mobility and is resistive during functional t/f's as indicated by severe posterior lean. Patient educated on the importance of OOB activity to maximize recovery and increase occupational performance. Addressed all patient questions/concerns within STAFFORD scope of practice. Co-treatment performed with PTA due to patient's complexity and benefit of 2 skilled therapists to facilitate functional and safe occupational performance, accommodate patient's activity tolerance, and maximize patient's participation in therapy.       Patient left HOB elevated with all lines intact, call button in reach, and caregiver present    GOALS:   Multidisciplinary Problems        Occupational Therapy Goals          Problem: Occupational Therapy    Goal Priority Disciplines Outcome Interventions   Occupational Therapy Goal     OT, PT/OT Ongoing, Progressing    Description: Goals to be met by: 7/10/23     Patient will increase functional independence with ADLs by performing:    UE Dressing with Minimal Assistance.  LE Dressing with Minimal Assistance.  Grooming while standing with Minimal Assistance.  Toileting from bedside commode with Stand-by Assistance for hygiene and clothing management.   Toilet transfer to bedside commode with Stand-by Assistance.                         Time Tracking:     OT Date of Treatment: 07/13/23  OT Start Time: 1054  OT Stop Time: 1115  OT Total Time (min): 21 min    Billable Minutes:Therapeutic Activity 21    OT/ALEXIS: ALEXIS     Number of ALEXIS visits since last OT visit: 1 7/13/2023

## 2023-07-14 NOTE — PLAN OF CARE
Problem: Infection  Goal: Absence of Infection Signs and Symptoms  Outcome: Ongoing, Progressing     Problem: Adult Inpatient Plan of Care  Goal: Plan of Care Review  Outcome: Ongoing, Progressing  Goal: Absence of Hospital-Acquired Illness or Injury  Outcome: Ongoing, Progressing  Goal: Optimal Comfort and Wellbeing  Outcome: Ongoing, Progressing  Goal: Readiness for Transition of Care  Outcome: Ongoing, Progressing     Problem: Impaired Wound Healing  Goal: Optimal Wound Healing  Outcome: Ongoing, Progressing     Problem: Skin Injury Risk Increased  Goal: Skin Health and Integrity  Outcome: Ongoing, Progressing     Problem: Fall Injury Risk  Goal: Absence of Fall and Fall-Related Injury  Outcome: Ongoing, Progressing

## 2023-07-14 NOTE — PLAN OF CARE
07/14/23 1051   Post-Acute Status   Post-Acute Authorization Home Health;Placement   Post-Acute Placement Status   (Pending transport time)   Home Health Status Referrals Sent   Discharge Plan   Discharge Plan A Assisted Living;Home Health   Discharge Plan B Return to Nursing Home     CLAUDIA called Sang[facility] garcia/ [facility] , to confirm admit for today. CLAUDIA hard faxed admit clinicals to #[phone]. [facility]'s DME delivered to [facility] facility. CLAUDIA met with the pt's caregiver at bedside to provide updates.     11:34 AM  CLAUDIA called [facility] to confirm paperwork received. Staff asked for questions. CLAUDIA sent corrections.    11:44 AM  [facility]- Accepted.  order in.     Saida Pablo LMSW  Case Management   [facility]   Ext. [phone]

## 2023-07-14 NOTE — NURSING
Patient transported via stretcher off unit. Belongings packed up and transported per caregiver at bedside. PIV removed.

## 2023-07-14 NOTE — PLAN OF CARE
07/14/23 1208   Post-Acute Status   Post-Acute Authorization Placement   Post-Acute Placement Status Set-up Complete/Auth obtained     Patient's set-up has been completed.CLAUDIA scheduled d/c transportation to [facility]. Patient is scheduled to be picked up at 1:00 pm. CLAUDIA provided patient's nurse with report number #[phone]; ask for the nurse for the floor. Requested  time does not guarantee arrival time.      CLAUDIA informed the pt and pt's caregiver at bedside, agreeable.      Saida Pablo LMSW  Case Management   [facility]   Ext. [phone]

## 2023-07-14 NOTE — PLAN OF CARE
[facility]    HOME HEALTH ORDERS  FACE TO FACE ENCOUNTER    Patient Name: Radha Sandoval   YOB: 1935     PCP: Primary Doctor No   PCP Address: None   PCP Phone Number: None   PCP Fax: None     Encounter Date: 07/14/2023     Discharging Team: [facility]    Admit to Home Health    Diagnoses:  Active Hospital Problems    Diagnosis  POA    *Multiple closed right sided fractures of pelvis without disruption of pelvic ring [S32.82XA]  Yes     Priority: 1 - High    Acute cystitis with hematuria [N30.01]  Yes     Priority: 2     Acute deep vein thrombosis (DVT) of femoral vein of right lower extremity [I82.411]  Yes     Priority: 3     Acute pulmonary embolism without acute cor pulmonale [I26.99]  Yes     Priority: 3     Urinary retention [R33.9]  Yes     Priority: 4     Acute hypoxemic respiratory failure [J96.01]  Yes     Priority: 5     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, 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]  Yes     Priority: 6     Acute diastolic congestive heart failure [I50.31]  Yes     Priority: 7     Subdural hygroma [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.08]  Yes     Priority: 8     Hypomagnesemia [E83.42]  Yes     Priority: 9     Hypokalemia [E87.6]  Yes     Priority: 11     Debility [R53.81]  Yes     Priority: 13     Primary hypertension [I10]  Yes     Priority: 14     Oropharyngeal dysphagia [R13.12]  Yes     Priority: 15     Goals of care, counseling/discussion [Z71.89]  Not Applicable     Priority: 16       Resolved Hospital Problems    Diagnosis Date Resolved POA    Acute metabolic encephalopathy [G93.41] 07/13/2023 Yes     Priority: 12     Fracture of pubis [S32.509A] 07/12/2023 Yes        Future Appointments   Date Time Provider Department Center   7/19/2023  8:30 AM [facility]   7/24/2023  1:15 PM [facility] OI-MRI3 [facility]   7/25/2023 11:00 AM [facility]-CT1 500 LB LIMIT [facility] CTSC [facility]   8/1/2023 10:30 AM Saida Maldonado PA-C [facility] NEUROS8 [facility]   9/7/2023   9:30 AM ASSESSMENT CLINIC [facility] NERPSY8 [facility] Gusman        My clinical findings that support the need for the home health skilled services and home bound status are the following:  Weakness/numbness causing balance and gait disturbance due to Fracture making it taxing to leave home.  Requiring assistive device to leave home due to unsteady gait caused by  Fracture.    Allergies: Review of patient's allergies indicates:  No Known Allergies     Diet: regular diet    Activities: Activity as tolerated; Weight bear as tolerated to right lower extremity    Nursing:   SN to complete comprehensive assessment including routine vital signs. Instruct on disease process and s/s of complications to report to MD. Review/verify medication list sent home with the patient at time of discharge  and instruct patient/caregiver as needed. Frequency may be adjusted depending on start of care date.    Notify MD if SBP > 160 or < 90; DBP > 90 or < 50; HR > 120 or < 50; Temp > 101    Ok to schedule additional visits based on staff availability and patient request on consecutive days within the home health episode.       Okay to crush medications to give to patient including pain medication. Okay to open capsule and give medication that way also.       When multiple disciplines ordered:     Start of Care occurs on Sunday - Wednesday schedule remaining discipline evaluations as ordered on separate consecutive days following the start of care.     Thursday SOC -schedule subsequent evaluations Friday and Monday the following week.      Friday - Saturday SOC - schedule subsequent discipline evaluations on consecutive days starting Monday of the following week.     For all post-discharge communication and subsequent orders please contact patient's primary care physician. If unable to reach primary care physician or do not receive response within 30 minutes, please contact [facility] for clinical staff order clarification.      CONSULTS:     Physical Therapy to evaluate and treat. Evaluate for home safety and equipment needs; Establish/upgrade home exercise program. Perform / instruct on therapeutic exercises, gait training, transfer training, and Range of Motion.  Occupational Therapy to evaluate and treat. Evaluate home environment for safety and equipment needs. Perform/Instruct on transfers, ADL training, ROM, and therapeutic exercises.  Aide to provide assistance with personal care, ADLs, and vital signs.      MISCELLANEOUS CARE:  Routine Skin for Bedridden Patients: Instruct patient/caregiver to apply moisture barrier cream to all skin folds and wet areas in perineal area daily and after baths and all bowel movements.      Medications: Review discharge medications with patient and family and provide education.         Medication List        START taking these medications      acetaminophen 325 MG tablet  Commonly known as: TYLENOL  Take 2 tablets (650 mg total) by mouth every 6 (six) hours as needed (Pain).     loperamide 2 mg capsule  Commonly known as: IMODIUM  Take 1 capsule (2 mg total) by mouth 4 (four) times daily as needed for Diarrhea.     melatonin 3 mg tablet  Commonly known as: MELATIN  Take 2 tablets (6 mg total) by mouth nightly as needed for Insomnia.     methocarbamoL 500 MG Tab  Commonly known as: ROBAXIN  Take 1 tablet (500 mg total) by mouth 3 (three) times daily as needed (muscle spasms, pain scale 5-7).     zinc sulfate 50 mg zinc (220 mg) capsule  Commonly known as: ZINCATE  Take 1 capsule (220 mg total) by mouth once daily.              CHANGE how you take these medications      furosemide 20 MG tablet  Commonly known as: LASIX  Take 1 tablet (20 mg total) by mouth 2 (two) times daily.  What changed: when to take this     haloperidoL 0.5 MG tablet  Commonly known as: HALDOL  Take 1 tablet (0.5 mg total) by mouth every evening.       lisinopriL 40 MG tablet  Commonly known as: PRINIVIL,ZESTRIL  Take 1 tablet (40 mg total) by  mouth once daily.       metoprolol tartrate 25 MG tablet  Commonly known as: LOPRESSOR  Take 1 tablet (25 mg total) by mouth 2 (two) times daily.       miconazole NITRATE 2 % 2 % top powder  Commonly known as: MICOTIN  Apply topically 2 (two) times daily. Underside of bilateral breasts       tamsulosin 0.4 mg Cap  Commonly known as: FLOMAX  Take 1 capsule (0.4 mg total) by mouth nightly.              CONTINUE taking these medications      apixaban 5 mg Tab  Commonly known as: ELIQUIS  Take 1 tablet (5 mg total) by mouth 2 (two) times daily.     ascorbic acid (vitamin C) 250 MG tablet  Commonly known as: VITAMIN C  Take 1 tablet (250 mg total) by mouth once daily.     EScitalopram oxalate 5 MG Tab  Commonly known as: LEXAPRO  Take 1 tablet (5 mg total) by mouth once daily.     memantine 5 MG Tab  Commonly known as: NAMENDA  Take 1 tablet (5 mg total) by mouth 2 (two) times daily.     potassium chloride 10 MEQ Tbsr  Commonly known as: KLOR-CON  Take 1 tablet (10 mEq total) by mouth once daily.            STOP taking these medications      PROMOD PROTEIN Liqd  Generic drug: protein supplement     zinc gluconate 50 mg tablet  Replaced by: zinc sulfate 50 mg zinc (220 mg) capsule                I certify that this patient is confined to her home and needs intermittent skilled nursing care, physical therapy, and occupational therapy.    _________________________________  Yelena Aparicio MD  07/14/2023

## 2023-07-15 PROBLEM — I50.31 ACUTE DIASTOLIC CONGESTIVE HEART FAILURE: Status: RESOLVED | Noted: 2023-01-01 | Resolved: 2023-01-01

## 2023-07-15 PROBLEM — Z71.89 GOALS OF CARE, COUNSELING/DISCUSSION: Status: RESOLVED | Noted: 2023-01-01 | Resolved: 2023-01-01

## 2023-07-15 PROBLEM — J96.01 ACUTE HYPOXEMIC RESPIRATORY FAILURE: Status: RESOLVED | Noted: 2023-01-01 | Resolved: 2023-01-01

## 2023-07-15 PROBLEM — R13.12 OROPHARYNGEAL DYSPHAGIA: Status: RESOLVED | Noted: 2023-01-01 | Resolved: 2023-01-01

## 2023-07-15 PROBLEM — R33.9 URINARY RETENTION: Status: RESOLVED | Noted: 2023-01-01 | Resolved: 2023-01-01

## 2023-07-15 PROBLEM — E83.42 HYPOMAGNESEMIA: Status: RESOLVED | Noted: 2023-01-01 | Resolved: 2023-01-01

## 2023-07-15 PROBLEM — N30.01 ACUTE CYSTITIS WITH HEMATURIA: Status: RESOLVED | Noted: 2023-01-01 | Resolved: 2023-01-01

## 2023-07-15 PROBLEM — E87.6 HYPOKALEMIA: Status: RESOLVED | Noted: 2023-01-01 | Resolved: 2023-01-01

## 2023-07-15 NOTE — ASSESSMENT & PLAN NOTE
- Acute heart failure resolved on discharge. Patient discharged on Lasix 20 mg po BID + Lisinopril and Metoprolol to treat.   - Much improved on 7/13. Plan to switch from IV to oral Lasix on 7/13 and monitor for 1 day to make sure still diuresising on oral lasix. Patient switched to Lasix 20 mg po BID on 7/13. Patient weaned off oxygen and not hypoxia on room air. Patient denies any SOB.   - Patient is identified as having Diastolic (HFpEF) heart failure that is Acute. Latest ECHO performed and demonstrates- Results for orders placed during the hospital encounter of 05/22/23    Echo    Interpretation Summary  · The estimated ejection fraction is 65%.  · The left ventricle is normal in size with concentric hypertrophy and normal systolic function.  · Grade I left ventricular diastolic dysfunction.  · Normal right ventricular size with normal right ventricular systolic function.  · Mild left atrial enlargement.  · There is mild aortic valve stenosis.  · Aortic valve area is 1.47 cm2; peak velocity is 2.9 m/s; mean gradient is 15 mmHg.  · There is pulmonary hypertension.  · The estimated PA systolic pressure is 54 mmHg.  · Intermediate central venous pressure (8 mmHg).    - Continue Metoprolol, Lisinopril and Lasix to treat heart failure.  - Patient started on IV Lasix 40 mg BID on admit to treat acute heart failure and diuresised very well with IV Lasix.   - Monitor clinical status closely.   - Monitor on telemetry.   - Patient is off CHF pathway.    - Monitor strict Is&Os and daily weights.    - Fluid restriction of 1.5 L. Cardiology has not been any consulted.   - Continue to stress to patient importance of self efficacy and  on diet for CHF. Last BNP reviewed- and noted below   Recent Labs   Lab 07/08/23  1549   *

## 2023-07-15 NOTE — ASSESSMENT & PLAN NOTE
Speech therapy consulted and recommended soft diet/minced diet and reports can updgrade once she receives her denture bond per recs on 7/10. Patient discharged on regular diet.

## 2023-07-15 NOTE — ASSESSMENT & PLAN NOTE
· Improved. Noted on previous lower extremity ultrasound in May 2023.    · Ultrasound of lower extremities on this admit showed improved clot burden with non occlusive thrombus noted in right common femoral veins with resolution of thrombus in right popliteal and and left femoral veins.   · Continue Apixiban 5 mg po BID to treat on discharge.

## 2023-07-15 NOTE — DISCHARGE SUMMARY
[facility] Medicine  Discharge Summary      Patient Name: Radha Sandoval  MRN: [id number]  CINDY: [id number]  Patient Class: IP- Inpatient  Admission Date: 7/8/2023  Hospital Length of Stay: 6 days  Discharge Date and Time: 7/14/2023  1:45 PM  Attending Physician: Yelena Aparicio MD   Discharging Provider: Yelena Aparicio MD  Primary Care Provider: Primary Doctor [facility] Medicine Team: [facility]  Yelena Aparicio MD  Primary Care Team: [facility]    HPI:   87 y/o WF hx of PE/DVT, bilateral on anticoagulation, Dementia, Hypertension, Debility presents from skilled nursing facility with unwitnessed fall.     She had recent admission to [facility] from [facility]-05/[facility], admitted for acute hypoxemic respiratory failure and was found with large Right sided PE, extensive RLE DVT and LLE DVT.  She had ECHO and cardiology evaluation without findings of right heart strain.  She was on heparin and then eliquis and discharge orders for coumadin.  Currently at SNF patient receiving Apixaban.   Noted to have Delirium and patient was treated with scheduled Haldol, report that seroquel worsened pts mental status and discontinued. She had urinary retention and duncan catheter placed during admission and patient discharged with indwelling catheter, with plans for voiding trial 6/5.     Today history per ED note and hired caretaker - Chle Bui who is with patient at bedside.  Chel states she presented to SNF to visit patient at approx 10am and found patient on the floor multiple feet from her bed and b/w bathroom.   Patient told her she was trying to ambulate. Caretaker noted patient has been wheezing this week    Since arrival to ED found with recurrent hypoxia and concern for pulmonary edema, persistent pleural effusions noted on CT, recurrent urinary retention s/p duncan catheter placement.  Plain film and CT imaging demonstrated Pubic fracture in 2 places Superior/inferior ramus. UA consistent with  recurrent cystitis.     CT head with questionable new subdural fluid collections, acute bleeding not suspected - Neurosurgery consulted eval pending.     She's received 40mg IV lasix, Vancomycin and Zosyn and referred for admission.     Chel is a hired caretaker, since patient is at SNF she visits few hours per day per patient preference.  She has noted in recent months/weeks patient with cognitive decline, frequent disorientation, hallucinations, repeats persecutory delusion about 3 men locking her in a cabin, pt repeats this toe me at bedside.   Patient oriented to self/birthdate, intermittently to hospital.  C/o of severe left foot pain when blankets removed from her, she is frustrated upset at being asked to repeat details of the fall.  Unclear if she understands that she suffered broken pelvis despite describing to her at bedside.     Extended Emergency Contact Information  Primary Emergency Contact: maykel griffith  Mobile Phone: [phone]  Relation: Son   needed? No  Secondary Emergency Contact: chel ramirez  Mobile Phone: [phone]  Relation: Other   needed? No              Hospital Course:   Patient admitted with closed right sided pubic rami fractures of right superior and inferior pubic rami seen on X-rays and CT of pelvis. Orthopedic surgery consulted and stated non-operative management needed for fractures as stable fractures and weight bear as tolerated to right lower extremity. PT/OT consulted. Patient placed on multimodal pain medications for pain management. 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 Neurosurgery consulted and evaluated. No acute surgical intervention needed. Repeat CTA of head and neck done on 7/10 and showed stable hygromas. Neurosurgery discussed with family her son Maykel and plan for outpatient follow-up with repeat CT scan of head in 2 weeks and plan outpatient middle meningeal artery embolization to treat hygromas and Neurosurgery to  arrange their own follow-up and signed off case on 7/10. Neurosurgery okay for continued anticoagulation with Apixiban to treat recent PEs and bilateral DVTs found on recent admit in 5/2023. Patient resumed on Apixiban 5 mg po BID to treat after cleared by Neurosurgery to resume on 7/10. Patient noted to be hypoxic on admit and CTA of chest showed bilateral pleural effusions and recent echo with elevated CVP and BNP elevated at 931 on admit consistent with acute on chronic diastolic heart failure. Patient placed on IV Lasix 40 mg BID to treat and diuresised well with improvement in hypoxia and able to be weaned off oxygen on 7/13. Lasix switched from IV to oral Lasix 20 mg po BID on 7/13 and patient discharged on this dose. Patient continued on home Metoprolol and Lisinopril to treat her chronic heart failure in hospital. Patient also noted on admit to have UTI and placed on IV Ceftriaxone to treat. Patient with frequent UTIs in past 1 year. Urine culture grew multiple organisms but none in predominance. Patient treated with IV Ceftriaxone for 3 days to treat UTI and no further antibiotics needed on discharge. Patient also with noted urinary retention in ED on admit and Spann placed. Patient has had recent issues with urinary retention causing need for Spann placement. Spann removed on 7/12 and patient able to void on her own. Ambulatory referral to Urology as outpatient to be placed on discharge due to recurrent issues with retention and UTIs. PT/OT recommended SNF on discharge but son declined and wanted patient placed in assisted living instead so family made arrangements on discharge for patient to discharge to [facility] and to be discharged with  PT/OT and ARIN and patient discharged to [facility] on 7/14/2023 and SW arranged  PT/OT on discharge. Patient's medications sent to [facility] on discharge to get filled as that is pharmacy that [facility] uses for their patients. Patient to follow-up  with Orthopedics on discharge to monitor fracture healing. Neurosurgery arranging their own follow-up of patient for her subdural hygromas on discharge. No home oxygen required on discharge.        Goals of Care Treatment Preferences:  Code Status: Full Code       LaPOST: Yes  What is most important right now is to focus on extending life as long as possible, even it it means sacrificing quality, curative/life-prolongation (regardless of treatment burdens).  Accordingly, we have decided that the best plan to meet the patient's goals includes continuing with treatment.      Consults:   Consults (From admission, onward)        Status Ordering Provider     Inpatient consult to Neurosurgery  Once        Provider:  (Not yet assigned)    Completed DEB ROTHMAN     Inpatient consult to Orthopedic Surgery  Once        Provider:  (Not yet assigned)    Completed FRANKLIN MCPHERSON          Neuro  Subdural hygroma  - Noted on CT head of head on admit to have new and chronic bilateral hygromas left > right.   - Neurosurgery consulted and evaluated and no acute hemorrhage concern.  - CTA of head and neck done as per Neurosurgery recs and showed stable bilateral hygromas.  - Per Neurosurgery no acute intervention needed and Neurosurgery to plan for middle meningeal artery embolization outpatient for treatment of stable subdural hygromas as well as follow-up in Neurosurgery clinic with CT head in 2 weeks that Neurosurgery will schedule.   - Per Neurosurgery, okay to resume oral anti-coagulation for acute PE/DVTs given likely higher benefit rather than risk.    Pulmonary  Acute hypoxemic respiratory failure-resolved as of 7/15/2023  - Resolved on 7/13. Patient back to room air with oxygen sats > 90% on room air. No oxygen needed on discharge.  - Patient with new acute respiratory failure with hypoxia on this admit.   - Patient with recent diagnosis of PE and noted to have bilateral L>R pleural effusions persistent on this admit and  persistent wheezing prior to admit per family in last days to weeks prior to admit sounds consistent with volume overload developing with LE edema worsening before admit as well. Patient started on IV Lasix to treat acute diastolic heart failure and hypoxia improved with IV Lasix and treatment of acute heart failure.   - CTA of chest on this admit showed bilateral pleural effusions but no significant parenchymal disease. Sub optimal study but no obvious large PEs noted on CTA.   - Titrate oxygen and wean as tolerated to keep oxygen sats > 90%.     Cardiac/Vascular  Primary hypertension  Chronic and controlled. Continue Lisinopril and Metoprolol to treat on discharge.      Acute diastolic congestive heart failure  - Acute heart failure resolved on discharge. Patient discharged on Lasix 20 mg po BID + Lisinopril and Metoprolol to treat.   - Much improved on 7/13. Plan to switch from IV to oral Lasix on 7/13 and monitor for 1 day to make sure still diuresising on oral lasix. Patient switched to Lasix 20 mg po BID on 7/13. Patient weaned off oxygen and not hypoxia on room air. Patient denies any SOB.   - Patient is identified as having Diastolic (HFpEF) heart failure that is Acute. Latest ECHO performed and demonstrates- Results for orders placed during the hospital encounter of 05/22/23    Echo    Interpretation Summary  · The estimated ejection fraction is 65%.  · The left ventricle is normal in size with concentric hypertrophy and normal systolic function.  · Grade I left ventricular diastolic dysfunction.  · Normal right ventricular size with normal right ventricular systolic function.  · Mild left atrial enlargement.  · There is mild aortic valve stenosis.  · Aortic valve area is 1.47 cm2; peak velocity is 2.9 m/s; mean gradient is 15 mmHg.  · There is pulmonary hypertension.  · The estimated PA systolic pressure is 54 mmHg.  · Intermediate central venous pressure (8 mmHg).    - Continue Metoprolol, Lisinopril and  Lasix to treat heart failure.  - Patient started on IV Lasix 40 mg BID on admit to treat acute heart failure and diuresised very well with IV Lasix.   - Monitor clinical status closely.   - Monitor on telemetry.   - Patient is off CHF pathway.    - Monitor strict Is&Os and daily weights.    - Fluid restriction of 1.5 L. Cardiology has not been any consulted.   - Continue to stress to patient importance of self efficacy and  on diet for CHF. Last BNP reviewed- and noted below   Recent Labs   Lab 07/08/23  1549   *       Renal/  Hypokalemia-resolved as of 7/15/2023  · Resolved on discharge. Patient discharged on Potassium chloride 10 mEq po daily.  · Related to renal losses from Lasix. Continue to replace potassium as needed to try and keep potassium > 4 with oral potassium supplements.      Hypomagnesemia-resolved as of 7/15/2023  Resolved on discharge. Related to renal losses from Lasix. Replace as needed with oral Magnesium supplements.       Urinary retention-resolved as of 7/15/2023  · Resolved on 7/13. No Spann catheter will be needed on hospital discharge. Continue to monitor with bladder scans every shift in hospital to monitor bladder residuals as patient has known urinary incontinence making accurate I+O's impossible to measure.   · Patient with urinary retention noted in ED on admit and Spann catheter placed. Voiding trial done on 7/12 with removal of Spann. This has been an on and off issue with patient in past several months.  · Outpatient referral to Urology clinic on discharge for recurrent urinary retention and frequent UTIs.     Acute cystitis with hematuria-resolved as of 7/15/2023  - Resolved.   - Noted to have inability to urinate in ED and urinary retention. Son reports baseline incontinence prior to this so posisble UTI caused retention prior to admit.  - Patient with frequent UTIs in past per son and no culture data recently and has potential of resistance causing reinfection  also but also have diverticulum seen of urethra which may be a nidus. Will refer to Urology as outpatient on discharge.   - Urine culture sent and returned with multiple organisms but none in predominance and treated with IV Ceftriaxone x 3 days and now has completed treatment for UTI.       Hematology  Acute deep vein thrombosis (DVT) of femoral vein of right lower extremity  · Improved. Noted on previous lower extremity ultrasound in May 2023.    · Ultrasound of lower extremities on this admit showed improved clot burden with non occlusive thrombus noted in right common femoral veins with resolution of thrombus in right popliteal and and left femoral veins.   · Continue Apixiban 5 mg po BID to treat on discharge.     Acute pulmonary embolism without acute cor pulmonale  · Diagnosed with recent PEs and bilateral DVTs last hospital admission (5/22-5/23/23). Patient was on Apixiban but transitioned to Warfarin per discharge summary. Prior progress notes indicate she was switched to Warfarin due to potential high cost of Apixiban as outpatient. Per nursing facility MAR she is back on Apixiban. Son reports that at some point had it held he was told given easy bruising and was told clots may have gone away.   · Patient placed on Apixiban 5 mg po BID on this admit to treat PE and DVTs and to continue on discharge.   · Repeat CTA of chest on this admit was sub optimal but no large PE noted.       GI  Oropharyngeal dysphagia-resolved as of 7/15/2023  Speech therapy consulted and recommended soft diet/minced diet and reports can updgrade once she receives her denture bond per recs on 7/10. Patient discharged on regular diet.       Orthopedic  * Multiple closed right sided fractures of pelvis without disruption of pelvic ring  · Patient with CT scan of pelvis on admit that showed closed fractures of right inferior and superior pubic rami but stable fractures.  · Orthopedic surgery consulted and recommended non-operative  management of right sided pelvic fractures and weight bear as tolerated to right lower extremity and to continue on discharge.   · PT/OT consulted and recommended SNF on discharge but family not interested and wants patient placed at [facility] facility with home health PT/OT on discharge and arrangements made with family with CM/CLAUDIA and patient discharged to [facility] with HH PT/OT on 7/14.   · Patient on long term anticoagulation for recent PEs and bilateral proximal DVTs with Apixiban 5 mg po BID to treat and continued in hospital so no other chemical DVT prophylaxis needed on discharge except Apixiban.    · Pain management with multimodal pain medications and pain controlled. Continue scheduled Tylenol 1000 mg po every 8 hours and Robaxin prn for breakthrough pain on discharge.       Palliative Care  Goals of care, counseling/discussion  Advance Care Planning   Patient able to voice her wishes on 7/10 as back at her mental status baseline and her sitter and I were able to ask her specifically what those were with her sitter asking her if she were to become incapacitated and if she were to be near death from not breathing or heart stopping and need cpr or berathing or feeding tube would she want this and she stated to both of us that she would want us to try to do what we could to save her and would want to be full code and will keep this in her chart.    Other  Debility  · With recent hospital stays - patient's caretaker notes worsening debility and patient's cognitive deficits compound issue. Patient's unwitnessed fall occurred on this admit as patient reported to be trying to walk to the bathroom when this occurred on her own.   · Son reports that prior to these recent multiple admits she was doing well but has had multiple serious illnesses including incarcerated hernia, PE, UTIs which have led to worsening baseline status in last few months.  · Continue PT/OT on discharge with  PT/OT and DME to  help with debility.     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 Patient at cognitive baseline.  · Continue delirium and fall precautions on discharge to assisted living.   · Continue Haldol 0.5 mg po nightly to help with sundowning on discharge. Continue Namenda 5 mg po BID to help with dementia on discharge.   · Continue Lexapro 5 mg po daily to help with mood disturbance related to dementia on discharge.       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, F02.83] 05/22/2023 Yes    Acute diastolic congestive heart failure [I50.31] 07/08/2023 Yes    Subdural hygroma [G96.08] 07/08/2023 Yes    Debility [R53.81] 04/25/2023 Yes    Primary hypertension [I10] 06/10/2022 Yes    Oropharyngeal dysphagia [R13.12] 07/09/2023 Yes    Goals of care, counseling/discussion [Z71.89] 07/09/2023 Not Applicable      Problems Resolved During this Admission:    Diagnosis Date Noted Date Resolved POA    Acute cystitis with hematuria [N30.01] 04/18/2023 07/15/2023 Yes    Hypomagnesemia [E83.42] 07/09/2023 07/15/2023 Yes    Hypokalemia [E87.6] 07/09/2023 07/15/2023 Yes    Acute metabolic encephalopathy [G93.41] 07/09/2023 07/13/2023 Yes    Fracture of pubis [S32.509A] 07/08/2023 07/12/2023 Yes       Discharged Condition: good    Disposition: Home-Health Care Svc; Patient discharged to [facility]    Follow Up:  Future Appointments   Date Time Provider Department Center   7/19/2023  8:30  "AM KAROL Greene II, MD [facility] IM [facility] PC[facility]   7/24/2023  1:15 PM [facility] OI-MRI3 [facility]   7/25/2023 11:00 AM [facility]-CT1 500 LB LIMIT [facility] CTSC [facility]   7/26/2023  2:00 PM Cayden Marroquin NP [facility] ORTHO [facility]   8/1/2023 10:30 AM Saida Maldonado PA-C [facility] NEUROS8 [facility]   9/7/2023  9:30 AM ASSESSMENT CLINIC [facility] NERPSY8 [facility]        Follow-up Information     [facility] - Orthopedics 5th Fl Follow up.    Specialty: Orthopedics  Why: hospital follow up for pelvic fx in 3-4 weeks  Contact information:  Yeyo Gusman, 5th Floor  [facility] [address] [phone]  Additional information:  [facility] - Main Building, 5th Floor   Please park in [address] and take Atrium elevator           [facility] - Urology At[facility] 4th Fl Follow up.    Specialty: Urology  Why: referral placed to establish care for frequent UTIS  Contact information:  Yyeo Gusman  [facility] [address] [phone]  Additional information:  Main Building, 4th Floor   Please park in [address] and take Atrium elevator           Primary Doctor No Follow up.           [facility] [place] [facility].    Specialty: Home Health Services  Contact information:  [address]  [address]  [place] [address]  [phone]                       Patient Instructions:      HOSPITAL BED FOR HOME USE   Order Comments: Requesting No Side rail's - pt admitting to assisted living facility     Order Specific Question Answer Comments   Type: Semi-electric    Length of need (1-99 months): 99    Does patient have medical equipment at home? rollator    Does patient have medical equipment at home? shower chair    Does patient have medical equipment at home? grab bar    Height: 5' 3" (1.6 m)    Weight: 76 kg (167 lb 8.8 oz)    Please check all that apply: Patient requires frequent changes in body position and/or has an immediate need for a change in body position.  " "  Please check all that apply: Patient requires, for the alleviation of pain, positioning of the body in ways not feasible in an ordinary bed.    Please check all that apply: Patient requires positioning of the body in ways not feasible in an ordinary bed due to a medical condition which is expected to last at least one month.      COMMODE FOR HOME USE     Order Specific Question Answer Comments   Type: Standard    Height: 5' 3" (1.6 m)    Weight: 76 kg (167 lb 8.8 oz)    Does patient have medical equipment at home? rollator    Does patient have medical equipment at home? shower chair    Does patient have medical equipment at home? grab bar    Length of need (1-99 months): 99      WHEELCHAIR FOR HOME USE     Order Specific Question Answer Comments   Hours in W/C per day: 8    Type of Wheelchair: Standard    Size(Width): 18"(STD adult)    Leg Support: STD footrests    Lap Belt: Velcro    Accessories: Anti-tippers    Cushion: Basic    Reclining Back No    Height: 5' 3" (1.6 m)    Weight: 76 kg (167 lb 8.8 oz)    Does patient have medical equipment at home? rollator    Does patient have medical equipment at home? shower chair    Does patient have medical equipment at home? grab bar    Length of need (1-99 months): 99    Please check all that apply: The patient requires the use of a w/c for activities of daily living within the Home.    Please check all that apply: Patient mobility limitations cannot be sufficiently resolved by the use of other ambulatory therapies.    Please check all that apply: Patient's upper body strength is sufficient for propulsion.      WALKER FOR HOME USE     Order Specific Question Answer Comments   Type of Walker: Adult (5'4"-6'6")    With wheels? Yes    Height: 5' 3" (1.6 m)    Weight: 76 kg (167 lb 8.8 oz)    Length of need (1-99 months): 99    Does patient have medical equipment at home? rollator    Does patient have medical equipment at home? shower chair    Does patient have medical " equipment at home? grab bar    Please check all that apply: Patient's condition impairs ambulation.    Please check all that apply: Walker will be used for gait training.      Ambulatory referral/consult to Urology   Standing Status: Future   Referral Priority: Routine Referral Type: Consultation   Referral Reason: Specialty Services Required   Requested Specialty: Urology   Number of Visits Requested: 1     Diet Adult Regular     Notify your health care provider if you experience any of the following:  temperature >100.4     Notify your health care provider if you experience any of the following:  persistent nausea and vomiting or diarrhea     Notify your health care provider if you experience any of the following:  severe uncontrolled pain     Notify your health care provider if you experience any of the following:  redness, tenderness, or signs of infection (pain, swelling, redness, odor or green/yellow discharge around incision site)     Notify your health care provider if you experience any of the following:  difficulty breathing or increased cough     Notify your health care provider if you experience any of the following:  severe persistent headache     Notify your health care provider if you experience any of the following:  worsening rash     Notify your health care provider if you experience any of the following:  persistent dizziness, light-headedness, or visual disturbances     Notify your health care provider if you experience any of the following:  increased confusion or weakness     Activity as tolerated     Weight bearing restrictions (specify):   Order Comments: Weight bear as tolerated to right lower extremity       Significant Diagnostic Studies:   Hemoglobin   Date Value Ref Range Status   07/14/2023 8.2 (L) 12.0 - 16.0 g/dL Final   07/13/2023 7.7 (L) 12.0 - 16.0 g/dL Final   07/11/2023 8.3 (L) 12.0 - 16.0 g/dL Final   07/10/2023 7.8 (L) 12.0 - 16.0 g/dL Final   07/09/2023 8.4 (L) 12.0 - 16.0  g/dL Final     Hematocrit   Date Value Ref Range Status   07/14/2023 25.9 (L) 37.0 - 48.5 % Final   07/13/2023 25.5 (L) 37.0 - 48.5 % Final   07/11/2023 26.9 (L) 37.0 - 48.5 % Final   07/10/2023 25.1 (L) 37.0 - 48.5 % Final   07/09/2023 27.2 (L) 37.0 - 48.5 % Final     BUN   Date Value Ref Range Status   07/14/2023 13 8 - 23 mg/dL Final   07/13/2023 15 8 - 23 mg/dL Final   07/11/2023 13 8 - 23 mg/dL Final   07/10/2023 13 8 - 23 mg/dL Final   07/10/2023 15 8 - 23 mg/dL Final     Creatinine   Date Value Ref Range Status   07/14/2023 0.8 0.5 - 1.4 mg/dL Final   07/13/2023 0.7 0.5 - 1.4 mg/dL Final   07/11/2023 0.7 0.5 - 1.4 mg/dL Final   07/10/2023 0.8 0.5 - 1.4 mg/dL Final   07/10/2023 0.7 0.5 - 1.4 mg/dL Final        Medications:  Reconciled Home Medications:      Medication List      START taking these medications    acetaminophen 325 MG tablet  Commonly known as: TYLENOL  Take 2 tablets (650 mg total) by mouth every 6 (six) hours as needed (Pain).     loperamide 2 mg capsule  Commonly known as: IMODIUM  Take 1 capsule (2 mg total) by mouth 4 (four) times daily as needed for Diarrhea.     melatonin 3 mg tablet  Commonly known as: MELATIN  Take 2 tablets (6 mg total) by mouth nightly as needed for Insomnia.     methocarbamoL 500 MG Tab  Commonly known as: ROBAXIN  Take 1 tablet (500 mg total) by mouth 3 (three) times daily as needed (muscle spasms, pain scale 5-7).     zinc sulfate 50 mg zinc (220 mg) capsule  Commonly known as: ZINCATE  Take 1 capsule (220 mg total) by mouth once daily.  Replaces: zinc gluconate 50 mg tablet        CHANGE how you take these medications    furosemide 20 MG tablet  Commonly known as: LASIX  Take 1 tablet (20 mg total) by mouth 2 (two) times daily.  What changed: when to take this     haloperidoL 0.5 MG tablet  Commonly known as: HALDOL  Take 1 tablet (0.5 mg total) by mouth every evening.  What changed:   · medication strength  · how much to take     lisinopriL 40 MG tablet  Commonly  known as: PRINIVIL,ZESTRIL  Take 1 tablet (40 mg total) by mouth once daily.  What changed:   · medication strength  · how much to take     metoprolol tartrate 25 MG tablet  Commonly known as: LOPRESSOR  Take 1 tablet (25 mg total) by mouth 2 (two) times daily.  What changed: how much to take     miconazole NITRATE 2 % 2 % top powder  Commonly known as: MICOTIN  Apply topically 2 (two) times daily. Underside of bilateral breasts  What changed:   · how to take this  · when to take this  · additional instructions     tamsulosin 0.4 mg Cap  Commonly known as: FLOMAX  Take 1 capsule (0.4 mg total) by mouth nightly.  What changed:   · how much to take  · when to take this        CONTINUE taking these medications    apixaban 5 mg Tab  Commonly known as: ELIQUIS  Take 1 tablet (5 mg total) by mouth 2 (two) times daily.     ascorbic acid (vitamin C) 250 MG tablet  Commonly known as: VITAMIN C  Take 1 tablet (250 mg total) by mouth once daily.     EScitalopram oxalate 5 MG Tab  Commonly known as: LEXAPRO  Take 1 tablet (5 mg total) by mouth once daily.     memantine 5 MG Tab  Commonly known as: NAMENDA  Take 1 tablet (5 mg total) by mouth 2 (two) times daily.     potassium chloride 10 MEQ Tbsr  Commonly known as: KLOR-CON  Take 1 tablet (10 mEq total) by mouth once daily.        STOP taking these medications    PROMOD PROTEIN Liqd  Generic drug: protein supplement     zinc gluconate 50 mg tablet  Replaced by: zinc sulfate 50 mg zinc (220 mg) capsule            Indwelling Lines/Drains at time of discharge:   Lines/Drains/Airways     None                 Time spent on the discharge of patient: 32 minutes         Yelena Aparicio MD  Department of Hospital Medicine  [facility] - Surgery

## 2023-07-15 NOTE — ASSESSMENT & PLAN NOTE
· Resolved on 7/13. No Spann catheter will be needed on hospital discharge. Continue to monitor with bladder scans every shift in hospital to monitor bladder residuals as patient has known urinary incontinence making accurate I+O's impossible to measure.   · Patient with urinary retention noted in ED on admit and Spann catheter placed. Voiding trial done on 7/12 with removal of Spann. This has been an on and off issue with patient in past several months.  · Outpatient referral to Urology clinic on discharge for recurrent urinary retention and frequent UTIs.

## 2023-07-15 NOTE — ASSESSMENT & PLAN NOTE
· Diagnosed with recent PEs and bilateral DVTs last hospital admission (5/22-5/23/23). Patient was on Apixiban but transitioned to Warfarin per discharge summary. Prior progress notes indicate she was switched to Warfarin due to potential high cost of Apixiban as outpatient. Per nursing facility MAR she is back on Apixiban. Son reports that at some point had it held he was told given easy bruising and was told clots may have gone away.   · Patient placed on Apixiban 5 mg po BID on this admit to treat PE and DVTs and to continue on discharge.   · Repeat CTA of chest on this admit was sub optimal but no large PE noted.

## 2023-07-15 NOTE — ASSESSMENT & PLAN NOTE
- Resolved on 7/13. Patient back to room air with oxygen sats > 90% on room air. No oxygen needed on discharge.  - Patient with new acute respiratory failure with hypoxia on this admit.   - Patient with recent diagnosis of PE and noted to have bilateral L>R pleural effusions persistent on this admit and persistent wheezing prior to admit per family in last days to weeks prior to admit sounds consistent with volume overload developing with LE edema worsening before admit as well. Patient started on IV Lasix to treat acute diastolic heart failure and hypoxia improved with IV Lasix and treatment of acute heart failure.   - CTA of chest on this admit showed bilateral pleural effusions but no significant parenchymal disease. Sub optimal study but no obvious large PEs noted on CTA.   - Titrate oxygen and wean as tolerated to keep oxygen sats > 90%.

## 2023-07-15 NOTE — ASSESSMENT & PLAN NOTE
Resolved on discharge. Related to renal losses from Lasix. Replace as needed with oral Magnesium supplements.

## 2023-07-15 NOTE — ASSESSMENT & PLAN NOTE
· Resolved on discharge. Patient discharged on Potassium chloride 10 mEq po daily.  · Related to renal losses from Lasix. Continue to replace potassium as needed to try and keep potassium > 4 with oral potassium supplements.

## 2023-07-15 NOTE — ASSESSMENT & PLAN NOTE
· Patient with CT scan of pelvis on admit that showed closed fractures of right inferior and superior pubic rami but stable fractures.  · Orthopedic surgery consulted and recommended non-operative management of right sided pelvic fractures and weight bear as tolerated to right lower extremity and to continue on discharge.   · PT/OT consulted and recommended SNF on discharge but family not interested and wants patient placed at [facility] facility with home health PT/OT on discharge and arrangements made with family with CM/CLAUDIA and patient discharged to [facility] with  PT/OT on 7/14.   · Patient on long term anticoagulation for recent PEs and bilateral proximal DVTs with Apixiban 5 mg po BID to treat and continued in hospital so no other chemical DVT prophylaxis needed on discharge except Apixiban.    · Pain management with multimodal pain medications and pain controlled. Continue scheduled Tylenol 1000 mg po every 8 hours and Robaxin prn for breakthrough pain on discharge.

## 2023-07-15 NOTE — ASSESSMENT & PLAN NOTE
· Chronic condition. Patient at cognitive baseline.  · Continue delirium and fall precautions on discharge to assisted living.   · Continue Haldol 0.5 mg po nightly to help with sundowning on discharge. Continue Namenda 5 mg po BID to help with dementia on discharge.   · Continue Lexapro 5 mg po daily to help with mood disturbance related to dementia on discharge.

## 2023-07-15 NOTE — ASSESSMENT & PLAN NOTE
· With recent hospital stays - patient's caretaker notes worsening debility and patient's cognitive deficits compound issue. Patient's unwitnessed fall occurred on this admit as patient reported to be trying to walk to the bathroom when this occurred on her own.   · Son reports that prior to these recent multiple admits she was doing well but has had multiple serious illnesses including incarcerated hernia, PE, UTIs which have led to worsening baseline status in last few months.  · Continue PT/OT on discharge with  PT/OT and DME to help with debility.

## 2023-07-17 NOTE — PLAN OF CARE
[facility]  Discharge Final Note    Primary Care Provider: Primary Doctor No    Expected Discharge Date: 7/14/2023    Final Discharge Note (most recent)       Final Note - 07/14/23 1345          Final Note    Assessment Type Final Discharge Note     Anticipated Discharge Disposition Home-Health Care [facility] Resources/Appts/Education Provided Provided patient/caregiver with written discharge plan information;Appointments scheduled by Navigator/Coordinator                   Future Appointments   Date Time Provider Department Center   7/19/2023  8:30 AM KAROL Greene II, MD [facility] IM [facility]   7/24/2023  1:15 PM [facility]-MRI3 [facility]   7/25/2023 11:00 AM [facility]-CT1 500 LB LIMIT [facility]   7/26/2023  2:00 PM Cayden Marroquin NP [facility] ORTHO [facility]   8/1/2023 10:30 AM Saida Maldonado PA-C [facility] NEUROS8 [facility]   9/7/2023  9:30 AM ASSESSMENT CLINIC [facility] NERPSY8 [facility]     Important Message from Medicare  Important Message from Medicare regarding Discharge Appeal Rights: Given to patient/caregiver, Explained to patient/caregiver, Signed/date by patient/caregiver     Date IMM was signed: 07/14/23  Time IMM was signed: 1128    Contact Info       [facility] [address]   Specialty: Orthopedics    [address]  [facility] [address]   Phone: [phone]       Next Steps: Follow up    Instructions: hospital follow up for pelvic fx in 3-4 weeks    [facility] [address]   Specialty: Urology    [address]  [facility] [address]   Phone: [phone]       Next Steps: Follow up    Instructions: referral placed to establish care for frequent UTIS    No, Primary Doctor   Relationship: PCP - General        Next Steps: Follow up    [facility]   Specialty: Home Health Services    [address]  [address]  [facility] [address]   Phone: [phone]       Next Steps: Follow up

## 2023-07-20 NOTE — PROGRESS NOTES
HOSPITAL FOLLOWUP NOTE    Discharge note information (in italics) was reviewed in detail and discussed with the patient:   Hospital Course:   Patient admitted with closed right sided pubic rami fractures of right superior and inferior pubic rami seen on X-rays and CT of pelvis. Orthopedic surgery consulted and stated non-operative management needed for fractures as stable fractures and weight bear as tolerated to right lower extremity. PT/OT consulted. Patient placed on multimodal pain medications for pain management. 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 Neurosurgery consulted and evaluated. No acute surgical intervention needed. Repeat CTA of head and neck done on 7/10 and showed stable hygromas. Neurosurgery discussed with family her son Maykel and plan for outpatient follow-up with repeat CT scan of head in 2 weeks and plan outpatient middle meningeal artery embolization to treat hygromas and Neurosurgery to arrange their own follow-up and signed off case on 7/10. Neurosurgery okay for continued anticoagulation with Apixiban to treat recent PEs and bilateral DVTs found on recent admit in 5/2023. Patient resumed on Apixiban 5 mg po BID to treat after cleared by Neurosurgery to resume on 7/10. Patient noted to be hypoxic on admit and CTA of chest showed bilateral pleural effusions and recent echo with elevated CVP and BNP elevated at 931 on admit consistent with acute on chronic diastolic heart failure. Patient placed on IV Lasix 40 mg BID to treat and diuresised well with improvement in hypoxia and able to be weaned off oxygen on 7/13. Lasix switched from IV to oral Lasix 20 mg po BID on 7/13 and patient discharged on this dose. Patient continued on home Metoprolol and Lisinopril to treat her chronic heart failure in hospital. Patient also noted on admit to have UTI and placed on IV Ceftriaxone to treat. Patient with frequent UTIs in past 1 year. Urine culture grew multiple organisms but none  in predominance. Patient treated with IV Ceftriaxone for 3 days to treat UTI and no further antibiotics needed on discharge. Patient also with noted urinary retention in ED on admit and Spann placed. Patient has had recent issues with urinary retention causing need for Spann placement. Spann removed on 7/12 and patient able to void on her own. Ambulatory referral to Urology as outpatient to be placed on discharge due to recurrent issues with retention and UTIs. PT/OT recommended SNF on discharge but son declined and wanted patient placed in assisted living instead so family made arrangements on discharge for patient to discharge to [facility] and to be discharged with  PT/OT and DME and patient discharged to [facility] on 7/14/2023 and SW arranged HH PT/OT on discharge. Patient's medications sent to [email or web address] on discharge to get filled as that is pharmacy that [facility] uses for their patients. Patient to follow-up with Orthopedics on discharge to monitor fracture healing. Neurosurgery arranging their own follow-up of patient for her subdural hygromas on discharge. No home oxygen required on discharge.        PMFSH: all information reviewed and updated as necessary during this encounter.  Medication list reviewed and reconciled.    Pt still in pain but unable to communicate sufficiently to gather new information. This appointment was made in error for this patient who wanted to establish care with a new PCP and I am retiring in a few weeks.  Her hired sitter is with her today and she is her primary care giver. The patient's 2 sons were not present and according to the sitter have unrealistic exsections for the patient's recovery. She is now in an assisted living facility but according to her care giver with her, this is insufficient to meet the patient's needs going forward. She requires 24/7 supervision and is 100% unable to perform any ADLs alone.      Review of Systems   Unable to perform ROS:  Dementia   Musculoskeletal:  Positive for falls and joint pain.   Physical Exam  Vitals reviewed.   Constitutional:       General: She is not in acute distress.     Appearance: She is not ill-appearing, toxic-appearing or diaphoretic.   Cardiovascular:      Rate and Rhythm: Normal rate.   Pulmonary:      Effort: Pulmonary effort is normal.   Neurological:      Mental Status: She is alert.      Comments: Non communicative during the appointment today.       Assessment/plan:  1. Hospital discharge follow-up  I agree that she needs 24 hour care and recommend nursing home placement. She will est care with a PCP soon and follow up on the care situation with her new PCP.     2. Multiple closed fractures of pelvis without disruption of pelvic ring with routine healing, subsequent encounter  Comments:  Keep follow up in orthopedics and continue current medications.    3. Subdural hygroma  Comments:  Keep follow up with neurosurgery as scheduled.     4. Dementia, unspecified dementia severity, unspecified dementia type, unspecified whether behavioral, psychotic, or mood disturbance or anxiety  Comments:  Keep follow up in neurology and continue current medications       Medication List with Changes/Refills   Current Medications    ACETAMINOPHEN (TYLENOL) 325 MG TABLET    Take 2 tablets (650 mg total) by mouth every 6 (six) hours as needed (Pain).    APIXABAN (ELIQUIS) 5 MG TAB    Take 1 tablet (5 mg total) by mouth 2 (two) times daily.    ASCORBIC ACID, VITAMIN C, (VITAMIN C) 250 MG TABLET    Take 1 tablet (250 mg total) by mouth once daily.    ESCITALOPRAM OXALATE (LEXAPRO) 5 MG TAB    Take 1 tablet (5 mg total) by mouth once daily.    FUROSEMIDE (LASIX) 20 MG TABLET    Take 1 tablet (20 mg total) by mouth 2 (two) times daily.    HALOPERIDOL (HALDOL) 0.5 MG TABLET    Take 1 tablet (0.5 mg total) by mouth every evening.    LISINOPRIL (PRINIVIL,ZESTRIL) 40 MG TABLET    Take 1 tablet (40 mg total) by mouth once daily.     LOPERAMIDE (IMODIUM) 2 MG CAPSULE    Take 1 capsule (2 mg total) by mouth 4 (four) times daily as needed for Diarrhea.    MELATONIN (MELATIN) 3 MG TABLET    Take 2 tablets (6 mg total) by mouth nightly as needed for Insomnia.    MEMANTINE (NAMENDA) 5 MG TAB    Take 1 tablet (5 mg total) by mouth 2 (two) times daily.    METHOCARBAMOL (ROBAXIN) 500 MG TAB    Take 1 tablet (500 mg total) by mouth 3 (three) times daily as needed (muscle spasms, pain scale 5-7).    METOPROLOL TARTRATE (LOPRESSOR) 25 MG TABLET    Take 1 tablet (25 mg total) by mouth 2 (two) times daily.    MICONAZOLE NITRATE 2 % (MICOTIN) 2 % TOP POWDER    Apply topically 2 (two) times daily. Underside of bilateral breasts    POTASSIUM CHLORIDE (KLOR-CON) 10 MEQ TBSR    Take 1 tablet (10 mEq total) by mouth once daily.    TAMSULOSIN (FLOMAX) 0.4 MG CAP    Take 1 capsule (0.4 mg total) by mouth nightly.    ZINC SULFATE (ZINCATE) 50 MG ZINC (220 MG) CAPSULE    Take 1 capsule (220 mg total) by mouth once daily.        30 minutes total time spent on this encounter today. This includes face to face time and non-face to face time preparing to see the patient (eg, review of tests), obtaining and/or reviewing separately obtained history, documenting clinical information in the electronic or other health record, independently interpreting results and communicating results to the patient/family/caregiver, or care coordinator.

## 2023-07-22 NOTE — Clinical Note
Diagnosis: Frequent falls [514157]   Future Attending Provider: TAMI UGARTE [[id number]]   Admitting Provider:: TAMI UGARTE [[id number]]

## 2023-07-23 PROBLEM — N30.00 ACUTE CYSTITIS WITHOUT HEMATURIA: Status: ACTIVE | Noted: 2023-01-01

## 2023-07-23 PROBLEM — R94.31 PROLONGED Q-T INTERVAL ON ECG: Status: ACTIVE | Noted: 2023-01-01

## 2023-07-23 PROBLEM — R29.6 FREQUENT FALLS: Status: ACTIVE | Noted: 2023-01-01

## 2023-07-23 PROBLEM — I62.03 CHRONIC SUBDURAL HEMATOMA: Status: ACTIVE | Noted: 2023-01-01

## 2023-07-23 NOTE — CONSULTS
[facility] - Emergency Dept  Orthopedics  Consult Note    Patient Name: Radha Sandoval  MRN: [id number]  Admission Date: 7/22/2023  Hospital Length of Stay: 0 days  Attending Provider: Maykel Khan MD  Primary Care Provider: Primary Doctor No        Inpatient consult to Orthopedic Surgery  Consult performed by: Jose Quinones MD  Consult ordered by: Ashanti Scott MD        Subjective:     Principal Problem:Multiple closed fractures of pelvis without disruption of pelvic ring    Chief Complaint:   Chief Complaint   Patient presents with    Shortness of Breath     Pt coming from [facility] for SOB starting about an hour ago and alerted NH staff. Pt has no other complaints at this time and is alert and oriented        HPI: Radha Sandoval is a 88 y.o. female with PMH significant for chronic BL pleural effusions, recent DVT diagnosed in March '23 (on eliquis), dementia, HTN, recent hospitalization here at [facility] for pelvic fracture treated non-operatively and discharged on 7/14 presenting with right pelvic pain after a fall yesterday when she was transferred from bed to wheelchair. Patient is AO x1 at baseline and history was collected from her caretaker Sonia. She reports patient has had multiple falls since discharge, she is staying at a skilled nursing facility. Due to her fluctuating mentation she has been agitated with nursing staff and tries to leave her bed and resists being transferred from bed to wheelchair, which has resulted in multiple falls. She was hospitalized at [facility] last week for another fall. Prior to her pelvic fracture on 7/8, she was ambulating without assistance at baseline, however is now non-weight bearing. Currently on Eliquis. Never IV drug user, non-smoker, non-EtOH consumer. Never had chemoradiation. No h/o autoimmune disease. She is currently admitted [facility] for management of a UTI and further evaluation of her pelvic fracture.      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 5/22/2023    Lumbar spondylosis with myelopathy 4/25/2023    Multiple closed right sided fractures of pelvis without disruption of pelvic ring 7/9/2023    Primary hypertension 6/10/2022    Subdural hygroma 7/8/2023       Past Surgical History:   Procedure Laterality Date    REPAIR, HERNIA, INGUINAL, WITHOUT HISTORY OF PRIOR REPAIR, AGE 6 YEARS OR OLDER Right 5/6/2023    Procedure: REPAIR, HERNIA, INGUINAL, WITHOUT HISTORY OF PRIOR REPAIR, AGE 5 YEARS OR OLDER;  Surgeon: Maurice Piper MD;  Location: [facility] OR [address] [place] [address];  Service: General;  Laterality: Right;  possible laparotomy, possible bowel resection       Review of patient's allergies indicates:  No Known Allergies    Current Facility-Administered Medications   Medication    HYDROcodone-acetaminophen 5-325 mg per tablet 1 tablet     Current Outpatient Medications   Medication Sig    acetaminophen (TYLENOL) 325 MG tablet Take 2 tablets (650 mg total) by mouth every 6 (six) hours as needed (Pain).    apixaban (ELIQUIS) 5 mg Tab Take 1 tablet (5 mg total) by mouth 2 (two) times daily.    ascorbic acid, vitamin C, (VITAMIN C) 250 MG tablet Take 1 tablet (250 mg total) by mouth once daily.    EScitalopram oxalate (LEXAPRO) 5 MG Tab Take 1 tablet (5 mg total) by mouth once daily.    furosemide (LASIX) 20 MG tablet Take 1 tablet (20 mg total) by mouth 2 (two) times daily.    haloperidoL (HALDOL) 0.5 MG tablet Take 1 tablet (0.5 mg total) by mouth every evening.    lisinopriL (PRINIVIL,ZESTRIL) 40 MG tablet Take 1 tablet (40 mg total) by mouth once daily.    loperamide (IMODIUM) 2 mg capsule Take 1 capsule (2 mg total) by mouth 4  (four) times daily as needed for Diarrhea.    melatonin (MELATIN) 3 mg tablet Take 2 tablets (6 mg total) by mouth nightly as needed for Insomnia.    memantine (NAMENDA) 5 MG Tab Take 1 tablet (5 mg total) by mouth 2 (two) times daily.    methocarbamoL (ROBAXIN) 500 MG Tab Take 1 tablet (500 mg total) by mouth 3 (three) times daily as needed (muscle spasms, pain scale 5-7).    metoprolol tartrate (LOPRESSOR) 25 MG tablet Take 1 tablet (25 mg total) by mouth 2 (two) times daily.    miconazole NITRATE 2 % (MICOTIN) 2 % top powder Apply topically 2 (two) times daily. Underside of bilateral breasts    potassium chloride (KLOR-CON) 10 MEQ TbSR Take 1 tablet (10 mEq total) by mouth once daily.    tamsulosin (FLOMAX) 0.4 mg Cap Take 1 capsule (0.4 mg total) by mouth nightly.    zinc sulfate (ZINCATE) 50 mg zinc (220 mg) capsule Take 1 capsule (220 mg total) by mouth once daily.     Family History    None       Tobacco Use    Smoking status: Never    Smokeless tobacco: Never   Substance and Sexual Activity    Alcohol use: Not on file    Drug use: Never    Sexual activity: Not Currently     ROS    Constitutional: negative for fevers  Eyes: negative visual changes  ENT: negative for hearing loss  Respiratory: negative for dyspnea  Cardiovascular: negative for chest pain  Gastrointestinal: negative for abdominal pain  Genitourinary: negative for dysuria  Neurological: negative for headaches  Behavioral/Psych: negative for hallucinations  Endocrine: negative for temperature intolerance      Objective:     Vital Signs (Most Recent):  Temp: 98.1 °F (36.7 °C) (07/23/23 0442)  Pulse: 79 (07/23/23 0442)  Resp: 17 (07/23/23 0534)  BP: (!) 146/60 (07/23/23 0442)  SpO2: 95 % (07/23/23 0442) Vital Signs (24h Range):  Temp:  [97.6 °F (36.4 °C)-98.8 °F (37.1 °C)] 98.1 °F (36.7 °C)  Pulse:  [67-79] 79  Resp:  [17-24] 17  SpO2:  [94 %-96 %] 95 %  BP: (123-176)/(46-79) 146/60           There is no height or weight on file to calculate  "BMI.      Intake/Output Summary (Last 24 hours) at 7/23/2023 0730  Last data filed at 7/23/2023 0618  Gross per 24 hour   Intake 100 ml   Output --   Net 100 ml        Ortho/SPM Exam     Gen:  Mild distress, well-developed, well nourished.  CV:  Peripherally well-perfused. 2+ radial pulses, symmetric.  Respiratory:  Normal respiratory effort. No accessory muscle use.   Head/Neck:  Normocephalic.  Atraumatic. Sclera anicteric. TM. Neck supple.  Neuro: No FND. Awake. Alert. Oriented to person only, not time, place, or situation.  Abdomen: Soft, NTND.      MSK:  Left Upper Extremity  Inspection  - Skin intact throughout, no open wounds  - No swelling  - No ecchymosis, erythema, or signs of cellulitis  Palpation  - NonTTP throughout, no palpable abnormality  Range of motion  - AROM and PROM of the shoulder, elbow, wrist, and hand intact  Stability  - No evidence of joint dislocation or abnormal laxity   Neurovascular  - AIN/PIN/Radial/Median/Ulnar Nerves assessed in isolation without deficit  - Able to give thumbs up, make "OK" sign, cross IF/LF, abduct/adduct fingers, make fist  - SILT throughout  - Compartments soft  - Radial artery palpated  - Capillary Refill <3s  - Muscle tone normal    Right Upper Extremity  Inspection  - Skin intact throughout, no open wounds  - No swelling  - No ecchymosis, erythema, or signs of cellulitis  Palpation  - NonTTP throughout, no palpable abnormality   Range of motion  - AROM and PROM of the shoulder, elbow, wrist, and hand intact  Stability  - No evidence of joint dislocation or abnormal laxity   Neurovascular  - AIN/PIN/Radial/Median/Ulnar Nerves assessed in isolation without deficit  - Able to give thumbs up, make "OK" sign, cross IF/LF, abduct/adduct fingers, make fist  - SILT throughout  - Compartments soft  - Radial artery palpated  - Capillary Refill <3s  - Muscle tone normal    Left Lower Extremity  Inspection  - Skin intact throughout, no open wounds  - No swelling  - No " ecchymosis, erythema, or signs of cellulitis  Palpation  - NonTTP throughout, no palpable abnormality  Range of motion  - AROM and PROM of the hip, knee, ankle, and foot intact  Stability  - No evidence of joint dislocation or abnormal laxity  Neurovascular  - TA/EHL/Gastroc/FHL assessed in isolation without deficit  - SILT throughout  - Compartments soft  - DP palpated   - Capillary Refill <3s  - Negative Log roll  - Negative Stinchfield  - Muscle tone normal    Right Lower Extremity  Inspection  - Skin intact throughout, no open wounds  - No swelling  - No ecchymosis, erythema, or signs of cellulitis  Palpation  - TTP over the right hip, nontender to knee, ankle, and foot  Range of motion  - AROM and PROM of the ankle and foot intact  - AROM and PROM of the hip and knee limited due to pain  Stability  - No evidence of joint dislocation or abnormal laxity  Neurovascular  - TA/EHL/Gastroc/FHL assessed in isolation without deficit  - SILT throughout  - Compartments soft  - DP palpated   - Capillary Refill <3s  - Negative Log roll  - Negative Stinchfield  - Muscle tone normal    Spine/pelvis/axial body:  No tenderness to palpation of cervical, thoracic, or lumbar spine  No pain with compression of pelvis  No chest wall or abdominal tenderness  No decubitus ulcers  Muscle tone normal      Significant Labs: CBC:   Recent Labs   Lab 07/23/23  0032   WBC 10.34   HGB 9.8*   HCT 30.2*        CMP:   Recent Labs   Lab 07/23/23  0032      K 3.3*      CO2 27      BUN 21   CREATININE 1.0   CALCIUM 8.7   PROT 6.1   ALBUMIN 2.8*   BILITOT 0.6   ALKPHOS 139*   AST 22   ALT 12   ANIONGAP 13     All pertinent labs within the past 24 hours have been reviewed.    Significant Imaging: CT: I have reviewed all pertinent results/findings and my personal findings are:  CT findings consistent with superior and inferior right sided pubic rami fractures nondisplaced from prior imaging.   X-Ray: I have reviewed all  pertinent results/findings and my personal findings are:  XR AP pelvis shows known superior and inferior pelvic rami fractures that appear unchanged from prior XR. Full length femur films do not show any acute fractures or dislocations at this time.     Assessment/Plan:     * Multiple closed right sided fractures of pelvis without disruption of pelvic ring  Radha Sandoval is a 88 y.o. female with PMH significant for chronic BL pleural effusions, recent DVT diagnosed in March '23 (on eliquis), dementia, HTN, recent hospitalization here at [facility] for pelvic fracture treated non-operatively and discharged on 7/14 presenting with right pelvic pain after a fall yesterday when she was transferred from bed to wheelchair.    - evaluated at bedside, WBAT  - trial PT/OT  - Should patient be unable to ambulate once pain controlled, will re-consider surgical options  - MMP  - recommend following up on CT head read as possible SDH, radiology recommends repeat head in 6 hours, hold chemical DVT prophylaxis until better characterized  - SCDs at all times when not ambulating    Will continue to follow        KASANDRA GAY MD  Orthopedics  [facility] - Emergency Dept       Yes

## 2023-07-23 NOTE — ASSESSMENT & PLAN NOTE
- baseline per caretaker; however, concerned that patient does not sleep at night, worsening her mentation.   - continue memantine  - continue haloperidol 0.5 PO qhs  - will consider starting additional nightly med for agitation

## 2023-07-23 NOTE — PLAN OF CARE
SW emailed [facility] for medicaid eligibility for NH admission, on behalf of pt and family      Ana Jolly CD, MSW, LMSW, RSW   Case Management  [facility]  Email: [email or web address]

## 2023-07-23 NOTE — ASSESSMENT & PLAN NOTE
Radha Sandoval is a 88 y.o. female with PMH significant for chronic BL pleural effusions, recent DVT diagnosed in March '23 (on eliquis), dementia, HTN, recent hospitalization here at [facility] for pelvic fracture treated non-operatively and discharged on 7/14 presenting with right pelvic pain after a fall yesterday when she was transferred from bed to wheelchair.    - evaluated at bedside, WBAT  - trial PT/OT  - Should patient be unable to ambulate once pain controlled, will re-consider surgical options  - MMP  - recommend following up on CT head read as possible SDH, hold chemical DVT prophylaxis until better characterized  - SCDs at all times when not ambulating    Will continue to follow

## 2023-07-23 NOTE — SUBJECTIVE & OBJECTIVE
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 5/22/2023    Lumbar spondylosis with myelopathy 4/25/2023    Multiple closed right sided fractures of pelvis without disruption of pelvic ring 7/9/2023    Primary hypertension 6/10/2022    Subdural hygroma 7/8/2023       Past Surgical History:   Procedure Laterality Date    REPAIR, HERNIA, INGUINAL, WITHOUT HISTORY OF PRIOR REPAIR, AGE 6 YEARS OR OLDER Right 5/6/2023    Procedure: REPAIR, HERNIA, INGUINAL, WITHOUT HISTORY OF PRIOR REPAIR, AGE 6 YEARS OR OLDER;  Surgeon: Maurice Piper MD;  Location: [facility] OR [address] [place] [address];  Service: General;  Laterality: Right;  possible laparotomy, possible bowel resection       Review of patient's allergies indicates:  No Known Allergies    No current facility-administered medications on file prior to encounter.     Current Outpatient Medications on File Prior to Encounter   Medication Sig    acetaminophen (TYLENOL) 325 MG tablet Take 2 tablets (650 mg total) by mouth every 6 (six) hours as needed (Pain).    apixaban (ELIQUIS) 5 mg Tab Take 1 tablet (5 mg total) by mouth 2 (two) times daily.    ascorbic acid, vitamin C, (VITAMIN C) 250 MG tablet Take 1 tablet (250 mg total) by mouth once daily.    EScitalopram oxalate (LEXAPRO) 5 MG Tab Take 1 tablet (5 mg total) by mouth once daily.    furosemide (LASIX) 20 MG tablet Take 1 tablet (20 mg total) by mouth 2 (two) times daily.    haloperidoL (HALDOL) 0.5 MG tablet Take 1 tablet (0.5 mg total) by mouth every evening.    lisinopriL (PRINIVIL,ZESTRIL) 40 MG tablet Take 1 tablet (40 mg total) by mouth once daily.    loperamide (IMODIUM) 2 mg capsule Take 1 capsule (2 mg  total) by mouth 4 (four) times daily as needed for Diarrhea.    melatonin (MELATIN) 3 mg tablet Take 2 tablets (6 mg total) by mouth nightly as needed for Insomnia.    memantine (NAMENDA) 5 MG Tab Take 1 tablet (5 mg total) by mouth 2 (two) times daily.    methocarbamoL (ROBAXIN) 500 MG Tab Take 1 tablet (500 mg total) by mouth 3 (three) times daily as needed (muscle spasms, pain scale 5-7).    metoprolol tartrate (LOPRESSOR) 25 MG tablet Take 1 tablet (25 mg total) by mouth 2 (two) times daily.    miconazole NITRATE 2 % (MICOTIN) 2 % top powder Apply topically 2 (two) times daily. Underside of bilateral breasts    potassium chloride (KLOR-CON) 10 MEQ TbSR Take 1 tablet (10 mEq total) by mouth once daily.    tamsulosin (FLOMAX) 0.4 mg Cap Take 1 capsule (0.4 mg total) by mouth nightly.    zinc sulfate (ZINCATE) 50 mg zinc (220 mg) capsule Take 1 capsule (220 mg total) by mouth once daily.     Family History    None       Tobacco Use    Smoking status: Never    Smokeless tobacco: Never   Substance and Sexual Activity    Alcohol use: Not on file    Drug use: Never    Sexual activity: Not Currently     Review of Systems   Unable to perform ROS: Dementia   Objective:     Vital Signs (Most Recent):  Temp: 98.1 °F (36.7 °C) (07/23/23 0442)  Pulse: 79 (07/23/23 0442)  Resp: 17 (07/23/23 0534)  BP: 135/72 (07/23/23 0942)  SpO2: 95 % (07/23/23 0442) Vital Signs (24h Range):  Temp:  [97.6 °F (36.4 °C)-98.8 °F (37.1 °C)] 98.1 °F (36.7 °C)  Pulse:  [67-79] 79  Resp:  [17-24] 17  SpO2:  [94 %-96 %] 95 %  BP: (123-176)/(46-79) 135/72        There is no height or weight on file to calculate BMI.     Physical Exam  Vitals and nursing note reviewed.   Constitutional:       General: She is not in acute distress.     Appearance: She is well-developed. She is ill-appearing (chronically ill-appearing).   HENT:      Head: Normocephalic and atraumatic.      Mouth/Throat:      Pharynx: No oropharyngeal exudate.   Eyes:       Conjunctiva/sclera: Conjunctivae normal.      Pupils: Pupils are equal, round, and reactive to light.   Cardiovascular:      Rate and Rhythm: Normal rate and regular rhythm.      Heart sounds: Normal heart sounds.   Pulmonary:      Effort: Pulmonary effort is normal. No respiratory distress.      Breath sounds: Normal breath sounds. No wheezing.   Abdominal:      General: Bowel sounds are normal. There is no distension.      Palpations: Abdomen is soft.      Tenderness: There is no abdominal tenderness.   Musculoskeletal:         General: No tenderness. Normal range of motion.      Cervical back: Normal range of motion and neck supple.   Lymphadenopathy:      Cervical: No cervical adenopathy.   Skin:     General: Skin is warm and dry.      Capillary Refill: Capillary refill takes less than 2 seconds.      Findings: No rash.   Neurological:      Mental Status: She is alert. Mental status is at baseline.      Cranial Nerves: No cranial nerve deficit.      Sensory: No sensory deficit.      Coordination: Coordination normal.   Psychiatric:      Comments: Expressing delirious behavior, possibly hallucinating. Rambling, non-coherent. When re-directed, patient answers questions and is oriented to person and place.             CRANIAL NERVES     CN III, IV, VI   Pupils are equal, round, and reactive to light.     Significant Labs: All pertinent labs within the past 24 hours have been reviewed.  CBC:   Recent Labs   Lab 07/23/23 0032   WBC 10.34   HGB 9.8*   HCT 30.2*        CMP:   Recent Labs   Lab 07/23/23 0032      K 3.3*      CO2 27      BUN 21   CREATININE 1.0   CALCIUM 8.7   PROT 6.1   ALBUMIN 2.8*   BILITOT 0.6   ALKPHOS 139*   AST 22   ALT 12   ANIONGAP 13     Cardiac Markers:   Recent Labs   Lab 07/23/23 0032   *     Troponin:   Recent Labs   Lab 07/23/23  0032   TROPONINI 0.035*     Urine Studies:   Recent Labs   Lab 07/23/23  0306   COLORU Yellow   APPEARANCEUA Hazy*   PHUR 6.0    SPECGRAV 1.015   PROTEINUA Negative   GLUCUA Negative   KETONESU Negative   BILIRUBINUA Negative   OCCULTUA Negative   NITRITE Negative   LEUKOCYTESUR 3+*   RBCUA 28*   WBCUA >100*   BACTERIA Occasional   SQUAMEPITHEL 4   HYALINECASTS 20*       Significant Imaging: I have reviewed all pertinent imaging results/findings within the past 24 hours.    Imaging Results              CT Head Without Contrast (Final result)  Result time 07/23/23 08:24:54      Final result by Bill Tarango MD (07/23/23 08:24:54)                   Impression:        Similar volume of subdural hematomas.  Changes of density may be in part technical/artifactual in nature and also due to some leakage of recent intravenous contrast from recent CT of the abdomen pelvis with no focal hyperdense acute hemorrhage identified.  Follow-up as clinically warranted.      Electronically signed by: Bill Tarango  Date:    07/23/2023  Time:    08:24               Narrative:    EXAMINATION:  CT HEAD WITHOUT CONTRAST    CLINICAL HISTORY:  Head trauma, minor (Age >= 65y);    TECHNIQUE:  Low dose axial images were obtained through the head.  Coronal and sagittal reformations were also performed. Contrast was not administered.    COMPARISON:  07/23/2023, 07/09/2023    FINDINGS:  Bilateral subacute subdural hematomas are noted bilaterally again identified measuring up to 14 mm on  the left, similar in volume.  No new focal hyperdense hemorrhage is identified.  Overall mild increased density of the hematoma may in part be technical/artifactual in nature as well as due to leakage of recent intravenous contrast as similar appearances were noted previously (on 07/09/2023 and 07/10/2023).    3 mm midline shift to the right is similar in appearance.  The basal cisterns remain patent.  No acute intraparenchymal process.    Prominent atherosclerotic vascular calcifications are noted at the skull base.    The visualized sinuses and mastoid air cells are essentially  clear.                                       CT Chest Abdomen Pelvis With Contrast (xpd) (Final result)  Result time 07/23/23 02:32:32   Procedure changed from CT Abdomen Pelvis With Contrast     Final result by Chuy Mckeon MD (07/23/23 02:32:32)                   Impression:      Similar appearance of recent fractures involving the right inferior and superior pubic rami.  Increased conspicuity of essentially nondisplaced recent fractures of the right michelle sacrum and anterior aspect of the right acetabulum.    Motion and artifact limited study with suboptimal bolus timing.    Moderate-sized hiatal hernia with moderate lower esophageal wall thickening.  Suggest correlation for esophagitis.    Peribronchial thickening and questionable minimal patchy ground-glass opacities in the lung bases and bibasilar subsegmental atelectasis.    Nodular soft tissue opacities in the left breast.  Neoplasm not excluded.  Suggest correlation with physical exam and mammographic follow-up when clinically warranted.    Anasarca.    Mild nonspecific wall thickening of the inferior aspect of the urinary bladder.  Suggest correlation with urinalysis.    Multiple additional findings discussed in the body of the report.      Electronically signed by: Chuy Mckeon MD  Date:    07/23/2023  Time:    02:32               Narrative:    EXAMINATION:  CT CHEST ABDOMEN PELVIS WITH CONTRAST (XPD)    CLINICAL HISTORY:  Abdominal trauma, blunt;    TECHNIQUE:  Low dose axial images, sagittal and coronal reformations were obtained from the thoracic inlet to the pubic symphysis following the IV administration of 75 mL of Omnipaque 350 .  Oral contrast was not given.    COMPARISON:  CTA chest, 07/08/2023.  CT pelvis, 07/08/2023.  CT abdomen pelvis, 05/06/2023.    FINDINGS:  Chest:    Exam quality is limited by suboptimal bolus timing and motion.  Evaluation is limited by extensive streak artifact due to the patient's arms overlying the field of  view.    Heart is borderline enlarged and similar to the prior study.  Coronary artery and mitral valve calcifications.  Thoracic aorta is stable in caliber with moderate to advanced calcific atherosclerosis.  No central pulmonary embolus.  No bulky mediastinal lymphadenopathy.    Detailed evaluation of the pulmonary parenchyma limited by motion.  There is peribronchial thickening and questionable minimal patchy ground-glass opacities in the lung bases.  Bibasilar subsegmental atelectasis.  No consolidation or pleural effusion.    Moderate-sized hiatal hernia with moderate lower esophageal wall thickening.    Abdomen:    Exam quality is limited by suboptimal bolus timing, motion, and extensive artifact related to the patient's arms overlying the field of view.    Liver is similar in size and contour when compared with the prior study.  Multiple hepatic hypodensities most suggestive of cysts.  Gallbladder is unremarkable.  No intrahepatic biliary ductal dilatation.    Spleen, adrenals, and pancreas are stable and negative for acute finding allowing for significant motion.    Kidneys are stable.  There is a large left renal cyst.  Small stone or vascular calcification in the right renal hilum.  No hydronephrosis.    Evaluation for bowel inflammation is limited by motion.  No small bowel obstruction.  Mild stool burden in the colon.    No pneumoperitoneum or organized fluid collection.    No bulky retroperitoneal lymphadenopathy.    Abdominal aorta is similar in caliber with moderate to advanced calcific atherosclerosis involving the aorta and major branch vessels.    Portal vein is grossly patent.    Pelvis:    Urinary bladder is mildly distended and there is mild wall thickening along the inferior aspect of the urinary bladder similar to the prior CT abdomen.  Rectum is unremarkable.  There is minimal presacral fat stranding.  No significant pelvic free fluid.    Bones and soft tissues:    Recent fractures involving  the right superior and inferior pubic rami similar to prior CT pelvis.  Suspect nondisplaced fracture of the right michelle sacrum, more conspicuous when compared with prior CT pelvis.  Nondisplaced fracture of the anterior aspect of the right acetabulum (coronal series 606, image 129) is also more conspicuous when compared with the prior study.  No additional acute fracture.  Degenerative changes in the spine and pubic symphysis.  Mild anterolisthesis of L4 with respect to L5.  Mild left convex scoliotic curvature of the spine.  Old right lower rib fractures.  Operative changes of presumed right lower abdominal wall hernia repair.  Mild diffuse body wall edema.  Somewhat nodular soft tissue densities in the left breast soft tissues.  Suggest follow-up mammogram.                                       CT Head Without Contrast (Final result)  Result time 07/23/23 02:16:47      Final result by Maykel Castro MD (07/23/23 02:16:47)                   Impression:      Subtle increase in density of the subdural hygromas suggesting acute on chronic subdural fluid collection.    Consider follow-up CT scan per protocol in patient with small acute subdural hematoma on chronic subdural hygromas.      Electronically signed by: Maykel Castro  Date:    07/23/2023  Time:    02:16               Narrative:    EXAMINATION:  CT HEAD WITHOUT CONTRAST    CLINICAL HISTORY:  Head trauma, minor (Age >= 65y);    TECHNIQUE:  Low dose axial CT images obtained throughout the head without intravenous contrast. Sagittal and coronal reconstructions were performed.    COMPARISON:  None.    FINDINGS:  Intracranial compartment:    Ventricles and sulci are stable in size for age without evidence of hydrocephalus. Subdural hygromas appear increased in density slightly suggesting acute on chronic subdural hematoma.    The brain parenchyma appears stable with involutional and chronic small vessel type changes again noted..  No parenchymal mass,  hemorrhage, edema or major vascular distribution infarct.    Skull/extracranial contents (limited evaluation): No fracture. Mastoid air cells and paranasal sinuses are essentially clear.                                       CT Cervical Spine Without Contrast (Final result)  Result time 07/23/23 03:01:55      Final result by Chuy Mckeon MD (07/23/23 03:01:55)                   Impression:      No evidence of acute fracture or acute osseous abnormality.    Osteopenia and stable degenerative changes.    Additional findings discussed in the body of the report.    Electronically signed by resident: Anahi Gutierrez  Date:    07/23/2023  Time:    02:02    Electronically signed by: Chuy Mckeon MD  Date:    07/23/2023  Time:    03:01               Narrative:    EXAMINATION:  CT CERVICAL SPINE WITHOUT CONTRAST    CLINICAL HISTORY:  Neck trauma (Age >= 65y);    TECHNIQUE:  Low dose axial images, sagittal and coronal reformations were performed though the cervical spine.  Contrast was not administered.    COMPARISON:  CT 04/18/2023 and 07/09/2023.    FINDINGS:  Alignment: Normal.    Vertebrae: Osteopenia.  No fracture.  Lucent area involving the left C4 facet without associated cortical disruption, unchanged dating back to 04/18/2023.  Stable mild height loss of the superior endplate of T4 vertebral body.    Discs: Multilevel disc height loss, most pronounced at C5-C6.    C1-2: Dens is intact.  Pre-dens space is maintained.    Skull base and craniocervical junction: Normal.    Degenerative findings:    Stable appearance of mild multilevel degenerative changes through the cervical spine.  There are several levels demonstrating mild to moderate facet arthropathy and mild uncovertebral spurring resulting in areas of mild neural foraminal narrowing.  No areas of spinal canal stenosis.    Paraspinal muscles & soft tissues: Evaluation of the lung apices is severely limited by respiratory motion artifact.  Dense calcific  atherosclerosis of the aortic arch.  Soft tissues of the thoracic inlet are somewhat distorted secondary to motion artifact.                                       X-Ray Pelvis Routine AP (Final result)  Result time 07/23/23 01:54:57   Procedure changed from X-Ray Hip 2 or 3 views Right (with Pelvis when performed)     Final result by Chuy Mckeon MD (07/23/23 01:54:57)                   Impression:      Similar alignment of recent fractures involving the right superior and inferior pubic rami.  No new acute displaced fracture.      Electronically signed by: Chuy Mckeon MD  Date:    07/23/2023  Time:    01:54               Narrative:    EXAMINATION:  XR PELVIS ROUTINE AP; XR FEMUR 2 VIEW RIGHT    CLINICAL HISTORY:  HIP PAIN;  Pain in right hip; Pain in right leg    TECHNIQUE:  Three frontal views of the pelvis performed.  Two views of the right femur also obtained.    COMPARISON:  CT pelvis, 07/08/2023.    FINDINGS:  Pelvis: Bones are mildly demineralized.  Similar appearance of mildly displaced recent fracture of the right inferior pubic ramus and nondisplaced fracture of the right superior pubic ramus.  Similar fracture fragment alignment allowing for differences in positioning.  No new acute fracture.  No dislocation.  Mild degenerative changes in both hips.    Right femur: No additional acute fracture or dislocation other than described above.  Degenerative changes in the right hip and right knee.  Atherosclerotic vascular calcifications.  Soft tissue edema overlying the right hip.  No unexpected radiopaque foreign body.                                       X-Ray Femur 2 AP/LAT Right (Final result)  Result time 07/23/23 01:54:57      Final result by Chuy Mckeon MD (07/23/23 01:54:57)                   Impression:      Similar alignment of recent fractures involving the right superior and inferior pubic rami.  No new acute displaced fracture.      Electronically signed by: Chuy Mckeon  "MD  Date:    07/23/2023  Time:    01:54               Narrative:    EXAMINATION:  XR PELVIS ROUTINE AP; XR FEMUR 2 VIEW RIGHT    CLINICAL HISTORY:  HIP PAIN;  Pain in right hip; Pain in right leg    TECHNIQUE:  Three frontal views of the pelvis performed.  Two views of the right femur also obtained.    COMPARISON:  CT pelvis, 07/08/2023.    FINDINGS:  Pelvis: Bones are mildly demineralized.  Similar appearance of mildly displaced recent fracture of the right inferior pubic ramus and nondisplaced fracture of the right superior pubic ramus.  Similar fracture fragment alignment allowing for differences in positioning.  No new acute fracture.  No dislocation.  Mild degenerative changes in both hips.    Right femur: No additional acute fracture or dislocation other than described above.  Degenerative changes in the right hip and right knee.  Atherosclerotic vascular calcifications.  Soft tissue edema overlying the right hip.  No unexpected radiopaque foreign body.                                       X-Ray Chest AP Portable (Final result)  Result time 07/23/23 01:47:54      Final result by Chuy Mckeon MD (07/23/23 01:47:54)                   Impression:      No detrimental change when compared with 07/08/2023.      Electronically signed by: Chuy Mckeon MD  Date:    07/23/2023  Time:    01:47               Narrative:    EXAMINATION:  XR CHEST AP PORTABLE    CLINICAL HISTORY:  Provided history is "Sepsis;  ".    TECHNIQUE:  One view of the chest.    COMPARISON:  07/08/2023.    FINDINGS:  Cardiac wires overlie the chest.  Patient is slightly rotated.  Cardiomediastinal silhouette is stable and may be at the upper limits of normal in size.  Atherosclerotic calcifications overlie the aortic arch.  Right hemidiaphragm is slightly elevated as seen previously.  Azygous lobe configuration is incidentally noted in the right lung apex.  No confluent area of consolidation.  No sizable pleural effusion.  No pneumothorax.  " Hiatal hernia again noted.

## 2023-07-23 NOTE — HPI
Radha Sandoval is a 88 y.o. female with PMH significant for chronic BL pleural effusions, recent DVT diagnosed in March '23 (on eliquis), dementia, HTN, with recent hospitalization here at [facility] [place], [place] for pelvic fracture treated non-operatively, who presents after a fall at her long-term while being transferred from wheelchair to bed. Hx taken per Caretaker Sonia and Son (JULIO) Maykel. Caretaker at bedside unaware of head trauma or other injury. Of note, patient sustained her pelvic fractures at [facility], but after hospitalization at [facility] was placed in [facility] on 7/14. Caretaker reports patient also had another hospitalization during this time at . Patient generally disoriented and delirious, but she will have periods or even days of clarity. At baseline, she is oriented x2. Due to mentation she has difficulty working with staff which results in falls. Prior to her pelvic fracture on 7/8, pt was able to perform independent transfers from wheelchair and could walk short distances with her walker, but she is now max assist. Currently on Eliquis. Patient without complaint on my exam, denies pain.     In the ED: AFVSS. CBC with baseline anemia. CMP reveals slight elevation in Cr 1.0 (baseline 0.8) and K 3.3. BNP and troponin elevated but at baseline. UA grossly infectious. Spann placed for urinary retention.  All imaging reviewed. 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 Repeat CTH after 6 hours was stable. XR pelvis reveals stable recent R superior and inferior pubic rami fractures, non-displaced. EKG in NSR with prolonged Qtc 510 ms. Given tylenol, norco, and 1g rocephin.

## 2023-07-23 NOTE — H&P
Bijan Gusman - Emergency Dept  [facility] Medicine  History & Physical    Patient Name: Radha Sandoval  MRN: [id number]  Patient Class: OP- Observation  Admission Date: 7/22/2023  Attending Physician: Maykel Khan MD   Primary Care Provider: Primary Doctor No         Patient information was obtained from relative(s), caregiver / friend and ER records.     Subjective:     Principal Problem:Multiple closed fractures of pelvis without disruption of pelvic ring    Chief Complaint:   Chief Complaint   Patient presents with    Shortness of Breath     Pt coming from [facility] for SOB starting about an hour ago and alerted NH staff. Pt has no other complaints at this time and is alert and oriented    Nausea        HPI: Radha Sandoval is a 88 y.o. female with PMH significant for chronic BL pleural effusions, recent DVT diagnosed in March '23 (on eliquis), dementia, HTN, with recent hospitalization here at [facility] for pelvic fracture treated non-operatively, who presents after a fall at her [facility] while being transferred from wheelchair to bed. Hx taken per Caretaker Sonia and Son (POA) Maykel. Caretaker at bedside unaware of head trauma or other injury. Of note, patient sustained her pelvic fractures at [facility], but after hospitalization at [facility] was placed in [facility] on 7/14. Caretaker reports patient also had another hospitalization during this time at . Patient generally disoriented and delirious, but she will have periods or even days of clarity. At baseline, she is oriented x2. Due to mentation she has difficulty working with staff which results in falls. Prior to her pelvic fracture on 7/8, pt was able to perform independent transfers from wheelchair and could walk short distances with her walker, but she is now max assist. Currently on Eliquis. Patient without complaint on my exam, denies pain.     In the ED: AFVSS. CBC with baseline anemia. CMP reveals slight elevation in Cr 1.0 (baseline 0.8) and K 3.3. BNP  and troponin elevated but at baseline. UA grossly infectious. Spann placed for urinary retention.  All imaging reviewed. 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 Repeat CTH after 6 hours was stable. XR pelvis reveals stable recent R superior and inferior pubic rami fractures, non-displaced. EKG in NSR with prolonged Qtc 510 ms. Given tylenol, norco, and 1g rocephin.       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 5/22/2023    Lumbar spondylosis with myelopathy 4/25/2023    Multiple closed right sided fractures of pelvis without disruption of pelvic ring 7/9/2023    Primary hypertension 6/10/2022    Subdural hygroma 7/8/2023       Past Surgical History:   Procedure Laterality Date    REPAIR, HERNIA, INGUINAL, WITHOUT HISTORY OF PRIOR REPAIR, AGE 6 YEARS OR OLDER Right 5/6/2023    Procedure: REPAIR, HERNIA, INGUINAL, WITHOUT HISTORY OF PRIOR REPAIR, AGE 5 YEARS OR OLDER;  Surgeon: Maurice Piper MD;  Location: [facility] OR [address] [place] [address];  Service: General;  Laterality: Right;  possible laparotomy, possible bowel resection       Review of patient's allergies indicates:  No Known Allergies    No current facility-administered medications on file prior to encounter.     Current Outpatient Medications on File Prior to Encounter   Medication Sig    acetaminophen (TYLENOL) 325 MG tablet Take 2 tablets (650 mg total) by mouth every 6 (six) hours as needed (Pain).    apixaban (ELIQUIS) 5 mg Tab Take 1 tablet (5 mg total) by mouth 2 (two) times daily.    ascorbic acid, vitamin C, (VITAMIN C) 250 MG tablet Take 1 tablet (250 mg total) by mouth once daily.    EScitalopram  oxalate (LEXAPRO) 5 MG Tab Take 1 tablet (5 mg total) by mouth once daily.    furosemide (LASIX) 20 MG tablet Take 1 tablet (20 mg total) by mouth 2 (two) times daily.    haloperidoL (HALDOL) 0.5 MG tablet Take 1 tablet (0.5 mg total) by mouth every evening.    lisinopriL (PRINIVIL,ZESTRIL) 40 MG tablet Take 1 tablet (40 mg total) by mouth once daily.    loperamide (IMODIUM) 2 mg capsule Take 1 capsule (2 mg total) by mouth 4 (four) times daily as needed for Diarrhea.    melatonin (MELATIN) 3 mg tablet Take 2 tablets (6 mg total) by mouth nightly as needed for Insomnia.    memantine (NAMENDA) 5 MG Tab Take 1 tablet (5 mg total) by mouth 2 (two) times daily.    methocarbamoL (ROBAXIN) 500 MG Tab Take 1 tablet (500 mg total) by mouth 3 (three) times daily as needed (muscle spasms, pain scale 5-7).    metoprolol tartrate (LOPRESSOR) 25 MG tablet Take 1 tablet (25 mg total) by mouth 2 (two) times daily.    miconazole NITRATE 2 % (MICOTIN) 2 % top powder Apply topically 2 (two) times daily. Underside of bilateral breasts    potassium chloride (KLOR-CON) 10 MEQ TbSR Take 1 tablet (10 mEq total) by mouth once daily.    tamsulosin (FLOMAX) 0.4 mg Cap Take 1 capsule (0.4 mg total) by mouth nightly.    zinc sulfate (ZINCATE) 50 mg zinc (220 mg) capsule Take 1 capsule (220 mg total) by mouth once daily.     Family History    None       Tobacco Use    Smoking status: Never    Smokeless tobacco: Never   Substance and Sexual Activity    Alcohol use: Not on file    Drug use: Never    Sexual activity: Not Currently     Review of Systems   Unable to perform ROS: Dementia   Objective:     Vital Signs (Most Recent):  Temp: 98.1 °F (36.7 °C) (07/23/23 0442)  Pulse: 79 (07/23/23 0442)  Resp: 17 (07/23/23 0534)  BP: 135/72 (07/23/23 0942)  SpO2: 95 % (07/23/23 0442) Vital Signs (24h Range):  Temp:  [97.6 °F (36.4 °C)-98.8 °F (37.1 °C)] 98.1 °F (36.7 °C)  Pulse:  [67-79] 79  Resp:  [17-24] 17  SpO2:  [94 %-96 %] 95 %  BP:  (123-176)/(46-79) 135/72        There is no height or weight on file to calculate BMI.     Physical Exam  Vitals and nursing note reviewed.   Constitutional:       General: She is not in acute distress.     Appearance: She is well-developed. She is ill-appearing (chronically ill-appearing).   HENT:      Head: Normocephalic and atraumatic.      Mouth/Throat:      Pharynx: No oropharyngeal exudate.   Eyes:      Conjunctiva/sclera: Conjunctivae normal.      Pupils: Pupils are equal, round, and reactive to light.   Cardiovascular:      Rate and Rhythm: Normal rate and regular rhythm.      Heart sounds: Normal heart sounds.   Pulmonary:      Effort: Pulmonary effort is normal. No respiratory distress.      Breath sounds: Normal breath sounds. No wheezing.   Abdominal:      General: Bowel sounds are normal. There is no distension.      Palpations: Abdomen is soft.      Tenderness: There is no abdominal tenderness.   Musculoskeletal:         General: No tenderness. Normal range of motion.      Cervical back: Normal range of motion and neck supple.   Lymphadenopathy:      Cervical: No cervical adenopathy.   Skin:     General: Skin is warm and dry.      Capillary Refill: Capillary refill takes less than 2 seconds.      Findings: No rash.   Neurological:      Mental Status: She is alert. Mental status is at baseline.      Cranial Nerves: No cranial nerve deficit.      Sensory: No sensory deficit.      Coordination: Coordination normal.   Psychiatric:      Comments: Expressing delirious behavior, possibly hallucinating. Rambling, non-coherent. When re-directed, patient answers questions and is oriented to person and place.             CRANIAL NERVES     CN III, IV, VI   Pupils are equal, round, and reactive to light.     Significant Labs: All pertinent labs within the past 24 hours have been reviewed.  CBC:   Recent Labs   Lab 07/23/23  0032   WBC 10.34   HGB 9.8*   HCT 30.2*        CMP:   Recent Labs   Lab  07/23/23  0032      K 3.3*      CO2 27      BUN 21   CREATININE 1.0   CALCIUM 8.7   PROT 6.1   ALBUMIN 2.8*   BILITOT 0.6   ALKPHOS 139*   AST 22   ALT 12   ANIONGAP 13     Cardiac Markers:   Recent Labs   Lab 07/23/23  0032   *     Troponin:   Recent Labs   Lab 07/23/23  0032   TROPONINI 0.035*     Urine Studies:   Recent Labs   Lab 07/23/23  0306   COLORU Yellow   APPEARANCEUA Hazy*   PHUR 6.0   SPECGRAV 1.015   PROTEINUA Negative   GLUCUA Negative   KETONESU Negative   BILIRUBINUA Negative   OCCULTUA Negative   NITRITE Negative   LEUKOCYTESUR 3+*   RBCUA 28*   WBCUA >100*   BACTERIA Occasional   SQUAMEPITHEL 4   HYALINECASTS 20*       Significant Imaging: I have reviewed all pertinent imaging results/findings within the past 24 hours.    Imaging Results              CT Head Without Contrast (Final result)  Result time 07/23/23 08:24:54      Final result by Bill Tarango MD (07/23/23 08:24:54)                   Impression:        Similar volume of subdural hematomas.  Changes of density may be in part technical/artifactual in nature and also due to some leakage of recent intravenous contrast from recent CT of the abdomen pelvis with no focal hyperdense acute hemorrhage identified.  Follow-up as clinically warranted.      Electronically signed by: Bill Tarango  Date:    07/23/2023  Time:    08:24               Narrative:    EXAMINATION:  CT HEAD WITHOUT CONTRAST    CLINICAL HISTORY:  Head trauma, minor (Age >= 65y);    TECHNIQUE:  Low dose axial images were obtained through the head.  Coronal and sagittal reformations were also performed. Contrast was not administered.    COMPARISON:  07/23/2023, 07/09/2023    FINDINGS:  Bilateral subacute subdural hematomas are noted bilaterally again identified measuring up to 14 mm on  the left, similar in volume.  No new focal hyperdense hemorrhage is identified.  Overall mild increased density of the hematoma may in part be technical/artifactual  in nature as well as due to leakage of recent intravenous contrast as similar appearances were noted previously (on 07/09/2023 and 07/10/2023).    3 mm midline shift to the right is similar in appearance.  The basal cisterns remain patent.  No acute intraparenchymal process.    Prominent atherosclerotic vascular calcifications are noted at the skull base.    The visualized sinuses and mastoid air cells are essentially clear.                                       CT Chest Abdomen Pelvis With Contrast (xpd) (Final result)  Result time 07/23/23 02:32:32   Procedure changed from CT Abdomen Pelvis With Contrast     Final result by Chuy Mckeon MD (07/23/23 02:32:32)                   Impression:      Similar appearance of recent fractures involving the right inferior and superior pubic rami.  Increased conspicuity of essentially nondisplaced recent fractures of the right michelle sacrum and anterior aspect of the right acetabulum.    Motion and artifact limited study with suboptimal bolus timing.    Moderate-sized hiatal hernia with moderate lower esophageal wall thickening.  Suggest correlation for esophagitis.    Peribronchial thickening and questionable minimal patchy ground-glass opacities in the lung bases and bibasilar subsegmental atelectasis.    Nodular soft tissue opacities in the left breast.  Neoplasm not excluded.  Suggest correlation with physical exam and mammographic follow-up when clinically warranted.    Anasarca.    Mild nonspecific wall thickening of the inferior aspect of the urinary bladder.  Suggest correlation with urinalysis.    Multiple additional findings discussed in the body of the report.      Electronically signed by: Chuy Mckeon MD  Date:    07/23/2023  Time:    02:32               Narrative:    EXAMINATION:  CT CHEST ABDOMEN PELVIS WITH CONTRAST (XPD)    CLINICAL HISTORY:  Abdominal trauma, blunt;    TECHNIQUE:  Low dose axial images, sagittal and coronal reformations were obtained  from the thoracic inlet to the pubic symphysis following the IV administration of 75 mL of Omnipaque 350 .  Oral contrast was not given.    COMPARISON:  CTA chest, 07/08/2023.  CT pelvis, 07/08/2023.  CT abdomen pelvis, 05/06/2023.    FINDINGS:  Chest:    Exam quality is limited by suboptimal bolus timing and motion.  Evaluation is limited by extensive streak artifact due to the patient's arms overlying the field of view.    Heart is borderline enlarged and similar to the prior study.  Coronary artery and mitral valve calcifications.  Thoracic aorta is stable in caliber with moderate to advanced calcific atherosclerosis.  No central pulmonary embolus.  No bulky mediastinal lymphadenopathy.    Detailed evaluation of the pulmonary parenchyma limited by motion.  There is peribronchial thickening and questionable minimal patchy ground-glass opacities in the lung bases.  Bibasilar subsegmental atelectasis.  No consolidation or pleural effusion.    Moderate-sized hiatal hernia with moderate lower esophageal wall thickening.    Abdomen:    Exam quality is limited by suboptimal bolus timing, motion, and extensive artifact related to the patient's arms overlying the field of view.    Liver is similar in size and contour when compared with the prior study.  Multiple hepatic hypodensities most suggestive of cysts.  Gallbladder is unremarkable.  No intrahepatic biliary ductal dilatation.    Spleen, adrenals, and pancreas are stable and negative for acute finding allowing for significant motion.    Kidneys are stable.  There is a large left renal cyst.  Small stone or vascular calcification in the right renal hilum.  No hydronephrosis.    Evaluation for bowel inflammation is limited by motion.  No small bowel obstruction.  Mild stool burden in the colon.    No pneumoperitoneum or organized fluid collection.    No bulky retroperitoneal lymphadenopathy.    Abdominal aorta is similar in caliber with moderate to advanced calcific  atherosclerosis involving the aorta and major branch vessels.    Portal vein is grossly patent.    Pelvis:    Urinary bladder is mildly distended and there is mild wall thickening along the inferior aspect of the urinary bladder similar to the prior CT abdomen.  Rectum is unremarkable.  There is minimal presacral fat stranding.  No significant pelvic free fluid.    Bones and soft tissues:    Recent fractures involving the right superior and inferior pubic rami similar to prior CT pelvis.  Suspect nondisplaced fracture of the right michelle sacrum, more conspicuous when compared with prior CT pelvis.  Nondisplaced fracture of the anterior aspect of the right acetabulum (coronal series 606, image 129) is also more conspicuous when compared with the prior study.  No additional acute fracture.  Degenerative changes in the spine and pubic symphysis.  Mild anterolisthesis of L4 with respect to L5.  Mild left convex scoliotic curvature of the spine.  Old right lower rib fractures.  Operative changes of presumed right lower abdominal wall hernia repair.  Mild diffuse body wall edema.  Somewhat nodular soft tissue densities in the left breast soft tissues.  Suggest follow-up mammogram.                                       CT Head Without Contrast (Final result)  Result time 07/23/23 02:16:47      Final result by Maykel Castro MD (07/23/23 02:16:47)                   Impression:      Subtle increase in density of the subdural hygromas suggesting acute on chronic subdural fluid collection.    Consider follow-up CT scan per protocol in patient with small acute subdural hematoma on chronic subdural hygromas.      Electronically signed by: Maykel Castro  Date:    07/23/2023  Time:    02:16               Narrative:    EXAMINATION:  CT HEAD WITHOUT CONTRAST    CLINICAL HISTORY:  Head trauma, minor (Age >= 65y);    TECHNIQUE:  Low dose axial CT images obtained throughout the head without intravenous contrast. Sagittal and  coronal reconstructions were performed.    COMPARISON:  None.    FINDINGS:  Intracranial compartment:    Ventricles and sulci are stable in size for age without evidence of hydrocephalus. Subdural hygromas appear increased in density slightly suggesting acute on chronic subdural hematoma.    The brain parenchyma appears stable with involutional and chronic small vessel type changes again noted..  No parenchymal mass, hemorrhage, edema or major vascular distribution infarct.    Skull/extracranial contents (limited evaluation): No fracture. Mastoid air cells and paranasal sinuses are essentially clear.                                       CT Cervical Spine Without Contrast (Final result)  Result time 07/23/23 03:01:55      Final result by Chuy Mckeon MD (07/23/23 03:01:55)                   Impression:      No evidence of acute fracture or acute osseous abnormality.    Osteopenia and stable degenerative changes.    Additional findings discussed in the body of the report.    Electronically signed by resident: Anahi Gutierrez  Date:    07/23/2023  Time:    02:02    Electronically signed by: Chuy Mckeon MD  Date:    07/23/2023  Time:    03:01               Narrative:    EXAMINATION:  CT CERVICAL SPINE WITHOUT CONTRAST    CLINICAL HISTORY:  Neck trauma (Age >= 65y);    TECHNIQUE:  Low dose axial images, sagittal and coronal reformations were performed though the cervical spine.  Contrast was not administered.    COMPARISON:  CT 04/18/2023 and 07/09/2023.    FINDINGS:  Alignment: Normal.    Vertebrae: Osteopenia.  No fracture.  Lucent area involving the left C4 facet without associated cortical disruption, unchanged dating back to 04/18/2023.  Stable mild height loss of the superior endplate of T4 vertebral body.    Discs: Multilevel disc height loss, most pronounced at C5-C6.    C1-2: Dens is intact.  Pre-dens space is maintained.    Skull base and craniocervical junction: Normal.    Degenerative  findings:    Stable appearance of mild multilevel degenerative changes through the cervical spine.  There are several levels demonstrating mild to moderate facet arthropathy and mild uncovertebral spurring resulting in areas of mild neural foraminal narrowing.  No areas of spinal canal stenosis.    Paraspinal muscles & soft tissues: Evaluation of the lung apices is severely limited by respiratory motion artifact.  Dense calcific atherosclerosis of the aortic arch.  Soft tissues of the thoracic inlet are somewhat distorted secondary to motion artifact.                                       X-Ray Pelvis Routine AP (Final result)  Result time 07/23/23 01:54:57   Procedure changed from X-Ray Hip 2 or 3 views Right (with Pelvis when performed)     Final result by Chuy Mckeon MD (07/23/23 01:54:57)                   Impression:      Similar alignment of recent fractures involving the right superior and inferior pubic rami.  No new acute displaced fracture.      Electronically signed by: Chuy Mckeon MD  Date:    07/23/2023  Time:    01:54               Narrative:    EXAMINATION:  XR PELVIS ROUTINE AP; XR FEMUR 2 VIEW RIGHT    CLINICAL HISTORY:  HIP PAIN;  Pain in right hip; Pain in right leg    TECHNIQUE:  Three frontal views of the pelvis performed.  Two views of the right femur also obtained.    COMPARISON:  CT pelvis, 07/08/2023.    FINDINGS:  Pelvis: Bones are mildly demineralized.  Similar appearance of mildly displaced recent fracture of the right inferior pubic ramus and nondisplaced fracture of the right superior pubic ramus.  Similar fracture fragment alignment allowing for differences in positioning.  No new acute fracture.  No dislocation.  Mild degenerative changes in both hips.    Right femur: No additional acute fracture or dislocation other than described above.  Degenerative changes in the right hip and right knee.  Atherosclerotic vascular calcifications.  Soft tissue edema overlying the right  "hip.  No unexpected radiopaque foreign body.                                       X-Ray Femur 2 AP/LAT Right (Final result)  Result time 07/23/23 01:54:57      Final result by Chuy Mckeon MD (07/23/23 01:54:57)                   Impression:      Similar alignment of recent fractures involving the right superior and inferior pubic rami.  No new acute displaced fracture.      Electronically signed by: Chuy Mckeon MD  Date:    07/23/2023  Time:    01:54               Narrative:    EXAMINATION:  XR PELVIS ROUTINE AP; XR FEMUR 2 VIEW RIGHT    CLINICAL HISTORY:  HIP PAIN;  Pain in right hip; Pain in right leg    TECHNIQUE:  Three frontal views of the pelvis performed.  Two views of the right femur also obtained.    COMPARISON:  CT pelvis, 07/08/2023.    FINDINGS:  Pelvis: Bones are mildly demineralized.  Similar appearance of mildly displaced recent fracture of the right inferior pubic ramus and nondisplaced fracture of the right superior pubic ramus.  Similar fracture fragment alignment allowing for differences in positioning.  No new acute fracture.  No dislocation.  Mild degenerative changes in both hips.    Right femur: No additional acute fracture or dislocation other than described above.  Degenerative changes in the right hip and right knee.  Atherosclerotic vascular calcifications.  Soft tissue edema overlying the right hip.  No unexpected radiopaque foreign body.                                       X-Ray Chest AP Portable (Final result)  Result time 07/23/23 01:47:54      Final result by Chuy Mckeon MD (07/23/23 01:47:54)                   Impression:      No detrimental change when compared with 07/08/2023.      Electronically signed by: Chuy Mckeon MD  Date:    07/23/2023  Time:    01:47               Narrative:    EXAMINATION:  XR CHEST AP PORTABLE    CLINICAL HISTORY:  Provided history is "Sepsis;  ".    TECHNIQUE:  One view of the " chest.    COMPARISON:  07/08/2023.    FINDINGS:  Cardiac wires overlie the chest.  Patient is slightly rotated.  Cardiomediastinal silhouette is stable and may be at the upper limits of normal in size.  Atherosclerotic calcifications overlie the aortic arch.  Right hemidiaphragm is slightly elevated as seen previously.  Azygous lobe configuration is incidentally noted in the right lung apex.  No confluent area of consolidation.  No sizable pleural effusion.  No pneumothorax.  Hiatal hernia again noted.                                        Assessment/Plan:     * Multiple closed right sided fractures of pelvis without disruption of pelvic ring  Frequent falls     - diagnosed at [place] [facility] [place] about 2 weeks ago.   - repeat XRs show stable frcatures, non-displaced   - weight bearing as tolerated   - pain control with tylenol and robaxin for now. Prn PO oxy 5 for severe pain  - PT/OT consulted   - patient will likely need long term placement given max assist requirements and progressive debility, in setting of dementia and behavioral disturbances       Prolonged Q-T interval on ECG  - QTc 510  - avoid QT prolonging meds   - monitor tele       Frequent falls        Urinary retention  Recurrent issue   - mild elevation in Cr likely d/t retention   - duncan placed, treating UTI   - follow urine cultures   - voiding trial pending clinical improvement       Goals of care, counseling/discussion  - Caretaker Sonia expresses several concerns regarding patients continual health and function decline  - Recent stay at SNF with progress noted and pt was placed in [facility] at [facility]. However, detention is not the appropriate level of care for patient given dementia and frequent falls   - [facility] will now require 24 hour sitters for patient to return   - DILIA TAYLOR consulted for dc planning   - will need to have an in-depth palliative discussion with son to determine best care plan for patient - consult placed   - will call son to discuss      Hypokalemia  Patient has hypokalemia which is currently uncontrolled. Last electrolytes reviewed- Recent Labs   Lab 07/23/23  0032   K 3.3*   . Will replace potassium and monitor electrolytes closely. Continuous telemetry.  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   - continue memantine  - continue haloperidol 0.5 PO qhs  - will consider starting additional nightly med for agitation        Lumbar spondylosis with myelopathy  - tylenol for pain control, robaxin      Acute cystitis with hematuria  Recurrent issue  - UA reviewed - grossly infectious  - f/u urine and blood cultures   - duncan placed for U-retention (infection suspected cause)   - started on rocephin in ED, will continue   - AFVSS, no leukocytosis.       Primary hypertension  - BP controlled, continue lisinopril, lopressor, lasix        VTE Risk Mitigation (From admission, onward)         Ordered     Reason for No Pharmacological VTE Prophylaxis  Once        Question:  Reasons:  Answer:  Risk of Bleeding  Comment:  SDH    07/23/23 0831     IP VTE HIGH RISK PATIENT  Once         07/23/23 0831     Place sequential compression device  Until discontinued         07/23/23 0831                     On 07/23/2023, patient should be placed in hospital observation services under my care in collaboration with Dr. Maykel Khan.      JON PattonC  Department of Hospital Medicine  [facility] - Emergency Dept

## 2023-07-23 NOTE — ASSESSMENT & PLAN NOTE
- Acute on chronic s/p fall at [facility]; no acute deficits  - repeat CTH stable   - holding eliquis for now  - INR wnl

## 2023-07-23 NOTE — ASSESSMENT & PLAN NOTE
Patient has hypokalemia which is currently uncontrolled. Last electrolytes reviewed- Recent Labs   Lab 07/23/23  0032   K 3.3*   . Will replace potassium and monitor electrolytes closely. Continuous telemetry.  - PO K replacement

## 2023-07-23 NOTE — HPI
Radha Sandoval is a 88 y.o. female with PMH significant for chronic BL pleural effusions, recent DVT diagnosed in March '23 (on eliquis), dementia, HTN, [place] [facility] [place] and discharged on 7/14 presenting with right pelvic pain after a fall yesterday when she was transferred from bed to wheelchair. Patient is AO x1 at baseline and history was collected from her caretaker Sonia. She reports patient has had multiple falls since discharge, she is staying at a skilled nursing facility. Due to her fluctuating mentation she has been agitated with nursing staff and tries to leave her bed and resists being transferred from bed to wheelchair, which has resulted in multiple falls. She was hospitalized at [facility] last week for another fall. Prior to her pelvic fracture on 7/8, she was ambulating without assistance at baseline, however is now non-weight bearing. Currently on Eliquis. Never IV drug user, non-smoker, non-EtOH consumer. Never had chemoradiation. No h/o autoimmune disease. She is currently admitted [facility] for management of a UTI and further evaluation of her pelvic fracture.

## 2023-07-23 NOTE — ASSESSMENT & PLAN NOTE
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    Patient is at neuro-baseline per daughter, accompanying pt at bedside.    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 6hr repeat stable. No change from prior imaging reviewed at clinic appointment with Dr. Jack on 7/10/23.    No neurosurgical intervention recommended at this time.   - follow up with Dr. Jack in outpatient clinic to discuss MMA embolization

## 2023-07-23 NOTE — PLAN OF CARE
[facility] - Emergency Dept  Initial Discharge Assessment       Primary Care Provider: Primary Doctor No    Admission Diagnosis: Frequent falls [R29.6]    Admission Date: 7/22/2023  Expected Discharge Date: 7/26/2023    SW went to assess pt and caregiver Chel was in room.  Pt was altered and not oriented.      As per Chel pt was living at an assisted living facility [facility].  As per caregiver pt has not been sleeping and has had several falls.  As per caregiver Chel pt is declining.  As per Chel pt requires 24hr care.      CLUADIA contacted son Tony [phone] and spoke to him regarding pt d/c.  Son stated pt was at [facility] for SNF and was d/c after 45 days.  After d/c from SNF pt went to live at [facility].  Son was having difficulty understanding that it was his responsibility to provide appropriate care for pt if the assisted living facility does not take pt back due to her level of needs.  Son did not understand that pt may not qualify for further SNF services, and he will need to have a plan as pt requires 24/hr care; which assisted living facilities do not provide.      CLAUDIA contacted [facility] [phone] and spoke with Maura, Wellness Nurse.  As per Maura they cannot re-admit pt at this time due to her level of care.  As per Maura pt will require another head-to-toe assessment before determining if they can re-admit her to their facility.  Maura stated that [facility] Resident Coordinator Director and Director of Nursing [phone] will need to be contacted on pt d/c.  As per Maura, pt is a new resident at the facility and they are concerned about the level of care pt will need.      Transition of Care Barriers: (P) None    Payor: MEDICARE / Plan: MEDICARE PART A & B / Product Type: Government /     Extended Emergency Contact Information  Primary Emergency Contact: tony griffith  Mobile Phone: [phone]  Relation: Son    needed? No  Secondary Emergency Contact: chel morrison  Mobile Phone: [phone]  Relation: Other   needed? No    Discharge Plan A: (P) Assisted Living  Discharge Plan B: (P) Assisted Living      [facility] [place] - [address]  [address]  [place] [address] [address]  Phone: [phone] Fax: [phone]      Initial Assessment (most recent)       Adult Discharge Assessment - 07/23/23 1100          Discharge Assessment    Assessment Type Discharge Planning Assessment     Confirmed/corrected address, phone number and insurance Yes     Confirmed Demographics Correct on Facesheet     Source of Information family;health care advocate;other (see comments) (P)      Reason For Admission Multiple closed right sided fractures of pelvis without disruption of pelvic ring     People in Home facility resident;alone     Facility Arrived From: [facility]     Do you expect to return to your current living situation? Yes (P)      Do you have help at home or someone to help you manage your care at home? Yes (P)      Who are your caregiver(s) and their phone number(s)? [facility] sitter agency; Chel Morrison caregiver [phone] (P)      Prior to hospitilization cognitive status: Unable to Assess (P)      Current cognitive status: Unable to Assess (P)      Walking or Climbing Stairs ambulation difficulty, requires equipment (P)      Mobility Management wheelchair, walker (P)      Dressing/Bathing bathing difficulty, assistance 1 person;dressing difficulty, dependent (P)      Dressing/Bathing Management personal sitter (P)      Home Accessibility wheelchair accessible (P)      Home Layout Able to live on 1st floor (P)      Equipment Currently Used at Home walker, standard;wheelchair (P)      Patient currently being followed by outpatient case management? No (P)      Do you currently have service(s) that help you manage your care at home? Yes (P)      Name  and Contact number of agency [facility] service - 24hr care (P)      Is the pt/caregiver preference to resume services with current agency Yes (P)      Do you have any problems affording any of your prescribed medications? No (P)      Is the patient taking medications as prescribed? yes (P)      Who is going to help you get home at discharge? Chel chaidez caregiver (P)      How do you get to doctors appointments? family or friend will provide (P)      Are you on dialysis? No (P)      Do you take coumadin? No (P)      Discharge Plan A Assisted Living (P)      Discharge Plan B Assisted Living (P)      DME Needed Upon Discharge  none (P)      Discharge Plan discussed with: Adult children;Caregiver (P)      Name(s) and Number(s) Caregiver Chel [phone]; son Maykel [phone] (P)      Transition of Care Barriers None (P)                    Ana Jolly CD, MSW, LMSW, RSW   Case Management  [facility]  Email: [email or web address]

## 2023-07-23 NOTE — ASSESSMENT & PLAN NOTE
Recurrent issue   - mild elevation in Cr likely d/t retention   - duncan placed, treating UTI   - follow urine cultures   - voiding trial pending clinical improvement

## 2023-07-23 NOTE — MEDICAL/APP STUDENT
Radha Sandoval is a 88 y.o. female with PMH significant for chronic BL pleural effusions, recent DVT diagnosed in March '23 (on eliquis), dementia, HTN, recent hospitalization here at [facility] for pelvic fracture treated non-operatively and discharged on 7/14 presenting with right pelvic pain after a fall yesterday when she was transferred from bed to wheelchair. Patient is AO x1 at baseline and history was collected from her caretaker Sonia. She reports patient has had multiple falls since discharge, she is staying at a skilled nursing facility. Due to her fluctuating mentation she has been agitated with nursing staff and tries to leave her bed and resists being transferred from bed to wheelchair, which has resulted in multiple falls. She was hospitalized at [facility] last week for another fall. Prior to her pelvic fracture on 7/8, she was ambulating without assistance at baseline, however is now non-weight bearing. She is currently on Eliquis. Never IV drug user, non-smoker, non-EtOH consumer. Never had chemoradiation. No h/o autoimmune disease. She is currently admitted for management of a UTI and further evaluation of her pelvic fracture.      Blas Vivas, MS4

## 2023-07-23 NOTE — ASSESSMENT & PLAN NOTE
- Caretaker Sonia expresses several concerns regarding patients continual health and function decline  - Recent stay at SNF with progress noted and pt was placed in halfway at [facility]. However, [facility]VIN is not the appropriate level of care for patient given dementia and frequent falls   - [facility] will now require 24 hour sitters for patient to return   - DILIA TAYLOR consulted for dc planning   - will need to have an in-depth palliative discussion with son to determine best care plan for patient - consult placed   - will call son to discuss

## 2023-07-23 NOTE — PHARMACY MED REC
"Admission Medication History     The home medication history was taken by Chepe Huerta.    You may go to "Admission" then "Reconcile Home Medications" tabs to review and/or act upon these items.     The home medication list has been updated by the Pharmacy department.   Please read ALL comments highlighted in yellow.   Please address this information as you see fit.    Feel free to contact us if you have any questions or require assistance.          Current Outpatient Medications on File Prior to Encounter   Medication Sig    acetaminophen (TYLENOL) 325 MG tablet   Take 2 tablets (650 mg total) by mouth every 6 (six) hours as needed (Pain).    apixaban (ELIQUIS) 5 mg Tab     Take 1 tablet (5 mg total) by mouth 2 (two) times daily.    ascorbic acid, vitamin C, (VITAMIN C) 250 MG tablet   Take 1 tablet (250 mg total) by mouth once daily.    EScitalopram oxalate (LEXAPRO) 5 MG Tab   Take 1 tablet (5 mg total) by mouth once daily. (TAKES 10 MG PER NH MAR)    furosemide (LASIX) 20 MG tablet   Take 1 tablet (20 mg total) by mouth 2 (two) times daily.    haloperidoL (HALDOL) 0.5 MG tablet   Take 0.5 mg by mouth every 8 (eight) hours as needed (SLEEPLESSNESS). (ALSO TAKE 1 MG AT BEDTIME PER NH MAR)      lisinopriL (PRINIVIL,ZESTRIL) 40 MG tablet   Take 1 tablet (40 mg total) by mouth once daily.    loperamide (IMODIUM) 2 mg capsule   Take 1 capsule (2 mg total) by mouth 4 (four) times daily as needed for Diarrhea.    melatonin (MELATIN) 3 mg tablet   Take 2 tablets (6 mg total) by mouth nightly as needed for Insomnia.    memantine (NAMENDA) 5 MG Tab   Take 1 tablet (5 mg total) by mouth 2 (two) times daily.    methocarbamoL (ROBAXIN) 500 MG Tab     Take 1 tablet (500 mg total) by mouth 3 (three) times daily as needed (muscle spasms, pain scale 5-7).    metoprolol tartrate (LOPRESSOR) 25 MG tablet   Take 1 tablet (25 mg total) by mouth 2 (two) times daily.    miconazole NITRATE 2 % (MICOTIN) 2 % top powder   Apply " topically 2 (two) times daily. Underside of bilateral breasts    potassium chloride (KLOR-CON) 10 MEQ TbSR   Take 1 tablet (10 mEq total) by mouth once daily.    tamsulosin (FLOMAX) 0.4 mg Cap   Take 1 capsule (0.4 mg total) by mouth nightly.    zinc sulfate (ZINCATE) 50 mg zinc (220 mg) capsule   Take 1 capsule (220 mg total) by mouth once daily.       Chepe Huerta  EXT [id number]                  .

## 2023-07-23 NOTE — CONSULTS
Brief consult acknowledgement note    Palliative medicine consult acknowledged and will be seen on Monday morning unless needed sooner.     Reason for consult:  Symptom management support and AC/GOC in the setting of progressive neuro cognitive decline, falls, and pelvic fractures    Please call palliative MD on call for discussion or immediate assistance.     Thank you for the opportunity to care for this patient and family.     Please call with questions.     Allan Vaughan MD  Palliative Medicine   [facility]  [phone] (cell)

## 2023-07-23 NOTE — ASSESSMENT & PLAN NOTE
Frequent falls     - diagnosed at last [facility] hospitalization about 2 weeks ago.   - repeat XRs show stable frcatures, non-displaced   - weight bearing as tolerated   - pain control with tylenol and robaxin for now. Prn PO oxy 5 for severe pain  - PT/OT consulted   - patient will likely need long term placement given max assist requirements and progressive debility, in setting of dementia and behavioral disturbances

## 2023-07-23 NOTE — CONSULTS
[facility] - Emergency Dept  Neurosurgery  Consult Note    Inpatient consult to Neurosurgery  Consult performed by: Varun Tapia MD  Consult ordered by: Shanel Welch PA-C      Subjective:     Chief Complaint/Reason for Admission: B/l chronic subdural hematoma vs hygroma s/p fall    History of Present Illness: 88F w/bilateral chronic subdural hematomas    Fall transferring from wheelchair-> bed. Baseline neuro exam at ED per caretaker and family. CTH showed b/l subdural hygromas, 6hr repeat stable.   UA infectious, duncan for retention.     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 5/22/2023    Lumbar spondylosis with myelopathy 4/25/2023    Multiple closed right sided fractures of pelvis without disruption of pelvic ring 7/9/2023    Primary hypertension 6/10/2022    Subdural hygroma 7/8/2023     Past Surgical History:   Procedure Laterality Date    REPAIR, HERNIA, INGUINAL, WITHOUT HISTORY OF PRIOR REPAIR, AGE 5 YEARS OR OLDER Right 5/6/2023    Procedure: REPAIR, HERNIA, INGUINAL, WITHOUT HISTORY OF PRIOR REPAIR, AGE 5 YEARS OR OLDER;  Surgeon: Maurice Piper MD;  Location: [facility] OR [address] [place] [address];  Service: General;  Laterality: Right;  possible laparotomy, possible bowel resection     Family History    None       Tobacco Use    Smoking status: Never    Smokeless tobacco: Never   Substance and Sexual Activity    Alcohol use: Not on file    Drug use: Never    Sexual activity: Not Currently     Review of  Systems  Objective:        There is no height or weight on file to calculate BMI.  Vital Signs (Most Recent):  Temp: 98.1 °F (36.7 °C) (07/23/23 0442)  Pulse: 79 (07/23/23 1343)  Resp: 20 (07/23/23 1343)  BP: (!) 133/58 (07/23/23 1343)  SpO2: 95 % (07/23/23 1343) Vital Signs (24h Range):  Temp:  [97.6 °F (36.4 °C)-98.8 °F (37.1 °C)] 98.1 °F (36.7 °C)  Pulse:  [67-79] 79  Resp:  [17-24] 20  SpO2:  [94 %-96 %] 95 %  BP: (123-176)/(46-87) 133/58     Date 07/23/23 0700 - 07/24/23 0659   Shift 5885-6446 4160-9545 3660-1019 24 Hour Total   INTAKE   Shift Total       OUTPUT   Urine 300   300   Shift Total 300   300   Weight (kg)                                Urethral Catheter 07/23/23 0306 Straight-tip 16 Fr. (Active)          Physical Exam     Aox1. GCS 15  PERRL. EOMI  Follows commands in all extremities  LE range of motion limited by pain (pelvic fracture)    Neurosurgery Physical Exam    Significant Labs:  Recent Labs   Lab 07/23/23 0032         K 3.3*      CO2 27   BUN 21   CREATININE 1.0   CALCIUM 8.7     Recent Labs   Lab 07/23/23 0032   WBC 10.34   HGB 9.8*   HCT 30.2*        No results for input(s): LABPT, INR, APTT in the last 48 hours.  Microbiology Results (last 7 days)       Procedure Component Value Units Date/Time    Blood culture x two cultures. Draw prior to antibiotics. [[id number]] Collected: 07/23/23 0033    Order Status: Completed Specimen: Blood from Peripheral, Antecubital, Right Updated: 07/23/23 0745     Blood Culture, Routine No Growth to date    Narrative:      Aerobic and anaerobic    Blood culture x two cultures. Draw prior to antibiotics. [[id number]] Collected: 07/23/23 0033    Order Status: Completed Specimen: Blood from Peripheral, Antecubital, Right Updated: 07/23/23 0745     Blood Culture, Routine No Growth to date    Narrative:      Aerobic and anaerobic    Urine culture [[id number]] Collected: 07/23/23 0306    Order Status: No result Specimen: Urine Updated:  07/23/23 0422          Recent Lab Results  (Last 5 results in the past 24 hours)        07/23/23  0306   07/23/23  0043   07/23/23  0042   07/23/23  0033   07/23/23  0032        Albumin         2.8       Alkaline Phosphatase         139       Allens Test   N/A   N/A           ALT         12       Anion Gap         13       Appearance, UA Hazy               AST         22       Bacteria, UA Occasional               Baso #         0.03       Basophil %         0.3       Bilirubin (UA) Negative               BILIRUBIN TOTAL         0.6  Comment: For infants and newborns, interpretation of results should be based  on gestational age, weight and in agreement with clinical  observations.    Premature Infant recommended reference ranges:  Up to 24 hours.............<8.0 mg/dL  Up to 48 hours............<12.0 mg/dL  3-5 days..................<15.0 mg/dL  6-29 days.................<15.0 mg/dL         Blood Culture, Routine       No Growth to date  [P]                No Growth to date  [P]         BNP         397  Comment: Values of less than 100 pg/ml are consistent with non-CHF populations.       Site   Other   Other           BUN         21       Calcium         8.7       Chloride         101       CO2         27       Color, UA Yellow               Creatinine         1.0       Differential Method         Automated       eGFR         54.5       Eos #         0.0       Eosinophil %         0.4       Glucose         106       Glucose, UA Negative               Gran # (ANC)         8.0       Gran %         77.6       Hematocrit         30.2       Hemoglobin         9.8       Hyaline Casts, UA 20               Immature Grans (Abs)         0.02  Comment: Mild elevation in immature granulocytes is non specific and   can be seen in a variety of conditions including stress response,   acute inflammation, trauma and pregnancy. Correlation with other   laboratory and clinical findings is essential.         Immature Granulocytes          0.2       Ketones, UA Negative               Leukocytes, UA 3+               Lymph #         1.3       Lymph %         12.6       MCH         28.3       MCHC         32.5       MCV         87       Microscopic Comment SEE COMMENT  Comment: Other formed elements not mentioned in the report are not   present in the microscopic examination.                  Mono #         0.9       Mono %         8.9       MPV         10.8       NITRITE UA Negative               Non-Squam Epith 0               nRBC         0       Occult Blood UA Negative               pH, UA 6.0               Platelets         319       POC BE     7           POC HCO3     30.5           POC Lactate   0.79             POC PCO2     43.3           POC PH     7.456           POC PO2     33           POC SATURATED O2     67           POC TCO2     32           Potassium         3.3       PROTEIN TOTAL         6.1       Protein, UA Negative  Comment: Recommend a 24 hour urine protein or a urine   protein/creatinine ratio if globulin induced proteinuria is  clinically suspected.                 RBC         3.46       RBC, UA 28               RDW         14.5       Sample   VENOUS   VENOUS           Sodium         141       Specific [place], UA 1.015               Specimen UA Urine, Catheterized               Squam Epithel, UA 4               Troponin I         0.035  Comment: The reference interval for Troponin I represents the 99th percentile   cutoff   for our facility and is consistent with 3rd generation assay   performance.         WBC Clumps, UA Few               WBC, UA >100               WBC         10.34       Yeast, UA Many                                       [P] - Preliminary Result               Significant Diagnostics:  CT: CT Head Without Contrast    Result Date: 7/23/2023  Similar volume of subdural hematomas.  Changes of density may be in part technical/artifactual in nature and also due to some leakage of recent intravenous contrast from  recent CT of the abdomen pelvis with no focal hyperdense acute hemorrhage identified.  Follow-up as clinically warranted. Electronically signed by: Bill Tarango Date:    07/23/2023 Time:    08:24    CT Head Without Contrast    Result Date: 7/23/2023  Subtle increase in density of the subdural hygromas suggesting acute on chronic subdural fluid collection. Consider follow-up CT scan per protocol in patient with small acute subdural hematoma on chronic subdural hygromas. Electronically signed by: Maykel Castro Date:    07/23/2023 Time:    02:16   MRI: No results found in the last 24 hours.    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    Patient is at neuro-baseline per daughter, accompanying pt at bedside.    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 6hr repeat stable. No change from prior imaging reviewed at clinic appointment with Dr. Jack on 7/10/23.    No neurosurgical intervention recommended at this time.   - follow up with Dr. Jack in outpatient clinic to discuss MMA embolization        Thank you for your consult. I will sign off. Please contact us if you have any additional questions.    Varun Tapia MD  Neurosurgery  [facility] - Emergency Dept

## 2023-07-23 NOTE — SUBJECTIVE & OBJECTIVE
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 5/22/2023    Lumbar spondylosis with myelopathy 4/25/2023    Multiple closed right sided fractures of pelvis without disruption of pelvic ring 7/9/2023    Primary hypertension 6/10/2022    Subdural hygroma 7/8/2023       Past Surgical History:   Procedure Laterality Date    REPAIR, HERNIA, INGUINAL, WITHOUT HISTORY OF PRIOR REPAIR, AGE 6 YEARS OR OLDER Right 5/6/2023    Procedure: REPAIR, HERNIA, INGUINAL, WITHOUT HISTORY OF PRIOR REPAIR, AGE 6 YEARS OR OLDER;  Surgeon: Maurice Piper MD;  Location: [facility] OR [address] [place] [address];  Service: General;  Laterality: Right;  possible laparotomy, possible bowel resection       Review of patient's allergies indicates:  No Known Allergies    Current Facility-Administered Medications   Medication    HYDROcodone-acetaminophen 5-325 mg per tablet 1 tablet     Current Outpatient Medications   Medication Sig    acetaminophen (TYLENOL) 325 MG tablet Take 2 tablets (650 mg total) by mouth every 6 (six) hours as needed (Pain).    apixaban (ELIQUIS) 5 mg Tab Take 1 tablet (5 mg total) by mouth 2 (two) times daily.    ascorbic acid, vitamin C, (VITAMIN C) 250 MG tablet Take 1 tablet (250 mg total) by mouth once daily.    EScitalopram oxalate (LEXAPRO) 5 MG Tab Take 1 tablet (5 mg total) by mouth once daily.    furosemide (LASIX) 20 MG tablet Take 1 tablet (20 mg total) by mouth 2 (two) times daily.    haloperidoL (HALDOL) 0.5 MG tablet Take 1 tablet (0.5 mg total) by mouth every evening.    lisinopriL (PRINIVIL,ZESTRIL) 40 MG tablet Take 1 tablet (40 mg total) by mouth once daily.    loperamide (IMODIUM) 2 mg capsule Take 1  capsule (2 mg total) by mouth 4 (four) times daily as needed for Diarrhea.    melatonin (MELATIN) 3 mg tablet Take 2 tablets (6 mg total) by mouth nightly as needed for Insomnia.    memantine (NAMENDA) 5 MG Tab Take 1 tablet (5 mg total) by mouth 2 (two) times daily.    methocarbamoL (ROBAXIN) 500 MG Tab Take 1 tablet (500 mg total) by mouth 3 (three) times daily as needed (muscle spasms, pain scale 5-7).    metoprolol tartrate (LOPRESSOR) 25 MG tablet Take 1 tablet (25 mg total) by mouth 2 (two) times daily.    miconazole NITRATE 2 % (MICOTIN) 2 % top powder Apply topically 2 (two) times daily. Underside of bilateral breasts    potassium chloride (KLOR-CON) 10 MEQ TbSR Take 1 tablet (10 mEq total) by mouth once daily.    tamsulosin (FLOMAX) 0.4 mg Cap Take 1 capsule (0.4 mg total) by mouth nightly.    zinc sulfate (ZINCATE) 50 mg zinc (220 mg) capsule Take 1 capsule (220 mg total) by mouth once daily.     Family History    None       Tobacco Use    Smoking status: Never    Smokeless tobacco: Never   Substance and Sexual Activity    Alcohol use: Not on file    Drug use: Never    Sexual activity: Not Currently     ROS    Constitutional: negative for fevers  Eyes: negative visual changes  ENT: negative for hearing loss  Respiratory: negative for dyspnea  Cardiovascular: negative for chest pain  Gastrointestinal: negative for abdominal pain  Genitourinary: negative for dysuria  Neurological: negative for headaches  Behavioral/Psych: negative for hallucinations  Endocrine: negative for temperature intolerance      Objective:     Vital Signs (Most Recent):  Temp: 98.1 °F (36.7 °C) (07/23/23 0442)  Pulse: 79 (07/23/23 0442)  Resp: 17 (07/23/23 0534)  BP: (!) 146/60 (07/23/23 0442)  SpO2: 95 % (07/23/23 0442) Vital Signs (24h Range):  Temp:  [97.6 °F (36.4 °C)-98.8 °F (37.1 °C)] 98.1 °F (36.7 °C)  Pulse:  [67-79] 79  Resp:  [17-24] 17  SpO2:  [94 %-96 %] 95 %  BP: (123-176)/(46-79) 146/60           There is no height or  "weight on file to calculate BMI.      Intake/Output Summary (Last 24 hours) at 7/23/2023 0730  Last data filed at 7/23/2023 0618  Gross per 24 hour   Intake 100 ml   Output --   Net 100 ml        Ortho/SPM Exam     Gen:  Mild distress, well-developed, well nourished.  CV:  Peripherally well-perfused. 2+ radial pulses, symmetric.  Respiratory:  Normal respiratory effort. No accessory muscle use.   Head/Neck:  Normocephalic.  Atraumatic. Sclera anicteric. TM. Neck supple.  Neuro: No FND. Awake. Alert. Oriented to person only, not time, place, or situation.  Abdomen: Soft, NTND.      MSK:  Left Upper Extremity  Inspection  - Skin intact throughout, no open wounds  - No swelling  - No ecchymosis, erythema, or signs of cellulitis  Palpation  - NonTTP throughout, no palpable abnormality  Range of motion  - AROM and PROM of the shoulder, elbow, wrist, and hand intact  Stability  - No evidence of joint dislocation or abnormal laxity   Neurovascular  - AIN/PIN/Radial/Median/Ulnar Nerves assessed in isolation without deficit  - Able to give thumbs up, make "OK" sign, cross IF/LF, abduct/adduct fingers, make fist  - SILT throughout  - Compartments soft  - Radial artery palpated  - Capillary Refill <3s  - Muscle tone normal    Right Upper Extremity  Inspection  - Skin intact throughout, no open wounds  - No swelling  - No ecchymosis, erythema, or signs of cellulitis  Palpation  - NonTTP throughout, no palpable abnormality   Range of motion  - AROM and PROM of the shoulder, elbow, wrist, and hand intact  Stability  - No evidence of joint dislocation or abnormal laxity   Neurovascular  - AIN/PIN/Radial/Median/Ulnar Nerves assessed in isolation without deficit  - Able to give thumbs up, make "OK" sign, cross IF/LF, abduct/adduct fingers, make fist  - SILT throughout  - Compartments soft  - Radial artery palpated  - Capillary Refill <3s  - Muscle tone normal    Left Lower Extremity  Inspection  - Skin intact throughout, no open " wounds  - No swelling  - No ecchymosis, erythema, or signs of cellulitis  Palpation  - NonTTP throughout, no palpable abnormality  Range of motion  - AROM and PROM of the hip, knee, ankle, and foot intact  Stability  - No evidence of joint dislocation or abnormal laxity  Neurovascular  - TA/EHL/Gastroc/FHL assessed in isolation without deficit  - SILT throughout  - Compartments soft  - DP palpated   - Capillary Refill <3s  - Negative Log roll  - Negative Stinchfield  - Muscle tone normal    Right Lower Extremity  Inspection  - Skin intact throughout, no open wounds  - No swelling  - No ecchymosis, erythema, or signs of cellulitis  Palpation  - TTP over the right hip, nontender to knee, ankle, and foot  Range of motion  - AROM and PROM of the ankle and foot intact  - AROM and PROM of the hip and knee limited due to pain  Stability  - No evidence of joint dislocation or abnormal laxity  Neurovascular  - TA/EHL/Gastroc/FHL assessed in isolation without deficit  - SILT throughout  - Compartments soft  - DP palpated   - Capillary Refill <3s  - Negative Log roll  - Negative Stinchfield  - Muscle tone normal    Spine/pelvis/axial body:  No tenderness to palpation of cervical, thoracic, or lumbar spine  No pain with compression of pelvis  No chest wall or abdominal tenderness  No decubitus ulcers  Muscle tone normal      Significant Labs: CBC:   Recent Labs   Lab 07/23/23  0032   WBC 10.34   HGB 9.8*   HCT 30.2*        CMP:   Recent Labs   Lab 07/23/23  0032      K 3.3*      CO2 27      BUN 21   CREATININE 1.0   CALCIUM 8.7   PROT 6.1   ALBUMIN 2.8*   BILITOT 0.6   ALKPHOS 139*   AST 22   ALT 12   ANIONGAP 13     All pertinent labs within the past 24 hours have been reviewed.    Significant Imaging: CT: I have reviewed all pertinent results/findings and my personal findings are:  CT findings consistent with superior and inferior right sided pubic rami fractures nondisplaced from prior imaging.    X-Ray: I have reviewed all pertinent results/findings and my personal findings are:  XR AP pelvis shows known superior and inferior pelvic rami fractures that appear unchanged from prior XR. Full length femur films do not show any acute fractures or dislocations at this time.

## 2023-07-23 NOTE — SUBJECTIVE & OBJECTIVE
(Not in a hospital admission)      Review of patient's allergies indicates:  No Known Allergies    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 5/22/2023    Lumbar spondylosis with myelopathy 4/25/2023    Multiple closed right sided fractures of pelvis without disruption of pelvic ring 7/9/2023    Primary hypertension 6/10/2022    Subdural hygroma 7/8/2023     Past Surgical History:   Procedure Laterality Date    REPAIR, HERNIA, INGUINAL, WITHOUT HISTORY OF PRIOR REPAIR, AGE 6 YEARS OR OLDER Right 5/6/2023    Procedure: REPAIR, HERNIA, INGUINAL, WITHOUT HISTORY OF PRIOR REPAIR, AGE 6 YEARS OR OLDER;  Surgeon: Maurice Piper MD;  Location: [facility] OR [address] [place] [address];  Service: General;  Laterality: Right;  possible laparotomy, possible bowel resection     Family History    None       Tobacco Use    Smoking status: Never    Smokeless tobacco: Never   Substance and Sexual Activity    Alcohol use: Not on file    Drug use: Never    Sexual activity: Not Currently     Review of Systems  Objective:        There is no height or weight on file to calculate BMI.  Vital Signs (Most Recent):  Temp: 98.1 °F (36.7 °C) (07/23/23 0442)  Pulse: 79 (07/23/23 1343)  Resp: 20 (07/23/23 1343)  BP: (!) 133/58 (07/23/23 1343)  SpO2: 95 % (07/23/23 1343) Vital Signs (24h Range):  Temp:  [97.6 °F (36.4 °C)-98.8 °F (37.1 °C)] 98.1 °F (36.7 °C)  Pulse:  [67-79] 79  Resp:  [17-24] 20  SpO2:  [94 %-96 %] 95 %  BP: (123-176)/(46-87) 133/58     Date 07/23/23 0700 - 07/24/23 0659   Shift 5019-2546 3162-9712 8847-7185 24 Hour Total   INTAKE   Shift Total       OUTPUT   Urine 300   300   Shift Total 300   300   Weight (kg)                                 Urethral Catheter 07/23/23 0306 Straight-tip 16 Fr. (Active)          Physical Exam     Aox1. GCS 15  PERRL. EOMI  Follows commands in all extremities  LE range of motion limited by pain (pelvic fracture)    Neurosurgery Physical Exam    Significant Labs:  Recent Labs   Lab 07/23/23 0032         K 3.3*      CO2 27   BUN 21   CREATININE 1.0   CALCIUM 8.7     Recent Labs   Lab 07/23/23 0032   WBC 10.34   HGB 9.8*   HCT 30.2*        No results for input(s): LABPT, INR, APTT in the last 48 hours.  Microbiology Results (last 7 days)       Procedure Component Value Units Date/Time    Blood culture x two cultures. Draw prior to antibiotics. [[id number]] Collected: 07/23/23 0033    Order Status: Completed Specimen: Blood from Peripheral, Antecubital, Right Updated: 07/23/23 0745     Blood Culture, Routine No Growth to date    Narrative:      Aerobic and anaerobic    Blood culture x two cultures. Draw prior to antibiotics. [[id number]] Collected: 07/23/23 0033    Order Status: Completed Specimen: Blood from Peripheral, Antecubital, Right Updated: 07/23/23 0745     Blood Culture, Routine No Growth to date    Narrative:      Aerobic and anaerobic    Urine culture [[id number]] Collected: 07/23/23 0306    Order Status: No result Specimen: Urine Updated: 07/23/23 0422          Recent Lab Results  (Last 5 results in the past 24 hours)        07/23/23  0306   07/23/23  0043   07/23/23  0042   07/23/23  0033   07/23/23  0032        Albumin         2.8       Alkaline Phosphatase         139       Allens Test   N/A   N/A           ALT         12       Anion Gap         13       Appearance, UA Hazy               AST         22       Bacteria, UA Occasional               Baso #         0.03       Basophil %         0.3       Bilirubin (UA) Negative               BILIRUBIN TOTAL         0.6  Comment: For infants and newborns, interpretation of results should be based  on gestational  age, weight and in agreement with clinical  observations.    Premature Infant recommended reference ranges:  Up to 24 hours.............<8.0 mg/dL  Up to 48 hours............<12.0 mg/dL  3-5 days..................<15.0 mg/dL  6-29 days.................<15.0 mg/dL         Blood Culture, Routine       No Growth to date  [P]                No Growth to date  [P]         BNP         397  Comment: Values of less than 100 pg/ml are consistent with non-CHF populations.       Site   Other   Other           BUN         21       Calcium         8.7       Chloride         101       CO2         27       Color, UA Yellow               Creatinine         1.0       Differential Method         Automated       eGFR         54.5       Eos #         0.0       Eosinophil %         0.4       Glucose         106       Glucose, UA Negative               Gran # (ANC)         8.0       Gran %         77.6       Hematocrit         30.2       Hemoglobin         9.8       Hyaline Casts, UA 20               Immature Grans (Abs)         0.02  Comment: Mild elevation in immature granulocytes is non specific and   can be seen in a variety of conditions including stress response,   acute inflammation, trauma and pregnancy. Correlation with other   laboratory and clinical findings is essential.         Immature Granulocytes         0.2       Ketones, UA Negative               Leukocytes, UA 3+               Lymph #         1.3       Lymph %         12.6       MCH         28.3       MCHC         32.5       MCV         87       Microscopic Comment SEE COMMENT  Comment: Other formed elements not mentioned in the report are not   present in the microscopic examination.                  Mono #         0.9       Mono %         8.9       MPV         10.8       NITRITE UA Negative               Non-Squam Epith 0               nRBC         0       Occult Blood UA Negative               pH, UA 6.0               Platelets         319       POC BE     7            POC HCO3     30.5           POC Lactate   0.79             POC PCO2     43.3           POC PH     7.456           POC PO2     33           POC SATURATED O2     67           POC TCO2     32           Potassium         3.3       PROTEIN TOTAL         6.1       Protein, UA Negative  Comment: Recommend a 24 hour urine protein or a urine   protein/creatinine ratio if globulin induced proteinuria is  clinically suspected.                 RBC         3.46       RBC, UA 28               RDW         14.5       Sample   VENOUS   VENOUS           Sodium         141       Specific Johnson, UA 1.015               Specimen UA Urine, Catheterized               Squam Epithel, UA 4               Troponin I         0.035  Comment: The reference interval for Troponin I represents the 99th percentile   cutoff   for our facility and is consistent with 3rd generation assay   performance.         WBC Clumps, UA Few               WBC, UA >100               WBC         10.34       Yeast, UA Many                                       [P] - Preliminary Result               Significant Diagnostics:  CT: CT Head Without Contrast    Result Date: 7/23/2023  Similar volume of subdural hematomas.  Changes of density may be in part technical/artifactual in nature and also due to some leakage of recent intravenous contrast from recent CT of the abdomen pelvis with no focal hyperdense acute hemorrhage identified.  Follow-up as clinically warranted. Electronically signed by: Bill Tarango Date:    07/23/2023 Time:    08:24    CT Head Without Contrast    Result Date: 7/23/2023  Subtle increase in density of the subdural hygromas suggesting acute on chronic subdural fluid collection. Consider follow-up CT scan per protocol in patient with small acute subdural hematoma on chronic subdural hygromas. Electronically signed by: Maykel Castro Date:    07/23/2023 Time:    02:16   MRI: No results found in the last 24 hours.

## 2023-07-23 NOTE — ASSESSMENT & PLAN NOTE
Recurrent issue  - UA reviewed - grossly infectious  - f/u urine and blood cultures   - duncan placed for U-retention (infection suspected cause)   - started on rocephin in ED, will continue   - AFVSS, no leukocytosis.

## 2023-07-23 NOTE — ED PROVIDER NOTES
"Encounter Date: 7/22/2023       History     Chief Complaint   Patient presents with    Shortness of Breath     Pt coming from [facility] for SOB starting about an hour ago and alerted NH staff. Pt has no other complaints at this time and is alert and oriented    Nausea     Pt is an 87 yo F with PMH of DVT/PE, HTN, Alzheimers dementia who presents from [facility] after a fall being transitioned from bed to wheelchair and then a second fall which were both reportedly onto her buttock. Pt reportedly became short of breath and hypoxic. She was also reportedly febrile with fever up to 102 F per caretaker at bedside.  Caretaker at bedside reports she requires 24/7 supervision and is 100% unable to perform any ADLs alone. They have been working on upgrading her care and her family has hired additional caretakers.    Hospitalized 7/8-7/14/23 after a fall.  "found with recurrent hypoxia and concern for pulmonary edema, persistent pleural effusions noted on CT, recurrent urinary retention s/p duncan catheter placement.  Plain film and CT imaging demonstrated Pubic fracture in 2 places Superior/inferior ramus. UA consistent with recurrent cystitis"    The history is provided by the patient, a caregiver and medical records. The history is limited by the condition of the patient.   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 5/22/2023    Lumbar spondylosis with myelopathy 4/25/2023    Multiple closed right sided fractures of pelvis without disruption of pelvic ring 7/9/2023    Primary hypertension 6/10/2022    Subdural " hygroma 7/8/2023     Past Surgical History:   Procedure Laterality Date    REPAIR, HERNIA, INGUINAL, WITHOUT HISTORY OF PRIOR REPAIR, AGE 5 YEARS OR OLDER Right 5/6/2023    Procedure: REPAIR, HERNIA, INGUINAL, WITHOUT HISTORY OF PRIOR REPAIR, AGE 5 YEARS OR OLDER;  Surgeon: Maurice Piper MD;  Location: [facility] OR [address] [place] [address];  Service: General;  Laterality: Right;  possible laparotomy, possible bowel resection     History reviewed. No pertinent family history.  Social History     Tobacco Use    Smoking status: Never    Smokeless tobacco: Never   Substance Use Topics    Drug use: Never         Physical Exam     Initial Vitals [07/22/23 1909]   BP Pulse Resp Temp SpO2   (!) 123/46 70 (!) 24 97.6 °F (36.4 °C) 95 %      MAP       --         Physical Exam    Nursing note and vitals reviewed.  Constitutional: She appears well-developed and well-nourished. She is not diaphoretic. No distress.   HENT:   Head: Normocephalic and atraumatic.   Eyes: Conjunctivae and EOM are normal.   Neck: Neck supple.   Normal range of motion.  Cardiovascular:  Normal rate and regular rhythm.           Pulmonary/Chest: No respiratory distress. She exhibits no tenderness.   Abdominal: She exhibits no distension. There is no abdominal tenderness.   Musculoskeletal:         General: Tenderness (R hip) present.      Cervical back: Normal range of motion and neck supple.     Neurological: She is alert and oriented to person, place, and time. GCS score is 15. GCS eye subscore is 4. GCS verbal subscore is 5. GCS motor subscore is 6.   Skin: Skin is warm and dry.   Psychiatric:   Limited due to clinical condtion       ED Course   Procedures  Labs Reviewed   CBC W/ AUTO DIFFERENTIAL - Abnormal; Notable for the following components:       Result Value    RBC 3.46 (*)     Hemoglobin 9.8 (*)     Hematocrit 30.2 (*)     Gran # (ANC) 8.0 (*)     Gran % 77.6 (*)     Lymph % 12.6 (*)     All other components within normal limits   COMPREHENSIVE METABOLIC PANEL  - Abnormal; Notable for the following components:    Potassium 3.3 (*)     Albumin 2.8 (*)     Alkaline Phosphatase 139 (*)     eGFR 54.5 (*)     All other components within normal limits   URINALYSIS, REFLEX TO URINE CULTURE - Abnormal; Notable for the following components:    Appearance, UA Hazy (*)     Leukocytes, UA 3+ (*)     All other components within normal limits    Narrative:     Specimen Source->Urine   TROPONIN I - Abnormal; Notable for the following components:    Troponin I 0.035 (*)     All other components within normal limits   B-TYPE NATRIURETIC PEPTIDE - Abnormal; Notable for the following components:     (*)     All other components within normal limits   URINALYSIS MICROSCOPIC - Abnormal; Notable for the following components:    RBC, UA 28 (*)     WBC, UA >100 (*)     WBC Clumps, UA Few (*)     Yeast, UA Many (*)     Hyaline Casts, UA 20 (*)     All other components within normal limits    Narrative:     Specimen Source->Urine   BASIC METABOLIC PANEL - Abnormal; Notable for the following components:    Potassium 3.0 (*)     Calcium 8.3 (*)     All other components within normal limits   CBC W/ AUTO DIFFERENTIAL - Abnormal; Notable for the following components:    RBC 3.26 (*)     Hemoglobin 9.0 (*)     Hematocrit 29.3 (*)     MCHC 30.7 (*)     Lymph # 0.7 (*)     Gran % 74.5 (*)     Lymph % 10.5 (*)     All other components within normal limits   ISTAT PROCEDURE - Abnormal; Notable for the following components:    POC PH 7.456 (*)     POC PO2 33 (*)     POC HCO3 30.5 (*)     POC SATURATED O2 67 (*)     POC TCO2 32 (*)     All other components within normal limits   MAGNESIUM   PROTIME-INR   LACTIC ACID, PLASMA   ISTAT LACTATE     EKG Readings: (Independently Interpreted)   Initial Reading: No STEMI. Previous EKG: Compared with most recent EKG   EKG at 12:39 a.m. normal sinus rhythm rate of 68 prolonged QT with a QTC of 510 compared with EKG from 07/08/2023 PACs are no longer present   ECG  Results              EKG 12-lead (Final result)  Result time 07/24/23 09:28:15      Final result by Interface, Lab In [facility] (07/24/23 09:28:15)                   Narrative:    Test Reason : Z91.89,    Vent. Rate : 061 BPM     Atrial Rate : 061 BPM     P-R Int : 176 ms          QRS Dur : 090 ms      QT Int : 472 ms       P-R-T Axes : 017 -03 026 degrees     QTc Int : 475 ms    Normal sinus rhythm  Normal ECG  When compared with ECG of 23-JUL-2023 00:39,  No significant change was found  Confirmed by Todd DERAS MD ([id number]) on 7/24/2023 9:28:07 AM    Referred By: [email or web address]   SELF           Confirmed By:Todd DERAS MD                                     EKG 12-lead (Final result)  Result time 07/23/23 10:07:26      Final result by Interface, Lab In [facility] (07/23/23 10:07:26)                   Narrative:    Test Reason : A41.9,    Vent. Rate : 068 BPM     Atrial Rate : 068 BPM     P-R Int : 162 ms          QRS Dur : 082 ms      QT Int : 480 ms       P-R-T Axes : 027 002 016 degrees     QTc Int : 510 ms    Normal sinus rhythm  Prolonged QT  Abnormal ECG  When compared with ECG of 08-JUL-2023 15:25,  Premature atrial complexes are no longer Present  Confirmed by Howard Bundy MD ([id number]) on 7/23/2023 10:07:17 AM    Referred By: [email or web address]   SELF           Confirmed By:Howard Bundy MD                                  Imaging Results              CT Head Without Contrast (Final result)  Result time 07/23/23 08:24:54      Final result by Bill Tarango MD (07/23/23 08:24:54)                   Impression:        Similar volume of subdural hematomas.  Changes of density may be in part technical/artifactual in nature and also due to some leakage of recent intravenous contrast from recent CT of the abdomen pelvis with no focal hyperdense acute hemorrhage identified.  Follow-up as clinically warranted.      Electronically signed by: Bill Tarango  Date:    07/23/2023  Time:    08:24               Narrative:    EXAMINATION:  CT  HEAD WITHOUT CONTRAST    CLINICAL HISTORY:  Head trauma, minor (Age >= 65y);    TECHNIQUE:  Low dose axial images were obtained through the head.  Coronal and sagittal reformations were also performed. Contrast was not administered.    COMPARISON:  07/23/2023, 07/09/2023    FINDINGS:  Bilateral subacute subdural hematomas are noted bilaterally again identified measuring up to 14 mm on  the left, similar in volume.  No new focal hyperdense hemorrhage is identified.  Overall mild increased density of the hematoma may in part be technical/artifactual in nature as well as due to leakage of recent intravenous contrast as similar appearances were noted previously (on 07/09/2023 and 07/10/2023).    3 mm midline shift to the right is similar in appearance.  The basal cisterns remain patent.  No acute intraparenchymal process.    Prominent atherosclerotic vascular calcifications are noted at the skull base.    The visualized sinuses and mastoid air cells are essentially clear.                                       CT Chest Abdomen Pelvis With Contrast (xpd) (Final result)  Result time 07/23/23 02:32:32   Procedure changed from CT Abdomen Pelvis With Contrast     Final result by Chuy Mckeon MD (07/23/23 02:32:32)                   Impression:      Similar appearance of recent fractures involving the right inferior and superior pubic rami.  Increased conspicuity of essentially nondisplaced recent fractures of the right michelle sacrum and anterior aspect of the right acetabulum.    Motion and artifact limited study with suboptimal bolus timing.    Moderate-sized hiatal hernia with moderate lower esophageal wall thickening.  Suggest correlation for esophagitis.    Peribronchial thickening and questionable minimal patchy ground-glass opacities in the lung bases and bibasilar subsegmental atelectasis.    Nodular soft tissue opacities in the left breast.  Neoplasm not excluded.  Suggest correlation with physical exam and  mammographic follow-up when clinically warranted.    Anasarca.    Mild nonspecific wall thickening of the inferior aspect of the urinary bladder.  Suggest correlation with urinalysis.    Multiple additional findings discussed in the body of the report.      Electronically signed by: Chuy Mckeon MD  Date:    07/23/2023  Time:    02:32               Narrative:    EXAMINATION:  CT CHEST ABDOMEN PELVIS WITH CONTRAST (XPD)    CLINICAL HISTORY:  Abdominal trauma, blunt;    TECHNIQUE:  Low dose axial images, sagittal and coronal reformations were obtained from the thoracic inlet to the pubic symphysis following the IV administration of 75 mL of Omnipaque 350 .  Oral contrast was not given.    COMPARISON:  CTA chest, 07/08/2023.  CT pelvis, 07/08/2023.  CT abdomen pelvis, 05/06/2023.    FINDINGS:  Chest:    Exam quality is limited by suboptimal bolus timing and motion.  Evaluation is limited by extensive streak artifact due to the patient's arms overlying the field of view.    Heart is borderline enlarged and similar to the prior study.  Coronary artery and mitral valve calcifications.  Thoracic aorta is stable in caliber with moderate to advanced calcific atherosclerosis.  No central pulmonary embolus.  No bulky mediastinal lymphadenopathy.    Detailed evaluation of the pulmonary parenchyma limited by motion.  There is peribronchial thickening and questionable minimal patchy ground-glass opacities in the lung bases.  Bibasilar subsegmental atelectasis.  No consolidation or pleural effusion.    Moderate-sized hiatal hernia with moderate lower esophageal wall thickening.    Abdomen:    Exam quality is limited by suboptimal bolus timing, motion, and extensive artifact related to the patient's arms overlying the field of view.    Liver is similar in size and contour when compared with the prior study.  Multiple hepatic hypodensities most suggestive of cysts.  Gallbladder is unremarkable.  No intrahepatic biliary ductal  dilatation.    Spleen, adrenals, and pancreas are stable and negative for acute finding allowing for significant motion.    Kidneys are stable.  There is a large left renal cyst.  Small stone or vascular calcification in the right renal hilum.  No hydronephrosis.    Evaluation for bowel inflammation is limited by motion.  No small bowel obstruction.  Mild stool burden in the colon.    No pneumoperitoneum or organized fluid collection.    No bulky retroperitoneal lymphadenopathy.    Abdominal aorta is similar in caliber with moderate to advanced calcific atherosclerosis involving the aorta and major branch vessels.    Portal vein is grossly patent.    Pelvis:    Urinary bladder is mildly distended and there is mild wall thickening along the inferior aspect of the urinary bladder similar to the prior CT abdomen.  Rectum is unremarkable.  There is minimal presacral fat stranding.  No significant pelvic free fluid.    Bones and soft tissues:    Recent fractures involving the right superior and inferior pubic rami similar to prior CT pelvis.  Suspect nondisplaced fracture of the right michelle sacrum, more conspicuous when compared with prior CT pelvis.  Nondisplaced fracture of the anterior aspect of the right acetabulum (coronal series 606, image 129) is also more conspicuous when compared with the prior study.  No additional acute fracture.  Degenerative changes in the spine and pubic symphysis.  Mild anterolisthesis of L4 with respect to L5.  Mild left convex scoliotic curvature of the spine.  Old right lower rib fractures.  Operative changes of presumed right lower abdominal wall hernia repair.  Mild diffuse body wall edema.  Somewhat nodular soft tissue densities in the left breast soft tissues.  Suggest follow-up mammogram.                                       CT Head Without Contrast (Final result)  Result time 07/23/23 02:16:47      Final result by Maykel Castro MD (07/23/23 02:16:47)                    Impression:      Subtle increase in density of the subdural hygromas suggesting acute on chronic subdural fluid collection.    Consider follow-up CT scan per protocol in patient with small acute subdural hematoma on chronic subdural hygromas.      Electronically signed by: Maykel Castro  Date:    07/23/2023  Time:    02:16               Narrative:    EXAMINATION:  CT HEAD WITHOUT CONTRAST    CLINICAL HISTORY:  Head trauma, minor (Age >= 65y);    TECHNIQUE:  Low dose axial CT images obtained throughout the head without intravenous contrast. Sagittal and coronal reconstructions were performed.    COMPARISON:  None.    FINDINGS:  Intracranial compartment:    Ventricles and sulci are stable in size for age without evidence of hydrocephalus. Subdural hygromas appear increased in density slightly suggesting acute on chronic subdural hematoma.    The brain parenchyma appears stable with involutional and chronic small vessel type changes again noted..  No parenchymal mass, hemorrhage, edema or major vascular distribution infarct.    Skull/extracranial contents (limited evaluation): No fracture. Mastoid air cells and paranasal sinuses are essentially clear.                                       CT Cervical Spine Without Contrast (Final result)  Result time 07/23/23 03:01:55      Final result by Chuy Mckeon MD (07/23/23 03:01:55)                   Impression:      No evidence of acute fracture or acute osseous abnormality.    Osteopenia and stable degenerative changes.    Additional findings discussed in the body of the report.    Electronically signed by resident: Anahi Gutierrez  Date:    07/23/2023  Time:    02:02    Electronically signed by: Chuy Mckeon MD  Date:    07/23/2023  Time:    03:01               Narrative:    EXAMINATION:  CT CERVICAL SPINE WITHOUT CONTRAST    CLINICAL HISTORY:  Neck trauma (Age >= 65y);    TECHNIQUE:  Low dose axial images, sagittal and coronal reformations were performed though the  cervical spine.  Contrast was not administered.    COMPARISON:  CT 04/18/2023 and 07/09/2023.    FINDINGS:  Alignment: Normal.    Vertebrae: Osteopenia.  No fracture.  Lucent area involving the left C4 facet without associated cortical disruption, unchanged dating back to 04/18/2023.  Stable mild height loss of the superior endplate of T4 vertebral body.    Discs: Multilevel disc height loss, most pronounced at C5-C6.    C1-2: Dens is intact.  Pre-dens space is maintained.    Skull base and craniocervical junction: Normal.    Degenerative findings:    Stable appearance of mild multilevel degenerative changes through the cervical spine.  There are several levels demonstrating mild to moderate facet arthropathy and mild uncovertebral spurring resulting in areas of mild neural foraminal narrowing.  No areas of spinal canal stenosis.    Paraspinal muscles & soft tissues: Evaluation of the lung apices is severely limited by respiratory motion artifact.  Dense calcific atherosclerosis of the aortic arch.  Soft tissues of the thoracic inlet are somewhat distorted secondary to motion artifact.                                       X-Ray Pelvis Routine AP (Final result)  Result time 07/23/23 01:54:57   Procedure changed from X-Ray Hip 2 or 3 views Right (with Pelvis when performed)     Final result by Chuy Mckeon MD (07/23/23 01:54:57)                   Impression:      Similar alignment of recent fractures involving the right superior and inferior pubic rami.  No new acute displaced fracture.      Electronically signed by: Chuy Mckeon MD  Date:    07/23/2023  Time:    01:54               Narrative:    EXAMINATION:  XR PELVIS ROUTINE AP; XR FEMUR 2 VIEW RIGHT    CLINICAL HISTORY:  HIP PAIN;  Pain in right hip; Pain in right leg    TECHNIQUE:  Three frontal views of the pelvis performed.  Two views of the right femur also obtained.    COMPARISON:  CT pelvis, 07/08/2023.    FINDINGS:  Pelvis: Bones are mildly  demineralized.  Similar appearance of mildly displaced recent fracture of the right inferior pubic ramus and nondisplaced fracture of the right superior pubic ramus.  Similar fracture fragment alignment allowing for differences in positioning.  No new acute fracture.  No dislocation.  Mild degenerative changes in both hips.    Right femur: No additional acute fracture or dislocation other than described above.  Degenerative changes in the right hip and right knee.  Atherosclerotic vascular calcifications.  Soft tissue edema overlying the right hip.  No unexpected radiopaque foreign body.                                       X-Ray Femur 2 AP/LAT Right (Final result)  Result time 07/23/23 01:54:57      Final result by Chuy Mckeon MD (07/23/23 01:54:57)                   Impression:      Similar alignment of recent fractures involving the right superior and inferior pubic rami.  No new acute displaced fracture.      Electronically signed by: Chuy Mckeon MD  Date:    07/23/2023  Time:    01:54               Narrative:    EXAMINATION:  XR PELVIS ROUTINE AP; XR FEMUR 2 VIEW RIGHT    CLINICAL HISTORY:  HIP PAIN;  Pain in right hip; Pain in right leg    TECHNIQUE:  Three frontal views of the pelvis performed.  Two views of the right femur also obtained.    COMPARISON:  CT pelvis, 07/08/2023.    FINDINGS:  Pelvis: Bones are mildly demineralized.  Similar appearance of mildly displaced recent fracture of the right inferior pubic ramus and nondisplaced fracture of the right superior pubic ramus.  Similar fracture fragment alignment allowing for differences in positioning.  No new acute fracture.  No dislocation.  Mild degenerative changes in both hips.    Right femur: No additional acute fracture or dislocation other than described above.  Degenerative changes in the right hip and right knee.  Atherosclerotic vascular calcifications.  Soft tissue edema overlying the right hip.  No unexpected radiopaque foreign  "body.                                       X-Ray Chest AP Portable (Final result)  Result time 07/23/23 01:47:54      Final result by Chuy Mckeon MD (07/23/23 01:47:54)                   Impression:      No detrimental change when compared with 07/08/2023.      Electronically signed by: Chuy Mckeon MD  Date:    07/23/2023  Time:    01:47               Narrative:    EXAMINATION:  XR CHEST AP PORTABLE    CLINICAL HISTORY:  Provided history is "Sepsis;  ".    TECHNIQUE:  One view of the chest.    COMPARISON:  07/08/2023.    FINDINGS:  Cardiac wires overlie the chest.  Patient is slightly rotated.  Cardiomediastinal silhouette is stable and may be at the upper limits of normal in size.  Atherosclerotic calcifications overlie the aortic arch.  Right hemidiaphragm is slightly elevated as seen previously.  Azygous lobe configuration is incidentally noted in the right lung apex.  No confluent area of consolidation.  No sizable pleural effusion.  No pneumothorax.  Hiatal hernia again noted.                                       Medications   ascorbic acid (vitamin C) tablet 250 mg (250 mg Oral Given 7/27/23 0825)   EScitalopram oxalate tablet 5 mg (5 mg Oral Given 7/27/23 0825)   furosemide tablet 20 mg (20 mg Oral Given 7/27/23 2039)   lisinopriL tablet 40 mg (40 mg Oral Given 7/27/23 0825)   loperamide capsule 2 mg (has no administration in time range)   memantine tablet 5 mg (5 mg Oral Given 7/27/23 2039)   methocarbamoL tablet 500 mg (500 mg Oral Not Given 7/27/23 2105)   metoprolol tartrate (LOPRESSOR) tablet 25 mg (25 mg Oral Given 7/27/23 2039)   miconazole NITRATE 2 % top powder ( Topical (Top) Given 7/27/23 2113)   tamsulosin 24 hr capsule 0.4 mg (0.4 mg Oral Given 7/27/23 2039)   zinc sulfate capsule 220 mg (220 mg Oral Given 7/27/23 0825)   sodium chloride 0.9% flush 10 mL (has no administration in time range)   melatonin tablet 6 mg (has no administration in time range)   prochlorperazine injection " Soln 5 mg (has no administration in time range)   aluminum-magnesium hydroxide-simethicone 200-200-20 mg/5 mL suspension 30 mL (has no administration in time range)   naloxone 0.4 mg/mL injection 0.02 mg (has no administration in time range)   glucose chewable tablet 16 g (has no administration in time range)   glucose chewable tablet 24 g (has no administration in time range)   glucagon (human recombinant) injection 1 mg (has no administration in time range)   acetaminophen tablet 1,000 mg (1,000 mg Oral Given 7/27/23 2113)   OLANZapine injection 2.5 mg (2.5 mg Intramuscular Given 7/26/23 1045)   hydrALAZINE injection 5 mg (0 mg Intravenous Hold 7/24/23 0308)   sodium chloride 0.9% flush 10 mL (has no administration in time range)   hydrALAZINE injection 10 mg (10 mg Intravenous Not Given 7/26/23 0940)   vitamin D 1000 units tablet 1,000 Units (1,000 Units Oral Given 7/27/23 0825)   potassium bicarbonate disintegrating tablet 25 mEq (25 mEq Oral Not Given 7/24/23 1800)   enoxaparin injection 40 mg (40 mg Subcutaneous Given 7/27/23 1900)   OLANZapine injection 5 mg (5 mg Intramuscular Given 7/27/23 2106)   albuterol-ipratropium 2.5 mg-0.5 mg/3 mL nebulizer solution 3 mL (3 mLs Nebulization Given 7/27/23 2036)   polyethylene glycol packet 17 g (17 g Oral Given 7/27/23 2039)   senna-docusate 8.6-50 mg per tablet 1 tablet (1 tablet Oral Given 7/27/23 2039)   lactulose 20 gram/30 mL solution Soln 30 g (has no administration in time range)   iohexoL (OMNIPAQUE 350) injection 75 mL (75 mLs Intravenous Given 7/23/23 0207)   acetaminophen oral solution 650 mg (650 mg Oral Given 7/23/23 0534)   HYDROcodone-acetaminophen 5-325 mg per tablet 1 tablet (1 tablet Oral Given 7/23/23 0534)   cefTRIAXone (ROCEPHIN) 1 g in dextrose 5 % in water (D5W) 5 % 100 mL IVPB (MB+) (0 g Intravenous Stopped 7/23/23 0618)   potassium bicarbonate disintegrating tablet 25 mEq (25 mEq Oral Given 7/26/23 1035)   magnesium sulfate 2g in water 50mL  IVPB (premix) (0 g Intravenous Stopped 7/27/23 1536)   potassium bicarbonate disintegrating tablet 40 mEq (40 mEq Oral Given 7/27/23 1221)   loperamide capsule 2 mg (2 mg Oral Given 7/27/23 1221)     Medical Decision Making:   History:   Old Medical Records: I decided to obtain old medical records.  Old Records Summarized: records from clinic visits and records from previous admission(s).       <> Summary of Records: See hpi  Differential Diagnosis:   Pelvic fracture, sacral fracture, UTI, ICH, electrolyte abnormality  Independently Interpreted Test(s):   I have ordered and independently interpreted EKG Reading(s) - see prior notes  Clinical Tests:   Lab Tests: Ordered and Reviewed  Radiological Study: Ordered and Reviewed  Medical Tests: Reviewed and Ordered  ED Management:  Pt with a UTI  Appears to have stable SDH- rpt CT done 6 hours from first  She has pelvic fractures that are more conspicuous on CT today. She is having a lot of pain  I consulted ortho for possible acute fractures since she fell again  Patient was admitted to  for further evaluation of her UTI, PT/OT           ED Course as of 07/27/23 2310   Sun Jul 23, 2023   0316 BNP(!): 397  Improved compared to 2 weeks ago [GK]   0316 Troponin I(!): 0.035  Chronic elevation at baseline [GK]      ED Course User Index  [GK] Ashanti Scott MD                 Clinical Impression:   Final diagnoses:  [A41.9] Sepsis  [M25.551] Right hip pain  [M79.604] Right leg pain  [R29.6] Frequent falls (Primary)  [N30.00] Acute cystitis without hematuria  [S32.9XXA] Pelvic fracture        ED Disposition Condition    Admit                 Ashanti Scott MD  07/27/23 2310

## 2023-07-23 NOTE — CONSULTS
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 01:47:54)                   Impression:      No detrimental change when compared with 07/08/2023.      Electronically signed by: Chuy Mckeon MD  Date:    07/23/2023  Time:    01:47               Narrative:    EXAMINATION:  XR CHEST AP PORTABLE    CLINICAL HISTORY:  Provided history is "Sepsis;  ".    TECHNIQUE:  One view of the chest.    COMPARISON:  07/08/2023.    FINDINGS:  Cardiac wires overlie the chest.  Patient is slightly rotated.  Cardiomediastinal silhouette is stable and may be at the upper limits of normal in size.  Atherosclerotic calcifications overlie the aortic arch.  Right hemidiaphragm is slightly elevated as seen previously.  Azygous lobe configuration is incidentally noted in the right lung apex.  No confluent area of consolidation.  No sizable pleural effusion.  No pneumothorax.  Hiatal hernia again noted.                                      "

## 2023-07-23 NOTE — ED TRIAGE NOTES
Radha Sandoval, a 88 y.o. female presents to the ED w/ complaint of SOB and fall. -head injury. Pt is altered mental status, not making any sense.    Triage note:  Chief Complaint   Patient presents with    Shortness of Breath     Pt coming from [facility] for SOB starting about an hour ago and alerted NH staff. 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    LOC: The patient is awake, alert and aware of environment with an appropriate affect, the patient is oriented x 4 and speaking appropriately.    APPEARANCE: Patient resting comfortably and in no acute distress, patient is clean and well groomed, patient's clothing is properly fastened.    SKIN: The skin is warm and dry, color consistent with ethnicity, patient has normal skin turgor and moist mucus membranes, skin intact, no breakdown or bruising noted.    MUSCULOSKELETAL: Patient moving all extremities well, no obvious swelling or deformities noted.    RESPIRATORY: Airway is open and patent, respirations are spontaneous and even, patient has a normal effort and rate. Pt having SOB.    CARDIAC: Patient has a normal rate and rhythm, no periphreal edema noted, capillary refill < 3 seconds.    ABDOMEN: Soft and non tender to  palpation, no distention noted. Patient denies any nausea, vomiting, diarrhea, or constipation.     NEUROLOGIC: Eyes open spontaneously, PERRL, behavior appropriate to situation, follows commands, facial expression symmetrical, bilateral hand grasp equal and even, purposeful motor response noted, normal sensation in all extremities. Pt is altered mental status, not making any sense.    HEENT: No abnormalities noted. White sclera and pupils equal round and reactive to light. Denies headache, dizziness.     : Pt voids independently, denies dysuria, hematuria, frequency.

## 2023-07-23 NOTE — HPI
88F w/bilateral chronic subdural hematomas    Fall transferring from wheelchair-> bed. Baseline neuro exam at ED per caretaker and family. CTH showed b/l subdural hygromas, 6hr repeat stable.   UA infectious, duncan for retention.     DVT (3/2023, +eliquis), dementia, HTN, pelvic fx (non-operative)

## 2023-07-24 PROBLEM — Z51.5 ENCOUNTER FOR PALLIATIVE CARE: Status: ACTIVE | Noted: 2023-01-01

## 2023-07-24 PROBLEM — S06.5XAA ACUTE SUBDURAL HEMATOMA: Status: ACTIVE | Noted: 2023-01-01

## 2023-07-24 NOTE — ASSESSMENT & PLAN NOTE
Frequent falls     - diagnosed at last [facility] hospitalization about 2 weeks ago.   - repeat XRs show stable frcatures, non-displaced   - Ortho consulted in ED; originally recommended non-op management, but after further discussion recommended ORIF pelvis 7/24. Family declining surgery at this time. Further discussions pending progress with PT/OT.   - weight bearing as tolerated   - pain control with tylenol and robaxin for now. Prn PO oxy 5 for severe pain  - PT/OT consult pending  - patient will likely need SNF v long term placement given max assist requirements and progressive debility, in setting of dementia and behavioral disturbances

## 2023-07-24 NOTE — MEDICAL/APP STUDENT
"CONSULTATION LIAISON PSYCHIATRY PROGRESS NOTE    Patient Name: Radha Sandoval  MRN: [id number]  Patient Class: IP- Inpatient  Admission Date: 7/22/2023  Attending Physician: Maykel Khan MD      SUBJECTIVE:   Radha Sandoval is a 88 y.o. female with past pertinent psychiatric history of Late Onset Alzheimers with mood disturbance and Delirium and past medical history of Hypertension, Acute DVT and PE 5/23, frequent falls with recent Pelvic fracture s/p orthopedic interventionpresents to the ED/admitted to the hospital for Multiple closed fractures of pelvis without disruption of pelvic ring    Psychiatry consulted for persistent insomnia contributing to delirium in patient with dementia, uncontrolled on current regimen    Today, the patient was seen and evaluated with caregiver, Mrs. Chel Morrison, full time caretaker, at bedside - [phone]. Her attention waxes and wanes where she would answer questions appropriately but she would have some psychomotor agitation where she would question why she is there. She denies any SI, HI, or AVH.     The caregiver states that she was able to sleep last night without any medications but was still hallucinating at night. The patient would threaten to hit her caregivers and says that she has 2 boyfriends and is focused on talking about her time as a  since she was 18 years of age.The caregiver is requesting for a urology consult for her incontinence, since she has not urinated since Friday but does have a Spann catheter in place right now as she prepares for surgery for her hip fracture.    Overnight, the nurse reports that the patient was making statements to kill herself and "if it is her time to die, then so be it." She would be delirious at times and needs to be redirected.        OBJECTIVE:    Mental Status Exam:  Appearance: unremarkable, age appropriate, lying in bed  Behavior/Cooperation: normal, psychomotor agitation, redirectable, eye contact normal  Speech: normal " tone, normal rate, normal pitch, normal volume  Mood: fine  Affect: normal and irritable  Thought Process: normal and logical  Thought Content: normal, no suicidality, no homicidality, delusions, or paranoia   Orientation: grossly intact  Memory: impaired  Attention Span/Concentration: Easily distracted  Insight: poor  Judgment: poor    CAM ICU positive? no    Please contact ON CALL psychiatry service (24/7) for any acute issues that may arise.    Maurice HERNANDES Psychiatry  [facility]  7/24/2023 11:21 AM        --------------------------------------------------------------------------------------------------------------------------------------------------------------------------------------------------------------------------------------    CONTINUED OBJECTIVE clinical data & findings reviewed and noted for above decision making    Current Medications:   Scheduled Meds:    acetaminophen  1,000 mg Oral Q8H    ascorbic acid (vitamin C)  250 mg Oral Daily    EScitalopram oxalate  5 mg Oral Daily    furosemide  20 mg Oral BID    hydrALAZINE  5 mg Intravenous Once    lisinopriL  40 mg Oral Daily    memantine  5 mg Oral BID    methocarbamoL  500 mg Oral QID    metoprolol tartrate  25 mg Oral BID    miconazole NITRATE 2 %   Topical (Top) BID    OLANZapine  2.5 mg Intramuscular QHS    potassium chloride  10 mEq Oral Daily    tamsulosin  0.4 mg Oral Nightly    zinc sulfate  220 mg Oral Daily     PRN Meds: aluminum-magnesium hydroxide-simethicone, glucagon (human recombinant), glucose, glucose, hydrALAZINE, loperamide, melatonin, naloxone, OLANZapine, oxyCODONE, polyethylene glycol, prochlorperazine, sodium chloride 0.9%, sodium chloride 0.9%    Allergies:   Review of patient's allergies indicates:  No Known Allergies    Vitals  Vitals:    07/24/23 1013   BP: (!) 166/105   Pulse:    Resp:    Temp:        Labs/Imaging/Studies:  Recent Results (from the past 24 hour(s))   Basic Metabolic Panel (BMP)     Collection Time: 07/24/23  4:13 AM   Result Value Ref Range    Sodium 141 136 - 145 mmol/L    Potassium 3.0 (L) 3.5 - 5.1 mmol/L    Chloride 102 95 - 110 mmol/L    CO2 27 23 - 29 mmol/L    Glucose 85 70 - 110 mg/dL    BUN 16 8 - 23 mg/dL    Creatinine 0.7 0.5 - 1.4 mg/dL    Calcium 8.3 (L) 8.7 - 10.5 mg/dL    Anion Gap 12 8 - 16 mmol/L    eGFR >60.0 >60 mL/min/1.73 m^2   Magnesium    Collection Time: 07/24/23  4:13 AM   Result Value Ref Range    Magnesium 1.6 1.6 - 2.6 mg/dL   CBC with Automated Differential    Collection Time: 07/24/23  4:13 AM   Result Value Ref Range    WBC 7.03 3.90 - 12.70 K/uL    RBC 3.26 (L) 4.00 - 5.40 M/uL    Hemoglobin 9.0 (L) 12.0 - 16.0 g/dL    Hematocrit 29.3 (L) 37.0 - 48.5 %    MCV 90 82 - 98 fL    MCH 27.6 27.0 - 31.0 pg    MCHC 30.7 (L) 32.0 - 36.0 g/dL    RDW 14.2 11.5 - 14.5 %    Platelets 282 150 - 450 K/uL    MPV 10.6 9.2 - 12.9 fL    Immature Granulocytes 0.3 0.0 - 0.5 %    Gran # (ANC) 5.2 1.8 - 7.7 K/uL    Immature Grans (Abs) 0.02 0.00 - 0.04 K/uL    Lymph # 0.7 (L) 1.0 - 4.8 K/uL    Mono # 0.5 0.3 - 1.0 K/uL    Eos # 0.5 0.0 - 0.5 K/uL    Baso # 0.05 0.00 - 0.20 K/uL    nRBC 0 0 /100 WBC    Gran % 74.5 (H) 38.0 - 73.0 %    Lymph % 10.5 (L) 18.0 - 48.0 %    Mono % 7.3 4.0 - 15.0 %    Eosinophil % 6.7 0.0 - 8.0 %    Basophil % 0.7 0.0 - 1.9 %    Differential Method Automated    PT/INR    Collection Time: 07/24/23  4:13 AM   Result Value Ref Range    Prothrombin Time 11.3 9.0 - 12.5 sec    INR 1.1 0.8 - 1.2   POCT glucose    Collection Time: 07/24/23 10:17 AM   Result Value Ref Range    POCT Glucose 94 70 - 110 mg/dL     Imaging Results              CT Head Without Contrast (Final result)  Result time 07/23/23 08:24:54      Final result by Bill Tarango MD (07/23/23 08:24:54)                   Impression:        Similar volume of subdural hematomas.  Changes of density may be in part technical/artifactual in nature and also due to some leakage of recent intravenous  contrast from recent CT of the abdomen pelvis with no focal hyperdense acute hemorrhage identified.  Follow-up as clinically warranted.      Electronically signed by: Bill Tarango  Date:    07/23/2023  Time:    08:24               Narrative:    EXAMINATION:  CT HEAD WITHOUT CONTRAST    CLINICAL HISTORY:  Head trauma, minor (Age >= 65y);    TECHNIQUE:  Low dose axial images were obtained through the head.  Coronal and sagittal reformations were also performed. Contrast was not administered.    COMPARISON:  07/23/2023, 07/09/2023    FINDINGS:  Bilateral subacute subdural hematomas are noted bilaterally again identified measuring up to 14 mm on  the left, similar in volume.  No new focal hyperdense hemorrhage is identified.  Overall mild increased density of the hematoma may in part be technical/artifactual in nature as well as due to leakage of recent intravenous contrast as similar appearances were noted previously (on 07/09/2023 and 07/10/2023).    3 mm midline shift to the right is similar in appearance.  The basal cisterns remain patent.  No acute intraparenchymal process.    Prominent atherosclerotic vascular calcifications are noted at the skull base.    The visualized sinuses and mastoid air cells are essentially clear.                                       CT Chest Abdomen Pelvis With Contrast (xpd) (Final result)  Result time 07/23/23 02:32:32   Procedure changed from CT Abdomen Pelvis With Contrast     Final result by Chuy Mckeon MD (07/23/23 02:32:32)                   Impression:      Similar appearance of recent fractures involving the right inferior and superior pubic rami.  Increased conspicuity of essentially nondisplaced recent fractures of the right michelle sacrum and anterior aspect of the right acetabulum.    Motion and artifact limited study with suboptimal bolus timing.    Moderate-sized hiatal hernia with moderate lower esophageal wall thickening.  Suggest correlation for  esophagitis.    Peribronchial thickening and questionable minimal patchy ground-glass opacities in the lung bases and bibasilar subsegmental atelectasis.    Nodular soft tissue opacities in the left breast.  Neoplasm not excluded.  Suggest correlation with physical exam and mammographic follow-up when clinically warranted.    Anasarca.    Mild nonspecific wall thickening of the inferior aspect of the urinary bladder.  Suggest correlation with urinalysis.    Multiple additional findings discussed in the body of the report.      Electronically signed by: Chuy Mckeon MD  Date:    07/23/2023  Time:    02:32               Narrative:    EXAMINATION:  CT CHEST ABDOMEN PELVIS WITH CONTRAST (XPD)    CLINICAL HISTORY:  Abdominal trauma, blunt;    TECHNIQUE:  Low dose axial images, sagittal and coronal reformations were obtained from the thoracic inlet to the pubic symphysis following the IV administration of 75 mL of Omnipaque 350 .  Oral contrast was not given.    COMPARISON:  CTA chest, 07/08/2023.  CT pelvis, 07/08/2023.  CT abdomen pelvis, 05/06/2023.    FINDINGS:  Chest:    Exam quality is limited by suboptimal bolus timing and motion.  Evaluation is limited by extensive streak artifact due to the patient's arms overlying the field of view.    Heart is borderline enlarged and similar to the prior study.  Coronary artery and mitral valve calcifications.  Thoracic aorta is stable in caliber with moderate to advanced calcific atherosclerosis.  No central pulmonary embolus.  No bulky mediastinal lymphadenopathy.    Detailed evaluation of the pulmonary parenchyma limited by motion.  There is peribronchial thickening and questionable minimal patchy ground-glass opacities in the lung bases.  Bibasilar subsegmental atelectasis.  No consolidation or pleural effusion.    Moderate-sized hiatal hernia with moderate lower esophageal wall thickening.    Abdomen:    Exam quality is limited by suboptimal bolus timing, motion, and  extensive artifact related to the patient's arms overlying the field of view.    Liver is similar in size and contour when compared with the prior study.  Multiple hepatic hypodensities most suggestive of cysts.  Gallbladder is unremarkable.  No intrahepatic biliary ductal dilatation.    Spleen, adrenals, and pancreas are stable and negative for acute finding allowing for significant motion.    Kidneys are stable.  There is a large left renal cyst.  Small stone or vascular calcification in the right renal hilum.  No hydronephrosis.    Evaluation for bowel inflammation is limited by motion.  No small bowel obstruction.  Mild stool burden in the colon.    No pneumoperitoneum or organized fluid collection.    No bulky retroperitoneal lymphadenopathy.    Abdominal aorta is similar in caliber with moderate to advanced calcific atherosclerosis involving the aorta and major branch vessels.    Portal vein is grossly patent.    Pelvis:    Urinary bladder is mildly distended and there is mild wall thickening along the inferior aspect of the urinary bladder similar to the prior CT abdomen.  Rectum is unremarkable.  There is minimal presacral fat stranding.  No significant pelvic free fluid.    Bones and soft tissues:    Recent fractures involving the right superior and inferior pubic rami similar to prior CT pelvis.  Suspect nondisplaced fracture of the right michelle sacrum, more conspicuous when compared with prior CT pelvis.  Nondisplaced fracture of the anterior aspect of the right acetabulum (coronal series 606, image 129) is also more conspicuous when compared with the prior study.  No additional acute fracture.  Degenerative changes in the spine and pubic symphysis.  Mild anterolisthesis of L4 with respect to L5.  Mild left convex scoliotic curvature of the spine.  Old right lower rib fractures.  Operative changes of presumed right lower abdominal wall hernia repair.  Mild diffuse body wall edema.  Somewhat nodular soft  tissue densities in the left breast soft tissues.  Suggest follow-up mammogram.                                       CT Head Without Contrast (Final result)  Result time 07/23/23 02:16:47      Final result by Maykel Castro MD (07/23/23 02:16:47)                   Impression:      Subtle increase in density of the subdural hygromas suggesting acute on chronic subdural fluid collection.    Consider follow-up CT scan per protocol in patient with small acute subdural hematoma on chronic subdural hygromas.      Electronically signed by: Maykel Castro  Date:    07/23/2023  Time:    02:16               Narrative:    EXAMINATION:  CT HEAD WITHOUT CONTRAST    CLINICAL HISTORY:  Head trauma, minor (Age >= 65y);    TECHNIQUE:  Low dose axial CT images obtained throughout the head without intravenous contrast. Sagittal and coronal reconstructions were performed.    COMPARISON:  None.    FINDINGS:  Intracranial compartment:    Ventricles and sulci are stable in size for age without evidence of hydrocephalus. Subdural hygromas appear increased in density slightly suggesting acute on chronic subdural hematoma.    The brain parenchyma appears stable with involutional and chronic small vessel type changes again noted..  No parenchymal mass, hemorrhage, edema or major vascular distribution infarct.    Skull/extracranial contents (limited evaluation): No fracture. Mastoid air cells and paranasal sinuses are essentially clear.                                       CT Cervical Spine Without Contrast (Final result)  Result time 07/23/23 03:01:55      Final result by Chuy Mckeon MD (07/23/23 03:01:55)                   Impression:      No evidence of acute fracture or acute osseous abnormality.    Osteopenia and stable degenerative changes.    Additional findings discussed in the body of the report.    Electronically signed by resident: Anahi Gutierrez  Date:    07/23/2023  Time:    02:02    Electronically signed by: Chuy  MD Linda  Date:    07/23/2023  Time:    03:01               Narrative:    EXAMINATION:  CT CERVICAL SPINE WITHOUT CONTRAST    CLINICAL HISTORY:  Neck trauma (Age >= 65y);    TECHNIQUE:  Low dose axial images, sagittal and coronal reformations were performed though the cervical spine.  Contrast was not administered.    COMPARISON:  CT 04/18/2023 and 07/09/2023.    FINDINGS:  Alignment: Normal.    Vertebrae: Osteopenia.  No fracture.  Lucent area involving the left C4 facet without associated cortical disruption, unchanged dating back to 04/18/2023.  Stable mild height loss of the superior endplate of T4 vertebral body.    Discs: Multilevel disc height loss, most pronounced at C5-C6.    C1-2: Dens is intact.  Pre-dens space is maintained.    Skull base and craniocervical junction: Normal.    Degenerative findings:    Stable appearance of mild multilevel degenerative changes through the cervical spine.  There are several levels demonstrating mild to moderate facet arthropathy and mild uncovertebral spurring resulting in areas of mild neural foraminal narrowing.  No areas of spinal canal stenosis.    Paraspinal muscles & soft tissues: Evaluation of the lung apices is severely limited by respiratory motion artifact.  Dense calcific atherosclerosis of the aortic arch.  Soft tissues of the thoracic inlet are somewhat distorted secondary to motion artifact.                                       X-Ray Pelvis Routine AP (Final result)  Result time 07/23/23 01:54:57   Procedure changed from X-Ray Hip 2 or 3 views Right (with Pelvis when performed)     Final result by Chuy Mckeon MD (07/23/23 01:54:57)                   Impression:      Similar alignment of recent fractures involving the right superior and inferior pubic rami.  No new acute displaced fracture.      Electronically signed by: Chuy Mckeon MD  Date:    07/23/2023  Time:    01:54               Narrative:    EXAMINATION:  XR PELVIS ROUTINE AP; XR FEMUR  2 VIEW RIGHT    CLINICAL HISTORY:  HIP PAIN;  Pain in right hip; Pain in right leg    TECHNIQUE:  Three frontal views of the pelvis performed.  Two views of the right femur also obtained.    COMPARISON:  CT pelvis, 07/08/2023.    FINDINGS:  Pelvis: Bones are mildly demineralized.  Similar appearance of mildly displaced recent fracture of the right inferior pubic ramus and nondisplaced fracture of the right superior pubic ramus.  Similar fracture fragment alignment allowing for differences in positioning.  No new acute fracture.  No dislocation.  Mild degenerative changes in both hips.    Right femur: No additional acute fracture or dislocation other than described above.  Degenerative changes in the right hip and right knee.  Atherosclerotic vascular calcifications.  Soft tissue edema overlying the right hip.  No unexpected radiopaque foreign body.                                       X-Ray Femur 2 AP/LAT Right (Final result)  Result time 07/23/23 01:54:57      Final result by Chuy Mckeon MD (07/23/23 01:54:57)                   Impression:      Similar alignment of recent fractures involving the right superior and inferior pubic rami.  No new acute displaced fracture.      Electronically signed by: Chuy Mckeon MD  Date:    07/23/2023  Time:    01:54               Narrative:    EXAMINATION:  XR PELVIS ROUTINE AP; XR FEMUR 2 VIEW RIGHT    CLINICAL HISTORY:  HIP PAIN;  Pain in right hip; Pain in right leg    TECHNIQUE:  Three frontal views of the pelvis performed.  Two views of the right femur also obtained.    COMPARISON:  CT pelvis, 07/08/2023.    FINDINGS:  Pelvis: Bones are mildly demineralized.  Similar appearance of mildly displaced recent fracture of the right inferior pubic ramus and nondisplaced fracture of the right superior pubic ramus.  Similar fracture fragment alignment allowing for differences in positioning.  No new acute fracture.  No dislocation.  Mild degenerative changes in both  "hips.    Right femur: No additional acute fracture or dislocation other than described above.  Degenerative changes in the right hip and right knee.  Atherosclerotic vascular calcifications.  Soft tissue edema overlying the right hip.  No unexpected radiopaque foreign body.                                       X-Ray Chest AP Portable (Final result)  Result time 07/23/23 01:47:54      Final result by Chuy Mckeon MD (07/23/23 01:47:54)                   Impression:      No detrimental change when compared with 07/08/2023.      Electronically signed by: Chuy Mckeon MD  Date:    07/23/2023  Time:    01:47               Narrative:    EXAMINATION:  XR CHEST AP PORTABLE    CLINICAL HISTORY:  Provided history is "Sepsis;  ".    TECHNIQUE:  One view of the chest.    COMPARISON:  07/08/2023.    FINDINGS:  Cardiac wires overlie the chest.  Patient is slightly rotated.  Cardiomediastinal silhouette is stable and may be at the upper limits of normal in size.  Atherosclerotic calcifications overlie the aortic arch.  Right hemidiaphragm is slightly elevated as seen previously.  Azygous lobe configuration is incidentally noted in the right lung apex.  No confluent area of consolidation.  No sizable pleural effusion.  No pneumothorax.  Hiatal hernia again noted.                                     "

## 2023-07-24 NOTE — CONSULTS
[facility] - Intensive [facility] ([place] [address])  Palliative Medicine  Consult Note    Patient Name: Radha Sandoval  MRN: [id number]  Admission Date: 7/22/2023  Hospital Length of Stay: 1 days  Code Status: Full Code   Attending Provider: Maykel Khan MD  Consulting Provider: Carrol Thomas MD  Primary Care Physician: Primary Doctor No  Principal Problem:Multiple closed fractures of pelvis without disruption of pelvic ring    Patient information was obtained from relative(s) and primary team.      Consults  Assessment/Plan:     Palliative Care  Encounter for palliative care  Radha Sandoval is an 88-year-old woman with a history of dementia (FAST score 6D), fall resulting in pelvic fracture in early July 2023 that was treated non-operatively, chronic bilateral pleural effusions, DVT (3/2023) on apixaban, who was admitted for acute respiratory decompensation in the setting of recurrent fall at [facility] ([facility] living [facility]). On admission, was found to have sustained subdural hematomas that thankfully have been stable and require no intervention per Neurosurgery. Palliative and Supportive Care was consulted to explore goals of care.    Advance Care Planning   Goals of Care:  - Code status: Full code  - Next of kin: 2 adult sons   - Son Maykel is reportedly MPOA. Requested copy of HCPOA form  - Patient does not have decision making capacity  - Prognosis: guarded  - Goals: improvement in condition, quality of life  - Plans: continue current level of care. Will meet Maykel tomorrow when he is able to visit. Initiated discussion related to ACP and code status. Maykel has requested to speak with someone to clarify his questions related to SNF and SNF days; reached out to primary team to request for case management to help address Maykel's questions related to this subject    Goals of Care Conversation:  - 7/24/23: I called Ms. Sandoval's son Maykel on the phone, who shared that he was very surprised to receive a call that his  mother was going to the operating room for a surgery that he had no idea was even supposed to happen nor was he consented for this surgery. He was explained that his mother failed therapy but she only had one 15-minute session with PT and one 15-minute session with OT during her stay at [facility], and not the actual SNF-level of rehab that she received at [facility]. He worries about his mother's ability to tolerate the stresses of the surgery and recovery as she is older and weaker than before. He confirms that he wishes for his mother to have meaningful quality of life, giving me an example that she was instructed to maintain a low sodium diet. But now in her later age, if she requests for a cheeseburger, he desires to provide this for her because it brings her romi. While he hopes that his mother will have years longer to live, he acknowledges that she won't have as much time as we all hope and wish. He is hopeful that longevity is on their side, but does note that longevity is within his father's side of his family. He shares his frustrations, mentioning that four months ago, his mother was on zero medications and walking independently. Now she is on many medications and requires guidance/assistance to walk with a walker. He hired a caretaker to watch over her per the instruction of her primary care doctor, but pays about $26/hr for the caretaker, who is hired 24/7. This in addition to the cost of residential living in [facility] amounts to $27,000, which is a severe financial burden. He is frustrated that despite the immense costs he is spending for his mother's care, she is not receiving adequate care and definitely not enough therapy services through home health.  - Inquired about his mother's ACP documents. He shared that her MPOA is him and will send me a copy to my email. I asked if they had discussions about life sustaining treatment and they had not. He  imagines that no one would want to live indefinitely on machines and artificial life support if they weren't going to get better on those treatments. I recommended formally that at the end of her life when her heart stops and she stops breathing, that we protect her from interventions like CPR/intubation which would inflict more suffering/harm than benefit. He would like some more time to think about this and talk about it with his brother. 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 He does acknowledge that his functional status is different from his mother's but agrees to discuss this more. Agreed with Mr. Sandoval that the time to think about this is before it happens, and not while it is happening or when it is imminent.            Thank you for your consult. I will follow-up with patient. Please contact us if you have any additional questions.    Subjective:     HPI:   Radha Sandoval is an 88-year-old woman with a history of dementia (FAST score 6D?), fall resulting in pelvic fracture in early July 2023 that was treated non-operatively, chronic bilateral pleural effusions, DVT (3/2023) on apixaban, who was admitted for acute respiratory decompensation in the setting of recurrent fall at [facility] (assisted living community). On admission, was found to have sustained subdural hematomas that thankfully have been stable and require no intervention per Neurosurgery. Palliative and Supportive Care was consulted to explore goals of care.      Hospital Course:  No notes on file    Interval History: Taken to OR this morning, procedure cancelled due to power of 's request to exhaust therapy services first.    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 5/22/2023    Lumbar spondylosis with myelopathy 4/25/2023    Multiple closed right sided fractures of pelvis without disruption of pelvic ring 7/9/2023    Primary hypertension 6/10/2022    Subdural hygroma 7/8/2023       Past Surgical History:   Procedure Laterality Date    REPAIR, HERNIA, INGUINAL, WITHOUT HISTORY OF PRIOR REPAIR, AGE 5 YEARS OR OLDER Right 5/6/2023    Procedure: REPAIR, HERNIA, INGUINAL, WITHOUT HISTORY OF PRIOR REPAIR, AGE 5 YEARS OR OLDER;  Surgeon: Maurice Piper MD;  Location: [facility] OR [address] [place] [address];  Service: General;  Laterality: Right;  possible laparotomy, possible bowel resection       Review of patient's allergies indicates:  No Known Allergies    Medications:  Continuous Infusions:  Scheduled Meds:   acetaminophen  1,000 mg Oral Q8H    ascorbic acid (vitamin C)  250 mg Oral Daily    enoxparin  40 mg Subcutaneous Q24H (prophylaxis, 1700)    EScitalopram oxalate  5 mg Oral Daily    furosemide  20 mg Oral BID    hydrALAZINE  5 mg Intravenous Once    lisinopriL  40 mg Oral Daily    memantine  5 mg Oral BID    methocarbamoL  500 mg Oral QID    metoprolol tartrate  25 mg Oral BID    miconazole NITRATE 2 %   Topical (Top) BID    OLANZapine  2.5 mg Intramuscular QHS    potassium bicarbonate  25 mEq Oral Q4H    tamsulosin  0.4 mg Oral Nightly    vitamin D  1,000 Units Oral Daily    zinc sulfate  220 mg Oral Daily     PRN Meds:aluminum-magnesium hydroxide-simethicone, glucagon (human recombinant), glucose, glucose, hydrALAZINE, loperamide, melatonin, naloxone, OLANZapine, oxyCODONE, polyethylene glycol, prochlorperazine, sodium chloride 0.9%, sodium chloride 0.9%    Family History    None       Tobacco Use    Smoking status: Never    Smokeless tobacco: Never   Substance  and Sexual Activity    Alcohol use: Not on file    Drug use: Never    Sexual activity: Not Currently       Review of Systems   Unable to perform ROS: Dementia   Objective:     Vital Signs (Most Recent):  Temp: 98.6 °F (37 °C) (07/24/23 1345)  Pulse: 79 (07/24/23 1500)  Resp: (!) 22 (07/24/23 1500)  BP: (!) 119/58 (07/24/23 1500)  SpO2: 95 % (07/24/23 1345) Vital Signs (24h Range):  Temp:  [97.5 °F (36.4 °C)-98.6 °F (37 °C)] 98.6 °F (37 °C)  Pulse:  [52-79] 79  Resp:  [16-22] 22  SpO2:  [94 %-100 %] 95 %  BP: (119-226)/() 119/58     Weight: 75.8 kg (167 lb 1.7 oz)  Body mass index is 29.6 kg/m².       Physical Exam  Constitutional:       General: She is not in acute distress.  HENT:      Head: Normocephalic.      Right Ear: External ear normal.      Left Ear: External ear normal.      Nose: Nose normal.      Mouth/Throat:      Mouth: Mucous membranes are dry.   Eyes:      Extraocular Movements: Extraocular movements intact.      Conjunctiva/sclera: Conjunctivae normal.   Cardiovascular:      Rate and Rhythm: Normal rate.   Pulmonary:      Effort: Pulmonary effort is normal.      Comments: Decreased breath sounds  Abdominal:      General: Bowel sounds are normal.      Palpations: Abdomen is soft.   Lymphadenopathy:      Cervical: No cervical adenopathy.   Skin:     General: Skin is dry.   Neurological:      Mental Status: She is disoriented.          Review of Symptoms      Symptom Assessment (ESAS 0-10 Scale)  Unable to complete assessment due to Dementia         Pain Assessment in Advanced Demential Scale (PAINAD)   Breathing - Independent of vocalization:  0  Negative vocalization:  0  Facial expression:  0  Body language:  0  Consolability:  0  Total:  0    Living Arrangements:  Lives in assisted living    Psychosocial/Cultural:   See Palliative Psychosocial Note: No  Has two sons, Maykel and Sb. Has hired caretakers  **Primary  to Follow**  Palliative Care  Consult:  No      Advance Care Planning   Advance Directives:   Living Will: No    LaPOST: No    Do Not Resuscitate Status: No    Medical Power of : Yes    Agent's Name:  Maykel Sandoval   Agent's Contact Number:  [phone]  Goals of Care: The family endorses that what is most important right now is to focus on quality of life, even if it means sacrificing a little time and improvement in condition but with limits to invasive therapies    Accordingly, we have decided that the best plan to meet the patient's goals includes continuing with treatment         Significant Labs: CBC:   Recent Labs   Lab 07/23/23 0032 07/24/23 0413   WBC 10.34 7.03   HGB 9.8* 9.0*   HCT 30.2* 29.3*    282     CMP:   Recent Labs   Lab 07/23/23 0032 07/24/23 0413    141   K 3.3* 3.0*    102   CO2 27 27    85   BUN 21 16   CREATININE 1.0 0.7   CALCIUM 8.7 8.3*   PROT 6.1  --    ALBUMIN 2.8*  --    BILITOT 0.6  --    ALKPHOS 139*  --    AST 22  --    ALT 12  --    ANIONGAP 13 12     CBC:   Recent Labs   Lab 07/24/23 0413   WBC 7.03   HGB 9.0*   HCT 29.3*   MCV 90        BMP:  Recent Labs   Lab 07/24/23 0413   GLU 85      K 3.0*      CO2 27   BUN 16   CREATININE 0.7   CALCIUM 8.3*   MG 1.6     LFT:  Lab Results   Component Value Date    AST 22 07/23/2023    ALKPHOS 139 (H) 07/23/2023    BILITOT 0.6 07/23/2023     Albumin:   Albumin   Date Value Ref Range Status   07/23/2023 2.8 (L) 3.5 - 5.2 g/dL Final     Protein:   Total Protein   Date Value Ref Range Status   07/23/2023 6.1 6.0 - 8.4 g/dL Final     Lactic acid:   Lab Results   Component Value Date    LACTATE 1.2 07/08/2023    LACTATE 0.8 05/06/2023       Significant Imaging: I have reviewed all pertinent imaging results/findings within the past 24 hours.      I spent a total of 75 minutes on the day of the visit. This includes face to face time in discussion of goals of care, symptom assessment, coordination of care and emotional support. This  also includes non-face to face time preparing to see the patient (eg, review of tests/imaging), obtaining and/or reviewing separately obtained history, documenting clinical information in the electronic or other health record, independently interpreting results and communicating results to the patient/family/caregiver, or care coordinator. A total of 16 minutes was spent on advance care planning, goals of care discussion, emotional support, formulating and communicating prognosis and goals of care, exploring burden/benefit of various approaches of treatment. This discussion occurred on a fully voluntary basis with the verbal consent of the patient and/or family.        Carrol Thomas MD  Palliative Medicine  [facility] - Intensive Care ([facility]

## 2023-07-24 NOTE — SUBJECTIVE & OBJECTIVE
Principal Problem:Multiple closed fractures of pelvis without disruption of pelvic ring    Principal Orthopedic Problem: same as above    Interval History: NAEO. VSS. Plan was for OR this morning, but sons would like to pursue trial of PT/OT. Will plan to see how she does with PT and rediscuss surgery if unable to move with PT.    Review of patient's allergies indicates:  No Known Allergies    Current Facility-Administered Medications   Medication    acetaminophen tablet 1,000 mg    aluminum-magnesium hydroxide-simethicone 200-200-20 mg/5 mL suspension 30 mL    ascorbic acid (vitamin C) tablet 250 mg    EScitalopram oxalate tablet 5 mg    furosemide tablet 20 mg    glucagon (human recombinant) injection 1 mg    glucose chewable tablet 16 g    glucose chewable tablet 24 g    hydrALAZINE injection 10 mg    hydrALAZINE injection 5 mg    lisinopriL tablet 40 mg    loperamide capsule 2 mg    melatonin tablet 6 mg    memantine tablet 5 mg    methocarbamoL tablet 500 mg    metoprolol tartrate (LOPRESSOR) tablet 25 mg    miconazole NITRATE 2 % top powder    naloxone 0.4 mg/mL injection 0.02 mg    OLANZapine injection 2.5 mg    OLANZapine injection 2.5 mg    oxyCODONE immediate release tablet 5 mg    polyethylene glycol packet 17 g    potassium chloride CR capsule 10 mEq    prochlorperazine injection Soln 5 mg    sodium chloride 0.9% flush 10 mL    sodium chloride 0.9% flush 10 mL    tamsulosin 24 hr capsule 0.4 mg    zinc sulfate capsule 220 mg     Objective:     Vital Signs (Most Recent):  Temp: 98 °F (36.7 °C) (07/24/23 0756)  Pulse: 73 (07/24/23 0953)  Resp: 18 (07/24/23 0953)  BP: (!) 194/93 (07/24/23 1004)  SpO2: 100 % (07/24/23 0953) Vital Signs (24h Range):  Temp:  [97.5 °F (36.4 °C)-98.5 °F (36.9 °C)] 98 °F (36.7 °C)  Pulse:  [52-79] 73  Resp:  [16-20] 18  SpO2:  [94 %-100 %] 100 %  BP: (133-226)/() 194/93     Weight: 75.8 kg (167 lb 1.7 oz)     Body mass index is 29.6 kg/m².      Intake/Output Summary (Last  24 hours) at 7/24/2023 1102  Last data filed at 7/24/2023 0545  Gross per 24 hour   Intake --   Output 1400 ml   Net -1400 ml        Ortho/SPM Exam  TTP of the right hip   Pain with compression of pelvis  Cap refill <2s  Negative log roll       Significant Labs: All pertinent labs within the past 24 hours have been reviewed.    Significant Imaging: I have reviewed and interpreted all pertinent imaging results/findings.

## 2023-07-24 NOTE — PROGRESS NOTES
Bijan Gusman - Surgery ([facility])  [facility] Medicine  Progress Note    Patient Name: Radha Sandoval  MRN: [id number]  Patient Class: IP- Inpatient   Admission Date: 7/22/2023  Length of Stay: 1 days  Attending Physician: Maykel Khan MD  Primary Care Provider: Primary Doctor No        Subjective:     Principal Problem:Multiple closed fractures of pelvis without disruption of pelvic ring        HPI:  Radha Sandoval is a 88 y.o. female with PMH significant for chronic BL pleural effusions, recent DVT diagnosed in March '23 (on eliquis), dementia, HTN, with recent hospitalization here at [facility] for pelvic fracture treated non-operatively, who presents after a fall at her [facility] while being transferred from wheelchair to bed. Hx taken per Caretaker Sonia and Son (POA) Maykel. Caretaker at bedside unaware of head trauma or other injury. Of note, patient sustained her pelvic fractures at [facility], but after hospitalization at [facility] was placed in [facility] on 7/14. Caretaker reports patient also had another hospitalization during this time at . Patient generally disoriented and delirious, but she will have periods or even days of clarity. At baseline, she is oriented x2. Due to mentation she has difficulty working with staff which results in falls. Prior to her pelvic fracture on 7/8, pt was able to perform independent transfers from wheelchair and could walk short distances with her walker, but she is now max assist. Currently on Eliquis. Patient without complaint on my exam, denies pain.     In the ED: AFVSS. CBC with baseline anemia. CMP reveals slight elevation in Cr 1.0 (baseline 0.8) and K 3.3. BNP and troponin elevated but at baseline. UA grossly infectious. Spann placed for urinary retention.  All imaging reviewed. 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 Repeat CTH after 6 hours was stable. XR pelvis reveals stable recent R superior and inferior pubic rami fractures, non-displaced. EKG in NSR with  prolonged Qtc 510 ms. Given tylenol, norco, and 1g rocephin.       Overview/Hospital Course:  Radha Sandoval was placed in  observation after a fall. NSGY consulted for evaluation of SDH and determined no need for intervention or continued monitoring of the bleed. Orthopedics discussed surgical options with family and recommended ORIF pelvis, but family is declining at this time and would prefer to continue with plan for PT/OT. Therapy assessment to follow. WBAT. Holding pharmacologic Dvt ppx at this time - continue SCDs. Psychiatry provided recs for medication adjustment for insomnia and delirium with prn dosing for agitation, will monitor response. Monitor EKG given prolonged Qtc. Palliative care consulted for assistance with GOC planning with son and care team, appreciate recs. Pain controlled and mentation at baseline.       Interval History: NAEON. Hypertensive this morning after refusing/ spitting out BP meds. IV hydralazine prn. Ortho recommended ORIF pelvis this morning but family prefers non-op management with PT/OT for now. Med adjustments per psych for agitation and delirium. No PEC for now Palliative consult to follow.     Review of Systems   Unable to perform ROS: Dementia   Objective:     Vital Signs (Most Recent):  Temp: 98.6 °F (37 °C) (07/24/23 0953)  Pulse: 73 (07/24/23 0953)  Resp: 18 (07/24/23 0953)  BP: (!) 166/105 (07/24/23 1013)  SpO2: 100 % (07/24/23 0953) Vital Signs (24h Range):  Temp:  [97.5 °F (36.4 °C)-98.6 °F (37 °C)] 98.6 °F (37 °C)  Pulse:  [52-79] 73  Resp:  [16-20] 18  SpO2:  [94 %-100 %] 100 %  BP: (133-226)/() 166/105     Weight: 75.8 kg (167 lb 1.7 oz)  Body mass index is 29.6 kg/m².    Intake/Output Summary (Last 24 hours) at 7/24/2023 0160  Last data filed at 7/24/2023 0545  Gross per 24 hour   Intake --   Output 1400 ml   Net -1400 ml         Physical Exam  Vitals and nursing note reviewed.   Constitutional:       General: She is not in acute distress.     Appearance:  She is well-developed. She is ill-appearing (chronically ill-appearing).   HENT:      Head: Normocephalic and atraumatic.      Mouth/Throat:      Pharynx: No oropharyngeal exudate.   Eyes:      Conjunctiva/sclera: Conjunctivae normal.      Pupils: Pupils are equal, round, and reactive to light.   Cardiovascular:      Rate and Rhythm: Normal rate and regular rhythm.      Heart sounds: Normal heart sounds.   Pulmonary:      Effort: Pulmonary effort is normal. No respiratory distress.      Breath sounds: Normal breath sounds. No wheezing.   Abdominal:      General: Bowel sounds are normal. There is no distension.      Palpations: Abdomen is soft.      Tenderness: There is no abdominal tenderness.   Musculoskeletal:         General: No tenderness. Normal range of motion.      Cervical back: Normal range of motion and neck supple.   Lymphadenopathy:      Cervical: No cervical adenopathy.   Skin:     General: Skin is warm and dry.      Capillary Refill: Capillary refill takes less than 2 seconds.      Findings: No rash.   Neurological:      Mental Status: She is alert. Mental status is at baseline.      Cranial Nerves: No cranial nerve deficit.      Sensory: No sensory deficit.      Coordination: Coordination normal.   Psychiatric:      Comments: Expressing delirious behavior, possibly hallucinating. Rambling, non-coherent. When re-directed, patient answers questions and is oriented to person and place.            Significant Labs: All pertinent labs within the past 24 hours have been reviewed.  BMP:   Recent Labs   Lab 07/24/23  0413   GLU 85      K 3.0*      CO2 27   BUN 16   CREATININE 0.7   CALCIUM 8.3*   MG 1.6     CBC:   Recent Labs   Lab 07/23/23  0032 07/24/23  0413   WBC 10.34 7.03   HGB 9.8* 9.0*   HCT 30.2* 29.3*    282       Significant Imaging: I have reviewed all pertinent imaging results/findings within the past 24 hours.      Assessment/Plan:      * Multiple closed right sided fractures  of pelvis without disruption of pelvic ring  Frequent falls     - [facility] about 2 weeks ago.   - repeat XRs show stable frcatures, non-displaced   - Ortho consulted in ED; originally recommended non-op management, but after further discussion recommended ORIF pelvis 7/24. Family declining surgery at this time. Further discussions pending progress with PT/OT.   - weight bearing as tolerated   - pain control with tylenol and robaxin for now. Prn PO oxy 5 for severe pain  - PT/OT consult pending  - patient will likely need SNF v long term placement given max assist requirements and progressive debility, in setting of dementia and behavioral disturbances       Prolonged Q-T interval on ECG  - QTc 510 --> improved on repeat 475  - avoid QT prolonging meds   - monitor tele       Frequent falls        Urinary retention  Recurrent issue   - mild elevation in Cr likely d/t retention   - duncan placed, treating UTI   - follow urine cultures   - voiding trial pending clinical improvement       Goals of care, counseling/discussion  - Caretaker Sonia expresses several concerns regarding patients continual health and function decline  - Recent stay at SNF with progress noted and pt was placed in [facility] at [place]. However, custodial is not the appropriate level of care for patient given dementia and frequent falls   - [place] will now require 24 hour sitters for patient to return   - CM SW consulted for dc planning   - will need to have an in-depth palliative discussion with son to determine best care plan for patient - consult placed   - son agreeable to discussion, will follow    Hypokalemia  Patient has hypokalemia which is currently uncontrolled. Last electrolytes reviewed-   Recent Labs   Lab 07/23/23  0032 07/24/23  0413   K 3.3* 3.0*   . Will replace potassium and monitor electrolytes closely. Continuous telemetry.  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   - continue memantine  - on home haloperidol 0.5 PO qhs  - will consider starting additional nightly med for agitation; psychiatry consulted for assistance   --> psych recs DC nightly haldol. Start 2.5 mg zyprexa QHS and q8h prn for agitation.   - monitor for improvement   - EKG monitoring         Lumbar spondylosis with myelopathy  - tylenol for pain control, robaxin      Acute cystitis with hematuria  Recurrent issue  - UA reviewed - grossly infectious  - f/u urine and blood cultures   - duncan placed for U-retention (infection suspected cause)   - started on rocephin in ED, will continue   - AFVSS, no leukocytosis.       Primary hypertension  - BP uncontrolled this morning, hypertensive to 226/90 -- suspect bc patient was agitated and spitting out meds  - continue lisinopril, lopressor, lasix  - PRN IV hydralazine for SBP >180 or if unable to tolerate oral meds.         VTE Risk Mitigation (From admission, onward)         Ordered     Reason for No Pharmacological VTE Prophylaxis  Once        Question:  Reasons:  Answer:  Risk of Bleeding  Comment:  SDH    07/23/23 0831     IP VTE HIGH RISK PATIENT  Once         07/23/23 0831     Place sequential compression device  Until discontinued         07/23/23 0831                Discharge Planning   TOSHA: 7/26/2023     Code Status: Full Code   Is the patient medically ready for discharge?: No    Reason for patient still in hospital (select all that apply): Patient trending condition, Laboratory test, Treatment, Consult recommendations and PT / OT  recommendations  Discharge Plan A: Assisted Living                  Shanel Welch PA-C  Department of Hospital Medicine   [facility] - Surgery (2nd Fl)

## 2023-07-24 NOTE — HPI
Radha Sandoval is an 88-year-old woman with a history of dementia (FAST score 6D?), fall resulting in pelvic fracture in early July 2023 that was treated non-operatively, chronic bilateral pleural effusions, DVT (3/2023) on apixaban, who was admitted for acute respiratory decompensation in the setting of recurrent fall at [facility] On admission, was found to have sustained subdural hematomas that thankfully have been stable and require no intervention per Neurosurgery. Palliative and Supportive Care was consulted to explore goals of care.

## 2023-07-24 NOTE — NURSING
REPORT REC., CARE ASSUMED, VSS, NAD, DENIES PAIN EXCEPT WITH MOVEMENT OF RLE, RLE SITE UNREMARKABLE, + NEUROVASC CHECK, FULL ASSESSMENT COMPLETED, SAFETY MEASURES INTACT, FAMILY AT BS, WILL CONTINUE TO MONITOR.

## 2023-07-24 NOTE — PLAN OF CARE
CONSULTATION LIAISON PSYCHIATRY PLAN OF CARE    Patient Name: Radha Sandoval  MRN: [id number]  Patient Class: IP- Inpatient  Admission Date: 7/22/2023  Attending Physician: Maykel Khan MD        Radha Sandoval is a 88 y.o. female with past medical history of Hypertension, Acute DVT and PE 5/23, frequent falls with recent Pelvic fracture s/p orthopedic intervention & past pertinent psychiatric history of Late Onset Alzheimers with mood disturbance and Delirium presents to the ED/admitted to the hospital for a fall, shortness of breath, and hypoxia    Psychiatry consulted for persistent insomnia contributing to delirium in patient with dementia, uncontrolled on current regimen    RISK ASSESSMENT  NO NEED FOR PEC at This Time        Adalberto Sarmiento MD  [facility]  7/24/2023 9:32 AM

## 2023-07-24 NOTE — ASSESSMENT & PLAN NOTE
- Caretaker Sonia expresses several concerns regarding patients continual health and function decline  - Recent stay at SNF with progress noted and pt was placed in shelter at [place]. However, [facility] is not the appropriate level of care for patient given dementia and frequent falls   - [place] will now require 24 hour sitters for patient to return   - DILIA TAYLOR consulted for dc planning   - will need to have an in-depth palliative discussion with son to determine best care plan for patient - consult placed   - son agreeable to discussion, will follow

## 2023-07-24 NOTE — ED NOTES
Assumed care for pt after recieving report from nightshift RN. Pt. resting in bed in NAD. RR e/u. VS being monitored per frequency of MD orders. Vital signs stable at this time of assessment. Pt. offered bathroom assistance and denies need at this time. Explanation of care/wait provided. Bed in low, locked position with rails up and call bell in reach. Will continue to monitor.     Pt's visitor at bedside updated on plan of care, white board updated with today's care team and plan.     Patient identifiers for Radha Sandoval 88 y.o. female checked and correct.  Chief Complaint   Patient presents with    Shortness of Breath     Pt coming from [facility] for SOB starting about an hour ago and alerted NH staff. 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 Pt sleeping, sitter at bedside.  APPEARANCE: Patient resting comfortably and in no acute distress. Patient is clean and well groomed, patient's clothing is properly fastened.  HEENT: WDL  SKIN: The skin is warm and dry. Patient has normal skin turgor and moist mucus membranes.   MUSKULOSKELETAL: Patient is moving all extremities well, no obvious deformities noted. Pulses intact.   RESPIRATORY: Airway is open and patent.  Respirations are spontaneous and non-labored with normal effort and rate.  CARDIAC: Patient has a normal rate and rhythm. 60 on cardiac monitor. No peripheral edema noted.   ABDOMEN: No distention noted. Soft and non-tender upon palpation.  NEUROLOGICAL: pupils 3mm, PERRL. Facial expression is symmetrical. Hand grasps are equal bilaterally. Normal sensation in all extremities when touched with finger.

## 2023-07-24 NOTE — SUBJECTIVE & OBJECTIVE
Interval History: Taken to OR this morning, procedure cancelled due to power of 's request to exhaust therapy services first.    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 5/22/2023    Lumbar spondylosis with myelopathy 4/25/2023    Multiple closed right sided fractures of pelvis without disruption of pelvic ring 7/9/2023    Primary hypertension 6/10/2022    Subdural hygroma 7/8/2023       Past Surgical History:   Procedure Laterality Date    REPAIR, HERNIA, INGUINAL, WITHOUT HISTORY OF PRIOR REPAIR, AGE 5 YEARS OR OLDER Right 5/6/2023    Procedure: REPAIR, HERNIA, INGUINAL, WITHOUT HISTORY OF PRIOR REPAIR, AGE 5 YEARS OR OLDER;  Surgeon: Maurice Piper MD;  Location: [facility] OR [address] [place] [address];  Service: General;  Laterality: Right;  possible laparotomy, possible bowel resection       Review of patient's allergies indicates:  No Known Allergies    Medications:  Continuous Infusions:  Scheduled Meds:   acetaminophen  1,000 mg Oral Q8H    ascorbic acid (vitamin C)  250 mg Oral Daily    enoxparin  40 mg Subcutaneous Q24H (prophylaxis, 1700)    EScitalopram oxalate  5 mg Oral Daily    furosemide  20 mg Oral BID    hydrALAZINE  5 mg Intravenous Once    lisinopriL  40 mg Oral Daily    memantine  5 mg Oral BID    methocarbamoL  500 mg Oral QID    metoprolol tartrate  25 mg Oral BID    miconazole NITRATE 2 %   Topical (Top) BID    OLANZapine  2.5 mg Intramuscular QHS    potassium bicarbonate  25 mEq Oral Q4H    tamsulosin  0.4 mg Oral Nightly    vitamin D  1,000 Units Oral Daily    zinc sulfate  220 mg Oral Daily     PRN Meds:aluminum-magnesium hydroxide-simethicone, glucagon (human  recombinant), glucose, glucose, hydrALAZINE, loperamide, melatonin, naloxone, OLANZapine, oxyCODONE, polyethylene glycol, prochlorperazine, sodium chloride 0.9%, sodium chloride 0.9%    Family History    None       Tobacco Use    Smoking status: Never    Smokeless tobacco: Never   Substance and Sexual Activity    Alcohol use: Not on file    Drug use: Never    Sexual activity: Not Currently       Review of Systems   Unable to perform ROS: Dementia   Objective:     Vital Signs (Most Recent):  Temp: 98.6 °F (37 °C) (07/24/23 1345)  Pulse: 79 (07/24/23 1500)  Resp: (!) 22 (07/24/23 1500)  BP: (!) 119/58 (07/24/23 1500)  SpO2: 95 % (07/24/23 1345) Vital Signs (24h Range):  Temp:  [97.5 °F (36.4 °C)-98.6 °F (37 °C)] 98.6 °F (37 °C)  Pulse:  [52-79] 79  Resp:  [16-22] 22  SpO2:  [94 %-100 %] 95 %  BP: (119-226)/() 119/58     Weight: 75.8 kg (167 lb 1.7 oz)  Body mass index is 29.6 kg/m².       Physical Exam  Constitutional:       General: She is not in acute distress.  HENT:      Head: Normocephalic.      Right Ear: External ear normal.      Left Ear: External ear normal.      Nose: Nose normal.      Mouth/Throat:      Mouth: Mucous membranes are dry.   Eyes:      Extraocular Movements: Extraocular movements intact.      Conjunctiva/sclera: Conjunctivae normal.   Cardiovascular:      Rate and Rhythm: Normal rate.   Pulmonary:      Effort: Pulmonary effort is normal.      Comments: Decreased breath sounds  Abdominal:      General: Bowel sounds are normal.      Palpations: Abdomen is soft.   Lymphadenopathy:      Cervical: No cervical adenopathy.   Skin:     General: Skin is dry.   Neurological:      Mental Status: She is disoriented.          Review of Symptoms      Symptom Assessment (ESAS 0-10 Scale)  Unable to complete assessment due to Dementia         Pain Assessment in Advanced Demential Scale (PAINAD)   Breathing - Independent of vocalization:  0  Negative vocalization:  0  Facial expression:  0  Body language:   0  Consolability:  0  Total:  0    Living Arrangements:  Lives in assisted living    Psychosocial/Cultural:   See Palliative Psychosocial Note: No  Has two sons, Maykel and Sb. Has hired caretakers  **Primary  to Follow**  Palliative Care  Consult: No      Advance Care Planning   Advance Directives:   Living Will: No    LaPOST: No    Do Not Resuscitate Status: No    Medical Power of : Yes    Agent's Name:  Maykel Sandoval   Agent's Contact Number:  [phone]  Goals of Care: The family endorses that what is most important right now is to focus on quality of life, even if it means sacrificing a little time and improvement in condition but with limits to invasive therapies    Accordingly, we have decided that the best plan to meet the patient's goals includes continuing with treatment         Significant Labs: CBC:   Recent Labs   Lab 07/23/23 0032 07/24/23 0413   WBC 10.34 7.03   HGB 9.8* 9.0*   HCT 30.2* 29.3*    282     CMP:   Recent Labs   Lab 07/23/23 0032 07/24/23 0413    141   K 3.3* 3.0*    102   CO2 27 27    85   BUN 21 16   CREATININE 1.0 0.7   CALCIUM 8.7 8.3*   PROT 6.1  --    ALBUMIN 2.8*  --    BILITOT 0.6  --    ALKPHOS 139*  --    AST 22  --    ALT 12  --    ANIONGAP 13 12     CBC:   Recent Labs   Lab 07/24/23 0413   WBC 7.03   HGB 9.0*   HCT 29.3*   MCV 90        BMP:  Recent Labs   Lab 07/24/23 0413   GLU 85      K 3.0*      CO2 27   BUN 16   CREATININE 0.7   CALCIUM 8.3*   MG 1.6     LFT:  Lab Results   Component Value Date    AST 22 07/23/2023    ALKPHOS 139 (H) 07/23/2023    BILITOT 0.6 07/23/2023     Albumin:   Albumin   Date Value Ref Range Status   07/23/2023 2.8 (L) 3.5 - 5.2 g/dL Final     Protein:   Total Protein   Date Value Ref Range Status   07/23/2023 6.1 6.0 - 8.4 g/dL Final     Lactic acid:   Lab Results   Component Value Date    LACTATE 1.2 07/08/2023    LACTATE 0.8 05/06/2023       Significant  Imaging: I have reviewed all pertinent imaging results/findings within the past 24 hours.

## 2023-07-24 NOTE — PROGRESS NOTES
Bijan Gusman - Surgery ([facility])  Orthopedics  Progress Note    Patient Name: Radha Sandoval  MRN: [id number]  Admission Date: 7/22/2023  Hospital Length of Stay: 1 days  Attending Provider: Maykel Khan MD  Primary Care Provider: Primary Doctor No  Follow-up For: Procedure(s) (LRB):  ORIF,PELVIS, rui, bone foam (N/A)    Post-Operative Day: Day of Surgery  Subjective:     Principal Problem:Multiple closed fractures of pelvis without disruption of pelvic ring    Principal Orthopedic Problem: same as above    Interval History: NAEO. VSS. Plan was for OR this morning, but sons would like to pursue trial of PT/OT. Will plan to see how she does with PT and rediscuss surgery if unable to move with PT.    Review of patient's allergies indicates:  No Known Allergies    Current Facility-Administered Medications   Medication    acetaminophen tablet 1,000 mg    aluminum-magnesium hydroxide-simethicone 200-200-20 mg/5 mL suspension 30 mL    ascorbic acid (vitamin C) tablet 250 mg    EScitalopram oxalate tablet 5 mg    furosemide tablet 20 mg    glucagon (human recombinant) injection 1 mg    glucose chewable tablet 16 g    glucose chewable tablet 24 g    hydrALAZINE injection 10 mg    hydrALAZINE injection 5 mg    lisinopriL tablet 40 mg    loperamide capsule 2 mg    melatonin tablet 6 mg    memantine tablet 5 mg    methocarbamoL tablet 500 mg    metoprolol tartrate (LOPRESSOR) tablet 25 mg    miconazole NITRATE 2 % top powder    naloxone 0.4 mg/mL injection 0.02 mg    OLANZapine injection 2.5 mg    OLANZapine injection 2.5 mg    oxyCODONE immediate release tablet 5 mg    polyethylene glycol packet 17 g    potassium chloride CR capsule 10 mEq    prochlorperazine injection Soln 5 mg    sodium chloride 0.9% flush 10 mL    sodium chloride 0.9% flush 10 mL    tamsulosin 24 hr capsule 0.4 mg    zinc sulfate capsule 220 mg     Objective:     Vital Signs (Most Recent):  Temp: 98 °F (36.7 °C) (07/24/23 0756)  Pulse:  73 (07/24/23 0953)  Resp: 18 (07/24/23 0953)  BP: (!) 194/93 (07/24/23 1004)  SpO2: 100 % (07/24/23 0953) Vital Signs (24h Range):  Temp:  [97.5 °F (36.4 °C)-98.5 °F (36.9 °C)] 98 °F (36.7 °C)  Pulse:  [52-79] 73  Resp:  [16-20] 18  SpO2:  [94 %-100 %] 100 %  BP: (133-226)/() 194/93     Weight: 75.8 kg (167 lb 1.7 oz)     Body mass index is 29.6 kg/m².      Intake/Output Summary (Last 24 hours) at 7/24/2023 1102  Last data filed at 7/24/2023 0545  Gross per 24 hour   Intake --   Output 1400 ml   Net -1400 ml        Ortho/SPM Exam  TTP of the right hip   Pain with compression of pelvis  Cap refill <2s  Negative log roll       Significant Labs: All pertinent labs within the past 24 hours have been reviewed.    Significant Imaging: I have reviewed and interpreted all pertinent imaging results/findings.    Assessment/Plan:     * Multiple closed right sided fractures of pelvis without disruption of pelvic ring  Radha Sandoval is a 88 y.o. female with PMH significant for chronic BL pleural effusions, recent DVT diagnosed in March '23 (on eliquis), dementia, HTN, recent hospitalization here at [facility] for pelvic fracture treated non-operatively and discharged on 7/14 presenting with right pelvic pain after a fall yesterday when she was transferred from bed to wheelchair. Plan was for OR today for ORIF pelvis, but her sons wish to continue PT at this time.     - PT/OT, WBAT  - Will have further discussions with sons regarding management if PT unsuccesful  - SCDs at all times when not ambulating  - Multimodal pain control          Samy Stinson MD  Orthopedics  [facility] - Surgery ([facility])

## 2023-07-24 NOTE — ASSESSMENT & PLAN NOTE
- baseline per caretaker; however, concerned that patient does not sleep at night, worsening her mentation, agitation, and delirium.   - continue memantine  - on home haloperidol 0.5 PO qhs  - will consider starting additional nightly med for agitation; psychiatry consulted for assistance   --> psych recs DC nightly haldol. Start 2.5 mg zyprexa QHS and q8h prn for agitation.   - monitor for improvement   - EKG monitoring

## 2023-07-24 NOTE — PT/OT/SLP EVAL
Occupational Therapy   Co-Evaluation and Tx    Name: Radha Sandoval  MRN: [id number]  Admitting Diagnosis: Multiple closed fractures of pelvis without disruption of pelvic ring  Recent Surgery: Procedure(s) (LRB):  ORIF,PELVIS, rui, bone foam (N/A) Day of Surgery    Recommendations:     Discharge Recommendations: nursing facility, skilled   Discharge Equipment Recommendations:  to be determined by next level of care   Barriers to discharge:  Other (Comment) (increased (A) required)    Assessment:     Radha Sandoval is a 88 y.o. female with a medical diagnosis of Multiple closed fractures of pelvis without disruption of pelvic ring.  She presents with the following performance deficits affecting function: weakness, impaired endurance, impaired self care skills, impaired functional mobility, gait instability, impaired balance, decreased safety awareness, decreased lower extremity function, impaired cognition, impaired cardiopulmonary response to activity, orthopedic precautions.  Pt presents confused with caregiver at bedside throughout. She demo severe fear of falling and gravitational insecurity with all mobility, requiring increased skilled (A). PTA, was transferring to w/c with stand pivot from assisted living facility staff. She required consistent verbal reassurance for safety 2/2 severe posterior lean with sitting EOB. She was able to tolerate static stand and performed bed mobility to allow for toileting to be performed. Pt would benefit from continued skilled acute OT services in order to maximize (I) and with ADLs and functional mobility to ensure safe return to PLOF in the least restrictive environment. OT recommending SNF once pt is medically appropriate for d/c.       Rehab Prognosis: Good; patient would benefit from acute skilled OT services to address these deficits and reach maximum level of function.       Plan:     Patient to be seen 4 x/week to address the above listed problems via self-care/home  "management, therapeutic activities, therapeutic exercises, neuromuscular re-education  Plan of Care Expires: 08/23/23  Plan of Care Reviewed with: patient, caregiver   Subjective     Chief Complaint: "If I move I'll have a bowel movement"  Patient/Family Comments/goals: Return to PLOF/get better     Occupational Profile:  Living Environment: Pt was admitted from assisted living facility.    Previous level of function: T/f from bed to w/c and BSC with staff.   Roles and Routines: MICHAEL  Equipment Used at Home: walker, rolling, wheelchair   Assistance upon Discharge: Assisted living staff and caregiver     Pain/Comfort:  Pain Rating 1: 0/10   Pain Rating 2: 0/10    Patients cultural, spiritual, Methodist conflicts given the current situation: no     Objective:     Communicated with: RN prior to session.  Patient found HOB elevated with telemetry, pulse ox (continuous), duncan catheter upon OT entry to room.    General Precautions: Standard, fall   Orthopedic Precautions: LLE weight bearing as tolerated, RLE weight bearing as tolerated   Braces: N/A   Respiratory Status: Room air    Occupational Performance:    Bed Mobility:    Patient completed Rolling/Turning to Left with  maximal assistance  Patient completed Rolling/Turning to Right with maximal assistance  Patient completed Supine to Sit with total assistance  Patient completed Sit to Supine with total assistance    Functional Mobility/Transfers:  Patient completed Sit <> Stand Transfer with maximal assistance  with  hand-held assist   Functional Mobility: Deferred 2/2 increased level of assistance, safety concerns, and decreased command following/agitation.     Activities of Daily Living:  Toileting: total assistance and of 2 persons for perineal hygiene and clothing management 2/2 BM     Cognitive/Visual Perceptual:  Cognitive/Psychosocial Skills:     -       Oriented to: Person   -       Follows Commands/attention:Inattentive  -       Communication: " clear/fluent  -       Memory: impaired  -       Safety awareness/insight to disability: impaired   Visual/Perceptual:      -MICHAEL      Physical Exam:  Balance:    -           Static sitting balance: Max A  - Static standing balance: Max A   - Dynamic standing balance:  MICHAEL  Postural examination/scapula alignment:    -       Rounded shoulders  Skin integrity: Visible skin intact  Edema:  None noted  Sensation:  MICHAEL  Upper Extremity Range of Motion:     -       Right Upper Extremity: WFL grossly with bed mobility  -       Left Upper Extremity: WFL grossly with bed mobility   Upper Extremity Strength:    -       Right Upper Extremity: WFL grossly with bed mobility  -       Left Upper Extremity: WFL grossly with bed mobility    Strength:    -       Right Upper Extremity: WFL  -       Left Upper Extremity: WFL    AMPAC 6 Click ADL:  AMPAC Total Score: 12    Treatment & Education:  Pt sat EOB ~8 min with Max A 2/2 severe posterior lean and fear of falling.  She required consistent verbal cues for reassurance and for safety.   Pt confused throughout and was provided with reassurance from caregiver and therapist throughout.   Pt and caregiver educated on:   Role of OT, POC, and d/c planning.   Safe transfer techniques and proper body mechanics for fall prevention and improved independence with functional transfers   Importance of OOB activities to increase endurance and tolerance for increased participation in daily ADLs.   Utilizing the call bell to request for assistance with all functional mobility to ensure safety during hospital stay.      Pt and caregiver verbalized understanding and all questions were addressed within the scope of OT.       Patient left HOB elevated with all lines intact, call button in reach, bed alarm on, and RN and caregiver present    GOALS:   Multidisciplinary Problems       Occupational Therapy Goals          Problem: Occupational Therapy    Goal Priority Disciplines Outcome Interventions    Occupational Therapy Goal     OT, PT/OT Ongoing, Progressing    Description: Goals to be met by: 8/7/2023     Patient will increase functional independence with ADLs by performing:    UE Dressing with Minimal Assistance.  LE Dressing with Moderate Assistance.  Grooming while EOB with Moderate Assistance.  Supine to sit with Minimal Assistance.  Stand pivot transfers with Minimal Assistance.                         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 5/22/2023    Lumbar spondylosis with myelopathy 4/25/2023    Multiple closed right sided fractures of pelvis without disruption of pelvic ring 7/9/2023    Primary hypertension 6/10/2022    Subdural hygroma 7/8/2023         Past Surgical History:   Procedure Laterality Date    REPAIR, HERNIA, INGUINAL, WITHOUT HISTORY OF PRIOR REPAIR, AGE 5 YEARS OR OLDER Right 5/6/2023    Procedure: REPAIR, HERNIA, INGUINAL, WITHOUT HISTORY OF PRIOR REPAIR, AGE 5 YEARS OR OLDER;  Surgeon: Maurice Piper MD;  Location: [facility] OR [address] [place] [address];  Service: General;  Laterality: Right;  possible laparotomy, possible bowel resection       Time Tracking:     OT Date of Treatment: 07/24/23  OT Start Time: 1435  OT Stop Time: 1453  OT Total Time (min): 18 min    Billable Minutes:Evaluation 8  Self Care/Home Management 10    7/24/2023  Co-evaluation/treatment performed due to patient's multiple deficits requiring two skilled therapists to appropriately and safely assess patient's strength, endurance, functional mobility, and ADL performance while facilitating functional tasks in addition to accommodating for patient's activity tolerance and medical  acuity 2/2 pt seen in [facility] for multiple closed R sided fractures of pelvis without disruption of pelvic ring.

## 2023-07-24 NOTE — ASSESSMENT & PLAN NOTE
- BP uncontrolled this morning, hypertensive to 226/90 -- suspect bc patient was agitated and spitting out meds  - continue lisinopril, lopressor, lasix  - PRN IV hydralazine for SBP >180 or if unable to tolerate oral meds.

## 2023-07-24 NOTE — PT/OT/SLP EVAL
Physical Therapy Co-Evaluation and Treatment    Patient Name:  Radha Sandoval   MRN:  [id number]    *co-treatment with OT  2/2 pt with potential impaired ability to tolerate 2 evaluations 2/2 medical status   Recommendations:     Discharge Recommendations: nursing facility, skilled   Discharge Equipment Recommendations: hospital bed   Barriers to discharge: Decreased caregiver support at current functional level       Assessment:     Radha Sandoval is a 88 y.o. female admitted with a medical diagnosis of Multiple closed fractures of pelvis without disruption of pelvic ring.  She presents with the following impairments/functional limitations: weakness, impaired endurance, impaired self care skills, impaired functional mobility, gait instability, impaired balance, pain, impaired cognition, decreased safety awareness, decreased lower extremity function, decreased upper extremity function. Pt with multiple re-admissions, known to this therapist. Pt consistently demo's poor participation when admitted to hospital, cognitively fluctuates, demo's significant fear of falling. She appeared to make progress in SNF after a prior admission, was transitioned to [facility] after last admission and was pivoting to a w/c with staff assistance. She continues to experience falls. Given that pt has demonstrated progress with skilled therapy intervention in the past, she has potential to improve progress with mobility in a skilled nursing facility pending her participation. Given her persistent fluctuating cognitive status, she will likely require 24/7 care for safety. Pt would continue to benefit from acute skilled therapy intervention to address deficits and progress toward prior level of function.       Rehab Prognosis: Good; patient would benefit from acute skilled PT services to address these deficits and reach maximum level of function.    Recent Surgery: Procedure(s) (LRB):  ORIF,PELVIS, rui, bone foam (N/A) Day of Surgery    Plan:      During this hospitalization, patient to be seen 4 x/week to address the identified rehab impairments via gait training, therapeutic activities, therapeutic exercises, neuromuscular re-education and progress toward the following goals:    Plan of Care Expires:  08/24/23    Subjective     Chief Complaint: reluctant to participate, requests to keep calling her son, reports fear of falling  Patient/Family Comments/goals: to get better   Pain/Comfort:  Pain Rating 1:  (pt reported pain on tops of feet, did not quantify)  Pain Addressed 1: Reposition, Cessation of Activity, Distraction  Pain Rating Post-Intervention 1: 0/10    Patients cultural, spiritual, Tenriism conflicts given the current situation: no    Living Environment:  Pt was admitted to assisted living facility following prior admission. Caregiver reports she was transferring from bed to w/c and commode with [facility] staff, unknown what level of assistance. Equipment used at home: wheelchair, walker, rolling.  DME owned (not currently used): none.  Upon discharge, patient will have assistance from [facility] staff and caregiver.    Objective:     Communicated with RN prior to session.  Patient found supine with pulse ox (continuous), telemetry, duncan catheter  upon PT entry to room.    General Precautions: Standard, fall  Orthopedic Precautions:RLE weight bearing as tolerated, LLE weight bearing as tolerated   Braces: N/A  Respiratory Status: Room air    Exams:  Cognitive Exam:  Patient is oriented to self only, demo's memory impairment, easily agitated, difficulty attending to task or following commands   Gross Motor Coordination:  impaired  RLE ROM: WFL  RLE Strength: did not actively move but resisted 5/5   LLE ROM: WFL  LLE Strength: did not actively move but resisted 5/5     Functional Mobility:  Bed Mobility:     Rolling Left:  maximal assistance  Rolling Right: maximal assistance  Supine to Sit: total assistance  Sit to Supine: total assistance  Transfers:      Sit to Stand:  maximal assistance with hand-held assist, blocking at R knee, facilitation provided to prevent significant posterior lean.       AM-PAC 6 CLICK MOBILITY  Total Score:9       Treatment & Education:  Pt sat EOB for ~8 mins with maximum assistance for static sitting balance. Pt with frequent retropulsion, occasionally would lift her legs up, demo' tremor like movement and report fear of falling off EOB. She would calm with re-orientation from caregiver.   Rolling performed to perform linen change and pericare after a bowel movement.   Pt educated on role of PT/POC. Pt verbalized understanding.   Pt encouraged to only perform OOB mobility with assistance from nursing/therapy.        Patient left HOB elevated with all lines intact, call button in reach, bed alarm on, and RN present.    GOALS:   Multidisciplinary Problems       Physical Therapy Goals          Problem: Physical Therapy    Goal Priority Disciplines Outcome Goal Variances Interventions   Physical Therapy Goal     PT, PT/OT           Goals to be met by: 2023      Patient will increase functional independence with mobility by performin. Supine to sit with MInimal Assistance  2. Sit to supine with MInimal Assistance  3. Rolling to Left and Right with Minimal Assistance.  4. Sit to stand transfer with Minimal Assistance  5. Gait  x 10 feet with Moderate Assistance using Rolling Walker.                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 2023    Lumbar spondylosis with myelopathy 2023    Multiple closed right sided fractures of pelvis without disruption of pelvic ring 2023     Primary hypertension 6/10/2022    Subdural hygroma 7/8/2023       Past Surgical History:   Procedure Laterality Date    REPAIR, HERNIA, INGUINAL, WITHOUT HISTORY OF PRIOR REPAIR, AGE 5 YEARS OR OLDER Right 5/6/2023    Procedure: REPAIR, HERNIA, INGUINAL, WITHOUT HISTORY OF PRIOR REPAIR, AGE 5 YEARS OR OLDER;  Surgeon: Maurice Piper MD;  Location: [facility] OR [address] [place] [address];  Service: General;  Laterality: Right;  possible laparotomy, possible bowel resection       Time Tracking:     PT Received On: 07/24/23  PT Start Time: 1435     PT Stop Time: 1452  PT Total Time (min): 17 min     Billable Minutes: Evaluation 7 mins and Therapeutic Activity 10 mins       07/24/2023

## 2023-07-24 NOTE — ASSESSMENT & PLAN NOTE
Acute subdural hematoma     - Acute SDH on chronic SD hygroma s/p fall at [facility]; no acute deficits  - repeat CTH stable   - holding eliquis for now - may continue in 2 weeks (per NSGY)  - INR wnl  - NSGY clears for

## 2023-07-24 NOTE — ED NOTES
Patient resting in bed, with daughter bedside, no complaints.  Bed locked, side rails are up, call light is in reach.  Family voices no needs at the time and updated on patient POC.

## 2023-07-24 NOTE — ASSESSMENT & PLAN NOTE
Radha Sandoval is an 88-year-old woman with a history of dementia (FAST score 6D), fall resulting in pelvic fracture in early July 2023 that was treated non-operatively, chronic bilateral pleural effusions, DVT (3/2023) on apixaban, who was admitted for acute respiratory decompensation in the setting of recurrent fall at [facility] ([facility] living Counts include 234 beds at the [facility]). On admission, was found to have sustained subdural hematomas that thankfully have been stable and require no intervention per Neurosurgery. Palliative and Supportive Care was consulted to explore goals of care.    Advance Care Planning   Goals of Care:  - Code status: Full code  - Next of kin: 2 adult sons   - Son Maykel is reportedly MPOA. Requested copy of HCPOA form  - Patient does not have decision making capacity  - Prognosis: guarded  - Goals: improvement in condition, quality of life  - Plans: continue current level of care. Will meet Maykel tomorrow when he is able to visit. Initiated discussion related to ACP and code status. Maykel has requested to speak with someone to clarify his questions related to SNF and SNF days; reached out to primary team to request for case management to help address Maykel's questions related to this subject    Goals of Care Conversation:  - 7/24/23: I called Ms. Sandoval's son Maykel on the phone, who shared that he was very surprised to receive a call that his mother was going to the operating room for a surgery that he had no idea was even supposed to happen nor was he consented for this surgery. He was explained that his mother failed therapy but she only had one 15-minute session with PT and one 15-minute session with OT during her stay at [facility], and not the actual SNF-level of rehab that she received at [facility] previously. He worries about his mother's ability to tolerate the stresses of the surgery and recovery as she is older and weaker than before. He confirms that he wishes for  his mother to have meaningful quality of life, giving me an example that she was instructed to maintain a low sodium diet. But now in her later age, if she requests for a cheeseburger, he desires to provide this for her because it brings her romi. While he hopes that his mother will have years longer to live, he acknowledges that she won't have as much time as we all hope and wish. He is hopeful that longevity is on their side, but does note that longevity is within his father's side of his family. He shares his frustrations, mentioning that four months ago, his mother was on zero medications and walking independently. Now she is on many medications and requires guidance/assistance to walk with a walker. He hired a caretaker to watch over her per the instruction of her primary care doctor, but pays about $26/hr for the caretaker, who is hired 24/7. This in addition to the cost of residential living in [facility] amounts to $27,000, which is a severe financial burden. He is frustrated that despite the immense costs he is spending for his mother's care, she is not receiving adequate care and definitely not enough therapy services through home health.  - Inquired about his mother's ACP documents. He shared that her MPOA is him and will send me a copy to my email. I asked if they had discussions about life sustaining treatment and they had not. He imagines that no one would want to live indefinitely on machines and artificial life support if they weren't going to get better on those treatments. I recommended formally that at the end of her life when her heart stops and she stops breathing, that we protect her from interventions like CPR/intubation which would inflict more suffering/harm than benefit. He would like some more time to think about this and talk about it with his brother. 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 He does acknowledge that his functional status is different from his mother's but agrees to discuss this more. Agreed with Mr. Jorgensenlady that the time to think about this is before it happens, and not while it is happening or when it is imminent.

## 2023-07-24 NOTE — ED NOTES
Received report from ELEAZAR Wright. Pt resting in bed with daughter at bedside. Chest rise and fall noted. NAD. Bed locked in lowest position. SR up x 2. Call light within reach.

## 2023-07-24 NOTE — HOSPITAL COURSE
Radha Sandoval was placed in  observation after a fall. NSGY consulted for evaluation of SDH and determined no need for intervention , will follow up in clinic in 2 weeks. HOLD eliquis until follow up. Orthopedics discussed surgical options with family and recommended ORIF pelvis, but family is declined at this time and would prefer to continue with plan for PT/OT. Therapy assessment; recs for SNF. WBAT. Dvt ppx with 40 lovenox SQ daily (ok per NSGY). Psychiatry provided recs for medication adjustment for insomnia and delirium with prn dosing for agitation.  Agitation persists and patient started expressing suicidal ideation when asked to work with therapy. Psychiatry aware of SI, and made med adjustments as indicated while monitoring QT. Caretaker has been staying at bedside on multiple occasions and expressed the patient's significant cognitive decline over the last several weeks to months.  Palliative Medicine consulted with son and patient full code.    Planned to discharge patient on 07/27 to skilled nursing facility, however, patient tachypneic with stridor and increased work of breathing.  Mentation worsened with progressive respiratory alkalosis.  No improvement with breathing treatment or trial of Lasix.  Concern for bronchomalacia with CT images positive for bilateral mainstem bronchi narrowing.  Sats remained stable on 2 L O2.  Discussed with rapid response team and medical ICU.  Patient was moved to ICU step-down unit and monitored closely.  On 07/29 increased work of breathing and stridor spontaneously resolved.  Mentation briefly improved but multiple episodes of confusion.  QT even longer and Zyprexa changed to Depakote.  Repeat CT images of the brain revealed stable subdural hematomas.  On further discussion with patient's son, he opted to complete neurologic workup with MRI of the brain and EEG.  Son gave consent for MRI with Ativan, no acute infarct, stable SDH and 3 mm midline shift. Abnormal EEG  due to moderate generalized non-specific cerebral dysfunction with no electrographic seizures or indications of seizure tendency.  B12 and ammonia normal.  Folate and RPR checked on admission 2 weeks prior at outside facility and unremarkable.  Later in the afternoon, nursing staff called due to tachycardia and hypotension.  EKG revealed AFib with RVR with blood pressure 70s over 50s.  Minimal response to IV fluids.  To my knowledge, no history of AFib.  Patient transferred to ICU.    Of note recurrent episode of urinary retention on 07/31 and a UA was collected growing Enterococcus species but only max of 49,999 CFU.  Typically would not treat unless 100,000 CFU, will defer to ICU as the patient has been transferred

## 2023-07-24 NOTE — PROGRESS NOTES
CONSULTATION LIAISON PSYCHIATRY PROGRESS NOTE    Patient Name: Radha Sandoval  MRN: [id number]  Patient Class: IP- Inpatient  Admission Date: 7/22/2023  Attending Physician: Maykel Khan MD      SUBJECTIVE:   Radha Sandoval is a 88 y.o. female with past medical history of bilateral chronic subdural hematomas, Hypertension, Acute DVT and PE 5/23, frequent falls with recent Pelvic fracture s/p orthopedic intervention & past pertinent psychiatric history of Late Onset Alzheimers with mood disturbance and Delirium presents to the ED/admitted to the hospital for a fall, shortness of breath, and hypoxia. Patient presented with right pelvic pain after a fall on 7/22 when she was transferred from bed to wheelchair. Due to her fluctuating mentation she has been agitated with nursing staff and tries to leave her bed and resists being transferred from bed to wheelchair, which has resulted in multiple falls.      Psychiatry consulted for persistent insomnia contributing to delirium in patient with dementia, uncontrolled on current regimen    Medical Student Maurice Banegas Evaluation:  Today, the patient was seen and evaluated with caregiver, Mrs. Chel Morrison, full time caretaker, at bedside - [phone]. Her attention waxes and wanes where she would answer questions appropriately but she would have some psychomotor agitation where she would question why she is there. She denies any SI, HI, or AVH.      The caregiver states that she was able to sleep last night without any medications. On assessment, the patient would threaten to hit her caregiver and says that she has 2 boyfriends and is focused on talking about her time as a  since she was 18 years of age.     Resident Evaluation:  Patient did not require any PRN medications overnight for agitation. Today, patient seated with caregiver, Chel at the bedside. Patient was scheduled for surgery today but per [facility] family prefers non-op management with PT/OT. Patient  perseverated on not wanting to have surgery. Per caregiver, patient slept well for the first time last night and did not seem to be hallucinating and mumbling to herself. Patient was oriented to self and year. Pleasant, cooperative, and engaged on exam. She denied SI, HI, AVH on assessment.    Chart Review:  Pt currently living at [facility] in [place]. Caregiver reports that patient has not been sleeping as has had several falls.     Of note, patient sustained her pelvic fractures at [facility], but after hospitalization at [facility] was placed in [facility] on 7/14. Caretaker reports patient also had another hospitalization during this time at . Patient generally disoriented and delirious, but she will have periods or even days of clarity.         OBJECTIVE:    Mental Status Exam:  General Appearance: unremarkable, neatly groomed, dressed in hospital garb  Behavior: cooperative, friendly, pleasant, polite, appropriate eye-contact, under good behavioral control  Involuntary Movements and Motor Activity: no abnormal involuntary movements noted  Gait and Station: unable to assess - patient lying down or seated  Speech and Language: normal rate, normal rhythm, normal volume, normal tone, conversational, spontaneous  Mood: perseverative on not wanting surgery  Affect: appropriate to situation and context  Thought Process and Associations: disorganized, tangential  Thought Content and Perceptions:: no suicidal ideation, no homicidal ideation, no hallucinations  Sensorium and Orientation: waxing and waning  Recent and Remote Memory: significant impairments noted  Attention and Concentration: attentive to conversation, easily distractible  Fund of Knowledge: impaired  Insight: poor  Judgment: poor    CAM ICU positive? no      ASSESSMENT & RECOMMENDATIONS   Late Onset Alzheimer's with mood disturbance  Delirium    DELIRIUM  PSYCH MEDICATIONS  Contiune Zyprexa 2.5 mg po nightly IM or IV  Continue  Lexapro 5 mg po QD  Continue Memantine 5 mg po BID  PRN - Zyprexa 2.5 mg po, IM, or IV Q8 prn for non redirectable agitation or aggression   Continue to monitor EKGs, Qtc with antipsychotics     DELIRIUM BEHAVIOR MANAGEMENT  PLEASE utilize CHEMICAL restraints with PRN meds first for agitation. Minimize use of PHYSICAL restraints OR have periods of being out of physical restraints if possible.  Keep window shades open and room lit during day and room dim at night in order to promote normal sleep-wake cycles  Encourage family at bedside. Sentinel patient often to situation, location, date.  Continue to Limit or Discontinue use of Narcotics, Benzos and Anti-cholinergic medications as they may worsen delirium.  Continue medical workup for causative etiology of Delirium.     RISK ASSESSMENT  NO NEED FOR PEC       FOLLOW UP  Will follow up while in house    DISPOSITION - once medically cleared:  Defer to medical team    Please contact ON CALL psychiatry service (24/7) for any acute issues that may arise.    I attest to having seen and evaluated the above patient with the student, Maurice Banegas. I have personally reviewed the note, assessment, and plan, and have made necessary adjustments.      Dr. Adalberto Sarmiento   Psychiatry  [facility]  7/24/2023 10:00 AM      --------------------------------------------------------------------------------------------------------------------------------------------------------------------------------------------------------------------------------------    CONTINUED OBJECTIVE clinical data & findings reviewed and noted for above decision making    Current Medications:   Scheduled Meds:    acetaminophen  1,000 mg Oral Q8H    ascorbic acid (vitamin C)  250 mg Oral Daily    EScitalopram oxalate  5 mg Oral Daily    furosemide  20 mg Oral BID    hydrALAZINE  5 mg Intravenous Once    lisinopriL  40 mg Oral Daily    memantine  5 mg Oral BID    methocarbamoL  500 mg Oral QID     metoprolol tartrate  25 mg Oral BID    miconazole NITRATE 2 %   Topical (Top) BID    OLANZapine  2.5 mg Intramuscular QHS    potassium chloride  10 mEq Oral Daily    tamsulosin  0.4 mg Oral Nightly    zinc sulfate  220 mg Oral Daily     PRN Meds: aluminum-magnesium hydroxide-simethicone, glucagon (human recombinant), glucose, glucose, hydrALAZINE, loperamide, melatonin, naloxone, OLANZapine, oxyCODONE, polyethylene glycol, prochlorperazine, sodium chloride 0.9%, sodium chloride 0.9%    Allergies:   Review of patient's allergies indicates:  No Known Allergies    Vitals  Vitals:    07/24/23 0953   BP: (!) 201/82   Pulse: 73   Resp: 18   Temp:        Labs/Imaging/Studies:  Recent Results (from the past 24 hour(s))   Basic Metabolic Panel (BMP)    Collection Time: 07/24/23  4:13 AM   Result Value Ref Range    Sodium 141 136 - 145 mmol/L    Potassium 3.0 (L) 3.5 - 5.1 mmol/L    Chloride 102 95 - 110 mmol/L    CO2 27 23 - 29 mmol/L    Glucose 85 70 - 110 mg/dL    BUN 16 8 - 23 mg/dL    Creatinine 0.7 0.5 - 1.4 mg/dL    Calcium 8.3 (L) 8.7 - 10.5 mg/dL    Anion Gap 12 8 - 16 mmol/L    eGFR >60.0 >60 mL/min/1.73 m^2   Magnesium    Collection Time: 07/24/23  4:13 AM   Result Value Ref Range    Magnesium 1.6 1.6 - 2.6 mg/dL   CBC with Automated Differential    Collection Time: 07/24/23  4:13 AM   Result Value Ref Range    WBC 7.03 3.90 - 12.70 K/uL    RBC 3.26 (L) 4.00 - 5.40 M/uL    Hemoglobin 9.0 (L) 12.0 - 16.0 g/dL    Hematocrit 29.3 (L) 37.0 - 48.5 %    MCV 90 82 - 98 fL    MCH 27.6 27.0 - 31.0 pg    MCHC 30.7 (L) 32.0 - 36.0 g/dL    RDW 14.2 11.5 - 14.5 %    Platelets 282 150 - 450 K/uL    MPV 10.6 9.2 - 12.9 fL    Immature Granulocytes 0.3 0.0 - 0.5 %    Gran # (ANC) 5.2 1.8 - 7.7 K/uL    Immature Grans (Abs) 0.02 0.00 - 0.04 K/uL    Lymph # 0.7 (L) 1.0 - 4.8 K/uL    Mono # 0.5 0.3 - 1.0 K/uL    Eos # 0.5 0.0 - 0.5 K/uL    Baso # 0.05 0.00 - 0.20 K/uL    nRBC 0 0 /100 WBC    Gran % 74.5 (H) 38.0 - 73.0 %    Lymph %  10.5 (L) 18.0 - 48.0 %    Mono % 7.3 4.0 - 15.0 %    Eosinophil % 6.7 0.0 - 8.0 %    Basophil % 0.7 0.0 - 1.9 %    Differential Method Automated    PT/INR    Collection Time: 07/24/23  4:13 AM   Result Value Ref Range    Prothrombin Time 11.3 9.0 - 12.5 sec    INR 1.1 0.8 - 1.2     Imaging Results              CT Head Without Contrast (Final result)  Result time 07/23/23 08:24:54      Final result by Bill Tarango MD (07/23/23 08:24:54)                   Impression:        Similar volume of subdural hematomas.  Changes of density may be in part technical/artifactual in nature and also due to some leakage of recent intravenous contrast from recent CT of the abdomen pelvis with no focal hyperdense acute hemorrhage identified.  Follow-up as clinically warranted.      Electronically signed by: Bill Tarango  Date:    07/23/2023  Time:    08:24               Narrative:    EXAMINATION:  CT HEAD WITHOUT CONTRAST    CLINICAL HISTORY:  Head trauma, minor (Age >= 65y);    TECHNIQUE:  Low dose axial images were obtained through the head.  Coronal and sagittal reformations were also performed. Contrast was not administered.    COMPARISON:  07/23/2023, 07/09/2023    FINDINGS:  Bilateral subacute subdural hematomas are noted bilaterally again identified measuring up to 14 mm on  the left, similar in volume.  No new focal hyperdense hemorrhage is identified.  Overall mild increased density of the hematoma may in part be technical/artifactual in nature as well as due to leakage of recent intravenous contrast as similar appearances were noted previously (on 07/09/2023 and 07/10/2023).    3 mm midline shift to the right is similar in appearance.  The basal cisterns remain patent.  No acute intraparenchymal process.    Prominent atherosclerotic vascular calcifications are noted at the skull base.    The visualized sinuses and mastoid air cells are essentially clear.                                       CT Chest Abdomen Pelvis  With Contrast (xpd) (Final result)  Result time 07/23/23 02:32:32   Procedure changed from CT Abdomen Pelvis With Contrast     Final result by Chuy Mckeon MD (07/23/23 02:32:32)                   Impression:      Similar appearance of recent fractures involving the right inferior and superior pubic rami.  Increased conspicuity of essentially nondisplaced recent fractures of the right michelle sacrum and anterior aspect of the right acetabulum.    Motion and artifact limited study with suboptimal bolus timing.    Moderate-sized hiatal hernia with moderate lower esophageal wall thickening.  Suggest correlation for esophagitis.    Peribronchial thickening and questionable minimal patchy ground-glass opacities in the lung bases and bibasilar subsegmental atelectasis.    Nodular soft tissue opacities in the left breast.  Neoplasm not excluded.  Suggest correlation with physical exam and mammographic follow-up when clinically warranted.    Anasarca.    Mild nonspecific wall thickening of the inferior aspect of the urinary bladder.  Suggest correlation with urinalysis.    Multiple additional findings discussed in the body of the report.      Electronically signed by: Chuy Mckeon MD  Date:    07/23/2023  Time:    02:32               Narrative:    EXAMINATION:  CT CHEST ABDOMEN PELVIS WITH CONTRAST (XPD)    CLINICAL HISTORY:  Abdominal trauma, blunt;    TECHNIQUE:  Low dose axial images, sagittal and coronal reformations were obtained from the thoracic inlet to the pubic symphysis following the IV administration of 75 mL of Omnipaque 350 .  Oral contrast was not given.    COMPARISON:  CTA chest, 07/08/2023.  CT pelvis, 07/08/2023.  CT abdomen pelvis, 05/06/2023.    FINDINGS:  Chest:    Exam quality is limited by suboptimal bolus timing and motion.  Evaluation is limited by extensive streak artifact due to the patient's arms overlying the field of view.    Heart is borderline enlarged and similar to the prior study.   Coronary artery and mitral valve calcifications.  Thoracic aorta is stable in caliber with moderate to advanced calcific atherosclerosis.  No central pulmonary embolus.  No bulky mediastinal lymphadenopathy.    Detailed evaluation of the pulmonary parenchyma limited by motion.  There is peribronchial thickening and questionable minimal patchy ground-glass opacities in the lung bases.  Bibasilar subsegmental atelectasis.  No consolidation or pleural effusion.    Moderate-sized hiatal hernia with moderate lower esophageal wall thickening.    Abdomen:    Exam quality is limited by suboptimal bolus timing, motion, and extensive artifact related to the patient's arms overlying the field of view.    Liver is similar in size and contour when compared with the prior study.  Multiple hepatic hypodensities most suggestive of cysts.  Gallbladder is unremarkable.  No intrahepatic biliary ductal dilatation.    Spleen, adrenals, and pancreas are stable and negative for acute finding allowing for significant motion.    Kidneys are stable.  There is a large left renal cyst.  Small stone or vascular calcification in the right renal hilum.  No hydronephrosis.    Evaluation for bowel inflammation is limited by motion.  No small bowel obstruction.  Mild stool burden in the colon.    No pneumoperitoneum or organized fluid collection.    No bulky retroperitoneal lymphadenopathy.    Abdominal aorta is similar in caliber with moderate to advanced calcific atherosclerosis involving the aorta and major branch vessels.    Portal vein is grossly patent.    Pelvis:    Urinary bladder is mildly distended and there is mild wall thickening along the inferior aspect of the urinary bladder similar to the prior CT abdomen.  Rectum is unremarkable.  There is minimal presacral fat stranding.  No significant pelvic free fluid.    Bones and soft tissues:    Recent fractures involving the right superior and inferior pubic rami similar to prior CT pelvis.   Suspect nondisplaced fracture of the right michelle sacrum, more conspicuous when compared with prior CT pelvis.  Nondisplaced fracture of the anterior aspect of the right acetabulum (coronal series 606, image 129) is also more conspicuous when compared with the prior study.  No additional acute fracture.  Degenerative changes in the spine and pubic symphysis.  Mild anterolisthesis of L4 with respect to L5.  Mild left convex scoliotic curvature of the spine.  Old right lower rib fractures.  Operative changes of presumed right lower abdominal wall hernia repair.  Mild diffuse body wall edema.  Somewhat nodular soft tissue densities in the left breast soft tissues.  Suggest follow-up mammogram.                                       CT Head Without Contrast (Final result)  Result time 07/23/23 02:16:47      Final result by Maykel Castro MD (07/23/23 02:16:47)                   Impression:      Subtle increase in density of the subdural hygromas suggesting acute on chronic subdural fluid collection.    Consider follow-up CT scan per protocol in patient with small acute subdural hematoma on chronic subdural hygromas.      Electronically signed by: Maykel Castro  Date:    07/23/2023  Time:    02:16               Narrative:    EXAMINATION:  CT HEAD WITHOUT CONTRAST    CLINICAL HISTORY:  Head trauma, minor (Age >= 65y);    TECHNIQUE:  Low dose axial CT images obtained throughout the head without intravenous contrast. Sagittal and coronal reconstructions were performed.    COMPARISON:  None.    FINDINGS:  Intracranial compartment:    Ventricles and sulci are stable in size for age without evidence of hydrocephalus. Subdural hygromas appear increased in density slightly suggesting acute on chronic subdural hematoma.    The brain parenchyma appears stable with involutional and chronic small vessel type changes again noted..  No parenchymal mass, hemorrhage, edema or major vascular distribution  infarct.    Skull/extracranial contents (limited evaluation): No fracture. Mastoid air cells and paranasal sinuses are essentially clear.                                       CT Cervical Spine Without Contrast (Final result)  Result time 07/23/23 03:01:55      Final result by Chuy Mckeon MD (07/23/23 03:01:55)                   Impression:      No evidence of acute fracture or acute osseous abnormality.    Osteopenia and stable degenerative changes.    Additional findings discussed in the body of the report.    Electronically signed by resident: Anahi Gutierrez  Date:    07/23/2023  Time:    02:02    Electronically signed by: Chuy Mckeon MD  Date:    07/23/2023  Time:    03:01               Narrative:    EXAMINATION:  CT CERVICAL SPINE WITHOUT CONTRAST    CLINICAL HISTORY:  Neck trauma (Age >= 65y);    TECHNIQUE:  Low dose axial images, sagittal and coronal reformations were performed though the cervical spine.  Contrast was not administered.    COMPARISON:  CT 04/18/2023 and 07/09/2023.    FINDINGS:  Alignment: Normal.    Vertebrae: Osteopenia.  No fracture.  Lucent area involving the left C4 facet without associated cortical disruption, unchanged dating back to 04/18/2023.  Stable mild height loss of the superior endplate of T4 vertebral body.    Discs: Multilevel disc height loss, most pronounced at C5-C6.    C1-2: Dens is intact.  Pre-dens space is maintained.    Skull base and craniocervical junction: Normal.    Degenerative findings:    Stable appearance of mild multilevel degenerative changes through the cervical spine.  There are several levels demonstrating mild to moderate facet arthropathy and mild uncovertebral spurring resulting in areas of mild neural foraminal narrowing.  No areas of spinal canal stenosis.    Paraspinal muscles & soft tissues: Evaluation of the lung apices is severely limited by respiratory motion artifact.  Dense calcific atherosclerosis of the aortic arch.  Soft tissues of  the thoracic inlet are somewhat distorted secondary to motion artifact.                                       X-Ray Pelvis Routine AP (Final result)  Result time 07/23/23 01:54:57   Procedure changed from X-Ray Hip 2 or 3 views Right (with Pelvis when performed)     Final result by Chuy Mckeon MD (07/23/23 01:54:57)                   Impression:      Similar alignment of recent fractures involving the right superior and inferior pubic rami.  No new acute displaced fracture.      Electronically signed by: Chuy Mckeon MD  Date:    07/23/2023  Time:    01:54               Narrative:    EXAMINATION:  XR PELVIS ROUTINE AP; XR FEMUR 2 VIEW RIGHT    CLINICAL HISTORY:  HIP PAIN;  Pain in right hip; Pain in right leg    TECHNIQUE:  Three frontal views of the pelvis performed.  Two views of the right femur also obtained.    COMPARISON:  CT pelvis, 07/08/2023.    FINDINGS:  Pelvis: Bones are mildly demineralized.  Similar appearance of mildly displaced recent fracture of the right inferior pubic ramus and nondisplaced fracture of the right superior pubic ramus.  Similar fracture fragment alignment allowing for differences in positioning.  No new acute fracture.  No dislocation.  Mild degenerative changes in both hips.    Right femur: No additional acute fracture or dislocation other than described above.  Degenerative changes in the right hip and right knee.  Atherosclerotic vascular calcifications.  Soft tissue edema overlying the right hip.  No unexpected radiopaque foreign body.                                       X-Ray Femur 2 AP/LAT Right (Final result)  Result time 07/23/23 01:54:57      Final result by Chuy Mckeon MD (07/23/23 01:54:57)                   Impression:      Similar alignment of recent fractures involving the right superior and inferior pubic rami.  No new acute displaced fracture.      Electronically signed by: Chuy Mckeon MD  Date:    07/23/2023  Time:    01:54                "Narrative:    EXAMINATION:  XR PELVIS ROUTINE AP; XR FEMUR 2 VIEW RIGHT    CLINICAL HISTORY:  HIP PAIN;  Pain in right hip; Pain in right leg    TECHNIQUE:  Three frontal views of the pelvis performed.  Two views of the right femur also obtained.    COMPARISON:  CT pelvis, 07/08/2023.    FINDINGS:  Pelvis: Bones are mildly demineralized.  Similar appearance of mildly displaced recent fracture of the right inferior pubic ramus and nondisplaced fracture of the right superior pubic ramus.  Similar fracture fragment alignment allowing for differences in positioning.  No new acute fracture.  No dislocation.  Mild degenerative changes in both hips.    Right femur: No additional acute fracture or dislocation other than described above.  Degenerative changes in the right hip and right knee.  Atherosclerotic vascular calcifications.  Soft tissue edema overlying the right hip.  No unexpected radiopaque foreign body.                                       X-Ray Chest AP Portable (Final result)  Result time 07/23/23 01:47:54      Final result by Chuy Mckeon MD (07/23/23 01:47:54)                   Impression:      No detrimental change when compared with 07/08/2023.      Electronically signed by: Chuy Mckeon MD  Date:    07/23/2023  Time:    01:47               Narrative:    EXAMINATION:  XR CHEST AP PORTABLE    CLINICAL HISTORY:  Provided history is "Sepsis;  ".    TECHNIQUE:  One view of the chest.    COMPARISON:  07/08/2023.    FINDINGS:  Cardiac wires overlie the chest.  Patient is slightly rotated.  Cardiomediastinal silhouette is stable and may be at the upper limits of normal in size.  Atherosclerotic calcifications overlie the aortic arch.  Right hemidiaphragm is slightly elevated as seen previously.  Azygous lobe configuration is incidentally noted in the right lung apex.  No confluent area of consolidation.  No sizable pleural effusion.  No pneumothorax.  Hiatal hernia again noted.                           "

## 2023-07-24 NOTE — ASSESSMENT & PLAN NOTE
Patient has hypokalemia which is currently uncontrolled. Last electrolytes reviewed-   Recent Labs   Lab 07/23/23  0032 07/24/23  0413   K 3.3* 3.0*   . Will replace potassium and monitor electrolytes closely. Continuous telemetry.  - continue daily 10 mEq KCl  - Add PO K replacement with K bicarb

## 2023-07-24 NOTE — SUBJECTIVE & OBJECTIVE
Interval History: NAEON. Hypertensive this morning after refusing/ spitting out BP meds. IV hydralazine prn. Ortho recommended ORIF pelvis this morning but family prefers non-op management with PT/OT for now. Med adjustments per psych for agitation and delirium. No PEC for now Palliative consult to follow.     Review of Systems   Unable to perform ROS: Dementia   Objective:     Vital Signs (Most Recent):  Temp: 98.6 °F (37 °C) (07/24/23 0953)  Pulse: 73 (07/24/23 0953)  Resp: 18 (07/24/23 0953)  BP: (!) 166/105 (07/24/23 1013)  SpO2: 100 % (07/24/23 0953) Vital Signs (24h Range):  Temp:  [97.5 °F (36.4 °C)-98.6 °F (37 °C)] 98.6 °F (37 °C)  Pulse:  [52-79] 73  Resp:  [16-20] 18  SpO2:  [94 %-100 %] 100 %  BP: (133-226)/() 166/105     Weight: 75.8 kg (167 lb 1.7 oz)  Body mass index is 29.6 kg/m².    Intake/Output Summary (Last 24 hours) at 7/24/2023 1259  Last data filed at 7/24/2023 0545  Gross per 24 hour   Intake --   Output 1400 ml   Net -1400 ml         Physical Exam  Vitals and nursing note reviewed.   Constitutional:       General: She is not in acute distress.     Appearance: She is well-developed. She is ill-appearing (chronically ill-appearing).   HENT:      Head: Normocephalic and atraumatic.      Mouth/Throat:      Pharynx: No oropharyngeal exudate.   Eyes:      Conjunctiva/sclera: Conjunctivae normal.      Pupils: Pupils are equal, round, and reactive to light.   Cardiovascular:      Rate and Rhythm: Normal rate and regular rhythm.      Heart sounds: Normal heart sounds.   Pulmonary:      Effort: Pulmonary effort is normal. No respiratory distress.      Breath sounds: Normal breath sounds. No wheezing.   Abdominal:      General: Bowel sounds are normal. There is no distension.      Palpations: Abdomen is soft.      Tenderness: There is no abdominal tenderness.   Musculoskeletal:         General: No tenderness. Normal range of motion.      Cervical back: Normal range of motion and neck supple.    Lymphadenopathy:      Cervical: No cervical adenopathy.   Skin:     General: Skin is warm and dry.      Capillary Refill: Capillary refill takes less than 2 seconds.      Findings: No rash.   Neurological:      Mental Status: She is alert. Mental status is at baseline.      Cranial Nerves: No cranial nerve deficit.      Sensory: No sensory deficit.      Coordination: Coordination normal.   Psychiatric:      Comments: Expressing delirious behavior, possibly hallucinating. Rambling, non-coherent. When re-directed, patient answers questions and is oriented to person and place.            Significant Labs: All pertinent labs within the past 24 hours have been reviewed.  BMP:   Recent Labs   Lab 07/24/23 0413   GLU 85      K 3.0*      CO2 27   BUN 16   CREATININE 0.7   CALCIUM 8.3*   MG 1.6     CBC:   Recent Labs   Lab 07/23/23  0032 07/24/23 0413   WBC 10.34 7.03   HGB 9.8* 9.0*   HCT 30.2* 29.3*    282       Significant Imaging: I have reviewed all pertinent imaging results/findings within the past 24 hours.

## 2023-07-24 NOTE — ASSESSMENT & PLAN NOTE
Radha Sandoval is a 88 y.o. female with PMH significant for chronic BL pleural effusions, recent DVT diagnosed in March '23 (on eliquis), dementia, HTN, [place] [facility] [place] and discharged on 7/14 presenting with right pelvic pain after a fall yesterday when she was transferred from bed to wheelchair. Plan was for OR today for ORIF pelvis, but her sons wish to continue PT at this time.     - PT/OT, WBAT  - Will have further discussions with sons regarding management if PT unsuccesful  - SCDs at all times when not ambulating  - Multimodal pain control

## 2023-07-24 NOTE — PLAN OF CARE
After speaking with patient's two sons at length, today's surgery has been cancelled per Ortho Resident, EDGAR Stinson MD. Pt's son, Maykel who has POA, not wanting to consent for surgery at this time, states that he would like for his mother to try and work with Physical Therapy.

## 2023-07-24 NOTE — PROGRESS NOTES
Multiple conversations with Loreta, pharmacist trying to get meds up for pt not available in [facility].  Pt was to be going to floor room earlier.  Repeat vitals done, pt hypertensive.  Hydralazine given per Irene Ha RN.

## 2023-07-25 PROBLEM — R45.851 SUICIDAL IDEATION: Status: ACTIVE | Noted: 2023-01-01

## 2023-07-25 PROBLEM — S32.810A CLOSED PELVIC RING FRACTURE: Status: ACTIVE | Noted: 2023-01-01

## 2023-07-25 NOTE — PT/OT/SLP PROGRESS
Physical Therapy Co-Treatment    Patient Name:  Radha Sandoval   MRN:  [id number]    Recommendations:     Discharge Recommendations: nursing facility, skilled  Discharge Equipment Recommendations: to be determined by next level of care  Barriers to discharge:  increased level of assistance needed    Assessment:     Radha Sandoval is a 88 y.o. female admitted with a medical diagnosis of Multiple closed fractures of pelvis without disruption of pelvic ring.  She presents with the following impairments/functional limitations: weakness, gait instability, impaired endurance, impaired balance, decreased lower extremity function, decreased safety awareness, impaired self care skills, impaired cognition, pain, impaired functional mobility. Pt needed max verbal encouragement from son via phone call and caregiver Chel in room to participate due to mild agitation secondary to cognitive impairments. Pt presented with decreased command following and increased fear of falling. Pt completed supine to sit and scooting with maxA and sat EOB x 10 min with SBA-modA. Pt began shaking and posteriorly leaning with sitting EOB due to fear of falling. Pt needed max verbal encouragement to lean forward and place feet on the ground. Pt completed sit to stand x 2 trials with maxAx 2 with RW and bilateral blocking of both knees. Pt was anxious with standing and needed max verbal cuing for upright posture.  Pt would benefit from a skilled nursing facility for: Dynamic/static standing/sitting balance through skilled balance training, strengthening with the use of skilled therapeutic exercises interventions, and mobility through adaptive equipment training. Pt continues to benefit from a collaborative multidisciplinary program to improve quality of life and focus on recovery of impairments.     Rehab Prognosis: Fair; patient would benefit from acute skilled PT services to address these deficits and reach maximum level of function.    Recent Surgery:  Procedure(s) (LRB):  ORIF,PELVIS, rui, bone foam (N/A) 1 Day Post-Op    Plan:     During this hospitalization, patient to be seen 4 x/week to address the identified rehab impairments via gait training, therapeutic activities, therapeutic exercises, neuromuscular re-education and progress toward the following goals:    Plan of Care Expires:  08/24/23    Subjective     Chief Complaint: Pt's caregiver reported pt had been asking to walk to the bathroom.  Patient/Family Comments/goals: Pt stated she wanted to kill herself multiple times throughout session and that she had a plan. RN and medical team notified.  Pain/Comfort:  Pain Rating 1:  (pt did not rate)  Location - Side 1: Right  Location 1: hip  Pain Addressed 1: Reposition, Distraction  Pain Rating Post-Intervention 1:  (pt did not rate)      Objective:     Communicated with RN prior to session.  Patient found supine with HOB elevated with telemetry, peripheral IV, duncan catheter upon PT entry to room.     General Precautions: Standard, fall  Orthopedic Precautions: LLE weight bearing as tolerated, RLE weight bearing as tolerated  Braces: N/A  Respiratory Status: Room air     Functional Mobility:  Bed Mobility:     Scooting: maximal assistance  Supine to Sit: maximal assistance  Sit to Supine: total assistance  Transfers:     Sit to Stand:  maximal assistancex2 with rolling walker, blocking at bilateral knees  Gait: not assessed this date due to decreased command following/agitation  Balance:   Static sitting: SBA-modA; pt needs increased verbal cuing to lean forward and has a fear of falling  Dynamic sitting: not assessed this date due to safety/decreased command following  Static standing: not assessed this date due to safety/decreased command following  Dynamic standing: not assessed this date due to safety/decreased command following      AM-PAC 6 CLICK MOBILITY  Turning over in bed (including adjusting bedclothes, sheets and blankets)?: 2  Sitting down  on and standing up from a chair with arms (e.g., wheelchair, bedside commode, etc.): 2  Moving from lying on back to sitting on the side of the bed?: 2  Moving to and from a bed to a chair (including a wheelchair)?: 1  Need to walk in hospital room?: 1  Climbing 3-5 steps with a railing?: 1  Basic Mobility Total Score: 9       Treatment & Education:  Pt educated on role of therapy, goals of session, and benefits of mobilizing. Pt educated on calling for assistance. All questions answered within PT scope of practice. Co-treatment performed to safely and appropriately accommodate for pt's strength and endurance while facilitating functional tasks and maintaining pt and staff safety.    Patient left supine with HOB elevated with all lines intact, call button in reach, caregiver Chel present and RN notified.    GOALS:   Multidisciplinary Problems       Physical Therapy Goals          Problem: Physical Therapy    Goal Priority Disciplines Outcome Goal Variances Interventions   Physical Therapy Goal     PT, PT/OT Ongoing, Progressing     Description: Goals to be met by: 2023     Patient will increase functional independence with mobility by performin. Supine to sit with MInimal Assistance  2. Sit to supine with MInimal Assistance  3. Rolling to Left and Right with Minimal Assistance.  4. Sit to stand transfer with Minimal Assistance  5. Gait  x 10 feet with Moderate Assistance using Rolling Walker.                          Time Tracking:     PT Received On: 23  PT Start Time: 1053     PT Stop Time: 1136  PT Total Time (min): 43 min     Billable Minutes: Therapeutic Activity 15 min and Neuromuscular Re-education 28 min    Treatment Type: Treatment  PT/PTA: PT     Number of PTA visits since last PT visit: 0     2023

## 2023-07-25 NOTE — PLAN OF CARE
Problem: Infection  Goal: Absence of Infection Signs and Symptoms  Outcome: Ongoing, Progressing     Problem: Adult Inpatient Plan of Care  Goal: Plan of Care Review  Outcome: Ongoing, Progressing  Goal: Patient-Specific Goal (Individualized)  Outcome: Ongoing, Progressing  Goal: Absence of Hospital-Acquired Illness or Injury  Outcome: Ongoing, Progressing  Goal: Optimal Comfort and Wellbeing  Outcome: Ongoing, Progressing  Goal: Readiness for Transition of Care  Outcome: Ongoing, Progressing     Problem: Coping Ineffective  Goal: Effective Coping  Outcome: Ongoing, Progressing     Problem: Impaired Wound Healing  Goal: Optimal Wound Healing  Outcome: Ongoing, Progressing     Problem: Fall Injury Risk  Goal: Absence of Fall and Fall-Related Injury  Outcome: Ongoing, Progressing     Problem: Skin Injury Risk Increased  Goal: Skin Health and Integrity  Outcome: Ongoing, Progressing

## 2023-07-25 NOTE — ASSESSMENT & PLAN NOTE
- BP uncontrolled this morning, hypertensive to 226/90 -- suspect bc patient was agitated and spitting out meds  - continue lisinopril, lopressor, lasix  - PRN IV hydralazine for SBP >180 or if unable to tolerate oral meds.   - 7/25 BP controlled

## 2023-07-25 NOTE — PROGRESS NOTES
"CONSULTATION LIAISON PSYCHIATRY PROGRESS NOTE    Patient Name: Radha Sandoval  MRN: [id number]  Patient Class: IP- Inpatient  Admission Date: 7/22/2023  Attending Physician: Maykel Khan MD      SUBJECTIVE:   Radha Sandoval is a 88 y.o. female with past medical history of bilateral chronic subdural hematomas, Hypertension, Acute DVT and PE 5/23, frequent falls with recent Pelvic fracture s/p orthopedic intervention & past pertinent psychiatric history of Late Onset Alzheimers with mood disturbance and Delirium presents to the ED/admitted to the hospital for a fall, shortness of breath, and hypoxia.    Patient presented with right pelvic pain after a fall on 7/22 when she was transferred from bed to wheelchair. Due to her fluctuating mentation she has been agitated with nursing staff and tries to leave her bed and resists being transferred from bed to wheelchair, which has resulted in multiple falls.    Psychiatry consulted for persistent insomnia contributing to delirium in patient with dementia, uncontrolled on current regimen    Today, Patient did not require any PRN medications overnight, received zyprexa 2.5 mg scheduled. Per caregiver, Chel, patient with no objective hallucinations overnight but still with poor sleep. On assessment, patient endorsed SI intermittently throughout the day. On assessment, patient was oriented to self only. Difficult to engage on assessment today due to patient's mentation today.      OBJECTIVE:    Mental Status Exam:  General Appearance: unremarkable, dressed in hospital garb, lying in bed  Behavior: friendly, polite, under good behavioral control  Involuntary Movements and Motor Activity: no abnormal involuntary movements noted  Gait and Station: unable to assess - patient lying down or seated  Speech and Language: spontaneous, talkative  Mood: "ok"  Affect: labile  Thought Process and Associations: disorganized, tangential  Thought Content and Perceptions:: + suicidal " ideation  Sensorium and Orientation: oriented to person only  Recent and Remote Memory: significant impairments noted  Attention and Concentration: easily distractible  Fund of Knowledge: impaired  Insight: limited, poor  Judgment: impaired    CAM ICU positive? Unable to assess      ASSESSMENT & RECOMMENDATIONS   Late Onset Alzheimer's with mood disturbance  Delirium     DELIRIUM  PSYCH MEDICATIONS  Recommend to Increase Zyprexa 2.5 mg to 5 mg po nightly IM or IV if QTC <480  Continue Lexapro 5 mg po QD  Continue Memantine 5 mg po BID  PRN - Zyprexa 2.5 mg po, IM, or IV Q8 prn for non redirectable agitation or aggression if QTC <480  Continue to monitor EKGs, Qtc with antipsychotics    DELIRIUM BEHAVIOR MANAGEMENT  PLEASE utilize CHEMICAL restraints with PRN meds first for agitation. Minimize use of PHYSICAL restraints OR have periods of being out of physical restraints if possible.  Keep window shades open and room lit during day and room dim at night in order to promote normal sleep-wake cycles  Encourage family at bedside. Glen patient often to situation, location, date.  Continue to Limit or Discontinue use of Narcotics, Benzos and Anti-cholinergic medications as they may worsen delirium.  Continue medical workup for causative etiology of Delirium.      RISK ASSESSMENT  NO NEED FOR PEC at this time. Will continue to reassess.        FOLLOW UP  Will follow up while in house     DISPOSITION - once medically cleared:  Defer to medical team    Please contact ON CALL psychiatry service (24/7) for any acute issues that may arise.    Dr. Adalberto HERNANDES Psychiatry  [facility]  7/25/2023 9:57 AM        --------------------------------------------------------------------------------------------------------------------------------------------------------------------------------------------------------------------------------------    CONTINUED OBJECTIVE clinical data & findings reviewed and noted for  above decision making    Current Medications:   Scheduled Meds:    acetaminophen  1,000 mg Oral Q8H    ascorbic acid (vitamin C)  250 mg Oral Daily    enoxparin  40 mg Subcutaneous Q24H (prophylaxis, 1700)    EScitalopram oxalate  5 mg Oral Daily    furosemide  20 mg Oral BID    hydrALAZINE  5 mg Intravenous Once    lisinopriL  40 mg Oral Daily    memantine  5 mg Oral BID    methocarbamoL  500 mg Oral QID    metoprolol tartrate  25 mg Oral BID    miconazole NITRATE 2 %   Topical (Top) BID    OLANZapine  2.5 mg Intramuscular QHS    tamsulosin  0.4 mg Oral Nightly    vitamin D  1,000 Units Oral Daily    zinc sulfate  220 mg Oral Daily     PRN Meds: aluminum-magnesium hydroxide-simethicone, glucagon (human recombinant), glucose, glucose, hydrALAZINE, loperamide, melatonin, naloxone, OLANZapine, oxyCODONE, polyethylene glycol, prochlorperazine, sodium chloride 0.9%, sodium chloride 0.9%    Allergies:   Review of patient's allergies indicates:  No Known Allergies    Vitals  Vitals:    07/25/23 0800   BP: (!) 198/81   Pulse:    Resp:    Temp:        Labs/Imaging/Studies:  Recent Results (from the past 24 hour(s))   POCT glucose    Collection Time: 07/24/23 10:17 AM   Result Value Ref Range    POCT Glucose 94 70 - 110 mg/dL   POCT glucose    Collection Time: 07/24/23  8:15 PM   Result Value Ref Range    POCT Glucose 110 70 - 110 mg/dL   Basic Metabolic Panel (BMP)    Collection Time: 07/25/23  5:40 AM   Result Value Ref Range    Sodium 140 136 - 145 mmol/L    Potassium 3.5 3.5 - 5.1 mmol/L    Chloride 101 95 - 110 mmol/L    CO2 28 23 - 29 mmol/L    Glucose 82 70 - 110 mg/dL    BUN 11 8 - 23 mg/dL    Creatinine 0.8 0.5 - 1.4 mg/dL    Calcium 8.4 (L) 8.7 - 10.5 mg/dL    Anion Gap 11 8 - 16 mmol/L    eGFR >60.0 >60 mL/min/1.73 m^2   CBC with Automated Differential    Collection Time: 07/25/23  5:40 AM   Result Value Ref Range    WBC 5.90 3.90 - 12.70 K/uL    RBC 3.67 (L) 4.00 - 5.40 M/uL    Hemoglobin 10.0 (L) 12.0 - 16.0  g/dL    Hematocrit 33.0 (L) 37.0 - 48.5 %    MCV 90 82 - 98 fL    MCH 27.2 27.0 - 31.0 pg    MCHC 30.3 (L) 32.0 - 36.0 g/dL    RDW 14.4 11.5 - 14.5 %    Platelets 299 150 - 450 K/uL    MPV 10.9 9.2 - 12.9 fL    Immature Granulocytes 0.2 0.0 - 0.5 %    Gran # (ANC) 4.0 1.8 - 7.7 K/uL    Immature Grans (Abs) 0.01 0.00 - 0.04 K/uL    Lymph # 1.0 1.0 - 4.8 K/uL    Mono # 0.5 0.3 - 1.0 K/uL    Eos # 0.4 0.0 - 0.5 K/uL    Baso # 0.04 0.00 - 0.20 K/uL    nRBC 0 0 /100 WBC    Gran % 67.1 38.0 - 73.0 %    Lymph % 16.3 (L) 18.0 - 48.0 %    Mono % 8.6 4.0 - 15.0 %    Eosinophil % 7.1 0.0 - 8.0 %    Basophil % 0.7 0.0 - 1.9 %    Differential Method Automated    POCT glucose    Collection Time: 07/25/23  7:45 AM   Result Value Ref Range    POCT Glucose 93 70 - 110 mg/dL     Imaging Results              CT Head Without Contrast (Final result)  Result time 07/23/23 08:24:54      Final result by Bill Tarango MD (07/23/23 08:24:54)                   Impression:        Similar volume of subdural hematomas.  Changes of density may be in part technical/artifactual in nature and also due to some leakage of recent intravenous contrast from recent CT of the abdomen pelvis with no focal hyperdense acute hemorrhage identified.  Follow-up as clinically warranted.      Electronically signed by: Bill Tarango  Date:    07/23/2023  Time:    08:24               Narrative:    EXAMINATION:  CT HEAD WITHOUT CONTRAST    CLINICAL HISTORY:  Head trauma, minor (Age >= 65y);    TECHNIQUE:  Low dose axial images were obtained through the head.  Coronal and sagittal reformations were also performed. Contrast was not administered.    COMPARISON:  07/23/2023, 07/09/2023    FINDINGS:  Bilateral subacute subdural hematomas are noted bilaterally again identified measuring up to 14 mm on  the left, similar in volume.  No new focal hyperdense hemorrhage is identified.  Overall mild increased density of the hematoma may in part be technical/artifactual in  nature as well as due to leakage of recent intravenous contrast as similar appearances were noted previously (on 07/09/2023 and 07/10/2023).    3 mm midline shift to the right is similar in appearance.  The basal cisterns remain patent.  No acute intraparenchymal process.    Prominent atherosclerotic vascular calcifications are noted at the skull base.    The visualized sinuses and mastoid air cells are essentially clear.                                       CT Chest Abdomen Pelvis With Contrast (xpd) (Final result)  Result time 07/23/23 02:32:32   Procedure changed from CT Abdomen Pelvis With Contrast     Final result by Chuy Mckeon MD (07/23/23 02:32:32)                   Impression:      Similar appearance of recent fractures involving the right inferior and superior pubic rami.  Increased conspicuity of essentially nondisplaced recent fractures of the right michelle sacrum and anterior aspect of the right acetabulum.    Motion and artifact limited study with suboptimal bolus timing.    Moderate-sized hiatal hernia with moderate lower esophageal wall thickening.  Suggest correlation for esophagitis.    Peribronchial thickening and questionable minimal patchy ground-glass opacities in the lung bases and bibasilar subsegmental atelectasis.    Nodular soft tissue opacities in the left breast.  Neoplasm not excluded.  Suggest correlation with physical exam and mammographic follow-up when clinically warranted.    Anasarca.    Mild nonspecific wall thickening of the inferior aspect of the urinary bladder.  Suggest correlation with urinalysis.    Multiple additional findings discussed in the body of the report.      Electronically signed by: Chuy Mckeon MD  Date:    07/23/2023  Time:    02:32               Narrative:    EXAMINATION:  CT CHEST ABDOMEN PELVIS WITH CONTRAST (XPD)    CLINICAL HISTORY:  Abdominal trauma, blunt;    TECHNIQUE:  Low dose axial images, sagittal and coronal reformations were obtained from  the thoracic inlet to the pubic symphysis following the IV administration of 75 mL of Omnipaque 350 .  Oral contrast was not given.    COMPARISON:  CTA chest, 07/08/2023.  CT pelvis, 07/08/2023.  CT abdomen pelvis, 05/06/2023.    FINDINGS:  Chest:    Exam quality is limited by suboptimal bolus timing and motion.  Evaluation is limited by extensive streak artifact due to the patient's arms overlying the field of view.    Heart is borderline enlarged and similar to the prior study.  Coronary artery and mitral valve calcifications.  Thoracic aorta is stable in caliber with moderate to advanced calcific atherosclerosis.  No central pulmonary embolus.  No bulky mediastinal lymphadenopathy.    Detailed evaluation of the pulmonary parenchyma limited by motion.  There is peribronchial thickening and questionable minimal patchy ground-glass opacities in the lung bases.  Bibasilar subsegmental atelectasis.  No consolidation or pleural effusion.    Moderate-sized hiatal hernia with moderate lower esophageal wall thickening.    Abdomen:    Exam quality is limited by suboptimal bolus timing, motion, and extensive artifact related to the patient's arms overlying the field of view.    Liver is similar in size and contour when compared with the prior study.  Multiple hepatic hypodensities most suggestive of cysts.  Gallbladder is unremarkable.  No intrahepatic biliary ductal dilatation.    Spleen, adrenals, and pancreas are stable and negative for acute finding allowing for significant motion.    Kidneys are stable.  There is a large left renal cyst.  Small stone or vascular calcification in the right renal hilum.  No hydronephrosis.    Evaluation for bowel inflammation is limited by motion.  No small bowel obstruction.  Mild stool burden in the colon.    No pneumoperitoneum or organized fluid collection.    No bulky retroperitoneal lymphadenopathy.    Abdominal aorta is similar in caliber with moderate to advanced calcific  atherosclerosis involving the aorta and major branch vessels.    Portal vein is grossly patent.    Pelvis:    Urinary bladder is mildly distended and there is mild wall thickening along the inferior aspect of the urinary bladder similar to the prior CT abdomen.  Rectum is unremarkable.  There is minimal presacral fat stranding.  No significant pelvic free fluid.    Bones and soft tissues:    Recent fractures involving the right superior and inferior pubic rami similar to prior CT pelvis.  Suspect nondisplaced fracture of the right mihcelle sacrum, more conspicuous when compared with prior CT pelvis.  Nondisplaced fracture of the anterior aspect of the right acetabulum (coronal series 606, image 129) is also more conspicuous when compared with the prior study.  No additional acute fracture.  Degenerative changes in the spine and pubic symphysis.  Mild anterolisthesis of L4 with respect to L5.  Mild left convex scoliotic curvature of the spine.  Old right lower rib fractures.  Operative changes of presumed right lower abdominal wall hernia repair.  Mild diffuse body wall edema.  Somewhat nodular soft tissue densities in the left breast soft tissues.  Suggest follow-up mammogram.                                       CT Head Without Contrast (Final result)  Result time 07/23/23 02:16:47      Final result by Maykel Castro MD (07/23/23 02:16:47)                   Impression:      Subtle increase in density of the subdural hygromas suggesting acute on chronic subdural fluid collection.    Consider follow-up CT scan per protocol in patient with small acute subdural hematoma on chronic subdural hygromas.      Electronically signed by: Maykel Castro  Date:    07/23/2023  Time:    02:16               Narrative:    EXAMINATION:  CT HEAD WITHOUT CONTRAST    CLINICAL HISTORY:  Head trauma, minor (Age >= 65y);    TECHNIQUE:  Low dose axial CT images obtained throughout the head without intravenous contrast. Sagittal and  coronal reconstructions were performed.    COMPARISON:  None.    FINDINGS:  Intracranial compartment:    Ventricles and sulci are stable in size for age without evidence of hydrocephalus. Subdural hygromas appear increased in density slightly suggesting acute on chronic subdural hematoma.    The brain parenchyma appears stable with involutional and chronic small vessel type changes again noted..  No parenchymal mass, hemorrhage, edema or major vascular distribution infarct.    Skull/extracranial contents (limited evaluation): No fracture. Mastoid air cells and paranasal sinuses are essentially clear.                                       CT Cervical Spine Without Contrast (Final result)  Result time 07/23/23 03:01:55      Final result by Chuy Mckeon MD (07/23/23 03:01:55)                   Impression:      No evidence of acute fracture or acute osseous abnormality.    Osteopenia and stable degenerative changes.    Additional findings discussed in the body of the report.    Electronically signed by resident: Anahi Gutierrez  Date:    07/23/2023  Time:    02:02    Electronically signed by: Chuy Mckeon MD  Date:    07/23/2023  Time:    03:01               Narrative:    EXAMINATION:  CT CERVICAL SPINE WITHOUT CONTRAST    CLINICAL HISTORY:  Neck trauma (Age >= 65y);    TECHNIQUE:  Low dose axial images, sagittal and coronal reformations were performed though the cervical spine.  Contrast was not administered.    COMPARISON:  CT 04/18/2023 and 07/09/2023.    FINDINGS:  Alignment: Normal.    Vertebrae: Osteopenia.  No fracture.  Lucent area involving the left C4 facet without associated cortical disruption, unchanged dating back to 04/18/2023.  Stable mild height loss of the superior endplate of T4 vertebral body.    Discs: Multilevel disc height loss, most pronounced at C5-C6.    C1-2: Dens is intact.  Pre-dens space is maintained.    Skull base and craniocervical junction: Normal.    Degenerative  findings:    Stable appearance of mild multilevel degenerative changes through the cervical spine.  There are several levels demonstrating mild to moderate facet arthropathy and mild uncovertebral spurring resulting in areas of mild neural foraminal narrowing.  No areas of spinal canal stenosis.    Paraspinal muscles & soft tissues: Evaluation of the lung apices is severely limited by respiratory motion artifact.  Dense calcific atherosclerosis of the aortic arch.  Soft tissues of the thoracic inlet are somewhat distorted secondary to motion artifact.                                       X-Ray Pelvis Routine AP (Final result)  Result time 07/23/23 01:54:57   Procedure changed from X-Ray Hip 2 or 3 views Right (with Pelvis when performed)     Final result by Chuy Mckeon MD (07/23/23 01:54:57)                   Impression:      Similar alignment of recent fractures involving the right superior and inferior pubic rami.  No new acute displaced fracture.      Electronically signed by: Chuy Mckeon MD  Date:    07/23/2023  Time:    01:54               Narrative:    EXAMINATION:  XR PELVIS ROUTINE AP; XR FEMUR 2 VIEW RIGHT    CLINICAL HISTORY:  HIP PAIN;  Pain in right hip; Pain in right leg    TECHNIQUE:  Three frontal views of the pelvis performed.  Two views of the right femur also obtained.    COMPARISON:  CT pelvis, 07/08/2023.    FINDINGS:  Pelvis: Bones are mildly demineralized.  Similar appearance of mildly displaced recent fracture of the right inferior pubic ramus and nondisplaced fracture of the right superior pubic ramus.  Similar fracture fragment alignment allowing for differences in positioning.  No new acute fracture.  No dislocation.  Mild degenerative changes in both hips.    Right femur: No additional acute fracture or dislocation other than described above.  Degenerative changes in the right hip and right knee.  Atherosclerotic vascular calcifications.  Soft tissue edema overlying the right  "hip.  No unexpected radiopaque foreign body.                                       X-Ray Femur 2 AP/LAT Right (Final result)  Result time 07/23/23 01:54:57      Final result by Chuy Mckeon MD (07/23/23 01:54:57)                   Impression:      Similar alignment of recent fractures involving the right superior and inferior pubic rami.  No new acute displaced fracture.      Electronically signed by: Chuy Mckeon MD  Date:    07/23/2023  Time:    01:54               Narrative:    EXAMINATION:  XR PELVIS ROUTINE AP; XR FEMUR 2 VIEW RIGHT    CLINICAL HISTORY:  HIP PAIN;  Pain in right hip; Pain in right leg    TECHNIQUE:  Three frontal views of the pelvis performed.  Two views of the right femur also obtained.    COMPARISON:  CT pelvis, 07/08/2023.    FINDINGS:  Pelvis: Bones are mildly demineralized.  Similar appearance of mildly displaced recent fracture of the right inferior pubic ramus and nondisplaced fracture of the right superior pubic ramus.  Similar fracture fragment alignment allowing for differences in positioning.  No new acute fracture.  No dislocation.  Mild degenerative changes in both hips.    Right femur: No additional acute fracture or dislocation other than described above.  Degenerative changes in the right hip and right knee.  Atherosclerotic vascular calcifications.  Soft tissue edema overlying the right hip.  No unexpected radiopaque foreign body.                                       X-Ray Chest AP Portable (Final result)  Result time 07/23/23 01:47:54      Final result by Chuy Mckeon MD (07/23/23 01:47:54)                   Impression:      No detrimental change when compared with 07/08/2023.      Electronically signed by: Chuy Mckeon MD  Date:    07/23/2023  Time:    01:47               Narrative:    EXAMINATION:  XR CHEST AP PORTABLE    CLINICAL HISTORY:  Provided history is "Sepsis;  ".    TECHNIQUE:  One view of the " chest.    COMPARISON:  07/08/2023.    FINDINGS:  Cardiac wires overlie the chest.  Patient is slightly rotated.  Cardiomediastinal silhouette is stable and may be at the upper limits of normal in size.  Atherosclerotic calcifications overlie the aortic arch.  Right hemidiaphragm is slightly elevated as seen previously.  Azygous lobe configuration is incidentally noted in the right lung apex.  No confluent area of consolidation.  No sizable pleural effusion.  No pneumothorax.  Hiatal hernia again noted.

## 2023-07-25 NOTE — ASSESSMENT & PLAN NOTE
Suicidal ideation   Insomnia     - baseline per caretaker; however, concerned that patient does not sleep at night, worsening her mentation, agitation, and delirium.   - continue memantine  - on home haloperidol 0.5 PO qhs  - will consider starting additional nightly med for agitation; psychiatry consulted for assistance   --> psych recs DC nightly haldol. Start 2.5 mg zyprexa QHS and q8h prn for agitation.    --> 7/25 increase nightly zyprexa to 5 mg   - monitor for improvement   - EKG monitoring   - 7/24-25 Pt reporting SI from patient during session. Caretaker says this is not new, patient expresses this intermittently. Psych aware. Med adjustments made.

## 2023-07-25 NOTE — SUBJECTIVE & OBJECTIVE
Interval History: Agitation somewhat improved after 2.5 mg zyprexa last night, per caretaker. Hallucinations resolved but patient still had trouble sleeping and today expressing SI. Increase nightly zyprexa, continue prn. Patient without complaint on my exa,. Adamantly refusing ortho surgery. I encouraged cooperation with therapy and patient agreed. Palliative convo with son today.    Review of Systems   Unable to perform ROS: Dementia   Objective:     Vital Signs (Most Recent):  Temp: 97.4 °F (36.3 °C) (07/25/23 1556)  Pulse: 77 (07/25/23 1556)  Resp: 16 (07/25/23 1556)  BP: (!) 146/98 (07/25/23 1556)  SpO2: 97 % (07/25/23 1556) Vital Signs (24h Range):  Temp:  [97.4 °F (36.3 °C)-98.1 °F (36.7 °C)] 97.4 °F (36.3 °C)  Pulse:  [63-77] 77  Resp:  [16-23] 16  SpO2:  [92 %-99 %] 97 %  BP: ()/(51-98) 146/98     Weight: 75.8 kg (167 lb 1.7 oz)  Body mass index is 29.6 kg/m².    Intake/Output Summary (Last 24 hours) at 7/25/2023 1721  Last data filed at 7/25/2023 0530  Gross per 24 hour   Intake 240 ml   Output 950 ml   Net -710 ml         Physical Exam  Vitals and nursing note reviewed.   Constitutional:       General: She is not in acute distress.     Appearance: She is well-developed. She is ill-appearing (chronically ill-appearing).   HENT:      Head: Normocephalic and atraumatic.      Mouth/Throat:      Pharynx: No oropharyngeal exudate.   Eyes:      Conjunctiva/sclera: Conjunctivae normal.      Pupils: Pupils are equal, round, and reactive to light.   Cardiovascular:      Rate and Rhythm: Normal rate and regular rhythm.      Heart sounds: Normal heart sounds.   Pulmonary:      Effort: Pulmonary effort is normal. No respiratory distress.      Breath sounds: Normal breath sounds. No wheezing.   Abdominal:      General: Bowel sounds are normal. There is no distension.      Palpations: Abdomen is soft.      Tenderness: There is no abdominal tenderness.   Musculoskeletal:         General: No tenderness. Normal range  of motion.      Cervical back: Normal range of motion and neck supple.   Lymphadenopathy:      Cervical: No cervical adenopathy.   Skin:     General: Skin is warm and dry.      Capillary Refill: Capillary refill takes less than 2 seconds.      Findings: No rash.   Neurological:      Mental Status: She is alert. Mental status is at baseline.      Cranial Nerves: No cranial nerve deficit.      Sensory: No sensory deficit.      Coordination: Coordination normal.   Psychiatric:         Mood and Affect: Mood normal.         Behavior: Behavior normal.      Comments: Cooperative and conversant this morning. Answering most questions appropriately.            Significant Labs: All pertinent labs within the past 24 hours have been reviewed.    Significant Imaging: I have reviewed all pertinent imaging results/findings within the past 24 hours.

## 2023-07-25 NOTE — PT/OT/SLP PROGRESS
Occupational Therapy   Treatment    Name: Radha Sandoval  MRN: [id number]  Admitting Diagnosis:  Multiple closed fractures of pelvis without disruption of pelvic ring  1 Day Post-Op    Recommendations:     Discharge Recommendations: nursing facility, skilled  Discharge Equipment Recommendations:  other (see comments) (TBD)  Barriers to discharge:  Other (Comment) (Increased level of skilled A needed at this time)    Assessment:     Radha Sandoval is a 88 y.o. female with a medical diagnosis of Multiple closed fractures of pelvis without disruption of pelvic ring.  Performance deficits affecting function are weakness, impaired endurance, impaired self care skills, impaired functional mobility, gait instability, impaired balance, impaired cognition, decreased coordination, decreased upper extremity function, decreased lower extremity function, decreased safety awareness, impaired cardiopulmonary response to activity, orthopedic precautions. Patient engaged fairly in today's session to sit EOB. Prior to activity and supine in bed, pt's blood pressure taken and read 121/58. Pt required significant encouragement to participate as pt was confused and agitated throughout session. Pt demonstrated significant fear of falling and gravitational insecurity with movement and needed cues to take deep breaths to remain calm. Throughout session, pt verbally expressed suicidal ideations, nursing notified and aware. Caregiver present on this date to reassure pt and encourage pt to engage in EOB activity. On this date, pt limited by general weakness, fear of falling, and unsteadiness. Patient would benefit from continued skilled acute OT 4 x/wk to improve functional mobility, increase independence with ADLs, and address established goals. Recommending SNF once medically appropriate for discharge to increase maximal independence, reduce burden of care, and ensure safety.     Rehab Prognosis:  Good; patient would benefit from acute skilled OT  "services to address these deficits and reach maximum level of function.       Plan:     Patient to be seen 4 x/week to address the above listed problems via self-care/home management, therapeutic activities, neuromuscular re-education, therapeutic exercises  Plan of Care Expires: 08/23/23  Plan of Care Reviewed with: patient, caregiver    Subjective     Chief Complaint: Fear of falling  Patient/Family Comments/goals: Pt agitated and confused throughout session  Pain/Comfort:  Pain Rating 1:  (Pt didn't rate)  Location - Side 1: Right  Location 1: hip ("occasionally when people touch it")  Pain Addressed 1: Pre-medicate for activity, Distraction, Reposition  Pain Rating Post-Intervention 1:  (Pt didn't rate)    Objective:     Communicated with: NSG prior to session.  Patient found supine with telemetry, duncan catheter, peripheral IV upon OT entry to room.    General Precautions: Standard, fall    Orthopedic Precautions:LLE weight bearing as tolerated, RLE weight bearing as tolerated  Braces: N/A  Respiratory Status: Room air     Occupational Performance:     Bed Mobility:    Patient completed Scooting/Bridging with maximal assistance  Patient completed Supine to Sit with maximal assistance. While seated pt demonstrated strong posterior lean.  Patient completed Sit to Supine with total assistance x2    Functional Mobility/Transfers:  Patient completed Sit <> Stand Transfer with maximal assistance and of 2 persons  with  rolling walker from EOB in two trials. Pt demonstrated severe fear of falling and had tendency to pick feet up off of floor when attempting to stand.   Activities of Daily Living:  Grooming: contact guard assistance to don back gown seated EOB  Lower Body Dressing: total assistance to don socks supine in bed      [facility] 6 Click ADL: 12    Treatment & Education:  Role of OT and POC  ADL retraining  Functional mobility training  Safety  Discharge planning  Importance EOB/OOB activity    Pt sat EOB to " engage in session with CGA <> mod A with cues to learn forward due to present posterior lean. Pt required significant encouragement and reassurance while seated EOB    Co-treatment performed due to patient's multiple deficits requiring two skilled therapists to appropriately and safely assess patient's strength and endurance while facilitating functional tasks in addition to accommodating for patient's activity tolerance.     Patient left supine with all lines intact, call button in reach, caregiver and nurse present, and all needs met.    GOALS:   Multidisciplinary Problems       Occupational Therapy Goals          Problem: Occupational Therapy    Goal Priority Disciplines Outcome Interventions   Occupational Therapy Goal     OT, PT/OT Ongoing, Progressing    Description: Goals to be met by: 8/7/2023     Patient will increase functional independence with ADLs by performing:    UE Dressing with Minimal Assistance.  LE Dressing with Moderate Assistance.  Grooming while EOB with Moderate Assistance.  Supine to sit with Minimal Assistance.  Stand pivot transfers with Minimal Assistance.                         Time Tracking:     OT Date of Treatment: 07/25/23  OT Start Time: 1052  OT Stop Time: 1136  OT Total Time (min): 44 min    Billable Minutes:Self Care/Home Management 9  Therapeutic Activity 35               7/25/2023

## 2023-07-25 NOTE — PROGRESS NOTES
[facility] - Int[facility] ([place] [address])  Palliative Medicine  Progress Note    Patient Name: Radha Sandoval  MRN: [id number]  Admission Date: 7/22/2023  Hospital Length of Stay: 2 days  Code Status: Full Code   Attending Provider: Maykel Khan MD  Consulting Provider: Carrol Thomas MD  Primary Care Physician: Primary Doctor No  Principal Problem:Multiple closed fractures of pelvis without disruption of pelvic ring    Patient information was obtained from patient, relative(s) and primary team.      Assessment/Plan:     Palliative Care  Encounter for palliative care  Radha Sandoval is an 88-year-old woman with a history of dementia (FAST score 6D), fall resulting in pelvic fracture in early July 2023 that was treated non-operatively, chronic bilateral pleural effusions, DVT (3/2023) on apixaban, who was admitted for acute respiratory decompensation in the setting of recurrent fall at [facility] ([facility] living [facility]). On admission, was found to have sustained subdural hematomas that thankfully have been stable and require no intervention per Neurosurgery. Palliative and Supportive Care was consulted to explore goals of care.    Advance Care Planning   Goals of Care:  - Code status: Full code  - Next of kin: 2 adult sons   - Son Maykel is reportedly MPOA. Requested copy of HCPOA form  - Patient does not have decision making capacity  - Prognosis: guarded  - Goals: improvement in condition, quality of life  - Plans: continue current level of care. Mayekl has requested to speak with someone to clarify his questions related to SNF and SNF days; reached out to primary team to request for case management to help address Maykel's questions related to this subject    Goals of Care Conversation:  - 7/25/23: Met Ms. Sandoval and her caretaker Chel at bedside, who clarified for me that Ms. Sandoval has been declining, evidenced by recurrent falls and deconditioning to the point where she is requiring 24/7 care so that she  "doesn't get up by herself and fall again. She has had at least 4 falls in the past 10 days. Met with Maykel who reiterates his hope to get his mother SNF care so that she can restore her strength as much as possible, then return home with a 24/7 caregiver. He is frustrated and disappointed by the care [employer] provided to his mother as it was mediocre and misleading. I asked if it would be helpful to discuss the natural progression of her dementia as I worry that she is advanced her in dementia stages. He admits that he's not ready to discuss this and "not ready to give up" on her. He hopes that he can find her a PCP who has a holistic approach to her care and can help deprescribe medications that she does not benefit from. Agreed to refer her to a geriatrician (and possibly dementia clinic) moving forward. He told me he will send me a copy of the HCPOA. He has not spoken with anyone from the primary team and would like to receive updates if possible.  - 7/24/23: I called Ms. Sandoval's son Maykel on the phone, who shared that he was very surprised to receive a call that his mother was going to the operating room for a surgery that he had no idea was even supposed to happen nor was he consented for this surgery. He was explained that his mother failed therapy but she only had one 15-minute session with PT and one 15-minute session with OT during her stay at [facility], and not the actual SNF-level of rehab that she received at [facility] previously. He worries about his mother's ability to tolerate the stresses of the surgery and recovery as she is older and weaker than before. He confirms that he wishes for his mother to have meaningful quality of life, giving me an example that she was instructed to maintain a low sodium diet. But now in her later age, if she requests for a cheeseburger, he desires to provide this for her because it brings her romi. While he hopes that his mother will have years " longer to live, he acknowledges that she won't have as much time as we all hope and wish. He is hopeful that longevity is on their side, but does note that longevity is within his father's side of his family. He shares his frustrations, mentioning that four months ago, his mother was on zero medications and walking independently. Now she is on many medications and requires guidance/assistance to walk with a walker. He hired a caretaker to watch over her per the instruction of her primary care doctor, but pays about $26/hr for the caretaker, who is hired 24/7. This in addition to the cost of residential living in [facility] amounts to $27,000, which is a severe financial burden. He is frustrated that despite the immense costs he is spending for his mother's care, she is not receiving adequate care and definitely not enough therapy services through home health.  - Inquired about his mother's ACP documents. He shared that her MPOA is him and will send me a copy to my email. I asked if they had discussions about life sustaining treatment and they had not. He imagines that no one would want to live indefinitely on machines and artificial life support if they weren't going to get better on those treatments. I recommended formally that at the end of her life when her heart stops and she stops breathing, that we protect her from interventions like CPR/intubation which would inflict more suffering/harm than benefit. He would like some more time to think about this and talk about it with his brother. 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 He does acknowledge that his functional status is different from his mother's but agrees to discuss this more. Agreed with Mr. Sandoval that the time to think about this is before it happens, and not while it is happening or when it is imminent.            I  will sign off. Please contact us if you have any additional questions.    Subjective:     Chief Complaint:   Chief Complaint   Patient presents with    Shortness of Breath     Pt coming from [facility] for SOB starting about an hour ago and alerted NH staff. Pt has no other complaints at this time and is alert and oriented    Nausea       HPI:   Radha Sandoval is an 88-year-old woman with a history of dementia (FAST score 6D?), fall resulting in pelvic fracture in early July 2023 that was treated non-operatively, chronic bilateral pleural effusions, DVT (3/2023) on apixaban, who was admitted for acute respiratory decompensation in the setting of recurrent fall at [facility] (assisted living [facility]). On admission, was found to have sustained subdural hematomas that thankfully have been stable and require no intervention per Neurosurgery. Palliative and Supportive Care was consulted to explore goals of care.      Hospital Course:  No notes on file    Interval History: Met Ms. Sandoval, her caregiver Chel and her son Maykel at bedside (along with grandson Pardeep). Supportive conversation at bedside.    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 5/22/2023    Lumbar spondylosis with myelopathy 4/25/2023    Multiple closed right sided fractures of pelvis without disruption of pelvic ring 7/9/2023    Primary hypertension 6/10/2022    Subdural hygroma 7/8/2023       Past Surgical History:   Procedure Laterality Date    REPAIR, HERNIA, INGUINAL, WITHOUT HISTORY OF PRIOR REPAIR, AGE 5 YEARS OR OLDER Right 5/6/2023    Procedure: REPAIR, HERNIA, INGUINAL, WITHOUT  HISTORY OF PRIOR REPAIR, AGE 5 YEARS OR OLDER;  Surgeon: Maurice Piper MD;  Location: [facility] OR [address] [place] [address];  Service: General;  Laterality: Right;  possible laparotomy, possible bowel resection       Review of patient's allergies indicates:  No Known Allergies    Medications:  Continuous Infusions:  Scheduled Meds:   acetaminophen  1,000 mg Oral Q8H    ascorbic acid (vitamin C)  250 mg Oral Daily    enoxparin  40 mg Subcutaneous Q24H (prophylaxis, 1700)    EScitalopram oxalate  5 mg Oral Daily    furosemide  20 mg Oral BID    hydrALAZINE  5 mg Intravenous Once    lisinopriL  40 mg Oral Daily    memantine  5 mg Oral BID    methocarbamoL  500 mg Oral QID    metoprolol tartrate  25 mg Oral BID    miconazole NITRATE 2 %   Topical (Top) BID    OLANZapine  5 mg Intramuscular QHS    tamsulosin  0.4 mg Oral Nightly    vitamin D  1,000 Units Oral Daily    zinc sulfate  220 mg Oral Daily     PRN Meds:aluminum-magnesium hydroxide-simethicone, glucagon (human recombinant), glucose, glucose, hydrALAZINE, loperamide, melatonin, naloxone, OLANZapine, oxyCODONE, polyethylene glycol, prochlorperazine, sodium chloride 0.9%, sodium chloride 0.9%    Family History    None       Tobacco Use    Smoking status: Never    Smokeless tobacco: Never   Substance and Sexual Activity    Alcohol use: Not on file    Drug use: Never    Sexual activity: Not Currently       Review of Systems   Unable to perform ROS: Dementia   Objective:     Vital Signs (Most Recent):  Temp: 97.4 °F (36.3 °C) (07/25/23 1556)  Pulse: 77 (07/25/23 1556)  Resp: 16 (07/25/23 1556)  BP: (!) 146/98 (07/25/23 1556)  SpO2: 97 % (07/25/23 1556) Vital Signs (24h Range):  Temp:  [97.4 °F (36.3 °C)-98.1 °F (36.7 °C)] 97.4 °F (36.3 °C)  Pulse:  [63-77] 77  Resp:  [16-23] 16  SpO2:  [92 %-99 %] 97 %  BP: ()/(51-98) 146/98     Weight: 75.8 kg (167 lb 1.7 oz)  Body mass index is 29.6 kg/m².       Physical Exam  Constitutional:       General: She is not in  acute distress.  HENT:      Head: Normocephalic.      Right Ear: External ear normal.      Left Ear: External ear normal.      Nose: Nose normal.      Mouth/Throat:      Mouth: Mucous membranes are dry.   Eyes:      Extraocular Movements: Extraocular movements intact.      Conjunctiva/sclera: Conjunctivae normal.   Cardiovascular:      Rate and Rhythm: Normal rate.   Pulmonary:      Effort: Pulmonary effort is normal.      Comments: Decreased breath sounds  Abdominal:      General: Bowel sounds are normal.      Palpations: Abdomen is soft.   Lymphadenopathy:      Cervical: No cervical adenopathy.   Skin:     General: Skin is dry.   Neurological:      Mental Status: She is disoriented.          Review of Symptoms      Symptom Assessment (ESAS 0-10 Scale)  Pain:  0  Dyspnea:  0  Anxiety:  0  Nausea:  0  Depression:  0  Anorexia:  0  Fatigue:  0  Insomnia:  0  Restlessness:  0  Agitation:  0 due to Dementia         Pain Assessment in Advanced Demential Scale (PAINAD)   Breathing - Independent of vocalization:  0  Negative vocalization:  0  Facial expression:  0  Body language:  0  Consolability:  0  Total:  0    Living Arrangements:  Lives in assisted living    Psychosocial/Cultural:   See Palliative Psychosocial Note: No  Has two sons, Maykel and Sb. Has hired caretakers  **Primary  to Follow**  Palliative Care  Consult: No      Advance Care Planning   Advance Directives:   Living Will: No    LaPOST: Yes    Do Not Resuscitate Status: No    Medical Power of : Yes    Agent's Name:  Maykel Sandoval   Agent's Contact Number:  [phone]  Goals of Care: What is most important right now is to focus on quality of life, even if it means sacrificing a little time, improvement in condition but with limits to invasive therapies. Accordingly, we have decided that the best plan to meet the patient's goals includes continuing with treatment.       Significant Labs: CBC:   Recent Labs   Lab  07/24/23  0413 07/25/23  0540   WBC 7.03 5.90   HGB 9.0* 10.0*   HCT 29.3* 33.0*    299       CMP:   Recent Labs   Lab 07/24/23  0413 07/25/23  0540    140   K 3.0* 3.5    101   CO2 27 28   GLU 85 82   BUN 16 11   CREATININE 0.7 0.8   CALCIUM 8.3* 8.4*   ANIONGAP 12 11       CBC:   Recent Labs   Lab 07/25/23  0540   WBC 5.90   HGB 10.0*   HCT 33.0*   MCV 90          BMP:  Recent Labs   Lab 07/25/23  0540   GLU 82      K 3.5      CO2 28   BUN 11   CREATININE 0.8   CALCIUM 8.4*       LFT:  Lab Results   Component Value Date    AST 22 07/23/2023    ALKPHOS 139 (H) 07/23/2023    BILITOT 0.6 07/23/2023     Albumin:   Albumin   Date Value Ref Range Status   07/23/2023 2.8 (L) 3.5 - 5.2 g/dL Final     Protein:   Total Protein   Date Value Ref Range Status   07/23/2023 6.1 6.0 - 8.4 g/dL Final     Lactic acid:   Lab Results   Component Value Date    LACTATE 1.2 07/08/2023    LACTATE 0.8 05/06/2023       Significant Imaging: I have reviewed all pertinent imaging results/findings within the past 24 hours.    I spent a total of 65 minutes on the day of the visit. This includes face to face time in discussion of goals of care, symptom assessment, coordination of care and emotional support. This also includes non-face to face time preparing to see the patient (eg, review of tests/imaging), obtaining and/or reviewing separately obtained history, documenting clinical information in the electronic or other health record, independently interpreting results and communicating results to the patient/family/caregiver, or care coordinator.         Carrol Thomas MD  Palliative Medicine  [facility] - Intensive Care ([address]

## 2023-07-25 NOTE — TELEPHONE ENCOUNTER
----- Message from Varun Tapia MD sent at 7/23/2023  4:59 PM CDT -----  Regarding: Follow-up  Hi,    Ms. Sandoval is an 88F presenting after a fall. She has bilateral subdural hematomas vs hygromas. She was seen by Dr. Jack on 7/10/23, and he recommended MMA embolization. Imaging is largely unchanged since the visit on 7/10.    It seems like she was lost to follow-up. We are recommending she visit Dr. Jack in outpatient setting for MMA embolization    Can you please help arrange a follow-up appointment with Dr. Jack?    Thank you!  Varun

## 2023-07-25 NOTE — ASSESSMENT & PLAN NOTE
- Caretaker Sonia expresses several concerns regarding patients continual health and function decline  - Recent stay at SNF with progress noted and pt was placed in custodial at [facility]. However, [facility] is not the appropriate level of care for patient given dementia and frequent falls   - [facility] will now require 24 hour sitters for patient to return   - DILIA TAYLOR consulted for dc planning   - will need to have an in-depth palliative discussion with son to determine best care plan for patient - consult placed   - son agreeable to discussion 7/25 - will follow up

## 2023-07-25 NOTE — SUBJECTIVE & OBJECTIVE
Interval History: Met Ms. Sandoval, her caregiver Chel and her son Maykel at bedside (along with grandlang Roberto). Supportive conversation at bedside.    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 5/22/2023    Lumbar spondylosis with myelopathy 4/25/2023    Multiple closed right sided fractures of pelvis without disruption of pelvic ring 7/9/2023    Primary hypertension 6/10/2022    Subdural hygroma 7/8/2023       Past Surgical History:   Procedure Laterality Date    REPAIR, HERNIA, INGUINAL, WITHOUT HISTORY OF PRIOR REPAIR, AGE 6 YEARS OR OLDER Right 5/6/2023    Procedure: REPAIR, HERNIA, INGUINAL, WITHOUT HISTORY OF PRIOR REPAIR, AGE 6 YEARS OR OLDER;  Surgeon: Maurice Piper MD;  Location: [facility] OR [address] [place] [address];  Service: General;  Laterality: Right;  possible laparotomy, possible bowel resection       Review of patient's allergies indicates:  No Known Allergies    Medications:  Continuous Infusions:  Scheduled Meds:   acetaminophen  1,000 mg Oral Q8H    ascorbic acid (vitamin C)  250 mg Oral Daily    enoxparin  40 mg Subcutaneous Q24H (prophylaxis, 1700)    EScitalopram oxalate  5 mg Oral Daily    furosemide  20 mg Oral BID    hydrALAZINE  5 mg Intravenous Once    lisinopriL  40 mg Oral Daily    memantine  5 mg Oral BID    methocarbamoL  500 mg Oral QID    metoprolol tartrate  25 mg Oral BID    miconazole NITRATE 2 %   Topical (Top) BID    OLANZapine  5 mg Intramuscular QHS    tamsulosin  0.4 mg Oral Nightly    vitamin D  1,000 Units Oral Daily    zinc sulfate  220 mg Oral Daily     PRN Meds:aluminum-magnesium hydroxide-simethicone, glucagon (human recombinant), glucose, glucose,  hydrALAZINE, loperamide, melatonin, naloxone, OLANZapine, oxyCODONE, polyethylene glycol, prochlorperazine, sodium chloride 0.9%, sodium chloride 0.9%    Family History    None       Tobacco Use    Smoking status: Never    Smokeless tobacco: Never   Substance and Sexual Activity    Alcohol use: Not on file    Drug use: Never    Sexual activity: Not Currently       Review of Systems   Unable to perform ROS: Dementia   Objective:     Vital Signs (Most Recent):  Temp: 97.4 °F (36.3 °C) (07/25/23 1556)  Pulse: 77 (07/25/23 1556)  Resp: 16 (07/25/23 1556)  BP: (!) 146/98 (07/25/23 1556)  SpO2: 97 % (07/25/23 1556) Vital Signs (24h Range):  Temp:  [97.4 °F (36.3 °C)-98.1 °F (36.7 °C)] 97.4 °F (36.3 °C)  Pulse:  [63-77] 77  Resp:  [16-23] 16  SpO2:  [92 %-99 %] 97 %  BP: ()/(51-98) 146/98     Weight: 75.8 kg (167 lb 1.7 oz)  Body mass index is 29.6 kg/m².       Physical Exam  Constitutional:       General: She is not in acute distress.  HENT:      Head: Normocephalic.      Right Ear: External ear normal.      Left Ear: External ear normal.      Nose: Nose normal.      Mouth/Throat:      Mouth: Mucous membranes are dry.   Eyes:      Extraocular Movements: Extraocular movements intact.      Conjunctiva/sclera: Conjunctivae normal.   Cardiovascular:      Rate and Rhythm: Normal rate.   Pulmonary:      Effort: Pulmonary effort is normal.      Comments: Decreased breath sounds  Abdominal:      General: Bowel sounds are normal.      Palpations: Abdomen is soft.   Lymphadenopathy:      Cervical: No cervical adenopathy.   Skin:     General: Skin is dry.   Neurological:      Mental Status: She is disoriented.          Review of Symptoms      Symptom Assessment (ESAS 0-10 Scale)  Pain:  0  Dyspnea:  0  Anxiety:  0  Nausea:  0  Depression:  0  Anorexia:  0  Fatigue:  0  Insomnia:  0  Restlessness:  0  Agitation:  0 due to Dementia         Pain Assessment in Advanced Demential Scale (PAINAD)   Breathing - Independent of  vocalization:  0  Negative vocalization:  0  Facial expression:  0  Body language:  0  Consolability:  0  Total:  0    Living Arrangements:  Lives in assisted living    Psychosocial/Cultural:   See Palliative Psychosocial Note: No  Has two sons, Maykel and Sb. Has hired caretakers  **Primary  to Follow**  Palliative Care  Consult: No      Advance Care Planning   Advance Directives:   Living Will: No    LaPOST: Yes    Do Not Resuscitate Status: No    Medical Power of : Yes    Agent's Name:  Maykel Sandoval   Agent's Contact Number:  [phone]  Goals of Care: What is most important right now is to focus on quality of life, even if it means sacrificing a little time, improvement in condition but with limits to invasive therapies. Accordingly, we have decided that the best plan to meet the patient's goals includes continuing with treatment.       Significant Labs: CBC:   Recent Labs   Lab 07/24/23  0413 07/25/23  0540   WBC 7.03 5.90   HGB 9.0* 10.0*   HCT 29.3* 33.0*    299       CMP:   Recent Labs   Lab 07/24/23  0413 07/25/23  0540    140   K 3.0* 3.5    101   CO2 27 28   GLU 85 82   BUN 16 11   CREATININE 0.7 0.8   CALCIUM 8.3* 8.4*   ANIONGAP 12 11       CBC:   Recent Labs   Lab 07/25/23  0540   WBC 5.90   HGB 10.0*   HCT 33.0*   MCV 90          BMP:  Recent Labs   Lab 07/25/23  0540   GLU 82      K 3.5      CO2 28   BUN 11   CREATININE 0.8   CALCIUM 8.4*       LFT:  Lab Results   Component Value Date    AST 22 07/23/2023    ALKPHOS 139 (H) 07/23/2023    BILITOT 0.6 07/23/2023     Albumin:   Albumin   Date Value Ref Range Status   07/23/2023 2.8 (L) 3.5 - 5.2 g/dL Final     Protein:   Total Protein   Date Value Ref Range Status   07/23/2023 6.1 6.0 - 8.4 g/dL Final     Lactic acid:   Lab Results   Component Value Date    LACTATE 1.2 07/08/2023    LACTATE 0.8 05/06/2023       Significant Imaging: I have reviewed all pertinent imaging  results/findings within the past 24 hours.

## 2023-07-25 NOTE — PROGRESS NOTES
[facility] - Intensive Care ([address] Medicine  Progress Note    Patient Name: Radha Sandoval  MRN: [id number]  Patient Class: IP- Inpatient   Admission Date: 7/22/2023  Length of Stay: 2 days  Attending Physician: Maykel Khan MD  Primary Care Provider: Primary Doctor No        Subjective:     Principal Problem:Multiple closed fractures of pelvis without disruption of pelvic ring        HPI:  Radha Sandoval is a 88 y.o. female with PMH significant for chronic BL pleural effusions, recent DVT diagnosed in March '23 (on eliquis), dementia, HTN, with recent hospitalization here at [facility] for pelvic fracture treated non-operatively, who presents after a fall at her [facility] while being transferred from wheelchair to bed. Hx taken per Caretaker Sonia and Son (JULIO) Maykel. Caretaker at bedside unaware of head trauma or other injury. Of note, patient sustained her pelvic fractures at [facility], but after hospitalization at [facility] was placed in [facility] on 7/14. Caretaker reports patient also had another hospitalization during this time at . Patient generally disoriented and delirious, but she will have periods or even days of clarity. At baseline, she is oriented x2. Due to mentation she has difficulty working with staff which results in falls. Prior to her pelvic fracture on 7/8, pt was able to perform independent transfers from wheelchair and could walk short distances with her walker, but she is now max assist. Currently on Eliquis. Patient without complaint on my exam, denies pain.     In the ED: AFVSS. CBC with baseline anemia. CMP reveals slight elevation in Cr 1.0 (baseline 0.8) and K 3.3. BNP and troponin elevated but at baseline. UA grossly infectious. Spann placed for urinary retention.  All imaging reviewed. 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 Repeat CTH after 6 hours was stable. XR pelvis reveals stable recent R superior and inferior pubic rami fractures, non-displaced. EKG in NSR  with prolonged Qtc 510 ms. Given tylenol, norco, and 1g rocephin.       Overview/Hospital Course:  Radha Sandoval was placed in  observation after a fall. NSGY consulted for evaluation of SDH and determined no need for intervention , will follow up in clinic in 2 weeks. HOLD eliquis until follow up. Orthopedics discussed surgical options with family and recommended ORIF pelvis, but family is declining at this time and would prefer to continue with plan for PT/OT. Therapy assessment; recs for SNF. WBAT. Dvt ppx with 40 lovenox SQ daily (ok per NSGY). Psychiatry provided recs for medication adjustment for insomnia and delirium with prn dosing for agitation, will monitor response. Agitation persists and patient now expressing suicidal ideation when asked to work with therapy. Zyprexa 5 mg nightly and 2.5 mg prn for agitation and prior to therapy sessions. Psychiatry aware of SI. Caretaker has been staying at bedside. Monitor EKG daily given prolonged Qtc. Palliative care consulted for assistance with GOC planning with son and care team, appreciate recs. Pain controlled and mentation at baseline.       Interval History: Agitation somewhat improved after 2.5 mg zyprexa last night, per caretaker. Hallucinations resolved but patient still had trouble sleeping and today expressing SI. Increase nightly zyprexa, continue prn. Patient without complaint on my exa,. Adamantly refusing ortho surgery. I encouraged cooperation with therapy and patient agreed. Palliative convo with son today.    Review of Systems   Unable to perform ROS: Dementia   Objective:     Vital Signs (Most Recent):  Temp: 97.4 °F (36.3 °C) (07/25/23 1556)  Pulse: 77 (07/25/23 1556)  Resp: 16 (07/25/23 1556)  BP: (!) 146/98 (07/25/23 1556)  SpO2: 97 % (07/25/23 1556) Vital Signs (24h Range):  Temp:  [97.4 °F (36.3 °C)-98.1 °F (36.7 °C)] 97.4 °F (36.3 °C)  Pulse:  [63-77] 77  Resp:  [16-23] 16  SpO2:  [92 %-99 %] 97 %  BP: ()/(51-98) 146/98     Weight:  75.8 kg (167 lb 1.7 oz)  Body mass index is 29.6 kg/m².    Intake/Output Summary (Last 24 hours) at 7/25/2023 1721  Last data filed at 7/25/2023 0530  Gross per 24 hour   Intake 240 ml   Output 950 ml   Net -710 ml         Physical Exam  Vitals and nursing note reviewed.   Constitutional:       General: She is not in acute distress.     Appearance: She is well-developed. She is ill-appearing (chronically ill-appearing).   HENT:      Head: Normocephalic and atraumatic.      Mouth/Throat:      Pharynx: No oropharyngeal exudate.   Eyes:      Conjunctiva/sclera: Conjunctivae normal.      Pupils: Pupils are equal, round, and reactive to light.   Cardiovascular:      Rate and Rhythm: Normal rate and regular rhythm.      Heart sounds: Normal heart sounds.   Pulmonary:      Effort: Pulmonary effort is normal. No respiratory distress.      Breath sounds: Normal breath sounds. No wheezing.   Abdominal:      General: Bowel sounds are normal. There is no distension.      Palpations: Abdomen is soft.      Tenderness: There is no abdominal tenderness.   Musculoskeletal:         General: No tenderness. Normal range of motion.      Cervical back: Normal range of motion and neck supple.   Lymphadenopathy:      Cervical: No cervical adenopathy.   Skin:     General: Skin is warm and dry.      Capillary Refill: Capillary refill takes less than 2 seconds.      Findings: No rash.   Neurological:      Mental Status: She is alert. Mental status is at baseline.      Cranial Nerves: No cranial nerve deficit.      Sensory: No sensory deficit.      Coordination: Coordination normal.   Psychiatric:         Mood and Affect: Mood normal.         Behavior: Behavior normal.      Comments: Cooperative and conversant this morning. Answering most questions appropriately.            Significant Labs: All pertinent labs within the past 24 hours have been reviewed.    Significant Imaging: I have reviewed all pertinent imaging results/findings within the  past 24 hours.      Assessment/Plan:      * Multiple closed right sided fractures of pelvis without disruption of pelvic ring  Frequent falls     - diagnosed at last [facility] hospitalization about 2 weeks ago.   - repeat XRs show stable frcatures, non-displaced   - Ortho consulted in ED; originally recommended non-op management, but after further discussion recommended ORIF pelvis 7/24. Family declining surgery at this time. Further discussions pending progress with PT/OT.   - weight bearing as tolerated   - pain control with tylenol and robaxin for now. Prn PO oxy 5 for severe pain  - PT/OT consult pending  - patient will likely need SNF v long term placement given max assist requirements and progressive debility, in setting of dementia and behavioral disturbances       Prolonged Q-T interval on ECG  - QTc 510 --> improved on repeat 475  - avoid QT prolonging meds   - monitor tele       Frequent falls        Urinary retention  Recurrent issue   - mild elevation in Cr likely d/t retention   - dunacn placed, treating UTI   - follow urine cultures   - voiding trial pending clinical improvement       Goals of care, counseling/discussion  - Caretaker Sonia expresses several concerns regarding patients continual health and function decline  - Recent stay at SNF with progress noted and pt was placed in [facility] at [facility]. However, jail is not the appropriate level of care for patient given dementia and frequent falls   - [facility] will now require 24 hour sitters for patient to return   - DILIA TAYLOR consulted for dc planning   - will need to have an in-depth palliative discussion with son to determine best care plan for patient - consult placed   - son agreeable to discussion 7/25 - will follow up    Hypokalemia  Patient has hypokalemia which is currently uncontrolled. Last electrolytes reviewed-   Recent Labs   Lab 07/24/23  0413 07/25/23  0540   K 3.0* 3.5   . Will replace potassium and monitor electrolytes closely. Continuous  telemetry.  - continue daily 10 mEq KCl  - Add PO K replacement with K bicarb         Subdural hygroma  Acute subdural hematoma     - Acute SDH on chronic SD hygroma s/p fall at [facility]; no acute deficits  - repeat CTH stable   - holding eliquis for now - may continue in 2 weeks (per NSGY)   - > NSGY ok's starting [facility] for dvt ppx   - INR wnl  - NSGY cleared. No need for continued monitoring aside from 2 week clinic follow up.         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   - continue memantine  - on home haloperidol 0.5 PO qhs  - will consider starting additional nightly med for agitation; psychiatry consulted for assistance   --> psych recs DC nightly haldol. Start 2.5 mg zyprexa QHS and q8h prn for agitation.    --> 7/25 increase nightly zyprexa to 5 mg   - monitor for improvement   - EKG monitoring   - 7/24-25 Pt reporting SI from patient during session. Caretaker says this is not new, patient expresses this intermittently. Psych aware. Med adjustments made.         Lumbar spondylosis with myelopathy  - tylenol for pain control, robaxin      Acute cystitis with hematuria  Recurrent issue  - UA reviewed - grossly infectious  - f/u urine and blood cultures   - duncan placed for U-retention (infection suspected cause)   - started on rocephin in ED, will continue   - AFVSS, no leukocytosis.   --> urine culture growing candida. No evidence for treating asymptomatic pateint's with candida.   --> duncan trial         Primary hypertension  - BP uncontrolled this morning, hypertensive to 226/90 -- suspect bc patient was agitated and spitting out meds  - continue lisinopril, lopressor, lasix  - PRN IV hydralazine for SBP >180 or if unable to tolerate oral meds.   - 7/25 BP controlled      VTE Risk Mitigation (From admission, onward)         Ordered      enoxaparin injection 40 mg  Every 24 hours         07/24/23 1337     Reason for No Pharmacological VTE Prophylaxis  Once        Question:  Reasons:  Answer:  Risk of Bleeding  Comment:  SDH    07/23/23 0831     IP VTE HIGH RISK PATIENT  Once         07/23/23 0831     Place sequential compression device  Until discontinued         07/23/23 0831                Discharge Planning   TOSHA: 7/28/2023     Code Status: Full Code   Is the patient medically ready for discharge?: No    Reason for patient still in hospital (select all that apply): Patient new problem, Patient trending condition, Laboratory test, Treatment, Consult recommendations and PT / OT recommendations  Discharge Plan A: Assisted Living                  Shanel Welch PA-C  Department of Hospital Medicine   [facility] - Intensive Care ([address]

## 2023-07-25 NOTE — PLAN OF CARE
Problem: Infection  Goal: Absence of Infection Signs and Symptoms  Outcome: Ongoing, Progressing     Problem: Adult Inpatient Plan of Care  Goal: Plan of Care Review  Outcome: Ongoing, Progressing  Goal: Patient-Specific Goal (Individualized)  Outcome: Ongoing, Progressing  Goal: Absence of Hospital-Acquired Illness or Injury  Outcome: Ongoing, Progressing  Goal: Optimal Comfort and Wellbeing  Outcome: Ongoing, Progressing  Goal: Readiness for Transition of Care  Outcome: Ongoing, Progressing     Problem: Coping Ineffective  Goal: Effective Coping  Outcome: Ongoing, Progressing     Problem: Impaired Wound Healing  Goal: Optimal Wound Healing  Outcome: Ongoing, Progressing     Problem: Fall Injury Risk  Goal: Absence of Fall and Fall-Related Injury  Outcome: Ongoing, Progressing     Problem: Skin Injury Risk Increased  Goal: Skin Health and Integrity  Outcome: Ongoing, Progressing       Pt AAO x 2. Pt not c/o of any pain. Takes meds crushed in applesauce. Spann intact. Medications given as ordered. Caregiver at bedside. Bed locked and low, call light within reach.

## 2023-07-25 NOTE — ASSESSMENT & PLAN NOTE
Recurrent issue  - UA reviewed - grossly infectious  - f/u urine and blood cultures   - duncan placed for U-retention (infection suspected cause)   - started on rocephin in ED, will continue   - AFVSS, no leukocytosis.   --> urine culture growing candida. No evidence for treating asymptomatic pateint's with candida.   --> duncan trial

## 2023-07-25 NOTE — ASSESSMENT & PLAN NOTE
"Radha Sandoval is an 88-year-old woman with a history of dementia (FAST score 6D), fall resulting in pelvic fracture in early July 2023 that was treated non-operatively, chronic bilateral pleural effusions, DVT (3/2023) on apixaban, who was admitted for acute respiratory decompensation in the setting of recurrent fall at [facility] (assisted living [facility]). On admission, was found to have sustained subdural hematomas that thankfully have been stable and require no intervention per Neurosurgery. Palliative and Supportive Care was consulted to explore goals of care.    Advance Care Planning   Goals of Care:  - Code status: Full code  - Next of kin: 2 adult sons   - Son Maykel is reportedly MPOA. Requested copy of HCPOA form  - Patient does not have decision making capacity  - Prognosis: guarded  - Goals: improvement in condition, quality of life  - Plans: continue current level of care. Maykel has requested to speak with someone to clarify his questions related to SNF and SNF days; reached out to primary team to request for case management to help address Maykel's questions related to this subject    Goals of Care Conversation:  - 7/25/23: Met Ms. Sandoval and her caretaker Chel at bedside, who clarified for me that Ms. Sandoval has been declining, evidenced by recurrent falls and deconditioning to the point where she is requiring 24/7 care so that she doesn't get up by herself and fall again. She has had at least 4 falls in the past 10 days. Met with Maykel who reiterates his hope to get his mother SNF care so that she can restore her strength as much as possible, then return home with a 24/7 caregiver. He is frustrated and disappointed by the care [facility] provided to his mother as it was mediocre and misleading. I asked if it would be helpful to discuss the natural progression of her dementia as I worry that she is advanced her in dementia stages. He admits that he's not ready to discuss this and "not ready to " "give up" on her. He hopes that he can find her a PCP who has a holistic approach to her care and can help deprescribe medications that she does not benefit from. Agreed to refer her to a geriatrician (and possibly dementia clinic) moving forward. He told me he will send me a copy of the HCPOA. He has not spoken with anyone from the primary team and would like to receive updates if possible.  - 7/24/23: I called Ms. Sandoval's son Maykel on the phone, who shared that he was very surprised to receive a call that his mother was going to the operating room for a surgery that he had no idea was even supposed to happen nor was he consented for this surgery. He was explained that his mother failed therapy but she only had one 15-minute session with PT and one 15-minute session with OT during her stay at [facility], and not the actual SNF-level of rehab that she received at [facility] previously. He worries about his mother's ability to tolerate the stresses of the surgery and recovery as she is older and weaker than before. He confirms that he wishes for his mother to have meaningful quality of life, giving me an example that she was instructed to maintain a low sodium diet. But now in her later age, if she requests for a cheeseburger, he desires to provide this for her because it brings her romi. While he hopes that his mother will have years longer to live, he acknowledges that she won't have as much time as we all hope and wish. He is hopeful that longevity is on their side, but does note that longevity is within his father's side of his family. He shares his frustrations, mentioning that four months ago, his mother was on zero medications and walking independently. Now she is on many medications and requires guidance/assistance to walk with a walker. He hired a caretaker to watch over her per the instruction of her primary care doctor, but pays about $26/hr for the caretaker, who is hired 24/7. " This in addition to the cost of residential living in [facility] amounts to $27,000, which is a severe financial burden. He is frustrated that despite the immense costs he is spending for his mother's care, she is not receiving adequate care and definitely not enough therapy services through home health.  - Inquired about his mother's ACP documents. He shared that her MPOA is him and will send me a copy to my email. I asked if they had discussions about life sustaining treatment and they had not. He imagines that no one would want to live indefinitely on machines and artificial life support if they weren't going to get better on those treatments. I recommended formally that at the end of her life when her heart stops and she stops breathing, that we protect her from interventions like CPR/intubation which would inflict more suffering/harm than benefit. He would like some more time to think about this and talk about it with his brother. 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 He does acknowledge that his functional status is different from his mother's but agrees to discuss this more. Agreed with Mr. Sandoval that the time to think about this is before it happens, and not while it is happening or when it is imminent.

## 2023-07-25 NOTE — ASSESSMENT & PLAN NOTE
Acute subdural hematoma     - Acute SDH on chronic SD hygroma s/p fall at [facility]; no acute deficits  - repeat CTH stable   - holding eliquis for now - may continue in 2 weeks (per NSGY)   - > NSGY ok's starting [facility] for dvt ppx   - INR wnl  - NSGY cleared. No need for continued monitoring aside from 2 week clinic follow up.

## 2023-07-25 NOTE — ASSESSMENT & PLAN NOTE
Patient has hypokalemia which is currently uncontrolled. Last electrolytes reviewed-   Recent Labs   Lab 07/24/23  0413 07/25/23  0540   K 3.0* 3.5   . Will replace potassium and monitor electrolytes closely. Continuous telemetry.  - continue daily 10 mEq KCl  - Add PO K replacement with K bicarb

## 2023-07-26 NOTE — PLAN OF CARE
07/26/23 1635   Post-Acute Status   Post-Acute Authorization Placement   Post-Acute Placement Status Referrals Sent   Discharge Plan   Discharge Plan A Skilled Nursing Facility   Discharge Plan B Home Health         SW phoned patient's Son tony to discuss patient's D/c plan. Patient's Son confirmed that patient [facility]. Patient's Son states that he does not believe that his Mom can return to living alone at her previous assisted living facility. Patient's Son states that he would like for patient to re-admit to SNF @ [facility] or another SNF facility. Patient's Son states that patient's assisted living housing expenses have been paid up through to the end of August. Son states that he will consider patient D/c home w/ home health if unable to secure SNF. Patient's Son not agreeable with residential placement at this time. Son states that he is willing to pay out-of-pocket for patient to receive SNF services, in the event that insurance is unable to cover SNF services due to patient recently receiving Skilled services under insurance. Patient's son requested that SNF option be pursued.      SW provided list of facilities in-network with patient's payor plan. Notified that referral sent to below listed facilities from in-network list based on proximity to home/family support:     [facility]  [facility]  [facility]  [facility]    Patient's Son instructed to identify preference.    Preferred Facility: (if more than 1, listed in order of descending preference)  [facility]    If an additional preferred facility not listed above is identified, additional referral to be sent. If above facilities unable to accept, will send additional referrals to in-network providers.      SW will continue to follow.              Elliott Tijerina, RYLEE, LMSW  [facility]  Case Management  Ext.  [phone]

## 2023-07-26 NOTE — PROGRESS NOTES
[facility] - Intensive Care ([address] Medicine  Progress Note    Patient Name: Radha Sandoval  MRN: [id number]  Patient Class: IP- Inpatient   Admission Date: 7/22/2023  Length of Stay: 3 days  Attending Physician: Maykel Khan MD  Primary Care Provider: Primary Doctor No        Subjective:     Principal Problem:Multiple closed fractures of pelvis without disruption of pelvic ring        HPI:  Radha Sandoval is a 88 y.o. female with PMH significant for chronic BL pleural effusions, recent DVT diagnosed in March '23 (on eliquis), dementia, HTN, with recent [facility] for pelvic fracture treated non-operatively, who presents after a fall at her [facility] while being transferred from wheelchair to bed. Hx taken per Caretaker Sonia and Son (JULIO) Maykel. Caretaker at bedside unaware of head trauma or other injury. Of note, patient sustained her pelvic fractures at [facility], but after hospitalization at [facility] was placed in [facility] on 7/14. Caretaker reports patient also had another hospitalization during this time at . Patient generally disoriented and delirious, but she will have periods or even days of clarity. At baseline, she is oriented x2. Due to mentation she has difficulty working with staff which results in falls. Prior to her pelvic fracture on 7/8, pt was able to perform independent transfers from wheelchair and could walk short distances with her walker, but she is now max assist. Currently on Eliquis. Patient without complaint on my exam, denies pain.     In the ED: AFVSS. CBC with baseline anemia. CMP reveals slight elevation in Cr 1.0 (baseline 0.8) and K 3.3. BNP and troponin elevated but at baseline. UA grossly infectious. Spann placed for urinary retention.  All imaging reviewed. 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 Repeat CTH after 6 hours was stable. XR pelvis reveals stable recent R superior and inferior pubic rami fractures, non-displaced. EKG in NSR  with prolonged Qtc 510 ms. Given tylenol, norco, and 1g rocephin.       Overview/Hospital Course:  Radha Sandoval was placed in [facility] observation after a fall. NSGY consulted for evaluation of SDH and determined no need for intervention , will follow up in clinic in 2 weeks. HOLD eliquis until follow up. Orthopedics discussed surgical options with family and recommended ORIF pelvis, but family is declining at this time and would prefer to continue with plan for PT/OT. Therapy assessment; recs for SNF. WBAT. Dvt ppx with 40 lovenox SQ daily (ok per NSGY). Psychiatry provided recs for medication adjustment for insomnia and delirium with prn dosing for agitation, will monitor response. Agitation persists and patient now expressing suicidal ideation when asked to work with therapy. Psychiatry aware of SI, making med adjustments as indicated. Caretaker has been staying at bedside. Monitor EKG daily given prolonged Qtc. Palliative care consulted for assistance with GOC planning with son and care team, appreciate recs. Pain controlled and mentation at baseline.       Interval History: Caretaker reporting agitation and hallucinations despite 5mg zyprexa last night. Psych adjustments as recommended. Continue PT/OT. Discussed plan with son today. Questions answered.     Review of Systems   Unable to perform ROS: Dementia   Objective:     Vital Signs (Most Recent):  Temp: 98 °F (36.7 °C) (07/26/23 1552)  Pulse: 72 (07/26/23 1552)  Resp: 18 (07/26/23 1552)  BP: 126/79 (07/26/23 1552)  SpO2: 96 % (07/26/23 1552) Vital Signs (24h Range):  Temp:  [97.5 °F (36.4 °C)-98.3 °F (36.8 °C)] 98 °F (36.7 °C)  Pulse:  [64-86] 72  Resp:  [16-23] 18  SpO2:  [92 %-98 %] 96 %  BP: ()/() 126/79     Weight: 75.8 kg (167 lb 1.7 oz)  Body mass index is 29.6 kg/m².    Intake/Output Summary (Last 24 hours) at 7/26/2023 2365  Last data filed at 7/26/2023 0549  Gross per 24 hour   Intake --   Output 1000 ml   Net -1000 ml           Physical  Exam  Vitals and nursing note reviewed.   Constitutional:       General: She is not in acute distress.     Appearance: She is well-developed. She is ill-appearing (chronically ill-appearing).   HENT:      Head: Normocephalic and atraumatic.      Mouth/Throat:      Pharynx: No oropharyngeal exudate.   Eyes:      Conjunctiva/sclera: Conjunctivae normal.      Pupils: Pupils are equal, round, and reactive to light.   Cardiovascular:      Rate and Rhythm: Normal rate and regular rhythm.      Heart sounds: Normal heart sounds.   Pulmonary:      Effort: Pulmonary effort is normal. No respiratory distress.      Breath sounds: Normal breath sounds. No wheezing.   Abdominal:      General: Bowel sounds are normal. There is no distension.      Palpations: Abdomen is soft.      Tenderness: There is no abdominal tenderness.   Musculoskeletal:         General: No tenderness. Normal range of motion.      Cervical back: Normal range of motion and neck supple.   Lymphadenopathy:      Cervical: No cervical adenopathy.   Skin:     General: Skin is warm and dry.      Capillary Refill: Capillary refill takes less than 2 seconds.      Findings: No rash.   Neurological:      Mental Status: She is alert. Mental status is at baseline.      Cranial Nerves: No cranial nerve deficit.      Sensory: No sensory deficit.      Coordination: Coordination normal.   Psychiatric:         Mood and Affect: Mood normal.         Behavior: Behavior normal.      Comments: Cooperative and conversant this morning. Answering most questions appropriately.            Significant Labs: All pertinent labs within the past 24 hours have been reviewed.    Significant Imaging: I have reviewed all pertinent imaging results/findings within the past 24 hours.      Assessment/Plan:      * Multiple closed right sided fractures of pelvis without disruption of pelvic ring  Frequent falls     - diagnosed at last [facility] about 2 weeks ago.   - repeat XRs show stable  frcatures, non-displaced   - Ortho consulted in ED; originally recommended non-op management, but after further discussion recommended ORIF pelvis 7/24. Family declining surgery at this time. Further discussions pending progress with PT/OT.   - weight bearing as tolerated   - pain control with tylenol and robaxin for now. Prn PO oxy 5 for severe pain  - PT/OT consult pending  - patient will likely need SNF v long term placement given max assist requirements and progressive debility, in setting of dementia and behavioral disturbances       Prolonged Q-T interval on ECG  - QTc 510 --> improved on repeat 475  - avoid QT prolonging meds   - monitor tele       Frequent falls        Urinary retention  Recurrent issue   - mild elevation in Cr likely d/t retention   - duncan placed, treating UTI   - follow urine cultures   - voiding trial today, will follow response       Goals of care, counseling/discussion  - Caretaker Sonia expresses several concerns regarding patients continual health and function decline  - Recent stay at SNF with progress noted and pt was placed in longterm at [facility]. However, longterm is not the appropriate level of care for patient given dementia and frequent falls   - [facility] will now require 24 hour sitters for patient to return   -  CLAUDIA consulted for dc planning   - will need to have an in-depth palliative discussion with son to determine best care plan for patient - consult placed   - son agreeable to discussion 7/25, palliative following    Hypokalemia  Patient has hypokalemia which is currently uncontrolled. Last electrolytes reviewed-   Recent Labs   Lab 07/24/23  0413 07/25/23  0540   K 3.0* 3.5   . Will replace potassium and monitor electrolytes closely. Continuous telemetry.  - continue daily 10 mEq KCl  - Add PO K replacement with K bicarb         Subdural hygroma  Acute subdural hematoma     - Acute SDH on chronic SD hygroma s/p fall at longterm; no acute deficits  - repeat CTH stable   - holding  eliquis for now - may continue in 2 weeks (per NSGY)   - > NSGY ok's starting [facility] for dvt ppx   - INR wnl  - NSGY cleared. No need for continued monitoring aside from 2 week clinic follow up.         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   - continue memantine  - on home haloperidol 0.5 PO qhs  - will consider starting additional nightly med for agitation; psychiatry consulted for assistance   --> psych recs DC nightly haldol. Start 2.5 mg zyprexa QHS and q8h prn for agitation.    --> 7/25 increase nightly zyprexa to 5 mg   - monitor for improvement   - EKG monitoring   - 7/24-25 Pt reporting SI from patient during session. Caretaker says this is not new, patient expresses this intermittently. Psych aware. Med adjustments made.         Lumbar spondylosis with myelopathy  - tylenol for pain control, robaxin      Acute cystitis with hematuria  Recurrent issue  - UA reviewed - grossly infectious  - f/u urine and blood cultures   - duncan placed for U-retention (infection suspected cause)   - started on rocephin in ED, will continue   - AFVSS, no leukocytosis.   --> urine culture growing candida. No evidence for treating asymptomatic pateint's with candida.   --> duncan trial         Primary hypertension  - BP uncontrolled this morning, hypertensive to 226/90 -- suspect bc patient was agitated and spitting out meds  - continue lisinopril, lopressor, lasix  - PRN IV hydralazine for SBP >180 or if unable to tolerate oral meds.   - 7/25 BP controlled        VTE Risk Mitigation (From admission, onward)         Ordered     enoxaparin injection 40 mg  Every 24 hours         07/24/23 1337     Reason for No Pharmacological VTE Prophylaxis  Once        Question:  Reasons:  Answer:  Risk of Bleeding  Comment:  [facility]    07/23/23 0849     IP VTE HIGH RISK  PATIENT  Once         07/23/23 0831     Place sequential compression device  Until discontinued         07/23/23 0831                Discharge Planning   TOSHA: 7/28/2023     Code Status: Full Code   Is the patient medically ready for discharge?: No    Reason for patient still in hospital (select all that apply): Patient trending condition, Treatment, Consult recommendations and PT / OT recommendations  Discharge Plan A: Assisted Living                  Shanel Welch PA-C  [facility]   [facility] - Intensive Care ([address]

## 2023-07-26 NOTE — SUBJECTIVE & OBJECTIVE
Principal Problem:Multiple closed fractures of pelvis without disruption of pelvic ring    Principal Orthopedic Problem: same as above    Interval History: NAEO. VSS. Will discuss with sons regarding treatment plan. Patient unable to sit upright with PT. Patient unable to rest comfortably. No complaints of hip pain today.    Review of patient's allergies indicates:  No Known Allergies    Current Facility-Administered Medications   Medication    acetaminophen tablet 1,000 mg    albuterol-ipratropium 2.5 mg-0.5 mg/3 mL nebulizer solution 3 mL    aluminum-magnesium hydroxide-simethicone 200-200-20 mg/5 mL suspension 30 mL    ascorbic acid (vitamin C) tablet 250 mg    enoxaparin injection 40 mg    EScitalopram oxalate tablet 5 mg    furosemide tablet 20 mg    glucagon (human recombinant) injection 1 mg    glucose chewable tablet 16 g    glucose chewable tablet 24 g    hydrALAZINE injection 10 mg    hydrALAZINE injection 5 mg    lisinopriL tablet 40 mg    loperamide capsule 2 mg    melatonin tablet 6 mg    memantine tablet 5 mg    methocarbamoL tablet 500 mg    metoprolol tartrate (LOPRESSOR) tablet 25 mg    miconazole NITRATE 2 % top powder    naloxone 0.4 mg/mL injection 0.02 mg    OLANZapine injection 2.5 mg    OLANZapine injection 5 mg    polyethylene glycol packet 17 g    prochlorperazine injection Soln 5 mg    sodium chloride 0.9% flush 10 mL    sodium chloride 0.9% flush 10 mL    tamsulosin 24 hr capsule 0.4 mg    vitamin D 1000 units tablet 1,000 Units    zinc sulfate capsule 220 mg     Objective:     Vital Signs (Most Recent):  Temp: 98 °F (36.7 °C) (07/26/23 1552)  Pulse: 72 (07/26/23 1552)  Resp: 18 (07/26/23 1552)  BP: 126/79 (07/26/23 1552)  SpO2: 96 % (07/26/23 1700) Vital Signs (24h Range):  Temp:  [97.5 °F (36.4 °C)-98.3 °F (36.8 °C)] 98 °F (36.7 °C)  Pulse:  [64-86] 72  Resp:  [16-23] 18  SpO2:  [92 %-98 %] 96 %  BP: ()/() 126/79     Weight: 75.8 kg (167 lb 1.7 oz)     Body mass index is 29.6  kg/m².      Intake/Output Summary (Last 24 hours) at 7/26/2023 1757  Last data filed at 7/26/2023 1630  Gross per 24 hour   Intake 990 ml   Output 1200 ml   Net -210 ml          Ortho/SPM Exam  TTP of the right hip   Pain with compression of pelvis  Cap refill <2s  Negative log roll       Significant Labs: All pertinent labs within the past 24 hours have been reviewed.    Significant Imaging: I have reviewed and interpreted all pertinent imaging results/findings.

## 2023-07-26 NOTE — PT/OT/SLP PROGRESS
Physical Therapy Treatment    Patient Name:  Radha Sandoval   MRN:  [id number]    Recommendations:     Discharge Recommendations: nursing facility, skilled  Discharge Equipment Recommendations: to be determined by next level of care  Barriers to discharge:  increased level of assistance needed    Assessment:     Radha Sandoval is a 88 y.o. female admitted with a medical diagnosis of Multiple closed fractures of pelvis without disruption of pelvic ring.  She presents with the following impairments/functional limitations: weakness, gait instability, decreased upper extremity function, impaired endurance, impaired balance, impaired cardiopulmonary response to activity, decreased lower extremity function, decreased safety awareness, impaired self care skills, impaired cognition, pain, impaired functional mobility, orthopedic precautions. Pt completed supine to sit with maxAx2. Pt continued to demonstrate strong fear of falling and had a strong posterior lean requiring max verbal cuing and maxA to encourage leaning forward to remain upright while sitting EOB. Pt was shaking with fear of falling. Pt was able to self-correct slightly but returned almost immediately to strong posterior trunk lean. Pt EOB approximately 5 minutes. Pt was returned to supine with totalAx2.   Pt would benefit from a skilled nursing facility for: Dynamic/static standing/sitting balance through skilled balance training, strengthening with the use of skilled therapeutic exercises interventions, and mobility through adaptive equipment training. Pt continues to benefit from a collaborative multidisciplinary program to improve quality of life and focus on recovery of impairments.     Rehab Prognosis: Good; patient would benefit from acute skilled PT services to address these deficits and reach maximum level of function.    Recent Surgery: Procedure(s) (LRB):  ORIF,PELVIS, rui, bone foam (N/A) 2 Days Post-Op    Plan:     During this hospitalization,  patient to be seen 4 x/week to address the identified rehab impairments via gait training, therapeutic activities, therapeutic exercises, neuromuscular re-education and progress toward the following goals:    Plan of Care Expires:  08/24/23    Subjective     Chief Complaint: none reported  Patient/Family Comments/goals: Pt's caregiver reports pt did not sleep last night.  Pain/Comfort:  Pain Rating 1:  (pt did not report)  Pain Rating Post-Intervention 1:  (pt did not report)      Objective:     Communicated with RN prior to session.  Patient found supine with HOB elevated with telemetry, peripheral IV upon PT entry to room.     General Precautions: Standard, fall  Orthopedic Precautions: LLE weight bearing as tolerated, RLE weight bearing as tolerated  Braces: N/A  Respiratory Status: Room air     Functional Mobility:  Bed Mobility:     Supine to Sit: maximal assistancex2  Sit to Supine: total assistancex2  Transfers:   not assessed this date due to decreased command following/poor static balance  Balance:   Static sitting: CGA-maxA x 5 minutes, Pt continued to demonstrate strong fear of falling and had a strong posterior lean requiring max verbal cuing and maxA to encourage leaning forward to remain upright while sitting EOB. Pt was shaking with fear of falling. Pt was able to self-correct slightly but returned almost immediately to strong posterior trunk lean.  Dynamic sitting: not assessed this date due to safety/decreased command following  Static standing: not assessed this date due to safety/decreased command following  Dynamic standing:not assessed this date due to safety/decreased command following      AM-PAC 6 CLICK MOBILITY  Turning over in bed (including adjusting bedclothes, sheets and blankets)?: 2  Sitting down on and standing up from a chair with arms (e.g., wheelchair, bedside commode, etc.): 2  Moving from lying on back to sitting on the side of the bed?: 2  Moving to and from a bed to a chair  (including a wheelchair)?: 1  Need to walk in hospital room?: 1  Climbing 3-5 steps with a railing?: 1  Basic Mobility Total Score: 9       Treatment & Education:  Pt educated on role of therapy, goals of session, and benefits of mobilizing. Pt educated on calling for assistance. All questions answered within PT scope of practice.    Patient left supine with HOB elevated with all lines intact, call button in reach, and RN notified..    GOALS:   Multidisciplinary Problems       Physical Therapy Goals          Problem: Physical Therapy    Goal Priority Disciplines Outcome Goal Variances Interventions   Physical Therapy Goal     PT, PT/OT Ongoing, Progressing     Description: Goals to be met by: 2023     Patient will increase functional independence with mobility by performin. Supine to sit with MInimal Assistance  2. Sit to supine with MInimal Assistance  3. Rolling to Left and Right with Minimal Assistance.  4. Sit to stand transfer with Minimal Assistance  5. Gait  x 10 feet with Moderate Assistance using Rolling Walker.                          Time Tracking:     PT Received On: 23  PT Start Time: 1048     PT Stop Time: 1101  PT Total Time (min): 13 min     Billable Minutes: Therapeutic Activity 13 min    Treatment Type: Treatment  PT/PTA: PT     Number of PTA visits since last PT visit: 0     2023

## 2023-07-26 NOTE — ASSESSMENT & PLAN NOTE
Recurrent issue   - mild elevation in Cr likely d/t retention   - duncan placed, treating UTI   - follow urine cultures   - voiding trial today, will follow response

## 2023-07-26 NOTE — SUBJECTIVE & OBJECTIVE
Interval History: Caretaker reporting agitation and hallucinations despite 5mg zyprexa last night. Psych adjustments as recommended. Continue PT/OT. Discussed plan with son today. Questions answered.     Review of Systems   Unable to perform ROS: Dementia   Objective:     Vital Signs (Most Recent):  Temp: 98 °F (36.7 °C) (07/26/23 1552)  Pulse: 72 (07/26/23 1552)  Resp: 18 (07/26/23 1552)  BP: 126/79 (07/26/23 1552)  SpO2: 96 % (07/26/23 1552) Vital Signs (24h Range):  Temp:  [97.5 °F (36.4 °C)-98.3 °F (36.8 °C)] 98 °F (36.7 °C)  Pulse:  [64-86] 72  Resp:  [16-23] 18  SpO2:  [92 %-98 %] 96 %  BP: ()/() 126/79     Weight: 75.8 kg (167 lb 1.7 oz)  Body mass index is 29.6 kg/m².    Intake/Output Summary (Last 24 hours) at 7/26/2023 1559  Last data filed at 7/26/2023 0549  Gross per 24 hour   Intake --   Output 1000 ml   Net -1000 ml           Physical Exam  Vitals and nursing note reviewed.   Constitutional:       General: She is not in acute distress.     Appearance: She is well-developed. She is ill-appearing (chronically ill-appearing).   HENT:      Head: Normocephalic and atraumatic.      Mouth/Throat:      Pharynx: No oropharyngeal exudate.   Eyes:      Conjunctiva/sclera: Conjunctivae normal.      Pupils: Pupils are equal, round, and reactive to light.   Cardiovascular:      Rate and Rhythm: Normal rate and regular rhythm.      Heart sounds: Normal heart sounds.   Pulmonary:      Effort: Pulmonary effort is normal. No respiratory distress.      Breath sounds: Normal breath sounds. No wheezing.   Abdominal:      General: Bowel sounds are normal. There is no distension.      Palpations: Abdomen is soft.      Tenderness: There is no abdominal tenderness.   Musculoskeletal:         General: No tenderness. Normal range of motion.      Cervical back: Normal range of motion and neck supple.   Lymphadenopathy:      Cervical: No cervical adenopathy.   Skin:     General: Skin is warm and dry.      Capillary  Refill: Capillary refill takes less than 2 seconds.      Findings: No rash.   Neurological:      Mental Status: She is alert. Mental status is at baseline.      Cranial Nerves: No cranial nerve deficit.      Sensory: No sensory deficit.      Coordination: Coordination normal.   Psychiatric:         Mood and Affect: Mood normal.         Behavior: Behavior normal.      Comments: Cooperative and conversant this morning. Answering most questions appropriately.            Significant Labs: All pertinent labs within the past 24 hours have been reviewed.    Significant Imaging: I have reviewed all pertinent imaging results/findings within the past 24 hours.

## 2023-07-26 NOTE — ASSESSMENT & PLAN NOTE
- Caretaker Sonia expresses several concerns regarding patients continual health and function decline  - Recent stay at SNF with progress noted and pt was placed in skilled nursing at [facility]. However, [facility] is not the appropriate level of care for patient given dementia and frequent falls   - [facility] will now require 24 hour sitters for patient to return   - DILIA TAYLOR consulted for dc planning   - will need to have an in-depth palliative discussion with son to determine best care plan for patient - consult placed   - son agreeable to discussion 7/25, palliative following

## 2023-07-26 NOTE — PROGRESS NOTES
CONSULTATION LIAISON PSYCHIATRY PROGRESS NOTE    Patient Name: Radha Sandoval  MRN: [id number]  Patient Class: IP- Inpatient  Admission Date: 7/22/2023  Attending Physician: Maykel Khan MD      SUBJECTIVE:   Radha Sandoval is a 88 y.o. female with past medical history of bilateral chronic subdural hematomas, Hypertension, Acute DVT and PE 5/23, frequent falls with recent Pelvic fracture s/p orthopedic intervention & past pertinent psychiatric history of Late Onset Alzheimers with mood disturbance and Delirium presents to the ED/admitted to the hospital for a fall, shortness of breath, and hypoxia.    Patient presented with right pelvic pain after a fall on 7/22 when she was transferred from bed to wheelchair. Due to her fluctuating mentation she has been agitated with nursing staff and tries to leave her bed and resists being transferred from bed to wheelchair, which has resulted in multiple falls.    Psychiatry consulted for persistent insomnia contributing to delirium in patient with dementia, uncontrolled on current regimen    Today, patient with diaphoretic, slurred, mumbled, verbose, incoherent speech. She was oriented to self. Sitter at bedside provided the history. She stated that the patient did not sleep at all last night and has been up all day as well. Overnight, patient was reportedly agitated and hallucinating. Pt worked with PT today.    Interval Events:  7/26: Patient received zyprexa 2.5 mg PRN at 1045 prior to physical therapy. Also received scheduled zyprexa 5 mg last night.     OBJECTIVE:    Mental Status Exam:  General Appearance: lying in bed, disheveled, diaphoretic  Behavior: under good behavioral control, restless and fidgety, poor eye contact  Involuntary Movements and Motor Activity: +psychomotor agitation  Gait and Station: unable to assess - patient lying down or seated  Speech and Language: spontaneous, monotone, verbose, incoherent, mumbling  Mood: Unable to assess  Affect: irritable  Thought  Process and Associations: Unable to Assess  Thought Content and Perceptions:: Unable to Assess  Sensorium and Orientation: oriented to person only  Recent and Remote Memory: significant impairments noted  Attention and Concentration: inattentive to conversation  Fund of Knowledge: Unable to Formally Assess  Insight: impaired  Judgment: impaired    CAM ICU positive? Unable to assess      ASSESSMENT & RECOMMENDATIONS   Late Onset Alzheimer's with mood disturbance  Delirium    DELIRIUM  PSYCH MEDICATIONS  Continue zyprexa 5 mg po nightly IM or IV if QTC <480  Recommend continuous EKG monitoring  Continue Lexapro 5 mg po QD  Continue Memantine 5 mg po BID  PRN - Zyprexa 2.5 mg po, IM, or IV Q8 prn for non redirectable agitation or aggression if QTC <480  Continue to monitor EKGs, Qtc with antipsychotics    DELIRIUM BEHAVIOR MANAGEMENT  PLEASE utilize CHEMICAL restraints with PRN meds first for agitation. Minimize use of PHYSICAL restraints OR have periods of being out of physical restraints if possible.  Keep window shades open and room lit during day and room dim at night in order to promote normal sleep-wake cycles  Encourage family at bedside. [place] patient often to situation, location, date.  Continue to Limit or Discontinue use of Narcotics, Benzos and Anti-cholinergic medications as they may worsen delirium.  Continue medical workup for causative etiology of Delirium.     RISK ASSESSMENT  NO NEED FOR PEC at this time. Will continue to reassess.      FOLLOW UP  Will follow up while in house     DISPOSITION - once medically cleared:  Defer to medical team    Please contact ON CALL psychiatry service (24/7) for any acute issues that may arise.    Dr. Adalberto HERNANDES Psychiatry  [facility]  7/26/2023 11:44  AM        --------------------------------------------------------------------------------------------------------------------------------------------------------------------------------------------------------------------------------------    CONTINUED OBJECTIVE clinical data & findings reviewed and noted for above decision making    Current Medications:   Scheduled Meds:    acetaminophen  1,000 mg Oral Q8H    albuterol-ipratropium  3 mL Nebulization Q6H WAKE    ascorbic acid (vitamin C)  250 mg Oral Daily    enoxparin  40 mg Subcutaneous Q24H (prophylaxis, 1700)    EScitalopram oxalate  5 mg Oral Daily    furosemide  20 mg Oral BID    hydrALAZINE  5 mg Intravenous Once    lisinopriL  40 mg Oral Daily    memantine  5 mg Oral BID    methocarbamoL  500 mg Oral QID    metoprolol tartrate  25 mg Oral BID    miconazole NITRATE 2 %   Topical (Top) BID    OLANZapine  5 mg Intramuscular QHS    tamsulosin  0.4 mg Oral Nightly    vitamin D  1,000 Units Oral Daily    zinc sulfate  220 mg Oral Daily     PRN Meds: aluminum-magnesium hydroxide-simethicone, glucagon (human recombinant), glucose, glucose, hydrALAZINE, loperamide, melatonin, naloxone, OLANZapine, polyethylene glycol, prochlorperazine, sodium chloride 0.9%, sodium chloride 0.9%    Allergies:   Review of patient's allergies indicates:  No Known Allergies    Vitals  Vitals:    07/26/23 1114   BP: (!) 182/88   Pulse: 85   Resp: 16   Temp: 98 °F (36.7 °C)       Labs/Imaging/Studies:  Recent Results (from the past 24 hour(s))   POCT glucose    Collection Time: 07/25/23 11:45 AM   Result Value Ref Range    POCT Glucose 125 (H) 70 - 110 mg/dL   POCT glucose    Collection Time: 07/25/23  4:12 PM   Result Value Ref Range    POCT Glucose 110 70 - 110 mg/dL   POCT glucose    Collection Time: 07/25/23  7:27 PM   Result Value Ref Range    POCT Glucose 133 (H) 70 - 110 mg/dL   Basic Metabolic Panel (BMP)    Collection Time: 07/26/23  5:09 AM   Result Value Ref Range    Sodium 137  136 - 145 mmol/L    Potassium 3.3 (L) 3.5 - 5.1 mmol/L    Chloride 101 95 - 110 mmol/L    CO2 27 23 - 29 mmol/L    Glucose 89 70 - 110 mg/dL    BUN 15 8 - 23 mg/dL    Creatinine 0.8 0.5 - 1.4 mg/dL    Calcium 8.4 (L) 8.7 - 10.5 mg/dL    Anion Gap 9 8 - 16 mmol/L    eGFR >60.0 >60 mL/min/1.73 m^2   CBC with Automated Differential    Collection Time: 07/26/23  5:09 AM   Result Value Ref Range    WBC 7.11 3.90 - 12.70 K/uL    RBC 3.27 (L) 4.00 - 5.40 M/uL    Hemoglobin 9.1 (L) 12.0 - 16.0 g/dL    Hematocrit 28.3 (L) 37.0 - 48.5 %    MCV 87 82 - 98 fL    MCH 27.8 27.0 - 31.0 pg    MCHC 32.2 32.0 - 36.0 g/dL    RDW 14.6 (H) 11.5 - 14.5 %    Platelets 314 150 - 450 K/uL    MPV 11.3 9.2 - 12.9 fL    Immature Granulocytes 0.4 0.0 - 0.5 %    Gran # (ANC) 5.3 1.8 - 7.7 K/uL    Immature Grans (Abs) 0.03 0.00 - 0.04 K/uL    Lymph # 1.0 1.0 - 4.8 K/uL    Mono # 0.6 0.3 - 1.0 K/uL    Eos # 0.2 0.0 - 0.5 K/uL    Baso # 0.02 0.00 - 0.20 K/uL    nRBC 0 0 /100 WBC    Gran % 74.9 (H) 38.0 - 73.0 %    Lymph % 13.4 (L) 18.0 - 48.0 %    Mono % 8.7 4.0 - 15.0 %    Eosinophil % 2.3 0.0 - 8.0 %    Basophil % 0.3 0.0 - 1.9 %    Differential Method Automated    POCT glucose    Collection Time: 07/26/23  7:48 AM   Result Value Ref Range    POCT Glucose 98 70 - 110 mg/dL   POCT glucose    Collection Time: 07/26/23 11:14 AM   Result Value Ref Range    POCT Glucose 104 70 - 110 mg/dL     Imaging Results              CT Head Without Contrast (Final result)  Result time 07/23/23 08:24:54      Final result by Bill Tarango MD (07/23/23 08:24:54)                   Impression:        Similar volume of subdural hematomas.  Changes of density may be in part technical/artifactual in nature and also due to some leakage of recent intravenous contrast from recent CT of the abdomen pelvis with no focal hyperdense acute hemorrhage identified.  Follow-up as clinically warranted.      Electronically signed by: Bill  Tarango  Date:    07/23/2023  Time:    08:24               Narrative:    EXAMINATION:  CT HEAD WITHOUT CONTRAST    CLINICAL HISTORY:  Head trauma, minor (Age >= 65y);    TECHNIQUE:  Low dose axial images were obtained through the head.  Coronal and sagittal reformations were also performed. Contrast was not administered.    COMPARISON:  07/23/2023, 07/09/2023    FINDINGS:  Bilateral subacute subdural hematomas are noted bilaterally again identified measuring up to 14 mm on  the left, similar in volume.  No new focal hyperdense hemorrhage is identified.  Overall mild increased density of the hematoma may in part be technical/artifactual in nature as well as due to leakage of recent intravenous contrast as similar appearances were noted previously (on 07/09/2023 and 07/10/2023).    3 mm midline shift to the right is similar in appearance.  The basal cisterns remain patent.  No acute intraparenchymal process.    Prominent atherosclerotic vascular calcifications are noted at the skull base.    The visualized sinuses and mastoid air cells are essentially clear.                                       CT Chest Abdomen Pelvis With Contrast (xpd) (Final result)  Result time 07/23/23 02:32:32   Procedure changed from CT Abdomen Pelvis With Contrast     Final result by Chuy Mckeon MD (07/23/23 02:32:32)                   Impression:      Similar appearance of recent fractures involving the right inferior and superior pubic rami.  Increased conspicuity of essentially nondisplaced recent fractures of the right michelle sacrum and anterior aspect of the right acetabulum.    Motion and artifact limited study with suboptimal bolus timing.    Moderate-sized hiatal hernia with moderate lower esophageal wall thickening.  Suggest correlation for esophagitis.    Peribronchial thickening and questionable minimal patchy ground-glass opacities in the lung bases and bibasilar subsegmental atelectasis.    Nodular soft tissue opacities in  the left breast.  Neoplasm not excluded.  Suggest correlation with physical exam and mammographic follow-up when clinically warranted.    Anasarca.    Mild nonspecific wall thickening of the inferior aspect of the urinary bladder.  Suggest correlation with urinalysis.    Multiple additional findings discussed in the body of the report.      Electronically signed by: Chuy Mckeon MD  Date:    07/23/2023  Time:    02:32               Narrative:    EXAMINATION:  CT CHEST ABDOMEN PELVIS WITH CONTRAST (XPD)    CLINICAL HISTORY:  Abdominal trauma, blunt;    TECHNIQUE:  Low dose axial images, sagittal and coronal reformations were obtained from the thoracic inlet to the pubic symphysis following the IV administration of 75 mL of Omnipaque 350 .  Oral contrast was not given.    COMPARISON:  CTA chest, 07/08/2023.  CT pelvis, 07/08/2023.  CT abdomen pelvis, 05/06/2023.    FINDINGS:  Chest:    Exam quality is limited by suboptimal bolus timing and motion.  Evaluation is limited by extensive streak artifact due to the patient's arms overlying the field of view.    Heart is borderline enlarged and similar to the prior study.  Coronary artery and mitral valve calcifications.  Thoracic aorta is stable in caliber with moderate to advanced calcific atherosclerosis.  No central pulmonary embolus.  No bulky mediastinal lymphadenopathy.    Detailed evaluation of the pulmonary parenchyma limited by motion.  There is peribronchial thickening and questionable minimal patchy ground-glass opacities in the lung bases.  Bibasilar subsegmental atelectasis.  No consolidation or pleural effusion.    Moderate-sized hiatal hernia with moderate lower esophageal wall thickening.    Abdomen:    Exam quality is limited by suboptimal bolus timing, motion, and extensive artifact related to the patient's arms overlying the field of view.    Liver is similar in size and contour when compared with the prior study.  Multiple hepatic hypodensities most  suggestive of cysts.  Gallbladder is unremarkable.  No intrahepatic biliary ductal dilatation.    Spleen, adrenals, and pancreas are stable and negative for acute finding allowing for significant motion.    Kidneys are stable.  There is a large left renal cyst.  Small stone or vascular calcification in the right renal hilum.  No hydronephrosis.    Evaluation for bowel inflammation is limited by motion.  No small bowel obstruction.  Mild stool burden in the colon.    No pneumoperitoneum or organized fluid collection.    No bulky retroperitoneal lymphadenopathy.    Abdominal aorta is similar in caliber with moderate to advanced calcific atherosclerosis involving the aorta and major branch vessels.    Portal vein is grossly patent.    Pelvis:    Urinary bladder is mildly distended and there is mild wall thickening along the inferior aspect of the urinary bladder similar to the prior CT abdomen.  Rectum is unremarkable.  There is minimal presacral fat stranding.  No significant pelvic free fluid.    Bones and soft tissues:    Recent fractures involving the right superior and inferior pubic rami similar to prior CT pelvis.  Suspect nondisplaced fracture of the right michelle sacrum, more conspicuous when compared with prior CT pelvis.  Nondisplaced fracture of the anterior aspect of the right acetabulum (coronal series 606, image 129) is also more conspicuous when compared with the prior study.  No additional acute fracture.  Degenerative changes in the spine and pubic symphysis.  Mild anterolisthesis of L4 with respect to L5.  Mild left convex scoliotic curvature of the spine.  Old right lower rib fractures.  Operative changes of presumed right lower abdominal wall hernia repair.  Mild diffuse body wall edema.  Somewhat nodular soft tissue densities in the left breast soft tissues.  Suggest follow-up mammogram.                                       CT Head Without Contrast (Final result)  Result time 07/23/23 02:16:47       Final result by Maykel Castro MD (07/23/23 02:16:47)                   Impression:      Subtle increase in density of the subdural hygromas suggesting acute on chronic subdural fluid collection.    Consider follow-up CT scan per protocol in patient with small acute subdural hematoma on chronic subdural hygromas.      Electronically signed by: Maykel Castro  Date:    07/23/2023  Time:    02:16               Narrative:    EXAMINATION:  CT HEAD WITHOUT CONTRAST    CLINICAL HISTORY:  Head trauma, minor (Age >= 65y);    TECHNIQUE:  Low dose axial CT images obtained throughout the head without intravenous contrast. Sagittal and coronal reconstructions were performed.    COMPARISON:  None.    FINDINGS:  Intracranial compartment:    Ventricles and sulci are stable in size for age without evidence of hydrocephalus. Subdural hygromas appear increased in density slightly suggesting acute on chronic subdural hematoma.    The brain parenchyma appears stable with involutional and chronic small vessel type changes again noted..  No parenchymal mass, hemorrhage, edema or major vascular distribution infarct.    Skull/extracranial contents (limited evaluation): No fracture. Mastoid air cells and paranasal sinuses are essentially clear.                                       CT Cervical Spine Without Contrast (Final result)  Result time 07/23/23 03:01:55      Final result by Chuy Mckeon MD (07/23/23 03:01:55)                   Impression:      No evidence of acute fracture or acute osseous abnormality.    Osteopenia and stable degenerative changes.    Additional findings discussed in the body of the report.    Electronically signed by resident: Anahi Gutierrez  Date:    07/23/2023  Time:    02:02    Electronically signed by: Chuy Mckeon MD  Date:    07/23/2023  Time:    03:01               Narrative:    EXAMINATION:  CT CERVICAL SPINE WITHOUT CONTRAST    CLINICAL HISTORY:  Neck trauma (Age >= 65y);    TECHNIQUE:  Low  dose axial images, sagittal and coronal reformations were performed though the cervical spine.  Contrast was not administered.    COMPARISON:  CT 04/18/2023 and 07/09/2023.    FINDINGS:  Alignment: Normal.    Vertebrae: Osteopenia.  No fracture.  Lucent area involving the left C4 facet without associated cortical disruption, unchanged dating back to 04/18/2023.  Stable mild height loss of the superior endplate of T4 vertebral body.    Discs: Multilevel disc height loss, most pronounced at C5-C6.    C1-2: Dens is intact.  Pre-dens space is maintained.    Skull base and craniocervical junction: Normal.    Degenerative findings:    Stable appearance of mild multilevel degenerative changes through the cervical spine.  There are several levels demonstrating mild to moderate facet arthropathy and mild uncovertebral spurring resulting in areas of mild neural foraminal narrowing.  No areas of spinal canal stenosis.    Paraspinal muscles & soft tissues: Evaluation of the lung apices is severely limited by respiratory motion artifact.  Dense calcific atherosclerosis of the aortic arch.  Soft tissues of the thoracic inlet are somewhat distorted secondary to motion artifact.                                       X-Ray Pelvis Routine AP (Final result)  Result time 07/23/23 01:54:57   Procedure changed from X-Ray Hip 2 or 3 views Right (with Pelvis when performed)     Final result by Chuy Mckeon MD (07/23/23 01:54:57)                   Impression:      Similar alignment of recent fractures involving the right superior and inferior pubic rami.  No new acute displaced fracture.      Electronically signed by: Chuy Mckeon MD  Date:    07/23/2023  Time:    01:54               Narrative:    EXAMINATION:  XR PELVIS ROUTINE AP; XR FEMUR 2 VIEW RIGHT    CLINICAL HISTORY:  HIP PAIN;  Pain in right hip; Pain in right leg    TECHNIQUE:  Three frontal views of the pelvis performed.  Two views of the right femur also  obtained.    COMPARISON:  CT pelvis, 07/08/2023.    FINDINGS:  Pelvis: Bones are mildly demineralized.  Similar appearance of mildly displaced recent fracture of the right inferior pubic ramus and nondisplaced fracture of the right superior pubic ramus.  Similar fracture fragment alignment allowing for differences in positioning.  No new acute fracture.  No dislocation.  Mild degenerative changes in both hips.    Right femur: No additional acute fracture or dislocation other than described above.  Degenerative changes in the right hip and right knee.  Atherosclerotic vascular calcifications.  Soft tissue edema overlying the right hip.  No unexpected radiopaque foreign body.                                       X-Ray Femur 2 AP/LAT Right (Final result)  Result time 07/23/23 01:54:57      Final result by Chuy Mckeon MD (07/23/23 01:54:57)                   Impression:      Similar alignment of recent fractures involving the right superior and inferior pubic rami.  No new acute displaced fracture.      Electronically signed by: Chuy Mckeon MD  Date:    07/23/2023  Time:    01:54               Narrative:    EXAMINATION:  XR PELVIS ROUTINE AP; XR FEMUR 2 VIEW RIGHT    CLINICAL HISTORY:  HIP PAIN;  Pain in right hip; Pain in right leg    TECHNIQUE:  Three frontal views of the pelvis performed.  Two views of the right femur also obtained.    COMPARISON:  CT pelvis, 07/08/2023.    FINDINGS:  Pelvis: Bones are mildly demineralized.  Similar appearance of mildly displaced recent fracture of the right inferior pubic ramus and nondisplaced fracture of the right superior pubic ramus.  Similar fracture fragment alignment allowing for differences in positioning.  No new acute fracture.  No dislocation.  Mild degenerative changes in both hips.    Right femur: No additional acute fracture or dislocation other than described above.  Degenerative changes in the right hip and right knee.  Atherosclerotic vascular  "calcifications.  Soft tissue edema overlying the right hip.  No unexpected radiopaque foreign body.                                       X-Ray Chest AP Portable (Final result)  Result time 07/23/23 01:47:54      Final result by Chuy Mckeon MD (07/23/23 01:47:54)                   Impression:      No detrimental change when compared with 07/08/2023.      Electronically signed by: Chuy Mckeon MD  Date:    07/23/2023  Time:    01:47               Narrative:    EXAMINATION:  XR CHEST AP PORTABLE    CLINICAL HISTORY:  Provided history is "Sepsis;  ".    TECHNIQUE:  One view of the chest.    COMPARISON:  07/08/2023.    FINDINGS:  Cardiac wires overlie the chest.  Patient is slightly rotated.  Cardiomediastinal silhouette is stable and may be at the upper limits of normal in size.  Atherosclerotic calcifications overlie the aortic arch.  Right hemidiaphragm is slightly elevated as seen previously.  Azygous lobe configuration is incidentally noted in the right lung apex.  No confluent area of consolidation.  No sizable pleural effusion.  No pneumothorax.  Hiatal hernia again noted.                                     "

## 2023-07-27 NOTE — PT/OT/SLP PROGRESS
Occupational Therapy   Treatment    Name: Radha Sandoval  MRN: [id number]  Admitting Diagnosis:  Multiple closed fractures of pelvis without disruption of pelvic ring  3 Days Post-Op    Recommendations:     Discharge Recommendations: nursing facility, skilled  Discharge Equipment Recommendations:  to be determined by next level of care  Barriers to discharge:  Other (Comment) (Increased level of skilled A needed at this time)    Assessment:     Radha Sandoval is a 88 y.o. female with a medical diagnosis of Multiple closed fractures of pelvis without disruption of pelvic ring. Performance deficits affecting function are gait instability, impaired endurance, weakness, impaired balance, impaired cognition, impaired self care skills, impaired functional mobility, decreased coordination, decreased upper extremity function, decreased lower extremity function, decreased safety awareness, impaired cardiopulmonary response to activity, orthopedic precautions. Pt engaged fairly in today's session but required encouragement from caregiver and therapist for continued participation. Pt was able to sit EOB on this date with SBA <> mod A with occasional strong posterior lean to engage in seated ADLs. Sit <> stand performed with max x2 and pt able to clear bottom from bed, but pt demonstrated strong feelings of anxiousness and SOB. After completing sit <> stand pt required consistent cues to take deep breaths to calm down. Patient would benefit from continued skilled acute OT 4 x/wk to improve functional mobility, increase independence with ADLs, and address established goals. Recommending SNF once medically appropriate for discharge to increase maximal independence, reduce burden of care, and ensure safety.    Rehab Prognosis:  Good; patient would benefit from acute skilled OT services to address these deficits and reach maximum level of function.       Plan:     Patient to be seen 4 x/week to address the above listed problems via  "self-care/home management, therapeutic activities, therapeutic exercises, neuromuscular re-education  Plan of Care Expires: 08/23/23  Plan of Care Reviewed with: patient, caregiver    Subjective     Chief Complaint: No complaints  Patient/Family Comments/goals: Pt agreed to therapy  Pain/Comfort:  Pain Rating 1:  (Pt didn't rate)  Location 1:  ("My whole body from my ankles to my neck" while seated EOB)  Pain Addressed 1: Pre-medicate for activity, Reposition, Distraction  Pain Rating Post-Intervention 1:  (Pt didn't rate)    Objective:     Communicated with: NSG prior to session.  Patient found supine with telemetry, peripheral IV upon OT entry to room.    General Precautions: Standard, fall    Orthopedic Precautions:LLE weight bearing as tolerated, RLE weight bearing as tolerated  Braces: N/A  Respiratory Status: Room air     Occupational Performance:     Bed Mobility:    Patient completed Scooting with minimum assistance and increased time to place feet flat on floor  Patient completed Supine to Sit with maximal assistance x2  Patient completed Sit to Supine with maximal assistance  x2    Functional Mobility/Transfers:  Patient completed Sit <> Stand Transfer with maximal assistance x2 assist from EOB. After coming to sit pt immediately demonstrating strong posterior lean in attempt to lay down.    Activities of Daily Living:  Grooming: stand by assistance seated EOB to wash face with wash cloth. Total assist to comb through hair seated EOB  Upper Body Dressing: minimum assistance to don back gown while seated EOB, max A to doff back gown supine in bed    [facility] 6 Click ADL: 12    Treatment & Education:  Role of OT and POC  ADL retraining  Functional mobility training  Safety  Discharge planning  Importance EOB/OOB activity    Pt engaged in dynamic sit balance exercises by performing two trials of reaching on L and R side. Pt able to main sit balance with SBA during trials.    Co-treatment performed due to " patient's multiple deficits requiring two skilled therapists to appropriately and safely assess patient's strength and endurance while facilitating functional tasks in addition to accommodating for patient's activity tolerance.     Patient left supine with all lines intact, call button in reach, bed alarm on, caregiver present, and all needs met.    GOALS:   Multidisciplinary Problems       Occupational Therapy Goals          Problem: Occupational Therapy    Goal Priority Disciplines Outcome Interventions   Occupational Therapy Goal     OT, PT/OT Ongoing, Progressing    Description: Goals to be met by: 8/7/2023     Patient will increase functional independence with ADLs by performing:    UE Dressing with Minimal Assistance.  LE Dressing with Moderate Assistance.  Grooming while EOB with Moderate Assistance.  Supine to sit with Minimal Assistance.  Stand pivot transfers with Minimal Assistance.                         Time Tracking:     OT Date of Treatment: 07/27/23  OT Start Time: 1522  OT Stop Time: 1552  OT Total Time (min): 30 min    Billable Minutes:Self Care/Home Management 30               7/27/2023

## 2023-07-27 NOTE — PT/OT/SLP PROGRESS
Physical Therapy Co-Treatment    Patient Name:  Radha Sandoval   MRN:  [id number]    Recommendations:     Discharge Recommendations: nursing facility, skilled  Discharge Equipment Recommendations: to be determined by next level of care  Barriers to discharge:  increased level of assistance needed    Assessment:     Radha Sandoval is a 88 y.o. female admitted with a medical diagnosis of Multiple closed fractures of pelvis without disruption of pelvic ring.  She presents with the following impairments/functional limitations: weakness, gait instability, decreased upper extremity function, impaired cardiopulmonary response to activity, impaired endurance, decreased lower extremity function, impaired balance, decreased safety awareness, impaired self care skills, impaired cognition, pain, impaired functional mobility, orthopedic precautions. Pt completed bed mobility with maxAx2. Pt has a strong fear of falling and began shaking with anticipation of standing up. Pt encouraged to take deep breaths to decrease fear of movement. Pt sat EOB x 10 minutes with SBA-modA with increased verbal and tactile cuing to lean forward and take deep breaths to calm anxiety/fear of falling. Pt completed STS with maxAx2 with no AD. Pt was then returned to supine with maxAx2.  Pt would benefit from a skilled nursing facility for: Dynamic/static standing/sitting balance through skilled balance training, strengthening with the use of skilled therapeutic exercises interventions, and mobility through adaptive equipment training. Pt continues to benefit from a collaborative multidisciplinary program to improve quality of life and focus on recovery of impairments.     Rehab Prognosis: Good; patient would benefit from acute skilled PT services to address these deficits and reach maximum level of function.    Recent Surgery: Procedure(s) (LRB):  ORIF,PELVIS, rui, bone foam (N/A) 3 Days Post-Op    Plan:     During this hospitalization, patient to be  "seen 4 x/week to address the identified rehab impairments via gait training, therapeutic activities, therapeutic exercises, neuromuscular re-education and progress toward the following goals:    Plan of Care Expires:  08/24/23    Subjective     Chief Complaint: pain "my whole body from my neck to my ankles"  Patient/Family Comments/goals: to improve mobility  Pain/Comfort:  Pain Rating 1:  (pt did not rate)  Location 1:  ("my whole body from my neck to my ankles")  Pain Addressed 1: Pre-medicate for activity, Reposition, Distraction  Pain Rating Post-Intervention 1:  (pt did not rate)      Objective:     Communicated with RN prior to session.  Patient found supine with telemetry, peripheral IV, PureWick upon PT entry to room.     General Precautions: Standard, fall  Orthopedic Precautions: LLE weight bearing as tolerated, RLE weight bearing as tolerated  Braces: N/A  Respiratory Status: Room air     Functional Mobility:  Bed Mobility:     Supine to Sit: maximal assistancex2  Sit to Supine: maximal assistancex2  Scooting to EOB: Yusef  Transfers:     Sit to Stand:  maximal assistancex2 with no AD  Gait: deferred this date due to decreased command following  Balance:   Static sitting: SBA-modA x 10 minutes with increased verbal and tactile cuing to lean forward and take deep breaths to calm anxiety/fear of falling  Dynamic sitting: SBA-modA  Static standing: maxAx2 with increased verbal cuing for upright posture and feet placement  Dynamic standing: not assessed this date due to safety/decreased command following      AM-PAC 6 CLICK MOBILITY  Turning over in bed (including adjusting bedclothes, sheets and blankets)?: 2  Sitting down on and standing up from a chair with arms (e.g., wheelchair, bedside commode, etc.): 2  Moving from lying on back to sitting on the side of the bed?: 2  Moving to and from a bed to a chair (including a wheelchair)?: 1  Need to walk in hospital room?: 1  Climbing 3-5 steps with a railing?: " 1  Basic Mobility Total Score: 9       Treatment & Education:  Pt and caregiver educated on role of therapy, goals of session, and benefits of mobilizing. Pt educated on calling for assistance. All questions answered within PT scope of practice. Co-treatment performed to appropriately and safely accommodate for pt's strength and endurance while facilitating functional tasks while maintaining pt and staff safety.    Patient left supine with HOB elevated with all lines intact, call button in reach, and caregiver present..    GOALS:   Multidisciplinary Problems       Physical Therapy Goals          Problem: Physical Therapy    Goal Priority Disciplines Outcome Goal Variances Interventions   Physical Therapy Goal     PT, PT/OT Ongoing, Progressing     Description: Goals to be met by: 2023     Patient will increase functional independence with mobility by performin. Supine to sit with MInimal Assistance  2. Sit to supine with MInimal Assistance  3. Rolling to Left and Right with Minimal Assistance.  4. Sit to stand transfer with Minimal Assistance  5. Gait  x 10 feet with Moderate Assistance using Rolling Walker.                          Time Tracking:     PT Received On: 23  PT Start Time: 1522     PT Stop Time: 1552  PT Total Time (min): 30 min     Billable Minutes: Therapeutic Activity 15 min and Neuromuscular Re-education 15 min    Treatment Type: Treatment  PT/PTA: PT     Number of PTA visits since last PT visit: 0     2023

## 2023-07-27 NOTE — PLAN OF CARE
Problem: Coping Ineffective  Goal: Effective Coping  Outcome: Ongoing, Progressing     Problem: Coping Ineffective  Goal: Effective Coping  Outcome: Ongoing, Progressing     Problem: Impaired Wound Healing  Goal: Optimal Wound Healing  Outcome: Ongoing, Progressing     Problem: Impaired Wound Healing  Goal: Optimal Wound Healing  Outcome: Ongoing, Progressing     Problem: Fall Injury Risk  Goal: Absence of Fall and Fall-Related Injury  Outcome: Ongoing, Progressing     Problem: Fall Injury Risk  Goal: Absence of Fall and Fall-Related Injury  Outcome: Ongoing, Progressing     Problem: Skin Injury Risk Increased  Goal: Skin Health and Integrity  Outcome: Ongoing, Progressing     Problem: Skin Injury Risk Increased  Goal: Skin Health and Integrity  Outcome: Ongoing, Progressing   Pt unable to express needs.    Incontinent.  Cleaned,  applied barrier cream, and new diaper.  [place] run of PT/OT before resorting to surgery(per Ortho).   Personal sitter at bedside.  Meds given as ordered.  Slept well during the night.  [facility]ident.  Safety maintained.  Bed in low position,  call  light in reach.

## 2023-07-28 NOTE — CARE UPDATE
RAPID RESPONSE NURSE PROACTIVE ROUNDING NOTE       Time of Visit: 1730    Admit Date: 2023  LOS: 5  Code Status: Full Code   Date of Visit: 2023  : 1935  Age: 88 y.o.  Sex: female  Race: White  Bed: 18392/87650 A:   MRN: [id number]  Was the patient discharged from an ICU this admission? No   Was the patient discharged from a PACU within last 24 hours? No   Did the patient receive conscious sedation/general anesthesia in last 24 hours? No  Was the patient in the ED within the past 24 hours? No  Was the patient on NIPPV within the past 24 hours? No   Attending Physician: Pablo Haddad III,*  Primary Service: [facility] MED A   Time spent at the bedside: 45 - 60 min    SITUATION    Notified by telemetry via phone call.  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    Last Vitals:  Temp: 98.8 °F (37.1 °C) ( 1724)  Pulse: 98 ( 1745)  Resp: 18 ( 1724)  BP: 147/99 ( 1724)  SpO2: 94 % ( 172)    24 Hours Vitals Range:  Temp:  [97.5 °F (36.4 °C)-98.8 °F (37.1 °C)]   Pulse:  []   Resp:  [18-22]   BP: (114-150)/(57-99)   SpO2:  [90 %-96 %]     Labs:  Recent Labs     23  0509 23  0718   WBC 7.11 5.90   HGB 9.1* 9.2*   HCT 28.3* 29.8*    256       Recent Labs     23  0509 23  0933 23  0717    142 141   K 3.3* 3.4* 3.8     103 104   CO2 27 29 28   CREATININE 0.8 0.7 0.7   GLU 89 108 98   MG  --  1.5* 1.9        Recent Labs     07/27/23  1410 07/28/23  1409   PH 7.484* 7.510*   PCO2 41.6 37.1   PO2 76* 64*   HCO3 31.2* 29.6*   POCSATURATED 96 94*   BE 8 7        ASSESSMENT    Physical Exam  Vitals reviewed.   Constitutional:       General: She is in acute distress.      Appearance: She is ill-appearing.   Cardiovascular:      Rate and Rhythm: Regular rhythm. Tachycardia present.      Pulses: Normal pulses.   Pulmonary:      Effort: Tachypnea and accessory muscle usage present.      Breath sounds: Transmitted upper airway sounds present.   Abdominal:      Palpations: Abdomen is soft.   Skin:     General: Skin is warm and dry.      Coloration: Skin is pale.   Neurological:      Mental Status: She is alert. Mental status is at baseline. She is disoriented.      GCS: GCS eye subscore is 4. GCS verbal subscore is 3. GCS motor subscore is 5.      Motor: Tremor present.   Psychiatric:         Mood and Affect: Mood is anxious.         Behavior: Behavior is agitated.       INTERVENTIONS    The patient was seen for Cardiac problem. Staff concerns included tachycardia. The following interventions were performed: continuous cardiac monitoring and continuous pulse ox monitoring .    - PIV insertion  - ABG  - IV lasix  -Breathing treatment    RECOMMENDATIONS    - Continuous cardiac and pulse ox monitoring  - Maintain IV access  - Titrate oxygen to maintain SPO2 > 92%  -Strict NPO  -Maintain HOB elevation  -SLP consult  Transfer to Step down unit      PROVIDER ESCALATION    Yes/No  Yes    Orders received and case discussed with Dr. Haddad  & ADEEL Cho.    Disposition: Remain in room 78590.    FOLLOW-UP    Bedside RNJiaro  updated on plan of care. Instructed to call the Rapid Response Nurse, Irene Panchal RN at [phone] for additional questions or concerns.

## 2023-07-28 NOTE — ASSESSMENT & PLAN NOTE
Recurrent issue  - initial UA reviewed - grossly infectious  - urine culture with Candida glabrata 10 K to 49 K units not consistent with convincing UTI.  - duncan placed for U-retention (infection suspected cause)   - completed Rocephin  - AFVSS, no leukocytosis.

## 2023-07-28 NOTE — PLAN OF CARE
Problem: Adult Inpatient Plan of Care  Goal: Plan of Care Review  Outcome: Ongoing, Progressing  Goal: Patient-Specific Goal (Individualized)  Outcome: Ongoing, Progressing     Problem: Adult Inpatient Plan of Care  Goal: Plan of Care Review  Outcome: Ongoing, Progressing     Problem: Adult Inpatient Plan of Care  Goal: Patient-Specific Goal (Individualized)  Outcome: Ongoing, Progressing     Problem: Fall Injury Risk  Goal: Absence of Fall and Fall-Related Injury  Outcome: Ongoing, Progressing   Pt unable to express needs.  Extremely confused. Still hallucinating/seeing things not present.   History of multiple falls with multiple fractures.  Trail with PT to hold off surgery for now.  Zyprexa given during the night as scheduled.  Sleeping well. Pure wick in place.  Linen/gown change this morning.   Personal sitter at the bedside.  Meds given as ordered for pain control.   Safety maintained.  Bed in low position,  call  light in reach.

## 2023-07-28 NOTE — SUBJECTIVE & OBJECTIVE
Interval History:  Caretakers says patient is more alert and talkative today.  Dentures are out so she is difficult to understand patient seems to have a fairly coherent conversation with me regarding hammertoes and foot pain.  Patient and caretakers report new cough that sounds productive and intermittent shortness of breath.  Patient does have some wheezing that is audible.    Caretaker says that she is noticed significant decline in patient's cognition over the last several weeks to months.  Caretaker says that the patient had outpatient workup pending for possible Lewy body dementia.    Labs reviewed:  Potassium 3.4, creatinine 0.7, magnesium 1.5    Discussed with psychiatry:  Continue Lexapro 5 daily, memantine 5 b.i.d., Zyprexa p.r.n.    Review of Systems   Unable to perform ROS: Dementia   Objective:     Vital Signs (Most Recent):  Temp: 98 °F (36.7 °C) (07/27/23 2352)  Pulse: 63 (07/28/23 0019)  Resp: 18 (07/27/23 2352)  BP: (!) 150/89 (07/27/23 2352)  SpO2: 95 % (07/27/23 2352) Vital Signs (24h Range):  Temp:  [97.5 °F (36.4 °C)-98 °F (36.7 °C)] 98 °F (36.7 °C)  Pulse:  [] 63  Resp:  [18] 18  SpO2:  [91 %-96 %] 95 %  BP: (134-175)/(60-89) 150/89     Weight: 75.8 kg (167 lb 1.7 oz)  Body mass index is 29.6 kg/m².  No intake or output data in the 24 hours ending 07/28/23 0023        Physical Exam  Vitals and nursing note reviewed.   Constitutional:       General: She is not in acute distress.     Appearance: She is well-developed. She is ill-appearing (chronically ill-appearing).   HENT:      Mouth/Throat:      Mouth: Mucous membranes are moist.      Pharynx: Oropharynx is clear. No oropharyngeal exudate or posterior oropharyngeal erythema.   Cardiovascular:      Rate and Rhythm: Normal rate and regular rhythm.      Heart sounds: Normal heart sounds.   Pulmonary:      Effort: Pulmonary effort is normal. No respiratory distress.      Breath sounds: Normal breath sounds. No wheezing.   Abdominal:       General: Bowel sounds are normal. There is no distension.      Palpations: Abdomen is soft.      Tenderness: There is no abdominal tenderness.   Musculoskeletal:      Cervical back: Normal range of motion and neck supple.   Skin:     General: Skin is warm and dry.   Neurological:      Mental Status: She is alert. Mental status is at baseline.      Sensory: No sensory deficit.      Coordination: Coordination normal.   Psychiatric:         Behavior: Behavior normal.      Comments: Cooperative and conversant this morning. Answering most questions appropriately.

## 2023-07-28 NOTE — PLAN OF CARE
07/28/23 1542   Post-Acute Status   Post-Acute Authorization Placement   Post-Acute Placement Status Pending post-acute provider review/more information requested   Discharge Plan   Discharge Plan A Skilled Nursing Facility   Discharge Plan B Assisted Living         CLAUDIA phoned patient's Son to notify that patient has accepting SNF facilities. SW notified patient's Son that below facilities are willing to accept patient.    [facility]- accepts  [facility]- accepts  [facility]- accepts  [facility]- accepts          Preferred facilities   [facility] - No response  [facility] - accepts  [facility] - denies    SW informed patient's Son and caregiver that [facility] has denied patient. . [facility] has not responded to CLAUDIA's calls to follow up as of today. Patient's Son states that he is unsure if patient has a copay. Patient's son states that patient was [facility] apprx 45days but son unsure if he paid a copay during that time. Patient's son and caregiver are in agreement with moving forward with SNF placement with [facility], pending final co-pay amounts and review of additional information.      If patient unable to D/c to SNF, patient's Son agreeable with patient's return to [facility] facility. Per Oliva with [facility], patient will need full eval done before D/c to facility to determine if patient's current needs can be met at facility. Per [facility], if patient's needs exceed what facility can provide, patient will not be permitted to return to [facility].     CLAUDIA will continue to follow.              RYLEE Giron, LMSW  [facility]  Case Management  Ext. [phone]

## 2023-07-28 NOTE — ASSESSMENT & PLAN NOTE
· Likely progression of dementia.  · Due to cough, workup for possible respiratory infection with CT scan and COVID swab, pending.  · ABG ordered, very mild alkalosis, not contributing.    · Continue breathing treatments.

## 2023-07-28 NOTE — PROGRESS NOTES
CONSULTATION LIAISON PSYCHIATRY PROGRESS NOTE    Patient Name: Radha Sandoval  MRN: [id number]  Patient Class: IP- Inpatient  Admission Date: 7/22/2023  Attending Physician: Pablo Haddad III,*      SUBJECTIVE:   Radha Sandoval is a 88 y.o. female with past medical history of bilateral chronic subdural hematomas, Hypertension, Acute DVT and PE 5/23, frequent falls with recent Pelvic fracture s/p orthopedic intervention & past pertinent psychiatric history of Late Onset Alzheimers with mood disturbance and Delirium presents to the ED/admitted to the hospital for a fall, shortness of breath, and hypoxia.     Patient presented with right pelvic pain after a fall on 7/22 when she was transferred from bed to wheelchair. Due to her fluctuating mentation she has been agitated with nursing staff and tries to leave her bed and resists being transferred from bed to wheelchair, which has resulted in multiple falls.    Psychiatry consulted for persistent insomnia contributing to delirium in patient with dementia, uncontrolled on current regimen       Today, patient was sleeping but easily awakened. Pleasant and conversational.  Personal sitter, Chel was at the bedside. She reported that patient slept well overnight. Patient still with hallucinations throughout the day, which is her baseline. No objective hallucinations observed. She was oriented to person, place, and year. Patient with slurred, incomprehensible speech at times. Per Chel, at baseline, patient changes her speech pattern depending on her mood. Chel was able to interpret patient's speech for interviewer. Patient able to follow 2 step commands on assessment.     Interval Events: Patient did not require PRN medications overnight for agitation. Patient increased from 2.5 mg zyprexa to zyprexa 5 mg (7/25). Remains on nightly zyprexa 5 mg. Per sitter, patient slept well last night.      OBJECTIVE:    Mental Status Exam:  General Appearance: unremarkable, dressed  "in hospital garb, in no acute distress  Behavior: friendly, pleasant, polite  Involuntary Movements and Motor Activity: +akathisia  Gait and Station: unable to assess - patient lying down or seated  Speech and Language: spontaneous, talkative, incoherent, mumbling  Mood: "good"  Affect: appropriate to situation and context  Thought Process and Associations: Unable to Assess  Thought Content and Perceptions:: no suicidal ideation, no homicidal ideation, no hallucinations  Sensorium and Orientation: oriented to person and place, oriented partially to time  Recent and Remote Memory: impaired  Attention and Concentration: easily distractible  Fund of Knowledge: impaired  Insight: poor awareness of illness  Judgment: impaired, behavior is inappropriate to the circumstances, noncompliant with health provider's recommendations and instructions          ASSESSMENT & RECOMMENDATIONS   Late Onset Alzheimer's with mood disturbance  Delirium     DELIRIUM  PSYCH MEDICATIONS  Continue zyprexa 5 mg po nightly IM or IV if QTC <480  Recommend continuous EKG monitoring  Continue Lexapro 5 mg po QD  Continue Memantine 5 mg po BID  PRN - Zyprexa 2.5 mg po, IM, or IV Q8 prn for non redirectable agitation or aggression if QTC <480  Continue to monitor EKGs, Qtc with antipsychotics     DELIRIUM BEHAVIOR MANAGEMENT  PLEASE utilize CHEMICAL restraints with PRN meds first for agitation. Minimize use of PHYSICAL restraints OR have periods of being out of physical restraints if possible.  Keep window shades open and room lit during day and room dim at night in order to promote normal sleep-wake cycles  Encourage family at bedside. Carroll patient often to situation, location, date.  Continue to Limit or Discontinue use of Narcotics, Benzos and Anti-cholinergic medications as they may worsen delirium.  Continue medical workup for causative etiology of Delirium.      RISK ASSESSMENT  NO NEED FOR PEC at this time. Will continue to reassess.    "   FOLLOW UP  Will follow up while in house     DISPOSITION - once medically cleared:  Defer to medical team         Please contact ON CALL psychiatry service (24/7) for any acute issues that may arise.    Dr. Adalberto Sarmiento   Psychiatry  [facility]  7/27/2023 9:59 PM   I , Steve Oakley MD, have reviewed the attached history and exam . Pt examined personally by me on 7-27-23  . The case discussed with the examining psychiatry resident physician . I agree the assessment and recommended treatment plan .       --------------------------------------------------------------------------------------------------------------------------------------------------------------------------------------------------------------------------------------    CONTINUED OBJECTIVE clinical data & findings reviewed and noted for above decision making    Current Medications:   Scheduled Meds:    acetaminophen  1,000 mg Oral Q8H    albuterol-ipratropium  3 mL Nebulization Q6H WAKE    ascorbic acid (vitamin C)  250 mg Oral Daily    enoxparin  40 mg Subcutaneous Q24H (prophylaxis, 1700)    EScitalopram oxalate  5 mg Oral Daily    furosemide  20 mg Oral BID    hydrALAZINE  5 mg Intravenous Once    lisinopriL  40 mg Oral Daily    memantine  5 mg Oral BID    methocarbamoL  500 mg Oral QID    metoprolol tartrate  25 mg Oral BID    miconazole NITRATE 2 %   Topical (Top) BID    OLANZapine  5 mg Intramuscular QHS    polyethylene glycol  17 g Oral BID    senna-docusate 8.6-50 mg  1 tablet Oral BID    tamsulosin  0.4 mg Oral Nightly    vitamin D  1,000 Units Oral Daily    zinc sulfate  220 mg Oral Daily     PRN Meds: aluminum-magnesium hydroxide-simethicone, glucagon (human recombinant), glucose, glucose, hydrALAZINE, lactulose, loperamide, melatonin, naloxone, OLANZapine, prochlorperazine, sodium chloride 0.9%, sodium chloride 0.9%    Allergies:   Review of patient's allergies indicates:  No Known Allergies    Vitals  Vitals:     07/27/23 2036   BP:    Pulse: 77   Resp: 18   Temp:        Labs/Imaging/Studies:  Recent Results (from the past 24 hour(s))   BNP    Collection Time: 07/27/23  5:09 AM   Result Value Ref Range     (H) 0 - 99 pg/mL   POCT glucose    Collection Time: 07/27/23  7:51 AM   Result Value Ref Range    POCT Glucose 97 70 - 110 mg/dL   Basic metabolic panel    Collection Time: 07/27/23  9:33 AM   Result Value Ref Range    Sodium 142 136 - 145 mmol/L    Potassium 3.4 (L) 3.5 - 5.1 mmol/L    Chloride 103 95 - 110 mmol/L    CO2 29 23 - 29 mmol/L    Glucose 108 70 - 110 mg/dL    BUN 13 8 - 23 mg/dL    Creatinine 0.7 0.5 - 1.4 mg/dL    Calcium 8.3 (L) 8.7 - 10.5 mg/dL    Anion Gap 10 8 - 16 mmol/L    eGFR >60.0 >60 mL/min/1.73 m^2   Magnesium    Collection Time: 07/27/23  9:33 AM   Result Value Ref Range    Magnesium 1.5 (L) 1.6 - 2.6 mg/dL   POCT glucose    Collection Time: 07/27/23 11:36 AM   Result Value Ref Range    POCT Glucose 112 (H) 70 - 110 mg/dL   ISTAT PROCEDURE    Collection Time: 07/27/23  2:10 PM   Result Value Ref Range    POC PH 7.484 (H) 7.35 - 7.45    POC PCO2 41.6 35 - 45 mmHg    POC PO2 76 (L) 80 - 100 mmHg    POC HCO3 31.2 (H) 24 - 28 mmol/L    POC BE 8 -2 to 2 mmol/L    POC SATURATED O2 96 95 - 100 %    POC TCO2 32 (H) 23 - 27 mmol/L    Sample ARTERIAL     Site RR     Allens Test Pass    POCT glucose    Collection Time: 07/27/23  4:15 PM   Result Value Ref Range    POCT Glucose 127 (H) 70 - 110 mg/dL   COVID-19 Routine Screening    Collection Time: 07/27/23  4:25 PM   Result Value Ref Range    SARS-CoV2 (COVID-19) Qualitative PCR Not Detected Not Detected     Imaging Results              CT Head Without Contrast (Final result)  Result time 07/23/23 08:24:54      Final result by Bill Tarango MD (07/23/23 08:24:54)                   Impression:        Similar volume of subdural hematomas.  Changes of density may be in part technical/artifactual in nature and also due to some leakage of recent  intravenous contrast from recent CT of the abdomen pelvis with no focal hyperdense acute hemorrhage identified.  Follow-up as clinically warranted.      Electronically signed by: Bill Tarango  Date:    07/23/2023  Time:    08:24               Narrative:    EXAMINATION:  CT HEAD WITHOUT CONTRAST    CLINICAL HISTORY:  Head trauma, minor (Age >= 65y);    TECHNIQUE:  Low dose axial images were obtained through the head.  Coronal and sagittal reformations were also performed. Contrast was not administered.    COMPARISON:  07/23/2023, 07/09/2023    FINDINGS:  Bilateral subacute subdural hematomas are noted bilaterally again identified measuring up to 14 mm on  the left, similar in volume.  No new focal hyperdense hemorrhage is identified.  Overall mild increased density of the hematoma may in part be technical/artifactual in nature as well as due to leakage of recent intravenous contrast as similar appearances were noted previously (on 07/09/2023 and 07/10/2023).    3 mm midline shift to the right is similar in appearance.  The basal cisterns remain patent.  No acute intraparenchymal process.    Prominent atherosclerotic vascular calcifications are noted at the skull base.    The visualized sinuses and mastoid air cells are essentially clear.                                       CT Chest Abdomen Pelvis With Contrast (xpd) (Final result)  Result time 07/23/23 02:32:32   Procedure changed from CT Abdomen Pelvis With Contrast     Final result by Chuy Mckeon MD (07/23/23 02:32:32)                   Impression:      Similar appearance of recent fractures involving the right inferior and superior pubic rami.  Increased conspicuity of essentially nondisplaced recent fractures of the right michelle sacrum and anterior aspect of the right acetabulum.    Motion and artifact limited study with suboptimal bolus timing.    Moderate-sized hiatal hernia with moderate lower esophageal wall thickening.  Suggest correlation for  esophagitis.    Peribronchial thickening and questionable minimal patchy ground-glass opacities in the lung bases and bibasilar subsegmental atelectasis.    Nodular soft tissue opacities in the left breast.  Neoplasm not excluded.  Suggest correlation with physical exam and mammographic follow-up when clinically warranted.    Anasarca.    Mild nonspecific wall thickening of the inferior aspect of the urinary bladder.  Suggest correlation with urinalysis.    Multiple additional findings discussed in the body of the report.      Electronically signed by: Chuy Mckeon MD  Date:    07/23/2023  Time:    02:32               Narrative:    EXAMINATION:  CT CHEST ABDOMEN PELVIS WITH CONTRAST (XPD)    CLINICAL HISTORY:  Abdominal trauma, blunt;    TECHNIQUE:  Low dose axial images, sagittal and coronal reformations were obtained from the thoracic inlet to the pubic symphysis following the IV administration of 75 mL of Omnipaque 350 .  Oral contrast was not given.    COMPARISON:  CTA chest, 07/08/2023.  CT pelvis, 07/08/2023.  CT abdomen pelvis, 05/06/2023.    FINDINGS:  Chest:    Exam quality is limited by suboptimal bolus timing and motion.  Evaluation is limited by extensive streak artifact due to the patient's arms overlying the field of view.    Heart is borderline enlarged and similar to the prior study.  Coronary artery and mitral valve calcifications.  Thoracic aorta is stable in caliber with moderate to advanced calcific atherosclerosis.  No central pulmonary embolus.  No bulky mediastinal lymphadenopathy.    Detailed evaluation of the pulmonary parenchyma limited by motion.  There is peribronchial thickening and questionable minimal patchy ground-glass opacities in the lung bases.  Bibasilar subsegmental atelectasis.  No consolidation or pleural effusion.    Moderate-sized hiatal hernia with moderate lower esophageal wall thickening.    Abdomen:    Exam quality is limited by suboptimal bolus timing, motion, and  extensive artifact related to the patient's arms overlying the field of view.    Liver is similar in size and contour when compared with the prior study.  Multiple hepatic hypodensities most suggestive of cysts.  Gallbladder is unremarkable.  No intrahepatic biliary ductal dilatation.    Spleen, adrenals, and pancreas are stable and negative for acute finding allowing for significant motion.    Kidneys are stable.  There is a large left renal cyst.  Small stone or vascular calcification in the right renal hilum.  No hydronephrosis.    Evaluation for bowel inflammation is limited by motion.  No small bowel obstruction.  Mild stool burden in the colon.    No pneumoperitoneum or organized fluid collection.    No bulky retroperitoneal lymphadenopathy.    Abdominal aorta is similar in caliber with moderate to advanced calcific atherosclerosis involving the aorta and major branch vessels.    Portal vein is grossly patent.    Pelvis:    Urinary bladder is mildly distended and there is mild wall thickening along the inferior aspect of the urinary bladder similar to the prior CT abdomen.  Rectum is unremarkable.  There is minimal presacral fat stranding.  No significant pelvic free fluid.    Bones and soft tissues:    Recent fractures involving the right superior and inferior pubic rami similar to prior CT pelvis.  Suspect nondisplaced fracture of the right michelle sacrum, more conspicuous when compared with prior CT pelvis.  Nondisplaced fracture of the anterior aspect of the right acetabulum (coronal series 606, image 129) is also more conspicuous when compared with the prior study.  No additional acute fracture.  Degenerative changes in the spine and pubic symphysis.  Mild anterolisthesis of L4 with respect to L5.  Mild left convex scoliotic curvature of the spine.  Old right lower rib fractures.  Operative changes of presumed right lower abdominal wall hernia repair.  Mild diffuse body wall edema.  Somewhat nodular soft  tissue densities in the left breast soft tissues.  Suggest follow-up mammogram.                                       CT Head Without Contrast (Final result)  Result time 07/23/23 02:16:47      Final result by Maykel Castro MD (07/23/23 02:16:47)                   Impression:      Subtle increase in density of the subdural hygromas suggesting acute on chronic subdural fluid collection.    Consider follow-up CT scan per protocol in patient with small acute subdural hematoma on chronic subdural hygromas.      Electronically signed by: Maykel Castro  Date:    07/23/2023  Time:    02:16               Narrative:    EXAMINATION:  CT HEAD WITHOUT CONTRAST    CLINICAL HISTORY:  Head trauma, minor (Age >= 65y);    TECHNIQUE:  Low dose axial CT images obtained throughout the head without intravenous contrast. Sagittal and coronal reconstructions were performed.    COMPARISON:  None.    FINDINGS:  Intracranial compartment:    Ventricles and sulci are stable in size for age without evidence of hydrocephalus. Subdural hygromas appear increased in density slightly suggesting acute on chronic subdural hematoma.    The brain parenchyma appears stable with involutional and chronic small vessel type changes again noted..  No parenchymal mass, hemorrhage, edema or major vascular distribution infarct.    Skull/extracranial contents (limited evaluation): No fracture. Mastoid air cells and paranasal sinuses are essentially clear.                                       CT Cervical Spine Without Contrast (Final result)  Result time 07/23/23 03:01:55      Final result by Chuy Mckeon MD (07/23/23 03:01:55)                   Impression:      No evidence of acute fracture or acute osseous abnormality.    Osteopenia and stable degenerative changes.    Additional findings discussed in the body of the report.    Electronically signed by resident: Anahi Gutierrez  Date:    07/23/2023  Time:    02:02    Electronically signed by: Chuy  MD Linda  Date:    07/23/2023  Time:    03:01               Narrative:    EXAMINATION:  CT CERVICAL SPINE WITHOUT CONTRAST    CLINICAL HISTORY:  Neck trauma (Age >= 65y);    TECHNIQUE:  Low dose axial images, sagittal and coronal reformations were performed though the cervical spine.  Contrast was not administered.    COMPARISON:  CT 04/18/2023 and 07/09/2023.    FINDINGS:  Alignment: Normal.    Vertebrae: Osteopenia.  No fracture.  Lucent area involving the left C4 facet without associated cortical disruption, unchanged dating back to 04/18/2023.  Stable mild height loss of the superior endplate of T4 vertebral body.    Discs: Multilevel disc height loss, most pronounced at C5-C6.    C1-2: Dens is intact.  Pre-dens space is maintained.    Skull base and craniocervical junction: Normal.    Degenerative findings:    Stable appearance of mild multilevel degenerative changes through the cervical spine.  There are several levels demonstrating mild to moderate facet arthropathy and mild uncovertebral spurring resulting in areas of mild neural foraminal narrowing.  No areas of spinal canal stenosis.    Paraspinal muscles & soft tissues: Evaluation of the lung apices is severely limited by respiratory motion artifact.  Dense calcific atherosclerosis of the aortic arch.  Soft tissues of the thoracic inlet are somewhat distorted secondary to motion artifact.                                       X-Ray Pelvis Routine AP (Final result)  Result time 07/23/23 01:54:57   Procedure changed from X-Ray Hip 2 or 3 views Right (with Pelvis when performed)     Final result by Chuy Mckeon MD (07/23/23 01:54:57)                   Impression:      Similar alignment of recent fractures involving the right superior and inferior pubic rami.  No new acute displaced fracture.      Electronically signed by: Chuy Mckeon MD  Date:    07/23/2023  Time:    01:54               Narrative:    EXAMINATION:  XR PELVIS ROUTINE AP; XR FEMUR  2 VIEW RIGHT    CLINICAL HISTORY:  HIP PAIN;  Pain in right hip; Pain in right leg    TECHNIQUE:  Three frontal views of the pelvis performed.  Two views of the right femur also obtained.    COMPARISON:  CT pelvis, 07/08/2023.    FINDINGS:  Pelvis: Bones are mildly demineralized.  Similar appearance of mildly displaced recent fracture of the right inferior pubic ramus and nondisplaced fracture of the right superior pubic ramus.  Similar fracture fragment alignment allowing for differences in positioning.  No new acute fracture.  No dislocation.  Mild degenerative changes in both hips.    Right femur: No additional acute fracture or dislocation other than described above.  Degenerative changes in the right hip and right knee.  Atherosclerotic vascular calcifications.  Soft tissue edema overlying the right hip.  No unexpected radiopaque foreign body.                                       X-Ray Femur 2 AP/LAT Right (Final result)  Result time 07/23/23 01:54:57      Final result by Chuy Mckeon MD (07/23/23 01:54:57)                   Impression:      Similar alignment of recent fractures involving the right superior and inferior pubic rami.  No new acute displaced fracture.      Electronically signed by: Chuy Mckeon MD  Date:    07/23/2023  Time:    01:54               Narrative:    EXAMINATION:  XR PELVIS ROUTINE AP; XR FEMUR 2 VIEW RIGHT    CLINICAL HISTORY:  HIP PAIN;  Pain in right hip; Pain in right leg    TECHNIQUE:  Three frontal views of the pelvis performed.  Two views of the right femur also obtained.    COMPARISON:  CT pelvis, 07/08/2023.    FINDINGS:  Pelvis: Bones are mildly demineralized.  Similar appearance of mildly displaced recent fracture of the right inferior pubic ramus and nondisplaced fracture of the right superior pubic ramus.  Similar fracture fragment alignment allowing for differences in positioning.  No new acute fracture.  No dislocation.  Mild degenerative changes in both  "hips.    Right femur: No additional acute fracture or dislocation other than described above.  Degenerative changes in the right hip and right knee.  Atherosclerotic vascular calcifications.  Soft tissue edema overlying the right hip.  No unexpected radiopaque foreign body.                                       X-Ray Chest AP Portable (Final result)  Result time 07/23/23 01:47:54      Final result by Chuy Mckeon MD (07/23/23 01:47:54)                   Impression:      No detrimental change when compared with 07/08/2023.      Electronically signed by: Chuy Mckeon MD  Date:    07/23/2023  Time:    01:47               Narrative:    EXAMINATION:  XR CHEST AP PORTABLE    CLINICAL HISTORY:  Provided history is "Sepsis;  ".    TECHNIQUE:  One view of the chest.    COMPARISON:  07/08/2023.    FINDINGS:  Cardiac wires overlie the chest.  Patient is slightly rotated.  Cardiomediastinal silhouette is stable and may be at the upper limits of normal in size.  Atherosclerotic calcifications overlie the aortic arch.  Right hemidiaphragm is slightly elevated as seen previously.  Azygous lobe configuration is incidentally noted in the right lung apex.  No confluent area of consolidation.  No sizable pleural effusion.  No pneumothorax.  Hiatal hernia again noted.                                     "

## 2023-07-28 NOTE — PLAN OF CARE
NURSING HOME ORDERS    07/28/2023  [facility]  [facility] - INTENSIVE CARE ([facility])  [address]  [facility] [address]  Dept: [phone]  Loc: [phone]     Admit to Nursing Home:  SNF    Diagnoses:  Active Hospital Problems    Diagnosis  POA    *Multiple closed right sided fractures of pelvis without disruption of pelvic ring [S32.82XA]  Yes     Priority: 1 - High    Closed pelvic ring fracture [S32.810A]  Yes    Suicidal ideation [R45.851]  Not Applicable    Encounter for palliative care [Z51.5]  Not Applicable    Frequent falls [R29.6]  Not Applicable    Prolonged Q-T interval on ECG [R94.31]  Yes    Acute subdural hematoma [S06.5XAA]  Yes    Urinary retention [R33.9]  Yes    Hypokalemia [E87.6]  Yes    Goals of care, counseling/discussion [Z71.89]  Not Applicable    Acute metabolic encephalopathy [G93.41]  Yes    Subdural hygroma [G96.08]  Yes    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       Patient is homebound due to:  Multiple closed fractures of pelvis without disruption of pelvic ring    Allergies:Review of patient's allergies indicates:  No Known Allergies    Vitals:  Routine    Diet: soft diet    Activities:   Activity as tolerated    Goals of Care Treatment Preferences:  Code Status: Full Code    Health care agent: Maykel Sandoval  [facility] agent number: [phone]       LaPOST: Yes  What is most important right now is to focus on quality of life, even if it means sacrificing a little time, improvement in condition but with limits to invasive therapies.  Accordingly, we have decided that the best plan to meet the patient's goals includes continuing with treatment.      Labs:  Every Monday CBC and BMP with [facility]    Nursing  Precautions:  Aspiration , Fall, and Pressure ulcer prevention    Consults:   PT to evaluate and treat- 5 times a week, OT to evaluate and treat- 5 times a week, and ST to evaluate and treat- 5 times a week     WEIGHT BEARING:  WBAT bilateral lower extremities with walker and assistance as needed    Miscellaneous Care: Routine Skin for Bedridden Patients:  Apply moisture barrier cream to all                 Medications: Discontinue all previous medication orders, if any. See new list below.     Medication List        START taking these medications      * OLANZapine injection  Commonly known as: ZyPREXA  Inject 5 mg into the muscle every evening.     * OLANZapine injection  Commonly known as: ZyPREXA  Inject 2.5 mg into the muscle every 8 (eight) hours as needed for Agitation.     polyethylene glycol 17 gram Pwpk  Commonly known as: GLYCOLAX  Take 17 g by mouth 2 (two) times daily as needed.           * This list has 2 medication(s) that are the same as other medications prescribed for you. Read the directions carefully, and ask your doctor or other care provider to review them with you.                CHANGE how you take these medications      apixaban 5 mg Tab  Commonly known as: ELIQUIS  Take 1 tablet (5 mg total) by mouth 2 (two) times daily. Starting 8/5  Start taking on: August 5, 2023  What changed:   additional instructions  These instructions start on August 5, 2023. If you are unsure what to do until then, ask your doctor or other care provider.     EScitalopram oxalate 5 MG Tab  Commonly known as: LEXAPRO  Take 2 tablets (10 mg total) by mouth once daily.  What changed: how much to take     haloperidoL 0.5 MG tablet  Commonly known as: HALDOL  Take 1 tablet (0.5 mg total) by mouth every evening.  What changed:   when to take this  reasons to take this            CONTINUE taking these medications      acetaminophen 325 MG tablet  Commonly known as: TYLENOL  Take 2 tablets (650 mg total) by mouth every 6  (six) hours as needed (Pain).     ascorbic acid (vitamin C) 250 MG tablet  Commonly known as: VITAMIN C  Take 1 tablet (250 mg total) by mouth once daily.     furosemide 20 MG tablet  Commonly known as: LASIX  Take 1 tablet (20 mg total) by mouth 2 (two) times daily.     lisinopriL 40 MG tablet  Commonly known as: PRINIVIL,ZESTRIL  Take 1 tablet (40 mg total) by mouth once daily.     loperamide 2 mg capsule  Commonly known as: IMODIUM  Take 1 capsule (2 mg total) by mouth 4 (four) times daily as needed for Diarrhea.     melatonin 3 mg tablet  Commonly known as: MELATIN  Take 2 tablets (6 mg total) by mouth nightly as needed for Insomnia.     memantine 5 MG Tab  Commonly known as: NAMENDA  Take 1 tablet (5 mg total) by mouth 2 (two) times daily.     methocarbamoL 500 MG Tab  Commonly known as: ROBAXIN  Take 1 tablet (500 mg total) by mouth 3 (three) times daily as needed (muscle spasms, pain scale 5-7).     metoprolol tartrate 25 MG tablet  Commonly known as: LOPRESSOR  Take 1 tablet (25 mg total) by mouth 2 (two) times daily.     miconazole NITRATE 2 % 2 % top powder  Commonly known as: MICOTIN  Apply topically 2 (two) times daily. Underside of bilateral breasts     potassium chloride 10 MEQ Tbsr  Commonly known as: KLOR-CON  Take 1 tablet (10 mEq total) by mouth once daily.     tamsulosin 0.4 mg Cap  Commonly known as: FLOMAX  Take 1 capsule (0.4 mg total) by mouth nightly.     zinc sulfate 50 mg zinc (220 mg) capsule  Commonly known as: ZINCATE  Take 1 capsule (220 mg total) by mouth once daily.                Immunizations Administered as of 7/28/2023       Name Date Dose VIS Date Route Exp Date    COVID-19, MRNA, LN-S, PF (Pfizer) (Purple Cap) 3/10/2021 0.3 mL -- -- --    Lot: [id number]     External: Auto Reconciled From Outside Source     COVID-19, MRNA, LN-S, PF (Pfizer) (Purple Cap) 2/17/2021 0.3 mL -- -- --    Lot: [id number]     External: Auto Reconciled From Outside Source             This patient has had both  covid vaccinations    Some patients may experience side effects after vaccination.  These may include fever, headache, muscle or joint aches.  Most symptoms resolve with 24-48 hours and do not require urgent medical evaluation unless they persist for more than 72 hours or symptoms are concerning for an unrelated medical condition.          _________________________________  Pablo Haddad III, MD  07/28/2023

## 2023-07-28 NOTE — PLAN OF CARE
[facility] - Intensive Care ([facility]  Discharge Reassessment    Primary Care Provider: Primary Doctor No    Expected Discharge Date: 7/28/2023    Reassessment (most recent)       Discharge Reassessment - 07/28/23 1540          Discharge Reassessment    Assessment Type Discharge Planning Reassessment (P)      Did the patient's condition or plan change since previous assessment? Yes (P)      Discharge Plan discussed with: Caregiver;Adult children (P)      Name(s) and Number(s) tony griffith (Son)   [phone]. Cehl (caregiver) [phone] (P)      Communicated TOSHA with patient/caregiver Yes (P)      Discharge Plan A Skilled Nursing Facility (P)      Discharge Plan B Assisted Living (P)      DME Needed Upon Discharge  none (P)      Transition of Care Barriers None (P)      Why the patient remains in the hospital Requires continued medical care (P)         Post-Acute Status    Post-Acute Authorization Placement (P)      Post-Acute Placement Status Pending post-acute provider review/more information requested (P)                      SW will continue to follow.              Elliott Tijerina, RYLEE, LMSW  [facility]  Case Management  Ext. [phone]

## 2023-07-28 NOTE — PROGRESS NOTES
[facility] - Intensive Care ([address] Medicine  Progress Note    Patient Name: Radha Sandoval  MRN: [id number]  Patient Class: IP- Inpatient   Admission Date: 7/22/2023  Length of Stay: 5 days  Attending Physician: Pablo Haddad III,*  Primary Care Provider: Primary Doctor No        Subjective:     Principal Problem:Multiple closed fractures of pelvis without disruption of pelvic ring        HPI:  Radha Sandoval is a 88 y.o. female with PMH significant for chronic BL pleural effusions, recent DVT diagnosed in March '23 (on eliquis), dementia, HTN, with recent hospitalization here at [facility] for pelvic fracture treated non-operatively, who presents after a fall at her nursing home while being transferred from wheelchair to bed. Hx taken per Caretaker Sonia and Son (POA) Maykel. Caretaker at bedside unaware of head trauma or other injury. Of note, patient sustained her pelvic fractures at [facility], but after hospitalization at [facility] was placed in [facility] on 7/14. Caretaker reports patient also had another hospitalization during this time at . Patient generally disoriented and delirious, but she will have periods or even days of clarity. At baseline, she is oriented x2. Due to mentation she has difficulty working with staff which results in falls. Prior to her pelvic fracture on 7/8, pt was able to perform independent transfers from wheelchair and could walk short distances with her walker, but she is now max assist. Currently on Eliquis. Patient without complaint on my exam, denies pain.     In the ED: AFVSS. CBC with baseline anemia. CMP reveals slight elevation in Cr 1.0 (baseline 0.8) and K 3.3. BNP and troponin elevated but at baseline. UA grossly infectious. Spann placed for urinary retention.  All imaging reviewed. 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 Repeat CTH after 6 hours was stable. XR pelvis reveals stable recent R superior and inferior pubic rami fractures,  non-displaced. EKG in NSR with prolonged Qtc 510 ms. Given tylenol, norco, and 1g rocephin.       Overview/Hospital Course:  Radha Sandoval was placed in [facility] observation after a fall. NSGY consulted for evaluation of SDH and determined no need for intervention , will follow up in clinic in 2 weeks. HOLD eliquis until follow up. Orthopedics discussed surgical options with family and recommended ORIF pelvis, but family is declining at this time and would prefer to continue with plan for PT/OT. Therapy assessment; recs for SNF. WBAT. Dvt ppx with 40 lovenox SQ daily (ok per NSGY). Psychiatry provided recs for medication adjustment for insomnia and delirium with prn dosing for agitation, will monitor response. Agitation persists and patient now expressing suicidal ideation when asked to work with therapy. Psychiatry aware of SI, making med adjustments as indicated. Caretaker has been staying at bedside. Monitor EKG daily given prolonged Qtc. Palliative care consulted for assistance with GOC planning with son and care team, appreciate recs. Pain controlled and mentation at baseline.       Interval History:  Caretakers says patient is more alert and talkative today.  Dentures are out so she is difficult to understand patient seems to have a fairly coherent conversation with me regarding hammertoes and foot pain.  Patient and caretakers report new cough that sounds productive and intermittent shortness of breath.  Patient does have some wheezing that is audible.    Caretaker says that she is noticed significant decline in patient's cognition over the last several weeks to months.  Caretaker says that the patient had outpatient workup pending for possible Lewy body dementia.    Labs reviewed:  Potassium 3.4, creatinine 0.7, magnesium 1.5    Discussed with psychiatry:  Continue Lexapro 5 daily, memantine 5 b.i.d., Zyprexa p.r.n.    Review of Systems   Unable to perform ROS: Dementia   Objective:     Vital Signs (Most  Recent):  Temp: 98 °F (36.7 °C) (07/27/23 2352)  Pulse: 63 (07/28/23 0019)  Resp: 18 (07/27/23 2352)  BP: (!) 150/89 (07/27/23 2352)  SpO2: 95 % (07/27/23 2352) Vital Signs (24h Range):  Temp:  [97.5 °F (36.4 °C)-98 °F (36.7 °C)] 98 °F (36.7 °C)  Pulse:  [] 63  Resp:  [18] 18  SpO2:  [91 %-96 %] 95 %  BP: (134-175)/(60-89) 150/89     Weight: 75.8 kg (167 lb 1.7 oz)  Body mass index is 29.6 kg/m².  No intake or output data in the 24 hours ending 07/28/23 0023        Physical Exam  Vitals and nursing note reviewed.   Constitutional:       General: She is not in acute distress.     Appearance: She is well-developed. She is ill-appearing (chronically ill-appearing).   HENT:      Mouth/Throat:      Mouth: Mucous membranes are moist.      Pharynx: Oropharynx is clear. No oropharyngeal exudate or posterior oropharyngeal erythema.   Cardiovascular:      Rate and Rhythm: Normal rate and regular rhythm.      Heart sounds: Normal heart sounds.   Pulmonary:      Effort: Pulmonary effort is normal. No respiratory distress.      Breath sounds: Normal breath sounds. No wheezing.   Abdominal:      General: Bowel sounds are normal. There is no distension.      Palpations: Abdomen is soft.      Tenderness: There is no abdominal tenderness.   Musculoskeletal:      Cervical back: Normal range of motion and neck supple.   Skin:     General: Skin is warm and dry.   Neurological:      Mental Status: She is alert. Mental status is at baseline.      Sensory: No sensory deficit.      Coordination: Coordination normal.   Psychiatric:         Behavior: Behavior normal.      Comments: Cooperative and conversant this morning. Answering most questions appropriately.              Assessment/Plan:      * Multiple closed right sided fractures of pelvis without disruption of pelvic ring  Frequent falls     - diagnosed at last [facility] [place] about 2 weeks ago.   - repeat XRs show stable frcatures, non-displaced   - Ortho consulted in ED;  originally recommended non-op management, but after further discussion recommended ORIF pelvis 7/24. Family declining surgery at this time. Further discussions pending progress with PT/OT.   - weight bearing as tolerated   - pain control with tylenol and robaxin for now. Prn PO oxy 5 for severe pain  - PT/OT consult pending  - patient will likely need SNF v long term placement given max assist requirements and progressive debility, in setting of dementia and behavioral disturbances       Prolonged Q-T interval on ECG  - continue to monitor, relatively stable  - avoid QT prolonging meds   - monitor tele       Frequent falls        Urinary retention  Recurrent issue   - mild elevation in Cr likely d/t retention   - duncan placed, treating UTI   - follow urine cultures   - voiding trial today, will follow response       Goals of care, counseling/discussion  - Caretaker Sonia expresses several concerns regarding patients continual health and function decline  - Recent stay at SNF with progress noted and pt was placed in ELVIN at [facility]. However, snf is not the appropriate level of care for patient given dementia and frequent falls   - [facility] will now require 24 hour sitters for patient to return   -  CLAUDIA consulted for dc planning   - will need to have an in-depth palliative discussion with son to determine best care plan for patient - consult placed   - son agreeable to discussion 7/25, palliative following    Acute metabolic encephalopathy  · Likely progression of dementia.  · Due to cough, workup for possible respiratory infection with CT scan and COVID swab, pending.  · ABG ordered, very mild alkalosis, not contributing.    · Continue breathing treatments.    Hypokalemia  Patient has hypokalemia which is currently uncontrolled. Last electrolytes reviewed-   Recent Labs   Lab 07/24/23  0413 07/25/23  0540   K 3.0* 3.5   . Will replace potassium and monitor electrolytes closely. Continuous telemetry.  - continue daily 10  mEq KCl  - Add PO K replacement with K bicarb         Subdural hygroma  Acute subdural hematoma     - Acute SDH on chronic SD hygroma s/p fall at [facility]; no acute deficits  - repeat CTH stable   - holding eliquis for now - may continue in 2 weeks (per NSGY)   - > NSGY ok's starting LWMH for dvt ppx   - INR wnl  - NSGY cleared. No need for continued monitoring aside from 2 week clinic follow up.         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   - continue memantine  - on home haloperidol 0.5 PO qhs  - will consider starting additional nightly med for agitation; psychiatry consulted for assistance   --> psych recs DC nightly haldol. Start 2.5 mg zyprexa QHS and q8h prn for agitation.    --> 7/25 increase nightly zyprexa to 5 mg   - monitor for improvement   - EKG monitoring   - 7/24-25 Pt reporting SI from patient during session. Caretaker says this is not new, patient expresses this intermittently. Psych aware. Med adjustments made.         Lumbar spondylosis with myelopathy  - tylenol for pain control, robaxin      Acute cystitis with hematuria  Recurrent issue  - initial UA reviewed - grossly infectious  - urine culture with Candida glabrata 10 K to 49 K units not consistent with convincing UTI.  - duncan placed for U-retention (infection suspected cause)   - completed Rocephin  - AFVSS, no leukocytosis.         Primary hypertension  - BP uncontrolled intermittently, as high as 226/90 -- suspect bc patient was agitated and spitting out meds  - continue lisinopril, lopressor, lasix  - PRN IV hydralazine for SBP >180 or if unable to tolerate oral meds.         VTE Risk Mitigation (From admission, onward)         Ordered     enoxaparin injection 40 mg  Every 24 hours         07/24/23 1337     Reason for No Pharmacological VTE Prophylaxis  Once         Question:  Reasons:  Answer:  Risk of Bleeding  Comment:  SDH    07/23/23 0831     IP VTE HIGH RISK PATIENT  Once         07/23/23 0831     Place sequential compression device  Until discontinued         07/23/23 0831                Discharge Planning   TOSHA: 7/28/2023     Code Status: Full Code   Is the patient medically ready for discharge?: No    Reason for patient still in hospital (select all that apply): Patient trending condition, Laboratory test and Pending disposition  Discharge Plan A: Skilled Nursing Facility                  Pablo Haddad III, MD  Department of Hospital Medicine   [facility] - Intensive Care ([place]-16)

## 2023-07-28 NOTE — PROGRESS NOTES
"CONSULTATION LIAISON PSYCHIATRY PROGRESS NOTE    Patient Name: Radha Sandoval  MRN: [id number]  Patient Class: IP- Inpatient  Admission Date: 7/22/2023  Attending Physician: Pablo Haddad III,*      SUBJECTIVE:   Radha Sandoval is a 88 y.o. female with past medical history of bilateral chronic subdural hematomas, Hypertension, Acute DVT and PE 5/23, frequent falls with recent Pelvic fracture s/p orthopedic intervention & past pertinent psychiatric history of Late Onset Alzheimers with mood disturbance and Delirium presents to the ED/admitted to the hospital for a fall, shortness of breath, and hypoxia.     Patient presented with right pelvic pain after a fall on 7/22 when she was transferred from bed to wheelchair. Due to her fluctuating mentation she has been agitated with nursing staff and tries to leave her bed and resists being transferred from bed to wheelchair, which has resulted in multiple falls.     Psychiatry consulted for persistent insomnia contributing to delirium in patient with dementia, uncontrolled on current regimen    Today, patient was talkative/conversational, baseline speech pattern. Sitter at bedside stated that the patient was restless last night and had been hallucinating throughout the day. No hallucinations observed on assessment.     Interval Events: Patient continuing to work with PT/OT. Per nursing, this morning, patient is confused and hallucinating.     OBJECTIVE:    Mental Status Exam:  General Appearance: unremarkable, dressed in hospital garb, in no acute distress  Behavior: friendly, pleasant, polite  Involuntary Movements and Motor Activity: +akathisia  Gait and Station: unable to assess - patient lying down or seated  Speech and Language: spontaneous, talkative, incoherent at times, mumbling  Mood: "good"  Affect: appropriate to situation and context  Thought Process and Associations: Unable to Assess  Thought Content and Perceptions:: no hallucinations observed on " assessment  Sensorium and Orientation: oriented to person, disoriented to place  Recent and Remote Memory: impaired  Attention and Concentration: easily distractible  Fund of Knowledge: impaired  Insight: impaired, limited  Judgment: impaired, limited        ASSESSMENT & RECOMMENDATIONS   Late Onset Alzheimer's with mood disturbance    DELIRIUM  PSYCH MEDICATIONS  Continue zyprexa 5 mg po nightly IM or IV   Continue Lexapro 5 mg po QD  Continue Memantine 5 mg po BID  PRN - Zyprexa 2.5 mg po, IM, or IV Q8 prn for non redirectable agitation or aggression if QTC <480  Continue to monitor EKGs, Qtc with antipsychotics     DELIRIUM BEHAVIOR MANAGEMENT  PLEASE utilize CHEMICAL restraints with PRN meds first for agitation. Minimize use of PHYSICAL restraints OR have periods of being out of physical restraints if possible.  Keep window shades open and room lit during day and room dim at night in order to promote normal sleep-wake cycles  Encourage family at bedside. [place] patient often to situation, location, date.  Continue to Limit or Discontinue use of Narcotics, Benzos and Anti-cholinergic medications as they may worsen delirium.  Continue medical workup for causative etiology of Delirium.      RISK ASSESSMENT  NO NEED FOR PEC at this time. Will continue to reassess.      FOLLOW UP  Will follow up while in house     DISPOSITION - once medically cleared:  Defer to medical team  Please contact ON CALL psychiatry service (24/7) for any acute issues that may arise.    Dr. Adalberto HERNANDES Psychiatry  [facility]  7/28/2023 11:02 AM        --------------------------------------------------------------------------------------------------------------------------------------------------------------------------------------------------------------------------------------    CONTINUED OBJECTIVE clinical data & findings reviewed and noted for above decision making    Current Medications:   Scheduled Meds:     acetaminophen  1,000 mg Oral Q8H    albuterol-ipratropium  3 mL Nebulization Q6H WAKE    ascorbic acid (vitamin C)  250 mg Oral Daily    enoxparin  40 mg Subcutaneous Q24H (prophylaxis, 1700)    EScitalopram oxalate  5 mg Oral Daily    furosemide  20 mg Oral BID    hydrALAZINE  5 mg Intravenous Once    lisinopriL  40 mg Oral Daily    memantine  5 mg Oral BID    methocarbamoL  500 mg Oral QID    metoprolol tartrate  25 mg Oral BID    miconazole NITRATE 2 %   Topical (Top) BID    OLANZapine  5 mg Intramuscular QHS    polyethylene glycol  17 g Oral BID    senna-docusate 8.6-50 mg  1 tablet Oral BID    tamsulosin  0.4 mg Oral Nightly    vitamin D  1,000 Units Oral Daily    zinc sulfate  220 mg Oral Daily     PRN Meds: aluminum-magnesium hydroxide-simethicone, glucagon (human recombinant), glucose, glucose, hydrALAZINE, lactulose, loperamide, melatonin, naloxone, OLANZapine, prochlorperazine, sodium chloride 0.9%, sodium chloride 0.9%    Allergies:   Review of patient's allergies indicates:  No Known Allergies    Vitals  Vitals:    07/28/23 0951   BP:    Pulse:    Resp:    Temp: 97.9 °F (36.6 °C)       Labs/Imaging/Studies:  Recent Results (from the past 24 hour(s))   POCT glucose    Collection Time: 07/27/23 11:36 AM   Result Value Ref Range    POCT Glucose 112 (H) 70 - 110 mg/dL   ISTAT PROCEDURE    Collection Time: 07/27/23  2:10 PM   Result Value Ref Range    POC PH 7.484 (H) 7.35 - 7.45    POC PCO2 41.6 35 - 45 mmHg    POC PO2 76 (L) 80 - 100 mmHg    POC HCO3 31.2 (H) 24 - 28 mmol/L    POC BE 8 -2 to 2 mmol/L    POC SATURATED O2 96 95 - 100 %    POC TCO2 32 (H) 23 - 27 mmol/L    Sample ARTERIAL     Site RR     Allens Test Pass    POCT glucose    Collection Time: 07/27/23  4:15 PM   Result Value Ref Range    POCT Glucose 127 (H) 70 - 110 mg/dL   COVID-19 Routine Screening    Collection Time: 07/27/23  4:25 PM   Result Value Ref Range    SARS-CoV2 (COVID-19) Qualitative PCR Not Detected Not Detected   Comprehensive  Metabolic Panel    Collection Time: 07/28/23  7:17 AM   Result Value Ref Range    Sodium 141 136 - 145 mmol/L    Potassium 3.8 3.5 - 5.1 mmol/L    Chloride 104 95 - 110 mmol/L    CO2 28 23 - 29 mmol/L    Glucose 98 70 - 110 mg/dL    BUN 16 8 - 23 mg/dL    Creatinine 0.7 0.5 - 1.4 mg/dL    Calcium 8.4 (L) 8.7 - 10.5 mg/dL    Total Protein 5.4 (L) 6.0 - 8.4 g/dL    Albumin 2.4 (L) 3.5 - 5.2 g/dL    Total Bilirubin 0.4 0.1 - 1.0 mg/dL    Alkaline Phosphatase 138 (H) 55 - 135 U/L    AST 32 10 - 40 U/L    ALT 15 10 - 44 U/L    eGFR >60.0 >60 mL/min/1.73 m^2    Anion Gap 9 8 - 16 mmol/L   Magnesium    Collection Time: 07/28/23  7:17 AM   Result Value Ref Range    Magnesium 1.9 1.6 - 2.6 mg/dL   CBC Auto Differential    Collection Time: 07/28/23  7:18 AM   Result Value Ref Range    WBC 5.90 3.90 - 12.70 K/uL    RBC 3.36 (L) 4.00 - 5.40 M/uL    Hemoglobin 9.2 (L) 12.0 - 16.0 g/dL    Hematocrit 29.8 (L) 37.0 - 48.5 %    MCV 89 82 - 98 fL    MCH 27.4 27.0 - 31.0 pg    MCHC 30.9 (L) 32.0 - 36.0 g/dL    RDW 14.4 11.5 - 14.5 %    Platelets 256 150 - 450 K/uL    MPV 10.9 9.2 - 12.9 fL    Immature Granulocytes 0.3 0.0 - 0.5 %    Gran # (ANC) 4.3 1.8 - 7.7 K/uL    Immature Grans (Abs) 0.02 0.00 - 0.04 K/uL    Lymph # 0.8 (L) 1.0 - 4.8 K/uL    Mono # 0.5 0.3 - 1.0 K/uL    Eos # 0.3 0.0 - 0.5 K/uL    Baso # 0.03 0.00 - 0.20 K/uL    nRBC 0 0 /100 WBC    Gran % 73.4 (H) 38.0 - 73.0 %    Lymph % 12.9 (L) 18.0 - 48.0 %    Mono % 7.6 4.0 - 15.0 %    Eosinophil % 5.3 0.0 - 8.0 %    Basophil % 0.5 0.0 - 1.9 %    Differential Method Automated    Echo    Collection Time: 07/28/23  8:35 AM   Result Value Ref Range    BSA 1.85 m2    TDI SEPTAL 0.04 m/s    LV LATERAL E/E' RATIO 15.13 m/s    LV SEPTAL E/E' RATIO 30.25 m/s    LA WIDTH 3.70 cm    TDI LATERAL 0.08 m/s    LVIDd 3.93 3.5 - 6.0 cm    IVS 0.86 0.6 - 1.1 cm    Posterior Wall 0.82 0.6 - 1.1 cm    LVIDs 2.66 2.1 - 4.0 cm    FS 32 28 - 44 %    LA volume 87.86 cm3    Sinus 2.66 cm    STJ  2.43 cm    Ascending aorta 2.66 cm    LV mass 97.00 g    LA size 5.03 cm    RVDD 2.89 cm    TAPSE 1.80 cm    Left Ventricle Relative Wall Thickness 0.42 cm    AV mean gradient 15 mmHg    AV valve area 1.88 cm2    AV Velocity Ratio 0.52     AV index (prosthetic) 0.60     MV mean gradient 5 mmHg    MV valve area p 1/2 method 3.09 cm2    MV valve area by continuity eq 2.66 cm2    E/A ratio 0.70     Mean e' 0.06 m/s    E wave deceleration time 245.49 msec    LVOT diameter 2.00 cm    LVOT area 3.1 cm2    LVOT peak jose 1.33 m/s    LVOT peak VTI 39.22 cm    Ao peak jose 2.58 m/s    Ao VTI 65.36 cm    LVOT stroke volume 123.15 cm3    AV peak gradient 27 mmHg    E/E' ratio 20.17 m/s    MV Peak E Jose 1.21 m/s    TR Max Jose 3.00 m/s    MV VTI 46.22 cm    MV stenosis pressure 1/2 time 71.19 ms    MV Peak A Jose 1.73 m/s    LV Systolic Volume 26.04 mL    LV Systolic Volume Index 14.4 mL/m2    LV Diastolic Volume 67.10 mL    LV Diastolic Volume Index 37.07 mL/m2    LA Volume Index 48.5 mL/m2    LV Mass Index 54 g/m2    RA Major Axis 4.71 cm    Left Atrium Minor Axis 5.74 cm    Left Atrium Major Axis 5.38 cm    Triscuspid Valve Regurgitation Peak Gradient 36 mmHg    LA Volume Index (Mod) 38.7 mL/m2    LA volume (mod) 70.00 cm3    RA Width 2.71 cm    Right Atrial Pressure (from IVC) 3 mmHg    EF 65 %    TV rest pulmonary artery pressure 39 mmHg    Mitral Valve Heart Rate 76 bpm     Imaging Results              CT Head Without Contrast (Final result)  Result time 07/23/23 08:24:54      Final result by Bill Tarango MD (07/23/23 08:24:54)                   Impression:        Similar volume of subdural hematomas.  Changes of density may be in part technical/artifactual in nature and also due to some leakage of recent intravenous contrast from recent CT of the abdomen pelvis with no focal hyperdense acute hemorrhage identified.  Follow-up as clinically warranted.      Electronically signed by: Bill  Tarango  Date:    07/23/2023  Time:    08:24               Narrative:    EXAMINATION:  CT HEAD WITHOUT CONTRAST    CLINICAL HISTORY:  Head trauma, minor (Age >= 65y);    TECHNIQUE:  Low dose axial images were obtained through the head.  Coronal and sagittal reformations were also performed. Contrast was not administered.    COMPARISON:  07/23/2023, 07/09/2023    FINDINGS:  Bilateral subacute subdural hematomas are noted bilaterally again identified measuring up to 14 mm on  the left, similar in volume.  No new focal hyperdense hemorrhage is identified.  Overall mild increased density of the hematoma may in part be technical/artifactual in nature as well as due to leakage of recent intravenous contrast as similar appearances were noted previously (on 07/09/2023 and 07/10/2023).    3 mm midline shift to the right is similar in appearance.  The basal cisterns remain patent.  No acute intraparenchymal process.    Prominent atherosclerotic vascular calcifications are noted at the skull base.    The visualized sinuses and mastoid air cells are essentially clear.                                       CT Chest Abdomen Pelvis With Contrast (xpd) (Final result)  Result time 07/23/23 02:32:32   Procedure changed from CT Abdomen Pelvis With Contrast     Final result by Chuy Mckeon MD (07/23/23 02:32:32)                   Impression:      Similar appearance of recent fractures involving the right inferior and superior pubic rami.  Increased conspicuity of essentially nondisplaced recent fractures of the right michelle sacrum and anterior aspect of the right acetabulum.    Motion and artifact limited study with suboptimal bolus timing.    Moderate-sized hiatal hernia with moderate lower esophageal wall thickening.  Suggest correlation for esophagitis.    Peribronchial thickening and questionable minimal patchy ground-glass opacities in the lung bases and bibasilar subsegmental atelectasis.    Nodular soft tissue opacities in  the left breast.  Neoplasm not excluded.  Suggest correlation with physical exam and mammographic follow-up when clinically warranted.    Anasarca.    Mild nonspecific wall thickening of the inferior aspect of the urinary bladder.  Suggest correlation with urinalysis.    Multiple additional findings discussed in the body of the report.      Electronically signed by: Chuy Mckeon MD  Date:    07/23/2023  Time:    02:32               Narrative:    EXAMINATION:  CT CHEST ABDOMEN PELVIS WITH CONTRAST (XPD)    CLINICAL HISTORY:  Abdominal trauma, blunt;    TECHNIQUE:  Low dose axial images, sagittal and coronal reformations were obtained from the thoracic inlet to the pubic symphysis following the IV administration of 75 mL of Omnipaque 350 .  Oral contrast was not given.    COMPARISON:  CTA chest, 07/08/2023.  CT pelvis, 07/08/2023.  CT abdomen pelvis, 05/06/2023.    FINDINGS:  Chest:    Exam quality is limited by suboptimal bolus timing and motion.  Evaluation is limited by extensive streak artifact due to the patient's arms overlying the field of view.    Heart is borderline enlarged and similar to the prior study.  Coronary artery and mitral valve calcifications.  Thoracic aorta is stable in caliber with moderate to advanced calcific atherosclerosis.  No central pulmonary embolus.  No bulky mediastinal lymphadenopathy.    Detailed evaluation of the pulmonary parenchyma limited by motion.  There is peribronchial thickening and questionable minimal patchy ground-glass opacities in the lung bases.  Bibasilar subsegmental atelectasis.  No consolidation or pleural effusion.    Moderate-sized hiatal hernia with moderate lower esophageal wall thickening.    Abdomen:    Exam quality is limited by suboptimal bolus timing, motion, and extensive artifact related to the patient's arms overlying the field of view.    Liver is similar in size and contour when compared with the prior study.  Multiple hepatic hypodensities most  suggestive of cysts.  Gallbladder is unremarkable.  No intrahepatic biliary ductal dilatation.    Spleen, adrenals, and pancreas are stable and negative for acute finding allowing for significant motion.    Kidneys are stable.  There is a large left renal cyst.  Small stone or vascular calcification in the right renal hilum.  No hydronephrosis.    Evaluation for bowel inflammation is limited by motion.  No small bowel obstruction.  Mild stool burden in the colon.    No pneumoperitoneum or organized fluid collection.    No bulky retroperitoneal lymphadenopathy.    Abdominal aorta is similar in caliber with moderate to advanced calcific atherosclerosis involving the aorta and major branch vessels.    Portal vein is grossly patent.    Pelvis:    Urinary bladder is mildly distended and there is mild wall thickening along the inferior aspect of the urinary bladder similar to the prior CT abdomen.  Rectum is unremarkable.  There is minimal presacral fat stranding.  No significant pelvic free fluid.    Bones and soft tissues:    Recent fractures involving the right superior and inferior pubic rami similar to prior CT pelvis.  Suspect nondisplaced fracture of the right michelle sacrum, more conspicuous when compared with prior CT pelvis.  Nondisplaced fracture of the anterior aspect of the right acetabulum (coronal series 606, image 129) is also more conspicuous when compared with the prior study.  No additional acute fracture.  Degenerative changes in the spine and pubic symphysis.  Mild anterolisthesis of L4 with respect to L5.  Mild left convex scoliotic curvature of the spine.  Old right lower rib fractures.  Operative changes of presumed right lower abdominal wall hernia repair.  Mild diffuse body wall edema.  Somewhat nodular soft tissue densities in the left breast soft tissues.  Suggest follow-up mammogram.                                       CT Head Without Contrast (Final result)  Result time 07/23/23 02:16:47       Final result by Maykel Castro MD (07/23/23 02:16:47)                   Impression:      Subtle increase in density of the subdural hygromas suggesting acute on chronic subdural fluid collection.    Consider follow-up CT scan per protocol in patient with small acute subdural hematoma on chronic subdural hygromas.      Electronically signed by: Maykel Castro  Date:    07/23/2023  Time:    02:16               Narrative:    EXAMINATION:  CT HEAD WITHOUT CONTRAST    CLINICAL HISTORY:  Head trauma, minor (Age >= 65y);    TECHNIQUE:  Low dose axial CT images obtained throughout the head without intravenous contrast. Sagittal and coronal reconstructions were performed.    COMPARISON:  None.    FINDINGS:  Intracranial compartment:    Ventricles and sulci are stable in size for age without evidence of hydrocephalus. Subdural hygromas appear increased in density slightly suggesting acute on chronic subdural hematoma.    The brain parenchyma appears stable with involutional and chronic small vessel type changes again noted..  No parenchymal mass, hemorrhage, edema or major vascular distribution infarct.    Skull/extracranial contents (limited evaluation): No fracture. Mastoid air cells and paranasal sinuses are essentially clear.                                       CT Cervical Spine Without Contrast (Final result)  Result time 07/23/23 03:01:55      Final result by Chuy Mckeon MD (07/23/23 03:01:55)                   Impression:      No evidence of acute fracture or acute osseous abnormality.    Osteopenia and stable degenerative changes.    Additional findings discussed in the body of the report.    Electronically signed by resident: Anahi Gutierrez  Date:    07/23/2023  Time:    02:02    Electronically signed by: Chuy Mckeon MD  Date:    07/23/2023  Time:    03:01               Narrative:    EXAMINATION:  CT CERVICAL SPINE WITHOUT CONTRAST    CLINICAL HISTORY:  Neck trauma (Age >= 65y);    TECHNIQUE:  Low  dose axial images, sagittal and coronal reformations were performed though the cervical spine.  Contrast was not administered.    COMPARISON:  CT 04/18/2023 and 07/09/2023.    FINDINGS:  Alignment: Normal.    Vertebrae: Osteopenia.  No fracture.  Lucent area involving the left C4 facet without associated cortical disruption, unchanged dating back to 04/18/2023.  Stable mild height loss of the superior endplate of T4 vertebral body.    Discs: Multilevel disc height loss, most pronounced at C5-C6.    C1-2: Dens is intact.  Pre-dens space is maintained.    Skull base and craniocervical junction: Normal.    Degenerative findings:    Stable appearance of mild multilevel degenerative changes through the cervical spine.  There are several levels demonstrating mild to moderate facet arthropathy and mild uncovertebral spurring resulting in areas of mild neural foraminal narrowing.  No areas of spinal canal stenosis.    Paraspinal muscles & soft tissues: Evaluation of the lung apices is severely limited by respiratory motion artifact.  Dense calcific atherosclerosis of the aortic arch.  Soft tissues of the thoracic inlet are somewhat distorted secondary to motion artifact.                                       X-Ray Pelvis Routine AP (Final result)  Result time 07/23/23 01:54:57   Procedure changed from X-Ray Hip 2 or 3 views Right (with Pelvis when performed)     Final result by Chuy Mckeon MD (07/23/23 01:54:57)                   Impression:      Similar alignment of recent fractures involving the right superior and inferior pubic rami.  No new acute displaced fracture.      Electronically signed by: Chuy Mckeon MD  Date:    07/23/2023  Time:    01:54               Narrative:    EXAMINATION:  XR PELVIS ROUTINE AP; XR FEMUR 2 VIEW RIGHT    CLINICAL HISTORY:  HIP PAIN;  Pain in right hip; Pain in right leg    TECHNIQUE:  Three frontal views of the pelvis performed.  Two views of the right femur also  obtained.    COMPARISON:  CT pelvis, 07/08/2023.    FINDINGS:  Pelvis: Bones are mildly demineralized.  Similar appearance of mildly displaced recent fracture of the right inferior pubic ramus and nondisplaced fracture of the right superior pubic ramus.  Similar fracture fragment alignment allowing for differences in positioning.  No new acute fracture.  No dislocation.  Mild degenerative changes in both hips.    Right femur: No additional acute fracture or dislocation other than described above.  Degenerative changes in the right hip and right knee.  Atherosclerotic vascular calcifications.  Soft tissue edema overlying the right hip.  No unexpected radiopaque foreign body.                                       X-Ray Femur 2 AP/LAT Right (Final result)  Result time 07/23/23 01:54:57      Final result by Chuy Mckeon MD (07/23/23 01:54:57)                   Impression:      Similar alignment of recent fractures involving the right superior and inferior pubic rami.  No new acute displaced fracture.      Electronically signed by: Chuy Mckeon MD  Date:    07/23/2023  Time:    01:54               Narrative:    EXAMINATION:  XR PELVIS ROUTINE AP; XR FEMUR 2 VIEW RIGHT    CLINICAL HISTORY:  HIP PAIN;  Pain in right hip; Pain in right leg    TECHNIQUE:  Three frontal views of the pelvis performed.  Two views of the right femur also obtained.    COMPARISON:  CT pelvis, 07/08/2023.    FINDINGS:  Pelvis: Bones are mildly demineralized.  Similar appearance of mildly displaced recent fracture of the right inferior pubic ramus and nondisplaced fracture of the right superior pubic ramus.  Similar fracture fragment alignment allowing for differences in positioning.  No new acute fracture.  No dislocation.  Mild degenerative changes in both hips.    Right femur: No additional acute fracture or dislocation other than described above.  Degenerative changes in the right hip and right knee.  Atherosclerotic vascular  "calcifications.  Soft tissue edema overlying the right hip.  No unexpected radiopaque foreign body.                                       X-Ray Chest AP Portable (Final result)  Result time 07/23/23 01:47:54      Final result by Chuy Mckeon MD (07/23/23 01:47:54)                   Impression:      No detrimental change when compared with 07/08/2023.      Electronically signed by: Chuy Mckeon MD  Date:    07/23/2023  Time:    01:47               Narrative:    EXAMINATION:  XR CHEST AP PORTABLE    CLINICAL HISTORY:  Provided history is "Sepsis;  ".    TECHNIQUE:  One view of the chest.    COMPARISON:  07/08/2023.    FINDINGS:  Cardiac wires overlie the chest.  Patient is slightly rotated.  Cardiomediastinal silhouette is stable and may be at the upper limits of normal in size.  Atherosclerotic calcifications overlie the aortic arch.  Right hemidiaphragm is slightly elevated as seen previously.  Azygous lobe configuration is incidentally noted in the right lung apex.  No confluent area of consolidation.  No sizable pleural effusion.  No pneumothorax.  Hiatal hernia again noted.                                     "

## 2023-07-28 NOTE — ASSESSMENT & PLAN NOTE
- BP uncontrolled intermittently, as high as 226/90 -- suspect bc patient was agitated and spitting out meds  - continue lisinopril, lopressor, lasix  - PRN IV hydralazine for SBP >180 or if unable to tolerate oral meds.

## 2023-07-28 NOTE — MEDICAL/APP STUDENT
"CONSULTATION LIAISON PSYCHIATRY PROGRESS NOTE    Patient Name: Radha Sandoval  MRN: [id number]  Patient Class: IP- Inpatient  Admission Date: 7/22/2023  Attending Physician: Pablo Haddad III,*      SUBJECTIVE:   Radha Sandoval is a 88 y.o. female with past medical history of bilateral chronic subdural hematomas, Hypertension, Acute DVT and PE 5/23, frequent falls with recent Pelvic fracture s/p orthopedic intervention & past pertinent psychiatric history of Late Onset Alzheimers with mood disturbance and Delirium presents to the ED/admitted to the hospital for a fall, shortness of breath, and hypoxia.     Patient presented with right pelvic pain after a fall on 7/22 when she was transferred from bed to wheelchair. Due to her fluctuating mentation she has been agitated with nursing staff and tries to leave her bed and resists being transferred from bed to wheelchair, which has resulted in multiple falls.    Psychiatry consulted for persistent insomnia contributing to delirium in patient with dementia, uncontrolled on current regimen    Today, the patient was awake and sitting up in bed on arrival. She was "pissed off" because the nurse was trying to make her eat some food before she got her medicine. Patient states that she is "old enough to be able to decide when she wants to eat." Patient settled down after a couple minutes of conversation. Her personal sitterChel was not at the bedside but according to the nurse Chel states that patient did not sleep well last night, although the patient claims the opposite. Patient states she slept well last night and didn't wake up throughout the night. She reports no SI/HI/SIB or any auditory or visual hallucinations. No objective hallucinations were observed during interview. She was oriented to person, place, and year. Her speech was spontaneous and coherent with occasional slurred speech. Patient did not require PRN medications over night.      OBJECTIVE:    Mental " "Status Exam:  General Appearance:  unremarkable, dressed in hospital garb, in no acute distress  Behavior: cooperative, friendly, agitated   Involuntary Movements and Motor Activity: +akathisia  Gait and Station: unable to assess - patient lying down or seated  Speech and Language: spontaneous, talkative, slurred  Mood: "pissed off"- patient was angry that nurse was making her eat when she wasn't hungry.   Affect: angry, agitated  Thought Process and Associations: disorganized, circumstantial  Thought Content and Perceptions:: no suicidal or homicidal ideation, no auditory or visual hallucinations, no paranoid ideation, no ideas of reference, no evidence of delusions or psychosis  Sensorium and Orientation: oriented to person and place, oriented partially to time. Oriented to year not month.   Recent and Remote Memory: impaired  Attention and Concentration: easily distractible  Fund of Knowledge: impaired  Insight: impaired, limited  Judgment: impaired, limited    CAM ICU positive? no      ASSESSMENT & RECOMMENDATIONS   Late Onset Alzheimer's with mood disturbance  Delirium     DELIRIUM  PSYCH MEDICATIONS  Continue zyprexa 5 mg po nightly IM or IV if QTC <480  Recommend continuous EKG monitoring  Continue Lexapro 5 mg po QD  Continue Memantine 5 mg po BID  PRN - Zyprexa 2.5 mg po, IM, or IV Q8 prn for non redirectable agitation or aggression if QTC <480  Continue to monitor EKGs, Qtc with antipsychotics     DELIRIUM BEHAVIOR MANAGEMENT  PLEASE utilize CHEMICAL restraints with PRN meds first for agitation. Minimize use of PHYSICAL restraints OR have periods of being out of physical restraints if possible.  Keep window shades open and room lit during day and room dim at night in order to promote normal sleep-wake cycles  Encourage family at bedside. [place] patient often to situation, location, date.  Continue to Limit or Discontinue use of Narcotics, Benzos and Anti-cholinergic medications as they may worsen " delirium.  Continue medical workup for causative etiology of Delirium.      RISK ASSESSMENT  NO NEED FOR PEC at this time. Will continue to reassess.      FOLLOW UP  Will follow up while in house      Please contact ON CALL psychiatry service (24/7) for any acute issues that may arise.    Iván HERNANDES Psychiatry  [facility]  7/28/2023 10:33 AM        --------------------------------------------------------------------------------------------------------------------------------------------------------------------------------------------------------------------------------------    CONTINUED OBJECTIVE clinical data & findings reviewed and noted for above decision making    Current Medications:   Scheduled Meds:    acetaminophen  1,000 mg Oral Q8H    albuterol-ipratropium  3 mL Nebulization Q6H WAKE    ascorbic acid (vitamin C)  250 mg Oral Daily    enoxparin  40 mg Subcutaneous Q24H (prophylaxis, 1700)    EScitalopram oxalate  5 mg Oral Daily    furosemide  20 mg Oral BID    hydrALAZINE  5 mg Intravenous Once    lisinopriL  40 mg Oral Daily    memantine  5 mg Oral BID    methocarbamoL  500 mg Oral QID    metoprolol tartrate  25 mg Oral BID    miconazole NITRATE 2 %   Topical (Top) BID    OLANZapine  5 mg Intramuscular QHS    polyethylene glycol  17 g Oral BID    senna-docusate 8.6-50 mg  1 tablet Oral BID    tamsulosin  0.4 mg Oral Nightly    vitamin D  1,000 Units Oral Daily    zinc sulfate  220 mg Oral Daily     PRN Meds: aluminum-magnesium hydroxide-simethicone, glucagon (human recombinant), glucose, glucose, hydrALAZINE, lactulose, loperamide, melatonin, naloxone, OLANZapine, prochlorperazine, sodium chloride 0.9%, sodium chloride 0.9%    Allergies:   Review of patient's allergies indicates:  No Known Allergies    Vitals  Vitals:    07/28/23 0951   BP:    Pulse:    Resp:    Temp: 97.9 °F (36.6 °C)       Labs/Imaging/Studies:  Recent Results (from the past 24 hour(s))   POCT glucose     Collection Time: 07/27/23 11:36 AM   Result Value Ref Range    POCT Glucose 112 (H) 70 - 110 mg/dL   ISTAT PROCEDURE    Collection Time: 07/27/23  2:10 PM   Result Value Ref Range    POC PH 7.484 (H) 7.35 - 7.45    POC PCO2 41.6 35 - 45 mmHg    POC PO2 76 (L) 80 - 100 mmHg    POC HCO3 31.2 (H) 24 - 28 mmol/L    POC BE 8 -2 to 2 mmol/L    POC SATURATED O2 96 95 - 100 %    POC TCO2 32 (H) 23 - 27 mmol/L    Sample ARTERIAL     Site RR     Allens Test Pass    POCT glucose    Collection Time: 07/27/23  4:15 PM   Result Value Ref Range    POCT Glucose 127 (H) 70 - 110 mg/dL   COVID-19 Routine Screening    Collection Time: 07/27/23  4:25 PM   Result Value Ref Range    SARS-CoV2 (COVID-19) Qualitative PCR Not Detected Not Detected   Comprehensive Metabolic Panel    Collection Time: 07/28/23  7:17 AM   Result Value Ref Range    Sodium 141 136 - 145 mmol/L    Potassium 3.8 3.5 - 5.1 mmol/L    Chloride 104 95 - 110 mmol/L    CO2 28 23 - 29 mmol/L    Glucose 98 70 - 110 mg/dL    BUN 16 8 - 23 mg/dL    Creatinine 0.7 0.5 - 1.4 mg/dL    Calcium 8.4 (L) 8.7 - 10.5 mg/dL    Total Protein 5.4 (L) 6.0 - 8.4 g/dL    Albumin 2.4 (L) 3.5 - 5.2 g/dL    Total Bilirubin 0.4 0.1 - 1.0 mg/dL    Alkaline Phosphatase 138 (H) 55 - 135 U/L    AST 32 10 - 40 U/L    ALT 15 10 - 44 U/L    eGFR >60.0 >60 mL/min/1.73 m^2    Anion Gap 9 8 - 16 mmol/L   Magnesium    Collection Time: 07/28/23  7:17 AM   Result Value Ref Range    Magnesium 1.9 1.6 - 2.6 mg/dL   CBC Auto Differential    Collection Time: 07/28/23  7:18 AM   Result Value Ref Range    WBC 5.90 3.90 - 12.70 K/uL    RBC 3.36 (L) 4.00 - 5.40 M/uL    Hemoglobin 9.2 (L) 12.0 - 16.0 g/dL    Hematocrit 29.8 (L) 37.0 - 48.5 %    MCV 89 82 - 98 fL    MCH 27.4 27.0 - 31.0 pg    MCHC 30.9 (L) 32.0 - 36.0 g/dL    RDW 14.4 11.5 - 14.5 %    Platelets 256 150 - 450 K/uL    MPV 10.9 9.2 - 12.9 fL    Immature Granulocytes 0.3 0.0 - 0.5 %    Gran # (ANC) 4.3 1.8 - 7.7 K/uL    Immature Grans (Abs) 0.02 0.00 -  0.04 K/uL    Lymph # 0.8 (L) 1.0 - 4.8 K/uL    Mono # 0.5 0.3 - 1.0 K/uL    Eos # 0.3 0.0 - 0.5 K/uL    Baso # 0.03 0.00 - 0.20 K/uL    nRBC 0 0 /100 WBC    Gran % 73.4 (H) 38.0 - 73.0 %    Lymph % 12.9 (L) 18.0 - 48.0 %    Mono % 7.6 4.0 - 15.0 %    Eosinophil % 5.3 0.0 - 8.0 %    Basophil % 0.5 0.0 - 1.9 %    Differential Method Automated    Echo    Collection Time: 07/28/23  8:35 AM   Result Value Ref Range    BSA 1.85 m2    TDI SEPTAL 0.04 m/s    LV LATERAL E/E' RATIO 15.13 m/s    LV SEPTAL E/E' RATIO 30.25 m/s    LA WIDTH 3.70 cm    TDI LATERAL 0.08 m/s    LVIDd 3.93 3.5 - 6.0 cm    IVS 0.86 0.6 - 1.1 cm    Posterior Wall 0.82 0.6 - 1.1 cm    LVIDs 2.66 2.1 - 4.0 cm    FS 32 28 - 44 %    LA volume 87.86 cm3    Sinus 2.66 cm    STJ 2.43 cm    Ascending aorta 2.66 cm    LV mass 97.00 g    LA size 5.03 cm    RVDD 2.89 cm    TAPSE 1.80 cm    Left Ventricle Relative Wall Thickness 0.42 cm    AV mean gradient 15 mmHg    AV valve area 1.88 cm2    AV Velocity Ratio 0.52     AV index (prosthetic) 0.60     MV mean gradient 5 mmHg    MV valve area p 1/2 method 3.09 cm2    MV valve area by continuity eq 2.66 cm2    E/A ratio 0.70     Mean e' 0.06 m/s    E wave deceleration time 245.49 msec    LVOT diameter 2.00 cm    LVOT area 3.1 cm2    LVOT peak jose 1.33 m/s    LVOT peak VTI 39.22 cm    Ao peak jose 2.58 m/s    Ao VTI 65.36 cm    LVOT stroke volume 123.15 cm3    AV peak gradient 27 mmHg    E/E' ratio 20.17 m/s    MV Peak E Jose 1.21 m/s    TR Max Jose 3.00 m/s    MV VTI 46.22 cm    MV stenosis pressure 1/2 time 71.19 ms    MV Peak A Jose 1.73 m/s    LV Systolic Volume 26.04 mL    LV Systolic Volume Index 14.4 mL/m2    LV Diastolic Volume 67.10 mL    LV Diastolic Volume Index 37.07 mL/m2    LA Volume Index 48.5 mL/m2    LV Mass Index 54 g/m2    RA Major Axis 4.71 cm    Left Atrium Minor Axis 5.74 cm    Left Atrium Major Axis 5.38 cm    Triscuspid Valve Regurgitation Peak Gradient 36 mmHg    LA Volume Index (Mod) 38.7 mL/m2     LA volume (mod) 70.00 cm3    RA Width 2.71 cm    Right Atrial Pressure (from IVC) 3 mmHg    EF 65 %    TV rest pulmonary artery pressure 39 mmHg    Mitral Valve Heart Rate 76 bpm     Imaging Results              CT Head Without Contrast (Final result)  Result time 07/23/23 08:24:54      Final result by Bill Tarango MD (07/23/23 08:24:54)                   Impression:        Similar volume of subdural hematomas.  Changes of density may be in part technical/artifactual in nature and also due to some leakage of recent intravenous contrast from recent CT of the abdomen pelvis with no focal hyperdense acute hemorrhage identified.  Follow-up as clinically warranted.      Electronically signed by: Bill Tarango  Date:    07/23/2023  Time:    08:24               Narrative:    EXAMINATION:  CT HEAD WITHOUT CONTRAST    CLINICAL HISTORY:  Head trauma, minor (Age >= 65y);    TECHNIQUE:  Low dose axial images were obtained through the head.  Coronal and sagittal reformations were also performed. Contrast was not administered.    COMPARISON:  07/23/2023, 07/09/2023    FINDINGS:  Bilateral subacute subdural hematomas are noted bilaterally again identified measuring up to 14 mm on  the left, similar in volume.  No new focal hyperdense hemorrhage is identified.  Overall mild increased density of the hematoma may in part be technical/artifactual in nature as well as due to leakage of recent intravenous contrast as similar appearances were noted previously (on 07/09/2023 and 07/10/2023).    3 mm midline shift to the right is similar in appearance.  The basal cisterns remain patent.  No acute intraparenchymal process.    Prominent atherosclerotic vascular calcifications are noted at the skull base.    The visualized sinuses and mastoid air cells are essentially clear.                                       CT Chest Abdomen Pelvis With Contrast (xpd) (Final result)  Result time 07/23/23 02:32:32   Procedure changed from CT Abdomen  Pelvis With Contrast     Final result by Chuy Mckeon MD (07/23/23 02:32:32)                   Impression:      Similar appearance of recent fractures involving the right inferior and superior pubic rami.  Increased conspicuity of essentially nondisplaced recent fractures of the right michelle sacrum and anterior aspect of the right acetabulum.    Motion and artifact limited study with suboptimal bolus timing.    Moderate-sized hiatal hernia with moderate lower esophageal wall thickening.  Suggest correlation for esophagitis.    Peribronchial thickening and questionable minimal patchy ground-glass opacities in the lung bases and bibasilar subsegmental atelectasis.    Nodular soft tissue opacities in the left breast.  Neoplasm not excluded.  Suggest correlation with physical exam and mammographic follow-up when clinically warranted.    Anasarca.    Mild nonspecific wall thickening of the inferior aspect of the urinary bladder.  Suggest correlation with urinalysis.    Multiple additional findings discussed in the body of the report.      Electronically signed by: Chuy Mckeon MD  Date:    07/23/2023  Time:    02:32               Narrative:    EXAMINATION:  CT CHEST ABDOMEN PELVIS WITH CONTRAST (XPD)    CLINICAL HISTORY:  Abdominal trauma, blunt;    TECHNIQUE:  Low dose axial images, sagittal and coronal reformations were obtained from the thoracic inlet to the pubic symphysis following the IV administration of 75 mL of Omnipaque 350 .  Oral contrast was not given.    COMPARISON:  CTA chest, 07/08/2023.  CT pelvis, 07/08/2023.  CT abdomen pelvis, 05/06/2023.    FINDINGS:  Chest:    Exam quality is limited by suboptimal bolus timing and motion.  Evaluation is limited by extensive streak artifact due to the patient's arms overlying the field of view.    Heart is borderline enlarged and similar to the prior study.  Coronary artery and mitral valve calcifications.  Thoracic aorta is stable in caliber with moderate to  advanced calcific atherosclerosis.  No central pulmonary embolus.  No bulky mediastinal lymphadenopathy.    Detailed evaluation of the pulmonary parenchyma limited by motion.  There is peribronchial thickening and questionable minimal patchy ground-glass opacities in the lung bases.  Bibasilar subsegmental atelectasis.  No consolidation or pleural effusion.    Moderate-sized hiatal hernia with moderate lower esophageal wall thickening.    Abdomen:    Exam quality is limited by suboptimal bolus timing, motion, and extensive artifact related to the patient's arms overlying the field of view.    Liver is similar in size and contour when compared with the prior study.  Multiple hepatic hypodensities most suggestive of cysts.  Gallbladder is unremarkable.  No intrahepatic biliary ductal dilatation.    Spleen, adrenals, and pancreas are stable and negative for acute finding allowing for significant motion.    Kidneys are stable.  There is a large left renal cyst.  Small stone or vascular calcification in the right renal hilum.  No hydronephrosis.    Evaluation for bowel inflammation is limited by motion.  No small bowel obstruction.  Mild stool burden in the colon.    No pneumoperitoneum or organized fluid collection.    No bulky retroperitoneal lymphadenopathy.    Abdominal aorta is similar in caliber with moderate to advanced calcific atherosclerosis involving the aorta and major branch vessels.    Portal vein is grossly patent.    Pelvis:    Urinary bladder is mildly distended and there is mild wall thickening along the inferior aspect of the urinary bladder similar to the prior CT abdomen.  Rectum is unremarkable.  There is minimal presacral fat stranding.  No significant pelvic free fluid.    Bones and soft tissues:    Recent fractures involving the right superior and inferior pubic rami similar to prior CT pelvis.  Suspect nondisplaced fracture of the right michelle sacrum, more conspicuous when compared with prior CT  pelvis.  Nondisplaced fracture of the anterior aspect of the right acetabulum (coronal series 606, image 129) is also more conspicuous when compared with the prior study.  No additional acute fracture.  Degenerative changes in the spine and pubic symphysis.  Mild anterolisthesis of L4 with respect to L5.  Mild left convex scoliotic curvature of the spine.  Old right lower rib fractures.  Operative changes of presumed right lower abdominal wall hernia repair.  Mild diffuse body wall edema.  Somewhat nodular soft tissue densities in the left breast soft tissues.  Suggest follow-up mammogram.                                       CT Head Without Contrast (Final result)  Result time 07/23/23 02:16:47      Final result by Maykel Castro MD (07/23/23 02:16:47)                   Impression:      Subtle increase in density of the subdural hygromas suggesting acute on chronic subdural fluid collection.    Consider follow-up CT scan per protocol in patient with small acute subdural hematoma on chronic subdural hygromas.      Electronically signed by: Maykel Castro  Date:    07/23/2023  Time:    02:16               Narrative:    EXAMINATION:  CT HEAD WITHOUT CONTRAST    CLINICAL HISTORY:  Head trauma, minor (Age >= 65y);    TECHNIQUE:  Low dose axial CT images obtained throughout the head without intravenous contrast. Sagittal and coronal reconstructions were performed.    COMPARISON:  None.    FINDINGS:  Intracranial compartment:    Ventricles and sulci are stable in size for age without evidence of hydrocephalus. Subdural hygromas appear increased in density slightly suggesting acute on chronic subdural hematoma.    The brain parenchyma appears stable with involutional and chronic small vessel type changes again noted..  No parenchymal mass, hemorrhage, edema or major vascular distribution infarct.    Skull/extracranial contents (limited evaluation): No fracture. Mastoid air cells and paranasal sinuses are essentially  clear.                                       CT Cervical Spine Without Contrast (Final result)  Result time 07/23/23 03:01:55      Final result by Chuy Mckeon MD (07/23/23 03:01:55)                   Impression:      No evidence of acute fracture or acute osseous abnormality.    Osteopenia and stable degenerative changes.    Additional findings discussed in the body of the report.    Electronically signed by resident: Anahi Gutierrez  Date:    07/23/2023  Time:    02:02    Electronically signed by: Chuy Mckeon MD  Date:    07/23/2023  Time:    03:01               Narrative:    EXAMINATION:  CT CERVICAL SPINE WITHOUT CONTRAST    CLINICAL HISTORY:  Neck trauma (Age >= 65y);    TECHNIQUE:  Low dose axial images, sagittal and coronal reformations were performed though the cervical spine.  Contrast was not administered.    COMPARISON:  CT 04/18/2023 and 07/09/2023.    FINDINGS:  Alignment: Normal.    Vertebrae: Osteopenia.  No fracture.  Lucent area involving the left C4 facet without associated cortical disruption, unchanged dating back to 04/18/2023.  Stable mild height loss of the superior endplate of T4 vertebral body.    Discs: Multilevel disc height loss, most pronounced at C5-C6.    C1-2: Dens is intact.  Pre-dens space is maintained.    Skull base and craniocervical junction: Normal.    Degenerative findings:    Stable appearance of mild multilevel degenerative changes through the cervical spine.  There are several levels demonstrating mild to moderate facet arthropathy and mild uncovertebral spurring resulting in areas of mild neural foraminal narrowing.  No areas of spinal canal stenosis.    Paraspinal muscles & soft tissues: Evaluation of the lung apices is severely limited by respiratory motion artifact.  Dense calcific atherosclerosis of the aortic arch.  Soft tissues of the thoracic inlet are somewhat distorted secondary to motion artifact.                                       X-Ray Pelvis Routine  AP (Final result)  Result time 07/23/23 01:54:57   Procedure changed from X-Ray Hip 2 or 3 views Right (with Pelvis when performed)     Final result by Chuy Mckeon MD (07/23/23 01:54:57)                   Impression:      Similar alignment of recent fractures involving the right superior and inferior pubic rami.  No new acute displaced fracture.      Electronically signed by: Chuy Mckeon MD  Date:    07/23/2023  Time:    01:54               Narrative:    EXAMINATION:  XR PELVIS ROUTINE AP; XR FEMUR 2 VIEW RIGHT    CLINICAL HISTORY:  HIP PAIN;  Pain in right hip; Pain in right leg    TECHNIQUE:  Three frontal views of the pelvis performed.  Two views of the right femur also obtained.    COMPARISON:  CT pelvis, 07/08/2023.    FINDINGS:  Pelvis: Bones are mildly demineralized.  Similar appearance of mildly displaced recent fracture of the right inferior pubic ramus and nondisplaced fracture of the right superior pubic ramus.  Similar fracture fragment alignment allowing for differences in positioning.  No new acute fracture.  No dislocation.  Mild degenerative changes in both hips.    Right femur: No additional acute fracture or dislocation other than described above.  Degenerative changes in the right hip and right knee.  Atherosclerotic vascular calcifications.  Soft tissue edema overlying the right hip.  No unexpected radiopaque foreign body.                                       X-Ray Femur 2 AP/LAT Right (Final result)  Result time 07/23/23 01:54:57      Final result by Chuy Mckeon MD (07/23/23 01:54:57)                   Impression:      Similar alignment of recent fractures involving the right superior and inferior pubic rami.  No new acute displaced fracture.      Electronically signed by: Chuy Mckeon MD  Date:    07/23/2023  Time:    01:54               Narrative:    EXAMINATION:  XR PELVIS ROUTINE AP; XR FEMUR 2 VIEW RIGHT    CLINICAL HISTORY:  HIP PAIN;  Pain in right hip; Pain in right  "leg    TECHNIQUE:  Three frontal views of the pelvis performed.  Two views of the right femur also obtained.    COMPARISON:  CT pelvis, 07/08/2023.    FINDINGS:  Pelvis: Bones are mildly demineralized.  Similar appearance of mildly displaced recent fracture of the right inferior pubic ramus and nondisplaced fracture of the right superior pubic ramus.  Similar fracture fragment alignment allowing for differences in positioning.  No new acute fracture.  No dislocation.  Mild degenerative changes in both hips.    Right femur: No additional acute fracture or dislocation other than described above.  Degenerative changes in the right hip and right knee.  Atherosclerotic vascular calcifications.  Soft tissue edema overlying the right hip.  No unexpected radiopaque foreign body.                                       X-Ray Chest AP Portable (Final result)  Result time 07/23/23 01:47:54      Final result by Chuy Mckeon MD (07/23/23 01:47:54)                   Impression:      No detrimental change when compared with 07/08/2023.      Electronically signed by: Chuy Mckeon MD  Date:    07/23/2023  Time:    01:47               Narrative:    EXAMINATION:  XR CHEST AP PORTABLE    CLINICAL HISTORY:  Provided history is "Sepsis;  ".    TECHNIQUE:  One view of the chest.    COMPARISON:  07/08/2023.    FINDINGS:  Cardiac wires overlie the chest.  Patient is slightly rotated.  Cardiomediastinal silhouette is stable and may be at the upper limits of normal in size.  Atherosclerotic calcifications overlie the aortic arch.  Right hemidiaphragm is slightly elevated as seen previously.  Azygous lobe configuration is incidentally noted in the right lung apex.  No confluent area of consolidation.  No sizable pleural effusion.  No pneumothorax.  Hiatal hernia again noted.                                     "

## 2023-07-28 NOTE — PT/OT/SLP PROGRESS
Occupational Therapy   Treatment    Name: Radha Sandoval  MRN: [id number]  Admitting Diagnosis:  Multiple closed fractures of pelvis without disruption of pelvic ring  4 Days Post-Op    Recommendations:     Discharge Recommendations: nursing facility, skilled  Discharge Equipment Recommendations:   (TBD)  Barriers to discharge:  Other (Comment) (requires extensive assist)    Assessment:     Radha Sandoval is a 88 y.o. female with a medical diagnosis of Multiple closed fractures of pelvis without disruption of pelvic ring.  She presents with deficits in self-care tasks as well as mobility .  Pt. Premedicated for session on this date and continued to be very anxious during session and leaning posteriorly. Pt. Requires extensive assist x 2 at this time for safety. Pt. Became agitated seated EOB and required Total A x 2 to return to supine. Patient would benefit from continued OT services to maximize level of safety and independence with self-care tasks.   . Performance deficits affecting function are weakness, impaired endurance, impaired self care skills, impaired functional mobility, impaired balance, impaired cognition, pain, impaired cardiopulmonary response to activity.     Rehab Prognosis:  Fair; patient would benefit from acute skilled OT services to address these deficits and reach maximum level of function.       Plan:     Patient to be seen 4 x/week to address the above listed problems via self-care/home management, therapeutic activities, therapeutic exercises, neuromuscular re-education  Plan of Care Expires: 08/23/23  Plan of Care Reviewed with: patient, caregiver    Subjective     Chief Complaint: Pt. Initially reporting wasn't going to do it but then agreed to session but became agitated during it  Patient/Family Comments/goals: no goals stated other than going home  Pain/Comfort:  Pain Rating 1:  (did not rate)  Location - Orientation 1: generalized  Pain Addressed 1: Reposition, Distraction  Pain Rating  Post-Intervention 1:  (did not rate)    Objective:     Communicated with: thom prior to session.  Patient found supine with telemetry, Tamera (personal sitter present) upon OT entry to room.    General Precautions: Standard, fall    Orthopedic Precautions:LLE weight bearing as tolerated, RLE weight bearing as tolerated  Braces: N/A  Respiratory Status: Room air     Occupational Performance:     Bed Mobility:    Patient completed Scooting/Bridging with total assistance and 2 persons  Patient completed Supine to Sit with total assistance and 2 persons  Patient completed Sit to Supine with total assistance and 2 persons     Functional Mobility/Transfers:  Drawsheet to \[facility] [place] [facility]\""  Functional Mobility: not appropriate    Activities of Daily Living:  Not performed      [facility] 6 Click ADL: 10    Treatment & Education:  Pt. Educated on importance of therapy    Patient left right sidelying with all lines intact, call button in reach, bed alarm on, nurse notified, and sitter present    GOALS:   Multidisciplinary Problems       Occupational Therapy Goals          Problem: Occupational Therapy    Goal Priority Disciplines Outcome Interventions   Occupational Therapy Goal     OT, PT/OT Ongoing, Progressing    Description: Goals to be met by: 8/7/2023     Patient will increase functional independence with ADLs by performing:    UE Dressing with Minimal Assistance.  LE Dressing with Moderate Assistance.  Grooming while EOB with Moderate Assistance.  Supine to sit with Minimal Assistance.  Stand pivot transfers with Minimal Assistance.                         Time Tracking:     OT Date of Treatment: 07/28/23  OT Start Time: 1318  OT Stop Time: 1334  OT Total Time (min): 16 min    Billable Minutes:Therapeutic Activity 16    OT/ALEXIS: OT          7/28/2023

## 2023-07-28 NOTE — CONSULTS
Significant Event  Critical Care Medicine    CC: Multiple closed fractures of pelvis without disruption of pelvic ring  Date: 07/28/2023  Admit Date: 7/22/2023  Hospital Length of Stay: 5    Code Status: Full Code     SUBJECTIVE:     Significant Events: Called urgently to the bedside by nurse to evaluate patient for dyspnea and increased WOB. Upon arrival, patient on 2L NC with tachypnea. Appears encephalopathic with auditory hallucinations.     Of note, patient recently diagnosed with DVT in March 2023 on eliquis which has been held due to recent subdural hematomas.     OBJECTIVE:     Physical Exam:  Last Vitals:  Vitals:    07/28/23 1811   BP:    Pulse: 93   Resp: (!) 24   Temp:      GA: awake, confused, auditory hallucinations  Pulmonary: tachypnea, episode of witnessed apnea, decrease breath sounds at the bases. No stridor.   Cardiac: RRR S1 & S2 w/o rubs/murmurs/gallops.   Abdominal: Bowel sounds present x 4. No appreciable hepatosplenomegaly.  Neuro:  --Mental Status: Confused, incomprehensible speech, not following commands  --CN II-XII grossly intact. Corneal reflex, gag, cough intact.    ASSESSMENT AND PLAN/INTERVENTIONS:     1) Acute hypoxemic respiratory failure  Plan/Interventions:  --Recommend CTA chest vs LE US to rule out PE. Unable to anticoagulate if positive due to fall history or recent subdural hematoma  --NPO as patient at high risk for aspiration; SLP eval?  --Wean oxygen for goal saturatio 88-92%  --recommend ongoing GOC discussions    Please re-consult if respiratory status continues to decline.       Uninterrupted Critical Care/Counseling Time (not including procedures): 30 minutes    Yolis Benton PA-C  Critical Care Medicine  7/28/2023   6:55 PM

## 2023-07-29 PROBLEM — R00.0 TACHYARRHYTHMIA: Status: ACTIVE | Noted: 2023-01-01

## 2023-07-29 PROBLEM — E87.3 RESPIRATORY ALKALOSIS: Status: ACTIVE | Noted: 2023-01-01

## 2023-07-29 PROBLEM — R82.90 ABNORMAL URINALYSIS: Status: ACTIVE | Noted: 2023-01-01

## 2023-07-29 NOTE — PROGRESS NOTES
[facility] - Intensive Care ([address] Medicine  Progress Note    Patient Name: Radha Sandoval  MRN: [id number]  Patient Class: IP- Inpatient   Admission Date: 7/22/2023  Length of Stay: 6 days  Attending Physician: Pablo Haddad III,*  Primary Care Provider: Primary Doctor No        Subjective:     Principal Problem:Multiple closed fractures of pelvis without disruption of pelvic ring        HPI:  Radha Sandoval is a 88 y.o. female with PMH significant for chronic BL pleural effusions, recent DVT diagnosed in March '23 (on eliquis), dementia, HTN, with recent hospitalization here at [facility] [place] treated non-operatively, who presents after a fall at her long-term while being transferred from wheelchair to bed. Hx taken per Caretaker Sonia and Son (POA) Maykel. Caretaker at bedside unaware of head trauma or other injury. Of note, patient sustained her pelvic fractures at [facility], but after hospitalization at [facility] was placed in [facility] on 7/14. Caretaker reports patient also had another hospitalization during this time at . Patient generally disoriented and delirious, but she will have periods or even days of clarity. At baseline, she is oriented x2. Due to mentation she has difficulty working with staff which results in falls. Prior to her pelvic fracture on 7/8, pt was able to perform independent transfers from wheelchair and could walk short distances with her walker, but she is now max assist. Currently on Eliquis. Patient without complaint on my exam, denies pain.     In the ED: AFVSS. CBC with baseline anemia. CMP reveals slight elevation in Cr 1.0 (baseline 0.8) and K 3.3. BNP and troponin elevated but at baseline. UA grossly infectious. Spann placed for urinary retention.  All imaging reviewed. 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 Repeat CTH after 6 hours was stable. XR pelvis reveals stable recent R superior and inferior pubic rami fractures,  non-displaced. EKG in NSR with prolonged Qtc 510 ms. Given tylenol, norco, and 1g rocephin.       Overview/Hospital Course:  Radha Sandoval was placed in [facility] observation after a fall. NSGY consulted for evaluation of SDH and determined no need for intervention , will follow up in clinic in 2 weeks. HOLD eliquis until follow up. Orthopedics discussed surgical options with family and recommended ORIF pelvis, but family is declining at this time and would prefer to continue with plan for PT/OT. Therapy assessment; recs for SNF. WBAT. Dvt ppx with 40 lovenox SQ daily (ok per NSGY). Psychiatry provided recs for medication adjustment for insomnia and delirium with prn dosing for agitation, will monitor response. Agitation persists and patient now expressing suicidal ideation when asked to work with therapy. Psychiatry aware of SI, making med adjustments as indicated. Caretaker has been staying at bedside. Monitor EKG daily given prolonged Qtc. Palliative care consulted for assistance with GOC planning with son and care team, appreciate recs. Pain controlled and mentation at baseline.       Interval History:  Patient seen and examined on multiple occasions with nursing staff and rapid response seen.  Patient more confused.  Speech is unintelligible.  Patient is tachypneic and using accessory muscles.  ABGs reveal respiratory alkalosis from persistent tachypnea.  Trial of Lasix without much improvement.  Breathing treatment ordered without much improvement.  Anxiety medications administered with little to no change.  Nursing staff reported short run of V-tach followed by sinus tach.  When I reviewed the strips, it appeared to be about a 5 run beat of V-tach followed by sinus tach with artifact.  EKG with normal sinus rhythm.      CTA chest and CT neck ordered as the patient's respiratory issues seem to be in the upper airway.  ICU consulted and agree with imaging.    Labs reviewed:  Hemoglobin 9.2, sodium 141, creatinine  0.7.        Review of Systems   Unable to perform ROS: Dementia   Objective:     Vital Signs (Most Recent):  Temp: 97.2 °F (36.2 °C) (07/28/23 2357)  Pulse: 78 (07/29/23 0038)  Resp: (!) 25 (07/28/23 2357)  BP: (!) 143/60 (07/28/23 2357)  SpO2: 95 % (07/28/23 2357) Vital Signs (24h Range):  Temp:  [97.2 °F (36.2 °C)-100.2 °F (37.9 °C)] 97.2 °F (36.2 °C)  Pulse:  [] 78  Resp:  [17-25] 25  SpO2:  [90 %-96 %] 95 %  BP: (114-147)/(57-99) 143/60     Weight: 75.8 kg (167 lb)  Body mass index is 28.67 kg/m².    Intake/Output Summary (Last 24 hours) at 7/29/2023 0045  Last data filed at 7/28/2023 0802  Gross per 24 hour   Intake 289.5 ml   Output 700 ml   Net -410.5 ml           Physical Exam  Vitals and nursing note reviewed.   Constitutional:       General: She is not in acute distress.     Appearance: She is well-developed. She is ill-appearing (chronically ill-appearing).   HENT:      Mouth/Throat:      Mouth: Mucous membranes are moist.      Pharynx: Oropharynx is clear. No oropharyngeal exudate or posterior oropharyngeal erythema.   Cardiovascular:      Rate and Rhythm: Normal rate and regular rhythm.      Heart sounds: Normal heart sounds.   Pulmonary:      Effort: Respiratory distress (Mild) present.      Breath sounds: Normal breath sounds. Stridor (mild) present. No wheezing.   Abdominal:      General: Bowel sounds are normal. There is no distension.      Palpations: Abdomen is soft.      Tenderness: There is no abdominal tenderness.   Musculoskeletal:      Cervical back: Normal range of motion and neck supple.   Skin:     General: Skin is warm and dry.   Neurological:      Mental Status: She is alert. Mental status is at baseline.      Sensory: No sensory deficit.      Coordination: Coordination normal.      Comments: Unintelligible speech but will follow commands               Assessment/Plan:      * Multiple closed right sided fractures of pelvis without disruption of pelvic ring  Frequent falls   -  diagnosed [place] [facility] [place] about 2 weeks ago.   - repeat XRs show stable fractures, non-displaced   - Ortho consulted in ED; originally recommended non-op management, but after further discussion recommended ORIF pelvis 7/24. Family declining surgery at this time. Further discussions pending progress with PT/OT.   - weight bearing as tolerated   - pain control with tylenol.  Avoiding sedating medications.  - PT/OT consult pending  - patient will likely need SNF v long term placement given max assist requirements and progressive debility, in setting of dementia and behavioral disturbances     Acute metabolic encephalopathy  · Likely progression of dementia.  · On multiple medications that could contribute that are relatively new according to the son:  Memantine, Robaxin, Lexapro, Haldol  · More confused on 07/28  · Multiple ABGs with progressing respiratory alkalosis, possibly contributing.  · If persists, will repeat head CT  · Continue breathing treatments.  Continue Lasix.  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   - continue memantine  - on home haloperidol 0.5 PO qhs  - will consider starting additional nightly med for agitation; psychiatry consulted for assistance   --> psych recs DC nightly haldol. Start 2.5 mg zyprexa QHS and q8h prn for agitation.    --> 7/25 increase nightly zyprexa to 5 mg   - monitor for improvement   - EKG monitoring   - 7/24-25 Pt reporting SI from patient during session. Caretaker says this is not new, patient expresses this intermittently. Psych aware. Med adjustments made.    Acute hypoxemic respiratory failure  Respiratory alkalosis  · Tachypneic, using accessory muscles, stridor  · Echo 7/28 EF 65%, grade 1 diastolic dysfunction, mild AS,  normal RV size and function, PA systolic pressure 39 mm Hg  · Additional IV Lasix administered, previous CT with small left pleural effusion  · Bronchodilators without much improvement  · CTA chest and CT soft tissue neck pending    Prolonged Q-T interval on ECG  - continue to monitor, relatively stable  - avoid QT prolonging meds   - monitor tele     Urinary retention  Recurrent issue   - mild elevation in Cr likely d/t retention   - duncan placed        Goals of care, counseling/discussion  - Caretaker Sonia expresses several concerns regarding patients continual health and function decline  - Recent stay at SNF with progress noted and pt was placed in [facility]VIN at [place]. However, ELVIN is not the appropriate level of care for patient given dementia and frequent falls   - [place] will now require 24 hour sitters for patient to return   - CM SW consulted for dc planning   - will need to have an in-depth palliative discussion with son to determine best care plan for patient - consult placed   - son agreeable to discussion 7/25, palliative following    Hypokalemia  · Monitor K/Mag and replace as needed      Subdural hygroma  Acute subdural hematoma   - Acute SDH on chronic SD hygroma s/p fall at group home; no acute deficits  - repeat CTH stable   - holding eliquis for now - may continue in 2 weeks (per NSGY)   - > NSGY ok's starting LWMH for dvt ppx   - INR wnl  - NSGY cleared. No need for continued monitoring aside from 2 week clinic follow up.     Lumbar spondylosis with myelopathy  - tylenol for pain control, robaxin as needed    Abnormal urinalysis  Recurrent UTIs  - initial UA concern for infection, completed Rocephin  - urine culture with Candida glabrata 10 K to 49 K units not consistent with convincing UTI.  - duncan placed for U-retention  - AFVSS, no leukocytosis.         Primary hypertension  - BP uncontrolled intermittently, as high as 226/90 -- suspect bc patient was agitated and spitting out meds  - continue  lisinopril, lopressor, lasix  - PRN IV hydralazine for SBP >180 or if unable to tolerate oral meds.       VTE Risk Mitigation (From admission, onward)         Ordered     enoxaparin injection 40 mg  Every 24 hours         07/24/23 1337     Reason for No Pharmacological VTE Prophylaxis  Once        Question:  Reasons:  Answer:  Risk of Bleeding  Comment:  SDH    07/23/23 0831     IP VTE HIGH RISK PATIENT  Once         07/23/23 0831     Place sequential compression device  Until discontinued         07/23/23 0831                Discharge Planning   TOSHA: 7/31/2023     Code Status: Full Code   Is the patient medically ready for discharge?: No    Reason for patient still in hospital (select all that apply): Patient new problem, Patient trending condition, Laboratory test, Treatment, Imaging, Consult recommendations and Pending disposition  Discharge Plan A: Skilled Nursing Facility                  Pablo Haddad III, MD  Department of Hospital Medicine   [facility] - Intensive Care ([place]-14)

## 2023-07-29 NOTE — ASSESSMENT & PLAN NOTE
Frequent falls   - diagnosed at last [facility] hospitalization about 2 weeks ago.   - repeat XRs show stable fractures, non-displaced   - Ortho consulted in ED; originally recommended non-op management, but after further discussion recommended ORIF pelvis 7/24. Family declining surgery at this time. Further discussions pending progress with PT/OT.   - weight bearing as tolerated   - pain control with tylenol.  Avoiding sedating medications.  - PT/OT consult pending  - patient will likely need SNF v long term placement given max assist requirements and progressive debility, in setting of dementia and behavioral disturbances

## 2023-07-29 NOTE — ASSESSMENT & PLAN NOTE
Respiratory alkalosis  · Tachypneic, using accessory muscles, intermittent stridor 7/28 resolved spontaneously on 07/29  · Trial of IV Lasix administered for pleural effusions seen on the CT chest 7/27.  · Echo 7/28 EF 65%, grade 1 diastolic dysfunction, mild AS, normal RV size and function, PA systolic pressure 39 mm Hg, normal CVP  · Holding Lasix as patient has  · Bronchodilators without much improvement  · CTA chest and CT soft tissue neck pending, negative for PE, negative for any mass compressing the airway narrowing of the bilateral mainstem bronchi to 2 mm diameter not present on previous CT suggesting possible transient finding.  Possible bronchomalacia

## 2023-07-29 NOTE — SUBJECTIVE & OBJECTIVE
Interval History:  Patient seen and examined on multiple occasions with nursing staff and rapid response seen.  Patient more confused.  Speech is unintelligible.  Patient is tachypneic and using accessory muscles.  ABGs reveal respiratory alkalosis from persistent tachypnea.  Trial of Lasix without much improvement.  Breathing treatment ordered without much improvement.  Anxiety medications administered with little to no change.  Nursing staff reported short run of V-tach followed by sinus tach.  When I reviewed the strips, it appeared to be about a 5 run beat of V-tach followed by sinus tach with artifact.  EKG with normal sinus rhythm.      CTA chest and CT neck ordered as the patient's respiratory issues seem to be in the upper airway.  ICU consulted and agree with imaging.    Labs reviewed:  Hemoglobin 9.2, sodium 141, creatinine 0.7.        Review of Systems   Unable to perform ROS: Dementia   Objective:     Vital Signs (Most Recent):  Temp: 97.2 °F (36.2 °C) (07/28/23 2357)  Pulse: 78 (07/29/23 0038)  Resp: (!) 25 (07/28/23 2357)  BP: (!) 143/60 (07/28/23 2357)  SpO2: 95 % (07/28/23 2357) Vital Signs (24h Range):  Temp:  [97.2 °F (36.2 °C)-100.2 °F (37.9 °C)] 97.2 °F (36.2 °C)  Pulse:  [] 78  Resp:  [17-25] 25  SpO2:  [90 %-96 %] 95 %  BP: (114-147)/(57-99) 143/60     Weight: 75.8 kg (167 lb)  Body mass index is 28.67 kg/m².    Intake/Output Summary (Last 24 hours) at 7/29/2023 0045  Last data filed at 7/28/2023 0802  Gross per 24 hour   Intake 289.5 ml   Output 700 ml   Net -410.5 ml           Physical Exam  Vitals and nursing note reviewed.   Constitutional:       General: She is not in acute distress.     Appearance: She is well-developed. She is ill-appearing (chronically ill-appearing).   HENT:      Mouth/Throat:      Mouth: Mucous membranes are moist.      Pharynx: Oropharynx is clear. No oropharyngeal exudate or posterior oropharyngeal erythema.   Cardiovascular:      Rate and Rhythm: Normal rate  and regular rhythm.      Heart sounds: Normal heart sounds.   Pulmonary:      Effort: Respiratory distress (Mild) present.      Breath sounds: Normal breath sounds. Stridor (mild) present. No wheezing.   Abdominal:      General: Bowel sounds are normal. There is no distension.      Palpations: Abdomen is soft.      Tenderness: There is no abdominal tenderness.   Musculoskeletal:      Cervical back: Normal range of motion and neck supple.   Skin:     General: Skin is warm and dry.   Neurological:      Mental Status: She is alert. Mental status is at baseline.      Sensory: No sensory deficit.      Coordination: Coordination normal.      Comments: Unintelligible speech but will follow commands

## 2023-07-29 NOTE — ASSESSMENT & PLAN NOTE
- Caretaker Sonia expresses several concerns regarding patients continual health and function decline  - Recent stay at SNF with progress noted and pt was placed in skilled nursing at [place]. However, [facility] is not the appropriate level of care for patient given dementia and frequent falls   - [place] will now require 24 hour sitters for patient to return   - DILIA TAYLOR consulted for dc planning   - will need to have an in-depth palliative discussion with son to determine best care plan for patient - consult placed   - son agreeable to discussion.  Patient remains full code.

## 2023-07-29 NOTE — NURSING TRANSFER
Nursing Transfer Note      7/29/2023   3:42 AM    Nurse giving handoff: Aleyda  Nurse receiving handoff: Zandra    Reason patient is being transferred: HLOC    Transfer From: 16W  [id number]    Transfer Via: Bed    Transfer with: O2, pulse oximetry, cardiac monitoring, personal belongings    Transported by: Aleyda RN, Candido RN     Transfer Vital Signs:  Blood Pressure:143/60  Heart Rate:95  O2:95  Temperature:97.2  Respirations:25    Telemetry: Box Number [id number]  Order for Tele Monitor? Yes    Additional Lines: Oxygen    4eyes on Skin: No    Medicines sent: Zyprexa, escitalopram, miconazole powder    Any special needs or follow-up needed: No    Patient belongings transferred with patient: Yes    Chart send with patient: Yes    Notified: Chel (caretaker)    Patient reassessed at: In room  1  Upon arrival to floor: cardiac monitor applied and bed in lowest position

## 2023-07-29 NOTE — ASSESSMENT & PLAN NOTE
· Likely progression of dementia versus medication.  · On multiple medications that could contribute that are relatively new according to the son:  Memantine, Robaxin, Lexapro, Haldol  · More confused on 07/28, better on 07/29--discontinued scheduled Robaxin  · Multiple ABGs with progressing respiratory alkalosis, possibly contributing.  · 7/29 repeated CT head with stable left greater than right subdural collections.  More prominent increased density within the left frontal portion of the hemorrhage noted but possibly contrast pooling from the CT from CT neck prior day.  · Critical care consulted, appreciate assistance

## 2023-07-29 NOTE — SUBJECTIVE & OBJECTIVE
Interval History:  Patient seen and examined on multiple occasions today initially, patient in bed with eyes closed with caretaker at bedside patient minimally responds and will not follow commands.  Patient tachypneic and using accessory muscles to breathe.  Patient still with intermittent stridor.  At that time, ABG and CT head ordered.  Later in the morning, I went back to the patient's room for 2nd a and she was awake and answering questions although words were difficult to understand.  Her eyes were open and her caretaker was feeding her.  At this point, she started resolved and patient was not having difficulty breathing      ABG reviewed and revealed respiratory alkalosis    Other labs reviewed:  Hemoglobin 8.5, potassium 3.5, 26, creatinine 0.9        Review of Systems   Unable to perform ROS: Dementia     Objective:     Vital Signs (Most Recent):  Temp: 97.7 °F (36.5 °C) (07/29/23 0740)  Pulse: 83 (07/29/23 0845)  Resp: (!) 29 (07/29/23 0845)  BP: 133/60 (07/29/23 1055)  SpO2: 97 % (07/29/23 0845) Vital Signs (24h Range):  Temp:  [97.2 °F (36.2 °C)-100.2 °F (37.9 °C)] 97.7 °F (36.5 °C)  Pulse:  [8-179] 83  Resp:  [17-29] 29  SpO2:  [91 %-100 %] 97 %  BP: ()/(60-99) 133/60     Weight: 75.8 kg (167 lb)  Body mass index is 28.67 kg/m².    Intake/Output Summary (Last 24 hours) at 7/29/2023 1418  Last data filed at 7/29/2023 0540  Gross per 24 hour   Intake --   Output 200 ml   Net -200 ml           Physical Exam  Vitals and nursing note reviewed.   Constitutional:       General: She is not in acute distress.     Appearance: She is well-developed. She is ill-appearing (chronically ill-appearing).   HENT:      Mouth/Throat:      Pharynx: No oropharyngeal exudate or posterior oropharyngeal erythema.   Cardiovascular:      Rate and Rhythm: Normal rate and regular rhythm.      Heart sounds: Normal heart sounds.   Pulmonary:      Effort: Respiratory distress: Mild.      Breath sounds: Normal breath sounds. No  stridor (Resolved as mentation resolved). No wheezing.   Abdominal:      General: Bowel sounds are normal. There is no distension.      Palpations: Abdomen is soft.      Tenderness: There is no abdominal tenderness.   Skin:     General: Skin is warm and dry.   Neurological:      Mental Status: She is alert. Mental status is at baseline.      Comments: Speech is more understandable today

## 2023-07-29 NOTE — ASSESSMENT & PLAN NOTE
Respiratory alkalosis  · Tachypneic, using accessory muscles, stridor  · Echo 7/28 EF 65%, grade 1 diastolic dysfunction, mild AS, normal RV size and function, PA systolic pressure 39 mm Hg  · Additional IV Lasix administered, previous CT with small left pleural effusion  · Bronchodilators without much improvement  · CTA chest and CT soft tissue neck pending

## 2023-07-29 NOTE — ASSESSMENT & PLAN NOTE
Recurrent UTIs  - initial UA concern for infection, completed Rocephin  - urine culture with Candida glabrata 10 K to 49 K units not consistent with convincing UTI.  - duncan placed for U-retention  - AFVSS, no leukocytosis.

## 2023-07-29 NOTE — ASSESSMENT & PLAN NOTE
Acute subdural hematoma   - Acute SDH on chronic SD hygroma s/p fall at [facility]; no acute deficits  - repeat CTH stable   - holding eliquis for now - may continue in 2 weeks (per NSGY)   - > NSGY ok's starting [facility] for dvt ppx, holding for now with acute number  - INR wnl  - NSGY cleared. No need for continued monitoring aside from 2 week clinic follow up.

## 2023-07-29 NOTE — CARE UPDATE
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    Last VS: /62   Pulse (!) 116   Temp 97.9 °F (36.6 °C) (Oral)   Resp (!) 24   Ht 5' 4" (1.626 m)   Wt 75.8 kg (167 lb)   SpO2 98%   Breastfeeding No   BMI 28.67 kg/m²     24H Vital Sign Range:  Temp:  [97.2 °F (36.2 °C)-100.2 °F (37.9 °C)]   Pulse:  []   Resp:  [17-25]   BP: ()/(57-99)   SpO2:  [90 %-98 %]     Level of Consciousness (AVPU): alert    Recent Labs     07/28/23  0718 07/29/23  0355   WBC 5.90 7.21   HGB 9.2* 8.5*   HCT 29.8* 26.8*    283       Recent Labs     07/27/23  0933 07/28/23  0717 07/29/23  0355    141 144   K 3.4* 3.8 3.5    104 106   CO2 29 28 26   BUN 13 16 16   CREATININE 0.7 0.7 0.9    98 103   MG 1.5* 1.9 1.8        Recent Labs     07/28/23  1409 07/28/23  1813 07/29/23  0217   PH 7.510* 7.549* 7.468*   PCO2 37.1 31.8* 39.3   PO2 64* 70* 81   HCO3 29.6* 27.7 28.5*   POCSATURATED 94* 96 97   BE 7 5 5        OXYGEN:  Flow (L/min): 2          MEWS score: 2    Charge RN, Tiffanie  contacted. No concerns verbalized at this time. Instructed to call [phone] for further concerns or assistance.    Dona Shea RN        "

## 2023-07-29 NOTE — PLAN OF CARE
Problem: Infection  Goal: Absence of Infection Signs and Symptoms  Outcome: Ongoing, Not Progressing     Problem: Adult Inpatient Plan of Care  Goal: Plan of Care Review  Outcome: Ongoing, Not Progressing  Goal: Patient-Specific Goal (Individualized)  Outcome: Ongoing, Not Progressing  Goal: Absence of Hospital-Acquired Illness or Injury  Outcome: Ongoing, Not Progressing  Goal: Optimal Comfort and Wellbeing  Outcome: Ongoing, Not Progressing  Goal: Readiness for Transition of Care  Outcome: Ongoing, Not Progressing     Problem: Coping Ineffective  Goal: Effective Coping  Outcome: Ongoing, Not Progressing     Problem: Impaired Wound Healing  Goal: Optimal Wound Healing  Outcome: Ongoing, Not Progressing     Problem: Fall Injury Risk  Goal: Absence of Fall and Fall-Related Injury  Outcome: Ongoing, Not Progressing     Problem: Skin Injury Risk Increased  Goal: Skin Health and Integrity  Outcome: Ongoing, Not Progressing     Patient alert with confusion this shift. Patient erratic with movements and agitated. Medicated for agitation. MD informed of situation. Patient breath sounds strider. CTA ordered for possible PE. Patient order to  move to stepdown. Spoke with son to inform him of situation.

## 2023-07-29 NOTE — PLAN OF CARE
Pt arrived on the unit approx 0330. Pt drowsy, confused, slurred/incomprehensible speech. RN discussed with bedside caregiver, this is pt's baseline. Pt denies pain or SOB. Pt on 2L O2, saturating 98-99%. Pt flipped into afib RVR, rates as high at 160, IVP metoprolol administered, 250cc bolus NaCl administered. Improved rates & BP. Pt diaphoretic, dry mucosa. Purewik in place, minimal output. Incontinent of bowel. Sitter/caregiver at bedside. Son, Maykel, MAICOL. Pt resting post unit transfer, continuing POC.

## 2023-07-29 NOTE — ASSESSMENT & PLAN NOTE
Frequent falls   - diagnosed at last [facility] hospitalization about 2 weeks ago.   - repeat XRs show stable fractures, non-displaced   - Ortho consulted in ED; originally recommended non-op management, but after further discussion recommended ORIF pelvis 7/24. Family declining surgery at this time.  - weight bearing as tolerated   - pain control with tylenol.  Avoiding sedating medications.  - PT/OT consult pending  - patient will likely need SNF v long term placement given max assist requirements and progressive debility, in setting of dementia and behavioral disturbances

## 2023-07-29 NOTE — CODE/ RAPID DOCUMENTATION
RAPID RESPONSE NURSE PROACTIVE ROUNDING NOTE       Time of Visit: 215    Admit Date: 2023  LOS: 6  Code Status: Full Code   Date of Visit: 2023  : 1935  Age: 88 y.o.  Sex: female  Race: White  Bed: 60420/01111 A:   MRN: [id number]  Was the patient discharged from an ICU this admission? No   Was the patient discharged from a PACU within last 24 hours? No   Did the patient receive conscious sedation/general anesthesia in last 24 hours? No  Was the patient in the ED within the past 24 hours? No  Was the patient on NIPPV within the past 24 hours? No   Attending Physician: Pablo Haddad III,*  Primary Service: [facility] MED A   Time spent at the bedside: 15 -30 min    SITUATION    Notified by CARSON.  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    Last Vitals:  Temp: 97.2 °F (36.2 °C) (2357)  Pulse: 78 ( 0038)  Resp: 25 (2357)  BP: 143/60 (2357)  SpO2: 97 % (217) Comment: ABG    24 Hours Vitals Range:  Temp:  [97.2 °F (36.2 °C)-100.2 °F (37.9 °C)]   Pulse:  []   Resp:  [17-25]   BP: (114-147)/(57-99)   SpO2:  [90 %-97 %]     Labs:  Recent Labs     23  0509 23  0718   WBC 7.11 5.90   HGB 9.1* 9.2*   HCT 28.3* 29.8*    256       Recent Labs     23  0509 23  0933 23  0717    142 141   K 3.3* 3.4* 3.8     103 104   CO2 27 29 28   BUN 15 13 16   CREATININE 0.8 0.7 0.7   GLU 89 108 98   MG  --  1.5* 1.9        Recent Labs     07/28/23  1409 07/28/23  1813 07/29/23  0217   PH 7.510* 7.549* 7.468*   PCO2 37.1 31.8* 39.3   PO2 64* 70* 81   HCO3 29.6* 27.7 28.5*   POCSATURATED 94* 96 97   BE 7 5 5        ASSESSMENT    Physical Exam  Constitutional:       Appearance: She is ill-appearing.   Eyes:      Pupils: Pupils are equal, round, and reactive to light.   Cardiovascular:      Rate and Rhythm: Normal rate and regular rhythm.      Heart sounds: Normal heart sounds. No murmur heard.     No friction rub. No gallop.   Pulmonary:      Effort: Tachypnea present.      Breath sounds: No stridor. Examination of the right-lower field reveals decreased breath sounds. Examination of the left-lower field reveals decreased breath sounds. Decreased breath sounds present. No wheezing, rhonchi or rales.   Abdominal:      Palpations: Abdomen is soft.      Tenderness: There is no abdominal tenderness. There is no guarding.   Skin:     General: Skin is warm.      Capillary Refill: Capillary refill takes 2 to 3 seconds.   Neurological:      Mental Status: She is alert.      GCS: GCS eye subscore is 4. GCS verbal subscore is 2. GCS motor subscore is 5.      Comments: incomprehensible speech, not following commands   Psychiatric:         Behavior: Behavior is agitated.     Called to se pt for tachypnea.  Pt awake but unable to ascertain orientation, pt not following commands and speech is not comprehensible.  SPO2 94-98% on 2lpm via NC.  Assessment as noted.  Pt appears encephalopathic, delirious, possibly hallucinating. Pt hemodynamically stable but tachypnic ABG improved from prior exam.  No critical care needs observed at this time, continue POC as previously implemented including Tx to HLOC 83537    INTERVENTIONS    The patient was seen for Respiratory problem. Staff concerns included tachypnea. The following interventions were performed:  POCT arterial blood gas , continuous pulse ox monitoring continued, continuous cardiac monitoring continued, and no additional interventions needed at this time.    RECOMMENDATIONS    Continue transfer to step down unit  Continue POC as per primary team    PROVIDER ESCALATION    Yes/No  Yes    Orders received and case discussed with  Deann Bellamy NP.    Disposition: Tx to [facility], bed 40025.    FOLLOW-UP    Charge Candido BUSH  updated on plan of care. Instructed to call the Rapid Response Nurse, Maykel Patricia RN at [phone] for additional questions or concerns.

## 2023-07-29 NOTE — ASSESSMENT & PLAN NOTE
· Likely progression of dementia.  · On multiple medications that could contribute that are relatively new according to the son:  Memantine, Robaxin, Lexapro, Haldol  · More confused on 07/28  · Multiple ABGs with progressing respiratory alkalosis, possibly contributing.  · If persists, will repeat head CT  · Continue breathing treatments.  Continue Lasix.  · Critical care consulted, appreciate assistance

## 2023-07-29 NOTE — ASSESSMENT & PLAN NOTE
Suicidal ideation   Insomnia   - baseline per caretaker; however, concerned that patient does not sleep at night, worsening her mentation, agitation, and delirium.   - continue memantine  - on home haloperidol 0.5 PO qhs  - psychiatry consulted for assistance   --> psych recs DC nightly haldol. Start 2.5 mg zyprexa QHS and q8h prn for agitation.    --> 7/25 increase nightly zyprexa to 5 mg   - monitor for improvement   - EKG monitoring   - 7/24-25 Pt reporting SI from patient during session. Caretaker says this is not new, patient expresses this intermittently. Psych aware. Med adjustments made.

## 2023-07-29 NOTE — PROGRESS NOTES
[facility] - Intensive Care ([address] Medicine  Progress Note    Patient Name: Radha Sandoval  MRN: [id number]  Patient Class: IP- Inpatient   Admission Date: 7/22/2023  Length of Stay: 6 days  Attending Physician: Pablo Haddad III,*  Primary Care Provider: Primary Doctor No        Subjective:     Principal Problem:Multiple closed fractures of pelvis without disruption of pelvic ring        HPI:  Radha Sandoval is a 88 y.o. female with PMH significant for chronic BL pleural effusions, recent DVT diagnosed in March '23 (on eliquis), dementia, HTN, with recent hospitalization here at [facility] for pelvic fracture treated non-operatively, who presents after a fall at her halfway while being transferred from wheelchair to bed. Hx taken per Caretaker Sonia and Son (POA) Maykel. Caretaker at bedside unaware of head trauma or other injury. Of note, patient sustained her pelvic fractures at [facility], but after hospitalization at [facility] was placed in [facility] on 7/14. Caretaker reports patient also had another hospitalization during this time at . Patient generally disoriented and delirious, but she will have periods or even days of clarity. At baseline, she is oriented x2. Due to mentation she has difficulty working with staff which results in falls. Prior to her pelvic fracture on 7/8, pt was able to perform independent transfers from wheelchair and could walk short distances with her walker, but she is now max assist. Currently on Eliquis. Patient without complaint on my exam, denies pain.     In the ED: AFVSS. CBC with baseline anemia. CMP reveals slight elevation in Cr 1.0 (baseline 0.8) and K 3.3. BNP and troponin elevated but at baseline. UA grossly infectious. Spann placed for urinary retention.  All imaging reviewed. 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 Repeat CTH after 6 hours was stable. XR pelvis reveals stable recent R superior and inferior pubic rami fractures,  non-displaced. EKG in NSR with prolonged Qtc 510 ms. Given tylenol, norco, and 1g rocephin.       Overview/Hospital Course:  Radha Sandoval was placed in [facility] observation after a fall. NSGY consulted for evaluation of SDH and determined no need for intervention , will follow up in clinic in 2 weeks. HOLD eliquis until follow up. Orthopedics discussed surgical options with family and recommended ORIF pelvis, but family is declining at this time and would prefer to continue with plan for PT/OT. Therapy assessment; recs for SNF. WBAT. Dvt ppx with 40 lovenox SQ daily (ok per NSGY). Psychiatry provided recs for medication adjustment for insomnia and delirium with prn dosing for agitation, will monitor response. Agitation persists and patient now expressing suicidal ideation when asked to work with therapy. Psychiatry aware of SI, making med adjustments as indicated. Caretaker has been staying at bedside. Monitor EKG daily given prolonged Qtc. Palliative care consulted for assistance with GOC planning with son and care team, appreciate recs. Pain controlled and mentation at baseline.       Interval History:  Patient seen and examined on multiple occasions today initially, patient in bed with eyes closed with caretaker at bedside patient minimally responds and will not follow commands.  Patient tachypneic and using accessory muscles to breathe.  Patient still with intermittent stridor.  At that time, ABG and CT head ordered.  Later in the morning, I went back to the patient's room for 2nd a and she was awake and answering questions although words were difficult to understand.  Her eyes were open and her caretaker was feeding her.  At this point, she started resolved and patient was not having difficulty breathing      ABG reviewed and revealed respiratory alkalosis    Other labs reviewed:  Hemoglobin 8.5, potassium 3.5, 26, creatinine 0.9        Review of Systems   Unable to perform ROS: Dementia     Objective:     Vital  Signs (Most Recent):  Temp: 97.7 °F (36.5 °C) (07/29/23 0740)  Pulse: 83 (07/29/23 0845)  Resp: (!) 29 (07/29/23 0845)  BP: 133/60 (07/29/23 1055)  SpO2: 97 % (07/29/23 0845) Vital Signs (24h Range):  Temp:  [97.2 °F (36.2 °C)-100.2 °F (37.9 °C)] 97.7 °F (36.5 °C)  Pulse:  [8-179] 83  Resp:  [17-29] 29  SpO2:  [91 %-100 %] 97 %  BP: ()/(60-99) 133/60     Weight: 75.8 kg (167 lb)  Body mass index is 28.67 kg/m².    Intake/Output Summary (Last 24 hours) at 7/29/2023 1418  Last data filed at 7/29/2023 0540  Gross per 24 hour   Intake --   Output 200 ml   Net -200 ml           Physical Exam  Vitals and nursing note reviewed.   Constitutional:       General: She is not in acute distress.     Appearance: She is well-developed. She is ill-appearing (chronically ill-appearing).   HENT:      Mouth/Throat:      Pharynx: No oropharyngeal exudate or posterior oropharyngeal erythema.   Cardiovascular:      Rate and Rhythm: Normal rate and regular rhythm.      Heart sounds: Normal heart sounds.   Pulmonary:      Effort: Respiratory distress: Mild.      Breath sounds: Normal breath sounds. No stridor (Resolved as mentation resolved). No wheezing.   Abdominal:      General: Bowel sounds are normal. There is no distension.      Palpations: Abdomen is soft.      Tenderness: There is no abdominal tenderness.   Skin:     General: Skin is warm and dry.   Neurological:      Mental Status: She is alert. Mental status is at baseline.      Comments: Speech is more understandable today               Assessment/Plan:      * Multiple closed right sided fractures of pelvis without disruption of pelvic ring  Frequent falls   - diagnosed at last [facility] hospitalization about 2 weeks ago.   - repeat XRs show stable fractures, non-displaced   - Ortho consulted in ED; originally recommended non-op management, but after further discussion recommended ORIF pelvis 7/24. Family declining surgery at this time.  - weight bearing as tolerated   - pain  control with tylenol.  Avoiding sedating medications.  - PT/OT consult pending  - patient will likely need SNF v long term placement given max assist requirements and progressive debility, in setting of dementia and behavioral disturbances     Acute metabolic encephalopathy  · Likely progression of dementia versus medication.  · On multiple medications that could contribute that are relatively new according to the son:  Memantine, Robaxin, Lexapro, Haldol  · More confused on 07/28, better on 07/29--discontinued scheduled Robaxin  · Multiple ABGs with progressing respiratory alkalosis, possibly contributing.  · 7/29 repeated CT head with stable left greater than right subdural collections.  More prominent increased density within the left frontal portion of the hemorrhage noted but possibly contrast pooling from the CT from CT neck prior day.  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   - continue memantine  - on home haloperidol 0.5 PO qhs  - psychiatry consulted for assistance   --> psych recs DC nightly haldol. Start 2.5 mg zyprexa QHS and q8h prn for agitation.    --> 7/25 increase nightly zyprexa to 5 mg   - monitor for improvement   - EKG monitoring   - 7/24-25 Pt reporting SI from patient during session. Caretaker says this is not new, patient expresses this intermittently. Psych aware. Med adjustments made.    Subdural hygroma  Acute subdural hematoma   - Acute SDH on chronic SD hygroma s/p fall at senior care; no acute deficits  - repeat CTH stable   - holding eliquis for now - may continue in 2 weeks (per NSGY)   - > NSGY ok's starting LWMH for dvt ppx, holding for now with acute number  - INR wnl  - NSGY cleared. No need for continued monitoring aside from 2 week clinic follow up.      Acute hypoxemic respiratory failure  Respiratory alkalosis  · Tachypneic, using accessory muscles, intermittent stridor 7/28 resolved spontaneously on 07/29  · Trial of IV Lasix administered for pleural effusions seen on the CT chest 7/27.  · Echo 7/28 EF 65%, grade 1 diastolic dysfunction, mild AS, normal RV size and function, PA systolic pressure 39 mm Hg, normal CVP  · Holding Lasix as patient has  · Bronchodilators without much improvement  · CTA chest and CT soft tissue neck pending, negative for PE, negative for any mass compressing the airway narrowing of the bilateral mainstem bronchi to 2 mm diameter not present on previous CT suggesting possible transient finding.  Possible bronchomalacia    Prolonged Q-T interval on ECG  - continue to monitor, relatively stable  - avoid QT prolonging meds   - monitor tele     Urinary retention  Recurrent issue   - mild elevation in Cr likely d/t retention   - duncan placed        Goals of care, counseling/discussion  - Caretaker Sonia expresses several concerns regarding patients continual health and function decline  - Recent stay at SNF with progress noted and pt was placed in ELVIN at [place]. However, ELVIN is not the appropriate level of care for patient given dementia and frequent falls   - [place] will now require 24 hour sitters for patient to return   - CM SW consulted for dc planning   - will need to have an in-depth palliative discussion with son to determine best care plan for patient - consult placed   - son agreeable to discussion.  Patient remains full code.    Hypokalemia  · Monitor K/Mag and replace as needed      Lumbar spondylosis with myelopathy  - tylenol for pain control, robaxin as needed    Abnormal urinalysis  Recurrent UTIs  - initial UA concern for infection, completed Rocephin  - urine culture with Candida glabrata 10 K to 49 K units not consistent with convincing UTI.  - duncan placed for U-retention  - AFVSS, no leukocytosis.         Primary  hypertension  - BP uncontrolled intermittently, as high as 226/90 -- suspect bc patient was agitated and spitting out meds  - continue lisinopril, lopressor, lasix  - PRN IV hydralazine for SBP >180 or if unable to tolerate oral meds.         VTE Risk Mitigation (From admission, onward)         Ordered     Reason for No Pharmacological VTE Prophylaxis  Once        Question:  Reasons:  Answer:  Risk of Bleeding  Comment:  SDH    07/23/23 0831     IP VTE HIGH RISK PATIENT  Once         07/23/23 0831     Place sequential compression device  Until discontinued         07/23/23 0831                Discharge Planning   TOSHA: 7/31/2023     Code Status: Full Code   Is the patient medically ready for discharge?: No    Reason for patient still in hospital (select all that apply): Patient trending condition, Treatment, Consult recommendations, PT / OT recommendations and Pending disposition  Discharge Plan A: Skilled Nursing Facility                  Pablo Haddad III, MD  Department of Hospital Medicine   [facility] [address] [facility] - Intensive Care ([place]-14)

## 2023-07-30 NOTE — PLAN OF CARE
Pt awake, alert, and forgetful.  She responds to questions appropriately and forgets not long later.  Cries at times asking to go home.  VSS, no complaints of pain at this time.  Sitter at bedside.  Will continue to monitor.

## 2023-07-30 NOTE — AI DETERIORATION ALERT
"RAPID RESPONSE NURSE AI ALERT       AI alert received.    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    Last VS: BP (!) 160/75 (BP Location: Left arm, Patient Position: Lying)   Pulse (!) 114   Temp 97.8 °F (36.6 °C) (Oral)   Resp (!) 95   Ht 5' 4" (1.626 m)   Wt 75.8 kg (167 lb)   SpO2 (!) 94%   Breastfeeding No   BMI 28.67 kg/m²     24H Vital Sign Range:  Temp:  [97.8 °F (36.6 °C)-98.3 °F (36.8 °C)]   Pulse:  []   Resp:  [16-95]   BP: (114-203)/()   SpO2:  [93 %-99 %]     Level of Consciousness (AVPU): alert    Recent Labs     07/28/23  0718 07/29/23  0355 07/30/23  0315   WBC 5.90 7.21 6.38   HGB 9.2* 8.5* 8.5*   HCT 29.8* 26.8* 27.9*    283 268       Recent Labs     07/28/23  0717 07/29/23  0355 07/30/23  0315    144 147*   K 3.8 3.5 3.4*    106 109   CO2 28 26 24   BUN 16 16 15   CREATININE 0.7 0.9 0.7   GLU 98 103 106   PHOS  --   --  3.2   MG 1.9 1.8 1.9        Recent Labs     07/28/23  1813 07/29/23  0217 07/29/23  0847   PH 7.549* 7.468* 7.520*   PCO2 31.8* 39.3 36.6   PO2 70* 81 77*   HCO3 27.7 28.5* 29.9*   POCSATURATED 96 97 97   BE 5 5 7        OXYGEN:  Flow (L/min): 2          MEWS score: 1    bedside RNErin  contacted. No concerns verbalized at this time. Instructed to call [phone] for further concerns or assistance.    Tarun Sharma RN        "

## 2023-07-30 NOTE — NURSING
Pt agitated, pulling at lines and telemetry, trying to get out of bed. 1:1 sitter at bedside. Video sitter also set-up and order placed. Provider Santiaog Hassan MD notified, said ok to give prn Zyprexa 2.5mg early, minimal relief from medication.

## 2023-07-30 NOTE — NURSING
Pt unable to follow commands and refuses to open eyes. States her name. Speech garbled and hard to understand.Provider notified. All PO medications held d/t mental status. /103 and 203/79 on re-check. Prn IV hydralazine administered. All other vital signs stable. Scheduled IM zyprexa given. Will continue to monitor.

## 2023-07-30 NOTE — PLAN OF CARE
Plan of Care:    Chart check this morning notable for daily up trending QTc while on Zyprexa, with this morning at 526. As such, recommend, discontinuing Zyprexa. Start Depacon 250 mg IV nightly, with Depacon 250 mg PRN q8 hours.     Please reach out with any questions or concerns.    Kilo Kilgore MD   Psychiatry  [facility] PGY2

## 2023-07-31 NOTE — ASSESSMENT & PLAN NOTE
Recurrent issue   - mild elevation in Cr likely d/t retention   - duncan placed, removed with decent urination, had to be replaced on 07/30 following recurrence of retention

## 2023-07-31 NOTE — PLAN OF CARE
[facility] - Intensive Care ([facility])  Discharge Reassessment    Per MD team, pt requires continued medical treatment and is not stable for d/c yet. Plan is to d/c to [facility] when medically ready. Will continue to follow for needs.    1659  CM informed SW that Oliva from [facility] called to get updates on pt and requested updates be faxed to [phone]. CLAUDIA faxed dp SNF clinicals per request. Will continue to follow for needs.    Primary Care Provider: Primary Doctor No    Expected Discharge Date: 8/2/2023    Reassessment (most recent)       Discharge Reassessment - 07/31/23 1531          Discharge Reassessment    Assessment Type Discharge Planning Reassessment (P)      Did the patient's condition or plan change since previous assessment? No (P)      Discharge Plan discussed with: Patient;Adult children (P)      Communicated TOSHA with patient/caregiver Date not available/Unable to determine (P)      Discharge Plan A Skilled Nursing Facility (P)      Discharge Plan B Home with family;Home Health (P)      DME Needed Upon Discharge  other (see comments) (P)    TBD    Why the patient remains in the hospital Requires continued medical care (P)         Post-Acute Status    Discharge Delays None known at this time (P)                    Erin Andrew, GRADY, CSW    Case Management Department

## 2023-07-31 NOTE — PLAN OF CARE
Pt awake, alert, and hollering out in bed.  Confused talking out of her head.  Tachy on tele, all other VSS.  No complaints of pain.  Requested cont fluids d/t minimal urine output overnight and decreased oral intake.  Gave Depakote at 1800 to assist with agitation.  Pt fell asleep and sats dropped to 86%. Placed pt on 1.5L O2 and sats increased to 96%.  Sitter at bedside.

## 2023-07-31 NOTE — ASSESSMENT & PLAN NOTE
· Likely progression of dementia versus medication.  · On multiple medications that could contribute that are relatively new according to the son:  Memantine, Robaxin, Lexapro, Haldol  · More confused on 07/28, better on 07/29--discontinued scheduled Robaxin  · Multiple ABGs with progressing respiratory alkalosis, possibly contributing.  · 7/29 repeated CT head with stable left greater than right subdural collections.  More prominent increased density within the left frontal portion of the hemorrhage noted but possibly contrast pooling from the CT from CT neck prior day.  Repeat CT head 7/30, stable SIADH.  · Critical care consulted, appreciate assistance

## 2023-07-31 NOTE — PROGRESS NOTES
"CONSULTATION LIAISON PSYCHIATRY PROGRESS NOTE    Patient Name: Radha Sandoval  MRN: [id number]  Patient Class: IP- Inpatient  Admission Date: 7/22/2023  Attending Physician: Pablo Haddad III,*      SUBJECTIVE:   Radha Sandoval is a 88 y.o. female with past medical history of bilateral chronic subdural hematomas, Hypertension, Acute DVT and PE 5/23, frequent falls with recent Pelvic fracture s/p orthopedic intervention & past pertinent psychiatric history of Late Onset Alzheimers with mood disturbance and Delirium presents to the ED/admitted to the hospital for a fall, shortness of breath, and hypoxia.     Patient presented with right pelvic pain after a fall on 7/22 when she was transferred from bed to wheelchair. Due to her fluctuating mentation she has been agitated with nursing staff and tries to leave her bed and resists being transferred from bed to wheelchair, which has resulted in multiple falls.    Psychiatry consulted for insomnia contributing to delirium in patient with dementia, uncontrolled on current regimen    Medical Student Evaluation, Iván Resendiz  Today, patient was talkative yet incoherent for much of the interview. Her speech pattern is worse than her baseline. Speech did significantly improve after patient agreed to drink some water. Nurse states that it has been difficult to convince her to eat/drink anything. Sitter at the bedside stated that the patient didn't sleep last night and was extremely restless. Patient spends the night mumbling and talking to herself. She has no SI/HI/SIB. Reports no hallucinations but according to sitter the patient keeps asking if there is "a boy in the room." The patients family visited yesterday and may be the source of the confusion.    Resident Evaluation:  Patient was resting and calm prior to assessment. Oriented to self, place "hospital," and year. Patient mumbled incoherently throughout assessment. Per nurse, patient with little PO intake throughout the " day and did not sleep well overnight. Patient on IV antibiotics on assessment.     Interval Events:   7/31: Pt confused, mental status fluctuates, garbled speech. Intermittently agitated and restless, easily redirectable.      OBJECTIVE:    Mental Status Exam:  General Appearance: unremarkable, dressed in hospital garb, in no acute distress  Behavior: friendly, pleasant, polite  Involuntary Movements and Motor Activity: +akathisia  Gait and Station: unable to assess - patient lying down or seated  Speech and Language: spontaneous, talkative, incoherent at times, mumbling  Mood: Unable to assess  Affect: appropriate to situation and context  Thought Process and Associations: Unable to Assess  Thought Content and Perceptions:: no hallucinations observed on assessment  Sensorium and Orientation: oriented to person, disoriented to place  Recent and Remote Memory: impaired  Attention and Concentration: easily distractible  Fund of Knowledge: impaired  Insight: impaired, limited  Judgment: impaired, limited          ASSESSMENT & RECOMMENDATIONS   Late Onset Alzheimer's with mood disturbance      PSYCH MEDICATIONS  Discontinued zyprexa 5 mg po nightly IM/IV due to Qtc prolongation  Continue Depacon 250 mg IV nightly  Continue Lexapro 5 mg po QD  Continue Memantine 5 mg po BID  PRN: Depacon 250 mg PRN agitation q8 hours    DELIRIUM BEHAVIOR MANAGEMENT  PLEASE utilize CHEMICAL restraints with PRN meds first for agitation. Minimize use of PHYSICAL restraints OR have periods of being out of physical restraints if possible.  Keep window shades open and room lit during day and room dim at night in order to promote normal sleep-wake cycles  Encourage family at bedside. [place] patient often to situation, location, date.  Continue to Limit or Discontinue use of Narcotics, Benzos and Anti-cholinergic medications as they may worsen delirium.  Continue medical workup for causative etiology of Delirium.      RISK ASSESSMENT  NO NEED  FOR PEC at this time. Will continue to reassess.      FOLLOW UP  Will follow up while in house     DISPOSITION - once medically cleared:  Defer to medical team    Please contact ON CALL psychiatry service (24/7) for any acute issues that may arise.    I attest to having seen and evaluated the above patient with student, Iván Resendiz. I have personally reviewed the note, assessment, and plan, and have made necessary adjustments.    Dr. Adalberto HERNANDES Psychiatry  [facility]  7/31/2023 11:31 AM        --------------------------------------------------------------------------------------------------------------------------------------------------------------------------------------------------------------------------------------    CONTINUED OBJECTIVE clinical data & findings reviewed and noted for above decision making    Current Medications:   Scheduled Meds:    albuterol-ipratropium  3 mL Nebulization Q6H WAKE    ascorbic acid (vitamin C)  250 mg Oral Daily    enoxaparin  40 mg Subcutaneous Daily    EScitalopram oxalate  5 mg Oral Daily    lisinopriL  40 mg Oral Daily    magnesium sulfate IVPB  1 g Intravenous Once    memantine  5 mg Oral BID    metoprolol tartrate  25 mg Oral BID    miconazole NITRATE 2 %   Topical (Top) BID    polyethylene glycol  17 g Oral BID    potassium bicarbonate  40 mEq Oral Q4H    senna-docusate 8.6-50 mg  1 tablet Oral BID    tamsulosin  0.4 mg Oral Nightly    traZODone  25 mg Oral QHS    valproate sodium (DEPACON) IVPB  250 mg Intravenous QHS    vitamin D  1,000 Units Oral Daily    zinc sulfate  220 mg Oral Daily     PRN Meds: acetaminophen, aluminum-magnesium hydroxide-simethicone, glucagon (human recombinant), glucose, glucose, hydrALAZINE, lactulose, loperamide, melatonin, metoprolol, naloxone, prochlorperazine, sodium chloride 0.9%, sodium chloride 0.9%, valproate sodium (DEPACON) IVPB    Allergies:   Review of patient's allergies indicates:  No Known  Allergies    Vitals  Vitals:    07/31/23 0835   BP:    Pulse: 92   Resp: 18   Temp:        Labs/Imaging/Studies:  Recent Results (from the past 24 hour(s))   Urinalysis, Reflex to Urine Culture Urine, Clean Catch    Collection Time: 07/31/23 12:30 AM    Specimen: Urine   Result Value Ref Range    Specimen UA Urine, Clean Catch     Color, UA Yellow Yellow, Straw, Mikayla    Appearance, UA Clear Clear    pH, UA 8.0 5.0 - 8.0    Specific Gravity, UA 1.020 1.005 - 1.030    Protein, UA Trace (A) Negative    Glucose, UA Negative Negative    Ketones, UA Negative Negative    Bilirubin (UA) Negative Negative    Occult Blood UA Negative Negative    Nitrite, UA Negative Negative    Leukocytes, UA 2+ (A) Negative   Urinalysis Microscopic    Collection Time: 07/31/23 12:30 AM   Result Value Ref Range    RBC, UA 3 0 - 4 /hpf    WBC, UA 46 (H) 0 - 5 /hpf    Bacteria Rare None-Occ /hpf    Squam Epithel, UA 1 /hpf    Hyaline Casts, UA 9 (A) 0-1/lpf /lpf    Microscopic Comment SEE COMMENT    Basic Metabolic Panel    Collection Time: 07/31/23  4:50 AM   Result Value Ref Range    Sodium 148 (H) 136 - 145 mmol/L    Potassium 3.4 (L) 3.5 - 5.1 mmol/L    Chloride 110 95 - 110 mmol/L    CO2 25 23 - 29 mmol/L    Glucose 93 70 - 110 mg/dL    BUN 13 8 - 23 mg/dL    Creatinine 0.9 0.5 - 1.4 mg/dL    Calcium 8.7 8.7 - 10.5 mg/dL    Anion Gap 13 8 - 16 mmol/L    eGFR >60.0 >60 mL/min/1.73 m^2   Magnesium    Collection Time: 07/31/23  4:50 AM   Result Value Ref Range    Magnesium 1.8 1.6 - 2.6 mg/dL   CBC Without Differential    Collection Time: 07/31/23  4:50 AM   Result Value Ref Range    WBC 6.52 3.90 - 12.70 K/uL    RBC 3.17 (L) 4.00 - 5.40 M/uL    Hemoglobin 8.7 (L) 12.0 - 16.0 g/dL    Hematocrit 28.0 (L) 37.0 - 48.5 %    MCV 88 82 - 98 fL    MCH 27.4 27.0 - 31.0 pg    MCHC 31.1 (L) 32.0 - 36.0 g/dL    RDW 15.4 (H) 11.5 - 14.5 %    Platelets 291 150 - 450 K/uL    MPV 11.1 9.2 - 12.9 fL     Imaging Results              CT Head Without Contrast  (Final result)  Result time 07/23/23 08:24:54      Final result by Bill Tarango MD (07/23/23 08:24:54)                   Impression:        Similar volume of subdural hematomas.  Changes of density may be in part technical/artifactual in nature and also due to some leakage of recent intravenous contrast from recent CT of the abdomen pelvis with no focal hyperdense acute hemorrhage identified.  Follow-up as clinically warranted.      Electronically signed by: Bill Tarango  Date:    07/23/2023  Time:    08:24               Narrative:    EXAMINATION:  CT HEAD WITHOUT CONTRAST    CLINICAL HISTORY:  Head trauma, minor (Age >= 65y);    TECHNIQUE:  Low dose axial images were obtained through the head.  Coronal and sagittal reformations were also performed. Contrast was not administered.    COMPARISON:  07/23/2023, 07/09/2023    FINDINGS:  Bilateral subacute subdural hematomas are noted bilaterally again identified measuring up to 14 mm on  the left, similar in volume.  No new focal hyperdense hemorrhage is identified.  Overall mild increased density of the hematoma may in part be technical/artifactual in nature as well as due to leakage of recent intravenous contrast as similar appearances were noted previously (on 07/09/2023 and 07/10/2023).    3 mm midline shift to the right is similar in appearance.  The basal cisterns remain patent.  No acute intraparenchymal process.    Prominent atherosclerotic vascular calcifications are noted at the skull base.    The visualized sinuses and mastoid air cells are essentially clear.                                       CT Chest Abdomen Pelvis With Contrast (xpd) (Final result)  Result time 07/23/23 02:32:32   Procedure changed from CT Abdomen Pelvis With Contrast     Final result by Chuy Mckeon MD (07/23/23 02:32:32)                   Impression:      Similar appearance of recent fractures involving the right inferior and superior pubic rami.  Increased conspicuity of  essentially nondisplaced recent fractures of the right michelle sacrum and anterior aspect of the right acetabulum.    Motion and artifact limited study with suboptimal bolus timing.    Moderate-sized hiatal hernia with moderate lower esophageal wall thickening.  Suggest correlation for esophagitis.    Peribronchial thickening and questionable minimal patchy ground-glass opacities in the lung bases and bibasilar subsegmental atelectasis.    Nodular soft tissue opacities in the left breast.  Neoplasm not excluded.  Suggest correlation with physical exam and mammographic follow-up when clinically warranted.    Anasarca.    Mild nonspecific wall thickening of the inferior aspect of the urinary bladder.  Suggest correlation with urinalysis.    Multiple additional findings discussed in the body of the report.      Electronically signed by: Chuy Mckeon MD  Date:    07/23/2023  Time:    02:32               Narrative:    EXAMINATION:  CT CHEST ABDOMEN PELVIS WITH CONTRAST (XPD)    CLINICAL HISTORY:  Abdominal trauma, blunt;    TECHNIQUE:  Low dose axial images, sagittal and coronal reformations were obtained from the thoracic inlet to the pubic symphysis following the IV administration of 75 mL of Omnipaque 350 .  Oral contrast was not given.    COMPARISON:  CTA chest, 07/08/2023.  CT pelvis, 07/08/2023.  CT abdomen pelvis, 05/06/2023.    FINDINGS:  Chest:    Exam quality is limited by suboptimal bolus timing and motion.  Evaluation is limited by extensive streak artifact due to the patient's arms overlying the field of view.    Heart is borderline enlarged and similar to the prior study.  Coronary artery and mitral valve calcifications.  Thoracic aorta is stable in caliber with moderate to advanced calcific atherosclerosis.  No central pulmonary embolus.  No bulky mediastinal lymphadenopathy.    Detailed evaluation of the pulmonary parenchyma limited by motion.  There is peribronchial thickening and questionable minimal  patchy ground-glass opacities in the lung bases.  Bibasilar subsegmental atelectasis.  No consolidation or pleural effusion.    Moderate-sized hiatal hernia with moderate lower esophageal wall thickening.    Abdomen:    Exam quality is limited by suboptimal bolus timing, motion, and extensive artifact related to the patient's arms overlying the field of view.    Liver is similar in size and contour when compared with the prior study.  Multiple hepatic hypodensities most suggestive of cysts.  Gallbladder is unremarkable.  No intrahepatic biliary ductal dilatation.    Spleen, adrenals, and pancreas are stable and negative for acute finding allowing for significant motion.    Kidneys are stable.  There is a large left renal cyst.  Small stone or vascular calcification in the right renal hilum.  No hydronephrosis.    Evaluation for bowel inflammation is limited by motion.  No small bowel obstruction.  Mild stool burden in the colon.    No pneumoperitoneum or organized fluid collection.    No bulky retroperitoneal lymphadenopathy.    Abdominal aorta is similar in caliber with moderate to advanced calcific atherosclerosis involving the aorta and major branch vessels.    Portal vein is grossly patent.    Pelvis:    Urinary bladder is mildly distended and there is mild wall thickening along the inferior aspect of the urinary bladder similar to the prior CT abdomen.  Rectum is unremarkable.  There is minimal presacral fat stranding.  No significant pelvic free fluid.    Bones and soft tissues:    Recent fractures involving the right superior and inferior pubic rami similar to prior CT pelvis.  Suspect nondisplaced fracture of the right michelle sacrum, more conspicuous when compared with prior CT pelvis.  Nondisplaced fracture of the anterior aspect of the right acetabulum (coronal series 606, image 129) is also more conspicuous when compared with the prior study.  No additional acute fracture.  Degenerative changes in the spine  and pubic symphysis.  Mild anterolisthesis of L4 with respect to L5.  Mild left convex scoliotic curvature of the spine.  Old right lower rib fractures.  Operative changes of presumed right lower abdominal wall hernia repair.  Mild diffuse body wall edema.  Somewhat nodular soft tissue densities in the left breast soft tissues.  Suggest follow-up mammogram.                                       CT Head Without Contrast (Final result)  Result time 07/23/23 02:16:47      Final result by Maykel Castro MD (07/23/23 02:16:47)                   Impression:      Subtle increase in density of the subdural hygromas suggesting acute on chronic subdural fluid collection.    Consider follow-up CT scan per protocol in patient with small acute subdural hematoma on chronic subdural hygromas.      Electronically signed by: Maykel Castro  Date:    07/23/2023  Time:    02:16               Narrative:    EXAMINATION:  CT HEAD WITHOUT CONTRAST    CLINICAL HISTORY:  Head trauma, minor (Age >= 65y);    TECHNIQUE:  Low dose axial CT images obtained throughout the head without intravenous contrast. Sagittal and coronal reconstructions were performed.    COMPARISON:  None.    FINDINGS:  Intracranial compartment:    Ventricles and sulci are stable in size for age without evidence of hydrocephalus. Subdural hygromas appear increased in density slightly suggesting acute on chronic subdural hematoma.    The brain parenchyma appears stable with involutional and chronic small vessel type changes again noted..  No parenchymal mass, hemorrhage, edema or major vascular distribution infarct.    Skull/extracranial contents (limited evaluation): No fracture. Mastoid air cells and paranasal sinuses are essentially clear.                                       CT Cervical Spine Without Contrast (Final result)  Result time 07/23/23 03:01:55      Final result by Chuy Mckeon MD (07/23/23 03:01:55)                   Impression:      No evidence  of acute fracture or acute osseous abnormality.    Osteopenia and stable degenerative changes.    Additional findings discussed in the body of the report.    Electronically signed by resident: Anahi Gutierrez  Date:    07/23/2023  Time:    02:02    Electronically signed by: Chuy Mckeon MD  Date:    07/23/2023  Time:    03:01               Narrative:    EXAMINATION:  CT CERVICAL SPINE WITHOUT CONTRAST    CLINICAL HISTORY:  Neck trauma (Age >= 65y);    TECHNIQUE:  Low dose axial images, sagittal and coronal reformations were performed though the cervical spine.  Contrast was not administered.    COMPARISON:  CT 04/18/2023 and 07/09/2023.    FINDINGS:  Alignment: Normal.    Vertebrae: Osteopenia.  No fracture.  Lucent area involving the left C4 facet without associated cortical disruption, unchanged dating back to 04/18/2023.  Stable mild height loss of the superior endplate of T4 vertebral body.    Discs: Multilevel disc height loss, most pronounced at C5-C6.    C1-2: Dens is intact.  Pre-dens space is maintained.    Skull base and craniocervical junction: Normal.    Degenerative findings:    Stable appearance of mild multilevel degenerative changes through the cervical spine.  There are several levels demonstrating mild to moderate facet arthropathy and mild uncovertebral spurring resulting in areas of mild neural foraminal narrowing.  No areas of spinal canal stenosis.    Paraspinal muscles & soft tissues: Evaluation of the lung apices is severely limited by respiratory motion artifact.  Dense calcific atherosclerosis of the aortic arch.  Soft tissues of the thoracic inlet are somewhat distorted secondary to motion artifact.                                       X-Ray Pelvis Routine AP (Final result)  Result time 07/23/23 01:54:57   Procedure changed from X-Ray Hip 2 or 3 views Right (with Pelvis when performed)     Final result by Chuy Mckeon MD (07/23/23 01:54:57)                   Impression:       Similar alignment of recent fractures involving the right superior and inferior pubic rami.  No new acute displaced fracture.      Electronically signed by: Chuy Mckeon MD  Date:    07/23/2023  Time:    01:54               Narrative:    EXAMINATION:  XR PELVIS ROUTINE AP; XR FEMUR 2 VIEW RIGHT    CLINICAL HISTORY:  HIP PAIN;  Pain in right hip; Pain in right leg    TECHNIQUE:  Three frontal views of the pelvis performed.  Two views of the right femur also obtained.    COMPARISON:  CT pelvis, 07/08/2023.    FINDINGS:  Pelvis: Bones are mildly demineralized.  Similar appearance of mildly displaced recent fracture of the right inferior pubic ramus and nondisplaced fracture of the right superior pubic ramus.  Similar fracture fragment alignment allowing for differences in positioning.  No new acute fracture.  No dislocation.  Mild degenerative changes in both hips.    Right femur: No additional acute fracture or dislocation other than described above.  Degenerative changes in the right hip and right knee.  Atherosclerotic vascular calcifications.  Soft tissue edema overlying the right hip.  No unexpected radiopaque foreign body.                                       X-Ray Femur 2 AP/LAT Right (Final result)  Result time 07/23/23 01:54:57      Final result by Chuy Mckeon MD (07/23/23 01:54:57)                   Impression:      Similar alignment of recent fractures involving the right superior and inferior pubic rami.  No new acute displaced fracture.      Electronically signed by: Chuy Mckeon MD  Date:    07/23/2023  Time:    01:54               Narrative:    EXAMINATION:  XR PELVIS ROUTINE AP; XR FEMUR 2 VIEW RIGHT    CLINICAL HISTORY:  HIP PAIN;  Pain in right hip; Pain in right leg    TECHNIQUE:  Three frontal views of the pelvis performed.  Two views of the right femur also obtained.    COMPARISON:  CT pelvis, 07/08/2023.    FINDINGS:  Pelvis: Bones are mildly demineralized.  Similar appearance of  "mildly displaced recent fracture of the right inferior pubic ramus and nondisplaced fracture of the right superior pubic ramus.  Similar fracture fragment alignment allowing for differences in positioning.  No new acute fracture.  No dislocation.  Mild degenerative changes in both hips.    Right femur: No additional acute fracture or dislocation other than described above.  Degenerative changes in the right hip and right knee.  Atherosclerotic vascular calcifications.  Soft tissue edema overlying the right hip.  No unexpected radiopaque foreign body.                                       X-Ray Chest AP Portable (Final result)  Result time 07/23/23 01:47:54      Final result by Chuy Mckeon MD (07/23/23 01:47:54)                   Impression:      No detrimental change when compared with 07/08/2023.      Electronically signed by: Chuy Mckeon MD  Date:    07/23/2023  Time:    01:47               Narrative:    EXAMINATION:  XR CHEST AP PORTABLE    CLINICAL HISTORY:  Provided history is "Sepsis;  ".    TECHNIQUE:  One view of the chest.    COMPARISON:  07/08/2023.    FINDINGS:  Cardiac wires overlie the chest.  Patient is slightly rotated.  Cardiomediastinal silhouette is stable and may be at the upper limits of normal in size.  Atherosclerotic calcifications overlie the aortic arch.  Right hemidiaphragm is slightly elevated as seen previously.  Azygous lobe configuration is incidentally noted in the right lung apex.  No confluent area of consolidation.  No sizable pleural effusion.  No pneumothorax.  Hiatal hernia again noted.                                     "

## 2023-07-31 NOTE — MEDICAL/APP STUDENT
"CONSULTATION LIAISON PSYCHIATRY PROGRESS NOTE    Patient Name: Radha Sandoval  MRN: [id number]  Patient Class: IP- Inpatient  Admission Date: 7/22/2023  Attending Physician: Pablo Haddad III,*      SUBJECTIVE:   Radha Sandoval is a 88 y.o. female with past medical history of bilateral chronic subdural hematomas, Hypertension, Acute DVT and PE 5/23, frequent falls with recent Pelvic fracture s/p orthopedic intervention & past pertinent psychiatric history of Late Onset Alzheimers with mood disturbance and Delirium presents to the ED/admitted to the hospital for a fall, shortness of breath, and hypoxia.    Psychiatry consulted for persistent insomnia contributing to delirium in patient with dementia, uncontrolled on current regimen    Today, patient was talkative yet incoherent for much of the interview. Her speech pattern is worse than her baseline. Speech did significantly improve after patient agreed to drink some water. Nurse states that it has been difficult to convince her to eat/drink anything. Sitter at the bedside stated that the patient didn't sleep last night and was extremely restless. Patient spends the night mumbling and talking to herself. She has no SI/HI/SIB. Reports no hallucinations but according to sitter the patient keeps asking if there is "a boy in the room." The patients family visited yesterday and may be the source of the confusion.     Interval Events: Patient was taken off Zyprexa due to qtc= 526ms on 7/30. Per nursing, this morning, patient is confused and agitated in bed. Given Trazodone with little effect per chart.      OBJECTIVE:    Mental Status Exam:  General Appearance: unremarkable, dressed in hospital garb, in no acute distress  Behavior: friendly, pleasant, polite  Involuntary Movements and Motor Activity: +akathisia  Gait and Station: unable to assess - patient lying down or seated  Speech and Language: spontaneous, incoherent at times, significant improvement following oral " "hydration.   Mood: "good"  Affect: appropriate to situation and context  Thought Process and Associations: Unable to Assess  Thought Content and Perceptions:: No   Sensorium and Orientation:  oriented to person, disoriented to place.  Recent and Remote Memory: impaired  Attention and Concentration: easily distractible  Fund of Knowledge: impaired  Insight: impaired, limited  Judgment: impaired,limited        ASSESSMENT & RECOMMENDATIONS   Late Onset Alzheimer's with mood disturbance     DELIRIUM  PSYCH MEDICATIONS  Continue zyprexa 5 mg po nightly IM or IV   Continue Lexapro 5 mg po QD  Continue Memantine 5 mg po BID  PRN - Zyprexa 2.5 mg po, IM, or IV Q8 prn for non redirectable agitation or aggression if QTC <480  Continue to monitor EKGs, Qtc with antipsychotics     DELIRIUM BEHAVIOR MANAGEMENT  PLEASE utilize CHEMICAL restraints with PRN meds first for agitation. Minimize use of PHYSICAL restraints OR have periods of being out of physical restraints if possible.  Keep window shades open and room lit during day and room dim at night in order to promote normal sleep-wake cycles  Encourage family at bedside. Banks patient often to situation, location, date.  Continue to Limit or Discontinue use of Narcotics, Benzos and Anti-cholinergic medications as they may worsen delirium.  Continue medical workup for causative etiology of Delirium.      RISK ASSESSMENT  NO NEED FOR PEC at this time. Will continue to reassess.      FOLLOW UP  Will follow up while in house     DISPOSITION - once medically cleared:  Defer to medical team  Please contact ON CALL psychiatry service (24/7) for any acute issues that may arise.    Iván HERNANDES Psychiatry  [facility]  7/31/2023 10:56 " AM        --------------------------------------------------------------------------------------------------------------------------------------------------------------------------------------------------------------------------------------    CONTINUED OBJECTIVE clinical data & findings reviewed and noted for above decision making    Current Medications:   Scheduled Meds:    albuterol-ipratropium  3 mL Nebulization Q6H WAKE    ascorbic acid (vitamin C)  250 mg Oral Daily    enoxaparin  40 mg Subcutaneous Daily    EScitalopram oxalate  5 mg Oral Daily    lisinopriL  40 mg Oral Daily    magnesium sulfate IVPB  1 g Intravenous Once    memantine  5 mg Oral BID    metoprolol tartrate  25 mg Oral BID    miconazole NITRATE 2 %   Topical (Top) BID    polyethylene glycol  17 g Oral BID    potassium bicarbonate  40 mEq Oral Q4H    senna-docusate 8.6-50 mg  1 tablet Oral BID    tamsulosin  0.4 mg Oral Nightly    traZODone  25 mg Oral QHS    valproate sodium (DEPACON) IVPB  250 mg Intravenous QHS    vitamin D  1,000 Units Oral Daily    zinc sulfate  220 mg Oral Daily     PRN Meds: acetaminophen, aluminum-magnesium hydroxide-simethicone, glucagon (human recombinant), glucose, glucose, hydrALAZINE, lactulose, loperamide, melatonin, metoprolol, naloxone, prochlorperazine, sodium chloride 0.9%, sodium chloride 0.9%, valproate sodium (DEPACON) IVPB    Allergies:   Review of patient's allergies indicates:  No Known Allergies    Vitals  Vitals:    07/31/23 0835   BP:    Pulse: 92   Resp: 18   Temp:        Labs/Imaging/Studies:  Recent Results (from the past 24 hour(s))   Urinalysis, Reflex to Urine Culture Urine, Clean Catch    Collection Time: 07/31/23 12:30 AM    Specimen: Urine   Result Value Ref Range    Specimen UA Urine, Clean Catch     Color, UA Yellow Yellow, Straw, Mikayla    Appearance, UA Clear Clear    pH, UA 8.0 5.0 - 8.0    Specific Gravity, UA 1.020 1.005 - 1.030    Protein, UA Trace (A) Negative    Glucose, UA Negative  Negative    Ketones, UA Negative Negative    Bilirubin (UA) Negative Negative    Occult Blood UA Negative Negative    Nitrite, UA Negative Negative    Leukocytes, UA 2+ (A) Negative   Urinalysis Microscopic    Collection Time: 07/31/23 12:30 AM   Result Value Ref Range    RBC, UA 3 0 - 4 /hpf    WBC, UA 46 (H) 0 - 5 /hpf    Bacteria Rare None-Occ /hpf    Squam Epithel, UA 1 /hpf    Hyaline Casts, UA 9 (A) 0-1/lpf /lpf    Microscopic Comment SEE COMMENT    Basic Metabolic Panel    Collection Time: 07/31/23  4:50 AM   Result Value Ref Range    Sodium 148 (H) 136 - 145 mmol/L    Potassium 3.4 (L) 3.5 - 5.1 mmol/L    Chloride 110 95 - 110 mmol/L    CO2 25 23 - 29 mmol/L    Glucose 93 70 - 110 mg/dL    BUN 13 8 - 23 mg/dL    Creatinine 0.9 0.5 - 1.4 mg/dL    Calcium 8.7 8.7 - 10.5 mg/dL    Anion Gap 13 8 - 16 mmol/L    eGFR >60.0 >60 mL/min/1.73 m^2   Magnesium    Collection Time: 07/31/23  4:50 AM   Result Value Ref Range    Magnesium 1.8 1.6 - 2.6 mg/dL   CBC Without Differential    Collection Time: 07/31/23  4:50 AM   Result Value Ref Range    WBC 6.52 3.90 - 12.70 K/uL    RBC 3.17 (L) 4.00 - 5.40 M/uL    Hemoglobin 8.7 (L) 12.0 - 16.0 g/dL    Hematocrit 28.0 (L) 37.0 - 48.5 %    MCV 88 82 - 98 fL    MCH 27.4 27.0 - 31.0 pg    MCHC 31.1 (L) 32.0 - 36.0 g/dL    RDW 15.4 (H) 11.5 - 14.5 %    Platelets 291 150 - 450 K/uL    MPV 11.1 9.2 - 12.9 fL     Imaging Results              CT Head Without Contrast (Final result)  Result time 07/23/23 08:24:54      Final result by Bill Tarango MD (07/23/23 08:24:54)                   Impression:        Similar volume of subdural hematomas.  Changes of density may be in part technical/artifactual in nature and also due to some leakage of recent intravenous contrast from recent CT of the abdomen pelvis with no focal hyperdense acute hemorrhage identified.  Follow-up as clinically warranted.      Electronically signed by: Bill Tarango  Date:    07/23/2023  Time:    08:24                Narrative:    EXAMINATION:  CT HEAD WITHOUT CONTRAST    CLINICAL HISTORY:  Head trauma, minor (Age >= 65y);    TECHNIQUE:  Low dose axial images were obtained through the head.  Coronal and sagittal reformations were also performed. Contrast was not administered.    COMPARISON:  07/23/2023, 07/09/2023    FINDINGS:  Bilateral subacute subdural hematomas are noted bilaterally again identified measuring up to 14 mm on  the left, similar in volume.  No new focal hyperdense hemorrhage is identified.  Overall mild increased density of the hematoma may in part be technical/artifactual in nature as well as due to leakage of recent intravenous contrast as similar appearances were noted previously (on 07/09/2023 and 07/10/2023).    3 mm midline shift to the right is similar in appearance.  The basal cisterns remain patent.  No acute intraparenchymal process.    Prominent atherosclerotic vascular calcifications are noted at the skull base.    The visualized sinuses and mastoid air cells are essentially clear.                                       CT Chest Abdomen Pelvis With Contrast (xpd) (Final result)  Result time 07/23/23 02:32:32   Procedure changed from CT Abdomen Pelvis With Contrast     Final result by Chuy Mckeon MD (07/23/23 02:32:32)                   Impression:      Similar appearance of recent fractures involving the right inferior and superior pubic rami.  Increased conspicuity of essentially nondisplaced recent fractures of the right michelle sacrum and anterior aspect of the right acetabulum.    Motion and artifact limited study with suboptimal bolus timing.    Moderate-sized hiatal hernia with moderate lower esophageal wall thickening.  Suggest correlation for esophagitis.    Peribronchial thickening and questionable minimal patchy ground-glass opacities in the lung bases and bibasilar subsegmental atelectasis.    Nodular soft tissue opacities in the left breast.  Neoplasm not excluded.  Suggest  correlation with physical exam and mammographic follow-up when clinically warranted.    Anasarca.    Mild nonspecific wall thickening of the inferior aspect of the urinary bladder.  Suggest correlation with urinalysis.    Multiple additional findings discussed in the body of the report.      Electronically signed by: Chuy Mckeon MD  Date:    07/23/2023  Time:    02:32               Narrative:    EXAMINATION:  CT CHEST ABDOMEN PELVIS WITH CONTRAST (XPD)    CLINICAL HISTORY:  Abdominal trauma, blunt;    TECHNIQUE:  Low dose axial images, sagittal and coronal reformations were obtained from the thoracic inlet to the pubic symphysis following the IV administration of 75 mL of Omnipaque 350 .  Oral contrast was not given.    COMPARISON:  CTA chest, 07/08/2023.  CT pelvis, 07/08/2023.  CT abdomen pelvis, 05/06/2023.    FINDINGS:  Chest:    Exam quality is limited by suboptimal bolus timing and motion.  Evaluation is limited by extensive streak artifact due to the patient's arms overlying the field of view.    Heart is borderline enlarged and similar to the prior study.  Coronary artery and mitral valve calcifications.  Thoracic aorta is stable in caliber with moderate to advanced calcific atherosclerosis.  No central pulmonary embolus.  No bulky mediastinal lymphadenopathy.    Detailed evaluation of the pulmonary parenchyma limited by motion.  There is peribronchial thickening and questionable minimal patchy ground-glass opacities in the lung bases.  Bibasilar subsegmental atelectasis.  No consolidation or pleural effusion.    Moderate-sized hiatal hernia with moderate lower esophageal wall thickening.    Abdomen:    Exam quality is limited by suboptimal bolus timing, motion, and extensive artifact related to the patient's arms overlying the field of view.    Liver is similar in size and contour when compared with the prior study.  Multiple hepatic hypodensities most suggestive of cysts.  Gallbladder is unremarkable.   No intrahepatic biliary ductal dilatation.    Spleen, adrenals, and pancreas are stable and negative for acute finding allowing for significant motion.    Kidneys are stable.  There is a large left renal cyst.  Small stone or vascular calcification in the right renal hilum.  No hydronephrosis.    Evaluation for bowel inflammation is limited by motion.  No small bowel obstruction.  Mild stool burden in the colon.    No pneumoperitoneum or organized fluid collection.    No bulky retroperitoneal lymphadenopathy.    Abdominal aorta is similar in caliber with moderate to advanced calcific atherosclerosis involving the aorta and major branch vessels.    Portal vein is grossly patent.    Pelvis:    Urinary bladder is mildly distended and there is mild wall thickening along the inferior aspect of the urinary bladder similar to the prior CT abdomen.  Rectum is unremarkable.  There is minimal presacral fat stranding.  No significant pelvic free fluid.    Bones and soft tissues:    Recent fractures involving the right superior and inferior pubic rami similar to prior CT pelvis.  Suspect nondisplaced fracture of the right michelle sacrum, more conspicuous when compared with prior CT pelvis.  Nondisplaced fracture of the anterior aspect of the right acetabulum (coronal series 606, image 129) is also more conspicuous when compared with the prior study.  No additional acute fracture.  Degenerative changes in the spine and pubic symphysis.  Mild anterolisthesis of L4 with respect to L5.  Mild left convex scoliotic curvature of the spine.  Old right lower rib fractures.  Operative changes of presumed right lower abdominal wall hernia repair.  Mild diffuse body wall edema.  Somewhat nodular soft tissue densities in the left breast soft tissues.  Suggest follow-up mammogram.                                       CT Head Without Contrast (Final result)  Result time 07/23/23 02:16:47      Final result by Maykel Castro MD (07/23/23  02:16:47)                   Impression:      Subtle increase in density of the subdural hygromas suggesting acute on chronic subdural fluid collection.    Consider follow-up CT scan per protocol in patient with small acute subdural hematoma on chronic subdural hygromas.      Electronically signed by: Maykel Castro  Date:    07/23/2023  Time:    02:16               Narrative:    EXAMINATION:  CT HEAD WITHOUT CONTRAST    CLINICAL HISTORY:  Head trauma, minor (Age >= 65y);    TECHNIQUE:  Low dose axial CT images obtained throughout the head without intravenous contrast. Sagittal and coronal reconstructions were performed.    COMPARISON:  None.    FINDINGS:  Intracranial compartment:    Ventricles and sulci are stable in size for age without evidence of hydrocephalus. Subdural hygromas appear increased in density slightly suggesting acute on chronic subdural hematoma.    The brain parenchyma appears stable with involutional and chronic small vessel type changes again noted..  No parenchymal mass, hemorrhage, edema or major vascular distribution infarct.    Skull/extracranial contents (limited evaluation): No fracture. Mastoid air cells and paranasal sinuses are essentially clear.                                       CT Cervical Spine Without Contrast (Final result)  Result time 07/23/23 03:01:55      Final result by Chuy Mckeon MD (07/23/23 03:01:55)                   Impression:      No evidence of acute fracture or acute osseous abnormality.    Osteopenia and stable degenerative changes.    Additional findings discussed in the body of the report.    Electronically signed by resident: Anahi Gutierrez  Date:    07/23/2023  Time:    02:02    Electronically signed by: Chuy Mckeon MD  Date:    07/23/2023  Time:    03:01               Narrative:    EXAMINATION:  CT CERVICAL SPINE WITHOUT CONTRAST    CLINICAL HISTORY:  Neck trauma (Age >= 65y);    TECHNIQUE:  Low dose axial images, sagittal and coronal  reformations were performed though the cervical spine.  Contrast was not administered.    COMPARISON:  CT 04/18/2023 and 07/09/2023.    FINDINGS:  Alignment: Normal.    Vertebrae: Osteopenia.  No fracture.  Lucent area involving the left C4 facet without associated cortical disruption, unchanged dating back to 04/18/2023.  Stable mild height loss of the superior endplate of T4 vertebral body.    Discs: Multilevel disc height loss, most pronounced at C5-C6.    C1-2: Dens is intact.  Pre-dens space is maintained.    Skull base and craniocervical junction: Normal.    Degenerative findings:    Stable appearance of mild multilevel degenerative changes through the cervical spine.  There are several levels demonstrating mild to moderate facet arthropathy and mild uncovertebral spurring resulting in areas of mild neural foraminal narrowing.  No areas of spinal canal stenosis.    Paraspinal muscles & soft tissues: Evaluation of the lung apices is severely limited by respiratory motion artifact.  Dense calcific atherosclerosis of the aortic arch.  Soft tissues of the thoracic inlet are somewhat distorted secondary to motion artifact.                                       X-Ray Pelvis Routine AP (Final result)  Result time 07/23/23 01:54:57   Procedure changed from X-Ray Hip 2 or 3 views Right (with Pelvis when performed)     Final result by Chuy Mckeon MD (07/23/23 01:54:57)                   Impression:      Similar alignment of recent fractures involving the right superior and inferior pubic rami.  No new acute displaced fracture.      Electronically signed by: Chuy Mckeon MD  Date:    07/23/2023  Time:    01:54               Narrative:    EXAMINATION:  XR PELVIS ROUTINE AP; XR FEMUR 2 VIEW RIGHT    CLINICAL HISTORY:  HIP PAIN;  Pain in right hip; Pain in right leg    TECHNIQUE:  Three frontal views of the pelvis performed.  Two views of the right femur also obtained.    COMPARISON:  CT pelvis,  07/08/2023.    FINDINGS:  Pelvis: Bones are mildly demineralized.  Similar appearance of mildly displaced recent fracture of the right inferior pubic ramus and nondisplaced fracture of the right superior pubic ramus.  Similar fracture fragment alignment allowing for differences in positioning.  No new acute fracture.  No dislocation.  Mild degenerative changes in both hips.    Right femur: No additional acute fracture or dislocation other than described above.  Degenerative changes in the right hip and right knee.  Atherosclerotic vascular calcifications.  Soft tissue edema overlying the right hip.  No unexpected radiopaque foreign body.                                       X-Ray Femur 2 AP/LAT Right (Final result)  Result time 07/23/23 01:54:57      Final result by Chuy Mckeon MD (07/23/23 01:54:57)                   Impression:      Similar alignment of recent fractures involving the right superior and inferior pubic rami.  No new acute displaced fracture.      Electronically signed by: Chuy Mckeon MD  Date:    07/23/2023  Time:    01:54               Narrative:    EXAMINATION:  XR PELVIS ROUTINE AP; XR FEMUR 2 VIEW RIGHT    CLINICAL HISTORY:  HIP PAIN;  Pain in right hip; Pain in right leg    TECHNIQUE:  Three frontal views of the pelvis performed.  Two views of the right femur also obtained.    COMPARISON:  CT pelvis, 07/08/2023.    FINDINGS:  Pelvis: Bones are mildly demineralized.  Similar appearance of mildly displaced recent fracture of the right inferior pubic ramus and nondisplaced fracture of the right superior pubic ramus.  Similar fracture fragment alignment allowing for differences in positioning.  No new acute fracture.  No dislocation.  Mild degenerative changes in both hips.    Right femur: No additional acute fracture or dislocation other than described above.  Degenerative changes in the right hip and right knee.  Atherosclerotic vascular calcifications.  Soft tissue edema overlying  "the right hip.  No unexpected radiopaque foreign body.                                       X-Ray Chest AP Portable (Final result)  Result time 07/23/23 01:47:54      Final result by Chuy Mckeon MD (07/23/23 01:47:54)                   Impression:      No detrimental change when compared with 07/08/2023.      Electronically signed by: Chuy Mckeon MD  Date:    07/23/2023  Time:    01:47               Narrative:    EXAMINATION:  XR CHEST AP PORTABLE    CLINICAL HISTORY:  Provided history is "Sepsis;  ".    TECHNIQUE:  One view of the chest.    COMPARISON:  07/08/2023.    FINDINGS:  Cardiac wires overlie the chest.  Patient is slightly rotated.  Cardiomediastinal silhouette is stable and may be at the upper limits of normal in size.  Atherosclerotic calcifications overlie the aortic arch.  Right hemidiaphragm is slightly elevated as seen previously.  Azygous lobe configuration is incidentally noted in the right lung apex.  No confluent area of consolidation.  No sizable pleural effusion.  No pneumothorax.  Hiatal hernia again noted.                                     "

## 2023-07-31 NOTE — NURSING
Spann catheter placed 0030. Ordered by attending physician for urinary retention. Straight cath x1 on day shift w/1140ml out. Bladder scan at midnight 215ml. Pt had not voided since last night.

## 2023-07-31 NOTE — SUBJECTIVE & OBJECTIVE
Interval History:  Patient resting comfortably at the time of my exam.  Wakes easily to voice.  Responds appropriately to a few questions but then her responses start making sense.  A lot of her speech is still unintelligible but her mentation has overall improved.  No more increased work of breathing and no more stridor.    Nursing staff reported urinary retention overnight requiring straight catheterization of 750 mL this morning.      Labs personally reviewed:  Hemoglobin 8.5, sodium 147, potassium 3.4    Review of Systems   Unable to perform ROS: Dementia     Objective:     Vital Signs (Most Recent):  Temp: 98.1 °F (36.7 °C) (07/30/23 2212)  Pulse: 84 (07/30/23 2212)  Resp: 16 (07/30/23 2212)  BP: (!) 168/100 (07/30/23 2212)  SpO2: 95 % (07/30/23 2212) Vital Signs (24h Range):  Temp:  [97.8 °F (36.6 °C)-98.1 °F (36.7 °C)] 98.1 °F (36.7 °C)  Pulse:  [] 84  Resp:  [15-95] 16  SpO2:  [94 %-100 %] 95 %  BP: (126-168)/() 168/100     Weight: 75.8 kg (167 lb)  Body mass index is 28.67 kg/m².    Intake/Output Summary (Last 24 hours) at 7/30/2023 2317  Last data filed at 7/30/2023 1200  Gross per 24 hour   Intake 250 ml   Output 750 ml   Net -500 ml           Physical Exam  Vitals and nursing note reviewed.   Constitutional:       General: She is not in acute distress.     Appearance: She is well-developed. She is ill-appearing (chronically ill-appearing).   HENT:      Mouth/Throat:      Pharynx: No oropharyngeal exudate or posterior oropharyngeal erythema.   Cardiovascular:      Rate and Rhythm: Normal rate and regular rhythm.      Heart sounds: Normal heart sounds.   Pulmonary:      Effort: No respiratory distress.      Breath sounds: Normal breath sounds. No stridor (Resolved as mentation resolved). No wheezing.   Abdominal:      General: Bowel sounds are normal. There is no distension.      Palpations: Abdomen is soft.      Tenderness: There is no abdominal tenderness.   Skin:     General: Skin is warm  and dry.   Neurological:      Mental Status: She is alert. Mental status is at baseline.      Comments: Speech is more understandable today

## 2023-07-31 NOTE — PLAN OF CARE
Pt confused, mental status fluctuates, garbled speech. Intermittently agitated and restless, easily redirectable. Zyprexa not given and order discontinued d/t prolonged QTc. Received trazodone, little to no effect, difficulty sleeping throughout shift. Video sitter at bedside. UA collected when duncan placed and sent to lab. Safety precautions maintained throughout shift.    Problem: Infection  Goal: Absence of Infection Signs and Symptoms  Outcome: Ongoing, Progressing     Problem: Adult Inpatient Plan of Care  Goal: Plan of Care Review  Outcome: Ongoing, Progressing  Goal: Patient-Specific Goal (Individualized)  Outcome: Ongoing, Progressing  Goal: Absence of Hospital-Acquired Illness or Injury  Outcome: Ongoing, Progressing  Goal: Optimal Comfort and Wellbeing  Outcome: Ongoing, Progressing  Goal: Readiness for Transition of Care  Outcome: Ongoing, Progressing     Problem: Coping Ineffective  Goal: Effective Coping  Outcome: Ongoing, Progressing     Problem: Impaired Wound Healing  Goal: Optimal Wound Healing  Outcome: Ongoing, Progressing     Problem: Fall Injury Risk  Goal: Absence of Fall and Fall-Related Injury  Outcome: Ongoing, Progressing     Problem: Skin Injury Risk Increased  Goal: Skin Health and Integrity  Outcome: Ongoing, Progressing

## 2023-07-31 NOTE — PROGRESS NOTES
[facility] - Intensive Care ([address] Medicine  Progress Note    Patient Name: Radha Sandoval  MRN: [id number]  Patient Class: IP- Inpatient   Admission Date: 7/22/2023  Length of Stay: 7 days  Attending Physician: Pablo Haddad III,*  Primary Care Provider: Primary Doctor No        Subjective:     Principal Problem:Multiple closed fractures of pelvis without disruption of pelvic ring        HPI:  Radha Sandoval is a 88 y.o. female with PMH significant for chronic BL pleural effusions, recent DVT diagnosed in March '23 (on eliquis), dementia, HTN, with recent [place] [facility] [place] treated non-operatively, who [facility] while being [facility] from wheelchair to bed. Hx taken per Caretaker Sonia and Son (POA) Maykel. Caretaker at bedside unaware of head trauma or other injury. Of note, patient sustained her pelvic fractures at [facility], but after hospitalization at [facility] was placed in [facility] on 7/14. Caretaker reports patient also had another hospitalization during this time at . Patient generally disoriented and delirious, but she will have periods or even days of clarity. At baseline, she is oriented x2. Due to mentation she has difficulty working with staff which results in falls. Prior to her pelvic fracture on 7/8, pt was able to perform independent transfers from wheelchair and could walk short distances with her walker, but she is now max assist. Currently on Eliquis. Patient without complaint on my exam, denies pain.     In the ED: AFVSS. CBC with baseline anemia. CMP reveals slight elevation in Cr 1.0 (baseline 0.8) and K 3.3. BNP and troponin elevated but at baseline. UA grossly infectious. Spann placed for urinary retention.  All imaging reviewed. 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 Repeat CTH after 6 hours was stable. XR pelvis reveals stable recent R superior and inferior pubic rami fractures,  non-displaced. EKG in NSR with prolonged Qtc 510 ms. Given tylenol, norco, and 1g rocephin.       Overview/Hospital Course:  Radha Sandoval was placed in  observation after a fall. NSGY consulted for evaluation of SDH and determined no need for intervention , will follow up in clinic in 2 weeks. HOLD eliquis until follow up. Orthopedics discussed surgical options with family and recommended ORIF pelvis, but family is declining at this time and would prefer to continue with plan for PT/OT. Therapy assessment; recs for SNF. WBAT. Dvt ppx with 40 lovenox SQ daily (ok per NSGY). Psychiatry provided recs for medication adjustment for insomnia and delirium with prn dosing for agitation, will monitor response. Agitation persists and patient now expressing suicidal ideation when asked to work with therapy. Psychiatry aware of SI, making med adjustments as indicated. Caretaker has been staying at bedside. Monitor EKG daily given prolonged Qtc. Palliative care consulted for assistance with GOC planning with son and care team, appreciate recs. Pain controlled and mentation at baseline.     Plan to discharge patient on 07/27 to skilled nursing facility, however, patient tachypneic with stridor and increased work of breathing.  Mentation worsened with progressive respiratory alkalosis.  No improvement with breathing treatment or trial of Lasix.  Concern for bronchomalacia with CT images positive for bilateral mainstem bronchi narrowing.  Sats remained stable on 2 L O2.  Discussed with rapid response team and medical ICU.  Patient was moved to ICU step-down unit and monitored closely.  On 07/29 increased work of breathing and stridor spontaneously resolved.  Mentation improved.  Repeat CT images of the brain revealed stable subdural hematomas.      Interval History:  Patient resting comfortably at the time of my exam.  Wakes easily to voice.  Responds appropriately to a few questions but then her responses start making sense.  A  lot of her speech is still unintelligible but her mentation has overall improved.  No more increased work of breathing and no more stridor.    Nursing staff reported urinary retention overnight requiring straight catheterization of 750 mL this morning.      Labs personally reviewed:  Hemoglobin 8.5, sodium 147, potassium 3.4    Review of Systems   Unable to perform ROS: Dementia     Objective:     Vital Signs (Most Recent):  Temp: 98.1 °F (36.7 °C) (07/30/23 2212)  Pulse: 84 (07/30/23 2212)  Resp: 16 (07/30/23 2212)  BP: (!) 168/100 (07/30/23 2212)  SpO2: 95 % (07/30/23 2212) Vital Signs (24h Range):  Temp:  [97.8 °F (36.6 °C)-98.1 °F (36.7 °C)] 98.1 °F (36.7 °C)  Pulse:  [] 84  Resp:  [15-95] 16  SpO2:  [94 %-100 %] 95 %  BP: (126-168)/() 168/100     Weight: 75.8 kg (167 lb)  Body mass index is 28.67 kg/m².    Intake/Output Summary (Last 24 hours) at 7/30/2023 2317  Last data filed at 7/30/2023 1200  Gross per 24 hour   Intake 250 ml   Output 750 ml   Net -500 ml           Physical Exam  Vitals and nursing note reviewed.   Constitutional:       General: She is not in acute distress.     Appearance: She is well-developed. She is ill-appearing (chronically ill-appearing).   HENT:      Mouth/Throat:      Pharynx: No oropharyngeal exudate or posterior oropharyngeal erythema.   Cardiovascular:      Rate and Rhythm: Normal rate and regular rhythm.      Heart sounds: Normal heart sounds.   Pulmonary:      Effort: No respiratory distress.      Breath sounds: Normal breath sounds. No stridor (Resolved as mentation resolved). No wheezing.   Abdominal:      General: Bowel sounds are normal. There is no distension.      Palpations: Abdomen is soft.      Tenderness: There is no abdominal tenderness.   Skin:     General: Skin is warm and dry.   Neurological:      Mental Status: She is alert. Mental status is at baseline.      Comments: Speech is more understandable today               Assessment/Plan:      * Multiple  closed right sided fractures of pelvis without disruption of pelvic ring  Frequent falls   - diagnosed at last [facility] hospitalization about 2 weeks ago.   - repeat XRs show stable fractures, non-displaced   - Ortho consulted in ED; originally recommended non-op management, but after further discussion recommended ORIF pelvis 7/24. Family declining surgery at this time.  - weight bearing as tolerated   - pain control with tylenol.  Avoiding sedating medications.  - PT/OT consult pending  - patient will likely need SNF v long term placement given max assist requirements and progressive debility, in setting of dementia and behavioral disturbances     Acute metabolic encephalopathy  · Likely progression of dementia versus medication.  · On multiple medications that could contribute that are relatively new according to the son:  Memantine, Robaxin, Lexapro, Haldol  · More confused on 07/28, better on 07/29--discontinued scheduled Robaxin  · Multiple ABGs with progressing respiratory alkalosis, possibly contributing.  · 7/29 repeated CT head with stable left greater than right subdural collections.  More prominent increased density within the left frontal portion of the hemorrhage noted but possibly contrast pooling from the CT from CT neck prior day.  Repeat CT head 7/30, stable SDH.  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   - continue memantine  - on home haloperidol 0.5 PO qhs  - psychiatry consulted for assistance   --> psych recs DC nightly haldol. Start 2.5 mg zyprexa QHS and q8h prn for agitation.    --> 7/25 increase nightly zyprexa to 5 mg   - monitor for improvement   - EKG monitoring   - 7/24-25 Pt reporting SI from patient during session. Caretaker says this is not new, patient  expresses this intermittently. Psych aware. Med adjustments made.    Subdural hygroma  Acute subdural hematoma   - Acute SDH on chronic SD hygroma s/p fall at [facility]; no acute deficits  - repeat CTH stable   - holding eliquis for now - may continue in 2 weeks (per NSGY)   - > NSGY ok's starting LWMH for dvt ppx, holding for now with acute number  - INR wnl  - NSGY cleared. No need for continued monitoring aside from 2 week clinic follow up.     Acute hypoxemic respiratory failure  Respiratory alkalosis  · Tachypneic, using accessory muscles, intermittent stridor 7/28 resolved spontaneously on 07/29  · Trial of IV Lasix administered for pleural effusions seen on the CT chest 7/27.  · Echo 7/28 EF 65%, grade 1 diastolic dysfunction, mild AS, normal RV size and function, PA systolic pressure 39 mm Hg, normal CVP  · Holding Lasix as patient has  · Bronchodilators without much improvement  · CTA chest and CT soft tissue neck pending, negative for PE, negative for any mass compressing the airway narrowing of the bilateral mainstem bronchi to 2 mm diameter not present on previous CT suggesting possible transient finding.  Possible transient bronchomalacia.    Prolonged Q-T interval on ECG  - continue to monitor, increasing  - avoid QT prolonging meds as much as possible  - change Zyprexa to Depakote per Psychiatry  - monitor tele     Urinary retention  Recurrent issue   - mild elevation in Cr likely d/t retention   - duncan placed, removed with decent urination, had to be replaced on 07/30 following recurrence of retention        Goals of care, counseling/discussion  - Caretaker Sonia expresses several concerns regarding patients continual health and function decline  - Recent stay at SNF with progress noted and pt was placed in [facility] at [place]. However, long-term is not the appropriate level of care for patient given dementia and frequent falls   - [place] will now require 24 hour sitters for patient to return   - DILIA TAYLOR consulted for  dc planning   - will need to have an in-depth palliative discussion with son to determine best care plan for patient - consult placed   - son agreeable to discussion.  Patient remains full code.    Hypokalemia  · Monitor K/Mag and replace as needed      Lumbar spondylosis with myelopathy  - tylenol for pain control, robaxin as needed    Abnormal urinalysis  Recurrent UTIs  - initial UA concern for infection, completed Rocephin  - urine culture with Candida glabrata 10 K to 49 K units not consistent with convincing UTI.  - duncan placed for U-retention, removed with further urinary retention.  Replaced Duncan on 07/30  -will leave Duncan on discharge, patient will need outpatient Urology for voiding trial.  - AFVSS, no leukocytosis.         Primary hypertension  - BP uncontrolled intermittently, as high as 226/90 -- suspect bc patient was agitated and spitting out meds  - continue lisinopril, lopressor, lasix.  Added nifedipine 7/30  - PRN IV hydralazine for SBP >180 or if unable to tolerate oral meds.         VTE Risk Mitigation (From admission, onward)         Ordered     Reason for No Pharmacological VTE Prophylaxis  Once        Question:  Reasons:  Answer:  Risk of Bleeding  Comment:  SDH    07/23/23 0831     IP VTE HIGH RISK PATIENT  Once         07/23/23 0831     Place sequential compression device  Until discontinued         07/23/23 0831                Discharge Planning   TOSHA: 7/31/2023     Code Status: Full Code   Is the patient medically ready for discharge?: No    Reason for patient still in hospital (select all that apply): Patient trending condition, Laboratory test, Imaging and Pending disposition  Discharge Plan A: Skilled Nursing Facility                  Pablo Haddad III, MD  Department of Hospital Medicine   [facility] - Intensive Care ([facility]

## 2023-07-31 NOTE — ASSESSMENT & PLAN NOTE
· Likely progression of dementia versus medication.  · On multiple medications that could contribute that are relatively new according to the son:  Memantine, Robaxin, Lexapro, Haldol  · More confused on 07/28, better on 07/29--discontinued scheduled Robaxin  · Multiple ABGs with progressing respiratory alkalosis, possibly contributing.  · 7/29 repeated CT head with stable left greater than right subdural collections.  More prominent increased density within the left frontal portion of the hemorrhage noted but possibly contrast pooling from the CT from CT neck prior day.  Repeat CT head 7/30, stable SDH.  · Critical care consulted, appreciate assistance

## 2023-07-31 NOTE — PT/OT/SLP PROGRESS
Speech Language Pathology Treatment    Patient Name:  Radha Sandoval   MRN:  [id number]  Admitting Diagnosis: Multiple closed fractures of pelvis without disruption of pelvic ring    Recommendations:                 General Recommendations:  Dysphagia therapy  Diet recommendations:  Puree, Liquid Diet Level: Thin   Aspiration Precautions: 1 bite/sip at a time, Assistance with meals, Eliminate distractions, Feed only when awake/alert, Frequent oral care, HOB to 90 degrees, Meds crushed in puree, Monitor for s/s of aspiration, Remain upright 30 minutes post meal, Small bites/sips, and Standard aspiration precautions   General Precautions: Standard, aspiration, fall, pureed diet  Communication strategies:  provide increased time to answer and go to room if call light pushed    Assessment:     Radha Sandoval is a 88 y.o. female with an SLP diagnosis of Dysphagia.      Subjective     Spoke with RN prior to entering pt's room, who reported pt very anxious this morning with possible auditory hallucinations . Pt seen bedside for session with caregiver present. Alert and agreeable to ST.       Pain/Comfort:  Pain Rating 1: 0/10  Pain Rating Post-Intervention 1: 0/10    Respiratory Status: Room air    Objective:     Has the patient been evaluated by SLP for swallowing?   Yes  Keep patient NPO? No   Current Respiratory Status:   room air     Pt seen for ongoing dysphagia management. Pt very confused and anxious this morning but calmed down given gentle redirection and reassurance. Session focused on med administration with RN. Pt appeared very dehydrated, with dry lips and oral mucosa. Unable to siphon from straw initially, but did readily accept trials of puree. Then she was able to siphon thins via single and consecutive straw sips. Also observed swallowing meds crushed in puree with RN. Both oral and pharyngeal phases of the swallow unremarkable, with no overt s/sx airway threat. Vocal quality remained clear. Pt on puree diet  with thin liquids and meds crushed at baseline per caregiver, but will trial advanced textures next session. Provided education re: role of ST, POC, diet recs, swallow precautions to pt and caregiver. At end of session, pt remained bedside with call light and all needs in reach. RN present. White board updated.      Goals:   Multidisciplinary Problems       SLP Goals       Not on file                    Plan:     Patient to be seen:  3 x/week   Plan of Care expires:  08/27/23  Plan of Care reviewed with:  patient, caregiver   SLP Follow-Up:  Yes       Discharge recommendations:  other (see comments) (tbd)   Barriers to Discharge:  Level of Skilled Assistance Needed      Time Tracking:     SLP Treatment Date:   07/31/23  Speech Start Time:  0813  Speech Stop Time:  0828     Speech Total Time (min):  15 min    Billable Minutes: Treatment Swallowing Dysfunction 7 and Self Care/Home Management Training 8    07/31/2023

## 2023-07-31 NOTE — ASSESSMENT & PLAN NOTE
- continue to monitor, increasing  - avoid QT prolonging meds as much as possible  - change Zyprexa to Depakote per Psychiatry  - monitor tele

## 2023-07-31 NOTE — PROGRESS NOTES
[facility] - Intensive Care ([address] Medicine  Progress Note    Patient Name: Radha Sandoval  MRN: [id number]  Patient Class: IP- Inpatient   Admission Date: 7/22/2023  Length of Stay: 7 days  Attending Physician: Pablo Haddad III,*  Primary Care Provider: Primary Doctor No        Subjective:     Principal Problem:Multiple closed fractures of pelvis without disruption of pelvic ring        HPI:  Radha Sandoval is a 88 y.o. female with PMH significant for chronic BL pleural effusions, recent DVT diagnosed in March '23 (on eliquis), dementia, HTN, with recent hospitalization here at [facility] for pelvic fracture treated non-operatively, who presents after a fall at her intermediate while being transferred from wheelchair to bed. Hx taken per Caretaker Sonia and Son (POA) Maykel. Caretaker at bedside unaware of head trauma or other injury. Of note, patient sustained her pelvic fractures at [facility], but after hospitalization at [facility] was placed in [facility] on 7/14. Caretaker reports patient also had another hospitalization during this time at . Patient generally disoriented and delirious, but she will have periods or even days of clarity. At baseline, she is oriented x2. Due to mentation she has difficulty working with staff which results in falls. Prior to her pelvic fracture on 7/8, pt was able to perform independent transfers from wheelchair and could walk short distances with her walker, but she is now max assist. Currently on Eliquis. Patient without complaint on my exam, denies pain.     In the ED: AFVSS. CBC with baseline anemia. CMP reveals slight elevation in Cr 1.0 (baseline 0.8) and K 3.3. BNP and troponin elevated but at baseline. UA grossly infectious. Spann placed for urinary retention.  All imaging reviewed. 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 Repeat CTH after 6 hours was stable. XR pelvis reveals stable recent R superior and inferior pubic rami fractures,  non-displaced. EKG in NSR with prolonged Qtc 510 ms. Given tylenol, norco, and 1g rocephin.       Overview/Hospital Course:  Radha Sandoval was placed in  observation after a fall. NSGY consulted for evaluation of SDH and determined no need for intervention , will follow up in clinic in 2 weeks. HOLD eliquis until follow up. Orthopedics discussed surgical options with family and recommended ORIF pelvis, but family is declining at this time and would prefer to continue with plan for PT/OT. Therapy assessment; recs for SNF. WBAT. Dvt ppx with 40 lovenox SQ daily (ok per NSGY). Psychiatry provided recs for medication adjustment for insomnia and delirium with prn dosing for agitation, will monitor response. Agitation persists and patient now expressing suicidal ideation when asked to work with therapy. Psychiatry aware of SI, making med adjustments as indicated. Caretaker has been staying at bedside. Monitor EKG daily given prolonged Qtc. Palliative care consulted for assistance with GOC planning with son and care team, appreciate recs. Pain controlled and mentation at baseline.     Plan to discharge patient on 07/27 to skilled nursing facility, however, patient tachypneic with stridor and increased work of breathing.  Mentation worsened with progressive respiratory alkalosis.  No improvement with breathing treatment or trial of Lasix.  Concern for bronchomalacia with CT images positive for bilateral mainstem bronchi narrowing.  Sats remained stable on 2 L O2.  Discussed with rapid response team and medical ICU.  Patient was moved to ICU step-down unit and monitored closely.  On 07/29 increased work of breathing and stridor spontaneously resolved.  Mentation improved.  Repeat CT images of the brain revealed stable subdural hematomas.      Interval History:  Patient resting comfortably at the time of my exam.  Wakes easily to voice.  Responds appropriately to a few questions but then her responses stop making sense.  A  lot of her speech is still unintelligible but her mentation has overall improved.  No more increased work of breathing and no more stridor.    Nursing staff reported urinary retention overnight requiring straight catheterization of 750 mL this morning.      Labs personally reviewed:  Hemoglobin 8.5, sodium 147, potassium 3.4    Review of Systems   Unable to perform ROS: Dementia     Objective:     Vital Signs (Most Recent):  Temp: 98.1 °F (36.7 °C) (07/30/23 2212)  Pulse: 84 (07/30/23 2212)  Resp: 16 (07/30/23 2212)  BP: (!) 168/100 (07/30/23 2212)  SpO2: 95 % (07/30/23 2212) Vital Signs (24h Range):  Temp:  [97.8 °F (36.6 °C)-98.1 °F (36.7 °C)] 98.1 °F (36.7 °C)  Pulse:  [] 84  Resp:  [15-95] 16  SpO2:  [94 %-100 %] 95 %  BP: (126-168)/() 168/100     Weight: 75.8 kg (167 lb)  Body mass index is 28.67 kg/m².    Intake/Output Summary (Last 24 hours) at 7/30/2023 2243  Last data filed at 7/30/2023 1200  Gross per 24 hour   Intake 250 ml   Output 750 ml   Net -500 ml           Physical Exam  Vitals and nursing note reviewed.   Constitutional:       General: She is not in acute distress.     Appearance: She is well-developed. She is ill-appearing (chronically ill-appearing).   HENT:      Mouth/Throat:      Pharynx: No oropharyngeal exudate or posterior oropharyngeal erythema.   Cardiovascular:      Rate and Rhythm: Normal rate and regular rhythm.      Heart sounds: Normal heart sounds.   Pulmonary:      Effort: No respiratory distress.      Breath sounds: Normal breath sounds. No stridor (Resolved as mentation resolved). No wheezing.   Abdominal:      General: Bowel sounds are normal. There is no distension.      Palpations: Abdomen is soft.      Tenderness: There is no abdominal tenderness.   Skin:     General: Skin is warm and dry.   Neurological:      Mental Status: She is alert. Mental status is at baseline.      Comments: Speech is more understandable today               Assessment/Plan:      * Multiple  closed right sided fractures of pelvis without disruption of pelvic ring  Frequent falls   - diagnosed at last [facility] hospitalization about 2 weeks ago.   - repeat XRs show stable fractures, non-displaced   - Ortho consulted in ED; originally recommended non-op management, but after further discussion recommended ORIF pelvis 7/24. Family declining surgery at this time.  - weight bearing as tolerated   - pain control with tylenol.  Avoiding sedating medications.  - PT/OT consult pending  - patient will likely need SNF v long term placement given max assist requirements and progressive debility, in setting of dementia and behavioral disturbances     Acute metabolic encephalopathy  Likely progression of dementia versus medication.  On multiple medications that could contribute that are relatively new according to the son:  Memantine, Robaxin, Lexapro, Haldol  More confused on 07/28, better on 07/29--discontinued scheduled Robaxin  Multiple ABGs with progressing respiratory alkalosis, possibly contributing.  7/29 repeated CT head with stable left greater than right subdural collections.  More prominent increased density within the left frontal portion of the hemorrhage noted but possibly contrast pooling from the CT from CT neck prior day.  Repeat CT head 7/30, stable SIADH.  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   - continue memantine  - on home haloperidol 0.5 PO qhs  - psychiatry consulted for assistance   --> psych recs DC nightly haldol. Start 2.5 mg zyprexa QHS and q8h prn for agitation.    --> 7/25 increase nightly zyprexa to 5 mg   - monitor for improvement   - EKG monitoring   - 7/24-25 Pt reporting SI from patient during session. Caretaker says this is not new, patient expresses this  intermittently. Psych aware. Med adjustments made.    Subdural hygroma  Acute subdural hematoma   - Acute SDH on chronic SD hygroma s/p fall at [facility]; no acute deficits  - repeat CTH stable   - holding eliquis for now - may continue in 2 weeks (per NSGY)   - > NSGY ok's starting LWMH for dvt ppx, holding for now with acute number  - INR wnl  - NSGY cleared. No need for continued monitoring aside from 2 week clinic follow up.     Acute hypoxemic respiratory failure  Respiratory alkalosis  Tachypneic, using accessory muscles, intermittent stridor 7/28 resolved spontaneously on 07/29  Trial of IV Lasix administered for pleural effusions seen on the CT chest 7/27.  Echo 7/28 EF 65%, grade 1 diastolic dysfunction, mild AS, normal RV size and function, PA systolic pressure 39 mm Hg, normal CVP  Holding Lasix as patient has  Bronchodilators without much improvement  CTA chest and CT soft tissue neck pending, negative for PE, negative for any mass compressing the airway narrowing of the bilateral mainstem bronchi to 2 mm diameter not present on previous CT suggesting possible transient finding.  Possible transient bronchomalacia.    Prolonged Q-T interval on ECG  - continue to monitor, relatively stable  - QTC greater than 500, will discuss medication adjustments with Psychiatry  - monitor tele     Urinary retention  Recurrent issue   - mild elevation in Cr likely d/t retention   - duncan placed, removed with decent urination, had to be replaced on 07/30 following recurrence of retention        Goals of care, counseling/discussion  - Caretaker Sonia expresses several concerns regarding patients continual health and function decline  - Recent stay at SNF with progress noted and pt was placed in snf at [facility]. However, snf is not the appropriate level of care for patient given dementia and frequent falls   - [facility] will now require 24 hour sitters for patient to return   - DILIA TAYLOR consulted for dc planning   - will need to have an  in-depth palliative discussion with son to determine best care plan for patient - consult placed   - son agreeable to discussion.  Patient remains full code.    Hypokalemia  Monitor K/Mag and replace as needed      Lumbar spondylosis with myelopathy  - tylenol for pain control, robaxin as needed    Abnormal urinalysis  Recurrent UTIs  - initial UA concern for infection, completed Rocephin  - urine culture with Candida glabrata 10 K to 49 K units not consistent with convincing UTI.  - duncan placed for U-retention, removed with further urinary retention.  Replaced Duncan on 07/30  -will leave Duncan on discharge, patient will need outpatient Urology for voiding trial.  - AFVSS, no leukocytosis.         Primary hypertension  - BP uncontrolled intermittently, as high as 226/90 -- suspect bc patient was agitated and spitting out meds  - continue lisinopril, lopressor, lasix.  Added nifedipine 7/30  - PRN IV hydralazine for SBP >180 or if unable to tolerate oral meds.         VTE Risk Mitigation (From admission, onward)           Ordered     Reason for No Pharmacological VTE Prophylaxis  Once        Question:  Reasons:  Answer:  Risk of Bleeding  Comment:  SDH    07/23/23 0831     IP VTE HIGH RISK PATIENT  Once         07/23/23 0831     Place sequential compression device  Until discontinued         07/23/23 0831                    Discharge Planning   TOSHA: 7/31/2023     Code Status: Full Code   Is the patient medically ready for discharge?: No    Reason for patient still in hospital (select all that apply): Patient trending condition, Laboratory test, Consult recommendations, PT / OT recommendations and Pending disposition  Discharge Plan A: Skilled Nursing Facility                  Pablo Haddad III, MD  Department of Hospital Medicine   [facility] - Intensive Care ([place]-14)

## 2023-07-31 NOTE — AI DETERIORATION ALERT
"RAPID RESPONSE NURSE AI ALERT       AI alert received.    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    Last VS: /81   Pulse 78   Temp 98.5 °F (36.9 °C) (Axillary)   Resp (!) 22   Ht 5' 4" (1.626 m)   Wt 75.8 kg (167 lb)   SpO2 (!) 94%   Breastfeeding No   BMI 28.67 kg/m²     24H Vital Sign Range:  Temp:  [97.9 °F (36.6 °C)-98.5 °F (36.9 °C)]   Pulse:  []   Resp:  [15-95]   BP: (126-168)/()   SpO2:  [94 %-100 %]     Level of Consciousness (AVPU): alert    Recent Labs     07/28/23  0718 07/29/23  0355 07/30/23  0315   WBC 5.90 7.21 6.38   HGB 9.2* 8.5* 8.5*   HCT 29.8* 26.8* 27.9*    283 268       Recent Labs     07/28/23  0717 07/29/23  0355 07/30/23  0315    144 147*   K 3.8 3.5 3.4*    106 109   CO2 28 26 24   BUN 16 16 15   CREATININE 0.7 0.9 0.7   GLU 98 103 106   PHOS  --   --  3.2   MG 1.9 1.8 1.9        Recent Labs     07/28/23  1813 07/29/23  0217 07/29/23  0847   PH 7.549* 7.468* 7.520*   PCO2 31.8* 39.3 36.6   PO2 70* 81 77*   HCO3 27.7 28.5* 29.9*   POCSATURATED 96 97 97   BE 5 5 7        OXYGEN:  Flow (L/min): 2          MEWS score: 2    bedside RNRicardo  contacted. No concerns verbalized at this time. Instructed to call [phone] for further concerns or assistance.    AIDEE Araya RN        "

## 2023-07-31 NOTE — ASSESSMENT & PLAN NOTE
- BP uncontrolled intermittently, as high as 226/90 -- suspect bc patient was agitated and spitting out meds  - continue lisinopril, lopressor, lasix.  Added nifedipine 7/30  - PRN IV hydralazine for SBP >180 or if unable to tolerate oral meds.

## 2023-07-31 NOTE — ASSESSMENT & PLAN NOTE
Respiratory alkalosis  · Tachypneic, using accessory muscles, intermittent stridor 7/28 resolved spontaneously on 07/29  · Trial of IV Lasix administered for pleural effusions seen on the CT chest 7/27.  · Echo 7/28 EF 65%, grade 1 diastolic dysfunction, mild AS, normal RV size and function, PA systolic pressure 39 mm Hg, normal CVP  · Holding Lasix as patient has  · Bronchodilators without much improvement  · CTA chest and CT soft tissue neck pending, negative for PE, negative for any mass compressing the airway narrowing of the bilateral mainstem bronchi to 2 mm diameter not present on previous CT suggesting possible transient finding.  Possible transient bronchomalacia.

## 2023-07-31 NOTE — SUBJECTIVE & OBJECTIVE
Interval History:  Patient resting comfortably at the time of my exam.  Wakes easily to voice.  Responds appropriately to a few questions but then her responses start making sense.  A lot of her speech is still unintelligible but her mentation has overall improved.  No more increased work of breathing and no more stridor.    Nursing staff reported urinary retention overnight requiring straight catheterization of 750 mL this morning.      Labs personally reviewed:  Hemoglobin 8.5, sodium 147, potassium 3.4    Review of Systems   Unable to perform ROS: Dementia     Objective:     Vital Signs (Most Recent):  Temp: 98.1 °F (36.7 °C) (07/30/23 2212)  Pulse: 84 (07/30/23 2212)  Resp: 16 (07/30/23 2212)  BP: (!) 168/100 (07/30/23 2212)  SpO2: 95 % (07/30/23 2212) Vital Signs (24h Range):  Temp:  [97.8 °F (36.6 °C)-98.1 °F (36.7 °C)] 98.1 °F (36.7 °C)  Pulse:  [] 84  Resp:  [15-95] 16  SpO2:  [94 %-100 %] 95 %  BP: (126-168)/() 168/100     Weight: 75.8 kg (167 lb)  Body mass index is 28.67 kg/m².    Intake/Output Summary (Last 24 hours) at 7/30/2023 2243  Last data filed at 7/30/2023 1200  Gross per 24 hour   Intake 250 ml   Output 750 ml   Net -500 ml           Physical Exam  Vitals and nursing note reviewed.   Constitutional:       General: She is not in acute distress.     Appearance: She is well-developed. She is ill-appearing (chronically ill-appearing).   HENT:      Mouth/Throat:      Pharynx: No oropharyngeal exudate or posterior oropharyngeal erythema.   Cardiovascular:      Rate and Rhythm: Normal rate and regular rhythm.      Heart sounds: Normal heart sounds.   Pulmonary:      Effort: No respiratory distress.      Breath sounds: Normal breath sounds. No stridor (Resolved as mentation resolved). No wheezing.   Abdominal:      General: Bowel sounds are normal. There is no distension.      Palpations: Abdomen is soft.      Tenderness: There is no abdominal tenderness.   Skin:     General: Skin is warm  and dry.   Neurological:      Mental Status: She is alert. Mental status is at baseline.      Comments: Speech is more understandable today

## 2023-07-31 NOTE — CARE UPDATE
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    Last VS: /81 (BP Location: Right arm, Patient Position: Lying)   Pulse 78   Temp 98.5 °F (36.9 °C) (Axillary)   Resp 20   Ht 5' 4" (1.626 m)   Wt 75.8 kg (167 lb)   SpO2 (!) 94%   Breastfeeding No   BMI 28.67 kg/m²     24H Vital Sign Range:  Temp:  [97.9 °F (36.6 °C)-98.5 °F (36.9 °C)]   Pulse:  []   Resp:  [15-95]   BP: (126-168)/()   SpO2:  [94 %-100 %]     Level of Consciousness (AVPU): alert    Recent Labs     07/29/23  0355 07/30/23  0315 07/31/23  0450   WBC 7.21 6.38 6.52   HGB 8.5* 8.5* 8.7*   HCT 26.8* 27.9* 28.0*    268 291       Recent Labs     07/29/23  0355 07/30/23  0315 07/31/23  0450    147* 148*   K 3.5 3.4* 3.4*    109 110   CO2 26 24 25   BUN 16 15 13   CREATININE 0.9 0.7 0.9    106 93   PHOS  --  3.2  --    MG 1.8 1.9 1.8        Recent Labs     07/28/23  1813 07/29/23  0217 07/29/23  0847   PH 7.549* 7.468* 7.520*   PCO2 31.8* 39.3 36.6   PO2 70* 81 77*   HCO3 27.7 28.5* 29.9*   POCSATURATED 96 97 97   BE 5 5 7        OXYGEN:  Flow (L/min): 2          MEWS score: 2    Charge RN, [place]  contacted. No concerns verbalized at this time. Instructed to call [phone] for further concerns or assistance.    Dona Shea RN        "

## 2023-08-01 PROBLEM — I95.9 HYPOTENSION: Status: ACTIVE | Noted: 2023-01-01

## 2023-08-01 PROBLEM — I48.91 ATRIAL FIBRILLATION WITH RAPID VENTRICULAR RESPONSE: Status: ACTIVE | Noted: 2023-01-01

## 2023-08-01 PROBLEM — R29.6 FREQUENT FALLS: Status: ACTIVE | Noted: 2023-01-01

## 2023-08-01 PROBLEM — R00.0 TACHYARRHYTHMIA: Status: RESOLVED | Noted: 2023-01-01 | Resolved: 2023-01-01

## 2023-08-01 NOTE — PROCEDURES
EEG REPORT      Radha Sandoval  [id number]  1935    DATE OF SERVICE: 8/1/2023         METHODOLOGY      Extended electroencephalographic recording is made while the patient is ambulatory and continuing normal daily activities.  Electrodes are placed according to the International 10-20 placement system and included T1 and T2 electrode placement.  Twenty four (24) channels of digital signal (sampling rate of 512/sec) was simultaneously recorded from the scalp including EKG and eye monitors.  Recording band pass was 0.1 to 100 hz and all data was stored digitally on the recorder.  The patient is instructed to press an event button when clinical symptoms occur and write the symptoms into a diary. Activation procedures which include photic stimulation, hyperventilation and instructing patients to perform simple task are done in selected patients.        The EEG is displayed on a monitor screen and can be reformatted into different montages for evaluation.  The entire recoding is submitted for computer assisted analysis to detect spike and electrographic seizure activity.  The entire recording is visually reviewed and the times identified by computer analysis as being spikes or seizures are reviewed again.  Compresses spectral analysis (CSA) is also performed on the activity recorded from each individual channel.  This is displayed as a power display of frequencies from 0 to 30 Hz over time.   The CSA analysis is done and displayed continuously.  This is reviewed for asymmetries in power between homologous areas of the scalp and for presence of changes in power which canbe seen when seizures occur.  Sections of suspected abnormalities on the CSA is then compared with the original EEG recording.  .     [employer] software was also utilized in the review of this study.  This software suite analyzes the EEG recording in multiple domains.  Coherence and rhythmicity is computed to identify EEG sections which may contain  organized seizures.  Each channel undergoes analysis to detect presence of spike and sharp waves which have special and morphological characteristic of epileptic activity.  The routine EEG recording is converted from spacial into frequency domain.  This is then displayed comparing homologous areas to identify areas of significant asymmetry.  Algorithm to identify non-cortically generated artifact is used to separate eye movement, EMG and other artifact from the EEG     Recording Times    A total of 00:31:40 hours of EEG was recorded.      EEG FINDINGS:  Background activity:   The background rhythm was characterized by generalized theta and delta activity with occasional generalized alpha spindles.   Symmetry and continuity: the background was continuous and symmetric     Sleep:   No sleep transients although there is cycling of the background.    Activation procedures:   NA    Abnormal activity:   No epileptiform discharges, periodic discharges, lateralized rhythmic delta activity or electrographic seizures were seen.    IMPRESSION:   Abnormal EEG due to moderate generalized non-specific cerebral dysfunction with no electrographic seizures or indications of seizure tendency.      Conner Wade MD  Neurology-Epilepsy.  [facility].

## 2023-08-01 NOTE — ASSESSMENT & PLAN NOTE
· Likely progression of dementia, no clear medical etiology   · On multiple medications that could contribute that are relatively new according to the son:  Memantine, Robaxin, Lexapro, Haldol  · More confused on 07/28, better on 07/29--discontinued scheduled Robaxin  · Multiple ABGs with progressing respiratory alkalosis, possibly contributing.  ·  Repeat CT head 7/30, stable SDH.  · Critical care consulted, appreciate assistance

## 2023-08-01 NOTE — PROGRESS NOTES
CONSULTATION LIAISON PSYCHIATRY PROGRESS NOTE    Patient Name: Radha Sandoval  MRN: [id number]  Patient Class: IP- Inpatient  Admission Date: 7/22/2023  Attending Physician: Pablo Haddad III,*      SUBJECTIVE:   Radha Sandoval is a 88 y.o. female with past medical history of bilateral chronic subdural hematomas, Hypertension, Acute DVT and PE 5/23, frequent falls with recent Pelvic fracture s/p orthopedic intervention & past pertinent psychiatric history of Late Onset Alzheimers with mood disturbance and Delirium presents to the ED/admitted to the hospital for a fall, shortness of breath, and hypoxia.     Patient presented with right pelvic pain after a fall on 7/22 when she was transferred from bed to wheelchair. Due to her fluctuating mentation she has been agitated with nursing staff and tries to leave her bed and resists being transferred from bed to wheelchair, which has resulted in multiple falls.    Psychiatry consulted for insomnia contributing to delirium in patient with dementia, uncontrolled on current regimen    Today, patient was sleeping during assessment, difficult to arouse. Patient received 0.5mg IV ativan for MRI brain prior to assessment. On reassessment, patient sleeping and EEG monitoring and tech at bedside.      Interval Events:  7/31:Pt awake, alert, and hollering out in bed.  Confused talking out of her head. Requested cont fluids d/t minimal urine output overnight and decreased oral intake. Patient received Depakote at 1800 to assist with agitation. Pt fell asleep and sats dropped to 86%. Placed pt on 1.5L O2 and sats increased to 96%.         OBJECTIVE:    Mental Status Exam:  General Appearance: dressed in hospital garb, lying in bed  Behavior:  Sleeping, difficult to arouse  Involuntary Movements and Motor Activity: no abnormal involuntary movements noted  Gait and Station: unable to assess - patient lying down or seated  Speech and Language:  did not assess, patient sleeping  Mood:  Unable to assess  Affect: Unable to assess  Thought Process and Associations: Unable to assess  Thought Content and Perceptions:: Unable to assess  Sensorium and Orientation: Unable to assess  Recent and Remote Memory: Unable to assess  Attention and Concentration: Unable to assess  Fund of Knowledge: Unable to assess  Insight: Unable to assess  Judgment: Unable to assess        ASSESSMENT & RECOMMENDATIONS   Late Onset Alzheimer's with mood disturbance  Aggitation    PSYCH MEDICATIONS  Continue Depacon 250 mg IV nightly  Continue Lexapro 5 mg po QD  Continue Memantine 5 mg po BID  PRN: Depacon 125 mg PRN agitation q8 hours     DELIRIUM BEHAVIOR MANAGEMENT  PLEASE utilize CHEMICAL restraints with PRN meds first for agitation. Minimize use of PHYSICAL restraints OR have periods of being out of physical restraints if possible.  Keep window shades open and room lit during day and room dim at night in order to promote normal sleep-wake cycles  Encourage family at bedside. [place] patient often to situation, location, date.  Continue to Limit or Discontinue use of Narcotics, Benzos and Anti-cholinergic medications as they may worsen delirium.  Continue medical workup for causative etiology of Delirium.      RISK ASSESSMENT  NO NEED FOR PEC at this time. Will continue to reassess.      FOLLOW UP  Will follow up while in house     DISPOSITION - once medically cleared:  Defer to medical team    Please contact ON CALL psychiatry service (24/7) for any acute issues that may arise.    Dr. Adalberto HERNANDES Psychiatry  [facility]  8/1/2023 9:35 AM        --------------------------------------------------------------------------------------------------------------------------------------------------------------------------------------------------------------------------------------    CONTINUED OBJECTIVE clinical data & findings reviewed and noted for above decision making    Current Medications:   Scheduled  Meds:    albuterol-ipratropium  3 mL Nebulization Q6H WAKE    ascorbic acid (vitamin C)  250 mg Oral Daily    enoxaparin  40 mg Subcutaneous Daily    EScitalopram oxalate  5 mg Oral Daily    lisinopriL  40 mg Oral Daily    memantine  5 mg Oral BID    metoprolol tartrate  25 mg Oral BID    miconazole NITRATE 2 %   Topical (Top) BID    polyethylene glycol  17 g Oral BID    potassium chloride  10 mEq Intravenous Q1H    senna-docusate 8.6-50 mg  1 tablet Oral BID    tamsulosin  0.4 mg Oral Nightly    valproate sodium (DEPACON) IVPB  250 mg Intravenous QHS    vitamin D  1,000 Units Oral Daily    zinc sulfate  220 mg Oral Daily     PRN Meds: acetaminophen, aluminum-magnesium hydroxide-simethicone, flumazeniL, glucagon (human recombinant), glucose, glucose, hydrALAZINE, lactulose, loperamide, lorazepam, melatonin, metoprolol, naloxone, prochlorperazine, sodium chloride 0.9%, sodium chloride 0.9%, valproate sodium (DEPACON) IVPB    Allergies:   Review of patient's allergies indicates:  No Known Allergies    Vitals  Vitals:    08/01/23 0735   BP: (!) 144/63   Pulse: 82   Resp: 16   Temp: 98.2 °F (36.8 °C)       Labs/Imaging/Studies:  Recent Results (from the past 24 hour(s))   POCT glucose    Collection Time: 07/31/23 10:09 PM   Result Value Ref Range    POCT Glucose 97 70 - 110 mg/dL   POCT glucose    Collection Time: 08/01/23 12:59 AM   Result Value Ref Range    POCT Glucose 91 70 - 110 mg/dL   Basic Metabolic Panel    Collection Time: 08/01/23  3:14 AM   Result Value Ref Range    Sodium 148 (H) 136 - 145 mmol/L    Potassium 3.3 (L) 3.5 - 5.1 mmol/L    Chloride 109 95 - 110 mmol/L    CO2 26 23 - 29 mmol/L    Glucose 80 70 - 110 mg/dL    BUN 14 8 - 23 mg/dL    Creatinine 0.9 0.5 - 1.4 mg/dL    Calcium 8.3 (L) 8.7 - 10.5 mg/dL    Anion Gap 13 8 - 16 mmol/L    eGFR >60.0 >60 mL/min/1.73 m^2   Phosphorus    Collection Time: 08/01/23  3:14 AM   Result Value Ref Range    Phosphorus 3.9 2.7 - 4.5 mg/dL   Magnesium    Collection  Time: 08/01/23  3:14 AM   Result Value Ref Range    Magnesium 2.3 1.6 - 2.6 mg/dL   Vitamin B12    Collection Time: 08/01/23  5:35 AM   Result Value Ref Range    Vitamin B-12 971 (H) 210 - 950 pg/mL   Ammonia    Collection Time: 08/01/23  5:35 AM   Result Value Ref Range    Ammonia <10 (A) 10 - 50 umol/L     Imaging Results              CT Head Without Contrast (Final result)  Result time 07/23/23 08:24:54      Final result by Bill Tarango MD (07/23/23 08:24:54)                   Impression:        Similar volume of subdural hematomas.  Changes of density may be in part technical/artifactual in nature and also due to some leakage of recent intravenous contrast from recent CT of the abdomen pelvis with no focal hyperdense acute hemorrhage identified.  Follow-up as clinically warranted.      Electronically signed by: Bill Tarango  Date:    07/23/2023  Time:    08:24               Narrative:    EXAMINATION:  CT HEAD WITHOUT CONTRAST    CLINICAL HISTORY:  Head trauma, minor (Age >= 65y);    TECHNIQUE:  Low dose axial images were obtained through the head.  Coronal and sagittal reformations were also performed. Contrast was not administered.    COMPARISON:  07/23/2023, 07/09/2023    FINDINGS:  Bilateral subacute subdural hematomas are noted bilaterally again identified measuring up to 14 mm on  the left, similar in volume.  No new focal hyperdense hemorrhage is identified.  Overall mild increased density of the hematoma may in part be technical/artifactual in nature as well as due to leakage of recent intravenous contrast as similar appearances were noted previously (on 07/09/2023 and 07/10/2023).    3 mm midline shift to the right is similar in appearance.  The basal cisterns remain patent.  No acute intraparenchymal process.    Prominent atherosclerotic vascular calcifications are noted at the skull base.    The visualized sinuses and mastoid air cells are essentially clear.                                        CT Chest Abdomen Pelvis With Contrast (xpd) (Final result)  Result time 07/23/23 02:32:32   Procedure changed from CT Abdomen Pelvis With Contrast     Final result by Chuy Mckeon MD (07/23/23 02:32:32)                   Impression:      Similar appearance of recent fractures involving the right inferior and superior pubic rami.  Increased conspicuity of essentially nondisplaced recent fractures of the right michelle sacrum and anterior aspect of the right acetabulum.    Motion and artifact limited study with suboptimal bolus timing.    Moderate-sized hiatal hernia with moderate lower esophageal wall thickening.  Suggest correlation for esophagitis.    Peribronchial thickening and questionable minimal patchy ground-glass opacities in the lung bases and bibasilar subsegmental atelectasis.    Nodular soft tissue opacities in the left breast.  Neoplasm not excluded.  Suggest correlation with physical exam and mammographic follow-up when clinically warranted.    Anasarca.    Mild nonspecific wall thickening of the inferior aspect of the urinary bladder.  Suggest correlation with urinalysis.    Multiple additional findings discussed in the body of the report.      Electronically signed by: Chuy Mckeon MD  Date:    07/23/2023  Time:    02:32               Narrative:    EXAMINATION:  CT CHEST ABDOMEN PELVIS WITH CONTRAST (XPD)    CLINICAL HISTORY:  Abdominal trauma, blunt;    TECHNIQUE:  Low dose axial images, sagittal and coronal reformations were obtained from the thoracic inlet to the pubic symphysis following the IV administration of 75 mL of Omnipaque 350 .  Oral contrast was not given.    COMPARISON:  CTA chest, 07/08/2023.  CT pelvis, 07/08/2023.  CT abdomen pelvis, 05/06/2023.    FINDINGS:  Chest:    Exam quality is limited by suboptimal bolus timing and motion.  Evaluation is limited by extensive streak artifact due to the patient's arms overlying the field of view.    Heart is borderline enlarged and  similar to the prior study.  Coronary artery and mitral valve calcifications.  Thoracic aorta is stable in caliber with moderate to advanced calcific atherosclerosis.  No central pulmonary embolus.  No bulky mediastinal lymphadenopathy.    Detailed evaluation of the pulmonary parenchyma limited by motion.  There is peribronchial thickening and questionable minimal patchy ground-glass opacities in the lung bases.  Bibasilar subsegmental atelectasis.  No consolidation or pleural effusion.    Moderate-sized hiatal hernia with moderate lower esophageal wall thickening.    Abdomen:    Exam quality is limited by suboptimal bolus timing, motion, and extensive artifact related to the patient's arms overlying the field of view.    Liver is similar in size and contour when compared with the prior study.  Multiple hepatic hypodensities most suggestive of cysts.  Gallbladder is unremarkable.  No intrahepatic biliary ductal dilatation.    Spleen, adrenals, and pancreas are stable and negative for acute finding allowing for significant motion.    Kidneys are stable.  There is a large left renal cyst.  Small stone or vascular calcification in the right renal hilum.  No hydronephrosis.    Evaluation for bowel inflammation is limited by motion.  No small bowel obstruction.  Mild stool burden in the colon.    No pneumoperitoneum or organized fluid collection.    No bulky retroperitoneal lymphadenopathy.    Abdominal aorta is similar in caliber with moderate to advanced calcific atherosclerosis involving the aorta and major branch vessels.    Portal vein is grossly patent.    Pelvis:    Urinary bladder is mildly distended and there is mild wall thickening along the inferior aspect of the urinary bladder similar to the prior CT abdomen.  Rectum is unremarkable.  There is minimal presacral fat stranding.  No significant pelvic free fluid.    Bones and soft tissues:    Recent fractures involving the right superior and inferior pubic rami  similar to prior CT pelvis.  Suspect nondisplaced fracture of the right michelle sacrum, more conspicuous when compared with prior CT pelvis.  Nondisplaced fracture of the anterior aspect of the right acetabulum (coronal series 606, image 129) is also more conspicuous when compared with the prior study.  No additional acute fracture.  Degenerative changes in the spine and pubic symphysis.  Mild anterolisthesis of L4 with respect to L5.  Mild left convex scoliotic curvature of the spine.  Old right lower rib fractures.  Operative changes of presumed right lower abdominal wall hernia repair.  Mild diffuse body wall edema.  Somewhat nodular soft tissue densities in the left breast soft tissues.  Suggest follow-up mammogram.                                       CT Head Without Contrast (Final result)  Result time 07/23/23 02:16:47      Final result by Maykel Castro MD (07/23/23 02:16:47)                   Impression:      Subtle increase in density of the subdural hygromas suggesting acute on chronic subdural fluid collection.    Consider follow-up CT scan per protocol in patient with small acute subdural hematoma on chronic subdural hygromas.      Electronically signed by: Maykel Castro  Date:    07/23/2023  Time:    02:16               Narrative:    EXAMINATION:  CT HEAD WITHOUT CONTRAST    CLINICAL HISTORY:  Head trauma, minor (Age >= 65y);    TECHNIQUE:  Low dose axial CT images obtained throughout the head without intravenous contrast. Sagittal and coronal reconstructions were performed.    COMPARISON:  None.    FINDINGS:  Intracranial compartment:    Ventricles and sulci are stable in size for age without evidence of hydrocephalus. Subdural hygromas appear increased in density slightly suggesting acute on chronic subdural hematoma.    The brain parenchyma appears stable with involutional and chronic small vessel type changes again noted..  No parenchymal mass, hemorrhage, edema or major vascular distribution  infarct.    Skull/extracranial contents (limited evaluation): No fracture. Mastoid air cells and paranasal sinuses are essentially clear.                                       CT Cervical Spine Without Contrast (Final result)  Result time 07/23/23 03:01:55      Final result by Chuy Mckeon MD (07/23/23 03:01:55)                   Impression:      No evidence of acute fracture or acute osseous abnormality.    Osteopenia and stable degenerative changes.    Additional findings discussed in the body of the report.    Electronically signed by resident: Anahi Gutierrez  Date:    07/23/2023  Time:    02:02    Electronically signed by: Chuy Mckeon MD  Date:    07/23/2023  Time:    03:01               Narrative:    EXAMINATION:  CT CERVICAL SPINE WITHOUT CONTRAST    CLINICAL HISTORY:  Neck trauma (Age >= 65y);    TECHNIQUE:  Low dose axial images, sagittal and coronal reformations were performed though the cervical spine.  Contrast was not administered.    COMPARISON:  CT 04/18/2023 and 07/09/2023.    FINDINGS:  Alignment: Normal.    Vertebrae: Osteopenia.  No fracture.  [place]ent area involving the left C4 facet without associated cortical disruption, unchanged dating back to 04/18/2023.  Stable mild height loss of the superior endplate of T4 vertebral body.    Discs: Multilevel disc height loss, most pronounced at C5-C6.    C1-2: Dens is intact.  Pre-dens space is maintained.    Skull base and craniocervical junction: Normal.    Degenerative findings:    Stable appearance of mild multilevel degenerative changes through the cervical spine.  There are several levels demonstrating mild to moderate facet arthropathy and mild uncovertebral spurring resulting in areas of mild neural foraminal narrowing.  No areas of spinal canal stenosis.    Paraspinal muscles & soft tissues: Evaluation of the lung apices is severely limited by respiratory motion artifact.  Dense calcific atherosclerosis of the aortic arch.  Soft tissues of  the thoracic inlet are somewhat distorted secondary to motion artifact.                                       X-Ray Pelvis Routine AP (Final result)  Result time 07/23/23 01:54:57   Procedure changed from X-Ray Hip 2 or 3 views Right (with Pelvis when performed)     Final result by Chuy Mckeon MD (07/23/23 01:54:57)                   Impression:      Similar alignment of recent fractures involving the right superior and inferior pubic rami.  No new acute displaced fracture.      Electronically signed by: Chuy Mckeon MD  Date:    07/23/2023  Time:    01:54               Narrative:    EXAMINATION:  XR PELVIS ROUTINE AP; XR FEMUR 2 VIEW RIGHT    CLINICAL HISTORY:  HIP PAIN;  Pain in right hip; Pain in right leg    TECHNIQUE:  Three frontal views of the pelvis performed.  Two views of the right femur also obtained.    COMPARISON:  CT pelvis, 07/08/2023.    FINDINGS:  Pelvis: Bones are mildly demineralized.  Similar appearance of mildly displaced recent fracture of the right inferior pubic ramus and nondisplaced fracture of the right superior pubic ramus.  Similar fracture fragment alignment allowing for differences in positioning.  No new acute fracture.  No dislocation.  Mild degenerative changes in both hips.    Right femur: No additional acute fracture or dislocation other than described above.  Degenerative changes in the right hip and right knee.  Atherosclerotic vascular calcifications.  Soft tissue edema overlying the right hip.  No unexpected radiopaque foreign body.                                       X-Ray Femur 2 AP/LAT Right (Final result)  Result time 07/23/23 01:54:57      Final result by Chuy Mckeon MD (07/23/23 01:54:57)                   Impression:      Similar alignment of recent fractures involving the right superior and inferior pubic rami.  No new acute displaced fracture.      Electronically signed by: Chuy Mckeon MD  Date:    07/23/2023  Time:    01:54                "Narrative:    EXAMINATION:  XR PELVIS ROUTINE AP; XR FEMUR 2 VIEW RIGHT    CLINICAL HISTORY:  HIP PAIN;  Pain in right hip; Pain in right leg    TECHNIQUE:  Three frontal views of the pelvis performed.  Two views of the right femur also obtained.    COMPARISON:  CT pelvis, 07/08/2023.    FINDINGS:  Pelvis: Bones are mildly demineralized.  Similar appearance of mildly displaced recent fracture of the right inferior pubic ramus and nondisplaced fracture of the right superior pubic ramus.  Similar fracture fragment alignment allowing for differences in positioning.  No new acute fracture.  No dislocation.  Mild degenerative changes in both hips.    Right femur: No additional acute fracture or dislocation other than described above.  Degenerative changes in the right hip and right knee.  Atherosclerotic vascular calcifications.  Soft tissue edema overlying the right hip.  No unexpected radiopaque foreign body.                                       X-Ray Chest AP Portable (Final result)  Result time 07/23/23 01:47:54      Final result by Chuy Mckeon MD (07/23/23 01:47:54)                   Impression:      No detrimental change when compared with 07/08/2023.      Electronically signed by: Chuy Mckeon MD  Date:    07/23/2023  Time:    01:47               Narrative:    EXAMINATION:  XR CHEST AP PORTABLE    CLINICAL HISTORY:  Provided history is "Sepsis;  ".    TECHNIQUE:  One view of the chest.    COMPARISON:  07/08/2023.    FINDINGS:  Cardiac wires overlie the chest.  Patient is slightly rotated.  Cardiomediastinal silhouette is stable and may be at the upper limits of normal in size.  Atherosclerotic calcifications overlie the aortic arch.  Right hemidiaphragm is slightly elevated as seen previously.  Azygous lobe configuration is incidentally noted in the right lung apex.  No confluent area of consolidation.  No sizable pleural effusion.  No pneumothorax.  Hiatal hernia again noted.                           "

## 2023-08-01 NOTE — PROGRESS NOTES
[facility] - Intensive Care ([address] Medicine  Progress Note    Patient Name: Radha Sandoval  MRN: [id number]  Patient Class: IP- Inpatient   Admission Date: 7/22/2023  Length of Stay: 8 days  Attending Physician: Pablo Haddad III,*  Primary Care Provider: Primary Doctor No        Subjective:     Principal Problem:Multiple closed fractures of pelvis without disruption of pelvic ring        HPI:  Radha Sandoval is a 88 y.o. female with PMH significant for chronic BL pleural effusions, recent DVT diagnosed in March '23 (on eliquis), dementia, HTN, with recent hospitalization here at [facility] for pelvic fracture treated non-operatively, who [facility] being [facility] to bed. Hx taken per Caretaker Sonia and Son (POA) Maykel. Caretaker at bedside unaware of head trauma or other injury. Of note, patient sustained her pelvic fractures at [facility], but after hospitalization at [facility] was placed in [facility] on 7/14. Caretaker reports patient also had another hospitalization during this time at . Patient generally disoriented and delirious, but she will have periods or even days of clarity. At baseline, she is oriented x2. Due to mentation she has difficulty working with staff which results in falls. Prior to her pelvic fracture on 7/8, pt was able to perform independent transfers from wheelchair and could walk short distances with her walker, but she is now max assist. Currently on Eliquis. Patient without complaint on my exam, denies pain.     In the ED: AFVSS. CBC with baseline anemia. CMP reveals slight elevation in Cr 1.0 (baseline 0.8) and K 3.3. BNP and troponin elevated but at baseline. UA grossly infectious. Spann placed for urinary retention.  All imaging reviewed. 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 Repeat CTH after 6 hours was stable. XR pelvis reveals stable recent R superior and inferior pubic rami fractures,  non-displaced. EKG in NSR with prolonged Qtc 510 ms. Given tylenol, norco, and 1g rocephin.       Overview/Hospital Course:  Radha Sandoval was placed in  observation after a fall. NSGY consulted for evaluation of SDH and determined no need for intervention , will follow up in clinic in 2 weeks. HOLD eliquis until follow up. Orthopedics discussed surgical options with family and recommended ORIF pelvis, but family is declining at this time and would prefer to continue with plan for PT/OT. Therapy assessment; recs for SNF. WBAT. Dvt ppx with 40 lovenox SQ daily (ok per NSGY). Psychiatry provided recs for medication adjustment for insomnia and delirium with prn dosing for agitation, will monitor response. Agitation persists and patient now expressing suicidal ideation when asked to work with therapy. Psychiatry aware of SI, making med adjustments as indicated. Caretaker has been staying at bedside. Monitor EKG daily given prolonged Qtc. Palliative care consulted for assistance with GOC planning with son and care team, appreciate recs. Pain controlled and mentation at baseline.     Plan to discharge patient on 07/27 to skilled nursing facility, however, patient tachypneic with stridor and increased work of breathing.  Mentation worsened with progressive respiratory alkalosis.  No improvement with breathing treatment or trial of Lasix.  Concern for bronchomalacia with CT images positive for bilateral mainstem bronchi narrowing.  Sats remained stable on 2 L O2.  Discussed with rapid response team and medical ICU.  Patient was moved to ICU step-down unit and monitored closely.  On 07/29 increased work of breathing and stridor spontaneously resolved.  Mentation improved.  Repeat CT images of the brain revealed stable subdural hematomas.      Interval History:  Patient is sleeping at the time of my exam.  She wakes easily to voice.  Patient answers a few questions appropriately but then her speech becomes unintelligible.   Had discussion with patient's son regarding mental status and failure to improve.  Patient eating and drinking much less.  Discussed possibility of requiring feeding tube.    Labs personally reviewed:  Hemoglobin 8.7, sodium 148, potassium 3.4     Review of Systems   Unable to perform ROS: Dementia     Objective:     Vital signs reviewed    Weight: 75.8 kg (167 lb)  Body mass index is 28.67 kg/m².        Physical Exam  Vitals and nursing note reviewed.   Constitutional:       General: She is not in acute distress.     Appearance: She is well-developed. She is ill-appearing (chronically ill-appearing).   HENT:      Mouth/Throat:      Pharynx: No oropharyngeal exudate or posterior oropharyngeal erythema.   Cardiovascular:      Rate and Rhythm: Normal rate and regular rhythm.      Heart sounds: Normal heart sounds.   Pulmonary:      Effort: No respiratory distress.      Breath sounds: No stridor (Resolved as mentation resolved). No wheezing.      Comments: Diminished at the bases  Abdominal:      General: Bowel sounds are normal. There is no distension.      Palpations: Abdomen is soft.      Tenderness: There is no abdominal tenderness.   Skin:     General: Skin is warm and dry.   Neurological:      Mental Status: She is alert. Mental status is at baseline.      Comments: Patient follows some commands.  Speech is mostly unintelligible               Assessment/Plan:      * Multiple closed right sided fractures of pelvis without disruption of pelvic ring  Frequent falls   - diagnosed at last [facility] hospitalization about 2 weeks ago.   - repeat XRs show stable fractures, non-displaced   - Ortho consulted in ED; originally recommended non-op management, but after further discussion recommended ORIF pelvis 7/24. Family declining surgery at this time.  - weight bearing as tolerated   - pain control with tylenol.  Avoiding sedating medications.  - PT/OT consult pending  - patient will likely need SNF v long term placement given  max assist requirements and progressive debility, in setting of dementia and behavioral disturbances     Acute metabolic encephalopathy  · Likely progression of dementia, no clear medical etiology   · On multiple medications that could contribute that are relatively new according to the son:  Memantine, Robaxin, Lexapro, Haldol  · More confused on 07/28, better on 07/29--discontinued scheduled Robaxin  · Multiple ABGs with progressing respiratory alkalosis, possibly contributing.  ·  Repeat CT head 7/30, stable SDH.  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   - continue memantine  - on home haloperidol 0.5 PO qhs  - psychiatry consulted for assistance: Zyprexa discontinued for Depakote due to prolonged QT  - monitor for improvement   - EKG monitoring   - 7/24-25 Pt reporting SI from patient during session. Caretaker says this is not new, patient expresses this intermittently. Psych aware.     Subdural hygroma  Acute subdural hematoma   - Acute SDH on chronic SD hygroma s/p fall [facility] living; no acute deficits  - repeat CTH stable   - holding eliquis for now - may continue in 2 weeks (per NSGY)   - > NSGY ok's starting LWMH for dvt ppx, holding for now with acute number  - INR wnl  - NSGY cleared. No need for continued monitoring aside from 2 week clinic follow up.     Acute hypoxemic respiratory failure  Respiratory alkalosis  · Tachypneic, using accessory muscles, intermittent stridor 7/28 resolved spontaneously on 07/29  · Trial of IV Lasix administered for pleural effusions seen on the CT chest 7/27.  · Echo 7/28 EF 65%, grade 1 diastolic dysfunction, mild AS, normal RV size and function, PA systolic pressure 39 mm Hg, normal CVP  · Bronchodilators without much improvement.  Patient appears  euvolemic, holding Lasix  · CTA chest and CT soft tissue neck pending, negative for PE, negative for any mass compressing the airway narrowing of the bilateral mainstem bronchi to 2 mm diameter not present on previous CT suggesting possible transient finding.  Possible transient bronchomalacia.    Prolonged Q-T interval on ECG  - continue to monitor, increasing  - avoid QT prolonging meds as much as possible  - change Zyprexa to Depakote per Psychiatry  - monitor tele     Urinary retention  Recurrent issue   - duncan placed, removed with decent urination, had to be replaced on 07/30 following recurrence of retention        Goals of care, counseling/discussion  - Caretaker expresses several concerns regarding patients continual health and function decline  - Recent stay at SNF with progress noted and pt was placed in ELVIN at [place]. However, [facility] is not the appropriate level of care for patient given dementia and frequent falls   - [place] will now require 24 hour sitters for patient to return   - CM CLAUDIA consulted for dc planning   - will need to have an in-depth palliative discussion with son to determine best care plan for patient - consult placed   - son agreeable to discussion.  Patient remains full code.    Hypokalemia  · Monitor K/Mag and replace as needed      Lumbar spondylosis with myelopathy  - tylenol for pain control, robaxin as needed    Abnormal urinalysis  Recurrent UTIs  - initial UA concern for infection, completed Rocephin  - urine culture with Candida glabrata 10 K to 49 K units not consistent with convincing UTI.  - duncan placed for U-retention, removed with further urinary retention.  Replaced Duncan on 07/30  -will leave Duncan on discharge, patient will need outpatient Urology for voiding trial.  - AFVSS, no leukocytosis.         Primary hypertension  - BP uncontrolled intermittently, as high as 226/90 -- suspect bc patient was agitated and spitting out meds  - continue lisinopril, lopressor, lasix.   Added nifedipine 7/30  - PRN IV hydralazine for SBP >180 or if unable to tolerate oral meds.         VTE Risk Mitigation (From admission, onward)         Ordered     enoxaparin injection 40 mg  Daily         07/31/23 0506     Reason for No Pharmacological VTE Prophylaxis  Once        Question:  Reasons:  Answer:  Risk of Bleeding  Comment:  SDH    07/23/23 0831     IP VTE HIGH RISK PATIENT  Once         07/23/23 0831     Place sequential compression device  Until discontinued         07/23/23 0831                Discharge Planning   TOSHA: 8/2/2023     Code Status: Full Code   Is the patient medically ready for discharge?: No    Reason for patient still in hospital (select all that apply): Patient trending condition, Treatment, Consult recommendations, PT / OT recommendations and Pending disposition  Discharge Plan A: Skilled Nursing Facility   Discharge Delays: None known at this time              Pablo Haddad III, MD  Department of Hospital Medicine   [facility] - Intensive Care ([place])

## 2023-08-01 NOTE — ASSESSMENT & PLAN NOTE
Suicidal ideation   Insomnia   - baseline per caretaker; however, concerned that patient does not sleep at night, worsening her mentation, agitation, and delirium.   - continue memantine  - on home haloperidol 0.5 PO qhs  - psychiatry consulted for assistance: Zyprexa discontinued for Depakote due to prolonged QT  - monitor for improvement   - EKG monitoring   - 7/24-25 Pt reporting SI from patient during session. Caretaker says this is not new, patient expresses this intermittently. Psych aware.

## 2023-08-01 NOTE — PROGRESS NOTES
Occupational Therapy    Pt Not Seen    Radha Sandoval  MRN: [id number]  Room/Bed: 14113/65038 A    Pt not seen this day secondary to being off unit in MRI procedure. Will follow up when Pt is available .    Eyad Summers, OTR/L  8/1/2023

## 2023-08-01 NOTE — ASSESSMENT & PLAN NOTE
Recurrent issue   - duncan placed, removed with decent urination, had to be replaced on 07/30 following recurrence of retention

## 2023-08-01 NOTE — ASSESSMENT & PLAN NOTE
Respiratory alkalosis  · Tachypneic, using accessory muscles, intermittent stridor 7/28 resolved spontaneously on 07/29  · Trial of IV Lasix administered for pleural effusions seen on the CT chest 7/27.  · Echo 7/28 EF 65%, grade 1 diastolic dysfunction, mild AS, normal RV size and function, PA systolic pressure 39 mm Hg, normal CVP  · Bronchodilators without much improvement.  Patient appears euvolemic, holding Lasix  · CTA chest and CT soft tissue neck pending, negative for PE, negative for any mass compressing the airway narrowing of the bilateral mainstem bronchi to 2 mm diameter not present on previous CT suggesting possible transient finding.  Possible transient bronchomalacia.

## 2023-08-01 NOTE — NURSING
Pt lethargic on assessment. Opens eyes with sternal rub, unable to follow commands, PERRLA +3, garbled speech. Received first dose of Depacon 1730 for agitation on day shift. 2200 pt still w/minimal response to stimulation. Work of breathing increased with accessory muscle use. Attending MD Dr. Haddad and overnight provider Dr. Vera notified. RRT called to come evaluate patient. /79 MAP 84, 95% on 1.5L NC, Sinus rhythm on tele HR 70-80s, temp 98.8. BG 97. All night time medications held d/t AMS including scheduled nightly Depacon. Will continue to monitor pt closely.

## 2023-08-01 NOTE — PT/OT/SLP PROGRESS
Physical Therapy      Patient Name:  Radha Sandoval   MRN:  [id number]    Patient not seen today secondary to making two attempts. Upon first attempt patient very lethargic and not able to stay awake. Returned in PM and patient hypertensive /84 (121) while in supine at rest. RN notified.  Will follow-up as able.

## 2023-08-01 NOTE — PLAN OF CARE
Work of breathing decreased, satting >95% on 1.5L NC, resting comfortably. Sinus rhythm on tele, HR 60-70s. Urine output diminished, 250ml out this shift. Friend at bedside and telesitter in place. Safety precautions maintained throughout shift and frequent rounding completed.      Problem: Infection  Goal: Absence of Infection Signs and Symptoms  Outcome: Ongoing, Progressing     Problem: Adult Inpatient Plan of Care  Goal: Plan of Care Review  Outcome: Ongoing, Progressing  Goal: Patient-Specific Goal (Individualized)  Outcome: Ongoing, Progressing  Goal: Absence of Hospital-Acquired Illness or Injury  Outcome: Ongoing, Progressing  Goal: Optimal Comfort and Wellbeing  Outcome: Ongoing, Progressing  Goal: Readiness for Transition of Care  Outcome: Ongoing, Progressing     Problem: Coping Ineffective  Goal: Effective Coping  Outcome: Ongoing, Progressing     Problem: Fall Injury Risk  Goal: Absence of Fall and Fall-Related Injury  Outcome: Ongoing, Progressing     Problem: Skin Injury Risk Increased  Goal: Skin Health and Integrity  Outcome: Ongoing, Progressing

## 2023-08-01 NOTE — ASSESSMENT & PLAN NOTE
- Caretaker expresses several concerns regarding patients continual health and function decline  - Recent stay at SNF with progress noted and pt was placed in ELVIN at [place]. However, ELVIN is not the appropriate level of care for patient given dementia and frequent falls   - [place] will now require 24 hour sitters for patient to return   - DILIA TYALOR consulted for dc planning   - will need to have an in-depth palliative discussion with son to determine best care plan for patient - consult placed   - son agreeable to discussion.  Patient remains full code.

## 2023-08-01 NOTE — NURSING
MRI brain scheduled for this morning around 0730. 0.5mg IV ativan ordered to be given prior to scan for anxiety. EEG monitoring to start after MRI completed d/t being incompatible w/ MRI scan. Plan of care discussed with attending and day shift RN. Friend at bedside updated.

## 2023-08-01 NOTE — SUBJECTIVE & OBJECTIVE
Interval History:  Patient is sleeping at the time of my exam.  She wakes easily to voice.  Patient answers a few questions appropriately but then her speech becomes unintelligible.  Had discussion with patient's son regarding mental status and failure to improve.  Patient eating and drinking much less.  Discussed possibility of requiring feeding tube.    Labs personally reviewed:  Hemoglobin 8.7, sodium 148, potassium 3.4     Review of Systems   Unable to perform ROS: Dementia     Objective:     Vital signs reviewed    Weight: 75.8 kg (167 lb)  Body mass index is 28.67 kg/m².        Physical Exam  Vitals and nursing note reviewed.   Constitutional:       General: She is not in acute distress.     Appearance: She is well-developed. She is ill-appearing (chronically ill-appearing).   HENT:      Mouth/Throat:      Pharynx: No oropharyngeal exudate or posterior oropharyngeal erythema.   Cardiovascular:      Rate and Rhythm: Normal rate and regular rhythm.      Heart sounds: Normal heart sounds.   Pulmonary:      Effort: No respiratory distress.      Breath sounds: No stridor (Resolved as mentation resolved). No wheezing.      Comments: Diminished at the bases  Abdominal:      General: Bowel sounds are normal. There is no distension.      Palpations: Abdomen is soft.      Tenderness: There is no abdominal tenderness.   Skin:     General: Skin is warm and dry.   Neurological:      Mental Status: She is alert. Mental status is at baseline.      Comments: Patient follows some commands.  Speech is mostly unintelligible

## 2023-08-01 NOTE — CARE UPDATE
RAPID RESPONSE NURSE PROACTIVE ROUNDING NOTE       Time of Visit:     Admit Date: 2023  LOS: 9  Code Status: Full Code   Date of Visit: 2023  : 1935  Age: 88 y.o.  Sex: female  Race: White  Bed: 04885/01665 A:   MRN: [id number]  Was the patient discharged from an ICU this admission? No   Was the patient discharged from a PACU within last 24 hours? No   Did the patient receive conscious sedation/general anesthesia in last 24 hours? No  Was the patient in the ED within the past 24 hours? No  Was the patient on NIPPV within the past 24 hours? No   Attending Physician: Pablo Haddad III,*  Primary Service: [facility] HOSP MED A   Time spent at the bedside: 15 -30 min    SITUATION    Notified by charge RN during rounding.  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    Last Vitals:  Temp: 98.8 °F (37.1 °C) (2210)  Pulse: 75 (2306)  Resp: 28 (2210)  BP: 129/79 (2210)  SpO2: 100 % (2306)    24 Hours Vitals Range:  Temp:  [98 °F (36.7 °C)-98.8 °F (37.1 °C)]   Pulse:  [75-92]   Resp:  [16-28]   BP: (107-153)/()   SpO2:  [89 %-100 %]     Labs:  Recent Labs     23  0355 23  0315 23  0450   WBC 7.21 6.38 6.52   HGB 8.5* 8.5* 8.7*   HCT 26.8* 27.9* 28.0*    268 291       Recent Labs     23  0355 23  0315 23  0450     147* 148*   K 3.5 3.4* 3.4*    109 110   CO2 26 24 25   BUN 16 15 13   CREATININE 0.9 0.7 0.9    106 93   PHOS  --  3.2  --    MG 1.8 1.9 1.8        Recent Labs     07/29/23  0217 07/29/23  0847   PH 7.468* 7.520*   PCO2 39.3 36.6   PO2 81 77*   HCO3 28.5* 29.9*   POCSATURATED 97 97   BE 5 7        ASSESSMENT    Physical Exam On assessment, pt is wakening, can tell us her name, but is not oriented to place or time. Pt is refusing oral intake and is currently on IVMF. CBG 97.  The patient started new medication, Depakote for agitation and the last note per the day shift RN was that the patient quickly fell deeply asleep and was then requiring new O2 1.5 L NC.  Her VS are WNL, afebrile. She is now  more awake per the night shift RN.  Dr. Vera at bedside to eval patient. The current consensus  is that this is a result of the new medication Depakote and her tolerance to the medication is poor.  Continue to monitor the patient  allowing the medication to wear off.     INTERVENTIONS    The patient was seen for Neurological problem. Staff concerns included decreased responsiveness. The following interventions were performed: POCT glucose, continuous pulse ox monitoring continued , continuous cardiac monitoring continued, and no additional interventions needed at this time.    RECOMMENDATIONS    Continue continuous cardiac and O2 monitoring. Allow appropriate timing for patient to awaken.    PROVIDER ESCALATION    Yes/No  Yes    Orders received and case discussed with Dr. Vera .    Disposition: Remain in room 81217.    FOLLOW-UP    Bedside RN, Rhoda Silva RN  updated on plan of care. Instructed to call the Rapid Response Nurse, Danica Cardona RN at [phone] for additional questions or concerns.

## 2023-08-02 NOTE — ASSESSMENT & PLAN NOTE
Patient has recurrent issues with urinary retention. Recent duncan placed on 7/30 for retention    - continue tamsulosin  - duncan in place

## 2023-08-02 NOTE — ASSESSMENT & PLAN NOTE
Patient has a history of multiple falls with multiple fractures diagnosed at last [facility] hospitalization. Repeat XRs show stable fractures, non-displaced. Per documentation, ortho consulted in ED; originally recommended non-op management, but after further discussion recommended ORIF pelvis 7/24 but family declined surgery.    - weight bearing as tolerated   - pain control with tylenol.  Avoiding sedating medications.

## 2023-08-02 NOTE — ASSESSMENT & PLAN NOTE
Patient has an acute SDH on chronic SD hygroma s/p fall at halfway; no acute deficits. The repeat CTH stable     - will hold eliquis x 2 weeks (8/5) per NSGY recommendations, ok for VTE ppx  - patient with neurological status at baseline, no new hemodynamic events.

## 2023-08-02 NOTE — SUBJECTIVE & OBJECTIVE
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 5/22/2023    Lumbar spondylosis with myelopathy 4/25/2023    Multiple closed right sided fractures of pelvis without disruption of pelvic ring 7/9/2023    Primary hypertension 6/10/2022    Subdural hygroma 7/8/2023       Past Surgical History:   Procedure Laterality Date    REPAIR, HERNIA, INGUINAL, WITHOUT HISTORY OF PRIOR REPAIR, AGE 6 YEARS OR OLDER Right 5/6/2023    Procedure: REPAIR, HERNIA, INGUINAL, WITHOUT HISTORY OF PRIOR REPAIR, AGE 6 YEARS OR OLDER;  Surgeon: Maurice Piper MD;  Location: [facility] OR [address] [place] [address];  Service: General;  Laterality: Right;  possible laparotomy, possible bowel resection       Review of patient's allergies indicates:  No Known Allergies    Family History    None       Tobacco Use    Smoking status: Never    Smokeless tobacco: Never   Substance and Sexual Activity    Alcohol use: Not on file    Drug use: Never    Sexual activity: Not Currently      Review of Systems   Unable to perform ROS: Dementia (patient altered at baseline unable to provide ROS)     Objective:     Vital Signs (Most Recent):  Temp: 98 °F (36.7 °C) (08/01/23 1515)  Pulse: (!) 127 (08/01/23 1902)  Resp: (!) 28 (08/01/23 1832)  BP: (!) 81/42 (08/01/23 1900)  SpO2: 100 % (08/01/23 1900) Vital Signs (24h Range):  Temp:  [98 °F (36.7 °C)-98.8 °F (37.1 °C)] 98 °F (36.7 °C)  Pulse:  [] 127  Resp:  [16-28] 28  SpO2:  [95 %-100 %] 100 %  BP: ()/(0-86) 81/42   Weight: 75.8 kg (167 lb)  Body mass index is 28.67 kg/m².      Intake/Output Summary (Last 24 hours) at 8/1/2023 1956  Last data filed at 8/1/2023 0619  Gross per 24 hour   Intake  470.59 ml   Output 250 ml   Net 220.59 ml          Physical Exam  Vitals and nursing note reviewed.   Constitutional:       Appearance: Normal appearance. She is ill-appearing.   HENT:      Right Ear: Tympanic membrane normal.      Nose: Nose normal. No congestion or rhinorrhea.      Mouth/Throat:      Mouth: Mucous membranes are moist.   Eyes:      Extraocular Movements: Extraocular movements intact.      Pupils: Pupils are equal, round, and reactive to light.   Cardiovascular:      Rate and Rhythm: Tachycardia present. Rhythm irregularly irregular.   Pulmonary:      Effort: Pulmonary effort is normal.      Comments: Coarse breath sounds   No wheezing or stridor   Abdominal:      General: Abdomen is flat. There is no distension.      Tenderness: There is no abdominal tenderness.   Musculoskeletal:         General: Normal range of motion.   Skin:     General: Skin is warm and dry.      Capillary Refill: Capillary refill takes less than 2 seconds.   Neurological:      General: No focal deficit present.      Mental Status: She is alert. She is disoriented.      Comments: Following commands   Speech non discernable             Vents:     Lines/Drains/Airways       Drain  Duration                  Urethral Catheter 07/31/23 0030 16 Fr. 1 day              Peripheral Intravenous Line  Duration                  Peripheral IV - Single Lumen 07/28/23 1855 20 G;1 3/4 in Right;Anterior Forearm 4 days                  Significant Labs:    CBC/Anemia Profile:  Recent Labs   Lab 07/31/23  0450 08/01/23  0535 08/01/23  1902   WBC 6.52  --   --    HGB 8.7*  --   --    HCT 28.0*  --  24*     --   --    MCV 88  --   --    RDW 15.4*  --   --    DVSYENPA33  --  971*  --         Chemistries:  Recent Labs   Lab 07/31/23  0450 08/01/23  0314   * 148*   K 3.4* 3.3*    109   CO2 25 26   BUN 13 14   CREATININE 0.9 0.9   CALCIUM 8.7 8.3*   MG 1.8 2.3   PHOS  --  3.9       All pertinent labs within the past 24 hours have  been reviewed.    Significant Imaging: I have reviewed all pertinent imaging results/findings within the past 24 hours.

## 2023-08-02 NOTE — PROGRESS NOTES
[facility] - Cardiac Medical ICU  [facility] Medicine  Progress Note    Patient Name: Radha Sandoval  MRN: [id number]  Patient Class: IP- Inpatient   Admission Date: 7/22/2023  Length of Stay: 9 days  Attending Physician: Zach Vargas MD  Primary Care Provider: Primary Doctor No        Subjective:     Principal Problem:Atrial fibrillation with RVR        HPI:  Radha Sandoval is a 88 y.o. female with PMH significant for chronic BL pleural effusions, recent DVT diagnosed in March '23 (on eliquis), dementia, HTN, with recent hospitalization here at [facility] for pelvic fracture treated non-operatively, who presents after a fall at her [facility] while being transferred from wheelchair to bed. Hx taken per Caretaker Sonia and Son (POA) Maykel. Caretaker at bedside unaware of head trauma or other injury. Of note, patient sustained her pelvic fractures at [facility], but after hospitalization at [facility] was placed in [facility] on 7/14. Caretaker reports patient also had another hospitalization during this time at . Patient generally disoriented and delirious, but she will have periods or even days of clarity. At baseline, she is oriented x2. Due to mentation she has difficulty working with staff which results in falls. Prior to her pelvic fracture on 7/8, pt was able to perform independent transfers from wheelchair and could walk short distances with her walker, but she is now max assist. Currently on Eliquis. Patient without complaint on my exam, denies pain.     In the ED: AFVSS. CBC with baseline anemia. CMP reveals slight elevation in Cr 1.0 (baseline 0.8) and K 3.3. BNP and troponin elevated but at baseline. UA grossly infectious. Spann placed for urinary retention.  All imaging reviewed. 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 Repeat CTH after 6 hours was stable. XR pelvis reveals stable recent R superior and inferior pubic rami fractures, non-displaced. EKG in NSR with prolonged Qtc 510 ms. Given  tylenol, norco, and 1g rocephin.       Overview/Hospital Course:  Radha Sandoval was placed in  observation after a fall. NSGY consulted for evaluation of SDH and determined no need for intervention , will follow up in clinic in 2 weeks. HOLD eliquis until follow up. Orthopedics discussed surgical options with family and recommended ORIF pelvis, but family is declined at this time and would prefer to continue with plan for PT/OT. Therapy assessment; recs for SNF. WBAT. Dvt ppx with 40 lovenox SQ daily (ok per NSGY). Psychiatry provided recs for medication adjustment for insomnia and delirium with prn dosing for agitation.  Agitation persists and patient started expressing suicidal ideation when asked to work with therapy. Psychiatry aware of SI, and made med adjustments as indicated while monitoring QT. Caretaker has been staying at bedside on multiple occasions and expressed the patient's significant cognitive decline over the last several weeks to months.  Palliative Medicine consulted with son and patient full code.    Planned to discharge patient on 07/27 to skilled nursing facility, however, patient tachypneic with stridor and increased work of breathing.  Mentation worsened with progressive respiratory alkalosis.  No improvement with breathing treatment or trial of Lasix.  Concern for bronchomalacia with CT images positive for bilateral mainstem bronchi narrowing.  Sats remained stable on 2 L O2.  Discussed with rapid response team and medical ICU.  Patient was moved to ICU step-down unit and monitored closely.  On 07/29 increased work of breathing and stridor spontaneously resolved.  Mentation briefly improved but multiple episodes of confusion.  QT even longer and Zyprexa changed to Depakote.  Repeat CT images of the brain revealed stable subdural hematomas.  On further discussion with patient's son, he opted to complete neurologic workup with MRI of the brain and EEG.  Son gave consent for MRI with Ativan,  no acute infarct, stable SDH and 3 mm midline shift. Abnormal EEG due to moderate generalized non-specific cerebral dysfunction with no electrographic seizures or indications of seizure tendency.  B12 and ammonia normal.  Folate and RPR checked on admission 2 weeks prior at outside facility and unremarkable.  Later in the afternoon, nursing staff called due to tachycardia and hypotension.  EKG revealed AFib with RVR with blood pressure 70s over 50s.  Minimal response to IV fluids.  To my knowledge, no history of AFib.  Patient transferred to ICU.    Of note recurrent episode of urinary retention on 07/31 and a UA was collected growing Enterococcus species but only max of 49,999 CFU.  Typically would not treat unless 100,000 CFU, will defer to ICU as the patient has been transferred      Interval History:  Patient seen and examined rapid response.  nursing staff called due to tachycardia and hypotension.  EKG revealed AFib with RVR with blood pressure 70s over 50s. Minimal response to IV fluids.  To my knowledge, no history of AFib.  Patient is still speaking although conversation is mostly unintelligible.  Patient transferred to ICU.    Discussed with patient's son, considering goals of care, but at this moment continue full code.  Discussed with caregiver in the room who said that the patient had a much better day today and was much more conversant.    Labs reviewed:  Hemoglobin 8.7, sodium 148, potassium 3.3, creatinine 0.9    Review of Systems   Unable to perform ROS: Dementia     Objective:     Vital signs reviewed    Weight: 75.8 kg (167 lb)  Body mass index is 28.67 kg/m².        Physical Exam  Vitals and nursing note reviewed.   Constitutional:       General: She is not in acute distress.     Appearance: She is well-developed. She is ill-appearing (chronically ill-appearing).   HENT:      Mouth/Throat:      Mouth: Mucous membranes are moist.   Cardiovascular:      Rate and Rhythm: Tachycardia present. Rhythm  irregular.   Pulmonary:      Effort: No respiratory distress.      Breath sounds: No stridor. No wheezing.      Comments: Diminished at the bases  Abdominal:      General: Bowel sounds are normal. There is no distension.      Palpations: Abdomen is soft.      Tenderness: There is no abdominal tenderness.   Skin:     General: Skin is warm and dry.   Neurological:      Mental Status: She is alert.      Comments: Patient follows some commands.  Speech is mostly unintelligible               Assessment/Plan:      * Atrial fibrillation with RVR  · Flipped into AFib afternoon of 8/1.  · Not a known diagnosis  · Patient previously on Eliquis for PE/DVTs.  · Unstable BP, rapid response called, appreciate assistance.  Patient transferred to ICU.    Multiple closed right sided fractures of pelvis without disruption of pelvic ring  Frequent falls   - diagnosed at last [facility] hospitalization about 2 weeks ago.   - repeat XRs show stable fractures, non-displaced   - Ortho consulted in ED; originally recommended non-op management, but after further discussion recommended ORIF pelvis 7/24.  Declined surgery.  - weight bearing as tolerated, avoid sedating meds   - PT/OT consulted  - patient will likely need SNF v long term placement given max assist requirements and progressive debility, in setting of dementia and behavioral disturbances     Acute metabolic encephalopathy  · Likely progression of dementia, no clear medical etiology   · On multiple medications that could contribute that are relatively new according to the son:  Memantine, Robaxin, Lexapro, Haldol, Depakote and Depakote which is new this admission.  · More confused on 07/28, better on 07/29--discontinued scheduled Robaxin  · Multiple ABGs with progressing respiratory alkalosis, possibly contributing.  · TSH, folate ordered 2 weeks ago at outside facility normal.  B12, ammonia normal.  MRI brain with no acute process.  EEG negative for seizure activity (although patient  had a dose of Ativan about 4 hours earlier).  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  - continue memantine, Lexapro  - psychiatry consulted for assistance: Zyprexa discontinued for Depakote due to prolonged QT  - monitor for improvement   - EKG monitoring   - 7/24-25 Pt reporting SI from patient during session. Caretaker says this is not new, patient expresses this intermittently. Psych aware.     Subdural hygroma  Acute subdural hematoma   - Acute SDH on chronic SD hygroma s/p fall at shelter; no acute deficits  - repeat CTH stable   - holding eliquis for now - may continue in 2 weeks (per NSGY)   - > NSGY ok's starting LWMH for dvt ppx, holding for now with acute number  - INR wnl  - NSGY cleared. No need for continued monitoring aside from 2 week clinic follow up.     Acute hypoxemic respiratory failure  Respiratory alkalosis  · Tachypneic, using accessory muscles, intermittent stridor 7/28 resolved spontaneously on 07/29  · Trial of IV Lasix administered for pleural effusions seen on the CT chest 7/27.  · Echo 7/28 EF 65%, grade 1 diastolic dysfunction, mild AS, normal RV size and function, PA systolic pressure 39 mm Hg, normal CVP  · Bronchodilators without much improvement.  Patient appears euvolemic, holding Lasix  · CTA chest and CT soft tissue neck pending, negative for PE, negative for any mass compressing the airway, narrowing of the bilateral mainstem bronchi to 2 mm diameter not present on previous CT suggesting possible transient finding.  Possible transient bronchomalacia.    Prolonged Q-T interval on ECG  - continue to monitor, increasing  - avoid QT prolonging meds as much as possible  - change Zyprexa to Depakote per Psychiatry  - monitor tele     Urinary retention  Recurrent issue   -  duncan placed, removed with decent urination, had to be replaced on 07/30 following recurrence of retention        Goals of care, counseling/discussion  - Caretaker expresses several concerns regarding patients continual health and function decline  - Recent stay at SNF with progress noted and pt was placed in nursing home at [facility]. However, [facility] is not the appropriate level of care for patient given dementia and frequent falls   - [facility] will now require 24 hour sitters for patient to return   - DILIA TAYLOR consulted for dc planning   - code status conversation held with son evening of 8/1, would like to discuss with brother as well.  Currently code status remains full.    Hypokalemia  · Monitor K/Mag and replace as needed      Lumbar spondylosis with myelopathy  - tylenol for pain control, robaxin as needed    Abnormal urinalysis  Recurrent UTIs  - initial UA concern for infection, completed Rocephin  - urine culture with Candida glabrata 10 K to 49 K units not consistent with convincing UTI.  - duncan placed for U-retention, removed with further urinary retention.  Replaced Duncan on 07/30 with Enterococcus colony count 10 K-49,999 CFU.  Unlikely to represent true infection.  -will leave Duncan on discharge, patient will need outpatient Urology for voiding trial.        Primary hypertension  - BP uncontrolled intermittently, as high as 226/90 -- suspect bc patient was agitated and spitting out meds  - continue lisinopril, lopressor, lasix.  Added nifedipine 7/30  - PRN IV hydralazine for SBP >180 or if unable to tolerate oral meds.         VTE Risk Mitigation (From admission, onward)         Ordered     enoxaparin injection 40 mg  Daily         07/31/23 0506     Reason for No Pharmacological VTE Prophylaxis  Once        Question:  Reasons:  Answer:  Risk of Bleeding  Comment:  SDH    07/23/23 0831     IP VTE HIGH RISK PATIENT  Once         07/23/23 0831     Place sequential compression device  Until discontinued         07/23/23  0831                Discharge Planning   TOSHA: 8/2/2023     Code Status: Full Code   Is the patient medically ready for discharge?: No    Reason for patient still in hospital (select all that apply): Patient unstable, Patient new problem, Patient trending condition, Laboratory test, Treatment, Consult recommendations and PT / OT recommendations  Discharge Plan A: Skilled Nursing Facility   Discharge Delays: None known at this time              Pablo Haddad III, MD  Department of Hospital Medicine   [facility] - Cardiac Medical ICU

## 2023-08-02 NOTE — ASSESSMENT & PLAN NOTE
Suicidal ideation   Insomnia   - baseline per caretaker; however, mentation waxes and wanes.  - continue memantine, Lexapro  - psychiatry consulted for assistance: Zyprexa discontinued for Depakote due to prolonged QT  - monitor for improvement   - EKG monitoring   - 7/24-25 Pt reporting SI from patient during session. Caretaker says this is not new, patient expresses this intermittently. Psych aware.

## 2023-08-02 NOTE — TELEPHONE ENCOUNTER
----- Message from Liliana Wei sent at 8/2/2023  1:30 PM CDT -----  Contact: Donald garcia/ [employer] [facility]   Donald garcia/ [facility] is calling in regards to the pts pharmacy changing to [facility]. All medications need to be transferred over to [facility] please so pt can get medications. Pt has none of her medications that were filled in July. Please call and advise. Thank you

## 2023-08-02 NOTE — CODE/ RAPID DOCUMENTATION
RAPID RESPONSE NURSE NOTE        Admit Date: 2023  LOS: 9  Code Status: Full Code   Date of Consult: 2023  : 1935  Age: 88 y.o.  Weight:   Wt Readings from Last 1 Encounters:   23 75.8 kg (167 lb)     Sex: female  Race: White   Bed: [facility]/[id number]  MRN: [id number]  Time Rapid Response Team page Received:   Time Rapid Response Team at Bedside:   Time Rapid Response Team left Bedside:   Was the patient discharged from an ICU this admission? No   Was the patient discharged from a PACU within last 24 hours? No   Did the patient receive conscious sedation/general anesthesia in last 24 hours? No  Was the patient in the ED within the past 24 hours? No  Was the patient on NIPPV within the past 24 hours? No   Did this progress into an ARC or CPA: No  Attending Physician: Pablo Haddad III,*  Primary Service[place] [facility] [place]       SITUATION    Notified by pager.  Reason for alert: hypotension  Called to evaluate the patient for Circulatory    BACKGROUND     Why is the patient in the hospital?: Multiple closed fractures of pelvis without disruption of pelvic ring    Patient has a past medical history of Acute deep vein thrombosis (DVT) of femoral vein of right lower extremity, Acute pulmonary embolism, Acute pulmonary embolism without acute cor pulmonale, Chronic bilateral pleural effusions, Debility, Hygroma, 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, Lumbar spondylosis with myelopathy, Multiple closed right sided fractures of pelvis without disruption of pelvic ring, Primary hypertension, and Subdural hygroma.    Last Vitals:  Temp: 98 °F (36.7 °C) (1515)  Pulse: 127 (1902)  Resp: 16 (1515)  BP: 81/42 (1900)  SpO2: 100 % (1900)    24 Hours Vitals Range:  Temp:  [98 °F (36.7 °C)-98.8 °F (37.1 °C)]   Pulse:  []   Resp:  [16-28]   BP: ()/(0-86)   SpO2:  [95 %-100 %]     Labs:  Recent Labs     23  0315 23  0450 23    WBC 6.38 6.52  --    HGB 8.5* 8.7*  --    HCT 27.9* 28.0* 24*    291  --        Recent Labs     07/30/23  0315 07/31/23  0450 08/01/23  0314   * 148* 148*   K 3.4* 3.4* 3.3*    110 109   CO2 24 25 26   BUN 15 13 14   CREATININE 0.7 0.9 0.9    93 80   PHOS 3.2  --  3.9   MG 1.9 1.8 2.3        Recent Labs     08/01/23  1902   PH 7.501*   PCO2 28.8*   PO2 50   HCO3 22.5*   POCSATURATED 89*   BE -1        ASSESSMENT    Physical Exam  Constitutional:       Appearance: She is ill-appearing.   Cardiovascular:      Rate and Rhythm: Tachycardia present. Rhythm irregular.   Pulmonary:      Effort: Tachypnea present.   Skin:     General: Skin is dry.      Capillary Refill: Capillary refill takes 2 to 3 seconds.      Coloration: Skin is pale.      Findings: Bruising present.   Neurological:      Mental Status: She is lethargic and disoriented.      GCS: GCS eye subscore is 3. GCS verbal subscore is 4. GCS motor subscore is 6.         BP 82/44 (54)  RR 28, SpO2 100% on 1.5L NC  BG 74    Patient lethargic, hypotensive. 10mg IVP hydralazine given at 1356 for hypertension. Patient has been too confused and lethargic to have much oral intake, and has not been able to take any of her oral medication, including scheduled metoprolol     INTERVENTIONS    The patient was seen for Cardiac problem. Staff concerns included tachycardia and hypotension. The following interventions were performed: EKG, critical care consult, and 500cc LR bolus, POCT venous blood gas with lytes, POCT BG, PIV insertion.    20g PIV placed to RFA, fluid bolus initiated    EKG shows afib RVR    Patient remained hypotensive with MAPs mid 50s to low 60s, decision made to upgrade patient to ICU for higher level of care    RECOMMENDATIONS    We recommend: transfer to MICU for higher level of care and possible vasopressor support    PROVIDER ESCALATION    Orders received and case discussed with Dr. Farmer with , Yolis Benton,  PA with CCM .    Primary team arrival time: 1846    Disposition: Tx in ICU bed 6071.  Patient transferred to MICU with RRN x4, on continuous cardiac, NIBP, SpO2 monitoring  Bedside handoff given to MICU RN Cory Farmer to notify pt son of transfer    FOLLOW UP    bedside RNErin  updated on plan of care. Instructed to call the Rapid Response Nurse, AIDEE Araya, [place] at [phone] for additional questions or concerns.

## 2023-08-02 NOTE — ASSESSMENT & PLAN NOTE
Patient has an acute SDH on chronic SD hyg[facility]; no acute deficits. The repeat CTH stable     - will hold eliquis x 2 weeks (8/5) per NSGY recommendations, ok for VTE ppx

## 2023-08-02 NOTE — PROGRESS NOTES
CONSULTATION LIAISON PSYCHIATRY PROGRESS NOTE    Patient Name: Radha Sandoval  MRN: [id number]  Patient Class: IP- Inpatient  Admission Date: 7/22/2023  Attending Physician: Zach Vargas MD      SUBJECTIVE:   Radha Sandoval is a 88 y.o. female with past medical history of bilateral chronic subdural hematomas, Hypertension, Acute DVT and PE 5/23, frequent falls with recent Pelvic fracture s/p orthopedic intervention & past pertinent psychiatric history of Late Onset Alzheimers with mood disturbance and Delirium presents to the ED/admitted to the hospital for a fall, shortness of breath, and hypoxia.     Patient presented with right pelvic pain after a fall on 7/22 when she was transferred from bed to wheelchair. Due to her fluctuating mentation she has been agitated with nursing staff and tries to leave her bed and resists being transferred from bed to wheelchair, which has resulted in multiple falls.    At baseline, patient generally disoriented and delirious, but she will have periods or even days of clarity; she is typically oriented x2.      Psychiatry consulted for insomnia contributing to delirium in patient with dementia, uncontrolled on current regimen     Medical Student Evaluation, Iván Resendiz:  Today the patient is found sleeping soundly in her bed dressed in hospital garb. She was unable to be woken up by voice. Per chart review, patient tried to get out of the bed several times throughout the night. Per chart review, son denied patient PO medication so that she could sleep. Patient received 250mg Depakote IV last night. And Lorazepam 0.5 mg this morning at 7am for agitation, per primary.    Resident Evaluation:  Patient was sleeping and difficult to arouse on AM assessment. On afternoon reassessment, patient was awake, alert, and conversational. Patient spoke coherently and logically, stated that she recognized some of the members of the team. She denied being hungry/thirsty. She was oriented to  "self and place. Patient was pleasant and cooperative with sitter at the bedside.    Interval Events:   Overnight, patient was stepped up to the MICU for further management for hypotension in the setting of A-fib with RVR A-fib with rates 140's, MAP upper 70's, overall asymptomatic from a-fib. Patient administered nightly depacon 250 on 7/31 and 8/1.        OBJECTIVE:  Mental Status Exam:  General Appearance: dressed in hospital garb, disheveled  Behavior: cooperative, pleasant, polite  Involuntary Movements and Motor Activity: no abnormal involuntary movements noted  Gait and Station: unable to assess - patient lying down or seated  Speech and Language: normal rate, normal rhythm, normal volume, normal tone, normal pitch, conversational, spontaneous, coherent  Mood: "good"  Affect: appropriate to situation and context  Thought Process and Associations: tangential at times  Thought Content and Perceptions:: no hallucinations  Sensorium and Orientation: alert  Recent and Remote Memory: mild impairments noted  Attention and Concentration: attentive to conversation  Fund of Knowledge: unaware of current events 2/2 medical attention  Insight: impaired due to medical condition  Judgment: impaired due to medical condtion          ASSESSMENT & RECOMMENDATIONS   Late Onset Alzheimer's with mood disturbance  Agitation    PSYCH MEDICATIONS  Continue Depacon 250 mg IV nightly  Continue Lexapro 5 mg po QD  Continue Memantine 5 mg po BID  PRN: Depacon 125 mg PRN agitation q8 hours     DELIRIUM BEHAVIOR MANAGEMENT  PLEASE utilize CHEMICAL restraints with PRN meds first for agitation. Minimize use of PHYSICAL restraints OR have periods of being out of physical restraints if possible.  Keep window shades open and room lit during day and room dim at night in order to promote normal sleep-wake cycles  Encourage family at bedside. [place] patient often to situation, location, date.  Continue to Limit or Discontinue use of Narcotics, " Benzos and Anti-cholinergic medications as they may worsen delirium.  Continue medical workup for causative etiology of Delirium.      RISK ASSESSMENT  NO NEED FOR PEC at this time. Will continue to reassess.      FOLLOW UP  Will follow up while in house     DISPOSITION - once medically cleared:  Defer to medical team      Please contact ON CALL psychiatry service (24/7) for any acute issues that may arise.    I attest to having seen and evaluated the above patient with student, Iván Resendiz. I have personally reviewed the note, assessment, and plan, and have made necessary adjustments.      Dr. Adalberto HERNANDES Psychiatry  [facility]  8/2/2023 9:31 AM        --------------------------------------------------------------------------------------------------------------------------------------------------------------------------------------------------------------------------------------    CONTINUED OBJECTIVE clinical data & findings reviewed and noted for above decision making    Current Medications:   Scheduled Meds:    albuterol-ipratropium  3 mL Nebulization Q6H WAKE    ascorbic acid (vitamin C)  250 mg Oral Daily    enoxaparin  40 mg Subcutaneous Daily    EScitalopram oxalate  5 mg Oral Daily    lisinopriL  40 mg Oral Daily    memantine  5 mg Oral BID    metoprolol tartrate  25 mg Oral BID    miconazole NITRATE 2 %   Topical (Top) BID    polyethylene glycol  17 g Oral BID    senna-docusate 8.6-50 mg  1 tablet Oral BID    tamsulosin  0.4 mg Oral Nightly    valproate sodium (DEPACON) IVPB  250 mg Intravenous QHS    vitamin D  1,000 Units Oral Daily    zinc sulfate  220 mg Oral Daily     PRN Meds: acetaminophen, aluminum-magnesium hydroxide-simethicone, flumazeniL, glucagon (human recombinant), glucose, glucose, lactulose, loperamide, melatonin, metoprolol, naloxone, prochlorperazine, sodium chloride 0.9%, valproate sodium (DEPACON) IVPB    Allergies:   Review of patient's allergies  indicates:  No Known Allergies    Vitals  Vitals:    08/02/23 0600   BP: (!) 119/58   Pulse: 86   Resp: (!) 23   Temp:        Labs/Imaging/Studies:  Recent Results (from the past 24 hour(s))   POCT glucose    Collection Time: 08/01/23  6:29 PM   Result Value Ref Range    POCT Glucose 92 70 - 110 mg/dL   POCT glucose    Collection Time: 08/01/23  7:01 PM   Result Value Ref Range    POCT Glucose 74 70 - 110 mg/dL   ISTAT PROCEDURE    Collection Time: 08/01/23  7:02 PM   Result Value Ref Range    POC PH 7.501 (H) 7.35 - 7.45    POC PCO2 28.8 (L) 35 - 45 mmHg    POC PO2 50 40 - 60 mmHg    POC HCO3 22.5 (L) 24 - 28 mmol/L    POC BE -1 -2 to 2 mmol/L    POC SATURATED O2 89 (L) 95 - 100 %    POC Sodium 145 136 - 145 mmol/L    POC Potassium 3.7 3.5 - 5.1 mmol/L    POC TCO2 23 (L) 24 - 29 mmol/L    POC Ionized Calcium 1.15 1.06 - 1.42 mmol/L    POC Hematocrit 24 (L) 36 - 54 %PCV    Sample VENOUS     Site Other     Allens Test N/A    POCT glucose    Collection Time: 08/01/23  7:02 PM   Result Value Ref Range    POCT Glucose 78 70 - 110 mg/dL   POCT glucose    Collection Time: 08/02/23  4:16 AM   Result Value Ref Range    POCT Glucose 89 70 - 110 mg/dL   Comprehensive Metabolic Panel    Collection Time: 08/02/23  4:27 AM   Result Value Ref Range    Sodium 141 136 - 145 mmol/L    Potassium 4.9 3.5 - 5.1 mmol/L    Chloride 110 95 - 110 mmol/L    CO2 20 (L) 23 - 29 mmol/L    Glucose 78 70 - 110 mg/dL    BUN 16 8 - 23 mg/dL    Creatinine 0.9 0.5 - 1.4 mg/dL    Calcium 8.5 (L) 8.7 - 10.5 mg/dL    Total Protein 6.0 6.0 - 8.4 g/dL    Albumin 2.6 (L) 3.5 - 5.2 g/dL    Total Bilirubin 0.5 0.1 - 1.0 mg/dL    Alkaline Phosphatase 119 55 - 135 U/L    AST 36 10 - 40 U/L    ALT 12 10 - 44 U/L    eGFR >60.0 >60 mL/min/1.73 m^2    Anion Gap 11 8 - 16 mmol/L   Magnesium    Collection Time: 08/02/23  4:27 AM   Result Value Ref Range    Magnesium 2.0 1.6 - 2.6 mg/dL   Phosphorus    Collection Time: 08/02/23  4:27 AM   Result Value Ref Range     Phosphorus 2.8 2.7 - 4.5 mg/dL   Phosphorus    Collection Time: 08/02/23  4:27 AM   Result Value Ref Range    Phosphorus 2.8 2.7 - 4.5 mg/dL     Imaging Results              CT Head Without Contrast (Final result)  Result time 07/23/23 08:24:54      Final result by Bill Tarango MD (07/23/23 08:24:54)                   Impression:        Similar volume of subdural hematomas.  Changes of density may be in part technical/artifactual in nature and also due to some leakage of recent intravenous contrast from recent CT of the abdomen pelvis with no focal hyperdense acute hemorrhage identified.  Follow-up as clinically warranted.      Electronically signed by: Bill Tarango  Date:    07/23/2023  Time:    08:24               Narrative:    EXAMINATION:  CT HEAD WITHOUT CONTRAST    CLINICAL HISTORY:  Head trauma, minor (Age >= 65y);    TECHNIQUE:  Low dose axial images were obtained through the head.  Coronal and sagittal reformations were also performed. Contrast was not administered.    COMPARISON:  07/23/2023, 07/09/2023    FINDINGS:  Bilateral subacute subdural hematomas are noted bilaterally again identified measuring up to 14 mm on  the left, similar in volume.  No new focal hyperdense hemorrhage is identified.  Overall mild increased density of the hematoma may in part be technical/artifactual in nature as well as due to leakage of recent intravenous contrast as similar appearances were noted previously (on 07/09/2023 and 07/10/2023).    3 mm midline shift to the right is similar in appearance.  The basal cisterns remain patent.  No acute intraparenchymal process.    Prominent atherosclerotic vascular calcifications are noted at the skull base.    The visualized sinuses and mastoid air cells are essentially clear.                                       CT Chest Abdomen Pelvis With Contrast (xpd) (Final result)  Result time 07/23/23 02:32:32   Procedure changed from CT Abdomen Pelvis With Contrast     Final result  by Chuy Mckeon MD (07/23/23 02:32:32)                   Impression:      Similar appearance of recent fractures involving the right inferior and superior pubic rami.  Increased conspicuity of essentially nondisplaced recent fractures of the right michelle sacrum and anterior aspect of the right acetabulum.    Motion and artifact limited study with suboptimal bolus timing.    Moderate-sized hiatal hernia with moderate lower esophageal wall thickening.  Suggest correlation for esophagitis.    Peribronchial thickening and questionable minimal patchy ground-glass opacities in the lung bases and bibasilar subsegmental atelectasis.    Nodular soft tissue opacities in the left breast.  Neoplasm not excluded.  Suggest correlation with physical exam and mammographic follow-up when clinically warranted.    Anasarca.    Mild nonspecific wall thickening of the inferior aspect of the urinary bladder.  Suggest correlation with urinalysis.    Multiple additional findings discussed in the body of the report.      Electronically signed by: Chuy Mckeon MD  Date:    07/23/2023  Time:    02:32               Narrative:    EXAMINATION:  CT CHEST ABDOMEN PELVIS WITH CONTRAST (XPD)    CLINICAL HISTORY:  Abdominal trauma, blunt;    TECHNIQUE:  Low dose axial images, sagittal and coronal reformations were obtained from the thoracic inlet to the pubic symphysis following the IV administration of 75 mL of Omnipaque 350 .  Oral contrast was not given.    COMPARISON:  CTA chest, 07/08/2023.  CT pelvis, 07/08/2023.  CT abdomen pelvis, 05/06/2023.    FINDINGS:  Chest:    Exam quality is limited by suboptimal bolus timing and motion.  Evaluation is limited by extensive streak artifact due to the patient's arms overlying the field of view.    Heart is borderline enlarged and similar to the prior study.  Coronary artery and mitral valve calcifications.  Thoracic aorta is stable in caliber with moderate to advanced calcific atherosclerosis.  No  central pulmonary embolus.  No bulky mediastinal lymphadenopathy.    Detailed evaluation of the pulmonary parenchyma limited by motion.  There is peribronchial thickening and questionable minimal patchy ground-glass opacities in the lung bases.  Bibasilar subsegmental atelectasis.  No consolidation or pleural effusion.    Moderate-sized hiatal hernia with moderate lower esophageal wall thickening.    Abdomen:    Exam quality is limited by suboptimal bolus timing, motion, and extensive artifact related to the patient's arms overlying the field of view.    Liver is similar in size and contour when compared with the prior study.  Multiple hepatic hypodensities most suggestive of cysts.  Gallbladder is unremarkable.  No intrahepatic biliary ductal dilatation.    Spleen, adrenals, and pancreas are stable and negative for acute finding allowing for significant motion.    Kidneys are stable.  There is a large left renal cyst.  Small stone or vascular calcification in the right renal hilum.  No hydronephrosis.    Evaluation for bowel inflammation is limited by motion.  No small bowel obstruction.  Mild stool burden in the colon.    No pneumoperitoneum or organized fluid collection.    No bulky retroperitoneal lymphadenopathy.    Abdominal aorta is similar in caliber with moderate to advanced calcific atherosclerosis involving the aorta and major branch vessels.    Portal vein is grossly patent.    Pelvis:    Urinary bladder is mildly distended and there is mild wall thickening along the inferior aspect of the urinary bladder similar to the prior CT abdomen.  Rectum is unremarkable.  There is minimal presacral fat stranding.  No significant pelvic free fluid.    Bones and soft tissues:    Recent fractures involving the right superior and inferior pubic rami similar to prior CT pelvis.  Suspect nondisplaced fracture of the right michelle sacrum, more conspicuous when compared with prior CT pelvis.  Nondisplaced fracture of the  anterior aspect of the right acetabulum (coronal series 606, image 129) is also more conspicuous when compared with the prior study.  No additional acute fracture.  Degenerative changes in the spine and pubic symphysis.  Mild anterolisthesis of L4 with respect to L5.  Mild left convex scoliotic curvature of the spine.  Old right lower rib fractures.  Operative changes of presumed right lower abdominal wall hernia repair.  Mild diffuse body wall edema.  Somewhat nodular soft tissue densities in the left breast soft tissues.  Suggest follow-up mammogram.                                       CT Head Without Contrast (Final result)  Result time 07/23/23 02:16:47      Final result by Maykel Castro MD (07/23/23 02:16:47)                   Impression:      Subtle increase in density of the subdural hygromas suggesting acute on chronic subdural fluid collection.    Consider follow-up CT scan per protocol in patient with small acute subdural hematoma on chronic subdural hygromas.      Electronically signed by: Maykel Castro  Date:    07/23/2023  Time:    02:16               Narrative:    EXAMINATION:  CT HEAD WITHOUT CONTRAST    CLINICAL HISTORY:  Head trauma, minor (Age >= 65y);    TECHNIQUE:  Low dose axial CT images obtained throughout the head without intravenous contrast. Sagittal and coronal reconstructions were performed.    COMPARISON:  None.    FINDINGS:  Intracranial compartment:    Ventricles and sulci are stable in size for age without evidence of hydrocephalus. Subdural hygromas appear increased in density slightly suggesting acute on chronic subdural hematoma.    The brain parenchyma appears stable with involutional and chronic small vessel type changes again noted..  No parenchymal mass, hemorrhage, edema or major vascular distribution infarct.    Skull/extracranial contents (limited evaluation): No fracture. Mastoid air cells and paranasal sinuses are essentially clear.                                        CT Cervical Spine Without Contrast (Final result)  Result time 07/23/23 03:01:55      Final result by Chuy Mckeon MD (07/23/23 03:01:55)                   Impression:      No evidence of acute fracture or acute osseous abnormality.    Osteopenia and stable degenerative changes.    Additional findings discussed in the body of the report.    Electronically signed by resident: Anahi Gutierrez  Date:    07/23/2023  Time:    02:02    Electronically signed by: Chuy Mckeon MD  Date:    07/23/2023  Time:    03:01               Narrative:    EXAMINATION:  CT CERVICAL SPINE WITHOUT CONTRAST    CLINICAL HISTORY:  Neck trauma (Age >= 65y);    TECHNIQUE:  Low dose axial images, sagittal and coronal reformations were performed though the cervical spine.  Contrast was not administered.    COMPARISON:  CT 04/18/2023 and 07/09/2023.    FINDINGS:  Alignment: Normal.    Vertebrae: Osteopenia.  No fracture.  Lucent area involving the left C4 facet without associated cortical disruption, unchanged dating back to 04/18/2023.  Stable mild height loss of the superior endplate of T4 vertebral body.    Discs: Multilevel disc height loss, most pronounced at C5-C6.    C1-2: Dens is intact.  Pre-dens space is maintained.    Skull base and craniocervical junction: Normal.    Degenerative findings:    Stable appearance of mild multilevel degenerative changes through the cervical spine.  There are several levels demonstrating mild to moderate facet arthropathy and mild uncovertebral spurring resulting in areas of mild neural foraminal narrowing.  No areas of spinal canal stenosis.    Paraspinal muscles & soft tissues: Evaluation of the lung apices is severely limited by respiratory motion artifact.  Dense calcific atherosclerosis of the aortic arch.  Soft tissues of the thoracic inlet are somewhat distorted secondary to motion artifact.                                       X-Ray Pelvis Routine AP (Final result)  Result time  07/23/23 01:54:57   Procedure changed from X-Ray Hip 2 or 3 views Right (with Pelvis when performed)     Final result by Chuy Mckeon MD (07/23/23 01:54:57)                   Impression:      Similar alignment of recent fractures involving the right superior and inferior pubic rami.  No new acute displaced fracture.      Electronically signed by: Chuy Mckeon MD  Date:    07/23/2023  Time:    01:54               Narrative:    EXAMINATION:  XR PELVIS ROUTINE AP; XR FEMUR 2 VIEW RIGHT    CLINICAL HISTORY:  HIP PAIN;  Pain in right hip; Pain in right leg    TECHNIQUE:  Three frontal views of the pelvis performed.  Two views of the right femur also obtained.    COMPARISON:  CT pelvis, 07/08/2023.    FINDINGS:  Pelvis: Bones are mildly demineralized.  Similar appearance of mildly displaced recent fracture of the right inferior pubic ramus and nondisplaced fracture of the right superior pubic ramus.  Similar fracture fragment alignment allowing for differences in positioning.  No new acute fracture.  No dislocation.  Mild degenerative changes in both hips.    Right femur: No additional acute fracture or dislocation other than described above.  Degenerative changes in the right hip and right knee.  Atherosclerotic vascular calcifications.  Soft tissue edema overlying the right hip.  No unexpected radiopaque foreign body.                                       X-Ray Femur 2 AP/LAT Right (Final result)  Result time 07/23/23 01:54:57      Final result by Chuy Mckeon MD (07/23/23 01:54:57)                   Impression:      Similar alignment of recent fractures involving the right superior and inferior pubic rami.  No new acute displaced fracture.      Electronically signed by: Chuy Mckeon MD  Date:    07/23/2023  Time:    01:54               Narrative:    EXAMINATION:  XR PELVIS ROUTINE AP; XR FEMUR 2 VIEW RIGHT    CLINICAL HISTORY:  HIP PAIN;  Pain in right hip; Pain in right leg    TECHNIQUE:  Three  "frontal views of the pelvis performed.  Two views of the right femur also obtained.    COMPARISON:  CT pelvis, 07/08/2023.    FINDINGS:  Pelvis: Bones are mildly demineralized.  Similar appearance of mildly displaced recent fracture of the right inferior pubic ramus and nondisplaced fracture of the right superior pubic ramus.  Similar fracture fragment alignment allowing for differences in positioning.  No new acute fracture.  No dislocation.  Mild degenerative changes in both hips.    Right femur: No additional acute fracture or dislocation other than described above.  Degenerative changes in the right hip and right knee.  Atherosclerotic vascular calcifications.  Soft tissue edema overlying the right hip.  No unexpected radiopaque foreign body.                                       X-Ray Chest AP Portable (Final result)  Result time 07/23/23 01:47:54      Final result by Chuy Mckeon MD (07/23/23 01:47:54)                   Impression:      No detrimental change when compared with 07/08/2023.      Electronically signed by: Chuy Mckeon MD  Date:    07/23/2023  Time:    01:47               Narrative:    EXAMINATION:  XR CHEST AP PORTABLE    CLINICAL HISTORY:  Provided history is "Sepsis;  ".    TECHNIQUE:  One view of the chest.    COMPARISON:  07/08/2023.    FINDINGS:  Cardiac wires overlie the chest.  Patient is slightly rotated.  Cardiomediastinal silhouette is stable and may be at the upper limits of normal in size.  Atherosclerotic calcifications overlie the aortic arch.  Right hemidiaphragm is slightly elevated as seen previously.  Azygous lobe configuration is incidentally noted in the right lung apex.  No confluent area of consolidation.  No sizable pleural effusion.  No pneumothorax.  Hiatal hernia again noted.                                     "

## 2023-08-02 NOTE — ASSESSMENT & PLAN NOTE
· Flipped into AFib afternoon of 8/1.  · Not a known diagnosis  · Patient previously on Eliquis for PE/DVTs.  · Unstable BP, rapid response called, appreciate assistance.  Patient transferred to ICU.

## 2023-08-02 NOTE — PROGRESS NOTES
[facility] - Cardiac Medical ICU  Critical Care Medicine  Progress Note    Patient Name: Radha Sandoval  MRN: [id number]  Admission Date: 7/22/2023  Hospital Length of Stay: 10 days  Code Status: Full Code  Attending Provider: Zach Vargas MD  Primary Care Provider: Primary Doctor No   Principal Problem: Atrial fibrillation with RVR    Subjective:     HPI:  The patient is a 88 y.o. female with PMH significant for chronic BL pleural effusions, recent DVT diagnosed in March '23 (previously on eliquis), dementia, HTN, with recent hospitalization for pelvic fracture treated non-operatively, who presents after a fall at her correction while being transferred from wheelchair to bed. Patient has had numerous hospitalizations over the past few months. At baseline, patient generally disoriented and delirious, but she will have periods or even days of clarity; she is typically oriented x2. Due to mentation she has difficulty working with staff which results in falls. Prior to her pelvic fracture on 7/8, pt was able to perform independent transfers from wheelchair and could walk short distances with her walker, but she is now max assist.      In the ED she was afebrile, stable vital signs, CBC with baseline anemia. BNP and troponin elevated but at baseline. UA grossly infectious. Spann placed for urinary retention. 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 Repeat CTH after 6 hours was stable. XR pelvis reveals stable recent R superior and inferior pubic rami fractures, non-displaced. EKG in NSR with prolonged Qtc 510 ms. Given tylenol, norco, and 1g rocephin. She was admitted to hospital medicine for further management. NSGY consulted for evaluation of SDH and determined no need for intervention, eliquis on hold until follow up outpatient w/ NSGY in 2 weeks. Orthopedics discussed surgical options with family and recommended ORIF pelvis, but family is declining at this time and would prefer to continue with plan for  PT/OT. Therapy assessment; recs for SNF. Psychiatry provided recs for medication adjustment for insomnia and delirium. Agitation persists and patient expressing suicidal ideation when asked to work with therapy. Psychiatry aware of SI, making med adjustments as indicated. Palliative care consulted for assistance with GOC planning with son and care team. Planned to discharge patient on 07/27 to skilled nursing facility, however, patient tachypneic with stridor and increased work of breathing.  Mentation worsened with progressive respiratory alkalosis.  No improvement with breathing treatment or trial of Lasix.  Concern for bronchomalacia with CT images positive for bilateral mainstem bronchi narrowing.  Sats remained stable on 2 L O2. MICU evaluated at that time. Patient was moved to step-down unit and monitored closely.  On 07/29 increased work of breathing and stridor spontaneously resolved.  Mentation improved. Repeat CT images of the brain revealed stable subdural hematomas.    On 8/1 critical care medicine consulted for hypotension in the setting of A-fib with RVR. She was transferred to MICU for further management w/ A-fib with rates 140's, MAP upper 70's, overall asymptomatic from a-fib.      Hospital/ICU Course:  Patient hemodynamically stable. No changes overnight. Patient with neurological status at baseline. Will hold eliquis for the moment (Patient with stable subdural hematoma). Vital signs stable. Patient admitted to the ICU on 7/31 on stable BP following levo 0.02. No new decompensation of Afib. Currently no shortness of breath. RR on normal ranges. ABG with respiratory alkalosis. Tolerating bowel regimen. Stable I/Os with positive net flow. No changes on electrolytes in the past 24hs. No present bleeds. Decreased H-H, which has been persisting for the pas 24hs. Normal temperature range on the past 24hs. Glucose on normal trends. No TSH values at the moment. Patient hemodynamically stable. Will  transfer for stepdown to Hospital Medicine.      Interval History/Significant Events: Patient hemodynamically stable. No changes overnight. No pressors at the moment. Decreased H-H, without clinical relevante at the moment. Normal temperature range on the past 24hs. Glucose on normal trends. Patient hemodynamically stable. Will transfer for stepdown to [facility] Medicine.     Review of Systems   Constitutional:  Negative for activity change and appetite change.   HENT:  Negative for drooling.    Eyes:  Negative for redness.   Respiratory:  Negative for shortness of breath.    Cardiovascular:  Negative for leg swelling.   Gastrointestinal:  Negative for abdominal distention.   Genitourinary:  Negative for hematuria.   Neurological:  Negative for tremors.   Psychiatric/Behavioral:  Negative for agitation.    Rest of ROS difficult to perform on patient.     Objective:     Vital Signs (Most Recent):  Temp: 98.4 °F (36.9 °C) (08/02/23 0700)  Pulse: 75 (08/02/23 1325)  Resp: (!) 25 (08/02/23 1325)  BP: 127/60 (08/02/23 1300)  SpO2: (!) 92 % (08/02/23 1325) Vital Signs (24h Range):  Temp:  [98 °F (36.7 °C)-98.4 °F (36.9 °C)] 98.4 °F (36.9 °C)  Pulse:  [] 75  Resp:  [16-42] 25  SpO2:  [88 %-100 %] 92 %  BP: ()/(0-126) 127/60   Weight: 75.8 kg (167 lb)  Body mass index is 28.67 kg/m².      Intake/Output Summary (Last 24 hours) at 8/2/2023 1409  Last data filed at 8/2/2023 0900  Gross per 24 hour   Intake 822.04 ml   Output 250 ml   Net 572.04 ml          Physical Exam  HENT:      Head: Normocephalic.   Eyes:      Pupils: Pupils are equal, round, and reactive to light.   Cardiovascular:      Rate and Rhythm: Normal rate and regular rhythm.      Pulses: Normal pulses.      Heart sounds: Murmur heard.      Crescendo systolic murmur is present with a grade of 2/6.      No friction rub.   Pulmonary:      Effort: No tachypnea.      Breath sounds: No stridor. Examination of the right-lower field reveals decreased breath  sounds and rhonchi. Examination of the left-lower field reveals decreased breath sounds. Decreased breath sounds and rhonchi present.   Abdominal:      General: Bowel sounds are decreased.      Palpations: Abdomen is soft.   Musculoskeletal:      Right lower leg: No edema.      Left lower leg: No edema.      Comments: Non-pitting bilateral LE edema.   Neurological:      Mental Status: She is lethargic.      GCS: GCS eye subscore is 2. GCS verbal subscore is 2. GCS motor subscore is 1.      Deep Tendon Reflexes:      Reflex Scores:       Bicep reflexes are 3+ on the right side and 3+ on the left side.       Patellar reflexes are 3+ on the right side and 3+ on the left side.  Psychiatric:         Behavior: Behavior is uncooperative.            Vents:     Lines/Drains/Airways       Drain  Duration                  Urethral Catheter 07/31/23 0030 16 Fr. 2 days              Peripheral Intravenous Line  Duration                  Peripheral IV - Single Lumen 07/28/23 1855 20 G;1 3/4 in Right;Anterior Forearm 4 days                  Significant Labs:    CBC/Anemia Profile:  Recent Labs   Lab 08/01/23  0535 08/01/23  1902   HCT  --  24*   [id number] [id number]*  --         Chemistries:  Recent Labs   Lab 08/01/23  0314 08/02/23  0427   * 141   K 3.3* 4.9    110   CO2 26 20*   BUN 14 16   CREATININE 0.9 0.9   CALCIUM 8.3* 8.5*   ALBUMIN  --  2.6*   PROT  --  6.0   BILITOT  --  0.5   ALKPHOS  --  119   ALT  --  12   AST  --  36   MG 2.3 2.0   PHOS 3.9 2.8  2.8       All pertinent labs within the past 24 hours have been reviewed.    Significant Imaging:  I have reviewed all pertinent imaging results/findings within the past 24 hours.      ABG  Recent Labs   Lab 08/01/23  1902   PH 7.501*   PO2 50   PCO2 28.8*   HCO3 22.5*   BE -1     Assessment/Plan:     Neuro  Acute subdural hematoma  Patient has an acute SDH on chronic SD hygroma s/p fall at nursing home; no acute deficits. The repeat CTH stable     - will hold eliquis x 2 weeks  (8/5) per NSGY recommendations, ok for VTE ppx  - patient with neurological status at baseline, no new hemodynamic events.     Acute metabolic encephalopathy  Prior documentation and assessment consistent with progression of dementia, no clear medical etiology. Repeat CT head 7/30, stable SDH. Psychiatry following and believe it is associated with Alzheimers with mood disturbance.     - psych following, appreciate recommendations:  -- depacon 250 mg IV qhs  -- lexapro 5 mg qd  -- memantine 5 mg PO BID  - will have SLP re-evaluate swallowing  - patient with neurological status at baseline. Current hemodynamic trends under normal values. Will stepdown patient to Hospital Medicine to continue treatment.     Subdural hygroma  See Subdural hemorrhage    Cardiac/Vascular  * Atrial fibrillation with RVR  Patient with afib at rate of 150s. Patient previously on lopressor 25 mg BID but missed couple of doses. Prior, patient responded to metoprolol. EF 65%. No eliquis noted on home medications.     - metoprolol 5mg IV once  - failed bedside swallow, will follow up SLP recommendations  - will resume eliquis x 2 weeks (8/5)  - patient currently with hemodynamic parameters wnl and stable. Will stepdown patient for Hospital Medicine to continue treatment.     Hypotension  Patient intermittently with MAPs of 50s. Associated with afib with RVR. No new hypotensive episodes.     See afib with rvr    Renal/  Urinary retention  Patient has recurrent issues with urinary retention. Recent duncan placed on 7/30 for retention    - continue tamsulosin  - duncan in place      Hypokalemia  Resolved. K 4.9    - replete IV with KCl 10 meq  - trend with daily cmp  - keep K >4 and Mg >2    Orthopedic  Closed pelvic ring fracture  Patient has a history of multiple falls with multiple fractures diagnosed at last [facility] hospitalization. Repeat XRs show stable fractures, non-displaced. Per documentation, ortho consulted in ED; originally recommended  non-op management, but after further discussion recommended ORIF pelvis 7/24 but family declined surgery.    - weight bearing as tolerated   - pain control with tylenol.  Avoiding sedating medications.    Palliative Care  Encounter for palliative care  Per prior documentation, caretaker Sonia expresses several concerns regarding patients continual health and function decline. DILIA TAYLOR consulted for dc planning. will need to have an in-depth palliative discussion with son to determine best care plan for patient with palliative care following. Son on board. 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     C: SAT & SBT Coordinated?                        D: Delirium: CAM-ICU Overall CAM-ICU: Positive   E: Early Mobility Performed? No   F: Feeding Goal:    Status:     Current Diet Order   Procedures    Diet Dysphagia Pureed (IDDSI Level 4) Thin     Order Specific Question:   Fluid consistency:     Answer:   Thin      AS: Analgesia/Sedation    T: Thromboembolic Prophylaxis    H: HOB > 300 Yes   U: Stress Ulcer Prophylaxis (if needed)    G: Glucose Control Yes   B: Bowel Function Stool Occurrence: 2   I: Indwelling Catheter (Lines & Spann) Necessity    D: De-escalation of Antimicrobials/Pharmacotherapies No    Plan for the day/ETD     Code Status:  Family/Goals of Care: Full Code         Critical secondary to Patient has a condition that poses threat to life and bodily function: Afib with RVR     Critical care was time spent personally by me on the following activities: development of treatment plan with patient or surrogate and bedside caregivers, discussions with consultants, evaluation of patient's response to treatment, examination of patient, ordering and performing treatments and interventions, ordering  and review of laboratory studies, ordering and review of radiographic studies, pulse oximetry, re-evaluation of patient's condition. This critical care time did not overlap with that of any other provider or involve time for any procedures.     Devon Greer MD  Critical Care Medicine  [facility] - Cardiac Medical ICU

## 2023-08-02 NOTE — SUBJECTIVE & OBJECTIVE
Interval History/Significant Events: Patient hemodynamically stable. No changes overnight. No pressors at the moment. Decreased H-H, without clinical relevante at the moment. Normal temperature range on the past 24hs. Glucose on normal trends. Patient hemodynamically stable. Will transfer for stepdown to Hospital Medicine.     Review of Systems   Constitutional:  Negative for activity change and appetite change.   HENT:  Negative for drooling.    Eyes:  Negative for redness.   Respiratory:  Negative for shortness of breath.    Cardiovascular:  Negative for leg swelling.   Gastrointestinal:  Negative for abdominal distention.   Genitourinary:  Negative for hematuria.   Neurological:  Negative for tremors.   Psychiatric/Behavioral:  Negative for agitation.    Rest of ROS difficult to perform on patient.     Objective:     Vital Signs (Most Recent):  Temp: 98.4 °F (36.9 °C) (08/02/23 0700)  Pulse: 75 (08/02/23 1325)  Resp: (!) 25 (08/02/23 1325)  BP: 127/60 (08/02/23 1300)  SpO2: (!) 92 % (08/02/23 1325) Vital Signs (24h Range):  Temp:  [98 °F (36.7 °C)-98.4 °F (36.9 °C)] 98.4 °F (36.9 °C)  Pulse:  [] 75  Resp:  [16-42] 25  SpO2:  [88 %-100 %] 92 %  BP: ()/(0-126) 127/60   Weight: 75.8 kg (167 lb)  Body mass index is 28.67 kg/m².      Intake/Output Summary (Last 24 hours) at 8/2/2023 1409  Last data filed at 8/2/2023 0900  Gross per 24 hour   Intake 822.04 ml   Output 250 ml   Net 572.04 ml          Physical Exam  HENT:      Head: Normocephalic.   Eyes:      Pupils: Pupils are equal, round, and reactive to light.   Cardiovascular:      Rate and Rhythm: Normal rate and regular rhythm.      Pulses: Normal pulses.      Heart sounds: Murmur heard.      Crescendo systolic murmur is present with a grade of 2/6.      No friction rub.   Pulmonary:      Effort: No tachypnea.      Breath sounds: No stridor. Examination of the right-lower field reveals decreased breath sounds and rhonchi. Examination of the left-lower  field reveals decreased breath sounds. Decreased breath sounds and rhonchi present.   Abdominal:      General: Bowel sounds are decreased.      Palpations: Abdomen is soft.   Musculoskeletal:      Right lower leg: No edema.      Left lower leg: No edema.      Comments: Non-pitting bilateral LE edema.   Neurological:      Mental Status: She is lethargic.      GCS: GCS eye subscore is 2. GCS verbal subscore is 2. GCS motor subscore is 1.      Deep Tendon Reflexes:      Reflex Scores:       Bicep reflexes are 3+ on the right side and 3+ on the left side.       Patellar reflexes are 3+ on the right side and 3+ on the left side.  Psychiatric:         Behavior: Behavior is uncooperative.            Vents:     Lines/Drains/Airways       Drain  Duration                  Urethral Catheter 07/31/23 0030 16 Fr. 2 days              Peripheral Intravenous Line  Duration                  Peripheral IV - Single Lumen 07/28/23 1855 20 G;1 3/4 in Right;Anterior Forearm 4 days                  Significant Labs:    CBC/Anemia Profile:  Recent Labs   Lab 08/01/23  0535 08/01/23  1902   HCT  --  24*   [id number] 971*  --         Chemistries:  Recent Labs   Lab 08/01/23  0314 08/02/23  0427   * 141   K 3.3* 4.9    110   CO2 26 20*   BUN 14 16   CREATININE 0.9 0.9   CALCIUM 8.3* 8.5*   ALBUMIN  --  2.6*   PROT  --  6.0   BILITOT  --  0.5   ALKPHOS  --  119   ALT  --  12   AST  --  36   MG 2.3 2.0   PHOS 3.9 2.8  2.8       All pertinent labs within the past 24 hours have been reviewed.    Significant Imaging:  I have reviewed all pertinent imaging results/findings within the past 24 hours.

## 2023-08-02 NOTE — PT/OT/SLP DISCHARGE
Occupational Therapy Discharge Summary    Radha Sandoval  MRN: [id number]   Principal Problem: Atrial fibrillation with RVR      Patient Discharged from acute Occupational Therapy on 8/2/2023  .  Please refer to prior OT note dated 7/28/23 for functional status.    Assessment:      Patient appropriate for care in another setting.    Objective:     GOALS:   Multidisciplinary Problems       Occupational Therapy Goals          Problem: Occupational Therapy    Goal Priority Disciplines Outcome Interventions   Occupational Therapy Goal     OT, PT/OT Ongoing, Progressing    Description: Goals to be met by: 8/7/2023     Patient will increase functional independence with ADLs by performing:    UE Dressing with Minimal Assistance.  LE Dressing with Moderate Assistance.  Grooming while EOB with Moderate Assistance.  Supine to sit with Minimal Assistance.  Stand pivot transfers with Minimal Assistance.                         Reasons for Discontinuation of Therapy Services  Transfer to alternate level of care.      Plan:     Patient Discharged to:  Transfer to ICU 8/1/23 due to tachycardia and hypotension. Initial OT orders no longer valid and will await new orders prior to continued therapy.     8/2/2023

## 2023-08-02 NOTE — PLAN OF CARE
MICU DAILY GOALS     Family/Goals of care/Code Status   Code Status: Full Code    24H Vital Sign Range  Temp:  [98.4 °F (36.9 °C)]   Pulse:  []   Resp:  [18-42]   BP: ()/(0-126)   SpO2:  [88 %-100 %]      Shift Events   No acute events throughout shift. SD orders in place, awaiting bed.     AWAKE RASS: Goal -    Actual - RASS (Bradford Agitation-Sedation Scale): alert and calm    Restraint necessity: Not necessary   BREATHE SBT: Not intubated    Coordinate A & B, analgesics/sedatives Pain: managed   SAT: Not intubated   Delirium CAM-ICU: Overall CAM-ICU: Positive   Early(intubated/ Progressive (non-intubated) Mobility MOVE Screen: Pass   Activity: Activity Management: Rolling - L1   Feeding/Nutrition Diet order: Diet/Nutrition Received: mechanical/dental soft, Specialty Diet/Nutrition Received: dysphagia mechanically altered, dysphagia pureed   Thrombus DVT prophylaxis: VTE Required Core Measure: Pharmacological prophylaxis initiated/maintained   HOB Elevation Head of Bed (HOB) Positioning: HOB at 30-45 degrees   Ulcer Prophylaxis GI: yes   Glucose control managed     Skin Skin assessed during daily assessment:   Yes, No altered skin integrity present.   Bowel Function no issues    Indwelling Catheter Necessity      Urethral Catheter 07/31/23 0030 16 Fr.-Reason for Continuing Urinary Catheterization: Critically ill in ICU and requiring hourly monitoring of intake/output  [REMOVED]      Urethral Catheter 07/23/23 0306 Straight-tip 16 Fr.-Reason for Continuing Urinary Catheterization: Urinary retention          De-escalation Antibiotics Yes       VS and assessment per flow sheet, patient progressing towards goals as tolerated, plan of care reviewed with family, all concerns addressed, will continue to monitor.

## 2023-08-02 NOTE — H&P
[facility] - Cardiac Medical ICU  Critical Care Medicine  History & Physical    Patient Name: Radha Sandoval  MRN: [id number]  Admission Date: 7/22/2023  Hospital Length of Stay: 10 days  Code Status: Full Code  Attending Physician: Zach Vargas MD   Primary Care Provider: Primary Doctor No   Principal Problem: Atrial fibrillation with RVR    Subjective:     HPI:  The patient is a 88 y.o. female with PMH significant for chronic BL pleural effusions, recent DVT diagnosed in March '23 (previously on eliquis), dementia, HTN, with recent hospitalization for pelvic fracture treated non-operatively, who presents after a fall at her [facility] while being transferred from wheelchair to bed. Patient has had numerous hospitalizations over the past few months. At baseline, patient generally disoriented and delirious, but she will have periods or even days of clarity; she is typically oriented x2. Due to mentation she has difficulty working with staff which results in falls. Prior to her pelvic fracture on 7/8, pt was able to perform independent transfers from wheelchair and could walk short distances with her walker, but she is now max assist.      In the ED she was afebrile, stable vital signs, CBC with baseline anemia. BNP and troponin elevated but at baseline. UA grossly infectious. Spann placed for urinary retention. 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 Repeat CTH after 6 hours was stable. XR pelvis reveals stable recent R superior and inferior pubic rami fractures, non-displaced. EKG in NSR with prolonged Qtc 510 ms. Given tylenol, norco, and 1g rocephin. She was admitted to hospital medicine for further management. NSGY consulted for evaluation of SDH and determined no need for intervention, eliquis on hold until follow up outpatient w/ NSGY in 2 weeks. Orthopedics discussed surgical options with family and recommended ORIF pelvis, but family is declining at this time and would prefer to continue with  plan for PT/OT. Therapy assessment; recs for SNF. Psychiatry provided recs for medication adjustment for insomnia and delirium. Agitation persists and patient expressing suicidal ideation when asked to work with therapy. Psychiatry aware of SI, making med adjustments as indicated. Palliative care consulted for assistance with [facility] planning with son and care team. Planned to discharge patient on 07/27 to skilled nursing facility, however, patient tachypneic with stridor and increased work of breathing.  Mentation worsened with progressive respiratory alkalosis.  No improvement with breathing treatment or trial of Lasix.  Concern for bronchomalacia with CT images positive for bilateral mainstem bronchi narrowing.  Sats remained stable on 2 L O2. MICU evaluated at that time. Patient was moved to step-down unit and monitored closely.  On 07/29 increased work of breathing and stridor spontaneously resolved.  Mentation improved. Repeat CT images of the brain revealed stable subdural hematomas.    On 8/1 critical care medicine consulted for hypotension in the setting of A-fib with RVR. She was transferred to MICU for further management w/ A-fib with rates 140's, MAP upper 70's, overall asymptomatic from a-fib.      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 5/22/2023    Lumbar spondylosis with myelopathy 4/25/2023    Multiple closed right sided fractures of pelvis without disruption of pelvic ring 7/9/2023    Primary hypertension 6/10/2022    Subdural hygroma 7/8/2023       Past Surgical History:   Procedure Laterality Date    REPAIR,  HERNIA, INGUINAL, WITHOUT HISTORY OF PRIOR REPAIR, AGE 5 YEARS OR OLDER Right 5/6/2023    Procedure: REPAIR, HERNIA, INGUINAL, WITHOUT HISTORY OF PRIOR REPAIR, AGE 5 YEARS OR OLDER;  Surgeon: Maurice Piper MD;  Location: [facility] OR [address] [place] [address];  Service: General;  Laterality: Right;  possible laparotomy, possible bowel resection       Review of patient's allergies indicates:  No Known Allergies    Family History    None       Tobacco Use    Smoking status: Never    Smokeless tobacco: Never   Substance and Sexual Activity    Alcohol use: Not on file    Drug use: Never    Sexual activity: Not Currently      Review of Systems   Unable to perform ROS: Dementia (patient altered at baseline unable to provide ROS)     Objective:     Vital Signs (Most Recent):  Temp: 98 °F (36.7 °C) (08/01/23 1515)  Pulse: (!) 127 (08/01/23 1902)  Resp: (!) 28 (08/01/23 1832)  BP: (!) 81/42 (08/01/23 1900)  SpO2: 100 % (08/01/23 1900) Vital Signs (24h Range):  Temp:  [98 °F (36.7 °C)-98.8 °F (37.1 °C)] 98 °F (36.7 °C)  Pulse:  [] 127  Resp:  [16-28] 28  SpO2:  [95 %-100 %] 100 %  BP: ()/(0-86) 81/42   Weight: 75.8 kg (167 lb)  Body mass index is 28.67 kg/m².      Intake/Output Summary (Last 24 hours) at 8/1/2023 1956  Last data filed at 8/1/2023 0619  Gross per 24 hour   Intake 470.59 ml   Output 250 ml   Net 220.59 ml          Physical Exam  Vitals and nursing note reviewed.   Constitutional:       Appearance: Normal appearance. She is ill-appearing.   HENT:      Right Ear: Tympanic membrane normal.      Nose: Nose normal. No congestion or rhinorrhea.      Mouth/Throat:      Mouth: Mucous membranes are moist.   Eyes:      Extraocular Movements: Extraocular movements intact.      Pupils: Pupils are equal, round, and reactive to light.   Cardiovascular:      Rate and Rhythm: Tachycardia present. Rhythm irregularly irregular.   Pulmonary:      Effort: Pulmonary effort is normal.      Comments: Coarse breath sounds   No wheezing  or stridor   Abdominal:      General: Abdomen is flat. There is no distension.      Tenderness: There is no abdominal tenderness.   Musculoskeletal:         General: Normal range of motion.   Skin:     General: Skin is warm and dry.      Capillary Refill: Capillary refill takes less than 2 seconds.   Neurological:      General: No focal deficit present.      Mental Status: She is alert. She is disoriented.      Comments: Following commands   Speech non discernable             Vents:     Lines/Drains/Airways       Drain  Duration                  Urethral Catheter 07/31/23 0030 16 Fr. 1 day              Peripheral Intravenous Line  Duration                  Peripheral IV - Single Lumen 07/28/23 1855 20 G;1 3/4 in Right;Anterior Forearm 4 days                  Significant Labs:    CBC/Anemia Profile:  Recent Labs   Lab 07/31/23  0450 08/01/23  0535 08/01/23  1902   WBC 6.52  --   --    HGB 8.7*  --   --    HCT 28.0*  --  24*     --   --    MCV 88  --   --    RDW 15.4*  --   --    [id number]  --  971*  --         Chemistries:  Recent Labs   Lab 07/31/23  0450 08/01/23  0314   * 148*   K 3.4* 3.3*    109   CO2 25 26   BUN 13 14   CREATININE 0.9 0.9   CALCIUM 8.7 8.3*   MG 1.8 2.3   PHOS  --  3.9       All pertinent labs within the past 24 hours have been reviewed.    Significant Imaging: I have reviewed all pertinent imaging results/findings within the past 24 hours.    Assessment/Plan:     Neuro  Acute subdural hematoma  Patient has an acute SDH on chronic SD hygroma s/p fall at [facility]; no acute deficits. The repeat CTH stable     - will hold eliquis x 2 weeks (8/5) per NSGY recommendations, ok for VTE ppx    Acute metabolic encephalopathy  Prior documentation and assessment consistent with progression of dementia, no clear medical etiology. Repeat CT head 7/30, stable SDH. Psychiatry following and believe it is associated with Alzheimers with mood disturbance.     - psych following, appreciate  recommendations:  -- depacon 250 mg IV qhs  -- lexapro 5 mg qd  -- memantine 5 mg PO BID  - will have SLP re-evaluate swallowing    Subdural hygroma  See Subdural hemorrhage    Cardiac/Vascular  * Atrial fibrillation with RVR  Patient with afib at rate of 150s. Patient previously on lopressor 25 mg BID but missed couple of doses. Prior, patient responded to metoprolol. EF 65%. No eliquis noted on home medications.     - metoprolol 5mg IV once  - failed bedside swallow, will follow up SLP recommendations  - will resume eliquis x 2 weeks (8/5)    Hypotension  Patient intermittently with MAPs of 50s. Associated with afib with RVR    See afib with rvr    Renal/  Urinary retention  Patient has recurrent issues with urinary retention. Recent duncan placed on 7/30 for retention    - continue tamsulosin  - duncan in place      Hypokalemia  K 3.3. Mg 2.3.     - replete IV with KCl 10 meq  - trend with daily cmp  - keep K >4 and Mg >2    Orthopedic  Closed pelvic ring fracture  Patient has a history of multiple falls with multiple fractures diagnosed at last [facility] hospitalization. Repeat XRs show stable fractures, non-displaced. Per documentation, ortho consulted in ED; originally recommended non-op management, but after further discussion recommended ORIF pelvis 7/24 but family declined surgery.    - weight bearing as tolerated   - pain control with tylenol.  Avoiding sedating medications.    Palliative Care  Encounter for palliative care  Per prior documentation, caretaker Sonia expresses several concerns regarding patients continual health and function decline. DILIA TAYLOR consulted for dc planning. will need to have an in-depth palliative discussion with son to determine best care plan for patient with palliative care following. Son on board. 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     C: SAT & SBT Coordinated?  NA                      D: Delirium: CAM-ICU     E: Early Mobility Performed? No   F: Feeding Goal:    Status:     Current Diet Order   Procedures    Diet Dysphagia Pureed (IDDSI Level 4) Thin     Order Specific Question:   Fluid consistency:     Answer:   Thin      AS: Analgesia/Sedation none   T: Thromboembolic Prophylaxis lovenox   H: HOB > 300 Yes   U: Stress Ulcer Prophylaxis (if needed) none   G: Glucose Control wnl   B: Bowel Function Stool Occurrence: 1   I: Indwelling Catheter (Lines & Spann) Necessity Spann 7/31   D: De-escalation of Antimicrobials/Pharmacotherapies none    Plan for the day/ETD Admit to icu    Code Status:  Family/Goals of Care: Full Code  Ongoing with family       Critical secondary to Patient has a condition that poses threat to life and bodily function: hypotensive     Critical care was time spent personally by me on the following activities: development of treatment plan with patient or surrogate and bedside caregivers, discussions with consultants, evaluation of patient's response to treatment, examination of patient, ordering and performing treatments and interventions, ordering and review of laboratory studies, ordering and review of radiographic studies, pulse oximetry, re-evaluation of patient's condition. This critical care time did not overlap with that of any other provider or involve time for any procedures.     Bennett Vera MD  Critical Care Medicine  [facility] - Cardiac Medical ICU

## 2023-08-02 NOTE — HPI
The patient is a 88 y.o. female with PMH significant for chronic BL pleural effusions, recent DVT diagnosed in March '23 (previously on eliquis), dementia, HTN, with recent hospitalization for pelvic fracture treated non-operatively, who presents after a fall at her halfway while being transferred from wheelchair to bed. Patient has had numerous hospitalizations over the past few months. At baseline, patient generally disoriented and delirious, but she will have periods or even days of clarity; she is typically oriented x2. Due to mentation she has difficulty working with staff which results in falls. Prior to her pelvic fracture on 7/8, pt was able to perform independent transfers from wheelchair and could walk short distances with her walker, but she is now max assist.      In the ED she was afebrile, stable vital signs, CBC with baseline anemia. BNP and troponin elevated but at baseline. UA grossly infectious. Spann placed for urinary retention. 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 Repeat CTH after 6 hours was stable. XR pelvis reveals stable recent R superior and inferior pubic rami fractures, non-displaced. EKG in NSR with prolonged Qtc 510 ms. Given tylenol, norco, and 1g rocephin. She was admitted to hospital medicine for further management. NSGY consulted for evaluation of SDH and determined no need for intervention, eliquis on hold until follow up outpatient w/ NSGY in 2 weeks. Orthopedics discussed surgical options with family and recommended ORIF pelvis, but family is declining at this time and would prefer to continue with plan for PT/OT. Therapy assessment; recs for SNF. Psychiatry provided recs for medication adjustment for insomnia and delirium. Agitation persists and patient expressing suicidal ideation when asked to work with therapy. Psychiatry aware of SI, making med adjustments as indicated. Palliative care consulted for assistance with [facility] planning with son and care team. Planned  to discharge patient on 07/27 to skilled nursing facility, however, patient tachypneic with stridor and increased work of breathing.  Mentation worsened with progressive respiratory alkalosis.  No improvement with breathing treatment or trial of Lasix.  Concern for bronchomalacia with CT images positive for bilateral mainstem bronchi narrowing.  Sats remained stable on 2 L O2. MICU evaluated at that time. Patient was moved to step-down unit and monitored closely.  On 07/29 increased work of breathing and stridor spontaneously resolved.  Mentation improved. Repeat CT images of the brain revealed stable subdural hematomas.    On 8/1 critical care medicine consulted for hypotension in the setting of A-fib with RVR. She was transferred to MICU for further management w/ A-fib with rates 140's, MAP upper 70's, overall asymptomatic from a-fib.

## 2023-08-02 NOTE — ASSESSMENT & PLAN NOTE
Prior documentation and assessment consistent with progression of dementia, no clear medical etiology. Repeat CT head 7/30, stable SDH. Psychiatry following and believe it is associated with Alzheimers with mood disturbance.     - psych following, appreciate recommendations:  -- depacon 250 mg IV qhs  -- lexapro 5 mg qd  -- memantine 5 mg PO BID  - will have SLP re-evaluate swallowing  - patient with neurological status at baseline. Current hemodynamic trends under normal values. Will stepdown patient to Hospital Medicine to continue treatment.

## 2023-08-02 NOTE — PLAN OF CARE
Problem: Infection  Goal: Absence of Infection Signs and Symptoms  Outcome: Ongoing, Progressing     Problem: Fall Injury Risk  Goal: Absence of Fall and Fall-Related Injury  Outcome: Ongoing, Progressing     Problem: Skin Injury Risk Increased  Goal: Skin Health and Integrity  Outcome: Ongoing, Progressing   ..  MICU DAILY GOALS     Family/Goals of care/Code Status   Code Status: Full Code    24H Vital Sign Range  Temp:  [98 °F (36.7 °C)-98.2 °F (36.8 °C)]   Pulse:  []   Resp:  [16-42]   BP: ()/(0-112)   SpO2:  [88 %-100 %]      Shift Events   No acute events throughout shift Patient was a rapid from the floor, PRN medication given for agitation. Patient tried to get out the bed several times throughout the night, son denied PO  medication because he didn't wanna wake her up. Vitals signs after 0300 not transferring in epic, day shift nurse and charge made aware.     AWAKE RASS: Goal -    Actual - RASS (Bradford Agitation-Sedation Scale): alert and calm    Restraint necessity: Not necessary   BREATHE SBT: Not intubated    Coordinate A & B, analgesics/sedatives Pain: managed   SAT: Not intubated   Delirium CAM-ICU: Overall CAM-ICU: Positive   Early(intubated/ Progressive (non-intubated) Mobility MOVE Screen: Pass   Activity: Activity Management: Rolling - L1   Feeding/Nutrition Diet order: Diet/Nutrition Received: mechanical/dental soft, Specialty Diet/Nutrition Received: dysphagia mechanically altered   Thrombus DVT prophylaxis: VTE Required Core Measure: Pharmacological prophylaxis initiated/maintained   HOB Elevation Head of Bed (HOB) Positioning: HOB at 30 degrees   Ulcer Prophylaxis GI: no   Glucose control managed     Skin Skin assessed during daily assessment:   Yes, prevention measures documented.   Bowel Function no issues    Indwelling Catheter Necessity      Urethral Catheter 07/31/23 0030 16 Fr.-Reason for Continuing Urinary Catheterization: Urinary retention, Critically ill in ICU and  requiring hourly monitoring of intake/output  [REMOVED]      Urethral Catheter 07/23/23 0309 Straight-tip 16 Fr.-Reason for Continuing Urinary Catheterization: Urinary retention          De-escalation Antibiotics No       VS and assessment per flow sheet, patient progressing towards goals as tolerated, plan of care reviewed with [unfilled] and family, all concerns addressed, will continue to monitor.

## 2023-08-02 NOTE — ASSESSMENT & PLAN NOTE
Per prior documentation, caretaker Sonia expresses several concerns regarding patients continual health and function decline. DILIA TAYLOR consulted for dc planning. will need to have an in-depth palliative discussion with son to determine best care plan for patient with palliative care following. Son on board. Full code

## 2023-08-02 NOTE — ASSESSMENT & PLAN NOTE
Patient with afib at rate of 150s. Patient previously on lopressor 25 mg BID but missed couple of doses. Prior, patient responded to metoprolol. EF 65%. No eliquis noted on home medications.     - metoprolol 5mg IV once  - failed bedside swallow, will follow up SLP recommendations  - will resume eliquis x 2 weeks (8/5)  - patient currently with hemodynamic parameters wnl and stable. Will stepdown patient for Hospital Medicine to continue treatment.

## 2023-08-02 NOTE — ASSESSMENT & PLAN NOTE
Recurrent UTIs  - initial UA concern for infection, completed Rocephin  - urine culture with Candida glabrata 10 K to 49 K units not consistent with convincing UTI.  - duncan placed for U-retention, removed with further urinary retention.  Replaced Duncan on 07/30 with Enterococcus colony count 10 K-49,999 CFU.  Unlikely to represent true infection.  -will leave Duncan on discharge, patient will need outpatient Urology for voiding trial.

## 2023-08-02 NOTE — TELEPHONE ENCOUNTER
Spoke with pharmacist. Informed charly that  is retired and was not pt PCP. Pt does not have a PCP on file and is currently admitted in hospital. Pt will have to get scheduled with a pcp when discharged.

## 2023-08-02 NOTE — ASSESSMENT & PLAN NOTE
Prior documentation and assessment consistent with progression of dementia, no clear medical etiology. Repeat CT head 7/30, stable SDH. Psychiatry following and believe it is associated with Alzheimers with mood disturbance.     - psych following, appreciate recommendations:  -- depacon 250 mg IV qhs  -- lexapro 5 mg qd  -- memantine 5 mg PO BID  - will have SLP re-evaluate swallowing

## 2023-08-02 NOTE — SUBJECTIVE & OBJECTIVE
Interval History:  Patient seen and examined rapid response.  nursing staff called due to tachycardia and hypotension.  EKG revealed AFib with RVR with blood pressure 70s over 50s. Minimal response to IV fluids.  To my knowledge, no history of AFib.  Patient is still speaking although conversation is mostly unintelligible.  Patient transferred to ICU.    Discussed with patient's son, considering goals of care, but at this moment continue full code.  Discussed with caregiver in the room who said that the patient had a much better day today and was much more conversant.    Labs reviewed:  Hemoglobin 8.7, sodium 148, potassium 3.3, creatinine 0.9    Review of Systems   Unable to perform ROS: Dementia     Objective:     Vital signs reviewed    Weight: 75.8 kg (167 lb)  Body mass index is 28.67 kg/m².        Physical Exam  Vitals and nursing note reviewed.   Constitutional:       General: She is not in acute distress.     Appearance: She is well-developed. She is ill-appearing (chronically ill-appearing).   HENT:      Mouth/Throat:      Mouth: Mucous membranes are moist.   Cardiovascular:      Rate and Rhythm: Tachycardia present. Rhythm irregular.   Pulmonary:      Effort: No respiratory distress.      Breath sounds: No stridor. No wheezing.      Comments: Diminished at the bases  Abdominal:      General: Bowel sounds are normal. There is no distension.      Palpations: Abdomen is soft.      Tenderness: There is no abdominal tenderness.   Skin:     General: Skin is warm and dry.   Neurological:      Mental Status: She is alert.      Comments: Patient follows some commands.  Speech is mostly unintelligible

## 2023-08-02 NOTE — ASSESSMENT & PLAN NOTE
Patient with afib at rate of 150s. Patient previously on lopressor 25 mg BID but missed couple of doses. Prior, patient responded to metoprolol. EF 65%. No eliquis noted on home medications.     - metoprolol 5mg IV once  - failed bedside swallow, will follow up SLP recommendations  - will resume eliquis x 2 weeks (8/5)

## 2023-08-02 NOTE — PT/OT/SLP DISCHARGE
Physical Therapy Discharge Summary    Name: Radha Sandoval  MRN: [id number]   Principal Problem: Atrial fibrillation with RVR     Patient Discharged from acute Physical Therapy on 23.  Please refer to prior PT noted date on 23 for functional status.     Assessment:     Patient transferred to higher level of care secondary to tachycardia and hypotension.    Objective:     GOALS:   Multidisciplinary Problems       Physical Therapy Goals          Problem: Physical Therapy    Goal Priority Disciplines Outcome Goal Variances Interventions   Physical Therapy Goal     PT, PT/OT Ongoing, Progressing     Description: Goals to be met by: 2023     Patient will increase functional independence with mobility by performin. Supine to sit with MInimal Assistance  2. Sit to supine with MInimal Assistance  3. Rolling to Left and Right with Minimal Assistance.  4. Sit to stand transfer with Minimal Assistance  5. Gait  x 10 feet with Moderate Assistance using Rolling Walker.                          Reasons for Discontinuation of Therapy Services  Transfer to alternate level of care.      Plan:     Patient Discharged to:  stepped up to ICU .      2023

## 2023-08-02 NOTE — ASSESSMENT & PLAN NOTE
Frequent falls   - diagnosed at last [facility] hospitalization about 2 weeks ago.   - repeat XRs show stable fractures, non-displaced   - Ortho consulted in ED; originally recommended non-op management, but after further discussion recommended ORIF pelvis 7/24.  Declined surgery.  - weight bearing as tolerated, avoid sedating meds   - PT/OT consulted  - patient will likely need SNF v long term placement given max assist requirements and progressive debility, in setting of dementia and behavioral disturbances

## 2023-08-02 NOTE — ASSESSMENT & PLAN NOTE
- Caretaker expresses several concerns regarding patients continual health and function decline  - Recent stay at SNF with progress noted and pt was placed in ELVIN at [place]. However, ELVIN is not the appropriate level of care for patient given dementia and frequent falls   - [place] will now require 24 hour sitters for patient to return   - DILIA TAYLOR consulted for dc planning   - code status conversation held with son evening of 8/1, would like to discuss with brother as well.  Currently code status remains full.

## 2023-08-02 NOTE — RESIDENT HANDOFF
Handoff     Primary Team: Networked reference to record [facility]  Room Number: 6071/6071 A     Patient Name: Radha Sandoval MRN: [id number]     Date of Birth: [id number] Allergies: Patient has no known allergies.     Age: 88 y.o. Admit Date: 7/22/2023     Sex: female  BMI: Body mass index is 28.67 kg/m².     Code Status: Full Code        Illness Level (current clinical status): Watcher - No    Reason for Admission: Atrial fibrillation with RVR    Brief HPI (pertinent PMH and diagnosis or differential diagnosis): 87 yo patient with PMHx of dementia, HTN, bilateral pleural effusions, DVT (previously on eliquis). Patient had previous pelvic fracture after a fall when transferred from wheelchair to bed. Increased hospitalizations in the past.    ED course: patient stable, afebrile, with previous heat CT with subdural hematomas. Pelvic x ray non-displaced fracture (right superior and inferior rami). EKG with Qtc 510ms (given norco, tylenol, rocephin).     Hospital Medicine course: followed by NSG, no intervention needed on the SDH. Ortho recommended surgery, however family preferred PT/OT. Patient started having suicidal ideations. Psychiatry optimized treatment and followed patient progression. Mentation worsened, with subsequent respiratory alkalosis and no response to lasix trial.     Admitted to ICU on 8/1 for hypotension in the setting of Afib + RVR (rates of 140s + MAP on 70s)     Procedure Date: EEG (8/1/23) Abnormal EEG due to moderate generalized non-specific cerebral dysfunction with no electrographic seizures or indications of seizure tendency.    Hospital Course (updated, brief assessment by system or problem, significant events): Patient hemodynamically stable. No changes overnight. Patient with neurological status at baseline. Will hold eliquis for the moment (Patient with stable subdural hematoma). Vital signs stable. Patient admitted to the ICU on 7/31 on stable BP following levo 0.02. No new decompensation of Afib.  Currently no shortness of breath. RR on normal ranges. ABG with respiratory alkalosis. Tolerating bowel regimen. Stable I/Os with positive net flow. No changes on electrolytes in the past 24hs. No present bleeds. Decreased H-H, which has been persisting for the pas 24hs. Normal temperature range on the past 24hs. Glucose on normal trends. No TSH values at the moment. Patient hemodynamically stable. Will transfer for stepdown to Hospital Medicine.     Tasks (specific, using if-then statements):     Acute subdural hematoma  Patient has an acute SDH on chronic SD hygroma s/p fall at [facility]; no acute deficits. The repeat CTH stable      - will hold eliquis x 2 weeks (8/5) per NSGY recommendations, ok for VTE ppx     Acute metabolic encephalopathy  Prior documentation and assessment consistent with progression of dementia, no clear medical etiology. Repeat CT head 7/30, stable SDH. Psychiatry following and believe it is associated with Alzheimers with mood disturbance.      - psych following, appreciate recommendations:  -- depacon 250 mg IV qhs  -- lexapro 5 mg qd  -- memantine 5 mg PO BID  - will have SLP re-evaluate swallowing     Subdural hygroma  See Subdural hemorrhage     Cardiac/Vascular  * Atrial fibrillation with RVR  Patient with afib at rate of 150s. Patient previously on lopressor 25 mg BID but missed couple of doses. Prior, patient responded to metoprolol. EF 65%. No eliquis noted on home medications.      - metoprolol 5mg IV once  - failed bedside swallow, will follow up SLP recommendations  - will resume eliquis x 2 weeks (8/5)     Hypotension  Patient intermittently with MAPs of 50s. Associated with afib with RVR     See afib with rvr     Renal/  Urinary retention  Patient has recurrent issues with urinary retention. Recent duncan placed on 7/30 for retention     - continue tamsulosin  - duncan in place        Hypokalemia  K 3.3. Mg 2.3.      - replete IV with KCl 10 meq  - trend with daily cmp  - keep K >4  and Mg >2     Orthopedic  Closed pelvic ring fracture  Patient has a history of multiple falls with multiple fractures diagnosed at last [facility] hospitalization. Repeat XRs show stable fractures, non-displaced. Per documentation, ortho consulted in ED; originally recommended non-op management, but after further discussion recommended ORIF pelvis 7/24 but family declined surgery.     - weight bearing as tolerated   - pain control with tylenol.  Avoiding sedating medications.     Palliative Care  Encounter for palliative care  Per prior documentation, caretaker Sonia expresses several concerns regarding patients continual health and function decline. DILIA TAYLOR consulted for dc planning. will need to have an in-depth palliative discussion with son to determine best care plan for patient with palliative care following. Son on board. 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

## 2023-08-02 NOTE — HOSPITAL COURSE
Patient hemodynamically stable. No changes overnight. Patient with neurological status at baseline. Will hold eliquis for the moment (Patient with stable subdural hematoma). Vital signs stable. Patient admitted to the ICU on 7/31 on stable BP following levo 0.02. No new decompensation of Afib. Currently no shortness of breath. RR on normal ranges. ABG with respiratory alkalosis. Tolerating bowel regimen. Stable I/Os with positive net flow. No changes on electrolytes in the past 24hs. No present bleeds. Decreased H-H, which has been persisting for the pas 24hs. Normal temperature range on the past 24hs. Glucose on normal trends. No TSH values at the moment. Patient hemodynamically stable. Will transfer for stepdown to Hospital Medicine.

## 2023-08-02 NOTE — ASSESSMENT & PLAN NOTE
Patient intermittently with MAPs of 50s. Associated with afib with RVR. No new hypotensive episodes.     See afib with rvr

## 2023-08-02 NOTE — ASSESSMENT & PLAN NOTE
Respiratory alkalosis  · Tachypneic, using accessory muscles, intermittent stridor 7/28 resolved spontaneously on 07/29  · Trial of IV Lasix administered for pleural effusions seen on the CT chest 7/27.  · Echo 7/28 EF 65%, grade 1 diastolic dysfunction, mild AS, normal RV size and function, PA systolic pressure 39 mm Hg, normal CVP  · Bronchodilators without much improvement.  Patient appears euvolemic, holding Lasix  · CTA chest and CT soft tissue neck pending, negative for PE, negative for any mass compressing the airway, narrowing of the bilateral mainstem bronchi to 2 mm diameter not present on previous CT suggesting possible transient finding.  Possible transient bronchomalacia.

## 2023-08-03 PROBLEM — J96.01 ACUTE HYPOXEMIC RESPIRATORY FAILURE: Status: RESOLVED | Noted: 2023-01-01 | Resolved: 2023-01-01

## 2023-08-03 NOTE — PLAN OF CARE
Problem: Occupational Therapy  Goal: Occupational Therapy Goal  Description: Re-eval performed on 8/3/2023, goals set to be met by 8/23/23  Patient will increase functional independence with ADLs by performing:    UE Dressing with Minimal Assistance.  Grooming while EOB with Moderate Assistance.  Supine to sit with Moderate Assistance.  Sit to stand with Maximal Assistance.  Sit EOB with CGA  Outcome: Ongoing, Progressing     Pt goals updated as appropriate

## 2023-08-03 NOTE — PLAN OF CARE
[facility] - Intensive Care ([facility]14)  Discharge Reassessment    Primary Care Provider: No, Primary Doctor    Expected Discharge Date: 8/4/2023    Reassessment (most recent)       Discharge Reassessment - 08/03/23 1519          Discharge Reassessment    Assessment Type Discharge Planning Reassessment (P)      Did the patient's condition or plan change since previous assessment? Yes (P)      Communicated TOSHA with patient/caregiver Yes (P)      Discharge Plan A Skilled Nursing Facility (P)      Discharge Plan B Skilled Nursing Facility (P)                      Patient is not stable to dc today. Patient expected to dc to [facility] when stable to dc.       Reyna Johnson, BIPIN  [facility]   [id number]

## 2023-08-03 NOTE — PROGRESS NOTES
"[facility] - Intensive Care ([facility]-)  Adult Nutrition  Consult Note    SUMMARY     Recommendations    1) Continue pureed diet per SLP     2) Continue ONS     3) Consider appetite stimulant if appropriate    4) If tube feeding rec's needed: Isosource 1.5 @ 45 ml/hr to provide 1620 kcals, 73g pro, 825 ml fluid with additional fluid per MD    5)Following    Goals: Meet % een/epn by next Rd f/u  Nutrition Goal Status: new  Communication of RD Recs: other (comment) (poc)    Assessment and Plan    Nutrition Problem  Inadequate energy intake    Related to (etiology):   Inadequate oral intake    Signs and Symptoms (as evidenced by):   Difficulties chewing/swallowing, poor intake     Interventions/Recommendations (treatment strategy):  Collaboration with other providers    Nutrition Diagnosis Status:   New      Malnutrition Assessment    Energy Intake (Malnutrition): less than 75% for greater than or equal to 1 month       Reason for Assessment    Reason For Assessment: length of stay  Diagnosis: cardiac disease  Relevant Medical History: HTN  Interdisciplinary Rounds: did not attend  General Information Comments: LOS x 11 days. Pt's caregiver and family members at bedside. Endorses 0% intake and no appetite. Endorses chewing/swallowing issue and trouble drinking from straw. Decreased appetite PTA since April. Pt likes junk food, fired foods per family members. They weren't sure of UBW per wt history wt is stable. Following.   Nutrition Discharge Planning: adequate intake    Nutrition Risk Screen    Nutrition Risk Screen: difficulty chewing/swallowing    Nutrition/Diet History    Spiritual, Cultural Beliefs, Uatsdin Practices, Values that Affect Care: no  Food Allergies: NKFA  Factors Affecting Nutritional Intake: decreased appetite    Anthropometrics    Temp: 97.9 °F (36.6 °C)  Height: 5' 4" (162.6 cm)  Height (inches): 64 in  Weight Method: Bed Scale  Weight: 75.8 kg (167 lb)  Weight (lb): 167 lb  Ideal Body " Weight (IBW), Female: 120 lb  % Ideal Body Weight, Female (lb): 139.17 %  BMI (Calculated): 28.7  BMI Grade: 25 - 29.9 - overweight       Lab/Procedures/Meds    Pertinent Labs Reviewed: reviewed  Pertinent Labs Comments: h/h: 9.6/33.3, mch: 26.9, mchc: 28.8  Pertinent Medications Reviewed: reviewed  Pertinent Medications Comments: enoxaparin, losinopril, lopressor, polyethylene glycol, senna-docusate, tamsulosin, vitamin D, zinc      Estimated/Assessed Needs    Weight Used For Calorie Calculations: 75.8 kg (167 lb)  Energy Calorie Requirements (kcal): 1515 (20 KCAL/KG)  Energy Need Method: Kcal/kg  Protein Requirements: 75-91 (1-1.2 g/kg)  Weight Used For Protein Calculations: 75.8 kg (167 lb)     Estimated Fluid Requirement Method: RDA Method  RDA Method (mL): 1515         Nutrition Prescription Ordered    Current Diet Order: Pureed    Evaluation of Received Nutrient/Fluid Intake    I/O: -3.3 L since admit  Comments: LBM 8/2  Tolerance: not tolerating  % Intake of Estimated Energy Needs: 0 - 25 %  % Meal Intake: 0 - 25 %    Nutrition Risk    Level of Risk/Frequency of Follow-up: low - moderate (f/u 1-2 x/week)       Monitor and Evaluation    Food and Nutrient Intake: energy intake, food and beverage intake  Food and Nutrient Adminstration: diet order  Knowledge/Beliefs/Attitudes: food and nutrition knowledge/skill, beliefs and attitudes  Physical Activity and Function: nutrition-related ADLs and IADLs  Anthropometric Measurements: height/length, weight, weight change, body mass index  Biochemical Data, Medical Tests and Procedures: electrolyte and renal panel, gastrointestinal profile, glucose/endocrine profile, inflammatory profile, lipid profile  Nutrition-Focused Physical Findings: overall appearance       Nutrition Follow-Up    RD Follow-up?: Yes    Jenny Whittington RD, LDN

## 2023-08-03 NOTE — PLAN OF CARE
SW contacted patient son to discuss the dc plan in regard to SNF placement. Per son he does not want patient to be placed at [facility] and is requesting placement at [facility] or [facility]. CLAUDIA has sent referrals and will continue to follow up.           Reyna Johnson, BIPIN  [facility]   [id number]

## 2023-08-03 NOTE — PT/OT/SLP RE-EVAL
"Occupational Therapy   Re-evaluation & Treatment    Name: Radha Sandoval  MRN: [id number]  Admitting Diagnosis:  Atrial fibrillation with RVR  Recent Surgery: Procedure(s) (LRB):  ORIF,PELVIS, rui, bone foam (N/A) 10 Days Post-Op    Recommendations:     Discharge Recommendations: nursing facility, skilled  Discharge Equipment Recommendations: other (see comments) (TBD)  Barriers to discharge:  Other (Comment) (Increased level of skilled A needed at this time)    Assessment:     Radha Sandoval is a 88 y.o. female with a medical diagnosis of Atrial fibrillation with RVR and recent hip fracture. Performance deficits affecting function are weakness, impaired endurance, impaired self care skills, impaired functional mobility, gait instability, impaired balance, impaired cardiopulmonary response to activity, impaired cognition. Pt engaged well in today's session and participated in EOB activity. While seated EOB pt washed face with wash cloth. Seated EOB pt required min <> max A with strong posterior lean depsite cues for remaining upright posture. On this date, pt limited by general weakness, inability, and posterior leaning. Patient would benefit from continued skilled acute OT 4 x/wk to improve functional mobility, increase independence with ADLs, and address established goals. Recommending SNF once medically appropriate for discharge to increase maximal independence, reduce burden of care, and ensure safety.    Rehab Prognosis:  Fair; patient would benefit from acute skilled OT services to address these deficits and reach maximum level of function.       Plan:     Patient to be seen 4 x/week to address the above listed problems via self-care/home management, therapeutic activities, therapeutic exercises  Plan of Care Expires: 08/23/23  Plan of Care Reviewed with: patient, caregiver    Subjective     Chief Complaint: No complaints  Patient/Family stated goals: "I'm going to go get a new room"  Pain/Comfort:  Pain Rating " 1:  (Pt didn't rate)  Location - Orientation 1: generalized  Location 1: back  Pain Addressed 1: Reposition, Distraction, Pre-medicate for activity  Pain Rating Post-Intervention 1:  (Pt didn't rate)    Objective:     Communicated with: NSG prior to session.  Patient found supine with: telemetry, PureWick upon OT entry to room.    General Precautions: Standard, fall, aspiration  Orthopedic Precautions: RLE weight bearing as tolerated, LLE weight bearing as tolerated  Braces: N/A  Respiratory Status: Room air    Occupational Performance:    Bed Mobility:    Patient completed Rolling/Turning to Left with  total assistance  Patient completed Rolling/Turning to Right with total assistance  Patient completed Scooting/Bridging with maximal assistance  Patient completed Supine to Sit with total assistance and 2 persons. While in sitting, pt demo'ed strong posterior lean, requiring min <> max for sit balance  Patient completed Sit to Supine with total assistance and 2 persons    Activities of Daily Living:  Grooming: set-up assist to wash face using was cloth seated EOB with min <> max  A for balance with strong posterior lean  LB Dressing: total assist to don socks supine in bed    Cognitive/Visual Perceptual:  Cognitive/Psychosocial Skills:     -       Follows Commands/attention:Easily distracted and Follows one-step commands  -       Communication: dysarthria  -       Memory: deficits  -       Safety awareness/insight to disability: impaired   -       Mood/Affect/Coping skills/emotional control: Agitated  Visual/Perceptual:      -Intact     AMPAC 6 Click:  AMPAC Total Score: 10    Treatment & Education:  Role of OT and POC  ADL retraining  Functional mobility training  Safety  Discharge planning  Importance EOB/OOB activity    Pt sat EOB with min <> max A with strong posterior lean. Pt provided with verbal, visual, and tactile cues to remain upright by bringing trunk forward.    Co-treatment performed due to patient's  multiple deficits requiring two skilled therapists to appropriately and safely assess patient's strength and endurance while facilitating functional tasks in addition to accommodating for patient's activity tolerance.     Patient left supine with all lines intact, call button in reach, caregiver present, and all needs met.    GOALS:   Multidisciplinary Problems       Occupational Therapy Goals          Problem: Occupational Therapy    Goal Priority Disciplines Outcome Interventions   Occupational Therapy Goal     OT, PT/OT Ongoing, Progressing    Description: Re-eval performed on 8/3/2023, goals set to be met by 8/23/23  Patient will increase functional independence with ADLs by performing:    UE Dressing with Minimal Assistance.  Grooming while EOB with Moderate Assistance.  Supine to sit with Moderate Assistance.  Sit to stand with Maximal Assistance.  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 5/22/2023    Lumbar spondylosis with myelopathy 4/25/2023    Multiple closed right sided fractures of pelvis without disruption of pelvic ring 7/9/2023    Primary hypertension 6/10/2022    Subdural hygroma 7/8/2023         Past Surgical History:   Procedure Laterality Date    REPAIR, HERNIA, INGUINAL, WITHOUT HISTORY OF PRIOR REPAIR, AGE 6 YEARS OR OLDER Right 5/6/2023    Procedure: REPAIR, HERNIA, INGUINAL, WITHOUT HISTORY OF PRIOR REPAIR, AGE 6 YEARS OR OLDER;  Surgeon: Maurice Piper MD;  Location: [facility] OR [address] [place] [address];  Service: General;  Laterality: Right;  possible laparotomy, possible bowel resection       Time Tracking:     OT Date of  Treatment: 08/03/23  OT Start Time: 1347  OT Stop Time: 1409  OT Total Time (min): 22 min    Billable Minutes:Mary 11  Self Care/Home Management 11    8/3/2023

## 2023-08-03 NOTE — SUBJECTIVE & OBJECTIVE
Interval History:  Patient had some agitation with subsequent hypertension.  When I reviewed her today she was calm and in good spirits.  No acute issues.  Spoke with son, disposition plan at this time is skilled nursing facility    Review of Systems   Unable to perform ROS: Other     Objective:     Vital Signs (Most Recent):  Temp: 97.9 °F (36.6 °C) (08/03/23 1200)  Pulse: 80 (08/03/23 1200)  Resp: (!) 24 (08/03/23 0724)  BP: (!) 164/68 (08/03/23 1200)  SpO2: 99 % (08/03/23 1200) Vital Signs (24h Range):  Temp:  [97.3 °F (36.3 °C)-97.9 °F (36.6 °C)] 97.9 °F (36.6 °C)  Pulse:  [68-93] 80  Resp:  [18-28] 24  SpO2:  [91 %-99 %] 99 %  BP: ()/(43-87) 164/68     Weight: 75.8 kg (167 lb)  Body mass index is 28.67 kg/m².    Intake/Output Summary (Last 24 hours) at 8/3/2023 1210  Last data filed at 8/2/2023 1500  Gross per 24 hour   Intake --   Output 300 ml   Net -300 ml      Physical Exam  Constitutional:       General: She is not in acute distress.     Appearance: She is obese.   HENT:      Head: Normocephalic.      Right Ear: External ear normal.      Left Ear: External ear normal.      Nose: Nose normal.   Cardiovascular:      Rate and Rhythm: Normal rate.   Pulmonary:      Effort: Pulmonary effort is normal.   Abdominal:      Palpations: Abdomen is soft.      Tenderness: There is no abdominal tenderness.   Skin:     General: Skin is warm.   Neurological:      Mental Status: She is alert. Mental status is at baseline.         MELD 3.0: 9 at 7/25/2023  5:40 AM  MELD-Na: 7 at 7/25/2023  5:40 AM  Calculated from:  Serum Creatinine: 0.8 mg/dL (Using min of 1 mg/dL) at 7/25/2023  5:40 AM  Serum Sodium: 140 mmol/L (Using max of 137 mmol/L) at 7/25/2023  5:40 AM  Total Bilirubin: 0.6 mg/dL (Using min of 1 mg/dL) at 7/23/2023 12:32 AM  Serum Albumin: 2.8 g/dL at 7/23/2023 12:32 AM  INR(ratio): 1.1 at 7/24/2023  4:13 AM  Age at listing (hypothetical): 88 years  Sex: Female at 7/25/2023  5:40 AM      Significant  "Labs:  CBC:  Recent Labs   Lab 08/01/23  1902 08/03/23  0219   WBC  --  6.51   HGB  --  9.6*   HCT 24* 33.3*   PLT  --  294     CMP:  Recent Labs   Lab 08/02/23  0427 08/03/23  0219    142   K 4.9 4.4    110   CO2 20* 23   GLU 78 92   BUN 16 14   CREATININE 0.9 0.8   CALCIUM 8.5* 9.0   PROT 6.0 5.8*   ALBUMIN 2.6* 2.7*   BILITOT 0.5 0.6   ALKPHOS 119 131   AST 36 27   ALT 12 11   ANIONGAP 11 9     PTINR:  No results for input(s): "INR" in the last 48 hours.    Significant Procedures:   Dobutamine Stress Test with Color Flow: No results found for this or any previous visit.      "

## 2023-08-03 NOTE — ASSESSMENT & PLAN NOTE
- continue to monitor  - avoid QT prolonging meds as much as possible  - change Zyprexa to Depakote per Psychiatry  - monitor tele

## 2023-08-03 NOTE — PLAN OF CARE
Recommendations    1) Continue pureed diet per SLP     2) Continue ONS     3) Consider appetite stimulant if appropriate    4) If tube feeding rec's needed: Isosource 1.5 @ 45 ml/hr to provide 1620 kcals, 73g pro, 825 ml fluid with additional fluid per MD    5)Following    Goals: Meet % een/epn by next Rd f/u  Nutrition Goal Status: new  Communication of RD Recs: other (comment) (poc)

## 2023-08-03 NOTE — ASSESSMENT & PLAN NOTE
Currently rate controlled.  Continue beta blocker.  We will resume apixaban on 08/05/2023 per neurosurgery recommendations

## 2023-08-03 NOTE — PROGRESS NOTES
[facility] - Intensive Care ([address] Medicine  Progress Note    Patient Name: Radha Sandoval  MRN: [id number]  Patient Class: IP- Inpatient   Admission Date: 7/22/2023  Length of Stay: 11 days  Attending Physician: Maykel Khan MD  Primary Care Provider: Bev, Primary Doctor        Subjective:     Principal Problem:Atrial fibrillation with RVR        HPI:  Radha Sandoval is a 88 y.o. female with PMH significant for chronic BL pleural effusions, recent DVT diagnosed in March '23 (on eliquis), dementia, HTN, with recent hospitalization here at [facility] for pelvic fracture treated non-operatively, who presents after a fall at her nursing home while being transferred from wheelchair to bed. Hx taken per Caretaker Sonia and Son (POA) Maykel. Caretaker at bedside unaware of head trauma or other injury. Of note, patient sustained her pelvic fractures at [facility], but after hospitalization at [facility] was placed in [facility] on 7/14. Caretaker reports patient also had another hospitalization during this time at . Patient generally disoriented and delirious, but she will have periods or even days of clarity. At baseline, she is oriented x2. Due to mentation she has difficulty working with staff which results in falls. Prior to her pelvic fracture on 7/8, pt was able to perform independent transfers from wheelchair and could walk short distances with her walker, but she is now max assist. Currently on Eliquis. Patient without complaint on my exam, denies pain.     In the ED: AFVSS. CBC with baseline anemia. CMP reveals slight elevation in Cr 1.0 (baseline 0.8) and K 3.3. BNP and troponin elevated but at baseline. UA grossly infectious. Spann placed for urinary retention.  All imaging reviewed. 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 Repeat CTH after 6 hours was stable. XR pelvis reveals stable recent R superior and inferior pubic rami fractures, non-displaced. EKG in NSR with prolonged Qtc 510 ms. Given  tylenol, norco, and 1g rocephin.       Overview/Hospital Course:  Radha Sandoval was placed in  observation after a fall. NSGY consulted for evaluation of SDH and determined no need for intervention , will follow up in clinic in 2 weeks. HOLD eliquis until follow up. Orthopedics discussed surgical options with family and recommended ORIF pelvis, but family is declined at this time and would prefer to continue with plan for PT/OT. Therapy assessment; recs for SNF. WBAT. Dvt ppx with 40 lovenox SQ daily (ok per NSGY). Psychiatry provided recs for medication adjustment for insomnia and delirium with prn dosing for agitation.  Agitation persists and patient started expressing suicidal ideation when asked to work with therapy. Psychiatry aware of SI, and made med adjustments as indicated while monitoring QT. Caretaker has been staying at bedside on multiple occasions and expressed the patient's significant cognitive decline over the last several weeks to months.  Palliative Medicine consulted with son and patient full code.    Planned to discharge patient on 07/27 to skilled nursing facility, however, patient tachypneic with stridor and increased work of breathing.  Mentation worsened with progressive respiratory alkalosis.  No improvement with breathing treatment or trial of Lasix.  Concern for bronchomalacia with CT images positive for bilateral mainstem bronchi narrowing.  Sats remained stable on 2 L O2.  Discussed with rapid response team and medical ICU.  Patient was moved to ICU step-down unit and monitored closely.  On 07/29 increased work of breathing and stridor spontaneously resolved.  Mentation briefly improved but multiple episodes of confusion.  QT even longer and Zyprexa changed to Depakote.  Repeat CT images of the brain revealed stable subdural hematomas.  On further discussion with patient's son, he opted to complete neurologic workup with MRI of the brain and EEG.  Son gave consent for MRI with Ativan,  no acute infarct, stable SDH and 3 mm midline shift. Abnormal EEG due to moderate generalized non-specific cerebral dysfunction with no electrographic seizures or indications of seizure tendency.  B12 and ammonia normal.  Folate and RPR checked on admission 2 weeks prior at outside facility and unremarkable.  Later in the afternoon, nursing staff called due to tachycardia and hypotension.  EKG revealed AFib with RVR with blood pressure 70s over 50s.  Minimal response to IV fluids.  To my knowledge, no history of AFib.  Patient transferred to ICU.    Of note recurrent episode of urinary retention on 07/31 and a UA was collected growing Enterococcus species but only max of 49,999 CFU.  Typically would not treat unless 100,000 CFU, will defer to ICU as the patient has been transferred      Interval History:  Patient had some agitation with subsequent hypertension.  When I reviewed her today she was calm and in good spirits.  No acute issues.  Spoke with son, disposition plan at this time is skilled nursing facility    Review of Systems   Unable to perform ROS: Other     Objective:     Vital Signs (Most Recent):  Temp: 97.9 °F (36.6 °C) (08/03/23 1200)  Pulse: 80 (08/03/23 1200)  Resp: (!) 24 (08/03/23 0724)  BP: (!) 164/68 (08/03/23 1200)  SpO2: 99 % (08/03/23 1200) Vital Signs (24h Range):  Temp:  [97.3 °F (36.3 °C)-97.9 °F (36.6 °C)] 97.9 °F (36.6 °C)  Pulse:  [68-93] 80  Resp:  [18-28] 24  SpO2:  [91 %-99 %] 99 %  BP: ()/(43-87) 164/68     Weight: 75.8 kg (167 lb)  Body mass index is 28.67 kg/m².    Intake/Output Summary (Last 24 hours) at 8/3/2023 1210  Last data filed at 8/2/2023 1500  Gross per 24 hour   Intake --   Output 300 ml   Net -300 ml      Physical Exam  Constitutional:       General: She is not in acute distress.     Appearance: She is obese.   HENT:      Head: Normocephalic.      Right Ear: External ear normal.      Left Ear: External ear normal.      Nose: Nose normal.   Cardiovascular:       "Rate and Rhythm: Normal rate.   Pulmonary:      Effort: Pulmonary effort is normal.   Abdominal:      Palpations: Abdomen is soft.      Tenderness: There is no abdominal tenderness.   Skin:     General: Skin is warm.   Neurological:      Mental Status: She is alert. Mental status is at baseline.         MELD 3.0: 9 at 7/25/2023  5:40 AM  MELD-Na: 7 at 7/25/2023  5:40 AM  Calculated from:  Serum Creatinine: 0.8 mg/dL (Using min of 1 mg/dL) at 7/25/2023  5:40 AM  Serum Sodium: 140 mmol/L (Using max of 137 mmol/L) at 7/25/2023  5:40 AM  Total Bilirubin: 0.6 mg/dL (Using min of 1 mg/dL) at 7/23/2023 12:32 AM  Serum Albumin: 2.8 g/dL at 7/23/2023 12:32 AM  INR(ratio): 1.1 at 7/24/2023  4:13 AM  Age at listing (hypothetical): 88 years  Sex: Female at 7/25/2023  5:40 AM      Significant Labs:  CBC:  Recent Labs   Lab 08/01/23  1902 08/03/23 0219   WBC  --  6.51   HGB  --  9.6*   HCT 24* 33.3*   PLT  --  294     CMP:  Recent Labs   Lab 08/02/23  0427 08/03/23  0219    142   K 4.9 4.4    110   CO2 20* 23   GLU 78 92   BUN 16 14   CREATININE 0.9 0.8   CALCIUM 8.5* 9.0   PROT 6.0 5.8*   ALBUMIN 2.6* 2.7*   BILITOT 0.5 0.6   ALKPHOS 119 131   AST 36 27   ALT 12 11   ANIONGAP 11 9     PTINR:  No results for input(s): "INR" in the last 48 hours.    Significant Procedures:   Dobutamine Stress Test with Color Flow: No results found for this or any previous visit.          Assessment/Plan:      * Atrial fibrillation with RVR  Currently rate controlled.  Continue beta blocker.  We will resume apixaban on 08/05/2023 per neurosurgery recommendations    Frequent falls  Disposition is skilled nursing facility.  Follow up with       Prolonged Q-T interval on ECG  - continue to monitor  - avoid QT prolonging meds as much as possible  - change Zyprexa to Depakote per Psychiatry  - monitor tele     Urinary retention  Recurrent issue   - duncan placed, removed with decent urination, had to be replaced on 07/30 " following recurrence of retention        Goals of care, counseling/discussion  - Caretaker expresses several concerns regarding patients continual health and function decline  - Recent stay at SNF with progress noted and pt was placed in custodial at [place]. However, custodial is not the appropriate level of care for patient given dementia and frequent falls   - [place] will now require 24 hour sitters for patient to return   - DILIA TAYLOR consulted for dc planning   - code status conversation held with son evening of 8/1, would like to discuss with brother as well.  Currently code status remains full.    Acute metabolic encephalopathy  · Likely progression of dementia, no clear medical etiology   · On multiple medications that could contribute that are relatively new according to the son:  Memantine, Robaxin, Lexapro, Haldol, Depakote and Depakote which is new this admission.  · More confused on 07/28, better on 07/29--discontinued scheduled Robaxin  · Multiple ABGs with progressing respiratory alkalosis, possibly contributing.  · TSH, folate ordered 2 weeks ago at outside facility normal.  B12, ammonia normal.  MRI brain with no acute process.  EEG negative for seizure activity (although patient had a dose of Ativan about 4 hours earlier).  · Critical care consulted, appreciate assistance:  AFib RVR with hypotension on 08/01, transferred to ICU    Hypokalemia  · Monitor K/Mag and replace as needed      Multiple closed right sided fractures of pelvis without disruption of pelvic ring  Frequent falls   - diagnosed at [place] [facility] [place] about 2 weeks ago.   - repeat XRs show stable fractures, non-displaced   - Ortho consulted in ED; originally recommended non-op management, but after further discussion recommended ORIF pelvis 7/24.  Declined surgery.  - weight bearing as tolerated, avoid sedating meds   - PT/OT consulted  - patient will likely need SNF v long term placement given max assist requirements and progressive  debility, in setting of dementia and behavioral disturbances     Subdural hygroma  Acute subdural hematoma   - Acute SDH on chronic SD hygroma s/p fall at [facility]; no acute deficits  - repeat CTH stable   - holding eliquis for now - may continue in 2 weeks (per NSGY)   - > NSGY ok's starting [facility] for dvt ppx, holding for now with acute number  - INR wnl  - NSGY cleared. No need for continued monitoring aside from 2 week clinic follow up.     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  - continue memantine, Lexapro  - psychiatry consulted for assistance: Zyprexa discontinued for Depakote due to prolonged QT  - monitor for improvement   - EKG monitoring   - 7/24-25 Pt reporting SI from patient during session. Caretaker says this is not new, patient expresses this intermittently. Psych aware.     Lumbar spondylosis with myelopathy  - tylenol for pain control, robaxin as needed    Abnormal urinalysis  Recurrent UTIs  - initial UA concern for infection, completed Rocephin  - urine culture with Candida glabrata 10 K to 49 K units not consistent with convincing UTI.  - duncan placed for U-retention, removed with further urinary retention.  Replaced Duncan on 07/30 with Enterococcus colony count 10 K-49,999 CFU.  Unlikely to represent true infection.  -will leave Duncan on discharge, patient will need outpatient Urology for voiding trial.        Primary hypertension  - BP uncontrolled intermittently, as high as 226/90 -- suspect bc patient was agitated and spitting out meds  - continue lisinopril, lopressor, lasix.  Added nifedipine 7/30  - PRN IV hydralazine for SBP >180 or if unable to tolerate oral meds.         VTE Risk Mitigation (From admission, onward)         Ordered     enoxaparin injection 40 mg  Daily         07/31/23 0506     Reason for No Pharmacological VTE Prophylaxis  Once        Question:  Reasons:  Answer:   Risk of Bleeding  Comment:  SDH    07/23/23 0831     IP VTE HIGH RISK PATIENT  Once         07/23/23 0831     Place sequential compression device  Until discontinued         07/23/23 0831                Discharge Planning   TOSHA: 8/2/2023     Code Status: Full Code   Is the patient medically ready for discharge?: No    Reason for patient still in hospital (select all that apply): Patient trending condition  Discharge Plan A: Skilled Nursing Facility   Discharge Delays: None known at this time              Maykel Khan MD  Department of Hospital Medicine   [facility] - Intensive Care ([place]-[facility])

## 2023-08-03 NOTE — PLAN OF CARE
Pt agitated overnight, received scheduled and PRN depakote. VSS. RA. NSR/ST. Large BM overnight. Home sitter at bedside.       Problem: Infection  Goal: Absence of Infection Signs and Symptoms  Outcome: Ongoing, Progressing     Problem: Adult Inpatient Plan of Care  Goal: Plan of Care Review  Outcome: Ongoing, Progressing  Goal: Patient-Specific Goal (Individualized)  Outcome: Ongoing, Progressing  Goal: Absence of Hospital-Acquired Illness or Injury  Outcome: Ongoing, Progressing  Goal: Optimal Comfort and Wellbeing  Outcome: Ongoing, Progressing  Goal: Readiness for Transition of Care  Outcome: Ongoing, Progressing     Problem: Coping Ineffective  Goal: Effective Coping  Outcome: Ongoing, Progressing     Problem: Impaired Wound Healing  Goal: Optimal Wound Healing  Outcome: Ongoing, Progressing     Problem: Fall Injury Risk  Goal: Absence of Fall and Fall-Related Injury  Outcome: Ongoing, Progressing     Problem: Skin Injury Risk Increased  Goal: Skin Health and Integrity  Outcome: Ongoing, Progressing

## 2023-08-03 NOTE — PROGRESS NOTES
CONSULTATION LIAISON PSYCHIATRY PROGRESS NOTE    Patient Name: Radha Sandoval  MRN: [id number]  Patient Class: IP- Inpatient  Admission Date: 7/22/2023  Attending Physician: Maykel Khan MD      SUBJECTIVE:   Radha Sandoval is a 88 y.o. female with past medical history of bilateral chronic subdural hematomas, Hypertension, Acute DVT and PE 5/23, frequent falls with recent Pelvic fracture s/p orthopedic intervention & past pertinent psychiatric history of Late Onset Alzheimers with mood disturbance and Delirium presents to the ED/admitted to the hospital for a fall, shortness of breath, and hypoxia.     Patient presented with right pelvic pain after a fall on 7/22 when she was transferred from bed to wheelchair. Due to her fluctuating mentation she has been agitated with nursing staff and tries to leave her bed and resists being transferred from bed to wheelchair, which has resulted in multiple falls.     At baseline, patient generally disoriented and delirious, but she will have periods or even days of clarity; she is typically oriented x2.        Psychiatry consulted for insomnia contributing to delirium in patient with dementia, uncontrolled on current regimen       Today, patient was lying in bed awake with the sitter at the bedside and 2 of the patient's friends in the room. Patient talked with team and answered questions but was difficult to understand due to mumbled speech. Patient was not agitated with no observed hallucinations. She fell asleep intermittently throughout assessment.        OBJECTIVE:    Mental Status Exam:  General Appearance: dressed in hospital garb, disheveled  Behavior: cooperative, pleasant, polite  Involuntary Movements and Motor Activity: +tremors  Gait and Station: unable to assess - patient lying down or seated  Speech and Language: soft, incoherent, slurred, mumbling  Mood: Unable to assess  Affect: appropriate to situation and context  Thought Process and Associations: Unable to Assess,  patient's speech difficult to comprehend  Thought Content and Perceptions:: tangential at times  Sensorium and Orientation: oriented so self, fell asleep intermittently throughout assessment  Recent and Remote Memory: significant impairments noted 2/2 medical condition  Attention and Concentration: attentive to conversation when awake  Fund of Knowledge: unaware of current events 2/2 medical attention  Insight: impaired due to medical condition  Judgment: impaired due to medical condtion            ASSESSMENT & RECOMMENDATIONS   Late Onset Alzheimer's with mood disturbance  Agitation     PSYCH MEDICATIONS  Continue Depacon 250 mg IV nightly  Continue Lexapro 5 mg po QD  Continue Memantine 5 mg po BID  PRN: Depacon 125 mg PRN agitation q8 hours     DELIRIUM BEHAVIOR MANAGEMENT  PLEASE utilize CHEMICAL restraints with PRN meds first for agitation. Minimize use of PHYSICAL restraints OR have periods of being out of physical restraints if possible.  Keep window shades open and room lit during day and room dim at night in order to promote normal sleep-wake cycles  Encourage family at bedside. [place] patient often to situation, location, date.  Continue to Limit or Discontinue use of Narcotics, Benzos and Anti-cholinergic medications as they may worsen delirium.  Continue medical workup for causative etiology of Delirium.      RISK ASSESSMENT  NO NEED FOR PEC at this time. Will continue to reassess.      FOLLOW UP  Will follow up while in house     DISPOSITION - once medically cleared:  Defer to medical team  Please contact ON CALL psychiatry service (24/7) for any acute issues that may arise.    Dr. Adalberto HERNANDES Psychiatry  [facility]  8/3/2023 10:26  AM        --------------------------------------------------------------------------------------------------------------------------------------------------------------------------------------------------------------------------------------    CONTINUED OBJECTIVE clinical data & findings reviewed and noted for above decision making    Current Medications:   Scheduled Meds:    ascorbic acid (vitamin C)  250 mg Oral Daily    enoxaparin  40 mg Subcutaneous Daily    EScitalopram oxalate  5 mg Oral Daily    lisinopriL  40 mg Oral Daily    memantine  5 mg Oral BID    metoprolol tartrate  25 mg Oral BID    miconazole NITRATE 2 %   Topical (Top) BID    polyethylene glycol  17 g Oral BID    senna-docusate 8.6-50 mg  1 tablet Oral BID    tamsulosin  0.4 mg Oral Nightly    valproate sodium (DEPACON) IVPB  250 mg Intravenous QHS    vitamin D  1,000 Units Oral Daily    zinc sulfate  220 mg Oral Daily     PRN Meds: acetaminophen, aluminum-magnesium hydroxide-simethicone, flumazeniL, glucagon (human recombinant), glucose, glucose, ipratropium, lactulose, levalbuterol, loperamide, melatonin, metoprolol, naloxone, prochlorperazine, sodium chloride 0.9%, valproate sodium (DEPACON) IVPB    Allergies:   Review of patient's allergies indicates:  No Known Allergies    Vitals  Vitals:    08/03/23 0724   BP: (!) 198/87   Pulse:    Resp: (!) 24   Temp: 97.3 °F (36.3 °C)       Labs/Imaging/Studies:  Recent Results (from the past 24 hour(s))   CBC Auto Differential    Collection Time: 08/03/23  2:19 AM   Result Value Ref Range    WBC 6.51 3.90 - 12.70 K/uL    RBC 3.57 (L) 4.00 - 5.40 M/uL    Hemoglobin 9.6 (L) 12.0 - 16.0 g/dL    Hematocrit 33.3 (L) 37.0 - 48.5 %    MCV 93 82 - 98 fL    MCH 26.9 (L) 27.0 - 31.0 pg    MCHC 28.8 (L) 32.0 - 36.0 g/dL    RDW 14.9 (H) 11.5 - 14.5 %    Platelets 294 150 - 450 K/uL    MPV 11.2 9.2 - 12.9 fL    Immature Granulocytes 0.5 0.0 - 0.5 %    Gran # (ANC) 4.7 1.8 - 7.7 K/uL    Immature Grans (Abs) 0.03 0.00  - 0.04 K/uL    Lymph # 0.8 (L) 1.0 - 4.8 K/uL    Mono # 0.5 0.3 - 1.0 K/uL    Eos # 0.4 0.0 - 0.5 K/uL    Baso # 0.04 0.00 - 0.20 K/uL    nRBC 0 0 /100 WBC    Gran % 72.8 38.0 - 73.0 %    Lymph % 12.3 (L) 18.0 - 48.0 %    Mono % 8.1 4.0 - 15.0 %    Eosinophil % 5.7 0.0 - 8.0 %    Basophil % 0.6 0.0 - 1.9 %    Platelet Estimate Appears normal     Ovalocytes Occasional     Spherocytes Occasional     Differential Method Automated    Comprehensive Metabolic Panel    Collection Time: 08/03/23  2:19 AM   Result Value Ref Range    Sodium 142 136 - 145 mmol/L    Potassium 4.4 3.5 - 5.1 mmol/L    Chloride 110 95 - 110 mmol/L    CO2 23 23 - 29 mmol/L    Glucose 92 70 - 110 mg/dL    BUN 14 8 - 23 mg/dL    Creatinine 0.8 0.5 - 1.4 mg/dL    Calcium 9.0 8.7 - 10.5 mg/dL    Total Protein 5.8 (L) 6.0 - 8.4 g/dL    Albumin 2.7 (L) 3.5 - 5.2 g/dL    Total Bilirubin 0.6 0.1 - 1.0 mg/dL    Alkaline Phosphatase 131 55 - 135 U/L    AST 27 10 - 40 U/L    ALT 11 10 - 44 U/L    eGFR >60.0 >60 mL/min/1.73 m^2    Anion Gap 9 8 - 16 mmol/L   Magnesium    Collection Time: 08/03/23  2:19 AM   Result Value Ref Range    Magnesium 1.9 1.6 - 2.6 mg/dL   Phosphorus    Collection Time: 08/03/23  2:19 AM   Result Value Ref Range    Phosphorus 2.9 2.7 - 4.5 mg/dL     Imaging Results              CT Head Without Contrast (Final result)  Result time 07/23/23 08:24:54      Final result by Bill Tarango MD (07/23/23 08:24:54)                   Impression:        Similar volume of subdural hematomas.  Changes of density may be in part technical/artifactual in nature and also due to some leakage of recent intravenous contrast from recent CT of the abdomen pelvis with no focal hyperdense acute hemorrhage identified.  Follow-up as clinically warranted.      Electronically signed by: Bill Tarango  Date:    07/23/2023  Time:    08:24               Narrative:    EXAMINATION:  CT HEAD WITHOUT CONTRAST    CLINICAL HISTORY:  Head trauma, minor (Age >=  65y);    TECHNIQUE:  Low dose axial images were obtained through the head.  Coronal and sagittal reformations were also performed. Contrast was not administered.    COMPARISON:  07/23/2023, 07/09/2023    FINDINGS:  Bilateral subacute subdural hematomas are noted bilaterally again identified measuring up to 14 mm on  the left, similar in volume.  No new focal hyperdense hemorrhage is identified.  Overall mild increased density of the hematoma may in part be technical/artifactual in nature as well as due to leakage of recent intravenous contrast as similar appearances were noted previously (on 07/09/2023 and 07/10/2023).    3 mm midline shift to the right is similar in appearance.  The basal cisterns remain patent.  No acute intraparenchymal process.    Prominent atherosclerotic vascular calcifications are noted at the skull base.    The visualized sinuses and mastoid air cells are essentially clear.                                       CT Chest Abdomen Pelvis With Contrast (xpd) (Final result)  Result time 07/23/23 02:32:32   Procedure changed from CT Abdomen Pelvis With Contrast     Final result by Chuy Mckeon MD (07/23/23 02:32:32)                   Impression:      Similar appearance of recent fractures involving the right inferior and superior pubic rami.  Increased conspicuity of essentially nondisplaced recent fractures of the right michelle sacrum and anterior aspect of the right acetabulum.    Motion and artifact limited study with suboptimal bolus timing.    Moderate-sized hiatal hernia with moderate lower esophageal wall thickening.  Suggest correlation for esophagitis.    Peribronchial thickening and questionable minimal patchy ground-glass opacities in the lung bases and bibasilar subsegmental atelectasis.    Nodular soft tissue opacities in the left breast.  Neoplasm not excluded.  Suggest correlation with physical exam and mammographic follow-up when clinically warranted.    Anasarca.    Mild  nonspecific wall thickening of the inferior aspect of the urinary bladder.  Suggest correlation with urinalysis.    Multiple additional findings discussed in the body of the report.      Electronically signed by: Chuy Mckeon MD  Date:    07/23/2023  Time:    02:32               Narrative:    EXAMINATION:  CT CHEST ABDOMEN PELVIS WITH CONTRAST (XPD)    CLINICAL HISTORY:  Abdominal trauma, blunt;    TECHNIQUE:  Low dose axial images, sagittal and coronal reformations were obtained from the thoracic inlet to the pubic symphysis following the IV administration of 75 mL of Omnipaque 350 .  Oral contrast was not given.    COMPARISON:  CTA chest, 07/08/2023.  CT pelvis, 07/08/2023.  CT abdomen pelvis, 05/06/2023.    FINDINGS:  Chest:    Exam quality is limited by suboptimal bolus timing and motion.  Evaluation is limited by extensive streak artifact due to the patient's arms overlying the field of view.    Heart is borderline enlarged and similar to the prior study.  Coronary artery and mitral valve calcifications.  Thoracic aorta is stable in caliber with moderate to advanced calcific atherosclerosis.  No central pulmonary embolus.  No bulky mediastinal lymphadenopathy.    Detailed evaluation of the pulmonary parenchyma limited by motion.  There is peribronchial thickening and questionable minimal patchy ground-glass opacities in the lung bases.  Bibasilar subsegmental atelectasis.  No consolidation or pleural effusion.    Moderate-sized hiatal hernia with moderate lower esophageal wall thickening.    Abdomen:    Exam quality is limited by suboptimal bolus timing, motion, and extensive artifact related to the patient's arms overlying the field of view.    Liver is similar in size and contour when compared with the prior study.  Multiple hepatic hypodensities most suggestive of cysts.  Gallbladder is unremarkable.  No intrahepatic biliary ductal dilatation.    Spleen, adrenals, and pancreas are stable and negative for  acute finding allowing for significant motion.    Kidneys are stable.  There is a large left renal cyst.  Small stone or vascular calcification in the right renal hilum.  No hydronephrosis.    Evaluation for bowel inflammation is limited by motion.  No small bowel obstruction.  Mild stool burden in the colon.    No pneumoperitoneum or organized fluid collection.    No bulky retroperitoneal lymphadenopathy.    Abdominal aorta is similar in caliber with moderate to advanced calcific atherosclerosis involving the aorta and major branch vessels.    Portal vein is grossly patent.    Pelvis:    Urinary bladder is mildly distended and there is mild wall thickening along the inferior aspect of the urinary bladder similar to the prior CT abdomen.  Rectum is unremarkable.  There is minimal presacral fat stranding.  No significant pelvic free fluid.    Bones and soft tissues:    Recent fractures involving the right superior and inferior pubic rami similar to prior CT pelvis.  Suspect nondisplaced fracture of the right michelle sacrum, more conspicuous when compared with prior CT pelvis.  Nondisplaced fracture of the anterior aspect of the right acetabulum (coronal series 606, image 129) is also more conspicuous when compared with the prior study.  No additional acute fracture.  Degenerative changes in the spine and pubic symphysis.  Mild anterolisthesis of L4 with respect to L5.  Mild left convex scoliotic curvature of the spine.  Old right lower rib fractures.  Operative changes of presumed right lower abdominal wall hernia repair.  Mild diffuse body wall edema.  Somewhat nodular soft tissue densities in the left breast soft tissues.  Suggest follow-up mammogram.                                       CT Head Without Contrast (Final result)  Result time 07/23/23 02:16:47      Final result by Maykel Castro MD (07/23/23 02:16:47)                   Impression:      Subtle increase in density of the subdural hygromas suggesting  acute on chronic subdural fluid collection.    Consider follow-up CT scan per protocol in patient with small acute subdural hematoma on chronic subdural hygromas.      Electronically signed by: Maykel Castro  Date:    07/23/2023  Time:    02:16               Narrative:    EXAMINATION:  CT HEAD WITHOUT CONTRAST    CLINICAL HISTORY:  Head trauma, minor (Age >= 65y);    TECHNIQUE:  Low dose axial CT images obtained throughout the head without intravenous contrast. Sagittal and coronal reconstructions were performed.    COMPARISON:  None.    FINDINGS:  Intracranial compartment:    Ventricles and sulci are stable in size for age without evidence of hydrocephalus. Subdural hygromas appear increased in density slightly suggesting acute on chronic subdural hematoma.    The brain parenchyma appears stable with involutional and chronic small vessel type changes again noted..  No parenchymal mass, hemorrhage, edema or major vascular distribution infarct.    Skull/extracranial contents (limited evaluation): No fracture. Mastoid air cells and paranasal sinuses are essentially clear.                                       CT Cervical Spine Without Contrast (Final result)  Result time 07/23/23 03:01:55      Final result by Chuy Mckeon MD (07/23/23 03:01:55)                   Impression:      No evidence of acute fracture or acute osseous abnormality.    Osteopenia and stable degenerative changes.    Additional findings discussed in the body of the report.    Electronically signed by resident: Anahi Gutierrez  Date:    07/23/2023  Time:    02:02    Electronically signed by: Chuy Mckeon MD  Date:    07/23/2023  Time:    03:01               Narrative:    EXAMINATION:  CT CERVICAL SPINE WITHOUT CONTRAST    CLINICAL HISTORY:  Neck trauma (Age >= 65y);    TECHNIQUE:  Low dose axial images, sagittal and coronal reformations were performed though the cervical spine.  Contrast was not administered.    COMPARISON:  CT 04/18/2023  and 07/09/2023.    FINDINGS:  Alignment: Normal.    Vertebrae: Osteopenia.  No fracture.  Lucent area involving the left C4 facet without associated cortical disruption, unchanged dating back to 04/18/2023.  Stable mild height loss of the superior endplate of T4 vertebral body.    Discs: Multilevel disc height loss, most pronounced at C5-C6.    C1-2: Dens is intact.  Pre-dens space is maintained.    Skull base and craniocervical junction: Normal.    Degenerative findings:    Stable appearance of mild multilevel degenerative changes through the cervical spine.  There are several levels demonstrating mild to moderate facet arthropathy and mild uncovertebral spurring resulting in areas of mild neural foraminal narrowing.  No areas of spinal canal stenosis.    Paraspinal muscles & soft tissues: Evaluation of the lung apices is severely limited by respiratory motion artifact.  Dense calcific atherosclerosis of the aortic arch.  Soft tissues of the thoracic inlet are somewhat distorted secondary to motion artifact.                                       X-Ray Pelvis Routine AP (Final result)  Result time 07/23/23 01:54:57   Procedure changed from X-Ray Hip 2 or 3 views Right (with Pelvis when performed)     Final result by Chuy Mckeon MD (07/23/23 01:54:57)                   Impression:      Similar alignment of recent fractures involving the right superior and inferior pubic rami.  No new acute displaced fracture.      Electronically signed by: Chuy Mckeon MD  Date:    07/23/2023  Time:    01:54               Narrative:    EXAMINATION:  XR PELVIS ROUTINE AP; XR FEMUR 2 VIEW RIGHT    CLINICAL HISTORY:  HIP PAIN;  Pain in right hip; Pain in right leg    TECHNIQUE:  Three frontal views of the pelvis performed.  Two views of the right femur also obtained.    COMPARISON:  CT pelvis, 07/08/2023.    FINDINGS:  Pelvis: Bones are mildly demineralized.  Similar appearance of mildly displaced recent fracture of the right  inferior pubic ramus and nondisplaced fracture of the right superior pubic ramus.  Similar fracture fragment alignment allowing for differences in positioning.  No new acute fracture.  No dislocation.  Mild degenerative changes in both hips.    Right femur: No additional acute fracture or dislocation other than described above.  Degenerative changes in the right hip and right knee.  Atherosclerotic vascular calcifications.  Soft tissue edema overlying the right hip.  No unexpected radiopaque foreign body.                                       X-Ray Femur 2 AP/LAT Right (Final result)  Result time 07/23/23 01:54:57      Final result by Chuy Mckeon MD (07/23/23 01:54:57)                   Impression:      Similar alignment of recent fractures involving the right superior and inferior pubic rami.  No new acute displaced fracture.      Electronically signed by: Chuy Mckeon MD  Date:    07/23/2023  Time:    01:54               Narrative:    EXAMINATION:  XR PELVIS ROUTINE AP; XR FEMUR 2 VIEW RIGHT    CLINICAL HISTORY:  HIP PAIN;  Pain in right hip; Pain in right leg    TECHNIQUE:  Three frontal views of the pelvis performed.  Two views of the right femur also obtained.    COMPARISON:  CT pelvis, 07/08/2023.    FINDINGS:  Pelvis: Bones are mildly demineralized.  Similar appearance of mildly displaced recent fracture of the right inferior pubic ramus and nondisplaced fracture of the right superior pubic ramus.  Similar fracture fragment alignment allowing for differences in positioning.  No new acute fracture.  No dislocation.  Mild degenerative changes in both hips.    Right femur: No additional acute fracture or dislocation other than described above.  Degenerative changes in the right hip and right knee.  Atherosclerotic vascular calcifications.  Soft tissue edema overlying the right hip.  No unexpected radiopaque foreign body.                                       X-Ray Chest AP Portable (Final result)   "Result time 07/23/23 01:47:54      Final result by Chuy Mckeon MD (07/23/23 01:47:54)                   Impression:      No detrimental change when compared with 07/08/2023.      Electronically signed by: Chuy Mckeon MD  Date:    07/23/2023  Time:    01:47               Narrative:    EXAMINATION:  XR CHEST AP PORTABLE    CLINICAL HISTORY:  Provided history is "Sepsis;  ".    TECHNIQUE:  One view of the chest.    COMPARISON:  07/08/2023.    FINDINGS:  Cardiac wires overlie the chest.  Patient is slightly rotated.  Cardiomediastinal silhouette is stable and may be at the upper limits of normal in size.  Atherosclerotic calcifications overlie the aortic arch.  Right hemidiaphragm is slightly elevated as seen previously.  Azygous lobe configuration is incidentally noted in the right lung apex.  No confluent area of consolidation.  No sizable pleural effusion.  No pneumothorax.  Hiatal hernia again noted.                                     "

## 2023-08-03 NOTE — NURSING
Patient lying in bed sleeping with sitter present at bedside. Patient has been restless throughout the day and needs constant reorientation and informed that she can't get out of bed.Patient's sitter has been here throughout the day and has assisted patient with eating, patient cleaned up and washed up in bed by staff. Patient's BP high earlier in the day as patient would get very agitated as BP was being checked, provider informed, BP better as day has preogressed. Patient remains in bed sleeping on and off, no needs expressed at this time.

## 2023-08-04 NOTE — PROGRESS NOTES
"CONSULTATION LIAISON PSYCHIATRY PROGRESS NOTE    Patient Name: Radha Sandoval  MRN: [id number]  Patient Class: IP- Inpatient  Admission Date: 7/22/2023  Attending Physician: Maykel Khan MD      SUBJECTIVE:   Radha Sandoval is a 88 y.o. female with past medical history of bilateral chronic subdural hematomas, Hypertension, Acute DVT and PE 5/23, frequent falls with recent Pelvic fracture s/p orthopedic intervention & past pertinent psychiatric history of Late Onset Alzheimers with mood disturbance and Delirium presents to the ED/admitted to the hospital for a fall, shortness of breath, and hypoxia.     Patient presented with right pelvic pain after a fall on 7/22 when she was transferred from bed to wheelchair. Due to her fluctuating mentation she has been agitated with nursing staff and tries to leave her bed and resists being transferred from bed to wheelchair, which has resulted in multiple falls.     At baseline, patient generally disoriented and delirious, but she will have periods or even days of clarity; she is typically oriented x2.      Psychiatry consulted for insomnia contributing to delirium in patient with dementia, uncontrolled on current regimen    Today, patient was resting in bed, intermittently falling asleep throughout the assessment. She stated that she was doing better today because she might be leaving soon. She was conversational and oriented to person, and stated that she was in a "medical machine" when asked place. She denied pain or discomfort. Per nursing, patient slept well overnight and did not require PRNs.       OBJECTIVE:    Mental Status Exam:  General Appearance: dressed in hospital garb, disheveled  Behavior: cooperative, pleasant, polite  Involuntary Movements and Motor Activity: +tremors  Gait and Station: unable to assess - patient lying down or seated  Speech and Language: soft, incoherent at times, slurred, mumbling  Mood: Unable to assess  Affect: appropriate to situation and " context  Thought Process and Associations: Unable to Assess, patient's speech difficult to comprehend  Thought Content and Perceptions:: tangential at times  Sensorium and Orientation: oriented so self, fell asleep intermittently throughout assessment  Recent and Remote Memory: significant impairments noted 2/2 medical condition  Attention and Concentration: attentive to conversation when awake  Fund of Knowledge: unaware of current events 2/2 medical attention  Insight: impaired due to medical condition  Judgment: impaired due to medical condtion           ASSESSMENT & RECOMMENDATIONS   Late Onset Alzheimer's with mood disturbance  Agitation    PSYCH MEDICATIONS  Continue Depacon 250 mg IV nightly or depakote 250 mg PO QHS  Continue Lexapro 5 mg po QD  Continue Memantine 5 mg po BID  PRN: Depacon 125 mg or depakote 125 mg PO PRN agitation q8 hours     DELIRIUM BEHAVIOR MANAGEMENT  PLEASE utilize CHEMICAL restraints with PRN meds first for agitation. Minimize use of PHYSICAL restraints OR have periods of being out of physical restraints if possible.  Keep window shades open and room lit during day and room dim at night in order to promote normal sleep-wake cycles  Encourage family at bedside. [place] patient often to situation, location, date.  Continue to Limit or Discontinue use of Narcotics, Benzos and Anti-cholinergic medications as they may worsen delirium.  Continue medical workup for causative etiology of Delirium.      RISK ASSESSMENT  NO NEED FOR PEC at this time.     FOLLOW UP  Will follow up while in house     DISPOSITION - once medically cleared:  Defer to medical team    Please contact ON CALL psychiatry service (24/7) for any acute issues that may arise.    Dr. Adalberto HERNANDES Psychiatry  [facility]  8/4/2023 10:10  AM        --------------------------------------------------------------------------------------------------------------------------------------------------------------------------------------------------------------------------------------    CONTINUED OBJECTIVE clinical data & findings reviewed and noted for above decision making    Current Medications:   Scheduled Meds:    ascorbic acid (vitamin C)  250 mg Oral Daily    enoxaparin  40 mg Subcutaneous Daily    EScitalopram oxalate  5 mg Oral Daily    lisinopriL  40 mg Oral Daily    memantine  5 mg Oral BID    metoprolol tartrate  25 mg Oral BID    miconazole NITRATE 2 %   Topical (Top) BID    polyethylene glycol  17 g Oral BID    senna-docusate 8.6-50 mg  1 tablet Oral BID    tamsulosin  0.4 mg Oral Nightly    valproate sodium (DEPACON) IVPB  250 mg Intravenous QHS    vitamin D  1,000 Units Oral Daily    zinc sulfate  220 mg Oral Daily     PRN Meds: acetaminophen, aluminum-magnesium hydroxide-simethicone, flumazeniL, glucagon (human recombinant), glucose, glucose, ipratropium, lactulose, levalbuterol, loperamide, melatonin, metoprolol, naloxone, prochlorperazine, sodium chloride 0.9%, valproate sodium (DEPACON) IVPB    Allergies:   Review of patient's allergies indicates:  No Known Allergies    Vitals  Vitals:    08/04/23 0813   BP: (!) 184/73   Pulse:    Resp:    Temp:        Labs/Imaging/Studies:  Recent Results (from the past 24 hour(s))   CBC Auto Differential    Collection Time: 08/04/23  3:51 AM   Result Value Ref Range    WBC 4.90 3.90 - 12.70 K/uL    RBC 3.71 (L) 4.00 - 5.40 M/uL    Hemoglobin 10.0 (L) 12.0 - 16.0 g/dL    Hematocrit 33.6 (L) 37.0 - 48.5 %    MCV 91 82 - 98 fL    MCH 27.0 27.0 - 31.0 pg    MCHC 29.8 (L) 32.0 - 36.0 g/dL    RDW 15.1 (H) 11.5 - 14.5 %    Platelets 241 150 - 450 K/uL    MPV 11.0 9.2 - 12.9 fL    Immature Granulocytes 0.4 0.0 - 0.5 %    Gran # (ANC) 3.1 1.8 - 7.7 K/uL    Immature Grans (Abs) 0.02 0.00 - 0.04 K/uL    Lymph # 1.1  1.0 - 4.8 K/uL    Mono # 0.5 0.3 - 1.0 K/uL    Eos # 0.2 0.0 - 0.5 K/uL    Baso # 0.04 0.00 - 0.20 K/uL    nRBC 0 0 /100 WBC    Gran % 63.1 38.0 - 73.0 %    Lymph % 21.8 18.0 - 48.0 %    Mono % 9.2 4.0 - 15.0 %    Eosinophil % 4.7 0.0 - 8.0 %    Basophil % 0.8 0.0 - 1.9 %    Differential Method Automated    Comprehensive Metabolic Panel    Collection Time: 08/04/23  3:51 AM   Result Value Ref Range    Sodium 137 136 - 145 mmol/L    Potassium 3.9 3.5 - 5.1 mmol/L    Chloride 109 95 - 110 mmol/L    CO2 18 (L) 23 - 29 mmol/L    Glucose 78 70 - 110 mg/dL    BUN 9 8 - 23 mg/dL    Creatinine 0.7 0.5 - 1.4 mg/dL    Calcium 8.4 (L) 8.7 - 10.5 mg/dL    Total Protein 5.5 (L) 6.0 - 8.4 g/dL    Albumin 2.4 (L) 3.5 - 5.2 g/dL    Total Bilirubin 0.4 0.1 - 1.0 mg/dL    Alkaline Phosphatase 121 55 - 135 U/L    AST 20 10 - 40 U/L    ALT 10 10 - 44 U/L    eGFR >60.0 >60 mL/min/1.73 m^2    Anion Gap 10 8 - 16 mmol/L   Magnesium    Collection Time: 08/04/23  3:51 AM   Result Value Ref Range    Magnesium 1.7 1.6 - 2.6 mg/dL   Phosphorus    Collection Time: 08/04/23  3:51 AM   Result Value Ref Range    Phosphorus 3.2 2.7 - 4.5 mg/dL     Imaging Results              CT Head Without Contrast (Final result)  Result time 07/23/23 08:24:54      Final result by Bill Tarango MD (07/23/23 08:24:54)                   Impression:        Similar volume of subdural hematomas.  Changes of density may be in part technical/artifactual in nature and also due to some leakage of recent intravenous contrast from recent CT of the abdomen pelvis with no focal hyperdense acute hemorrhage identified.  Follow-up as clinically warranted.      Electronically signed by: Bill Tarango  Date:    07/23/2023  Time:    08:24               Narrative:    EXAMINATION:  CT HEAD WITHOUT CONTRAST    CLINICAL HISTORY:  Head trauma, minor (Age >= 65y);    TECHNIQUE:  Low dose axial images were obtained through the head.  Coronal and sagittal reformations were also  performed. Contrast was not administered.    COMPARISON:  07/23/2023, 07/09/2023    FINDINGS:  Bilateral subacute subdural hematomas are noted bilaterally again identified measuring up to 14 mm on  the left, similar in volume.  No new focal hyperdense hemorrhage is identified.  Overall mild increased density of the hematoma may in part be technical/artifactual in nature as well as due to leakage of recent intravenous contrast as similar appearances were noted previously (on 07/09/2023 and 07/10/2023).    3 mm midline shift to the right is similar in appearance.  The basal cisterns remain patent.  No acute intraparenchymal process.    Prominent atherosclerotic vascular calcifications are noted at the skull base.    The visualized sinuses and mastoid air cells are essentially clear.                                       CT Chest Abdomen Pelvis With Contrast (xpd) (Final result)  Result time 07/23/23 02:32:32   Procedure changed from CT Abdomen Pelvis With Contrast     Final result by Chuy Mckeon MD (07/23/23 02:32:32)                   Impression:      Similar appearance of recent fractures involving the right inferior and superior pubic rami.  Increased conspicuity of essentially nondisplaced recent fractures of the right michelle sacrum and anterior aspect of the right acetabulum.    Motion and artifact limited study with suboptimal bolus timing.    Moderate-sized hiatal hernia with moderate lower esophageal wall thickening.  Suggest correlation for esophagitis.    Peribronchial thickening and questionable minimal patchy ground-glass opacities in the lung bases and bibasilar subsegmental atelectasis.    Nodular soft tissue opacities in the left breast.  Neoplasm not excluded.  Suggest correlation with physical exam and mammographic follow-up when clinically warranted.    Anasarca.    Mild nonspecific wall thickening of the inferior aspect of the urinary bladder.  Suggest correlation with urinalysis.    Multiple  additional findings discussed in the body of the report.      Electronically signed by: Chuy Mckeon MD  Date:    07/23/2023  Time:    02:32               Narrative:    EXAMINATION:  CT CHEST ABDOMEN PELVIS WITH CONTRAST (XPD)    CLINICAL HISTORY:  Abdominal trauma, blunt;    TECHNIQUE:  Low dose axial images, sagittal and coronal reformations were obtained from the thoracic inlet to the pubic symphysis following the IV administration of 75 mL of Omnipaque 350 .  Oral contrast was not given.    COMPARISON:  CTA chest, 07/08/2023.  CT pelvis, 07/08/2023.  CT abdomen pelvis, 05/06/2023.    FINDINGS:  Chest:    Exam quality is limited by suboptimal bolus timing and motion.  Evaluation is limited by extensive streak artifact due to the patient's arms overlying the field of view.    Heart is borderline enlarged and similar to the prior study.  Coronary artery and mitral valve calcifications.  Thoracic aorta is stable in caliber with moderate to advanced calcific atherosclerosis.  No central pulmonary embolus.  No bulky mediastinal lymphadenopathy.    Detailed evaluation of the pulmonary parenchyma limited by motion.  There is peribronchial thickening and questionable minimal patchy ground-glass opacities in the lung bases.  Bibasilar subsegmental atelectasis.  No consolidation or pleural effusion.    Moderate-sized hiatal hernia with moderate lower esophageal wall thickening.    Abdomen:    Exam quality is limited by suboptimal bolus timing, motion, and extensive artifact related to the patient's arms overlying the field of view.    Liver is similar in size and contour when compared with the prior study.  Multiple hepatic hypodensities most suggestive of cysts.  Gallbladder is unremarkable.  No intrahepatic biliary ductal dilatation.    Spleen, adrenals, and pancreas are stable and negative for acute finding allowing for significant motion.    Kidneys are stable.  There is a large left renal cyst.  Small stone or  vascular calcification in the right renal hilum.  No hydronephrosis.    Evaluation for bowel inflammation is limited by motion.  No small bowel obstruction.  Mild stool burden in the colon.    No pneumoperitoneum or organized fluid collection.    No bulky retroperitoneal lymphadenopathy.    Abdominal aorta is similar in caliber with moderate to advanced calcific atherosclerosis involving the aorta and major branch vessels.    Portal vein is grossly patent.    Pelvis:    Urinary bladder is mildly distended and there is mild wall thickening along the inferior aspect of the urinary bladder similar to the prior CT abdomen.  Rectum is unremarkable.  There is minimal presacral fat stranding.  No significant pelvic free fluid.    Bones and soft tissues:    Recent fractures involving the right superior and inferior pubic rami similar to prior CT pelvis.  Suspect nondisplaced fracture of the right michelle sacrum, more conspicuous when compared with prior CT pelvis.  Nondisplaced fracture of the anterior aspect of the right acetabulum (coronal series 606, image 129) is also more conspicuous when compared with the prior study.  No additional acute fracture.  Degenerative changes in the spine and pubic symphysis.  Mild anterolisthesis of L4 with respect to L5.  Mild left convex scoliotic curvature of the spine.  Old right lower rib fractures.  Operative changes of presumed right lower abdominal wall hernia repair.  Mild diffuse body wall edema.  Somewhat nodular soft tissue densities in the left breast soft tissues.  Suggest follow-up mammogram.                                       CT Head Without Contrast (Final result)  Result time 07/23/23 02:16:47      Final result by Maykel Castro MD (07/23/23 02:16:47)                   Impression:      Subtle increase in density of the subdural hygromas suggesting acute on chronic subdural fluid collection.    Consider follow-up CT scan per protocol in patient with small acute  subdural hematoma on chronic subdural hygromas.      Electronically signed by: Maykel Castro  Date:    07/23/2023  Time:    02:16               Narrative:    EXAMINATION:  CT HEAD WITHOUT CONTRAST    CLINICAL HISTORY:  Head trauma, minor (Age >= 65y);    TECHNIQUE:  Low dose axial CT images obtained throughout the head without intravenous contrast. Sagittal and coronal reconstructions were performed.    COMPARISON:  None.    FINDINGS:  Intracranial compartment:    Ventricles and sulci are stable in size for age without evidence of hydrocephalus. Subdural hygromas appear increased in density slightly suggesting acute on chronic subdural hematoma.    The brain parenchyma appears stable with involutional and chronic small vessel type changes again noted..  No parenchymal mass, hemorrhage, edema or major vascular distribution infarct.    Skull/extracranial contents (limited evaluation): No fracture. Mastoid air cells and paranasal sinuses are essentially clear.                                       CT Cervical Spine Without Contrast (Final result)  Result time 07/23/23 03:01:55      Final result by Chuy Mckeon MD (07/23/23 03:01:55)                   Impression:      No evidence of acute fracture or acute osseous abnormality.    Osteopenia and stable degenerative changes.    Additional findings discussed in the body of the report.    Electronically signed by resident: Anahi Gutierrez  Date:    07/23/2023  Time:    02:02    Electronically signed by: Chuy Mckeon MD  Date:    07/23/2023  Time:    03:01               Narrative:    EXAMINATION:  CT CERVICAL SPINE WITHOUT CONTRAST    CLINICAL HISTORY:  Neck trauma (Age >= 65y);    TECHNIQUE:  Low dose axial images, sagittal and coronal reformations were performed though the cervical spine.  Contrast was not administered.    COMPARISON:  CT 04/18/2023 and 07/09/2023.    FINDINGS:  Alignment: Normal.    Vertebrae: Osteopenia.  No fracture.  Lucent area involving the  left C4 facet without associated cortical disruption, unchanged dating back to 04/18/2023.  Stable mild height loss of the superior endplate of T4 vertebral body.    Discs: Multilevel disc height loss, most pronounced at C5-C6.    C1-2: Dens is intact.  Pre-dens space is maintained.    Skull base and craniocervical junction: Normal.    Degenerative findings:    Stable appearance of mild multilevel degenerative changes through the cervical spine.  There are several levels demonstrating mild to moderate facet arthropathy and mild uncovertebral spurring resulting in areas of mild neural foraminal narrowing.  No areas of spinal canal stenosis.    Paraspinal muscles & soft tissues: Evaluation of the lung apices is severely limited by respiratory motion artifact.  Dense calcific atherosclerosis of the aortic arch.  Soft tissues of the thoracic inlet are somewhat distorted secondary to motion artifact.                                       X-Ray Pelvis Routine AP (Final result)  Result time 07/23/23 01:54:57   Procedure changed from X-Ray Hip 2 or 3 views Right (with Pelvis when performed)     Final result by Chuy Mckeon MD (07/23/23 01:54:57)                   Impression:      Similar alignment of recent fractures involving the right superior and inferior pubic rami.  No new acute displaced fracture.      Electronically signed by: Chuy Mckeon MD  Date:    07/23/2023  Time:    01:54               Narrative:    EXAMINATION:  XR PELVIS ROUTINE AP; XR FEMUR 2 VIEW RIGHT    CLINICAL HISTORY:  HIP PAIN;  Pain in right hip; Pain in right leg    TECHNIQUE:  Three frontal views of the pelvis performed.  Two views of the right femur also obtained.    COMPARISON:  CT pelvis, 07/08/2023.    FINDINGS:  Pelvis: Bones are mildly demineralized.  Similar appearance of mildly displaced recent fracture of the right inferior pubic ramus and nondisplaced fracture of the right superior pubic ramus.  Similar fracture fragment  alignment allowing for differences in positioning.  No new acute fracture.  No dislocation.  Mild degenerative changes in both hips.    Right femur: No additional acute fracture or dislocation other than described above.  Degenerative changes in the right hip and right knee.  Atherosclerotic vascular calcifications.  Soft tissue edema overlying the right hip.  No unexpected radiopaque foreign body.                                       X-Ray Femur 2 AP/LAT Right (Final result)  Result time 07/23/23 01:54:57      Final result by Chuy Mckeon MD (07/23/23 01:54:57)                   Impression:      Similar alignment of recent fractures involving the right superior and inferior pubic rami.  No new acute displaced fracture.      Electronically signed by: Chuy Mckeon MD  Date:    07/23/2023  Time:    01:54               Narrative:    EXAMINATION:  XR PELVIS ROUTINE AP; XR FEMUR 2 VIEW RIGHT    CLINICAL HISTORY:  HIP PAIN;  Pain in right hip; Pain in right leg    TECHNIQUE:  Three frontal views of the pelvis performed.  Two views of the right femur also obtained.    COMPARISON:  CT pelvis, 07/08/2023.    FINDINGS:  Pelvis: Bones are mildly demineralized.  Similar appearance of mildly displaced recent fracture of the right inferior pubic ramus and nondisplaced fracture of the right superior pubic ramus.  Similar fracture fragment alignment allowing for differences in positioning.  No new acute fracture.  No dislocation.  Mild degenerative changes in both hips.    Right femur: No additional acute fracture or dislocation other than described above.  Degenerative changes in the right hip and right knee.  Atherosclerotic vascular calcifications.  Soft tissue edema overlying the right hip.  No unexpected radiopaque foreign body.                                       X-Ray Chest AP Portable (Final result)  Result time 07/23/23 01:47:54      Final result by Chuy Mckeon MD (07/23/23 01:47:54)                    "Impression:      No detrimental change when compared with 07/08/2023.      Electronically signed by: Chuy Mckeon MD  Date:    07/23/2023  Time:    01:47               Narrative:    EXAMINATION:  XR CHEST AP PORTABLE    CLINICAL HISTORY:  Provided history is "Sepsis;  ".    TECHNIQUE:  One view of the chest.    COMPARISON:  07/08/2023.    FINDINGS:  Cardiac wires overlie the chest.  Patient is slightly rotated.  Cardiomediastinal silhouette is stable and may be at the upper limits of normal in size.  Atherosclerotic calcifications overlie the aortic arch.  Right hemidiaphragm is slightly elevated as seen previously.  Azygous lobe configuration is incidentally noted in the right lung apex.  No confluent area of consolidation.  No sizable pleural effusion.  No pneumothorax.  Hiatal hernia again noted.                                     "

## 2023-08-04 NOTE — PLAN OF CARE
[facility] - Intensive Care ([facility])  Discharge Reassessment    Primary Care Provider: No, Primary Doctor    Expected Discharge Date: 8/4/2023    Reassessment (most recent)       Discharge Reassessment - 08/04/23 1456          Discharge Reassessment    Assessment Type Discharge Planning Reassessment (P)      Did the patient's condition or plan change since previous assessment? Yes (P)      Communicated TOSHA with patient/caregiver Yes (P)      Discharge Plan A Skilled Nursing Facility (P)      Discharge Plan B Skilled Nursing Facility (P)                      Pt will not dc to SNF today meds with psych still pending. CLAUDIA has informed Vinicius with [facility] of this information. Patient expected to dc on Monday.       Reyna Johnson LMSW  [facility]   [id number]

## 2023-08-04 NOTE — SUBJECTIVE & OBJECTIVE
Interval History:  No acute issues, awaiting placement.    Review of Systems   Unable to perform ROS: Other     Objective:     Vital Signs (Most Recent):  Temp: 97.6 °F (36.4 °C) (08/04/23 1200)  Pulse: 73 (08/04/23 1516)  Resp: 18 (08/04/23 1428)  BP: 132/60 (08/04/23 1200)  SpO2: 99 % (08/04/23 1428) Vital Signs (24h Range):  Temp:  [97.4 °F (36.3 °C)-98.7 °F (37.1 °C)] 97.6 °F (36.4 °C)  Pulse:  [62-90] 73  Resp:  [17-20] 18  SpO2:  [94 %-99 %] 99 %  BP: (132-187)/(60-75) 132/60     Weight: 75.8 kg (167 lb)  Body mass index is 28.67 kg/m².  No intake or output data in the 24 hours ending 08/04/23 1550     Physical Exam  Constitutional:       General: She is not in acute distress.     Appearance: She is obese.   HENT:      Head: Normocephalic.      Right Ear: External ear normal.      Left Ear: External ear normal.      Nose: Nose normal.   Cardiovascular:      Rate and Rhythm: Normal rate.   Pulmonary:      Effort: Pulmonary effort is normal.   Abdominal:      Palpations: Abdomen is soft.      Tenderness: There is no abdominal tenderness.   Skin:     General: Skin is warm.   Neurological:      Mental Status: She is alert. Mental status is at baseline.       MELD 3.0: 10 at 7/10/2023  5:15 PM  MELD-Na: 7 at 7/10/2023  5:15 PM  Calculated from:  Serum Creatinine: 0.8 mg/dL (Using min of 1 mg/dL) at 7/10/2023  5:15 PM  Serum Sodium: 142 mmol/L (Using max of 137 mmol/L) at 7/10/2023  5:15 PM  Total Bilirubin: 0.4 mg/dL (Using min of 1 mg/dL) at 7/8/2023  3:49 PM  Serum Albumin: 2.7 g/dL at 7/8/2023  3:49 PM  INR(ratio): 1.1 at 7/8/2023  3:49 PM  Age at listing (hypothetical): 88 years  Sex: Female at 7/10/2023  5:15 PM      Significant Labs:  CBC:  Recent Labs   Lab 08/03/23 0219 08/04/23  0351   WBC 6.51 4.90   HGB 9.6* 10.0*   HCT 33.3* 33.6*    241     CMP:  Recent Labs   Lab 08/03/23 0219 08/04/23  0351    137   K 4.4 3.9    109   CO2 23 18*   GLU 92 78   BUN 14 9   CREATININE 0.8 0.7  "  CALCIUM 9.0 8.4*   PROT 5.8* 5.5*   ALBUMIN 2.7* 2.4*   BILITOT 0.6 0.4   ALKPHOS 131 121   AST 27 20   ALT 11 10   ANIONGAP 9 10     PTINR:  No results for input(s): "INR" in the last 48 hours.    Significant Procedures:   Dobutamine Stress Test with Color Flow: No results found for this or any previous visit.      "

## 2023-08-04 NOTE — PROGRESS NOTES
[facility] - Intensive Care ([address] Medicine  Progress Note    Patient Name: Radha Sandoval  MRN: [id number]  Patient Class: IP- Inpatient   Admission Date: 7/22/2023  Length of Stay: 12 days  Attending Physician: Maykel Khan MD  Primary Care Provider: Bev, Primary Doctor        Subjective:     Principal Problem:Atrial fibrillation with RVR        HPI:  Radha Sandoval is a 88 y.o. female with PMH significant for chronic BL pleural effusions, recent DVT diagnosed in March '23 (on eliquis), dementia, HTN, with [place] [facility] non[place], who presents after a fall at her residential while being transferred from wheelchair to bed. Hx taken per Caretaker Sonia and Son (POA) Maykel. Caretaker at bedside unaware of head trauma or other injury. Of note, patient sustained her pelvic fractures at [facility], but after hospitalization at [facility] was placed in [facility] on 7/14. Caretaker reports patient also had another hospitalization during this time at . Patient generally disoriented and delirious, but she will have periods or even days of clarity. At baseline, she is oriented x2. Due to mentation she has difficulty working with staff which results in falls. Prior to her pelvic fracture on 7/8, pt was able to perform independent transfers from wheelchair and could walk short distances with her walker, but she is now max assist. Currently on Eliquis. Patient without complaint on my exam, denies pain.     In the ED: AFVSS. CBC with baseline anemia. CMP reveals slight elevation in Cr 1.0 (baseline 0.8) and K 3.3. BNP and troponin elevated but at baseline. UA grossly infectious. Spann placed for urinary retention.  All imaging reviewed. 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 Repeat CTH after 6 hours was stable. XR pelvis reveals stable recent R superior and inferior pubic rami fractures, non-displaced. EKG in NSR with prolonged Qtc 510 ms. Given  tylenol, norco, and 1g rocephin.       Overview/Hospital Course:  Radha Sandoval was placed in  observation after a fall. NSGY consulted for evaluation of SDH and determined no need for intervention , will follow up in clinic in 2 weeks. HOLD eliquis until follow up. Orthopedics discussed surgical options with family and recommended ORIF pelvis, but family is declined at this time and would prefer to continue with plan for PT/OT. Therapy assessment; recs for SNF. WBAT. Dvt ppx with 40 lovenox SQ daily (ok per NSGY). Psychiatry provided recs for medication adjustment for insomnia and delirium with prn dosing for agitation.  Agitation persists and patient started expressing suicidal ideation when asked to work with therapy. Psychiatry aware of SI, and made med adjustments as indicated while monitoring QT. Caretaker has been staying at bedside on multiple occasions and expressed the patient's significant cognitive decline over the last several weeks to months.  Palliative Medicine consulted with son and patient full code.    Planned to discharge patient on 07/27 to skilled nursing facility, however, patient tachypneic with stridor and increased work of breathing.  Mentation worsened with progressive respiratory alkalosis.  No improvement with breathing treatment or trial of Lasix.  Concern for bronchomalacia with CT images positive for bilateral mainstem bronchi narrowing.  Sats remained stable on 2 L O2.  Discussed with rapid response team and medical ICU.  Patient was moved to ICU step-down unit and monitored closely.  On 07/29 increased work of breathing and stridor spontaneously resolved.  Mentation briefly improved but multiple episodes of confusion.  QT even longer and Zyprexa changed to Depakote.  Repeat CT images of the brain revealed stable subdural hematomas.  On further discussion with patient's son, he opted to complete neurologic workup with MRI of the brain and EEG.  Son gave consent for MRI with Ativan,  no acute infarct, stable SDH and 3 mm midline shift. Abnormal EEG due to moderate generalized non-specific cerebral dysfunction with no electrographic seizures or indications of seizure tendency.  B12 and ammonia normal.  Folate and RPR checked on admission 2 weeks prior at outside facility and unremarkable.  Later in the afternoon, nursing staff called due to tachycardia and hypotension.  EKG revealed AFib with RVR with blood pressure 70s over 50s.  Minimal response to IV fluids.  To my knowledge, no history of AFib.  Patient transferred to ICU.    Of note recurrent episode of urinary retention on 07/31 and a UA was collected growing Enterococcus species but only max of 49,999 CFU.  Typically would not treat unless 100,000 CFU, will defer to ICU as the patient has been transferred      Interval History:  No acute issues, awaiting placement.    Review of Systems   Unable to perform ROS: Other     Objective:     Vital Signs (Most Recent):  Temp: 97.6 °F (36.4 °C) (08/04/23 1200)  Pulse: 73 (08/04/23 1516)  Resp: 18 (08/04/23 1428)  BP: 132/60 (08/04/23 1200)  SpO2: 99 % (08/04/23 1428) Vital Signs (24h Range):  Temp:  [97.4 °F (36.3 °C)-98.7 °F (37.1 °C)] 97.6 °F (36.4 °C)  Pulse:  [62-90] 73  Resp:  [17-20] 18  SpO2:  [94 %-99 %] 99 %  BP: (132-187)/(60-75) 132/60     Weight: 75.8 kg (167 lb)  Body mass index is 28.67 kg/m².  No intake or output data in the 24 hours ending 08/04/23 1550     Physical Exam  Constitutional:       General: She is not in acute distress.     Appearance: She is obese.   HENT:      Head: Normocephalic.      Right Ear: External ear normal.      Left Ear: External ear normal.      Nose: Nose normal.   Cardiovascular:      Rate and Rhythm: Normal rate.   Pulmonary:      Effort: Pulmonary effort is normal.   Abdominal:      Palpations: Abdomen is soft.      Tenderness: There is no abdominal tenderness.   Skin:     General: Skin is warm.   Neurological:      Mental Status: She is alert. Mental  "status is at baseline.       MELD 3.0: 10 at 7/10/2023  5:15 PM  MELD-Na: 7 at 7/10/2023  5:15 PM  Calculated from:  Serum Creatinine: 0.8 mg/dL (Using min of 1 mg/dL) at 7/10/2023  5:15 PM  Serum Sodium: 142 mmol/L (Using max of 137 mmol/L) at 7/10/2023  5:15 PM  Total Bilirubin: 0.4 mg/dL (Using min of 1 mg/dL) at 7/8/2023  3:49 PM  Serum Albumin: 2.7 g/dL at 7/8/2023  3:49 PM  INR(ratio): 1.1 at 7/8/2023  3:49 PM  Age at listing (hypothetical): 88 years  Sex: Female at 7/10/2023  5:15 PM      Significant Labs:  CBC:  Recent Labs   Lab 08/03/23 0219 08/04/23  0351   WBC 6.51 4.90   HGB 9.6* 10.0*   HCT 33.3* 33.6*    241     CMP:  Recent Labs   Lab 08/03/23  0219 08/04/23  0351    137   K 4.4 3.9    109   CO2 23 18*   GLU 92 78   BUN 14 9   CREATININE 0.8 0.7   CALCIUM 9.0 8.4*   PROT 5.8* 5.5*   ALBUMIN 2.7* 2.4*   BILITOT 0.6 0.4   ALKPHOS 131 121   AST 27 20   ALT 11 10   ANIONGAP 9 10     PTINR:  No results for input(s): "INR" in the last 48 hours.    Significant Procedures:   Dobutamine Stress Test with Color Flow: No results found for this or any previous visit.          Assessment/Plan:      * Atrial fibrillation with RVR  Currently rate controlled.  Continue beta blocker.  We will resume apixaban on 08/05/2023 per neurosurgery recommendations    Frequent falls  Disposition is skilled nursing facility.  Follow up with       Prolonged Q-T interval on ECG  - continue to monitor  - avoid QT prolonging meds as much as possible  - change Zyprexa to Depakote per Psychiatry  - monitor tele     Urinary retention  Recurrent issue   - duncan placed, removed with decent urination, had to be replaced on 07/30 following recurrence of retention        Goals of care, counseling/discussion  - Caretaker expresses several concerns regarding patients continual health and function decline  - Recent stay at SNF with progress noted and pt was placed in [facility] at [facility]. However, [facility] is not the " appropriate level of care for patient given dementia and frequent falls   - Avinger will now require 24 hour sitters for patient to return   - DILIA TAYLOR consulted for dc planning   - code status conversation held with son evening of 8/1, would like to discuss with brother as well.  Currently code status remains full.    Acute metabolic encephalopathy  · Likely progression of dementia, no clear medical etiology   · On multiple medications that could contribute that are relatively new according to the son:  Memantine, Robaxin, Lexapro, Haldol, Depakote and Depakote which is new this admission.  · More confused on 07/28, better on 07/29--discontinued scheduled Robaxin  · Multiple ABGs with progressing respiratory alkalosis, possibly contributing.  · TSH, folate ordered 2 weeks ago at outside facility normal.  B12, ammonia normal.  MRI brain with no acute process.  EEG negative for seizure activity (although patient had a dose of Ativan about 4 hours earlier).  · Critical care consulted, appreciate assistance:  AFib RVR with hypotension on 08/01, transferred to ICU    Hypokalemia  · Monitor K/Mag and replace as needed      Multiple closed right sided fractures of pelvis without disruption of pelvic ring  Frequent falls   - diagnosed at last [facility] hospitalization about 2 weeks ago.   - repeat XRs show stable fractures, non-displaced   - Ortho consulted in ED; originally recommended non-op management, but after further discussion recommended ORIF pelvis 7/24.  Declined surgery.  - weight bearing as tolerated, avoid sedating meds   - PT/OT consulted  - patient will likely need SNF v long term placement given max assist requirements and progressive debility, in setting of dementia and behavioral disturbances     Subdural hygroma  Acute subdural hematoma   - Acute SDH on chronic SD hygroma s/p fall at [facility]; no acute deficits  - repeat CTH stable   - holding eliquis for now - may continue in 2 weeks (per NSGY)   - > NSGY ok's  starting [facility] for dvt ppx, holding for now with acute number  - INR wnl  - NSGY cleared. No need for continued monitoring aside from 2 week clinic follow up.     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  - continue memantine, Lexapro  - psychiatry consulted for assistance: Zyprexa discontinued for Depakote due to prolonged QT  - monitor for improvement   - EKG monitoring   - 7/24-25 Pt reporting SI from patient during session. Caretaker says this is not new, patient expresses this intermittently. Psych aware.     8/4/23.  Psych recommending oral Depakote had night for d/c to snf    Lumbar spondylosis with myelopathy  - tylenol for pain control, robaxin as needed    Abnormal urinalysis  Recurrent UTIs  - initial UA concern for infection, completed Rocephin  - urine culture with Candida glabrata 10 K to 49 K units not consistent with convincing UTI.  - duncan placed for U-retention, removed with further urinary retention.  Replaced Duncan on 07/30 with Enterococcus colony count 10 K-49,999 CFU.  Unlikely to represent true infection.  -will leave Duncan on discharge, patient will need outpatient Urology for voiding trial.        Primary hypertension  - BP uncontrolled intermittently, as high as 226/90 -- suspect bc patient was agitated and spitting out meds  - continue lisinopril, lopressor, lasix.  Added nifedipine 7/30  - PRN IV hydralazine for SBP >180 or if unable to tolerate oral meds.         VTE Risk Mitigation (From admission, onward)         Ordered     apixaban tablet 5 mg  2 times daily         08/04/23 1237     enoxaparin injection 40 mg  Daily         08/04/23 1237     Reason for No Pharmacological VTE Prophylaxis  Once        Question:  Reasons:  Answer:  Risk of Bleeding  Comment:  SDH    07/23/23 0831     IP VTE HIGH RISK PATIENT  Once         07/23/23 0831     Place sequential compression device   Until discontinued         07/23/23 0831                Discharge Planning   TOSHA: 8/4/2023     Code Status: Full Code   Is the patient medically ready for discharge?: No    Reason for patient still in hospital (select all that apply): Patient trending condition  Discharge Plan A: Skilled Nursing Facility   Discharge Delays: None known at this time              Maykel Khan MD  Department of Hospital Medicine   [facility] - Intensive Care ([place]-14)

## 2023-08-04 NOTE — ASSESSMENT & PLAN NOTE
Suicidal ideation   Insomnia   - baseline per caretaker; however, mentation waxes and wanes.  - continue memantine, Lexapro  - psychiatry consulted for assistance: Zyprexa discontinued for Depakote due to prolonged QT  - monitor for improvement   - EKG monitoring   - 7/24-25 Pt reporting SI from patient during session. Caretaker says this is not new, patient expresses this intermittently. Psych aware.     8/4/23.  Psych recommending oral Depakote had night for d/c to snf

## 2023-08-04 NOTE — PT/OT/SLP PROGRESS
Speech Language Pathology Treatment    Patient Name:  Radha Sandoval   MRN:  [id number]  Admitting Diagnosis: Atrial fibrillation with RVR    Recommendations:                 General Recommendations:  Follow-up not indicated  Diet recommendations:  Puree Diet - IDDSI Level 4, Liquid Diet Level: Thin liquids - IDDSI Level 0 (baseline)  Aspiration Precautions: 1 bite/sip at a time, Assistance with meals, Eliminate distractions, Feed only when awake/alert, Frequent oral care, HOB to 90 degrees, Meds crushed in puree, Monitor for s/s of aspiration, Remain upright 30 minutes post meal, Small bites/sips, and Standard aspiration precautions   General Precautions: Standard, fall  Communication strategies:  provide increased time to answer and go to room if call light pushed    Assessment:     Radha Sandoval is a 88 y.o. female with an SLP diagnosis of Dysphagia.      Subjective     Pt awake and alert upon ST entry. Caregiver present at bedside. Pt agreeable to participate with ST.    Pain/Comfort:  Pain Rating 1: 0/10    Respiratory Status: Room air    Objective:     Has the patient been evaluated by SLP for swallowing?   Yes  Keep patient NPO? No   Current Respiratory Status:   room air     Pt awake/alert and agreeable to participate with SLP. She endorsed good tolerance of all recent po intake, though caregiver reported minimal intake overall. Pt accepted x4 tsp applesauce and several straw sips water without overt s/s airway threat. Pt's caregiver reported puree/thin diet at baseline. SLP offered education re: current imrpessions and decreased need for skilled SLP follow up at this time. Pt and caregiver expressed agreement. Pt would benefit from reinforcement.       Goals:   Multidisciplinary Problems       SLP Goals       Not on file                    Plan:       Plan of Care reviewed with:  patient, caregiver   SLP Follow-Up:  No       Discharge recommendations:  other (see comments) (tbd) none per SLP    Time Tracking:      SLP Treatment Date:   08/04/23  Speech Start Time:  0951  Speech Stop Time:  1001     Speech Total Time (min):  10 min    Billable Minutes: Treatment Swallowing Dysfunction 10    08/04/2023

## 2023-08-04 NOTE — NURSING
Patient lying in bed sleeping with sitter present at bedside. Patient's sitter has been here throughout the day and has assisted patient with eating, patient cleaned up and washed up in bed by staff. Patient remains in bed sleeping on and off, no needs expressed at this time.

## 2023-08-04 NOTE — PATIENT CARE CONFERENCE
[id number] JESSI Sandoval  :1935     AGE:88 y.o.     SEX:female      STATUS:Full Code      ISOLATION:No active isolations   ALLERGIES:Patient has no known allergies.   ADMIT DATE:  2023   PHYSICIAN:  Maykel Khan MD   DIAGNOSIS: Pelvic fracture [S32.9XXA]  Right leg pain [M79.604]  Right hip pain [M25.551]  Acute cystitis without hematuria [N30.00]  Chest pain [R07.9]  Frequent falls [R29.6]  Sepsis [A41.9]  At risk for long QT syndrome [Z91.89]  Shock [R57.9] Atrial fibrillation with RVR  CC: Shortness of Breath (Pt coming from [facility] for SOB starting about an hour ago and alerted NH staff. 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 2023    Lumbar spondylosis with myelopathy 2023    Multiple closed right sided fractures of pelvis without disruption of pelvic ring 2023    Primary hypertension 6/10/2022    Subdural hygroma 2023     Scheduled procedure(s): No  Labs to Monitor (no values):   Recent Vitals:  Temp: 98 °F (36.7 °C)  Pulse: 69  Resp: 17  SpO2: 95 %  BP: (!) 146/65    Respiratory:    Cardiac: Rhythm: normal sinus rhythm      Wt Readings from Last 2 Encounters:   23 75.8 kg (167 lb)   23 75.8 kg (167 lb)     GI/: Diet Dysphagia Pureed (IDDSI Level 4) Thin   Last Bowel Movement: 23    Neuro: ex:Dis to place, time, situation  Spann catheter: Yes- retention  Skin:WDL   Joselito Score: 13      Lines/Drains/Airways       Drain  Duration                  Urethral Catheter 23 0030 16 Fr. 4 days              Peripheral  Intravenous Line  Duration                  Peripheral IV - Single Lumen 08/03/23 2130 20 G Anterior;Right Forearm <1 day                  Antibiotics (From admission, onward)      None          VTE Risk Mitigation (From admission, onward)           Ordered     enoxaparin injection 40 mg  Daily         07/31/23 0506     Reason for No Pharmacological VTE Prophylaxis  Once        Question:  Reasons:  Answer:  Risk of Bleeding  Comment:  SDH    07/23/23 0831     IP VTE HIGH RISK PATIENT  Once         07/23/23 0831     Place sequential compression device  Until discontinued         07/23/23 0831                  Glycemic control:  Recent Labs   Lab 08/01/23  1829 08/01/23  1901 08/01/23  1902 08/02/23  0416   POCTGLUCOSE 92 74 78 89     Fall risk: bed alarm  Mobility: GEMS (Theresa Early Mobility Scale): Level 1-Primary in bed activities    Nursing Update/Plan of Care: Pt slept well this shift. A&Ox1. VSS. No reports of pain. Spann in place due to retention, catheter care performed. T&RQ2. Sitter at bedside, bed alarm in place. Safety precautions in place. Call light in reach. No further concerns noted at this time.

## 2023-08-04 NOTE — PLAN OF CARE
SW has left a voicemail for Vinicius, admission coordinator with [facility] and awaiting a call back    Patient will be discharging to [facility] today.      Reyna Johnson LMSW  [facility]   [id number]

## 2023-08-04 NOTE — PT/OT/SLP PROGRESS
Physical Therapy Treatment    Patient Name:  Radha Sandoval   MRN:  [id number]  Admitting Diagnosis: Atrial fibrillation with RVR  Recent Surgery: Procedure(s) (LRB):  ORIF,PELVIS, rui, bone foam (N/A) 11 Days Post-Op    Recommendations:     Discharge Recommendations:  nursing facility, skilled   Discharge Equipment Recommendations: to be determined by next level of care   Barriers to discharge: None    Highest Level of Mobility: sit to stand  Assistance Needed: maximal assistance    Assessment:     Radha Sandoval is a 88 y.o. female admitted with a medical diagnosis of Atrial fibrillation with RVR. Patient tolerated PT treatment well today. She  is most limited today by weakness.  She was able to practice bed mobility, sitting, and even attempted a stand. Focus of treatment was improving overall mobility and activity tolerance. Pt is progressing well. See detailed treatment note below:    Problem List: weakness, impaired endurance, impaired self care skills, impaired functional mobility, impaired balance, impaired cognition, decreased coordination, decreased upper extremity function, decreased lower extremity function, decreased safety awareness, decreased ROM. weakness, impaired endurance, impaired self care skills, impaired functional mobility, impaired balance, impaired cognition, decreased coordination, decreased upper extremity function, decreased lower extremity function, decreased safety awareness, decreased ROM  Rehab Prognosis: Good     GOALS:   Multidisciplinary Problems       Physical Therapy Goals          Problem: Physical Therapy    Goal Priority Disciplines Outcome Goal Variances Interventions   Physical Therapy Goal     PT, PT/OT Ongoing, Progressing     Description: Goals to be met by: 2023     Patient will increase functional independence with mobility by performin. Supine to sit with MInimal Assistance  2. Sit to supine with MInimal Assistance  3. Rolling to Left and Right with Minimal  "Assistance.  4. Sit to stand transfer with Minimal Assistance  5. Gait  x 10 feet with Moderate Assistance using Rolling Walker.                        Plan:     During this hospitalization, patient to be seen 4 x/week to address the listed problems via gait training, therapeutic activities, therapeutic exercises, neuromuscular re-education  Plan of Care Expires:  09/01/23  Plan of Care Reviewed with: patient, caregiver    Subjective     Communicated with RN prior to session.  Patient found resting in bed upon PT entry to room.   "I'll give you $50 if you will take me home"    Pain/Comfort:  Pain Rating 1: 0/10  Pain Rating Post-Intervention 1: 0/10    Objective:     Patient found with: telemetry, peripheral IV, pulse ox (continuous), duncan catheter   Mental Status: Patient is Alert, Cooperative, and Confused during session.    General Precautions: Standard, fall   Orthopedic Precautions:RLE weight bearing as tolerated, LLE weight bearing as tolerated   Braces: N/A   Respiratory Status: 2L - nasal cannula  Vital Signs (Most Recent):    Temp: 97.7 °F (36.5 °C) (08/04/23 0750)  Pulse: 62 (08/04/23 1052)  Resp: 18 (08/04/23 0750)  BP: (!) 184/73 (08/04/23 0813)  SpO2: 97 % (08/04/23 0750)    Functional Mobility:  Bed Mobility:   Rolling/Turning to Left: moderate assistance  Rolling/Turning to Right: moderate assistance  Supine to Sit: moderate assistance  Scooting anteriorly to EOB to have both feet planted on floor: moderate assistance  Sit to Supine: moderate assistance    Sitting Balance at Edge of Bed:  Assistance Level Required:  CGA - SBA  Postural deviations noted: rounded shoulders, forward head, posterior pelvic tilt  Patient demonstrated occasional posterior lean, but able to correct the deviation with CGA    Transfers:   Sit <> Stand Transfer: maximal assistance with no assistive device   Patient able to clear the mattress, but unable to fully extend into standing    Therapeutic Exercises:   Patient and " caregiver educated on and demonstrated lower extremity strengthening exercises to perform throughout the day.    Education:  Patient was educated on the following:  Progress of PT goals and plan of care  In room safety and use of call button  Importance of continued upright mobility and exercise    Patient left HOB elevated with all lines intact, call button in reach, and RN notified.    AM-PAC 6 CLICK MOBILITY  Turning over in bed (including adjusting bedclothes, sheets and blankets)?: 2  Sitting down on and standing up from a chair with arms (e.g., wheelchair, bedside commode, etc.): 2  Moving from lying on back to sitting on the side of the bed?: 2  Moving to and from a bed to a chair (including a wheelchair)?: 1  Need to walk in hospital room?: 1  Climbing 3-5 steps with a railing?: 1  Basic Mobility Total Score: 9       Time Tracking:     PT Received On: 08/04/23  PT Start Time: 0852     PT Stop Time: 0907  PT Total Time (min): 15 min     Billable Minutes:   Therapeutic Activity 15    Treatment Type: Treatment  PT/PTA: PT       Leda Graves PT, DPT  08/04/2023

## 2023-08-05 NOTE — PLAN OF CARE
CLAUDIA reviewed chart, pt expected to dc Monday to [facility]. CLAUDIA updated TOSHA.       Charity Romeo LMSW   Pediatric/PICU    [facility]  [phone]

## 2023-08-05 NOTE — SUBJECTIVE & OBJECTIVE
Interval History:  No acute issues, awaiting placement    Review of Systems   Unable to perform ROS: Other     Objective:     Vital Signs (Most Recent):  Temp: 97.6 °F (36.4 °C) (08/05/23 0440)  Pulse: (!) 59 (08/05/23 0735)  Resp: 18 (08/05/23 0735)  BP: (!) 147/80 (08/05/23 0735)  SpO2: 97 % (08/05/23 0735) Vital Signs (24h Range):  Temp:  [97.6 °F (36.4 °C)-97.8 °F (36.6 °C)] 97.6 °F (36.4 °C)  Pulse:  [] 59  Resp:  [16-20] 18  SpO2:  [97 %-99 %] 97 %  BP: (109-178)/(56-80) 147/80     Weight: 75.8 kg (167 lb)  Body mass index is 28.67 kg/m².  No intake or output data in the 24 hours ending 08/05/23 1026     Physical Exam  Constitutional:       General: She is not in acute distress.     Appearance: She is obese.   HENT:      Head: Normocephalic.      Right Ear: External ear normal.      Left Ear: External ear normal.      Nose: Nose normal.   Cardiovascular:      Rate and Rhythm: Normal rate.   Pulmonary:      Effort: Pulmonary effort is normal.   Abdominal:      Palpations: Abdomen is soft.      Tenderness: There is no abdominal tenderness.   Skin:     General: Skin is warm.   Neurological:      Mental Status: She is alert. Mental status is at baseline.     MELD 3.0: 10 at 7/10/2023  5:15 PM  MELD-Na: 7 at 7/10/2023  5:15 PM  Calculated from:  Serum Creatinine: 0.8 mg/dL (Using min of 1 mg/dL) at 7/10/2023  5:15 PM  Serum Sodium: 142 mmol/L (Using max of 137 mmol/L) at 7/10/2023  5:15 PM  Total Bilirubin: 0.4 mg/dL (Using min of 1 mg/dL) at 7/8/2023  3:49 PM  Serum Albumin: 2.7 g/dL at 7/8/2023  3:49 PM  INR(ratio): 1.1 at 7/8/2023  3:49 PM  Age at listing (hypothetical): 88 years  Sex: Female at 7/10/2023  5:15 PM      Significant Labs:  CBC:  Recent Labs   Lab 08/04/23 0351   WBC 4.90   HGB 10.0*   HCT 33.6*        CMP:  Recent Labs   Lab 08/04/23 0351      K 3.9      CO2 18*   GLU 78   BUN 9   CREATININE 0.7   CALCIUM 8.4*   PROT 5.5*   ALBUMIN 2.4*   BILITOT 0.4   ALKPHOS 121   AST  "20   ALT 10   ANIONGAP 10     PTINR:  No results for input(s): "INR" in the last 48 hours.    Significant Procedures:   Dobutamine Stress Test with Color Flow: No results found for this or any previous visit.      " Pt lives in a house with 4 JACLYN and a flight of stairs to bedroom, however [facility] states she has access to a bedroom on the first floor she will be staying in as its closer to her parents room. Bathroom has a tub shower. After pts last scoli Sx she rec'd HCPT followed by outpatient PT.

## 2023-08-05 NOTE — NURSING
Patient lying in bed sleeping. Patient's caregivers have remained at bedside throughout the day. Patient's BP elevated in the morning while awake as patient continues to get agitated when BP cuff inflates and provider informed, but has improved throughout the day. Patient adjusted in bed and turned as appropriate. Patient has slept most of the day but remains calm and awakens spontaneously.

## 2023-08-05 NOTE — PROGRESS NOTES
[facility] - Intensive Care ([address] Medicine  Progress Note    Patient Name: Radha Sandoval  MRN: [id number]  Patient Class: IP- Inpatient   Admission Date: 7/22/2023  Length of Stay: 13 days  Attending Physician: Maykel Khan MD  Primary Care Provider: Bev, Primary Doctor        Subjective:     Principal Problem:Atrial fibrillation with RVR        HPI:  Radha Sandoval is a 88 y.o. female with PMH significant for chronic BL pleural effusions, recent DVT diagnosed in March '23 (on eliquis), dementia, HTN, with recent hospitalization here at [facility] for pelvic fracture treated non-operatively, who presents after a fall at her senior living while being transferred from wheelchair to bed. Hx taken per Caretaker Sonia and Son (POA) Maykel. Caretaker at bedside unaware of head trauma or other injury. Of note, patient sustained her pelvic fractures at [facility], but after hospitalization at [facility] was placed in [facility] on 7/14. Caretaker reports patient also had another hospitalization during this time at . Patient generally disoriented and delirious, but she will have periods or even days of clarity. At baseline, she is oriented x2. Due to mentation she has difficulty working with staff which results in falls. Prior to her pelvic fracture on 7/8, pt was able to perform independent transfers from wheelchair and could walk short distances with her walker, but she is now max assist. Currently on Eliquis. Patient without complaint on my exam, denies pain.     In the ED: AFVSS. CBC with baseline anemia. CMP reveals slight elevation in Cr 1.0 (baseline 0.8) and K 3.3. BNP and troponin elevated but at baseline. UA grossly infectious. Spann placed for urinary retention.  All imaging reviewed. 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 Repeat CTH after 6 hours was stable. XR pelvis reveals stable recent R superior and inferior pubic rami fractures, non-displaced. EKG in NSR with prolonged Qtc 510 ms. Given  tylenol, norco, and 1g rocephin.       Overview/Hospital Course:  Radha Sandoval was placed in  observation after a fall. NSGY consulted for evaluation of SDH and determined no need for intervention , will follow up in clinic in 2 weeks. HOLD eliquis until follow up. Orthopedics discussed surgical options with family and recommended ORIF pelvis, but family is declined at this time and would prefer to continue with plan for PT/OT. Therapy assessment; recs for SNF. WBAT. Dvt ppx with 40 lovenox SQ daily (ok per NSGY). Psychiatry provided recs for medication adjustment for insomnia and delirium with prn dosing for agitation.  Agitation persists and patient started expressing suicidal ideation when asked to work with therapy. Psychiatry aware of SI, and made med adjustments as indicated while monitoring QT. Caretaker has been staying at bedside on multiple occasions and expressed the patient's significant cognitive decline over the last several weeks to months.  Palliative Medicine consulted with son and patient full code.    Planned to discharge patient on 07/27 to skilled nursing facility, however, patient tachypneic with stridor and increased work of breathing.  Mentation worsened with progressive respiratory alkalosis.  No improvement with breathing treatment or trial of Lasix.  Concern for bronchomalacia with CT images positive for bilateral mainstem bronchi narrowing.  Sats remained stable on 2 L O2.  Discussed with rapid response team and medical ICU.  Patient was moved to ICU step-down unit and monitored closely.  On 07/29 increased work of breathing and stridor spontaneously resolved.  Mentation briefly improved but multiple episodes of confusion.  QT even longer and Zyprexa changed to Depakote.  Repeat CT images of the brain revealed stable subdural hematomas.  On further discussion with patient's son, he opted to complete neurologic workup with MRI of the brain and EEG.  Son gave consent for MRI with Ativan,  no acute infarct, stable SDH and 3 mm midline shift. Abnormal EEG due to moderate generalized non-specific cerebral dysfunction with no electrographic seizures or indications of seizure tendency.  B12 and ammonia normal.  Folate and RPR checked on admission 2 weeks prior at outside facility and unremarkable.  Later in the afternoon, nursing staff called due to tachycardia and hypotension.  EKG revealed AFib with RVR with blood pressure 70s over 50s.  Minimal response to IV fluids.  To my knowledge, no history of AFib.  Patient transferred to ICU.    Of note recurrent episode of urinary retention on 07/31 and a UA was collected growing Enterococcus species but only max of 49,999 CFU.  Typically would not treat unless 100,000 CFU, will defer to ICU as the patient has been transferred      Interval History:  No acute issues, awaiting placement    Review of Systems   Unable to perform ROS: Other     Objective:     Vital Signs (Most Recent):  Temp: 97.6 °F (36.4 °C) (08/05/23 0440)  Pulse: (!) 59 (08/05/23 0735)  Resp: 18 (08/05/23 0735)  BP: (!) 147/80 (08/05/23 0735)  SpO2: 97 % (08/05/23 0735) Vital Signs (24h Range):  Temp:  [97.6 °F (36.4 °C)-97.8 °F (36.6 °C)] 97.6 °F (36.4 °C)  Pulse:  [] 59  Resp:  [16-20] 18  SpO2:  [97 %-99 %] 97 %  BP: (109-178)/(56-80) 147/80     Weight: 75.8 kg (167 lb)  Body mass index is 28.67 kg/m².  No intake or output data in the 24 hours ending 08/05/23 1026     Physical Exam  Constitutional:       General: She is not in acute distress.     Appearance: She is obese.   HENT:      Head: Normocephalic.      Right Ear: External ear normal.      Left Ear: External ear normal.      Nose: Nose normal.   Cardiovascular:      Rate and Rhythm: Normal rate.   Pulmonary:      Effort: Pulmonary effort is normal.   Abdominal:      Palpations: Abdomen is soft.      Tenderness: There is no abdominal tenderness.   Skin:     General: Skin is warm.   Neurological:      Mental Status: She is alert.  "Mental status is at baseline.     MELD 3.0: 10 at 7/10/2023  5:15 PM  MELD-Na: 7 at 7/10/2023  5:15 PM  Calculated from:  Serum Creatinine: 0.8 mg/dL (Using min of 1 mg/dL) at 7/10/2023  5:15 PM  Serum Sodium: 142 mmol/L (Using max of 137 mmol/L) at 7/10/2023  5:15 PM  Total Bilirubin: 0.4 mg/dL (Using min of 1 mg/dL) at 7/8/2023  3:49 PM  Serum Albumin: 2.7 g/dL at 7/8/2023  3:49 PM  INR(ratio): 1.1 at 7/8/2023  3:49 PM  Age at listing (hypothetical): 88 years  Sex: Female at 7/10/2023  5:15 PM      Significant Labs:  CBC:  Recent Labs   Lab 08/04/23  0351   WBC 4.90   HGB 10.0*   HCT 33.6*        CMP:  Recent Labs   Lab 08/04/23  0351      K 3.9      CO2 18*   GLU 78   BUN 9   CREATININE 0.7   CALCIUM 8.4*   PROT 5.5*   ALBUMIN 2.4*   BILITOT 0.4   ALKPHOS 121   AST 20   ALT 10   ANIONGAP 10     PTINR:  No results for input(s): "INR" in the last 48 hours.    Significant Procedures:   Dobutamine Stress Test with Color Flow: No results found for this or any previous visit.          Assessment/Plan:      * Atrial fibrillation with RVR  Currently rate controlled.  Continue beta blocker.  We will resume apixaban on 08/05/2023 per neurosurgery recommendations    Frequent falls  Disposition is skilled nursing facility.  Follow up with       Prolonged Q-T interval on ECG  - continue to monitor  - avoid QT prolonging meds as much as possible  - change Zyprexa to Depakote per Psychiatry  - monitor tele     Urinary retention  Recurrent issue   - duncan placed, removed with decent urination, had to be replaced on 07/30 following recurrence of retention        Goals of care, counseling/discussion  - Caretaker expresses several concerns regarding patients continual health and function decline  - Recent stay at SNF with progress noted and pt was placed in [facility]IN at [place]. However, ELVIN is not the appropriate level of care for patient given dementia and frequent falls   - [facility] will now require 24 " hour sitters for patient to return   - DILIA TAYLOR consulted for dc planning   - code status conversation held with son evening of 8/1, would like to discuss with brother as well.  Currently code status remains full.    Acute metabolic encephalopathy  · Likely progression of dementia, no clear medical etiology   · On multiple medications that could contribute that are relatively new according to the son:  Memantine, Robaxin, Lexapro, Haldol, Depakote and Depakote which is new this admission.  · More confused on 07/28, better on 07/29--discontinued scheduled Robaxin  · Multiple ABGs with progressing respiratory alkalosis, possibly contributing.  · TSH, folate ordered 2 weeks ago at outside facility normal.  B12, ammonia normal.  MRI brain with no acute process.  EEG negative for seizure activity (although patient had a dose of Ativan about 4 hours earlier).  · Critical care consulted, appreciate assistance:  AFib RVR with hypotension on 08/01, transferred to ICU    Hypokalemia  · Monitor K/Mag and replace as needed      Multiple closed right sided fractures of pelvis without disruption of pelvic ring  Frequent falls   - diagnosed at last [facility] hospitalization about 2 weeks ago.   - repeat XRs show stable fractures, non-displaced   - Ortho consulted in ED; originally recommended non-op management, but after further discussion recommended ORIF pelvis 7/24.  Declined surgery.  - weight bearing as tolerated, avoid sedating meds   - PT/OT consulted  - patient will likely need SNF v long term placement given max assist requirements and progressive debility, in setting of dementia and behavioral disturbances     Subdural hygroma  Acute subdural hematoma   - Acute SDH on chronic SD hygroma s/p fall at [facility]; no acute deficits  - repeat CTH stable   - holding eliquis for now - may continue in 2 weeks (per NSGY)   - > NSGY ok's starting [facility] for dvt ppx, holding for now with acute number  - INR wnl  - NSGY cleared. No need for continued  monitoring aside from 2 week clinic follow up.     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  - continue memantine, Lexapro  - psychiatry consulted for assistance: Zyprexa discontinued for Depakote due to prolonged QT  - monitor for improvement   - EKG monitoring   - 7/24-25 Pt reporting SI from patient during session. Caretaker says this is not new, patient expresses this intermittently. Psych aware.     8/4/23.  Psych recommending oral Depakote had night for d/c to snf    Lumbar spondylosis with myelopathy  - tylenol for pain control, robaxin as needed    Abnormal urinalysis  Recurrent UTIs  - initial UA concern for infection, completed Rocephin  - urine culture with Candida glabrata 10 K to 49 K units not consistent with convincing UTI.  - duncan placed for U-retention, removed with further urinary retention.  Replaced Duncan on 07/30 with Enterococcus colony count 10 K-49,999 CFU.  Unlikely to represent true infection.  -will leave Duncan on discharge, patient will need outpatient Urology for voiding trial.        Primary hypertension  - BP uncontrolled intermittently, as high as 226/90 -- suspect bc patient was agitated and spitting out meds  - continue lisinopril, lopressor, lasix.  Added nifedipine 7/30  - PRN IV hydralazine for SBP >180 or if unable to tolerate oral meds.         VTE Risk Mitigation (From admission, onward)         Ordered     apixaban tablet 5 mg  2 times daily         08/04/23 1237     Reason for No Pharmacological VTE Prophylaxis  Once        Question:  Reasons:  Answer:  Risk of Bleeding  Comment:  SDH    07/23/23 0831     IP VTE HIGH RISK PATIENT  Once         07/23/23 0831     Place sequential compression device  Until discontinued         07/23/23 0831                Discharge Planning   TOSHA: 8/4/2023     Code Status: Full Code   Is the patient medically ready for discharge?: No     Reason for patient still in hospital (select all that apply): Patient trending condition  Discharge Plan A: Skilled Nursing Facility   Discharge Delays: None known at this time              Maykel Khan MD  Department of Hospital Medicine   [facility] - Intensive Care ([place]-[facility])

## 2023-08-05 NOTE — PLAN OF CARE
Problem: Infection  Goal: Absence of Infection Signs and Symptoms  Outcome: Ongoing, Progressing     Problem: Adult Inpatient Plan of Care  Goal: Plan of Care Review  Outcome: Ongoing, Progressing  Goal: Patient-Specific Goal (Individualized)  Outcome: Ongoing, Progressing  Goal: Absence of Hospital-Acquired Illness or Injury  Outcome: Ongoing, Progressing  Goal: Optimal Comfort and Wellbeing  Outcome: Ongoing, Progressing  Goal: Readiness for Transition of Care  Outcome: Ongoing, Progressing     Problem: Coping Ineffective  Goal: Effective Coping  Outcome: Ongoing, Progressing     Problem: Impaired Wound Healing  Goal: Optimal Wound Healing  Outcome: Ongoing, Progressing     Problem: Fall Injury Risk  Goal: Absence of Fall and Fall-Related Injury  Outcome: Ongoing, Progressing     Problem: Skin Injury Risk Increased  Goal: Skin Health and Integrity  Outcome: Ongoing, Progressing      Detail Level: Zone Continue Regimen: Orally take one tab of Spironolactone 25mg daily \\nApply triamcinolone and azelaic acid mixed with minoxidil compound to scalp nightly\\nApply Winlevi to the scalp once daily Plan: \\nPatient has tried to take oral spironolactone in the past and had side effects of light headedness and lower BP.\\nShe is currently being treated for hypertension. \\nShe plans to discuss if her medication can be adjusted to allow her to try and take this medication again. \\nShe takes BP (sartan) medication in the morning, plan to take the spironolactone in the evening to avoid synergistic effect.

## 2023-08-06 NOTE — PLAN OF CARE
Patient remained stable and safe this shift.  She was alert and oriented to person, place and she knew the current year but not the month.

## 2023-08-06 NOTE — PATIENT CARE CONFERENCE
[id number] JESSI Sandoval  :1935     AGE:88 y.o.     SEX:female      STATUS:Full Code      ISOLATION:No active isolations   ALLERGIES:Patient has no known allergies.   ADMIT DATE:  2023   PHYSICIAN:  Maykel Khan MD   DIAGNOSIS: Pelvic fracture [S32.9XXA]  Right leg pain [M79.604]  Right hip pain [M25.551]  Acute cystitis without hematuria [N30.00]  Chest pain [R07.9]  Frequent falls [R29.6]  Sepsis [A41.9]  At risk for long QT syndrome [Z91.89]  Shock [R57.9] Atrial fibrillation with RVR  CC: Shortness of Breath ([facility] for SOB starting about an hour ago and alerted NH staff. 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 2023    Lumbar spondylosis with myelopathy 2023    Multiple closed right sided fractures of pelvis without disruption of pelvic ring 2023    Primary hypertension 6/10/2022    Subdural hygroma 2023     Scheduled procedure(s): No  Labs to Monitor (no values):   Recent Vitals:  Temp: 98.2 °F (36.8 °C)  Pulse: 61  Resp: 18  SpO2: 97 %  BP: (!) 143/63    Respiratory:    Cardiac: Rhythm: normal sinus rhythm      Wt Readings from Last 2 Encounters:   23 75.8 kg (167 lb)   23 75.8 kg (167 lb)     GI/: Diet Dysphagia Pureed (IDDSI Level 4) Thin   Last Bowel Movement: 23    Neuro: ex:Disoriented to place, time, situation.   Spann catheter: Yes; Reason for continuation: Urinary retention  Skin:WDL   Joselito Score: 13      Lines/Drains/Airways       Drain  Duration                  Urethral Catheter 23  0030 16 Fr. 6 days              Peripheral Intravenous Line  Duration                  Peripheral IV - Single Lumen 08/03/23 2130 20 G Anterior;Right Forearm 2 days                  Antibiotics (From admission, onward)      None          VTE Risk Mitigation (From admission, onward)           Ordered     apixaban tablet 5 mg  2 times daily         08/04/23 1237     Reason for No Pharmacological VTE Prophylaxis  Once        Question:  Reasons:  Answer:  Risk of Bleeding  Comment:  SDH    07/23/23 0831     IP VTE HIGH RISK PATIENT  Once         07/23/23 0831     Place sequential compression device  Until discontinued         07/23/23 0831                  Glycemic control:  Recent Labs   Lab 08/01/23  1829 08/01/23  1901 08/01/23  1902 08/02/23  0416   POCTGLUCOSE 92 [phone] 89     Fall risk: bed alarm  Mobility: GEMS ([place] Early Mobility Scale): Level 1-Primary in bed activities    Nursing Update/Plan of Care: Pt slept well this shift. A&Ox1. VSS. No reports of pain. Spann in place, catheter care performed. T&RQ2. Pt to be DC possibly tomorrow with Home Health. Safety precautions in place. Call light in reach. No further concerns noted at this time.

## 2023-08-06 NOTE — PROGRESS NOTES
[facility] - Intensive Care ([address] Medicine  Progress Note    Patient Name: Radha Sandoval  MRN: [id number]  Patient Class: IP- Inpatient   Admission Date: 7/22/2023  Length of Stay: 14 days  Attending Physician: Maykel Khan MD  Primary Care Provider: Bev, Primary Doctor        Subjective:     Principal Problem:Atrial fibrillation with RVR        HPI:  Radha Sandoval is a 88 y.o. female with PMH significant for chronic BL pleural effusions, recent DVT diagnosed in March '23 (on eliquis), dementia, HTN, with [place] [facility] [place], who [facility] while being [facility] from wheelchair to bed. Hx taken per Caretaker Sonia and Son (POA) Maykel. Caretaker at bedside unaware of head trauma or other injury. Of note, patient sustained her pelvic fractures at [facility], but after hospitalization at [facility] was placed in [facility] on 7/14. Caretaker reports patient also had another hospitalization during this time at . Patient generally disoriented and delirious, but she will have periods or even days of clarity. At baseline, she is oriented x2. Due to mentation she has difficulty working with staff which results in falls. Prior to her pelvic fracture on 7/8, pt was able to perform independent transfers from wheelchair and could walk short distances with her walker, but she is now max assist. Currently on Eliquis. Patient without complaint on my exam, denies pain.     In the ED: AFVSS. CBC with baseline anemia. CMP reveals slight elevation in Cr 1.0 (baseline 0.8) and K 3.3. BNP and troponin elevated but at baseline. UA grossly infectious. Spann placed for urinary retention.  All imaging reviewed. 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 Repeat CTH after 6 hours was stable. XR pelvis reveals stable recent R superior and inferior pubic rami fractures, non-displaced. EKG in NSR with prolonged Qtc 510 ms. Given  tylenol, norco, and 1g rocephin.       Overview/Hospital Course:  Radha Sandoval was placed in  observation after a fall. NSGY consulted for evaluation of SDH and determined no need for intervention , will follow up in clinic in 2 weeks. HOLD eliquis until follow up. Orthopedics discussed surgical options with family and recommended ORIF pelvis, but family is declined at this time and would prefer to continue with plan for PT/OT. Therapy assessment; recs for SNF. WBAT. Dvt ppx with 40 lovenox SQ daily (ok per NSGY). Psychiatry provided recs for medication adjustment for insomnia and delirium with prn dosing for agitation.  Agitation persists and patient started expressing suicidal ideation when asked to work with therapy. Psychiatry aware of SI, and made med adjustments as indicated while monitoring QT. Caretaker has been staying at bedside on multiple occasions and expressed the patient's significant cognitive decline over the last several weeks to months.  Palliative Medicine consulted with son and patient full code.    Planned to discharge patient on 07/27 to skilled nursing facility, however, patient tachypneic with stridor and increased work of breathing.  Mentation worsened with progressive respiratory alkalosis.  No improvement with breathing treatment or trial of Lasix.  Concern for bronchomalacia with CT images positive for bilateral mainstem bronchi narrowing.  Sats remained stable on 2 L O2.  Discussed with rapid response team and medical ICU.  Patient was moved to ICU step-down unit and monitored closely.  On 07/29 increased work of breathing and stridor spontaneously resolved.  Mentation briefly improved but multiple episodes of confusion.  QT even longer and Zyprexa changed to Depakote.  Repeat CT images of the brain revealed stable subdural hematomas.  On further discussion with patient's son, he opted to complete neurologic workup with MRI of the brain and EEG.  Son gave consent for MRI with Ativan,  no acute infarct, stable SDH and 3 mm midline shift. Abnormal EEG due to moderate generalized non-specific cerebral dysfunction with no electrographic seizures or indications of seizure tendency.  B12 and ammonia normal.  Folate and RPR checked on admission 2 weeks prior at outside facility and unremarkable.  Later in the afternoon, nursing staff called due to tachycardia and hypotension.  EKG revealed AFib with RVR with blood pressure 70s over 50s.  Minimal response to IV fluids.  To my knowledge, no history of AFib.  Patient transferred to ICU.    Of note recurrent episode of urinary retention on 07/31 and a UA was collected growing Enterococcus species but only max of 49,999 CFU.  Typically would not treat unless 100,000 CFU, will defer to ICU as the patient has been transferred      Interval History:  No complaints or acute events.  Occasionally agitated with hypertension    Review of Systems   Unable to perform ROS: Other     Objective:     Vital Signs (Most Recent):  Temp: 97.8 °F (36.6 °C) (08/06/23 0828)  Pulse: 60 (08/06/23 0930)  Resp: 18 (08/06/23 0828)  BP: (!) 184/74 (08/06/23 0831)  SpO2: 96 % (08/06/23 0828) Vital Signs (24h Range):  Temp:  [97.7 °F (36.5 °C)-98.2 °F (36.8 °C)] 97.8 °F (36.6 °C)  Pulse:  [55-75] 60  Resp:  [16-18] 18  SpO2:  [92 %-97 %] 96 %  BP: (140-198)/(63-93) 184/74     Weight: 75.8 kg (167 lb)  Body mass index is 28.67 kg/m².    Intake/Output Summary (Last 24 hours) at 8/6/2023 1017  Last data filed at 8/6/2023 0455  Gross per 24 hour   Intake --   Output 1475 ml   Net -1475 ml      Physical Exam  Constitutional:       General: She is not in acute distress.     Appearance: She is obese.   HENT:      Head: Normocephalic.      Right Ear: External ear normal.      Left Ear: External ear normal.      Nose: Nose normal.   Cardiovascular:      Rate and Rhythm: Normal rate.   Pulmonary:      Effort: Pulmonary effort is normal.   Abdominal:      Palpations: Abdomen is soft.       "Tenderness: There is no abdominal tenderness.   Skin:     General: Skin is warm.   Neurological:      Mental Status: She is alert. Mental status is at baseline.     MELD 3.0: 10 at 7/10/2023  5:15 PM  MELD-Na: 7 at 7/10/2023  5:15 PM  Calculated from:  Serum Creatinine: 0.8 mg/dL (Using min of 1 mg/dL) at 7/10/2023  5:15 PM  Serum Sodium: 142 mmol/L (Using max of 137 mmol/L) at 7/10/2023  5:15 PM  Total Bilirubin: 0.4 mg/dL (Using min of 1 mg/dL) at 7/8/2023  3:49 PM  Serum Albumin: 2.7 g/dL at 7/8/2023  3:49 PM  INR(ratio): 1.1 at 7/8/2023  3:49 PM  Age at listing (hypothetical): 88 years  Sex: Female at 7/10/2023  5:15 PM      Significant Labs:  CBC:  No results for input(s): "WBC", "HGB", "HCT", "PLT" in the last 48 hours.  CMP:  No results for input(s): "NA", "K", "CL", "CO2", "GLU", "BUN", "CREATININE", "CALCIUM", "PROT", "ALBUMIN", "BILITOT", "ALKPHOS", "AST", "ALT", "ANIONGAP", "EGFRNONAA" in the last 48 hours.    Invalid input(s): "ESTGFAFRICA"  PTINR:  No results for input(s): "INR" in the last 48 hours.    Significant Procedures:   Dobutamine Stress Test with Color Flow: No results found for this or any previous visit.          Assessment/Plan:      * Atrial fibrillation with RVR  Currently rate controlled.  Continue beta blocker.  We will resume apixaban on 08/05/2023 per neurosurgery recommendations    Frequent falls  Disposition is skilled nursing facility.  Follow up with       Prolonged Q-T interval on ECG  - continue to monitor  - avoid QT prolonging meds as much as possible  - change Zyprexa to Depakote per Psychiatry  - monitor tele     Urinary retention  Recurrent issue   - duncan placed, removed with decent urination, had to be replaced on 07/30 following recurrence of retention        Goals of care, counseling/discussion  - Caretaker expresses several concerns regarding patients continual health and function decline  - Recent stay at SNF with progress noted and pt was placed in residential " at [place]. However, [facility] is not the appropriate level of care for patient given dementia and frequent falls   - [place] will now require 24 hour sitters for patient to return   - DILIA TAYLOR consulted for dc planning   - code status conversation held with son evening of 8/1, would like to discuss with brother as well.  Currently code status remains full.    Acute metabolic encephalopathy  · Likely progression of dementia, no clear medical etiology   · On multiple medications that could contribute that are relatively new according to the son:  Memantine, Robaxin, Lexapro, Haldol, Depakote and Depakote which is new this admission.  · More confused on 07/28, better on 07/29--discontinued scheduled Robaxin  · Multiple ABGs with progressing respiratory alkalosis, possibly contributing.  · TSH, folate ordered 2 weeks ago at outside facility normal.  B12, ammonia normal.  MRI brain with no acute process.  EEG negative for seizure activity (although patient had a dose of Ativan about 4 hours earlier).  · Critical care consulted, appreciate assistance:  AFib RVR with hypotension on 08/01, transferred to ICU    Hypokalemia  · Monitor K/Mag and replace as needed      Multiple closed right sided fractures of pelvis without disruption of pelvic ring  Frequent falls   - [place] [facility] [place].   - repeat XRs show stable fractures, non-displaced   - Ortho consulted in ED; originally recommended non-op management, but after further discussion recommended ORIF pelvis 7/24.  Declined surgery.  - weight bearing as tolerated, avoid sedating meds   - PT/OT consulted  - patient will likely need SNF v long term placement given max assist requirements and progressive debility, in setting of dementia and behavioral disturbances     Subdural hygroma  Acute subdural hematoma   - Acute SDH on chronic SD hygroma s/p fall at [facility]; no acute deficits  - repeat CTH stable   - holding eliquis for now - may continue in 2  weeks (per NSGY)   - > NSGY ok's starting [facility] for dvt ppx, holding for now with acute number  - INR wnl  - NSGY cleared. No need for continued monitoring aside from 2 week clinic follow up.     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  - continue memantine, Lexapro  - psychiatry consulted for assistance: Zyprexa discontinued for Depakote due to prolonged QT  - monitor for improvement   - EKG monitoring   - 7/24-25 Pt reporting SI from patient during session. Caretaker says this is not new, patient expresses this intermittently. Psych aware.     8/4/23.  Psych recommending oral Depakote had night for d/c to snf    Lumbar spondylosis with myelopathy  - tylenol for pain control, robaxin as needed    Abnormal urinalysis  Recurrent UTIs  - initial UA concern for infection, completed Rocephin  - urine culture with Candida glabrata 10 K to 49 K units not consistent with convincing UTI.  - duncan placed for U-retention, removed with further urinary retention.  Replaced Duncan on 07/30 with Enterococcus colony count 10 K-49,999 CFU.  Unlikely to represent true infection.  -will leave Duncan on discharge, patient will need outpatient Urology for voiding trial.        Primary hypertension  - BP uncontrolled intermittently, as high as 226/90 -- suspect bc patient was agitated and spitting out meds  - continue lisinopril, lopressor, lasix.  Added nifedipine 7/30  - PRN IV hydralazine for SBP >180 or if unable to tolerate oral meds.         VTE Risk Mitigation (From admission, onward)         Ordered     apixaban tablet 5 mg  2 times daily         08/04/23 1237     Reason for No Pharmacological VTE Prophylaxis  Once        Question:  Reasons:  Answer:  Risk of Bleeding  Comment:  SDH    07/23/23 0831     IP VTE HIGH RISK PATIENT  Once         07/23/23 0831     Place sequential compression device  Until discontinued          07/23/23 0831                Discharge Planning   TOSHA: 8/7/2023     Code Status: Full Code   Is the patient medically ready for discharge?: No    Reason for patient still in hospital (select all that apply): Patient trending condition  Discharge Plan A: Skilled Nursing Facility   Discharge Delays: None known at this time              Maykel Khan MD  Department of Hospital Medicine   [facility] - Intensive Care ([place]-14)

## 2023-08-06 NOTE — SUBJECTIVE & OBJECTIVE
"Interval History:  No complaints or acute events.  Occasionally agitated with hypertension    Review of Systems   Unable to perform ROS: Other     Objective:     Vital Signs (Most Recent):  Temp: 97.8 °F (36.6 °C) (08/06/23 0828)  Pulse: 60 (08/06/23 0930)  Resp: 18 (08/06/23 0828)  BP: (!) 184/74 (08/06/23 0831)  SpO2: 96 % (08/06/23 0828) Vital Signs (24h Range):  Temp:  [97.7 °F (36.5 °C)-98.2 °F (36.8 °C)] 97.8 °F (36.6 °C)  Pulse:  [55-75] 60  Resp:  [16-18] 18  SpO2:  [92 %-97 %] 96 %  BP: (140-198)/(63-93) 184/74     Weight: 75.8 kg (167 lb)  Body mass index is 28.67 kg/m².    Intake/Output Summary (Last 24 hours) at 8/6/2023 1017  Last data filed at 8/6/2023 0455  Gross per 24 hour   Intake --   Output 1475 ml   Net -1475 ml      Physical Exam  Constitutional:       General: She is not in acute distress.     Appearance: She is obese.   HENT:      Head: Normocephalic.      Right Ear: External ear normal.      Left Ear: External ear normal.      Nose: Nose normal.   Cardiovascular:      Rate and Rhythm: Normal rate.   Pulmonary:      Effort: Pulmonary effort is normal.   Abdominal:      Palpations: Abdomen is soft.      Tenderness: There is no abdominal tenderness.   Skin:     General: Skin is warm.   Neurological:      Mental Status: She is alert. Mental status is at baseline.     MELD 3.0: 10 at 7/10/2023  5:15 PM  MELD-Na: 7 at 7/10/2023  5:15 PM  Calculated from:  Serum Creatinine: 0.8 mg/dL (Using min of 1 mg/dL) at 7/10/2023  5:15 PM  Serum Sodium: 142 mmol/L (Using max of 137 mmol/L) at 7/10/2023  5:15 PM  Total Bilirubin: 0.4 mg/dL (Using min of 1 mg/dL) at 7/8/2023  3:49 PM  Serum Albumin: 2.7 g/dL at 7/8/2023  3:49 PM  INR(ratio): 1.1 at 7/8/2023  3:49 PM  Age at listing (hypothetical): 88 years  Sex: Female at 7/10/2023  5:15 PM      Significant Labs:  CBC:  No results for input(s): "WBC", "HGB", "HCT", "PLT" in the last 48 hours.  CMP:  No results for input(s): "NA", "K", "CL", "CO2", "GLU", "BUN", " ""CREATININE", "CALCIUM", "PROT", "ALBUMIN", "BILITOT", "ALKPHOS", "AST", "ALT", "ANIONGAP", "EGFRNONAA" in the last 48 hours.    Invalid input(s): "ESTGFAFRICA"  PTINR:  No results for input(s): "INR" in the last 48 hours.    Significant Procedures:   Dobutamine Stress Test with Color Flow: No results found for this or any previous visit.      "

## 2023-08-07 NOTE — DISCHARGE SUMMARY
[facility] - Intensive Care ([place] [address])  [facility] Medicine  Discharge Summary      Patient Name: Radha Sandoval  MRN: [id number]  Admission Date: 7/22/2023  Hospital Length of Stay: 15 days  Discharge Date and Time:  08/07/2023 10:06 AM  Attending Physician: Maykel Khan MD   Discharging Provider: Maykel Khan MD  Discharge Provider Team: [facility]OSP MED A  Primary Care Provider: Bev, Primary Doctor            Procedure(s) (LRB):  ORIF,PELVIS, rui, bone foam (N/A)      Hospital Course: Radha Sandoval was placed in  observation after a fall. NSGY consulted for evaluation of SDH and determined no need for intervention , will follow up in clinic in 2 weeks. HOLD eliquis until follow up. Orthopedics discussed surgical options with family and recommended ORIF pelvis, but family is declined at this time and would prefer to continue with plan for PT/OT. Therapy assessment; recs for SNF. WBAT. Dvt ppx with 40 lovenox SQ daily (ok per NSGY). Psychiatry provided recs for medication adjustment for insomnia and delirium with prn dosing for agitation.  Agitation persists and patient started expressing suicidal ideation when asked to work with therapy. Psychiatry aware of SI, and made med adjustments as indicated while monitoring QT. Caretaker has been staying at bedside on multiple occasions and expressed the patient's significant cognitive decline over the last several weeks to months.  Palliative Medicine consulted with son and patient full code.     Planned to discharge patient on 07/27 to skilled nursing facility, however, patient tachypneic with stridor and increased work of breathing.  Mentation worsened with progressive respiratory alkalosis.  No improvement with breathing treatment or trial of Lasix.  Concern for bronchomalacia with CT images positive for bilateral mainstem bronchi narrowing.  Sats remained stable on 2 L O2.  Discussed with rapid response team and medical ICU.  Patient was moved to ICU step-down unit and  monitored closely.  On 07/29 increased work of breathing and stridor spontaneously resolved.  Mentation briefly improved but multiple episodes of confusion.  QT even longer and Zyprexa changed to Depakote.  Repeat CT images of the brain revealed stable subdural hematomas.  On further discussion with patient's son, he opted to complete neurologic workup with MRI of the brain and EEG.  Son gave consent for MRI with Ativan, no acute infarct, stable SDH and 3 mm midline shift. Abnormal EEG due to moderate generalized non-specific cerebral dysfunction with no electrographic seizures or indications of seizure tendency.  B12 and ammonia normal.  Folate and RPR checked on admission 2 weeks prior at outside facility and unremarkable.  Spann placed for urinary rentention. Referral given for Neurosurgery, Orthopedics and Urology    She was accepted to SNF and discharged. Apixaban resumed per Neurosurgery recommendations.         Physical Exam  Constitutional:       General: She is not in acute distress.     Appearance: She is obese.   HENT:      Head: Normocephalic.      Right Ear: External ear normal.      Left Ear: External ear normal.      Nose: Nose normal.   Cardiovascular:      Rate and Rhythm: Normal rate.   Pulmonary:      Effort: Pulmonary effort is normal.   Abdominal:      Palpations: Abdomen is soft.      Tenderness: There is no abdominal tenderness. Spann catheter  Skin:     General: Skin is warm.   Neurological:      Mental Status: She is alert. Mental status is at baseline.     Consults:   Consults (From admission, onward)          Status Ordering Provider     Inpatient consult to Critical Care Medicine  Once        Provider:  Yolis Benton PA-C    Completed J CARLOS AZUL III     Inpatient consult to PICC team ([facility])  Once        Provider:  (Not yet assigned)    Completed J CARLOS AZUL III     Inpatient consult to Neurosurgery  Once        Provider:  (Not yet assigned)    Completed  DARLENE COLLIER     Inpatient consult to Psychiatry  Once        Provider:  (Not yet assigned)    Completed DARLENE COLLIER     Inpatient consult to Palliative Care  Once        Provider:  (Not yet assigned)    Completed DARLENE COLLIER     Inpatient consult to Social Work  Once        Provider:  (Not yet assigned)    Acknowledged DARLENE COLLIER     Inpatient consult to Orthopedic Surgery  Once        Provider:  (Not yet assigned)    PRASHANT Mora            Final Active Diagnoses:    Diagnosis Date Noted POA    PRINCIPAL PROBLEM:  Atrial fibrillation with RVR [I48.91] 08/01/2023 No    Frequent falls [R29.6] 08/01/2023 Not Applicable    Hypotension [I95.9] 08/01/2023 Yes    Respiratory alkalosis [E87.3] 07/29/2023 No    Closed pelvic ring fracture [S32.810A] 07/25/2023 Yes    Suicidal ideation [R45.851] 07/25/2023 Not Applicable    Encounter for palliative care [Z51.5] 07/24/2023 Not Applicable    Prolonged Q-T interval on ECG [R94.31] 07/23/2023 Yes    Acute subdural hematoma [S06.5XAA] 07/23/2023 Yes    Urinary retention [R33.9] 07/11/2023 Yes    Multiple closed right sided fractures of pelvis without disruption of pelvic ring [S32.82XA] 07/09/2023 Yes    Hypokalemia [E87.6] 07/09/2023 Yes    Goals of care, counseling/discussion [Z71.89] 07/09/2023 Not Applicable    Acute metabolic encephalopathy [G93.41] 07/09/2023 Yes    Subdural hygroma [G96.08] 07/08/2023 Yes    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, F02.83] 05/22/2023 Yes    Lumbar spondylosis with myelopathy [M47.16] 04/25/2023 Yes    Abnormal urinalysis [R82.90] 04/18/2023 Yes    Primary hypertension [I10] 06/10/2022 Yes      Problems Resolved During this Admission:    Diagnosis Date Noted Date Resolved POA    Tachyarrhythmia [R00.0] 07/29/2023 08/01/2023 Yes    Acute hypoxemic respiratory failure [J96.01] 05/22/2023 08/03/2023 No      Discharged Condition: fair    Disposition: Skilled Nursing  Facility    Follow Up:    Patient Instructions:      Ambulatory referral/consult to Neurosurgery   Standing Status: Future   Referral Priority: Routine Referral Type: Consultation   Referral Reason: Specialty Services Required   Requested Specialty: Neurosurgery   Number of Visits Requested: 1     Ambulatory referral/consult to Orthopedics   Standing Status: Future   Referral Priority: Routine Referral Type: Consultation   Requested Specialty: Orthopedic Surgery   Number of Visits Requested: 1     Ambulatory referral/consult to Urology   Standing Status: Future   Referral Priority: Routine Referral Type: Consultation   Referral Reason: Specialty Services Required   Requested Specialty: Urology   Number of Visits Requested: 1     Medications:  Reconciled Home Medications:      Medication List        START taking these medications      divalproex 250 MG EC tablet  Commonly known as: DEPAKOTE  Take 1 tablet (250 mg total) by mouth every evening.     polyethylene glycol 17 gram Pwpk  Commonly known as: GLYCOLAX  Take 17 g by mouth 2 (two) times daily as needed.            CHANGE how you take these medications      apixaban 5 mg Tab  Commonly known as: ELIQUIS  Take 1 tablet (5 mg total) by mouth 2 (two) times daily. Starting 8/5  What changed: additional instructions     EScitalopram oxalate 5 MG Tab  Commonly known as: LEXAPRO  Take 2 tablets (10 mg total) by mouth once daily.  What changed: how much to take     haloperidoL 0.5 MG tablet  Commonly known as: HALDOL  Take 1 tablet (0.5 mg total) by mouth every evening.  What changed:   when to take this  reasons to take this            CONTINUE taking these medications      acetaminophen 325 MG tablet  Commonly known as: TYLENOL  Take 2 tablets (650 mg total) by mouth every 6 (six) hours as needed (Pain).     ascorbic acid (vitamin C) 250 MG tablet  Commonly known as: VITAMIN C  Take 1 tablet (250 mg total) by mouth once daily.     furosemide 20 MG tablet  Commonly known  as: LASIX  Take 1 tablet (20 mg total) by mouth 2 (two) times daily.     lisinopriL 40 MG tablet  Commonly known as: PRINIVIL,ZESTRIL  Take 1 tablet (40 mg total) by mouth once daily.     loperamide 2 mg capsule  Commonly known as: IMODIUM  Take 1 capsule (2 mg total) by mouth 4 (four) times daily as needed for Diarrhea.     melatonin 3 mg tablet  Commonly known as: MELATIN  Take 2 tablets (6 mg total) by mouth nightly as needed for Insomnia.     memantine 5 MG Tab  Commonly known as: NAMENDA  Take 1 tablet (5 mg total) by mouth 2 (two) times daily.     methocarbamoL 500 MG Tab  Commonly known as: ROBAXIN  Take 1 tablet (500 mg total) by mouth 3 (three) times daily as needed (muscle spasms, pain scale 5-7).     metoprolol tartrate 25 MG tablet  Commonly known as: LOPRESSOR  Take 1 tablet (25 mg total) by mouth 2 (two) times daily.     miconazole NITRATE 2 % 2 % top powder  Commonly known as: MICOTIN  Apply topically 2 (two) times daily. Underside of bilateral breasts     potassium chloride 10 MEQ Tbsr  Commonly known as: KLOR-CON  Take 1 tablet (10 mEq total) by mouth once daily.     tamsulosin 0.4 mg Cap  Commonly known as: FLOMAX  Take 1 capsule (0.4 mg total) by mouth nightly.     zinc sulfate 50 mg zinc (220 mg) capsule  Commonly known as: ZINCATE  Take 1 capsule (220 mg total) by mouth once daily.              Pending Diagnostic Studies:       Procedure Component Value Units Date/Time    EKG 12-lead [[id number]]     Order Status: Sent Lab Status: No result           Indwelling Lines/Drains at time of discharge:   Lines/Drains/Airways       Drain  Duration                  Urethral Catheter 07/31/23 0030 16 Fr. 7 days                    Time spent on the discharge of patient: 40 minutes         Maykel Khan MD  Department of Hospital Medicine  [facility] ([place])

## 2023-08-07 NOTE — TREATMENT PLAN
NURSING HOME ORDERS    08/07/2023  [facility]  [facility]ETO - INTENSIVE CARE ([place])  [address]  [facility] [address]  Dept: [phone]  Loc: [phone]     Admit to Nursing Home:  SNF    Diagnoses:  Active Hospital Problems    Diagnosis  POA    *Atrial fibrillation with RVR [I48.91]  No    Frequent falls [R29.6]  Not Applicable     Priority: 2     Hypotension [I95.9]  Yes    Respiratory alkalosis [E87.3]  No    Closed pelvic ring fracture [S32.810A]  Yes    Suicidal ideation [R45.851]  Not Applicable    Encounter for palliative care [Z51.5]  Not Applicable    Prolonged Q-T interval on ECG [R94.31]  Yes    Acute subdural hematoma [S06.5XAA]  Yes    Urinary retention [R33.9]  Yes    Multiple closed right sided fractures of pelvis without disruption of pelvic ring [S32.82XA]  Yes    Hypokalemia [E87.6]  Yes    Goals of care, counseling/discussion [Z71.89]  Not Applicable    Acute metabolic encephalopathy [G93.41]  Yes    Subdural hygroma [G96.08]  Yes    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, F02.83]  Yes    Lumbar spondylosis with myelopathy [M47.16]  Yes    Abnormal urinalysis [R82.90]  Yes    Primary hypertension [I10]  Yes      Resolved Hospital Problems    Diagnosis Date Resolved POA    Tachyarrhythmia [R00.0] 08/01/2023 Yes    Acute hypoxemic respiratory failure [J96.01] 08/03/2023 No       Patient is homebound due to:  Atrial fibrillation with RVR    Allergies:Review of patient's allergies indicates:  No Known Allergies    Vitals:  Routine    Diet: 2 gram sodium diet    Activities:   Activity as tolerated    Goals of Care Treatment Preferences:  Code Status: Full Code    Health care agent: Maykel Sandoval  [facility] care agent number: [phone]       LaPOST: Yes  What is most important right now is to focus on quality of life, even if it means sacrificing a little time, improvement in condition but with limits to invasive therapies.  Accordingly, we  have decided that the best plan to meet the patient's goals includes continuing with treatment.      Labs:  per routine    Nursing Precautions:  Aspiration , Fall, and Pressure ulcer prevention    Consults:   PT to evaluate and treat- 3 times a week and OT to evaluate and treat- 3 times a week           Medications: Discontinue all previous medication orders, if any. See new list below.     Medication List        START taking these medications      divalproex 250 MG EC tablet  Commonly known as: DEPAKOTE  Take 1 tablet (250 mg total) by mouth every evening.     polyethylene glycol 17 gram Pwpk  Commonly known as: GLYCOLAX  Take 17 g by mouth 2 (two) times daily as needed.            CHANGE how you take these medications      apixaban 5 mg Tab  Commonly known as: ELIQUIS  Take 1 tablet (5 mg total) by mouth 2 (two) times daily. Starting 8/5  What changed: additional instructions     EScitalopram oxalate 5 MG Tab  Commonly known as: LEXAPRO  Take 2 tablets (10 mg total) by mouth once daily.  What changed: how much to take     haloperidoL 0.5 MG tablet  Commonly known as: HALDOL  Take 1 tablet (0.5 mg total) by mouth every evening.  What changed:   when to take this  reasons to take this            CONTINUE taking these medications      acetaminophen 325 MG tablet  Commonly known as: TYLENOL  Take 2 tablets (650 mg total) by mouth every 6 (six) hours as needed (Pain).     ascorbic acid (vitamin C) 250 MG tablet  Commonly known as: VITAMIN C  Take 1 tablet (250 mg total) by mouth once daily.     furosemide 20 MG tablet  Commonly known as: LASIX  Take 1 tablet (20 mg total) by mouth 2 (two) times daily.     lisinopriL 40 MG tablet  Commonly known as: PRINIVIL,ZESTRIL  Take 1 tablet (40 mg total) by mouth once daily.     loperamide 2 mg capsule  Commonly known as: IMODIUM  Take 1 capsule (2 mg total) by mouth 4 (four) times daily as needed for Diarrhea.     melatonin 3 mg tablet  Commonly known as: MELATIN  Take 2 tablets  (6 mg total) by mouth nightly as needed for Insomnia.     memantine 5 MG Tab  Commonly known as: NAMENDA  Take 1 tablet (5 mg total) by mouth 2 (two) times daily.     methocarbamoL 500 MG Tab  Commonly known as: ROBAXIN  Take 1 tablet (500 mg total) by mouth 3 (three) times daily as needed (muscle spasms, pain scale 5-7).     metoprolol tartrate 25 MG tablet  Commonly known as: LOPRESSOR  Take 1 tablet (25 mg total) by mouth 2 (two) times daily.     miconazole NITRATE 2 % 2 % top powder  Commonly known as: MICOTIN  Apply topically 2 (two) times daily. Underside of bilateral breasts     potassium chloride 10 MEQ Tbsr  Commonly known as: KLOR-CON  Take 1 tablet (10 mEq total) by mouth once daily.     tamsulosin 0.4 mg Cap  Commonly known as: FLOMAX  Take 1 capsule (0.4 mg total) by mouth nightly.     zinc sulfate 50 mg zinc (220 mg) capsule  Commonly known as: ZINCATE  Take 1 capsule (220 mg total) by mouth once daily.                Immunizations Administered as of 8/7/2023       Name Date Dose VIS Date Route Exp Date    COVID-19, MRNA, LN-S, PF (Pfizer) (Purple Cap) 3/10/2021 0.3 mL -- -- --    Lot: [id number]     External: Auto Reconciled From Outside Source     COVID-19, MRNA, LN-S, PF (Pfizer) (Purple Cap) 2/17/2021 0.3 mL -- -- --    Lot: [id number]     External: Auto Reconciled From Outside Source                 Some patients may experience side effects after vaccination.  These may include fever, headache, muscle or joint aches.  Most symptoms resolve with 24-48 hours and do not require urgent medical evaluation unless they persist for more than 72 hours or symptoms are concerning for an unrelated medical condition.          _________________________________  Maykel Khan MD  08/07/2023

## 2023-08-07 NOTE — PLAN OF CARE
Patient will be discharging to [facility] today. SW has emailed Vinicius in admissions all clinicals documents.       Reyna Johnson LMSW  [facility]   [id number]

## 2023-08-07 NOTE — PROGRESS NOTES
1611: I notified Dr. Maykel Khan via secure chat that her /78 and heart rate 62.  I was told by Dr. Khan via secure chat to recheck manual BP in 30 minutes.  Manual BP at 1653 was 190/70.  I notified Maykel Arzola of this new BP via secure chat and I also includedi n the message that transport is here to transport patient to her SNF facility. I  am currently waiting on a response from Dr. Khan    9286:  PRN hydralazine was ordered.  Transport has also already left and placed her transport on will call

## 2023-08-07 NOTE — NURSING
Sent secure chat to primary team (Maykel Khna)that patient UOP decreasing and she is not drinking much fluids.  Asked if would like to order fluids.

## 2023-08-07 NOTE — PROGRESS NOTES
"1754: I informed Dr. Maykel Khan of patient current blood pressure of 130/56 and he said its ok to  proceed with discharge now.   I sent a secure chat message to [place] on call   that "Patient is on will call and she now is ready to go and needs transportation her facility [facility].  "

## 2023-08-07 NOTE — PATIENT CARE CONFERENCE
[id number] JESSI Sandoval  :1935     AGE:88 y.o.     SEX:female      STATUS:Full Code      ISOLATION:No active isolations   ALLERGIES:Patient has no known allergies.   ADMIT DATE:  2023   PHYSICIAN:  Maykel Khan MD   DIAGNOSIS: Pelvic fracture [S32.9XXA]  Right leg pain [M79.604]  Right hip pain [M25.551]  Acute cystitis without hematuria [N30.00]  Chest pain [R07.9]  Frequent falls [R29.6]  Sepsis [A41.9]  At risk for long QT syndrome [Z91.89]  Shock [R57.9] Atrial fibrillation with RVR  CC: Shortness of Breath (Pt coming from [facility] for SOB starting about an hour ago and alerted NH staff. 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 2023    Lumbar spondylosis with myelopathy 2023    Multiple closed right sided fractures of pelvis without disruption of pelvic ring 2023    Primary hypertension 6/10/2022    Subdural hygroma 2023     Scheduled procedure(s): No  Labs to Monitor (no values):   Recent Vitals:  Temp: 98.7 °F (37.1 °C)  Pulse: 61  Resp: 18  SpO2: 96 %  BP: (!) 162/89    Respiratory:    Cardiac: Rhythm: sinus bradycardia      Wt Readings from Last 2 Encounters:   23 75.8 kg (167 lb)   23 75.8 kg (167 lb)     GI/: Diet Dysphagia Pureed (IDDSI Level 4) Thin   Last Bowel Movement: 23    Neuro: ex:disoriented to time, situation  Spann catheter: Yes; Reason for continuation: Urinary retention  Skin:WDL   Joselito Score: 14      Lines/Drains/Airways       Drain  Duration                  Urethral Catheter 23 0030 16 Fr. 7  days              Peripheral Intravenous Line  Duration                  Peripheral IV - Single Lumen 08/03/23 2130 20 G Anterior;Right Forearm 3 days                  Antibiotics (From admission, onward)      None          VTE Risk Mitigation (From admission, onward)           Ordered     apixaban tablet 5 mg  2 times daily         08/04/23 1237     Reason for No Pharmacological VTE Prophylaxis  Once        Question:  Reasons:  Answer:  Risk of Bleeding  Comment:  SDH    07/23/23 0831     IP VTE HIGH RISK PATIENT  Once         07/23/23 0831     Place sequential compression device  Until discontinued         07/23/23 0831                  Glycemic control:  Recent Labs   Lab 08/01/23  1901 08/01/23  1902 08/02/23  0416 08/06/23  2346   POCTGLUCOSE 74 78 89 66*     Fall risk: bed alarm, sitter at bedside  Mobility: GEMS (Hadley Early Mobility Scale): Level 1-Primary in bed activities    Nursing Update/Plan of Care: Pt slept well this shift. A&Ox2. VSS. No reports of pain. Spann in place, catheter care performed. T&RQ2. Sitter at bedside. Safety precautions in place. Call light in reach. No further concerns noted at this time.

## 2023-08-07 NOTE — PLAN OF CARE
[facility] - Intensive Care ([facility])  Discharge Final Note    Primary Care Provider: No, Primary Doctor    Expected Discharge Date: 8/7/2023    Final Discharge Note (most recent)       Final Note - 08/07/23 1606          Final Note    Assessment Type Final Discharge Note (P)      Anticipated Discharge Disposition Skilled Nursing Facility (P)                      Important Message from Medicare  Important Message from Medicare regarding Discharge Appeal Rights: Explained to patient/caregiver, Signed/date by patient/caregiver     Date IMM was signed: 08/07/23  Time IMM was signed: 0829      Patient is discharging today to [facility].  has arranged transport for 4:00. Nurse call report to [phone].Patient family has been informed.       Future Appointments   Date Time Provider Department Center   8/16/2023  8:00 AM Rula Mays NP [facility]   9/7/2023  9:30 AM ASSESSMENT CLINIC [facility] [id number]   10/20/2023  8:40 AM Thiago Andrade MD [facility] CLAUDIA  [facility]   [id number]

## 2023-08-07 NOTE — PT/OT/SLP PROGRESS
Occupational Therapy   Co-Treatment with PT  Co-treatment performed due to patient's multiple deficits requiring two skilled therapists to appropriately and safely assess patient's strength and endurance while facilitating functional tasks in addition to accommodating for patient's activity tolerance.     Name: Radha Sandoval  MRN: [id number]  Admitting Diagnosis:  Atrial fibrillation with RVR  14 Days Post-Op    Recommendations:     Discharge Recommendations: nursing facility, skilled  Discharge Equipment Recommendations:  other (see comments) (TBD)  Barriers to discharge:       Assessment:     Radha Sandoval is a 88 y.o. female with a medical diagnosis of Atrial fibrillation with RVR.  She presents with the following performance deficits affecting function: weakness, impaired endurance, impaired self care skills, impaired balance, gait instability, impaired functional mobility, decreased safety awareness, decreased coordination, decreased upper extremity function, decreased lower extremity function, impaired cognition, decreased ROM.     Pt agreeable to therapy and tolerated session well. Pt lethargic upon entry. Pt requires significant assistance with bed mobility and transfers.     Rehab Prognosis:  Good; patient would benefit from acute skilled OT services to address these deficits and reach maximum level of function.       Plan:     Patient to be seen 4 x/week to address the above listed problems via self-care/home management, therapeutic activities, therapeutic exercises  Plan of Care Expires: 08/23/23  Plan of Care Reviewed with: patient    Subjective     Patient/Family Comments/goals: to regain strength  Pain/Comfort:  Pain Rating 1: 0/10  Pain Rating Post-Intervention 1: 0/10    Objective:     Communicated with: RN prior to session.  Patient found HOB elevated with telemetry, pulse ox (continuous), peripheral IV, duncan catheter upon OT entry to room.  A client care conference was completed by the OTR and the  STAFFORD prior to treatment by the STAFFORD to discuss the patient's POC and current status.    General Precautions: Standard, fall    Orthopedic Precautions:LLE weight bearing as tolerated, RLE weight bearing as tolerated  Braces: N/A  Respiratory Status: Room air     Occupational Performance:     Bed Mobility:    Patient completed Rolling/Turning to Left with  maximal assistance  Patient completed Rolling/Turning to Right with maximal assistance  Patient completed Scooting/Bridging with total assistance and 2 persons  Patient completed Supine to Sit with maximal assistance and 2 persons  Patient completed Sit to Supine with maximal assistance and 2 persons     Functional Mobility/Transfers:  Patient completed Sit <> Stand Transfer with total assistance and of 2 persons  with  rolling walker   2 attempts, both unsuccessful. Posterior lean noted.     Activities of Daily Living:  Grooming: minimum assistance for oral hygiene and facial care. Hand over hand and verbal cues needed to initiate task.   Toileting: total assistance for pericare with rolling in bed      AMPA 6 Click ADL: 10    Treatment & Education:  Pt and sitter educated on OT POC and frequency during hospital stay.     Pt and sitter educated on importance of OOB/EOB activity to improve function.     Addressed all patient questions/concerns within STAFFORD scope of practice.     Patient left HOB elevated with all lines intact, call button in reach, RN notified, and sitter present    GOALS:   Multidisciplinary Problems       Occupational Therapy Goals          Problem: Occupational Therapy    Goal Priority Disciplines Outcome Interventions   Occupational Therapy Goal     OT, PT/OT Ongoing, Progressing    Description: Re-eval performed on 8/3/2023, goals set to be met by 8/23/23  Patient will increase functional independence with ADLs by performing:    UE Dressing with Minimal Assistance.  Grooming while EOB with Moderate Assistance.  Supine to sit with Moderate  Assistance.  Sit to stand with Maximal Assistance.  Sit EOB with CGA                       Time Tracking:     OT Date of Treatment: 08/07/23  OT Start Time: 1030  OT Stop Time: 1109  OT Total Time (min): 39 min    Billable Minutes:Self Care/Home Management 39    OT/ALEXIS: ALEXIS     Number of ALEXIS visits since last OT visit: 1    8/7/2023

## 2023-08-07 NOTE — PLAN OF CARE
Patient will be transferring to SNF today.  Patient remained stable and safe this shift.She is ready for transition

## 2023-08-07 NOTE — PT/OT/SLP PROGRESS
"Physical Therapy Co-Treatment    Patient Name:  Radha Sandoval   MRN:  [id number]    Recommendations:     Discharge Recommendations: nursing facility, skilled  Discharge Equipment Recommendations: to be determined by next level of care  Barriers to discharge: Pt currently requiring increased level of assistance with mobility    Assessment:     Radha Sandoval is a 88 y.o. female admitted with a medical diagnosis of Atrial fibrillation with RVR.  She presents with the following impairments/functional limitations: impaired endurance, weakness, impaired functional mobility, gait instability, impaired balance, impaired cognition, decreased upper extremity function, decreased lower extremity function, pain, decreased ROM.    Initially presented with eyes closed, lethargic, difficult to arouse. Pt performed bed mob 2 person assist with fair sitting balance with 1 person assist. Pt performed 2 reps STS with RW and 2 person assist and presented with decreased initiation and significant posterior lean. Pt continues to benefit from skilled PT services while in house in order to address the aforementioned deficits.      Rehab Prognosis: Good; patient would benefit from acute skilled PT services to address these deficits and reach maximum level of function.    Recent Surgery: Procedure(s) (LRB):  ORIF,PELVIS, rui, bone foam (N/A) 14 Days Post-Op    Plan:     During this hospitalization, patient to be seen 4 x/week to address the identified rehab impairments via gait training, therapeutic activities, therapeutic exercises, neuromuscular re-education, wheelchair management/training and progress toward the following goals:    Plan of Care Expires:  09/01/23    Subjective     "Put me back in line"    Pain/Comfort:  Pain Rating 1:  (not rated)  Location - Orientation 1: generalized      Objective:     Communicated with RN prior to session.  Patient found HOB elevated with telemetry, pulse ox (continuous), peripheral IV, duncan catheter " upon PT entry to room.     General Precautions: Standard, fall  Orthopedic Precautions: LLE weight bearing as tolerated, RLE weight bearing as tolerated  Braces: N/A  Respiratory Status: Room air     Functional Mobility:  Bed Mobility:     Rolling Left:  maximal assistance and of 2 persons  Rolling Right: maximal assistance and of 2 persons  Supine to Sit: maximal assistance and of 2 persons  Sit to Supine: maximal assistance and of 2 persons  Transfers:     Sit to Stand:  total assistance and of 2 persons with rolling walker  Balance:   Fair sitting balance CGA-Yusef with 1 UE support on rail  Poor standing balance      AM-PAC 6 CLICK MOBILITY  Turning over in bed (including adjusting bedclothes, sheets and blankets)?: 2  Sitting down on and standing up from a chair with arms (e.g., wheelchair, bedside commode, etc.): 2  Moving from lying on back to sitting on the side of the bed?: 2  Moving to and from a bed to a chair (including a wheelchair)?: 1  Need to walk in hospital room?: 1  Climbing 3-5 steps with a railing?: 1  Basic Mobility Total Score: 9       Treatment & Education:  Tolerated EOB sitting x8-10 min with focus on oral care and grooming  2 reps STS with poor standing tolerance and poor upright posture  Tolerated rolling bilaterally while actively resisting due to fear of falling    Educated pt on PT role/POC  Educated pt on importance of OOB activity and daily ambulation   Pt educated on proper body mechanics, safety techniques, and energy conservation with PT facilitation and cueing throughout session   Pt verbalized understanding      Patient left with bed in chair position with all lines intact, call button in reach, RN notified, and ALEXIS and caregiver present..    GOALS:   Multidisciplinary Problems       Physical Therapy Goals          Problem: Physical Therapy    Goal Priority Disciplines Outcome Goal Variances Interventions   Physical Therapy Goal     PT, PT/OT Ongoing, Progressing     Description:  Goals to be met by: 2023     Patient will increase functional independence with mobility by performin. Supine to sit with MInimal Assistance  2. Sit to supine with MInimal Assistance  3. Rolling to Left and Right with Minimal Assistance.  4. Sit to stand transfer with Minimal Assistance  5. Gait  x 10 feet with Moderate Assistance using Rolling Walker.                          Time Tracking:     PT Received On: 23  PT Start Time: 1031     PT Stop Time: 1109  PT Total Time (min): 38 min     Billable Minutes: Therapeutic Activity 38    Co-treatment performed due to patient's multiple deficits requiring two skilled therapists to appropriately and safely assess patient's strength and endurance while facilitating functional tasks in addition to accommodating for patient's activity tolerance.          Treatment Type: Treatment  PT/PTA: PT     Number of PTA visits since last PT visit: 0     2023

## 2023-08-09 PROBLEM — I95.9 HYPOTENSION: Status: RESOLVED | Noted: 2023-01-01 | Resolved: 2023-01-01

## 2023-08-09 PROBLEM — E87.6 HYPOKALEMIA: Status: RESOLVED | Noted: 2023-01-01 | Resolved: 2023-01-01

## 2023-08-09 PROBLEM — G93.40 ACUTE ENCEPHALOPATHY: Status: ACTIVE | Noted: 2023-01-01

## 2023-08-09 PROBLEM — N30.01 ACUTE CYSTITIS WITH HEMATURIA: Status: ACTIVE | Noted: 2023-01-01

## 2023-08-09 PROBLEM — R82.90 ABNORMAL URINALYSIS: Status: RESOLVED | Noted: 2023-01-01 | Resolved: 2023-01-01

## 2023-08-09 NOTE — ED PROVIDER NOTES
"Encounter Date: 8/9/2023       History     Chief Complaint   Patient presents with    Failure To Thrive     EMS reports sent to ER due to "no longer eating, failure to thrive"     Pt is an 88-year-old female with past medical history of Alzheimer's disease, hypertension, pelvic fractures, urinary retention with indwelling Spann catheter, metabolic encephalopathy, traumatic subdural hematoma who presents from [facility] with concern for altered mental status that started today.  Patient was just discharged from this hospital 2 days ago.  Her caretaker named D at bedside reports that yesterday patient seemed to be doing well.  She got up with physical therapy and was eating and talking with the speech therapist.   Today she was unable to cooperate and did not get up with PT. She was somnolent all day.      Per chart review, pt was discharged 2 days ago from this hospital after a complicated visit for SDH including respiratory issues, urinary retention    The history is provided by medical records and a relative. The history is limited by the condition of the patient.     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 5/22/2023    Lumbar spondylosis with myelopathy 4/25/2023    Multiple closed right sided fractures of pelvis without disruption of pelvic ring 7/9/2023    Primary hypertension 6/10/2022    Subdural hygroma 7/8/2023     Past Surgical History:   Procedure Laterality Date    REPAIR, HERNIA, INGUINAL, WITHOUT HISTORY OF PRIOR REPAIR, AGE 5 YEARS OR OLDER Right 5/6/2023    Procedure: REPAIR, HERNIA, INGUINAL, " WITHOUT HISTORY OF PRIOR REPAIR, AGE 5 YEARS OR OLDER;  Surgeon: Maurice Piper MD;  Location: [facility] OR [address] [place] [address];  Service: General;  Laterality: Right;  possible laparotomy, possible bowel resection     History reviewed. No pertinent family history.  Social History     Tobacco Use    Smoking status: Never    Smokeless tobacco: Never   Substance Use Topics    Drug use: Never     Review of Systems    Physical Exam     Initial Vitals [23 1605]   BP Pulse Resp Temp SpO2   (!) 126/57 70 18 98.3 °F (36.8 °C) 96 %      MAP       --         Physical Exam    Nursing note and vitals reviewed.  Constitutional: She appears well-developed and well-nourished. No distress.   HENT:   Head: Normocephalic and atraumatic.   Eyes: EOM are normal.   Neck: Neck supple.   Cardiovascular:  Normal rate and regular rhythm.           Pulmonary/Chest: No respiratory distress.   Abdominal: She exhibits no distension. There is no abdominal tenderness. There is no rebound.   Musculoskeletal:      Cervical back: Neck supple.     Neurological: She is alert.   Somnolent but arousable. Able to tell me her name and    Skin: Skin is warm.   Psychiatric:   Unable to assess secondary to clinical condition      Very limited due to clinical condition    ED Course   Procedures  Labs Reviewed   CBC W/ AUTO DIFFERENTIAL - Abnormal; Notable for the following components:       Result Value    RBC 3.82 (*)     Hemoglobin 10.5 (*)     Hematocrit 33.5 (*)     MCHC 31.3 (*)     RDW 15.0 (*)     Lymph # 0.8 (*)     Gran % 76.8 (*)     Lymph % 11.9 (*)     All other components within normal limits   COMPREHENSIVE METABOLIC PANEL - Abnormal; Notable for the following components:    Total Protein 5.8 (*)     Albumin 2.7 (*)     All other components within normal limits   TSH - Abnormal; Notable for the following components:    TSH 4.002 (*)     All other components within normal limits   URINALYSIS, REFLEX TO URINE CULTURE - Abnormal; Notable for the  following components:    Appearance, UA Hazy (*)     Protein, UA 1+ (*)     Ketones, UA 1+ (*)     Occult Blood UA 3+ (*)     Leukocytes, UA 2+ (*)     All other components within normal limits    Narrative:     Specimen Source->Urine   MAGNESIUM - Abnormal; Notable for the following components:    Magnesium 1.5 (*)     All other components within normal limits   URINALYSIS MICROSCOPIC - Abnormal; Notable for the following components:    RBC, UA >100 (*)     WBC, UA 58 (*)     Bacteria Moderate (*)     Hyaline Casts,  (*)     All other components within normal limits    Narrative:     Specimen Source->Urine   BASIC METABOLIC PANEL - Abnormal; Notable for the following components:    Potassium 3.3 (*)     CO2 19 (*)     Calcium 8.4 (*)     Anion Gap 17 (*)     eGFR 54.5 (*)     All other components within normal limits   MAGNESIUM - Abnormal; Notable for the following components:    Magnesium 1.4 (*)     All other components within normal limits   CBC W/ AUTO DIFFERENTIAL - Abnormal; Notable for the following components:    RBC 3.53 (*)     Hemoglobin 9.4 (*)     Hematocrit 30.4 (*)     MCH 26.6 (*)     MCHC 30.9 (*)     RDW 15.3 (*)     Gran % 76.6 (*)     Lymph % 12.5 (*)     All other components within normal limits   T4, FREE   PHOSPHORUS   POCT GLUCOSE        ECG Results              EKG 12-lead (Final result)  Result time 08/10/23 11:38:21      Final result by Interface, Lab In [facility] (08/10/23 11:38:21)                   Narrative:    Test Reason : Z91.89,    Vent. Rate : 065 BPM     Atrial Rate : 065 BPM     P-R Int : 116 ms          QRS Dur : 078 ms      QT Int : 442 ms       P-R-T Axes : 002 006 239 degrees     QTc Int : 459 ms    Poor data quality  Normal sinus rhythm  Low anterior forces  Abnormal ECG  When compared with ECG of 07-AUG-2023 00:35,  low anterior forces more pronounced  T wave inversion more evident in Inferior leads  suggest repeat ECG  Confirmed by SALEEM SANTIAGO MD ([id number]) on  8/10/2023 11:38:12 AM    Referred By: KHADRA   SELF           Confirmed By:SALEEM SANTIAGO MD                                  Imaging Results              CT Head Without Contrast (Final result)  Result time 08/09/23 19:52:35      Final result by Maurice Hernández MD (08/09/23 19:52:35)                   Impression:      1. No detrimental change, acute large vascular territory infarct, or intracranial hemorrhage identified.  If persistent neurologic deficit, MRI brain can be obtained.  2. Interval resolution of previous right-sided subdural hemorrhage with interval overall decreased volume and decreased attenuation of left-sided subdural hemorrhage now late subacute/remote.  Similar 2-3 mm rightward midline shift.      Electronically signed by: Maurice Hernández MD  Date:    08/09/2023  Time:    19:52               Narrative:    EXAMINATION:  CT HEAD WITHOUT CONTRAST    CLINICAL HISTORY:  Mental status change, unknown cause;    TECHNIQUE:  Low dose axial CT images obtained throughout the head without intravenous contrast. Sagittal and coronal reconstructions were performed.    COMPARISON:  MRI brain 08/01/2023, head CT 07/30/2023    FINDINGS:  Patient is rotated and tilted within the scanner.  Beam hardening with streak artifact slightly limits evaluation.    Intracranial compartment:    Previous right-sided subdural hemorrhage is no longer identified and has likely resolved.  Previous left-sided subdural hemorrhage has decreased in volume and now more hypoattenuating but slightly heterogeneous consistent with evolving late subacute/remote subdural hemorrhage, measuring up to 9 mm in maximum thickness.  No new or enlarging extra-axial fluid collections.  No subarachnoid hemorrhage.    Grossly stable 2-3 mm rightward midline shift.  The ventricles are otherwise stable in overall size and configuration without acute hydrocephalus.    Similar patchy hypoattenuation of the supratentorial white matter consistent with  chronic microvascular ischemic change.  Skull base atherosclerotic vascular calcifications noted.  No definite new focal areas of abnormal parenchymal attenuation.  No parenchymal mass, hemorrhage, edema or major vascular distribution infarct.    Skull/extracranial contents (limited evaluation): No fracture. Mastoid air cells and paranasal sinuses are essentially clear.                                       X-Ray Chest AP Portable (Final result)  Result time 08/09/23 19:43:19      Final result by Maurice Hernández MD (08/09/23 19:43:19)                   Impression:      No detrimental change or radiographic acute intrathoracic process seen.      Electronically signed by: Maurice Hernández MD  Date:    08/09/2023  Time:    19:43               Narrative:    EXAMINATION:  XR CHEST AP PORTABLE    CLINICAL HISTORY:  altered mental status;    TECHNIQUE:  Single frontal view of the chest was performed.    COMPARISON:  Chest radiograph and CT thorax 07/28/2023    FINDINGS:  Monitoring leads overlie the chest.  Patient is rotated.    Similar nonspecific elevation of the right hemidiaphragm.  Bibasilar minimal platelike scarring versus atelectasis.  The lungs are otherwise well expanded without consolidation, pleural effusion or pneumothorax.    Mediastinal structures are midline.  Grossly similar calcification and tortuosity of the aorta.  Heart is upper limits of normal in size.  Pulmonary vasculature and hilar contours are within normal limits.    No acute osseous process seen.  PA and lateral views can be obtained.                                       Medications   miconazole NITRATE 2 % top powder ( Topical (Top) Given 8/11/23 2042)   ipratropium 0.02 % nebulizer solution 0.5 mg (has no administration in time range)   levalbuterol nebulizer solution 1.25 mg (has no administration in time range)   naloxone 0.4 mg/mL injection 0.02 mg (has no administration in time range)   prochlorperazine injection Soln 5 mg (has no  administration in time range)   valproate (DEPACON) 130 mg in dextrose 5 % (D5W) 50 mL IVPB (has no administration in time range)   sodium chloride 0.9% flush 10 mL (has no administration in time range)   melatonin tablet 6 mg (has no administration in time range)   acetaminophen tablet 650 mg (650 mg Oral Given 8/11/23 2351)   bisacodyL suppository 10 mg (has no administration in time range)   glucose chewable tablet 16 g (has no administration in time range)   dextrose 10% bolus 125 mL 125 mL (has no administration in time range)   dextrose 10% bolus 250 mL 250 mL (has no administration in time range)   glucagon (human recombinant) injection 1 mg (has no administration in time range)   hydrALAZINE injection 10 mg (has no administration in time range)   apixaban tablet 5 mg (5 mg Oral Given 8/11/23 2042)   mupirocin 2 % ointment ( Nasal Given 8/11/23 2042)   ampicillin (OMNIPEN) 2 g in sodium chloride 0.9 % 100 mL IVPB (MB+) (0 g Intravenous Stopped 8/11/23 2340)   cefTRIAXone (ROCEPHIN) 1 g in dextrose 5 % in water (D5W) 100 mL IVPB (MB+) (0 g Intravenous Stopped 8/9/23 2025)   sodium chloride 0.9% bolus 1,000 mL 1,000 mL (0 mLs Intravenous Stopped 8/9/23 2158)   hydrALAZINE injection 20 mg (20 mg Intravenous Given 8/9/23 2158)   potassium phosphate 30 mmol in dextrose 5 % (D5W) 500 mL infusion (0 mmol Intravenous Stopped 8/10/23 2038)   magnesium sulfate 2g in water 50mL IVPB (premix) (0 g Intravenous Stopped 8/11/23 1907)     Medical Decision Making:   History:   Old Medical Records: I decided to obtain old medical records.  Old Records Summarized: records from clinic visits and records from previous admission(s).       <> Summary of Records: Echo 7/28/23:· The left ventricle is normal in size with normal systolic function.  · The estimated ejection fraction is 65%.  · Grade I left ventricular diastolic dysfunction.  · There is mild aortic valve stenosis. Aortic valve area is 1.88 cm2; peak velocity is 2.58 m/s;  mean gradient is 15 mmHg.  · There is significant mitral annular calcification. The mean diastolic mitral gradient is 5mmHg at heart rate of 76bpm.  · Normal right ventricular size with normal right ventricular systolic function.  · The estimated PA systolic pressure is 39 mmHg.  · Normal central venous pressure (3 mmHg).  · Mild left atrial enlargement.       Differential Diagnosis:   Uti, ICH, encephalopathy, medication side effect, uremia, anemia  Clinical Tests:   Lab Tests: Ordered and Reviewed  Radiological Study: Ordered and Reviewed  Medical Tests: Ordered and Reviewed  ED Management:  Pt does have signs of a uti but has an indwelling duncan  She is unable to stay at the NH bc she cannot participate in the activities  Concern for UTI, pt admitted to  for further evaluation                          Clinical Impression:   Final diagnoses:  [R41.82] AMS (altered mental status)        ED Disposition Condition    Admit                 Ashanti Scott MD  08/12/23 0744       Ashanti Scott MD  08/12/23 0772

## 2023-08-09 NOTE — Clinical Note
Diagnosis: AMS (altered mental status) [1350093]   Future Attending Provider: SUDARSHAN TIRADO [[id number]]   Admitting Provider:: SUDARSHAN TIRADO [[id number]]

## 2023-08-09 NOTE — ED TRIAGE NOTES
Hx subdural hematoma. Pt was discharged from hospital on 8/7. Pt was sent here from [facility] due to refusing to eat or take her medicine for the past 2 days. Pt is currently oriented to self, which is baseline per EMS. Pt has no complaints at this time. She is following commands and waking up to voice. Spann in place. Awaiting further orders

## 2023-08-10 PROBLEM — G30.1 SEVERE LATE ONSET ALZHEIMER'S DEMENTIA WITHOUT BEHAVIORAL DISTURBANCE, PSYCHOTIC DISTURBANCE, MOOD DISTURBANCE, OR ANXIETY: Chronic | Status: ACTIVE | Noted: 2023-01-01

## 2023-08-10 PROBLEM — I10 PRIMARY HYPERTENSION: Chronic | Status: ACTIVE | Noted: 2022-06-10

## 2023-08-10 PROBLEM — I27.82 CHRONIC PULMONARY EMBOLISM WITHOUT ACUTE COR PULMONALE: Status: ACTIVE | Noted: 2023-01-01

## 2023-08-10 PROBLEM — N39.0 CATHETER-ASSOCIATED URINARY TRACT INFECTION: Status: ACTIVE | Noted: 2023-01-01

## 2023-08-10 PROBLEM — F02.C0 SEVERE LATE ONSET ALZHEIMER'S DEMENTIA WITHOUT BEHAVIORAL DISTURBANCE, PSYCHOTIC DISTURBANCE, MOOD DISTURBANCE, OR ANXIETY: Chronic | Status: ACTIVE | Noted: 2023-01-01

## 2023-08-10 PROBLEM — T83.511A CATHETER-ASSOCIATED URINARY TRACT INFECTION: Status: ACTIVE | Noted: 2023-01-01

## 2023-08-10 NOTE — H&P
"[facility] - [facility] Dept  Hospital Medicine  History & Physical    Patient Name: Radha Sandoval  MRN: [id number]  Patient Class: OP- Observation  Admission Date: 8/9/2023  Attending Phys[place] [facility] [place] [facility] [place]  Admitting Physician: Jay Gomez MD  Primary Care Provider: Bev Primary Doctor      Patient information was obtained from patient, past medical records and ER records.     Subjective:     Principal Problem:Acute encephalopathy    Chief Complaint:   Chief Complaint   Patient presents with    Failure To Thrive     EMS reports sent to ER due to "no longer eating, failure to thrive"        HPI: 88 y.o. female with PMH significant for chronic BL pleural effusions, recent DVT diagnosed in March '23 (on eliquis), Alzheimers dementia, HTN, recent pelvic fracture (non-operative), Subdural Hematoma presents from [facility] for concerns of altered mental status.  [place] [facility] [place]    Her hired caretaker is at bedside, reports that patient has waxing waning mentation but at SNF was awake/alert and reported to be eating well through Monday (08/07), then on Tues eating less but participated with physical therapy (08/08) and Today she has not been eating anything, and unable to participate in therapy, prompting ED evaluation.     She is hypertensive this evening, but vital signs earlier in the day stable, she is not safe to take oral anti-hypertensive medications, she will withdraw to pain, does not open eyes to painful stimuli, no meaningful speech, occasional moans.  Spann catheter present placed 3 days ago prior to discharge.     CT head today shows no new or extension of subdural hematoma, actually shows resolution of SDH. No other acute or detrimental change.   SNF MAR not available at this time.  Urinalysis with pyuria and RBC empiric Ceftriaxone started and referral for admission placed.     The history is provided by the patient, medical records  The history is limited by the " condition of the patient.         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 5/22/2023    Lumbar spondylosis with myelopathy 4/25/2023    Multiple closed right sided fractures of pelvis without disruption of pelvic ring 7/9/2023    Primary hypertension 6/10/2022    Subdural hygroma 7/8/2023       Past Surgical History:   Procedure Laterality Date    REPAIR, HERNIA, INGUINAL, WITHOUT HISTORY OF PRIOR REPAIR, AGE 6 YEARS OR OLDER Right 5/6/2023    Procedure: REPAIR, HERNIA, INGUINAL, WITHOUT HISTORY OF PRIOR REPAIR, AGE 6 YEARS OR OLDER;  Surgeon: Maurice Piper MD;  Location: [facility] OR [address] [place] [address];  Service: General;  Laterality: Right;  possible laparotomy, possible bowel resection       Review of patient's allergies indicates:  No Known Allergies    No current facility-administered medications on file prior to encounter.     Current Outpatient Medications on File Prior to Encounter   Medication Sig    acetaminophen (TYLENOL) 325 MG tablet Take 2 tablets (650 mg total) by mouth every 6 (six) hours as needed (Pain).    apixaban (ELIQUIS) 5 mg Tab Take 1 tablet (5 mg total) by mouth 2 (two) times daily. Starting 8/5    ascorbic acid, vitamin C, (VITAMIN C) 250 MG tablet Take 1 tablet (250 mg total) by mouth once daily.    divalproex (DEPAKOTE) 250 MG EC tablet Take 1 tablet (250 mg total) by mouth every evening.    EScitalopram oxalate (LEXAPRO) 5 MG Tab Take 2 tablets (10 mg total) by mouth once daily.    furosemide (LASIX) 20 MG tablet Take 1 tablet (20 mg total) by mouth 2 (two) times daily.    haloperidoL (HALDOL) 0.5 MG tablet Take 1 tablet (0.5 mg total) by mouth  every evening.    lisinopriL (PRINIVIL,ZESTRIL) 40 MG tablet Take 1 tablet (40 mg total) by mouth once daily.    loperamide (IMODIUM) 2 mg capsule Take 1 capsule (2 mg total) by mouth 4 (four) times daily as needed for Diarrhea.    melatonin (MELATIN) 3 mg tablet Take 2 tablets (6 mg total) by mouth nightly as needed for Insomnia.    memantine (NAMENDA) 5 MG Tab Take 1 tablet (5 mg total) by mouth 2 (two) times daily.    methocarbamoL (ROBAXIN) 500 MG Tab Take 1 tablet (500 mg total) by mouth 3 (three) times daily as needed (muscle spasms, pain scale 5-7).    metoprolol tartrate (LOPRESSOR) 25 MG tablet Take 1 tablet (25 mg total) by mouth 2 (two) times daily.    miconazole NITRATE 2 % (MICOTIN) 2 % top powder Apply topically 2 (two) times daily. Underside of bilateral breasts    polyethylene glycol (GLYCOLAX) 17 gram PwPk Take 17 g by mouth 2 (two) times daily as needed.    potassium chloride (KLOR-CON) 10 MEQ TbSR Take 1 tablet (10 mEq total) by mouth once daily.    tamsulosin (FLOMAX) 0.4 mg Cap Take 1 capsule (0.4 mg total) by mouth nightly.    zinc sulfate (ZINCATE) 50 mg zinc (220 mg) capsule Take 1 capsule (220 mg total) by mouth once daily.     Family History    None       Tobacco Use    Smoking status: Never    Smokeless tobacco: Never   Substance and Sexual Activity    Alcohol use: Not on file    Drug use: Never    Sexual activity: Not Currently     Review of Systems   Unable to perform ROS: Mental status change     Objective:     Vital Signs (Most Recent):  Temp: 97.9 °F (36.6 °C) (08/09/23 2308)  Pulse: 64 (08/09/23 2308)  Resp: 16 (08/09/23 2308)  BP: (!) 107/51 (08/09/23 2308)  SpO2: 98 % (08/09/23 2308) Vital Signs (24h Range):  Temp:  [97.9 °F (36.6 °C)-98.3 °F (36.8 °C)] 97.9 °F (36.6 °C)  Pulse:  [60-70] 64  Resp:  [16-18] 16  SpO2:  [95 %-99 %] 98 %  BP: (107-208)/(51-76) 107/51     Weight: 75.8 kg (167 lb)  Body mass index is 28.67 kg/m².     Physical Exam  Vitals reviewed.  "  Constitutional:       Appearance: She is ill-appearing.   HENT:      Head: Normocephalic.   Eyes:      General: No scleral icterus.     Pupils: Pupils are equal, round, and reactive to light.   Cardiovascular:      Rate and Rhythm: Normal rate and regular rhythm.      Pulses: Normal pulses.      Heart sounds: No murmur heard.  Pulmonary:      Effort: Pulmonary effort is normal. No respiratory distress.      Breath sounds: No wheezing.      Comments: Limited anterior exam  Abdominal:      General: Bowel sounds are normal. There is no distension.      Palpations: Abdomen is soft.      Tenderness: There is no abdominal tenderness.   Genitourinary:     Comments: Spann catheter in place  Musculoskeletal:      Cervical back: Neck supple.      Comments: LUE flexor contracture at elbow, bilateral LE rigidity   Skin:     General: Skin is dry.      Coloration: Skin is pale.   Neurological:      Mental Status: She is lethargic.      GCS: GCS eye subscore is 1. GCS verbal subscore is 2. GCS motor subscore is 5.   Psychiatric:         Speech: She is noncommunicative.         Cognition and Memory: Cognition is impaired. Memory is impaired.              CRANIAL NERVES     CN III, IV, VI   Pupils are equal, round, and reactive to light.       Significant Labs: All pertinent labs within the past 24 hours have been reviewed.  ABGs: No results for input(s): "PH", "PCO2", "HCO3", "POCSATURATED", "BE", "TOTALHB", "COHB", "METHB", "O2HB", "POCFIO2", "PO2" in the last 48 hours.  Blood Culture: No results for input(s): "LABBLOO" in the last 48 hours.  CBC:   Recent Labs   Lab 08/09/23  1755   WBC 6.48   HGB 10.5*   HCT 33.5*        CMP:   Recent Labs   Lab 08/09/23  1755      K 3.6      CO2 23   GLU 89   BUN 14   CREATININE 0.8   CALCIUM 8.9   PROT 5.8*   ALBUMIN 2.7*   BILITOT 0.4   ALKPHOS 121   AST 23   ALT 10   ANIONGAP 14     Cardiac Markers: No results for input(s): "CKMB", "MYOGLOBIN", "BNP", "TROPISTAT" in the " "last 48 hours.  Coagulation: No results for input(s): "PT", "INR", "APTT" in the last 48 hours.  Lactic Acid: No results for input(s): "LACTATE" in the last 48 hours.  Magnesium:   Recent Labs   Lab 08/09/23  1755   MG 1.5*     Troponin: No results for input(s): "TROPONINI", "TROPONINIHS" in the last 48 hours.  TSH:   Recent Labs   Lab 08/09/23  1755   TSH 4.002*     Urine Studies:   Recent Labs   Lab 08/09/23  1756   COLORU Yellow   APPEARANCEUA Hazy*   PHUR 6.0   SPECGRAV 1.020   PROTEINUA 1+*   GLUCUA Negative   KETONESU 1+*   BILIRUBINUA Negative   OCCULTUA 3+*   NITRITE Negative   LEUKOCYTESUR 2+*   RBCUA >100*   WBCUA 58*   BACTERIA Moderate*   SQUAMEPITHEL 15   HYALINECASTS 110*       Significant Imaging: I have reviewed all pertinent imaging results/findings within the past 24 hours.    CT HEAD WITHOUT CONTRAST     CLINICAL HISTORY:  Mental status change, unknown cause;     TECHNIQUE:  Low dose axial CT images obtained throughout the head without intravenous contrast. Sagittal and coronal reconstructions were performed.     COMPARISON:  MRI brain 08/01/2023, head CT 07/30/2023     FINDINGS:  Patient is rotated and tilted within the scanner.  Beam hardening with streak artifact slightly limits evaluation.     Intracranial compartment:     Previous right-sided subdural hemorrhage is no longer identified and has likely resolved.  Previous left-sided subdural hemorrhage has decreased in volume and now more hypoattenuating but slightly heterogeneous consistent with evolving late subacute/remote subdural hemorrhage, measuring up to 9 mm in maximum thickness.  No new or enlarging extra-axial fluid collections.  No subarachnoid hemorrhage.     Grossly stable 2-3 mm rightward midline shift.  The ventricles are otherwise stable in overall size and configuration without acute hydrocephalus.     Similar patchy hypoattenuation of the supratentorial white matter consistent with chronic microvascular ischemic change.  " Skull base atherosclerotic vascular calcifications noted.  No definite new focal areas of abnormal parenchymal attenuation.  No parenchymal mass, hemorrhage, edema or major vascular distribution infarct.     Skull/extracranial contents (limited evaluation): No fracture. Mastoid air cells and paranasal sinuses are essentially clear.     Impression:     1. No detrimental change, acute large vascular territory infarct, or intracranial hemorrhage identified.  If persistent neurologic deficit, MRI brain can be obtained.  2. Interval resolution of previous right-sided subdural hemorrhage with interval overall decreased volume and decreased attenuation of left-sided subdural hemorrhage now late subacute/remote.  Similar 2-3 mm rightward midline shift.        Electronically signed by: Maurice Hernández MD  Date:                                            08/09/2023  Time:                                           19:52    Assessment/Plan:     * Acute encephalopathy  Etiology is unclear -she's had multiple recent hospitalizations, chronic dementia with worsening functional status and acute medical problems.  She is on divalproex and haldol nightly lower dosages  -Abnormal UA with pyruria concerning for acute cystitis may be contributing etiology, does not present with SIRS   -CT head negative for new or evolution of SHD, has improved.  Pt has been placed back on anticoagulation (dvt/PE/afib) has multiple medical indication for anticoagulation      Acute cystitis with hematuria  -catheter placed in last 3 days,  Treat empirically with ceftriaxone given change in mental status.   -If urine culture is negative - likely can keep duncan catheter, however if bacteria grows then exchange duncan catheter.       Acute pulmonary embolism without acute cor pulmonale  -chronic and stable respiratory status   -Use therapeutic Lovenox 1mg/kg q12 hours until patient safe to take apixaban.       Debility  Secondary to recent admissions,  decompensated health state and pelvic fractures       Urinary retention  Continue duncan catheter and careset orders.     Multiple closed right sided fractures of pelvis without disruption of pelvic ring  -per last admit notes, current management is non-operateive based on family decision, patient was seen/evaluted by orthopedic surgery and ORIF recommended but family has declined operative intervention  -RE-order PT/OT when patient appropriate to participate.   -If she cannot consistently participate with therapy and if family not able to care for patient at home, senior care nursing home placement would likely be required upon discharge.       Primary hypertension  Chronic-  Uncontrolled  -Given Hydralazine 20mg IV tonight as patient is not safe to take oral medications.   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     No acute agitation at this time. Would use IV depakote or haldol if agitation developed       Prolonged Q-T interval on ECG  Obtain EKG for surveillance,   Withhold any PRN that may cause QT prolongation  Avoid Olanzapine as at last admit QT prolongation noted with use.         VTE Risk Mitigation (From admission, onward)         Ordered     enoxaparin injection 80 mg  Every 12 hours         08/09/23 2348     IP VTE HIGH RISK PATIENT  Once         08/09/23 2324     Place sequential compression device  Until discontinued         08/09/23 2324                   On 08/10/2023, patient should be placed in hospital observation services under my care.        Jay Gomez MD  Department of Hospital Medicine  [facility]

## 2023-08-10 NOTE — SUBJECTIVE & OBJECTIVE
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 5/22/2023    Lumbar spondylosis with myelopathy 4/25/2023    Multiple closed right sided fractures of pelvis without disruption of pelvic ring 7/9/2023    Primary hypertension 6/10/2022    Subdural hygroma 7/8/2023       Past Surgical History:   Procedure Laterality Date    REPAIR, HERNIA, INGUINAL, WITHOUT HISTORY OF PRIOR REPAIR, AGE 6 YEARS OR OLDER Right 5/6/2023    Procedure: REPAIR, HERNIA, INGUINAL, WITHOUT HISTORY OF PRIOR REPAIR, AGE 6 YEARS OR OLDER;  Surgeon: Maurice Piper MD;  Location: [facility] OR [address] [place] [address];  Service: General;  Laterality: Right;  possible laparotomy, possible bowel resection       Review of patient's allergies indicates:  No Known Allergies    No current facility-administered medications on file prior to encounter.     Current Outpatient Medications on File Prior to Encounter   Medication Sig    acetaminophen (TYLENOL) 325 MG tablet Take 2 tablets (650 mg total) by mouth every 6 (six) hours as needed (Pain).    apixaban (ELIQUIS) 5 mg Tab Take 1 tablet (5 mg total) by mouth 2 (two) times daily. Starting 8/5    ascorbic acid, vitamin C, (VITAMIN C) 250 MG tablet Take 1 tablet (250 mg total) by mouth once daily.    divalproex (DEPAKOTE) 250 MG EC tablet Take 1 tablet (250 mg total) by mouth every evening.    EScitalopram oxalate (LEXAPRO) 5 MG Tab Take 2 tablets (10 mg total) by mouth once daily.    furosemide (LASIX) 20 MG tablet Take 1 tablet (20 mg total) by mouth 2 (two) times daily.    haloperidoL (HALDOL) 0.5 MG tablet Take 1 tablet (0.5 mg total) by mouth every evening.    lisinopriL (PRINIVIL,ZESTRIL) 40 MG  tablet Take 1 tablet (40 mg total) by mouth once daily.    loperamide (IMODIUM) 2 mg capsule Take 1 capsule (2 mg total) by mouth 4 (four) times daily as needed for Diarrhea.    melatonin (MELATIN) 3 mg tablet Take 2 tablets (6 mg total) by mouth nightly as needed for Insomnia.    memantine (NAMENDA) 5 MG Tab Take 1 tablet (5 mg total) by mouth 2 (two) times daily.    methocarbamoL (ROBAXIN) 500 MG Tab Take 1 tablet (500 mg total) by mouth 3 (three) times daily as needed (muscle spasms, pain scale 5-7).    metoprolol tartrate (LOPRESSOR) 25 MG tablet Take 1 tablet (25 mg total) by mouth 2 (two) times daily.    miconazole NITRATE 2 % (MICOTIN) 2 % top powder Apply topically 2 (two) times daily. Underside of bilateral breasts    polyethylene glycol (GLYCOLAX) 17 gram PwPk Take 17 g by mouth 2 (two) times daily as needed.    potassium chloride (KLOR-CON) 10 MEQ TbSR Take 1 tablet (10 mEq total) by mouth once daily.    tamsulosin (FLOMAX) 0.4 mg Cap Take 1 capsule (0.4 mg total) by mouth nightly.    zinc sulfate (ZINCATE) 50 mg zinc (220 mg) capsule Take 1 capsule (220 mg total) by mouth once daily.     Family History    None       Tobacco Use    Smoking status: Never    Smokeless tobacco: Never   Substance and Sexual Activity    Alcohol use: Not on file    Drug use: Never    Sexual activity: Not Currently     Review of Systems   Unable to perform ROS: Mental status change     Objective:     Vital Signs (Most Recent):  Temp: 97.9 °F (36.6 °C) (08/09/23 2308)  Pulse: 64 (08/09/23 2308)  Resp: 16 (08/09/23 2308)  BP: (!) 107/51 (08/09/23 2308)  SpO2: 98 % (08/09/23 2308) Vital Signs (24h Range):  Temp:  [97.9 °F (36.6 °C)-98.3 °F (36.8 °C)] 97.9 °F (36.6 °C)  Pulse:  [60-70] 64  Resp:  [16-18] 16  SpO2:  [95 %-99 %] 98 %  BP: (107-208)/(51-76) 107/51     Weight: 75.8 kg (167 lb)  Body mass index is 28.67 kg/m².     Physical Exam  Vitals reviewed.   Constitutional:       Appearance: She is ill-appearing.   HENT:      Head:  "Normocephalic.   Eyes:      General: No scleral icterus.     Pupils: Pupils are equal, round, and reactive to light.   Cardiovascular:      Rate and Rhythm: Normal rate and regular rhythm.      Pulses: Normal pulses.      Heart sounds: No murmur heard.  Pulmonary:      Effort: Pulmonary effort is normal. No respiratory distress.      Breath sounds: No wheezing.      Comments: Limited anterior exam  Abdominal:      General: Bowel sounds are normal. There is no distension.      Palpations: Abdomen is soft.      Tenderness: There is no abdominal tenderness.   Genitourinary:     Comments: Spann catheter in place  Musculoskeletal:      Cervical back: Neck supple.      Comments: LUE flexor contracture at elbow, bilateral LE rigidity   Skin:     General: Skin is dry.      Coloration: Skin is pale.   Neurological:      Mental Status: She is lethargic.      GCS: GCS eye subscore is 1. GCS verbal subscore is 2. GCS motor subscore is 5.   Psychiatric:         Speech: She is noncommunicative.         Cognition and Memory: Cognition is impaired. Memory is impaired.              CRANIAL NERVES     CN III, IV, VI   Pupils are equal, round, and reactive to light.       Significant Labs: All pertinent labs within the past 24 hours have been reviewed.  ABGs: No results for input(s): "PH", "PCO2", "HCO3", "POCSATURATED", "BE", "TOTALHB", "COHB", "METHB", "O2HB", "POCFIO2", "PO2" in the last 48 hours.  Blood Culture: No results for input(s): "LABBLOO" in the last 48 hours.  CBC:   Recent Labs   Lab 08/09/23  1755   WBC 6.48   HGB 10.5*   HCT 33.5*        CMP:   Recent Labs   Lab 08/09/23  1755      K 3.6      CO2 23   GLU 89   BUN 14   CREATININE 0.8   CALCIUM 8.9   PROT 5.8*   ALBUMIN 2.7*   BILITOT 0.4   ALKPHOS 121   AST 23   ALT 10   ANIONGAP 14     Cardiac Markers: No results for input(s): "CKMB", "MYOGLOBIN", "BNP", "TROPISTAT" in the last 48 hours.  Coagulation: No results for input(s): "PT", "INR", "APTT" in " "the last 48 hours.  Lactic Acid: No results for input(s): "LACTATE" in the last 48 hours.  Magnesium:   Recent Labs   Lab 08/09/23  1755   MG 1.5*     Troponin: No results for input(s): "TROPONINI", "TROPONINIHS" in the last 48 hours.  TSH:   Recent Labs   Lab 08/09/23  1755   TSH 4.002*     Urine Studies:   Recent Labs   Lab 08/09/23  1756   COLORU Yellow   APPEARANCEUA Hazy*   PHUR 6.0   SPECGRAV 1.020   PROTEINUA 1+*   GLUCUA Negative   KETONESU 1+*   BILIRUBINUA Negative   OCCULTUA 3+*   NITRITE Negative   LEUKOCYTESUR 2+*   RBCUA >100*   WBCUA 58*   BACTERIA Moderate*   SQUAMEPITHEL 15   HYALINECASTS 110*       Significant Imaging: I have reviewed all pertinent imaging results/findings within the past 24 hours.    CT HEAD WITHOUT CONTRAST     CLINICAL HISTORY:  Mental status change, unknown cause;     TECHNIQUE:  Low dose axial CT images obtained throughout the head without intravenous contrast. Sagittal and coronal reconstructions were performed.     COMPARISON:  MRI brain 08/01/2023, head CT 07/30/2023     FINDINGS:  Patient is rotated and tilted within the scanner.  Beam hardening with streak artifact slightly limits evaluation.     Intracranial compartment:     Previous right-sided subdural hemorrhage is no longer identified and has likely resolved.  Previous left-sided subdural hemorrhage has decreased in volume and now more hypoattenuating but slightly heterogeneous consistent with evolving late subacute/remote subdural hemorrhage, measuring up to 9 mm in maximum thickness.  No new or enlarging extra-axial fluid collections.  No subarachnoid hemorrhage.     Grossly stable 2-3 mm rightward midline shift.  The ventricles are otherwise stable in overall size and configuration without acute hydrocephalus.     Similar patchy hypoattenuation of the supratentorial white matter consistent with chronic microvascular ischemic change.  Skull base atherosclerotic vascular calcifications noted.  No definite new focal " areas of abnormal parenchymal attenuation.  No parenchymal mass, hemorrhage, edema or major vascular distribution infarct.     Skull/extracranial contents (limited evaluation): No fracture. Mastoid air cells and paranasal sinuses are essentially clear.     Impression:     1. No detrimental change, acute large vascular territory infarct, or intracranial hemorrhage identified.  If persistent neurologic deficit, MRI brain can be obtained.  2. Interval resolution of previous right-sided subdural hemorrhage with interval overall decreased volume and decreased attenuation of left-sided subdural hemorrhage now late subacute/remote.  Similar 2-3 mm rightward midline shift.        Electronically signed by: Maurice Hernández MD  Date:                                            08/09/2023  Time:                                           19:52

## 2023-08-10 NOTE — ASSESSMENT & PLAN NOTE
Obtain EKG for surveillance,   Withhold any PRN that may cause QT prolongation  Avoid Olanzapine as at last admit QT prolongation noted with use.

## 2023-08-10 NOTE — PLAN OF CARE
[facility] - Emergency Dept  Initial Discharge Assessment       Primary Care Provider: Bev, Primary Doctor    Admission Diagnosis: AMS (altered mental status) [R41.82]    Admission Date: 8/9/2023  Expected Discharge Date:     Transition of Care Barriers: (P) None    Payor: MEDICARE / Plan: MEDICARE PART A & B / Product Type: Government /     Extended Emergency Contact Information  Primary Emergency Contact: tony griffith  Mobile Phone: [phone]  Relation: Son   needed? No  Secondary Emergency Contact: scott ramirez  Mobile Phone: [phone]  Relation: Other   needed? No    Discharge Plan A: (P) Assisted Living         [facility] [place] [facility] [place], [place] - [address]  [address]  [address]  [facility] [address]  Phone: [phone] Fax: [phone]      Initial Assessment (most recent)       Adult Discharge Assessment - 08/10/23 0442          Discharge Assessment    Assessment Type Discharge Planning Assessment (P)      Confirmed/corrected address, phone number and insurance Yes (P)      Confirmed Demographics Correct on Facesheet (P)      Source of Information patient (P)      Does patient/caregiver understand observation status Yes (P)      Communicated TOSHA with patient/caregiver Yes (P)      Reason For Admission Not eating (P)      People in Home facility resident (P)      [facility]      Do you expect to return to your current living situation? Yes (P)      Do you have help at home or someone to help you manage your care at home? Yes (P)      Who are your caregiver(s) and their phone number(s)? Son (P)      Prior to hospitilization cognitive status: Unable to Assess (P)      Current cognitive status: Unable to Assess (P)      Home Layout Able to live on 1st floor (P)      Equipment Currently Used at Home none (P)      Readmission within 30 days? Yes (P)      Patient currently being followed by outpatient case management? No (P)      Do you  currently have service(s) that help you manage your care at home? Yes (P)      Name and Contact number of agency Patient lives in an assisted livivng facility but has [facility] wokers with her everydat (P)      Is the pt/caregiver preference to resume services with current agency Yes (P)      Do you take prescription medications? Yes (P)      Do you have prescription coverage? Yes (P)      Coverage medicare (P)      Is the patient taking medications as prescribed? yes (P)      Who is going to help you get home at discharge? Son (P)      How do you get to doctors appointments? agency (P)      Are you on dialysis? No (P)      Do you take coumadin? No (P)      Discharge Plan A Assisted Living (P)      DME Needed Upon Discharge  none (P)      Discharge Plan discussed with: Patient (P)      Transition of Care Barriers None (P)         Physical Activity    On average, how many days per week do you engage in moderate to strenuous exercise (like a brisk walk)? 0 days (P)      On average, how many minutes do you engage in exercise at this level? 0 min (P)         Financial Resource Strain    How hard is it for you to pay for the very basics like food, housing, medical care, and heating? Not hard at all (P)         Housing Stability    In the last 12 months, was there a time when you were not able to pay the mortgage or rent on time? No (P)      In the last 12 months, how many places have you lived? 2 (P)      In the last 12 months, was there a time when you did not have a steady place to sleep or slept in a shelter (including now)? No (P)         Transportation Needs    In the past 12 months, has lack of transportation kept you from medical appointments or from getting medications? No (P)      In the past 12 months, has lack of transportation kept you from meetings, work, or from getting things needed for daily living? No (P)         Food Insecurity    Within the past 12 months, you worried that your food would run out before you  got the money to buy more. Never true (P)      Within the past 12 months, the food you bought just didn't last and you didn't have money to get more. Never true (P)         Stress    Do you feel stress - tense, restless, nervous, or anxious, or unable to sleep at night because your mind is troubled all the time - these days? Patient refused (P)    refused       Social Connections    In a typical week, how many times do you talk on the phone with family, friends, or neighbors? More than three times a week (P)      How often do you get together with friends or relatives? Never (P)      How often do you attend Anabaptist or Jewish services? Never (P)      Do you belong to any clubs or organizations such as Anabaptist groups, unions, fraternal or athletic groups, or school groups? No (P)      How often do you attend meetings of the clubs or organizations you belong to? Never (P)      Are you , , , , never , or living with a partner?  (P)         Alcohol Use    Q1: How often do you have a drink containing alcohol? Never (P)      Q2: How many drinks containing alcohol do you have on a typical day when you are drinking? Patient does not drink (P)      Q3: How often do you have six or more drinks on one occasion? Never (P)         OTHER    Name(s) of People in Home None (P)

## 2023-08-10 NOTE — ASSESSMENT & PLAN NOTE
Etiology is unclear -she's had multiple recent hospitalizations, chronic dementia with worsening functional status and acute medical problems.  She is on divalproex and haldol nightly lower dosages  -Abnormal UA with pyruria concerning for acute cystitis may be contributing etiology, does not present with SIRS   -CT head negative for new or evolution of SHD, has improved.  Pt has been placed back on anticoagulation (dvt/PE/afib) has multiple medical indication for anticoagulation

## 2023-08-10 NOTE — ASSESSMENT & PLAN NOTE
Chronic-  Uncontrolled  -Given Hydralazine 20mg IV tonight as patient is not safe to take oral medications.   -resume oral medications when clinically appropriate  -PRN IV hydralazine ordered

## 2023-08-10 NOTE — HPI
88 y.o. female with PMH significant for chronic BL pleural effusions, recent DVT diagnosed in March '23 (on eliquis), Alzheimers dementia, HTN, recent pelvic fracture (non-operative), Subdural Hematoma presents from [facility] for concerns of altered mental status.  Recent Discharge from [facility] on 08/07/23    Her hired caretaker is at bedside, reports that patient has waxing waning mentation but at SNF was awake/alert and reported to be eating well through Monday (08/07), then on Tues eating less but participated with physical therapy (08/08) and Today she has not been eating anything, and unable to participate in therapy, prompting ED evaluation.     She is hypertensive this evening, but vital signs earlier in the day stable, she is not safe to take oral anti-hypertensive medications, she will withdraw to pain, does not open eyes to painful stimuli, no meaningful speech, occasional moans.  Spann catheter present placed 3 days ago prior to discharge.     CT head today shows no new or extension of subdural hematoma, actually shows resolution of SDH. No other acute or detrimental change.   SNF MAR not available at this time.  Urinalysis with pyuria and RBC empiric Ceftriaxone started and referral for admission placed.     The history is provided by the patient, medical records  The history is limited by the condition of the patient.

## 2023-08-10 NOTE — ASSESSMENT & PLAN NOTE
-catheter placed in last 3 days,  Treat empirically with ceftriaxone given change in mental status.   -If urine culture is negative - likely can keep duncan catheter, however if bacteria grows then exchange duncan catheter.

## 2023-08-10 NOTE — ASSESSMENT & PLAN NOTE
When condition improves can resume Depakote and nightly haldol.     No acute agitation at this time. Would use IV depakote or haldol if agitation developed

## 2023-08-10 NOTE — ASSESSMENT & PLAN NOTE
-per last admit notes, current management is non-operateive based on family decision, patient was seen/evaluted by orthopedic surgery and ORIF recommended but family has declined operative intervention  -RE-order PT/OT when patient appropriate to participate.   -If she cannot consistently participate with therapy and if family not able to care for patient at home, group home nursing home placement would likely be required upon discharge.

## 2023-08-10 NOTE — ED NOTES
Received report and assumed care of patient at this time. Pt came to ED w/ c/o AMS. Pt is really somnolent at this time.  Airway is open and patent, respirations are spontaneous, normal effort and rate noted. Per pt's friend at bedside pt has not c/o chest pain at this time. Skin warm and dry. Movement to all extremities noted. No apparent distress noted. Bed placed in low position, side rails up x 2, call light is within reach of patient. Explanation of care provided to patient, pt placed on BP, pulse-ox and cardiac monitoring. Patient offers no complaints at this time. Awaiting further MD orders and bed assignment, will continue POC.

## 2023-08-10 NOTE — PLAN OF CARE
08/10/23 0448   Post-Acute Status   Post-Acute Authorization Other   Coverage Medicare   Other Status No Post-Acute Service Needs   Discharge Delays None known at this time   Discharge Plan   Discharge Plan A Home;Home with family     Patient lives at an Assisted Living facility with 24/7 PCA services.     Patient is to return to Assisted Living on discharge,    Family is discussing removing patient from assisted living back into her home.     Patient denies any post acute needs at this time.     LEANDRO Arriola, MSW-LMSW  Medical Social Worker/  ER Department

## 2023-08-10 NOTE — ED NOTES
"Assumed care of patient.  Radha Sandoval, a 88 y.o. female     Triage note:  Chief Complaint   Patient presents with    Failure To Thrive     EMS reports sent to ER due to "no longer eating, failure to thrive"     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 5/22/2023    Lumbar spondylosis with myelopathy 4/25/2023    Multiple closed right sided fractures of pelvis without disruption of pelvic ring 7/9/2023    Primary hypertension 6/10/2022    Subdural hygroma 7/8/2023       "

## 2023-08-10 NOTE — ASSESSMENT & PLAN NOTE
-chronic and stable respiratory status   -Use therapeutic Lovenox 1mg/kg q12 hours until patient safe to take apixaban.

## 2023-08-11 PROBLEM — D64.9 ANEMIA: Status: ACTIVE | Noted: 2023-01-01

## 2023-08-11 NOTE — PLAN OF CARE
Met pt and family friend yesterday  Per friend (Sonia), Ms Sandoval has been at [facility], home setting, [facility] and is now at [facility] ([facility])  Plan is to return to [facility] when stable  Pt transferred last night to 6th floor, bed 629    [facility]  Discharge Reassessment    Primary Care Provider: No, Primary Doctor    Expected Discharge Date:     Reassessment (most recent)       Discharge Reassessment - 08/11/23 0831          Discharge Reassessment    Assessment Type Discharge Planning Reassessment (P)      Did the patient's condition or plan change since previous assessment? No (P)      Discharge Plan discussed with: Patient;Friend (P)      Communicated TOSHA with patient/caregiver Yes (P)      Discharge Plan A Return to nursing home (P)      DME Needed Upon Discharge  none (P)      Transition of Care Barriers None (P)         Post-Acute Status    Discharge Delays None known at this time (P)

## 2023-08-11 NOTE — ASSESSMENT & PLAN NOTE
-chronic and stable respiratory status   - started on Lovenox 1mg/kg q12 hours  - apixaban 5 mg BID

## 2023-08-11 NOTE — PLAN OF CARE
Problem: Adult Inpatient Plan of Care  Goal: Plan of Care Review  Outcome: Ongoing, Progressing  Goal: Patient-Specific Goal (Individualized)  Outcome: Ongoing, Progressing  Goal: Absence of Hospital-Acquired Illness or Injury  Outcome: Ongoing, Progressing  Goal: Optimal Comfort and Wellbeing  Outcome: Ongoing, Progressing  Goal: Readiness for Transition of Care  Outcome: Ongoing, Progressing     Problem: Infection  Goal: Absence of Infection Signs and Symptoms  Outcome: Ongoing, Progressing     Problem: Impaired Wound Healing  Goal: Optimal Wound Healing  Outcome: Ongoing, Progressing     Problem: Skin Injury Risk Increased  Goal: Skin Health and Integrity  Outcome: Ongoing, Progressing     Problem: Fall Injury Risk  Goal: Absence of Fall and Fall-Related Injury  Outcome: Ongoing, Progressing     Problem: Confusion Acute  Goal: Optimal Cognitive Function  Outcome: Ongoing, Progressing    Patient rested comfortably with no significant changes during the shift, remains free from falls and injuries. Side rails up x2, bed low and locked, call light and personal belongings within reach. VS remain stable and respirations even and unlabored. No grimace, cries or other signs of distress. Questions and concerns addressed and answered. Medication compliance. Caregiver remains at bedside. HOB and pillows adjusted for comfort. Pt repositioned and continues on waffle mattress. Heels elevated off bed. Reviewed importance of Safety barriers and calling for assist prn.

## 2023-08-11 NOTE — NURSING
Nurses Note -- 4 Eyes      8/11/2023   12:10 AM      Skin assessed during: Admit      [x] No Altered Skin Integrity Present    []Prevention Measures Documented      [] Yes- Altered Skin Integrity Present or Discovered   [] LDA Added if Not in Epic (Describe Wound)   [] New Altered Skin Integrity was Present on Admit and Documented in LDA   [] Wound Image Taken    Wound Care Consulted? No    Attending Nurse:  Miles Weston RN/Staff Member:   BULL Irizarry-RN    Patient has excoriation on her left buttocks and moisture associated dermatitis on both buttocks.

## 2023-08-11 NOTE — PROGRESS NOTES
Hospital Medicine  Progress note    Team: [facility] HOSP MED Z Pinky Orourke MD  Admit Date: 8/9/2023    Principal Problem:  Acute encephalopathy    Interval hx:  Improved. Eating now    ROS   Respiratory: neg for cough neg for shortness of breath  Cardiovascular: neg for chest pain neg for palpitations  Gastrointestinal: neg for nausea neg for vomiting, neg for abdominal pain neg for diarrhea neg for constipation   Behavioral/Psych: neg for depression neg for anxiety    PEx  Temp:  [98.1 °F (36.7 °C)-98.7 °F (37.1 °C)]   Pulse:  [63-88]   Resp:  [16-20]   BP: ()/(49-70)   SpO2:  [93 %-99 %]     Intake/Output Summary (Last 24 hours) at 8/10/2023 2314  Last data filed at 8/10/2023 2038  Gross per 24 hour   Intake 488.81 ml   Output --   Net 488.81 ml       General Appearance: no acute distress, WD, elderly  Heart: regular rate and rhythm, no heave  Respiratory: Normal respiratory effort, symmetric excursion, bilateral vesicular breath sounds   Abdomen: Soft, non-tender; bowel sounds active  Skin: intact, no rash, no ulcers  Neurologic:  No focal numbness or weakness  Mental status: Alert, oriented to person only, affect appropriate     Recent Labs   Lab 08/04/23  0351 08/09/23  1755 08/10/23  0351   WBC 4.90 6.48 7.65   HGB 10.0* 10.5* 9.4*   HCT 33.6* 33.5* 30.4*    205 224     Recent Labs   Lab 08/04/23  0351 08/07/23  0203 08/09/23  1755 08/10/23  0351    142 143 145   K 3.9 4.4 3.6 3.3*    111* 106 109   CO2 18* 18* 23 19*   BUN 9 13 14 16   CREATININE 0.7 0.7 0.8 1.0   GLU 78 62* 89 83   CALCIUM 8.4* 8.7 8.9 8.4*   MG 1.7 1.7 1.5* 1.4*   PHOS 3.2 3.2  --  2.8     Recent Labs   Lab 08/04/23 0351 08/07/23 0203 08/09/23 1755   ALKPHOS 121  --  121   ALT 10  --  10   AST 20  --  23   ALBUMIN 2.4* 2.4* 2.7*   PROT 5.5*  --  5.8*   BILITOT 0.4  --  0.4        Recent Labs   Lab 08/06/23  2346 08/10/23  0105   POCTGLUCOSE 66* 91       Scheduled Meds:   cefTRIAXone (ROCEPHIN) IVPB  1 g Intravenous  Q24H    enoxparin  1 mg/kg Subcutaneous Q12H (treatment, non-standard time)    miconazole NITRATE 2 %   Topical (Top) BID     Continuous Infusions:  As Needed:  acetaminophen, bisacodyL, dextrose 10%, dextrose 10%, glucagon (human recombinant), glucose, hydrALAZINE, ipratropium, levalbuterol, melatonin, naloxone, prochlorperazine, sodium chloride 0.9%, valproate sodium (DEPACON) IVPB    Assessment and Plan  / Problems managed today    * Acute encephalopathy  -hold haldol and depakote and robaxin  -CT head negative for new or evolution of SHD, has improved.  Pt has been placed back on anticoagulation (dvt/PE/afib) has multiple medical indication for anticoagulation  -UTI on ceftriaxone  -delirium precautions    Catheter-associated urinary tract infection  -catheter placed in last 3 days  -voiding trial tomorrow  -ceftriaxone 2 g IV daily    Prolonged Q-T interval on ECG  Obtain EKG for surveillance,   Withhold any PRN that may cause QT prolongation  Avoid Olanzapine as at last admit QT prolongation noted with use.       Urinary retention  Continue duncan catheter and careset orders.     Multiple closed right sided fractures of pelvis without disruption of pelvic ring  -per last admit notes, current management is non-operateive based on family decision, patient was seen/evaluted by orthopedic surgery and ORIF recommended but family has declined operative intervention  -RE-order PT/OT when patient appropriate to participate.   -If she cannot consistently participate with therapy and if family not able to care for patient at home, long term nursing home placement would likely be required upon discharge.       Severe late onset Alzheimer's dementia without behavioral disturbance, psychotic disturbance, mood disturbance, or anxiety  When condition improves can resume Depakote and nightly haldol.     No acute agitation at this time. Would use IV depakote or haldol if agitation developed       Chronic pulmonary embolism  without acute cor pulmonale  -chronic and stable respiratory status   - started on Lovenox 1mg/kg q12 hours  - apixaban 5 mg BID    Debility  Secondary to recent admissions, decompensated health state and pelvic fractures       Primary hypertension  Chronic-  Uncontrolled  -Given Hydralazine 20mg IV tonight as patient is not safe to take oral medications.   -resume oral medications when clinically appropriate  -PRN IV hydralazine ordered      Discharge Planning   TOSHA:      Code Status: Full Code   Is the patient medically ready for discharge?: No    Reason for patient still in hospital (select all that apply): Patient trending condition and Treatment  Discharge Plan A: Home, Home with family   Discharge Delays: None known at this time    Diet:  regular diet  GI PPx: not needed  DVT PPx:  apixaban  Airways: room air  Wounds: none    Goals of Care:  Return to prior functional status       Time (minutes) spent in care of the patient including review of tests, flow sheets and notes since last visit, face to face contact, placing orders, communicating with consultants if needed, care coordination, and documentation: 35 min.    Pinky Orourke MD

## 2023-08-11 NOTE — PLAN OF CARE
Problem: Adult Inpatient Plan of Care  Goal: Plan of Care Review  Outcome: Ongoing, Not Progressing  Goal: Patient-Specific Goal (Individualized)  Outcome: Ongoing, Not Progressing  Goal: Absence of Hospital-Acquired Illness or Injury  Outcome: Ongoing, Not Progressing  Goal: Optimal Comfort and Wellbeing  Outcome: Ongoing, Not Progressing  Goal: Readiness for Transition of Care  Outcome: Ongoing, Not Progressing     Problem: Infection  Goal: Absence of Infection Signs and Symptoms  Outcome: Ongoing, Not Progressing     Problem: Impaired Wound Healing  Goal: Optimal Wound Healing  Outcome: Ongoing, Not Progressing     Problem: Skin Injury Risk Increased  Goal: Skin Health and Integrity  Outcome: Ongoing, Not Progressing     Problem: Fall Injury Risk  Goal: Absence of Fall and Fall-Related Injury  Outcome: Ongoing, Not Progressing     Problem: Confusion Acute  Goal: Optimal Cognitive Function  Outcome: Ongoing, Not Progressing

## 2023-08-11 NOTE — ASSESSMENT & PLAN NOTE
-stop haldol and depakote and robaxin  -CT head negative for new or evolution of SHD, has improved.  Pt has been placed back on anticoagulation (dvt/PE/afib) has multiple medical indication for anticoagulation  -UTI on ampicillin  -delirium precautions

## 2023-08-11 NOTE — PLAN OF CARE
08/11/23 0835   Post-Acute Status   Post-Acute Authorization Other   Discharge Delays None known at this time   Discharge Plan   Discharge Plan A Return to nursing home

## 2023-08-12 NOTE — PT/OT/SLP EVAL
Physical Therapy Co-Evaluation and Discharge Note    Patient Name:  Radha Sandoval   MRN:  [id number]    Co-evaluation and co-treatment performed for this visit due to suspected patient need for two skilled therapists to ensure patient and staff safety and to accommodate for patient activity tolerance/pain management     Recommendations:     Discharge Recommendations:  (return to NH)  Discharge Equipment Recommendations: none   Barriers to discharge: None    Assessment:     Radha Sandoval is a 88 y.o. female admitted with a medical diagnosis of Acute encephalopathy. .  At this time, patient is functioning at their prior level of function and does not require further acute PT services.     Recent Surgery: * No surgery found *      Plan:     During this hospitalization, patient does not require further acute PT services.  Please re-consult if situation changes.      Subjective     Chief Complaint: no specific c/o  Patient/Family Comments/goals: pt. Agreeable to PT with encouragement  Pain/Comfort:  Pain Rating 1:  (pt. did not rate)    Patients cultural, spiritual, Restoration conflicts given the current situation: no    Living Environment:  Pt. Lives at NH with 24/7 caregivers.  Prior to admission, patients level of function was bed/chair bound.  Equipment used at home: hospital bed and NH DME.  Upon discharge, patient will have assistance from caregivers.    Objective:     Communicated with nursing prior to session.  Patient found supine with peripheral IV, pressure relief boots upon PT entry to room.    General Precautions: Standard, fall    Orthopedic Precautions:N/A   Braces: N/A  Respiratory Status: Room air    Exams:  RLE ROM: WFL  RLE Strength: 3/5  LLE ROM: WFL  LLE Strength: 3/5    Functional Mobility:  Bed Mobility:     Rolling Left:  maximal assistance  Scooting: maximal assistance  Supine to Sit: maximal assistance  Sit to Supine: maximal assistance  Transfers:     Sit to Stand:  maximal assistance with  hand-held assist  Balance: fair sitting and poor standing    AM-PAC 6 CLICK MOBILITY  Total Score:9       Treatment and Education:  Discussed therapy needs, goals, and POC. Pt. Performed sitting balance/tolerance along EOB with SBA-CGA while completing ADLs with OT.    AM-PAC 6 CLICK MOBILITY  Total Score:9     Patient left supine with all lines intact and call button in reach.    GOALS:   Multidisciplinary Problems       Physical Therapy Goals       Not on file              Multidisciplinary Problems (Resolved)          Problem: Physical Therapy    Goal Priority Disciplines Outcome Goal Variances Interventions   Physical Therapy Goal   (Resolved)     PT, PT/OT Met                         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 5/22/2023    Lumbar spondylosis with myelopathy 4/25/2023    Multiple closed right sided fractures of pelvis without disruption of pelvic ring 7/9/2023    Primary hypertension 6/10/2022    Subdural hygroma 7/8/2023       Past Surgical History:   Procedure Laterality Date    REPAIR, HERNIA, INGUINAL, WITHOUT HISTORY OF PRIOR REPAIR, AGE 6 YEARS OR OLDER Right 5/6/2023    Procedure: REPAIR, HERNIA, INGUINAL, WITHOUT HISTORY OF PRIOR REPAIR, AGE 5 YEARS OR OLDER;  Surgeon: Maurice Piper MD;  Location: [facility] OR [address] [place] [address];  Service: General;  Laterality: Right;  possible laparotomy, possible bowel resection       Time Tracking:     PT Received On: 08/12/23  PT Start Time: 1328     PT Stop Time: 1349  PT Total Time (min): 21 min     Billable Minutes: Evaluation 10 and Therapeutic Activity 11      08/12/2023

## 2023-08-12 NOTE — PROGRESS NOTES
Hospital Medicine  Progress note    Team: [facility] [place] Pinky Orourke MD  Admit Date: 8/9/2023    Principal Problem:  Acute encephalopathy    Interval hx:  Somnolent today, but family and nursing not sure if she slept last night    ROS   Respiratory: neg for cough neg for shortness of breath  Cardiovascular: neg for chest pain neg for palpitations  Gastrointestinal: neg for nausea neg for vomiting, neg for abdominal pain neg for diarrhea neg for constipation   Behavioral/Psych: neg for depression neg for anxiety    PEx  Temp:  [97.5 °F (36.4 °C)-99 °F (37.2 °C)]   Pulse:  [67-90]   Resp:  [18-19]   BP: (136-148)/(62-69)   SpO2:  [93 %-99 %]     Intake/Output Summary (Last 24 hours) at 8/11/2023 2004  Last data filed at 8/11/2023 1107  Gross per 24 hour   Intake 488.81 ml   Output 400 ml   Net 88.81 ml         General Appearance: no acute distress, WD, elderly  Heart: regular rate and rhythm, no heave  Respiratory: Normal respiratory effort, symmetric excursion, bilateral vesicular breath sounds   Abdomen: Soft, non-tender; bowel sounds active  Skin: intact, no rash, no ulcers  Neurologic:  No focal numbness or weakness  Mental status: Alert, oriented to person only, affect appropriate     Recent Labs   Lab 08/09/23  1755 08/10/23  0351 08/11/23  0411   WBC 6.48 7.65 6.19   HGB 10.5* 9.4* 9.6*   HCT 33.5* 30.4* 30.8*    224 217       Recent Labs   Lab 08/07/23  0203 08/09/23  1755 08/10/23  0351 08/11/23  0411    143 145 143   K 4.4 3.6 3.3* 3.6   * 106 109 109   CO2 18* 23 19* 21*   BUN 13 14 16 18   CREATININE 0.7 0.8 1.0 1.0   GLU 62* 89 83 90   CALCIUM 8.7 8.9 8.4* 8.4*   MG 1.7 1.5* 1.4* 1.4*   PHOS 3.2  --  2.8 4.6*       Recent Labs   Lab 08/07/23 0203 08/09/23 1755   ALKPHOS  --  121   ALT  --  10   AST  --  23   ALBUMIN 2.4* 2.7*   PROT  --  5.8*   BILITOT  --  0.4          Recent Labs   Lab 08/06/23  2346 08/10/23  0105   POCTGLUCOSE 66* 91         Scheduled Meds:   ampicillin IVPB  2  g Intravenous Q12H    apixaban  5 mg Oral BID    miconazole NITRATE 2 %   Topical (Top) BID    mupirocin   Nasal BID     Continuous Infusions:  As Needed:  acetaminophen, bisacodyL, dextrose 10%, dextrose 10%, glucagon (human recombinant), glucose, hydrALAZINE, ipratropium, levalbuterol, melatonin, naloxone, prochlorperazine, sodium chloride 0.9%, valproate sodium (DEPACON) IVPB    Assessment and Plan  / Problems managed today    * Acute encephalopathy  -stop haldol and depakote and robaxin  -CT head negative for new or evolution of SHD, has improved.  Pt has been placed back on anticoagulation (dvt/PE/afib) has multiple medical indication for anticoagulation  -UTI on ampicillin  -delirium precautions    Anemia  - stable, at [facility]    Catheter-associated urinary tract infection  Enterococcal infection  -catheter placed in last 3 days  -duncan catheter removed  -ampicillin IV    Prolonged Q-T interval on ECG  Obtain EKG for surveillance,   Withhold any PRN that may cause QT prolongation  Avoid Olanzapine as at last admit QT prolongation noted with use.     Urinary retention  Continue duncan catheter and careset orders.     Multiple closed right sided fractures of pelvis without disruption of pelvic ring  -per last admit notes, current management is non-operateive based on family decision, patient was seen/evaluted by orthopedic surgery and ORIF recommended but family has declined operative intervention  -RE-order PT/OT when patient appropriate to participate.   -If she cannot consistently participate with therapy and if family not able to care for patient at home, shelter nursing home placement would likely be required upon discharge.       Severe late onset Alzheimer's dementia without behavioral disturbance, psychotic disturbance, mood disturbance, or anxiety  When condition improves can resume Depakote and nightly haldol.     No acute agitation at this time. Would use IV depakote or haldol if agitation  developed       Chronic pulmonary embolism without acute cor pulmonale  -chronic and stable respiratory status   - started on Lovenox 1mg/kg q12 hours  - apixaban 5 mg BID    Debility  Secondary to recent admissions, decompensated health state and pelvic fractures     Primary hypertension  Chronic-  Uncontrolled  -Given Hydralazine 20mg IV tonight as patient is not safe to take oral medications.   -resume oral medications when clinically appropriate  -PRN IV hydralazine ordered    Discharge Planning   TOSHA: 8/14/2023     Code Status: Full Code   Is the patient medically ready for discharge?: No    Reason for patient still in hospital (select all that apply): Patient trending condition and Treatment  Discharge Plan A: Return to nursing home   Discharge Delays: None known at this time    Diet:  regular diet  GI PPx: not needed  DVT PPx:  apixaban  Airways: room air  Wounds: none    Goals of Care:  Return to prior functional status       Time (minutes) spent in care of the patient including review of tests, flow sheets and notes since last visit, face to face contact, placing orders, communicating with consultants if needed, care coordination, and documentation: 35 min.    Pinky Orourke MD

## 2023-08-12 NOTE — PT/OT/SLP EVAL
Occupational Therapy   Evaluation and Discharge Note    Name: Radha Sandoval  MRN: [id number]  Admitting Diagnosis: Acute encephalopathy  Recent Surgery: * No surgery found *      Recommendations:     Discharge Recommendations: other (see comments) (return to NH)  Discharge Equipment Recommendations: none  Barriers to discharge:  None    Assessment:     Radha Sandoval is a 88 y.o. female with a medical diagnosis of Acute encephalopathy. At this time, patient is functioning at their prior level of function and does not require further acute OT services.     Plan:     During this hospitalization, patient does not require further acute OT services.  Please re-consult if situation changes.    Plan of Care Reviewed with: patient    Subjective     Chief Complaint: none noted  Patient/Family Comments/goals: patient agreed to therapy    Occupational Profile:  Living Environment: Pt was admitted from assisted living facility.       Previous level of function: T/f from bed to w/c and BSC with staff.   Roles and Routines: MICHAEL  Equipment Used at Home: walker, rolling, wheelchair   Assistance upon Discharge: Assisted living staff and caregiver     Pain/Comfort:  Pain Rating 1:  (patient did not rate or indicate pain)  Pain Addressed 1: Reposition, Distraction  Pain Rating Post-Intervention 1:  (patient did not rate or indicate pain)    Patients cultural, spiritual, Worship conflicts given the current situation: no    Objective:     Communicated with: NSG prior to session.  Patient found HOB elevated with peripheral IV, pressure relief boots, telemetry upon OT entry to room.    General Precautions: Standard, fall  Orthopedic Precautions: N/A  Braces: N/A  Respiratory Status: Room air     Occupational Performance:    Bed Mobility:    Patient completed Rolling/Turning to Left with  maximal assistance  Patient completed Scooting/Bridging with maximal assistance  Patient completed Supine to Sit with maximal assistance  Patient completed  Sit to Supine with maximal assistance    Functional Mobility/Transfers:  Patient completed Sit <> Stand Transfer with maximal assistance  with  hand-held assist     Activities of Daily Living:  Grooming: minimum assistance washing face and combing hair EOB  Lower Body Dressing: total assistance Donning socks    Cognitive/Visual Perceptual:  Cognitive/Psychosocial Skills:     -       Oriented to: Person   -       Follows Commands/attention:Follows multistep  commands  -       Communication: clear/fluent  -       Safety awareness/insight to disability: impaired   -       Mood/Affect/Coping skills/emotional control: Appropriate to situation  Visual/Perceptual:      -Intact      Physical Exam:  Upper Extremity Range of Motion:     -       Right Upper Extremity: WFL  -       Left Upper Extremity: WFL  Upper Extremity Strength:    -       Right Upper Extremity: 3/5  -       Left Upper Extremity: 3/5   Strength:    -       Right Upper Extremity: WFL  -       Left Upper Extremity: WFL  Fine Motor Coordination: -       Intact  Gross motor coordination:   WFL    AMPAC 6 Click ADL:  AMPAC Total Score: 11    Treatment & Education:  Role of OT and POC  ADL retraining  Functional mobility training  Safety  Discharge planning  Importance EOB/OOB activity    Co-treatment performed due to patient's multiple deficits requiring two skilled therapists to appropriately and safely assess patient's strength and endurance while facilitating functional tasks in addition to accommodating for patient's activity tolerance.     Patient left HOB elevated with all lines intact, call button in reach, and all needs met.     GOALS:   Multidisciplinary Problems       Occupational Therapy Goals       Not on file              Multidisciplinary Problems (Resolved)          Problem: Occupational Therapy    Goal Priority Disciplines Outcome Interventions   Occupational Therapy Goal   (Resolved)     OT, PT/OT Met                        History:      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 5/22/2023    Lumbar spondylosis with myelopathy 4/25/2023    Multiple closed right sided fractures of pelvis without disruption of pelvic ring 7/9/2023    Primary hypertension 6/10/2022    Subdural hygroma 7/8/2023         Past Surgical History:   Procedure Laterality Date    REPAIR, HERNIA, INGUINAL, WITHOUT HISTORY OF PRIOR REPAIR, AGE 6 YEARS OR OLDER Right 5/6/2023    Procedure: REPAIR, HERNIA, INGUINAL, WITHOUT HISTORY OF PRIOR REPAIR, AGE 6 YEARS OR OLDER;  Surgeon: Maurice Piper MD;  Location: [facility] OR [address] [place] [address];  Service: General;  Laterality: Right;  possible laparotomy, possible bowel resection       Time Tracking:     OT Date of Treatment: 08/12/23  OT Start Time: 1328  OT Stop Time: 1349  OT Total Time (min): 21 min    Billable Minutes:Evaluation 10  Self Care/Home Management 11    8/12/2023

## 2023-08-12 NOTE — PLAN OF CARE
Problem: Occupational Therapy  Goal: Occupational Therapy Goal  Outcome: Met       Evaluation/Discharge only. No acute OT needs at this time. No goals established. Re-consult if situation changes.

## 2023-08-12 NOTE — PLAN OF CARE
Problem: Adult Inpatient Plan of Care  Goal: Plan of Care Review  Outcome: Ongoing, Not Progressing  Goal: Patient-Specific Goal (Individualized)  Outcome: Ongoing, Not Progressing  Goal: Absence of Hospital-Acquired Illness or Injury  Outcome: Ongoing, Not Progressing  Goal: Optimal Comfort and Wellbeing  Outcome: Ongoing, Not Progressing  Goal: Readiness for Transition of Care  Outcome: Ongoing, Not Progressing     Problem: Infection  Goal: Absence of Infection Signs and Symptoms  Outcome: Ongoing, Not Progressing     Problem: Impaired Wound Healing  Goal: Optimal Wound Healing  Outcome: Ongoing, Not Progressing     Problem: Skin Injury Risk Increased  Goal: Skin Health and Integrity  Outcome: Ongoing, Not Progressing     Problem: Fall Injury Risk  Goal: Absence of Fall and Fall-Related Injury  Outcome: Ongoing, Not Progressing     Problem: Confusion Acute  Goal: Optimal Cognitive Function  Outcome: Ongoing, Not Progressing     No significant changes on our shift. Turned to her sides and continued care and monitoring.

## 2023-08-13 PROBLEM — R05.1 ACUTE COUGH: Status: ACTIVE | Noted: 2023-01-01

## 2023-08-13 NOTE — PLAN OF CARE
Problem: Fall Injury Risk  Goal: Absence of Fall and Fall-Related Injury  Outcome: Ongoing, Progressing     Problem: Confusion Acute  Goal: Optimal Cognitive Function  Outcome: Ongoing, Progressing     Problem: Skin Injury Risk Increased  Goal: Skin Health and Integrity  Outcome: Ongoing, Progressing     Problem: Impaired Wound Healing  Goal: Optimal Wound Healing  Outcome: Ongoing, Progressing     Problem: Infection  Goal: Absence of Infection Signs and Symptoms  Outcome: Ongoing, Progressing

## 2023-08-13 NOTE — PT/OT/SLP PROGRESS
Speech Language Pathology  Pt not seen     Radha Sandoval  MRN: [id number]    Patient not seen today secondary to RN hold regarding potential change in status. SLP unable to return in PM. Will follow up.

## 2023-08-13 NOTE — PROGRESS NOTES
Hospital Medicine  Progress note    [place] [facility] [place] Pinky Orourke [place]  Admit Date: 8/9/2023    Principal Problem:  Acute encephalopathy    Interval hx:  Coughing and wheezing     ROS   Respiratory: neg for cough neg for shortness of breath  Cardiovascular: neg for chest pain neg for palpitations  Gastrointestinal: neg for nausea neg for vomiting, neg for abdominal pain neg for diarrhea neg for constipation   Behavioral/Psych: neg for depression neg for anxiety    PEx  Temp:  [97.3 °F (36.3 °C)-98.7 °F (37.1 °C)]   Pulse:  [78-89]   Resp:  [16-18]   BP: (120-189)/(56-77)   SpO2:  [92 %-97 %]   No intake or output data in the 24 hours ending 08/13/23 1136      General Appearance: no acute distress, WD, elderly  Heart: regular rate and rhythm, no heave  Respiratory: Normal respiratory effort, symmetric excursion, expiratory wheezing with decreased breath sounds  Abdomen: Soft, non-tender; bowel sounds active  Skin: intact, no rash, no ulcers  Neurologic:  No focal numbness or weakness  Mental status: Alert, oriented to person only, affect appropriate     Recent Labs   Lab 08/09/23  1755 08/10/23  0351 08/11/23  0411   WBC 6.48 7.65 6.19   HGB 10.5* 9.4* 9.6*   HCT 33.5* 30.4* 30.8*    224 217       Recent Labs   Lab 08/11/23  0411 08/12/23  0902 08/13/23  0851    141 141   K 3.6 3.3* 3.4*    109 107   CO2 21* 21* 22*   BUN 18 11 7*   CREATININE 1.0 0.7 0.6   GLU 90 111* 99   CALCIUM 8.4* 8.3* 8.2*   MG 1.4* 2.4 1.6   PHOS 4.6* 2.5* 4.0       Recent Labs   Lab 08/09/23  1755 08/12/23  0902 08/13/23  0851   ALKPHOS 121  --   --    ALT 10  --   --    AST 23  --   --    ALBUMIN 2.7* 2.3* 2.3*   PROT 5.8*  --   --    BILITOT 0.4  --   --           Recent Labs   Lab 08/06/23  2346 08/10/23  0105   POCTGLUCOSE 66* 91         Scheduled Meds:   albuterol  2 puff Inhalation QID    amLODIPine  5 mg Oral Daily    ampicillin IVPB  2 g Intravenous Q12H    apixaban  5 mg Oral BID    ipratropium  2 puff  Inhalation QID    miconazole NITRATE 2 %   Topical (Top) BID    mupirocin   Nasal BID    potassium chloride  30 mEq Oral TID WM     Continuous Infusions:  As Needed:  acetaminophen, bisacodyL, dextrose 10%, dextrose 10%, glucagon (human recombinant), glucose, hydrALAZINE, melatonin, naloxone, prochlorperazine, sodium chloride 0.9%, valproate sodium (DEPACON) IVPB    Assessment and Plan  / Problems managed today    * Acute encephalopathy  -stop haldol and depakote and robaxin  -CT head negative for new or evolution of SHD, has improved.  Pt has been placed back on anticoagulation (dvt/PE/afib) has multiple medical indication for anticoagulation  -UTI on ampicillin  -delirium precautions    Acute cough  CXR  Albuterol and atrovent QID    Anemia  - stable, at [facility]    Catheter-associated urinary tract infection  Enterococcal infection  -catheter placed in last 3 days  -duncan catheter removed  -ampicillin IV    Prolonged Q-T interval on ECG  Obtain EKG for surveillance,   Withhold any PRN that may cause QT prolongation  Avoid Olanzapine as at last admit QT prolongation noted with use.     Urinary retention  Successful voiding trial    Hypokalemia  hypophosphorus  Hypomangesium  replacement    Multiple closed right sided fractures of pelvis without disruption of pelvic ring  -per last admit notes, current management is non-operateive based on family decision, patient was seen/evaluted by orthopedic surgery and ORIF recommended but family has declined operative intervention  -RE-order PT/OT when patient appropriate to participate.   -If she cannot consistently participate with therapy and if family not able to care for patient at home, snf nursing home placement would likely be required upon discharge.       Severe late onset Alzheimer's dementia without behavioral disturbance, psychotic disturbance, mood disturbance, or anxiety  When condition improves can resume Depakote and nightly haldol.     No acute  agitation at this time. Would use IV depakote or haldol if agitation developed       Chronic pulmonary embolism without acute cor pulmonale  -chronic and stable respiratory status   - started on Lovenox 1mg/kg q12 hours  - apixaban 5 mg BID    Debility  Secondary to recent admissions, decompensated health state and pelvic fractures     Primary hypertension  Chronic-  Uncontrolled  -Given Hydralazine 20mg IV tonight as patient is not safe to take oral medications.   -resume oral medications when clinically appropriate  -PRN IV hydralazine ordered    Discharge Planning   TOSHA: 8/14/2023     Code Status: Full Code   Is the patient medically ready for discharge?: No    Reason for patient still in hospital (select all that apply): Patient trending condition and Treatment  Discharge Plan A: Return to nursing home   Discharge Delays: None known at this time    Diet:  regular diet  GI PPx: not needed  DVT PPx:  apixaban  Airways: room air  Wounds: none    Goals of Care:  Return to prior functional status       Time (minutes) spent in care of the patient including review of tests, flow sheets and notes since last visit, face to face contact, placing orders, communicating with consultants if needed, care coordination, and documentation: 35 min.    Pinky Orourke MD

## 2023-08-13 NOTE — PROGRESS NOTES
Hospital Medicine  Progress note    Team: [facility] Pinky Orourke MD  Admit Date: 8/9/2023    Principal Problem:  Acute encephalopathy    Interval hx:  Alert today. Difficulty with regular diet.     ROS   Respiratory: neg for cough neg for shortness of breath  Cardiovascular: neg for chest pain neg for palpitations  Gastrointestinal: neg for nausea neg for vomiting, neg for abdominal pain neg for diarrhea neg for constipation   Behavioral/Psych: neg for depression neg for anxiety    PEx  Temp:  [96.3 °F (35.7 °C)-98.1 °F (36.7 °C)]   Pulse:  []   Resp:  [16-18]   BP: ()/(53-68)   SpO2:  [92 %-98 %]   No intake or output data in the 24 hours ending 08/12/23 2235      General Appearance: no acute distress, WD, elderly  Heart: regular rate and rhythm, no heave  Respiratory: Normal respiratory effort, symmetric excursion, bilateral vesicular breath sounds   Abdomen: Soft, non-tender; bowel sounds active  Skin: intact, no rash, no ulcers  Neurologic:  No focal numbness or weakness  Mental status: Alert, oriented to person only, affect appropriate     Recent Labs   Lab 08/09/23  1755 08/10/23  0351 08/11/23  0411   WBC 6.48 7.65 6.19   HGB 10.5* 9.4* 9.6*   HCT 33.5* 30.4* 30.8*    224 217       Recent Labs   Lab 08/10/23  0351 08/11/23  0411 08/12/23  0902    143 141   K 3.3* 3.6 3.3*    109 109   CO2 19* 21* 21*   BUN 16 18 11   CREATININE 1.0 1.0 0.7   GLU 83 90 111*   CALCIUM 8.4* 8.4* 8.3*   MG 1.4* 1.4* 2.4   PHOS 2.8 4.6* 2.5*       Recent Labs   Lab 08/07/23  0203 08/09/23  1755 08/12/23  0902   ALKPHOS  --  121  --    ALT  --  10  --    AST  --  23  --    ALBUMIN 2.4* 2.7* 2.3*   PROT  --  5.8*  --    BILITOT  --  0.4  --           Recent Labs   Lab 08/06/23  2346 08/10/23  0105   POCTGLUCOSE 66* 91         Scheduled Meds:   ampicillin IVPB  2 g Intravenous Q12H    apixaban  5 mg Oral BID    miconazole NITRATE 2 %   Topical (Top) BID    mupirocin   Nasal BID     Continuous  Infusions:  As Needed:  acetaminophen, bisacodyL, dextrose 10%, dextrose 10%, glucagon (human recombinant), glucose, hydrALAZINE, ipratropium, levalbuterol, melatonin, naloxone, prochlorperazine, sodium chloride 0.9%, valproate sodium (DEPACON) IVPB    Assessment and Plan  / Problems managed today    * Acute encephalopathy  -stop haldol and depakote and robaxin  -CT head negative for new or evolution of SHD, has improved.  Pt has been placed back on anticoagulation (dvt/PE/afib) has multiple medical indication for anticoagulation  -UTI on ampicillin  -delirium precautions    Anemia  - stable, at [facility]    Catheter-associated urinary tract infection  Enterococcal infection  -catheter placed in last 3 days  -duncan catheter removed  -ampicillin IV    Prolonged Q-T interval on ECG  Obtain EKG for surveillance,   Withhold any PRN that may cause QT prolongation  Avoid Olanzapine as at last admit QT prolongation noted with use.     Urinary retention  Successful voiding trial    Hypokalemia  hypophosphorus  Hypomangesium  replacement      Multiple closed right sided fractures of pelvis without disruption of pelvic ring  -per last admit notes, current management is non-operateive based on family decision, patient was seen/evaluted by orthopedic surgery and ORIF recommended but family has declined operative intervention  -RE-order PT/OT when patient appropriate to participate.   -If she cannot consistently participate with therapy and if family not able to care for patient at home, retirement nursing home placement would likely be required upon discharge.       Severe late onset Alzheimer's dementia without behavioral disturbance, psychotic disturbance, mood disturbance, or anxiety  When condition improves can resume Depakote and nightly haldol.     No acute agitation at this time. Would use IV depakote or haldol if agitation developed       Chronic pulmonary embolism without acute cor pulmonale  -chronic and stable  respiratory status   - started on Lovenox 1mg/kg q12 hours  - apixaban 5 mg BID    Debility  Secondary to recent admissions, decompensated health state and pelvic fractures     Primary hypertension  Chronic-  Uncontrolled  -Given Hydralazine 20mg IV tonight as patient is not safe to take oral medications.   -resume oral medications when clinically appropriate  -PRN IV hydralazine ordered    Discharge Planning   TOSHA: 8/14/2023     Code Status: Full Code   Is the patient medically ready for discharge?: No    Reason for patient still in hospital (select all that apply): Patient trending condition and Treatment  Discharge Plan A: Return to nursing home   Discharge Delays: None known at this time    Diet:  regular diet  GI PPx: not needed  DVT PPx:  apixaban  Airways: room air  Wounds: none    Goals of Care:  Return to prior functional status       Time (minutes) spent in care of the patient including review of tests, flow sheets and notes since last visit, face to face contact, placing orders, communicating with consultants if needed, care coordination, and documentation: 35 min.    Pinky Orourke MD

## 2023-08-14 NOTE — PLAN OF CARE
Problem: Adult Inpatient Plan of Care  Goal: Plan of Care Review  Outcome: Met  Goal: Patient-Specific Goal (Individualized)  Outcome: Met  Goal: Absence of Hospital-Acquired Illness or Injury  Outcome: Met  Goal: Optimal Comfort and Wellbeing  Outcome: Met  Goal: Readiness for Transition of Care  Outcome: Met     Problem: Infection  Goal: Absence of Infection Signs and Symptoms  Outcome: Met     Problem: Impaired Wound Healing  Goal: Optimal Wound Healing  Outcome: Met     Problem: Skin Injury Risk Increased  Goal: Skin Health and Integrity  Outcome: Met     Problem: Fall Injury Risk  Goal: Absence of Fall and Fall-Related Injury  Outcome: Met     Problem: Confusion Acute  Goal: Optimal Cognitive Function  Outcome: Met       Pt discharged back to [facility] via ambulance going to  P111. Belongings left with sitter/family friend back to [facility]. Ivs removed. Report given to Tatiana @ 2904 - [phone].

## 2023-08-14 NOTE — PLAN OF CARE
[facility]     Department of Hospital Medicine     [address] [place] [address]     [phone] after hours  [phone] fax       NURSING HOME ORDERS                                     08/14/2023    Admit to Nursing Home:    Skilled Bed  Diagnoses:  Active Hospital Problems    Diagnosis  POA    *Acute encephalopathy [G93.40]  Yes     Priority: 1 - High    Acute cough [R05.1]  Yes    Anemia [D64.9]  Yes    Catheter-associated urinary tract infection [T83.511A, N39.0]  Yes    Prolonged Q-T interval on ECG [R94.31]  Yes    Urinary retention [R33.9]  Yes    Multiple closed right sided fractures of pelvis without disruption of pelvic ring [S32.82XA]  Yes    Hypokalemia [E87.6]  Yes    Subdural hygroma [G96.08]  Yes    Chronic pulmonary embolism without acute cor pulmonale [I27.82]  Yes    Severe late onset Alzheimer's dementia without behavioral disturbance, psychotic disturbance, mood disturbance, or anxiety [G30.1, F02.C0]  Yes     Chronic    Debility [R53.81]  Yes    Primary hypertension [I10]  Yes     Chronic      Resolved Hospital Problems   No resolved problems to display.       Allergies:Review of patient's allergies indicates:  No Known Allergies    Vitals:     Every shift (Skilled Nursing patients)    Diet: Mechanical soft regular diet  Encourage patient to drink at least 1200 mL (5 cups) per day and no more than 2000 mL (8 cups) per day    Acitivities:    - Up in a chair BID   - May use walker, cane, or self-propelled wheelchair   - Weight bearing: as tolerated    Nursing Precautions:     - Aspiration precautions:             -  Upright 90 degrees before during and after meals             -  Suction at bedside        -  for positioning   - Pressure reducing foam mattress   - Turn patient every two hours. Use wedge pillows to anchor patient   - Fall precautions    CONSULTS:      PT to evaluate and treat     OT to evaluate and treat     ST to  evaluate and treat     Nutrition to evaluate and recommend diet     Psychiatry to evaluate and follow patients for delirium      LABS:  renal function panel and magnesium twice weekly    WOUND CARE:  Wound spray or saline for wound cleaning with all dressing changes.    All wounds to be measured with first dressing changes and every week.    Buttocks  Nurse to: cleanse sacral area with soap and water pat dry apply triad paste twice a day and prn.      Medications: Discontinue all previous medication orders, if any. See new list below.  Current Discharge Medication List        START taking these medications    Details   !! albuterol (PROVENTIL HFA) 90 mcg/actuation inhaler Inhale 2 puffs into the lungs every 4 (four) hours as needed for Wheezing. Use spacer with adult mask  Qty: 18 g, Refills: 1      !! albuterol (PROVENTIL/VENTOLIN HFA) 90 mcg/actuation inhaler Inhale 2 puffs into the lungs 4 (four) times daily. Use spacer with adult mask for 3 days  Qty: 18 g, Refills: 0      ampicillin (PRINCIPEN) 500 MG capsule Take 1 capsule (500 mg total) by mouth 4 (four) times daily. Stop 8/18/2023 for 4 days  Qty: 16 capsule, Refills: 0      cholecalciferol, vitamin D3, (VITAMIN D3) 50 mcg (2,000 unit) Tab Take 1 tablet (2,000 Units total) by mouth once daily.      magnesium oxide 400 mg magnesium Tab Take 1 tablet by mouth 2 (two) times daily with meals. Take for one month      potassium, sodium phosphates (PHOS-NAK) 280-160-250 mg PwPk Take 2 packets by mouth once daily. for 7 doses  Refills: 0       !! - Potential duplicate medications found. Please discuss with provider.        CONTINUE these medications which have NOT CHANGED    Details   acetaminophen (TYLENOL) 325 MG tablet Take 2 tablets (650 mg total) by mouth every 6 (six) hours as needed (Pain).  Qty: 60 tablet, Refills: 1      apixaban (ELIQUIS) 5 mg Tab Take 1 tablet (5 mg total) by mouth 2 (two) times daily. Starting 8/5  Qty: 60 tablet, Refills: 11       EScitalopram oxalate (LEXAPRO) 5 MG Tab Take 2 tablets (10 mg total) by mouth once daily.  Qty: 60 tablet, Refills: 11      furosemide (LASIX) 20 MG tablet Take 1 tablet (20 mg total) by mouth 2 (two) times daily.  Qty: 60 tablet, Refills: 11      lisinopriL (PRINIVIL,ZESTRIL) 40 MG tablet Take 1 tablet (40 mg total) by mouth once daily.  Qty: 30 tablet, Refills: 11      loperamide (IMODIUM) 2 mg capsule Take 1 capsule (2 mg total) by mouth 4 (four) times daily as needed for Diarrhea.  Qty: 60 capsule, Refills: 0      melatonin (MELATIN) 3 mg tablet Take 2 tablets (6 mg total) by mouth nightly as needed for Insomnia.  Qty: 30 tablet, Refills: 3      memantine (NAMENDA) 5 MG Tab Take 1 tablet (5 mg total) by mouth 2 (two) times daily.  Qty: 60 tablet, Refills: 11      metoprolol tartrate (LOPRESSOR) 25 MG tablet Take 1 tablet (25 mg total) by mouth 2 (two) times daily.  Qty: 60 tablet, Refills: 11      miconazole NITRATE 2 % (MICOTIN) 2 % top powder Apply topically 2 (two) times daily. Underside of bilateral breasts  Qty: 85 g, Refills: 3      polyethylene glycol (GLYCOLAX) 17 gram PwPk Take 17 g by mouth 2 (two) times daily as needed.  Refills: 0      potassium chloride (KLOR-CON) 10 MEQ TbSR Take 1 tablet (10 mEq total) by mouth once daily.  Qty: 30 tablet, Refills: 11           STOP taking these medications       ascorbic acid, vitamin C, (VITAMIN C) 250 MG tablet        divalproex (DEPAKOTE) 250 MG EC tablet        haloperidoL (HALDOL) 0.5 MG tablet        methocarbamoL (ROBAXIN) 500 MG Tab        tamsulosin (FLOMAX) 0.4 mg Cap        zinc sulfate (ZINCATE) 50 mg zinc (220 mg) capsule              _________________________________  Pinky Orourke MD  08/14/2023

## 2023-08-14 NOTE — PLAN OF CARE
CLAUDIA left message for daughter, Sonia [phone]; left message requesting a return call.  Spoke to pt's son, Maykel [phone]; confirmed pt will return to [facility] for SNF.  Spoke to pt's caretaker, Chel [phone] ; and advised patient will return to [facility] on today.       08/14/23 1438   Post-Acute Status   Post-Acute Authorization Placement   Post-Acute Placement Status Pending medical clearance/testing   Coverage Medicare A & B   Discharge Delays None known at this time   Discharge Plan   Discharge Plan A Skilled Nursing Facility   Discharge Plan B Home;Home with family;Home Health;Other  (24/7 sitters)     Domenica Braswell LMSW  PRN-  [facility]  Ext. [phone]

## 2023-08-14 NOTE — PT/OT/SLP EVAL
Speech Language Pathology Evaluation  Bedside Swallow  Discharge    Patient Name:  Radha Sandoval   MRN:  [id number]  629/629 A    Admitting Diagnosis: Acute encephalopathy    Recommendations:                 General Recommendations:  Follow-up not indicated  Diet recommendations:  Minced & Moist Diet - IDDSI Level 5, Thin liquids - IDDSI Level 0   Aspiration Precautions: 1 bite/sip at a time, Assistance with meals, Eliminate distractions, HOB to 90 degrees, Monitor for s/s of aspiration, and Standard aspiration precautions, check for pocketing, feed only when awake  General Precautions: Standard, fall  Communication strategies:  none    Assessment:     Radha Sandoval is a 88 y.o. female with adequate tolerance of baseline diet. No further skilled acute Speech Therapy services warranted at this time. Please re-consult as needed.       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 5/22/2023    Lumbar spondylosis with myelopathy 4/25/2023    Multiple closed right sided fractures of pelvis without disruption of pelvic ring 7/9/2023    Primary hypertension 6/10/2022    Subdural hygroma 7/8/2023       Past Surgical History:   Procedure Laterality Date    REPAIR, HERNIA, INGUINAL, WITHOUT HISTORY OF PRIOR REPAIR, AGE 6 YEARS OR OLDER Right 5/6/2023    Procedure: REPAIR, HERNIA, INGUINAL, WITHOUT HISTORY OF PRIOR REPAIR, AGE 6 YEARS OR OLDER;  Surgeon: Maurice Piper MD;  Location: [facility] OR [address] [place] [address];  Service: General;  Laterality: Right;  possible laparotomy, possible bowel resection       Chest X-Rays: No acute abnormality.    Prior diet: Patient with previous (recent) ST recommendations  for puree and mechanical soft pending SHARA. Caregiver at bedside reports patient on a soft diet at baseline and has been tolerating soft meal trays with no difficulties noted.       Subjective     Patient awake and cooperative.   Caregiver at bedside.     Objective:     Oral Musculature Evaluation  Oral Musculature: WFL  Dentition: present and adequate  Mucosal Quality: adequate  Lingual Strength and Mobility: WFL  Volitional Cough: elicited  Volitional Swallow: timely  Voice Prior to PO Intake: clear    Bedside Swallow Eval:   Consistencies Assessed:  Thin liquids sips of water via straw  Puree bites of pudding  Solids bites of Cheerios      Oral Phase:   WFL    Pharyngeal Phase:   no overt clinical signs/symptoms of aspiration  no overt clinical signs/symptoms of pharyngeal dysphagia    Compensatory Strategies  None    Treatment: SLP provided patient and patient's caregiver education on SLP recommendations, SLP role, s/s and risks of aspiration, safe swallow precautions, and POC. All questions addressed and caregiver verbalized understanding of all discussed.     Goals:   Multidisciplinary Problems       SLP Goals          Problem: SLP    Goal Priority Disciplines Outcome   SLP Goal     SLP Ongoing, Progressing                       Plan:       Plan of Care reviewed with:  patient, caregiver   SLP Follow-Up:  No       Discharge recommendations:   (return to prior lvl of care)   Barriers to Discharge:  None    Time Tracking:     SLP Treatment Date:   08/14/23  Speech Start Time:  0910  Speech Stop Time:  0921     Speech Total Time (min):  11 min    Billable Minutes: Eval Swallow and Oral Function 11    08/14/2023

## 2023-08-14 NOTE — PROGRESS NOTES
Pharmacist Renal Dose Adjustment Note    Radha Sandoval is a 88 y.o. female being treated with the medication ampicillin    Patient Data:    Vital Signs (Most Recent):  Temp: 97.5 °F (36.4 °C) (08/14/23 0741)  Pulse: 66 (08/14/23 0819)  Resp: 18 (08/14/23 0819)  BP: (!) 189/77 (08/14/23 0741)  SpO2: 95 % (08/14/23 0819) Vital Signs (72h Range):  Temp:  [96.3 °F (35.7 °C)-99 °F (37.2 °C)]   Pulse:  []   Resp:  [16-20]   BP: ()/(53-77)   SpO2:  [92 %-99 %]      Recent Labs   Lab 08/12/23  0902 08/13/23  0851 08/14/23  0837   CREATININE 0.7 0.6 0.6     Serum creatinine: 0.6 mg/dL 08/14/23 0837  Estimated creatinine clearance: 65.8 mL/min    Ampicillin 2g Q12H will be changed to ampicillin 2g Q6H     Pharmacist's Name: Donell Roberson  Pharmacist's Extension: [phone]

## 2023-08-14 NOTE — PROGRESS NOTES
08/14/23 0800   WOCN Assessment   WOCN Total Time (mins) 20   Visit Date 08/14/23   Visit Time 0800   Consult Type New   WOCN Speciality Wound   Intervention assessed;changed;applied;chart review;coordination of care;orders        Altered Skin Integrity 08/14/23 0800 Sacral spine   Date First Assessed/Time First Assessed: 08/14/23 0800   Location: Sacral spine   Wound Image    Description of Altered Skin Integrity Partial thickness tissue loss. Shallow open ulcer with a red or pink wound bed, without slough. Intact or Open/Ruptured Serum-filled blister.   Dressing Appearance Open to air   Drainage Amount None   Red (%), Wound Tissue Color 100 %   Periwound Area Denuded;Moist;Redness   Wound Edges Undefined   Wound Length (cm) 6 cm   Wound Width (cm) 7 cm   Wound Depth (cm) 0.1 cm   Wound Volume (cm^3) 4.2 cm^3   Wound Surface Area (cm^2) 42 cm^2     [facility] Surg  Wound Care    Patient Name:  Radha Sandoval   MRN:  [id number]  Date: 8/14/2023  Diagnosis: Acute encephalopathy    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 5/22/2023    Lumbar spondylosis with myelopathy 4/25/2023    Multiple closed right sided fractures of pelvis without disruption of pelvic ring 7/9/2023    Primary hypertension 6/10/2022    Subdural hygroma 7/8/2023       Social History     Socioeconomic History    Marital status: Unknown   Tobacco Use    Smoking status: Never    Smokeless tobacco: Never   Substance and Sexual Activity    Drug use: Never    Sexual activity: Not Currently     Social Determinants of Health     Financial Resource Strain: Low Risk  (8/10/2023)    Overall Financial Resource  Strain (CARDIA)     Difficulty of Paying Living Expenses: Not hard at all   Food Insecurity: No Food Insecurity (8/10/2023)    Hunger Vital Sign     Worried About Running Out of Food in the Last Year: Never true     Ran Out of Food in the Last Year: Never true   Transportation Needs: No Transportation Needs (8/10/2023)    PRAPARE - Transportation     Lack of Transportation (Medical): No     Lack of Transportation (Non-Medical): No   Physical Activity: Inactive (8/10/2023)    Exercise Vital Sign     Days of Exercise per Week: 0 days     Minutes of Exercise per Session: 0 min   Stress: Unknown (8/10/2023)    Citizen of [place] [employer] - Occupational Stress Questionnaire     Feeling of Stress : Patient refused   Social Connections: Socially Isolated (8/10/2023)    Social Connection and Isolation Panel [NHANES]     Frequency of Communication with Friends and Family: More than three times a week     Frequency of Social Gatherings with Friends and Family: Never     Attends Yarsani Services: Never     Active Member of Clubs or Organizations: No     Attends Club or Organization Meetings: Never     Marital Status:    Housing Stability: Low Risk  (8/10/2023)    Housing Stability Vital Sign     Unable to Pay for Housing in the Last Year: No     Number of Places Lived in the Last Year: 2     Unstable Housing in the Last Year: No   Recent Concern: Housing Stability - High Risk (5/23/2023)    Housing Stability Vital Sign     Unable to Pay for Housing in the Last Year: Yes     Number of Places Lived in the Last Year: 1     Unstable Housing in the Last Year: Yes       Precautions:     Allergies as of 08/09/2023    (No Known Allergies)       WOC Assessment Details/Treatment   Patient consult for wound care to sacral area, partially open because of moisture. There is no drainage 100% redness. This redness spread throughout to the buttocks. The patient  need max assistance to reposition herself while in bed.  Incontinent to bowel and bladder. The patient have a waffle mattress in place with a purple wedge for comfort when repositioned. Heel protector's boots are on bi lateral heels. The treatment to the sacral area to cleanse with soap and water pat dry apply triad paste twice a day and prn.    Recommendations made to primary team for  the above  Orders placed.     08/14/2023

## 2023-08-14 NOTE — PLAN OF CARE
CLAUDIA in communication with pt's son (Maykel)  and caregiver (Chel); provided update on discharge and return to [facility] for SNF.  Family agreeable to patient's return.      CLAUDIA scheduled d/c transportation to [facility] through [facility]. Patient is scheduled to be picked up at 4:30p.m..  CLAUDIA provided patient's nurse with report number [phone] ext [phone]; ask for the nurse for room P111. CLAUDIA is in communication with patient's CM and patient's Care Team.  Pt will discharge via stretcher/room air.         08/14/23 1451   Post-Acute Status   Post-Acute Authorization Placement   Post-Acute Placement Status Set-up Complete/Auth obtained  ([facility])   Coverage medicare a & b   Discharge Delays None known at this time   Discharge Plan   Discharge Plan A Skilled Nursing Facility   Discharge Plan B Home;Home with family;Home Health;Other  (Sitters)     Domenica Braswell LMSW  PRN-  [facility]  Ext. [phone]

## 2023-08-14 NOTE — PLAN OF CARE
Problem: SLP  Goal: SLP Goal  Outcome: Ongoing, Progressing   Bedside swallow study completed. Recommend continue mechanical soft diet and thin liquids with aspiration precautions in place. No further skilled acute Speech Therapy services warranted at this time. Please re-consult as needed.

## 2023-08-15 NOTE — PLAN OF CARE
[facility] - Med Surg  Discharge Final Note    Primary Care Provider: Bev, Primary Doctor    Expected Discharge Date: 8/14/2023    Pt discharged back to [facility] for SNF.  Pt discharged via stretcher/room air.  Son and Caretaker notified of discharge back to facility.      Final Discharge Note (most recent)       Final Note - 08/15/23 0745          Final Note    Assessment Type Final Discharge Note     Anticipated Discharge Disposition Skilled Nursing Facility        Post-Acute Status    Post-Acute Authorization Placement     Post-Acute Placement Status Set-up Complete/Auth obtained   [facility] (SNF)    Coverage Medicare A & B     Discharge Delays None known at this time                     Important Message from Medicare  Important Message from Medicare regarding Discharge Appeal Rights: Given to patient/caregiver, Explained to patient/caregiver, Signed/date by patient/caregiver     Date IMM was signed: 08/14/23  Time IMM was signed: 1416    Domenica Braswell LMSW  PRN-  [facility]  Ext. [phone]

## 2023-08-15 NOTE — DISCHARGE SUMMARY
Discharge Summary  Hospital Medicine    Attending Provider on Discharge: Pinky Orourke MD  Hospital Medicine Team: [facility]  Date of Admission:  8/9/2023     Date of Discharge:  8/14/2023  Code status: Full Code    Active Hospital Problems    Diagnosis  POA    *Acute encephalopathy [G93.40]  Yes     Priority: 1 - High    Acute cough [R05.1]  Yes    Anemia [D64.9]  Yes    Catheter-associated urinary tract infection [T83.511A, N39.0]  Yes    Prolonged Q-T interval on ECG [R94.31]  Yes    Urinary retention [R33.9]  Yes    Multiple closed right sided fractures of pelvis without disruption of pelvic ring [S32.82XA]  Yes    Hypokalemia [E87.6]  Yes    Subdural hygroma [G96.08]  Yes    Chronic pulmonary embolism without acute cor pulmonale [I27.82]  Yes    Severe late onset Alzheimer's dementia without behavioral disturbance, psychotic disturbance, mood disturbance, or anxiety [G30.1, F02.C0]  Yes     Chronic    Debility [R53.81]  Yes    Primary hypertension [I10]  Yes     Chronic      Resolved Hospital Problems   No resolved problems to display.     HPI  88 y.o. female with PMH significant for chronic BL pleural effusions, recent DVT diagnosed in March '23 (on eliquis), Alzheimers dementia, HTN, recent pelvic fracture (non-operative), Subdural Hematoma presents from [facility] for concerns of altered mental status.  Recent Discharge from [facility] on 08/07/23    Her hired caretaker is at bedside, reports that patient has waxing waning mentation but at SNF was awake/alert and reported to be eating well through Monday (08/07), then on Tues eating less but participated with physical therapy (08/08) and Today she has not been eating anything, and unable to participate in therapy, prompting ED evaluation.     She is hypertensive this evening, but vital signs earlier in the day stable, she is not safe to take oral anti-hypertensive medications, she will withdraw to pain, does not open eyes to painful stimuli, no  meaningful speech, occasional moans.  Spann catheter present placed 3 days ago prior to discharge.     CT head today shows no new or extension of subdural hematoma, actually shows resolution of SDH. No other acute or detrimental change.   SNF MAR not available at this time.  Urinalysis with pyuria and RBC empiric Ceftriaxone started and referral for admission placed.     The history is provided by the patient, medical records  The history is limited by the condition of the patient.     Hospital Course  * Acute encephalopathy  Stop haldol and depakote and robaxin. CT head negative for new or evolution of SHD, has improved.  Pt has been placed back on anticoagulation (dvt/PE/afib) has multiple medical indication for anticoagulation. UTI on ampicillin. Delirium precautions. Discharged on remainder of 7 day course of ampicillin. Patient's encephalopathy felt due to sedating medications more so than UTI.     Acute cough and wheezing  bronchomalacia  CXR negative. Albuterol and atrovent QID. Resolved. Previous CT soft tissue neck showed bronchomalacia    Anemia  - stable, at basleline    Catheter-associated urinary tract infection  Enterococcal infection  Catheter placed in last 3 days. Spann catheter removed. Started on ampicillin IV. Discharged on remainder of 7 day course of ampicillin    Prolonged Q-T interval on ECG  Obtain EKG for surveillance,   Withhold any PRN that may cause QT prolongation  Avoid Olanzapine as at last admit QT prolongation noted with use.     Urinary retention  Successful voiding trial. Spann removed    Hypokalemia  hypophosphorus  Hypomangesium  replaced    Multiple closed right sided fractures of pelvis without disruption of pelvic ring  -per last admit notes, current management is non-operateive based on family decision, patient was seen/evaluted by orthopedic surgery and ORIF recommended but family has declined operative intervention  -PT/OT when patient appropriate to participate.    -Patient to return to SNF as she is more alert.    Severe late onset Alzheimer's dementia without behavioral disturbance, psychotic disturbance, mood disturbance, or anxiety  Stopped haldol and depakote due to sedation. Alertness improved off medications.    Chronic pulmonary embolism without acute cor pulmonale  -chronic and stable respiratory status   - continued on apixaban 5 mg BID    Debility  Secondary to recent admissions, decompensated health state and pelvic fractures     Primary hypertension  Chronic-  Uncontrolled  Resume lisinopril 40 mg daily, metoprolol 25 mg BID    Procedures: none    Consultants: none    Discharge Medication List as of 8/14/2023  4:22 PM        START taking these medications    Details   !! albuterol (PROVENTIL HFA) 90 mcg/actuation inhaler Inhale 2 puffs into the lungs every 4 (four) hours as needed for Wheezing. Use spacer with adult mask, Starting Mon 8/14/2023, No Print      !! albuterol (PROVENTIL/VENTOLIN HFA) 90 mcg/actuation inhaler Inhale 2 puffs into the lungs 4 (four) times daily. Use spacer with adult mask for 3 days, Starting Mon 8/14/2023, Until Thu 8/17/2023, Normal      ampicillin (PRINCIPEN) 500 MG capsule Take 1 capsule (500 mg total) by mouth 4 (four) times daily. Stop 8/18/2023 for 4 days, Starting Mon 8/14/2023, Until Fri 8/18/2023, Normal      cholecalciferol, vitamin D3, (VITAMIN D3) 50 mcg (2,000 unit) Tab Take 1 tablet (2,000 Units total) by mouth once daily., Starting Mon 8/14/2023, No Print      magnesium oxide 400 mg magnesium Tab Take 1 tablet by mouth 2 (two) times daily with meals. Take for one month, Starting Mon 8/14/2023, Until Wed 9/13/2023, No Print      potassium, sodium phosphates (PHOS-NAK) 280-160-250 mg PwPk Take 2 packets by mouth once daily. for 7 doses, Starting Mon 8/14/2023, Until Mon 8/21/2023, No Print       !! - Potential duplicate medications found. Please discuss with provider.        CONTINUE these medications which have NOT  CHANGED    Details   acetaminophen (TYLENOL) 325 MG tablet Take 2 tablets (650 mg total) by mouth every 6 (six) hours as needed (Pain)., Starting Fri 7/14/2023, OTC      apixaban (ELIQUIS) 5 mg Tab Take 1 tablet (5 mg total) by mouth 2 (two) times daily. Starting 8/5, Starting Mon 8/7/2023, Until Tue 8/6/2024, No Print      EScitalopram oxalate (LEXAPRO) 5 MG Tab Take 2 tablets (10 mg total) by mouth once daily., Starting Fri 7/28/2023, Until Sat 7/27/2024, No Print      furosemide (LASIX) 20 MG tablet Take 1 tablet (20 mg total) by mouth 2 (two) times daily., Starting Fri 7/14/2023, Until Sat 7/13/2024, Normal      lisinopriL (PRINIVIL,ZESTRIL) 40 MG tablet Take 1 tablet (40 mg total) by mouth once daily., Starting Fri 7/14/2023, Until Sat 7/13/2024, Normal      loperamide (IMODIUM) 2 mg capsule Take 1 capsule (2 mg total) by mouth 4 (four) times daily as needed for Diarrhea., Starting Fri 7/14/2023, OTC      melatonin (MELATIN) 3 mg tablet Take 2 tablets (6 mg total) by mouth nightly as needed for Insomnia., Starting Fri 7/14/2023, Normal      memantine (NAMENDA) 5 MG Tab Take 1 tablet (5 mg total) by mouth 2 (two) times daily., Starting Fri 7/14/2023, Until Sat 7/13/2024, Normal      metoprolol tartrate (LOPRESSOR) 25 MG tablet Take 1 tablet (25 mg total) by mouth 2 (two) times daily., Starting Fri 7/14/2023, Until Sat 7/13/2024, Normal      miconazole NITRATE 2 % (MICOTIN) 2 % top powder Apply topically 2 (two) times daily. Underside of bilateral breasts, Starting Fri 7/14/2023, Normal      polyethylene glycol (GLYCOLAX) 17 gram PwPk Take 17 g by mouth 2 (two) times daily as needed., Starting Fri 7/28/2023, No Print      potassium chloride (KLOR-CON) 10 MEQ TbSR Take 1 tablet (10 mEq total) by mouth once daily., Starting Fri 7/14/2023, Until Sat 7/13/2024, Normal           STOP taking these medications       ascorbic acid, vitamin C, (VITAMIN C) 250 MG tablet Comments:   Reason for Stopping:         divalproex  (DEPAKOTE) 250 MG EC tablet Comments:   Reason for Stopping:         haloperidoL (HALDOL) 0.5 MG tablet Comments:   Reason for Stopping:         methocarbamoL (ROBAXIN) 500 MG Tab Comments:   Reason for Stopping:         tamsulosin (FLOMAX) 0.4 mg Cap Comments:   Reason for Stopping:         zinc sulfate (ZINCATE) 50 mg zinc (220 mg) capsule Comments:   Reason for Stopping:               Discharge Diet:regular diet , mechanical soft    Activity: activity as tolerated    Discharge Condition: Good    Disposition: Home or Self Care    Tests pending at the time of discharge: none      Time spent  on the discharge of the patient including review of hospital course with the patient. reviewing discharge medications and arranging follow-up care 35 nub    Discharge examination Patient was seen and examined on the date of discharge and determined to be suitable for discharge.    Discharge plan     Future Appointments   Date Time Provider Department Center   8/16/2023  8:00 AM Rula Mays NP [facility] C3[facility] [place]   8/30/2023  9:30 AM Sravani Stokes PA-C [facility] ORTHO B[facility] Gusman [facility]   9/7/2023  9:30 AM ASSESSMENT CLINIC [facility] NERPSY8 Bijan Gusman   10/20/2023  8:40 AM Thiago Andrade MD [facility] Bijan Gusman PCTACO Orourke MD

## 2023-08-30 NOTE — PROGRESS NOTES
Subjective:     Patient ID: Radha Sandoval is a 88 y.o. female.    Chief Complaint: Pain and Injury of the Pelvis    HPI  Patient is a 88 year old female who presented to the ED on 07/09/23 with history of multiple falls in the past few months, but found down at [facility].   RADS: right superior pubic root and inferior pubic rami fractures  There is a subtle anterior cortical buckle at right sacral ala  PLAN: non op weight bearing as tolerated with walker  Reports is doing ok. Pain controlled. Participating in PT     Review of Systems   Constitutional: Negative for chills and fever.   Cardiovascular:  Negative for chest pain.   Respiratory:  Negative for cough and shortness of breath.    Skin:  Negative for color change, dry skin, itching, nail changes, poor wound healing and rash.   Musculoskeletal:         Pelvic fracture    Neurological:  Negative for dizziness.   Psychiatric/Behavioral:  Negative for altered mental status. The patient is not nervous/anxious.    All other systems reviewed and are negative.      Objective:       General    Constitutional: She is oriented to person, place, and time. She appears well-developed and well-nourished. No distress.   HENT:   Head: Atraumatic.   Eyes: Conjunctivae are normal.   Cardiovascular:  Normal rate.            Pulmonary/Chest: Effort normal.   Neurological: She is alert and oriented to person, place, and time.   Psychiatric: She has a normal mood and affect. Her behavior is normal.             Right Hip Exam     Comments:  Patient demonstrates obvious pain with any movement of lower extremities.  Skin intact throughout with no open wounds.  Severe hallux valgus bilaterally.  Extremities are WWP.          Physical Exam  Constitutional:       General: She is not in acute distress.     Appearance: She is well-developed and well-nourished. She is not diaphoretic.   HENT:      Head: Atraumatic.   Eyes:      Conjunctiva/sclera: Conjunctivae normal.    Cardiovascular:      Rate and Rhythm: Normal rate.   Pulmonary:      Effort: Pulmonary effort is normal.   Neurological:      Mental Status: She is alert and oriented to person, place, and time.   Psychiatric:         Mood and Affect: Mood and affect normal.         Behavior: Behavior normal.     RADS:   DJD and osteopenia.  Healing fractures identified involving the right superior and inferior pubic ramus.  No bone destruction identified.    Assessment:     Encounter Diagnoses   Name Primary?    Frequent falls     Multiple closed fractures of pelvis without disruption of pelvic ring with routine healing, subsequent encounter Yes       Plan:      Continue non-op treament. Weight bearing as tolerated ROMAT, PT/OT. Return to clinic in 4 weeks at that time x-ray of her pelvis AP inlet outlet is needed.

## 2023-08-31 NOTE — PATIENT INSTRUCTIONS
Instructions:  - [facility] Nurse Practitioner to schedule home follow-up visit with patient  as needed.  - Continue all medications, treatments and therapies as ordered.   - Follow all instructions, recommendations as discussed.  - Maintain Safety Precautions at all times.  - Attend all medical appointments as scheduled.  - For worsening symptoms: call Primary Care Physician or Nurse Practitioner.  - For emergencies, call 911 or immediately report to the nearest emergency room.  - Limit Risks of environmental exposure to coronavirus/COVID-19 as discussed including: social distancing, hand hygiene, and limiting departures from the home for necessities only.

## 2023-08-31 NOTE — PROGRESS NOTES
Established Patient - Audio Only Telehealth Visit     The patient location is: [facility]  The chief complaint leading to consultation is: Follow up  Visit type: Virtual visit with audio only (telephone)  Total time spent with patient: 23 minutes       The reason for the audio only service rather than synchronous audio and video virtual visit was related to technical difficulties or patient preference/necessity.     Each patient to whom I provide medical services by telemedicine is:  (1) informed of the relationship between the physician and patient and the respective role of any other health care provider with respect to management of the patient; and (2) notified that they may decline to receive medical services by telemedicine and may withdraw from such care at any time. Patient verbally consented to receive this service via voice-only telephone call.       HPI: Ms Radha Sandoval was scheduled for a follow up appointment today. Upon calling, her caregiver answered her phone. She opted for an audio visit as she has another appointment.    Ms Sandoval moved to [facility] from her home in [place], she then moved to [facility] in [place], she is now at [facility]. All her medications are being filled by in house providers as per her caregiver.     PAST HISTORY:     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 5/22/2023    Lumbar spondylosis with myelopathy 4/25/2023    Multiple closed right sided fractures of pelvis without disruption of pelvic ring 7/9/2023    Primary hypertension 6/10/2022    Subdural hygroma 7/8/2023       Past Surgical History:   Procedure Laterality Date    REPAIR, HERNIA,  INGUINAL, WITHOUT HISTORY OF PRIOR REPAIR, AGE 6 YEARS OR OLDER Right 5/6/2023    Procedure: REPAIR, HERNIA, INGUINAL, WITHOUT HISTORY OF PRIOR REPAIR, AGE 6 YEARS OR OLDER;  Surgeon: Maurice Piper MD;  Location: [facility] OR [address] [place] [address];  Service: General;  Laterality: Right;  possible laparotomy, possible bowel resection       History reviewed. No pertinent family history.    Social History     Socioeconomic History    Marital status: Unknown   Tobacco Use    Smoking status: Never    Smokeless tobacco: Never   Substance and Sexual Activity    Drug use: Never    Sexual activity: Not Currently     Social Determinants of Health     Financial Resource Strain: Low Risk  (8/10/2023)    Overall Financial Resource Strain (CARDIA)     Difficulty of Paying Living Expenses: Not hard at all   Food Insecurity: No Food Insecurity (8/10/2023)    Hunger Vital Sign     Worried About Running Out of Food in the Last Year: Never true     Ran Out of Food in the Last Year: Never true   Transportation Needs: No Transportation Needs (8/10/2023)    PRAPARE - Transportation     Lack of Transportation (Medical): No     Lack of Transportation (Non-Medical): No   Physical Activity: Inactive (8/10/2023)    Exercise Vital Sign     Days of Exercise per Week: 0 days     Minutes of Exercise per Session: 0 min   Stress: Unknown (8/10/2023)    [employer] [place] [employer] - Occupational Stress Questionnaire     Feeling of Stress : Patient refused   Social Connections: Socially Isolated (8/10/2023)    Social Connection and Isolation Panel [NHANES]     Frequency of Communication with Friends and Family: More than three times a week     Frequency of Social Gatherings with Friends and Family: Never     Attends Jewish Services: Never     Active Member of Clubs or Organizations: No     Attends Club or Organization Meetings: Never     Marital Status:    Housing Stability: Low Risk  (8/10/2023)    Housing Stability Vital Sign     Unable to  Pay for Housing in the Last Year: No     Number of Places Lived in the Last Year: 2     Unstable Housing in the Last Year: No   Recent Concern: Housing Stability - High Risk (5/23/2023)    Housing Stability Vital Sign     Unable to Pay for Housing in the Last Year: Yes     Number of Places Lived in the Last Year: 1     Unstable Housing in the Last Year: Yes       MEDICATIONS & ALLERGIES:     Current Outpatient Medications on File Prior to Visit   Medication Sig Dispense Refill    acetaminophen (TYLENOL) 325 MG tablet Take 2 tablets (650 mg total) by mouth every 6 (six) hours as needed (Pain). 60 tablet 1    albuterol (PROVENTIL HFA) 90 mcg/actuation inhaler Inhale 2 puffs into the lungs every 4 (four) hours as needed for Wheezing. Use spacer with adult mask 18 g 1    apixaban (ELIQUIS) 5 mg Tab Take 1 tablet (5 mg total) by mouth 2 (two) times daily. Starting 8/5 60 tablet 11    cholecalciferol, vitamin D3, (VITAMIN D3) 50 mcg (2,000 unit) Tab Take 1 tablet (2,000 Units total) by mouth once daily.      EScitalopram oxalate (LEXAPRO) 5 MG Tab Take 2 tablets (10 mg total) by mouth once daily. 60 tablet 11    furosemide (LASIX) 20 MG tablet Take 1 tablet (20 mg total) by mouth 2 (two) times daily. 60 tablet 11    lisinopriL (PRINIVIL,ZESTRIL) 40 MG tablet Take 1 tablet (40 mg total) by mouth once daily. 30 tablet 11    loperamide (IMODIUM) 2 mg capsule Take 1 capsule (2 mg total) by mouth 4 (four) times daily as needed for Diarrhea. 60 capsule 0    magnesium oxide 400 mg magnesium Tab Take 1 tablet by mouth 2 (two) times daily with meals. Take for one month      melatonin (MELATIN) 3 mg tablet Take 2 tablets (6 mg total) by mouth nightly as needed for Insomnia. 30 tablet 3    memantine (NAMENDA) 5 MG Tab Take 1 tablet (5 mg total) by mouth 2 (two) times daily. 60 tablet 11    metoprolol tartrate (LOPRESSOR) 25 MG tablet Take 1 tablet (25 mg total) by mouth 2 (two) times daily. 60 tablet 11    miconazole NITRATE 2 %  (MICOTIN) 2 % top powder Apply topically 2 (two) times daily. Underside of bilateral breasts 85 g 3    polyethylene glycol (GLYCOLAX) 17 gram PwPk Take 17 g by mouth 2 (two) times daily as needed.  0    potassium chloride (KLOR-CON) 10 MEQ TbSR Take 1 tablet (10 mEq total) by mouth once daily. 30 tablet 11     No current facility-administered medications on file prior to visit.        Review of patient's allergies indicates:  No Known Allergies          1. Severe late onset Alzheimer's dementia without behavioral disturbance, psychotic disturbance, mood disturbance, or anxiety      nursing home, has caregiver as well  Continue Memantine  Fall precautions     Patient Instructions Given:  - Continue all medications, treatments and therapies as ordered.   - Follow all instructions, recommendations as discussed.  - Maintain Safety Precautions at all times.  - Attend all medical appointments as scheduled.  - For worsening symptoms: call Primary Care Physician or Nurse Practitioner.  - For emergencies, call 911 or immediately report to the nearest emergency room.    After Visit Medication List :     Medication List            Accurate as of August 30, 2023 11:59 PM. If you have any questions, ask your nurse or doctor.                CONTINUE taking these medications      acetaminophen 325 MG tablet  Commonly known as: TYLENOL  Take 2 tablets (650 mg total) by mouth every 6 (six) hours as needed (Pain).     * albuterol 90 mcg/actuation inhaler  Commonly known as: PROVENTIL/VENTOLIN HFA  Inhale 2 puffs into the lungs 4 (four) times daily. Use spacer with adult mask for 3 days     * albuterol 90 mcg/actuation inhaler  Commonly known as: PROVENTIL HFA  Inhale 2 puffs into the lungs every 4 (four) hours as needed for Wheezing. Use spacer with adult mask     apixaban 5 mg Tab  Commonly known as: ELIQUIS  Take 1 tablet (5 mg total) by mouth 2 (two) times daily. Starting 8/5     cholecalciferol (vitamin D3) 50 mcg (2,000 unit)  Tab  Commonly known as: VITAMIN D3  Take 1 tablet (2,000 Units total) by mouth once daily.     EScitalopram oxalate 5 MG Tab  Commonly known as: LEXAPRO  Take 2 tablets (10 mg total) by mouth once daily.     furosemide 20 MG tablet  Commonly known as: LASIX  Take 1 tablet (20 mg total) by mouth 2 (two) times daily.     lisinopriL 40 MG tablet  Commonly known as: PRINIVIL,ZESTRIL  Take 1 tablet (40 mg total) by mouth once daily.     loperamide 2 mg capsule  Commonly known as: IMODIUM  Take 1 capsule (2 mg total) by mouth 4 (four) times daily as needed for Diarrhea.     magnesium oxide 400 mg magnesium Tab  Take 1 tablet by mouth 2 (two) times daily with meals. Take for one month     melatonin 3 mg tablet  Commonly known as: MELATIN  Take 2 tablets (6 mg total) by mouth nightly as needed for Insomnia.     memantine 5 MG Tab  Commonly known as: NAMENDA  Take 1 tablet (5 mg total) by mouth 2 (two) times daily.     metoprolol tartrate 25 MG tablet  Commonly known as: LOPRESSOR  Take 1 tablet (25 mg total) by mouth 2 (two) times daily.     miconazole NITRATE 2 % 2 % top powder  Commonly known as: MICOTIN  Apply topically 2 (two) times daily. Underside of bilateral breasts     polyethylene glycol 17 gram Pwpk  Commonly known as: GLYCOLAX  Take 17 g by mouth 2 (two) times daily as needed.     potassium chloride 10 MEQ Tbsr  Commonly known as: KLOR-CON  Take 1 tablet (10 mEq total) by mouth once daily.           * This list has 2 medication(s) that are the same as other medications prescribed for you. Read the directions carefully, and ask your doctor or other care provider to review them with you.                Future Appointments   Date Time Provider Department Center   9/7/2023  9:30 AM ASSESSMENT CLINIC [facility] NERPSY8 Bijan Gusman   10/11/2023 11:00 AM Sravani Stokes PA-C [facility] ORTHO B[facility] Gus[facility] [place]   10/20/2023  8:40 AM Thiago Andrade MD [facility] Bijan Gusman PCW     Risks of environmental exposure to coronavirus discussed  including: social distancing, hand hygiene, and limiting departures from the home for necessities only. Reports understanding and willingness to comply.     Signature:    Rula Mays, MSN, APRN, FNP-C  [facility]            This service was not originating from a related E/M service provided within the previous 7 days nor will  to an E/M service or procedure within the next 24 hours or my soonest available appointment.  Prevailing standard of care was able to be met in this audio-only visit.

## 2023-09-18 PROBLEM — K92.2 UGIB (UPPER GASTROINTESTINAL BLEED): Status: ACTIVE | Noted: 2023-01-01

## 2023-09-18 PROBLEM — U07.1 COVID: Status: ACTIVE | Noted: 2023-01-01

## 2023-09-18 NOTE — NURSING
Patient arrived to room via stretcher with transport and family.  Patient is awake, alert, and only oriented to person at this time.  Patient now resting comfortably in bed locked in lowest position, side rails up x3, and call bell in reach.  Will continue to monitor.

## 2023-09-18 NOTE — CONSULTS
[facility] ([place])  Palliative Medicine  Consult Note    Patient Name: Radha Sandoval  MRN: [id number]  Admission Date: 9/18/2023  Hospital Length of Stay: 0 days  Code Status: Full Code   Attending Provider: Vianca Bean MD  Consulting Provider: Damon Cervantes MD  Primary Care Physician: Agatha Alvarez  Principal Problem:UGIB (upper gastrointestinal bleed)    Patient information was obtained from relative(s), past medical records and primary team.      Inpatient consult to Palliative Care  Consult performed by: Damon Cervantes MD  Consult ordered by: Gertrude Trevizo PA-C  Reason for consult: goals of care        Assessment/Plan:     Neuro  Severe late onset Alzheimer's dementia without behavioral disturbance, psychotic disturbance, mood disturbance, or anxiety  - noted hx  - follows with psych outpatient  - has had decreased mobility since hip fracture 7/2023. Currently at SNF at [facility] to work on mobility.   - illness progression information shared with lang Brar    Cardiac/Vascular  Atrial fibrillation with RVR  - noted hx  - management per primary team    Renal/  GINA (acute kidney injury)  - noted  - management per primary team with IVF    ID  COVID  - covid positive test  - per son Maykel she was positive 2 weeks ago, but over this past weekend was negative x3 so he is surprised she is positive again now    Hematology  Chronic pulmonary embolism without acute cor pulmonale  - noted hx  - anticogulation on hold  - management per primary team    GI  * UGIB (upper gastrointestinal bleed)  - noted concern given hgb drop and elevated BUN with episodes of brown emesis  - GI consulted   - management per GI/primary team  - lang Brar is open to his mother having upper endoscopy if it is deemed to be needed by GI and welcomes conversation with GI to discuss further once they see patient    Orthopedic  Multiple closed right sided fractures of pelvis without disruption of pelvic ring  - noted  "hx  - non surgical route of management chosen by son Maykel    Palliative Care  Goals of care, counseling/discussion  9/18/2023:  - chart reviewed in depth  - patient seen at bedside, but she is unable to participate in the conversation due to her baseline mental status in setting of dementia to go along with it seems she had a very long night and is very tired  - called and talked with her son Maykel who previous notes mentioned is her HCPOA  - introduced self and role of palliative medicine  - updated Maykel regarding his mothers medical on going such as concern for UGI, eboni, covid +ve with next step plan pending primary team and GI consultation.   - Maykel shared more back story regarding his mothers medical on [place] and expressed his frustration that it seems she is constantly having one step forward but two steps back  - he hopes for his mother to get back on track medically and have improvement of her functionality/mobility which is why she is at the SNF at [facility] working on getting stronger/more mobile before this recent set back. He shared some disappointing experiences he had with some previous nursing homes where they were feeding her rather than letting her feed herself when he feels she is capable of doing so. He wants her to maintain her independence as long as possible and stated "worst thing for her is laying in bed all day".   - he shared that at baseline his mother is able to have meaningful conversation and knows loved ones, but he does admit she has a tendency to have false stories that are often grounded in reality.  - he admits she has been having a more rapid decline the past several months, which led me to further exploring why with him  - we revisited her DVT/PE diagnosis earlier 2023 followed by her falls + hip fracture 7/2023 which was decided to be managed non operatively. Discussed how there seems to be one thing after another going wrong and often there is a certain larger level of decline " "after a hip fracture such as what his mother had. Maykel stated his understanding.   - delved into detail regarding the general progressive nature of dementia and provided illness education to Maykel too in order to ensure he was aware of what to expect in the future. It is clear it has been a lot and has been understandably difficult for him to accept thus far.   - he shared more about her life and background with me including her love for ballroom dancing and music. He stated just yesterday he was talking with her and she was playing the piano at the nursing home and having a conversation with him so he is surprised this all came out of no where.   - discussed the risk of bleed overall especially given she is on anticoagulation. He stated his hope it is just a "pepto bismul moment" to which I stated I shared the unliklihood given the labs noted and her symptoms  - Maykel re-affirmed he is the HCPOA as was noted in past notes. He will have the paperwork brought in.   - We discussed code status next to which he shared his knowledge and understanding based on past conversations. He shared the difficulty of having to make such a decision including his past experience of witnessing a successful code situation. We discussed how different that situation is than what his mothers is. I delved into the LaPOST on file from 5/2022 and how thankfully she made the decision for him with that document, outlining her wish for DNR/DNI. He is going to look for that document on her mychart to review. He stated he felt somewhat relieved to hear that she had shared her wishes that recently compared to a living will of hers he found from 2008. He is going to review the LaPOST on her mychart and talk further with his brother.   - provided significant emotional support and listening ear  [] goals are for medical optimization as possible. Continue with current management. He would be open to her having an upper endoscopy if it is deemed to be " "needed by GI, but would like to talk further with them about that first when they call him upon consultation.   [] Son Maykel is going to look over her LaPOST on file from 5/2022 which outlined her DNR/DNI and will talk further with his brother about that. Code status to remain full code for now. We will follow up further too.   [] Maykel is HCPOA. Will bring in paperwork. This is noted in past notes too    Other  Debility  - pt/ot when able  - was at SNF at [facility] prior to admission        Thank you for your consult. I will follow-up with patient. Please contact us if you have any additional questions.    Subjective:     HPI:   Per H&P: "Ms. Radha Sandoval is a 88 y.o. female, with PMH of A. Fib, CHF, HTN, Alzheimer's dementia, h/o RLE DVT, chronic PE, chronic anticoagulation, chronic b/l pleural effusions, who presented to [facility] due to two witnessed episode of brown emesis. She notes only persistence of back/neck and RLE pain without any new/acute symptoms. She denied dizziness, light headedness. She was evaluated in the ED with labs showing H&H of 8.6/26.9, which is a drop from her baseline. While in the ED, she had two episodes of brown emesis which was guaiac positive. Metabolic panel also showed BUN 55 and Cr 2.2, which is elevated compared to her baseline renal function. She was treated in the ED with protonix. She was placed on observation."    Palliative medicine consulted for assistance with GO.      Hospital Course:  No notes on file      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 5/22/2023    Lumbar spondylosis with myelopathy 4/25/2023    Multiple closed " right sided fractures of pelvis without disruption of pelvic ring 7/9/2023    Primary hypertension 6/10/2022    Subdural hygroma 7/8/2023       Past Surgical History:   Procedure Laterality Date    REPAIR, HERNIA, INGUINAL, WITHOUT HISTORY OF PRIOR REPAIR, AGE 6 YEARS OR OLDER Right 5/6/2023    Procedure: REPAIR, HERNIA, INGUINAL, WITHOUT HISTORY OF PRIOR REPAIR, AGE 6 YEARS OR OLDER;  Surgeon: Maurice Piper MD;  Location: [facility] OR [address] [place] [address];  Service: General;  Laterality: Right;  possible laparotomy, possible bowel resection       Review of patient's allergies indicates:  No Known Allergies    Medications:  Continuous Infusions:   lactated ringers 100 mL/hr at 09/18/23 0709     Scheduled Meds:   EScitalopram oxalate  10 mg Oral Daily    memantine  5 mg Oral BID    pantoprazole  40 mg Intravenous BID    sodium chloride 0.9%  10 mL Intravenous Q8H     PRN Meds:0.9%  NaCl infusion (for blood administration), acetaminophen, melatonin, naloxone, senna-docusate 8.6-50 mg    Family History    None       Tobacco Use    Smoking status: Never    Smokeless tobacco: Never   Substance and Sexual Activity    Alcohol use: Not on file    Drug use: Never    Sexual activity: Not Currently       Objective:     Vital Signs (Most Recent):  Temp: 98.5 °F (36.9 °C) (09/18/23 1220)  Pulse: 79 (09/18/23 1220)  Resp: 14 (09/18/23 0815)  BP: (!) 110/55 (09/18/23 1256)  SpO2: (!) 94 % (09/18/23 1220) Vital Signs (24h Range):  Temp:  [98 °F (36.7 °C)-99.6 °F (37.6 °C)] 98.5 °F (36.9 °C)  Pulse:  [70-85] 79  Resp:  [14-20] 14  SpO2:  [94 %-97 %] 94 %  BP: ()/(44-80) 110/55        There is no height or weight on file to calculate BMI.       Physical Exam  Vitals and nursing note reviewed.   Constitutional:       Comments: Sleeping, comfortable appearing, did not open eyes or speak to verbal stimuli and light hand tapping   HENT:      Head: Normocephalic and atraumatic.   Eyes:      Comments: Eyes remained closed   Pulmonary:      Effort:  Pulmonary effort is normal. No respiratory distress.   Skin:     General: Skin is warm.   Neurological:      Comments: Eyes closed during visit, did not awake to participate in conversation.             Review of Symptoms        Living Arrangements:  Lives in nursing home    Psychosocial/Cultural:   See Palliative Psychosocial Note: Yes  - was at SNF at [facility]   - has 2 sons with son Maykel being HCPOA (per Maykel and past notes)  - used to be   - enjoys ballroom dancing and music  **Primary  to Follow**  Palliative Care  Consult: No        Advance Care Planning  Advance Directives:   LaPOST: Yes    Do Not Resuscitate Status: No    Medical Power of : Son Maykel.      Decision Making:  Patient unable to communicate due to disease severity/cognitive impairment and Family answered questions  Goals of Care: The family endorses that what is most important right now is to focus on medical optimization.     Accordingly, we have decided that the best plan to meet the patient's goals includes continuing with treatment           Significant Labs: reviewed  CBC:   Recent Labs   Lab 09/18/23  0754   WBC 10.91   HGB 8.1*   HCT 25.8*   MCV 85        BMP:  Recent Labs   Lab 09/18/23  0636         K 4.4      CO2 24   BUN 55*   CREATININE 2.2*   CALCIUM 8.7   MG 2.1     LFT:  Lab Results   Component Value Date    AST 26 09/18/2023    ALKPHOS 83 09/18/2023    BILITOT 0.5 09/18/2023     Albumin:   Albumin   Date Value Ref Range Status   09/18/2023 2.7 (L) 3.5 - 5.2 g/dL Final     Protein:   Total Protein   Date Value Ref Range Status   09/18/2023 5.7 (L) 6.0 - 8.4 g/dL Final     Lactic acid:   Lab Results   Component Value Date    LACTATE 1.2 07/08/2023    LACTATE 0.8 05/06/2023       Significant Imaging: reviewed        > 50% of 70 min visit spent in chart review, face to face discussion of symptom assessment, coordination of care with other  specialists, and d/c planning  16 minutes ACP time spent: goals of care, emotional support, formulating and communication prognosis and goals of care, exploring burden/ benefit of various approaches of treatment.       Damon Cervantes MD  Palliative Medicine  [facility]  [facility] ([place])

## 2023-09-18 NOTE — SUBJECTIVE & OBJECTIVE
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 5/22/2023    Lumbar spondylosis with myelopathy 4/25/2023    Multiple closed right sided fractures of pelvis without disruption of pelvic ring 7/9/2023    Primary hypertension 6/10/2022    Subdural hygroma 7/8/2023       Past Surgical History:   Procedure Laterality Date    REPAIR, HERNIA, INGUINAL, WITHOUT HISTORY OF PRIOR REPAIR, AGE 6 YEARS OR OLDER Right 5/6/2023    Procedure: REPAIR, HERNIA, INGUINAL, WITHOUT HISTORY OF PRIOR REPAIR, AGE 6 YEARS OR OLDER;  Surgeon: Maurice Piper MD;  Location: [facility] OR [address] [place] [address];  Service: General;  Laterality: Right;  possible laparotomy, possible bowel resection       Review of patient's allergies indicates:  No Known Allergies    Medications:  Continuous Infusions:   lactated ringers 100 mL/hr at 09/18/23 0709     Scheduled Meds:   EScitalopram oxalate  10 mg Oral Daily    memantine  5 mg Oral BID    pantoprazole  40 mg Intravenous BID    sodium chloride 0.9%  10 mL Intravenous Q8H     PRN Meds:0.9%  NaCl infusion (for blood administration), acetaminophen, melatonin, naloxone, senna-docusate 8.6-50 mg    Family History    None       Tobacco Use    Smoking status: Never    Smokeless tobacco: Never   Substance and Sexual Activity    Alcohol use: Not on file    Drug use: Never    Sexual activity: Not Currently       Objective:     Vital Signs (Most Recent):  Temp: 98.5 °F (36.9 °C) (09/18/23 1220)  Pulse: 79 (09/18/23 1220)  Resp: 14 (09/18/23 0815)  BP: (!) 110/55 (09/18/23 1256)  SpO2: (!) 94 % (09/18/23 1220) Vital Signs (24h Range):  Temp:  [98 °F (36.7 °C)-99.6 °F (37.6 °C)] 98.5 °F (36.9  °C)  Pulse:  [70-85] 79  Resp:  [14-20] 14  SpO2:  [94 %-97 %] 94 %  BP: ()/(44-80) 110/55        There is no height or weight on file to calculate BMI.       Physical Exam  Vitals and nursing note reviewed.   Constitutional:       Comments: Sleeping, comfortable appearing, did not open eyes or speak to verbal stimuli and light hand tapping   HENT:      Head: Normocephalic and atraumatic.   Eyes:      Comments: Eyes remained closed   Pulmonary:      Effort: Pulmonary effort is normal. No respiratory distress.   Skin:     General: Skin is warm.   Neurological:      Comments: Eyes closed during visit, did not awake to participate in conversation.             Review of Symptoms        Living Arrangements:  Lives in nursing home    Psychosocial/Cultural:   See Palliative Psychosocial Note: Yes  - was at SNF at [facility]   - has 2 sons with son Maykel being HCPOA (per Maykel and past notes)  - used to be   - enjoys ballroom dancing and music  **Primary  to Follow**  Palliative Care  Consult: No        Advance Care Planning   Advance Directives:   LaPOST: Yes    Do Not Resuscitate Status: No    Medical Power of : George Brar.      Decision Making:  Patient unable to communicate due to disease severity/cognitive impairment and Family answered questions  Goals of Care: The family endorses that what is most important right now is to focus on medical optimization.     Accordingly, we have decided that the best plan to meet the patient's goals includes continuing with treatment           Significant Labs: reviewed  CBC:   Recent Labs   Lab 09/18/23  0754   WBC 10.91   HGB 8.1*   HCT 25.8*   MCV 85        BMP:  Recent Labs   Lab 09/18/23  0636         K 4.4      CO2 24   BUN 55*   CREATININE 2.2*   CALCIUM 8.7   MG 2.1     LFT:  Lab Results   Component Value Date    AST 26 09/18/2023    ALKPHOS 83 09/18/2023    BILITOT 0.5 09/18/2023      Albumin:   Albumin   Date Value Ref Range Status   09/18/2023 2.7 (L) 3.5 - 5.2 g/dL Final     Protein:   Total Protein   Date Value Ref Range Status   09/18/2023 5.7 (L) 6.0 - 8.4 g/dL Final     Lactic acid:   Lab Results   Component Value Date    LACTATE 1.2 07/08/2023    LACTATE 0.8 05/06/2023       Significant Imaging: reviewed

## 2023-09-18 NOTE — H&P
[facility]  History & Physical    Patient Name: Radha Sandoval  MRN: [id number]  Patient Class: OP- Observation  Admission Date: 9/18/2023  Attending Physician: Vianca Bean MD   Primary Care Provider: Bev Primary Doctor         Patient information was obtained from patient, past medical records and ER records.     Subjective:     Principal Problem:UGIB (upper gastrointestinal bleed)    Chief Complaint:   Chief Complaint   Patient presents with    Emesis     Pt from [facility] with c/o 2 episodes of vomiting brown emesis        HPI: Ms. Radha Sandoval is a 88 y.o. female, with PMH of A. Fib, CHF, HTN, Alzheimer's dementia, h/o RLE DVT, chronic PE, chronic anticoagulation, chronic b/l pleural effusions, who presented to [facility] due to two witnessed episode of brown emesis. She notes only persistence of back/neck and RLE pain without any new/acute symptoms. She denied dizziness, light headedness. She was evaluated in the ED with labs showing H&H of 8.6/26.9, which is a drop from her baseline. While in the ED, she had two episodes of brown emesis which was guaiac positive. Metabolic panel also showed BUN 55 and Cr 2.2, which is elevated compared to her baseline renal function. She was treated in the ED with protonix. She was placed on observation.           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 5/22/2023    Lumbar spondylosis with myelopathy 4/25/2023    Multiple closed right sided fractures of pelvis without disruption of pelvic ring 7/9/2023    Primary hypertension 6/10/2022    Subdural hygroma 7/8/2023       Past Surgical  History:   Procedure Laterality Date    REPAIR, HERNIA, INGUINAL, WITHOUT HISTORY OF PRIOR REPAIR, AGE 5 YEARS OR OLDER Right 5/6/2023    Procedure: REPAIR, HERNIA, INGUINAL, WITHOUT HISTORY OF PRIOR REPAIR, AGE 5 YEARS OR OLDER;  Surgeon: Maurice Piper MD;  Location: [facility] OR [address] [place] [address];  Service: General;  Laterality: Right;  possible laparotomy, possible bowel resection       Review of patient's allergies indicates:  No Known Allergies    No current facility-administered medications on file prior to encounter.     Current Outpatient Medications on File Prior to Encounter   Medication Sig    acetaminophen (TYLENOL) 325 MG tablet Take 2 tablets (650 mg total) by mouth every 6 (six) hours as needed (Pain).    albuterol (PROVENTIL HFA) 90 mcg/actuation inhaler Inhale 2 puffs into the lungs every 4 (four) hours as needed for Wheezing. Use spacer with adult mask    apixaban (ELIQUIS) 5 mg Tab Take 1 tablet (5 mg total) by mouth 2 (two) times daily. Starting 8/5    cholecalciferol, vitamin D3, (VITAMIN D3) 50 mcg (2,000 unit) Tab Take 1 tablet (2,000 Units total) by mouth once daily.    EScitalopram oxalate (LEXAPRO) 5 MG Tab Take 2 tablets (10 mg total) by mouth once daily.    furosemide (LASIX) 20 MG tablet Take 1 tablet (20 mg total) by mouth 2 (two) times daily.    lisinopriL (PRINIVIL,ZESTRIL) 40 MG tablet Take 1 tablet (40 mg total) by mouth once daily.    loperamide (IMODIUM) 2 mg capsule Take 1 capsule (2 mg total) by mouth 4 (four) times daily as needed for Diarrhea.    melatonin (MELATIN) 3 mg tablet Take 2 tablets (6 mg total) by mouth nightly as needed for Insomnia.    memantine (NAMENDA) 5 MG Tab Take 1 tablet (5 mg total) by mouth 2 (two) times daily.    metoprolol tartrate (LOPRESSOR) 25 MG tablet Take 1 tablet (25 mg total) by mouth 2 (two) times daily.    miconazole NITRATE 2 % (MICOTIN) 2 % top powder Apply topically 2 (two) times daily. Underside of bilateral breasts    polyethylene  glycol (GLYCOLAX) 17 gram PwPk Take 17 g by mouth 2 (two) times daily as needed.    potassium chloride (KLOR-CON) 10 MEQ TbSR Take 1 tablet (10 mEq total) by mouth once daily.     Family History    None       Tobacco Use    Smoking status: Never    Smokeless tobacco: Never   Substance and Sexual Activity    Alcohol use: Not on file    Drug use: Never    Sexual activity: Not Currently     Review of Systems   Unable to perform ROS: Dementia     Objective:     Vital Signs (Most Recent):  Temp: 98 °F (36.7 °C) (09/18/23 0307)  Pulse: 83 (09/18/23 0525)  Resp: 14 (09/18/23 0525)  BP: (!) 103/44 (09/18/23 0505)  SpO2: 97 % (09/18/23 0505) Vital Signs (24h Range):  Temp:  [98 °F (36.7 °C)] 98 °F (36.7 °C)  Pulse:  [70-84] 83  Resp:  [14-18] 14  SpO2:  [96 %-97 %] 97 %  BP: (103-135)/(44-80) 103/44        There is no height or weight on file to calculate BMI.     Physical Exam  Vitals and nursing note reviewed.   Constitutional:       General: She is not in acute distress.     Appearance: She is well-developed and normal weight. She is ill-appearing (chronically). She is not toxic-appearing or diaphoretic.   HENT:      Head: Normocephalic and atraumatic.   Eyes:      General: No scleral icterus.        Right eye: No discharge.         Left eye: No discharge.      Conjunctiva/sclera: Conjunctivae normal.   Neck:      Trachea: No tracheal deviation.   Cardiovascular:      Rate and Rhythm: Normal rate and regular rhythm.      Heart sounds: Murmur heard.      No gallop.   Pulmonary:      Effort: Pulmonary effort is normal. No respiratory distress.      Breath sounds: Normal breath sounds. No stridor. No wheezing or rales.   Abdominal:      General: Bowel sounds are normal. There is no distension.      Palpations: Abdomen is soft. There is no mass.      Tenderness: There is no abdominal tenderness. There is no guarding.   Musculoskeletal:      Cervical back: Normal range of motion and neck supple.   Skin:     General: Skin  "is warm and dry.      Coloration: Skin is pale.      Findings: No erythema or rash.   Neurological:      GCS: GCS eye subscore is 2. GCS verbal subscore is 1. GCS motor subscore is 4.      Motor: No abnormal muscle tone.      Comments: Patient is non-participatory in exam.    Psychiatric:      Comments: Patient is non-participatory in exam.                 Significant Labs: All pertinent labs within the past 24 hours have been reviewed.  BMP:   Recent Labs   Lab 09/18/23  0247         K 4.3      CO2 24   BUN 55*   CREATININE 2.2*   CALCIUM 9.0     CBC:   Recent Labs   Lab 09/18/23  0247   WBC 10.17   HGB 8.6*   HCT 26.9*        CMP:   Recent Labs   Lab 09/18/23  0247      K 4.3      CO2 24      BUN 55*   CREATININE 2.2*   CALCIUM 9.0   PROT 5.7*   ALBUMIN 2.7*   BILITOT 0.5   ALKPHOS 83   AST 26   ALT 14   ANIONGAP 12     Urine Culture: No results for input(s): "LABURIN" in the last 48 hours.  Urine Studies: No results for input(s): "COLORU", "APPEARANCEUA", "PHUR", "SPECGRAV", "PROTEINUA", "GLUCUA", "KETONESU", "BILIRUBINUA", "OCCULTUA", "NITRITE", "UROBILINOGEN", "LEUKOCYTESUR", "RBCUA", "WBCUA", "BACTERIA", "SQUAMEPITHEL", "HYALINECASTS" in the last 48 hours.    Invalid input(s): "WRIGHTSUR"    Significant Imaging: I have reviewed all pertinent imaging results/findings within the past 24 hours.  Imaging Results    None          Assessment/Plan:     * UGIB (upper gastrointestinal bleed)  - Ms. Radha Sandoval presents after having two episodes of brown emesis  - emesis in ED is guaiac positive   - H&H with significant decrease   - GINA with significantly elevated BUN as compared to one month ago   - GI bleed pathways initiated   - GI consulted       GINA (acute kidney injury)  - baseline Cr is around 0.8   - Cr today is 2.2 with significant elevation of BUN at 55   - hydrate with IVF  - urine lytes ordered   - avoid nephrotoxins   - renally dose meds     Atrial fibrillation " with RVR  - ED EKG shows sinus rhythm with PACs rate of 78  - home meds: furosemide 20 mg QD, metoprolol tartrate 25 mg BID  - [id number] Score: 3   - monitor on tele     Chronic pulmonary embolism without acute cor pulmonale  - currently anticoagulated with Eliquis 5 mg BID       VTE Risk Mitigation (From admission, onward)         Ordered     Reason for No Pharmacological VTE Prophylaxis  Once        Question:  Reasons:  Answer:  Active Bleeding    09/18/23 0440     IP VTE HIGH RISK PATIENT  Once         09/18/23 0440                     On 09/18/2023, patient should be placed in hospital observation services under the care of Dr. Vianca Bean MD.      JON PatelC  Department of Hospital Medicine  [facility] - Emergency Dept

## 2023-09-18 NOTE — ED NOTES
"Radha Sandoval, an 88 y.o. female presents to the ED reporting sudden vomiting today approx 6 times. Per nursing home and EMS patient vomit was coffee ground. Pt denies abd pain but reports "neck, back and arm pain all the time". Pt in NAD. 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

## 2023-09-18 NOTE — ASSESSMENT & PLAN NOTE
- ED EKG shows sinus rhythm with PACs rate of 78  - home meds: furosemide 20 mg QD, metoprolol tartrate 25 mg BID  - [id number] Score: 3   - monitor on tele

## 2023-09-18 NOTE — PLAN OF CARE
Plan of care reviewed with patient and family.  Family verbalized understanding and had no further questions.  Patient remains confused and lethargic throughout the shift.  Patient arouasable to tactile stimulation, but will drift back to sleep.  Patient has not had any episodes of emesis this shift.  Patient found to have pressure injury to sacral spine and wound care consulted.  Patient now resting comfortably in bed locked in lowest position, side rails up x3, and call bell in reach.  Will continue to monitor.      Problem: Adult Inpatient Plan of Care  Goal: Plan of Care Review  Outcome: Ongoing, Progressing  Goal: Patient-Specific Goal (Individualized)  Outcome: Ongoing, Progressing  Goal: Absence of Hospital-Acquired Illness or Injury  Outcome: Ongoing, Progressing  Goal: Optimal Comfort and Wellbeing  Outcome: Ongoing, Progressing  Goal: Readiness for Transition of Care  Outcome: Ongoing, Progressing     Problem: Adjustment to Illness (Gastrointestinal Bleeding)  Goal: Optimal Coping with Acute Illness  Outcome: Ongoing, Progressing     Problem: Bleeding (Gastrointestinal Bleeding)  Goal: Hemostasis  Outcome: Ongoing, Progressing     Problem: Fluid and Electrolyte Imbalance (Acute Kidney Injury/Impairment)  Goal: Fluid and Electrolyte Balance  Outcome: Ongoing, Progressing     Problem: Oral Intake Inadequate (Acute Kidney Injury/Impairment)  Goal: Optimal Nutrition Intake  Outcome: Ongoing, Progressing     Problem: Renal Function Impairment (Acute Kidney Injury/Impairment)  Goal: Effective Renal Function  Outcome: Ongoing, Progressing     Problem: Impaired Wound Healing  Goal: Optimal Wound Healing  Outcome: Ongoing, Progressing     Problem: Fall Injury Risk  Goal: Absence of Fall and Fall-Related Injury  Outcome: Ongoing, Progressing     Problem: Skin Injury Risk Increased  Goal: Skin Health and Integrity  Outcome: Ongoing, Progressing     Problem: Coping Ineffective  Goal: Effective Coping  Outcome:  Ongoing, Progressing     Problem: Infection  Goal: Absence of Infection Signs and Symptoms  Outcome: Ongoing, Progressing

## 2023-09-18 NOTE — ED PROVIDER NOTES
Encounter Date: 9/18/2023    SCRIBE #1 NOTE: I, Opal Del Cid, am scribing for, and in the presence of,  Estrella Beach MD.       History     Chief Complaint   Patient presents with    Emesis     Pt from [facility] with c/o 2 episodes of vomiting brown emesis     Radha Sandoval is a 88 y.o. female, with a PMHx of HTN, dementia, DVT/PE on anticoagulation, who presents to the ED from [facility] for evaluation of 2 episodes of brown hematemesis. Pt reports persistent back, neck, and lower extremity pain ongoing for the past 3 months which is not acutely exacerbated. Denies dizziness or lightheadedness. Information in HPI limited as pt is poor historian. This is the extent of the patient's complaints at this time.    The history is provided by the patient, the nursing home and the EMS personnel.     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 5/22/2023    Lumbar spondylosis with myelopathy 4/25/2023    Multiple closed right sided fractures of pelvis without disruption of pelvic ring 7/9/2023    Primary hypertension 6/10/2022    Subdural hygroma 7/8/2023     Past Surgical History:   Procedure Laterality Date    REPAIR, HERNIA, INGUINAL, WITHOUT HISTORY OF PRIOR REPAIR, AGE 5 YEARS OR OLDER Right 5/6/2023    Procedure: REPAIR, HERNIA, INGUINAL, WITHOUT HISTORY OF PRIOR REPAIR, AGE 5 YEARS OR OLDER;  Surgeon: Maurice Piper MD;  Location: [facility] OR [address] [place] [address];  Service: General;  Laterality: Right;  possible laparotomy, possible bowel resection     No family history on file.  Social History     Tobacco Use    Smoking status: Never     Smokeless tobacco: Never   Substance Use Topics    Drug use: Never     Review of Systems   Constitutional:  Negative for chills and fever.   HENT:  Negative for congestion and sore throat.    Eyes:  Negative for visual disturbance.   Respiratory:  Negative for cough and shortness of breath.    Cardiovascular:  Negative for chest pain and palpitations.   Gastrointestinal:  Positive for vomiting. Negative for abdominal pain and diarrhea.   Genitourinary:  Negative for decreased urine volume, dysuria and vaginal discharge.   Musculoskeletal:  Positive for myalgias. Negative for joint swelling and neck stiffness.   Skin:  Negative for rash and wound.   Neurological:  Negative for weakness, numbness and headaches.   Psychiatric/Behavioral:  Negative for confusion.        Physical Exam     Initial Vitals   BP Pulse Resp Temp SpO2   09/18/23 0230 09/18/23 0230 09/18/23 0230 09/18/23 0307 09/18/23 0230   119/70 84 18 98 °F (36.7 °C) 96 %      MAP       --                Physical Exam    Constitutional: She appears well-developed and well-nourished. She does not have a sickly appearance. No distress.   HENT:   Head: Normocephalic and atraumatic.   Right Ear: External ear normal.   Left Ear: External ear normal.   Eyes: Conjunctivae, EOM and lids are normal. Right eye exhibits no discharge. Left eye exhibits no discharge. Right conjunctiva is not injected. Right conjunctiva has no hemorrhage. Left conjunctiva is not injected. Left conjunctiva has no hemorrhage. No scleral icterus.   Neck: Phonation normal. No stridor present. No tracheal deviation present.   Normal range of motion.  Cardiovascular:  Normal rate and regular rhythm.     Exam reveals no friction rub.       Murmur heard.  Systolic murmur is present.  Pulses:       Radial pulses are 2+ on the right side and 2+ on the left side.        Dorsalis pedis pulses are 2+ on the right side and 2+ on the left side.   Pulmonary/Chest: Breath sounds normal. She has no  wheezes. She has no rhonchi. She has no rales.   Abdominal: Abdomen is soft. There is no abdominal tenderness. There is no rebound and no guarding.   Musculoskeletal:      Cervical back: Normal range of motion.     Neurological: She is alert and oriented to person, place, and time. She has normal strength. GCS eye subscore is 4. GCS verbal subscore is 5. GCS motor subscore is 6.   Skin: Skin is warm.   Psychiatric: She has a normal mood and affect. Her speech is normal and behavior is normal. Judgment and thought content normal. Cognition and memory are normal.         ED Course   Procedures  Labs Reviewed   CBC W/ AUTO DIFFERENTIAL - Abnormal; Notable for the following components:       Result Value    RBC 3.18 (*)     Hemoglobin 8.6 (*)     Hematocrit 26.9 (*)     RDW 16.9 (*)     Gran # (ANC) 8.1 (*)     Gran % 79.5 (*)     Lymph % 14.0 (*)     All other components within normal limits   COMPREHENSIVE METABOLIC PANEL - Abnormal; Notable for the following components:    BUN 55 (*)     Creatinine 2.2 (*)     Total Protein 5.7 (*)     Albumin 2.7 (*)     eGFR 21 (*)     All other components within normal limits   LIPASE   URINALYSIS, REFLEX TO URINE CULTURE   TYPE & SCREEN     EKG Readings: (Independently Interpreted)   Normal sinus rhythm. Rate of 78. No significant ST or T wave changes compared to previous on 8/2023.       Imaging Results    None          Medications   0.9%  NaCl infusion (for blood administration) (has no administration in time range)   famotidine (PF) injection 20 mg (20 mg Intravenous Given 9/18/23 0247)   ondansetron injection 4 mg (4 mg Intravenous Given 9/18/23 0247)   0.9%  NaCl infusion (500 mLs Intravenous New Bag 9/18/23 0248)   pantoprazole (PROTONIX) 40 mg in sodium chloride 0.9 % 100 mL IVPB (MB+) (8 mg/hr Intravenous New Bag 9/18/23 0300)     Medical Decision Making  88-year-old female with history of dementia was transferred from the nursing home for evaluation of 2 episodes of  brown colored emesis this evening.  The patient denies any symptoms at this time.  Abdominal exam benign.  She is hemodynamically stable.  She did have an episode of spitting up while her IV was being placed that was coffee-ground in appearance and guaiac positive.  Pepcid was initially ordered in addition to IV fluids and Zofran.  Will also add Protonix bolus and infusion.    Lab work significant for down trend in her H&H compared to most recent.  Baseline last month 9.6-10.7, and hemoglobin today is 8.6.  CMP also significant for GINA.  Creatinine 0.6-1 at baseline and currently 2.2.  Patient will be admitted to the hospitalist service for observation, serial H&H, and GI evaluation as well as IV fluids.  The case has been discussed with Génesis Perla PA-C with the hospitalist service.    Amount and/or Complexity of Data Reviewed  Labs: ordered.  ECG/medicine tests: ordered and independent interpretation performed.    Risk  Prescription drug management.            Scribe Attestation:   Scribe #1: I performed the above scribed service and the documentation accurately describes the services I performed. I attest to the accuracy of the note.      I, Estrella Beach  , personally performed the services described in this documentation. All medical record entries made by the scribe were at my direction and in my presence. I have reviewed the chart and agree that the record reflects my personal performance and is accurate and complete.                      Clinical Impression:   Final diagnoses:  [R11.2] Nausea & vomiting  [N17.9] GINA (acute kidney injury) (Primary)  [K92.0] Hematemesis with nausea  [K92.2] Acute upper GI bleed        ED Disposition Condition    Observation Stable                Estrella Beach MD  09/18/23 0657

## 2023-09-18 NOTE — PLAN OF CARE
COVID+ - completed with verbal consent from lang Brar      09/18/23 0832   SANCHEZ Message   Medicare Outpatient and Observation Notification regarding financial responsibility Explained to patient/caregiver   Date SANCHEZ was signed 09/18/23   Time SANCHEZ was signed 0815

## 2023-09-18 NOTE — NURSING
Patient unable to void since admission.  Bladder scan results show 370cc.  PA notified, new orders were for straight cath.  Will straight cath patient and continue to monitor.

## 2023-09-18 NOTE — ASSESSMENT & PLAN NOTE
- Ms. Radha Sandoval presents after having two episodes of brown emesis  - emesis in ED is guaiac positive   - H&H with significant decrease   - GINA with significantly elevated BUN as compared to one month ago   - GI bleed pathways initiated   - GI consulted

## 2023-09-18 NOTE — SUBJECTIVE & OBJECTIVE
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 5/22/2023    Lumbar spondylosis with myelopathy 4/25/2023    Multiple closed right sided fractures of pelvis without disruption of pelvic ring 7/9/2023    Primary hypertension 6/10/2022    Subdural hygroma 7/8/2023       Past Surgical History:   Procedure Laterality Date    REPAIR, HERNIA, INGUINAL, WITHOUT HISTORY OF PRIOR REPAIR, AGE 5 YEARS OR OLDER Right 5/6/2023    Procedure: REPAIR, HERNIA, INGUINAL, WITHOUT HISTORY OF PRIOR REPAIR, AGE 6 YEARS OR OLDER;  Surgeon: Maurice Piper MD;  Location: [facility] OR [address] [place] [address];  Service: General;  Laterality: Right;  possible laparotomy, possible bowel resection       Review of patient's allergies indicates:  No Known Allergies    No current facility-administered medications on file prior to encounter.     Current Outpatient Medications on File Prior to Encounter   Medication Sig    acetaminophen (TYLENOL) 325 MG tablet Take 2 tablets (650 mg total) by mouth every 6 (six) hours as needed (Pain).    albuterol (PROVENTIL HFA) 90 mcg/actuation inhaler Inhale 2 puffs into the lungs every 4 (four) hours as needed for Wheezing. Use spacer with adult mask    apixaban (ELIQUIS) 5 mg Tab Take 1 tablet (5 mg total) by mouth 2 (two) times daily. Starting 8/5    cholecalciferol, vitamin D3, (VITAMIN D3) 50 mcg (2,000 unit) Tab Take 1 tablet (2,000 Units total) by mouth once daily.    EScitalopram oxalate (LEXAPRO) 5 MG Tab Take 2 tablets (10 mg total) by mouth once daily.    furosemide (LASIX) 20 MG tablet Take 1 tablet (20 mg total) by mouth 2 (two) times daily.    lisinopriL (PRINIVIL,ZESTRIL) 40 MG tablet Take 1  tablet (40 mg total) by mouth once daily.    loperamide (IMODIUM) 2 mg capsule Take 1 capsule (2 mg total) by mouth 4 (four) times daily as needed for Diarrhea.    melatonin (MELATIN) 3 mg tablet Take 2 tablets (6 mg total) by mouth nightly as needed for Insomnia.    memantine (NAMENDA) 5 MG Tab Take 1 tablet (5 mg total) by mouth 2 (two) times daily.    metoprolol tartrate (LOPRESSOR) 25 MG tablet Take 1 tablet (25 mg total) by mouth 2 (two) times daily.    miconazole NITRATE 2 % (MICOTIN) 2 % top powder Apply topically 2 (two) times daily. Underside of bilateral breasts    polyethylene glycol (GLYCOLAX) 17 gram PwPk Take 17 g by mouth 2 (two) times daily as needed.    potassium chloride (KLOR-CON) 10 MEQ TbSR Take 1 tablet (10 mEq total) by mouth once daily.     Family History    None       Tobacco Use    Smoking status: Never    Smokeless tobacco: Never   Substance and Sexual Activity    Alcohol use: Not on file    Drug use: Never    Sexual activity: Not Currently     Review of Systems   Unable to perform ROS: Dementia     Objective:     Vital Signs (Most Recent):  Temp: 98 °F (36.7 °C) (09/18/23 0307)  Pulse: 83 (09/18/23 0525)  Resp: 14 (09/18/23 0525)  BP: (!) 103/44 (09/18/23 0505)  SpO2: 97 % (09/18/23 0505) Vital Signs (24h Range):  Temp:  [98 °F (36.7 °C)] 98 °F (36.7 °C)  Pulse:  [70-84] 83  Resp:  [14-18] 14  SpO2:  [96 %-97 %] 97 %  BP: (103-135)/(44-80) 103/44        There is no height or weight on file to calculate BMI.     Physical Exam  Vitals and nursing note reviewed.   Constitutional:       General: She is not in acute distress.     Appearance: She is well-developed and normal weight. She is ill-appearing (chronically). She is not toxic-appearing or diaphoretic.   HENT:      Head: Normocephalic and atraumatic.   Eyes:      General: No scleral icterus.        Right eye: No discharge.         Left eye: No discharge.      Conjunctiva/sclera: Conjunctivae normal.   Neck:      Trachea: No tracheal  "deviation.   Cardiovascular:      Rate and Rhythm: Normal rate and regular rhythm.      Heart sounds: Murmur heard.      No gallop.   Pulmonary:      Effort: Pulmonary effort is normal. No respiratory distress.      Breath sounds: Normal breath sounds. No stridor. No wheezing or rales.   Abdominal:      General: Bowel sounds are normal. There is no distension.      Palpations: Abdomen is soft. There is no mass.      Tenderness: There is no abdominal tenderness. There is no guarding.   Musculoskeletal:      Cervical back: Normal range of motion and neck supple.   Skin:     General: Skin is warm and dry.      Coloration: Skin is pale.      Findings: No erythema or rash.   Neurological:      GCS: GCS eye subscore is 2. GCS verbal subscore is 1. GCS motor subscore is 4.      Motor: No abnormal muscle tone.      Comments: Patient is non-participatory in exam.    Psychiatric:      Comments: Patient is non-participatory in exam.                 Significant Labs: All pertinent labs within the past 24 hours have been reviewed.  BMP:   Recent Labs   Lab 09/18/23 0247         K 4.3      CO2 24   BUN 55*   CREATININE 2.2*   CALCIUM 9.0     CBC:   Recent Labs   Lab 09/18/23 0247   WBC 10.17   HGB 8.6*   HCT 26.9*        CMP:   Recent Labs   Lab 09/18/23 0247      K 4.3      CO2 24      BUN 55*   CREATININE 2.2*   CALCIUM 9.0   PROT 5.7*   ALBUMIN 2.7*   BILITOT 0.5   ALKPHOS 83   AST 26   ALT 14   ANIONGAP 12     Urine Culture: No results for input(s): "LABURIN" in the last 48 hours.  Urine Studies: No results for input(s): "COLORU", "APPEARANCEUA", "PHUR", "SPECGRAV", "PROTEINUA", "GLUCUA", "KETONESU", "BILIRUBINUA", "OCCULTUA", "NITRITE", "UROBILINOGEN", "LEUKOCYTESUR", "RBCUA", "WBCUA", "BACTERIA", "SQUAMEPITHEL", "HYALINECASTS" in the last 48 hours.    Invalid input(s): "WRIGHTSUR"    Significant Imaging: I have reviewed all pertinent imaging results/findings within the past 24 " hours.  Imaging Results    None

## 2023-09-18 NOTE — CONSULTS
[facility] ([place])  Wound Care    Patient Name:  Radha Sandoval   MRN:  [id number]  Date: 9/18/2023  Diagnosis: UGIB (upper gastrointestinal bleed)    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 5/22/2023    Lumbar spondylosis with myelopathy 4/25/2023    Multiple closed right sided fractures of pelvis without disruption of pelvic ring 7/9/2023    Primary hypertension 6/10/2022    Subdural hygroma 7/8/2023       Social History     Socioeconomic History    Marital status: Unknown   Tobacco Use    Smoking status: Never    Smokeless tobacco: Never   Substance and Sexual Activity    Drug use: Never    Sexual activity: Not Currently     Social Determinants of Health     Financial Resource Strain: Low Risk  (8/10/2023)    Overall Financial Resource Strain (CARDIA)     Difficulty of Paying Living Expenses: Not hard at all   Food Insecurity: No Food Insecurity (8/10/2023)    Hunger Vital Sign     Worried About Running Out of Food in the Last Year: Never true     Ran Out of Food in the Last Year: Never true   Transportation Needs: No Transportation Needs (8/10/2023)    PRAPARE - Transportation     Lack of Transportation (Medical): No     Lack of Transportation (Non-Medical): No   Physical Activity: Inactive (8/10/2023)    Exercise Vital Sign     Days of Exercise per Week: 0 days     Minutes of Exercise per Session: 0 min   Stress: Unknown (8/10/2023)    [employer] [place] [employer] - Occupational Stress Questionnaire     Feeling of Stress : Patient refused   Social Connections: Socially Isolated (8/10/2023)    Social Connection and Isolation Panel [NHANES]      Frequency of Communication with Friends and Family: More than three times a week     Frequency of Social Gatherings with Friends and Family: Never     Attends Mormon Services: Never     Active Member of Clubs or Organizations: No     Attends Club or Organization Meetings: Never     Marital Status:    Housing Stability: Low Risk  (8/10/2023)    Housing Stability Vital Sign     Unable to Pay for Housing in the Last Year: No     Number of Places Lived in the Last Year: 2     Unstable Housing in the Last Year: No   Recent Concern: Housing Stability - High Risk (5/23/2023)    Housing Stability Vital Sign     Unable to Pay for Housing in the Last Year: Yes     Number of Places Lived in the Last Year: 1     Unstable Housing in the Last Year: Yes       Precautions:     Allergies as of 09/18/2023    (No Known Allergies)       [facility] Assessment Details/Treatment     Wound care consult received for assessment of sacrum. Patient is an 88 year old female with PMH of A. Fib, CHF, HTN, Alzheimer's dementia, h/o RLE DVT, chronic PE, chronic anticoagulation, chronic b/l pleural effusions, who presented to [facility] SNF due to two witnessed episode of brown emesis.    Upon assessment to sacrum noted small 0.8 x 0.6 full thickness wound with maroon discoloration and small amount of slough to wound bed. Cleansed with Vashe and applied silicone foam dressing.     Recommend triad paste to promote autolytic debridement and moist wound healing. Nursing and MD team notified. Orders placed. Nursing to maintain pressure injury prevention interventions. Wound care to follow pt.        09/18/23 1410        Altered Skin Integrity 08/14/23 0800 Sacral spine   Date First Assessed/Time First Assessed: 08/14/23 0800   Altered Skin Integrity Present on Admission - Did Patient arrive to the hospital with altered skin?: yes  Location: Sacral spine   Wound Image    Description of Altered Skin Integrity Full thickness tissue loss.  Subcutaneous fat may be visible but bone, tendon or muscle are not exposed   Dressing Appearance Dry;Intact;Clean   Drainage Amount None   Drainage Characteristics/Odor No odor   Appearance Red;Yellow;Slough;Dry   Wound Edges Defined;Open   Wound Length (cm) 0.6 cm   Wound Width (cm) 0.8 cm   Wound Depth (cm) 0.2 cm   Wound Volume (cm^3) 0.096 cm^3   Wound Surface Area (cm^2) 0.48 cm^2   Care Cleansed with:;Antimicrobial agent;Applied:  (triad paste)   Dressing Applied;Silicone;Foam   Dressing Change Due 09/19/23 09/18/2023

## 2023-09-18 NOTE — ASSESSMENT & PLAN NOTE
- covid positive test  - per son Maykel she was positive 2 weeks ago, but over this past weekend was negative x3 so he is surprised she is positive again now

## 2023-09-18 NOTE — ASSESSMENT & PLAN NOTE
- noted hx  - follows with psych outpatient  - has had decreased mobility since hip fracture 7/2023. Currently at SNF at [facility] to work on mobility.   - illness progression information shared with son Maykel

## 2023-09-18 NOTE — ED NOTES
Pt rounding complete.  Patient resting in bed. Respirations even and unlabored.  Restroom and comfort needs addressed. Call light within reach.

## 2023-09-18 NOTE — ASSESSMENT & PLAN NOTE
- noted concern given hgb drop and elevated BUN with episodes of brown emesis  - GI consulted   - management per GI/primary team  - son Maykel is open to his mother having upper endoscopy if it is deemed to be needed by GI and welcomes conversation with GI to discuss further once they see patient

## 2023-09-18 NOTE — HPI
"Per H&P: "Ms. Radha Sandoval is a 88 y.o. female, with PMH of A. Fib, CHF, HTN, Alzheimer's dementia, h/o RLE DVT, chronic PE, chronic anticoagulation, chronic b/l pleural effusions, who presented to [facility] due to two witnessed episode of brown emesis. She notes only persistence of back/neck and RLE pain without any new/acute symptoms. She denied dizziness, light headedness. She was evaluated in the ED with labs showing H&H of 8.6/26.9, which is a drop from her baseline. While in the ED, she had two episodes of brown emesis which was guaiac positive. Metabolic panel also showed BUN 55 and Cr 2.2, which is elevated compared to her baseline renal function. She was treated in the ED with protonix. She was placed on observation."    Palliative medicine consulted for assistance with GOC.  "

## 2023-09-18 NOTE — PLAN OF CARE
Assessment completed via phone with lang Brar - currently receiving SNF care at [facility] with plans to return there once medically stable    [facility] ([place])  Initial Discharge Assessment       Primary Care Provider: Agatha Alvarez    Admission Diagnosis: Nausea & vomiting [R11.2]  Acute upper GI bleed [K92.2]  GINA (acute kidney injury) [N17.9]  Hematemesis with nausea [K92.0]    Admission Date: 9/18/2023  Expected Discharge Date:     Transition of Care Barriers: None    Payor: MEDICARE / Plan: MEDICARE PART A & B / Product Type: Government /     Extended Emergency Contact Information  Primary Emergency Contact: tony griffith  Mobile Phone: [phone]  Relation: Son   needed? No  Secondary Emergency Contact: scott ramirez  Mobile Phone: [phone]  Relation: Other   needed? No    Discharge Plan A: Skilled Nursing Facility       No Pharmacies Listed    Initial Assessment (most recent)       Adult Discharge Assessment - 09/18/23 0826          Discharge Assessment    Assessment Type Discharge Planning Assessment     Confirmed/corrected address, phone number and insurance Yes     Confirmed Demographics Correct on Facesheet     Source of Information family     If unable to respond/provide information was family/caregiver contacted? Yes     Contact Name/Number lang Brar     Does patient/caregiver understand observation status Yes     People in Home facility resident     Facility Arrived From: [facility]     Do you expect to return to your current living situation? Yes     Walking or Climbing Stairs ambulation difficulty, requires equipment;ambulation difficulty, assistance 1 person;transferring difficulty, requires equipment;transferring difficulty, assistance 1 person     Dressing/Bathing bathing difficulty, requires equipment;bathing difficulty, assistance 1 person;dressing difficulty, assistance 1 person     Equipment Currently Used at Home hospital  bed;wheelchair;shower chair     Do you take prescription medications? Yes     Do you have prescription coverage? Yes     Do you have any problems affording any of your prescribed medications? No     Is the patient taking medications as prescribed? yes     Who is going to help you get home at discharge? wheelchair van     Are you on dialysis? No     DME Needed Upon Discharge  none     Discharge Plan discussed with: Adult children     Transition of Care Barriers None     Discharge Plan A Skilled Nursing Facility

## 2023-09-18 NOTE — NURSING
PA notified patient's HR sustaining in 130s for approximately 15 minutes before decreasing to the 80s.  Patient asymptomatic throughout.  Patient also has not voided in 6 hours with bladder scan results showing 50cc.  New orders were to recheck bladder scan in 2 hours.  Also PO metoprolol and stat EKG.  Will administer and continue to monitor.

## 2023-09-18 NOTE — CONSULTS
.[facility] S[facility] ([place])  Gastroenterology  Consult Note    Patient Name: Radha Sandoval  MRN: [id number]  Admission Date: 9/18/2023  Hospital Length of Stay: 0 days  Code Status: Full Code   Primary Care Physician: Agatha Alvarez  Principal Problem:UGIB (upper gastrointestinal bleed)    Inpatient consult to Gastroenterology  Consult performed by: Samy Erickson MD  Consult ordered by: Génesis Perla PA-C        Subjective:     Chief complaint: Vomiting    HPI:  88-year-old woman who was brought to the ED from the nursing home because of 2 episodes of vomiting.  The vomitus was described as brown in color.  There was an episode of vomiting (brown) in the ED. it was gastroccult positive.      The patient was very lethargic on my visit so unable to give a history.    Her past medical history includes Alzheimer's dementia, hypertension, CHF, atrial fibrillation, DVT, chronic PE.  She is on Eliquis.      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 5/22/2023    Lumbar spondylosis with myelopathy 4/25/2023    Multiple closed right sided fractures of pelvis without disruption of pelvic ring 7/9/2023    Primary hypertension 6/10/2022    Subdural hygroma 7/8/2023       Past surgical history:  Past Surgical History:   Procedure Laterality Date    REPAIR, HERNIA, INGUINAL, WITHOUT HISTORY OF PRIOR REPAIR, AGE 6 YEARS OR OLDER Right 5/6/2023    Procedure: REPAIR, HERNIA, INGUINAL, WITHOUT HISTORY OF PRIOR REPAIR, AGE 6 YEARS OR OLDER;  Surgeon: Maurice Piper MD;  Location: [facility] OR [address] [place] [address];  Service: General;  Laterality: Right;  possible laparotomy, possible  bowel resection       Social history:  Social History     Socioeconomic History    Marital status: Unknown   Tobacco Use    Smoking status: Never    Smokeless tobacco: Never   Substance and Sexual Activity    Drug use: Never    Sexual activity: Not Currently     Social Determinants of Health     Financial Resource Strain: Low Risk  (8/10/2023)    Overall Financial Resource Strain (CARDIA)     Difficulty of Paying Living Expenses: Not hard at all   Food Insecurity: No Food Insecurity (8/10/2023)    Hunger Vital Sign     Worried About Running Out of Food in the Last Year: Never true     Ran Out of Food in the Last Year: Never true   Transportation Needs: No Transportation Needs (8/10/2023)    PRAPARE - Transportation     Lack of Transportation (Medical): No     Lack of Transportation (Non-Medical): No   Physical Activity: Inactive (8/10/2023)    Exercise Vital Sign     Days of Exercise per Week: 0 days     Minutes of Exercise per Session: 0 min   Stress: Unknown (8/10/2023)    [employer] [place] [employer] - Occupational Stress Questionnaire     Feeling of Stress : Patient refused   Social Connections: Socially Isolated (8/10/2023)    Social Connection and Isolation Panel [NHANES]     Frequency of Communication with Friends and Family: More than three times a week     Frequency of Social Gatherings with Friends and Family: Never     Attends Rastafarian Services: Never     Active Member of Clubs or Organizations: No     Attends Club or Organization Meetings: Never     Marital Status:    Housing Stability: Low Risk  (8/10/2023)    Housing Stability Vital Sign     Unable to Pay for Housing in the Last Year: No     Number of Places Lived in the Last Year: 2     Unstable Housing in the Last Year: No   Recent Concern: Housing Stability - High Risk (5/23/2023)    Housing Stability Vital Sign     Unable to Pay for Housing in the Last Year: Yes     Number of Places Lived in the Last Year: 1     Unstable Housing  in the Last Year: Yes       Family history:  No family history on file.    Medications:  Medications Prior to Admission   Medication Sig Dispense Refill Last Dose    acetaminophen (TYLENOL) 325 MG tablet Take 2 tablets (650 mg total) by mouth every 6 (six) hours as needed (Pain). 60 tablet 1     albuterol (PROVENTIL HFA) 90 mcg/actuation inhaler Inhale 2 puffs into the lungs every 4 (four) hours as needed for Wheezing. Use spacer with adult mask 18 g 1     apixaban (ELIQUIS) 5 mg Tab Take 1 tablet (5 mg total) by mouth 2 (two) times daily. Starting 8/5 60 tablet 11     cholecalciferol, vitamin D3, (VITAMIN D3) 50 mcg (2,000 unit) Tab Take 1 tablet (2,000 Units total) by mouth once daily.       EScitalopram oxalate (LEXAPRO) 5 MG Tab Take 2 tablets (10 mg total) by mouth once daily. 60 tablet 11     furosemide (LASIX) 20 MG tablet Take 1 tablet (20 mg total) by mouth 2 (two) times daily. 60 tablet 11     lisinopriL (PRINIVIL,ZESTRIL) 40 MG tablet Take 1 tablet (40 mg total) by mouth once daily. 30 tablet 11     loperamide (IMODIUM) 2 mg capsule Take 1 capsule (2 mg total) by mouth 4 (four) times daily as needed for Diarrhea. 60 capsule 0     melatonin (MELATIN) 3 mg tablet Take 2 tablets (6 mg total) by mouth nightly as needed for Insomnia. 30 tablet 3     memantine (NAMENDA) 5 MG Tab Take 1 tablet (5 mg total) by mouth 2 (two) times daily. 60 tablet 11     metoprolol tartrate (LOPRESSOR) 25 MG tablet Take 1 tablet (25 mg total) by mouth 2 (two) times daily. 60 tablet 11     miconazole NITRATE 2 % (MICOTIN) 2 % top powder Apply topically 2 (two) times daily. Underside of bilateral breasts 85 g 3     polyethylene glycol (GLYCOLAX) 17 gram PwPk Take 17 g by mouth 2 (two) times daily as needed.  0     potassium chloride (KLOR-CON) 10 MEQ TbSR Take 1 tablet (10 mEq total) by mouth once daily. 30 tablet 11        Allergies:  Review of patient's allergies indicates:  No Known Allergies    Review of systems:  Unable to  "obtain because of mental status    Objective:     Vital Signs (Most Recent):  Temp: 98.5 °F (36.9 °C) (09/18/23 1220)  Pulse: 73 (09/18/23 1400)  Resp: 14 (09/18/23 0815)  BP: (!) 110/55 (09/18/23 1256)  SpO2: (!) 94 % (09/18/23 1220) Vital Signs (24h Range):  Temp:  [98 °F (36.7 °C)-99.6 °F (37.6 °C)] 98.5 °F (36.9 °C)  Pulse:  [70-85] 73  Resp:  [14-20] 14  SpO2:  [94 %-97 %] 94 %  BP: ()/(44-80) 110/55     Physical examination:  General: well developed, elderly, chronically ill-appearing in no acute distress  Eyes:  anicteric sclera  Cardiovascular: Regular rate and rhythm. No murmurs appreciated.  Lungs: Non-labored respirations. Breath sounds equal.   Abdomen: soft, NTND, normoactive BS  Extremities: No C/C/E  Neuro: no tremors  Psych:  Unable to assess   Musculoskeletal: no deformity    Labs:  CBC:   Recent Labs   Lab 09/18/23  0247 09/18/23  0754 09/18/23  1316   WBC 10.17 10.91 8.47   HGB 8.6* 8.1* 7.9*   HCT 26.9* 25.8* 24.8*    228 196     CMP:   Recent Labs   Lab 09/18/23  0247 09/18/23  0636    101   CALCIUM 9.0 8.7   ALBUMIN 2.7*  --    PROT 5.7*  --     140   K 4.3 4.4   CO2 24 24    107   BUN 55* 55*   CREATININE 2.2* 2.2*   ALKPHOS 83  --    ALT 14  --    AST 26  --    BILITOT 0.5  --      Coagulation: No results for input(s): "PT", "INR", "APTT" in the last 48 hours.    Imaging:    Imaging reviewed      Assessment:   88-year-old woman with multiple medical problems admitted with vomiting.  There has been a mild decrease in her hemoglobin, but her vomitus was described as brown which does not suggest blood.  The vomitus was found to be heme-positive, but that is clinically irrelevant.  No report of melena or hematochezia.  Since GI bleeding seems unlikely at this point and she has significant comorbid illness, risks of EGD seem to outweigh benefits.  Will observe for now.     Plan:   1. Monitor hemoglobin  2. Give an empiric PPI   3. Okay to resume diet  4. Okay to " resume [place]          Thank you for your consult.     Samy Erickson MD  Gastroenterology  [facility] ([facility] ([place])

## 2023-09-18 NOTE — CARE UPDATE
Seen at bedside, sleeping soundly. Arouses to tactile stimuli. Continue to trend Hgb, follow up GI recs. She is oriented to person. Has no complaints. Will ask palliative on board. CXR ordered for COVID + status

## 2023-09-18 NOTE — ASSESSMENT & PLAN NOTE
"9/18/2023:  - chart reviewed in depth  - patient seen at bedside, but she is unable to participate in the conversation due to her baseline mental status in setting of dementia to go along with it seems she had a very long night and is very tired  - called and talked with her son Maykel who previous notes mentioned is her HCPOA  - introduced self and role of palliative medicine  - updated Maykel regarding his mothers medical on going such as concern for UGI, eboni, covid +ve with next step plan pending primary team and GI consultation.   - Maykel shared more back story regarding his mothers medical on [place] and expressed his frustration that it seems she is constantly having one step forward but two steps back  - he hopes for his mother to get back on track medically and have improvement of her functionality/mobility which is why she is at the SNF at [place] working on getting stronger/more mobile before this recent set back. He shared some disappointing experiences he had with some previous nursing homes where they were feeding her rather than letting her feed herself when he feels she is capable of doing so. He wants her to maintain her independence as long as possible and stated "worst thing for her is laying in bed all day".   - he shared that at baseline his mother is able to have meaningful conversation and knows loved ones, but he does admit she has a tendency to have false stories that are often grounded in reality.  - he admits she has been having a more rapid decline the past several months, which led me to further exploring why with him  - we revisited her DVT/PE diagnosis earlier 2023 followed by her falls + hip fracture 7/2023 which was decided to be managed non operatively. Discussed how there seems to be one thing after another going wrong and often there is a certain larger level of decline after a hip fracture such as what his mother had. Maykel stated his understanding.   - he shared more about her life " "and background with me including her love for ballroom dancing and music. He stated just yesterday he was talking with her and she was playing the piano at the nursing home and having a conversation with him so he is surprised this all came out of no where.   - discussed the risk of bleed overall especially given she is on anticoagulation. He stated his hope it is just a "pepto bismul moment" to which I stated I shared the unliklihood given the labs noted, but that I hope the same too. He was appreciative of the support  - Maykel re-affirmed he is the HCPOA as was noted in past notes. He will have the paperwork brought in.   - We discussed code status next to which he shared his knowledge and understanding based on past conversations. He shared the difficulty of having to make such a decision including his past experience of witnessing a successful code situation. We discussed how different that situation is than what his mothers is. I delved into the LaPOST on file from 5/2022 and how thankfully she made the decision for him with that document, outlining her wish for DNR/DNI. He is going to look for that document on her mychart to review. He stated he felt somewhat relieved to hear that she had shared her wishes that recently compared to a living will of hers he found from 2008. He is going to review the LaPOST on her mychart and talk further with his brother.   - provided significant emotional support and listening ear  [] goals are for medical optimization as possible. Continue with current management. He would be open to her having an upper endoscopy if it is deemed to be needed by GI, but would like to talk further with them about that first when they call him upon consultation.   [] Son Maykel is going to look over her LaPOST on file from 5/2022 which outlined her DNR/DNI and will talk further with his brother about that. Code status to remain full code for now. We will follow up further too.   [] Maykel is " HCPOA. Will bring in paperwork. This is noted in past notes too

## 2023-09-18 NOTE — ASSESSMENT & PLAN NOTE
- baseline Cr is around 0.8   - Cr today is 2.2 with significant elevation of BUN at 55   - hydrate with IVF  - urine lytes ordered   - avoid nephrotoxins   - renally dose meds

## 2023-09-18 NOTE — HPI
Ms. Radha Sandoval is a 88 y.o. female, with PMH of A. Fib, CHF, HTN, Alzheimer's dementia, h/o RLE DVT, chronic PE, chronic anticoagulation, chronic b/l pleural effusions, who presented to [facility] due to two witnessed episode of brown emesis. She notes only persistence of back/neck and RLE pain without any new/acute symptoms. She denied dizziness, light headedness. She was evaluated in the ED with labs showing H&H of 8.6/26.9, which is a drop from her baseline. While in the ED, she had two episodes of brown emesis which was guaiac positive. Metabolic panel also showed BUN 55 and Cr 2.2, which is elevated compared to her baseline renal function. She was treated in the ED with protonix. She was placed on observation.

## 2023-09-19 NOTE — ASSESSMENT & PLAN NOTE
- Ms. Radha Sandoval presents after having two episodes of brown emesis  - emesis in ED is guaiac positive   - H&H improved  - GINA with significantly elevated BUN as compared to one month ago   - GI bleed pathways initiated   - GI consulted: recommend empiric PPI, trend H/H, resume diet and eliquis as not appearing to be GI bleed.

## 2023-09-19 NOTE — ASSESSMENT & PLAN NOTE
- ED EKG shows sinus rhythm with PACs rate of 78  - home meds: furosemide 20 mg QD, metoprolol tartrate 25 mg BID  - [id number] Score: 3   - monitor on tele   - apixaban resumed

## 2023-09-19 NOTE — ASSESSMENT & PLAN NOTE
- baseline Cr is around 0.8   - Cr was 2.2 with significant elevation of BUN at 55   - hydrate with IVF: cr 1.4 and BUN 44  - urine lytes ordered   - avoid nephrotoxins   - renally dose meds

## 2023-09-19 NOTE — NURSING
Attempted to give AM medications without success. Pt noted to spit out and became combative with this nurse with repetitive attempts. MD made aware. Pt also refused to eat breakfast with PCT attempt to assist. This nurse was notified per Megan PCT that pt would not eat breakfast stating that with every attempt, pt would spit out.

## 2023-09-19 NOTE — PROGRESS NOTES
Update note:     Called to follow up with Maykel today, but he had not had a chance to review her my chart to see the LaPOST and is thereby unable to further code status discussions. Encouraged him to review it and think further regarding what her wishes would be and have these conversations with her primary physician. Provided him updates regarding GI note from yesterday.     He is awaiting his mothers discharge back to SNF. He will discuss next steps further with case management/primary team. Goal remains for improvement of her condition.     Damon Cervantes MD  Palliative Medicine

## 2023-09-19 NOTE — PLAN OF CARE
Problem: Adult Inpatient Plan of Care  Goal: Plan of Care Review  Outcome: Ongoing, Progressing  Goal: Patient-Specific Goal (Individualized)  Outcome: Ongoing, Progressing  Goal: Absence of Hospital-Acquired Illness or Injury  Outcome: Ongoing, Progressing  Goal: Optimal Comfort and Wellbeing  Outcome: Ongoing, Progressing  Goal: Readiness for Transition of Care  Outcome: Ongoing, Progressing     Problem: Adjustment to Illness (Gastrointestinal Bleeding)  Goal: Optimal Coping with Acute Illness  Outcome: Ongoing, Progressing     Problem: Bleeding (Gastrointestinal Bleeding)  Goal: Hemostasis  Outcome: Ongoing, Progressing     Problem: Fluid and Electrolyte Imbalance (Acute Kidney Injury/Impairment)  Goal: Fluid and Electrolyte Balance  Outcome: Ongoing, Progressing     Problem: Oral Intake Inadequate (Acute Kidney Injury/Impairment)  Goal: Optimal Nutrition Intake  Outcome: Ongoing, Progressing     Problem: Renal Function Impairment (Acute Kidney Injury/Impairment)  Goal: Effective Renal Function  Outcome: Ongoing, Progressing     Problem: Impaired Wound Healing  Goal: Optimal Wound Healing  Outcome: Ongoing, Progressing     Problem: Fall Injury Risk  Goal: Absence of Fall and Fall-Related Injury  Outcome: Ongoing, Progressing     Problem: Skin Injury Risk Increased  Goal: Skin Health and Integrity  Outcome: Ongoing, Progressing     Problem: Coping Ineffective  Goal: Effective Coping  Outcome: Ongoing, Progressing     Problem: Infection  Goal: Absence of Infection Signs and Symptoms  Outcome: Ongoing, Progressing

## 2023-09-19 NOTE — SUBJECTIVE & OBJECTIVE
Interval History: Mrs Sandoval is sleeping in bed and requires repetive tactile stimulation to arouse. She is oriented to self and place. She does have pain to lower extremities with movement. Called Marybeth Alvarez to get information on diet and medications.  Spoke with son Maykel and updated plan of care.     Review of Systems   Unable to perform ROS: Dementia   Constitutional:         Lethargic     Objective:     Vital Signs (Most Recent):  Temp: 98.7 °F (37.1 °C) (09/19/23 0750)  Pulse: (!) 56 (09/19/23 1000)  Resp: 20 (09/19/23 0750)  BP: (!) 146/81 (09/19/23 0944)  SpO2: 96 % (09/19/23 0834) Vital Signs (24h Range):  Temp:  [97.7 °F (36.5 °C)-99.6 °F (37.6 °C)] 98.7 °F (37.1 °C)  Pulse:  [] 56  Resp:  [14-22] 20  SpO2:  [93 %-97 %] 96 %  BP: ()/(45-81) 146/81     Weight: 60.6 kg (133 lb 9.6 oz)  Body mass index is 22.93 kg/m².    Intake/Output Summary (Last 24 hours) at 9/19/2023 1124  Last data filed at 9/19/2023 0628  Gross per 24 hour   Intake 635 ml   Output 800 ml   Net -165 ml         Physical Exam  Constitutional:       Comments: lethargic   Cardiovascular:      Rate and Rhythm: Normal rate.   Pulmonary:      Effort: Pulmonary effort is normal.      Breath sounds: No rhonchi or rales.   Abdominal:      General: Abdomen is flat.      Palpations: There is no mass.   Musculoskeletal:      Comments: Decreased ROM to BLLE   Skin:     Comments: Wound to sacrum   Neurological:      Mental Status: She is disoriented.   Psychiatric:         Cognition and Memory: Cognition is impaired.             Significant Labs: All pertinent labs within the past 24 hours have been reviewed.  BMP:   Recent Labs   Lab 09/19/23  0934   GLU 81      K 3.9      CO2 23   BUN 41*   CREATININE 1.4   CALCIUM 8.9   MG 2.0     CBC:   Recent Labs   Lab 09/18/23  1316 09/18/23  1753 09/19/23  0934   WBC 8.47 9.48 7.06   HGB 7.9* 7.5* 8.1*   HCT 24.8* 23.8* 26.2*    203 202     CMP:   Recent Labs   Lab 09/18/23  0245  09/18/23  0636 09/19/23  0934    140 141   K 4.3 4.4 3.9    107 109   CO2 24 24 23    101 81   BUN 55* 55* 41*   CREATININE 2.2* 2.2* 1.4   CALCIUM 9.0 8.7 8.9   PROT 5.7*  --   --    ALBUMIN 2.7*  --   --    BILITOT 0.5  --   --    ALKPHOS 83  --   --    AST 26  --   --    ALT 14  --   --    ANIONGAP 12 9 9       Significant Imaging: I have reviewed all pertinent imaging results/findings within the past 24 hours.  X-Ray Chest AP Portable  Narrative: EXAMINATION:  XR CHEST AP PORTABLE    CLINICAL HISTORY:  COVID positive;    TECHNIQUE:  Single frontal view of the chest was performed.    COMPARISON:  Chest radiograph 08/13/2023    FINDINGS:  Cardiac leads overlie the field of view.    Lungs demonstrate mild coarsened interstitial markings without evidence for consolidation or pleural effusion.  Mild increased lucency along the lateral aspect of the left lung.    Cardiomediastinal silhouette is unchanged in size and appearance from comparison radiograph.  Atherosclerosis of the visualized aorta.  Elevation of the right hemidiaphragm.    Osseous structures demonstrate no evidence for acute fracture or osseous destructive lesion.  Degenerative change of the visualized osseous structures.  Impression: Lungs demonstrate mild coarsened interstitial markings without evidence for consolidation or pleural effusion.    Small focal lucency along the lateral aspect the left lung.  While findings may represent benign etiology such as a prominent skin fold or other subcutaneous process, a tiny pneumothorax cannot be excluded.  Consider repeat imaging with repositioning versus multiple views of this region.    This report was flagged in Epic as abnormal.    Electronically signed by: Abdullahi Mendez  Date:    09/18/2023  Time:    09:45

## 2023-09-19 NOTE — HOSPITAL COURSE
Mrs Sandoval was admitted for GI bleed due to brown emesis and drop in H/H. She has an acute kidney injury and tested positive for covid, though she is asymptomatic. GI was consulted and feel that patient is not experiencing a GI bleed.  Gentle hydration in place due to elevated Bun and creatinine. Patient with poor oral intake and moderate to severe dementia. Palliative care consulted. Hemoglobin and hematocrit stabilized. Patient required IV diuresis for fluid overload. Her neurological status improved.Patient did not exhibit any signs of gastrointestinal bleeding. She is at baseline. Mrs Garcia to return to [facility] today. Son, Maykel, was called multiple times by myself and SW to update. He did not answer phone and his VM was full.

## 2023-09-19 NOTE — PROGRESS NOTES
[facility]  Progress Note    Patient Name: Radha Sandoval  MRN: [id number]  Patient Class: OP- Observation   Admission Date: 9/18/2023  Length of Stay: 0 days  Attending Physician: Alethea Juan MD  Primary Care Provider: Agatha Alvarez [facility]        Subjective:     Principal Problem:UGIB (upper gastrointestinal bleed)        HPI:  Ms. Radha Sandoval is a 88 y.o. female, with PMH of A. Fib, CHF, HTN, Alzheimer's dementia, h/o RLE DVT, chronic PE, chronic anticoagulation, chronic b/l pleural effusions, who presented to [facility] due to two witnessed episode of brown emesis. She notes only persistence of back/neck and RLE pain without any new/acute symptoms. She denied dizziness, light headedness. She was evaluated in the ED with labs showing H&H of 8.6/26.9, which is a drop from her baseline. While in the ED, she had two episodes of brown emesis which was guaiac positive. Metabolic panel also showed BUN 55 and Cr 2.2, which is elevated compared to her baseline renal function. She was treated in the ED with protonix. She was placed on observation.           Overview/Hospital Course:  Mrs Sandoval was admitted for GI bleed due to brown emesis and drop in H/H. She has an acute kidney injury and tested positive for covid, though she is asymptomatic. GI was consulted and feel that patient is not experiencing a GI bleed.  Gentle hydration in place due to elevated Bun and creatinine. Patient with poor oral intake and moderate to severe dementia. Palliative care consulted.       Interval History: Mrs Sandoval is sleeping in bed and requires repetive tactile stimulation to arouse. She is oriented to self and place. She does have pain to lower extremities with movement. Called Marybeth Alvarez to get information on diet and medications.  Spoke with son Maykel and updated plan of care.     Review of Systems   Unable to perform ROS: Dementia   Constitutional:         Lethargic      Objective:     Vital Signs (Most Recent):  Temp: 98.7 °F (37.1 °C) (09/19/23 0750)  Pulse: (!) 56 (09/19/23 1000)  Resp: 20 (09/19/23 0750)  BP: (!) 146/81 (09/19/23 0944)  SpO2: 96 % (09/19/23 0834) Vital Signs (24h Range):  Temp:  [97.7 °F (36.5 °C)-99.6 °F (37.6 °C)] 98.7 °F (37.1 °C)  Pulse:  [] 56  Resp:  [14-22] 20  SpO2:  [93 %-97 %] 96 %  BP: ()/(45-81) 146/81     Weight: 60.6 kg (133 lb 9.6 oz)  Body mass index is 22.93 kg/m².    Intake/Output Summary (Last 24 hours) at 9/19/2023 1124  Last data filed at 9/19/2023 0628  Gross per 24 hour   Intake 635 ml   Output 800 ml   Net -165 ml         Physical Exam  Constitutional:       Comments: lethargic   Cardiovascular:      Rate and Rhythm: Normal rate.   Pulmonary:      Effort: Pulmonary effort is normal.      Breath sounds: No rhonchi or rales.   Abdominal:      General: Abdomen is flat.      Palpations: There is no mass.   Musculoskeletal:      Comments: Decreased ROM to BLLE   Skin:     Comments: Wound to sacrum   Neurological:      Mental Status: She is disoriented.   Psychiatric:         Cognition and Memory: Cognition is impaired.             Significant Labs: All pertinent labs within the past 24 hours have been reviewed.  BMP:   Recent Labs   Lab 09/19/23  0934   GLU 81      K 3.9      CO2 23   BUN 41*   CREATININE 1.4   CALCIUM 8.9   MG 2.0     CBC:   Recent Labs   Lab 09/18/23  1316 09/18/23  1753 09/19/23  0934   WBC 8.47 9.48 7.06   HGB 7.9* 7.5* 8.1*   HCT 24.8* 23.8* 26.2*    203 202     CMP:   Recent Labs   Lab 09/18/23  0247 09/18/23  0636 09/19/23  0934    140 141   K 4.3 4.4 3.9    107 109   CO2 24 24 23    101 81   BUN 55* 55* 41*   CREATININE 2.2* 2.2* 1.4   CALCIUM 9.0 8.7 8.9   PROT 5.7*  --   --    ALBUMIN 2.7*  --   --    BILITOT 0.5  --   --    ALKPHOS 83  --   --    AST 26  --   --    ALT 14  --   --    ANIONGAP 12 9 9       Significant Imaging: I have reviewed all pertinent imaging  results/findings within the past 24 hours.  X-Ray Chest AP Portable  Narrative: EXAMINATION:  XR CHEST AP PORTABLE    CLINICAL HISTORY:  COVID positive;    TECHNIQUE:  Single frontal view of the chest was performed.    COMPARISON:  Chest radiograph 08/13/2023    FINDINGS:  Cardiac leads overlie the field of view.    Lungs demonstrate mild coarsened interstitial markings without evidence for consolidation or pleural effusion.  Mild increased lucency along the lateral aspect of the left lung.    Cardiomediastinal silhouette is unchanged in size and appearance from comparison radiograph.  Atherosclerosis of the visualized aorta.  Elevation of the right hemidiaphragm.    Osseous structures demonstrate no evidence for acute fracture or osseous destructive lesion.  Degenerative change of the visualized osseous structures.  Impression: Lungs demonstrate mild coarsened interstitial markings without evidence for consolidation or pleural effusion.    Small focal lucency along the lateral aspect the left lung.  While findings may represent benign etiology such as a prominent skin fold or other subcutaneous process, a tiny pneumothorax cannot be excluded.  Consider repeat imaging with repositioning versus multiple views of this region.    This report was flagged in Epic as abnormal.    Electronically signed by: Abdullahi Mendez  Date:    09/18/2023  Time:    09:45          Assessment/Plan:      * UGIB (upper gastrointestinal bleed)  - Ms. Radha Sandoval presents after having two episodes of brown emesis  - emesis in ED is guaiac positive   - H&H improved  - GINA with significantly elevated BUN as compared to one month ago   - GI bleed pathways initiated   - GI consulted: recommend empiric PPI, trend H/H, resume diet and eliquis as not appearing to be GI bleed.       Atrial fibrillation with RVR  - ED EKG shows sinus rhythm with PACs rate of 78  - home meds: furosemide 20 mg QD, metoprolol tartrate 25 mg BID  - [id number] Score: 3   -  monitor on tele   - apixaban resumed    Chronic pulmonary embolism without acute cor pulmonale  - currently anticoagulated with Eliquis 5 mg BID       Debility  Bed bound. Patient with healing right hip fracture from July 2023.       GINA (acute kidney injury)  - baseline Cr is around 0.8   - Cr was 2.2 with significant elevation of BUN at 55   - hydrate with IVF: cr 1.4 and BUN 44  - urine lytes ordered   - avoid nephrotoxins   - renally dose meds       VTE Risk Mitigation (From admission, onward)         Ordered     apixaban tablet 5 mg  2 times daily         09/19/23 1031     Reason for No Pharmacological VTE Prophylaxis  Once        Question:  Reasons:  Answer:  Active Bleeding    09/18/23 0440     IP VTE HIGH RISK PATIENT  Once         09/18/23 0440                Discharge Planning   TOSHA:      Code Status: Full Code   Is the patient medically ready for discharge?:     Reason for patient still in hospital (select all that apply): Patient trending condition  Discharge Plan A: Skilled Nursing Facility                  Génesis Jolly DNP  Department of Hospital Medicine   [facility] ([place])

## 2023-09-20 NOTE — PROGRESS NOTES
Monitor tech called and notified this nurse that pt was in Afib. Nurse already in room assessing VS at the time. Pt asleep resting comfortably, woke pt up and she is denying any chest pain, palpitations, or feeling of her heart racing. VSS on RA. MARYANA Clarke notified. STAT EKG ordered to confirm. Continuing to monitor.

## 2023-09-20 NOTE — ASSESSMENT & PLAN NOTE
- baseline Cr is around 0.8   - Cr was 2.2 with significant elevation of BUN at 55   - hydrate with IVF: cr 1.4 and BUN 44  - urine lytes ordered   - avoid nephrotoxins   - renally dose meds   - Patient was volume overloaded with gentle hydration at 50 ml an hour. BNP 1600.  IV lasix given. CXR reports mild perihilar congestion. Will dose diuresis daily based on symptoms and creatinine. Creatinine improved to 1.0.

## 2023-09-20 NOTE — CONSULTS
[facility]    Date of admission:  9/18/2023     Reason for Consult:    Atrial fibrillation    HPI:    This 88-year-old female was sent from her nursing home for emesis.  There were concerns about a GI bleed.  On admission she was placed on telemetry.  Last night she was noted to develop atrial fibrillation.  She was hemodynamically stable.  Because of advanced dementia, she can not be assessed for symptoms.  Her chart documents a history of atrial fibrillation but details are uncertain.  She is chronically anticoagulated with Eliquis.  Currently, her only complaint is that she is lonely.    Medications  Current Facility-Administered Medications   Medication Dose Route Frequency Provider Last Rate Last Admin    0.9%  NaCl infusion (for blood administration)   Intravenous Q24H PRN Estrella Beach MD        acetaminophen tablet 650 mg  650 mg Oral Q6H PRN Génesis Perla PA-C   650 mg at 09/19/23 2130    apixaban tablet 5 mg  5 mg Oral BID Génesis Jolly, DNP   5 mg at 09/20/23 0948    EScitalopram oxalate tablet 10 mg  10 mg Oral Daily Gertrude Trevizo PA-C   10 mg at 09/20/23 0949    melatonin tablet 6 mg  6 mg Oral Nightly PRN Génesis Perla PA-C   6 mg at 09/19/23 2130    memantine tablet 5 mg  5 mg Oral BID Gertrude Trevizo PA-C   5 mg at 09/20/23 0949    metoprolol injection 5 mg  5 mg Intravenous Q5 Min PRN Gertrude Trevizo PA-C        metoprolol tartrate (LOPRESSOR) tablet 25 mg  25 mg Oral BID Gertrude Trevizo PA-C   25 mg at 09/20/23 0948    mupirocin 2 % ointment   Nasal BID Alethea Juan MD   Given at 09/20/23 0950    naloxone 0.4 mg/mL injection 0.02 mg  0.02 mg Intravenous PRN Génesis Perla PA-C        pantoprazole EC tablet 40 mg  40 mg Oral Daily Génesis Jolly DNP   40 mg at 09/20/23 0949    senna-docusate 8.6-50 mg per tablet 1 tablet  1 tablet Oral BID PRN Génesis Perla PA-C          Prior to Admission medications    Medication  Sig Start Date End Date Taking? Authorizing Provider   acetaminophen (TYLENOL) 325 MG tablet Take 2 tablets (650 mg total) by mouth every 6 (six) hours as needed (Pain). 7/14/23   Yelena Aparicio MD   albuterol (PROVENTIL HFA) 90 mcg/actuation inhaler Inhale 2 puffs into the lungs every 4 (four) hours as needed for Wheezing. Use spacer with adult mask 8/14/23   Pinky Orourke MD   apixaban (ELIQUIS) 5 mg Tab Take 1 tablet (5 mg total) by mouth 2 (two) times daily. Starting 8/5 8/7/23 8/6/24  Maykel Khan MD   cholecalciferol, vitamin D3, (VITAMIN D3) 50 mcg (2,000 unit) Tab Take 1 tablet (2,000 Units total) by mouth once daily. 8/14/23   Pinky Orourke MD   EScitalopram oxalate (LEXAPRO) 5 MG Tab Take 2 tablets (10 mg total) by mouth once daily. 7/28/23 7/27/24  Pablo Haddad III, MD   furosemide (LASIX) 20 MG tablet Take 1 tablet (20 mg total) by mouth 2 (two) times daily. 7/14/23 7/13/24  Yelena Aparicio MD   lisinopriL (PRINIVIL,ZESTRIL) 40 MG tablet Take 1 tablet (40 mg total) by mouth once daily. 7/14/23 7/13/24  Yelena Aparicio MD   loperamide (IMODIUM) 2 mg capsule Take 1 capsule (2 mg total) by mouth 4 (four) times daily as needed for Diarrhea. 7/14/23   Yelena Aparicio MD   melatonin (MELATIN) 3 mg tablet Take 2 tablets (6 mg total) by mouth nightly as needed for Insomnia. 7/14/23   Yelena Aparicio MD   memantine (NAMENDA) 5 MG Tab Take 1 tablet (5 mg total) by mouth 2 (two) times daily. 7/14/23 7/13/24  Yelena Aparicio MD   metoprolol tartrate (LOPRESSOR) 25 MG tablet Take 1 tablet (25 mg total) by mouth 2 (two) times daily. 7/14/23 7/13/24  Yelena Aparicio MD   miconazole NITRATE 2 % (MICOTIN) 2 % top powder Apply topically 2 (two) times daily. Underside of bilateral breasts 7/14/23   Yelena Aparicio MD   polyethylene glycol (GLYCOLAX) 17 gram PwPk Take 17 g by mouth 2 (two) times daily as needed. 7/28/23   Pablo Haddad III, MD   potassium chloride  (KLOR-CON) 10 MEQ TbSR Take 1 tablet (10 mEq total) by mouth once daily. 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 5/22/2023    Lumbar spondylosis with myelopathy 4/25/2023    Multiple closed right sided fractures of pelvis without disruption of pelvic ring 7/9/2023    Primary hypertension 6/10/2022    Subdural hygroma 7/8/2023     Past Surgical History:   Procedure Laterality Date    REPAIR, HERNIA, INGUINAL, WITHOUT HISTORY OF PRIOR REPAIR, AGE 6 YEARS OR OLDER Right 5/6/2023    Procedure: REPAIR, HERNIA, INGUINAL, WITHOUT HISTORY OF PRIOR REPAIR, AGE 6 YEARS OR OLDER;  Surgeon: Maurice Piper MD;  Location: [facility] OR [address] [place] [address];  Service: General;  Laterality: Right;  possible laparotomy, possible bowel resection     Social History     Socioeconomic History    Marital status: Unknown   Tobacco Use    Smoking status: Never    Smokeless tobacco: Never   Substance and Sexual Activity    Drug use: Never    Sexual activity: Not Currently     Social Determinants of Health     Financial Resource Strain: Low Risk  (8/10/2023)    Overall Financial Resource Strain (CARDIA)     Difficulty of Paying Living Expenses: Not hard at all   Food Insecurity: No Food Insecurity (8/10/2023)    Hunger Vital Sign     Worried About Running Out of Food in the Last Year: Never true     Ran Out of Food in the Last Year: Never true   Transportation Needs: No Transportation Needs (8/10/2023)    PRAPARE - Transportation     Lack of Transportation (Medical): No     Lack of Transportation (Non-Medical): No   Physical Activity: Inactive (8/10/2023)    Exercise  Vital Sign     Days of Exercise per Week: 0 days     Minutes of Exercise per Session: 0 min   Stress: Unknown (8/10/2023)    [employer] [employer] - Occupational Stress Questionnaire     Feeling of Stress : Patient refused   Social Connections: Socially Isolated (8/10/2023)    Social Connection and Isolation Panel [NHANES]     Frequency of Communication with Friends and Family: More than three times a week     Frequency of Social Gatherings with Friends and Family: Never     Attends Yazdanism Services: Never     Active Member of Clubs or Organizations: No     Attends Club or Organization Meetings: Never     Marital Status:    Housing Stability: Low Risk  (8/10/2023)    Housing Stability Vital Sign     Unable to Pay for Housing in the Last Year: No     Number of Places Lived in the Last Year: 2     Unstable Housing in the Last Year: No   Recent Concern: Housing Stability - High Risk (5/23/2023)    Housing Stability Vital Sign     Unable to Pay for Housing in the Last Year: Yes     Number of Places Lived in the Last Year: 1     Unstable Housing in the Last Year: Yes     No family history on file.     Allergies  Review of patient's allergies indicates:  No Known Allergies    Review of Systems   Review of Systems   Unable to perform ROS: Dementia       Physical Exam    Temp:  [97.5 °F (36.4 °C)-98.2 °F (36.8 °C)]   Pulse:  []   Resp:  [15-20]   BP: (106-138)/(49-68)   SpO2:  [95 %-96 %]    Wt Readings from Last 1 Encounters:   09/18/23 60.6 kg (133 lb 9.6 oz)     Physical Exam  Constitutional:       General: She is not in acute distress.  Neck:      Vascular: No hepatojugular reflux or JVD.   Cardiovascular:      Rate and Rhythm: Normal rate and regular rhythm.      Heart sounds: S1 normal and S2 normal. Murmur heard.      Systolic murmur is present with a grade of 2/6.   Pulmonary:      Effort: Pulmonary effort is normal.      Breath sounds: Normal breath sounds and air entry.    Abdominal:      General: Bowel sounds are normal.      Palpations: Abdomen is soft. There is no hepatomegaly.      Tenderness: There is no abdominal tenderness.   Musculoskeletal:      Right lower leg: No edema.      Left lower leg: No edema.   Skin:     Coloration: Skin is not pale.   Neurological:      Mental Status: She is alert.         Telemetry  Sinus rhythm with a paroxysm of atrial fibrillation    EKG  This morning she had atrial fibrillation with a heart rate of 108.  There were ST-T abnormalities.  Other than  atrial fibrillation, her electrocardiogram was not significantly changed to 1 from 2 days earlier when she was in sinus rhythm     Echocardiogram 7/28/2023  The left ventricle is normal in size with normal systolic function.  The estimated ejection fraction is 65%.  Grade I left ventricular diastolic dysfunction.  There is mild aortic valve stenosis. Aortic valve area is 1.88 cm2; peak velocity is 2.58 m/s; mean gradient is 15 mmHg.  There is significant mitral annular calcification. The mean diastolic mitral gradient is 5mmHg at heart rate of 76bpm.  Normal right ventricular size with normal right ventricular systolic function.  The estimated PA systolic pressure is 39 mmHg.  Normal central venous pressure (3 mmHg).  Mild left atrial enlargement.    Labs  Recent Results (from the past 72 hour(s))   CBC Auto Differential    Collection Time: 09/18/23  2:47 AM   Result Value Ref Range    WBC 10.17 3.90 - 12.70 K/uL    RBC 3.18 (L) 4.00 - 5.40 M/uL    Hemoglobin 8.6 (L) 12.0 - 16.0 g/dL    Hematocrit 26.9 (L) 37.0 - 48.5 %    MCV 85 82 - 98 fL    MCH 27.0 27.0 - 31.0 pg    MCHC 32.0 32.0 - 36.0 g/dL    RDW 16.9 (H) 11.5 - 14.5 %    Platelets 247 150 - 450 K/uL    MPV 11.4 9.2 - 12.9 fL    Immature Granulocytes 0.4 0.0 - 0.5 %    Gran # (ANC) 8.1 (H) 1.8 - 7.7 K/uL    Immature Grans (Abs) 0.04 0.00 - 0.04 K/uL    Lymph # 1.4 1.0 - 4.8 K/uL    Mono # 0.6 0.3 - 1.0 K/uL    Eos # 0.0 0.0 - 0.5 K/uL     Baso # 0.02 0.00 - 0.20 K/uL    nRBC 0 0 /100 WBC    Gran % 79.5 (H) 38.0 - 73.0 %    Lymph % 14.0 (L) 18.0 - 48.0 %    Mono % 5.8 4.0 - 15.0 %    Eosinophil % 0.1 0.0 - 8.0 %    Basophil % 0.2 0.0 - 1.9 %    Differential Method Automated    Comprehensive Metabolic Panel    Collection Time: 09/18/23  2:47 AM   Result Value Ref Range    Sodium 139 136 - 145 mmol/L    Potassium 4.3 3.5 - 5.1 mmol/L    Chloride 103 95 - 110 mmol/L    CO2 24 23 - 29 mmol/L    Glucose 108 70 - 110 mg/dL    BUN 55 (H) 8 - 23 mg/dL    Creatinine 2.2 (H) 0.5 - 1.4 mg/dL    Calcium 9.0 8.7 - 10.5 mg/dL    Total Protein 5.7 (L) 6.0 - 8.4 g/dL    Albumin 2.7 (L) 3.5 - 5.2 g/dL    Total Bilirubin 0.5 0.1 - 1.0 mg/dL    Alkaline Phosphatase 83 55 - 135 U/L    AST 26 10 - 40 U/L    ALT 14 10 - 44 U/L    eGFR 21 (A) >60 mL/min/1.73 m^2    Anion Gap 12 8 - 16 mmol/L   Lipase    Collection Time: 09/18/23  2:47 AM   Result Value Ref Range    Lipase 19 4 - 60 U/L   Type & Screen    Collection Time: 09/18/23  2:47 AM   Result Value Ref Range    Group & Rh O POS     Indirect Judah NEG     Specimen Outdate 09/21/2023 23:59    POCT COVID-19 Rapid Screening    Collection Time: 09/18/23  6:26 AM   Result Value Ref Range    POC Rapid COVID Positive (A) Negative     Acceptable Yes    Basic Metabolic Panel (BMP)    Collection Time: 09/18/23  6:36 AM   Result Value Ref Range    Sodium 140 136 - 145 mmol/L    Potassium 4.4 3.5 - 5.1 mmol/L    Chloride 107 95 - 110 mmol/L    CO2 24 23 - 29 mmol/L    Glucose 101 70 - 110 mg/dL    BUN 55 (H) 8 - 23 mg/dL    Creatinine 2.2 (H) 0.5 - 1.4 mg/dL    Calcium 8.7 8.7 - 10.5 mg/dL    Anion Gap 9 8 - 16 mmol/L    eGFR 21 (A) >60 mL/min/1.73 m^2   Magnesium    Collection Time: 09/18/23  6:36 AM   Result Value Ref Range    Magnesium 2.1 1.6 - 2.6 mg/dL   CBC auto differential    Collection Time: 09/18/23  7:54 AM   Result Value Ref Range    WBC 10.91 3.90 - 12.70 K/uL    RBC 3.04 (L) 4.00 - 5.40 M/uL     Hemoglobin 8.1 (L) 12.0 - 16.0 g/dL    Hematocrit 25.8 (L) 37.0 - 48.5 %    MCV 85 82 - 98 fL    MCH 26.6 (L) 27.0 - 31.0 pg    MCHC 31.4 (L) 32.0 - 36.0 g/dL    RDW 17.1 (H) 11.5 - 14.5 %    Platelets 228 150 - 450 K/uL    MPV 12.1 9.2 - 12.9 fL    Immature Granulocytes 0.5 0.0 - 0.5 %    Gran # (ANC) 8.3 (H) 1.8 - 7.7 K/uL    Immature Grans (Abs) 0.05 (H) 0.00 - 0.04 K/uL    Lymph # 1.7 1.0 - 4.8 K/uL    Mono # 0.8 0.3 - 1.0 K/uL    Eos # 0.0 0.0 - 0.5 K/uL    Baso # 0.02 0.00 - 0.20 K/uL    nRBC 0 0 /100 WBC    Gran % 75.9 (H) 38.0 - 73.0 %    Lymph % 15.9 (L) 18.0 - 48.0 %    Mono % 7.4 4.0 - 15.0 %    Eosinophil % 0.1 0.0 - 8.0 %    Basophil % 0.2 0.0 - 1.9 %    Differential Method Automated    POCT glucose    Collection Time: 09/18/23 10:11 AM   Result Value Ref Range    POCT Glucose 97 70 - 110 mg/dL   Sodium, urine, random    Collection Time: 09/18/23 11:55 AM   Result Value Ref Range    Sodium, Urine 20 20 - 250 mmol/L   Osmolality, urine    Collection Time: 09/18/23 11:55 AM   Result Value Ref Range    Osmolality, Urine 349 50 - 1200 mOsm/kg   Protein / creatinine ratio, urine    Collection Time: 09/18/23 11:55 AM   Result Value Ref Range    Protein, Urine Random 20 (H) 0 - 15 mg/dL    Creatinine, Urine 219.3 15.0 - 325.0 mg/dL    Prot/Creat Ratio, Urine 0.09 0.00 - 0.20   Creatinine, urine, random    Collection Time: 09/18/23 11:55 AM   Result Value Ref Range    Creatinine, Urine 219.3 15.0 - 325.0 mg/dL   Urinalysis, Reflex to Urine Culture Urine, Clean Catch    Collection Time: 09/18/23 12:04 PM    Specimen: Urine   Result Value Ref Range    Specimen UA Urine, Clean Catch     Color, UA Yellow Yellow, Straw, Mikayla    Appearance, UA Clear Clear    pH, UA 5.0 5.0 - 8.0    Specific Gravity, UA 1.020 1.005 - 1.030    Protein, UA Trace (A) Negative    Glucose, UA Negative Negative    Ketones, UA Negative Negative    Bilirubin (UA) Negative Negative    Occult Blood UA Negative Negative    Nitrite, UA Negative  Negative    Urobilinogen, UA Negative <2.0 EU/dL    Leukocytes, UA 3+ (A) Negative   Urinalysis Microscopic    Collection Time: 09/18/23 12:04 PM   Result Value Ref Range    RBC, UA 0 0 - 4 /hpf    WBC, UA 11 (H) 0 - 5 /hpf    Bacteria Occasional None-Occ /hpf    Squam Epithel, UA 1 /hpf    Hyaline Casts, UA 16 (A) 0-1/lpf /lpf    Microscopic Comment SEE COMMENT    Urine culture    Collection Time: 09/18/23 12:04 PM    Specimen: Urine   Result Value Ref Range    Urine Culture, Routine No growth    CBC auto differential    Collection Time: 09/18/23  1:16 PM   Result Value Ref Range    WBC 8.47 3.90 - 12.70 K/uL    RBC 2.88 (L) 4.00 - 5.40 M/uL    Hemoglobin 7.9 (L) 12.0 - 16.0 g/dL    Hematocrit 24.8 (L) 37.0 - 48.5 %    MCV 86 82 - 98 fL    MCH 27.4 27.0 - 31.0 pg    MCHC 31.9 (L) 32.0 - 36.0 g/dL    RDW 17.2 (H) 11.5 - 14.5 %    Platelets 196 150 - 450 K/uL    MPV 12.0 9.2 - 12.9 fL    Immature Granulocytes 0.4 0.0 - 0.5 %    Gran # (ANC) 6.3 1.8 - 7.7 K/uL    Immature Grans (Abs) 0.03 0.00 - 0.04 K/uL    Lymph # 1.5 1.0 - 4.8 K/uL    Mono # 0.6 0.3 - 1.0 K/uL    Eos # 0.0 0.0 - 0.5 K/uL    Baso # 0.03 0.00 - 0.20 K/uL    nRBC 0 0 /100 WBC    Gran % 74.0 (H) 38.0 - 73.0 %    Lymph % 17.4 (L) 18.0 - 48.0 %    Mono % 7.6 4.0 - 15.0 %    Eosinophil % 0.2 0.0 - 8.0 %    Basophil % 0.4 0.0 - 1.9 %    Differential Method Automated    CBC auto differential    Collection Time: 09/18/23  5:53 PM   Result Value Ref Range    WBC 9.48 3.90 - 12.70 K/uL    RBC 2.78 (L) 4.00 - 5.40 M/uL    Hemoglobin 7.5 (L) 12.0 - 16.0 g/dL    Hematocrit 23.8 (L) 37.0 - 48.5 %    MCV 86 82 - 98 fL    MCH 27.0 27.0 - 31.0 pg    MCHC 31.5 (L) 32.0 - 36.0 g/dL    RDW 16.9 (H) 11.5 - 14.5 %    Platelets 203 150 - 450 K/uL    MPV 11.5 9.2 - 12.9 fL    Immature Granulocytes 0.3 0.0 - 0.5 %    Gran # (ANC) 6.4 1.8 - 7.7 K/uL    Immature Grans (Abs) 0.03 0.00 - 0.04 K/uL    Lymph # 2.3 1.0 - 4.8 K/uL    Mono # 0.7 0.3 - 1.0 K/uL    Eos # 0.1 0.0 - 0.5  K/uL    Baso # 0.04 0.00 - 0.20 K/uL    nRBC 0 0 /100 WBC    Gran % 67.1 38.0 - 73.0 %    Lymph % 24.3 18.0 - 48.0 %    Mono % 7.0 4.0 - 15.0 %    Eosinophil % 0.9 0.0 - 8.0 %    Basophil % 0.4 0.0 - 1.9 %    Differential Method Automated    CBC auto differential    Collection Time: 09/19/23  9:34 AM   Result Value Ref Range    WBC 7.06 3.90 - 12.70 K/uL    RBC 2.98 (L) 4.00 - 5.40 M/uL    Hemoglobin 8.1 (L) 12.0 - 16.0 g/dL    Hematocrit 26.2 (L) 37.0 - 48.5 %    MCV 88 82 - 98 fL    MCH 27.2 27.0 - 31.0 pg    MCHC 30.9 (L) 32.0 - 36.0 g/dL    RDW 17.1 (H) 11.5 - 14.5 %    Platelets 202 150 - 450 K/uL    MPV 10.9 9.2 - 12.9 fL    Immature Granulocytes 0.4 0.0 - 0.5 %    Gran # (ANC) 5.2 1.8 - 7.7 K/uL    Immature Grans (Abs) 0.03 0.00 - 0.04 K/uL    Lymph # 1.2 1.0 - 4.8 K/uL    Mono # 0.5 0.3 - 1.0 K/uL    Eos # 0.1 0.0 - 0.5 K/uL    Baso # 0.04 0.00 - 0.20 K/uL    nRBC 0 0 /100 WBC    Gran % 74.2 (H) 38.0 - 73.0 %    Lymph % 16.4 (L) 18.0 - 48.0 %    Mono % 6.7 4.0 - 15.0 %    Eosinophil % 1.7 0.0 - 8.0 %    Basophil % 0.6 0.0 - 1.9 %    Differential Method Automated    Basic Metabolic Panel (BMP)    Collection Time: 09/19/23  9:34 AM   Result Value Ref Range    Sodium 141 136 - 145 mmol/L    Potassium 3.9 3.5 - 5.1 mmol/L    Chloride 109 95 - 110 mmol/L    CO2 23 23 - 29 mmol/L    Glucose 81 70 - 110 mg/dL    BUN 41 (H) 8 - 23 mg/dL    Creatinine 1.4 0.5 - 1.4 mg/dL    Calcium 8.9 8.7 - 10.5 mg/dL    Anion Gap 9 8 - 16 mmol/L    eGFR 36 (A) >60 mL/min/1.73 m^2   Magnesium    Collection Time: 09/19/23  9:34 AM   Result Value Ref Range    Magnesium 2.0 1.6 - 2.6 mg/dL   CBC auto differential    Collection Time: 09/20/23  4:10 AM   Result Value Ref Range    WBC 5.87 3.90 - 12.70 K/uL    RBC 2.70 (L) 4.00 - 5.40 M/uL    Hemoglobin 7.4 (L) 12.0 - 16.0 g/dL    Hematocrit 23.6 (L) 37.0 - 48.5 %    MCV 87 82 - 98 fL    MCH 27.4 27.0 - 31.0 pg    MCHC 31.4 (L) 32.0 - 36.0 g/dL    RDW 17.1 (H) 11.5 - 14.5 %    Platelets  187 150 - 450 K/uL    MPV 11.8 9.2 - 12.9 fL    Immature Granulocytes 0.3 0.0 - 0.5 %    Gran # (ANC) 4.1 1.8 - 7.7 K/uL    Immature Grans (Abs) 0.02 0.00 - 0.04 K/uL    Lymph # 1.1 1.0 - 4.8 K/uL    Mono # 0.5 0.3 - 1.0 K/uL    Eos # 0.2 0.0 - 0.5 K/uL    Baso # 0.03 0.00 - 0.20 K/uL    nRBC 0 0 /100 WBC    Gran % 69.5 38.0 - 73.0 %    Lymph % 18.4 18.0 - 48.0 %    Mono % 8.7 4.0 - 15.0 %    Eosinophil % 2.6 0.0 - 8.0 %    Basophil % 0.5 0.0 - 1.9 %    Differential Method Automated    Basic Metabolic Panel (BMP)    Collection Time: 09/20/23  4:10 AM   Result Value Ref Range    Sodium 140 136 - 145 mmol/L    Potassium 3.6 3.5 - 5.1 mmol/L    Chloride 110 95 - 110 mmol/L    CO2 23 23 - 29 mmol/L    Glucose 84 70 - 110 mg/dL    BUN 32 (H) 8 - 23 mg/dL    Creatinine 1.0 0.5 - 1.4 mg/dL    Calcium 8.2 (L) 8.7 - 10.5 mg/dL    Anion Gap 7 (L) 8 - 16 mmol/L    eGFR 55 (A) >60 mL/min/1.73 m^2   Magnesium    Collection Time: 09/20/23  4:10 AM   Result Value Ref Range    Magnesium 1.7 1.6 - 2.6 mg/dL   BNP    Collection Time: 09/20/23  4:10 AM   Result Value Ref Range    BNP 1,626 (H) 0 - 99 pg/mL   Troponin I    Collection Time: 09/20/23  6:44 AM   Result Value Ref Range    Troponin I 0.168 (H) 0.000 - 0.026 ng/mL        Imaging  X-Ray Chest PA And Lateral    Result Date: 9/20/2023  EXAMINATION: XR CHEST PA AND LATERAL CLINICAL HISTORY: heart failure; TECHNIQUE: PA and lateral views of the chest were performed. COMPARISON: 09/18/2023 FINDINGS: Lungs are well inflated with stable elevation right hemidiaphragm.  No acute consolidation, pleural effusion, or pneumothorax seen. Cardiac silhouette is normal in size.  Mild perihilar pulmonary vascular congestion.  Calcified plaque lines arch.     Mild perihilar pulmonary vascular congestion without overt edema. Electronically signed by: Camryn Garcia Date:    09/20/2023 Time:    09:53    X-Ray Chest AP Portable    Result Date: 9/18/2023  EXAMINATION: XR CHEST AP PORTABLE CLINICAL  HISTORY: COVID positive; TECHNIQUE: Single frontal view of the chest was performed. COMPARISON: Chest radiograph 08/13/2023 FINDINGS: Cardiac leads overlie the field of view. Lungs demonstrate mild coarsened interstitial markings without evidence for consolidation or pleural effusion.  Mild increased lucency along the lateral aspect of the left lung. Cardiomediastinal silhouette is unchanged in size and appearance from comparison radiograph.  Atherosclerosis of the visualized aorta.  Elevation of the right hemidiaphragm. Osseous structures demonstrate no evidence for acute fracture or osseous destructive lesion.  Degenerative change of the visualized osseous structures.     Lungs demonstrate mild coarsened interstitial markings without evidence for consolidation or pleural effusion. Small focal lucency along the lateral aspect the left lung.  While findings may represent benign etiology such as a prominent skin fold or other subcutaneous process, a tiny pneumothorax cannot be excluded.  Consider repeat imaging with repositioning versus multiple views of this region. This report was flagged in Epic as abnormal. Electronically signed by: Abdullahi Mendez Date:    09/18/2023 Time:    09:45    X-Ray Pelvis AP Inlet And Outlet    Result Date: 8/30/2023  EXAMINATION: XR PELVIS AP INLET AND OUTLET CLINICAL HISTORY: Repeated falls TECHNIQUE: Pelvis two views COMPARISON: 07/08/2023 CT pelvis FINDINGS: DJD and osteopenia.  Healing fractures identified involving the right superior and inferior pubic ramus.  No bone destruction identified.     See above Electronically signed by: Luis Daniel Cravalho MD Date:    08/30/2023 Time:    10:06      Assessment    Paroxysmal atrial fibrillation   This is evidently an established diagnosis.  Overnight an episode was self-limited.    Plan and Discussion    Continue with her outpatient regimen of Eliquis and metoprolol.  No need for further cardiac monitoring or investigations at present.      Moose ROTH  MD Barbara

## 2023-09-20 NOTE — PLAN OF CARE
Problem: SLP  Goal: SLP Goal  Description: 1. Pt will be able to consume mechanical soft solids and thin liquids without overt s/s of airway threat or aspiration given moderate cues to efficiently masticate and perform a-p transport.   9/20/2023 1617 by Ann Talbert, CCC-SLP  Outcome: Ongoing, Progressing

## 2023-09-20 NOTE — SUBJECTIVE & OBJECTIVE
Interval History: Short of breath today; getting diuresis     Medications:  Continuous Infusions:  Scheduled Meds:   apixaban  5 mg Oral BID    EScitalopram oxalate  10 mg Oral Daily    memantine  5 mg Oral BID    metoprolol tartrate  25 mg Oral BID    mupirocin   Nasal BID    pantoprazole  40 mg Oral Daily     PRN Meds:0.9%  NaCl infusion (for blood administration), acetaminophen, melatonin, metoprolol, naloxone, senna-docusate 8.6-50 mg    Objective:     Vital Signs (Most Recent):  Temp: 97.7 °F (36.5 °C) (09/20/23 1242)  Pulse: 64 (09/20/23 1242)  Resp: 16 (09/20/23 1242)  BP: 138/63 (09/20/23 1242)  SpO2: 96 % (09/20/23 1242) Vital Signs (24h Range):  Temp:  [97.5 °F (36.4 °C)-98.2 °F (36.8 °C)] 97.7 °F (36.5 °C)  Pulse:  [] 64  Resp:  [15-20] 16  SpO2:  [92 %-97 %] 96 %  BP: (106-138)/(49-68) 138/63     Weight: 60.6 kg (133 lb 9.6 oz)  Body mass index is 22.93 kg/m².       Physical Exam  Constitutional:       Comments: Appears elderly, propped up in bed, smiled at times    Cardiovascular:      Rate and Rhythm: Normal rate.   Pulmonary:      Comments: Mildly increased work of breathing, though pt denied feeling short of breath   Neurological:      Mental Status: She is alert.      Comments: Able to answer very basic yes and no questions       Significant Labs: All pertinent labs within the past 24 hours have been reviewed.  CBC:   Recent Labs   Lab 09/20/23  1206   WBC 6.49   HGB 8.2*   HCT 26.0*   MCV 87        BMP:  Recent Labs   Lab 09/20/23  0410   GLU 84      K 3.6      CO2 23   BUN 32*   CREATININE 1.0   CALCIUM 8.2*   MG 1.7     LFT:  Lab Results   Component Value Date    AST 26 09/18/2023    ALKPHOS 83 09/18/2023    BILITOT 0.5 09/18/2023     Albumin:   Albumin   Date Value Ref Range Status   09/18/2023 2.7 (L) 3.5 - 5.2 g/dL Final     Protein:   Total Protein   Date Value Ref Range Status   09/18/2023 5.7 (L) 6.0 - 8.4 g/dL Final     Lactic acid:   Lab Results   Component Value  Date    LACTATE 1.2 07/08/2023    LACTATE 0.8 05/06/2023       Significant Imaging: I have reviewed all pertinent imaging results/findings within the past 24 hours.

## 2023-09-20 NOTE — ASSESSMENT & PLAN NOTE
- PT/OT consulted; will be returning to SNF after hospital discharge, though likely transitioning to [facility] placement at same facility after this  - At baseline, pt requires wheelchair  - Contributes to hospice qualification, should goals become comfort-focused

## 2023-09-20 NOTE — PROGRESS NOTES
[facility] Medicine  Progress Note    Patient Name: Radha Sandoval  MRN: [id number]  Patient Class: IP- Inpatient   Admission Date: 9/18/2023  Length of Stay: 1 days  Attending Physician: Alethea Juan MD  Primary Care Provider: Agatha Alvarez [facility]        Subjective:     Principal Problem:GINA (acute kidney injury)        HPI:  Ms. Radha Sandoval is a 88 y.o. female, with PMH of A. Fib, CHF, HTN, Alzheimer's dementia, h/o RLE DVT, chronic PE, chronic anticoagulation, chronic b/l pleural effusions, who presented to [facility] due to two witnessed episode of brown emesis. She notes only persistence of back/neck and RLE pain without any new/acute symptoms. She denied dizziness, light headedness. She was evaluated in the ED with labs showing H&H of 8.6/26.9, which is a drop from her baseline. While in the ED, she had two episodes of brown emesis which was guaiac positive. Metabolic panel also showed BUN 55 and Cr 2.2, which is elevated compared to her baseline renal function. She was treated in the ED with protonix. She was placed on observation.           Overview/Hospital Course:  Mrs Sandoval was admitted for GI bleed due to brown emesis and drop in H/H. She has an acute kidney injury and tested positive for covid, though she is asymptomatic. GI was consulted and feel that patient is not experiencing a GI bleed.  Gentle hydration in place due to elevated Bun and creatinine. Patient with poor oral intake and moderate to severe dementia. Palliative care consulted.       Interval History: Patient is more alert and responsive today. She has some fluid overload and elevated BNP. IV lasix given. She denies pain and responds appropriately to simple questions. She is disoriented to place and situation. Updated son Maykel on plan of care.     Review of Systems   Unable to perform ROS: Dementia     Objective:     Vital Signs (Most Recent):  Temp: 97.7 °F (36.5 °C) (09/20/23 1242)  Pulse:  76 (09/20/23 1557)  Resp: 16 (09/20/23 1242)  BP: 138/63 (09/20/23 1242)  SpO2: 96 % (09/20/23 1242) Vital Signs (24h Range):  Temp:  [97.5 °F (36.4 °C)-98.2 °F (36.8 °C)] 97.7 °F (36.5 °C)  Pulse:  [] 76  Resp:  [15-20] 16  SpO2:  [95 %-97 %] 96 %  BP: (106-138)/(49-68) 138/63     Weight: 60.6 kg (133 lb 9.6 oz)  Body mass index is 22.93 kg/m².    Intake/Output Summary (Last 24 hours) at 9/20/2023 1644  Last data filed at 9/20/2023 1230  Gross per 24 hour   Intake 1022.5 ml   Output 1380 ml   Net -357.5 ml         Physical Exam  Vitals reviewed.   Cardiovascular:      Rate and Rhythm: Rhythm irregular.   Pulmonary:      Breath sounds: Wheezing present.   Abdominal:      Palpations: Abdomen is soft.   Musculoskeletal:      Comments: Decreased ROM to BLLE   Skin:     General: Skin is warm.      Comments: Sacral wound   Neurological:      Mental Status: She is alert. She is disoriented.   Psychiatric:         Behavior: Behavior normal.             Significant Labs: All pertinent labs within the past 24 hours have been reviewed.  BMP:   Recent Labs   Lab 09/20/23  0410   GLU 84      K 3.6      CO2 23   BUN 32*   CREATININE 1.0   CALCIUM 8.2*   MG 1.7     CBC:   Recent Labs   Lab 09/19/23  0934 09/20/23  0410 09/20/23  1206   WBC 7.06 5.87 6.49   HGB 8.1* 7.4* 8.2*   HCT 26.2* 23.6* 26.0*    187 211     CMP:   Recent Labs   Lab 09/19/23  0934 09/20/23  0410    140   K 3.9 3.6    110   CO2 23 23   GLU 81 84   BUN 41* 32*   CREATININE 1.4 1.0   CALCIUM 8.9 8.2*   ANIONGAP 9 7*       Significant Imaging: I have reviewed all pertinent imaging results/findings within the past 24 hours.  X-Ray Chest PA And Lateral  Narrative: EXAMINATION:  XR CHEST PA AND LATERAL    CLINICAL HISTORY:  heart failure;    TECHNIQUE:  PA and lateral views of the chest were performed.    COMPARISON:  09/18/2023    FINDINGS:  Lungs are well inflated with stable elevation right hemidiaphragm.  No acute  consolidation, pleural effusion, or pneumothorax seen.    Cardiac silhouette is normal in size.  Mild perihilar pulmonary vascular congestion.  Calcified plaque lines arch.  Impression: Mild perihilar pulmonary vascular congestion without overt edema.    Electronically signed by: Camryn Garcia  Date:    09/20/2023  Time:    09:53          Assessment/Plan:      * GINA (acute kidney injury)  - baseline Cr is around 0.8   - Cr was 2.2 with significant elevation of BUN at 55   - hydrate with IVF: cr 1.4 and BUN 44  - urine lytes ordered   - avoid nephrotoxins   - renally dose meds   - Patient was volume overloaded with gentle hydration at 50 ml an hour. BNP 1600.  IV lasix given. CXR reports mild perihilar congestion. Will dose diuresis daily based on symptoms and creatinine. Creatinine improved to 1.0.    UGIB (upper gastrointestinal bleed)  - Ms. Radha Sandoval presents after having two episodes of brown emesis  - emesis in ED is guaiac positive   - H&H improved  - GINA with significantly elevated BUN as compared to one month ago   - GI bleed pathways initiated   - GI consulted: recommend empiric PPI, trend H/H, resume diet and eliquis as not appearing to be GI bleed.       Atrial fibrillation with RVR  - ED EKG shows sinus rhythm with PACs rate of 78  - home meds: furosemide 20 mg QD, metoprolol tartrate 25 mg BID  - [id number] Score: 3   - monitor on tele   - apixaban resumed  - cardiology consulted. Appreciate recommendations    Chronic pulmonary embolism without acute cor pulmonale  - currently anticoagulated with Eliquis 5 mg BID       Debility  Bed bound. Patient with healing right hip fracture from July 2023.         VTE Risk Mitigation (From admission, onward)         Ordered     apixaban tablet 5 mg  2 times daily         09/19/23 1031     Reason for No Pharmacological VTE Prophylaxis  Once        Question:  Reasons:  Answer:  Active Bleeding    09/18/23 0440     IP VTE HIGH RISK PATIENT  Once         09/18/23  0440                Discharge Planning   TOSHA:      Code Status: Full Code   Is the patient medically ready for discharge?:     Reason for patient still in hospital (select all that apply): Patient trending condition  Discharge Plan A: Skilled Nursing Facility                  Génesis Jolly DNP  Department of Hospital Medicine   [facility] ([address])

## 2023-09-20 NOTE — PROGRESS NOTES
[facility] ([place])  Palliative Medicine  Progress Note    Patient Name: Radha Sandoval  MRN: [id number]  Admission Date: 9/18/2023  Hospital Length of Stay: 1 days  Code Status: Full Code   Attending Provider: Alethea Juan MD  Consulting Provider: Libby Huerta MD  Primary Care Physician: Agatha Alvarez  Principal Problem:GINA (acute kidney injury)    Assessment/Plan:     Advance Care Planning       9/20/23  - Chart and interval history reviewed; discussed pt in person with primary team. Pt will be getting diuresis due to mild volume overload; had received fluids due to GINA (now resolved).   - Visited with pt at bedside; she was alert, propped up in bed, and smiled at times. She did appear mildly short of breath at times, though denied pain or shortness of breath. Let her know we would call her son after visit.   - Along with Génesis Jolly ([facility] medicine), called and spoke with her son Maykel. Medical update provided; discussed that pt will now need to be diuresed due to mild volume overload, which likely occurred from IV fluids given to treat her significant GINA. This is in indication that patient's overall health is very fragile, unfortunately.   - In discussing plan moving forward, he would like patient to return to SNF after discharge (hopefully in next day or so); he told us that pt has been participating with 1.5 hours of therapy per day, and can still play the piano - told him that is wonderful to hear!   - We further discussed plans for after SNF; he is planning on moving her to [facility] NH at same facility afterwards. Suggested having NH-based palliative follow up, which he is open to. Shared with him that this will be a once a month visit from NP to start; however, when he desires to start comfort-focused care for his mother, this same team can transition pt to hospice care. While current preferences are not aligned with hospice care, encouraged him to consider this if pt starts  having issues with eating and drinking (ie, dysphagia or decreased intake), is more frequently getting infections and/or hospitalized, or is spending more time sleeping.   - Let him know that our team will continue to check in on pt throughout this and any future hospital stays    See ACP notes on file for previous discussions with Dr Cervantes.     Neuro  Severe late onset Alzheimer's dementia without behavioral disturbance, psychotic disturbance, mood disturbance, or anxiety  - Noted history  - Follows with psych outpatient  - Illness trajectory education provided; contributes to current hospice qualification, should goals become comfort-focused      Cardiac/Vascular  Atrial fibrillation with RVR  - No longer in RVR  - Mgmt per cardiology and primary team     Renal/  GINA (acute kidney injury)  - Has improved with IV fluids, though pt now with mild volume overload  - Mgmt per primary team     ID  COVID  - Awaiting send out test     Hematology  Chronic pulmonary embolism without acute cor pulmonale  - Noted history; anticoagulation on hold due to anemia   - Mgmt per primary team     GI  UGIB (upper gastrointestinal bleed)  - GI consulted; pt poor candidate for EGD due to co-morbidities. Mgmt per primary team.     Orthopedic  Multiple closed right sided fractures of pelvis without disruption of pelvic ring  - Noted history; has been treated non-operatively   - Pt uses a wheelchair typically     Other  Debility  - PT/OT consulted; will be returning to SNF after hospital discharge, though likely transitioning to retirement placement at same facility after this  - At baseline, pt requires wheelchair  - Contributes to hospice qualification, should goals become comfort-focused     I will follow-up with patient. Please contact us if you have any additional questions.     ANDREA Zaman  Palliative Medicine Staff   [phone]    Total visit time: 51 minutes    > 50% of 35 min visit spent in chart review, face to face  discussion of symptom assessment, coordination of care with other specialists, and discharge planning.    16 min ACP time spent: goals of care, emotional support, formulating and communicating prognosis, exploring burden/ benefit of various approaches of treatment.     Subjective:     Interval History: Short of breath today; getting diuresis     Medications:  Continuous Infusions:  Scheduled Meds:   apixaban  5 mg Oral BID    EScitalopram oxalate  10 mg Oral Daily    memantine  5 mg Oral BID    metoprolol tartrate  25 mg Oral BID    mupirocin   Nasal BID    pantoprazole  40 mg Oral Daily     PRN Meds:0.9%  NaCl infusion (for blood administration), acetaminophen, melatonin, metoprolol, naloxone, senna-docusate 8.6-50 mg    Objective:     Vital Signs (Most Recent):  Temp: 97.7 °F (36.5 °C) (09/20/23 1242)  Pulse: 64 (09/20/23 1242)  Resp: 16 (09/20/23 1242)  BP: 138/63 (09/20/23 1242)  SpO2: 96 % (09/20/23 1242) Vital Signs (24h Range):  Temp:  [97.5 °F (36.4 °C)-98.2 °F (36.8 °C)] 97.7 °F (36.5 °C)  Pulse:  [] 64  Resp:  [15-20] 16  SpO2:  [92 %-97 %] 96 %  BP: (106-138)/(49-68) 138/63     Weight: 60.6 kg (133 lb 9.6 oz)  Body mass index is 22.93 kg/m².       Physical Exam  Constitutional:       Comments: Appears elderly, propped up in bed, smiled at times    Cardiovascular:      Rate and Rhythm: Normal rate.   Pulmonary:      Comments: Mildly increased work of breathing, though pt denied feeling short of breath   Neurological:      Mental Status: She is alert.      Comments: Able to answer very basic yes and no questions       Significant Labs: All pertinent labs within the past 24 hours have been reviewed.  CBC:   Recent Labs   Lab 09/20/23  1206   WBC 6.49   HGB 8.2*   HCT 26.0*   MCV 87        BMP:  Recent Labs   Lab 09/20/23  0410   GLU 84      K 3.6      CO2 23   BUN 32*   CREATININE 1.0   CALCIUM 8.2*   MG 1.7     LFT:  Lab Results   Component Value Date    AST 26 09/18/2023    ALKPHOS  83 09/18/2023    BILITOT 0.5 09/18/2023     Albumin:   Albumin   Date Value Ref Range Status   09/18/2023 2.7 (L) 3.5 - 5.2 g/dL Final     Protein:   Total Protein   Date Value Ref Range Status   09/18/2023 5.7 (L) 6.0 - 8.4 g/dL Final     Lactic acid:   Lab Results   Component Value Date    LACTATE 1.2 07/08/2023    LACTATE 0.8 05/06/2023       Significant Imaging: I have reviewed all pertinent imaging results/findings within the past 24 hours.

## 2023-09-20 NOTE — PT/OT/SLP EVAL
Speech Language Pathology Evaluation  Bedside Swallow    Patient Name:  Radha Sandoval   MRN:  [id number]  Admitting Diagnosis: GINA (acute kidney injury)    Recommendations:                 General Recommendations:  SLP    Diet recommendations: Solids: Puree, Liquids: Thin     Aspiration Precautions: 1 bite/sip at a time, Assistance with meals, Check for pocketing/oral residue, Feed only when awake/alert, Meds crushed in puree, and Small bites/sips     General Precautions: Standard, aspiration    Communication strategies:  speak loudly, ask simple questions    Assessment:     Radha Sandoval is a 88 y.o. female with an SLP diagnosis of oral dysphagia likely due to lack of dentition and cognitive communication status.       History:     HPI:  Ms. Radha Sandoval is a 88 y.o. female, with PMH of A. Fib, CHF, HTN, Alzheimer's dementia, h/o RLE DVT, chronic PE, chronic anticoagulation, chronic b/l pleural effusions, who presented to [facility] due to two witnessed episode of brown emesis. She notes only persistence of back/neck and RLE pain without any new/acute symptoms. She denied dizziness, light headedness. She was evaluated in the ED with labs showing H&H of 8.6/26.9, which is a drop from her baseline. While in the ED, she had two episodes of brown emesis which was guaiac positive. Metabolic panel also showed BUN 55 and Cr 2.2, which is elevated compared to her baseline renal function. She was treated in the ED with protonix. She was placed on observation.     Overview/Hospital Course:  Mrs Sandoval was admitted for GI bleed due to brown emesis and drop in H/H. She has an acute kidney injury and tested positive for covid, though she is asymptomatic. GI was consulted and feel that patient is not experiencing a GI bleed.  Gentle hydration in place due to elevated Bun and creatinine. Patient with poor oral intake and moderate to severe dementia. Palliative care consulted.     Interval History: Mrs Sandoval is  sleeping in bed and requires repetive tactile stimulation to arouse. She is oriented to self and place. She does have pain to lower extremities with movement. Called Marybeth Alvarez to get information on diet and medications.  Spoke with son Maykel and updated plan of care.     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 5/22/2023    Lumbar spondylosis with myelopathy 4/25/2023    Multiple closed right sided fractures of pelvis without disruption of pelvic ring 7/9/2023    Primary hypertension 6/10/2022    Subdural hygroma 7/8/2023       Past Surgical History:   Procedure Laterality Date    REPAIR, HERNIA, INGUINAL, WITHOUT HISTORY OF PRIOR REPAIR, AGE 5 YEARS OR OLDER Right 5/6/2023    Procedure: REPAIR, HERNIA, INGUINAL, WITHOUT HISTORY OF PRIOR REPAIR, AGE 5 YEARS OR OLDER;  Surgeon: Maurice Piper MD;  Location: [facility] OR [address] [place] [address];  Service: General;  Laterality: Right;  possible laparotomy, possible bowel resection       Chest X-Rays 9/20/22: Mild perihilar pulmonary vascular congestion without overt edema    Prior diet: mechanical soft solids, thin liquids     Subjective     Pt awake, alert. Noted difficulty hearing SLP with mask.     Pain/Comfort:   No pain reported    Respiratory Status: Room air    Objective:     Cognitive Communication Status: Pt awake, alert. Confusional state noted due to dementia. Pt oriented to name and year. Not oriented to birth date, month, or current date. Difficulty with further assessment due to confusional state and pt unable to fully hear SLP due to mask.     Oral Musculature Evaluation  Oral Musculature: unable to assess due to poor  "participation/comprehension  Structural Abnormalities: pt missing top row of teeth  Dentition: other (see comments) (pt edentulous on top row of mouth, bottom teeth present, however per pt and pt's son pt uses dentures to eat)  Secretion Management: adequate  Mucosal Quality: good  Voice Prior to PO Intake: Clear, normal volume  Oral Musculature Comments: Face is symmetrical at rest and during smile. Unable to formally assess oral musculature, however appears to  be WFL for speech and oral intake.    Bedside Swallow Eval:   Consistencies Assessed:  Thin liquids single sips via cup   Puree 1tsp bites pudding   Solids single bites portia cracker      Oral Phase:   Lip seal WNL for liquids and purees   Open mouth mastication of solids, pt stating she has to "chew and spit" per doctors orders. Cognitive status impacting evaluation of solids.    Pharyngeal Phase:   No overt s/s of airway threat or aspiration during intake of liquids or purees: no coughing, throat clearing, change in vocal quality  No s/s of airway threat with intake of solids, however pt did not appear to swallow. Majority of solid bolus expectorated       Treatment: Pt dysphagia appears to be associated with cognitive communication status and lack of dentition. Per pt's son, pt does not eat well when she takes her dentures out and has been taking them out and forgetting where she put them more often in recent past. RN reporting pt expectorating food and choking on some solid pieces. No coughing or choking observed this date, however recommending downgrade to purees to assess intake while dentures are being located. Will continue to assess.     Goals:   Multidisciplinary Problems       SLP Goals          Problem: SLP    Goal Priority Disciplines Outcome   SLP Goal     SLP Ongoing, Progressing   Description: 1. Pt will be able to consume mechanical soft solids and thin liquids without overt s/s of airway threat or aspiration given moderate cues to " efficiently masticate and perform a-p transport.                        Plan:     Patient to be seen:  3 x/week, 5 x/week   Plan of Care expires:  09/27/23  Plan of Care reviewed with:  patient, son   SLP Follow-Up:  Yes       Discharge recommendations:          Time Tracking:     SLP Treatment Date:   09/20/23  Speech Start Time:  1520  Speech Stop Time:  1542     Speech Total Time (min):  22 min    Billable Minutes: Eval Swallow and Oral Function 22 mins    09/20/2023

## 2023-09-20 NOTE — SUBJECTIVE & OBJECTIVE
Interval History: Patient is more alert and responsive today. She has some fluid overload and elevated BNP. IV lasix given. She denies pain and responds appropriately to simple questions. She is disoriented to place and situation. Updated son Maykel on plan of care.     Review of Systems   Unable to perform ROS: Dementia     Objective:     Vital Signs (Most Recent):  Temp: 97.7 °F (36.5 °C) (09/20/23 1242)  Pulse: 76 (09/20/23 1557)  Resp: 16 (09/20/23 1242)  BP: 138/63 (09/20/23 1242)  SpO2: 96 % (09/20/23 1242) Vital Signs (24h Range):  Temp:  [97.5 °F (36.4 °C)-98.2 °F (36.8 °C)] 97.7 °F (36.5 °C)  Pulse:  [] 76  Resp:  [15-20] 16  SpO2:  [95 %-97 %] 96 %  BP: (106-138)/(49-68) 138/63     Weight: 60.6 kg (133 lb 9.6 oz)  Body mass index is 22.93 kg/m².    Intake/Output Summary (Last 24 hours) at 9/20/2023 1644  Last data filed at 9/20/2023 1230  Gross per 24 hour   Intake 1022.5 ml   Output 1380 ml   Net -357.5 ml         Physical Exam  Vitals reviewed.   Cardiovascular:      Rate and Rhythm: Rhythm irregular.   Pulmonary:      Breath sounds: Wheezing present.   Abdominal:      Palpations: Abdomen is soft.   Musculoskeletal:      Comments: Decreased ROM to BLLE   Skin:     General: Skin is warm.      Comments: Sacral wound   Neurological:      Mental Status: She is alert. She is disoriented.   Psychiatric:         Behavior: Behavior normal.             Significant Labs: All pertinent labs within the past 24 hours have been reviewed.  BMP:   Recent Labs   Lab 09/20/23  0410   GLU 84      K 3.6      CO2 23   BUN 32*   CREATININE 1.0   CALCIUM 8.2*   MG 1.7     CBC:   Recent Labs   Lab 09/19/23  0934 09/20/23  0410 09/20/23  1206   WBC 7.06 5.87 6.49   HGB 8.1* 7.4* 8.2*   HCT 26.2* 23.6* 26.0*    187 211     CMP:   Recent Labs   Lab 09/19/23  0934 09/20/23  0410    140   K 3.9 3.6    110   CO2 23 23   GLU 81 84   BUN 41* 32*   CREATININE 1.4 1.0   CALCIUM 8.9 8.2*   ANIONGAP 9 7*        Significant Imaging: I have reviewed all pertinent imaging results/findings within the past 24 hours.  X-Ray Chest PA And Lateral  Narrative: EXAMINATION:  XR CHEST PA AND LATERAL    CLINICAL HISTORY:  heart failure;    TECHNIQUE:  PA and lateral views of the chest were performed.    COMPARISON:  09/18/2023    FINDINGS:  Lungs are well inflated with stable elevation right hemidiaphragm.  No acute consolidation, pleural effusion, or pneumothorax seen.    Cardiac silhouette is normal in size.  Mild perihilar pulmonary vascular congestion.  Calcified plaque lines arch.  Impression: Mild perihilar pulmonary vascular congestion without overt edema.    Electronically signed by: Camryn Garcia  Date:    09/20/2023  Time:    09:53

## 2023-09-20 NOTE — PLAN OF CARE
Pt remained free of falls and injuries throughout shift.Pt oriented to self, reoriented as needed. Pt calm and cooperative. Purposeful hourly rounding performed. Pt swallows meds crushed in pudding. NS infusing @ 50 mL/hr. Managed c/o BLE pain with PRN Tylenol. Patient bedfast, turned q2h. VSS on room air. Telemetry maintained, Afib, NP aware. Pt resting comfortably in bed, denies pain, denies needs at this time. Bed low and locked, bed alarm on, call light in reach. Side rails up x2. Will continue to monitor.

## 2023-09-20 NOTE — ASSESSMENT & PLAN NOTE
- ED EKG shows sinus rhythm with PACs rate of 78  - home meds: furosemide 20 mg QD, metoprolol tartrate 25 mg BID  - [id number] Score: 3   - monitor on tele   - apixaban resumed  - cardiology consulted. Appreciate recommendations

## 2023-09-20 NOTE — ASSESSMENT & PLAN NOTE
- Noted history  - Follows with psych outpatient  - Illness trajectory education provided; contributes to hospice qualification, should goals become comfort-focused in future

## 2023-09-20 NOTE — NURSING
EKG results show Afib with ST and T wave abnormality, . Results given to Génesis Jolyl NP, says she will be seeing the pt shortly. Continuing to monitor.

## 2023-09-21 NOTE — NURSING
Upon assessment- pt awake and lying comfortably in bed.  Pt removed PIV and blood noted to linens.  After 3 RN's attempted to place IV with no success- notified house supervisor Casey. Per house supervisor- notify Maryann in AM to place access. Notified ADEEL Sharif. Mikayla Johnson

## 2023-09-21 NOTE — DISCHARGE SUMMARY
[facility] Medicine  Discharge Summary      Patient Name: Radha Sandoval  MRN: [id number]  [facility]: [id number]  Patient Class: IP- Inpatient  Admission Date: 9/18/2023  Hospital Length of Stay: 2 days  Discharge Date and Time:  09/21/2023 10:41 AM  Attending Physician: Alethea Juan MD   Discharging Provider: Génesis Jolly DNP  Primary Care Provider: [facility]    Primary Care Team: Networked reference to record PCT     HPI:   Ms. Radha Sandoval is a 88 y.o. female, with PMH of A. Fib, CHF, HTN, Alzheimer's dementia, h/o RLE DVT, chronic PE, chronic anticoagulation, chronic b/l pleural effusions, who presented to [facility] due to two witnessed episode of brown emesis. She notes only persistence of back/neck and RLE pain without any new/acute symptoms. She denied dizziness, light headedness. She was evaluated in the ED with labs showing H&H of 8.6/26.9, which is a drop from her baseline. While in the ED, she had two episodes of brown emesis which was guaiac positive. Metabolic panel also showed BUN 55 and Cr 2.2, which is elevated compared to her baseline renal function. She was treated in the ED with protonix. She was placed on observation.           * No surgery found *      Hospital Course:   Mrs Sandoval was admitted for GI bleed due to brown emesis and drop in H/H. She has an acute kidney injury and tested positive for covid, though she is asymptomatic. GI was consulted and feel that patient is not experiencing a GI bleed.  Gentle hydration in place due to elevated Bun and creatinine. Patient with poor oral intake and moderate to severe dementia. Palliative care consulted. Hemoglobin and hematocrit stabilized. Patient required IV diuresis for fluid overload. Her neurological status improved.Patient did not exhibit any signs of gastrointestinal bleeding. She is at baseline. Mrs Garcia to return to [facility] today. Son, Maykel, was called multiple times by  myself and SW to update. He did not answer phone and his VM was full.        Goals of Care Treatment Preferences:  Code Status: Full Code    Health care agent: Maykel Sandoval  [facility] care agent number: [phone]       LaPOST: Yes  What is most important right now is to focus on quality of life, even if it means sacrificing a little time, improvement in condition but with limits to invasive therapies.  Accordingly, we have decided that the best plan to meet the patient's goals includes continuing with treatment.      Consults:   Consults (From admission, onward)        Status Ordering Provider     Inpatient consult to Cardiology  Once        Provider:  Moose Jimenez MD    Completed EDY WYNNE     Inpatient consult to Palliative Care  Once        Provider:  Damon Cervantes MD    Completed DOYLE MIRELES     Inpatient consult to Gastroenterology  Once        Provider:  (Not yet assigned)    Completed EDY GUTIERREZ          Renal/  * GINA (acute kidney injury)  RESOLVED  - baseline Cr is around 0.8   - Cr was 2.2 with significant elevation of BUN at 55   - hydrate with IVF: cr 1.4 and BUN 44  - urine lytes ordered   - avoid nephrotoxins   - renally dose meds   - Patient was volume overloaded with gentle hydration at 50 ml an hour. BNP 1600.  IV lasix given. CXR reports mild perihilar congestion. Will dose diuresis daily based on symptoms and creatinine. Creatinine improved to 1.0.    GI  UGIB (upper gastrointestinal bleed)  - Ms. Radha Sandoval presents after having two episodes of brown emesis  - emesis in ED is guaiac positive   - H&H improved  - GINA with significantly elevated BUN as compared to one month ago   - GI bleed pathways initiated   - GI consulted: recommend empiric PPI, trend H/H, resume diet and eliquis as not appearing to be GI bleed.         Final Active Diagnoses:    Diagnosis Date Noted POA    PRINCIPAL PROBLEM:  GINA (acute kidney injury) [N17.9] 04/18/2023 Yes    UGIB (upper  gastrointestinal bleed) [K92.2] 09/18/2023 Yes    COVID [U07.1] 09/18/2023 Yes    Atrial fibrillation with RVR [I48.91] 08/01/2023 Yes    Multiple closed right sided fractures of pelvis without disruption of pelvic ring [S32.82XA] 07/09/2023 Yes    Goals of care, counseling/discussion [Z71.89] 07/09/2023 Not Applicable    Chronic pulmonary embolism without acute cor pulmonale [I27.82] 05/22/2023 Yes    Severe late onset Alzheimer's dementia without behavioral disturbance, psychotic disturbance, mood disturbance, or anxiety [G30.1, F02.C0] 05/22/2023 Yes     Chronic    Debility [R53.81] 04/25/2023 Yes      Problems Resolved During this Admission:       Discharged Condition: baseline neuro status, labs stable     Disposition: Home or Self Care    Follow Up:   Follow-up Information     [place] [place] [facility]    Specialty: Hospice and Palliative Medicine  Contact information:  [address] [id number] [phone]                       Patient Instructions:      Ambulatory referral/consult to HOME Palliative Care   Standing Status: Future   Referral Priority: Routine Referral Type: Consultation   Referred to Provider: [facility] Requested Specialty: Hospice and Palliative Medicine   Number of Visits Requested: 1       Significant Diagnostic Studies: N/A    Pending Diagnostic Studies:     None         Medications:  Reconciled Home Medications:      Medication List      CHANGE how you take these medications    furosemide 20 MG tablet  Commonly known as: LASIX  Take 1 tablet (20 mg total) by mouth once daily.  What changed: when to take this        CONTINUE taking these medications    acetaminophen 325 MG tablet  Commonly known as: TYLENOL  Take 2 tablets (650 mg total) by mouth every 6 (six) hours as needed (Pain).     albuterol 90 mcg/actuation inhaler  Commonly known as: PROVENTIL HFA  Inhale 2 puffs into the lungs  every 4 (four) hours as needed for Wheezing. Use spacer with adult mask     apixaban 5 mg Tab  Commonly known as: ELIQUIS  Take 1 tablet (5 mg total) by mouth 2 (two) times daily. Starting 8/5     cholecalciferol (vitamin D3) 50 mcg (2,000 unit) Tab  Commonly known as: VITAMIN D3  Take 1 tablet (2,000 Units total) by mouth once daily.     EScitalopram oxalate 5 MG Tab  Commonly known as: LEXAPRO  Take 2 tablets (10 mg total) by mouth once daily.     lisinopriL 40 MG tablet  Commonly known as: PRINIVIL,ZESTRIL  Take 1 tablet (40 mg total) by mouth once daily.     loperamide 2 mg capsule  Commonly known as: IMODIUM  Take 1 capsule (2 mg total) by mouth 4 (four) times daily as needed for Diarrhea.     melatonin 3 mg tablet  Commonly known as: MELATIN  Take 2 tablets (6 mg total) by mouth nightly as needed for Insomnia.     memantine 5 MG Tab  Commonly known as: NAMENDA  Take 1 tablet (5 mg total) by mouth 2 (two) times daily.     metoprolol tartrate 25 MG tablet  Commonly known as: LOPRESSOR  Take 1 tablet (25 mg total) by mouth 2 (two) times daily.     miconazole NITRATE 2 % 2 % top powder  Commonly known as: MICOTIN  Apply topically 2 (two) times daily. Underside of bilateral breasts     polyethylene glycol 17 gram Pwpk  Commonly known as: GLYCOLAX  Take 17 g by mouth 2 (two) times daily as needed.     potassium chloride 10 MEQ Tbsr  Commonly known as: KLOR-CON  Take 1 tablet (10 mEq total) by mouth once daily.            Indwelling Lines/Drains at time of discharge:   Lines/Drains/Airways     None                 Time spent on the discharge of patient: 45 minutes         Génesis Jolly DNP  Department of Hospital Medicine  [facility] ([place])

## 2023-09-21 NOTE — PLAN OF CARE
Sw spoke with [facility] who stated they could take patient back with a positive COVID test since she was already a resident. Call [phone] to report.

## 2023-09-21 NOTE — ASSESSMENT & PLAN NOTE
RESOLVED  - baseline Cr is around 0.8   - Cr was 2.2 with significant elevation of BUN at 55   - hydrate with IVF: cr 1.4 and BUN 44  - urine lytes ordered   - avoid nephrotoxins   - renally dose meds   - Patient was volume overloaded with gentle hydration at 50 ml an hour. BNP 1600.  IV lasix given. CXR reports mild perihilar congestion. Will dose diuresis daily based on symptoms and creatinine. Creatinine improved to 1.0.

## 2023-09-21 NOTE — NURSING
"RN attempted twice to call report for patient to [facility]. The first time Vinicius in admission told me he did not receive any information on her and he did not receive orders. I informed him patient has left the building and is on her way to them. I put them on a brief hold and they hung up the phone. Spoke to CN and SW. Attempted to call back again and a woman answered the phone. I explained to her I needed to call report and she put me on hold. RN then heard a message that repeated "please enter your password". RN informed SW and CN that getting through to give report was denied twice now.   "

## 2023-09-21 NOTE — PLAN OF CARE
NURSING HOME ORDERS    09/21/2023  [facility] LOCATION ([facility])  [facility] ([facility])  [address]  [facility] [address]  Dept: [phone]  Loc: [phone]     Admit to Nursing Home:      Diagnoses:  Active Hospital Problems    Diagnosis  POA    *GINA (acute kidney injury) [N17.9]  Yes    UGIB (upper gastrointestinal bleed) [K92.2]  Yes    COVID [U07.1]  Yes    Atrial fibrillation with RVR [I48.91]  Yes    Multiple closed right sided fractures of pelvis without disruption of pelvic ring [S32.82XA]  Yes    Goals of care, counseling/discussion [Z71.89]  Not Applicable    Chronic pulmonary embolism without acute cor pulmonale [I27.82]  Yes    Severe late onset Alzheimer's dementia without behavioral disturbance, psychotic disturbance, mood disturbance, or anxiety [G30.1, F02.C0]  Yes     Chronic    Debility [R53.81]  Yes      Resolved Hospital Problems   No resolved problems to display.       Patient is homebound due to:  GINA (acute kidney injury)    Allergies:Review of patient's allergies indicates:  No Known Allergies    Vitals:  Routine    Diet: regular diet and mechanical soft    Activities:   Activity as tolerated    Goals of Care Treatment Preferences:  Code Status: Full Code    Health care agent: Maykel Lori  [facility] agent number: [phone]       LaPOST: Yes  What is most important right now is to focus on quality of life, even if it means sacrificing a little time, improvement in condition but with limits to invasive therapies.  Accordingly, we have decided that the best plan to meet the patient's goals includes continuing with treatment.      Labs:  routine    Nursing Precautions:  Fall and Pressure ulcer prevention    Consults:   PT to evaluate and treat- 3 times a week, OT to evaluate and treat- 3 times a week, and Wound Care     Miscellaneous Care: Routine Skin for Bedridden Patients:  Apply moisture barrier cream to all  Wound Care: yes:  Pressure Ulcer(s) Stage III:  Location:  sacrum    Consult ET nurse        Apply the following to wound:   cleanse with cleansing cloths or wound cleanser, then apply a thin layer of Triad ointment to affected areas. Cover with foam dressing. Change every 3 days or prn if soiled                 Medications: Discontinue all previous medication orders, if any. See new list below.    **Monitor weight. Patient will require decreased dose of eliquis when she weighs less than 60 kg. Please monitor.**         Medication List        CHANGE how you take these medications      furosemide 20 MG tablet  Commonly known as: LASIX  Take 1 tablet (20 mg total) by mouth once daily.  What changed: when to take this            CONTINUE taking these medications      acetaminophen 325 MG tablet  Commonly known as: TYLENOL  Take 2 tablets (650 mg total) by mouth every 6 (six) hours as needed (Pain).     albuterol 90 mcg/actuation inhaler  Commonly known as: PROVENTIL HFA  Inhale 2 puffs into the lungs every 4 (four) hours as needed for Wheezing. Use spacer with adult mask     apixaban 5 mg Tab  Commonly known as: ELIQUIS  Take 1 tablet (5 mg total) by mouth 2 (two) times daily. Starting 8/5     cholecalciferol (vitamin D3) 50 mcg (2,000 unit) Tab  Commonly known as: VITAMIN D3  Take 1 tablet (2,000 Units total) by mouth once daily.     EScitalopram oxalate 5 MG Tab  Commonly known as: LEXAPRO  Take 2 tablets (10 mg total) by mouth once daily.     lisinopriL 40 MG tablet  Commonly known as: PRINIVIL,ZESTRIL  Take 1 tablet (40 mg total) by mouth once daily.     loperamide 2 mg capsule  Commonly known as: IMODIUM  Take 1 capsule (2 mg total) by mouth 4 (four) times daily as needed for Diarrhea.     melatonin 3 mg tablet  Commonly known as: MELATIN  Take 2 tablets (6 mg total) by mouth nightly as needed for Insomnia.     memantine 5 MG Tab  Commonly known as: NAMENDA  Take 1 tablet (5 mg total) by mouth 2 (two) times daily.     metoprolol tartrate 25 MG tablet  Commonly known as:  LOPRESSOR  Take 1 tablet (25 mg total) by mouth 2 (two) times daily.     miconazole NITRATE 2 % 2 % top powder  Commonly known as: MICOTIN  Apply topically 2 (two) times daily. Underside of bilateral breasts     polyethylene glycol 17 gram Pwpk  Commonly known as: GLYCOLAX  Take 17 g by mouth 2 (two) times daily as needed.     potassium chloride 10 MEQ Tbsr  Commonly known as: KLOR-CON  Take 1 tablet (10 mEq total) by mouth once daily.                Immunizations Administered as of 9/21/2023       Name Date Dose VIS Date Route Exp Date    COVID-19, MRNA, LN-S PF (Pfizer) (Purple Cap) 3/10/2021 -- -- Intramuscular --    Site: Left deltoid     : [employer]     Lot: [id number]     External: Auto Reconciled From Outside Source     Comment: Adminis     COVID-19, MRNA, LN-S PF (Pfizer) (Purple Cap) 2/17/2021 -- -- Intramuscular --    Site: Left deltoid     : Pf[employer] Inc     Lot: [id number]     External: Auto Reconciled From Outside Source     Comment: Adminis                   _________________________________  Génesis Jolly DNP  09/21/2023

## 2023-09-21 NOTE — PHYSICIAN QUERY
PT Name: Radha Sandoval  MR #: [id number]     DOCUMENTATION CLARIFICATION     CDS/: Sangita Gonzalez RN             Contact information: [email or web address]  This form is a permanent document in the medical record.     Query Date: September 21, 2023    By submitting this query, we are merely seeking further clarification of documentation.  Please utilize your independent clinical judgment when addressing the question(s) below.    The Medical Record contains the following   Indicators Supporting Clinical Findings Location in Medical Record   x Heart Failure documented PMH:  CHF 9/21 DC summary     x BNP BNP= 1626 9/20 Lab     x EF/Echo The estimated ejection fraction is 65%.  Grade I left ventricular diastolic dysfunction. 7/28/23 ECHO       x Radiology findings Mild perihilar pulmonary vascular congestion without overt edema.    Lungs demonstrate mild coarsened interstitial markings without evidence for consolidation or pleural effusion.    9/20 CXR    9/18 CXR    Subjective/Objective Respiratory Conditions      Recent/Current MI      Heart Transplant, LVAD     x Edema, JVD      Ascites     x Diuretics/Meds Lasix 20mg IVP   Metoprolol 25mg PO 9/18-21 9/21 DC summary    9/20 MAR  9/18-20 MAR       x Other Treatment Patient required IV diuresis for fluid overload    BNP  CXR  Cardiac monitor  Pulse oxmetry 9/18 Orders          Other       Heart failure is a clinical diagnosis which includes symptomatic fluid retention, elevated intracardiac pressures, and/or the inability of the heart to deliver adequate blood flow.    Heart Failure with reduced Ejection Fraction (HFrEF) or Systolic Heart Failure (loses ability to contract normally, EF is <40%)    Heart Failure with preserved Ejection Fraction (HFpEF) or Diastolic Heart Failure (stiff ventricles, does not relax properly, EF is >50%)     Heart Failure with Combined Systolic and Diastolic Failure (stiff ventricles, does not relax properly and EF is  <50%)    Mid-range or mildly reduced ejection fraction (HFmrEF) is classified as systolic heart failure.  Congestive heart failure with a recovered EF is classified as Diastolic Heart Failure.  Common clues to acute exacerbation:  Rapidly progressive symptoms (w/in 2 weeks of presentation), using IV diuretics, using supplemental O2, pulmonary edema on Xray, new or worsening pleural effusion, +JVD or other signs of volume overload, MI w/in 4 weeks, and/or BNP >500  The clinical guidelines noted are only system guidelines, and do not replace the providers clinical judgment.    Provider, please specify the diagnosis associated with the above clinical findings.    [  X ]  Acute on Chronic Diastolic Heart Failure (HFpEF) - worsening of CHF signs/symptoms in preexisting CHF  POA status:  (   ) yes   ( X  ) no   (   ) unable to determine     [   ]  Chronic Diastolic Heart Failure  and Acute Pulmonary edema (noncardiac related)  POA status:  (   ) yes   (   ) no   (   ) unable to determine     [   ]  Other (please specify): ___________________________________     [  ]  Clinically Undetermined           Please document in your progress notes daily for the duration of treatment until resolved and include in your discharge summary.    References:  American Heart Association editorial staff. (2017, May). Ejection Fraction Heart Failure Measurement. American Heart Association. https://www.heart.org/en/health-topics/heart-failure/diagnosing-heart-failure/ejection-fraction-heart-failure-measurement#:~:text=Ejection%20fraction%20(EF)%20is%20a,pushed%20out%20with%20each%20heartbeat  CASSIE Strong (2020, December 15). Heart failure with preserved ejection fraction: Clinical manifestations and diagnosis. [employer]. https://www.Veacon.com/contents/heart-failure-with-preserved-ejection-fraction-clinical-manifestations-and-diagnosis.  ICD-10-CM/PCS Coding Clinic Third Quarter ICD-10, Effective with discharges: September 8, 2020  [facility] § Heart failure with mid-range or mildly reduced ejection fraction (2020).  ICD-10-CM/PCS Coding Clinic Third Quarter ICD-10, Effective with discharges: September 8, 2020 [facility] § Heart failure with recovered ejection fraction (2020).  Form No. 92365

## 2023-09-21 NOTE — PLAN OF CARE
Patient will be discharging back to [facility] who stated they can accept the patient today. James scheduled transportation for 11. James attempted to contact patient's son, Maykel, but was unable to reach him. No other CM order/needs to be addressed at this time.     [facility]  Discharge Final Note    Primary Care Provider: Agatha Alvarez    Expected Discharge Date:     Final Discharge Note (most recent)       Final Note - 09/21/23 0933          Final Note    Assessment Type Final Discharge Note     Anticipated Discharge Disposition Skilled Nursing Facility (P)      Hospital Resources/Appts/Education Provided Provided patient/caregiver with written discharge plan information;Appointments scheduled and added to AVS        Post-Acute Status    Discharge Delays None known at this time                     Important Message from Medicare             Contact Info       [facility]   Specialty: Hospice and Palliative Medicine    [address] [address]   Phone: [phone]       Next Steps: Follow up

## 2023-09-21 NOTE — PROGRESS NOTES
[facility]    Admission date:  9/18/2023     Assessment    Paroxysmal atrial fibrillation   No further episodes recorded on telemetry    Plan and Discussion    No further recommendations.  Will cease active follow-up but remain available for any cardiac issues which may arise.    Subjective    No problems reported    Medications  Current Facility-Administered Medications   Medication Dose Route Frequency Provider Last Rate Last Admin    0.9%  NaCl infusion (for blood administration)   Intravenous Q24H PRN Estrella Beach MD        acetaminophen oral solution 499.5074 mg  499.5074 mg Oral Once Génesis Jolly DNP        acetaminophen tablet 650 mg  650 mg Oral Q6H PRN Génesis Perla PA-C   650 mg at 09/21/23 0914    apixaban tablet 5 mg  5 mg Oral BID Génesis Jolly DNP   5 mg at 09/21/23 0914    EScitalopram oxalate tablet 10 mg  10 mg Oral Daily Gertrude Trevizo PA-C   10 mg at 09/21/23 0914    furosemide tablet 20 mg  20 mg Oral Daily Génesis Jolly DNP   20 mg at 09/21/23 0934    melatonin tablet 6 mg  6 mg Oral Nightly PRN Génesis Perla PA-C   6 mg at 09/19/23 2130    memantine tablet 5 mg  5 mg Oral BID Gertrude Trevizo PA-C   5 mg at 09/21/23 0934    metoprolol injection 5 mg  5 mg Intravenous Q5 Min PRN Gertrude Trevizo PA-C        metoprolol tartrate (LOPRESSOR) tablet 25 mg  25 mg Oral BID Gertrude Trevizo PA-C   25 mg at 09/21/23 0914    mupirocin 2 % ointment   Nasal BID Alethea Juan MD   Given at 09/20/23 2029    naloxone 0.4 mg/mL injection 0.02 mg  0.02 mg Intravenous PRN Génesis Perla PA-C        pantoprazole EC tablet 40 mg  40 mg Oral Daily Génesis Jolly DNP   40 mg at 09/21/23 0914    potassium chloride 10 mEq in 100 mL IVPB  10 mEq Intravenous Once Génesis Jolly DNP        potassium chloride SA CR tablet 20 mEq  20 mEq Oral TID Génesis Jolly DNP   20 mEq at 09/21/23 0914    senna-docusate 8.6-50 mg per  tablet 1 tablet  1 tablet Oral BID Génesis Lynch PA-C            Physical Exam    Temp:  [97.4 °F (36.3 °C)-98.6 °F (37 °C)]   Pulse:  [60-87]   Resp:  [16-20]   BP: (136-176)/(57-96)   SpO2:  [93 %-96 %]    Wt Readings from Last 3 Encounters:   09/18/23 60.6 kg (133 lb 9.6 oz)   08/09/23 75.8 kg (167 lb)   08/03/23 75.8 kg (167 lb)     Physical Exam    Telemetry  Sinus rhythm    Labs  Recent Results (from the past 24 hour(s))   Troponin I    Collection Time: 09/20/23 10:45 AM   Result Value Ref Range    Troponin I 0.182 (H) 0.000 - 0.026 ng/mL   CBC Without Differential    Collection Time: 09/20/23 12:06 PM   Result Value Ref Range    WBC 6.49 3.90 - 12.70 K/uL    RBC 2.98 (L) 4.00 - 5.40 M/uL    Hemoglobin 8.2 (L) 12.0 - 16.0 g/dL    Hematocrit 26.0 (L) 37.0 - 48.5 %    MCV 87 82 - 98 fL    MCH 27.5 27.0 - 31.0 pg    MCHC 31.5 (L) 32.0 - 36.0 g/dL    RDW 16.9 (H) 11.5 - 14.5 %    Platelets 211 150 - 450 K/uL    MPV 12.1 9.2 - 12.9 fL   Troponin I    Collection Time: 09/20/23  2:42 PM   Result Value Ref Range    Troponin I 0.236 (H) 0.000 - 0.026 ng/mL   Troponin I    Collection Time: 09/20/23  8:17 PM   Result Value Ref Range    Troponin I 0.242 (H) 0.000 - 0.026 ng/mL   POCT glucose    Collection Time: 09/20/23  8:28 PM   Result Value Ref Range    POCT Glucose 113 (H) 70 - 110 mg/dL   CBC auto differential    Collection Time: 09/21/23  4:33 AM   Result Value Ref Range    WBC 6.18 3.90 - 12.70 K/uL    RBC 2.91 (L) 4.00 - 5.40 M/uL    Hemoglobin 8.0 (L) 12.0 - 16.0 g/dL    Hematocrit 24.9 (L) 37.0 - 48.5 %    MCV 86 82 - 98 fL    MCH 27.5 27.0 - 31.0 pg    MCHC 32.1 32.0 - 36.0 g/dL    RDW 17.2 (H) 11.5 - 14.5 %    Platelets 219 150 - 450 K/uL    MPV 11.9 9.2 - 12.9 fL    Immature Granulocytes 0.3 0.0 - 0.5 %    Gran # (ANC) 4.4 1.8 - 7.7 K/uL    Immature Grans (Abs) 0.02 0.00 - 0.04 K/uL    Lymph # 1.1 1.0 - 4.8 K/uL    Mono # 0.5 0.3 - 1.0 K/uL    Eos # 0.1 0.0 - 0.5 K/uL    Baso # 0.03 0.00 - 0.20  K/uL    nRBC 0 0 /100 WBC    Gran % 70.4 38.0 - 73.0 %    Lymph % 17.8 (L) 18.0 - 48.0 %    Mono % 8.7 4.0 - 15.0 %    Eosinophil % 2.3 0.0 - 8.0 %    Basophil % 0.5 0.0 - 1.9 %    Differential Method Automated    Basic Metabolic Panel    Collection Time: 09/21/23  4:33 AM   Result Value Ref Range    Sodium 141 136 - 145 mmol/L    Potassium 3.4 (L) 3.5 - 5.1 mmol/L    Chloride 109 95 - 110 mmol/L    CO2 23 23 - 29 mmol/L    Glucose 87 70 - 110 mg/dL    BUN 21 8 - 23 mg/dL    Creatinine 0.9 0.5 - 1.4 mg/dL    Calcium 8.5 (L) 8.7 - 10.5 mg/dL    Anion Gap 9 8 - 16 mmol/L    eGFR >60 >60 mL/min/1.73 m^2           Moose Jimenez MD

## 2023-09-29 NOTE — TELEPHONE ENCOUNTER
----- Message from Bettie Cardenas MA sent at 9/29/2023 10:43 AM CDT -----  Regarding: Referral  Francis Cruz,     This patient has been discharged.  I have included the previous notes about the patient. Can we see about getting her in for eval with Marcie?    Thanks,  May    All Conversations  (Newest Message First)  July 25, 2023  Nancy Chapman, ELEAZAR     Pt still in hosp. Appts scheduled.       Nancy Chapman RN   7/25/23 10:17 AM    ----- Message from Varun Tapia MD sent at 7/23/2023  4:59 PM CDT -----  Regarding: Follow-up  Hi,     Ms. Sandoval is an 88F presenting after a fall. She has bilateral subdural hematomas vs hygromas. She was seen by Dr. Jack on 7/10/23, and he recommended MMA embolization. Imaging is largely unchanged since the visit on 7/10.     It seems like she was lost to follow-up. We are recommending she visit Dr. Jack in outpatient setting for MMA embolization     Can you please help arrange a follow-up appointment with Dr. Jack?     Thank you!  Varun

## 2023-11-03 NOTE — TELEPHONE ENCOUNTER
Tried contacting patient regarding scheduling a fracture clinic appointment no answer ,could not leave a message  voice mail is full

## 2023-11-06 NOTE — DISCHARGE INSTRUCTIONS
Mrs. Sandoval,    Thank you for letting me care for you today! It was nice meeting you, and I hope you feel better soon.   If you would like access to your chart and what was done today please utilize the [facility].   Please come back to [facility] for all of your future medical needs.    Our goal in the emergency department is to always give you outstanding care and exceptional service. You may receive a survey by mail or e-mail in the next week regarding your experience in our ED. We would greatly appreciate you completing and returning the survey. Your feedback provides us with a way to recognize our staff who give very good care and it helps us learn how to improve when your experience was below our aspiration of excellence.     Sincerely,    Allan Scott MD  Board Certified Emergency Physician

## 2023-11-06 NOTE — ED PROVIDER NOTES
Encounter Date: 11/6/2023    SCRIBE #1 NOTE: IBelen, am scribing for, and in the presence of,  Allan Scott MD. I have scribed the following portions of the note - Other sections scribed: Chart prep, ED course.       History     Chief Complaint   Patient presents with    Fatigue     Found by nursing home staff at [facility] leaned over in her wheel chair and not responding. On EMS arrival patient alert and at baseline, oriented to self, respirations E/UL. Patient c/o lower leg pain.      88-year-old female with a history of dementia as well as significant medical comorbid conditions presenting from [facility] for evaluation of an episode of poor level of consciousness while sitting in a wheelchair in the hallway.  Was minimally responsive.  Moved to her bed and thereafter returned to her neurologic baseline thereafter was transported to the hospital for further evaluation.  History is limited secondary to this patient's status of dementia.    The history is provided by the EMS personnel.     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 OR [address] [place] [address];  Service: General;  Laterality: Right;  possible laparotomy, possible bowel resection     No family history on file.  Social History     Tobacco Use    Smoking status: Never    Smokeless tobacco: Never   Substance Use Topics    Drug use: Never     Review of Systems   Unable to perform ROS: Dementia       Physical Exam     Initial Vitals [11/06/23 1133]   BP Pulse Resp Temp SpO2   104/72 103 20 96.1 °F (35.6 °C) 100 %      MAP       --         Physical Exam  Constitutional:  Chronically ill-appearing 88-year-old female in no obvious distress  Eyes: Conjunctivae normal.  ENT       Head: Normocephalic, atraumatic.       Nose: Normal external appearance        Mouth/Throat: no strigulous respirations   Hematological/Lymphatic/Immunilogical: no visible lymphadenopathy   Cardiovascular: Normal rate,   Respiratory: Normal respiratory effort.   Gastrointestinal: non distended   Musculoskeletal:  Normal range of motion in the upper extremities, rigidity in the lower extremities diminished range of motion in the hips, knees, ankles bilaterally  Neurologic: Alert, regards appropriately, disoriented to situation place and time  Skin: Skin is warm, dry. No rash noted.  Psychiatric: Mood and affect are normal.   ED Course   Procedures  Labs Reviewed   CBC W/ AUTO DIFFERENTIAL - Abnormal; Notable for the following components:       Result Value    RBC 3.40 (*)     Hemoglobin 8.8 (*)     Hematocrit 28.4 (*)     MCH 25.9 (*)     MCHC 31.0 (*)     RDW 16.1 (*)     Lymph # 0.9 (*)     Gran % 78.0 (*)     Lymph % 11.9 (*)     All other components within normal limits   COMPREHENSIVE METABOLIC PANEL - Abnormal; Notable for the following components:    CO2 22 (*)     BUN 29 (*)     Total Protein 5.8 (*)     Albumin 2.5 (*)     ALT 7 (*)     eGFR 40 (*)     All other components within normal limits   B-TYPE NATRIURETIC PEPTIDE - Abnormal; Notable for the following  components:     (*)     All other components within normal limits   TROPONIN I - Abnormal; Notable for the following components:    Troponin I 0.044 (*)     All other components within normal limits   MAGNESIUM - Abnormal; Notable for the following components:    Magnesium 1.5 (*)     All other components within normal limits        ECG Results              EKG 12-lead (Final result)  Result time 11/06/23 20:34:36      Final result by Interface, Lab In [facility] (11/06/23 20:34:36)                   Narrative:    Test Reason : R55,    Vent. Rate : 093 BPM     Atrial Rate : 090 BPM     P-R Int : 000 ms          QRS Dur : 080 ms      QT Int : 444 ms       P-R-T Axes : 000 013 032 degrees     QTc Int : 552 ms    Chaotic atrial mechanism  Nonspecific ST and T wave abnormality  Prolonged QT  Abnormal ECG    Confirmed by Nahun DINH, Dhaval ([id number]) on 11/6/2023 8:34:26 PM    Referred By: [email or web address]   SELF           Confirmed By:Dhaval Garnica MD                      Wet Read by Allan Scott MD (11/06/23 12:03:55, [facility] Emergency Dept, Emergency Medicine)    My EKG interpretation, atrial fibrillation, 93 beats per minute, normal axis, , when compared to previous EKG 09/20/2023 T-wave depressions have resolved                                  Imaging Results    None          Medications - No data to display  Medical Decision Making  Differential diagnosis-arrhythmia, anemia, renal failure, myocardial infarction, syncope, dehydration    Discussed with this patient's son via telephone who is her medical surrogate decision maker.  Notes that her directive is for comfort care measures, if able would prefer to avoid hospitalization and significant testing.  Notes that she has filled out an advanced directive which is consistent with the same.    In the absence of an immediate significant life threat prefers for her to undergo discharge.  Evaluation is unrevealing overall, she is at her baseline, will  plan for discharge back to her nursing facility for further evaluation.    Problems Addressed:  Altered mental status, unspecified altered mental status type: chronic illness or injury with exacerbation, progression, or side effects of treatment  Alzheimer disease: chronic illness or injury  Anemia, unspecified type: chronic illness or injury  Syncope: acute illness or injury    Amount and/or Complexity of Data Reviewed  Independent Historian: caregiver     Details: Son via telephone  External Data Reviewed: labs, radiology, ECG and notes.     Details: Significant medical comorbid condition in Alzheimer's disease  Labs: ordered. Decision-making details documented in ED Course.  ECG/medicine tests: ordered and independent interpretation performed. Decision-making details documented in ED Course.    Risk  OTC drugs.  Prescription drug management.  Decision regarding hospitalization.  Decision not to resuscitate or to de-escalate care because of poor prognosis.  Diagnosis or treatment significantly limited by social determinants of health.  Risk Details: Alzheimer's significantly impacts this patient's health care            Scribe Attestation:   Scribe #1: I performed the above scribed service and the documentation accurately describes the services I performed. I attest to the accuracy of the note.    Physician Attestation for Scribe: I, allan scott, reviewed documentation as scribed in my presence, which is both accurate and complete.        ED Course as of 11/07/23 0753   Mon Nov 06, 2023   1151 Called adult son tony griffith, not notified prior to transport [TK]   1203 Spoke with the patient's son, who confirms his mother is DNR, DNI, and has an LaPOST to confirm that he is the medical decision maker despite [facility]'s documentation of the patient as a full code. He prefers patient is discharged to facility if at all able. [GS]      ED Course User Index  [GS] Belen Weber  [TK] Allan Scott MD                     Clinical Impression:   Final diagnoses:  [R55] Syncope  [R41.82] Altered mental status, unspecified altered mental status type (Primary)  [G30.9, F02.80] Alzheimer disease  [D64.9] Anemia, unspecified type        ED Disposition Condition    Discharge Stable          ED Prescriptions    None       Follow-up Information       Follow up With Specialties Details Why Contact Info    Agatha Alvarez [facility], SN[facility] Agency Go to   [address] [place] [id number] [phone]               Allan Scott MD  11/07/23 0756

## 2023-11-13 PROBLEM — N39.0 CATHETER-ASSOCIATED URINARY TRACT INFECTION: Status: RESOLVED | Noted: 2023-01-01 | Resolved: 2023-01-01

## 2023-11-13 PROBLEM — T83.511A CATHETER-ASSOCIATED URINARY TRACT INFECTION: Status: RESOLVED | Noted: 2023-01-01 | Resolved: 2023-01-01

## 2023-11-13 NOTE — ASSESSMENT & PLAN NOTE
Attempted to contact patient, no answer. His appt with Neurosurgery is tomorrow.   Recurrent UTIs  - initial UA concern for infection, completed Rocephin  - urine culture with Candida glabrata 10 K to 49 K units not consistent with convincing UTI.  - duncan placed for U-retention, removed with further urinary retention.  Replaced Duncan on 07/30  -will leave Duncan on discharge, patient will need outpatient Urology for voiding trial.  - AFVSS, no leukocytosis.

## 2023-11-15 NOTE — PROGRESS NOTES
Subjective:     Patient ID: Radha Sandoval is a 88 y.o. female.    Chief Complaint: No chief complaint on file.    HPI  Patient is a 88 year old female who presented to the ED on 07/09/23 with history of multiple falls in the past few months, but found down at [facility].   RADS: right superior pubic root and inferior pubic rami fractures  There is a subtle anterior cortical buckle at right sacral ala  PLAN: non op weight bearing as tolerated with walker  Reports is doing ok. Pain controlled. Participating in PT     11/15/23: Doing well. Is participating in PT. Reports is able to do more that she could previously and pain is improving.     Review of Systems   Constitutional: Negative for chills and fever.   Cardiovascular:  Negative for chest pain.   Respiratory:  Negative for cough and shortness of breath.    Skin:  Negative for color change, dry skin, itching, nail changes, poor wound healing and rash.   Musculoskeletal:         Pelvic fracture    Neurological:  Negative for dizziness.   Psychiatric/Behavioral:  Negative for altered mental status. The patient is not nervous/anxious.    All other systems reviewed and are negative.      Objective:       General    Constitutional: She is oriented to person, place, and time. She appears well-developed and well-nourished. No distress.   HENT:   Head: Atraumatic.   Eyes: Conjunctivae are normal.   Cardiovascular:  Normal rate.            Pulmonary/Chest: Effort normal.   Neurological: She is alert and oriented to person, place, and time.   Psychiatric: She has a normal mood and affect. Her behavior is normal.             Right Hip Exam     Comments:  Patient demonstrates obvious pain with any movement of lower extremities.  Skin intact throughout with no open wounds.  Severe hallux valgus bilaterally.  Extremities are WWP.          Physical Exam  Constitutional:       General: She is not in acute distress.     Appearance: She is well-developed and  well-nourished. She is not diaphoretic.   HENT:      Head: Atraumatic.   Eyes:      Conjunctiva/sclera: Conjunctivae normal.   Cardiovascular:      Rate and Rhythm: Normal rate.   Pulmonary:      Effort: Pulmonary effort is normal.   Neurological:      Mental Status: She is alert and oriented to person, place, and time.   Psychiatric:         Mood and Affect: Mood and affect normal.         Behavior: Behavior normal.     RADS:   Healing fracture deformity right superior and inferior symphysis pubis.  Position alignment stable.  No dislocation.  Osteopenia.  1 cm size bone island density left SI joints stable.  Mild DJD hips.     Assessment:     Encounter Diagnosis   Name Primary?    Multiple closed fractures of pelvis without disruption of pelvic ring with routine healing, subsequent encounter Yes       Plan:      Continue non-op treament. Weight bearing as tolerated ROMAT, PT/OT. Return to clinic in 6 weeks at that time x-ray of her pelvis AP inlet outlet is needed.

## 2023-12-16 PROBLEM — N30.00 ACUTE CYSTITIS WITHOUT HEMATURIA: Status: ACTIVE | Noted: 2023-01-01

## 2023-12-16 NOTE — Clinical Note
Diagnosis: AMS (altered mental status) [1831414]   Future Attending Provider: BRENNEN NEWMAN [[id number]]   Admitting Provider:: PRASHANT HOFFMAN [[id number]]   Reason for IP Medical Treatment  (Clinical interventions that can only be accomplished in the IP setting? ) :: IV abx   I certify that Inpatient services for greater than or equal to 2 midnights are medically necessary:: Yes   Plans for Post-Acute care--if anticipated (pick the single best option):: I. Nursing Home (assisted)

## 2023-12-17 NOTE — SUBJECTIVE & OBJECTIVE
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 5/22/2023    Lumbar spondylosis with myelopathy 4/25/2023    Multiple closed right sided fractures of pelvis without disruption of pelvic ring 7/9/2023    Primary hypertension 6/10/2022    Subdural hygroma 7/8/2023       Past Surgical History:   Procedure Laterality Date    REPAIR, HERNIA, INGUINAL, WITHOUT HISTORY OF PRIOR REPAIR, AGE 6 YEARS OR OLDER Right 5/6/2023    Procedure: REPAIR, HERNIA, INGUINAL, WITHOUT HISTORY OF PRIOR REPAIR, AGE 6 YEARS OR OLDER;  Surgeon: Maurice Piper MD;  Location: [facility] OR [address] [place] [address];  Service: General;  Laterality: Right;  possible laparotomy, possible bowel resection       Review of patient's allergies indicates:  No Known Allergies    No current facility-administered medications on file prior to encounter.     Current Outpatient Medications on File Prior to Encounter   Medication Sig    acetaminophen (TYLENOL) 325 MG tablet Take 2 tablets (650 mg total) by mouth every 6 (six) hours as needed (Pain).    albuterol (PROVENTIL HFA) 90 mcg/actuation inhaler Inhale 2 puffs into the lungs every 4 (four) hours as needed for Wheezing. Use spacer with adult mask    apixaban (ELIQUIS) 5 mg Tab Take 1 tablet (5 mg total) by mouth 2 (two) times daily. Starting 8/5    cholecalciferol, vitamin D3, (VITAMIN D3) 50 mcg (2,000 unit) Tab Take 1 tablet (2,000 Units total) by mouth once daily.    EScitalopram oxalate (LEXAPRO) 5 MG Tab Take 2 tablets (10 mg total) by mouth once daily.    furosemide (LASIX) 20 MG tablet Take 1 tablet (20 mg total) by mouth once daily.    lisinopriL (PRINIVIL,ZESTRIL) 40 MG tablet Take 1 tablet  (40 mg total) by mouth once daily.    loperamide (IMODIUM) 2 mg capsule Take 1 capsule (2 mg total) by mouth 4 (four) times daily as needed for Diarrhea.    melatonin (MELATIN) 3 mg tablet Take 2 tablets (6 mg total) by mouth nightly as needed for Insomnia.    memantine (NAMENDA) 5 MG Tab Take 1 tablet (5 mg total) by mouth 2 (two) times daily.    metoprolol tartrate (LOPRESSOR) 25 MG tablet Take 1 tablet (25 mg total) by mouth 2 (two) times daily.    miconazole NITRATE 2 % (MICOTIN) 2 % top powder Apply topically 2 (two) times daily. Underside of bilateral breasts    polyethylene glycol (GLYCOLAX) 17 gram PwPk Take 17 g by mouth 2 (two) times daily as needed.    potassium chloride (KLOR-CON) 10 MEQ TbSR Take 1 tablet (10 mEq total) by mouth once daily.     Family History    None       Tobacco Use    Smoking status: Never    Smokeless tobacco: Never   Substance and Sexual Activity    Alcohol use: Not on file    Drug use: Never    Sexual activity: Not Currently     Review of Systems   Unable to perform ROS: Dementia     Objective:     Vital Signs (Most Recent):  Temp: 97.6 °F (36.4 °C) (12/16/23 2315)  Pulse: 79 (12/16/23 2315)  Resp: 18 (12/16/23 2315)  BP: (!) 162/70 (12/16/23 2315)  SpO2: 96 % (12/16/23 2315) Vital Signs (24h Range):  Temp:  [97.6 °F (36.4 °C)-98.3 °F (36.8 °C)] 97.6 °F (36.4 °C)  Pulse:  [79-90] 79  Resp:  [16-20] 18  SpO2:  [96 %-100 %] 96 %  BP: (142-186)/(52-86) 162/70     Weight: 60.8 kg (134 lb)  Body mass index is 23 kg/m².     Physical Exam  Vitals and nursing note reviewed.   Constitutional:       General: She is not in acute distress.     Appearance: She is well-developed.   HENT:      Head: Normocephalic and atraumatic.      Mouth/Throat:      Pharynx: No oropharyngeal exudate.   Eyes:      Conjunctiva/sclera: Conjunctivae normal.   Cardiovascular:      Rate and Rhythm: Normal rate and regular rhythm.      Heart sounds: Normal heart sounds.   Pulmonary:      Effort: Pulmonary effort is  normal. No respiratory distress.      Breath sounds: Normal breath sounds. No wheezing.   Abdominal:      General: Bowel sounds are normal. There is no distension.      Palpations: Abdomen is soft.      Tenderness: There is no abdominal tenderness.   Musculoskeletal:         General: No tenderness. Normal range of motion.      Cervical back: Normal range of motion and neck supple.   Lymphadenopathy:      Cervical: No cervical adenopathy.   Skin:     General: Skin is warm and dry.      Capillary Refill: Capillary refill takes less than 2 seconds.      Findings: No rash.   Neurological:      General: No focal deficit present.      Mental Status: She is alert. She is disoriented.      Cranial Nerves: No cranial nerve deficit.      Sensory: No sensory deficit.      Coordination: Coordination normal.   Psychiatric:         Mood and Affect: Mood normal.         Speech: Speech normal.         Behavior: Behavior normal.                Significant Labs: All pertinent labs within the past 24 hours have been reviewed.  BMP:   Recent Labs   Lab 12/16/23 2001         K 3.3*      CO2 21*   BUN 20   CREATININE 1.4   CALCIUM 9.0     CBC:   Recent Labs   Lab 12/16/23 2001   WBC 7.11   HGB 6.7*   HCT 21.0*          Significant Imaging: I have reviewed all pertinent imaging results/findings within the past 24 hours.  CT Head Without Contrast  Narrative: EXAMINATION:  CT HEAD WITHOUT CONTRAST    CLINICAL HISTORY:  Mental status change, unknown cause;    TECHNIQUE:  Low dose axial CT images obtained throughout the head without intravenous contrast. Sagittal and coronal reconstructions were performed.  Examination was repeated due to motion artifact.    COMPARISON:  CT head dated 08/09/2023.    FINDINGS:  Ventricles and sulci are normal in size for age without evidence of hydrocephalus. No extra-axial blood or fluid collections.  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  There are  chronic microvascular ischemic changes, stable.  No parenchymal mass, hemorrhage, edema or major vascular distribution infarct.    No calvarial fracture.  The scalp is unremarkable.  Bilateral paranasal sinuses and mastoid air cells are clear.  Impression: No acute intracranial abnormality.    Electronically signed by: Luis Doran  Date:    12/16/2023  Time:    22:41  X-Ray Chest AP Portable  Narrative: EXAMINATION:  XR CHEST AP PORTABLE    CLINICAL HISTORY:  Altered mental status, unspecified    TECHNIQUE:  Single frontal view of the chest was performed.    COMPARISON:  09/20/2023.    FINDINGS:  Right hemidiaphragm elevation, similar to prior.  Accessory azygous lobe.  Chronic increased markings in the lungs, similar to prior.  Perihilar vascular congestion appears less pronounced.    Heart and lungs otherwise appear unchanged when allowing for differences in technique and positioning.    Retrocardiac hiatal hernia, similar to prior.  Impression: No acute findings.    No significant change from prior study.    Electronically signed by: Olaf Boudreaux MD  Date:    12/16/2023  Time:    21:05

## 2023-12-17 NOTE — HPI
Radha Sandoval is a 88 y.o. female with a PMHx of DVT/PE, chronic bilateral pleural effusions, HTN, severe late term alzheimer's dementia who presents to [facility] from [facility] for evaluation of altered mental status. Patient is unable to provide any meaningful history due to dementia. Family friend at bedside assists with history. Per chart review, patient noted to be more confused this afternoon by NH staff. She is oriented to person only at baseline. Presentation is similar to when she had a UTI a few months ago. No known recent melena or bloody stools, however they aren't sure since patient lives in a NH. Patient complains of recent right shoulder and face pain but then later denies any recent pain. Denies abdominal or chest pain. Remainder of history limited 2/2 dementia.     ED: hypertensive top /77, vitals otherwise stable. No leukocytosis. Hgb 6.7, baseline ~8. Cr 1.4, slightly worse from baseline ~1.1. CTH/CXR negative for acute findings. Given 1U pRBCs.

## 2023-12-17 NOTE — ED NOTES
Per EMS, family wanted patient to ER for evaluation of hallucinations and altered mental status with episodes of combativeness. Hx of dementia

## 2023-12-17 NOTE — ASSESSMENT & PLAN NOTE
- Cr 1.4, baseline ~0.9-1.1  - suspect 2/2 UTI and prerenal from anemia  - avoid nephrotoxins, renally dose meds  - management of UTI/anemia as noted above

## 2023-12-17 NOTE — ED NOTES
The patient was incontinent of stool, linens changed and perineal care given. Patient repositioned for comfort, will continue to monitor.

## 2023-12-17 NOTE — HPI
Radha Sandoval is an 88 year old female with a PMHx of DVT/PE, chronic bilateral pleural effusions, HTN, alzheimer's dementia who presents to [facility] from [facility] for evaluation of altered mental status. Patient is unable to provide any meaningful history due to AMS.     Per chart review, patient noted to be more confused this afternoon and hallucinating by NH staff. She is oriented to person only at baseline. Presentation is similar to when she had a UTI a few months ago.      In ED afebrile and HDS. No leukocytosis. Hgb 6.7, Cr 1.4. CTH/CXR negative for acute findings. Given 1U pRBCs. UA positive for > 100 WBC, 7 squams. ID consulted for antibiotic recommendations. Primary team has started ampicillin.    08/09/23 Urine culture - Enterococcus gallinarum   07/31/23 Urine culture - Enterococcus faecalis

## 2023-12-17 NOTE — ASSESSMENT & PLAN NOTE
- suspect 2/2 UTI  - no focal deficits  - CTH negative for acute abnormalities  - treat UTI as below  - neuro checks q4hr  - delirium precautions   12/17 improved. oriented to person, place, month and year , answers simple questions appropriately. follows simple commands

## 2023-12-17 NOTE — ASSESSMENT & PLAN NOTE
- Hgb 6.7, baseline ~8  - FOBT positive but no other evidence of overt GI bleeding  - s/p 1U pRBCs in the ED  - will give 80mg protonic IVP x1  - further IV PPI/ GI consult pending H/H trend  - NPO at MN as precaution  - holding eliquis

## 2023-12-17 NOTE — PROGRESS NOTES
[facility] - Emergency Dept  [facility] Medicine  Progress Note    Patient Name: Radha Sandoval  MRN: [id number]  Patient Class: IP- Inpatient   Admission Date: 12/16/2023  Length of Stay: 1 days  Attending Physician: Kory Smith MD  Primary Care Provider: Agatha Alvarez        Subjective:     Principal Problem:Acute encephalopathy        HPI:  Radha Sandoval is a 88 y.o. female with a PMHx of DVT/PE, chronic bilateral pleural effusions, HTN, severe late term alzheimer's dementia who presents to [facility] from [facility] for evaluation of altered mental status. Patient is unable to provide any meaningful history due to dementia. Family friend at bedside assists with history. Per chart review, patient noted to be more confused this afternoon by NH staff. She is oriented to person only at baseline. Presentation is similar to when she had a UTI a few months ago. No known recent melena or bloody stools, however they aren't sure since patient lives in a NH. Patient complains of recent right shoulder and face pain but then later denies any recent pain. Denies abdominal or chest pain. Remainder of history limited 2/2 dementia.     ED: hypertensive top /77, vitals otherwise stable. No leukocytosis. Hgb 6.7, baseline ~8. Cr 1.4, slightly worse from baseline ~1.1. CTH/CXR negative for acute findings. Given 1U pRBCs.     Overview/Hospital Course:  12/17 Hb 8.4 s/p Transfusion with 1 unit of PRBC. K and Mg replaced. NPO. GI Eval . on ampicillin for prior h/o enterococcal UTI. UC pending . per GI, Hb at baseline s/p transfusion. consult GI if evidence of overt bleed . ID eval -Unclear if reported worsened mentation from baseline is secondary to a UTI.Continue Ampicillin for           Review of Systems:   Pain scale:  Constitutional:  fever,  chills, headache, vision loss, hearing loss, weight loss, Generalized weakness, falls, loss of smell, loss of taste, poor appetite,  sore throat  Respiratory: cough, shortness of  breath.   Cardiovascular: chest pain, dizziness, palpitations, orthopnea, swelling of feet, syncope  Gastrointestinal: nausea, vomiting, abdominal pain, diarrhea, black stool,  blood in stool, change in bowel habits, constipation  Genitourinary: hematuria, dysuria, urgency, frequency  Integument/Breast: rash,  pruritis  Hematologic/Lymphatic: easy bruising, lymphadenopathy  Musculoskeletal: arthralgias , myalgias, back pain, neck pain, knee pain  Neurological: confusion- improved , seizures, tremors, slurred speech  Behavioral/Psych:  depression, anxiety, auditory or visual hallucinations     OBJECTIVE:     Physical Exam:  Body mass index is 23 kg/m².    Constitutional: Appears well-developed and well-nourished.   Head: Normocephalic and atraumatic.   Neck: Normal range of motion. Neck supple.   Cardiovascular: Normal heart rate.  Regular heart rhythm.  Pulmonary/Chest: Effort normal.   Abdominal: No distension.  No tenderness  Musculoskeletal: Normal range of motion. No edema.   Neurological:  oriented to person, place, month and year , answers simple questions appropriately. follows simple commands  able to move bilateral upper and lower extremities without limitation   Skin: Skin is warm and dry.   Psychiatric: Normal mood and affect. Behavior is normal.                  Vital Signs  Temp: 97.8 °F (36.6 °C) (12/17/23 1300)  Pulse: 71 (12/17/23 1300)  Resp: 20 (12/17/23 1300)  BP: (Abnormal) 206/80 (12/17/23 1300)  SpO2: 97 % (12/17/23 1300)     24 Hour VS Range    Temp:  [97.6 °F (36.4 °C)-98.6 °F (37 °C)]   Pulse:  [68-90]   Resp:  [16-20]   BP: (138-206)/()   SpO2:  [95 %-100 %]     Intake/Output Summary (Last 24 hours) at 12/17/2023 1431  Last data filed at 12/17/2023 1212  Gross per 24 hour   Intake 688.44 ml   Output no documentation   Net 688.44 ml         I/O This Shift:  I/O this shift:  In: 338.4 [IV Piggyback:338.4]  Out: -     Wt Readings from Last 3 Encounters:   12/16/23 60.8 kg (134 lb)  "  11/15/23 61.2 kg (134 lb 14.7 oz)   11/06/23 61.2 kg (135 lb)       I have personally reviewed the vitals and recorded Intake/Output     Laboratory/Diagnostic Data:    CBC/Anemia Labs: Coags:    Recent Labs   Lab 12/16/23 2001 12/17/23 0337   WBC 7.11 5.72   HGB 6.7* 8.5*   HCT 21.0* 25.8*    292   MCV 75* 80*   RDW 17.1* 16.9*    No results for input(s): "PT", "INR", "APTT" in the last 168 hours.     Chemistries: ABG:   Recent Labs   Lab 12/16/23 2001 12/17/23 0337    142   K 3.3* 3.2*    110   CO2 21* 25   BUN 20 19   CREATININE 1.4 1.3   CALCIUM 9.0 8.6*   PROT 6.0  --    BILITOT 0.5  --    ALKPHOS 74  --    ALT 7*  --    AST 18  --    MG  --  1.6   PHOS  --  3.5    No results for input(s): "PH", "PCO2", "PO2", "HCO3", "POCSATURATED", "BE" in the last 168 hours.     POCT Glucose: HbA1c:    Recent Labs   Lab 12/17/23  0524 12/17/23  0824   POCTGLUCOSE 93 95    Hemoglobin A1C   Date Value Ref Range Status   12/17/2023 5.8 (H) 4.0 - 5.6 % Final     Comment:     ADA Screening Guidelines:  5.7-6.4%  Consistent with prediabetes  >or=6.5%  Consistent with diabetes    High levels of fetal hemoglobin interfere with the HbA1C  assay. Heterozygous hemoglobin variants (HbS, HgC, etc)do  not significantly interfere with this assay.   However, presence of multiple variants may affect accuracy.          Cardiac Enzymes: Ejection Fractions:    Recent Labs     12/16/23 2001 12/17/23 0216 12/17/23 0337   TROPONINI 0.059* 0.073* 0.063*    EF   Date Value Ref Range Status   07/28/2023 65 % Final   05/22/2023 65 % Final          Recent Labs   Lab 12/16/23 2027   COLORU Yellow   APPEARANCEUA Ex.Turbid   PHUR 6.0   SPECGRAV 1.015   PROTEINUA 1+*   GLUCUA Negative   KETONESU Negative   BILIRUBINUA Negative   OCCULTUA 1+*   NITRITE Negative   LEUKOCYTESUR 3+*   RBCUA 9*   WBCUA >100*   BACTERIA Many*   SQUAMEPITHEL 7   HYALINECASTS 0       Lactate (Lactic Acid) (mmol/L)   Date Value   07/08/2023 1.2 " "  05/06/2023 0.8     BNP (pg/mL)   Date Value   12/16/2023 514 (H)   11/06/2023 373 (H)   09/20/2023 1,626 (H)   07/27/2023 301 (H)   07/23/2023 397 (H)     No results found for: "CRP", "SEDRATE"  No results found for: "DDIMER"  No results found for: "FERRITIN"  No results found for: "LDH"  Troponin I (ng/mL)   Date Value   12/17/2023 0.063 (H)   12/17/2023 0.073 (H)   12/16/2023 0.059 (H)   11/06/2023 0.044 (H)   09/20/2023 0.242 (H)   09/20/2023 0.236 (H)   09/20/2023 0.182 (H)   09/20/2023 0.168 (H)     CPK (U/L)   Date Value   03/27/2023 201 (H)     Results for orders placed or performed during the hospital encounter of 04/18/23   Vitamin D   Result Value Ref Range    Vit D, 25-Hydroxy 45 30 - 96 ng/mL     SARS-CoV2 (COVID-19) Qualitative PCR (no units)   Date Value   09/20/2023 Detected (A)   08/13/2023 Not Detected   08/07/2023 Not Detected   07/27/2023 Not Detected   05/29/2023 Not Detected   05/09/2023 Not Detected   04/24/2023 Not Detected   04/20/2023 Not Detected     POC Rapid COVID (no units)   Date Value   09/18/2023 Positive (A)   03/27/2023 Negative       Microbiology labs for the last week  Microbiology Results (last 7 days)       Procedure Component Value Units Date/Time    Urine culture [[id number]] Collected: 12/16/23 2027    Order Status: No result Specimen: Urine Updated: 12/16/23 2052            Reviewed and noted in plan where applicable- Please see chart for full lab data.    Lines/Drains:       Peripheral IV - Single Lumen 12/16/23 2002 20 G Anterior;Right Wrist (Active)   Site Assessment Clean;Dry;Intact 12/16/23 2048   Extremity Assessment Distal to IV No abnormal discoloration;No redness;No swelling 12/16/23 2048   Dressing Status Clean;Dry;Intact 12/16/23 2048   Dressing Intervention First dressing 12/16/23 2048   Number of days: 0       Imaging  ECG Results              EKG 12-lead (Final result)  Result time 12/17/23 09:22:39      Final result by Interface, Lab In [facility] (12/17/23 " 09:22:39)               Narrative:    Test Reason : R41.82,    Vent. Rate : 121 BPM     Atrial Rate : 220 BPM     P-R Int : 000 ms          QRS Dur : 080 ms      QT Int : 410 ms       P-R-T Axes : 000 044 054 degrees     QTc Int : 582 ms    Undetermined rhythm due to artifact.  Origin is supraventricular.  Nonspecific ST and T wave abnormality  Abnormal ECG  When compared with ECG of 06-NOV-2023 12:00,  Current undetermined rhythm precludes rhythm comparison, needs review  Nonspecific T wave abnormality now evident in Anterior leads  Confirmed by Kilo Grimes MD ([id number]) on 12/17/2023 9:22:29 AM    Referred By: [email or web address]   SELF           Confirmed By:Kilo Grimes MD                                  Results for orders placed during the hospital encounter of 07/22/23    Echo    Interpretation Summary  · The left ventricle is normal in size with normal systolic function.  · The estimated ejection fraction is 65%.  · Grade I left ventricular diastolic dysfunction.  · There is mild aortic valve stenosis. Aortic valve area is 1.88 cm2; peak velocity is 2.58 m/s; mean gradient is 15 mmHg.  · There is significant mitral annular calcification. The mean diastolic mitral gradient is 5mmHg at heart rate of 76bpm.  · Normal right ventricular size with normal right ventricular systolic function.  · The estimated PA systolic pressure is 39 mmHg.  · Normal central venous pressure (3 mmHg).  · Mild left atrial enlargement.      CT Head Without Contrast  Narrative: EXAMINATION:  CT HEAD WITHOUT CONTRAST    CLINICAL HISTORY:  Mental status change, unknown cause;    TECHNIQUE:  Low dose axial CT images obtained throughout the head without intravenous contrast. Sagittal and coronal reconstructions were performed.  Examination was repeated due to motion artifact.    COMPARISON:  CT head dated 08/09/2023.    FINDINGS:  Ventricles and sulci are normal in size for age without evidence of hydrocephalus. No extra-axial blood or fluid  collections.  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  There are chronic microvascular ischemic changes, stable.  No parenchymal mass, hemorrhage, edema or major vascular distribution infarct.    No calvarial fracture.  The scalp is unremarkable.  Bilateral paranasal sinuses and mastoid air cells are clear.  Impression: No acute intracranial abnormality.    Electronically signed by: Luis Doran  Date:    12/16/2023  Time:    22:41  X-Ray Chest AP Portable  Narrative: EXAMINATION:  XR CHEST AP PORTABLE    CLINICAL HISTORY:  Altered mental status, unspecified    TECHNIQUE:  Single frontal view of the chest was performed.    COMPARISON:  09/20/2023.    FINDINGS:  Right hemidiaphragm elevation, similar to prior.  Accessory azygous lobe.  Chronic increased markings in the lungs, similar to prior.  Perihilar vascular congestion appears less pronounced.    Heart and lungs otherwise appear unchanged when allowing for differences in technique and positioning.    Retrocardiac hiatal hernia, similar to prior.  Impression: No acute findings.    No significant change from prior study.    Electronically signed by: Olaf Boudreaux MD  Date:    12/16/2023  Time:    21:05      Labs, Imaging, EKG and Diagnostic results from 12/17/2023 were reviewed.    Medications:  Medication list was reviewed and changes noted under Assessment/Plan.  No current facility-administered medications on file prior to encounter.     Current Outpatient Medications on File Prior to Encounter   Medication Sig Dispense Refill    acetaminophen (TYLENOL) 325 MG tablet Take 2 tablets (650 mg total) by mouth every 6 (six) hours as needed (Pain). 60 tablet 1    albuterol (PROVENTIL HFA) 90 mcg/actuation inhaler Inhale 2 puffs into the lungs every 4 (four) hours as needed for Wheezing. Use spacer with adult mask 18 g 1    apixaban (ELIQUIS) 5 mg Tab Take 1 tablet (5 mg total) by mouth 2 (two) times daily. Starting 8/5 60 tablet 11     cholecalciferol, vitamin D3, (VITAMIN D3) 50 mcg (2,000 unit) Tab Take 1 tablet (2,000 Units total) by mouth once daily.      EScitalopram oxalate (LEXAPRO) 5 MG Tab Take 2 tablets (10 mg total) by mouth once daily. 60 tablet 11    furosemide (LASIX) 20 MG tablet Take 1 tablet (20 mg total) by mouth once daily. 30 tablet 0    lisinopriL (PRINIVIL,ZESTRIL) 40 MG tablet Take 1 tablet (40 mg total) by mouth once daily. 30 tablet 11    loperamide (IMODIUM) 2 mg capsule Take 1 capsule (2 mg total) by mouth 4 (four) times daily as needed for Diarrhea. 60 capsule 0    melatonin (MELATIN) 3 mg tablet Take 2 tablets (6 mg total) by mouth nightly as needed for Insomnia. 30 tablet 3    memantine (NAMENDA) 5 MG Tab Take 1 tablet (5 mg total) by mouth 2 (two) times daily. 60 tablet 11    metoprolol tartrate (LOPRESSOR) 25 MG tablet Take 1 tablet (25 mg total) by mouth 2 (two) times daily. 60 tablet 11    miconazole NITRATE 2 % (MICOTIN) 2 % top powder Apply topically 2 (two) times daily. Underside of bilateral breasts 85 g 3    polyethylene glycol (GLYCOLAX) 17 gram PwPk Take 17 g by mouth 2 (two) times daily as needed.  0    potassium chloride (KLOR-CON) 10 MEQ TbSR Take 1 tablet (10 mEq total) by mouth once daily. 30 tablet 11     Scheduled Medications:  ampicillin IVPB, 500 mg, Intravenous, Q6H      PRN: 0.9%  NaCl infusion (for blood administration), acetaminophen, albuterol-ipratropium, bisacodyL, dextrose 10%, dextrose 10%, glucagon (human recombinant), glucose, glucose, hydrALAZINE, melatonin, polyethylene glycol, prochlorperazine  Infusions:    dextrose 5% lactated ringers 40 mL/hr at 12/17/23 1349     Estimated Creatinine Clearance: 26.3 mL/min (based on SCr of 1.3 mg/dL).               Assessment/Plan:      * Acute encephalopathy  - suspect 2/2 UTI  - no focal deficits  - CTH negative for acute abnormalities  - treat UTI as below  - neuro checks q4hr  - delirium precautions   12/17 improved. oriented to person, place,  month and year , answers simple questions appropriately. follows simple commands    Acute cystitis without hematuria  - afebrile without leukocytosis  - UA infectious  - started on ampicillin based on prior urine cultures, will continue  - ID consulted for antibiotic recs  - follow Ucx  - strict I/Os    Anemia  - Hgb 6.7, baseline ~8  - FOBT positive but no other evidence of overt GI bleeding  - s/p 1U pRBCs in the ED  - will give 80mg protonic IVP x1  - further IV PPI/ GI consult pending H/H trend  - NPO at MN as precaution  - holding eliquis   Anemia      Recent Labs   Lab 12/16/23 2001 12/17/23  0337   HGB 6.7* 8.5*         Component Value Date/Time    MCV 80 (L) 12/17/2023 0337    RDW 16.9 (H) 12/17/2023 0337    FOLATE 7.7 06/23/2023 1153    [id number] 971 (H) 08/01/2023 0535    OCCULTBLOOD Positive (A) 05/28/2023 0311     Monitor CBC and transfuse if H/H drops below 7/21.    12/17 . per GI, Hb at baseline s/p transfusion. consult GI if evidence of overt bleed .     Prolonged Q-T interval on ECG  QTc prolonged at  583ms.  Avoid QT prolonging agents       Dysphagia  - appears to be acute on chronic issue  - failed swallow study in the ED  - keep NPO  - SLP consulted     Hypokalemia  replaced     Chronic bilateral pleural effusions  - no acute issues  - CXR stable  - continue lasix when able to safely swallow     Severe late onset Alzheimer's dementia without behavioral disturbance, psychotic disturbance, mood disturbance, or anxiety  - more confused due to UTI  - oriented to person only on exam (baseline), no focal deficits   - delirium precautions  - continue aricept and namenda once able to safely swallow    Chronic pulmonary embolism without acute cor pulmonale  - hold eliquis given anemia and possible GIB  CTA chest 5/23 -Examination positive for extensive pulmonary emboli asymmetric to the right. Embolus noted within the lateral aspect of the right main pulmonary artery, encroaching within 4.7 cm of the  main pulmonary artery bifurcation   Repeat CTA chest 07/24 - no evidence of  pulmonary embolisme. resume eliquis once cleared by SLP     GINA (acute kidney injury)  - Cr 1.4, baseline ~0.9-1.1  - suspect 2/2 UTI and prerenal from anemia  - avoid nephrotoxins, renally dose meds  - management of UTI/anemia as noted above    Recent Labs   Lab 12/16/23 2001 12/17/23  0337   BUN 20 19   CREATININE 1.4 1.3       I & O     Intake/Output Summary (Last 24 hours) at 12/17/2023 0730  Last data filed at 12/17/2023 0340  Gross per 24 hour   Intake 350 ml   Output no documentation   Net 350 ml        Primary hypertension  - hold lisinopril until able to safely swallow  - use PRN IV hydralazine for now      VTE Risk Mitigation (From admission, onward)           Ordered     Reason for No Pharmacological VTE Prophylaxis  Once        Question Answer Comment   Reasons: Already adequately anticoagulated on oral Anticoagulants    Reasons: Active Bleeding        12/16/23 2223     IP VTE HIGH RISK PATIENT  Once         12/16/23 2223                    Discharge Planning   TOSHA: 12/18/2023     Code Status: Full Code   Is the patient medically ready for discharge?: No    Reason for patient still in hospital (select all that apply): Treatment                     Kory Smith MD  Department of Hospital Medicine   [facility] - Emergency Dept

## 2023-12-17 NOTE — ED NOTES
Family friend arrived to the ED. After asking more about the patient's history, she states that it seems like the patient is at her baseline, but the pt son requested that she be sent to the ER for evaluation of hallucinations. Updated family friend on the plan of care and everything that has been done up to this point. Advised to use the call bell if her or the patient needs anything.

## 2023-12-17 NOTE — SUBJECTIVE & OBJECTIVE
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 5/22/2023    Lumbar spondylosis with myelopathy 4/25/2023    Multiple closed right sided fractures of pelvis without disruption of pelvic ring 7/9/2023    Primary hypertension 6/10/2022    Subdural hygroma 7/8/2023       Past Surgical History:   Procedure Laterality Date    REPAIR, HERNIA, INGUINAL, WITHOUT HISTORY OF PRIOR REPAIR, AGE 6 YEARS OR OLDER Right 5/6/2023    Procedure: REPAIR, HERNIA, INGUINAL, WITHOUT HISTORY OF PRIOR REPAIR, AGE 6 YEARS OR OLDER;  Surgeon: Maurice Piper MD;  Location: [facility] OR [address] [place] [address];  Service: General;  Laterality: Right;  possible laparotomy, possible bowel resection       Review of patient's allergies indicates:  No Known Allergies    Medications:  (Not in a hospital admission)    Antibiotics (From admission, onward)      Start     Stop Route Frequency Ordered    12/17/23 0400  ampicillin (OMNIPEN) 500 mg in sodium chloride 0.9 % 100 mL IVPB (MB+)         -- IV Every 6 hours (non-standard times) 12/16/23 2309          Antifungals (From admission, onward)      None          Antivirals (From admission, onward)      None             Immunization History   Administered Date(s) Administered    COVID-19, MRNA, LN-S, PF (Pfizer) (Purple Cap) 02/17/2021, 03/10/2021    PPD Test 04/20/2023, 05/26/2023, 07/11/2023, 08/04/2023       Family History    None       Social History     Socioeconomic History    Marital status: Unknown   Tobacco Use    Smoking status: Never    Smokeless tobacco: Never   Substance and Sexual Activity    Drug use: Never    Sexual activity: Not Currently     Social Determinants of Health      Financial Resource Strain: Low Risk  (8/10/2023)    Overall Financial Resource Strain (CARDIA)     Difficulty of Paying Living Expenses: Not hard at all   Food Insecurity: No Food Insecurity (8/10/2023)    Hunger Vital Sign     Worried About Running Out of Food in the Last Year: Never true     Ran Out of Food in the Last Year: Never true   Transportation Needs: No Transportation Needs (8/10/2023)    PRAPARE - Transportation     Lack of Transportation (Medical): No     Lack of Transportation (Non-Medical): No   Physical Activity: Inactive (8/10/2023)    Exercise Vital Sign     Days of Exercise per Week: 0 days     Minutes of Exercise per Session: 0 min   Stress: Patient Declined (8/10/2023)    [place] [employer] - Occupational Stress Questionnaire     Feeling of Stress : Patient declined   Social Connections: Socially Isolated (8/10/2023)    Social Connection and Isolation Panel [NHANES]     Frequency of Communication with Friends and Family: More than three times a week     Frequency of Social Gatherings with Friends and Family: Never     Attends [employer]ism Services: Never     Active Member of Clubs or Organizations: No     Attends Club or Organization Meetings: Never     Marital Status:    Housing Stability: Low Risk  (8/10/2023)    Housing Stability Vital Sign     Unable to Pay for Housing in the Last Year: No     Number of Places Lived in the Last Year: 2     Unstable Housing in the Last Year: No   Recent Concern: Housing Stability - High Risk (5/23/2023)    Housing Stability Vital Sign     Unable to Pay for Housing in the Last Year: Yes     Number of Places Lived in the Last Year: 1     Unstable Housing in the Last Year: Yes     Review of Systems   Unable to perform ROS: Mental status change   Respiratory:  Negative for chest tightness.    Cardiovascular:  Negative for chest pain.   Gastrointestinal:  Negative for abdominal pain.   Psychiatric/Behavioral:  Positive for confusion.       Objective:     Vital Signs (Most Recent):  Temp: 98.6 °F (37 °C) (12/17/23 0545)  Pulse: 68 (12/17/23 0545)  Resp: 20 (12/17/23 0545)  BP: (!) 138/108 (12/17/23 0545)  SpO2: 95 % (12/17/23 0545) Vital Signs (24h Range):  Temp:  [97.6 °F (36.4 °C)-98.6 °F (37 °C)] 98.6 °F (37 °C)  Pulse:  [68-90] 68  Resp:  [16-20] 20  SpO2:  [95 %-100 %] 95 %  BP: (138-186)/() 138/108     Weight: 60.8 kg (134 lb)  Body mass index is 23 kg/m².    Estimated Creatinine Clearance: 26.3 mL/min (based on SCr of 1.3 mg/dL).     Physical Exam  Vitals and nursing note reviewed.   Constitutional:       General: She is not in acute distress.     Appearance: Normal appearance. She is well-developed. She is not ill-appearing, toxic-appearing or diaphoretic.   HENT:      Head: Normocephalic and atraumatic.      Right Ear: External ear normal.      Left Ear: External ear normal.      Nose: Nose normal.   Eyes:      General: No scleral icterus.        Right eye: No discharge.         Left eye: No discharge.      Extraocular Movements: Extraocular movements intact.      Conjunctiva/sclera: Conjunctivae normal.   Cardiovascular:      Rate and Rhythm: Normal rate and regular rhythm.   Pulmonary:      Effort: Pulmonary effort is normal. No respiratory distress.      Breath sounds: No stridor.   Abdominal:      General: There is no distension.      Palpations: Abdomen is soft. There is no mass.      Tenderness: There is no abdominal tenderness.   Musculoskeletal:         General: Normal range of motion.   Skin:     General: Skin is warm.      Findings: No erythema or rash.   Neurological:      Mental Status: She is alert. Mental status is at baseline. She is disoriented.      Cranial Nerves: No cranial nerve deficit.   Psychiatric:         Mood and Affect: Mood normal.          Significant Labs: Blood Culture:   Recent Labs   Lab 07/08/23  1821 07/23/23  0033   LABBLOO No growth after 5 days.  No growth after 5 days. No growth after 5 days.   "No growth after 5 days.     BMP:   Recent Labs   Lab 12/17/23 0337   GLU 93      K 3.2*      CO2 25   BUN 19   CREATININE 1.3   CALCIUM 8.6*   MG 1.6     CBC:   Recent Labs   Lab 12/16/23 2001 12/17/23 0337   WBC 7.11 5.72   HGB 6.7* 8.5*   HCT 21.0* 25.8*    292     CMP:   Recent Labs   Lab 12/16/23 2001 12/17/23 0337    142   K 3.3* 3.2*    110   CO2 21* 25    93   BUN 20 19   CREATININE 1.4 1.3   CALCIUM 9.0 8.6*   PROT 6.0  --    ALBUMIN 2.7*  --    BILITOT 0.5  --    ALKPHOS 74  --    AST 18  --    ALT 7*  --    ANIONGAP 14 7*     Fungus Culture (Blood or Bone Marrow): No results for input(s): "FUNGUSCULTUR" in the last 4320 hours.  Genital Culture: No results for input(s): "LABGENI" in the last 4320 hours.  Hepatitis Panel: No results for input(s): "HEPBSAG", "HEPAIGM", "HEPCAB" in the last 48 hours.    Invalid input(s): "HAPBIGM"  HIV 1/2 Antibodies: No results for input(s): "[id number]" in the last 48 hours.  HIV Rapid: No results for input(s): "HIVRAPID" in the last 48 hours.  Lactic Acid: No results for input(s): "LACTATE" in the last 48 hours.  Microbiology Results (last 7 days)       Procedure Component Value Units Date/Time    Urine culture [[id number]] Collected: 12/16/23 2027    Order Status: No result Specimen: Urine Updated: 12/16/23 2052          Pathology Results  (Last 10 years)                 05/06/23 2105  Specimen to Pathology, Surgery General Surgery Final result    Narrative:  Specimen #1:  Hernia Sac (Perm)   Which provider would you like to cc?->BRENNEN CASSIDY   Release to patient->Immediate   Specimen total (fresh, frozen, permanent):->1             Quantiferon: No results for input(s): "NIL", "TBAG", "TBAGNIL", "MITOGENNIL", "TBGOLD" in the last 48 hours.  Respiratory Culture: No results for input(s): "GSRESP", "RESPIRATORYC" in the last 4320 hours.  Urine Culture:   Recent Labs   Lab 07/08/23  1844 07/23/23  0306 07/31/23  0030 " "08/09/23  1756 09/18/23  1204   LABURIN Multiple organisms isolated. None in predominance.  Repeat if  clinically necessary. EVELYNE GLABRATA  10,000 - 49,999 cfu/ml  Treatment of asymptomatic candiduria is not recommended (except for   specific populations). Candida isolated in the urine typically   represents colonization. If an indwelling urinary catheter is present  it should be removed or replaced.  * ENTEROCOCCUS FAECALIS  10,000 - 49,999 cfu/ml  * ENTEROCOCCUS GALLINARUM  50,000 - 99,999 cfu/ml  * No growth     Urine Studies:   Recent Labs   Lab 09/18/23  1204 12/16/23 2027   COLORU Yellow Yellow   APPEARANCEUA Clear Ex.Turbid   PHUR 5.0 6.0   SPECGRAV 1.020 1.015   PROTEINUA Trace* 1+*   GLUCUA Negative Negative   KETONESU Negative Negative   BILIRUBINUA Negative Negative   OCCULTUA Negative 1+*   NITRITE Negative Negative   UROBILINOGEN Negative  --    LEUKOCYTESUR 3+* 3+*   RBCUA 0 9*   WBCUA 11* >100*   BACTERIA Occasional Many*   SQUAMEPITHEL 1 7   HYALINECASTS 16* 0     Wound Culture: No results for input(s): "LABAERO" in the last 4320 hours.  Recent Lab Results  (Last 5 results in the past 24 hours)        12/17/23  0824   12/17/23  0524   12/17/23  0337   12/17/23  0216   12/16/23 2107        Unit Blood Type Code         5100       Unit Expiration         [id number]       Unit Blood Type         O POS       Anion Gap     7           Baso #     0.05           Basophil %     0.9           BUN     19           Calcium     8.6           Chloride     110           CO2     25           CODING SYSTEM         [id number]       Creatinine     1.3           Crossmatch Interpretation         Compatible       Differential Method     Automated           DISPENSE STATUS         TRANSFUSED       eGFR     39.8           Eos #     0.1           Eosinophil %     1.7           Estimated Avg Glucose     120           Glucose     93           Gran # (ANC)     3.7           Gran %     65.1           Group & Rh         O " POS       Hematocrit     25.8           Hemoglobin     8.5           Hemoglobin A1C External     5.8  Comment: ADA Screening Guidelines:  5.7-6.4%  Consistent with prediabetes  >or=6.5%  Consistent with diabetes    High levels of fetal hemoglobin interfere with the HbA1C  assay. Heterozygous hemoglobin variants (HbS, HgC, etc)do  not significantly interfere with this assay.   However, presence of multiple variants may affect accuracy.             Immature Grans (Abs)     0.02  Comment: Mild elevation in immature granulocytes is non specific and   can be seen in a variety of conditions including stress response,   acute inflammation, trauma and pregnancy. Correlation with other   laboratory and clinical findings is essential.             Immature Granulocytes     0.3           INDIRECT DAVON         NEG       Lymph #     1.3           Lymph %     22.6           Magnesium      1.6           MCH     26.3           MCHC     32.9           MCV     80           Mono #     0.5           Mono %     9.4           MPV     10.3           nRBC     0           Phosphorus Level     3.5           Platelet Count     292           POCT Glucose 95   93             Potassium     3.2           Product Code         [id number]       RBC     3.23           RDW     16.9           Sodium     142           Specimen Outdate         12/19/2023 23:59       Troponin I     0.063  Comment: The reference interval for Troponin I represents the 99th percentile   cutoff   for our facility and is consistent with 3rd generation assay   performance.     0.073  Comment: The reference interval for Troponin I represents the 99th percentile   cutoff   for our facility and is consistent with 3rd generation assay   performance.           UNIT NUMBER         [id number]       WBC     5.72                                  Significant Imaging:     Imaging Results              CT Head Without Contrast (Final result)  Result time 12/16/23 22:41:26      Final  result by Luis Doran DO (12/16/23 22:41:26)                   Impression:      No acute intracranial abnormality.      Electronically signed by: Luis Doran  Date:    12/16/2023  Time:    22:41               Narrative:    EXAMINATION:  CT HEAD WITHOUT CONTRAST    CLINICAL HISTORY:  Mental status change, unknown cause;    TECHNIQUE:  Low dose axial CT images obtained throughout the head without intravenous contrast. Sagittal and coronal reconstructions were performed.  Examination was repeated due to motion artifact.    COMPARISON:  CT head dated 08/09/2023.    FINDINGS:  Ventricles and sulci are normal in size for age without evidence of hydrocephalus. No extra-axial blood or fluid collections.  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  There are chronic microvascular ischemic changes, stable.  No parenchymal mass, hemorrhage, edema or major vascular distribution infarct.    No calvarial fracture.  The scalp is unremarkable.  Bilateral paranasal sinuses and mastoid air cells are clear.                                       X-Ray Chest AP Portable (Final result)  Result time 12/16/23 21:05:43      Final result by Olaf Boudreaux MD (12/16/23 21:05:43)                   Impression:      No acute findings.    No significant change from prior study.      Electronically signed by: Olaf Boudreaux MD  Date:    12/16/2023  Time:    21:05               Narrative:    EXAMINATION:  XR CHEST AP PORTABLE    CLINICAL HISTORY:  Altered mental status, unspecified    TECHNIQUE:  Single frontal view of the chest was performed.    COMPARISON:  09/20/2023.    FINDINGS:  Right hemidiaphragm elevation, similar to prior.  Accessory azygous lobe.  Chronic increased markings in the lungs, similar to prior.  Perihilar vascular congestion appears less pronounced.    Heart and lungs otherwise appear unchanged when allowing for differences in technique and positioning.    Retrocardiac hiatal hernia,  similar to prior.

## 2023-12-17 NOTE — ASSESSMENT & PLAN NOTE
- more confused due to UTI  - oriented to person only on exam (baseline), no focal deficits   - delirium precautions  - continue aricept and namenda once able to safely swallow

## 2023-12-17 NOTE — ASSESSMENT & PLAN NOTE
88 year old female with a PMHx of DVT/PE, chronic bilateral pleural effusions, HTN, alzheimer's dementia who presents to [facility] from [facility] for evaluation of altered mental status. ID consulted for antibiotic recommendations for pyuria. Unclear what patient baseline mentation is. She is afebrile and HDS. No leukocytosis. Most recent urine cultures reviewed and grew Enterococcus species.     Recommendations  Unclear if reported worsened mentation from baseline is secondary to a UTI. Would continue to evaluate for non infectious etiologies.  Continue Ampicillin for now  Follow urine culture results  Anticipate short course of antibiotics for uncomplicated UTI  Discussed plan with ID staff. ID will follow.

## 2023-12-17 NOTE — HOSPITAL COURSE
12/17 Hb 8.4 s/p Transfusion with 1 unit of PRBC. K and Mg replaced. NPO. GI Eval . on ampicillin for prior h/o enterococcal UTI. UC pending . per GI, Hb at baseline s/p transfusion. consult GI if evidence of overt bleed . ID eval -Unclear if reported worsened mentation from baseline is secondary to a UTI.Continue Ampicillin   12/18 UC 12/16 No growth. ampicillin discontinued.  K of 2.9, replaced IV. SLP eval today -  minced/moist ( level 5) and thin liquid. was seen by GI at [facility] 9/18 and advised resumption of eliquis.  Hb stable at 8.4 s/p transfusion . Brown stool on MI exam. Initial Hgb 6.7 -->8.5 s/p 1u pRBCs. Most recent Hgb stable at 8.9. GI consulted for anemia.  follow up in clinic to consider outpatient endoscopy. Discussed with son POA - . Maykel Sandoval about risks and benefits  of anticoagulation as the patient is bedboud and at risk for DVT/PE .son understands the risks- agrees for resumption of eliquis   12/19 Hb stable on eliquis. discharge to NH today

## 2023-12-17 NOTE — ASSESSMENT & PLAN NOTE
- hold eliquis given anemia and possible GIB  CTA chest 5/23 -Examination positive for extensive pulmonary emboli asymmetric to the right. Embolus noted within the lateral aspect of the right main pulmonary artery, encroaching within 4.7 cm of the main pulmonary artery bifurcation   Repeat CTA chest 07/24 - no evidence of  pulmonary embolism. resume eliquis once cleared by SLP   12/18 Discussed with son POA - Mr. Maykel Sandoval about risks and benefits  of anticoagulation as the patient is bedboud and at risk for DVT/PE .son understands the risks- agrees for resumption of eliquis

## 2023-12-17 NOTE — ASSESSMENT & PLAN NOTE
- Hgb 6.7, baseline ~8  - FOBT positive but no other evidence of overt GI bleeding  - s/p 1U pRBCs in the ED  - will give 80mg protonic IVP x1  - further IV PPI/ GI consult pending H/H trend  - NPO at [facility] as precaution  - holding eliquis   Anemia      Recent Labs   Lab 12/16/23 2001 12/17/23  0337   HGB 6.7* 8.5*         Component Value Date/Time    MCV 80 (L) 12/17/2023 0337    RDW 16.9 (H) 12/17/2023 0337    FOLATE 7.7 06/23/2023 1153    [id number] 971 (H) 08/01/2023 0535    OCCULTBLOOD Positive (A) 05/28/2023 0311     Monitor CBC and transfuse if H/H drops below 7/21.    12/17 . per GI, Hb at baseline s/p transfusion. consult GI if evidence of overt bleed .

## 2023-12-17 NOTE — ASSESSMENT & PLAN NOTE
- appears to be acute on chronic issue  - failed swallow study in the ED  - keep NPO  - SLP consulted

## 2023-12-17 NOTE — ED NOTES
Nurses Note -- 4 Eyes      12/16/2023   6:53 PM      Skin assessed during: Daily Assessment      [x] No Altered Skin Integrity Present    [x]Prevention Measures Documented      [] Yes- Altered Skin Integrity Present or Discovered   [] LDA Added if Not in Epic (Describe Wound)   [] New Altered Skin Integrity was Present on Admit and Documented in LDA   [] Wound Image Taken    Wound Care Consulted? No    Attending Nurse:  Debra Weston RN/Staff Member:   Clemencia Evans RN

## 2023-12-17 NOTE — CONSULTS
[facility] - Emergency Dept  Infectious Disease  Consult Note    Patient Name: Radha Sandoval  MRN: [id number]  Admission Date: 12/16/2023  Hospital Length of Stay: 1 days  Attending Physician: Kory Smith MD  Primary Care Provider: Agatha Alvarez     Isolation Status: No active isolations    Patient information was obtained from past medical records.      Inpatient consult to Infectious Diseases  Consult performed by: Leda Lubin PA-C  Consult ordered by: Julianna Corbin PA-C        Assessment/Plan:     Renal/  Acute cystitis without hematuria    88 year old female with a PMHx of DVT/PE, chronic bilateral pleural effusions, HTN, alzheimer's dementia [place] [facility] [place] [facility] for evaluation of altered mental status. ID consulted for antibiotic recommendations for pyuria. Unclear what patient baseline mentation is. She is afebrile and HDS. No leukocytosis. Most recent urine cultures reviewed and grew Enterococcus species.     Recommendations  Unclear if reported worsened mentation from baseline is secondary to a UTI. Would continue to evaluate for non infectious etiologies.  Continue Ampicillin for now  Follow urine culture results  Anticipate short course of antibiotics for uncomplicated UTI  Discussed plan with ID staff. ID will follow.         Thank you for the consult. Please secure chat for any questions.  Leda Lubin PA-C      Subjective:     Principal Problem: Acute encephalopathy    HPI: Radha Sandoval is an 88 year old female with a PMHx of DVT/PE, chronic bilateral pleural effusions, HTN, alzheimer's dementia who [place] [facility] [place] [facility] [place] evaluation of altered mental status. Patient is unable to provide any meaningful history due to AMS.     Per chart review, patient noted to be more confused this afternoon and hallucinating by NH staff. She is oriented to person only at baseline. Presentation is similar to when she had a UTI a few months ago.      In ED  afebrile and HDS. No leukocytosis. Hgb 6.7, Cr 1.4. CTH/CXR negative for acute findings. Given 1U pRBCs. UA positive for > 100 WBC, 7 squams. ID consulted for antibiotic recommendations. Primary team has started ampicillin.    08/09/23 Urine culture - Enterococcus gallinarum   07/31/23 Urine culture - Enterococcus faecalis     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 5/22/2023    Lumbar spondylosis with myelopathy 4/25/2023    Multiple closed right sided fractures of pelvis without disruption of pelvic ring 7/9/2023    Primary hypertension 6/10/2022    Subdural hygroma 7/8/2023       Past Surgical History:   Procedure Laterality Date    REPAIR, HERNIA, INGUINAL, WITHOUT HISTORY OF PRIOR REPAIR, AGE 5 YEARS OR OLDER Right 5/6/2023    Procedure: REPAIR, HERNIA, INGUINAL, WITHOUT HISTORY OF PRIOR REPAIR, AGE 5 YEARS OR OLDER;  Surgeon: Maurice Piper MD;  Location: [facility] OR [address] [place] [address];  Service: General;  Laterality: Right;  possible laparotomy, possible bowel resection       Review of patient's allergies indicates:  No Known Allergies    Medications:  (Not in a hospital admission)    Antibiotics (From admission, onward)      Start     Stop Route Frequency Ordered    12/17/23 0400  ampicillin (OMNIPEN) 500 mg in sodium chloride 0.9 % 100 mL IVPB (MB+)         -- IV Every 6 hours (non-standard times) 12/16/23 2309          Antifungals (From admission, onward)      None          Antivirals (From admission, onward)      None             Immunization History   Administered Date(s) Administered    COVID-19, MRNA, LN-S, PF (Pfizer) (Purple Cap) 02/17/2021, 03/10/2021    PPD Test 04/20/2023,  05/26/2023, 07/11/2023, 08/04/2023       Family History    None       Social History     Socioeconomic History    Marital status: Unknown   Tobacco Use    Smoking status: Never    Smokeless tobacco: Never   Substance and Sexual Activity    Drug use: Never    Sexual activity: Not Currently     Social Determinants of Health     Financial Resource Strain: Low Risk  (8/10/2023)    Overall Financial Resource Strain (CARDIA)     Difficulty of Paying Living Expenses: Not hard at all   Food Insecurity: No Food Insecurity (8/10/2023)    Hunger Vital Sign     Worried About Running Out of Food in the Last Year: Never true     Ran Out of Food in the Last Year: Never true   Transportation Needs: No Transportation Needs (8/10/2023)    PRAPARE - Transportation     Lack of Transportation (Medical): No     Lack of Transportation (Non-Medical): No   Physical Activity: Inactive (8/10/2023)    Exercise Vital Sign     Days of Exercise per Week: 0 days     Minutes of Exercise per Session: 0 min   Stress: Patient Declined (8/10/2023)    [place] [employer] - Occupational Stress Questionnaire     Feeling of Stress : Patient declined   Social Connections: Socially Isolated (8/10/2023)    Social Connection and Isolation Panel [NHANES]     Frequency of Communication with Friends and Family: More than three times a week     Frequency of Social Gatherings with Friends and Family: Never     Attends Hindu Services: Never     Active Member of Clubs or Organizations: No     Attends Club or Organization Meetings: Never     Marital Status:    Housing Stability: Low Risk  (8/10/2023)    Housing Stability Vital Sign     Unable to Pay for Housing in the Last Year: No     Number of Places Lived in the Last Year: 2     Unstable Housing in the Last Year: No   Recent Concern: Housing Stability - High Risk (5/23/2023)    Housing Stability Vital Sign     Unable to Pay for Housing in the Last Year: Yes     Number of Places Lived  in the Last Year: 1     Unstable Housing in the Last Year: Yes     Review of Systems   Unable to perform ROS: Mental status change   Respiratory:  Negative for chest tightness.    Cardiovascular:  Negative for chest pain.   Gastrointestinal:  Negative for abdominal pain.   Psychiatric/Behavioral:  Positive for confusion.      Objective:     Vital Signs (Most Recent):  Temp: 98.6 °F (37 °C) (12/17/23 0545)  Pulse: 68 (12/17/23 0545)  Resp: 20 (12/17/23 0545)  BP: (!) 138/108 (12/17/23 0545)  SpO2: 95 % (12/17/23 0545) Vital Signs (24h Range):  Temp:  [97.6 °F (36.4 °C)-98.6 °F (37 °C)] 98.6 °F (37 °C)  Pulse:  [68-90] 68  Resp:  [16-20] 20  SpO2:  [95 %-100 %] 95 %  BP: (138-186)/() 138/108     Weight: 60.8 kg (134 lb)  Body mass index is 23 kg/m².    Estimated Creatinine Clearance: 26.3 mL/min (based on SCr of 1.3 mg/dL).     Physical Exam  Vitals and nursing note reviewed.   Constitutional:       General: She is not in acute distress.     Appearance: Normal appearance. She is well-developed. She is not ill-appearing, toxic-appearing or diaphoretic.   HENT:      Head: Normocephalic and atraumatic.      Right Ear: External ear normal.      Left Ear: External ear normal.      Nose: Nose normal.   Eyes:      General: No scleral icterus.        Right eye: No discharge.         Left eye: No discharge.      Extraocular Movements: Extraocular movements intact.      Conjunctiva/sclera: Conjunctivae normal.   Cardiovascular:      Rate and Rhythm: Normal rate and regular rhythm.   Pulmonary:      Effort: Pulmonary effort is normal. No respiratory distress.      Breath sounds: No stridor.   Abdominal:      General: There is no distension.      Palpations: Abdomen is soft. There is no mass.      Tenderness: There is no abdominal tenderness.   Musculoskeletal:         General: Normal range of motion.   Skin:     General: Skin is warm.      Findings: No erythema or rash.   Neurological:      Mental Status: She is alert.  "Mental status is at baseline. She is disoriented.      Cranial Nerves: No cranial nerve deficit.   Psychiatric:         Mood and Affect: Mood normal.          Significant Labs: Blood Culture:   Recent Labs   Lab 07/08/23  1821 07/23/23  0033   LABBLOO No growth after 5 days.  No growth after 5 days. No growth after 5 days.  No growth after 5 days.     BMP:   Recent Labs   Lab 12/17/23 0337   GLU 93      K 3.2*      CO2 25   BUN 19   CREATININE 1.3   CALCIUM 8.6*   MG 1.6     CBC:   Recent Labs   Lab 12/16/23 2001 12/17/23 0337   WBC 7.11 5.72   HGB 6.7* 8.5*   HCT 21.0* 25.8*    292     CMP:   Recent Labs   Lab 12/16/23 2001 12/17/23 0337    142   K 3.3* 3.2*    110   CO2 21* 25    93   BUN 20 19   CREATININE 1.4 1.3   CALCIUM 9.0 8.6*   PROT 6.0  --    ALBUMIN 2.7*  --    BILITOT 0.5  --    ALKPHOS 74  --    AST 18  --    ALT 7*  --    ANIONGAP 14 7*     Fungus Culture (Blood or Bone Marrow): No results for input(s): "FUNGUSCULTUR" in the last 4320 hours.  Genital Culture: No results for input(s): "LABGENI" in the last 4320 hours.  Hepatitis Panel: No results for input(s): "HEPBSAG", "HEPAIGM", "HEPCAB" in the last 48 hours.    Invalid input(s): "HAPBIGM"  HIV 1/2 Antibodies: No results for input(s): "[id number]" in the last 48 hours.  HIV Rapid: No results for input(s): "HIVRAPID" in the last 48 hours.  Lactic Acid: No results for input(s): "LACTATE" in the last 48 hours.  Microbiology Results (last 7 days)       Procedure Component Value Units Date/Time    Urine culture [[id number]] Collected: 12/16/23 2027    Order Status: No result Specimen: Urine Updated: 12/16/23 2052          Pathology Results  (Last 10 years)                 05/06/23 2105  Specimen to Pathology, Surgery General Surgery Final result    Narrative:  Specimen #1:  Hernia Sac (Perm)   Which provider would you like to cc?->BRENNEN CASSIDY   Release to patient->Immediate   Specimen total (fresh, " "frozen, permanent):->1             Quantiferon: No results for input(s): "NIL", "TBAG", "TBAGNIL", "MITOGENNIL", "TBGOLD" in the last 48 hours.  Respiratory Culture: No results for input(s): "GSRESP", "RESPIRATORYC" in the last 4320 hours.  Urine Culture:   Recent Labs   Lab 07/08/23  1844 07/23/23  0306 07/31/23  0030 08/09/23  1756 09/18/23  1204   LABURIN Multiple organisms isolated. None in predominance.  Repeat if  clinically necessary. EVELYNE GLABRATA  10,000 - 49,999 cfu/ml  Treatment of asymptomatic candiduria is not recommended (except for   specific populations). Candida isolated in the urine typically   represents colonization. If an indwelling urinary catheter is present  it should be removed or replaced.  * ENTEROCOCCUS FAECALIS  10,000 - 49,999 cfu/ml  * ENTEROCOCCUS GALLINARUM  50,000 - 99,999 cfu/ml  * No growth     Urine Studies:   Recent Labs   Lab 09/18/23  1204 12/16/23 2027   COLORU Yellow Yellow   APPEARANCEUA Clear Ex.Turbid   PHUR 5.0 6.0   SPECGRAV 1.020 1.015   PROTEINUA Trace* 1+*   GLUCUA Negative Negative   KETONESU Negative Negative   BILIRUBINUA Negative Negative   OCCULTUA Negative 1+*   NITRITE Negative Negative   UROBILINOGEN Negative  --    LEUKOCYTESUR 3+* 3+*   RBCUA 0 9*   WBCUA 11* >100*   BACTERIA Occasional Many*   SQUAMEPITHEL 1 7   HYALINECASTS 16* 0     Wound Culture: No results for input(s): "LABAERO" in the last 4320 hours.  Recent Lab Results  (Last 5 results in the past 24 hours)        12/17/23  0824   12/17/23  0524   12/17/23  0337   12/17/23  0216   12/16/23  2107        Unit Blood Type Code         5100       Unit Expiration         [id number]       Unit Blood Type         O POS       Anion Gap     7           Baso #     0.05           Basophil %     0.9           BUN     19           Calcium     8.6           Chloride     110           CO2     25           CODING SYSTEM         [id number]       Creatinine     1.3           Crossmatch Interpretation         " Compatible       Differential Method     Automated           DISPENSE STATUS         TRANSFUSED       eGFR     39.8           Eos #     0.1           Eosinophil %     1.7           Estimated Avg Glucose     120           Glucose     93           Gran # (ANC)     3.7           Gran %     65.1           Group & Rh         O POS       Hematocrit     25.8           Hemoglobin     8.5           Hemoglobin A1C External     5.8  Comment: ADA Screening Guidelines:  5.7-6.4%  Consistent with prediabetes  >or=6.5%  Consistent with diabetes    High levels of fetal hemoglobin interfere with the HbA1C  assay. Heterozygous hemoglobin variants (HbS, HgC, etc)do  not significantly interfere with this assay.   However, presence of multiple variants may affect accuracy.             Immature Grans (Abs)     0.02  Comment: Mild elevation in immature granulocytes is non specific and   can be seen in a variety of conditions including stress response,   acute inflammation, trauma and pregnancy. Correlation with other   laboratory and clinical findings is essential.             Immature Granulocytes     0.3           INDIRECT DAVON         NEG       Lymph #     1.3           Lymph %     22.6           Magnesium      1.6           MCH     26.3           MCHC     32.9           MCV     80           Mono #     0.5           Mono %     9.4           MPV     10.3           nRBC     0           Phosphorus Level     3.5           Platelet Count     292           POCT Glucose 95   93             Potassium     3.2           Product Code         [id number]       RBC     3.23           RDW     16.9           Sodium     142           Specimen Outdate         12/19/2023 23:59       Troponin I     0.063  Comment: The reference interval for Troponin I represents the 99th percentile   cutoff   for our facility and is consistent with 3rd generation assay   performance.     0.073  Comment: The reference interval for Troponin I represents the 99th percentile    cutoff   for our facility and is consistent with 3rd generation assay   performance.           UNIT NUMBER         [id number]       WBC     5.72                                  Significant Imaging:     Imaging Results              CT Head Without Contrast (Final result)  Result time 12/16/23 22:41:26      Final result by Luis Doran DO (12/16/23 22:41:26)                   Impression:      No acute intracranial abnormality.      Electronically signed by: Luis Doran  Date:    12/16/2023  Time:    22:41               Narrative:    EXAMINATION:  CT HEAD WITHOUT CONTRAST    CLINICAL HISTORY:  Mental status change, unknown cause;    TECHNIQUE:  Low dose axial CT images obtained throughout the head without intravenous contrast. Sagittal and coronal reconstructions were performed.  Examination was repeated due to motion artifact.    COMPARISON:  CT head dated 08/09/2023.    FINDINGS:  Ventricles and sulci are normal in size for age without evidence of hydrocephalus. No extra-axial blood or fluid collections.  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  There are chronic microvascular ischemic changes, stable.  No parenchymal mass, hemorrhage, edema or major vascular distribution infarct.    No calvarial fracture.  The scalp is unremarkable.  Bilateral paranasal sinuses and mastoid air cells are clear.                                       X-Ray Chest AP Portable (Final result)  Result time 12/16/23 21:05:43      Final result by Olaf Boudreaux MD (12/16/23 21:05:43)                   Impression:      No acute findings.    No significant change from prior study.      Electronically signed by: Olaf Boudreaux MD  Date:    12/16/2023  Time:    21:05               Narrative:    EXAMINATION:  XR CHEST AP PORTABLE    CLINICAL HISTORY:  Altered mental status, unspecified    TECHNIQUE:  Single frontal view of the chest was performed.    COMPARISON:  09/20/2023.    FINDINGS:  Right  hemidiaphragm elevation, similar to prior.  Accessory azygous lobe.  Chronic increased markings in the lungs, similar to prior.  Perihilar vascular congestion appears less pronounced.    Heart and lungs otherwise appear unchanged when allowing for differences in technique and positioning.    Retrocardiac hiatal hernia, similar to prior.

## 2023-12-17 NOTE — ASSESSMENT & PLAN NOTE
- afebrile without leukocytosis  - UA infectious  - started on ampicillin based on prior urine cultures, will continue  - ID consulted for antibiotic recs  - follow Ucx  - strict I/Os

## 2023-12-17 NOTE — ASSESSMENT & PLAN NOTE
QTc prolonged at  583ms.  Avoid QT prolonging agents      Render Risk Assessment In Note?: no Additional Notes: MONITOR FOR CHANGES, CALL WITH FLARES.\\nCONTINUE ANTIHISTAMINE, such as Allegra 180 mg QD. Detail Level: Simple

## 2023-12-17 NOTE — ASSESSMENT & PLAN NOTE
- Cr 1.4, baseline ~0.9-1.1  - suspect 2/2 UTI and prerenal from anemia  - avoid nephrotoxins, renally dose meds  - management of UTI/anemia as noted above    Recent Labs   Lab 12/16/23 2001 12/17/23  0337   BUN 20 19   CREATININE 1.4 1.3       I & O     Intake/Output Summary (Last 24 hours) at 12/17/2023 0730  Last data filed at 12/17/2023 0340  Gross per 24 hour   Intake 350 ml   Output no documentation   Net 350 ml

## 2023-12-17 NOTE — ED NOTES
Assumed care for pt after recieving report from nightshift RN. Pt. resting in bed in NAD, RR e/u. Vital signs stable and within desired limits at this time of assessment. Pt. offered bathroom assistance and denies need at this time. Explanation of care/wait provided. Pt verbalizes no needs at this time. Bed in low, locked position with rails up and call bell in reach. Pt's white board updated with today's care team and plan.     Patient identifiers for Radha Sandoval 88 y.o. female checked and correct.  Chief Complaint   Patient presents with    Altered Mental Status     Arrives via EMS from [facility] for AMS. Per staff, went more altered and hallucinating. Only oriented to self at baseline      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 5/22/2023    Lumbar spondylosis with myelopathy 4/25/2023    Multiple closed right sided fractures of pelvis without disruption of pelvic ring 7/9/2023    Primary hypertension 6/10/2022    Subdural hygroma 7/8/2023     Allergies reported: Review of patient's allergies indicates:  No Known Allergies      LOC: Patient is awake, alert, and aware of environment with an appropriate affect. Patient is oriented x 4 and speaking appropriately.  APPEARANCE: Patient resting comfortably and in no acute distress. Patient is clean and well groomed, patient's clothing is properly fastened.  HEENT: WDL  SKIN: The skin is warm and dry. Patient has normal skin turgor and moist mucus membranes.   MUSKULOSKELETAL: Patient is moving all extremities well, no obvious deformities noted. Pulses intact.   RESPIRATORY: Airway is open and patent. Respirations are spontaneous and  non-labored with normal effort and rate.  CARDIAC: Patient has a normal rate and rhythm. 68 on cardiac monitor. No peripheral edema noted.   ABDOMEN: No distention noted. Soft and non-tender upon palpation.  NEUROLOGICAL: pupils 3mm, PERRL. Facial expression is symmetrical. Hand grasps are equal bilaterally. Normal sensation in all extremities when touched with finger.

## 2023-12-17 NOTE — ED PROVIDER NOTES
Encounter Date: 12/16/2023       History     Chief Complaint   Patient presents with    Altered Mental Status     Arrives via EMS from [facility] for AMS. Per staff, went more altered and hallucinating. Only oriented to self at baseline      87 yo female with pmh of alzheimer's presents from her nursing home for AMS. According the NH, she is A&O x1 at her baseline. Today she began to hallucinate. When speaking to her, she is alert but she rambles non-sensically. She was also addressing people who were not in the room. I was not able to obtain any significant history from her as she is severely altered, either from her known history of alzheimer's or an additional insult.        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 5/22/2023    Lumbar spondylosis with myelopathy 4/25/2023    Multiple closed right sided fractures of pelvis without disruption of pelvic ring 7/9/2023    Primary hypertension 6/10/2022    Subdural hygroma 7/8/2023     Past Surgical History:   Procedure Laterality Date    REPAIR, HERNIA, INGUINAL, WITHOUT HISTORY OF PRIOR REPAIR, AGE 5 YEARS OR OLDER Right 5/6/2023    Procedure: REPAIR, HERNIA, INGUINAL, WITHOUT HISTORY OF PRIOR REPAIR, AGE 5 YEARS OR OLDER;  Surgeon: Maurice Piper MD;  Location: [facility] OR [address] [place] [address];  Service: General;  Laterality: Right;  possible laparotomy, possible bowel resection     History reviewed. No pertinent family history.  Social History     Tobacco Use    Smoking status: Never    Smokeless tobacco: Never   Substance Use Topics    Drug use: Never     Review of Systems    Physical Exam      Initial Vitals [12/16/23 1803]   BP Pulse Resp Temp SpO2   (!) 142/52 90 18 98.2 °F (36.8 °C) 100 %      MAP       --         Physical Exam    Constitutional: No distress.   HENT:   Head: Normocephalic and atraumatic.   Eyes: EOM are normal. Pupils are equal, round, and reactive to light. Right eye exhibits no discharge. Left eye exhibits no discharge. No scleral icterus.   Neck: Neck supple.   Cardiovascular:  Normal rate and regular rhythm.           Murmur (AS murmur) heard.  Pulmonary/Chest: Breath sounds normal. No respiratory distress. She has no wheezes.   Abdominal: Abdomen is soft. She exhibits no distension. There is no abdominal tenderness.   Genitourinary: Rectum:      Guaiac result positive.   Guaiac positive stool. : Acceptable.  Musculoskeletal:         General: No edema. Normal range of motion.      Cervical back: Neck supple.     Neurological: She is alert. She has normal strength. She is disoriented. No cranial nerve deficit or sensory deficit.   A&O x1, person only   Skin: Skin is warm and dry.   Psychiatric: Her behavior is normal. Her affect is labile. She is actively hallucinating. Cognition and memory are impaired. She is inattentive.         ED Course   Procedures  Labs Reviewed   CBC W/ AUTO DIFFERENTIAL - Abnormal; Notable for the following components:       Result Value    RBC 2.81 (*)     Hemoglobin 6.7 (*)     Hematocrit 21.0 (*)     MCV 75 (*)     MCH 23.8 (*)     MCHC 31.9 (*)     RDW 17.1 (*)     Gran % 75.4 (*)     Lymph % 14.6 (*)     All other components within normal limits   COMPREHENSIVE METABOLIC PANEL - Abnormal; Notable for the following components:    Potassium 3.3 (*)     CO2 21 (*)     Albumin 2.7 (*)     ALT 7 (*)     eGFR 36.4 (*)     All other components within normal limits   B-TYPE NATRIURETIC PEPTIDE - Abnormal; Notable for the following components:     (*)     All other components within normal limits   TROPONIN I - Abnormal; Notable for  the following components:    Troponin I 0.059 (*)     All other components within normal limits   URINALYSIS, REFLEX TO URINE CULTURE - Abnormal; Notable for the following components:    Protein, UA 1+ (*)     Occult Blood UA 1+ (*)     Leukocytes, UA 3+ (*)     All other components within normal limits    Narrative:     Specimen Source->Urine   URINALYSIS MICROSCOPIC - Abnormal; Notable for the following components:    RBC, UA 9 (*)     WBC, UA >100 (*)     WBC Clumps, UA Many (*)     Bacteria Many (*)     All other components within normal limits    Narrative:     Specimen Source->Urine   CULTURE, URINE   TSH   TROPONIN I   TYPE & SCREEN   PREPARE RBC SOFT          Imaging Results              CT Head Without Contrast (Final result)  Result time 12/16/23 22:41:26      Final result by Luis Doran DO (12/16/23 22:41:26)                   Impression:      No acute intracranial abnormality.      Electronically signed by: Luis Doran  Date:    12/16/2023  Time:    22:41               Narrative:    EXAMINATION:  CT HEAD WITHOUT CONTRAST    CLINICAL HISTORY:  Mental status change, unknown cause;    TECHNIQUE:  Low dose axial CT images obtained throughout the head without intravenous contrast. Sagittal and coronal reconstructions were performed.  Examination was repeated due to motion artifact.    COMPARISON:  CT head dated 08/09/2023.    FINDINGS:  Ventricles and sulci are normal in size for age without evidence of hydrocephalus. No extra-axial blood or fluid collections.  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  There are chronic microvascular ischemic changes, stable.  No parenchymal mass, hemorrhage, edema or major vascular distribution infarct.    No calvarial fracture.  The scalp is unremarkable.  Bilateral paranasal sinuses and mastoid air cells are clear.                                       X-Ray Chest AP Portable (Final result)  Result time 12/16/23 21:05:43      Final result  by Olaf Boudreaux MD (12/16/23 21:05:43)                   Impression:      No acute findings.    No significant change from prior study.      Electronically signed by: Olaf Boudreaux MD  Date:    12/16/2023  Time:    21:05               Narrative:    EXAMINATION:  XR CHEST AP PORTABLE    CLINICAL HISTORY:  Altered mental status, unspecified    TECHNIQUE:  Single frontal view of the chest was performed.    COMPARISON:  09/20/2023.    FINDINGS:  Right hemidiaphragm elevation, similar to prior.  Accessory azygous lobe.  Chronic increased markings in the lungs, similar to prior.  Perihilar vascular congestion appears less pronounced.    Heart and lungs otherwise appear unchanged when allowing for differences in technique and positioning.    Retrocardiac hiatal hernia, similar to prior.                                       Medications   0.9%  NaCl infusion (for blood administration) (has no administration in time range)   pantoprazole injection 80 mg (has no administration in time range)   albuterol-ipratropium 2.5 mg-0.5 mg/3 mL nebulizer solution 3 mL (has no administration in time range)   melatonin tablet 6 mg (has no administration in time range)   polyethylene glycol packet 17 g (has no administration in time range)   bisacodyL suppository 10 mg (has no administration in time range)   acetaminophen tablet 650 mg (has no administration in time range)   glucose chewable tablet 16 g (has no administration in time range)   glucose chewable tablet 24 g (has no administration in time range)   glucagon (human recombinant) injection 1 mg (has no administration in time range)   ondansetron injection 4 mg (has no administration in time range)   prochlorperazine injection Soln 5 mg (has no administration in time range)   dextrose 10% bolus 125 mL 125 mL (has no administration in time range)   dextrose 10% bolus 250 mL 250 mL (has no administration in time range)   ampicillin (OMNIPEN) 500 mg in sodium chloride 0.9 %  100 mL IVPB (MB+) (0 mg Intravenous Stopped 12/16/23 2666)     Medical Decision Making  88 year old female with pmh of dementia presents from her nursing home with concerns for worsening AMS. She is A&O x1 at baseline and exam today is consistent with that. She was addressing people who were not in the room today. Her physical exam, including neuro exam, was unremarkable. Differential diagnosis includes but is not limited to worsening alzheimer's, infection.. Code stroke was not called because she does not have any appreciable neurological defects. We will obtain CBC, CMP trop, BNP, EKG, CXR, TSH, UA, and CT head.    Amount and/or Complexity of Data Reviewed  Labs: ordered.  Radiology: ordered.    Risk  Prescription drug management.  Decision regarding hospitalization.                                      Clinical Impression:  Final diagnoses:  [R41.82] AMS (altered mental status)          ED Disposition Condition    Admit Stable                Forrest Appiah DO  Resident  12/16/23 7123

## 2023-12-17 NOTE — ASSESSMENT & PLAN NOTE
- suspect 2/2 UTI  - no focal deficits  - CTH negative for acute abnormalities  - treat UTI as below  - neuro checks q4hr  - delirium precautions

## 2023-12-18 PROBLEM — D64.9 CHRONIC ANEMIA: Status: ACTIVE | Noted: 2023-01-01

## 2023-12-18 PROBLEM — E43 SEVERE MALNUTRITION: Status: ACTIVE | Noted: 2023-01-01

## 2023-12-18 PROBLEM — Z79.01 ANTICOAGULATED: Status: ACTIVE | Noted: 2023-01-01

## 2023-12-18 NOTE — PLAN OF CARE
Recommendations    1. Continue Regular Minced and Moist (IDDSI Level 5) Diet; texture per SLP.   2. Recommend Boost VHC-Chocolate with all meals.   3. Recommend MVI, folic acid and thiamine.   4. Consider appetite stimulant, if appropriate.   5. RD to monitor and follow-up.    Goals: Meet % EEN/EPN by follow-up date.  Nutrition Goal Status: new  Communication of RD Recs: other (comment) (POC)

## 2023-12-18 NOTE — CONSULTS
[facility] Gusman - [facility] Surg  Adult Nutrition  Consult Note    SUMMARY     Recommendations    1. Continue Regular Minced and Moist (IDDSI Level 5) Diet; texture per SLP.   2. Recommend Boost VHC-Chocolate with all meals.   3. Recommend MVI, folic acid and thiamine.   4. Consider appetite stimulant, if appropriate.   5. RD to monitor and follow-up.    Goals: Meet % EEN/EPN by follow-up date.  Nutrition Goal Status: new  Communication of RD Recs: other (comment) (POC)    Assessment and Plan    Endocrine  Severe malnutrition  Malnutrition Type:  Context: chronic illness  Level: severe    Related to (etiology):   Decreased ability to consume sufficient energy    Signs and Symptoms (as evidenced by):   AMS and weight loss     Malnutrition Characteristic Summary:  Weight Loss (Malnutrition): other (see comments) (33% x 9 months)  Energy Intake (Malnutrition): less than 75% for greater than or equal to 3 months  Subcutaneous Fat (Malnutrition): moderate depletion  Muscle Mass (Malnutrition): severe depletion  Hand  Strength, Left (Malnutrition): Fair  Hand  Strength, Right (Malnutrition): Fair      Interventions/Recommendations (treatment strategy):  1. Continue Regular Minced and Moist (IDDSI Level 5) Diet; texture per SLP. 2. Recommend Boost VHC-Chocolate with all meals. 3. Recommend MVI, folic acid and thiamine. 4. Consider appetite stimulant, if appropriate. 5. RD to monitor and follow-up.    Collaboration of nutrition care with other providers    Nutrition Diagnosis Status:   New         Malnutrition Assessment  Malnutrition Context: chronic illness  Malnutrition Level: severe  Skin (Micronutrient): bruised, dry, turgor reduced, thinned, cuts unhealed  Nails (Micronutrient): none  Hair/Scalp (Micronutrient): dull, dry, lusterless, stiff  Eyes (Micronutrient): none  Extraoral (Micronutrient): none  Gums (Micronutrient): none  Lips/Mucous Membranes (Micronutrient): other (see comments) (dry, cracked)  Teeth  (Micronutrient): edentulous  Tongue (Micronutrient): none  Neck/Chest (Micronutrient): subcutaneous fat loss, muscle wasting  Musculoskeletal/Lower Extremities: muscle wasting, muscle control poor, subcutaneous fat loss   Micronutrient Evaluation Summary: suspected deficiency  Micronutrient Evaluation Comments: iron, calcium, vitamin D, Vitamin A, Vitamin C, B12, B9, B6, B7   Weight Loss (Malnutrition): other (see comments) (33% x 9 months)  Energy Intake (Malnutrition): less than 75% for greater than or equal to 3 months  Subcutaneous Fat (Malnutrition): moderate depletion  Muscle Mass (Malnutrition): severe depletion  Hand  Strength, Left (Malnutrition): Fair  Hand  Strength, Right (Malnutrition): Fair   Orbital Region (Subcutaneous Fat Loss): moderate depletion  Upper Arm Region (Subcutaneous Fat Loss): severe depletion  Thoracic and Lumbar Region: mild depletion   Scotts Region (Muscle Loss): mild depletion  Clavicle Bone Region (Muscle Loss): mild depletion  Clavicle and Acromion Bone Region (Muscle Loss): mild depletion  Scapular Bone Region (Muscle Loss): mild depletion  Dorsal Hand (Muscle Loss): severe depletion  Patellar Region (Muscle Loss): severe depletion  Anterior Thigh Region (Muscle Loss): severe depletion  Posterior Calf Region (Muscle Loss): severe depletion                 Reason for Assessment    Reason For Assessment: consult  Diagnosis: other (see comments) (Acute encephalopathy)  Relevant Medical History: HTN, DVT, PE, late onset Alzheimer's dementia  Interdisciplinary Rounds: did not attend  General Information Comments: RD consulted for decreased oral intake. Swallowind difficulty noted. Per SLP patient was NPO, but upgraded to Minced and Moist (IDDSI Level 5). UBW ~200 lbs,  lbs. NFPE performed 12/18. Pt meets criteria for moderate malnutrition in the context of chronic illness per ASPEN guidelines.  Nutrition Discharge Planning: Adequate PO intake    Nutrition Risk  "Screen    Nutrition Risk Screen: reduced oral intake over the last month    Nutrition/Diet History    Patient Reported Diet/Restrictions/Preferences: general  Spiritual, Cultural Beliefs, Faith Practices, Values that Affect Care: no  Supplemental Drinks or Food Habits: Boost Original  Food Allergies: NKFA  Factors Affecting Nutritional Intake: impaired cognitive status/motor control, difficulty/impaired swallowing    Anthropometrics    Temp: 97.6 °F (36.4 °C)  Height: 5' 4" (162.6 cm)  Height (inches): 64 in  Weight: 60.8 kg (134 lb)  Weight (lb): 134 lb  Ideal Body Weight (IBW), Female: 120 lb  % Ideal Body Weight, Female (lb): 111.67 %  BMI (Calculated): 23       Lab/Procedures/Meds    Pertinent Labs Reviewed: reviewed  Pertinent Labs Comments: H/H: 8.9/27.8, K 2.9, Ca 8.4  Pertinent Medications Reviewed: reviewed  Pertinent Medications Comments: ampicillin, dextrose, lactated ringer      Estimated/Assessed Needs    Weight Used For Calorie Calculations: 60.8 kg (134 lb)  Energy Calorie Requirements (kcal): 1641 kcal/d (27 kcal/kg)  Energy Need Method: Kcal/kg  Protein Requirements: 61 g/d (1.0 g/kg)  Weight Used For Protein Calculations: 60.8 kg (134 lb)        RDA Method (mL): 1641         Nutrition Prescription Ordered    Current Diet Order: Minced and Moist ( IDDSI Level 5)  Oral Nutrition Supplement: Boost VHC- Chocolate (TID)    Evaluation of Received Nutrient/Fluid Intake    I/O: +1.03 L  Comments: LBM: 12/16  Tolerance: tolerating  % Intake of Estimated Energy Needs: 75 - 100 %  % Meal Intake: 0 - 25 %    Nutrition Risk    Level of Risk/Frequency of Follow-up: moderate (1-2x/ week)       Monitor and Evaluation    Food and Nutrient Intake: energy intake, food and beverage intake  Food and Nutrient Adminstration: diet order  Knowledge/Beliefs/Attitudes: food and nutrition knowledge/skill, beliefs and attitudes  Physical Activity and Function: nutrition-related ADLs and IADLs, factors affecting access to " physical activity  Anthropometric Measurements: body mass index, weight change, weight  Biochemical Data, Medical Tests and Procedures: lipid profile, inflammatory profile, glucose/endocrine profile, gastrointestinal profile, electrolyte and renal panel  Nutrition-Focused Physical Findings: skin, head and eyes, extremities, muscles and bones, overall appearance       Nutrition Follow-Up    RD Follow-up?: Yes    Katelyn Bonilla Registration Eligible, Provisional LDN

## 2023-12-18 NOTE — ASSESSMENT & PLAN NOTE
- appears to be acute on chronic issue  - failed swallow study in the ED  - keep NPO  - SLP consulted   1218  SLP eval today -  minced/moist ( level 5) and thin liquid

## 2023-12-18 NOTE — PLAN OF CARE
CLAUDIA spoke to pt's son to address his concerns regarding [facility]; pt's son inquired about lack of medical team notifying pt's family of pt's medical condition, pt's late arrivals to appointments, and no follow up regarding long term medicaid.     CLAUDIA spoke to Blaise w/ [facility] [phone]; discussed son's concern.  Blaise will reach out to pt's son to address concerns.  Updated clinicals sent to [facility].      Discharge Plan A and Plan B have been determined by review of patient's clinical status, future medical and therapeutic needs, and coverage/benefits for post-acute care in coordination with multidisciplinary team members.     12/18/23 1323   Post-Acute Status   Post-Acute Authorization Placement   Post-Acute Placement Status Referrals Sent   Coverage Medicare A & B   Discharge Delays None known at this time   Discharge Plan   Discharge Plan A Return to nursing home   Discharge Plan B Home;Home with family;Home Health     Domenica Braswell LMSW  Part-Time-  [facility]  Ext. [phone]\

## 2023-12-18 NOTE — ASSESSMENT & PLAN NOTE
- Cr 1.4, baseline ~0.9-1.1  - suspect 2/2 UTI and prerenal from anemia  - avoid nephrotoxins, renally dose meds  - management of UTI/anemia as noted above    Recent Labs   Lab 12/16/23 2001 12/17/23  0337 12/18/23  0556   BUN 20 19 16   CREATININE 1.4 1.3 0.9       I & O     Intake/Output Summary (Last 24 hours) at 12/18/2023 1020  Last data filed at 12/18/2023 0631  Gross per 24 hour   Intake 1033.44 ml   Output no documentation   Net 1033.44 ml

## 2023-12-18 NOTE — NURSING
.Nurses Note -- 4 Eyes      12/18/2023   6:27 AM      Skin assessed during: Admit      [x] No Altered Skin Integrity Present    []Prevention Measures Documented      [] Yes- Altered Skin Integrity Present or Discovered   [] LDA Added if Not in Epic (Describe Wound)   [] New Altered Skin Integrity was Present on Admit and Documented in LDA   [] Wound Image Taken    Wound Care Consulted? No    Attending Nurse:  ELEAZAR Ferrera    Second RN/Staff Member:   ELEAZAR Acosta

## 2023-12-18 NOTE — PT/OT/SLP EVAL
Speech Language Pathology Evaluation  Bedside Swallow    Patient Name:  Radha Sandoval   MRN:  [id number]  Admitting Diagnosis: Acute encephalopathy    Recommendations:                 General Recommendations:  Dysphagia therapy  Diet recommendations:  Minced & Moist Diet - IDDSI Level 5, Thin liquids - IDDSI Level 0   Aspiration Precautions: Standard aspiration precautions, one bite/sip at a time, feed assist, monitor for signs of aspiration  General Precautions: Standard, aspiration  Communication strategies:  none    Assessment:     Radha Sandoval is a 88 y.o. female with an SLP diagnosis of Dysphagia 2/2 her cognitive status putting her at a higher risk for aspiration.   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 5/22/2023    Lumbar spondylosis with myelopathy 4/25/2023    Multiple closed right sided fractures of pelvis without disruption of pelvic ring 7/9/2023    Primary hypertension 6/10/2022    Subdural hygroma 7/8/2023       Past Surgical History:   Procedure Laterality Date    REPAIR, HERNIA, INGUINAL, WITHOUT HISTORY OF PRIOR REPAIR, AGE 5 YEARS OR OLDER Right 5/6/2023    Procedure: REPAIR, HERNIA, INGUINAL, WITHOUT HISTORY OF PRIOR REPAIR, AGE 5 YEARS OR OLDER;  Surgeon: Maurice Piper MD;  Location: [facility] OR [address] [place] [address];  Service: General;  Laterality: Right;  possible laparotomy, possible bowel resection         Prior Intubation HX:  none on file    Modified Barium Swallow: none on file     Chest X-Rays: FINDINGS: 12/16  Right hemidiaphragm elevation, similar to prior.  Accessory azygous lobe.  Chronic increased markings in the lungs, similar to prior.  Perihilar  vascular congestion appears less pronounced.    Prior diet: last SLP note dated 9/20 rec puree/thins 2/2 lack of dentition and cognitive status.        Subjective     Pt seen with family at the bedside, family exiting room at the beginning of session.   Patient goals: None stated     Pain/Comfort:  Pain Rating 1: 0/10    Respiratory Status: Room air    Objective:     Oral Musculature Evaluation  Oral Musculature: WFL  Dentition: present and adequate  Secretion Management: adequate  Mucosal Quality: good  Mandibular Strength and Mobility: WFL  Oral Labial Strength and Mobility: WFL  Lingual Strength and Mobility: WFL    Bedside Swallow Eval:   Consistencies Assessed:  Thin liquids approx 4 oz via cup edge and straw   Puree entire container of pudding  Solids 1/2 potria cracker      Oral Phase:   Slight prolonged mastication with solids     Pharyngeal Phase:   No signs of aspiration across all trials    Compensatory Strategies  None    Treatment: Education provided to family member and pt at bedside regarding SLP role. Family exiting room, unable to obtain a meaningful hx from pt 2/2 cognitive status. Pt with evident confusion and tearfulness throughout the session, however redirectable. Whiteboard updated with recommendations. SLP will follow up.     Goals:   Multidisciplinary Problems       SLP Goals          Problem: SLP    Goal Priority Disciplines Outcome   SLP Goal     SLP Ongoing, Progressing   Description: Speech Language Pathology Goals:   1. Pt will tolerate the least restrictive diet without signs of aspiration.                        Plan:     Patient to be seen:  3 x/week   Plan of Care expires:  01/18/24  Plan of Care reviewed with:  patient   SLP Follow-Up:  Yes       Discharge recommendations:    tbd  Barriers to Discharge:  None per speech    Time Tracking:     SLP Treatment Date:   12/18/23  Speech Start Time:  1003  Speech Stop Time:  1017     Speech Total Time (min):  14 min    Billable Minutes:  Eval Swallow and Oral Function 6 and Self Care/Home Management Training 8    12/18/2023

## 2023-12-18 NOTE — CONSULTS
[facility]  Gastroenterology  Consult Note    Patient Name: Radha Sandoval  MRN: [id number]  Admission Date: 12/16/2023  Hospital Length of Stay: 2 days  Code Status: Full Code   Attending Provider: Kory Smith MD   Consulting Provider: Tasneem Peterson MD  Primary Care Physician: Agatha Alvarez  Principal Problem:Acute encephalopathy    Inpatient consult to Gastroenterology  Consult performed by: Tasneem Peterson MD  Consult ordered by: Kory Smtih MD        Subjective:     HPI:  Ms. Sandoval is an 88 year old female w/ HTN, Alzheimers dementia and PE in 5/2023 on Eliquis who presents from NH w/ AMS. History is limited and thus obtained from chart review due to patient's dementia. Per chart review, patient was noted to be more confused compared to her baseline. In the ED, patient HDS. Labs notable for Hgb 6.7 (baseline ~8), BUN 20, and Cr 1.4. Received bolus of IV PPI 80 and 1u pRBC w/ subsequent increase in Hgb to 8.5. Additionally, patient started on ampicillin for empiric treatment of UTI. GI consulted for anemia. No prior endoscopies available for review.     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 5/22/2023    Lumbar spondylosis with myelopathy 4/25/2023    Multiple closed right sided fractures of pelvis without disruption of pelvic ring 7/9/2023    Primary hypertension 6/10/2022    Subdural hygroma 7/8/2023       Past Surgical History:   Procedure Laterality Date    REPAIR, HERNIA, INGUINAL, WITHOUT HISTORY OF PRIOR REPAIR, AGE 6 YEARS OR OLDER Right 5/6/2023    Procedure: REPAIR, HERNIA, INGUINAL, WITHOUT HISTORY OF PRIOR REPAIR, AGE 6 YEARS OR  OLDER;  Surgeon: Maurice Piper MD;  Location: [facility] OR [address] [place] [address];  Service: General;  Laterality: Right;  possible laparotomy, possible bowel resection       Review of patient's allergies indicates:  No Known Allergies  Family History    None       Tobacco Use    Smoking status: Never    Smokeless tobacco: Never   Substance and Sexual Activity    Alcohol use: Not on file    Drug use: Never    Sexual activity: Not Currently     Review of Systems   Unable to perform ROS: Dementia     Objective:     Vital Signs (Most Recent):  Temp: 97.7 °F (36.5 °C) (12/18/23 1048)  Pulse: 89 (12/18/23 1049)  Resp: 18 (12/18/23 1048)  BP: (!) 116/56 (12/18/23 1048)  SpO2: 99 % (12/18/23 1048) Vital Signs (24h Range):  Temp:  [97.7 °F (36.5 °C)-98.1 °F (36.7 °C)] 97.7 °F (36.5 °C)  Pulse:  [54-89] 89  Resp:  [16-20] 18  SpO2:  [93 %-99 %] 99 %  BP: (116-206)/() 116/56     Weight: 60.8 kg (134 lb) (12/16/23 1803)  Body mass index is 23 kg/m².      Intake/Output Summary (Last 24 hours) at 12/18/2023 1130  Last data filed at 12/18/2023 0631  Gross per 24 hour   Intake 837.33 ml   Output --   Net 837.33 ml       Lines/Drains/Airways       Peripheral Intravenous Line  Duration                  Peripheral IV - Single Lumen 12/16/23 2002 20 G Anterior;Right Wrist 1 day                     Physical Exam  Constitutional:       Appearance: Normal appearance.   HENT:      Head: Normocephalic and atraumatic.      Mouth/Throat:      Mouth: Mucous membranes are moist.      Pharynx: Oropharynx is clear.   Pulmonary:      Effort: Pulmonary effort is normal. No respiratory distress.   Abdominal:      General: Abdomen is flat. There is no distension.      Palpations: Abdomen is soft.      Tenderness: There is no abdominal tenderness. There is no guarding or rebound.   Skin:     General: Skin is warm and dry.   Neurological:      Mental Status: She is alert. She is disoriented.          Significant Labs:  CBC:   Recent Labs   Lab 12/17/23  1189  12/17/23 1959 12/18/23  0556   WBC 5.72 6.25 5.21   HGB 8.5* 8.4* 8.9*   HCT 25.8* 26.0* 27.8*    307 300     CMP:   Recent Labs   Lab 12/16/23 2001 12/17/23 0337 12/18/23  0556      < > 83   CALCIUM 9.0   < > 8.4*   ALBUMIN 2.7*  --   --    PROT 6.0  --   --       < > 143   K 3.3*   < > 2.9*   CO2 21*   < > 24      < > 110   BUN 20   < > 16   CREATININE 1.4   < > 0.9   ALKPHOS 74  --   --    ALT 7*  --   --    AST 18  --   --    BILITOT 0.5  --   --     < > = values in this interval not displayed.       Significant Imaging:  Imaging results within the past 24 hours have been reviewed.  Assessment/Plan:     Anemia  88 year old female w/ HTN, Alzheimers dementia and PE in 5/2023 on Eliquis who presents from NH w/ [facility]. No collateral available to confirm if patient has had any episodes of hematemesis, melena or bloody stools. No endoscopies available for review. Remains HDS. Brown stool on exam. No additional evidence of active bleeding appreciated. Initial Hgb 6.7 -->8.5 s/p 1u pRBCs. Most recent Hgb stable at 8.9. GI consulted for anemia.     - Will arrange for follow up in clinic to consider outpatient endoscopy. If she develops overt signs of bleeding, please re-consult.      Thank you for your consult. I will sign off. Please contact us if you have any additional questions.    Tasneem Peterson MD  Gastroenterology  [facility]

## 2023-12-18 NOTE — PLAN OF CARE
Pt is alert. Pt remain free from fall and injuries. NPO  at this time. VS remain stable. Tele sitter in place.  D5 LR 40 ml/hr continuous. Questions and concerns voiced and answered. Medication compliance. Pt slept well after Melatonin.  Call light in reach. Bed in low locked position. Side rails x2. Belongings at bedside.

## 2023-12-18 NOTE — ASSESSMENT & PLAN NOTE
- afebrile without leukocytosis  - UA infectious  - started on ampicillin based on prior urine cultures, will continue  - ID consulted for antibiotic recs  - follow Ucx  - strict I/Os  12/18 UC 12/16 No growth. ampicillin discontinued.

## 2023-12-18 NOTE — SUBJECTIVE & OBJECTIVE
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 5/22/2023    Lumbar spondylosis with myelopathy 4/25/2023    Multiple closed right sided fractures of pelvis without disruption of pelvic ring 7/9/2023    Primary hypertension 6/10/2022    Subdural hygroma 7/8/2023       Past Surgical History:   Procedure Laterality Date    REPAIR, HERNIA, INGUINAL, WITHOUT HISTORY OF PRIOR REPAIR, AGE 6 YEARS OR OLDER Right 5/6/2023    Procedure: REPAIR, HERNIA, INGUINAL, WITHOUT HISTORY OF PRIOR REPAIR, AGE 6 YEARS OR OLDER;  Surgeon: Maurice Piper MD;  Location: [facility] OR [address] [place] [address];  Service: General;  Laterality: Right;  possible laparotomy, possible bowel resection       Review of patient's allergies indicates:  No Known Allergies  Family History    None       Tobacco Use    Smoking status: Never    Smokeless tobacco: Never   Substance and Sexual Activity    Alcohol use: Not on file    Drug use: Never    Sexual activity: Not Currently     Review of Systems   Unable to perform ROS: Dementia     Objective:     Vital Signs (Most Recent):  Temp: 97.7 °F (36.5 °C) (12/18/23 1048)  Pulse: 89 (12/18/23 1049)  Resp: 18 (12/18/23 1048)  BP: (!) 116/56 (12/18/23 1048)  SpO2: 99 % (12/18/23 1048) Vital Signs (24h Range):  Temp:  [97.7 °F (36.5 °C)-98.1 °F (36.7 °C)] 97.7 °F (36.5 °C)  Pulse:  [54-89] 89  Resp:  [16-20] 18  SpO2:  [93 %-99 %] 99 %  BP: (116-206)/() 116/56     Weight: 60.8 kg (134 lb) (12/16/23 1803)  Body mass index is 23 kg/m².      Intake/Output Summary (Last 24 hours) at 12/18/2023 1130  Last data filed at 12/18/2023 0631  Gross per 24 hour   Intake 837.33 ml   Output --   Net 837.33 ml        Lines/Drains/Airways       Peripheral Intravenous Line  Duration                  Peripheral IV - Single Lumen 12/16/23 2002 20 G Anterior;Right Wrist 1 day                     Physical Exam  Constitutional:       Appearance: Normal appearance.   HENT:      Head: Normocephalic and atraumatic.      Mouth/Throat:      Mouth: Mucous membranes are moist.      Pharynx: Oropharynx is clear.   Pulmonary:      Effort: Pulmonary effort is normal. No respiratory distress.   Abdominal:      General: Abdomen is flat. There is no distension.      Palpations: Abdomen is soft.      Tenderness: There is no abdominal tenderness. There is no guarding or rebound.   Skin:     General: Skin is warm and dry.   Neurological:      Mental Status: She is alert. She is disoriented.          Significant Labs:  CBC:   Recent Labs   Lab 12/17/23 0337 12/17/23 1959 12/18/23  0556   WBC 5.72 6.25 5.21   HGB 8.5* 8.4* 8.9*   HCT 25.8* 26.0* 27.8*    307 300     CMP:   Recent Labs   Lab 12/16/23 2001 12/17/23 0337 12/18/23  0556      < > 83   CALCIUM 9.0   < > 8.4*   ALBUMIN 2.7*  --   --    PROT 6.0  --   --       < > 143   K 3.3*   < > 2.9*   CO2 21*   < > 24      < > 110   BUN 20   < > 16   CREATININE 1.4   < > 0.9   ALKPHOS 74  --   --    ALT 7*  --   --    AST 18  --   --    BILITOT 0.5  --   --     < > = values in this interval not displayed.       Significant Imaging:  Imaging results within the past 24 hours have been reviewed.

## 2023-12-18 NOTE — PROGRESS NOTES
[facility] Surg  Infectious Disease  Progress Note    Patient Name: Radha Sandoval  MRN: [id number]  Admission Date: 12/16/2023  Length of Stay: 2 days  Attending Physician: Kory Smith MD  Primary Care Provider: Agatha Alvarez    Isolation Status: No active isolations  Assessment/Plan:      Renal/  Acute cystitis without hematuria    88 year old female with a PMHx of DVT/PE, chronic bilateral pleural effusions, HTN, alzheimer's dementia who [place] [facility] for evaluation of altered mental status. ID consulted for antibiotic recommendations for pyuria. Unclear what patient baseline mentation is. She is afebrile and HDS. No leukocytosis. Most recent urine cultures reviewed and grew Enterococcus species.     Urine culture from admission no growth. UA with squams. Received x2 days of amoxil. Low concern for UTI. Remains altered, but with other risk factors for altered mental status.     Recommendations  Will stop amoxil as urine culture is negative   Discussed plan with ID staff. ID will sign off      Thank you for your consult. I will sign off. Please contact us if you have any additional questions.    Katherine Sanchez NP  Infectious Disease  [facility] Surg    Subjective:     Principal Problem:Acute encephalopathy    HPI: Rahda Sandoval is an 88 year old female with a PMHx of DVT/PE, chronic bilateral pleural effusions, HTN, alzheimer's dementia who presents to [facility] from [facility] for evaluation of altered mental status. Patient is unable to provide any meaningful history due to AMS.     Per chart review, patient noted to be more confused this afternoon and hallucinating by NH staff. She is oriented to person only at baseline. Presentation is similar to when she had a UTI a few months ago.      In ED afebrile and HDS. No leukocytosis. Hgb 6.7, Cr 1.4. CTH/CXR negative for acute findings. Given 1U pRBCs. UA positive for > 100 WBC, 7 squams. ID consulted for antibiotic  recommendations. Primary team has started ampicillin.    08/09/23 Urine culture - Enterococcus gallinarum   07/31/23 Urine culture - Enterococcus faecalis   Interval hx:     Afebrile. HDS. Urine culture negative. Remains altered.     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 5/22/2023    Lumbar spondylosis with myelopathy 4/25/2023    Multiple closed right sided fractures of pelvis without disruption of pelvic ring 7/9/2023    Primary hypertension 6/10/2022    Subdural hygroma 7/8/2023       Past Surgical History:   Procedure Laterality Date    REPAIR, HERNIA, INGUINAL, WITHOUT HISTORY OF PRIOR REPAIR, AGE 5 YEARS OR OLDER Right 5/6/2023    Procedure: REPAIR, HERNIA, INGUINAL, WITHOUT HISTORY OF PRIOR REPAIR, AGE 5 YEARS OR OLDER;  Surgeon: Maurice Piper MD;  Location: [facility] OR [address] [place] [address];  Service: General;  Laterality: Right;  possible laparotomy, possible bowel resection       Review of patient's allergies indicates:  No Known Allergies    Medications:  Medications Prior to Admission   Medication Sig    acetaminophen (TYLENOL) 325 MG tablet Take 2 tablets (650 mg total) by mouth every 6 (six) hours as needed (Pain).    albuterol (PROVENTIL HFA) 90 mcg/actuation inhaler Inhale 2 puffs into the lungs every 4 (four) hours as needed for Wheezing. Use spacer with adult mask    apixaban (ELIQUIS) 5 mg Tab Take 1 tablet (5 mg total) by mouth 2 (two) times daily. Starting 8/5    cholecalciferol, vitamin D3, (VITAMIN D3) 50 mcg (2,000 unit) Tab Take 1 tablet (2,000 Units total) by mouth once daily.    EScitalopram oxalate (LEXAPRO) 5 MG Tab Take 2 tablets (10 mg total) by mouth once daily.     furosemide (LASIX) 20 MG tablet Take 1 tablet (20 mg total) by mouth once daily.    lisinopriL (PRINIVIL,ZESTRIL) 40 MG tablet Take 1 tablet (40 mg total) by mouth once daily.    loperamide (IMODIUM) 2 mg capsule Take 1 capsule (2 mg total) by mouth 4 (four) times daily as needed for Diarrhea.    melatonin (MELATIN) 3 mg tablet Take 2 tablets (6 mg total) by mouth nightly as needed for Insomnia.    memantine (NAMENDA) 5 MG Tab Take 1 tablet (5 mg total) by mouth 2 (two) times daily.    metoprolol tartrate (LOPRESSOR) 25 MG tablet Take 1 tablet (25 mg total) by mouth 2 (two) times daily.    miconazole NITRATE 2 % (MICOTIN) 2 % top powder Apply topically 2 (two) times daily. Underside of bilateral breasts    polyethylene glycol (GLYCOLAX) 17 gram PwPk Take 17 g by mouth 2 (two) times daily as needed.    potassium chloride (KLOR-CON) 10 MEQ TbSR Take 1 tablet (10 mEq total) by mouth once daily.       Antibiotics (From admission, onward)      None          Antifungals (From admission, onward)      None          Antivirals (From admission, onward)      None             Immunization History   Administered Date(s) Administered    COVID-19, MRNA, LN-S, PF (Pfizer) (Purple Cap) 02/17/2021, 03/10/2021    PPD Test 04/20/2023, 05/26/2023, 07/11/2023, 08/04/2023       Family History    None       Social History     Socioeconomic History    Marital status: Unknown   Tobacco Use    Smoking status: Never    Smokeless tobacco: Never   Substance and Sexual Activity    Drug use: Never    Sexual activity: Not Currently     Social Determinants of Health     Financial Resource Strain: Low Risk  (12/18/2023)    Overall Financial Resource Strain (CARDIA)     Difficulty of Paying Living Expenses: Not hard at all   Food Insecurity: No Food Insecurity (12/18/2023)    Hunger Vital Sign     Worried About Running Out of Food in the Last Year: Never true     Ran Out of Food in the Last Year: Never true   Transportation Needs: No Transportation  Needs (12/18/2023)    PRAPARE - Transportation     Lack of Transportation (Medical): No     Lack of Transportation (Non-Medical): No   Physical Activity: Inactive (12/18/2023)    Exercise Vital Sign     Days of Exercise per Week: 0 days     Minutes of Exercise per Session: 0 min   Stress: No Stress Concern Present (12/18/2023)    [place] [employer] - Occupational Stress Questionnaire     Feeling of Stress : Not at all   Social Connections: Socially Isolated (12/18/2023)    Social Connection and Isolation Panel [NHANES]     Frequency of Communication with Friends and Family: Once a week     Frequency of Social Gatherings with Friends and Family: Once a week     Attends Mormon Services: Never     Active Member of Clubs or Organizations: No     Attends Club or Organization Meetings: Never     Marital Status:    Housing Stability: Low Risk  (12/18/2023)    Housing Stability Vital Sign     Unable to Pay for Housing in the Last Year: No     Number of Places Lived in the Last Year: 2     Unstable Housing in the Last Year: No     Review of Systems   Unable to perform ROS: Mental status change   Respiratory:  Negative for chest tightness.    Cardiovascular:  Negative for chest pain.   Gastrointestinal:  Negative for abdominal pain.   Psychiatric/Behavioral:  Positive for confusion.      Objective:     Vital Signs (Most Recent):  Temp: 97.7 °F (36.5 °C) (12/18/23 1048)  Pulse: 89 (12/18/23 1049)  Resp: 18 (12/18/23 1048)  BP: (!) 116/56 (12/18/23 1048)  SpO2: 99 % (12/18/23 1048) Vital Signs (24h Range):  Temp:  [97.7 °F (36.5 °C)-98.1 °F (36.7 °C)] 97.7 °F (36.5 °C)  Pulse:  [54-89] 89  Resp:  [16-18] 18  SpO2:  [93 %-99 %] 99 %  BP: (116-171)/() 116/56     Weight: 60.8 kg (134 lb)  Body mass index is 23 kg/m².    Estimated Creatinine Clearance: 38 mL/min (based on SCr of 0.9 mg/dL).     Physical Exam  Vitals and nursing note reviewed.   Constitutional:       General: She is not in acute  distress.     Appearance: Normal appearance. She is well-developed. She is not ill-appearing, toxic-appearing or diaphoretic.   HENT:      Head: Normocephalic and atraumatic.      Right Ear: External ear normal.      Left Ear: External ear normal.      Nose: Nose normal.   Eyes:      General: No scleral icterus.        Right eye: No discharge.         Left eye: No discharge.      Extraocular Movements: Extraocular movements intact.      Conjunctiva/sclera: Conjunctivae normal.   Cardiovascular:      Rate and Rhythm: Normal rate and regular rhythm.   Pulmonary:      Effort: Pulmonary effort is normal. No respiratory distress.      Breath sounds: No stridor.   Abdominal:      General: There is no distension.      Palpations: Abdomen is soft. There is no mass.      Tenderness: There is no abdominal tenderness.   Musculoskeletal:         General: Normal range of motion.   Skin:     General: Skin is warm.      Findings: No erythema or rash.   Neurological:      Mental Status: She is alert. Mental status is at baseline. She is disoriented.      Cranial Nerves: No cranial nerve deficit.   Psychiatric:         Mood and Affect: Mood normal.          Significant Labs: Blood Culture:   Recent Labs   Lab 07/08/23  1821 07/23/23  0033   LABBLOO No growth after 5 days.  No growth after 5 days. No growth after 5 days.  No growth after 5 days.       BMP:   Recent Labs   Lab 12/18/23  0556   GLU 83      K 2.9*      CO2 24   BUN 16   CREATININE 0.9   CALCIUM 8.4*   MG 1.9       CBC:   Recent Labs   Lab 12/17/23  0337 12/17/23 1959 12/18/23  0556   WBC 5.72 6.25 5.21   HGB 8.5* 8.4* 8.9*   HCT 25.8* 26.0* 27.8*    307 300       CMP:   Recent Labs   Lab 12/16/23 2001 12/17/23  0337 12/18/23  0556    142 143   K 3.3* 3.2* 2.9*    110 110   CO2 21* 25 24    93 83   BUN 20 19 16   CREATININE 1.4 1.3 0.9   CALCIUM 9.0 8.6* 8.4*   PROT 6.0  --   --    ALBUMIN 2.7*  --   --    BILITOT 0.5  --   --   "  ALKPHOS 74  --   --    AST 18  --   --    ALT 7*  --   --    ANIONGAP 14 7* 9       Fungus Culture (Blood or Bone Marrow): No results for input(s): "FUNGUSCULTUR" in the last 4320 hours.  Genital Culture: No results for input(s): "LABGENI" in the last 4320 hours.  Hepatitis Panel: No results for input(s): "HEPBSAG", "HEPAIGM", "HEPCAB" in the last 48 hours.    Invalid input(s): "HAPBIGM"  HIV 1/2 Antibodies: No results for input(s): "EYQ93ROMH" in the last 48 hours.  HIV Rapid: No results for input(s): "HIVRAPID" in the last 48 hours.  Lactic Acid: No results for input(s): "LACTATE" in the last 48 hours.  Microbiology Results (last 7 days)       Procedure Component Value Units Date/Time    Urine culture [[id number]] Collected: 12/16/23 2027    Order Status: Completed Specimen: Urine Updated: 12/18/23 0301     Urine Culture, Routine No growth    Narrative:      Specimen Source->Urine          Pathology Results  (Last 10 years)                 05/06/23 2105  Specimen to Pathology, Surgery General Surgery Final result    Narrative:  Specimen #1:  Hernia Sac (Perm)   Which provider would you like to cc?->BRENNEN CASSIDY   Release to patient->Immediate   Specimen total (fresh, frozen, permanent):->1             Quantiferon: No results for input(s): "NIL", "TBAG", "TBAGNIL", "MITOGENNIL", "TBGOLD" in the last 48 hours.  Respiratory Culture: No results for input(s): "GSRESP", "RESPIRATORYC" in the last 4320 hours.  Urine Culture:   Recent Labs   Lab 07/23/23  0306 07/31/23  0030 08/09/23  1756 09/18/23  1204 12/16/23 2027   LABURIN CANDIDA GLABRATA  10,000 - 49,999 cfu/ml  Treatment of asymptomatic candiduria is not recommended (except for   specific populations). Candida isolated in the urine typically   represents colonization. If an indwelling urinary catheter is present  it should be removed or replaced.  * ENTEROCOCCUS FAECALIS  10,000 - 49,999 cfu/ml  * ENTEROCOCCUS GALLINARUM  50,000 - 99,999 cfu/ml  * No " "growth No growth       Urine Studies:   Recent Labs   Lab 09/18/23  1204 12/16/23 2027   COLORU Yellow Yellow   APPEARANCEUA Clear Ex.Turbid   PHUR 5.0 6.0   SPECGRAV 1.020 1.015   PROTEINUA Trace* 1+*   GLUCUA Negative Negative   KETONESU Negative Negative   BILIRUBINUA Negative Negative   OCCULTUA Negative 1+*   NITRITE Negative Negative   UROBILINOGEN Negative  --    LEUKOCYTESUR 3+* 3+*   RBCUA 0 9*   WBCUA 11* >100*   BACTERIA Occasional Many*   SQUAMEPITHEL 1 7   HYALINECASTS 16* 0       Wound Culture: No results for input(s): "LABAERO" in the last 4320 hours.  Recent Lab Results  (Last 5 results in the past 24 hours)        12/18/23  1117   12/18/23  0727   12/18/23  0556   12/18/23  0545   12/17/23 1959        Anion Gap     9           Baso #     0.02     0.05       Basophil %     0.4     0.8       BUN     16           Calcium     8.4           Chloride     110           CO2     24           Creatinine     0.9           Differential Method     Automated     Automated       eGFR     >60.0           Eos #     0.3     0.1       Eosinophil %     5.6     1.6       Glucose     83           Gran # (ANC)     3.1     3.9       Gran %     59.6     63.0       Hematocrit     27.8     26.0       Hemoglobin     8.9     8.4       Immature Grans (Abs)     0.01  Comment: Mild elevation in immature granulocytes is non specific and   can be seen in a variety of conditions including stress response,   acute inflammation, trauma and pregnancy. Correlation with other   laboratory and clinical findings is essential.       0.01  Comment: Mild elevation in immature granulocytes is non specific and   can be seen in a variety of conditions including stress response,   acute inflammation, trauma and pregnancy. Correlation with other   laboratory and clinical findings is essential.         Immature Granulocytes     0.2     0.2       Lymph #     1.2     1.5       Lymph %     23.8     23.2       Magnesium      1.9           MCH     " 25.8     25.1       MCHC     32.0     32.3       MCV     81     78       Mono #     0.5     0.7       Mono %     10.4     11.2       MPV     11.7     10.7       nRBC     0     0       Phosphorus Level     3.6           Platelet Count     300     307       POCT Glucose 95   93     92         Potassium     2.9           RBC     3.45     3.34       RDW     17.3     16.9       Sodium     143           WBC     5.21     6.25                              Significant Imaging:     Imaging Results              CT Head Without Contrast (Final result)  Result time 12/16/23 22:41:26      Final result by Luis Doran DO (12/16/23 22:41:26)                   Impression:      No acute intracranial abnormality.      Electronically signed by: Luis Doran  Date:    12/16/2023  Time:    22:41               Narrative:    EXAMINATION:  CT HEAD WITHOUT CONTRAST    CLINICAL HISTORY:  Mental status change, unknown cause;    TECHNIQUE:  Low dose axial CT images obtained throughout the head without intravenous contrast. Sagittal and coronal reconstructions were performed.  Examination was repeated due to motion artifact.    COMPARISON:  CT head dated 08/09/2023.    FINDINGS:  Ventricles and sulci are normal in size for age without evidence of hydrocephalus. No extra-axial blood or fluid collections.  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  There are chronic microvascular ischemic changes, stable.  No parenchymal mass, hemorrhage, edema or major vascular distribution infarct.    No calvarial fracture.  The scalp is unremarkable.  Bilateral paranasal sinuses and mastoid air cells are clear.                                       X-Ray Chest AP Portable (Final result)  Result time 12/16/23 21:05:43      Final result by Olaf Boudreaux MD (12/16/23 21:05:43)                   Impression:      No acute findings.    No significant change from prior study.      Electronically signed by: Olaf Boudreaux  MD  Date:    12/16/2023  Time:    21:05               Narrative:    EXAMINATION:  XR CHEST AP PORTABLE    CLINICAL HISTORY:  Altered mental status, unspecified    TECHNIQUE:  Single frontal view of the chest was performed.    COMPARISON:  09/20/2023.    FINDINGS:  Right hemidiaphragm elevation, similar to prior.  Accessory azygous lobe.  Chronic increased markings in the lungs, similar to prior.  Perihilar vascular congestion appears less pronounced.    Heart and lungs otherwise appear unchanged when allowing for differences in technique and positioning.    Retrocardiac hiatal hernia, similar to prior.

## 2023-12-18 NOTE — ASSESSMENT & PLAN NOTE
88 year old female w/ HTN, Alzheimers dementia and PE in 5/2023 on Eliquis who presents from NH w/ AMS. No collateral available to confirm if patient has had any episodes of hematemesis, melena or bloody stools. No endoscopies available for review. Remains HDS. Brown stool on exam. No additional evidence of active bleeding appreciated. Initial Hgb 6.7 -->8.5 s/p 1u pRBCs. Most recent Hgb stable at 8.9. GI consulted for anemia.     - Will arrange for follow up in clinic to consider outpatient endoscopy

## 2023-12-18 NOTE — PLAN OF CARE
[facility] - Med Surg  Initial Discharge Assessment       Primary Care Provider: [facility]    Admission Diagnosis: Chest pain [R07.9]  AMS (altered mental status) [R41.82]    Admission Date: 12/16/2023  Expected Discharge Date: 12/19/2023    Transition of Care Barriers: None    Payor: MEDICARE / Plan: MEDICARE PART A & B / Product Type: Government /     Extended Emergency Contact Information  Primary Emergency Contact: michelleladytony   [place]  Mobile Phone: [phone]  Relation: Son   needed? No  Secondary Emergency Contact: Eloisa Bazan   [place]  Mobile Phone: [phone]  Relation: Friend    Discharge Plan A: Return to nursing home  Discharge Plan B: Return to Nursing Home    No Pharmacies Listed    Initial Assessment (most recent)       Adult Discharge Assessment - 12/18/23 1304          Discharge Assessment    Assessment Type Discharge Planning Assessment     Confirmed/corrected address, phone number and insurance --   Pt is a resident at [facility].    Confirmed Demographics Correct on Facesheet     Source of Information family     Reason For Admission Acute cystitis without hematuria     Facility Arrived From: [facility] (Resident) [phone]     Do you expect to return to your current living situation? Yes     Do you have help at home or someone to help you manage your care at home? Yes     Who are your caregiver(s) and their phone number(s)? Nursing staff at [facility]     Prior to hospitilization cognitive status: Unable to Assess     Current cognitive status: Unable to Assess     Walking or Climbing Stairs Difficulty yes     Walking or Climbing Stairs ambulation difficulty, requires equipment;stair climbing difficulty, dependent;transferring difficulty, assistance 1 person     Mobility Management Wheelchair     Dressing/Bathing Difficulty yes     Dressing/Bathing bathing difficulty, assistance 1 person;dressing  difficulty, assistance 1 person     Dressing/Bathing Management Pt requires assistance with ADLS.     Equipment Currently Used at Home wheelchair     Readmission within 30 days? No     Do you currently have service(s) that help you manage your care at home? --   Nursing Home facility.    Do you take prescription medications? Yes     Do you have prescription coverage? Yes     Coverage Medicare A & B     Do you have any problems affording any of your prescribed medications? No     Is the patient taking medications as prescribed? yes     Who is going to help you get home at discharge? Case management will arrange transport back to facility.     Are you on dialysis? No     Do you take coumadin? No     Discharge Plan A Return to nursing home     Discharge Plan B Return to Nursing Home     DME Needed Upon Discharge  none     Discharge Plan discussed with: Adult children     Transition of Care Barriers None        Physical Activity    On average, how many days per week do you engage in moderate to strenuous exercise (like a brisk walk)? 0 days     On average, how many minutes do you engage in exercise at this level? 0 min        Financial Resource Strain    How hard is it for you to pay for the very basics like food, housing, medical care, and heating? Not hard at all        Housing Stability    In the last 12 months, was there a time when you were not able to pay the mortgage or rent on time? No     In the last 12 months, was there a time when you did not have a steady place to sleep or slept in a shelter (including now)? No        Transportation Needs    In the past 12 months, has lack of transportation kept you from medical appointments or from getting medications? No     In the past 12 months, has lack of transportation kept you from meetings, work, or from getting things needed for daily living? No        Food Insecurity    Within the past 12 months, you worried that your food would run out before you got the money to  buy more. Never true     Within the past 12 months, the food you bought just didn't last and you didn't have money to get more. Never true        Stress    Do you feel stress - tense, restless, nervous, or anxious, or unable to sleep at night because your mind is troubled all the time - these days? Not at all        Social Connections    In a typical week, how many times do you talk on the phone with family, friends, or neighbors? Once a week     How often do you get together with friends or relatives? Once a week     How often do you attend Adventism or Christianity services? Never     Do you belong to any clubs or organizations such as Adventism groups, unions, fraternal or athletic groups, or school groups? No     How often do you attend meetings of the clubs or organizations you belong to? Never     Are you , , , , never , or living with a partner?         Alcohol Use    Q1: How often do you have a drink containing alcohol? Never     Q2: How many drinks containing alcohol do you have on a typical day when you are drinking? Patient does not drink     Q3: How often do you have six or more drinks on one occasion? Never                      SW completed assessment with patient's son.  No hospital readmission on file.  Pt will need transportation back to facility.      Pt lives at [facility].  Pt dependent with mobility and ADLs.  Pt uses a wheelchair for ambulation.  Pt requires help with ADLs.  Pt has a wheelchair.  Pt has support from nursing home staff, sitter and, son.      Pt does not receive coumadin, dialysis, or HH services.      Disp:  1. Return to [facility], 2. Home w/ family w/ HH.     Discharge Plan A and Plan B have been determined by review of patient's clinical status, future medical and therapeutic needs, and coverage/benefits for post-acute care in coordination with multidisciplinary team members.    Domenica Braswell LMSW  Part-Time-Social  Worker  [facility]  Ext. [phone]

## 2023-12-18 NOTE — PROGRESS NOTES
[facility] - Emergency Dept  [facility] Medicine  Progress Note    Patient Name: Radha Sandoval  MRN: [id number]  Patient Class: IP- Inpatient   Admission Date: 12/16/2023  Length of Stay: 2 days  Attending Physician: Kory Smith MD  Primary Care Provider: Agatha Alvarez        Subjective:     Principal Problem:Acute encephalopathy        HPI:  Radha Sandoval is a 88 y.o. female with a PMHx of DVT/PE, chronic bilateral pleural effusions, HTN, severe late term alzheimer's dementia who presents to [facility] from [facility] for evaluation of altered mental status. Patient is unable to provide any meaningful history due to dementia. Family friend at bedside assists with history. Per chart review, patient noted to be more confused this afternoon by NH staff. She is oriented to person only at baseline. Presentation is similar to when she had a UTI a few months ago. No known recent melena or bloody stools, however they aren't sure since patient lives in a NH. Patient complains of recent right shoulder and face pain but then later denies any recent pain. Denies abdominal or chest pain. Remainder of history limited 2/2 dementia.     ED: hypertensive top /77, vitals otherwise stable. No leukocytosis. Hgb 6.7, baseline ~8. Cr 1.4, slightly worse from baseline ~1.1. CTH/CXR negative for acute findings. Given 1U pRBCs.     Overview/Hospital Course:  12/17 Hb 8.4 s/p Transfusion with 1 unit of PRBC. K and Mg replaced. NPO. GI Eval . on ampicillin for prior h/o enterococcal UTI. UC pending . per GI, Hb at baseline s/p transfusion. consult GI if evidence of overt bleed . ID eval -Unclear if reported worsened mentation from baseline is secondary to a UTI.Continue Ampicillin   12/18 UC 12/16 No growth. ampicillin discontinued.  K of 2.9, replaced IV. SLP eval today -  minced/moist ( level 5) and thin liquid. was seen by GI at [facility] 9/18 and advised resumption of eliquis.  Hb stable at 8.4 s/p transfusion . Brown stool on [place]  exam. Initial Hgb 6.7 -->8.5 s/p 1u pRBCs. Most recent Hgb stable at 8.9. GI consulted for anemia.  follow up in clinic to consider outpatient endoscopy. Discussed with son POA - Mr. Maykel Sandoval about risks and benefits  of anticoagulation as the patient is bedboud and at risk for DVT/PE .son understands the risks- agrees for resumption of eliquis           Review of Systems:   Pain scale:  Constitutional:  fever,  chills, headache, vision loss, hearing loss, weight loss, Generalized weakness, falls, loss of smell, loss of taste, poor appetite,  sore throat  Respiratory: cough, shortness of breath.   Cardiovascular: chest pain, dizziness, palpitations, orthopnea, swelling of feet, syncope  Gastrointestinal: nausea, vomiting, abdominal pain, diarrhea, black stool,  blood in stool, change in bowel habits, constipation  Genitourinary: hematuria, dysuria, urgency, frequency  Integument/Breast: rash,  pruritis  Hematologic/Lymphatic: easy bruising, lymphadenopathy  Musculoskeletal: arthralgias , myalgias, back pain, neck pain, knee pain  Neurological: confusion- improved , seizures, tremors, slurred speech  Behavioral/Psych:  depression, anxiety, auditory or visual hallucinations     OBJECTIVE:     Physical Exam:  Body mass index is 23 kg/m².    Constitutional: Appears well-developed and well-nourished. hard of hearing  Head: Normocephalic and atraumatic.   Neck: Normal range of motion. Neck supple.   Cardiovascular: Normal heart rate.  Regular heart rhythm.  Pulmonary/Chest: Effort normal.   Abdominal: No distension.  No tenderness  Musculoskeletal: Normal range of motion. No edema. right foot drop. feet demformities  Neurological:  oriented to person, place, month and year , answers simple questions appropriately. follows simple commands  able to move bilateral upper and lower extremities without limitation   Skin: Skin is warm and dry.   Psychiatric: Normal mood and affect. Behavior is normal.                  Vital  "Signs  Temp: 97.6 °F (36.4 °C) (12/18/23 1508)  Pulse: 75 (12/18/23 1508)  Resp: 18 (12/18/23 1508)  BP: (Abnormal) 153/68 (12/18/23 1508)  SpO2: 98 % (12/18/23 1508)     24 Hour VS Range    Temp:  [97.6 °F (36.4 °C)-98.1 °F (36.7 °C)]   Pulse:  [54-89]   Resp:  [16-18]   BP: (116-171)/()   SpO2:  [93 %-99 %]     Intake/Output Summary (Last 24 hours) at 12/18/2023 1838  Last data filed at 12/18/2023 0631  Gross per 24 hour   Intake 695 ml   Output no documentation   Net 695 ml         I/O This Shift:  No intake/output data recorded.    Wt Readings from Last 3 Encounters:   12/16/23 60.8 kg (134 lb)   11/15/23 61.2 kg (134 lb 14.7 oz)   11/06/23 61.2 kg (135 lb)       I have personally reviewed the vitals and recorded Intake/Output     Laboratory/Diagnostic Data:    CBC/Anemia Labs: Coags:    Recent Labs   Lab 12/17/23 0337 12/17/23 1959 12/18/23  0556   WBC 5.72 6.25 5.21   HGB 8.5* 8.4* 8.9*   HCT 25.8* 26.0* 27.8*    307 300   MCV 80* 78* 81*   RDW 16.9* 16.9* 17.3*    No results for input(s): "PT", "INR", "APTT" in the last 168 hours.     Chemistries: ABG:   Recent Labs   Lab 12/16/23 2001 12/17/23 0337 12/18/23  0556 12/18/23  1555    142 143 140   K 3.3* 3.2* 2.9* 3.6    110 110 110   CO2 21* 25 24 23   BUN 20 19 16 15   CREATININE 1.4 1.3 0.9 0.9   CALCIUM 9.0 8.6* 8.4* 8.5*   PROT 6.0  --   --   --    BILITOT 0.5  --   --   --    ALKPHOS 74  --   --   --    ALT 7*  --   --   --    AST 18  --   --   --    MG  --  1.6 1.9  --    PHOS  --  3.5 3.6 3.0    No results for input(s): "PH", "PCO2", "PO2", "HCO3", "POCSATURATED", "BE" in the last 168 hours.     POCT Glucose: HbA1c:    Recent Labs   Lab 12/17/23  0524 12/17/23  0824 12/18/23  0545 12/18/23  0727 12/18/23  1117 12/18/23  1617   POCTGLUCOSE 93 95 92 93 95 116*    Hemoglobin A1C   Date Value Ref Range Status   12/17/2023 5.8 (H) 4.0 - 5.6 % Final     Comment:     ADA Screening Guidelines:  5.7-6.4%  Consistent with " "prediabetes  >or=6.5%  Consistent with diabetes    High levels of fetal hemoglobin interfere with the HbA1C  assay. Heterozygous hemoglobin variants (HbS, HgC, etc)do  not significantly interfere with this assay.   However, presence of multiple variants may affect accuracy.          Cardiac Enzymes: Ejection Fractions:    Recent Labs     12/16/23 2001 12/17/23  0216 12/17/23  0337   TROPONINI 0.059* 0.073* 0.063*    EF   Date Value Ref Range Status   07/28/2023 65 % Final   05/22/2023 65 % Final          Recent Labs   Lab 12/16/23 2027   COLORU Yellow   APPEARANCEUA Ex.Turbid   PHUR 6.0   SPECGRAV 1.015   PROTEINUA 1+*   GLUCUA Negative   KETONESU Negative   BILIRUBINUA Negative   OCCULTUA 1+*   NITRITE Negative   LEUKOCYTESUR 3+*   RBCUA 9*   WBCUA >100*   BACTERIA Many*   SQUAMEPITHEL 7   HYALINECASTS 0       Lactate (Lactic Acid) (mmol/L)   Date Value   07/08/2023 1.2   05/06/2023 0.8     BNP (pg/mL)   Date Value   12/16/2023 514 (H)   11/06/2023 373 (H)   09/20/2023 1,626 (H)   07/27/2023 301 (H)   07/23/2023 397 (H)     No results found for: "CRP", "SEDRATE"  No results found for: "DDIMER"  No results found for: "FERRITIN"  No results found for: "LDH"  Troponin I (ng/mL)   Date Value   12/17/2023 0.063 (H)   12/17/2023 0.073 (H)   12/16/2023 0.059 (H)   11/06/2023 0.044 (H)   09/20/2023 0.242 (H)   09/20/2023 0.236 (H)   09/20/2023 0.182 (H)   09/20/2023 0.168 (H)     CPK (U/L)   Date Value   03/27/2023 201 (H)     Results for orders placed or performed during the hospital encounter of 04/18/23   Vitamin D   Result Value Ref Range    Vit D, 25-Hydroxy 45 30 - 96 ng/mL     SARS-CoV2 (COVID-19) Qualitative PCR (no units)   Date Value   09/20/2023 Detected (A)   08/13/2023 Not Detected   08/07/2023 Not Detected   07/27/2023 Not Detected   05/29/2023 Not Detected   05/09/2023 Not Detected   04/24/2023 Not Detected   04/20/2023 Not Detected     POC Rapid COVID (no units)   Date Value   09/18/2023 Positive (A) "   03/27/2023 Negative       Microbiology labs for the last week  Microbiology Results (last 7 days)       Procedure Component Value Units Date/Time    Urine culture [[id number]] Collected: 12/16/23 2027    Order Status: Completed Specimen: Urine Updated: 12/18/23 0301     Urine Culture, Routine No growth    Narrative:      Specimen Source->Urine            Reviewed and noted in plan where applicable- Please see chart for full lab data.    Lines/Drains:       Peripheral IV - Single Lumen 12/16/23 2002 20 G Anterior;Right Wrist (Active)   Site Assessment Clean;Dry;Intact 12/16/23 2048   Extremity Assessment Distal to IV No abnormal discoloration;No redness;No swelling 12/16/23 2048   Dressing Status Clean;Dry;Intact 12/16/23 2048   Dressing Intervention First dressing 12/16/23 2048   Number of days: 0       Imaging  ECG Results              EKG 12-lead (Final result)  Result time 12/17/23 09:22:39      Final result by Interface, Lab In [facility] (12/17/23 09:22:39)               Narrative:    Test Reason : R41.82,    Vent. Rate : 121 BPM     Atrial Rate : 220 BPM     P-R Int : 000 ms          QRS Dur : 080 ms      QT Int : 410 ms       P-R-T Axes : 000 044 054 degrees     QTc Int : 582 ms    Undetermined rhythm due to artifact.  Origin is supraventricular.  Nonspecific ST and T wave abnormality  Abnormal ECG  When compared with ECG of 06-NOV-2023 12:00,  Current undetermined rhythm precludes rhythm comparison, needs review  Nonspecific T wave abnormality now evident in Anterior leads  Confirmed by Cornelio DINH, Kilo HATFIELD ([id number]) on 12/17/2023 9:22:29 AM    Referred By: [email or web address]   SELF           Confirmed By:Kilo Grimes MD                                  Results for orders placed during the hospital encounter of 07/22/23    Echo    Interpretation Summary  · The left ventricle is normal in size with normal systolic function.  · The estimated ejection fraction is 65%.  · Grade I left ventricular diastolic dysfunction.  ·  There is mild aortic valve stenosis. Aortic valve area is 1.88 cm2; peak velocity is 2.58 m/s; mean gradient is 15 mmHg.  · There is significant mitral annular calcification. The mean diastolic mitral gradient is 5mmHg at heart rate of 76bpm.  · Normal right ventricular size with normal right ventricular systolic function.  · The estimated PA systolic pressure is 39 mmHg.  · Normal central venous pressure (3 mmHg).  · Mild left atrial enlargement.      CT Head Without Contrast  Narrative: EXAMINATION:  CT HEAD WITHOUT CONTRAST    CLINICAL HISTORY:  Mental status change, unknown cause;    TECHNIQUE:  Low dose axial CT images obtained throughout the head without intravenous contrast. Sagittal and coronal reconstructions were performed.  Examination was repeated due to motion artifact.    COMPARISON:  CT head dated 08/09/2023.    FINDINGS:  Ventricles and sulci are normal in size for age without evidence of hydrocephalus. No extra-axial blood or fluid collections.  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  There are chronic microvascular ischemic changes, stable.  No parenchymal mass, hemorrhage, edema or major vascular distribution infarct.    No calvarial fracture.  The scalp is unremarkable.  Bilateral paranasal sinuses and mastoid air cells are clear.  Impression: No acute intracranial abnormality.    Electronically signed by: Luis Doran  Date:    12/16/2023  Time:    22:41  X-Ray Chest AP Portable  Narrative: EXAMINATION:  XR CHEST AP PORTABLE    CLINICAL HISTORY:  Altered mental status, unspecified    TECHNIQUE:  Single frontal view of the chest was performed.    COMPARISON:  09/20/2023.    FINDINGS:  Right hemidiaphragm elevation, similar to prior.  Accessory azygous lobe.  Chronic increased markings in the lungs, similar to prior.  Perihilar vascular congestion appears less pronounced.    Heart and lungs otherwise appear unchanged when allowing for differences in technique and  positioning.    Retrocardiac hiatal hernia, similar to prior.  Impression: No acute findings.    No significant change from prior study.    Electronically signed by: Olaf Boudreaux MD  Date:    12/16/2023  Time:    21:05      Labs, Imaging, EKG and Diagnostic results from 12/18/2023 were reviewed.    Medications:  Medication list was reviewed and changes noted under Assessment/Plan.  No current facility-administered medications on file prior to encounter.     Current Outpatient Medications on File Prior to Encounter   Medication Sig Dispense Refill    acetaminophen (TYLENOL) 325 MG tablet Take 2 tablets (650 mg total) by mouth every 6 (six) hours as needed (Pain). 60 tablet 1    albuterol (PROVENTIL HFA) 90 mcg/actuation inhaler Inhale 2 puffs into the lungs every 4 (four) hours as needed for Wheezing. Use spacer with adult mask 18 g 1    apixaban (ELIQUIS) 5 mg Tab Take 1 tablet (5 mg total) by mouth 2 (two) times daily. Starting 8/5 60 tablet 11    cholecalciferol, vitamin D3, (VITAMIN D3) 50 mcg (2,000 unit) Tab Take 1 tablet (2,000 Units total) by mouth once daily.      EScitalopram oxalate (LEXAPRO) 5 MG Tab Take 2 tablets (10 mg total) by mouth once daily. 60 tablet 11    furosemide (LASIX) 20 MG tablet Take 1 tablet (20 mg total) by mouth once daily. 30 tablet 0    lisinopriL (PRINIVIL,ZESTRIL) 40 MG tablet Take 1 tablet (40 mg total) by mouth once daily. 30 tablet 11    loperamide (IMODIUM) 2 mg capsule Take 1 capsule (2 mg total) by mouth 4 (four) times daily as needed for Diarrhea. 60 capsule 0    melatonin (MELATIN) 3 mg tablet Take 2 tablets (6 mg total) by mouth nightly as needed for Insomnia. 30 tablet 3    memantine (NAMENDA) 5 MG Tab Take 1 tablet (5 mg total) by mouth 2 (two) times daily. 60 tablet 11    metoprolol tartrate (LOPRESSOR) 25 MG tablet Take 1 tablet (25 mg total) by mouth 2 (two) times daily. 60 tablet 11    miconazole NITRATE 2 % (MICOTIN) 2 % top powder Apply topically 2 (two) times  daily. Underside of bilateral breasts 85 g 3    polyethylene glycol (GLYCOLAX) 17 gram PwPk Take 17 g by mouth 2 (two) times daily as needed.  0    potassium chloride (KLOR-CON) 10 MEQ TbSR Take 1 tablet (10 mEq total) by mouth once daily. 30 tablet 11     Scheduled Medications:  apixaban, 5 mg, Oral, BID  [START ON 12/19/2023] EScitalopram oxalate, 10 mg, Oral, Daily  memantine, 5 mg, Oral, BID  metoprolol tartrate, 25 mg, Oral, BID      PRN: 0.9%  NaCl infusion (for blood administration), acetaminophen, albuterol-ipratropium, bisacodyL, dextrose 10%, dextrose 10%, glucagon (human recombinant), glucose, glucose, hydrALAZINE, melatonin, polyethylene glycol, prochlorperazine  Infusions:       Estimated Creatinine Clearance: 38 mL/min (based on SCr of 0.9 mg/dL).               Assessment/Plan:      * Acute encephalopathy  - suspect 2/2 UTI  - no focal deficits  - CTH negative for acute abnormalities  - treat UTI as below  - neuro checks q4hr  - delirium precautions   12/17 improved. oriented to person, place, month and year , answers simple questions appropriately. follows simple commands    Acute cystitis without hematuria  - afebrile without leukocytosis  - UA infectious  - started on ampicillin based on prior urine cultures, will continue  - ID consulted for antibiotic recs  - follow Ucx  - strict I/Os  12/18 UC 12/16 No growth. ampicillin discontinued.     Anemia  - Hgb 6.7, baseline ~8  - FOBT positive but no other evidence of overt GI bleeding  - s/p 1U pRBCs in the ED  - will give 80mg protonic IVP x1  - further IV PPI/ GI consult pending H/H trend  - NPO at [facility] as precaution  - holding eliquis   Anemia      Recent Labs   Lab 12/16/23 2001 12/17/23  0337   HGB 6.7* 8.5*         Component Value Date/Time    MCV 80 (L) 12/17/2023 0337    RDW 16.9 (H) 12/17/2023 0337    FOLATE 7.7 06/23/2023 1153    [id number] 971 (H) 08/01/2023 0535    OCCULTBLOOD Positive (A) 05/28/2023 0311     Monitor CBC and transfuse if H/H  drops below 7/21.    12/17 . per GI, Hb at baseline s/p transfusion. consult GI if evidence of overt bleed .     Prolonged Q-T interval on ECG  QTc prolonged at  583ms.  Avoid QT prolonging agents       Dysphagia  - appears to be acute on chronic issue  - failed swallow study in the ED  - keep NPO  - SLP consulted   1218  SLP eval today -  minced/moist ( level 5) and thin liquid    Hypokalemia  replaced     Chronic bilateral pleural effusions  - no acute issues  - CXR stable  - continue lasix when able to safely swallow     Severe late onset Alzheimer's dementia without behavioral disturbance, psychotic disturbance, mood disturbance, or anxiety  - more confused due to UTI  - oriented to person only on exam (baseline), no focal deficits   - delirium precautions  - continue aricept and namenda once able to safely swallow    Chronic pulmonary embolism without acute cor pulmonale  - hold eliquis given anemia and possible GIB  CTA chest 5/23 -Examination positive for extensive pulmonary emboli asymmetric to the right. Embolus noted within the lateral aspect of the right main pulmonary artery, encroaching within 4.7 cm of the main pulmonary artery bifurcation   Repeat CTA chest 07/24 - no evidence of  pulmonary embolism. resume eliquis once cleared by SLP   12/18 Discussed with son POA - Mr. Maykel Sandoval about risks and benefits  of anticoagulation as the patient is bedboud and at risk for DVT/PE .son understands the risks- agrees for resumption of eliquis    GINA (acute kidney injury)  - Cr 1.4, baseline ~0.9-1.1  - suspect 2/2 UTI and prerenal from anemia  - avoid nephrotoxins, renally dose meds  - management of UTI/anemia as noted above    Recent Labs   Lab 12/16/23 2001 12/17/23  0337 12/18/23  0556   BUN 20 19 16   CREATININE 1.4 1.3 0.9       I & O     Intake/Output Summary (Last 24 hours) at 12/18/2023 1020  Last data filed at 12/18/2023 0631  Gross per 24 hour   Intake 1033.44 ml   Output no documentation   Net  1033.44 ml        Primary hypertension  - hold lisinopril until able to safely swallow  - use PRN IV hydralazine for now      VTE Risk Mitigation (From admission, onward)           Ordered     apixaban tablet 5 mg  2 times daily         12/18/23 1830     IP VTE HIGH RISK PATIENT  Once         12/18/23 1019     Reason for No Pharmacological VTE Prophylaxis  Once        Question Answer Comment   Reasons: Already adequately anticoagulated on oral Anticoagulants    Reasons: Active Bleeding        12/16/23 2223                    Discharge Planning   TOSHA: 12/19/2023     Code Status: Full Code   Is the patient medically ready for discharge?: No    Reason for patient still in hospital (select all that apply): Treatment  Discharge Plan A: Return to nursing home   Discharge Delays: None known at this time              Kory Smith MD  Department of Hospital Medicine   [facility] - Emergency Dept

## 2023-12-18 NOTE — SUBJECTIVE & OBJECTIVE
Interval hx:     Afebrile. HDS. Urine culture negative. Remains altered.     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 OR [address] [place] [address];  Service: General;  Laterality: Right;  possible laparotomy, possible bowel resection       Review of patient's allergies indicates:  No Known Allergies    Medications:  Medications Prior to Admission   Medication Sig    acetaminophen (TYLENOL) 325 MG tablet Take 2 tablets (650 mg total) by mouth every 6 (six) hours as needed (Pain).    albuterol (PROVENTIL HFA) 90 mcg/actuation inhaler Inhale 2 puffs into the lungs every 4 (four) hours as needed for Wheezing. Use spacer with adult mask    apixaban (ELIQUIS) 5 mg Tab Take 1 tablet (5 mg total) by mouth 2 (two) times daily. Starting 8/5    cholecalciferol, vitamin D3, (VITAMIN D3) 50 mcg (2,000 unit) Tab Take 1 tablet (2,000 Units total) by mouth once daily.    EScitalopram oxalate (LEXAPRO) 5 MG Tab Take 2 tablets (10 mg total) by mouth once daily.    furosemide (LASIX) 20 MG tablet Take 1 tablet (20 mg total) by mouth once daily.    lisinopriL (PRINIVIL,ZESTRIL) 40 MG tablet Take 1 tablet (40 mg total)  by mouth once daily.    loperamide (IMODIUM) 2 mg capsule Take 1 capsule (2 mg total) by mouth 4 (four) times daily as needed for Diarrhea.    melatonin (MELATIN) 3 mg tablet Take 2 tablets (6 mg total) by mouth nightly as needed for Insomnia.    memantine (NAMENDA) 5 MG Tab Take 1 tablet (5 mg total) by mouth 2 (two) times daily.    metoprolol tartrate (LOPRESSOR) 25 MG tablet Take 1 tablet (25 mg total) by mouth 2 (two) times daily.    miconazole NITRATE 2 % (MICOTIN) 2 % top powder Apply topically 2 (two) times daily. Underside of bilateral breasts    polyethylene glycol (GLYCOLAX) 17 gram PwPk Take 17 g by mouth 2 (two) times daily as needed.    potassium chloride (KLOR-CON) 10 MEQ TbSR Take 1 tablet (10 mEq total) by mouth once daily.       Antibiotics (From admission, onward)      None          Antifungals (From admission, onward)      None          Antivirals (From admission, onward)      None             Immunization History   Administered Date(s) Administered    COVID-19, MRNA, LN-S, PF (Pfizer) (Purple Cap) 02/17/2021, 03/10/2021    PPD Test 04/20/2023, 05/26/2023, 07/11/2023, 08/04/2023       Family History    None       Social History     Socioeconomic History    Marital status: Unknown   Tobacco Use    Smoking status: Never    Smokeless tobacco: Never   Substance and Sexual Activity    Drug use: Never    Sexual activity: Not Currently     Social Determinants of Health     Financial Resource Strain: Low Risk  (12/18/2023)    Overall Financial Resource Strain (CARDIA)     Difficulty of Paying Living Expenses: Not hard at all   Food Insecurity: No Food Insecurity (12/18/2023)    Hunger Vital Sign     Worried About Running Out of Food in the Last Year: Never true     Ran Out of Food in the Last Year: Never true   Transportation Needs: No Transportation Needs (12/18/2023)    PRAPARE - Transportation     Lack of Transportation (Medical): No     Lack of Transportation (Non-Medical): No   Physical Activity:  Inactive (12/18/2023)    Exercise Vital Sign     Days of Exercise per Week: 0 days     Minutes of Exercise per Session: 0 min   Stress: No Stress Concern Present (12/18/2023)    [employer] [facility] - Occupational Stress Questionnaire     Feeling of Stress : Not at all   Social Connections: Socially Isolated (12/18/2023)    Social Connection and Isolation Panel [NHANES]     Frequency of Communication with Friends and Family: Once a week     Frequency of Social Gatherings with Friends and Family: Once a week     Attends Baptism Services: Never     Active Member of Clubs or Organizations: No     Attends Club or Organization Meetings: Never     Marital Status:    Housing Stability: Low Risk  (12/18/2023)    Housing Stability Vital Sign     Unable to Pay for Housing in the Last Year: No     Number of Places Lived in the Last Year: 2     Unstable Housing in the Last Year: No     Review of Systems   Unable to perform ROS: Mental status change   Respiratory:  Negative for chest tightness.    Cardiovascular:  Negative for chest pain.   Gastrointestinal:  Negative for abdominal pain.   Psychiatric/Behavioral:  Positive for confusion.      Objective:     Vital Signs (Most Recent):  Temp: 97.7 °F (36.5 °C) (12/18/23 1048)  Pulse: 89 (12/18/23 1049)  Resp: 18 (12/18/23 1048)  BP: (!) 116/56 (12/18/23 1048)  SpO2: 99 % (12/18/23 1048) Vital Signs (24h Range):  Temp:  [97.7 °F (36.5 °C)-98.1 °F (36.7 °C)] 97.7 °F (36.5 °C)  Pulse:  [54-89] 89  Resp:  [16-18] 18  SpO2:  [93 %-99 %] 99 %  BP: (116-171)/() 116/56     Weight: 60.8 kg (134 lb)  Body mass index is 23 kg/m².    Estimated Creatinine Clearance: 38 mL/min (based on SCr of 0.9 mg/dL).     Physical Exam  Vitals and nursing note reviewed.   Constitutional:       General: She is not in acute distress.     Appearance: Normal appearance. She is well-developed. She is not ill-appearing, toxic-appearing or diaphoretic.   HENT:      Head:  Normocephalic and atraumatic.      Right Ear: External ear normal.      Left Ear: External ear normal.      Nose: Nose normal.   Eyes:      General: No scleral icterus.        Right eye: No discharge.         Left eye: No discharge.      Extraocular Movements: Extraocular movements intact.      Conjunctiva/sclera: Conjunctivae normal.   Cardiovascular:      Rate and Rhythm: Normal rate and regular rhythm.   Pulmonary:      Effort: Pulmonary effort is normal. No respiratory distress.      Breath sounds: No stridor.   Abdominal:      General: There is no distension.      Palpations: Abdomen is soft. There is no mass.      Tenderness: There is no abdominal tenderness.   Musculoskeletal:         General: Normal range of motion.   Skin:     General: Skin is warm.      Findings: No erythema or rash.   Neurological:      Mental Status: She is alert. Mental status is at baseline. She is disoriented.      Cranial Nerves: No cranial nerve deficit.   Psychiatric:         Mood and Affect: Mood normal.          Significant Labs: Blood Culture:   Recent Labs   Lab 07/08/23  1821 07/23/23  0033   LABBLOO No growth after 5 days.  No growth after 5 days. No growth after 5 days.  No growth after 5 days.       BMP:   Recent Labs   Lab 12/18/23  0556   GLU 83      K 2.9*      CO2 24   BUN 16   CREATININE 0.9   CALCIUM 8.4*   MG 1.9       CBC:   Recent Labs   Lab 12/17/23  0337 12/17/23 1959 12/18/23  0556   WBC 5.72 6.25 5.21   HGB 8.5* 8.4* 8.9*   HCT 25.8* 26.0* 27.8*    307 300       CMP:   Recent Labs   Lab 12/16/23 2001 12/17/23 0337 12/18/23  0556    142 143   K 3.3* 3.2* 2.9*    110 110   CO2 21* 25 24    93 83   BUN 20 19 16   CREATININE 1.4 1.3 0.9   CALCIUM 9.0 8.6* 8.4*   PROT 6.0  --   --    ALBUMIN 2.7*  --   --    BILITOT 0.5  --   --    ALKPHOS 74  --   --    AST 18  --   --    ALT 7*  --   --    ANIONGAP 14 7* 9       Fungus Culture (Blood or Bone Marrow): No results for  "input(s): "FUNGUSCULTUR" in the last 4320 hours.  Genital Culture: No results for input(s): "LABGENI" in the last 4320 hours.  Hepatitis Panel: No results for input(s): "HEPBSAG", "HEPAIGM", "HEPCAB" in the last 48 hours.    Invalid input(s): "HAPBIGM"  HIV 1/2 Antibodies: No results for input(s): "ULT30TQLG" in the last 48 hours.  HIV Rapid: No results for input(s): "HIVRAPID" in the last 48 hours.  Lactic Acid: No results for input(s): "LACTATE" in the last 48 hours.  Microbiology Results (last 7 days)       Procedure Component Value Units Date/Time    Urine culture [[id number]] Collected: 12/16/23 2027    Order Status: Completed Specimen: Urine Updated: 12/18/23 0301     Urine Culture, Routine No growth    Narrative:      Specimen Source->Urine          Pathology Results  (Last 10 years)                 05/06/23 2105  Specimen to Pathology, Surgery General Surgery Final result    Narrative:  Specimen #1:  Hernia Sac (Perm)   Which provider would you like to cc?->BRENNEN CASSIDY   Release to patient->Immediate   Specimen total (fresh, frozen, permanent):->1             Quantiferon: No results for input(s): "NIL", "TBAG", "TBAGNIL", "MITOGENNIL", "TBGOLD" in the last 48 hours.  Respiratory Culture: No results for input(s): "GSRESP", "RESPIRATORYC" in the last 4320 hours.  Urine Culture:   Recent Labs   Lab 07/23/23  0306 07/31/23  0030 08/09/23  1756 09/18/23  1204 12/16/23 2027   LABURIN CANDIDA GLABRATA  10,000 - 49,999 cfu/ml  Treatment of asymptomatic candiduria is not recommended (except for   specific populations). Candida isolated in the urine typically   represents colonization. If an indwelling urinary catheter is present  it should be removed or replaced.  * ENTEROCOCCUS FAECALIS  10,000 - 49,999 cfu/ml  * ENTEROCOCCUS GALLINARUM  50,000 - 99,999 cfu/ml  * No growth No growth       Urine Studies:   Recent Labs   Lab 09/18/23  1204 12/16/23 2027   COLORU Yellow Yellow   APPEARANCEUA Clear Ex.Turbid " "  PHUR 5.0 6.0   SPECGRAV 1.020 1.015   PROTEINUA Trace* 1+*   GLUCUA Negative Negative   KETONESU Negative Negative   BILIRUBINUA Negative Negative   OCCULTUA Negative 1+*   NITRITE Negative Negative   UROBILINOGEN Negative  --    LEUKOCYTESUR 3+* 3+*   RBCUA 0 9*   WBCUA 11* >100*   BACTERIA Occasional Many*   SQUAMEPITHEL 1 7   HYALINECASTS 16* 0       Wound Culture: No results for input(s): "LABAERO" in the last 4320 hours.  Recent Lab Results  (Last 5 results in the past 24 hours)        12/18/23  1117   12/18/23  0727   12/18/23  0556   12/18/23  0545   12/17/23 1959        Anion Gap     9           Baso #     0.02     0.05       Basophil %     0.4     0.8       BUN     16           Calcium     8.4           Chloride     110           CO2     24           Creatinine     0.9           Differential Method     Automated     Automated       eGFR     >60.0           Eos #     0.3     0.1       Eosinophil %     5.6     1.6       Glucose     83           Gran # (ANC)     3.1     3.9       Gran %     59.6     63.0       Hematocrit     27.8     26.0       Hemoglobin     8.9     8.4       Immature Grans (Abs)     0.01  Comment: Mild elevation in immature granulocytes is non specific and   can be seen in a variety of conditions including stress response,   acute inflammation, trauma and pregnancy. Correlation with other   laboratory and clinical findings is essential.       0.01  Comment: Mild elevation in immature granulocytes is non specific and   can be seen in a variety of conditions including stress response,   acute inflammation, trauma and pregnancy. Correlation with other   laboratory and clinical findings is essential.         Immature Granulocytes     0.2     0.2       Lymph #     1.2     1.5       Lymph %     23.8     23.2       Magnesium      1.9           MCH     25.8     25.1       MCHC     32.0     32.3       MCV     81     78       Mono #     0.5     0.7       Mono %     10.4     11.2       MPV     " 11.7     10.7       nRBC     0     0       Phosphorus Level     3.6           Platelet Count     300     307       POCT Glucose 95   93     92         Potassium     2.9           RBC     3.45     3.34       RDW     17.3     16.9       Sodium     143           WBC     5.21     6.25                              Significant Imaging:     Imaging Results              CT Head Without Contrast (Final result)  Result time 12/16/23 22:41:26      Final result by Luis Doran DO (12/16/23 22:41:26)                   Impression:      No acute intracranial abnormality.      Electronically signed by: Luis Doran  Date:    12/16/2023  Time:    22:41               Narrative:    EXAMINATION:  CT HEAD WITHOUT CONTRAST    CLINICAL HISTORY:  Mental status change, unknown cause;    TECHNIQUE:  Low dose axial CT images obtained throughout the head without intravenous contrast. Sagittal and coronal reconstructions were performed.  Examination was repeated due to motion artifact.    COMPARISON:  CT head dated 08/09/2023.    FINDINGS:  Ventricles and sulci are normal in size for age without evidence of hydrocephalus. No extra-axial blood or fluid collections.  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  There are chronic microvascular ischemic changes, stable.  No parenchymal mass, hemorrhage, edema or major vascular distribution infarct.    No calvarial fracture.  The scalp is unremarkable.  Bilateral paranasal sinuses and mastoid air cells are clear.                                       X-Ray Chest AP Portable (Final result)  Result time 12/16/23 21:05:43      Final result by Olaf Boudreaux MD (12/16/23 21:05:43)                   Impression:      No acute findings.    No significant change from prior study.      Electronically signed by: Olaf Boudreaux MD  Date:    12/16/2023  Time:    21:05               Narrative:    EXAMINATION:  XR CHEST AP PORTABLE    CLINICAL HISTORY:  Altered mental status,  unspecified    TECHNIQUE:  Single frontal view of the chest was performed.    COMPARISON:  09/20/2023.    FINDINGS:  Right hemidiaphragm elevation, similar to prior.  Accessory azygous lobe.  Chronic increased markings in the lungs, similar to prior.  Perihilar vascular congestion appears less pronounced.    Heart and lungs otherwise appear unchanged when allowing for differences in technique and positioning.    Retrocardiac hiatal hernia, similar to prior.

## 2023-12-18 NOTE — PLAN OF CARE
Problem: Adult Inpatient Plan of Care  Goal: Plan of Care Review  Outcome: Ongoing, Progressing  Goal: Patient-Specific Goal (Individualized)  Outcome: Ongoing, Progressing  Goal: Absence of Hospital-Acquired Illness or Injury  Outcome: Ongoing, Progressing  Goal: Optimal Comfort and Wellbeing  Outcome: Ongoing, Progressing  Goal: Readiness for Transition of Care  Outcome: Ongoing, Progressing     Problem: Infection  Goal: Absence of Infection Signs and Symptoms  Outcome: Ongoing, Progressing     Problem: Fluid and Electrolyte Imbalance (Acute Kidney Injury/Impairment)  Goal: Fluid and Electrolyte Balance  Outcome: Ongoing, Progressing     Problem: Oral Intake Inadequate (Acute Kidney Injury/Impairment)  Goal: Optimal Nutrition Intake  Outcome: Ongoing, Progressing     Problem: Renal Function Impairment (Acute Kidney Injury/Impairment)  Goal: Effective Renal Function  Outcome: Ongoing, Progressing     Problem: Impaired Wound Healing  Goal: Optimal Wound Healing  Outcome: Ongoing, Progressing     Problem: Fall Injury Risk  Goal: Absence of Fall and Fall-Related Injury  Outcome: Ongoing, Progressing     Problem: Skin Injury Risk Increased  Goal: Skin Health and Integrity  Outcome: Ongoing, Progressing     Problem: Pain Acute  Goal: Acceptable Pain Control and Functional Ability  Outcome: Ongoing, Progressing

## 2023-12-18 NOTE — ASSESSMENT & PLAN NOTE
88 year old female with a PMHx of DVT/PE, chronic bilateral pleural effusions, HTN, alzheimer's dementia who presents to [facility] from [facility] for evaluation of altered mental status. ID consulted for antibiotic recommendations for pyuria. Unclear what patient baseline mentation is. She is afebrile and HDS. No leukocytosis. Most recent urine cultures reviewed and grew Enterococcus species.     Urine culture from admission no growth. UA with squams. Received x2 days of amoxil. Low concern for UTI. Remains altered, but with other risk factors for altered mental status.     Recommendations  Will stop amoxil as urine culture is negative   Discussed plan with ID staff. ID will sign off

## 2023-12-18 NOTE — ED NOTES
Pt placed on telemetry box [id number] . Rhythm and rate confirmed by telemetry technician: cher hoyos [phone]

## 2023-12-18 NOTE — HPI
Ms. Sandoval is an 88 year old female w/ HTN, Alzheimers dementia and PE in 5/2023 on Eliquis who presents from NH w/ AMS. History is limited and thus obtained from chart review due to patient's dementia. Per chart review, patient was noted to be more confused compared to her baseline. In the ED, patient HDS. Labs notable for Hgb 6.7 (baseline ~8), BUN 20, and Cr 1.4. Received bolus of IV PPI 80 and 1u pRBC w/ subsequent increase in Hgb to 8.5. Additionally, patient started on ampicillin for empiric treatment of UTI. GI consulted for anemia. No prior endoscopies available for review.

## 2023-12-19 NOTE — PLAN OF CARE
Per medical team, pt medically ready for d/c today.  CLAUDIA sent updated orders to Agatha Calvin [phone]; CLAUDIA spoke to Keith garcia/ [facility] [phone] will have nursing review and provide report information.       CLAUDIA telephoned pt's son, Maykel [phone], notified of discharge back to facility today.  Pt's son would also like for someone from the medical team to call him back with updated status on medications patient has been taking.  Medical team notified.      Discharge Plan A and Plan B have been determined by review of patient's clinical status, future medical and therapeutic needs, and coverage/benefits for post-acute care in coordination with multidisciplinary team members.       12/19/23 0949   Post-Acute Status   Post-Acute Authorization Placement   Post-Acute Placement Status Pending post-acute provider review/more information requested   Coverage Medicare A & B   Discharge Delays None known at this time   Discharge Plan   Discharge Plan A Return to nursing home   Discharge Plan B Return to Nursing Home     Domenica Braswell LMSW  Part-Time-  [facility]  Ext. [phone]

## 2023-12-19 NOTE — PLAN OF CARE
Patient will be discharged back to [facility].   Problem: Adult Inpatient Plan of Care  Goal: Plan of Care Review  Outcome: Adequate for Care Transition  Goal: Patient-Specific Goal (Individualized)  Outcome: Adequate for Care Transition  Goal: Absence of Hospital-Acquired Illness or Injury  Outcome: Adequate for Care Transition  Goal: Optimal Comfort and Wellbeing  Outcome: Adequate for Care Transition  Goal: Readiness for Transition of Care  Outcome: Adequate for Care Transition     Problem: Infection  Goal: Absence of Infection Signs and Symptoms  Outcome: Adequate for Care Transition     Problem: Fluid and Electrolyte Imbalance (Acute Kidney Injury/Impairment)  Goal: Fluid and Electrolyte Balance  Outcome: Adequate for Care Transition     Problem: Oral Intake Inadequate (Acute Kidney Injury/Impairment)  Goal: Optimal Nutrition Intake  Outcome: Adequate for Care Transition     Problem: Renal Function Impairment (Acute Kidney Injury/Impairment)  Goal: Effective Renal Function  Outcome: Adequate for Care Transition     Problem: Impaired Wound Healing  Goal: Optimal Wound Healing  Outcome: Adequate for Care Transition     Problem: Fall Injury Risk  Goal: Absence of Fall and Fall-Related Injury  Outcome: Adequate for Care Transition     Problem: Skin Injury Risk Increased  Goal: Skin Health and Integrity  Outcome: Adequate for Care Transition     Problem: Pain Acute  Goal: Acceptable Pain Control and Functional Ability  Outcome: Adequate for Care Transition

## 2023-12-19 NOTE — PLAN OF CARE
Problem: Impaired Wound Healing  Goal: Optimal Wound Healing  Outcome: Ongoing, Progressing     Problem: Impaired Wound Healing  Goal: Optimal Wound Healing  Outcome: Ongoing, Progressing     Problem: Fall Injury Risk  Goal: Absence of Fall and Fall-Related Injury  Outcome: Ongoing, Progressing     Problem: Fall Injury Risk  Goal: Absence of Fall and Fall-Related Injury  Outcome: Ongoing, Progressing     Problem: Skin Injury Risk Increased  Goal: Skin Health and Integrity  Outcome: Ongoing, Progressing     Problem: Pain Acute  Goal: Acceptable Pain Control and Functional Ability  Outcome: Ongoing, Progressing

## 2023-12-19 NOTE — ASSESSMENT & PLAN NOTE
- Hgb 6.7, baseline ~8  - FOBT positive but no other evidence of overt GI bleeding  - s/p 1U pRBCs in the ED  - will give 80mg protonic IVP x1  - further IV PPI/ GI consult pending H/H trend  - NPO at MN as precaution  - holding eliquis   Anemia      Recent Labs   Lab 12/17/23  0337 12/17/23  1959 12/18/23  0556   HGB 8.5* 8.4* 8.9*           Component Value Date/Time    MCV 81 (L) 12/18/2023 0556    RDW 17.3 (H) 12/18/2023 0556    FOLATE 7.7 06/23/2023 1153    [id number] 971 (H) 08/01/2023 0535    OCCULTBLOOD Positive (A) 05/28/2023 0311     Monitor CBC and transfuse if H/H drops below 7/21.    12/17 . per GI, Hb at baseline s/p transfusion. consult GI if evidence of overt bleed .   12/18 follow up in clinic to consider outpatient endoscopy.

## 2023-12-19 NOTE — PLAN OF CARE
[facility]     Department of Hospital Medicine     [facility] [place] [address]     [phone] after hours  [phone] fax       NURSING HOME ORDERS    Patient Name: Radha Sandoval  YOB: [birth year]    Admit to Nursing Home:  Regular Bed        Diagnoses:  Active Hospital Problems    Diagnosis  POA    *Acute encephalopathy [G93.40]  Yes    Anticoagulated [Z79.01]  Not Applicable    Chronic anemia [D64.9]  Yes    Severe malnutrition [E43]  Yes    Acute cystitis without hematuria [N30.00]  Yes    Anemia [D64.9]  Yes    Prolonged Q-T interval on ECG [R94.31]  Yes    Dysphagia [R13.10]  Yes    Hypokalemia [E87.6]  Yes    Chronic pulmonary embolism without acute cor pulmonale [I27.82]  Yes    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       Patient is homebound due to:  Acute encephalopathy    Allergies:Review of patient's allergies indicates:  No Known Allergies    Vitals:     Every shift (Skilled Nursing patients)    Diet:   Minced & Moist Diet - IDDSI Level 5, Thin liquids - IDDSI Level 0   Aspiration Precautions: Standard aspiration precautions, one bite/sip at a time, feed assist, monitor for signs of aspiration    Acitivities  - Up in a chair each morning as tolerated  - Ambulate with assistance to bathroom  - Scheduled walks once each shift (every 8 hours)  - May ambulate independently  - May use walker, cane, or self-propelled wheelchair       - Weight bearing: as tolerated     LABS:  Per facility protocol    Nursing Precautions:    - Aspiration precautions:             - Total assistance with meals            -  Upright  90 degrees befor during and after meals             -  Suction at bedside          - Fall precautions per nursing home protocol   - Decubitus precautions:        -  for positioning   - Pressure reducing foam mattress   - Turn patient every two hours. Use wedge pillows to anchor patient    CONSULTS:   Physical Therapy to evaluate and treat     Occupational Therapy to evaluate and treat     Speech Therapy  to evaluate and treat     Nutrition to evaluate and recommend diet    MISCELLANEOUS CARE:   Spann Care: Empty Spann bag every shift.  Change Spann every month     Routine Skin for Bedridden Patients:  Apply moisture barrier cream to all    skin folds and wet areas in perineal area daily and after baths and                           all bowel movements.   Fax discharge orders to [facility]: Fax # [phone]                    DIABETES CARE: NA    Medications: Discontinue all previous medication orders, if any. See new list below.     Medication List        Continue taking these medications      acetaminophen 325 MG tablet  Commonly known as: TYLENOL  Take 2 tablets (650 mg total) by mouth every 6 (six) hours as needed (Pain).     albuterol 90 mcg/actuation inhaler  Commonly known as: PROVENTIL HFA  Inhale 2 puffs into the lungs every 4 (four) hours as needed for Wheezing. Use spacer with adult mask     apixaban 5 mg Tab  Commonly known as: ELIQUIS  Take 1 tablet (5 mg total) by mouth 2 (two) times daily. Starting 8/5     cholecalciferol (vitamin D3) 50 mcg (2,000 unit) Tab  Commonly known as: VITAMIN D3  Take 1 tablet (2,000 Units total) by mouth once daily.     EScitalopram oxalate 5 MG Tab  Commonly known as: LEXAPRO  Take 2 tablets (10 mg total) by mouth once daily.     furosemide 20 MG tablet  Commonly known as: LASIX  Take 1 tablet (20 mg total) by mouth once daily.     lisinopriL 40 MG tablet  Commonly known as: PRINIVIL,ZESTRIL  Take 1 tablet (40 mg total) by mouth once daily.     loperamide 2 mg  capsule  Commonly known as: IMODIUM  Take 1 capsule (2 mg total) by mouth 4 (four) times daily as needed for Diarrhea.     melatonin 3 mg tablet  Commonly known as: MELATIN  Take 2 tablets (6 mg total) by mouth nightly as needed for Insomnia.     memantine 5 MG Tab  Commonly known as: NAMENDA  Take 1 tablet (5 mg total) by mouth 2 (two) times daily.     metoprolol tartrate 25 MG tablet  Commonly known as: LOPRESSOR  Take 1 tablet (25 mg total) by mouth 2 (two) times daily.     miconazole NITRATE 2 % 2 % top powder  Commonly known as: MICOTIN  Apply topically 2 (two) times daily. Underside of bilateral breasts     polyethylene glycol 17 gram Pwpk  Commonly known as: GLYCOLAX  Take 17 g by mouth 2 (two) times daily as needed.     potassium chloride 10 MEQ Tbsr  Commonly known as: KLOR-CON  Take 1 tablet (10 mEq total) by mouth once daily.                 _________________________________  Kory Smith MD  12/19/2023

## 2023-12-19 NOTE — PLAN OF CARE
Pt's son, Maykel, notified of discharge back to [facility] today.      CLAUDIA scheduled d/c transportation to [facility] through [facility]. Patient is scheduled to be picked up at 4:30p.m. CLAUDIA provided patient's nurse with report number [phone]; ask for the nurse for room 220B.  CLAUDIA is in communication with patient's CM and patient's Care Team.  Per pt's nurse, pt will need to discharge via stretcher/room air.      Discharge Plan A and Plan B have been determined by review of patient's clinical status, future medical and therapeutic needs, and coverage/benefits for post-acute care in coordination with multidisciplinary team members.     12/19/23 1532   Post-Acute Status   Post-Acute Authorization Placement   Post-Acute Placement Status Set-up Complete/Auth obtained  ([facility])   Discharge Delays None known at this time   Discharge Plan   Discharge Plan A Return to nursing home   Discharge Plan B Home;Home with family     Domenica Braswell LMSW  Part-Time-  [facility]  Ext. [phone]

## 2023-12-19 NOTE — PROGRESS NOTES
[facility] - Emergency Dept  [facility] Medicine  Progress Note    Patient Name: Radha Sandoval  MRN: [id number]  Patient Class: IP- Inpatient   Admission Date: 12/16/2023  Length of Stay: 3 days  Attending Physician: Kory Smith MD  Primary Care Provider: [facility]        Subjective:     Principal Problem:Acute encephalopathy        HPI:  Radha Sandoval is a 88 y.o. female with a PMHx of DVT/PE, chronic bilateral pleural effusions, HTN, severe late term alzheimer's dementia who presents to [facility] from [facility] for evaluation of altered mental status. Patient is unable to provide any meaningful history due to dementia. Family friend at bedside assists with history. Per chart review, patient noted to be more confused this afternoon by NH staff. She is oriented to person only at baseline. Presentation is similar to when she had a UTI a few months ago. No known recent melena or bloody stools, however they aren't sure since patient lives in a NH. Patient complains of recent right shoulder and face pain but then later denies any recent pain. Denies abdominal or chest pain. Remainder of history limited 2/2 dementia.     ED: hypertensive top /77, vitals otherwise stable. No leukocytosis. Hgb 6.7, baseline ~8. Cr 1.4, slightly worse from baseline ~1.1. CTH/CXR negative for acute findings. Given 1U pRBCs.     Overview/Hospital Course:  12/17 Hb 8.4 s/p Transfusion with 1 unit of PRBC. K and Mg replaced. NPO. GI Eval . on ampicillin for prior h/o enterococcal UTI. UC pending . per GI, Hb at baseline s/p transfusion. consult GI if evidence of overt bleed . ID eval -Unclear if reported worsened mentation from baseline is secondary to a UTI.Continue Ampicillin   12/18 UC 12/16 No growth. ampicillin discontinued.  K of 2.9, replaced IV. SLP eval today -  minced/moist ( level 5) and thin liquid. was seen by GI at [facility] 9/18 and advised resumption of eliquis.  Hb stable at 8.4 s/p transfusion . Brown stool on FL  exam. Initial Hgb 6.7 -->8.5 s/p 1u pRBCs. Most recent Hgb stable at 8.9. GI consulted for anemia.  follow up in clinic to consider outpatient endoscopy. Discussed with son POA - Mr. Maykel Sandoval about risks and benefits  of anticoagulation as the patient is bedboud and at risk for DVT/PE .son understands the risks- agrees for resumption of eliquis   12/19 Hb stable on eliquis. discharge to NH today          Review of Systems:   Pain scale:  Constitutional:  fever,  chills, headache, vision loss, hearing loss, weight loss, Generalized weakness, falls, loss of smell, loss of taste, poor appetite,  sore throat  Respiratory: cough, shortness of breath.   Cardiovascular: chest pain, dizziness, palpitations, orthopnea, swelling of feet, syncope  Gastrointestinal: nausea, vomiting, abdominal pain, diarrhea, black stool,  blood in stool, change in bowel habits, constipation  Genitourinary: hematuria, dysuria, urgency, frequency  Integument/Breast: rash,  pruritis  Hematologic/Lymphatic: easy bruising, lymphadenopathy  Musculoskeletal: arthralgias , myalgias, back pain, neck pain, knee pain  Neurological: confusion- improved , seizures, tremors, slurred speech  Behavioral/Psych:  depression, anxiety, auditory or visual hallucinations     OBJECTIVE:     Physical Exam:  Body mass index is 23 kg/m².    Constitutional: Appears well-developed and well-nourished. hard of hearing  Head: Normocephalic and atraumatic.   Neck: Normal range of motion. Neck supple.   Cardiovascular: Normal heart rate.  Regular heart rhythm.  Pulmonary/Chest: Effort normal.   Abdominal: No distension.  No tenderness  Musculoskeletal: Normal range of motion. No edema. right foot drop. feet demformities  Neurological:  oriented to person, place, month and year , answers simple questions appropriately. follows simple commands  able to move bilateral upper and lower extremities without limitation   Skin: Skin is warm and dry.   Psychiatric: Normal mood and  "affect. Behavior is normal.                  Vital Signs  Temp: 97.7 °F (36.5 °C) (12/19/23 0331)  Pulse: 63 (12/19/23 0331)  Resp: 16 (12/19/23 0331)  BP: (Abnormal) 170/69 (12/19/23 0331)  SpO2: 97 % (12/19/23 0331)     24 Hour VS Range    Temp:  [97.6 °F (36.4 °C)-98.3 °F (36.8 °C)]   Pulse:  [59-89]   Resp:  [16-18]   BP: (116-193)/(56-82)   SpO2:  [93 %-99 %]   No intake or output data in the 24 hours ending 12/19/23 0703        I/O This Shift:  No intake/output data recorded.    Wt Readings from Last 3 Encounters:   12/16/23 60.8 kg (134 lb)   11/15/23 61.2 kg (134 lb 14.7 oz)   11/06/23 61.2 kg (135 lb)       I have personally reviewed the vitals and recorded Intake/Output     Laboratory/Diagnostic Data:    CBC/Anemia Labs: Coags:    Recent Labs   Lab 12/17/23 1959 12/18/23 0556 12/18/23 1555 12/19/23  0249   WBC 6.25 5.21  --  5.69   HGB 8.4* 8.9*  --  8.6*   HCT 26.0* 27.8*  --  26.4*    300  --  284   MCV 78* 81*  --  77*   RDW 16.9* 17.3*  --  17.7*   IRON  --   --  18*  --    FERRITIN  --   --  16*  --     No results for input(s): "PT", "INR", "APTT" in the last 168 hours.     Chemistries: ABG:   Recent Labs   Lab 12/16/23 2001 12/16/23 2001 12/17/23 0337 12/18/23 0556 12/18/23 1555 12/19/23  0249     --  142 143 140 140   K 3.3*  --  3.2* 2.9* 3.6 3.6     --  110 110 110 110   CO2 21*  --  25 24 23 20*   BUN 20  --  19 16 15 12   CREATININE 1.4  --  1.3 0.9 0.9 0.9   CALCIUM 9.0  --  8.6* 8.4* 8.5* 8.5*   PROT 6.0  --   --   --   --   --    BILITOT 0.5  --   --   --   --   --    ALKPHOS 74  --   --   --   --   --    ALT 7*  --   --   --   --   --    AST 18  --   --   --   --   --    MG  --   --  1.6 1.9  --  1.7   PHOS  --    < > 3.5 3.6 3.0 3.0    < > = values in this interval not displayed.    No results for input(s): "PH", "PCO2", "PO2", "HCO3", "POCSATURATED", "BE" in the last 168 hours.     POCT Glucose: HbA1c:    Recent Labs   Lab 12/18/23  0727 12/18/23  1117 " "12/18/23  1617 12/18/23  2009 12/18/23  2341 12/19/23  0329   POCTGLUCOSE 93 95 116* 92 88 81    Hemoglobin A1C   Date Value Ref Range Status   12/17/2023 5.8 (H) 4.0 - 5.6 % Final     Comment:     ADA Screening Guidelines:  5.7-6.4%  Consistent with prediabetes  >or=6.5%  Consistent with diabetes    High levels of fetal hemoglobin interfere with the HbA1C  assay. Heterozygous hemoglobin variants (HbS, HgC, etc)do  not significantly interfere with this assay.   However, presence of multiple variants may affect accuracy.          Cardiac Enzymes: Ejection Fractions:    Recent Labs     12/16/23 2001 12/17/23  0216 12/17/23  0337   TROPONINI 0.059* 0.073* 0.063*    EF   Date Value Ref Range Status   07/28/2023 65 % Final   05/22/2023 65 % Final          No results for input(s): "COLORU", "APPEARANCEUA", "PHUR", "SPECGRAV", "PROTEINUA", "GLUCUA", "KETONESU", "BILIRUBINUA", "OCCULTUA", "NITRITE", "UROBILINOGEN", "LEUKOCYTESUR", "RBCUA", "WBCUA", "BACTERIA", "SQUAMEPITHEL", "HYALINECASTS" in the last 48 hours.    Invalid input(s): "WRIGHTSUR"      Lactate (Lactic Acid) (mmol/L)   Date Value   07/08/2023 1.2   05/06/2023 0.8     BNP (pg/mL)   Date Value   12/16/2023 514 (H)   11/06/2023 373 (H)   09/20/2023 1,626 (H)   07/27/2023 301 (H)   07/23/2023 397 (H)     No results found for: "CRP", "SEDRATE"  No results found for: "DDIMER"  Ferritin (ng/mL)   Date Value   12/18/2023 16 (L)     No results found for: "LDH"  Troponin I (ng/mL)   Date Value   12/17/2023 0.063 (H)   12/17/2023 0.073 (H)   12/16/2023 0.059 (H)   11/06/2023 0.044 (H)   09/20/2023 0.242 (H)   09/20/2023 0.236 (H)   09/20/2023 0.182 (H)   09/20/2023 0.168 (H)     CPK (U/L)   Date Value   03/27/2023 201 (H)     Results for orders placed or performed during the hospital encounter of 04/18/23   Vitamin D   Result Value Ref Range    Vit D, 25-Hydroxy 45 30 - 96 ng/mL     SARS-CoV2 (COVID-19) Qualitative PCR (no units)   Date Value   09/20/2023 Detected (A) "   08/13/2023 Not Detected   08/07/2023 Not Detected   07/27/2023 Not Detected   05/29/2023 Not Detected   05/09/2023 Not Detected   04/24/2023 Not Detected   04/20/2023 Not Detected     POC Rapid COVID (no units)   Date Value   09/18/2023 Positive (A)   03/27/2023 Negative       Microbiology labs for the last week  Microbiology Results (last 7 days)       Procedure Component Value Units Date/Time    Urine culture [[id number]] Collected: 12/16/23 2027    Order Status: Completed Specimen: Urine Updated: 12/18/23 0301     Urine Culture, Routine No growth    Narrative:      Specimen Source->Urine            Reviewed and noted in plan where applicable- Please see chart for full lab data.    Lines/Drains:       Peripheral IV - Single Lumen 12/16/23 2002 20 G Anterior;Right Wrist (Active)   Site Assessment Clean;Dry;Intact 12/16/23 2048   Extremity Assessment Distal to IV No abnormal discoloration;No redness;No swelling 12/16/23 2048   Dressing Status Clean;Dry;Intact 12/16/23 2048   Dressing Intervention First dressing 12/16/23 2048   Number of days: 0       Imaging  ECG Results              EKG 12-lead (Final result)  Result time 12/17/23 09:22:39      Final result by Interface, Lab In [facility] (12/17/23 09:22:39)               Narrative:    Test Reason : R41.82,    Vent. Rate : 121 BPM     Atrial Rate : 220 BPM     P-R Int : 000 ms          QRS Dur : 080 ms      QT Int : 410 ms       P-R-T Axes : 000 044 054 degrees     QTc Int : 582 ms    Undetermined rhythm due to artifact.  Origin is supraventricular.  Nonspecific ST and T wave abnormality  Abnormal ECG  When compared with ECG of 06-NOV-2023 12:00,  Current undetermined rhythm precludes rhythm comparison, needs review  Nonspecific T wave abnormality now evident in Anterior leads  Confirmed by Cornelio DINH, Kilo HATFIELD ([id number]) on 12/17/2023 9:22:29 AM    Referred By: [email or web address]   SELF           Confirmed By:Kilo Grimes MD                                  Results for orders  placed during the hospital encounter of 07/22/23    Echo    Interpretation Summary  · The left ventricle is normal in size with normal systolic function.  · The estimated ejection fraction is 65%.  · Grade I left ventricular diastolic dysfunction.  · There is mild aortic valve stenosis. Aortic valve area is 1.88 cm2; peak velocity is 2.58 m/s; mean gradient is 15 mmHg.  · There is significant mitral annular calcification. The mean diastolic mitral gradient is 5mmHg at heart rate of 76bpm.  · Normal right ventricular size with normal right ventricular systolic function.  · The estimated PA systolic pressure is 39 mmHg.  · Normal central venous pressure (3 mmHg).  · Mild left atrial enlargement.      CT Head Without Contrast  Narrative: EXAMINATION:  CT HEAD WITHOUT CONTRAST    CLINICAL HISTORY:  Mental status change, unknown cause;    TECHNIQUE:  Low dose axial CT images obtained throughout the head without intravenous contrast. Sagittal and coronal reconstructions were performed.  Examination was repeated due to motion artifact.    COMPARISON:  CT head dated 08/09/2023.    FINDINGS:  Ventricles and sulci are normal in size for age without evidence of hydrocephalus. No extra-axial blood or fluid collections.  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  There are chronic microvascular ischemic changes, stable.  No parenchymal mass, hemorrhage, edema or major vascular distribution infarct.    No calvarial fracture.  The scalp is unremarkable.  Bilateral paranasal sinuses and mastoid air cells are clear.  Impression: No acute intracranial abnormality.    Electronically signed by: Luis Doran  Date:    12/16/2023  Time:    22:41  X-Ray Chest AP Portable  Narrative: EXAMINATION:  XR CHEST AP PORTABLE    CLINICAL HISTORY:  Altered mental status, unspecified    TECHNIQUE:  Single frontal view of the chest was performed.    COMPARISON:  09/20/2023.    FINDINGS:  Right hemidiaphragm elevation, similar  to prior.  Accessory azygous lobe.  Chronic increased markings in the lungs, similar to prior.  Perihilar vascular congestion appears less pronounced.    Heart and lungs otherwise appear unchanged when allowing for differences in technique and positioning.    Retrocardiac hiatal hernia, similar to prior.  Impression: No acute findings.    No significant change from prior study.    Electronically signed by: Olaf Boudreaux MD  Date:    12/16/2023  Time:    21:05      Labs, Imaging, EKG and Diagnostic results from 12/19/2023 were reviewed.    Medications:  Medication list was reviewed and changes noted under Assessment/Plan.  No current facility-administered medications on file prior to encounter.     Current Outpatient Medications on File Prior to Encounter   Medication Sig Dispense Refill    acetaminophen (TYLENOL) 325 MG tablet Take 2 tablets (650 mg total) by mouth every 6 (six) hours as needed (Pain). 60 tablet 1    albuterol (PROVENTIL HFA) 90 mcg/actuation inhaler Inhale 2 puffs into the lungs every 4 (four) hours as needed for Wheezing. Use spacer with adult mask 18 g 1    apixaban (ELIQUIS) 5 mg Tab Take 1 tablet (5 mg total) by mouth 2 (two) times daily. Starting 8/5 60 tablet 11    cholecalciferol, vitamin D3, (VITAMIN D3) 50 mcg (2,000 unit) Tab Take 1 tablet (2,000 Units total) by mouth once daily.      EScitalopram oxalate (LEXAPRO) 5 MG Tab Take 2 tablets (10 mg total) by mouth once daily. 60 tablet 11    furosemide (LASIX) 20 MG tablet Take 1 tablet (20 mg total) by mouth once daily. 30 tablet 0    lisinopriL (PRINIVIL,ZESTRIL) 40 MG tablet Take 1 tablet (40 mg total) by mouth once daily. 30 tablet 11    loperamide (IMODIUM) 2 mg capsule Take 1 capsule (2 mg total) by mouth 4 (four) times daily as needed for Diarrhea. 60 capsule 0    melatonin (MELATIN) 3 mg tablet Take 2 tablets (6 mg total) by mouth nightly as needed for Insomnia. 30 tablet 3    memantine (NAMENDA) 5 MG Tab Take 1 tablet (5 mg total)  by mouth 2 (two) times daily. 60 tablet 11    metoprolol tartrate (LOPRESSOR) 25 MG tablet Take 1 tablet (25 mg total) by mouth 2 (two) times daily. 60 tablet 11    miconazole NITRATE 2 % (MICOTIN) 2 % top powder Apply topically 2 (two) times daily. Underside of bilateral breasts 85 g 3    polyethylene glycol (GLYCOLAX) 17 gram PwPk Take 17 g by mouth 2 (two) times daily as needed.  0    potassium chloride (KLOR-CON) 10 MEQ TbSR Take 1 tablet (10 mEq total) by mouth once daily. 30 tablet 11     Scheduled Medications:  apixaban, 5 mg, Oral, BID  EScitalopram oxalate, 10 mg, Oral, Daily  memantine, 5 mg, Oral, BID  metoprolol tartrate, 25 mg, Oral, BID      PRN: 0.9%  NaCl infusion (for blood administration), acetaminophen, albuterol-ipratropium, bisacodyL, dextrose 10%, dextrose 10%, glucagon (human recombinant), glucose, glucose, hydrALAZINE, melatonin, polyethylene glycol, prochlorperazine  Infusions:       Estimated Creatinine Clearance: 38 mL/min (based on SCr of 0.9 mg/dL).               Assessment/Plan:      * Acute encephalopathy  - suspect 2/2 UTI  - no focal deficits  - CTH negative for acute abnormalities  - treat UTI as below  - neuro checks q4hr  - delirium precautions   12/17 improved. oriented to person, place, month and year , answers simple questions appropriately. follows simple commands    Acute cystitis without hematuria  - afebrile without leukocytosis  - UA infectious  - started on ampicillin based on prior urine cultures, will continue  - ID consulted for antibiotic recs  - follow Ucx  - strict I/Os  12/18 UC 12/16 No growth. ampicillin discontinued.     Anemia  - Hgb 6.7, baseline ~8  - FOBT positive but no other evidence of overt GI bleeding  - s/p 1U pRBCs in the ED  - will give 80mg protonic IVP x1  - further IV PPI/ GI consult pending H/H trend  - NPO at [facility] as precaution  - holding eliquis   Anemia      Recent Labs   Lab 12/17/23  0337 12/17/23  1959 12/18/23  0556   HGB 8.5* 8.4* 8.9*            Component Value Date/Time    MCV 81 (L) 12/18/2023 0556    RDW 17.3 (H) 12/18/2023 0556    FOLATE 7.7 06/23/2023 1153    XLTMPCIC01 971 (H) 08/01/2023 0535    OCCULTBLOOD Positive (A) 05/28/2023 0311     Monitor CBC and transfuse if H/H drops below 7/21.    12/17 . per GI, Hb at baseline s/p transfusion. consult GI if evidence of overt bleed .   12/18 follow up in clinic to consider outpatient endoscopy.     Prolonged Q-T interval on ECG  QTc prolonged at  583ms.  Avoid QT prolonging agents       Dysphagia  - appears to be acute on chronic issue  - failed swallow study in the ED  - keep NPO  - SLP consulted   1218  SLP eval today -  minced/moist ( level 5) and thin liquid    Hypokalemia  replaced     Chronic bilateral pleural effusions  - no acute issues  - CXR stable  - continue lasix when able to safely swallow     Severe late onset Alzheimer's dementia without behavioral disturbance, psychotic disturbance, mood disturbance, or anxiety  - more confused due to UTI  - oriented to person only on exam (baseline), no focal deficits   - delirium precautions  - continue aricept and namenda once able to safely swallow    Chronic pulmonary embolism without acute cor pulmonale  - hold eliquis given anemia and possible GIB  CTA chest 5/23 -Examination positive for extensive pulmonary emboli asymmetric to the right. Embolus noted within the lateral aspect of the right main pulmonary artery, encroaching within 4.7 cm of the main pulmonary artery bifurcation   Repeat CTA chest 07/24 - no evidence of  pulmonary embolism. resume eliquis once cleared by SLP   12/18 Discussed with son POA - Mr. Maykel Sandoval about risks and benefits  of anticoagulation as the patient is bedboud and at risk for DVT/PE .son understands the risks- agrees for resumption of eliquis    GINA (acute kidney injury)  - Cr 1.4, baseline ~0.9-1.1  - suspect 2/2 UTI and prerenal from anemia  - avoid nephrotoxins, renally dose meds  - management of  UTI/anemia as noted above    Recent Labs   Lab 12/16/23 2001 12/17/23  0337 12/18/23  0556   BUN 20 19 16   CREATININE 1.4 1.3 0.9       I & O     Intake/Output Summary (Last 24 hours) at 12/18/2023 1020  Last data filed at 12/18/2023 0631  Gross per 24 hour   Intake 1033.44 ml   Output no documentation   Net 1033.44 ml        Primary hypertension  - hold lisinopril until able to safely swallow  - use PRN IV hydralazine for now      VTE Risk Mitigation (From admission, onward)           Ordered     apixaban tablet 5 mg  2 times daily         12/18/23 1830     IP VTE HIGH RISK PATIENT  Once         12/18/23 1019     Reason for No Pharmacological VTE Prophylaxis  Once        Question Answer Comment   Reasons: Already adequately anticoagulated on oral Anticoagulants    Reasons: Active Bleeding        12/16/23 2223                    Discharge Planning   TOSHA: 12/19/2023     Code Status: Full Code   Is the patient medically ready for discharge?: No    Reason for patient still in hospital (select all that apply): Treatment  Discharge Plan A: Return to nursing home   Discharge Delays: None known at this time              Kory Smith MD  Department of Hospital Medicine   [facility] - Emergency Dept

## 2023-12-20 NOTE — PLAN OF CARE
[facility] - Med Surg  Discharge Final Note    Primary Care Provider: [facility]    Expected Discharge Date: 12/19/2023    Pt discharged back to [facility].  Pt's family member notified.  Pt discharged via stretcher/room air.      Discharge Plan A and Plan B have been determined by review of patient's clinical status, future medical and therapeutic needs, and coverage/benefits for post-acute care in coordination with multidisciplinary team members.      Final Discharge Note (most recent)       Final Note - 12/20/23 0748          Final Note    Assessment Type Final Discharge Note     Anticipated Discharge Disposition Intermediate Care Facility for skilled nursing or Supportive Care        Post-Acute Status    Post-Acute Authorization Placement     Post-Acute Placement Status Set-up Complete/Auth obtained     Coverage Medicare A & B     Discharge Delays None known at this time                     Important Message from Medicare  Important Message from Medicare regarding Discharge Appeal Rights: Given to patient/caregiver, Explained to patient/caregiver, Signed/date by patient/caregiver     Date IMM was signed: 12/19/23  Time IMM was signed: 1326    Contact Info       [facility]   Specialty: Nursing Home Agency, SNF Agency   Relationship: PCP - General    [address] [place] [id number]   Phone: [phone]       Next Steps: Follow up          Domenica Braswell LMSW  Part-Time-  [facility]  Ext. [phone]

## 2023-12-20 NOTE — PHYSICIAN QUERY
PT Name: Radha Sandoval  MR #: [id number]    DOCUMENTATION CLARIFICATION     CDS/: Varun Weems RN       Contact information: [email or web address]   This form is a permanent document in the medical record.     Query Date: December 20, 2023    By submitting this query, we are merely seeking further clarification of documentation. Please utilize your independent clinical judgment when addressing the question(s) below.    The Medical Record contains the following:   Indicators   Supporting Clinical Findings Location in Medical Record   x AMS, Confusion,  LOC, etc.  Patient presented with altered mental status    -Unclear if reported worsened mentation from baseline is secondary to a UTI. Continue Ampicillin    12/17 improved. oriented to person, place, month and year , answers simple questions appropriately. follows simple commands    Unclear if reported worsened mentation from baseline is secondary to a UTI. Would continue to evaluate for non infectious etiologies.    Urine culture from admission no growth. UA with squams. Received x2 days of amoxil. Low concern for UTI. Remains altered, but with other risk factors for altered mental status.   ED Provider (12/16)    HM PN (12/17)            ID Consult (12/17)      ID PN (12/18)   x Acute/Chronic Illness 87 yo female with pmh of alzheimer's presents from her nursing home for AMS. According the NH, she is A&O x1 at her baseline. Today she began to hallucinate. When speaking to her, she is alert but she rambles non-sensically. She was also addressing people who were not in the room. I was not able to obtain any significant history from her as she is severely altered, either from her known history of alzheimer's or an additional insult.    ED: hypertensive top /77, vitals otherwise stable. No leukocytosis. Hgb 6.7, baseline ~8. Cr 1.4, slightly worse from baseline ~1.1. CTH/CXR negative for acute findings. Given 1U pRBCs.    Acute encephalopathy  - suspect 2/2  "UTI  - no focal deficits  - CTH negative for acute abnormalities    Acute cystitis without hematuria  - afebrile without leukocytosis  - UA infectious  - started on ampicillin based on prior urine cultures, will continue    GINA (acute kidney injury)  - Cr 1.4, baseline ~0.9-1.1  - suspect 2/2 UTI and prerenal from anemia    Severe late onset Alzheimer's dementia without behavioral disturbance, psychotic disturbance, mood disturbance, or anxiety  - more confused due to UTI    12/19 Hb stable on eliquis. discharge to NH today   ED Provider (12/16)              HM H&P (12/17)                                            HM DS (12/18)   x Radiology Findings - CXR stable   HM PN (12/17)    Electrolyte Imbalance      Medication     x Treatment         - neuro checks q4hr  - delirium precautions   HM H&P (12/17)   x Other 12/18 UC 12/16 No growth. ampicillin discontinued.    PN (12/18)     The noted clinical guidelines are only system guidelines and do not replace the providers clinical judgment.    The National [place] of Neurologic Disorders and Stroke (NINDS) of the NIH describes encephalopathy as any diffuse disease of the brain that alters brain function or structure.    Provider, please further specify the diagnosis of "Acute encephalopathy" associated with above clinical findings.    [ x  ] Metabolic Encephalopathy - Due to electrolyte imbalance, metabolic derangements, or infectious processes, includes Septic Encephalopathy, Uremic Encephalopathy     [   ] Encephalopathy, unspecified        [   ] Other Encephalopathy (please specify): ____________________     [   ] Other neurological condition- Includes Post-ictal altered mental status (please specify condition): __________       Please document in your progress notes daily for the duration of treatment until resolved, and include in your discharge summary.    References:  TEZ Butler RN, CCDS. (2018, June 9). Notes from the Instructor: Encephalopathy tips. " Retrieved October 22, 2020, from [email or web address]://acdis.org/articles/note-instructor-encephalopathy-tips    ICD-9-CM Coding Clinic First Quarter 2013, Effective with discharges: October 21, 2013 [facility] § Seizure with encephalopathy due to postictal state (2013).    ICD-10-CM/hospitals [employer] Integrated Codebook (Version V.20.8.10.0) [Computer software]. (2020). Retrieved October 21, 2020.    National [place] of Neurological Disorders and Stroke. (2019, March 27). Retrieved October 22, 2020, from https://www.ninds.nih.gov/Disorders/All-Disorders/Kbwnufapeoehfw-Wwecwovqyox-Hpwz    Form No. 85220

## 2023-12-25 PROBLEM — K92.2 UGIB (UPPER GASTROINTESTINAL BLEED): Status: RESOLVED | Noted: 2023-01-01 | Resolved: 2023-01-01

## 2024-01-01 ENCOUNTER — PATIENT MESSAGE (OUTPATIENT)
Dept: PALLIATIVE MEDICINE | Facility: CLINIC | Age: 89
End: 2024-01-01
Payer: MEDICARE

## 2024-01-01 ENCOUNTER — TELEPHONE (OUTPATIENT)
Dept: AUDIOLOGY | Facility: CLINIC | Age: 89
End: 2024-01-01
Payer: MEDICARE

## 2024-01-01 ENCOUNTER — ANESTHESIA (OUTPATIENT)
Dept: ENDOSCOPY | Facility: OTHER | Age: 89
DRG: 380 | End: 2024-01-01
Payer: MEDICARE

## 2024-01-01 ENCOUNTER — TELEPHONE (OUTPATIENT)
Dept: ADMINISTRATIVE | Facility: HOSPITAL | Age: 89
End: 2024-01-01
Payer: MEDICARE

## 2024-01-01 ENCOUNTER — TELEPHONE (OUTPATIENT)
Dept: ADMINISTRATIVE | Facility: OTHER | Age: 89
End: 2024-01-01
Payer: MEDICARE

## 2024-01-01 ENCOUNTER — OFFICE VISIT (OUTPATIENT)
Dept: PALLIATIVE MEDICINE | Facility: CLINIC | Age: 89
End: 2024-01-01
Payer: MEDICARE

## 2024-01-01 ENCOUNTER — OFFICE VISIT (OUTPATIENT)
Dept: UROLOGY | Facility: CLINIC | Age: 89
End: 2024-01-01
Payer: MEDICARE

## 2024-01-01 ENCOUNTER — HOSPITAL ENCOUNTER (EMERGENCY)
Facility: OTHER | Age: 89
Discharge: HOME | End: 2024-02-12
Attending: EMERGENCY MEDICINE
Payer: MEDICARE

## 2024-01-01 ENCOUNTER — OFFICE VISIT (OUTPATIENT)
Dept: PODIATRY | Facility: CLINIC | Age: 89
End: 2024-01-01
Payer: MEDICARE

## 2024-01-01 ENCOUNTER — ANESTHESIA EVENT (OUTPATIENT)
Dept: ENDOSCOPY | Facility: OTHER | Age: 89
DRG: 380 | End: 2024-01-01
Payer: MEDICARE

## 2024-01-01 ENCOUNTER — PATIENT MESSAGE (OUTPATIENT)
Dept: ADMINISTRATIVE | Facility: OTHER | Age: 89
End: 2024-01-01
Payer: MEDICARE

## 2024-01-01 ENCOUNTER — HOSPITAL ENCOUNTER (EMERGENCY)
Facility: HOSPITAL | Age: 89
End: 2024-03-08
Attending: EMERGENCY MEDICINE
Payer: MEDICARE

## 2024-01-01 ENCOUNTER — OFFICE VISIT (OUTPATIENT)
Dept: INTERNAL MEDICINE | Facility: CLINIC | Age: 89
End: 2024-01-01
Payer: MEDICARE

## 2024-01-01 VITALS
WEIGHT: 112 LBS | HEIGHT: 64 IN | BODY MASS INDEX: 19.12 KG/M2 | DIASTOLIC BLOOD PRESSURE: 61 MMHG | HEART RATE: 73 BPM | TEMPERATURE: 98 F | OXYGEN SATURATION: 96 % | RESPIRATION RATE: 19 BRPM | SYSTOLIC BLOOD PRESSURE: 130 MMHG

## 2024-01-01 VITALS
WEIGHT: 110 LBS | HEART RATE: 68 BPM | BODY MASS INDEX: 18.78 KG/M2 | HEIGHT: 64 IN | DIASTOLIC BLOOD PRESSURE: 46 MMHG | SYSTOLIC BLOOD PRESSURE: 83 MMHG

## 2024-01-01 VITALS
WEIGHT: 110 LBS | RESPIRATION RATE: 19 BRPM | BODY MASS INDEX: 18.78 KG/M2 | OXYGEN SATURATION: 98 % | HEART RATE: 63 BPM | SYSTOLIC BLOOD PRESSURE: 179 MMHG | DIASTOLIC BLOOD PRESSURE: 86 MMHG | HEIGHT: 64 IN | TEMPERATURE: 98 F

## 2024-01-01 VITALS
DIASTOLIC BLOOD PRESSURE: 66 MMHG | HEIGHT: 64 IN | WEIGHT: 110.25 LBS | RESPIRATION RATE: 18 BRPM | TEMPERATURE: 97 F | HEART RATE: 68 BPM | BODY MASS INDEX: 18.82 KG/M2 | OXYGEN SATURATION: 97 % | SYSTOLIC BLOOD PRESSURE: 110 MMHG

## 2024-01-01 VITALS
HEIGHT: 64 IN | DIASTOLIC BLOOD PRESSURE: 61 MMHG | RESPIRATION RATE: 16 BRPM | BODY MASS INDEX: 18.78 KG/M2 | HEIGHT: 64 IN | BODY MASS INDEX: 19.22 KG/M2 | OXYGEN SATURATION: 94 % | HEART RATE: 94 BPM | SYSTOLIC BLOOD PRESSURE: 137 MMHG | WEIGHT: 110 LBS | OXYGEN SATURATION: 96 % | DIASTOLIC BLOOD PRESSURE: 60 MMHG | SYSTOLIC BLOOD PRESSURE: 158 MMHG | HEART RATE: 78 BPM

## 2024-01-01 VITALS — DIASTOLIC BLOOD PRESSURE: 30 MMHG | HEART RATE: 50 BPM | SYSTOLIC BLOOD PRESSURE: 45 MMHG | RESPIRATION RATE: 16 BRPM

## 2024-01-01 DIAGNOSIS — K92.2 GASTROINTESTINAL HEMORRHAGE, UNSPECIFIED GASTROINTESTINAL HEMORRHAGE TYPE: ICD-10-CM

## 2024-01-01 DIAGNOSIS — E43 SEVERE MALNUTRITION: Chronic | ICD-10-CM

## 2024-01-01 DIAGNOSIS — E86.0 DEHYDRATION: Primary | ICD-10-CM

## 2024-01-01 DIAGNOSIS — Z66 DNR (DO NOT RESUSCITATE): ICD-10-CM

## 2024-01-01 DIAGNOSIS — R73.03 PREDIABETES: ICD-10-CM

## 2024-01-01 DIAGNOSIS — F02.C0 SEVERE LATE ONSET ALZHEIMER'S DEMENTIA WITHOUT BEHAVIORAL DISTURBANCE, PSYCHOTIC DISTURBANCE, MOOD DISTURBANCE, OR ANXIETY: Chronic | ICD-10-CM

## 2024-01-01 DIAGNOSIS — N39.0 RECURRENT UTI: Primary | ICD-10-CM

## 2024-01-01 DIAGNOSIS — E87.6 HYPOKALEMIA: ICD-10-CM

## 2024-01-01 DIAGNOSIS — Z51.5 ENCOUNTER FOR PALLIATIVE CARE: ICD-10-CM

## 2024-01-01 DIAGNOSIS — G30.1 SEVERE LATE ONSET ALZHEIMER'S DEMENTIA WITHOUT BEHAVIORAL DISTURBANCE, PSYCHOTIC DISTURBANCE, MOOD DISTURBANCE, OR ANXIETY: Chronic | ICD-10-CM

## 2024-01-01 DIAGNOSIS — M24.571 EQUINUS CONTRACTURE OF RIGHT ANKLE: ICD-10-CM

## 2024-01-01 DIAGNOSIS — I46.9 CARDIAC ARREST: Primary | ICD-10-CM

## 2024-01-01 DIAGNOSIS — I95.9 HYPOTENSION: ICD-10-CM

## 2024-01-01 DIAGNOSIS — I95.9 TRANSIENT HYPOTENSION: ICD-10-CM

## 2024-01-01 DIAGNOSIS — Z71.89 ACP (ADVANCE CARE PLANNING): Primary | ICD-10-CM

## 2024-01-01 DIAGNOSIS — H54.7 VISION LOSS: ICD-10-CM

## 2024-01-01 DIAGNOSIS — Z87.440 HISTORY OF UTI: Primary | ICD-10-CM

## 2024-01-01 DIAGNOSIS — R29.6 FREQUENT FALLS: ICD-10-CM

## 2024-01-01 DIAGNOSIS — D64.9 CHRONIC ANEMIA: Chronic | ICD-10-CM

## 2024-01-01 DIAGNOSIS — M79.671 RIGHT FOOT PAIN: Primary | ICD-10-CM

## 2024-01-01 DIAGNOSIS — H91.90 HEARING LOSS, UNSPECIFIED HEARING LOSS TYPE, UNSPECIFIED LATERALITY: ICD-10-CM

## 2024-01-01 DIAGNOSIS — M47.16 LUMBAR SPONDYLOSIS WITH MYELOPATHY: ICD-10-CM

## 2024-01-01 DIAGNOSIS — I10 PRIMARY HYPERTENSION: Chronic | ICD-10-CM

## 2024-01-01 DIAGNOSIS — Z86.711 HISTORY OF PULMONARY EMBOLISM: Chronic | ICD-10-CM

## 2024-01-01 PROBLEM — R33.9 URINARY RETENTION: Status: RESOLVED | Noted: 2023-01-01 | Resolved: 2024-01-01

## 2024-01-01 PROBLEM — S06.5XAA ACUTE SUBDURAL HEMATOMA: Status: RESOLVED | Noted: 2023-01-01 | Resolved: 2024-01-01

## 2024-01-01 PROBLEM — E87.3 RESPIRATORY ALKALOSIS: Status: RESOLVED | Noted: 2023-01-01 | Resolved: 2024-01-01

## 2024-01-01 PROBLEM — R79.89 POSITIVE D DIMER: Status: ACTIVE | Noted: 2024-01-01

## 2024-01-01 PROBLEM — R94.31 PROLONGED Q-T INTERVAL ON ECG: Status: RESOLVED | Noted: 2023-01-01 | Resolved: 2024-01-01

## 2024-01-01 PROBLEM — J90 CHRONIC BILATERAL PLEURAL EFFUSIONS: Chronic | Status: ACTIVE | Noted: 2023-01-01

## 2024-01-01 PROBLEM — F02.80 MAJOR NEUROCOGNITIVE DISORDER DUE TO ALZHEIMER'S DISEASE, WITHOUT BEHAVIORAL DISTURBANCE: Status: ACTIVE | Noted: 2024-01-01

## 2024-01-01 PROBLEM — N30.00 ACUTE CYSTITIS WITHOUT HEMATURIA: Status: RESOLVED | Noted: 2023-01-01 | Resolved: 2024-01-01

## 2024-01-01 PROBLEM — E88.09 HYPOALBUMINEMIA: Status: RESOLVED | Noted: 2023-01-01 | Resolved: 2024-01-01

## 2024-01-01 PROBLEM — G93.41 ACUTE METABOLIC ENCEPHALOPATHY: Status: RESOLVED | Noted: 2023-01-01 | Resolved: 2024-01-01

## 2024-01-01 PROBLEM — I48.91 ATRIAL FIBRILLATION: Chronic | Status: ACTIVE | Noted: 2023-01-01

## 2024-01-01 PROBLEM — R53.81 DEBILITY: Status: RESOLVED | Noted: 2023-01-01 | Resolved: 2024-01-01

## 2024-01-01 PROBLEM — I27.82 CHRONIC PULMONARY EMBOLISM WITHOUT ACUTE COR PULMONALE: Chronic | Status: ACTIVE | Noted: 2023-01-01

## 2024-01-01 PROBLEM — Z79.01 ANTICOAGULATED: Status: RESOLVED | Noted: 2023-01-01 | Resolved: 2024-01-01

## 2024-01-01 PROBLEM — G93.40 ACUTE ENCEPHALOPATHY: Status: RESOLVED | Noted: 2023-01-01 | Resolved: 2024-01-01

## 2024-01-01 PROBLEM — R45.851 SUICIDAL IDEATION: Status: RESOLVED | Noted: 2023-01-01 | Resolved: 2024-01-01

## 2024-01-01 PROBLEM — I82.411 ACUTE DEEP VEIN THROMBOSIS (DVT) OF FEMORAL VEIN OF RIGHT LOWER EXTREMITY: Status: RESOLVED | Noted: 2023-01-01 | Resolved: 2024-01-01

## 2024-01-01 PROBLEM — G30.9 MAJOR NEUROCOGNITIVE DISORDER DUE TO ALZHEIMER'S DISEASE, WITHOUT BEHAVIORAL DISTURBANCE: Status: ACTIVE | Noted: 2024-01-01

## 2024-01-01 PROBLEM — U07.1 COVID: Status: RESOLVED | Noted: 2023-01-01 | Resolved: 2024-01-01

## 2024-01-01 PROBLEM — R05.1 ACUTE COUGH: Status: RESOLVED | Noted: 2023-01-01 | Resolved: 2024-01-01

## 2024-01-01 LAB
ABO + RH BLD: NORMAL
ALBUMIN SERPL BCP-MCNC: 3.2 G/DL (ref 3.5–5.2)
ALP SERPL-CCNC: 66 U/L (ref 55–135)
ALT SERPL W/O P-5'-P-CCNC: 7 U/L (ref 10–44)
ANION GAP SERPL CALC-SCNC: 10 MMOL/L (ref 8–16)
ANION GAP SERPL CALC-SCNC: 10 MMOL/L (ref 8–16)
ANION GAP SERPL CALC-SCNC: 11 MMOL/L (ref 8–16)
ANION GAP SERPL CALC-SCNC: 12 MMOL/L (ref 8–16)
ANION GAP SERPL CALC-SCNC: 9 MMOL/L (ref 8–16)
AST SERPL-CCNC: 15 U/L (ref 10–40)
BACTERIA #/AREA URNS HPF: ABNORMAL /HPF
BACTERIA UR CULT: NORMAL
BACTERIA UR CULT: NORMAL
BASOPHILS # BLD AUTO: 0.03 K/UL (ref 0–0.2)
BASOPHILS # BLD AUTO: 0.04 K/UL (ref 0–0.2)
BASOPHILS # BLD AUTO: 0.04 K/UL (ref 0–0.2)
BASOPHILS # BLD AUTO: 0.05 K/UL (ref 0–0.2)
BASOPHILS # BLD AUTO: 0.05 K/UL (ref 0–0.2)
BASOPHILS # BLD AUTO: 0.06 K/UL (ref 0–0.2)
BASOPHILS NFR BLD: 0.4 % (ref 0–1.9)
BASOPHILS NFR BLD: 0.4 % (ref 0–1.9)
BASOPHILS NFR BLD: 0.5 % (ref 0–1.9)
BASOPHILS NFR BLD: 0.6 % (ref 0–1.9)
BASOPHILS NFR BLD: 0.8 % (ref 0–1.9)
BASOPHILS NFR BLD: 0.9 % (ref 0–1.9)
BILIRUB SERPL-MCNC: 0.4 MG/DL (ref 0.1–1)
BILIRUB UR QL STRIP: NEGATIVE
BLD GP AB SCN CELLS X3 SERPL QL: NORMAL
BLD PROD TYP BPU: NORMAL
BLOOD UNIT EXPIRATION DATE: NORMAL
BLOOD UNIT TYPE CODE: 9500
BLOOD UNIT TYPE: NORMAL
BUN SERPL-MCNC: 17 MG/DL (ref 8–23)
BUN SERPL-MCNC: 20 MG/DL (ref 8–23)
BUN SERPL-MCNC: 21 MG/DL (ref 8–23)
BUN SERPL-MCNC: 21 MG/DL (ref 8–23)
BUN SERPL-MCNC: 22 MG/DL (ref 8–23)
CALCIUM SERPL-MCNC: 8.2 MG/DL (ref 8.7–10.5)
CALCIUM SERPL-MCNC: 8.3 MG/DL (ref 8.7–10.5)
CALCIUM SERPL-MCNC: 8.4 MG/DL (ref 8.7–10.5)
CALCIUM SERPL-MCNC: 8.7 MG/DL (ref 8.7–10.5)
CALCIUM SERPL-MCNC: 8.8 MG/DL (ref 8.7–10.5)
CHLORIDE SERPL-SCNC: 102 MMOL/L (ref 95–110)
CHLORIDE SERPL-SCNC: 104 MMOL/L (ref 95–110)
CHLORIDE SERPL-SCNC: 107 MMOL/L (ref 95–110)
CHLORIDE SERPL-SCNC: 108 MMOL/L (ref 95–110)
CHLORIDE SERPL-SCNC: 111 MMOL/L (ref 95–110)
CLARITY UR: ABNORMAL
CO2 SERPL-SCNC: 21 MMOL/L (ref 23–29)
CO2 SERPL-SCNC: 22 MMOL/L (ref 23–29)
CO2 SERPL-SCNC: 24 MMOL/L (ref 23–29)
CO2 SERPL-SCNC: 25 MMOL/L (ref 23–29)
CO2 SERPL-SCNC: 26 MMOL/L (ref 23–29)
CODING SYSTEM: NORMAL
COLOR UR: YELLOW
CREAT SERPL-MCNC: 0.9 MG/DL (ref 0.5–1.4)
CREAT SERPL-MCNC: 1 MG/DL (ref 0.5–1.4)
CREAT SERPL-MCNC: 1.2 MG/DL (ref 0.5–1.4)
CREAT SERPL-MCNC: 1.2 MG/DL (ref 0.5–1.4)
CREAT SERPL-MCNC: 1.4 MG/DL (ref 0.5–1.4)
CREAT SERPL-MCNC: 1.5 MG/DL (ref 0.5–1.4)
CREAT UR-MCNC: 138.9 MG/DL (ref 15–325)
CROSSMATCH INTERPRETATION: NORMAL
DIFFERENTIAL METHOD BLD: ABNORMAL
DISPENSE STATUS: NORMAL
EOSINOPHIL # BLD AUTO: 0 K/UL (ref 0–0.5)
EOSINOPHIL # BLD AUTO: 0.1 K/UL (ref 0–0.5)
EOSINOPHIL # BLD AUTO: 0.2 K/UL (ref 0–0.5)
EOSINOPHIL # BLD AUTO: 0.2 K/UL (ref 0–0.5)
EOSINOPHIL # BLD AUTO: 0.3 K/UL (ref 0–0.5)
EOSINOPHIL # BLD AUTO: 0.3 K/UL (ref 0–0.5)
EOSINOPHIL NFR BLD: 0.3 % (ref 0–8)
EOSINOPHIL NFR BLD: 0.8 % (ref 0–8)
EOSINOPHIL NFR BLD: 2.6 % (ref 0–8)
EOSINOPHIL NFR BLD: 3.7 % (ref 0–8)
EOSINOPHIL NFR BLD: 4.1 % (ref 0–8)
EOSINOPHIL NFR BLD: 4.7 % (ref 0–8)
ERYTHROCYTE [DISTWIDTH] IN BLOOD BY AUTOMATED COUNT: 18.4 % (ref 11.5–14.5)
ERYTHROCYTE [DISTWIDTH] IN BLOOD BY AUTOMATED COUNT: 18.5 % (ref 11.5–14.5)
ERYTHROCYTE [DISTWIDTH] IN BLOOD BY AUTOMATED COUNT: 18.5 % (ref 11.5–14.5)
ERYTHROCYTE [DISTWIDTH] IN BLOOD BY AUTOMATED COUNT: 19.8 % (ref 11.5–14.5)
ERYTHROCYTE [DISTWIDTH] IN BLOOD BY AUTOMATED COUNT: 19.8 % (ref 11.5–14.5)
ERYTHROCYTE [DISTWIDTH] IN BLOOD BY AUTOMATED COUNT: 19.9 % (ref 11.5–14.5)
EST. GFR  (NO RACE VARIABLE): 36 ML/MIN/1.73 M^2
EST. GFR  (NO RACE VARIABLE): 44 ML/MIN/1.73 M^2
EST. GFR  (NO RACE VARIABLE): 44 ML/MIN/1.73 M^2
EST. GFR  (NO RACE VARIABLE): 55 ML/MIN/1.73 M^2
EST. GFR  (NO RACE VARIABLE): >60 ML/MIN/1.73 M^2
GLUCOSE SERPL-MCNC: 114 MG/DL (ref 70–110)
GLUCOSE SERPL-MCNC: 123 MG/DL (ref 70–110)
GLUCOSE SERPL-MCNC: 134 MG/DL (ref 70–110)
GLUCOSE SERPL-MCNC: 80 MG/DL (ref 70–110)
GLUCOSE SERPL-MCNC: 86 MG/DL (ref 70–110)
GLUCOSE UR QL STRIP: NEGATIVE
HCT VFR BLD AUTO: 21.2 % (ref 37–48.5)
HCT VFR BLD AUTO: 22.2 % (ref 37–48.5)
HCT VFR BLD AUTO: 24.3 % (ref 37–48.5)
HCT VFR BLD AUTO: 27.5 % (ref 37–48.5)
HCT VFR BLD AUTO: 28.8 % (ref 37–48.5)
HCT VFR BLD AUTO: 30.1 % (ref 37–48.5)
HGB BLD-MCNC: 6.7 G/DL (ref 12–16)
HGB BLD-MCNC: 6.8 G/DL (ref 12–16)
HGB BLD-MCNC: 7.7 G/DL (ref 12–16)
HGB BLD-MCNC: 8.7 G/DL (ref 12–16)
HGB BLD-MCNC: 8.9 G/DL (ref 12–16)
HGB BLD-MCNC: 9.7 G/DL (ref 12–16)
HGB UR QL STRIP: ABNORMAL
HYALINE CASTS #/AREA URNS LPF: 2 /LPF
IMM GRANULOCYTES # BLD AUTO: 0.02 K/UL (ref 0–0.04)
IMM GRANULOCYTES # BLD AUTO: 0.03 K/UL (ref 0–0.04)
IMM GRANULOCYTES # BLD AUTO: 0.04 K/UL (ref 0–0.04)
IMM GRANULOCYTES # BLD AUTO: 0.04 K/UL (ref 0–0.04)
IMM GRANULOCYTES NFR BLD AUTO: 0.3 % (ref 0–0.5)
IMM GRANULOCYTES NFR BLD AUTO: 0.4 % (ref 0–0.5)
KETONES UR QL STRIP: NEGATIVE
LEUKOCYTE ESTERASE UR QL STRIP: ABNORMAL
LYMPHOCYTES # BLD AUTO: 0.8 K/UL (ref 1–4.8)
LYMPHOCYTES # BLD AUTO: 0.8 K/UL (ref 1–4.8)
LYMPHOCYTES # BLD AUTO: 0.9 K/UL (ref 1–4.8)
LYMPHOCYTES # BLD AUTO: 1 K/UL (ref 1–4.8)
LYMPHOCYTES # BLD AUTO: 1.1 K/UL (ref 1–4.8)
LYMPHOCYTES # BLD AUTO: 1.6 K/UL (ref 1–4.8)
LYMPHOCYTES NFR BLD: 12.8 % (ref 18–48)
LYMPHOCYTES NFR BLD: 13.3 % (ref 18–48)
LYMPHOCYTES NFR BLD: 15.7 % (ref 18–48)
LYMPHOCYTES NFR BLD: 16.6 % (ref 18–48)
LYMPHOCYTES NFR BLD: 17.8 % (ref 18–48)
LYMPHOCYTES NFR BLD: 7.8 % (ref 18–48)
MAGNESIUM SERPL-MCNC: 1.5 MG/DL (ref 1.6–2.6)
MAGNESIUM SERPL-MCNC: 1.8 MG/DL (ref 1.6–2.6)
MAGNESIUM SERPL-MCNC: 2.1 MG/DL (ref 1.6–2.6)
MCH RBC QN AUTO: 25.5 PG (ref 27–31)
MCH RBC QN AUTO: 25.5 PG (ref 27–31)
MCH RBC QN AUTO: 25.6 PG (ref 27–31)
MCH RBC QN AUTO: 26 PG (ref 27–31)
MCH RBC QN AUTO: 26.1 PG (ref 27–31)
MCH RBC QN AUTO: 26.3 PG (ref 27–31)
MCHC RBC AUTO-ENTMCNC: 30.6 G/DL (ref 32–36)
MCHC RBC AUTO-ENTMCNC: 30.9 G/DL (ref 32–36)
MCHC RBC AUTO-ENTMCNC: 31.6 G/DL (ref 32–36)
MCHC RBC AUTO-ENTMCNC: 31.6 G/DL (ref 32–36)
MCHC RBC AUTO-ENTMCNC: 31.7 G/DL (ref 32–36)
MCHC RBC AUTO-ENTMCNC: 32.2 G/DL (ref 32–36)
MCV RBC AUTO: 81 FL (ref 82–98)
MCV RBC AUTO: 81 FL (ref 82–98)
MCV RBC AUTO: 82 FL (ref 82–98)
MCV RBC AUTO: 82 FL (ref 82–98)
MCV RBC AUTO: 83 FL (ref 82–98)
MCV RBC AUTO: 85 FL (ref 82–98)
MICROSCOPIC COMMENT: ABNORMAL
MONOCYTES # BLD AUTO: 0.5 K/UL (ref 0.3–1)
MONOCYTES # BLD AUTO: 0.6 K/UL (ref 0.3–1)
MONOCYTES # BLD AUTO: 0.8 K/UL (ref 0.3–1)
MONOCYTES NFR BLD: 5.4 % (ref 4–15)
MONOCYTES NFR BLD: 7 % (ref 4–15)
MONOCYTES NFR BLD: 7.1 % (ref 4–15)
MONOCYTES NFR BLD: 8 % (ref 4–15)
MONOCYTES NFR BLD: 8.2 % (ref 4–15)
MONOCYTES NFR BLD: 8.3 % (ref 4–15)
NEUTROPHILS # BLD AUTO: 4.1 K/UL (ref 1.8–7.7)
NEUTROPHILS # BLD AUTO: 4.9 K/UL (ref 1.8–7.7)
NEUTROPHILS # BLD AUTO: 6.6 K/UL (ref 1.8–7.7)
NEUTROPHILS # BLD AUTO: 8.4 K/UL (ref 1.8–7.7)
NEUTROPHILS NFR BLD: 69.6 % (ref 38–73)
NEUTROPHILS NFR BLD: 71.1 % (ref 38–73)
NEUTROPHILS NFR BLD: 72.9 % (ref 38–73)
NEUTROPHILS NFR BLD: 75.6 % (ref 38–73)
NEUTROPHILS NFR BLD: 75.8 % (ref 38–73)
NEUTROPHILS NFR BLD: 85.1 % (ref 38–73)
NITRITE UR QL STRIP: NEGATIVE
NRBC BLD-RTO: 0 /100 WBC
NUM UNITS TRANS PACKED RBC: NORMAL
OB PNL STL: POSITIVE
OHS QRS DURATION: 72 MS
OHS QRS DURATION: 76 MS
OHS QRS DURATION: 84 MS
OHS QTC CALCULATION: 528 MS
OHS QTC CALCULATION: 542 MS
OHS QTC CALCULATION: 548 MS
OSMOLALITY UR: 369 MOSM/KG (ref 50–1200)
PH UR STRIP: 6 [PH] (ref 5–8)
PLATELET # BLD AUTO: 228 K/UL (ref 150–450)
PLATELET # BLD AUTO: 236 K/UL (ref 150–450)
PLATELET # BLD AUTO: 236 K/UL (ref 150–450)
PLATELET # BLD AUTO: 253 K/UL (ref 150–450)
PLATELET # BLD AUTO: 254 K/UL (ref 150–450)
PLATELET # BLD AUTO: 297 K/UL (ref 150–450)
PMV BLD AUTO: 10.2 FL (ref 9.2–12.9)
PMV BLD AUTO: 10.7 FL (ref 9.2–12.9)
PMV BLD AUTO: 10.9 FL (ref 9.2–12.9)
PMV BLD AUTO: 11.1 FL (ref 9.2–12.9)
PMV BLD AUTO: 11.4 FL (ref 9.2–12.9)
PMV BLD AUTO: 11.5 FL (ref 9.2–12.9)
POCT GLUCOSE: 109 MG/DL (ref 70–110)
POTASSIUM SERPL-SCNC: 3.3 MMOL/L (ref 3.5–5.1)
POTASSIUM SERPL-SCNC: 3.4 MMOL/L (ref 3.5–5.1)
POTASSIUM SERPL-SCNC: 3.5 MMOL/L (ref 3.5–5.1)
POTASSIUM SERPL-SCNC: 3.7 MMOL/L (ref 3.5–5.1)
POTASSIUM SERPL-SCNC: 3.7 MMOL/L (ref 3.5–5.1)
PROT SERPL-MCNC: 6.6 G/DL (ref 6–8.4)
PROT UR QL STRIP: ABNORMAL
PROT UR-MCNC: 21 MG/DL (ref 0–15)
PROT/CREAT UR: 0.15 MG/G{CREAT} (ref 0–0.2)
RBC # BLD AUTO: 2.62 M/UL (ref 4–5.4)
RBC # BLD AUTO: 2.67 M/UL (ref 4–5.4)
RBC # BLD AUTO: 3.02 M/UL (ref 4–5.4)
RBC # BLD AUTO: 3.35 M/UL (ref 4–5.4)
RBC # BLD AUTO: 3.41 M/UL (ref 4–5.4)
RBC # BLD AUTO: 3.69 M/UL (ref 4–5.4)
RBC #/AREA URNS HPF: 1 /HPF (ref 0–4)
SAMPLE: ABNORMAL
SODIUM SERPL-SCNC: 138 MMOL/L (ref 136–145)
SODIUM SERPL-SCNC: 139 MMOL/L (ref 136–145)
SODIUM SERPL-SCNC: 141 MMOL/L (ref 136–145)
SODIUM SERPL-SCNC: 142 MMOL/L (ref 136–145)
SODIUM SERPL-SCNC: 142 MMOL/L (ref 136–145)
SODIUM UR-SCNC: 53 MMOL/L (ref 20–250)
SP GR UR STRIP: 1.01 (ref 1–1.03)
SPECIMEN OUTDATE: NORMAL
SQUAMOUS #/AREA URNS HPF: 4 /HPF
URN SPEC COLLECT METH UR: ABNORMAL
UROBILINOGEN UR STRIP-ACNC: ABNORMAL EU/DL
WBC # BLD AUTO: 5.91 K/UL (ref 3.9–12.7)
WBC # BLD AUTO: 6.52 K/UL (ref 3.9–12.7)
WBC # BLD AUTO: 6.54 K/UL (ref 3.9–12.7)
WBC # BLD AUTO: 6.86 K/UL (ref 3.9–12.7)
WBC # BLD AUTO: 9.11 K/UL (ref 3.9–12.7)
WBC # BLD AUTO: 9.87 K/UL (ref 3.9–12.7)
WBC #/AREA URNS HPF: 49 /HPF (ref 0–5)

## 2024-01-01 PROCEDURE — 80053 COMPREHEN METABOLIC PANEL: CPT | Performed by: EMERGENCY MEDICINE

## 2024-01-01 PROCEDURE — 25000003 PHARM REV CODE 250: Performed by: INTERNAL MEDICINE

## 2024-01-01 PROCEDURE — 87086 URINE CULTURE/COLONY COUNT: CPT | Performed by: EMERGENCY MEDICINE

## 2024-01-01 PROCEDURE — 93010 ELECTROCARDIOGRAM REPORT: CPT | Mod: ,,, | Performed by: INTERNAL MEDICINE

## 2024-01-01 PROCEDURE — 99215 OFFICE O/P EST HI 40 MIN: CPT | Mod: PBBFAC | Performed by: PODIATRIST

## 2024-01-01 PROCEDURE — 80048 BASIC METABOLIC PNL TOTAL CA: CPT | Performed by: PHYSICIAN ASSISTANT

## 2024-01-01 PROCEDURE — 99900035 HC TECH TIME PER 15 MIN (STAT)

## 2024-01-01 PROCEDURE — 25000003 PHARM REV CODE 250: Performed by: PHYSICIAN ASSISTANT

## 2024-01-01 PROCEDURE — 99213 OFFICE O/P EST LOW 20 MIN: CPT | Mod: S$GLB,,, | Performed by: NURSE PRACTITIONER

## 2024-01-01 PROCEDURE — 63600175 PHARM REV CODE 636 W HCPCS: Performed by: STUDENT IN AN ORGANIZED HEALTH CARE EDUCATION/TRAINING PROGRAM

## 2024-01-01 PROCEDURE — 94761 N-INVAS EAR/PLS OXIMETRY MLT: CPT

## 2024-01-01 PROCEDURE — 21400001 HC TELEMETRY ROOM

## 2024-01-01 PROCEDURE — 36415 COLL VENOUS BLD VENIPUNCTURE: CPT | Performed by: PHYSICIAN ASSISTANT

## 2024-01-01 PROCEDURE — 85025 COMPLETE CBC W/AUTO DIFF WBC: CPT | Performed by: PHYSICIAN ASSISTANT

## 2024-01-01 PROCEDURE — 99497 ADVNCD CARE PLAN 30 MIN: CPT | Mod: S$PBB,,, | Performed by: STUDENT IN AN ORGANIZED HEALTH CARE EDUCATION/TRAINING PROGRAM

## 2024-01-01 PROCEDURE — 84300 ASSAY OF URINE SODIUM: CPT | Performed by: EMERGENCY MEDICINE

## 2024-01-01 PROCEDURE — 83735 ASSAY OF MAGNESIUM: CPT | Performed by: PHYSICIAN ASSISTANT

## 2024-01-01 PROCEDURE — 63600175 PHARM REV CODE 636 W HCPCS: Performed by: INTERNAL MEDICINE

## 2024-01-01 PROCEDURE — 37000009 HC ANESTHESIA EA ADD 15 MINS: Performed by: INTERNAL MEDICINE

## 2024-01-01 PROCEDURE — 99215 OFFICE O/P EST HI 40 MIN: CPT | Mod: PBBFAC | Performed by: PHYSICIAN ASSISTANT

## 2024-01-01 PROCEDURE — 99497 ADVNCD CARE PLAN 30 MIN: CPT | Mod: PBBFAC | Performed by: STUDENT IN AN ORGANIZED HEALTH CARE EDUCATION/TRAINING PROGRAM

## 2024-01-01 PROCEDURE — 37000008 HC ANESTHESIA 1ST 15 MINUTES: Performed by: INTERNAL MEDICINE

## 2024-01-01 PROCEDURE — 27000221 HC OXYGEN, UP TO 24 HOURS

## 2024-01-01 PROCEDURE — 25000003 PHARM REV CODE 250: Performed by: EMERGENCY MEDICINE

## 2024-01-01 PROCEDURE — C9113 INJ PANTOPRAZOLE SODIUM, VIA: HCPCS | Performed by: INTERNAL MEDICINE

## 2024-01-01 PROCEDURE — 83935 ASSAY OF URINE OSMOLALITY: CPT | Performed by: EMERGENCY MEDICINE

## 2024-01-01 PROCEDURE — 0DJ08ZZ INSPECTION OF UPPER INTESTINAL TRACT, VIA NATURAL OR ARTIFICIAL OPENING ENDOSCOPIC: ICD-10-PCS | Performed by: INTERNAL MEDICINE

## 2024-01-01 PROCEDURE — D9220A PRA ANESTHESIA: Mod: CRNA,,, | Performed by: STUDENT IN AN ORGANIZED HEALTH CARE EDUCATION/TRAINING PROGRAM

## 2024-01-01 PROCEDURE — D9220A PRA ANESTHESIA: Mod: ANES,,, | Performed by: ANESTHESIOLOGY

## 2024-01-01 PROCEDURE — P9040 RBC LEUKOREDUCED IRRADIATED: HCPCS | Performed by: INTERNAL MEDICINE

## 2024-01-01 PROCEDURE — 63600175 PHARM REV CODE 636 W HCPCS: Performed by: PHYSICIAN ASSISTANT

## 2024-01-01 PROCEDURE — 93005 ELECTROCARDIOGRAM TRACING: CPT

## 2024-01-01 PROCEDURE — 30233N1 TRANSFUSION OF NONAUTOLOGOUS RED BLOOD CELLS INTO PERIPHERAL VEIN, PERCUTANEOUS APPROACH: ICD-10-PCS | Performed by: INTERNAL MEDICINE

## 2024-01-01 PROCEDURE — 36415 COLL VENOUS BLD VENIPUNCTURE: CPT | Performed by: HOSPITALIST

## 2024-01-01 PROCEDURE — 97165 OT EVAL LOW COMPLEX 30 MIN: CPT

## 2024-01-01 PROCEDURE — 99999 PR PBB SHADOW E&M-EST. PATIENT-LVL V: CPT | Mod: PBBFAC,,, | Performed by: PODIATRIST

## 2024-01-01 PROCEDURE — 99284 EMERGENCY DEPT VISIT MOD MDM: CPT | Mod: 25

## 2024-01-01 PROCEDURE — 85025 COMPLETE CBC W/AUTO DIFF WBC: CPT | Performed by: HOSPITALIST

## 2024-01-01 PROCEDURE — 99283 EMERGENCY DEPT VISIT LOW MDM: CPT

## 2024-01-01 PROCEDURE — 86850 RBC ANTIBODY SCREEN: CPT | Performed by: INTERNAL MEDICINE

## 2024-01-01 PROCEDURE — 99203 OFFICE O/P NEW LOW 30 MIN: CPT | Mod: S$PBB,,, | Performed by: PODIATRIST

## 2024-01-01 PROCEDURE — 99999 PR PBB SHADOW E&M-EST. PATIENT-LVL V: CPT | Mod: PBBFAC,,, | Performed by: PHYSICIAN ASSISTANT

## 2024-01-01 PROCEDURE — 85025 COMPLETE CBC W/AUTO DIFF WBC: CPT | Performed by: EMERGENCY MEDICINE

## 2024-01-01 PROCEDURE — 80048 BASIC METABOLIC PNL TOTAL CA: CPT | Mod: 91 | Performed by: PHYSICIAN ASSISTANT

## 2024-01-01 PROCEDURE — 81000 URINALYSIS NONAUTO W/SCOPE: CPT | Performed by: EMERGENCY MEDICINE

## 2024-01-01 PROCEDURE — 25000003 PHARM REV CODE 250: Performed by: STUDENT IN AN ORGANIZED HEALTH CARE EDUCATION/TRAINING PROGRAM

## 2024-01-01 PROCEDURE — 82570 ASSAY OF URINE CREATININE: CPT | Performed by: EMERGENCY MEDICINE

## 2024-01-01 PROCEDURE — 99215 OFFICE O/P EST HI 40 MIN: CPT | Mod: PBBFAC | Performed by: STUDENT IN AN ORGANIZED HEALTH CARE EDUCATION/TRAINING PROGRAM

## 2024-01-01 PROCEDURE — 86920 COMPATIBILITY TEST SPIN: CPT | Performed by: INTERNAL MEDICINE

## 2024-01-01 PROCEDURE — 97161 PT EVAL LOW COMPLEX 20 MIN: CPT

## 2024-01-01 PROCEDURE — A4216 STERILE WATER/SALINE, 10 ML: HCPCS | Performed by: PHYSICIAN ASSISTANT

## 2024-01-01 PROCEDURE — 96361 HYDRATE IV INFUSION ADD-ON: CPT

## 2024-01-01 PROCEDURE — 85025 COMPLETE CBC W/AUTO DIFF WBC: CPT | Mod: 91 | Performed by: INTERNAL MEDICINE

## 2024-01-01 PROCEDURE — 99999 PR PBB SHADOW E&M-EST. PATIENT-LVL V: CPT | Mod: PBBFAC,,, | Performed by: STUDENT IN AN ORGANIZED HEALTH CARE EDUCATION/TRAINING PROGRAM

## 2024-01-01 PROCEDURE — 96360 HYDRATION IV INFUSION INIT: CPT

## 2024-01-01 PROCEDURE — G0378 HOSPITAL OBSERVATION PER HR: HCPCS

## 2024-01-01 PROCEDURE — 82272 OCCULT BLD FECES 1-3 TESTS: CPT | Performed by: INTERNAL MEDICINE

## 2024-01-01 PROCEDURE — 99215 OFFICE O/P EST HI 40 MIN: CPT | Mod: S$PBB,,, | Performed by: PHYSICIAN ASSISTANT

## 2024-01-01 PROCEDURE — 63600175 PHARM REV CODE 636 W HCPCS: Performed by: HOSPITALIST

## 2024-01-01 PROCEDURE — 99215 OFFICE O/P EST HI 40 MIN: CPT | Mod: S$PBB,,, | Performed by: STUDENT IN AN ORGANIZED HEALTH CARE EDUCATION/TRAINING PROGRAM

## 2024-01-01 PROCEDURE — 36415 COLL VENOUS BLD VENIPUNCTURE: CPT | Mod: XB | Performed by: INTERNAL MEDICINE

## 2024-01-01 PROCEDURE — 96372 THER/PROPH/DIAG INJ SC/IM: CPT | Performed by: EMERGENCY MEDICINE

## 2024-01-01 PROCEDURE — 63600175 PHARM REV CODE 636 W HCPCS: Performed by: EMERGENCY MEDICINE

## 2024-01-01 PROCEDURE — 80048 BASIC METABOLIC PNL TOTAL CA: CPT | Performed by: EMERGENCY MEDICINE

## 2024-01-01 PROCEDURE — 43235 EGD DIAGNOSTIC BRUSH WASH: CPT | Performed by: INTERNAL MEDICINE

## 2024-01-01 RX ORDER — ESCITALOPRAM OXALATE 10 MG/1
10 TABLET ORAL DAILY
Status: DISCONTINUED | OUTPATIENT
Start: 2024-01-01 | End: 2024-01-01 | Stop reason: HOSPADM

## 2024-01-01 RX ORDER — TALC
6 POWDER (GRAM) TOPICAL NIGHTLY PRN
Status: DISCONTINUED | OUTPATIENT
Start: 2024-01-01 | End: 2024-01-01

## 2024-01-01 RX ORDER — SODIUM CHLORIDE 0.9 % (FLUSH) 0.9 %
3 SYRINGE (ML) INJECTION
Status: DISCONTINUED | OUTPATIENT
Start: 2024-01-01 | End: 2024-01-01

## 2024-01-01 RX ORDER — FAMOTIDINE 40 MG/1
40 TABLET, FILM COATED ORAL NIGHTLY
Qty: 30 TABLET | Refills: 0
Start: 2024-01-01 | End: 2025-01-03

## 2024-01-01 RX ORDER — FAMOTIDINE 20 MG/1
20 TABLET, FILM COATED ORAL DAILY
Status: DISCONTINUED | OUTPATIENT
Start: 2024-01-01 | End: 2024-01-01

## 2024-01-01 RX ORDER — AMOXICILLIN 250 MG
1 CAPSULE ORAL 2 TIMES DAILY PRN
Status: DISCONTINUED | OUTPATIENT
Start: 2024-01-01 | End: 2024-01-01 | Stop reason: HOSPADM

## 2024-01-01 RX ORDER — HYDRALAZINE HYDROCHLORIDE 20 MG/ML
10 INJECTION INTRAMUSCULAR; INTRAVENOUS EVERY 8 HOURS PRN
Status: DISCONTINUED | OUTPATIENT
Start: 2024-01-01 | End: 2024-01-01 | Stop reason: HOSPADM

## 2024-01-01 RX ORDER — SODIUM CHLORIDE 0.9 % (FLUSH) 0.9 %
10 SYRINGE (ML) INJECTION
Status: DISCONTINUED | OUTPATIENT
Start: 2024-01-01 | End: 2024-01-01 | Stop reason: HOSPADM

## 2024-01-01 RX ORDER — FAMOTIDINE 20 MG/1
40 TABLET, FILM COATED ORAL DAILY
Status: DISCONTINUED | OUTPATIENT
Start: 2024-01-01 | End: 2024-01-01

## 2024-01-01 RX ORDER — PANTOPRAZOLE SODIUM 40 MG/10ML
80 INJECTION, POWDER, LYOPHILIZED, FOR SOLUTION INTRAVENOUS ONCE
Status: COMPLETED | OUTPATIENT
Start: 2024-01-01 | End: 2024-01-01

## 2024-01-01 RX ORDER — PANTOPRAZOLE SODIUM 40 MG/1
40 TABLET, DELAYED RELEASE ORAL DAILY
Qty: 30 TABLET | Refills: 0
Start: 2024-01-01 | End: 2025-01-03

## 2024-01-01 RX ORDER — PANTOPRAZOLE SODIUM 40 MG/1
40 TABLET, DELAYED RELEASE ORAL DAILY
Status: DISCONTINUED | OUTPATIENT
Start: 2024-01-01 | End: 2024-01-01 | Stop reason: HOSPADM

## 2024-01-01 RX ORDER — ESCITALOPRAM OXALATE 20 MG/1
20 TABLET ORAL DAILY
Qty: 30 TABLET | Refills: 11 | Status: SHIPPED | OUTPATIENT
Start: 2024-01-01 | End: 2025-02-07

## 2024-01-01 RX ORDER — PANTOPRAZOLE SODIUM 40 MG/10ML
40 INJECTION, POWDER, LYOPHILIZED, FOR SOLUTION INTRAVENOUS 2 TIMES DAILY
Status: DISCONTINUED | OUTPATIENT
Start: 2024-01-01 | End: 2024-01-01

## 2024-01-01 RX ORDER — SODIUM CHLORIDE 9 MG/ML
1000 INJECTION, SOLUTION INTRAVENOUS
Status: COMPLETED | OUTPATIENT
Start: 2024-01-01 | End: 2024-01-01

## 2024-01-01 RX ORDER — HYDROXYZINE HYDROCHLORIDE 25 MG/1
25 TABLET, FILM COATED ORAL 3 TIMES DAILY PRN
Status: DISCONTINUED | OUTPATIENT
Start: 2024-01-01 | End: 2024-01-01 | Stop reason: HOSPADM

## 2024-01-01 RX ORDER — POTASSIUM CHLORIDE 750 MG/1
30 TABLET, EXTENDED RELEASE ORAL
Status: COMPLETED | OUTPATIENT
Start: 2024-01-01 | End: 2024-01-01

## 2024-01-01 RX ORDER — MICONAZOLE NITRATE 2 %
POWDER (GRAM) TOPICAL 2 TIMES DAILY
Status: DISCONTINUED | OUTPATIENT
Start: 2024-01-01 | End: 2024-01-01 | Stop reason: HOSPADM

## 2024-01-01 RX ORDER — QUETIAPINE FUMARATE 25 MG/1
50 TABLET, FILM COATED ORAL NIGHTLY
COMMUNITY
Start: 2024-01-01 | End: 2024-01-01 | Stop reason: SDUPTHER

## 2024-01-01 RX ORDER — PROPOFOL 10 MG/ML
VIAL (ML) INTRAVENOUS
Status: DISCONTINUED | OUTPATIENT
Start: 2024-01-01 | End: 2024-01-01

## 2024-01-01 RX ORDER — MEMANTINE HYDROCHLORIDE 5 MG/1
5 TABLET ORAL 2 TIMES DAILY
Status: DISCONTINUED | OUTPATIENT
Start: 2024-01-01 | End: 2024-01-01 | Stop reason: HOSPADM

## 2024-01-01 RX ORDER — SODIUM CHLORIDE 0.9 % (FLUSH) 0.9 %
10 SYRINGE (ML) INJECTION
Status: DISCONTINUED | OUTPATIENT
Start: 2024-01-01 | End: 2024-01-01

## 2024-01-01 RX ORDER — MEPERIDINE HYDROCHLORIDE 25 MG/ML
12.5 INJECTION INTRAMUSCULAR; INTRAVENOUS; SUBCUTANEOUS ONCE AS NEEDED
Status: DISCONTINUED | OUTPATIENT
Start: 2024-01-01 | End: 2024-01-01

## 2024-01-01 RX ORDER — LISINOPRIL 20 MG/1
40 TABLET ORAL DAILY
Status: DISCONTINUED | OUTPATIENT
Start: 2024-01-01 | End: 2024-01-01 | Stop reason: HOSPADM

## 2024-01-01 RX ORDER — ACETAMINOPHEN 325 MG/1
650 TABLET ORAL EVERY 6 HOURS PRN
Status: DISCONTINUED | OUTPATIENT
Start: 2024-01-01 | End: 2024-01-01 | Stop reason: HOSPADM

## 2024-01-01 RX ORDER — HYDROXYZINE HYDROCHLORIDE 25 MG/1
25 TABLET, FILM COATED ORAL 3 TIMES DAILY PRN
Refills: 0
Start: 2024-01-01 | End: 2024-01-01

## 2024-01-01 RX ORDER — MAGNESIUM SULFATE HEPTAHYDRATE 40 MG/ML
2 INJECTION, SOLUTION INTRAVENOUS ONCE
Status: COMPLETED | OUTPATIENT
Start: 2024-01-01 | End: 2024-01-01

## 2024-01-01 RX ORDER — PROCHLORPERAZINE EDISYLATE 5 MG/ML
5 INJECTION INTRAMUSCULAR; INTRAVENOUS EVERY 30 MIN PRN
Status: DISCONTINUED | OUTPATIENT
Start: 2024-01-01 | End: 2024-01-01

## 2024-01-01 RX ORDER — METOPROLOL TARTRATE 25 MG/1
25 TABLET, FILM COATED ORAL 2 TIMES DAILY
Status: DISCONTINUED | OUTPATIENT
Start: 2024-01-01 | End: 2024-01-01 | Stop reason: HOSPADM

## 2024-01-01 RX ORDER — HYDROXYZINE HYDROCHLORIDE 25 MG/1
25 TABLET, FILM COATED ORAL NIGHTLY PRN
Status: DISCONTINUED | OUTPATIENT
Start: 2024-01-01 | End: 2024-01-01

## 2024-01-01 RX ORDER — HYDROCODONE BITARTRATE AND ACETAMINOPHEN 500; 5 MG/1; MG/1
TABLET ORAL
Status: DISCONTINUED | OUTPATIENT
Start: 2024-01-01 | End: 2024-01-01 | Stop reason: HOSPADM

## 2024-01-01 RX ORDER — FAMOTIDINE 20 MG/1
40 TABLET, FILM COATED ORAL NIGHTLY
Status: DISCONTINUED | OUTPATIENT
Start: 2024-01-01 | End: 2024-01-01 | Stop reason: HOSPADM

## 2024-01-01 RX ORDER — ENOXAPARIN SODIUM 300 MG/3ML
1 INJECTION INTRAVENOUS; SUBCUTANEOUS EVERY 12 HOURS
Status: DISCONTINUED | OUTPATIENT
Start: 2024-01-01 | End: 2024-01-01

## 2024-01-01 RX ORDER — ONDANSETRON 2 MG/ML
4 INJECTION INTRAMUSCULAR; INTRAVENOUS EVERY 8 HOURS PRN
Status: DISCONTINUED | OUTPATIENT
Start: 2024-01-01 | End: 2024-01-01 | Stop reason: HOSPADM

## 2024-01-01 RX ORDER — ENOXAPARIN SODIUM 100 MG/ML
1 INJECTION SUBCUTANEOUS EVERY 24 HOURS
Status: DISCONTINUED | OUTPATIENT
Start: 2024-01-01 | End: 2024-01-01

## 2024-01-01 RX ORDER — SODIUM CHLORIDE 0.9 % (FLUSH) 0.9 %
10 SYRINGE (ML) INJECTION EVERY 8 HOURS
Status: DISCONTINUED | OUTPATIENT
Start: 2024-01-01 | End: 2024-01-01

## 2024-01-01 RX ORDER — SODIUM CHLORIDE 9 MG/ML
INJECTION, SOLUTION INTRAVENOUS CONTINUOUS
Status: DISCONTINUED | OUTPATIENT
Start: 2024-01-01 | End: 2024-01-01

## 2024-01-01 RX ORDER — NALOXONE HCL 0.4 MG/ML
0.02 VIAL (ML) INJECTION
Status: DISCONTINUED | OUTPATIENT
Start: 2024-01-01 | End: 2024-01-01 | Stop reason: HOSPADM

## 2024-01-01 RX ORDER — LIDOCAINE HYDROCHLORIDE 20 MG/ML
INJECTION INTRAVENOUS
Status: DISCONTINUED | OUTPATIENT
Start: 2024-01-01 | End: 2024-01-01

## 2024-01-01 RX ORDER — OXYCODONE HYDROCHLORIDE 5 MG/1
5 TABLET ORAL
Status: DISCONTINUED | OUTPATIENT
Start: 2024-01-01 | End: 2024-01-01

## 2024-01-01 RX ORDER — QUETIAPINE FUMARATE 50 MG/1
50 TABLET, FILM COATED ORAL NIGHTLY
Qty: 30 TABLET | Refills: 1 | Status: SHIPPED | OUTPATIENT
Start: 2024-01-01

## 2024-01-01 RX ORDER — FUROSEMIDE 20 MG/1
20 TABLET ORAL
Status: COMPLETED | OUTPATIENT
Start: 2024-01-01 | End: 2024-01-01

## 2024-01-01 RX ORDER — HYDROMORPHONE HYDROCHLORIDE 2 MG/ML
0.4 INJECTION, SOLUTION INTRAMUSCULAR; INTRAVENOUS; SUBCUTANEOUS EVERY 5 MIN PRN
Status: DISCONTINUED | OUTPATIENT
Start: 2024-01-01 | End: 2024-01-01

## 2024-01-01 RX ADMIN — PANTOPRAZOLE SODIUM 40 MG: 40 INJECTION, POWDER, FOR SOLUTION INTRAVENOUS at 09:01

## 2024-01-01 RX ADMIN — POTASSIUM BICARBONATE 20 MEQ: 391 TABLET, EFFERVESCENT ORAL at 04:02

## 2024-01-01 RX ADMIN — APIXABAN 5 MG: 2.5 TABLET, FILM COATED ORAL at 09:01

## 2024-01-01 RX ADMIN — Medication 6 MG: at 02:01

## 2024-01-01 RX ADMIN — CEFTRIAXONE SODIUM 1 G: 1 INJECTION, POWDER, FOR SOLUTION INTRAMUSCULAR; INTRAVENOUS at 06:01

## 2024-01-01 RX ADMIN — MICONAZOLE NITRATE: 20 POWDER TOPICAL at 09:01

## 2024-01-01 RX ADMIN — ESCITALOPRAM OXALATE 10 MG: 10 TABLET ORAL at 09:01

## 2024-01-01 RX ADMIN — METOPROLOL TARTRATE 25 MG: 25 TABLET, FILM COATED ORAL at 09:01

## 2024-01-01 RX ADMIN — PROPOFOL 20 MG: 10 INJECTION, EMULSION INTRAVENOUS at 07:01

## 2024-01-01 RX ADMIN — PANTOPRAZOLE SODIUM 80 MG: 40 INJECTION, POWDER, LYOPHILIZED, FOR SOLUTION INTRAVENOUS at 09:01

## 2024-01-01 RX ADMIN — METOPROLOL TARTRATE 25 MG: 25 TABLET, FILM COATED ORAL at 08:01

## 2024-01-01 RX ADMIN — SODIUM CHLORIDE 1000 ML: 0.9 INJECTION, SOLUTION INTRAVENOUS at 04:02

## 2024-01-01 RX ADMIN — SODIUM CHLORIDE: 9 INJECTION, SOLUTION INTRAVENOUS at 11:01

## 2024-01-01 RX ADMIN — LIDOCAINE HYDROCHLORIDE 100 MG: 20 INJECTION, SOLUTION INTRAVENOUS at 07:01

## 2024-01-01 RX ADMIN — MEMANTINE HYDROCHLORIDE 5 MG: 5 TABLET ORAL at 09:01

## 2024-01-01 RX ADMIN — APIXABAN 5 MG: 2.5 TABLET, FILM COATED ORAL at 08:01

## 2024-01-01 RX ADMIN — ENOXAPARIN SODIUM 50 MG: 60 INJECTION SUBCUTANEOUS at 03:01

## 2024-01-01 RX ADMIN — Medication 10 ML: at 05:01

## 2024-01-01 RX ADMIN — MICONAZOLE NITRATE: 20 POWDER TOPICAL at 08:01

## 2024-01-01 RX ADMIN — MEMANTINE HYDROCHLORIDE 5 MG: 5 TABLET ORAL at 08:01

## 2024-01-01 RX ADMIN — ESCITALOPRAM OXALATE 10 MG: 10 TABLET ORAL at 08:01

## 2024-01-01 RX ADMIN — FUROSEMIDE 20 MG: 20 TABLET ORAL at 12:01

## 2024-01-01 RX ADMIN — MAGNESIUM SULFATE HEPTAHYDRATE 2 G: 40 INJECTION, SOLUTION INTRAVENOUS at 07:01

## 2024-01-01 RX ADMIN — SODIUM CHLORIDE: 9 INJECTION, SOLUTION INTRAVENOUS at 05:01

## 2024-01-01 RX ADMIN — PROPOFOL 30 MG: 10 INJECTION, EMULSION INTRAVENOUS at 07:01

## 2024-01-01 RX ADMIN — HYDROXYZINE HYDROCHLORIDE 25 MG: 25 TABLET ORAL at 04:01

## 2024-01-01 RX ADMIN — POTASSIUM CHLORIDE 30 MEQ: 750 TABLET, EXTENDED RELEASE ORAL at 08:01

## 2024-01-01 RX ADMIN — SODIUM CHLORIDE, SODIUM LACTATE, POTASSIUM CHLORIDE, AND CALCIUM CHLORIDE: .6; .31; .03; .02 INJECTION, SOLUTION INTRAVENOUS at 07:01

## 2024-01-01 RX ADMIN — HYDRALAZINE HYDROCHLORIDE 10 MG: 20 INJECTION INTRAMUSCULAR; INTRAVENOUS at 07:01

## 2024-01-01 RX ADMIN — PANTOPRAZOLE SODIUM 40 MG: 40 INJECTION, POWDER, FOR SOLUTION INTRAVENOUS at 08:01

## 2024-01-01 RX ADMIN — POTASSIUM CHLORIDE 30 MEQ: 750 TABLET, EXTENDED RELEASE ORAL at 10:01

## 2024-01-01 NOTE — HPI
Ms. Radha Sandoval is a 88 y.o. female, with PMH of HTN, Alzheimer's dementia, chronic PE on chronic anticoagulation, chronic b/l pleural effusions, dysphagia, urinary retention, A. Fib, frequent falls, anemia, who presented on 12/31/23 after two falls at her nursing home earlier in the day. The first fall was unwitnessed and occurred when it was assumed she fell out of her wheelchair. She fall again that afternoon. She denied head injury, or LOC with either fall. She reported a headache. She was evaluated in the ED with labs showing anemia (H&H of 9.0/28.5), with left shift. A metabolic panel showed elevated creatinine of 1.6, with BUN of 21. Potassium was mildly low at 3.2. Troponin was 0.045, and a D-dimer was elevated at 0.52. A CT of the C-spine showed no acute fracture or subluxation of the C-spine. A CT Head showed no acute intracranial abnormalities. A CXR showed chronic interstitial findings. She was treated in the ED with 2L IV fluids, potassium, magnesium, and analgesics. She was placed on observation.

## 2024-01-01 NOTE — ASSESSMENT & PLAN NOTE
Chronic, uncontrolled. Latest blood pressure and vitals reviewed-     Temp:  [97.5 °F (36.4 °C)-97.8 °F (36.6 °C)]   Pulse:  [73-98]   Resp:  [15-24]   BP: (117-199)/(37-83)   SpO2:  [97 %-100 %] .   Home meds for hypertension were reviewed and noted below.   Hypertension Medications               furosemide (LASIX) 20 MG tablet Take 1 tablet (20 mg total) by mouth once daily.    lisinopriL (PRINIVIL,ZESTRIL) 40 MG tablet Take 1 tablet (40 mg total) by mouth once daily.    metoprolol tartrate (LOPRESSOR) 25 MG tablet Take 1 tablet (25 mg total) by mouth 2 (two) times daily.            While in the hospital, will manage blood pressure as follows; Continue home antihypertensive regimen    Will utilize p.r.n. blood pressure medication only if patient's blood pressure greater than 180/110 and she develops symptoms such as worsening chest pain or shortness of breath.

## 2024-01-01 NOTE — ED NOTES
Resumed pt care. Per report pt arrived from [facility] s/t unwitnessed fall today. +blood thinners. No trauma/injury noted. A&Ox1 oriented to self only-per report this is pt baseline. ED workup in process. Safety measures in place. VSS. Will continue to monitor.

## 2024-01-01 NOTE — PLAN OF CARE
DILIA completed assessment thru chart review, and spoke to pt, son not available at time of assessment.      Initial Discharge Planning Assessment:1/1/24    Patient admitted on:12/31/23    Chart reviewed, Care plan discussed with treatment team, attending     PCP updated in Epic: Facility MD    Pharmacy updated in Epic:facility pharmacy    DME at home:[facility]    Current Dispo:return to nursing home    Transportation:will need hospital transport    POA/LW:Maykel Sandoval, lang      Anticipated DC needs from CM perspecrive:none   01/01/24 1516   Discharge Assessment   Assessment Type Discharge Planning Assessment   Confirmed/corrected address, phone number and insurance Yes   Source of Information family   When was your last doctors appointment?   (Pt sees MD at her Nursing home, [facility])   Communicated TOSHA with patient/caregiver Date not available/Unable to determine   Reason For Admission AMS   People in Home facility resident   Facility Arrived From: [facility]   Do you expect to return to your current living situation? Yes   Do you have help at home or someone to help you manage your care at home? Yes   Who are your caregiver(s) and their phone number(s)? Rylan Sandoval, son, [phone]   Prior to hospitilization cognitive status: Not Oriented to Place;Not Oriented to Time;Coma/Sedated/Intubated   Current cognitive status: Not Oriented to Time;Not Oriented to Place;Coma/Sedated/Intubated   Walking or Climbing Stairs Difficulty yes   Walking or Climbing Stairs ambulation difficulty, assistance 1 person   Mobility Management wheelchair   Dressing/Bathing Difficulty yes   Home Accessibility wheelchair accessible   Equipment Currently Used at Home wheelchair   Readmission within 30 days? No   Patient currently being followed by outpatient case management? No   Do you currently have service(s) that help you manage your care at home? No   Do you take prescription medications? Yes   Do you have any  problems affording any of your prescribed medications? No   Who is going to help you get home at discharge? will need transportation back to [place]   Are you on dialysis? No   Do you take coumadin? No   Discharge Plan A Return to nursing home   Discharge Plan B Return to Nursing Home   DME Needed Upon Discharge  none   Transition of Care Barriers None   Physical Activity   On average, how many days per week do you engage in moderate to strenuous exercise (like a brisk walk)? 0 days     [facility] ([address])  Initial Discharge Assessment       Primary Care Provider: [facility]    Admission Diagnosis: Acute kidney injury [N17.9]  Multiple falls [R29.6]    Admission Date: 12/31/2023  Expected Discharge Date:     Transition of Care Barriers: (P) None    Payor: MEDICARE / Plan: MEDICARE PART A & B / Product Type: Government /     Extended Emergency Contact Information  Primary Emergency Contact: tony griffith   [place]  Mobile Phone: [phone]  Relation: Son   needed? No  Secondary Emergency Contact: Eloisa Bazan   [place]  Mobile Phone: [phone]  Relation: Friend    Discharge Plan A: (P) Return to nursing home  Discharge Plan B: (P) Return to Nursing Home    No Pharmacies Listed    Initial Assessment (most recent)       Adult Discharge Assessment - 01/01/24 1516          Discharge Assessment    Assessment Type Discharge Planning Assessment (P)      Confirmed/corrected address, phone number and insurance Yes (P)      Source of Information family (P)      When was your last doctors appointment? -- (P)    Pt sees MD at her [facility]    Communicated TOSHA with patient/caregiver Date not available/Unable to determine (P)      Reason For Admission AMS (P)      People in Home facility resident (P)      [facility] From[facility] (P)      Do you expect to return to your current living situation? Yes (P)      Do you  have help at home or someone to help you manage your care at home? Yes (P)      Who are your caregiver(s) and their phone number(s)? Rylan Sandoval, son, [phone] (P)      Prior to hospitilization cognitive status: Not Oriented to Place;Not Oriented to Time;Coma/Sedated/Intubated (P)      Current cognitive status: Not Oriented to Time;Not Oriented to Place;Coma/Sedated/Intubated (P)      Walking or Climbing Stairs Difficulty yes (P)      Walking or Climbing Stairs ambulation difficulty, assistance 1 person (P)      Mobility Management wheelchair (P)      Dressing/Bathing Difficulty yes (P)      Home Accessibility wheelchair accessible (P)      Equipment Currently Used at Home wheelchair (P)      Readmission within 30 days? No (P)      Patient currently being followed by outpatient case management? No (P)      Do you currently have service(s) that help you manage your care at home? No (P)      Do you take prescription medications? Yes (P)      Do you have any problems affording any of your prescribed medications? No (P)      Who is going to help you get home at discharge? will need transportation back to [facility] (P)      Are you on dialysis? No (P)      Do you take coumadin? No (P)      Discharge Plan A Return to nursing home (P)      Discharge Plan B Return to Nursing Home (P)      DME Needed Upon Discharge  none (P)      Transition of Care Barriers None (P)         Physical Activity    On average, how many days per week do you engage in moderate to strenuous exercise (like a brisk walk)? 0 days (P)                                   Case management to follow for plans and arrangements

## 2024-01-01 NOTE — PLAN OF CARE
OT eval completed; pt requiring assist for all ADLS & mobility with cognitive deficits, currently at functional baseline. No further indications for OT services in acute setting, recommend return to NH with 24 hour assist as needed.

## 2024-01-01 NOTE — PT/OT/SLP EVAL
"Physical Therapy Evaluation and Discharge Note    Patient Name:  Radha Sandoval   MRN:  [id number]    Recommendations:     Discharge Recommendations: No Therapy Indicated  Discharge Equipment Recommendations: none   Barriers to discharge: None    Assessment:     Radha Sandoval is a 88 y.o. female admitted with a medical diagnosis of Acute cystitis. She has a past medical history that includes but is not limited to frequent falls, HTN, GINA, PE, anemia, multiple pelvic fractures, Alzheimer's dementia and DVT.  At this time, patient is functioning at their prior level of function and does not require further acute PT services.     PT orders received. Evaluation completed. Pt is bed / chair bound at baseline. Pt is total A for mobility and a nakia lift is utilized for transfers at baseline. Pt is at her baseline functional level. No acute PT needs identified at this time. Will d/c PT. Recommend discharge back to nursing home.     Recent Surgery: * No surgery found *      Plan:     During this hospitalization, patient does not require further acute PT services.  Please re-consult if situation changes.      Subjective     Chief Complaint: "The doctor told me not to get up."  Patient/Family Comments/goals: Pt agreeable to sit EOB with encouragement from therapists.   Pain/Comfort:  Pain Rating 1: 0/10  Pain Rating Post-Intervention 1: 0/10    Patients cultural, spiritual, Yazidi conflicts given the current situation: no    Living Environment:  Pt is a nursing home resident. She reports she requires total assistance for transfers from the bed to her wheelchair. Equipment used at home: wheelchair (NH equipment).  DME owned (not currently used): none.  Upon discharge, patient will have assistance from NH staff.    Objective:     Communicated with ELEAZAR Chaudhary prior to session.  Patient found HOB elevated with telemetry, pulse ox (continuous), bed alarm, peripheral IV upon PT entry to room.    General Precautions: Standard, fall "    Orthopedic Precautions:N/A   Braces: N/A  Respiratory Status: Room air    Exams:  Cognition:   Patient is oriented to self only.  Pt follows approximately 50% of simple commands.    Mood: Pleasant and cooperative.   Safety Awareness: impaired  Musculoskeletal:  Posture:  rounded shoulders, forward head  LE ROM/Strength:   R ROM: limited  L ROM: limited  R Strength:   Limited  L Strength:   Limited  Neuromuscular:  Sensation: Intact to light touch bilateral LEs.   Tone/Reflexes: No impairments identified with functional mobility. No formal testing performed.   Balance:   Static sitting: total assistance  Dynamic sitting: total assistance  Static standing: NT  Dynamic standing: NT  Visual-vestibular: No impairments identified with functional mobility. No formal testing performed.  Integument: Visible skin intact  Edema: none noted    Functional Mobility:  Bed Mobility:     Scooting: total assistance of 2 persons  Supine to Sit: total assistance of 2 persons  Sit to Supine: total assistance of 2 persons    Treatment and Education:  Bed mobility and sitting balance as described above.    AM-PAC 6 CLICK MOBILITY  Total Score:10     Patient left HOB elevated with all lines intact, call button in reach, and bed alarm on.    GOALS:   Multidisciplinary Problems       Physical Therapy Goals       Not on file                    History:     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 05/22/2023    Lumbar spondylosis with myelopathy 04/25/2023    Multiple closed right sided fractures of pelvis without disruption of pelvic ring 07/09/2023    Primary  hypertension 06/10/2022    Subdural hygroma 07/08/2023       Past Surgical History:   Procedure Laterality Date    REPAIR, HERNIA, INGUINAL, WITHOUT HISTORY OF PRIOR REPAIR, AGE 5 YEARS OR OLDER Right 5/6/2023    Procedure: REPAIR, HERNIA, INGUINAL, WITHOUT HISTORY OF PRIOR REPAIR, AGE 5 YEARS OR OLDER;  Surgeon: Maurice Piper MD;  Location: [facility] OR [address] [place] [address];  Service: General;  Laterality: Right;  possible laparotomy, possible bowel resection       Time Tracking:     PT Received On: 01/01/24  PT Start Time: 0927     PT Stop Time: 0936  PT Total Time (min): 9 min     Billable Minutes: Evaluation 9  Overlap with OT for portions of session due to complex nature of patient, tolerance for therapeutic modalities, and safety with mobility to decrease fall risk for patient and caregiver injury requiring two skilled therapists to provide interventions.      01/01/2024

## 2024-01-01 NOTE — ASSESSMENT & PLAN NOTE
- metabolic panel initially showed Cr of 1.6, and BUN 21  - after 2L IV fluids, Cr was 1.2 and BUN was 20   - urine lytes ordered   Patient with acute kidney injury/acute renal failure likely due to pre-renal azotemia due to dehydration GINA is currently improving. Baseline creatinine  0.9  - Labs reviewed- Renal function/electrolytes with Estimated Creatinine Clearance: 27.1 mL/min (based on SCr of 1.2 mg/dL). according to latest data. Monitor urine output and serial BMP and adjust therapy as needed. Avoid nephrotoxins and renally dose meds for GFR listed above.

## 2024-01-01 NOTE — PLAN OF CARE
Problem: Infection  Goal: Absence of Infection Signs and Symptoms  Outcome: Ongoing, Progressing     Problem: Adult Inpatient Plan of Care  Goal: Plan of Care Review  Outcome: Ongoing, Progressing  Goal: Patient-Specific Goal (Individualized)  Outcome: Ongoing, Progressing  Goal: Absence of Hospital-Acquired Illness or Injury  Outcome: Ongoing, Progressing  Goal: Optimal Comfort and Wellbeing  Outcome: Ongoing, Progressing  Goal: Readiness for Transition of Care  Outcome: Ongoing, Progressing     Problem: Fluid and Electrolyte Imbalance (Acute Kidney Injury/Impairment)  Goal: Fluid and Electrolyte Balance  Outcome: Ongoing, Progressing     Problem: Oral Intake Inadequate (Acute Kidney Injury/Impairment)  Goal: Optimal Nutrition Intake  Outcome: Ongoing, Progressing     Problem: Renal Function Impairment (Acute Kidney Injury/Impairment)  Goal: Effective Renal Function  Outcome: Ongoing, Progressing     Problem: Impaired Wound Healing  Goal: Optimal Wound Healing  Outcome: Ongoing, Progressing     Problem: Fall Injury Risk  Goal: Absence of Fall and Fall-Related Injury  Outcome: Ongoing, Progressing     Problem: Skin Injury Risk Increased  Goal: Skin Health and Integrity  Outcome: Ongoing, Progressing

## 2024-01-01 NOTE — ED PROVIDER NOTES
"  Source of History:  Medical record, patient  Independent history obtained from : EMS    Chief complaint:  Per triage note: "Fall (Pt had unwitnessed fall while at nursing home this morning. Pt endorses she did not hit her head. Hx of dementia. Pt then had second fall this afternoon. . Denies LOC. Take eliquis daily. Pt oriented to self. )  "    HPI:    Patient presents for evaluation of unwitnessed falls at her nursing home.  No clear inciting factor.  Patient has complained of headache.  She is suspected to have fallen out of her chair.  She has no other there apparent injuries, has no other complaints.  History is limited by pt condition, and limited documentation of recent events and history of present illness from the patient's referring facility.       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 05/22/2023    Lumbar spondylosis with myelopathy 04/25/2023    Multiple closed right sided fractures of pelvis without disruption of pelvic ring 07/09/2023    Primary hypertension 06/10/2022    Subdural hygroma 07/08/2023       Past Surgical History:   Procedure Laterality Date    REPAIR, HERNIA, INGUINAL, WITHOUT HISTORY OF PRIOR REPAIR, AGE 5 YEARS OR OLDER Right 5/6/2023    Procedure: REPAIR, HERNIA, INGUINAL, WITHOUT HISTORY OF PRIOR REPAIR, AGE 5 YEARS OR OLDER;  Surgeon: Maurice Piper MD;  Location: [facility] OR [address] [place] [address];  Service: General;  Laterality: Right;  possible laparotomy, possible bowel " resection       Social History     Tobacco Use    Smoking status: Never    Smokeless tobacco: Never   Substance Use Topics    Drug use: Never       Physical Exam:      Nursing note and vitals reviewed.    BP (!) 155/71   Pulse 98   Temp 97.5 °F (36.4 °C) (Oral)   Resp 20   Wt 52 kg (114 lb 10.2 oz)   SpO2 97%   BMI 19.68 kg/m²     Constitutional:  Awake, alert. No distress.   Eyes: EOMI. No discharge. Anicteric.  HENT:  No lacerations, contusions, or abrasions on close inspection.  Neck: Normal range of motion. Neck supple.  No midline spinal tenderness, step-offs, or deformities.  Cardiovascular: Normal rate. No murmur, no gallop and no friction rub heard.   Pulmonary/Chest: No respiratory distress. Effort normal. No wheezes, no rales, no rhonchi.   Abdominal: Bowel sounds normal. Soft. No distension and no mass. There is no tenderness. There is no rebound, no guarding, no tenderness at McBurney's point.  Musculoskeletal: Normal range of motion.  No bony tenderness at large joints or long bones.  No midline spinal tenderness, step-offs, or deformities.  Neurological:  GCS 14. Awake, alert. No gross cranial nerve, light touch or strength deficit. Coordination normal.   Skin: Skin is warm and dry.   EXT: 2+ radial pulses.   Psychiatric: Mumbling, difficult to understand speech.    Medical Decision Making / Independent Interpretations / External Records Reviewed:      Patient is an 88-year-old female with history of hypertension, DVT/PE, Alzheimer's dementia, on chronic anticoagulation, history of subdural hygroma who presents after apparent falls at her nursing home today.  Staff reportedly suspects that she slid out of her wheelchair nobody witnessed the falls.  Patient has complained of headache.     ED Course as of 01/01/24 0139   Sun Dec 31, 2023   1922 --  EKG Interpretation: I independently reviewed and interpreted EKG which shows normal sinus rhythm at 79 beats per minute, no STEMI, no significant acute  ST/T abnormalities, normal intervals.  No acute change compared to prior tracing.  --   [RC]   2148 I independently reviewed and interpreted labs which are notable for elevated troponin of 0.045 compared to 0.063 two weeks ago, GINA with Cr 1.6, mild hypokalemia. Pt's elevated troponin is likely chronic, appears stable.   Patient's D-dimer is minimally elevated at 0.52. I considered PE study, however pt has GINA, 0.52 is a minimal elevation which is less than expected for her age. I doubt acute PE.   I suspect her symptoms are due to dehydration. Will give IV fluids, reassess creatinine [RC]   Mon Jan 01, 2024   0100 Patient creatinine still 1.5 after 2 L IV fluids.     Patient has a true medical need for admission/observation. I have discussed the patient history, exam, findings with hospitalist ADEEL Perla, who accepts the patient for Dr. Vargas.  We discussed the patient's risk for PE.  Will treat empirically with Lovenox pending further evaluation, likely with V/Q scan.  Dosing recommendations shows no adjustment needed for GFR greater than 30.     =====================================  Critical Care:  35 minutes total critical care time was personally spent by me, exclusive of procedures and separately billable time.   Critical care was necessary to treat or prevent imminent or life-threatening deterioration of the following conditions: GINA   =====================================        [RC]   0136 Pt has [employer] documents filed stating she is DNR/comfort measures dated 2022.   Pt has a document from her nursing home showing she is full code dated Aug 2023.   On most recent admissions, pt was full code.   Given these contradictory directives, will make pt full code pending discussion with appropriate family member.  [RC]      ED Course User Index  [RC] Myles Thacker MD       --  I decided to obtain the patient's medical records. I reviewed patient's prior external notes / results: inpatient admission  documentation.   --      Medications   enoxaparin injection 1 mg/kg (Dosing Weight) (has no administration in time range)   naproxen tablet 500 mg (500 mg Oral Given 12/31/23 1835)   ketorolac injection 9.999 mg (9.999 mg Intravenous Given 12/31/23 2142)   metoclopramide injection 10 mg (10 mg Intravenous Given 12/31/23 2142)   magnesium oxide tablet 400 mg (400 mg Oral Given 12/31/23 2213)   potassium bicarbonate disintegrating tablet 20 mEq (20 mEq Oral Given 12/31/23 2213)   lactated ringers bolus 1,000 mL (0 mLs Intravenous Stopped 12/31/23 2344)   lactated ringers bolus 1,000 mL (0 mLs Intravenous Stopped 12/31/23 2344)   furosemide tablet 20 mg (20 mg Oral Given 1/1/24 0058)              No future appointments.     Diagnostic Impression:    1. Multiple falls    2. Acute kidney injury         ED Disposition Condition    Observation Stable                  Myles Thacker MD  01/01/24 0132

## 2024-01-01 NOTE — PLAN OF CARE
Pt unable to sign, gave CM permission to sign for her.   01/01/24 1523   SANCHEZ Message   Medicare Outpatient and Observation Notification regarding financial responsibility  --    Date SANCHEZ was signed 01/01/24   Time SANCHEZ was signed 1230     [facility] ([address])  Discharge Final Note    Primary Care Provider: Agatha Alvarez    Expected Discharge Date:     Final Discharge Note (most recent)       Final Note - 01/01/24 1516          Final Note    Assessment Type Discharge Planning Assessment                     Important Message from Medicare

## 2024-01-01 NOTE — ED NOTES
Report given to ELEAZAR Chang. Pt to be transferred to Room 378 via stretcher with nurse transport escort. Pt belongings with pt on bed. VSS. Safety measures in place; side rails up x2.

## 2024-01-01 NOTE — ASSESSMENT & PLAN NOTE
- Ms. Radha Sandoval presents after two falls  - she is found to have a UTI   - urine cultures pending   - rocephin ordered fro broad spectrum coverage

## 2024-01-01 NOTE — H&P
[facility] Medicine  History & Physical    Patient Name: Radha Sandoval  MRN: [id number]  Patient Class: OP- Observation  Admission Date: 12/31/2023  Attending Physician: GONZALEZ Vargas MD   Primary Care Provider: Agatha Alvarez         Patient information was obtained from patient, past medical records, and ER records.     Subjective:     Principal Problem:Acute cystitis    Chief Complaint:   Chief Complaint   Patient presents with    Fall     Pt had unwitnessed fall while at nursing home this morning. Pt endorses she did not hit her head. Hx of dementia. Pt then had second fall this afternoon. . Denies LOC. Take eliquis daily. Pt oriented to self.         HPI: Ms. Radha Sandoval is a 88 y.o. female, with PMH of HTN, Alzheimer's dementia, chronic PE on chronic anticoagulation, chronic b/l pleural effusions, dysphagia, urinary retention, A. Fib, frequent falls, anemia, who presented on 12/31/23 after two falls at her nursing home earlier in the day. The first fall was unwitnessed and occurred when it was assumed she fell out of her wheelchair. She fall again that afternoon. She denied head injury, or LOC with either fall. She reported a headache. She was evaluated in the ED with labs showing anemia (H&H of 9.0/28.5), with left shift. A metabolic panel showed elevated creatinine of 1.6, with BUN of 21. Potassium was mildly low at 3.2. Troponin was 0.045, and a D-dimer was elevated at 0.52. A CT of the C-spine showed no acute fracture or subluxation of the C-spine. A CT Head showed no acute intracranial abnormalities. A CXR showed chronic interstitial findings. She was treated in the ED with 2L IV fluids, potassium, magnesium, and analgesics. She was placed on observation.     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 05/22/2023    Lumbar spondylosis with myelopathy 04/25/2023    Multiple closed right sided fractures of pelvis without disruption of pelvic ring 07/09/2023    Primary hypertension 06/10/2022    Subdural hygroma 07/08/2023       Past Surgical History:   Procedure Laterality Date    REPAIR, HERNIA, INGUINAL, WITHOUT HISTORY OF PRIOR REPAIR, AGE 6 YEARS OR OLDER Right 5/6/2023    Procedure: REPAIR, HERNIA, INGUINAL, WITHOUT HISTORY OF PRIOR REPAIR, AGE 6 YEARS OR OLDER;  Surgeon: Maurice Piper MD;  Location: [facility] OR [address] [place] [address];  Service: General;  Laterality: Right;  possible laparotomy, possible bowel resection       Review of patient's allergies indicates:  No Known Allergies    No current facility-administered medications on file prior to encounter.     Current Outpatient Medications on File Prior to Encounter   Medication Sig    acetaminophen (TYLENOL) 325 MG tablet Take 2 tablets (650 mg total) by mouth every 6 (six) hours as needed (Pain).    albuterol (PROVENTIL HFA) 90 mcg/actuation inhaler Inhale 2 puffs into the lungs every 4 (four) hours as needed for Wheezing. Use spacer with adult mask    apixaban (ELIQUIS) 5 mg Tab Take 1 tablet (5 mg total) by mouth 2 (two) times daily. Starting 8/5    cholecalciferol, vitamin D3, (VITAMIN D3) 50 mcg (2,000 unit) Tab Take 1 tablet (2,000 Units total) by mouth once daily.    EScitalopram oxalate (LEXAPRO) 5 MG Tab Take 2 tablets (10 mg total) by mouth once daily.    furosemide (LASIX) 20 MG tablet Take 1 tablet (20 mg total) by mouth once daily.    lisinopriL (PRINIVIL,ZESTRIL) 40 MG tablet Take 1 tablet (40 mg total) by mouth once daily.    loperamide (IMODIUM) 2 mg capsule Take 1 capsule (2 mg total) by mouth 4 (four) times daily as needed for Diarrhea.     melatonin (MELATIN) 3 mg tablet Take 2 tablets (6 mg total) by mouth nightly as needed for Insomnia.    memantine (NAMENDA) 5 MG Tab Take 1 tablet (5 mg total) by mouth 2 (two) times daily.    metoprolol tartrate (LOPRESSOR) 25 MG tablet Take 1 tablet (25 mg total) by mouth 2 (two) times daily.    miconazole NITRATE 2 % (MICOTIN) 2 % top powder Apply topically 2 (two) times daily. Underside of bilateral breasts    polyethylene glycol (GLYCOLAX) 17 gram PwPk Take 17 g by mouth 2 (two) times daily as needed.    potassium chloride (KLOR-CON) 10 MEQ TbSR Take 1 tablet (10 mEq total) by mouth once daily.     Family History    None       Tobacco Use    Smoking status: Never    Smokeless tobacco: Never   Substance and Sexual Activity    Alcohol use: Not on file    Drug use: Never    Sexual activity: Not Currently     Review of Systems   Unable to perform ROS: Dementia     Objective:     Vital Signs (Most Recent):  Temp: 97.8 °F (36.6 °C) (01/01/24 0337)  Pulse: 79 (01/01/24 0337)  Resp: 17 (01/01/24 0337)  BP: (!) 155/67 (01/01/24 0337)  SpO2: 98 % (01/01/24 0337) Vital Signs (24h Range):  Temp:  [97.5 °F (36.4 °C)-97.8 °F (36.6 °C)] 97.8 °F (36.6 °C)  Pulse:  [73-98] 79  Resp:  [15-24] 17  SpO2:  [97 %-100 %] 98 %  BP: (117-199)/(37-83) 155/67     Weight: 49 kg (108 lb 0.4 oz)  Body mass index is 18.54 kg/m².     Physical Exam  Vitals and nursing note reviewed.   Constitutional:       General: She is not in acute distress.     Appearance: She is well-developed and normal weight. She is ill-appearing (chronically). She is not toxic-appearing or diaphoretic.      Comments: Elderly and chronically ill appearing.   HENT:      Head: Normocephalic and atraumatic.   Eyes:      General: No scleral icterus.        Right eye: No discharge.         Left eye: No discharge.      Conjunctiva/sclera: Conjunctivae normal.   Neck:      Trachea: No tracheal deviation.   Cardiovascular:      Rate and Rhythm: Normal rate and regular rhythm.  "     Heart sounds: Normal heart sounds. No murmur heard.     No gallop.   Pulmonary:      Effort: Pulmonary effort is normal. No respiratory distress.      Breath sounds: Normal breath sounds. No stridor. No wheezing or rales.   Abdominal:      General: Bowel sounds are normal. There is no distension.      Palpations: Abdomen is soft. There is no mass.      Tenderness: There is no abdominal tenderness. There is no guarding.   Musculoskeletal:         General: No deformity. Normal range of motion.      Cervical back: Normal range of motion and neck supple.   Skin:     General: Skin is warm and dry.      Coloration: Skin is not pale.      Findings: No erythema or rash.   Neurological:      General: No focal deficit present.      Mental Status: She is alert and oriented to person, place, and time.      Cranial Nerves: No cranial nerve deficit.      Motor: No abnormal muscle tone.      Comments: Reports she sees a man in the room during exam, no man was present.    Psychiatric:         Mood and Affect: Mood normal.         Behavior: Behavior normal.         Thought Content: Thought content normal.         Judgment: Judgment normal.                Significant Labs: All pertinent labs within the past 24 hours have been reviewed.  BMP:   Recent Labs   Lab 01/01/24  0346   *      K 3.3*      CO2 26   BUN 22   CREATININE 1.4   CALCIUM 8.7   MG 1.5*     CBC:   Recent Labs   Lab 12/31/23 1902 01/01/24  0346   WBC 12.56 9.11   HGB 9.0* 7.7*   HCT 28.5* 24.3*    253     CMP:   Recent Labs   Lab 12/31/23 1902 01/01/24  0129 01/01/24  0346    139 138   K 3.2* 3.5 3.3*    104 102   CO2 22* 24 26   * 114* 134*   BUN 21 20 22   CREATININE 1.6* 1.2 1.4   CALCIUM 9.6 8.3* 8.7   PROT 6.5  --   --    ALBUMIN 3.0*  --   --    BILITOT 0.7  --   --    ALKPHOS 72  --   --    AST 20  --   --    ALT 7*  --   --    ANIONGAP 17* 11 10     Urine Culture: No results for input(s): "LABURIN" in the last " 48 hours.  Urine Studies:   Recent Labs   Lab 01/01/24  0101   COLORU Yellow   APPEARANCEUA Hazy*   PHUR 6.0   SPECGRAV 1.015   PROTEINUA Trace*   GLUCUA Negative   KETONESU Negative   BILIRUBINUA Negative   OCCULTUA 2+*   NITRITE Negative   UROBILINOGEN 2.0-3.0*   LEUKOCYTESUR 3+*   RBCUA 1   WBCUA 49*   BACTERIA Many*   SQUAMEPITHEL 4   HYALINECASTS 2*       Significant Imaging: I have reviewed all pertinent imaging results/findings within the past 24 hours.  Imaging Results              CT Cervical Spine Without Contrast (Final result)  Result time 12/31/23 19:11:09      Final result by Luis Doran DO (12/31/23 19:11:09)                   Impression:      No acute fracture or subluxation of the cervical spine.    Degenerative changes, similar to prior.      Electronically signed by: Luis Doran  Date:    12/31/2023  Time:    19:11               Narrative:    EXAMINATION:  CT CERVICAL SPINE WITHOUT CONTRAST    CLINICAL HISTORY:  Trauma;    TECHNIQUE:  Low dose axial images, sagittal and coronal reformations were performed though the cervical spine without intravenous contrast.    COMPARISON:  CT of the cervical spine from 07/23/2023.    FINDINGS:  Alignment: There is mild levoconvex curvature of the cervical spine.  Alignment is otherwise normal.    Vertebra: There is osteopenia.  There is no acute fracture or subluxation of the cervical spine.  The vertebral body heights are maintained.    Discs: Mild multilevel disc height loss, most significant at C5-C6.    Degenerative changes: There are mild multilevel degenerative changes of the cervical spine, not significantly changed from prior, without evidence of high-grade osseous spinal canal stenosis.    Miscellaneous: The soft tissues of the neck are unremarkable.  There are atherosclerotic calcifications of the bilateral carotid bifurcations and of the partially visualized aortic arch.  There is an azygous fissure.  The visualized lung apices are otherwise  clear.                                       CT Head Without Contrast (Final result)  Result time 12/31/23 18:53:10      Final result by Luis Doran DO (12/31/23 18:53:10)                   Impression:      No acute intracranial abnormality.      Electronically signed by: Luis Doran  Date:    12/31/2023  Time:    18:53               Narrative:    EXAMINATION:  CT HEAD WITHOUT CONTRAST    CLINICAL HISTORY:  Trauma;    TECHNIQUE:  Low dose axial CT images obtained throughout the head without intravenous contrast. Sagittal and coronal reconstructions were performed.    COMPARISON:  CT head dated 12/16/2023.    FINDINGS:  Ventricles and sulci are normal in size for age without evidence of hydrocephalus. No extra-axial blood or fluid collections.  There is hypoattenuation in the supratentorial white matter, compatible with chronic microvascular ischemic changes, stable.  The brain parenchyma is otherwise unremarkable.  No parenchymal mass, hemorrhage, edema or major vascular distribution infarct.    No calvarial fracture.  The scalp is unremarkable.  Bilateral paranasal sinuses and mastoid air cells are clear.                                       X-Ray Chest 1 View (Final result)  Result time 12/31/23 18:45:41      Final result by William Coelho MD (12/31/23 18:45:41)                   Impression:      1. Chronic appearing interstitial findings, no large focal consolidation.      Electronically signed by: William Coelho MD  Date:    12/31/2023  Time:    18:45               Narrative:    EXAMINATION:  XR CHEST 1 VIEW    CLINICAL HISTORY:  r/o bleeding or hemorrhage;    TECHNIQUE:  Single frontal view of the chest was performed.    COMPARISON:  12/16/2023    FINDINGS:  The cardiomediastinal silhouette is not enlarged noting calcification of the aorta.  There is elevation of the right hemidiaphragm..  There is no pleural effusion.  The trachea is midline.  The lungs are symmetrically expanded bilaterally  with coarse interstitial attenuation bilaterally..  No large focal consolidation seen.  There is no pneumothorax.  The osseous structures are remarkable for degenerative change..                                       Assessment/Plan:     * Acute cystitis  - Ms. Radha Sandoval presents after two falls  - she is found to have a UTI   - urine cultures pending   - rocephin ordered fro broad spectrum coverage         GINA (acute kidney injury)  - metabolic panel initially showed Cr of 1.6, and BUN 21  - after 2L IV fluids, Cr was 1.2 and BUN was 20   - urine lytes ordered   Patient with acute kidney injury/acute renal failure likely due to pre-renal azotemia due to dehydration GINA is currently improving. Baseline creatinine  0.9  - Labs reviewed- Renal function/electrolytes with Estimated Creatinine Clearance: 27.1 mL/min (based on SCr of 1.2 mg/dL). according to latest data. Monitor urine output and serial BMP and adjust therapy as needed. Avoid nephrotoxins and renally dose meds for GFR listed above.    Frequent falls  - Ms. Radha Sandoval has frequent falls   - today she had 2 falls out of her wheelchair  - PT/OT consulted  - fall precautions ordered       Anemia  - chronic   - stable   Patient's anemia is currently controlled. Has not received any PRBCs to date. Etiology likely d/t chronic blood loss and Iron deficiency  Current CBC reviewed-   Lab Results   Component Value Date    HGB 9.0 (L) 12/31/2023    HCT 28.5 (L) 12/31/2023     Monitor serial CBC and transfuse if patient becomes hemodynamically unstable, symptomatic or H/H drops below 7/21.    Dysphagia  - aspiration precautions       Chronic pulmonary embolism without acute cor pulmonale  - continue apixaban  - has elevated D-dimer    - unclear if acute or chronic   - US b/l LE veins ordered for this AM   - consider CTA vs. VQ scan based upon AM renal function labs     Primary hypertension  Chronic, uncontrolled. Latest blood pressure and vitals reviewed-      Temp:  [97.5 °F (36.4 °C)-97.8 °F (36.6 °C)]   Pulse:  [73-98]   Resp:  [15-24]   BP: (117-199)/(37-83)   SpO2:  [97 %-100 %] .   Home meds for hypertension were reviewed and noted below.   Hypertension Medications               furosemide (LASIX) 20 MG tablet Take 1 tablet (20 mg total) by mouth once daily.    lisinopriL (PRINIVIL,ZESTRIL) 40 MG tablet Take 1 tablet (40 mg total) by mouth once daily.    metoprolol tartrate (LOPRESSOR) 25 MG tablet Take 1 tablet (25 mg total) by mouth 2 (two) times daily.            While in the hospital, will manage blood pressure as follows; Continue home antihypertensive regimen    Will utilize p.r.n. blood pressure medication only if patient's blood pressure greater than 180/110 and she develops symptoms such as worsening chest pain or shortness of breath.      VTE Risk Mitigation (From admission, onward)           Ordered     apixaban tablet 5 mg  2 times daily         01/01/24 0591                         Génesis Perla PA-C  Department of Hospital Medicine  [facility] Emergency Dept

## 2024-01-01 NOTE — ASSESSMENT & PLAN NOTE
- continue apixaban  - has elevated D-dimer    - unclear if acute or chronic   - US b/l LE veins ordered for this AM   - consider CTA vs. VQ scan based upon AM renal function labs

## 2024-01-01 NOTE — ASSESSMENT & PLAN NOTE
- chronic   - stable   Patient's anemia is currently controlled. Has not received any PRBCs to date. Etiology likely d/t chronic blood loss and Iron deficiency  Current CBC reviewed-   Lab Results   Component Value Date    HGB 9.0 (L) 12/31/2023    HCT 28.5 (L) 12/31/2023     Monitor serial CBC and transfuse if patient becomes hemodynamically unstable, symptomatic or H/H drops below 7/21.

## 2024-01-01 NOTE — PLAN OF CARE
Problem: Physical Therapy  Goal: Physical Therapy Goal  Outcome: Adequate for Care Transition     PT orders received. Evaluation completed. Pt is bed / chair bound at baseline. Pt is total A for mobility and a nakia lift is utilized for transfers at baseline. Pt is at her baseline functional level. No acute PT needs identified at this time. Will d/c PT. Recommend discharge back to nursing home.

## 2024-01-01 NOTE — SUBJECTIVE & OBJECTIVE
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 05/22/2023    Lumbar spondylosis with myelopathy 04/25/2023    Multiple closed right sided fractures of pelvis without disruption of pelvic ring 07/09/2023    Primary hypertension 06/10/2022    Subdural hygroma 07/08/2023       Past Surgical History:   Procedure Laterality Date    REPAIR, HERNIA, INGUINAL, WITHOUT HISTORY OF PRIOR REPAIR, AGE 6 YEARS OR OLDER Right 5/6/2023    Procedure: REPAIR, HERNIA, INGUINAL, WITHOUT HISTORY OF PRIOR REPAIR, AGE 6 YEARS OR OLDER;  Surgeon: Maurice Piper MD;  Location: [facility] OR [address] [place] [address];  Service: General;  Laterality: Right;  possible laparotomy, possible bowel resection       Review of patient's allergies indicates:  No Known Allergies    No current facility-administered medications on file prior to encounter.     Current Outpatient Medications on File Prior to Encounter   Medication Sig    acetaminophen (TYLENOL) 325 MG tablet Take 2 tablets (650 mg total) by mouth every 6 (six) hours as needed (Pain).    albuterol (PROVENTIL HFA) 90 mcg/actuation inhaler Inhale 2 puffs into the lungs every 4 (four) hours as needed for Wheezing. Use spacer with adult mask    apixaban (ELIQUIS) 5 mg Tab Take 1 tablet (5 mg total) by mouth 2 (two) times daily. Starting 8/5    cholecalciferol, vitamin D3, (VITAMIN D3) 50 mcg (2,000 unit) Tab Take 1 tablet (2,000 Units total) by mouth once daily.    EScitalopram oxalate (LEXAPRO) 5 MG Tab Take 2 tablets (10 mg total) by mouth once daily.    furosemide (LASIX) 20 MG tablet Take 1 tablet (20 mg total) by mouth once daily.    lisinopriL  (PRINIVIL,ZESTRIL) 40 MG tablet Take 1 tablet (40 mg total) by mouth once daily.    loperamide (IMODIUM) 2 mg capsule Take 1 capsule (2 mg total) by mouth 4 (four) times daily as needed for Diarrhea.    melatonin (MELATIN) 3 mg tablet Take 2 tablets (6 mg total) by mouth nightly as needed for Insomnia.    memantine (NAMENDA) 5 MG Tab Take 1 tablet (5 mg total) by mouth 2 (two) times daily.    metoprolol tartrate (LOPRESSOR) 25 MG tablet Take 1 tablet (25 mg total) by mouth 2 (two) times daily.    miconazole NITRATE 2 % (MICOTIN) 2 % top powder Apply topically 2 (two) times daily. Underside of bilateral breasts    polyethylene glycol (GLYCOLAX) 17 gram PwPk Take 17 g by mouth 2 (two) times daily as needed.    potassium chloride (KLOR-CON) 10 MEQ TbSR Take 1 tablet (10 mEq total) by mouth once daily.     Family History    None       Tobacco Use    Smoking status: Never    Smokeless tobacco: Never   Substance and Sexual Activity    Alcohol use: Not on file    Drug use: Never    Sexual activity: Not Currently     Review of Systems   Unable to perform ROS: Dementia     Objective:     Vital Signs (Most Recent):  Temp: 97.8 °F (36.6 °C) (01/01/24 0337)  Pulse: 79 (01/01/24 0337)  Resp: 17 (01/01/24 0337)  BP: (!) 155/67 (01/01/24 0337)  SpO2: 98 % (01/01/24 0337) Vital Signs (24h Range):  Temp:  [97.5 °F (36.4 °C)-97.8 °F (36.6 °C)] 97.8 °F (36.6 °C)  Pulse:  [73-98] 79  Resp:  [15-24] 17  SpO2:  [97 %-100 %] 98 %  BP: (117-199)/(37-83) 155/67     Weight: 49 kg (108 lb 0.4 oz)  Body mass index is 18.54 kg/m².     Physical Exam  Vitals and nursing note reviewed.   Constitutional:       General: She is not in acute distress.     Appearance: She is well-developed and normal weight. She is ill-appearing (chronically). She is not toxic-appearing or diaphoretic.      Comments: Elderly and chronically ill appearing.   HENT:      Head: Normocephalic and atraumatic.   Eyes:      General: No scleral icterus.        Right eye: No  discharge.         Left eye: No discharge.      Conjunctiva/sclera: Conjunctivae normal.   Neck:      Trachea: No tracheal deviation.   Cardiovascular:      Rate and Rhythm: Normal rate and regular rhythm.      Heart sounds: Normal heart sounds. No murmur heard.     No gallop.   Pulmonary:      Effort: Pulmonary effort is normal. No respiratory distress.      Breath sounds: Normal breath sounds. No stridor. No wheezing or rales.   Abdominal:      General: Bowel sounds are normal. There is no distension.      Palpations: Abdomen is soft. There is no mass.      Tenderness: There is no abdominal tenderness. There is no guarding.   Musculoskeletal:         General: No deformity. Normal range of motion.      Cervical back: Normal range of motion and neck supple.   Skin:     General: Skin is warm and dry.      Coloration: Skin is not pale.      Findings: No erythema or rash.   Neurological:      General: No focal deficit present.      Mental Status: She is alert and oriented to person, place, and time.      Cranial Nerves: No cranial nerve deficit.      Motor: No abnormal muscle tone.      Comments: Reports she sees a man in the room during exam, no man was present.    Psychiatric:         Mood and Affect: Mood normal.         Behavior: Behavior normal.         Thought Content: Thought content normal.         Judgment: Judgment normal.                Significant Labs: All pertinent labs within the past 24 hours have been reviewed.  BMP:   Recent Labs   Lab 01/01/24  0346   *      K 3.3*      CO2 26   BUN 22   CREATININE 1.4   CALCIUM 8.7   MG 1.5*     CBC:   Recent Labs   Lab 12/31/23 1902 01/01/24  0346   WBC 12.56 9.11   HGB 9.0* 7.7*   HCT 28.5* 24.3*    253     CMP:   Recent Labs   Lab 12/31/23 1902 01/01/24  0129 01/01/24  0346    139 138   K 3.2* 3.5 3.3*    104 102   CO2 22* 24 26   * 114* 134*   BUN 21 20 22   CREATININE 1.6* 1.2 1.4   CALCIUM 9.6 8.3* 8.7   PROT 6.5   "--   --    ALBUMIN 3.0*  --   --    BILITOT 0.7  --   --    ALKPHOS 72  --   --    AST 20  --   --    ALT 7*  --   --    ANIONGAP 17* 11 10     Urine Culture: No results for input(s): "LABURIN" in the last 48 hours.  Urine Studies:   Recent Labs   Lab 01/01/24  0101   COLORU Yellow   APPEARANCEUA Hazy*   PHUR 6.0   SPECGRAV 1.015   PROTEINUA Trace*   GLUCUA Negative   KETONESU Negative   BILIRUBINUA Negative   OCCULTUA 2+*   NITRITE Negative   UROBILINOGEN 2.0-3.0*   LEUKOCYTESUR 3+*   RBCUA 1   WBCUA 49*   BACTERIA Many*   SQUAMEPITHEL 4   HYALINECASTS 2*       Significant Imaging: I have reviewed all pertinent imaging results/findings within the past 24 hours.  Imaging Results              CT Cervical Spine Without Contrast (Final result)  Result time 12/31/23 19:11:09      Final result by Luis Doran DO (12/31/23 19:11:09)                   Impression:      No acute fracture or subluxation of the cervical spine.    Degenerative changes, similar to prior.      Electronically signed by: Luis Doran  Date:    12/31/2023  Time:    19:11               Narrative:    EXAMINATION:  CT CERVICAL SPINE WITHOUT CONTRAST    CLINICAL HISTORY:  Trauma;    TECHNIQUE:  Low dose axial images, sagittal and coronal reformations were performed though the cervical spine without intravenous contrast.    COMPARISON:  CT of the cervical spine from 07/23/2023.    FINDINGS:  Alignment: There is mild levoconvex curvature of the cervical spine.  Alignment is otherwise normal.    Vertebra: There is osteopenia.  There is no acute fracture or subluxation of the cervical spine.  The vertebral body heights are maintained.    Discs: Mild multilevel disc height loss, most significant at C5-C6.    Degenerative changes: There are mild multilevel degenerative changes of the cervical spine, not significantly changed from prior, without evidence of high-grade osseous spinal canal stenosis.    Miscellaneous: The soft tissues of the neck are " unremarkable.  There are atherosclerotic calcifications of the bilateral carotid bifurcations and of the partially visualized aortic arch.  There is an azygous fissure.  The visualized lung apices are otherwise clear.                                       CT Head Without Contrast (Final result)  Result time 12/31/23 18:53:10      Final result by Luis Doran DO (12/31/23 18:53:10)                   Impression:      No acute intracranial abnormality.      Electronically signed by: Luis Doran  Date:    12/31/2023  Time:    18:53               Narrative:    EXAMINATION:  CT HEAD WITHOUT CONTRAST    CLINICAL HISTORY:  Trauma;    TECHNIQUE:  Low dose axial CT images obtained throughout the head without intravenous contrast. Sagittal and coronal reconstructions were performed.    COMPARISON:  CT head dated 12/16/2023.    FINDINGS:  Ventricles and sulci are normal in size for age without evidence of hydrocephalus. No extra-axial blood or fluid collections.  There is hypoattenuation in the supratentorial white matter, compatible with chronic microvascular ischemic changes, stable.  The brain parenchyma is otherwise unremarkable.  No parenchymal mass, hemorrhage, edema or major vascular distribution infarct.    No calvarial fracture.  The scalp is unremarkable.  Bilateral paranasal sinuses and mastoid air cells are clear.                                       X-Ray Chest 1 View (Final result)  Result time 12/31/23 18:45:41      Final result by William Coelho MD (12/31/23 18:45:41)                   Impression:      1. Chronic appearing interstitial findings, no large focal consolidation.      Electronically signed by: William Coelho MD  Date:    12/31/2023  Time:    18:45               Narrative:    EXAMINATION:  XR CHEST 1 VIEW    CLINICAL HISTORY:  r/o bleeding or hemorrhage;    TECHNIQUE:  Single frontal view of the chest was performed.    COMPARISON:  12/16/2023    FINDINGS:  The cardiomediastinal  silhouette is not enlarged noting calcification of the aorta.  There is elevation of the right hemidiaphragm..  There is no pleural effusion.  The trachea is midline.  The lungs are symmetrically expanded bilaterally with coarse interstitial attenuation bilaterally..  No large focal consolidation seen.  There is no pneumothorax.  The osseous structures are remarkable for degenerative change..

## 2024-01-01 NOTE — ED TRIAGE NOTES
Pt arrived via EMS from [facility] after sustaining unwitnessed fall. Pt c/o occipital HA; is on blood thinners. Pt is AAO to self and situation; speech slurred, hearing decreased, and poor vision at baseline. Denies lightheadedness. NADN.

## 2024-01-01 NOTE — ASSESSMENT & PLAN NOTE
- Ms. Radha Sandoval has frequent falls   - today she had 2 falls out of her wheelchair  - PT/OT consulted  - fall precautions ordered

## 2024-01-01 NOTE — PLAN OF CARE
Problem: Infection  Goal: Absence of Infection Signs and Symptoms  Outcome: Ongoing, Progressing     Problem: Adult Inpatient Plan of Care  Goal: Plan of Care Review  Outcome: Ongoing, Progressing  Goal: Patient-Specific Goal (Individualized)  Outcome: Ongoing, Progressing  Goal: Absence of Hospital-Acquired Illness or Injury  Outcome: Ongoing, Progressing     Problem: Fluid and Electrolyte Imbalance (Acute Kidney Injury/Impairment)  Goal: Fluid and Electrolyte Balance  Outcome: Ongoing, Progressing     POC reviewed with patient. All questions and concerns addressed. Patient oriented to self only. Fall/safety precautions implemented and maintained. Purewick care performed. Denies any pain this shift. No acute events noted this shift. Please see flowsheet for full assessment and vitals. Bed locked in lowest position. Side rails up x2. Call bell within reach.

## 2024-01-02 NOTE — CONSULTS
.[facility] - [facility] ([address])  Gastroenterology  Consult Note    Patient Name: Radha Sandoval  MRN: [id number]  Admission Date: 12/31/2023  Hospital Length of Stay: 0 days  Code Status: Full Code   Primary Care Physician: Agatha Alvarez  Principal Problem:Acute cystitis    Inpatient consult to Gastroenterology  Consult performed by: Samy Erickson MD  Consult ordered by: GONZALEZ Vargas MD        Subjective:     Chief complaint: Anemia    HPI:  88-year-old woman admitted with change in mentation who had had 2 falls.  She was found to have a UTI and GINA.  GI was consulted because of anemia.  At a previous admission, there was a decrease in hemoglobin and heme-positive stool.  It was elected to observe.  This admission, she has also been having a decrease in hemoglobin.  This morning, she was found to have a melenic stool.  The patient has dementia so history is somewhat unreliable.  However, she denies any abdominal pain, nausea, or vomiting.  She has been on Eliquis.    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 05/22/2023    Lumbar spondylosis with myelopathy 04/25/2023    Multiple closed right sided fractures of pelvis without disruption of pelvic ring 07/09/2023    Primary hypertension 06/10/2022    Subdural hygroma 07/08/2023       Past surgical history:  Past Surgical History:   Procedure Laterality Date    REPAIR, HERNIA, INGUINAL, WITHOUT HISTORY OF PRIOR REPAIR, AGE 6 YEARS OR OLDER Right 5/6/2023    Procedure: REPAIR, HERNIA, INGUINAL, WITHOUT HISTORY OF PRIOR REPAIR, AGE 6 YEARS OR OLDER;  Surgeon: Maurice  VARSHA Piper MD;  Location: [facility] OR [address] [place] [address];  Service: General;  Laterality: Right;  possible laparotomy, possible bowel resection       Social history:  Social History     Socioeconomic History    Marital status: Unknown   Tobacco Use    Smoking status: Never    Smokeless tobacco: Never   Substance and Sexual Activity    Drug use: Never    Sexual activity: Not Currently     Social Determinants of Health     Financial Resource Strain: Low Risk  (12/18/2023)    Overall Financial Resource Strain (CARDIA)     Difficulty of Paying Living Expenses: Not hard at all   Food Insecurity: No Food Insecurity (12/18/2023)    Hunger Vital Sign     Worried About Running Out of Food in the Last Year: Never true     Ran Out of Food in the Last Year: Never true   Transportation Needs: No Transportation Needs (12/18/2023)    PRAPARE - Transportation     Lack of Transportation (Medical): No     Lack of Transportation (Non-Medical): No   Physical Activity: Inactive (1/1/2024)    Exercise Vital Sign     Days of Exercise per Week: 0 days     Minutes of Exercise per Session: 0 min   Stress: No Stress Concern Present (12/18/2023)    [employer] [place] [employer] - Occupational Stress Questionnaire     Feeling of Stress : Not at all   Social Connections: Socially Isolated (12/18/2023)    Social Connection and Isolation Panel [NHANES]     Frequency of Communication with Friends and Family: Once a week     Frequency of Social Gatherings with Friends and Family: Once a week     Attends Nondenominational Services: Never     Active Member of Clubs or Organizations: No     Attends Club or Organization Meetings: Never     Marital Status:    Housing Stability: Low Risk  (12/18/2023)    Housing Stability Vital Sign     Unable to Pay for Housing in the Last Year: No     Number of Places Lived in the Last Year: 2     Unstable Housing in the Last Year: No       Family history:  History reviewed. No pertinent family  history.    Medications:  Medications Prior to Admission   Medication Sig Dispense Refill Last Dose    acetaminophen (TYLENOL) 325 MG tablet Take 2 tablets (650 mg total) by mouth every 6 (six) hours as needed (Pain). 60 tablet 1     albuterol (PROVENTIL HFA) 90 mcg/actuation inhaler Inhale 2 puffs into the lungs every 4 (four) hours as needed for Wheezing. Use spacer with adult mask 18 g 1     apixaban (ELIQUIS) 5 mg Tab Take 1 tablet (5 mg total) by mouth 2 (two) times daily. Starting 8/5 60 tablet 11     cholecalciferol, vitamin D3, (VITAMIN D3) 50 mcg (2,000 unit) Tab Take 1 tablet (2,000 Units total) by mouth once daily.       EScitalopram oxalate (LEXAPRO) 5 MG Tab Take 2 tablets (10 mg total) by mouth once daily. 60 tablet 11     furosemide (LASIX) 20 MG tablet Take 1 tablet (20 mg total) by mouth once daily. 30 tablet 0     lisinopriL (PRINIVIL,ZESTRIL) 40 MG tablet Take 1 tablet (40 mg total) by mouth once daily. 30 tablet 11     loperamide (IMODIUM) 2 mg capsule Take 1 capsule (2 mg total) by mouth 4 (four) times daily as needed for Diarrhea. 60 capsule 0     melatonin (MELATIN) 3 mg tablet Take 2 tablets (6 mg total) by mouth nightly as needed for Insomnia. 30 tablet 3     memantine (NAMENDA) 5 MG Tab Take 1 tablet (5 mg total) by mouth 2 (two) times daily. 60 tablet 11     metoprolol tartrate (LOPRESSOR) 25 MG tablet Take 1 tablet (25 mg total) by mouth 2 (two) times daily. 60 tablet 11     miconazole NITRATE 2 % (MICOTIN) 2 % top powder Apply topically 2 (two) times daily. Underside of bilateral breasts 85 g 3     polyethylene glycol (GLYCOLAX) 17 gram PwPk Take 17 g by mouth 2 (two) times daily as needed.  0     potassium chloride (KLOR-CON) 10 MEQ TbSR Take 1 tablet (10 mEq total) by mouth once daily. 30 tablet 11        Allergies:  Review of patient's allergies indicates:  No Known Allergies    Review of systems:  Unable to get a reliable review of systems because of dementia    Objective:     Vital  Signs (Most Recent):  Temp: 98.6 °F (37 °C) (01/02/24 1619)  Pulse: 73 (01/02/24 1619)  Resp: 18 (01/02/24 1619)  BP: (!) 177/76 (01/02/24 1619)  SpO2: 96 % (01/02/24 1619) Vital Signs (24h Range):  Temp:  [97.7 °F (36.5 °C)-98.6 °F (37 °C)] 98.6 °F (37 °C)  Pulse:  [62-86] 73  Resp:  [12-18] 18  SpO2:  [93 %-97 %] 96 %  BP: (125-177)/(62-76) 177/76     Physical examination:  General: well developed, elderly and somewhat frail, in no apparent distress  HENT: NCAT, hearing grossly intact  Eyes:  anicteric sclera  Cardiovascular: Regular rate and rhythm.   Lungs: Non-labored respirations. Breath sounds equal.   Abdomen: soft, NTND, normoactive BS  Neuro:  no tremors  Psych: Appropriate mood   Skin: No jaundice  Musculoskeletal: no deformity    Labs:  CBC:   Recent Labs   Lab 01/01/24  0346 01/02/24  0425 01/02/24  0710   WBC 9.11 6.86 6.54   HGB 7.7* 6.8* 6.7*   HCT 24.3* 22.2* 21.2*    236 228     CMP:   Recent Labs   Lab 12/31/23  1902 01/01/24  0129 01/02/24  0425   *   < > 86   CALCIUM 9.6   < > 8.2*   ALBUMIN 3.0*  --   --    PROT 6.5  --   --       < > 142   K 3.2*   < > 3.7   CO2 22*   < > 25      < > 108   BUN 21   < > 21   CREATININE 1.6*   < > 1.0   ALKPHOS 72  --   --    ALT 7*  --   --    AST 20  --   --    BILITOT 0.7  --   --     < > = values in this interval not displayed.       Imaging:  Imaging reviewed      Assessment:   88-year-old woman with dementia and other medical issues being seen by GI for anemia with an acute decrease in hemoglobin in the setting of Eliquis.  She also had witnessed melena.  Risks and benefits of EGD were discussed with the patient's son, Maykel, by phone.  Will plan EGD tomorrow.    Plan:   1. Monitor hemoglobin   2. Agree with transfusion  3. EGD tomorrow      Discussed with Dr. Vargas      Thank you for your consult.     Samy Erickson MD  Gastroenterology  Spiritism [facility] Surg ([address])

## 2024-01-02 NOTE — PROGRESS NOTES
[facility] Surg [address] Medicine  Progress Note    Patient Name: Radha Sandoval  MRN: [id number]  Patient Class: IP- Inpatient   Admission Date: 12/31/2023  Length of Stay: 0 days  Attending Physician: GONZALEZ Vargas MD  Primary Care Provider: Agatha Alvarez        Subjective:     Principal Problem:GI bleed        HPI:  Ms. Radha Sandoval is a 88 y.o. female, with PMH of HTN, Alzheimer's dementia, chronic PE on chronic anticoagulation, chronic b/l pleural effusions, dysphagia, urinary retention, A. Fib, frequent falls, anemia, who presented on 12/31/23 after two falls at her nursing home earlier in the day. The first fall was unwitnessed and occurred when it was assumed she fell out of her wheelchair. She fall again that afternoon. She denied head injury, or LOC with either fall. She reported a headache. She was evaluated in the ED with labs showing anemia (H&H of 9.0/28.5), with left shift. A metabolic panel showed elevated creatinine of 1.6, with BUN of 21. Potassium was mildly low at 3.2. Troponin was 0.045, and a D-dimer was elevated at 0.52. A CT of the C-spine showed no acute fracture or subluxation of the C-spine. A CT Head showed no acute intracranial abnormalities. A CXR showed chronic interstitial findings. She was treated in the ED with 2L IV fluids, potassium, magnesium, and analgesics. She was placed on observation.     Overview/Hospital Course:  Admitted with fall and UTI. Started ceftriaxone. Noted hemoglobin downtrend and found to have melenic stool. Started pantoprazole, transfused 1U pRBCs and GI consulted.    Interval History: No acute events overnight but noted significant hemoglobin downtrend today. On skin evaluation for potential ecchymosis/hematoma after her fall at NH, noted melena. Discussed with son Maykel via phone. No other concerns at this time.    Review of Systems   Unable to perform ROS: Dementia     Objective:     Vital Signs (Most Recent):  Temp: 98.8 °F  (37.1 °C) (01/02/24 1928)  Pulse: 70 (01/02/24 2046)  Resp: 20 (01/02/24 1928)  BP: (!) 172/67 (01/02/24 2046)  SpO2: (!) 94 % (01/02/24 1928) Vital Signs (24h Range):  Temp:  [97.7 °F (36.5 °C)-98.8 °F (37.1 °C)] 98.8 °F (37.1 °C)  Pulse:  [62-77] 70  Resp:  [12-20] 20  SpO2:  [93 %-97 %] 94 %  BP: (125-177)/(62-76) 172/67     Weight: 51.5 kg (113 lb 8.6 oz)  Body mass index is 19.49 kg/m².    Intake/Output Summary (Last 24 hours) at 1/2/2024 2138  Last data filed at 1/2/2024 1928  Gross per 24 hour   Intake 2973.63 ml   Output --   Net 2973.63 ml         Physical Exam  Vitals and nursing note reviewed.   Constitutional:       General: She is not in acute distress.     Appearance: She is well-developed.   HENT:      Head: Normocephalic and atraumatic.   Eyes:      General:         Right eye: No discharge.         Left eye: No discharge.      Conjunctiva/sclera: Conjunctivae normal.   Cardiovascular:      Rate and Rhythm: Normal rate.      Pulses: Normal pulses.   Pulmonary:      Effort: Pulmonary effort is normal. No respiratory distress.   Abdominal:      Palpations: Abdomen is soft.      Tenderness: There is no abdominal tenderness.   Genitourinary:     Comments: Melena present in bed on exam.  Musculoskeletal:         General: Normal range of motion.      Right lower leg: No edema.      Left lower leg: No edema.   Skin:     General: Skin is warm and dry.   Neurological:      Mental Status: She is alert.      Comments: Oriented x 1 (self).           Significant Labs:   CBC:  Recent Labs   Lab 01/01/24  0346 01/02/24  0425 01/02/24  0710   WBC 9.11 6.86 6.54   HGB 7.7* 6.8* 6.7*   HCT 24.3* 22.2* 21.2*    236 228   GRAN 72.9  6.6 71.1  4.9 75.6*  4.9   LYMPH 17.8*  1.6 15.7*  1.1 12.8*  0.8*   MONO 8.2  0.8 8.3  0.6 7.0  0.5   EOS 0.0 0.3 0.2   BASO 0.04 0.03 0.04   CMP:  Recent Labs   Lab 12/31/23  1902 01/01/24  0129 01/01/24  0346 01/02/24  0425    139 138 142   K 3.2* 3.5 3.3* 3.7   CL  103 104 102 108   CO2 22* 24 26 25   BUN 21 20 22 21   CREATININE 1.6* 1.2 1.4 1.0   * 114* 134* 86   CALCIUM 9.6 8.3* 8.7 8.2*   MG  --   --  1.5* 2.1   ALKPHOS 72  --   --   --    AST 20  --   --   --    ALT 7*  --   --   --    BILITOT 0.7  --   --   --    PROT 6.5  --   --   --    ALBUMIN 3.0*  --   --   --    ANIONGAP 17* 11 10 9      Significant Imaging: I have reviewed and interpreted all pertinent imaging results/findings within the past 24 hours.      Assessment/Plan:      * GI bleed  - Noted downtrend in Hgb below 7 and found to have melena on exam.  - Start pantoprazole 80mg IV x1 followed by 40mg IV BID.  - Blood consented with son Maykel Sandoval via phone. Transfuse 1U pRBCs.  - GI consult. Appreciate assistance. Plan for EGD tomorrow AM.    Severe malnutrition  - RD consulted. Continue boost TIDWM.    Chronic anemia  - As above.    Acute cystitis  - Continue ceftriaxone 1g IV q24hr.  - Monitor culture.    Frequent falls  - Noted; stable. No evidence of trauma.    Atrial fibrillation  - Hold apixaban as above. Metoprolol as under HTN.    Dysphagia  - Aspiration precautions.    Severe late onset Alzheimer's dementia without behavioral disturbance, psychotic disturbance, mood disturbance, or anxiety  - Chronic; stable.  - Continue memantine 5mg PO BID. Start hydroxyzine 25mg PO TID PRN for anxiety/insomnia.    History of pulmonary embolism  - Hold apixaban as above.    GINA (acute kidney injury)  - Resolved.    Primary hypertension  - Continue metoprolol 25mg PO BID.      VTE Risk Mitigation (From admission, onward)      None            Discharge Planning   TOSHA: 1/4/2024     Code Status: Full Code   Is the patient medically ready for discharge?:     Reason for patient still in hospital (select all that apply): Treatment  Discharge Plan A: Return to nursing home                  HALI Vargas MD  Department of Hospital Medicine   [facility] ([address])

## 2024-01-02 NOTE — PROGRESS NOTES
Pressure Injury Prevention bundle, Support surface, and Registered dietician consultation ordered for a Joselito scale score of 14.

## 2024-01-02 NOTE — PROGRESS NOTES
Dr Vargas at bedside. Assisted with assessment - removed the external female catheter noting feces present to device and drainage canister.

## 2024-01-03 PROBLEM — Z71.89 ADVANCE CARE PLANNING: Status: ACTIVE | Noted: 2024-01-01

## 2024-01-03 NOTE — ASSESSMENT & PLAN NOTE
- Still able to have a conversation, though confused at times. Uses wheelchair for mobility.   - If no longer able to walk without assistance, FAST score 7C; qualifies for hospice based on this diagnosis alone, should goals become comfort-focused

## 2024-01-03 NOTE — ASSESSMENT & PLAN NOTE
- Chronic; stable.  - Continue memantine 5mg PO BID. Start hydroxyzine 25mg PO TID PRN for anxiety/insomnia.

## 2024-01-03 NOTE — PLAN OF CARE
Recommendations  1) Consider a texture modified diet s/t patient missing dentures - textures per SLP/MD (patient previously on Minced & Moist)    2) Recommend commercial beverages (chocolate) added to diet order.    3) RD to monitor and follow up    Goals:   Patient will be able to tolerate >/= 50% intake of meals by RD follow up  Nutrition Goal Status: new  Communication of RD Recs:  (POC)

## 2024-01-03 NOTE — CARE UPDATE
LoriMaykel Son   [phone]     Update given. He is upset about the care at the NH.  All questions and were addressed.     ALYSIA Gill MD

## 2024-01-03 NOTE — [employer]OVATION PATIENT INSTRUCTIONS
Discharge Summary/Instructions after an Endoscopic Procedure  Patient Name: Radha Sandoval  Patient MRN: [id number]  Patient YOB: 1935  Wednesday, January 3, 2024  Sabas Busby MD  RESTRICTIONS:  During your procedure today, you received medications for sedation.  These   medications may affect your judgment, balance and coordination.  Therefore,   for 24 hours, you have the following restrictions:   - DO NOT drive a car, operate machinery, make legal/financial decisions,   sign important papers or drink alcohol.    ACTIVITY:  Today: no heavy lifting, straining or running due to procedural   sedation/anesthesia.  The following day: return to full activity including work.  DIET:  Eat and drink normally unless instructed otherwise.     TREATMENT FOR COMMON SIDE EFFECTS:  - Mild abdominal pain, nausea, belching, bloating or excessive gas:  rest,   eat lightly and use a heating pad.  - Sore Throat: treat with throat lozenges and/or gargle with warm salt   water.  - Because air was used during the procedure, expelling large amounts of air   from your rectum or belching is normal.  - If a bowel prep was taken, you may not have a bowel movement for 1-3 days.    This is normal.  SYMPTOMS TO WATCH FOR AND REPORT TO YOUR PHYSICIAN:  1. Abdominal pain or bloating, other than gas cramps.  2. Chest pain.  3. Back pain.  4. Signs of infection such as: chills or fever occurring within 24 hours   after the procedure.  5. Rectal bleeding, which would show as bright red, maroon, or black stools.   (A tablespoon of blood from the rectum is not serious, especially if   hemorrhoids are present.)  6. Vomiting.  7. Weakness or dizziness.  GO DIRECTLY TO THE NEAREST EMERGENCY ROOM IF YOU HAVE ANY OF THE FOLLOWING:      Difficulty breathing              Chills and/or fever over 101 F   Persistent vomiting and/or vomiting blood   Severe abdominal pain   Severe chest pain   Black, tarry stools   Bleeding- more than one  tablespoon   Any other symptom or condition that you feel may need urgent attention  Your doctor recommends these additional instructions:  If any biopsies were taken, your doctors clinic will contact you in 1 to 2   weeks with any results.  - [facility].   - Advance diet as tolerated.   - Use Protonix (pantoprazole) 40 mg PO daily.   - Use Pepcid (famotidine) 40 mg PO daily.  For questions, problems or results please call your physician - Sabas Busby MD at Work:  [phone].  [facility], EMERGENCY ROOM PHONE NUMBER: [phone], [facility]   [phone].  IF A COMPLICATION OR EMERGENCY SITUATION ARISES AND YOU ARE UNABLE TO REACH   YOUR PHYSICIAN - GO DIRECTLY TO THE EMERGENCY ROOM.  Sabas Busby MD  1/3/2024 7:39:26 AM  This report has been verified and signed electronically.  Dear patient,  As a result of recent federal legislation (The Federal Cures Act), you may   receive lab or pathology results from your procedure in your [facility]   account before your physician is able to contact you. Your physician or   their representative will relay the results to you with their   recommendations at their soonest availability.  Thank you,  [employer]OVATION

## 2024-01-03 NOTE — CONSULTS
[facility] ([address])  Adult Nutrition  Consult Note    SUMMARY     Recommendations  1) Consider a texture modified diet s/t patient missing dentures - textures per SLP/MD (patient previously on Minced & Moist)    2) Recommend commercial beverages (chocolate) added to diet order.    3) RD to monitor and follow up    Goals:   Patient will be able to tolerate >/= 50% intake of meals by RD follow up  Nutrition Goal Status: new  Communication of RD Recs:  (POC)    Assessment and Plan    Endocrine  Severe malnutrition  Malnutrition Type:  Context: chronic illness  Level: severe    Related to (etiology):   Decreased ability to consume adequate nutrition    Signs and Symptoms (as evidenced by):   AMS 2/2 dementia  Altered GI function - GI bleed  Weight loss 16% in 1 month     Malnutrition Characteristic Summary:  Weight Loss (Malnutrition): greater than 5% in 1 month (12%)  Subcutaneous Fat (Malnutrition): severe depletion  Muscle Mass (Malnutrition): severe depletion      Interventions (treatment strategy):  Commercial beverages   Collaboration with other providers    Nutrition Diagnosis Status:   New         Malnutrition Assessment  Malnutrition Context: chronic illness  Malnutrition Level: severe  Skin (Micronutrient): cracked, thinned, bruised, turgor reduced  Teeth (Micronutrient): dentures (missing top teeth)       Weight Loss (Malnutrition): greater than 5% in 1 month (12%)  Subcutaneous Fat (Malnutrition): severe depletion  Muscle Mass (Malnutrition): severe depletion   Orbital Region (Subcutaneous Fat Loss): moderate depletion  Upper Arm Region (Subcutaneous Fat Loss): severe depletion   [place] Region (Muscle Loss): moderate depletion  Clavicle Bone Region (Muscle Loss): severe depletion  Clavicle and Acromion Bone Region (Muscle Loss): severe depletion  Dorsal Hand (Muscle Loss): severe depletion                 Reason for Assessment    Reason For Assessment: consult, felix Yee  Diagnosis:  (GI  bleed)  Relevant Medical History:   Patient Active Problem List   Diagnosis    Primary hypertension    Insomnia    GINA (acute kidney injury)    Lumbar spondylosis with myelopathy    History of pulmonary embolism    Severe late onset Alzheimer's dementia without behavioral disturbance, psychotic disturbance, mood disturbance, or anxiety    Chronic bilateral pleural effusions    Subdural hygroma    Multiple closed right sided fractures of pelvis without disruption of pelvic ring    Goals of care, counseling/discussion    Dysphagia    Encounter for palliative care    Closed pelvic ring fracture    Atrial fibrillation    Frequent falls    GI bleed    Acute cystitis    Chronic anemia    Severe malnutrition    Positive D dimer    Major neurocognitive disorder due to Alzheimer's disease, without behavioral disturbance     Interdisciplinary Rounds: did not attend  General Information Comments: Pt arrived from [facility] s/t unwitnessed fall today. +blood thinners. No trauma/injury noted. Patient with hx of dementia, a little hard to understand, asked for her son and dentures. As pt was NPO, reports hunger s/p EGD procedure. Diet adv to renal and called about breakfast tray. Patient previously on IDDSI level 5 diet minced and moist. Recommend downgrading as patient does not have dentures. No GI intolerance reported.  16% weight loss noted from 12/2023,  lb. PIV. Joselito 14 - skin intact. NFPE completed 1/3 patient present with severe malnutrition in context of chronic illness/injury 2/2 weight loss, AMS, decreased appetite.  Nutrition Discharge Planning: dc on general healthful diet, texture modified diet if appropriate    Nutrition Risk Screen    Nutrition Risk Screen: difficulty chewing/swallowing, reduced oral intake over the last month    Nutrition/Diet History    Spiritual, Cultural Beliefs, Mormon Practices, Values that Affect Care: no  Factors Affecting Nutritional Intake: altered gastrointestinal  "function, impaired cognitive status/motor control, chewing difficulties/inability to chew food, decreased appetite    Anthropometrics    Temp: 97.7 °F (36.5 °C)  Height Method: Stated  Height: 5' 4" (162.6 cm)  Height (inches): 64 in  Weight Method: Bed Scale  Weight: 51.4 kg (113 lb 5.1 oz)  Weight (lb): 113.32 lb  Ideal Body Weight (IBW), Female: 120 lb  % Ideal Body Weight, Female (lb): 94.43 %  BMI (Calculated): 19.4  BMI Grade: 18.5-24.9 - normal  Weight Loss: unintentional  Usual Body Weight (UBW), k.4 kg  % Usual Body Weight: 83.89  % Weight Change From Usual Weight: -16.29 %       Lab/Procedures/Meds    Pertinent Labs Reviewed: reviewed  CBC:  Recent Labs   Lab 24  0550   WBC 5.91   HGB 8.7*   HCT 27.5*        CMP:  Recent Labs   Lab 24  0550   CALCIUM 8.4*      K 3.7   CO2 21*   *   BUN 17   CREATININE 0.9     Pertinent Medications Reviewed: reviewed  Scheduled Meds:   cefTRIAXone (ROCEPHIN) IVPB  1 g Intravenous Q24H    EScitalopram oxalate  10 mg Oral Daily    memantine  5 mg Oral BID    metoprolol tartrate  25 mg Oral BID    miconazole NITRATE 2 %   Topical (Top) BID    pantoprazole  40 mg Intravenous BID     Continuous Infusions:  PRN Meds:.0.9%  NaCl infusion (for blood administration), acetaminophen, hydrOXYzine HCL, naloxone, ondansetron, senna-docusate 8.6-50 mg, sodium chloride 0.9%    Estimated/Assessed Needs    Weight Used For Calorie Calculations: 51.4 kg (113 lb 5.1 oz)  Energy Calorie Requirements (kcal): 7066-5807  Energy Need Method: Kcal/kg (25-30)  Protein Requirements: 52g (1.0 g/kg)  Weight Used For Protein Calculations: 51.4 kg (113 lb 5.1 oz)  Fluid Requirements (mL): 1 mL/kcal  Estimated Fluid Requirement Method:  (or per MD)  RDA Method (mL): 1285         Nutrition Prescription Ordered    Current Diet Order: Renal    Evaluation of Received Nutrient/Fluid Intake    I/O: + 3.1 L since admit  Energy Calories Required: not meeting needs  Protein " Required: not meeting needs  Fluid Required: not meeting needs  Comments: LBM: 1/2/24  Tolerance:  (monitoring)  % Intake of Estimated Energy Needs: 0 - 25 %  % Meal Intake: NPO  Diet recently adv to renal and patient waiting for breakfast    Intake/Output - Last 3 Shifts         01/01 0700  01/02 0659 01/02 0700  01/03 0659 01/03 0700  01/04 0659    P.O. 100      I.V. (mL/kg) 2480.7 (48.2)      Blood  342.9     IV Piggyback 198.6  300    Total Intake(mL/kg) 2779.3 (54) 342.9 (6.7) 300 (5.8)    Urine (mL/kg/hr) 100 (0.1) 250 (0.2)     Stool 0      Total Output 100 250     Net +2679.3 +92.9 +300           Urine Occurrence 2 x      Stool Occurrence 1 x 3 x             Nutrition Risk    Level of Risk/Frequency of Follow-up:  (1-2 x/week)       Monitor and Evaluation    Food and Nutrient Intake: energy intake, food and beverage intake  Food and Nutrient Adminstration: diet order  Physical Activity and Function: nutrition-related ADLs and IADLs  Anthropometric Measurements: weight, weight change  Biochemical Data, Medical Tests and Procedures: electrolyte and renal panel, gastrointestinal profile, glucose/endocrine profile, inflammatory profile, lipid profile  Nutrition-Focused Physical Findings: overall appearance, skin, extremities, muscles and bones, head and eyes       Nutrition Follow-Up    RD Follow-up?: Yes    Nicky Maldonado RDN, DAVEY

## 2024-01-03 NOTE — SUBJECTIVE & OBJECTIVE
Interval History: No acute events overnight but noted significant hemoglobin downtrend today. On skin evaluation for potential ecchymosis/hematoma after her fall at NH, noted melena. Discussed with son Maykel via phone. No other concerns at this time.    Review of Systems   Unable to perform ROS: Dementia     Objective:     Vital Signs (Most Recent):  Temp: 98.8 °F (37.1 °C) (01/02/24 1928)  Pulse: 70 (01/02/24 2046)  Resp: 20 (01/02/24 1928)  BP: (!) 172/67 (01/02/24 2046)  SpO2: (!) 94 % (01/02/24 1928) Vital Signs (24h Range):  Temp:  [97.7 °F (36.5 °C)-98.8 °F (37.1 °C)] 98.8 °F (37.1 °C)  Pulse:  [62-77] 70  Resp:  [12-20] 20  SpO2:  [93 %-97 %] 94 %  BP: (125-177)/(62-76) 172/67     Weight: 51.5 kg (113 lb 8.6 oz)  Body mass index is 19.49 kg/m².    Intake/Output Summary (Last 24 hours) at 1/2/2024 2138  Last data filed at 1/2/2024 1928  Gross per 24 hour   Intake 2973.63 ml   Output --   Net 2973.63 ml         Physical Exam  Vitals and nursing note reviewed.   Constitutional:       General: She is not in acute distress.     Appearance: She is well-developed.   HENT:      Head: Normocephalic and atraumatic.   Eyes:      General:         Right eye: No discharge.         Left eye: No discharge.      Conjunctiva/sclera: Conjunctivae normal.   Cardiovascular:      Rate and Rhythm: Normal rate.      Pulses: Normal pulses.   Pulmonary:      Effort: Pulmonary effort is normal. No respiratory distress.   Abdominal:      Palpations: Abdomen is soft.      Tenderness: There is no abdominal tenderness.   Genitourinary:     Comments: Melena present in bed on exam.  Musculoskeletal:         General: Normal range of motion.      Right lower leg: No edema.      Left lower leg: No edema.   Skin:     General: Skin is warm and dry.   Neurological:      Mental Status: She is alert.      Comments: Oriented x 1 (self).           Significant Labs:   CBC:  Recent Labs   Lab 01/01/24  0346 01/02/24  0425 01/02/24  0710   WBC 9.11 6.86  6.54   HGB 7.7* 6.8* 6.7*   HCT 24.3* 22.2* 21.2*    236 228   GRAN 72.9  6.6 71.1  4.9 75.6*  4.9   LYMPH 17.8*  1.6 15.7*  1.1 12.8*  0.8*   MONO 8.2  0.8 8.3  0.6 7.0  0.5   EOS 0.0 0.3 0.2   BASO 0.04 0.03 0.04   CMP:  Recent Labs   Lab 12/31/23  1902 01/01/24  0129 01/01/24  0346 01/02/24  0425    139 138 142   K 3.2* 3.5 3.3* 3.7    104 102 108   CO2 22* 24 26 25   BUN 21 20 22 21   CREATININE 1.6* 1.2 1.4 1.0   * 114* 134* 86   CALCIUM 9.6 8.3* 8.7 8.2*   MG  --   --  1.5* 2.1   ALKPHOS 72  --   --   --    AST 20  --   --   --    ALT 7*  --   --   --    BILITOT 0.7  --   --   --    PROT 6.5  --   --   --    ALBUMIN 3.0*  --   --   --    ANIONGAP 17* 11 10 9      Significant Imaging: I have reviewed and interpreted all pertinent imaging results/findings within the past 24 hours.

## 2024-01-03 NOTE — SUBJECTIVE & OBJECTIVE
"Interval History: No acute events overnight, no further melena noted. Returned from EGD and is eating breakfast without difficulty. Said "thank you for visiting."    Review of Systems   Unable to perform ROS: Dementia     Objective:     Vital Signs (Most Recent):  Temp: 98.1 °F (36.7 °C) (01/03/24 1127)  Pulse: 64 (01/03/24 1127)  Resp: 18 (01/03/24 1127)  BP: (!) 121/58 (01/03/24 1127)  SpO2: (!) 94 % (01/03/24 1127) Vital Signs (24h Range):  Temp:  [97.7 °F (36.5 °C)-98.8 °F (37.1 °C)] 98.1 °F (36.7 °C)  Pulse:  [60-75] 64  Resp:  [16-20] 18  SpO2:  [94 %-100 %] 94 %  BP: (121-178)/(58-86) 121/58     Weight: 51.4 kg (113 lb 5.1 oz)  Body mass index is 19.45 kg/m².    Intake/Output Summary (Last 24 hours) at 1/3/2024 1502  Last data filed at 1/3/2024 0734  Gross per 24 hour   Intake 642.92 ml   Output 250 ml   Net 392.92 ml           Physical Exam  Vitals and nursing note reviewed.   Constitutional:       General: She is not in acute distress.     Appearance: She is well-developed.   HENT:      Head: Normocephalic and atraumatic.   Eyes:      General:         Right eye: No discharge.         Left eye: No discharge.      Conjunctiva/sclera: Conjunctivae normal.   Cardiovascular:      Rate and Rhythm: Normal rate.      Pulses: Normal pulses.   Pulmonary:      Effort: Pulmonary effort is normal. No respiratory distress.   Abdominal:      Palpations: Abdomen is soft.      Tenderness: There is no abdominal tenderness.   Musculoskeletal:         General: Normal range of motion.      Right lower leg: No edema.      Left lower leg: No edema.   Skin:     General: Skin is warm and dry.   Neurological:      Mental Status: She is alert.      Comments: Oriented x 1 (self).           Significant Labs:   CBC:  Recent Labs   Lab 01/02/24  0425 01/02/24  0710 01/03/24  0550   WBC 6.86 6.54 5.91   HGB 6.8* 6.7* 8.7*   HCT 22.2* 21.2* 27.5*    228 254   GRAN 71.1  4.9 75.6*  4.9 69.6  4.1   LYMPH 15.7*  1.1 12.8*  0.8* " 16.6*  1.0   MONO 8.3  0.6 7.0  0.5 8.0  0.5   EOS 0.3 0.2 0.3   BASO 0.03 0.04 0.05     CMP:  Recent Labs   Lab 12/31/23  1902 01/01/24  0129 01/01/24  0346 01/02/24  0425 01/03/24  0550      < > 138 142 142   K 3.2*   < > 3.3* 3.7 3.7      < > 102 108 111*   CO2 22*   < > 26 25 21*   BUN 21   < > 22 21 17   CREATININE 1.6*   < > 1.4 1.0 0.9   *   < > 134* 86 80   CALCIUM 9.6   < > 8.7 8.2* 8.4*   MG  --   --  1.5* 2.1 1.8   ALKPHOS 72  --   --   --   --    AST 20  --   --   --   --    ALT 7*  --   --   --   --    BILITOT 0.7  --   --   --   --    PROT 6.5  --   --   --   --    ALBUMIN 3.0*  --   --   --   --    ANIONGAP 17*   < > 10 9 10    < > = values in this interval not displayed.        Significant Imaging: I have reviewed and interpreted all pertinent imaging results/findings within the past 24 hours.

## 2024-01-03 NOTE — PROGRESS NOTES
EGD:  1) Hiatal hernia  2) Ulcerative esophagitis, lower third. 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 Biopsies not taken as she recently was on Eliquis.  3) Normal stomach  4) Normal duodenum.    Plan:  Pantoprazole 40 mg every morning ACB  Famotidine 40 mg nightly    Ideally would biopsy lower esophagus though no obvious detrimental changes noted.  Trend H/H.

## 2024-01-03 NOTE — ANESTHESIA POSTPROCEDURE EVALUATION
Anesthesia Post Evaluation    Patient: Radha Sandoval    Procedure(s) Performed: Procedure(s) (LRB):  EGD (ESOPHAGOGASTRODUODENOSCOPY) (N/A)    Final Anesthesia Type: MAC      Patient location during evaluation: PACU  Patient participation: Yes- Able to Participate  Level of consciousness: awake and alert  Post-procedure vital signs: reviewed and stable  Pain management: adequate  Airway patency: patent    PONV status at discharge: No PONV  Anesthetic complications: no      Cardiovascular status: hemodynamically stable  Respiratory status: unassisted  Hydration status: euvolemic  Follow-up not needed.              Vitals Value Taken Time   /66 01/03/24 0844   Temp 36.5 °C (97.7 °F) 01/03/24 0844   Pulse 64 01/03/24 0844   Resp 18 01/03/24 0844   SpO2 97 % 01/03/24 0844         Event Time   Out of Recovery 01/03/2024 08:20:50         Pain/Juan Score: Juan Score: 8 (1/3/2024  8:18 AM)

## 2024-01-03 NOTE — HOSPITAL COURSE
Admitted with fall and UTI. Started ceftriaxone. Noted hemoglobin downtrend and found to have melenic stool. Started pantoprazole, transfused 1U pRBCs and GI consulted. Underwent EGD with mucosal breaks with esophagitis that was likely source of bleeding. H/H stable. GI recommended to continue PPI daily and H2 blocker QHS. Patient completed full course of IV Rocephin for her UTI. Palliative consulted to address goals of care. Patient to remain full code, but son agreeable to have Palliative care at the NH.     * GI bleed  - Noted downtrend in Hgb below 7 and found to have melena on exam.  - Started pantoprazole 80mg IV x1 followed by 40mg IV BID.  - Blood consented with son Maykel Sandoval via phone. Transfused 1U pRBCs.  - GI consulted. Appreciate assistance.   - Further workup with EGD on 1/3/2024 showed:        LA Grade D (one or more mucosal breaks involving at least 75% of        esophageal circumference) esophagitis with no bleeding was found in        the lower third of the esophagus.        Circumferential salmon-colored mucosa was present at 31 cm. The        maximum longitudinal extent of these esophageal mucosal changes was        4 cm in length.        A medium-sized hiatal hernia was present.        The entire examined stomach was normal.        The examined duodenum was normal.   - Advanced diet and GI recommended to continue protonix 40 mg daily and pepcid 40 mg QHS.  - No further melena and H/H remained stable  - Discharged on medications back to NH with palliative care    Acute cystitis  - UA consistent with infection. Urine culture with multiple organisms with non in predominance  - Completed full treatment with IV ceftriaxone 1g IV q24hr during hospitalization     Severe late onset Alzheimer's dementia without behavioral disturbance, psychotic disturbance, mood disturbance, or anxiety  Advanced care planning/goals of care  - Chronic; stable.  - Continued memantine 5mg PO BID. Started hydroxyzine 25mg  PO TID PRN for anxiety/insomnia.  - Palliative care consulted. Patient to stay a full code for now. Son was agreeable for Palliative care for additional support at the NH.    Severe malnutrition  - RD consulted. Continued boost TIDWM.     Chronic anemia  - As above.      Frequent falls  - Noted; stable. No evidence of trauma.     Atrial fibrillation  - Held apixaban as above. Metoprolol as under HTN.  - Resumed apixaban on discharge.     Dysphagia  - Aspiration precautions.     History of pulmonary embolism  - Held apixaban as above. Resumed on discharge.     GINA (acute kidney injury)  - Resolved.     Primary hypertension  - Continued metoprolol 25mg PO BID.

## 2024-01-03 NOTE — ANESTHESIA PREPROCEDURE EVALUATION
01/03/2024  Radha Sandoval is a 88 y.o., female.      Pre-op Assessment    I have reviewed the Patient Summary Reports.     I have reviewed the Nursing Notes. I have reviewed the NPO Status.   I have reviewed the Medications.     Review of Systems  Anesthesia Hx:  No problems with previous Anesthesia                Social:  Non-Smoker       Hematology/Oncology:       -- Anemia:                                  Cardiovascular:     Hypertension    Dysrhythmias atrial fibrillation                                   Pulmonary:  Pulmonary Normal                       Renal/:  Chronic Renal Disease, CKD                Hepatic/GI:  Hepatic/GI Normal                 Musculoskeletal:  Arthritis               Neurological:    Neuromuscular Disease,            Dementia severe                        Endocrine:  Endocrine Normal            Psych:  Psychiatric History anxiety depression                Physical Exam  General: Confusion    Airway:  Mallampati: II   Mouth Opening: Normal  TM Distance: Normal  Tongue: Normal  Neck ROM: Normal ROM    Dental:  Partial Dentures        Anesthesia Plan  Type of Anesthesia, risks & benefits discussed:    Anesthesia Type: MAC  Intra-op Monitoring Plan: Standard ASA Monitors  Post Op Pain Control Plan: multimodal analgesia  Induction:  IV  Informed Consent: Informed consent signed with the Patient representative and all parties understand the risks and agree with anesthesia plan.  All questions answered.   ASA Score: 3    Ready For Surgery From Anesthesia Perspective.     .

## 2024-01-03 NOTE — ASSESSMENT & PLAN NOTE
- Consult for support, advance care planning, and continuity of care in pt admitted to hospital from NH for concerns for altered mental status and several falls; ultimately found to have UTI and anemia. She is s/p EGD, which showed possible esophagitis; has been started on PPI and diet resumed. Chart reviewed in depth; familiar with pt from prior hospital stay several months ago - see ACP notes on file.   - Visited with pt at bedside; during entirety of visit, she was alert, smiling, and readily conversational. While easily able to engage in discussion, she did get somewhat confused at times (knew she was in the hospital, but though she was going back to work in [place] after discharge). She had just finished eating some of her breakfast. Reminded her of role of palliative medicine in her care, and provided her with a basic update about her care. She is hopeful to go back to the NH in the next day or two; I let her know I would reach out to her son Maykel, to which she was immediately receptive.   - Called and spoke with her very supportive son Maykel; reminded him of role of palliative medicine in her care, and provided him with a basic medical update, as well as discussion of plan moving forward. As one of the biggest questions will be risks/benefits of continuing anticoagulation, I let him know that Dr Gill would further discuss during a call.   - In asking about her current living situation at NH, he voiced several concerns, including lack of consistent communication, not having a proactive physician at the facility, and the fact they don't have safety rails on her bed despite her being a fall risk. Validated these concerns, and let him know it is understandable that he is frustrated by these things, though reminded him that it is clear that he cares a lot about his mother and is doing a great job looking after her.   - I suggested a couple of options, including looking into the possibility of a new nursing home  once she is back at [facility], though also brought up the possibility of NH-based hospice support. Explained that in addition to providing medical equipment needed for her comfort and safety (ie, bed rails) it would be an extra set of eyes on pt, and would hopefully increase the communication he is getting about her. I let him know that even if he desires to bring pt back to the hospital, he can always temporarily revoke hospice.   - Based on our conversation, he is open to hearing more from SW/CM about hospice options for the NH, though also seems to be considering finding a new NH. I let him know we will continue to check in on his mother, and will do our best to support both of them.   - I did not bring up code status during this initial conversation, as much of conversation was spent providing active listening presence. Will bring this up during subsequent conversations.   - Updated primary team in person following visit; will follow up with pt

## 2024-01-03 NOTE — PROGRESS NOTES
"[facility] - Med Surg [address] Medicine  Progress Note    Patient Name: Radha Sandoval  MRN: [id number]  Patient Class: IP- Inpatient   Admission Date: 12/31/2023  Length of Stay: 1 days  Attending Physician: Vianca Gill MD  Primary Care Provider: Agatha Alvarez        Subjective:     Principal Problem:GI bleed        HPI:  Ms. Radha Sandoval is a 88 y.o. female, with PMH of HTN, Alzheimer's dementia, chronic PE on chronic anticoagulation, chronic b/l pleural effusions, dysphagia, urinary retention, A. Fib, frequent falls, anemia, who presented on 12/31/23 after two falls at her nursing home earlier in the day. The first fall was unwitnessed and occurred when it was assumed she fell out of her wheelchair. She fall again that afternoon. She denied head injury, or LOC with either fall. She reported a headache. She was evaluated in the ED with labs showing anemia (H&H of 9.0/28.5), with left shift. A metabolic panel showed elevated creatinine of 1.6, with BUN of 21. Potassium was mildly low at 3.2. Troponin was 0.045, and a D-dimer was elevated at 0.52. A CT of the C-spine showed no acute fracture or subluxation of the C-spine. A CT Head showed no acute intracranial abnormalities. A CXR showed chronic interstitial findings. She was treated in the ED with 2L IV fluids, potassium, magnesium, and analgesics. She was placed on observation.     Overview/Hospital Course:  Admitted with fall and UTI. Started ceftriaxone. Noted hemoglobin downtrend and found to have melenic stool. Started pantoprazole, transfused 1U pRBCs and GI consulted.    Interval History: No acute events overnight, no further melena noted. Returned from EGD and is eating breakfast without difficulty. Said "thank you for visiting."    Review of Systems   Unable to perform ROS: Dementia     Objective:     Vital Signs (Most Recent):  Temp: 98.1 °F (36.7 °C) (01/03/24 1127)  Pulse: 64 (01/03/24 1127)  Resp: 18 (01/03/24 1127)  BP: (!) " 121/58 (01/03/24 1127)  SpO2: (!) 94 % (01/03/24 1127) Vital Signs (24h Range):  Temp:  [97.7 °F (36.5 °C)-98.8 °F (37.1 °C)] 98.1 °F (36.7 °C)  Pulse:  [60-75] 64  Resp:  [16-20] 18  SpO2:  [94 %-100 %] 94 %  BP: (121-178)/(58-86) 121/58     Weight: 51.4 kg (113 lb 5.1 oz)  Body mass index is 19.45 kg/m².    Intake/Output Summary (Last 24 hours) at 1/3/2024 1502  Last data filed at 1/3/2024 0734  Gross per 24 hour   Intake 642.92 ml   Output 250 ml   Net 392.92 ml           Physical Exam  Vitals and nursing note reviewed.   Constitutional:       General: She is not in acute distress.     Appearance: She is well-developed.   HENT:      Head: Normocephalic and atraumatic.   Eyes:      General:         Right eye: No discharge.         Left eye: No discharge.      Conjunctiva/sclera: Conjunctivae normal.   Cardiovascular:      Rate and Rhythm: Normal rate.      Pulses: Normal pulses.   Pulmonary:      Effort: Pulmonary effort is normal. No respiratory distress.   Abdominal:      Palpations: Abdomen is soft.      Tenderness: There is no abdominal tenderness.   Musculoskeletal:         General: Normal range of motion.      Right lower leg: No edema.      Left lower leg: No edema.   Skin:     General: Skin is warm and dry.   Neurological:      Mental Status: She is alert.      Comments: Oriented x 1 (self).           Significant Labs:   CBC:  Recent Labs   Lab 01/02/24  0425 01/02/24  0710 01/03/24  0550   WBC 6.86 6.54 5.91   HGB 6.8* 6.7* 8.7*   HCT 22.2* 21.2* 27.5*    228 254   GRAN 71.1  4.9 75.6*  4.9 69.6  4.1   LYMPH 15.7*  1.1 12.8*  0.8* 16.6*  1.0   MONO 8.3  0.6 7.0  0.5 8.0  0.5   EOS 0.3 0.2 0.3   BASO 0.03 0.04 0.05     CMP:  Recent Labs   Lab 12/31/23  1902 01/01/24  0129 01/01/24  0346 01/02/24  0425 01/03/24  0550      < > 138 142 142   K 3.2*   < > 3.3* 3.7 3.7      < > 102 108 111*   CO2 22*   < > 26 25 21*   BUN 21   < > 22 21 17   CREATININE 1.6*   < > 1.4 1.0 0.9   GLU  145*   < > 134* 86 80   CALCIUM 9.6   < > 8.7 8.2* 8.4*   MG  --   --  1.5* 2.1 1.8   ALKPHOS 72  --   --   --   --    AST 20  --   --   --   --    ALT 7*  --   --   --   --    BILITOT 0.7  --   --   --   --    PROT 6.5  --   --   --   --    ALBUMIN 3.0*  --   --   --   --    ANIONGAP 17*   < > 10 9 10    < > = values in this interval not displayed.        Significant Imaging: I have reviewed and interpreted all pertinent imaging results/findings within the past 24 hours.    Assessment/Plan:      * GI bleed  - Noted downtrend in Hgb below 7 and found to have melena on exam.  - Start pantoprazole 80mg IV x1 followed by 40mg IV BID.  - Blood consented with son Maykel Sandoval via phone. Transfuse 1U pRBCs.  - GI consult. Appreciate assistance.   - EGD showed:        LA Grade D (one or more mucosal breaks involving at least 75% of        esophageal circumference) esophagitis with no bleeding was found in        the lower third of the esophagus.        Circumferential salmon-colored mucosa was present at 31 cm. The        maximum longitudinal extent of these esophageal mucosal changes was        4 cm in length.        A medium-sized hiatal hernia was present.        The entire examined stomach was normal.        The examined duodenum was normal.   - Advance diet and will need to continue PPI and pepcid  - Monitor for H/H stability    Severe malnutrition  - RD consulted. Continue boost TIDWM.    Chronic anemia  - As above.    Acute cystitis  - Continue ceftriaxone 1g IV q24hr.  - Monitor culture.    Frequent falls  - Noted; stable. No evidence of trauma.    Atrial fibrillation  - Hold apixaban as above. Metoprolol as under HTN.    Dysphagia  - Aspiration precautions.    Severe late onset Alzheimer's dementia without behavioral disturbance, psychotic disturbance, mood disturbance, or anxiety  - Chronic; stable.  - Continue memantine 5mg PO BID. Start hydroxyzine 25mg PO TID PRN for anxiety/insomnia.    History of pulmonary  embolism  - Hold apixaban as above.    GINA (acute kidney injury)  - Resolved.    Primary hypertension  - Continue metoprolol 25mg PO BID.      VTE Risk Mitigation (From admission, onward)      None                Vianca Gill MD  Department of Hospital Medicine   [facility] - Med Surg ([address])

## 2024-01-03 NOTE — OR NURSING
Pt wihtout any change from previous assessment. States no pain. Asks where her Son is. Report called to [facility]. Pt placed on transport.

## 2024-01-03 NOTE — CONSULTS
[facility] ([address])  Palliative Medicine  Consult Note    Patient Name: Radha Sandoval  MRN: [id number]  Admission Date: 12/31/2023  Hospital Length of Stay: 1 days  Code Status: Full Code   Attending Provider: Vianca Gill MD  Consulting Provider: Libby Huerta MD  Primary Care Physician: Agatha Alvarez  Principal Problem:GI bleed    Patient information was obtained from patient, relative(s), past medical records, ER records, and primary team.      Inpatient consult to Palliative Care  Consult performed by: Libby Huerta MD  Consult ordered by: Vianca Gill MD  Reason for consult: Advance Care Planning        Assessment/Plan:     Advance Care Planning    - Consult for support, advance care planning, and continuity of care in pt admitted to hospital from NH for concerns for altered mental status and several falls; ultimately found to have UTI and anemia. She is s/p EGD, which showed possible esophagitis; has been started on PPI and diet resumed. Chart reviewed in depth; familiar with pt from prior hospital stay several months ago - see ACP notes on file.   - Visited with pt at bedside; during entirety of visit, she was alert, smiling, and readily conversational. While easily able to engage in discussion, she did get somewhat confused at times (knew she was in the hospital, but though she was going back to work in [employer] after discharge). She had just finished eating some of her breakfast. Reminded her of role of palliative medicine in her care, and provided her with a basic update about her care. She is hopeful to go back to the NH in the next day or two; I let her know I would reach out to her son Maykel, to which she was immediately receptive.   - Called and spoke with her very supportive son Maykel; reminded him of role of palliative medicine in her care, and provided him with a basic medical update, as well as discussion of plan moving forward. As one of the biggest questions will  be risks/benefits of continuing anticoagulation, I let him know that Dr Gill would further discuss during a call.   - In asking about her current living situation at NH, he voiced several concerns, including lack of consistent communication, not having a proactive physician at the facility, and the fact they don't have safety rails on her bed despite her being a fall risk. Validated these concerns, and let him know it is understandable that he is frustrated by these things, though reminded him that it is clear that he cares a lot about his mother and is doing a great job looking after her.   - I suggested a couple of options, including looking into the possibility of a new nursing home once she is back at [facility], though also brought up the possibility of NH-based hospice support. Explained that in addition to providing medical equipment needed for her comfort and safety (ie, bed rails) it would be an extra set of eyes on pt, and would hopefully increase the communication he is getting about her. I let him know that even if he desires to bring pt back to the hospital, he can always temporarily revoke hospice.   - While he would like to continue with current level of care (likely including further hospital stays if needed), he is open to hearing more from SW/CM about hospice options for the NH, though also seems to be considering finding a new NH. I let him know we will continue to check in on his mother, and will do our best to support both of them.   - I did not bring up code status during this initial conversation, as much of conversation was spent providing active listening presence. Will bring this up during subsequent conversations.   - Updated primary team in person following visit; will follow up with pt     Neuro  Severe late onset Alzheimer's dementia without behavioral disturbance, psychotic disturbance, mood disturbance, or anxiety  - Still able to have a conversation, though confused at times. Uses  "wheelchair for mobility.   - If no longer able to walk without assistance, FAST score 7C; qualifies for hospice based on this diagnosis alone, should goals become comfort-focused    Cardiac/Vascular  Atrial fibrillation  - NOAC currently being held     Renal/  Acute cystitis  - Abx and management per primary team     GINA (acute kidney injury)  - Improved with fluids and UTI treatment     Endocrine  Severe malnutrition  - BMI 19; contributes to hospice qualification should goals become comfort-focused     GI  GI bleed  - Underwent EGD today; started on PPI and H2 blocker. CBC being monitored, per primary team.   - Primary team will discuss risks/benefits of restarting NOAC (A-fib, hx of PE) given concern for low hemoglobin   - Given complexity of situation, contributes to hospice qualification, should goals become comfort-focused    Other  Frequent falls  - Son requesting bed rails at NH    Thank you for your consult. I will follow-up with patient. Please contact us if you have any additional questions.    ANDREA Zaman  Palliative Medicine Staff   [phone]    Total visit time: 86 minutes    > 50% of 70 min visit spent in chart review, face to face discussion of symptom assessment, coordination of care with other specialists, and discharge planning.    16 min ACP time spent: goals of care, emotional support, formulating and communicating prognosis, exploring burden/ benefit of various approaches of treatment.      Subjective:     HPI: (from H&P "Ms. Radha Sandoval is a 88 y.o. female, with PMH of HTN, Alzheimer's dementia, chronic PE on chronic anticoagulation, chronic b/l pleural effusions, dysphagia, urinary retention, A. Fib, frequent falls, anemia, who presented on 12/31/23 after two falls at her nursing home earlier in the day. The first fall was unwitnessed and occurred when it was assumed she fell out of her wheelchair. She fall again that afternoon. She denied head injury, or LOC with either fall. " "She reported a headache. She was evaluated in the ED with labs showing anemia (H&H of 9.0/28.5), with left shift. A metabolic panel showed elevated creatinine of 1.6, with BUN of 21. Potassium was mildly low at 3.2. Troponin was 0.045, and a D-dimer was elevated at 0.52. A CT of the C-spine showed no acute fracture or subluxation of the C-spine. A CT Head showed no acute intracranial abnormalities. A CXR showed chronic interstitial findings. She was treated in the ED with 2L IV fluids, potassium, magnesium, and analgesics. She was placed on observation."    Palliative medicine is consulted for support and advance care planning; for details of visit with pt, see ACP section of plan.       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 05/22/2023    Lumbar spondylosis with myelopathy 04/25/2023    Multiple closed right sided fractures of pelvis without disruption of pelvic ring 07/09/2023    Primary hypertension 06/10/2022    Subdural hygroma 07/08/2023       Past Surgical History:   Procedure Laterality Date    REPAIR, HERNIA, INGUINAL, WITHOUT HISTORY OF PRIOR REPAIR, AGE 6 YEARS OR OLDER Right 5/6/2023    Procedure: REPAIR, HERNIA, INGUINAL, WITHOUT HISTORY OF PRIOR REPAIR, AGE 6 YEARS OR OLDER;  Surgeon: Maurice Piper MD;  Location: [facility] OR [address] [place] [address];  Service: General;  Laterality: Right;  possible laparotomy, possible bowel resection       Review of patient's allergies indicates:  No Known Allergies    Medications:  Continuous Infusions:  Scheduled Meds:   cefTRIAXone (ROCEPHIN) IVPB  1 g Intravenous Q24H    EScitalopram oxalate  10 mg Oral Daily "    memantine  5 mg Oral BID    metoprolol tartrate  25 mg Oral BID    miconazole NITRATE 2 %   Topical (Top) BID    pantoprazole  40 mg Intravenous BID     PRN Meds:0.9%  NaCl infusion (for blood administration), acetaminophen, hydrOXYzine HCL, naloxone, ondansetron, senna-docusate 8.6-50 mg, sodium chloride 0.9%    Family History    None       Tobacco Use    Smoking status: Never    Smokeless tobacco: Never   Substance and Sexual Activity    Alcohol use: Not on file    Drug use: Never    Sexual activity: Not Currently       Review of Systems   Unable to perform ROS: Dementia     Objective:     Vital Signs (Most Recent):  Temp: 98.1 °F (36.7 °C) (01/03/24 1127)  Pulse: 64 (01/03/24 1127)  Resp: 18 (01/03/24 1127)  BP: (!) 121/58 (01/03/24 1127)  SpO2: (!) 94 % (01/03/24 1127) Vital Signs (24h Range):  Temp:  [97.7 °F (36.5 °C)-98.8 °F (37.1 °C)] 98.1 °F (36.7 °C)  Pulse:  [60-75] 64  Resp:  [16-20] 18  SpO2:  [94 %-100 %] 94 %  BP: (121-178)/(58-86) 121/58     Weight: 51.4 kg (113 lb 5.1 oz)  Body mass index is 19.45 kg/m².       Physical Exam  Constitutional:       Comments: Smiling, sitting up in bed, conversational, elderly    HENT:      Mouth/Throat:      Mouth: Mucous membranes are moist.   Pulmonary:      Effort: Pulmonary effort is normal.   Neurological:      Mental Status: She is alert.      Comments: Conversational but disoriented at times       Significant Labs: All pertinent labs within the past 24 hours have been reviewed.  CBC:   Recent Labs   Lab 01/03/24  0550   WBC 5.91   HGB 8.7*   HCT 27.5*   MCV 82        BMP:  Recent Labs   Lab 01/03/24  0550   GLU 80      K 3.7   *   CO2 21*   BUN 17   CREATININE 0.9   CALCIUM 8.4*   MG 1.8     LFT:  Lab Results   Component Value Date    AST 20 12/31/2023    ALKPHOS 72 12/31/2023    BILITOT 0.7 12/31/2023     Albumin:   Albumin   Date Value Ref Range Status   12/31/2023 3.0 (L) 3.5 - 5.2 g/dL Final     Protein:   Total Protein   Date Value  Ref Range Status   12/31/2023 6.5 6.0 - 8.4 g/dL Final     Lactic acid:   Lab Results   Component Value Date    LACTATE 1.2 07/08/2023    LACTATE 0.8 05/06/2023       Significant Imaging: I have reviewed all pertinent imaging results/findings within the past 24 hours.

## 2024-01-03 NOTE — ASSESSMENT & PLAN NOTE
- Underwent EGD today; started on PPI and H2 blocker. CBC being monitored, per primary team.   - Primary team will discuss risks/benefits of restarting NOAC (A-fib, hx of PE) given concern for low hemoglobin   - Given complexity of situation, contributes to hospice qualification, should goals become comfort-focused

## 2024-01-03 NOTE — SUBJECTIVE & OBJECTIVE
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 05/22/2023    Lumbar spondylosis with myelopathy 04/25/2023    Multiple closed right sided fractures of pelvis without disruption of pelvic ring 07/09/2023    Primary hypertension 06/10/2022    Subdural hygroma 07/08/2023       Past Surgical History:   Procedure Laterality Date    REPAIR, HERNIA, INGUINAL, WITHOUT HISTORY OF PRIOR REPAIR, AGE 6 YEARS OR OLDER Right 5/6/2023    Procedure: REPAIR, HERNIA, INGUINAL, WITHOUT HISTORY OF PRIOR REPAIR, AGE 6 YEARS OR OLDER;  Surgeon: Maurice Piper MD;  Location: [facility] OR [address] [place] [address];  Service: General;  Laterality: Right;  possible laparotomy, possible bowel resection       Review of patient's allergies indicates:  No Known Allergies    Medications:  Continuous Infusions:  Scheduled Meds:   cefTRIAXone (ROCEPHIN) IVPB  1 g Intravenous Q24H    EScitalopram oxalate  10 mg Oral Daily    memantine  5 mg Oral BID    metoprolol tartrate  25 mg Oral BID    miconazole NITRATE 2 %   Topical (Top) BID    pantoprazole  40 mg Intravenous BID     PRN Meds:0.9%  NaCl infusion (for blood administration), acetaminophen, hydrOXYzine HCL, naloxone, ondansetron, senna-docusate 8.6-50 mg, sodium chloride 0.9%    Family History    None       Tobacco Use    Smoking status: Never    Smokeless tobacco: Never   Substance and Sexual Activity    Alcohol use: Not on file    Drug use: Never    Sexual activity: Not Currently       Review of Systems   Unable to perform ROS: Dementia     Objective:     Vital Signs (Most Recent):  Temp: 98.1 °F (36.7 °C) (01/03/24 1127)  Pulse: 64 (01/03/24  1127)  Resp: 18 (01/03/24 1127)  BP: (!) 121/58 (01/03/24 1127)  SpO2: (!) 94 % (01/03/24 1127) Vital Signs (24h Range):  Temp:  [97.7 °F (36.5 °C)-98.8 °F (37.1 °C)] 98.1 °F (36.7 °C)  Pulse:  [60-75] 64  Resp:  [16-20] 18  SpO2:  [94 %-100 %] 94 %  BP: (121-178)/(58-86) 121/58     Weight: 51.4 kg (113 lb 5.1 oz)  Body mass index is 19.45 kg/m².       Physical Exam  Constitutional:       Comments: Smiling, sitting up in bed, conversational, elderly    HENT:      Mouth/Throat:      Mouth: Mucous membranes are moist.   Pulmonary:      Effort: Pulmonary effort is normal.   Neurological:      Mental Status: She is alert.      Comments: Conversational but disoriented at times       Significant Labs: All pertinent labs within the past 24 hours have been reviewed.  CBC:   Recent Labs   Lab 01/03/24  0550   WBC 5.91   HGB 8.7*   HCT 27.5*   MCV 82        BMP:  Recent Labs   Lab 01/03/24  0550   GLU 80      K 3.7   *   CO2 21*   BUN 17   CREATININE 0.9   CALCIUM 8.4*   MG 1.8     LFT:  Lab Results   Component Value Date    AST 20 12/31/2023    ALKPHOS 72 12/31/2023    BILITOT 0.7 12/31/2023     Albumin:   Albumin   Date Value Ref Range Status   12/31/2023 3.0 (L) 3.5 - 5.2 g/dL Final     Protein:   Total Protein   Date Value Ref Range Status   12/31/2023 6.5 6.0 - 8.4 g/dL Final     Lactic acid:   Lab Results   Component Value Date    LACTATE 1.2 07/08/2023    LACTATE 0.8 05/06/2023       Significant Imaging: I have reviewed all pertinent imaging results/findings within the past 24 hours.

## 2024-01-03 NOTE — ASSESSMENT & PLAN NOTE
Malnutrition Type:  Context: chronic illness  Level: severe    Related to (etiology):   Decreased ability to consume adequate nutrition    Signs and Symptoms (as evidenced by):   AMS 2/2 dementia  Altered GI function - GI bleed  Weight loss 16% in 1 month     Malnutrition Characteristic Summary:  Weight Loss (Malnutrition): greater than 5% in 1 month (12%)  Subcutaneous Fat (Malnutrition): severe depletion  Muscle Mass (Malnutrition): severe depletion      Interventions/Recommendations (treatment strategy):  1) Consider a texture modified diet s/t patient missing dentures - textures per SLP/MD (patient previously on Minced & Moist)  2) Recommend commercial beverages (chocolate) added to diet order.  3) RD to monitor and follow up    Nutrition Diagnosis Status:   New

## 2024-01-03 NOTE — ASSESSMENT & PLAN NOTE
- Noted downtrend in Hgb below 7 and found to have melena on exam.  - Start pantoprazole 80mg IV x1 followed by 40mg IV BID.  - Blood consented with son Maykel Sandoval via phone. Transfuse 1U pRBCs.  - GI consult. Appreciate assistance. Plan for EGD tomorrow AM.

## 2024-01-03 NOTE — ASSESSMENT & PLAN NOTE
- Noted downtrend in Hgb below 7 and found to have melena on exam.  - Start pantoprazole 80mg IV x1 followed by 40mg IV BID.  - Blood consented with son Maykel Sandoval via phone. Transfuse 1U pRBCs.  - GI consult. Appreciate assistance.   - EGD showed:        LA Grade D (one or more mucosal breaks involving at least 75% of        esophageal circumference) esophagitis with no bleeding was found in        the lower third of the esophagus.        Circumferential salmon-colored mucosa was present at 31 cm. The        maximum longitudinal extent of these esophageal mucosal changes was        4 cm in length.        A medium-sized hiatal hernia was present.        The entire examined stomach was normal.        The examined duodenum was normal.   - Advance diet and will need to continue PPI and pepcid  - Monitor for H/H stability

## 2024-01-03 NOTE — TRANSFER OF CARE
"Anesthesia Transfer of Care Note    Patient: Radha Sandoval    Procedure(s) Performed: Procedure(s) (LRB):  EGD (ESOPHAGOGASTRODUODENOSCOPY) (N/A)    Patient location: PACU    Anesthesia Type: MAC    Transport from OR: Transported from OR on 2-3 L/min O2 by NC with adequate spontaneous ventilation    Post pain: adequate analgesia    Post assessment: no apparent anesthetic complications and tolerated procedure well    Post vital signs: stable    Level of consciousness: awake and alert    Nausea/Vomiting: no nausea/vomiting    Complications: none    Transfer of care protocol was followed      Last vitals: Visit Vitals  BP (!) 178/86 (BP Location: Left arm, Patient Position: Lying)   Pulse 61   Temp 36.6 °C (97.9 °F) (Axillary)   Resp 16   Ht 5' 4" (1.626 m)   Wt 51.4 kg (113 lb 5.1 oz)   SpO2 95%   BMI 19.45 kg/m²     "

## 2024-01-04 NOTE — PROGRESS NOTES
Pharmacist Renal Dose Adjustment Note    Radha Sandoval is a 88 y.o. female being treated with the medication famotidine    Patient Data:    Vital Signs (Most Recent):  Temp: 98.4 °F (36.9 °C) (01/04/24 0725)  Pulse: 68 (01/04/24 0820)  Resp: 19 (01/04/24 0725)  BP: 134/63 (01/04/24 0820)  SpO2: 99 % (01/04/24 0725) Vital Signs (72h Range):  Temp:  [97.7 °F (36.5 °C)-98.8 °F (37.1 °C)]   Pulse:  []   Resp:  [12-20]   BP: (121-200)/(58-86)   SpO2:  [93 %-100 %]      Recent Labs   Lab 01/01/24  0346 01/02/24  0425 01/03/24  0550   CREATININE 1.4 1.0 0.9     Serum creatinine: 0.9 mg/dL 01/03/24 0550  Estimated creatinine clearance: 35.3 mL/min     Medication Dose Route Frequency   Previous Order Famotidine 40 mg once daily    New Order Famotidine 20 mg once daily for CrCl 10-50 mL/min      Renal dose adjustments performed as noted above.  We will continue monitoring and adjusting as necessary.    Pharmacist: Sandra Hawkins, PharmD  [phone]

## 2024-01-04 NOTE — PLAN OF CARE
Per Dr. Gill, possible discharge back to [facility], pending labs. This CM Manager left a voicemail for admissions ([phone]) regarding return transfer. No answer. Left voicemail.   01/04/24 6188   Post-Acute Status   Post-Acute Authorization Placement   Discharge Plan   Discharge Plan A Return to nursing home

## 2024-01-04 NOTE — PROGRESS NOTES
"Gastroenterology Progress Note    Reason for Consult: GI bleed    Subjective:  Hypertensive overnight.  Somewhat combative.  No labs back this AM, but H/H was up yesterday from the day prior after a unit of blood.  Nurse reports 2 Bms overnight which were dark but not bloody.  No N/V.  Pt denies abdominal pain.    ROS:  Unable to obtain 2/2 dementia    Physical Exam  BP (!) 169/85 (BP Location: Right arm, Patient Position: Lying)   Pulse 62   Temp 98.4 °F (36.9 °C) (Axillary)   Resp 19   Ht 5' 4" (1.626 m)   Wt 50.8 kg (111 lb 15.9 oz)   SpO2 99%   BMI 19.22 kg/m²   General: Awake, alert elderly, chronically ill appearing female in no apparent distress  HEENT: NC/AT, PERRL, EOMI, MMM  CV: RRR, without murmur, gallop, or rubs  Pulm: CTAB, no wheezing, rales, or rhonchi  GI: soft, NT/ND, +BS  MSK: no edema and normal ROM  Neuro: alert and oriented to self, not situation, moves all extremities equally, sensation grossly intact  Skin: no rashes or lesions  Psych: normal mood and affect    Medications/Infusions:  Current Facility-Administered Medications   Medication Dose Route Frequency Provider Last Rate Last Admin    0.9%  NaCl infusion (for blood administration)   Intravenous Q24H PRN Sabas Busby MD        acetaminophen tablet 650 mg  650 mg Oral Q6H PRN Sabas Busby MD        cefTRIAXone (ROCEPHIN) 1 g in dextrose 5 % in water (D5W) 100 mL IVPB (MB+)  1 g Intravenous Q24H Sabas Busby MD   Stopped at 01/04/24 0651    EScitalopram oxalate tablet 10 mg  10 mg Oral Daily Sabas Busby MD   10 mg at 01/03/24 0936    hydrALAZINE injection 10 mg  10 mg Intravenous Q8H PRN Vianca Gill MD   10 mg at 01/04/24 0701    hydrOXYzine HCL tablet 25 mg  25 mg Oral TID PRN Sabas Busby MD   25 mg at 01/02/24 1656    lisinopriL tablet 40 mg  40 mg Oral Daily Vianca Gill MD        memantine tablet 5 mg  5 mg Oral BID Sabas Busby MD   5 mg at 01/03/24 2110    metoprolol tartrate " (LOPRESSOR) tablet 25 mg  25 mg Oral BID Sabas Busby MD   25 mg at 01/03/24 2110    miconazole NITRATE 2 % top powder   Topical (Top) BID Génesis Perla PA-C   Given at 01/03/24 2111    naloxone 0.4 mg/mL injection 0.02 mg  0.02 mg Intravenous PRN Sabas Busby MD        ondansetron injection 4 mg  4 mg Intravenous Q8H PRN Sabas Busby MD        pantoprazole injection 40 mg  40 mg Intravenous BID Sabas Busby MD   40 mg at 01/03/24 2110    senna-docusate 8.6-50 mg per tablet 1 tablet  1 tablet Oral BID PRN Sabas Busby MD        sodium chloride 0.9% flush 10 mL  10 mL Intravenous PRN Sabas Busby MD           Intake and Output:    Intake/Output Summary (Last 24 hours) at 1/4/2024 0744  Last data filed at 1/4/2024 0621  Gross per 24 hour   Intake 480 ml   Output 700 ml   Net -220 ml       Labs:  Lab Results   Component Value Date/Time    WBC 5.91 01/03/2024 05:50 AM    HGB 8.7 (L) 01/03/2024 05:50 AM    HCT 27.5 (L) 01/03/2024 05:50 AM    HCT 24 (L) 08/01/2023 07:02 PM     01/03/2024 05:50 AM    MCV 82 01/03/2024 05:50 AM     01/03/2024 05:50 AM    K 3.7 01/03/2024 05:50 AM     (H) 01/03/2024 05:50 AM    CO2 21 (L) 01/03/2024 05:50 AM    BUN 17 01/03/2024 05:50 AM    CREATININE 0.9 01/03/2024 05:50 AM    GLU 80 01/03/2024 05:50 AM    CALCIUM 8.4 (L) 01/03/2024 05:50 AM    MG 1.8 01/03/2024 05:50 AM    PHOS 3.0 12/19/2023 02:49 AM    INR 1.0 12/31/2023 07:02 PM    APTT 42.8 (H) 05/26/2023 05:57 AM   ]  Lab Results   Component Value Date/Time    PROT 6.5 12/31/2023 07:02 PM    ALBUMIN 3.0 (L) 12/31/2023 07:02 PM    BILITOT 0.7 12/31/2023 07:02 PM    AST 20 12/31/2023 07:02 PM    ALT 7 (L) 12/31/2023 07:02 PM    ALKPHOS 72 12/31/2023 07:02 PM   ]    Imaging and Procedures:  Labs and imaging results were reviewed.  EGD 1/3/24:  EGD:  1) Hiatal hernia  2) Ulcerative esophagitis, lower third. 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 Biopsies not taken as she recently was on  Eliquis.  3) Normal stomach  4) Normal duodenum.       Assessment:  Radha Sandoval is a 88 y.o. female with a history of HTN, Alzheimer's dementia, PE on AC, pleural effusions, afib, falls, and chronic anemia admitted after a fall.  GI consulted for anemia and possible melena.    Plan:  - pantoprazole 40 mg daily  - famotidine 40 mg QHS  - diet as tolerated  - elevate HOB, avoid late meals  - call back with questions/concerns    Eloisa Acevedo MD

## 2024-01-04 NOTE — PLAN OF CARE
Patient will discharge back to [facility]. Patient will return back to her room 220B at [phone] ext [phone] or [phone]. ETA.is 7:30 PM.      4:42 Pm Sw called patient's son Maykel Sandoval to inform him that his mom will be transported back to [facility] at 7:30 PM.   01/04/24 1630   Final Note   Assessment Type Final Discharge Note   Anticipated Discharge Disposition Nurse Fac [place]   Hospital Resources/Appts/Education Provided Provided patient/caregiver with written discharge plan information   Post-Acute Status   Discharge Delays None known at this time     [facility] ([address])  Discharge Final Note    Primary Care Provider: Agatha Alvarez    Expected Discharge Date: 1/4/2024    Final Discharge Note (most recent)       Final Note - 01/04/24 1630          Final Note    Assessment Type Final Discharge Note (P)      Anticipated Discharge Disposition Planned Readmission - Nursing Facility (P)      Hospital Resources/Appts/Education Provided Provided patient/caregiver with written discharge plan information (P)         Post-Acute Status    Discharge Delays None known at this time (P)                      Important Message from Medicare             Contact Info       [facility]   Specialty: Nursing Home Agency, SNF Agency   Relationship: PCP - General    [address] [place] [id number]   Phone: [phone]       Next Steps: Follow up

## 2024-01-04 NOTE — PLAN OF CARE
James spoke to patient's son-Maykel about his decision of Hospice or Palliative Care. Patient's son picked Palliative Care for his mom when she returns to [facility]. James will call the facility to discuss the patient discharge needs back to [facility].       2:50 PM-James just spoke to Keith at [facility]. SNF Orders were placed in [facility]. Keith said that he would call me back with room and report.      Keith-Admissions at [facility]  Cell [phone]

## 2024-01-04 NOTE — NURSING
06:34:- SBP was 200. Dr Gill was notified. IVP hydralazine and her BP meds were restarted. However patient wasnot in the state to have anything from mouth as she was sleeping and she was being combative while tried to wake her up. Therefore gave her PRN 10mg of IVP hydralazine.

## 2024-01-04 NOTE — PLAN OF CARE
NURSING HOME ORDERS    01/04/2024  [facility] ([facility])  [facility] ([address])  [address]  [place] [address]  Dept: [phone]  Loc: [phone]     Admit to Nursing Home:  Skilled Nursing Facility    Diagnoses:  Active Hospital Problems    Diagnosis  POA    *GI bleed [K92.2]  Yes     Priority: 1 - High    Advance care planning [Z71.89]  Not Applicable    Positive D dimer [R79.89]  Yes    Severe malnutrition [E43]  Yes     Chronic    Chronic anemia [D64.9]  Yes     Chronic    Acute cystitis [N30.00]  Yes    Frequent falls [R29.6]  Not Applicable    Atrial fibrillation [I48.91]  Yes     Chronic    Dysphagia [R13.10]  Yes    History of pulmonary embolism [Z86.711]  Yes     Chronic    Severe late onset Alzheimer's dementia without behavioral disturbance, psychotic disturbance, mood disturbance, or anxiety [G30.1, F02.C0]  Yes     Chronic    GINA (acute kidney injury) [N17.9]  Yes    Primary hypertension [I10]  Yes     Chronic      Resolved Hospital Problems   No resolved problems to display.       Patient is homebound due to:  GI bleed    Allergies:Review of patient's allergies indicates:  No Known Allergies    Vitals:  Routine    Diet: cardiac diet    Activities:   Activity as tolerated    Goals of Care Treatment Preferences:  Code Status: Full Code    Health care agent: Maykel Lori  [facility] care agent number: [phone]       LaPOST: Yes  What is most important right now is to focus on quality of life, even if it means sacrificing a little time, improvement in condition but with limits to invasive therapies.  Accordingly, we have decided that the best plan to meet the patient's goals includes continuing with treatment.      Labs:  None    Nursing Precautions:  Aspiration , Fall, and Pressure ulcer prevention    Consults:   PT to evaluate and treat- 5 times a week, OT to evaluate and treat- 5 times a week, and Nutrition to evaluate and recommend diet     Palliative care  consult.      Miscellaneous Care: Routine Skin for Bedridden Patients:  Apply moisture barrier cream to all                   Medications: Discontinue all previous medication orders, if any. See new list below.     Medication List        START taking these medications      famotidine 40 MG tablet  Commonly known as: PEPCID  Take 1 tablet (40 mg total) by mouth every evening.     hydrOXYzine HCL 25 MG tablet  Commonly known as: ATARAX  Take 1 tablet (25 mg total) by mouth 3 (three) times daily as needed for Anxiety (Insomnia).     pantoprazole 40 MG tablet  Commonly known as: PROTONIX  Take 1 tablet (40 mg total) by mouth once daily.            CONTINUE taking these medications      acetaminophen 325 MG tablet  Commonly known as: TYLENOL  Take 2 tablets (650 mg total) by mouth every 6 (six) hours as needed (Pain).     albuterol 90 mcg/actuation inhaler  Commonly known as: PROVENTIL HFA  Inhale 2 puffs into the lungs every 4 (four) hours as needed for Wheezing. Use spacer with adult mask     apixaban 5 mg Tab  Commonly known as: ELIQUIS  Take 1 tablet (5 mg total) by mouth 2 (two) times daily. Starting 8/5     cholecalciferol (vitamin D3) 50 mcg (2,000 unit) Tab  Commonly known as: VITAMIN D3  Take 1 tablet (2,000 Units total) by mouth once daily.     EScitalopram oxalate 5 MG Tab  Commonly known as: LEXAPRO  Take 2 tablets (10 mg total) by mouth once daily.     furosemide 20 MG tablet  Commonly known as: LASIX  Take 1 tablet (20 mg total) by mouth once daily.     lisinopriL 40 MG tablet  Commonly known as: PRINIVIL,ZESTRIL  Take 1 tablet (40 mg total) by mouth once daily.     loperamide 2 mg capsule  Commonly known as: IMODIUM  Take 1 capsule (2 mg total) by mouth 4 (four) times daily as needed for Diarrhea.     melatonin 3 mg tablet  Commonly known as: MELATIN  Take 2 tablets (6 mg total) by mouth nightly as needed for Insomnia.     memantine 5 MG Tab  Commonly known as: NAMENDA  Take 1 tablet (5 mg total) by mouth  2 (two) times daily.     metoprolol tartrate 25 MG tablet  Commonly known as: LOPRESSOR  Take 1 tablet (25 mg total) by mouth 2 (two) times daily.     miconazole NITRATE 2 % 2 % top powder  Commonly known as: MICOTIN  Apply topically 2 (two) times daily. Underside of bilateral breasts     polyethylene glycol 17 gram Pwpk  Commonly known as: GLYCOLAX  Take 17 g by mouth 2 (two) times daily as needed.     potassium chloride 10 MEQ Tbsr  Commonly known as: KLOR-CON  Take 1 tablet (10 mEq total) by mouth once daily.                Immunizations Administered as of 1/4/2024       Name Date Dose VIS Date Route Exp Date    COVID-19, MRNA, LN-S, PF (Pfizer) (Purple Cap) 3/10/2021 0.3 mL -- Intramuscular --    Site: Left deltoid     : [employer]     Lot: [id number]     Comment: Adminis     COVID-19, MRNA, LN-S, PF (Pfizer) (Purple Cap) 2/17/2021 0.3 mL -- Intramuscular --    Site: Left deltoid     : Pf[employer]     Lot: [id number]     Comment: Adminis                 _________________________________  Vianca Gill MD  01/04/2024

## 2024-01-04 NOTE — PLAN OF CARE
Problem: Infection  Goal: Absence of Infection Signs and Symptoms  Outcome: Ongoing, Progressing     Problem: Adult Inpatient Plan of Care  Goal: Plan of Care Review  Outcome: Ongoing, Progressing  Goal: Patient-Specific Goal (Individualized)  Outcome: Ongoing, Progressing  Goal: Absence of Hospital-Acquired Illness or Injury  Outcome: Ongoing, Progressing  Goal: Optimal Comfort and Wellbeing  Outcome: Ongoing, Progressing  Goal: Readiness for Transition of Care  Outcome: Ongoing, Progressing     Problem: Fluid and Electrolyte Imbalance (Acute Kidney Injury/Impairment)  Goal: Fluid and Electrolyte Balance  Outcome: Ongoing, Progressing     Problem: Oral Intake Inadequate (Acute Kidney Injury/Impairment)  Goal: Optimal Nutrition Intake  Outcome: Ongoing, Progressing     Problem: Renal Function Impairment (Acute Kidney Injury/Impairment)  Goal: Effective Renal Function  Outcome: Ongoing, Progressing     Problem: Impaired Wound Healing  Goal: Optimal Wound Healing  Outcome: Ongoing, Progressing     Problem: Fall Injury Risk  Goal: Absence of Fall and Fall-Related Injury  Outcome: Ongoing, Progressing     Problem: Skin Injury Risk Increased  Goal: Skin Health and Integrity  Outcome: Ongoing, Progressing     Problem: Coping Ineffective  Goal: Effective Coping  Outcome: Ongoing, Progressing

## 2024-01-04 NOTE — PLAN OF CARE
This CM Manager spoke with the patient's son Maykel ([phone]). Discussed palliative care vs hospice. Information to be emailed to Maykel at [email or web address].

## 2024-01-04 NOTE — PLAN OF CARE
This CM Manager spoke with Christi charge nurse at [facility]. ([phone]). Per Christi, she will have admissions call Maria Elena for the patient to return. CLAUDIA Arredondo updated.

## 2024-01-05 NOTE — TELEPHONE ENCOUNTER
Spoke to patient who declined scheduling Cardiology appointment at this time. Will defer referral and contact her back in a couple of weeks. My Chart message to be sent.

## 2024-01-05 NOTE — DISCHARGE SUMMARY
[facility] Surg [address] Medicine  Discharge Summary      Patient Name: Radha Sandoval  MRN: [id number]  CINDY: [id number]  Patient Class: IP- Inpatient  Admission Date: 12/31/2023  Hospital Length of Stay: 2 days  Discharge Date and Time: 1/4/2024  7:16 PM  Attending Physician: Vianca Gill MD  Discharging Provider: Vianca Gill MD  Primary Care Provider: Agatha Alvarez    Primary Care Team: Networked reference to record PCT     HPI:   Ms. Radha Sandoval is a 88 y.o. female, with PMH of HTN, Alzheimer's dementia, chronic PE on chronic anticoagulation, chronic b/l pleural effusions, dysphagia, urinary retention, A. Fib, frequent falls, anemia, who presented on 12/31/23 after two falls at her nursing home earlier in the day. The first fall was unwitnessed and occurred when it was assumed she fell out of her wheelchair. She fall again that afternoon. She denied head injury, or LOC with either fall. She reported a headache. She was evaluated in the ED with labs showing anemia (H&H of 9.0/28.5), with left shift. A metabolic panel showed elevated creatinine of 1.6, with BUN of 21. Potassium was mildly low at 3.2. Troponin was 0.045, and a D-dimer was elevated at 0.52. A CT of the C-spine showed no acute fracture or subluxation of the C-spine. A CT Head showed no acute intracranial abnormalities. A CXR showed chronic interstitial findings. She was treated in the ED with 2L IV fluids, potassium, magnesium, and analgesics. She was placed on observation.     Procedure(s) (LRB):  EGD (ESOPHAGOGASTRODUODENOSCOPY) (N/A)      Hospital Course:   Admitted with fall and UTI. Started ceftriaxone. Noted hemoglobin downtrend and found to have melenic stool. Started pantoprazole, transfused 1U pRBCs and GI consulted. Underwent EGD with mucosal breaks with esophagitis that was likely source of bleeding. H/H stable. GI recommended to continue PPI daily and H2 blocker QHS. Patient completed full course of IV  Rocephin for her UTI. Palliative consulted to address goals of care. Patient to remain full code, but son agreeable to have Palliative care at the NH.     * GI bleed  - Noted downtrend in Hgb below 7 and found to have melena on exam.  - Started pantoprazole 80mg IV x1 followed by 40mg IV BID.  - Blood consented with son Maykel Sandoval via phone. Transfused 1U pRBCs.  - GI consulted. Appreciate assistance.   - Further workup with EGD on 1/3/2024 showed:        LA Grade D (one or more mucosal breaks involving at least 75% of        esophageal circumference) esophagitis with no bleeding was found in        the lower third of the esophagus.        Circumferential salmon-colored mucosa was present at 31 cm. The        maximum longitudinal extent of these esophageal mucosal changes was        4 cm in length.        A medium-sized hiatal hernia was present.        The entire examined stomach was normal.        The examined duodenum was normal.   - Advanced diet and GI recommended to continue protonix 40 mg daily and pepcid 40 mg QHS.  - No further melena and H/H remained stable  - Discharged on medications back to NH with palliative care    Acute cystitis  - UA consistent with infection. Urine culture with multiple organisms with non in predominance  - Completed full treatment with IV ceftriaxone 1g IV q24hr during hospitalization     Severe late onset Alzheimer's dementia without behavioral disturbance, psychotic disturbance, mood disturbance, or anxiety  Advanced care planning/goals of care  - Chronic; stable.  - Continued memantine 5mg PO BID. Started hydroxyzine 25mg PO TID PRN for anxiety/insomnia.  - Palliative care consulted. Patient to stay a full code for now. Son was agreeable for Palliative care for additional support at the NH.    Severe malnutrition  - RD consulted. Continued boost TIDWM.     Chronic anemia  - As above.      Frequent falls  - Noted; stable. No evidence of trauma.     Atrial fibrillation  - Held  apixaban as above. Metoprolol as under HTN.  - Resumed apixaban on discharge.     Dysphagia  - Aspiration precautions.     History of pulmonary embolism  - Held apixaban as above. Resumed on discharge.     GINA (acute kidney injury)  - Resolved.     Primary hypertension  - Continued metoprolol 25mg PO BID.     Goals of Care Treatment Preferences:  Code Status: Full Code    Health care agent: Maykel Sandoval  [facility] agent number: [phone]       LaPOST: Yes  What is most important right now is to focus on quality of life, even if it means sacrificing a little time, improvement in condition but with limits to invasive therapies.  Accordingly, we have decided that the best plan to meet the patient's goals includes continuing with treatment.      Consults:   Consults (From admission, onward)          Status Ordering Provider     Inpatient consult to Palliative Care  Once        Provider:  (Not yet assigned)    Completed ASAD KAUFMAN     Inpatient consult to Gastroenterology  Once        Provider:  Samy Erickson MD    Completed GONZALEZ CASPER     Inpatient consult to Registered Dietitian/Nutritionist  Once        Provider:  (Not yet assigned)    Completed ETHAN KAUR          Service: Hospital Medicine    Final Active Diagnoses:    Diagnosis Date Noted POA    PRINCIPAL PROBLEM:  GI bleed [K92.2] 09/18/2023 Yes    Advance care planning [Z71.89] 01/03/2024 Not Applicable    Positive D dimer [R79.89] 01/01/2024 Yes    Severe malnutrition [E43] 12/18/2023 Yes     Chronic    Chronic anemia [D64.9] 12/18/2023 Yes     Chronic    Acute cystitis [N30.00] 12/16/2023 Yes    Frequent falls [R29.6] 08/01/2023 Not Applicable    Atrial fibrillation [I48.91] 08/01/2023 Yes     Chronic    Dysphagia [R13.10] 07/09/2023 Yes    History of pulmonary embolism [Z86.711] 05/22/2023 Yes     Chronic    Severe late onset Alzheimer's dementia without behavioral disturbance, psychotic disturbance, mood disturbance, or anxiety [G30.1,  F02.C0] 05/22/2023 Yes     Chronic    GINA (acute kidney injury) [N17.9] 04/18/2023 Yes    Primary hypertension [I10] 06/10/2022 Yes     Chronic      Problems Resolved During this Admission:       Discharged Condition: good    Disposition: Skilled Nursing Facility    Follow Up:   Follow-up Information       Agatha Alvarez Follow up.    Specialties: Nursing Home Agency, SNF Agency  Contact information:  [address] [facility] [address] [phone]                           Patient Instructions:      ACCEPT - Ambulatory referral/consult to Cardiology   Standing Status: Future   Referral Priority: Routine Referral Type: Consultation   Referral Reason: Specialty Services Required   Requested Specialty: Cardiology   Number of Visits Requested: 1     Ambulatory referral/consult to HOME Palliative Care   Standing Status: Future   Referral Priority: Routine Referral Type: Consultation   Requested Specialty: Hospice and Palliative Medicine   Number of Visits Requested: 1     Diet Cardiac     Activity as tolerated       Significant Diagnostic Studies: N/A    Pending Diagnostic Studies:       None           Medications:  Reconciled Home Medications:      Medication List        START taking these medications      famotidine 40 MG tablet  Commonly known as: PEPCID  Take 1 tablet (40 mg total) by mouth every evening.     hydrOXYzine HCL 25 MG tablet  Commonly known as: ATARAX  Take 1 tablet (25 mg total) by mouth 3 (three) times daily as needed for Anxiety (Insomnia).     pantoprazole 40 MG tablet  Commonly known as: PROTONIX  Take 1 tablet (40 mg total) by mouth once daily.            CONTINUE taking these medications      acetaminophen 325 MG tablet  Commonly known as: TYLENOL  Take 2 tablets (650 mg total) by mouth every 6 (six) hours as needed (Pain).     albuterol 90 mcg/actuation inhaler  Commonly known as: PROVENTIL HFA  Inhale 2 puffs into the lungs every 4 (four) hours as needed for Wheezing. Use  spacer with adult mask     apixaban 5 mg Tab  Commonly known as: ELIQUIS  Take 1 tablet (5 mg total) by mouth 2 (two) times daily. Starting 8/5     cholecalciferol (vitamin D3) 50 mcg (2,000 unit) Tab  Commonly known as: VITAMIN D3  Take 1 tablet (2,000 Units total) by mouth once daily.     EScitalopram oxalate 5 MG Tab  Commonly known as: LEXAPRO  Take 2 tablets (10 mg total) by mouth once daily.     furosemide 20 MG tablet  Commonly known as: LASIX  Take 1 tablet (20 mg total) by mouth once daily.     lisinopriL 40 MG tablet  Commonly known as: PRINIVIL,ZESTRIL  Take 1 tablet (40 mg total) by mouth once daily.     loperamide 2 mg capsule  Commonly known as: IMODIUM  Take 1 capsule (2 mg total) by mouth 4 (four) times daily as needed for Diarrhea.     melatonin 3 mg tablet  Commonly known as: MELATIN  Take 2 tablets (6 mg total) by mouth nightly as needed for Insomnia.     memantine 5 MG Tab  Commonly known as: NAMENDA  Take 1 tablet (5 mg total) by mouth 2 (two) times daily.     metoprolol tartrate 25 MG tablet  Commonly known as: LOPRESSOR  Take 1 tablet (25 mg total) by mouth 2 (two) times daily.     miconazole NITRATE 2 % 2 % top powder  Commonly known as: MICOTIN  Apply topically 2 (two) times daily. Underside of bilateral breasts     polyethylene glycol 17 gram Pwpk  Commonly known as: GLYCOLAX  Take 17 g by mouth 2 (two) times daily as needed.     potassium chloride 10 MEQ Tbsr  Commonly known as: KLOR-CON  Take 1 tablet (10 mEq total) by mouth once daily.              Indwelling Lines/Drains at time of discharge:   Lines/Drains/Airways       Drain  Duration             Female External Urinary Catheter w/ Suction 01/02/24 1900 2 days                    Time spent on the discharge of patient: 35 minutes         Vianca Gill MD  Department of Hospital Medicine  [facility] ([address])

## 2024-01-05 NOTE — PLAN OF CARE
Pt transferred to [facility] via [facility]. VSS on RA. Report given to Nurse. IV removed, cath intact. All belongings packed and sent w/ pt. Pt transferred in no distress.

## 2024-01-22 NOTE — TELEPHONE ENCOUNTER
SINCERE with scheduled Cardiology appointment of 2-, and contact number provided for any questions. My Chart message to be sent.

## 2024-02-05 NOTE — ASSESSMENT & PLAN NOTE
2/5/2024         RE: Ryan Briseno  [address]  [facility] [address]        Dear Colleague,    Thank you for referring your patient, Ryan Briseno, to the [facility]. Please see a copy of my visit note below.    [facility] Dermatology Note  Encounter Date: Feb 5, 2024  Office Visit     Dermatology Problem List:  1. BCC, right upper lip. Schedule Mohs (patient has deferred in spite of written communication risk letter).       BCC, R medial lower eyelid, s/p Mohs and rhombic flap 1/31/22    2. Acrochordon left upper eyelid              LN2 today  3. Rosacea, papular pustular type.  4. Infected EIC, L neck   - I&D and ILK 20 mg/ml preformed 2/5/24  - Doxycycline 100 mg twice daily x 14 days    ____________________________________________    Assessment & Plan:    # Infected EIC, L neck  - Discussed option of I&D and treatment with ILK. Risks/benefits were discussed.   - Informed patient we do not typically excise inflamed or infected cysts.   - Patient prescribed doxycycline 100 mg for 7 days at Urgent Care and instructed to continue for another 7 days, for a 14 day course. Prescription sent today      # BCC, R upper lip, untreated  - Considering Mohs     # Bleb, R medial canthus, sequelae of treating BCC on right medial lower eyelid 1/2022   - Patient would like to schedule scar revision for right medial lower eyelid    Procedures Performed:   - Incision and Drainage (I&D). After discussion of the risks including bleeding, infection, scar, non diagnosis and skin discoloration, verbal and/or written consent was obtained. The area was prepped with alcohol and 6 mL of lidocaine with epi (1:100,000) was injected into the lesion on the L neck.  An 11 blade was used to incise the lesion and the lesion was drained. A dressing was placed.  The patient tolerated the procedure well and left the dermatology clinic in good condition.     - Intra-lesional triamcinolone  9/20/23  - Chart and interval history reviewed; discussed pt in person with primary team. Pt will be getting diuresis due to mild volume overload; had received fluids due to GINA (now resolved).   - Visited with pt at bedside; she was alert, propped up in bed, and smiled at times. She did appear mildly short of breath at times, though denied pain or shortness of breath. Let her know we would call her son after visit.   - Along with Génesis Jolly ([facility] medicine), called and spoke with her son Maykel. Medical update provided; discussed that pt will now need to be diuresed due to mild volume overload, which likely occurred from IV fluids given to treat her significant GINA. This is in indication that patient's overall health is very fragile, unfortunately.   - In discussing plan moving forward, he would like patient to return to SNF after discharge (hopefully in next day or so); he told us that pt has been participating with 1.5 hours of therapy per day, and can still play the piano - told him that is wonderful to hear!   - We further discussed plans for after SNF; he is planning on moving her to retirement NH at same facility afterwards. We discussed having NH-based palliative follow up, which he is open to. Shared with him that this will be a once a month visit from NP to start; however, when he desires to start comfort-focused care for his mother, this same team can transition pt to hospice care. While current preferences are not aligned with hospice care, encouraged him to consider this if pt starts having issues with eating and drinking (ie, dysphagia or decreased intake), is more frequently getting infections and/or hospitalized, or is spending more time sleeping.   - Let him know that our team will continue to check in on pt throughout this and any future hospital stays    See ACP notes on file for previous discussions with Dr Cervantes.    procedure note. After verbal consent and review of risk of pain and skin thinning/atrophy, positioning and cleansing with isopropyl alcohol, *0.5  total mL of triamcinolone 20 mg/mL was injected into 1 lesion(s) on the L neck. The patient tolerated the procedure well and left the dermatology clinic in good condition.    Follow-up: March 2024    Staff and Scribe:     I, KULDEEP MARSHALL, am serving as a scribe; to document services personally performed by Yordy Fong MD -based on data collection and the provider's statements to me.    Provider Disclosure:   The documentation recorded by the scribe accurately reflects the services I personally performed and the decisions made by me. I personally performed the procedures today.      Yordy Fong DO    Department of Dermatology  [facility]: Phone: [phone], Fax:[phone]  [facility]: Phone: [phone], Fax: [phone]    ____________________________________________    CC: Derm Problem (Inflamed lesion on neck. Follow up on untreated skin cancer on right upper lip.)    HPI:  Mr. Ryan Briseno is a(n) 53 year old male who presents today as a return patient for follow up. Patient was last seen virtually on 5/9/22 for scar evaluation of BCC on R medial lower eyelid.     Patient brings attention to a lesion on his neck that he is unsure if worrisome. Per patient, he first noticed a hard white nodule about one month ago. States he knicked it with a razor on Wednesday, which caused the lesion to bleed and became sore. Notes it did grow to the size of a golf ball and was red. The lesion did release some discharge of blood and pus on Saturday. He was seen at Urgent Care and was prescribed doxycycline 100 mg twice daily for 7 days and has since taken 3 doses. Notes the lesion has improved in appearance since beginning the doxycycline.      Additionally patient is interested in scar revision of R medial lower eyelid.     States he was recently diagnosed with colon cancer.     Patient is otherwise feeling well, without additional skin concerns.    Labs Reviewed:  N/A    Physical Exam:  Vitals: There were no vitals taken for this visit.  SKIN: Focused examination of face and neck was performed.  - 3.5 x 2.0 cm nodule with central ulceration and fibrinous material on the left neck    - No other lesions of concern on areas examined.     Medications:  Current Outpatient Medications   Medication     doxycycline hyclate (VIBRAMYCIN) 100 MG capsule     tadalafil (CIALIS) 10 MG tablet     bisacodyl (DULCOLAX) 5 MG EC tablet     polyethylene glycol (GOLYTELY) 236 g suspension     No current facility-administered medications for this visit.      Past Medical History:   Patient Active Problem List   Diagnosis     Elevated BP without diagnosis of hypertension     Morbid obesity (H)     Smoker     Malignant neoplasm of colon, unspecified part of colon (H)     Past Medical History:   Diagnosis Date     Hypertension      Malignant neoplasm of colon, unspecified part of colon (H) 9/29/2023        CC No referring provider defined for this encounter. on close of this encounter.      Again, thank you for allowing me to participate in the care of your patient.        Sincerely,        Yordy Fong MD

## 2024-02-07 NOTE — PROGRESS NOTES
"Subjective:       Patient ID: Radha Sandoval is a 88 y.o. female.        Chief Complaint: Follow-up    Radha Sandoval is an established patient of Agatha Alvarez here today for urgent care visit.    Multiple facilities over the past year:  [facility]  [facility]  [facility]     Numerous hospital visits as well    Declines in memory over the past year    Eloisa and Sonia are with her today, both friends and sitters  Son is Maykel but not here today    Needs a PCP to establish care and also caregivers asking about palliative care consult    Patient conversant during visit, knows she is at [facility] but then tells me she is not going to go back to work today because she plans to "quit"    Caregivers concern over frequent UTIs that inevitably lead to hospitalization    Last hospitalization was 12/2023 for GI bleed with EGD showing mucosal breaks, eliquis held but then resumed at discharge, transfused while admitted    No med list available for review today as facility administers and did not provide a list    Past medical history of HTN, alzheimer's, chronic PE on anticoagulation, urinary retention, afib, frequent falls, and anemia.                 Review of Systems   Unable to perform ROS: Dementia       Objective:      Physical Exam  Vitals and nursing note reviewed.   Constitutional:       Appearance: Normal appearance. She is well-developed.   HENT:      Head: Normocephalic.      Right Ear: External ear normal.      Left Ear: External ear normal.   Eyes:      Pupils: Pupils are equal, round, and reactive to light.   Cardiovascular:      Rate and Rhythm: Normal rate and regular rhythm.      Heart sounds: Normal heart sounds. No murmur heard.     No friction rub. No gallop.   Pulmonary:      Effort: Pulmonary effort is normal. No respiratory distress.      Breath sounds: Normal breath sounds.   Abdominal:      Palpations: Abdomen is soft.      Tenderness: There is no abdominal " "tenderness.   Skin:     General: Skin is warm and dry.   Neurological:      Mental Status: She is alert.         Assessment:       1. Recurrent UTI    2. Severe late onset Alzheimer's dementia without behavioral disturbance, psychotic disturbance, mood disturbance, or anxiety    3. BMI less than 19,adult    4. Primary hypertension    5. History of pulmonary embolism    6. Chronic anemia    7. Severe malnutrition        Plan:       Radha was seen today for follow-up.    Diagnoses and all orders for this visit:    Recurrent UTI  -     Ambulatory referral/consult to Urology; Future    Severe late onset Alzheimer's dementia without behavioral disturbance, psychotic disturbance, mood disturbance, or anxiety  -     Ambulatory referral/consult to CLINIC Palliative Care; Future    BMI less than 19,adult  -     Ambulatory referral/consult to CLINIC Palliative Care; Future    Primary hypertension - BP elevated, monitor, get list of meds from facility    History of pulmonary embolism    Chronic anemia    Severe malnutrition    Will place a referral for palliative care and urology  Establish care with PCP - Dr. Hurd - first available 4/2024  BP elevated today - unsure which medications she has gotten  She has lost weight over the past year, appetite is not great   >45 minutes spent on patient encounter    Pt has been given instructions populated from patient instructions database and has verbalized understanding of the after visit summary and information contained wherein.    Follow up with a primary care provider. May go to ER for acute shortness of breath, lightheadedness, fever, or any other emergent complaints or changes in condition.    "This note will be shared with the patient"    Future Appointments   Date Time Provider Department Center   2/8/2024 11:00 AM Gabriela Ybarra MD [facility] PAL MED [facility]   2/15/2024  1:00 PM Micha Jackson DPM NOM POD [facility] [place]   2/29/2024  1:30 PM Talha Padron MD [facility] " CARDIO [place]   4/9/2024 10:40 AM Rosendo Hurd MD [facility]

## 2024-02-08 NOTE — PROGRESS NOTES
Palliative Medicine Clinic Note      Consult Requested By: Vianney Sloan    Primary Care Physician:   Agatha Alvarez    Reason for Consult: Advance care planning and symptom management in the setting of Dementia       ASSESSMENT/PLAN:     Plan/Recommendations:  Radha was seen today for establish care.    Diagnoses and all orders for this visit:        Severe late onset Alzheimer's dementia without behavioral disturbance, psychotic disturbance, mood disturbance, or anxiety  -     Ambulatory referral/consult to CLINIC Palliative Care  -discussed changing her medications  -   discussed increasing  EScitalopram oxalate (LEXAPRO) 20 MG tablet; Take 1 tablet (20 mg total) by mouth once daily.  -  discussed changing quetiapine from 25 b.i.d. to  QUEtiapine (SEROQUEL) 50 MG tablet; Take 1 tablet (50 mg total) by mouth every evening.  Discussed black box warning with son  -place referral to audiology and Ophthalmology per son's request  -LCSW to follow up w/ son regarding Medicaid resources       BMI less than 19,adult / Frequent falls  -     Ambulatory referral/consult to CLINIC Palliative Care  -she is very frail  -receiving PT she is currently in SNF         Advance Care Planning   Advance Directives:   [place]    Agent's Name:  Maykel Sandoval   Agent's Contact Number:  [phone]    Decision Making:  Family answered questions and Patient unable to communicate due to disease severity/cognitive impairment  Goals of Care: What is most important right now is to focus on quality of life, even if it means sacrificing a little time, improvement in condition but with limits to invasive therapies.  Son shared that he wants to make sure that she maintains her current quality of life and that she can be as well as possible and as comfortable as possible.    Accordingly, we have decided that the best plan to meet the patient's goals includes continuing with treatment.    He also shared that she had signed her advanced  care planning documents 2 years ago, and that all the family is aware of her wishes.  Confirmed the post document DNR code status.               Understanding of disease and Illness Trajectory: Family  has  adequate understanding of patient's illness, they can benefit from continued education on what to expect in the future.      18 min time was spent on advance care planning, goals of care discussion, emotional support, formulating and communicating prognosis and goals of care, exploring burden/benefit of various approaches of treatment.        Follow up:    Plan discussed with referring     SUBJECTIVE:     History obtained from:  Son and caregiver/friends Walker Trivedi complaint:   Chief Complaint   Patient presents with    Establish Care         History of Present Illness / Interval History:  Radha Sandoval is 88 y.o. year old female presenting with dementia.  Referred to Palliative Care for evaluation and management of advance care planning, and additional support..     Patient initially delirious more sleepy than usual, vomited a couple of times in the clinic.  Woke up and said she was feeling fine she was just tired.  Caregiver stated that she had done PT in the morning and then traveled to the appointment which is very unusual not part of her routine.  Patient and caregivers advocated for going home.    At baseline she is very anxious she is somewhat depressed.  Son shared that he feels like she should do more therapy she should become more independent.  He is worried about the coordination of care and he is access to primary care in the nursing home.  He would want her to go see the eye doctor and to get some hearing aids.  He would want her to get her dentures in so that she can speak and eat better.  Friends shared that the denture is no longer fit and she finds them uncomfortable so she has not wearing them because of that reason.      Labs:  Normal creatinine, anemia hemoglobin of 8.7 1m  ago      Review of Symptoms      Symptom Assessment (ESAS 0-10 Scale)  Unable to complete assessment due to Dementia     CAM / Delirium:  Negative  Constipation:  Negative  Diarrhea:  Negative      Pain Assessment in Advanced Demential Scale (PAINAD)   Breathing - Independent of vocalization:  0  Negative vocalization:  0  Facial expression:  0  Body language:  0  Consolability:  0  Total:  0    Functional Assessment Scale (FAST):  6d    Living Arrangements:  Lives in assisted living    Psychosocial/Cultural:   See Palliative Psychosocial Note: Yes  Lives in [facility], she has 2 adult sons David and jt.  And he she has 2 friends/caregivers that visit everyday Sonia and Eloisa  **Primary  to Follow**  Palliative Care  Consult: Yes    Spiritual:  F - Tanisha and Belief:  Roman Catholic        Disease History:  PMH of HTN, Alzheimer's dementia, chronic PE on chronic anticoagulation, chronic b/l pleural effusions, dysphagia, urinary retention, A. Fib, frequent falls, anemia, Lives in [facility]       Medications:    Current Outpatient Medications:     acetaminophen (TYLENOL) 325 MG tablet, Take 2 tablets (650 mg total) by mouth every 6 (six) hours as needed (Pain)., Disp: 60 tablet, Rfl: 1    albuterol (PROVENTIL HFA) 90 mcg/actuation inhaler, Inhale 2 puffs into the lungs every 4 (four) hours as needed for Wheezing. Use spacer with adult mask, Disp: 18 g, Rfl: 1    apixaban (ELIQUIS) 5 mg Tab, Take 1 tablet (5 mg total) by mouth 2 (two) times daily. Starting 8/5, Disp: 60 tablet, Rfl: 11    cholecalciferol, vitamin D3, (VITAMIN D3) 50 mcg (2,000 unit) Tab, Take 1 tablet (2,000 Units total) by mouth once daily., Disp: , Rfl:     famotidine (PEPCID) 40 MG tablet, Take 1 tablet (40 mg total) by mouth every evening., Disp: 30 tablet, Rfl: 0    furosemide (LASIX) 20 MG tablet, Take 1 tablet (20 mg total) by mouth once daily., Disp: 30 tablet, Rfl: 0    lisinopriL  (PRINIVIL,ZESTRIL) 40 MG tablet, Take 1 tablet (40 mg total) by mouth once daily., Disp: 30 tablet, Rfl: 11    loperamide (IMODIUM) 2 mg capsule, Take 1 capsule (2 mg total) by mouth 4 (four) times daily as needed for Diarrhea., Disp: 60 capsule, Rfl: 0    melatonin (MELATIN) 3 mg tablet, Take 2 tablets (6 mg total) by mouth nightly as needed for Insomnia., Disp: 30 tablet, Rfl: 3    memantine (NAMENDA) 5 MG Tab, Take 1 tablet (5 mg total) by mouth 2 (two) times daily., Disp: 60 tablet, Rfl: 11    metoprolol tartrate (LOPRESSOR) 25 MG tablet, Take 1 tablet (25 mg total) by mouth 2 (two) times daily., Disp: 60 tablet, Rfl: 11    miconazole NITRATE 2 % (MICOTIN) 2 % top powder, Apply topically 2 (two) times daily. Underside of bilateral breasts, Disp: 85 g, Rfl: 3    pantoprazole (PROTONIX) 40 MG tablet, Take 1 tablet (40 mg total) by mouth once daily., Disp: 30 tablet, Rfl: 0    polyethylene glycol (GLYCOLAX) 17 gram PwPk, Take 17 g by mouth 2 (two) times daily as needed., Disp: , Rfl: 0    potassium chloride (KLOR-CON) 10 MEQ TbSR, Take 1 tablet (10 mEq total) by mouth once daily., Disp: 30 tablet, Rfl: 11    EScitalopram oxalate (LEXAPRO) 20 MG tablet, Take 1 tablet (20 mg total) by mouth once daily., Disp: 30 tablet, Rfl: 11    QUEtiapine (SEROQUEL) 50 MG tablet, Take 1 tablet (50 mg total) by mouth every evening., Disp: 30 tablet, Rfl: 1      External  database queried on 02/09/2024  by Gabriela Ybarra .   The results reviewed and considered with the clinical data in the decision whether or not to prescribe a controlled substance.  12/15/2023 12/15/2023 1 Alprazolam 0.25 Mg Tablet 60.00 30 Em Are [id number] [facility] (1151) 0 1.00 LME Private Pay [place]   11/16/2023 11/16/2023 1 Alprazolam 0.25 Mg Tablet 60.00 30 Li Pre [id number] Sen (8463) 0          Review of patient's allergies indicates:  No Known Allergies    OBJECTIVE:       Physical Exam:  Vitals: Temp: 97.3 °F (36.3 °C) (02/08/24 1054)  Pulse: 68 (02/08/24  1054)  Resp: 18 (02/08/24 1054)  BP: 110/66 (02/08/24 1054)  SpO2: 97 % (02/08/24 1054)  Physical Exam  Constitutional:       General: She is not in acute distress.  HENT:      Head: Normocephalic and atraumatic.   Pulmonary:      Effort: Pulmonary effort is normal. No respiratory distress.   Musculoskeletal:      Cervical back: Neck supple.   Neurological:      Mental Status: She is alert. She is disoriented.   Psychiatric:         Mood and Affect: Mood and affect normal.                 I spent a total of 61 minutes on the day of the visit. This includes face to face time in discussion of goals of care, symptom assessment, coordination of care and emotional support.  This also includes non-face to face time preparing to see the patient (eg, review of tests/imaging), obtaining and/or reviewing separately obtained history, documenting clinical information in the electronic or other health record, independently interpreting results and communicating results to the patient/family/caregiver, or care coordinator.       Additional 18min time spent on a voluntary advance care planning and /or goals of care discussion, providing emotional support, formulating and communicating prognosis and exploring burden/benefit of various approaches of treatment.       This note was partially created using "[employer] Voice Recognition software. Typographical and content errors may occur with this process. While efforts are made to detect and correct such errors, in some cases errors will persist. For this reason, wording in this document should be considered in the proper context and not strictly verbatim.      Signature: Gabriela Ybarra MD

## 2024-02-12 NOTE — ED PROVIDER NOTES
"Encounter Date: 2/12/2024    SCRIBE #1 NOTE: I, Vazquez Jimenez, am scribing for, and in the presence of,  Markel Esteban II, MD.       History     Chief Complaint   Patient presents with    Hypotension     Sent from [facility] for low BP of 70/30. Patient AAOx3. Given 100 ml NS. C/o generalized weakness     Time seen by provider: 2:30 PM    Radha Sandoval is a 88 y.o. female, with a PMHx of HTN, A-Fib with RVR, DVT w/ PE, and Alzheimer's, who presents to the ED with generalized weakness onset this morning. The patient currently stays at [facility] and reports an episode this morning in which she was unable eat or talk and could not move except to roll around. Patient states she began seeing "balls" in her vision and contacted a friend who notified staff. Her blood pressure was measured at 70/30 and EMS was called. She was given 100 ml NS en route and upon arrival patient reports her symptoms have improved. She notes poor eating and drinking recently and denies any fevers. This is the extent of the patient's complaints today in the Emergency Department.      The history is provided by the patient and the nursing home. The history is limited by the condition of the patient.   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 05/22/2023    Lumbar spondylosis with myelopathy 04/25/2023    Multiple closed right sided fractures of pelvis without disruption of pelvic ring 07/09/2023    Primary hypertension 06/10/2022    Subdural hygroma 07/08/2023     Past Surgical History: "   Procedure Laterality Date    ESOPHAGOGASTRODUODENOSCOPY N/A 1/3/2024    Procedure: EGD (ESOPHAGOGASTRODUODENOSCOPY);  Surgeon: Sabas Busby MD;  Location: [facility];  Service: Endoscopy;  Laterality: N/A;    REPAIR, HERNIA, INGUINAL, WITHOUT HISTORY OF PRIOR REPAIR, AGE 5 YEARS OR OLDER Right 5/6/2023    Procedure: REPAIR, HERNIA, INGUINAL, WITHOUT HISTORY OF PRIOR REPAIR, AGE 5 YEARS OR OLDER;  Surgeon: Maurice Piper MD;  Location: [address];  Service: General;  Laterality: Right;  possible laparotomy, possible bowel resection     No family history on file.  Social History     Tobacco Use    Smoking status: Never    Smokeless tobacco: Never   Substance Use Topics    Drug use: Never     Review of Systems   Unable to perform ROS: Dementia   Constitutional:  Positive for activity change and appetite change. Negative for chills, diaphoresis, fatigue and fever.   HENT:  Negative for ear discharge, facial swelling, hearing loss, nosebleeds and sore throat.    Eyes:  Negative for photophobia, pain, discharge and visual disturbance.   Respiratory:  Negative for choking, chest tightness, shortness of breath and wheezing.    Cardiovascular:  Negative for chest pain, palpitations and leg swelling.   Gastrointestinal:  Negative for abdominal distention, abdominal pain, blood in stool, constipation, diarrhea, nausea and vomiting.   Genitourinary:  Negative for difficulty urinating, dysuria, frequency, hematuria, pelvic pain, urgency, vaginal bleeding and vaginal discharge.   Musculoskeletal:  Negative for back pain, gait problem, joint swelling, myalgias and neck stiffness.   Skin:  Negative for color change, pallor and rash.   Neurological:  Positive for weakness. Negative for seizures, syncope, facial asymmetry, numbness and headaches.   Hematological:  Negative for adenopathy. Does not bruise/bleed easily.   Psychiatric/Behavioral:  Negative for confusion, hallucinations, self-injury and suicidal ideas.         Physical Exam     Initial Vitals [02/12/24 1350]   BP Pulse Resp Temp SpO2   (!) 138/41 (!) 58 16 98.1 °F (36.7 °C) 98 %      MAP       --         Physical Exam    Nursing note and vitals reviewed.  Constitutional: She appears well-developed. She is not diaphoretic. No distress.   Frail, elderly female. No distress.     HENT:   Head: Normocephalic and atraumatic.   Moist mucous membranes.   Eyes: EOM are normal. Pupils are equal, round, and reactive to light.   No pallor or icterus.   Neck: Neck supple.   Normal range of motion.  Cardiovascular:  Normal rate, regular rhythm and normal heart sounds.     Exam reveals no gallop and no friction rub.       No murmur heard.  Pulmonary/Chest: Breath sounds normal. No respiratory distress. She has no wheezes. She has no rhonchi. She has no rales.   Abdominal: Abdomen is soft. There is no abdominal tenderness.   Musculoskeletal:         General: No tenderness or edema. Normal range of motion.      Cervical back: Normal range of motion and neck supple.     Lymphadenopathy:     She has no cervical adenopathy.   Neurological: She is alert and oriented to person, place, and time.   Skin: Skin is warm and dry.   Psychiatric: She has a normal mood and affect. Her behavior is normal.   Poor short term memory due to dementia. Does not appear delirious or acutely confused.          ED Course   Procedures  Labs Reviewed   CBC W/ AUTO DIFFERENTIAL - Abnormal; Notable for the following components:       Result Value    RBC 3.41 (*)     Hemoglobin 8.9 (*)     Hematocrit 28.8 (*)     MCH 26.1 (*)     MCHC 30.9 (*)     RDW 18.5 (*)     Gran # (ANC) 8.4 (*)     Lymph # 0.8 (*)     Gran % 85.1 (*)     Lymph % 7.8 (*)     All other components within normal limits   COMPREHENSIVE METABOLIC PANEL - Abnormal; Notable for the following components:    Potassium 3.4 (*)     CO2 22 (*)     Glucose 123 (*)     Albumin 3.2 (*)     ALT 7 (*)     eGFR 44 (*)     All other components within normal  limits     EKG Readings: (Independently Interpreted)   Sinus rhythm with rate of 60. No ST or T wave changes. No acute ischemia or arrhythmia.        Imaging Results    None          Medications   0.9%  NaCl infusion (1,000 mLs Intravenous New Bag 2/12/24 1624)   potassium bicarbonate disintegrating tablet 20 mEq (20 mEq Oral Given 2/12/24 1624)     Medical Decision Making  Amount and/or Complexity of Data Reviewed  Labs: ordered.    Risk  Prescription drug management.    Patient presents by EMS after blood pressure was noted to be low by nursing home staff.  The patient did complain of weakness to them however she has a very poor historian, with dementia.  Here during my exam she is very tangential, but is not acutely ill-appearing.  EMS gave her a fluid bolus of 200 cc on the way in and blood pressure has normalized upon arrival here.  Continue IV fluid repletion.  Laboratory studies do show very mild GINA along with mild hypokalemia.  Given oral potassium to help replenish potassium.  GINA is expected to be improved by the IV fluid repletion.  Laboratory studies also show chronic anemia, similar to prior values.  No leukocytosis, suggesting against systemic infectious process.  At this time patient remains normotensive.  Stable for discharge back to nursing home.        Scribe Attestation:   Scribe #1: I performed the above scribed service and the documentation accurately describes the services I performed. I attest to the accuracy of the note.    Physician Attestation for Scribe: I, TL, reviewed documentation as scribed in my presence, which is both accurate and complete.                              Clinical Impression:  Final diagnoses:  [I95.9] Hypotension  [E86.0] Dehydration (Primary)  [I95.9] Transient hypotension  [E87.6] Hypokalemia          ED Disposition Condition    Discharge Stable          ED Prescriptions    None       Follow-up Information       Follow up With Specialties Details Why Contact Info     [facility], SNF Agency In 3 days  [address] [address]  [phone]               Markel Esteban II, MD  02/12/24 6134

## 2024-02-13 NOTE — ED NOTES
-sent from [facility] for low BP, initial BP in /51  LOC: The patient is awake, alert and aware of environment, oriented to self only, pt w/ hx of Alzheimer's/memory issues.  APPEARANCE: Patient resting comfortably and in no acute distress, patient is clean and well groomed, patient's clothing is properly fastened.  SKIN: The skin is warm and dry, patient has normal skin turgor and moist mucus membranes, skin intact, no breakdown or brusing noted.  MUSKULOSKELETAL: Patient moving all extremities well, no obvious swelling or deformities noted.  RESPIRATORY: Airway is open and patent, respirations are spontaneous, patient has a normal effort and rate. Breath sounds are clear and equal bilaterally.  CARDIAC: Normal heart sounds. No peripheral edema.  ABDOMEN: Soft and non tender to palpation, no distention noted. Bowel sounds present.  
LOC: The patient is awake, alert and aware of environment with an appropriate affect, the patient is oriented x 3 and speaking appropriately.  APPEARANCE: Patient resting comfortably and in no acute distress, patient is clean and well groomed, patient's clothing is properly fastened.  SKIN: The skin is warm and dry, patient has normal skin turgor and moist mucus membranes, skin intact, no breakdown or brusing noted.  MUSKULOSKELETAL: Patient moving all extremities well, no obvious swelling or deformities noted.  RESPIRATORY: Airway is open and patent, respirations are spontaneous, patient has a normal effort and rate. Breath sounds are clear and equal bilaterally.  CARDIAC: Normal heart sounds. No peripheral edema.  ABDOMEN: Soft and non tender to palpation, no distention noted. Bowel sounds present.  
Patient care resumed  
No

## 2024-02-26 NOTE — PROGRESS NOTES
Subjective:      Patient ID: Radha Sandoval is a 89 y.o. female.    Chief Complaint:   Foot Problem (Referred by orthopedist to assist with helping pt regain strength with feet)    Radha is a 89 y.o. female who presents to the clinic for evaluation and treatment of high risk feet. 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 The patient's chief complaint is long, thick toenails. This patient has documented high risk feet requiring routine maintenance secondary to peripheral vascular disease.    PCP: [facility]    Date Last Seen by PCP:   Chief Complaint   Patient presents with    Foot Problem     Referred by orthopedist to assist with helping pt regain strength with feet         Current shoe gear:  Affected Foot: Casual shoes     Unaffected Foot: Casual shoes    Last encounter in this department: Visit date not found    Hemoglobin A1C   Date Value Ref Range Status   12/17/2023 5.8 (H) 4.0 - 5.6 % Final     Comment:     ADA Screening Guidelines:  5.7-6.4%  Consistent with prediabetes  >or=6.5%  Consistent with diabetes    High levels of fetal hemoglobin interfere with the HbA1C  assay. Heterozygous hemoglobin variants (HbS, HgC, etc)do  not significantly interfere with this assay.   However, presence of multiple variants may affect accuracy.         Review of Systems   Constitutional: Negative for chills, decreased  appetite, fever and malaise/fatigue.   HENT:  Negative for congestion, hearing loss, hoarse voice and tinnitus.    Eyes:  Negative for blurred vision, double vision, pain and visual disturbance.   Cardiovascular:  Positive for claudication. Negative for chest pain, cyanosis, palpitations and syncope.   Respiratory:  Negative for hemoptysis and shortness of breath.    Endocrine: Negative for cold intolerance and heat intolerance.   Hematologic/Lymphatic: Negative for adenopathy and bleeding problem.   Skin:  Positive for color change, dry skin, nail changes and poor wound healing.   Musculoskeletal:  Positive for arthritis, joint pain and stiffness. Negative for myalgias.   Gastrointestinal:  Negative for bloating, change in bowel habit, nausea and vomiting.   Genitourinary:  Negative for dysuria, flank pain and hematuria.   Neurological:  Positive for disturbances in coordination, loss of balance and sensory change. Negative for numbness.   Psychiatric/Behavioral:  Negative for altered mental status, hallucinations, memory loss and suicidal ideas.    Allergic/Immunologic: Negative for environmental allergies and persistent infections.         Objective:      Physical Exam  Vitals reviewed.   Constitutional:       Appearance: She is well-developed.   Cardiovascular:      Pulses:           Dorsalis pedis pulses are 0 on the right side and 0 on the left side.        Posterior tibial pulses are 1+ on the right side and 1+ on the left side.   Pulmonary:      Effort: Pulmonary effort is normal.   Musculoskeletal:         General: Normal range of motion.      Comments: Anterior, lateral, and posterior muscle groups bilateral lower extremities show strength 4 over 5 symmetrically. Inspection and palpation of the joints and bones reveal no crepitus or joint effusion.    Bilateral ankle contracture noted/equinus deformity with digital contractures nonreducible noted.   Feet:      Right foot:      Skin integrity: Callus and  dry skin present.      Left foot:      Skin integrity: Callus and dry skin present.   Skin:     General: Skin is warm and dry.      Capillary Refill: Capillary refill takes 2 to 3 seconds.      Coloration: Skin is pale.      Comments: Skin bilateral lower extremities noted to be thin, dry, and atrophic.  Toenails thickened, discolored, with subungual fungal debris and tenderness noted.  Hyperkeratotic lesions noted to both feet plantarly with tenderness.   Neurological:      Mental Status: She is alert and oriented to person, place, and time.      Sensory: Sensory deficit present.      Motor: Atrophy present.      Deep Tendon Reflexes: Reflexes abnormal.      Reflex Scores:       Patellar reflexes are 1+ on the right side and 1+ on the left side.       Achilles reflexes are 1+ on the right side and 1+ on the left side.          Assessment:       Encounter Diagnoses   Name Primary?    Right foot pain Yes    Equinus contracture of right ankle     Lumbar spondylosis with myelopathy     Prediabetes      Independent visualization of imaging was performed.  Results were reviewed in detail with patient.       Plan:       Radha was seen today for foot problem.    Diagnoses and all orders for this visit:    Right foot pain  -     Ambulatory referral/consult to Physical/Occupational Therapy; Future    Equinus contracture of right ankle  -     Ambulatory referral/consult to Physical/Occupational Therapy; Future  -     HME - OTHER    Lumbar spondylosis with myelopathy  -     HME - OTHER    Prediabetes  -     HME - OTHER      I counseled the patient on her conditions, their implications and medical management.    The nature of the condition, options for management, as well as potential risks and complications were discussed in detail with patient. Patient was amenable to my recommendations and left my office fully informed and will follow up as instructed or sooner if necessary.      Shoe inspection and foot education  discussed in detail. Patient reminded of the importance of good nutrition, and proper foot hygeine. We discussed wearing proper shoe gear and to contact our office with any new questions or concerns.    I recommended patient be fitted for orthoses.  I explained that orthoses may improve function of the foot, reduce pain, decrease pronation, increase efficiency of muscle function of the foot and ankle and prevent surgery.  Alternative forms of biomechanical control of the foot and ankle were discussed with the patient.      Return to clinic in 3-6 months or sooner if problems arise

## 2024-02-29 NOTE — PROGRESS NOTES
Subjective:      Radha Sandoval is a 89 y.o. female who was self-referred for evaluation of her frequent UTIs.    She presents today with her caretaker.    The patient presents today due to her frequent UTIs. She voids per depends but is able to make it to the restroom to void. She denies bothersome urinary symptoms- dysuria, frequency, urgency and hematuria.  Per chart review it appears she is often brought to the ED with AMS and treated for a UTI- cultures have typically shown no growth however.     Admits to poor water intake. She loves diet coke.   Denies a history of nephrolithiasis.       Urine Culture   4/18/2023 No significant growth    4/22/2023 EVELYNE ALBICANS !    5/6/2023 No growth    5/22/2023 No growth    7/8/2023 Multiple organisms isolated. None in predominance. Repeat if clinically necessary    7/23/2023 EVELYNE GLABRATA !    7/31/2023 ENTEROCOCCUS FAECALIS !    8/9/2023 ENTEROCOCCUS GALLINARUM !    9/18/2023 No growth    12/16/2023 No growth    1/1/2024 Multiple organisms isolated. None in predominance. Repeat if clinically necessary       The following portions of the patient's history were reviewed and updated as appropriate: allergies, current medications, past family history, past medical history, past social history, past surgical history and problem list.    Review of Systems  Constitutional: no fever or chills  ENT: no nasal congestion or sore throat  Respiratory: no cough or shortness of breath  Cardiovascular: no chest pain or palpitations  Gastrointestinal: no nausea or vomiting, tolerating diet  Genitourinary: as per HPI  Hematologic/Lymphatic: no easy bruising or lymphadenopathy  Musculoskeletal: no arthralgias or myalgias  Neurological: no seizures or tremors  Behavioral/Psych: no auditory or visual hallucinations     Objective:   Vital Signs:   Vitals:    02/29/24 0942   BP: 137/61   Pulse: 94   Resp: 16     Physical Exam   General: alert and oriented, no acute distress  Head:  normocephalic, atraumatic  Neck: supple, normal ROM  Respiratory: Symmetric expansion, non-labored breathing  Cardiovascular: regular rate and rhythm  Abdomen: soft, non tender, non distended  Pelvic: deferred  Skin: normal coloration and turgor, no rashes, no suspicious skin lesions noted  Neuro: alert and oriented x3, no gross deficits  Psych: normal judgment and insight, normal mood/affect, and non-anxious    Lab Review   Urinalysis demonstrates : no sample  Lab Results   Component Value Date    WBC 9.87 02/12/2024    HGB 8.9 (L) 02/12/2024    HCT 28.8 (L) 02/12/2024    HCT 24 (L) 08/01/2023    MCV 85 02/12/2024     02/12/2024     Lab Results   Component Value Date    CREATININE 1.2 02/12/2024    BUN 21 02/12/2024       Imaging   None    Assessment:     1. History of UTI      Plan:   Diagnoses and all orders for this visit:    History of UTI    Plan:  --Reviewed culture patterns over the a last year- only 2 positive urine cultures, otherwise no growth or yeast   --Discussed antibiotic prophylaxis, defer for now   --Recommend only treating culture proven bladder infections   --Increase water intake  --Change depends regularly!

## 2024-03-05 NOTE — TELEPHONE ENCOUNTER
LM with rescheduled Cardiology appointment of 4-, and contact number provided for any questions. My Chart message to be sent.

## 2024-03-08 NOTE — ED NOTES
Pt arrives on 15 LPM via NRB w/ CPR in progress. MD Reginald at bedside confirms patient is DNR. Compressions stopped.

## 2024-03-08 NOTE — ED NOTES
[facility] called to report pt's death. On Call investigator to return call in 15 min.

## 2024-03-09 NOTE — ED PROVIDER NOTES
Encounter Date: 3/8/2024       History     Chief Complaint   Patient presents with    GI Bleeding     Pt presents to ED via EMS from [facility] w/ c/o GI bleed.    Cardiac Arrest     Radha Sandoval is a 89 y.o. female with PMH of pulmonary embolism, atrial fibrillation, Alzheimer's, presenting to [facility] ED for GI bleeding/cardiac arrest.  Patient arriving via EMS from [facility].  Per report, patient had an episode of bloody stool which prompted staff to call EMS.  Patient lost pulse/blood pressure in route.  Patient arriving with active CPR and bag-valve mask ventilation per EMS.  States that patient had cardiac arrest 2 minutes prior to arrival.  Patient was transferred to bed and CPR continued for 2 minutes before paperwork was identified showing that patient is DNR/comfort care.  At that time, resuscitative efforts were ceased.  Time of death 5:06 p.m..        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 05/22/2023    Lumbar spondylosis with myelopathy 04/25/2023    Multiple closed right sided fractures of pelvis without disruption of pelvic ring 07/09/2023    Primary hypertension 06/10/2022    Subdural hygroma 07/08/2023     Past Surgical History:   Procedure Laterality Date    ESOPHAGOGASTRODUODENOSCOPY N/A 1/3/2024    Procedure: EGD (ESOPHAGOGASTRODUODENOSCOPY);  Surgeon: Sabas Busby MD;  Location: [facility];  Service: Endoscopy;  Laterality: N/A;    REPAIR, HERNIA, INGUINAL, WITHOUT HISTORY OF PRIOR REPAIR, AGE 6 YEARS OR OLDER Right 5/6/2023    Procedure: REPAIR,  HERNIA, INGUINAL, WITHOUT HISTORY OF PRIOR REPAIR, AGE 5 YEARS OR OLDER;  Surgeon: Maurice Piper MD;  Location: [facility] OR [address] [place] [address];  Service: General;  Laterality: Right;  possible laparotomy, possible bowel resection     No family history on file.  Social History     Tobacco Use    Smoking status: Never    Smokeless tobacco: Never   Substance Use Topics    Drug use: Never     Review of Systems    Physical Exam     Initial Vitals   BP Pulse Resp Temp SpO2   03/08/24 1708 03/08/24 1706 03/08/24 1706 -- 03/08/24 1706   (!) 45/30 (!) 0 (!) 0  (!) 0 %      MAP       --                Physical Exam    Constitutional: She appears cachectic. She appears toxic.   Eyes:   Pupils approximately 3 mm and fixed.   Cardiovascular:            Pulses:       Carotid pulses are 0 on the right side and 0 on the left side.       Femoral pulses are 0 on the right side and 0 on the left side.  Pulmonary/Chest: Apnea noted.   Abdominal: She exhibits no distension.           ED Course   Procedures  Labs Reviewed - No data to display       Imaging Results    None          Medications - No data to display  Medical Decision Making  89-year-old female with history of pulmonary embolism, atrial fibrillation, dementia presenting for cardiac arrest.  Initially patient is apneic/pulseless.  EMS actively doing CPR and ventilating via bag-valve mask.     Per EMS, report from nursing home staff stated that she was currently full code.  Paperwork provided by nursing home facility showed that patient is currently DNR/comfort care.  Upon chart review, patient's prior hospital visits redemonstrated DNR status.  Resuscitative efforts were stopped to honor with patient's end of life wishes.  Time of death called at 5:06 p.m..  Discussed with patient's son over the phone.  Attempted to call him back to determine if he would like to visit before [place] [facility] but there was no answer.    Amount and/or Complexity of Data  Reviewed  Independent Historian: caregiver and EMS  External Data Reviewed: notes.                                      Clinical Impression:  Final diagnoses:  [I46.9] Cardiac arrest (Primary)  [K92.2] Gastrointestinal hemorrhage, unspecified gastrointestinal hemorrhage type  [Z66] DNR (do not resuscitate)          ED Disposition Condition                     Raine Tovar MD  Resident  24

## 2024-03-09 NOTE — ED NOTES
Pt arrives to ED with chest compression in progress by EMS, pt lost pulse upon arrival. 1 Round of chest compression performed until pt's cover sheet from NH stated she is a DNR. Compressions ceased. No pulse felt, no cardiac activity on ultrasound, TOD called by Eyad Montelongo MD at 1706.

## (undated) DEVICE — TAPE SURG DURAPORE 2 X10YD

## (undated) DEVICE — SYS LABEL CORRECT MED

## (undated) DEVICE — APPLICATOR CHLORAPREP ORN 26ML

## (undated) DEVICE — DRESSING TRANS 4X4 3/4

## (undated) DEVICE — SUT VICRYL PLUS 0 CT1 18IN

## (undated) DEVICE — SPONGE COTTON TRAY 4X4IN

## (undated) DEVICE — DRAPE STERI-DRAPE 1000 17X11IN

## (undated) DEVICE — DRAPE THREE-QTR REINF 53X77IN

## (undated) DEVICE — SUT VICRYL 3-0 27 SH

## (undated) DEVICE — ADHESIVE DERMABOND ADVANCED

## (undated) DEVICE — SUT PDS II 2-0 CT-2 VIL

## (undated) DEVICE — SUT MONOCRYL 3-0 PS-1

## (undated) DEVICE — TRAY MINOR GEN SURG OMC

## (undated) DEVICE — DRESSING AQUACEL RIBBON 2X45CM

## (undated) DEVICE — NDL 18GA X1 1/2 REG BEVEL

## (undated) DEVICE — DRAPE C-ARMOR EQUIPMENT COVER

## (undated) DEVICE — GOWN POLY REINF BRTH SLV XL

## (undated) DEVICE — PACK DRAPE UNIVERSAL CONVERTOR

## (undated) DEVICE — SUT 2/0 30IN SILK BLK BRAI

## (undated) DEVICE — DRAPE LAP T SHT W/ INSTR PAD

## (undated) DEVICE — TOWEL OR DISP STRL BLUE 4/PK

## (undated) DEVICE — DRAPE INCISE IOBAN 2 23X17IN

## (undated) DEVICE — SUT MCRYL PLUS 4-0 PS2 27IN

## (undated) DEVICE — TRAY MINOR ORTHO OMC

## (undated) DEVICE — GOWN SURGICAL X-LARGE

## (undated) DEVICE — DRAPE C-ARM ELAS CLIP 42X120IN

## (undated) DEVICE — NDL HYPO REG 25G X 1 1/2

## (undated) DEVICE — ELECTRODE REM PLYHSV RETURN 9

## (undated) DEVICE — DRAIN PENROSE XRAY 12 X 1/4 ST

## (undated) DEVICE — SUT VICRYL PLUS 3-0 SH 18IN